# Patient Record
Sex: FEMALE | Race: WHITE | NOT HISPANIC OR LATINO | Employment: OTHER | URBAN - METROPOLITAN AREA
[De-identification: names, ages, dates, MRNs, and addresses within clinical notes are randomized per-mention and may not be internally consistent; named-entity substitution may affect disease eponyms.]

---

## 2017-01-23 ENCOUNTER — TELEPHONE (OUTPATIENT)
Dept: CARDIOLOGY | Facility: CLINIC | Age: 77
End: 2017-01-23

## 2017-01-23 NOTE — TELEPHONE ENCOUNTER
The pt called to verify her medication dosage as her home health RN has been giving her lisinopril BID.  She noticed on the bottle that she was only supposed to be taking it once daily.  I confirmed this for her (once daily) and she states she will stop taking it twice a day.

## 2017-03-02 ENCOUNTER — TRANSCRIBE ORDERS (OUTPATIENT)
Dept: ADMINISTRATIVE | Facility: HOSPITAL | Age: 77
End: 2017-03-02

## 2017-03-02 DIAGNOSIS — R10.84 GENERALIZED ABDOMINAL PAIN: Primary | ICD-10-CM

## 2017-03-03 ENCOUNTER — HOSPITAL ENCOUNTER (OUTPATIENT)
Dept: CT IMAGING | Facility: HOSPITAL | Age: 77
Discharge: HOME | End: 2017-03-03
Attending: FAMILY MEDICINE | Admitting: FAMILY MEDICINE

## 2017-03-03 DIAGNOSIS — R10.84 GENERALIZED ABDOMINAL PAIN: ICD-10-CM

## 2017-03-03 PROCEDURE — 74150 CT ABDOMEN W/O CONTRAST: CPT

## 2017-04-17 ENCOUNTER — TRANSCRIBE ORDERS (OUTPATIENT)
Dept: LAB | Facility: HOSPITAL | Age: 77
End: 2017-04-17

## 2017-04-17 ENCOUNTER — TRANSCRIBE ORDERS (OUTPATIENT)
Dept: ADMINISTRATIVE | Facility: HOSPITAL | Age: 77
End: 2017-04-17

## 2017-04-17 DIAGNOSIS — R10.811 RIGHT UPPER QUADRANT ABDOMINAL TENDERNESS, REBOUND TENDERNESS PRESENCE NOT SPECIFIED: Primary | ICD-10-CM

## 2017-04-19 ENCOUNTER — TRANSCRIBE ORDERS (OUTPATIENT)
Dept: LAB | Facility: HOSPITAL | Age: 77
End: 2017-04-19

## 2017-04-19 ENCOUNTER — LAB (OUTPATIENT)
Dept: LAB | Facility: HOSPITAL | Age: 77
End: 2017-04-19

## 2017-04-19 DIAGNOSIS — E27.8 ADRENAL MASS (HCC): ICD-10-CM

## 2017-04-19 DIAGNOSIS — E27.8 ADRENAL MASS (HCC): Primary | ICD-10-CM

## 2017-04-19 LAB
ANION GAP SERPL CALCULATED.3IONS-SCNC: 1 MMOL/L (ref 3–11)
BUN BLD-MCNC: 31 MG/DL (ref 9–23)
BUN/CREAT SERPL: 20.7 (ref 7–25)
CALCIUM SPEC-SCNC: 11 MG/DL (ref 8.7–10.4)
CHLORIDE SERPL-SCNC: 101 MMOL/L (ref 99–109)
CO2 SERPL-SCNC: 32 MMOL/L (ref 20–31)
CREAT BLD-MCNC: 1.5 MG/DL (ref 0.6–1.3)
GFR SERPL CREATININE-BSD FRML MDRD: 34 ML/MIN/1.73
GLUCOSE BLD-MCNC: 78 MG/DL (ref 70–100)
POTASSIUM BLD-SCNC: 5 MMOL/L (ref 3.5–5.5)
SODIUM BLD-SCNC: 134 MMOL/L (ref 132–146)

## 2017-04-19 PROCEDURE — 83586 ASSAY 17- KETOSTEROIDS: CPT

## 2017-04-19 PROCEDURE — 83835 ASSAY OF METANEPHRINES: CPT

## 2017-04-19 PROCEDURE — 36415 COLL VENOUS BLD VENIPUNCTURE: CPT

## 2017-04-19 PROCEDURE — 84244 ASSAY OF RENIN: CPT

## 2017-04-19 PROCEDURE — 82530 CORTISOL FREE: CPT

## 2017-04-19 PROCEDURE — 81050 URINALYSIS VOLUME MEASURE: CPT

## 2017-04-19 PROCEDURE — 83582 ASSAY OF KETOGENIC STEROIDS: CPT

## 2017-04-19 PROCEDURE — 80048 BASIC METABOLIC PNL TOTAL CA: CPT

## 2017-04-23 LAB
ALDOST SERPL-MCNC: 22.1 NG/DL (ref 0–30)
RENIN PLAS-CCNC: 0.51 NG/ML/HR (ref 0.17–5.38)

## 2017-04-24 LAB
17KS 24H UR-MRATE: 2.6 MG/D (ref 3.2–10.6)
COLLECT TME SMN: 24 HR
CREAT 24H UR-MCNC: 19 MG/DL
CREAT 24H UR-MRATE: 494 MG/D (ref 500–1400)
Lab: 1 MG/L
METANEPHRINE, PL: <10 PG/ML (ref 0–62)
NORMETANEPHRINE, PL: 24 PG/ML (ref 0–145)
TOTAL VOLUME: 2600 ML

## 2017-04-25 LAB
CORTIS F 24H UR-MRATE: 10 UG/24 HR (ref 0–50)
CORTIS F UR-MCNC: 4 UG/L

## 2017-05-08 ENCOUNTER — HOSPITAL ENCOUNTER (OUTPATIENT)
Dept: NUCLEAR MEDICINE | Facility: HOSPITAL | Age: 77
Discharge: HOME | End: 2017-05-08
Attending: SURGERY

## 2017-05-08 VITALS — BODY MASS INDEX: 30.04 KG/M2 | WEIGHT: 175 LBS

## 2017-05-08 DIAGNOSIS — R10.811 RIGHT UPPER QUADRANT ABDOMINAL TENDERNESS, REBOUND TENDERNESS PRESENCE NOT SPECIFIED: ICD-10-CM

## 2017-05-08 PROCEDURE — 0 TECHNETIUM TC 99M MEBROFENIN KIT: Performed by: SURGERY

## 2017-05-08 PROCEDURE — 78227 HEPATOBIL SYST IMAGE W/DRUG: CPT

## 2017-05-08 PROCEDURE — 25010000002 SINCALIDE PER 5 MCG: Performed by: SURGERY

## 2017-05-08 PROCEDURE — A9537 TC99M MEBROFENIN: HCPCS | Performed by: SURGERY

## 2017-05-08 RX ORDER — KIT FOR THE PREPARATION OF TECHNETIUM TC 99M MEBROFENIN 45 MG/10ML
1 INJECTION, POWDER, LYOPHILIZED, FOR SOLUTION INTRAVENOUS
Status: COMPLETED | OUTPATIENT
Start: 2017-05-08 | End: 2017-05-08

## 2017-05-08 RX ADMIN — SINCALIDE 1.6 MCG: 5 INJECTION, POWDER, LYOPHILIZED, FOR SOLUTION INTRAVENOUS at 14:06

## 2017-05-08 RX ADMIN — MEBROFENIN 1 DOSE: 45 INJECTION, POWDER, LYOPHILIZED, FOR SOLUTION INTRAVENOUS at 13:01

## 2017-06-12 ENCOUNTER — OFFICE VISIT (OUTPATIENT)
Dept: CARDIOLOGY | Facility: CLINIC | Age: 77
End: 2017-06-12

## 2017-06-12 VITALS
BODY MASS INDEX: 31.79 KG/M2 | WEIGHT: 186.2 LBS | SYSTOLIC BLOOD PRESSURE: 119 MMHG | HEART RATE: 58 BPM | DIASTOLIC BLOOD PRESSURE: 63 MMHG | HEIGHT: 64 IN

## 2017-06-12 DIAGNOSIS — I25.10 CORONARY ARTERY DISEASE INVOLVING NATIVE CORONARY ARTERY OF NATIVE HEART WITHOUT ANGINA PECTORIS: Primary | ICD-10-CM

## 2017-06-12 DIAGNOSIS — I50.21 ACUTE SYSTOLIC CONGESTIVE HEART FAILURE (HCC): ICD-10-CM

## 2017-06-12 DIAGNOSIS — I51.81 TAKOTSUBO CARDIOMYOPATHY: ICD-10-CM

## 2017-06-12 DIAGNOSIS — I10 ESSENTIAL HYPERTENSION: ICD-10-CM

## 2017-06-12 DIAGNOSIS — E78.2 MIXED HYPERLIPIDEMIA: ICD-10-CM

## 2017-06-12 PROCEDURE — 99214 OFFICE O/P EST MOD 30 MIN: CPT | Performed by: INTERNAL MEDICINE

## 2017-06-12 RX ORDER — ATORVASTATIN CALCIUM 80 MG/1
80 TABLET, FILM COATED ORAL NIGHTLY
Qty: 90 TABLET | Refills: 3
Start: 2017-06-12 | End: 2019-03-27 | Stop reason: SDUPTHER

## 2017-06-12 RX ORDER — LISINOPRIL 10 MG/1
10 TABLET ORAL DAILY
Qty: 90 TABLET | Refills: 3 | Status: SHIPPED | OUTPATIENT
Start: 2017-06-12 | End: 2019-01-28 | Stop reason: SDUPTHER

## 2017-06-12 RX ORDER — SPIRONOLACTONE 25 MG/1
25 TABLET ORAL DAILY
Qty: 90 TABLET | Refills: 3
Start: 2017-06-12 | End: 2019-03-27

## 2017-06-12 RX ORDER — CARVEDILOL 12.5 MG/1
12.5 TABLET ORAL 2 TIMES DAILY WITH MEALS
Qty: 180 TABLET | Refills: 3 | Status: SHIPPED | OUTPATIENT
Start: 2017-06-12 | End: 2019-03-27 | Stop reason: SDUPTHER

## 2017-06-12 RX ORDER — ASPIRIN 81 MG/1
81 TABLET, CHEWABLE ORAL DAILY
Qty: 90 TABLET | Refills: 3 | Status: SHIPPED | OUTPATIENT
Start: 2017-06-12 | End: 2019-03-27 | Stop reason: DRUGHIGH

## 2017-06-12 RX ORDER — PREGABALIN 50 MG/1
50 CAPSULE ORAL DAILY
COMMUNITY
End: 2019-03-27

## 2017-06-12 RX ORDER — TORSEMIDE 20 MG/1
20 TABLET ORAL DAILY
Qty: 90 TABLET | Refills: 3
Start: 2017-06-12 | End: 2019-03-27 | Stop reason: DRUGHIGH

## 2017-06-12 NOTE — PROGRESS NOTES
[facility] AT [address], [address]  [place], [place], [address] [phone]  [facility]           OUTPATIENT CLINIC FOLLOW UP NOTE    Encounter Date:    Patient Care Team:  Patient Care Team:  NIRAV Mcneill MD as PCP - General (Family Medicine)    Subjective:   Reason for consultation:   No chief complaint on file.      HPI:    Yanique Webster is a 77 y.o. female.  History of Present Illness  The patient is a 76-year-old female with a history of diabetes, hypertension, hyperlipidemia, peripheral vascular disease, history of tobacco smoking who was recently in the hospital 3 months ago due to acute respiratory distress, for which she was intubated for an monitored in the ICU.  She was found to have an elevated troponin and had an non STEMI.  She underwent a left heart catheterization which showed nonobstructive CAD.  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  She was sent home on a Life Vest and optimal medical therapy for both CAD and cardiomyopathy.    Today she presents for follow-up.  She remains functional class I.  She denies exertional chest pain/chest pressure.  She is very mild dyspnea with moderate activities that is relieved with rest and not associated with PND, orthopnea, lower extremity swelling.  She does complain of a three-day history of positional sharp chest discomfort that is worse with sitting up as well as worse with pushing on the right side of her left breast and over the sternum.  No recent viral infection.  She has not tried any medicines in addition to relieve this discomfort.    She has a history of anxiety, claustrophobia complaints    PFSH:  Patient Active Problem List   Diagnosis   • Aspiration pneumonia of both lungs   • Acute respiratory failure with hypoxia   • Fibromyalgia   • PVD (peripheral vascular disease)   • Acute renal failure - resolved.    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   • Diabetic gastroparesis   • Takotsubo cardiomyopathy   • Acute systolic congestive heart failure   • Elevated troponin   • Hypertension   • Diabetes mellitus   • Hyperlipidemia   • COPD (chronic obstructive pulmonary disease)   • Obesity   • Osteoarthritis   • Chronic pain   • GERD (gastroesophageal reflux disease)   • Pericarditis   • Gout   • Chronic joint pain   • Coronary artery disease involving native coronary artery of native heart without angina pectoris         Current Outpatient Prescriptions:   •  aspirin 81 MG chewable tablet, Chew 1 tablet Daily., Disp: 90 tablet, Rfl: 3  •  atorvastatin (LIPITOR) 80 MG tablet, Take 1 tablet by mouth Every Night., Disp: 90 tablet, Rfl: 3  •  calcitriol (ROCALTROL) 0.25 MCG capsule, Take 0.25 mcg by mouth 1 (one) time per week. Last filled 6/24/16 for 12 week course, Disp: , Rfl:   •  carvedilol (COREG) 12.5 MG tablet, Take 1 tablet by mouth 2 (Two) Times a Day With Meals., Disp: 180 tablet, Rfl: 3  •  cholecalciferol (VITAMIN D3) 1000 UNITS tablet, Take 1,000 Units by mouth daily., Disp: , Rfl:   •  DULoxetine (CYMBALTA) 60 MG capsule, Take 2 capsules by mouth daily. (Patient taking differently: Take 60 mg by mouth Daily.), Disp: , Rfl:   •  insulin detemir (LEVEMIR) 100 UNIT/ML injection, Inject 20 Units under the skin every night., Disp: , Rfl: 12  •  ipratropium-albuterol (DUO-NEB) 0.5-2.5 mg/mL nebulizer, Take 3 mL by nebulization every 6 (six) hours as needed for shortness of air., Disp: 360 mL, Rfl:   •  lisinopril (PRINIVIL,ZESTRIL) 10 MG tablet, Take 1 tablet by mouth Daily., Disp: 90 tablet, Rfl: 3  •  methadone (DOLOPHINE) 5 MG tablet, Take 1 tablet by mouth every 8 (eight) hours., Disp: , Rfl: 0  •  pantoprazole (PROTONIX) 40 MG EC tablet, Take 1 tablet by mouth daily., Disp: , Rfl:   •  polyethylene glycol (MIRALAX) packet, Take 17 g by mouth daily., Disp: , Rfl:   •  polyethylene  "glycol (MIRALAX) packet, Take 17 g by mouth every 12 (twelve) hours as needed (constipation)., Disp: , Rfl:   •  pregabalin (LYRICA) 50 MG capsule, Take 50 mg by mouth 3 (Three) Times a Day., Disp: , Rfl:   •  sennosides-docusate sodium (SENOKOT-S) 8.6-50 MG tablet, Take 2 tablets by mouth 2 (two) times a day., Disp: , Rfl:   •  spironolactone (ALDACTONE) 25 MG tablet, Take 1 tablet by mouth Daily., Disp: 90 tablet, Rfl: 3  •  torsemide (DEMADEX) 20 MG tablet, Take 1 tablet by mouth Daily., Disp: 90 tablet, Rfl: 3  •  TRADJENTA 5 MG tablet tablet, Daily., Disp: , Rfl:     Allergies   Allergen Reactions   • Penicillins         reports that she quit smoking about 14 years ago. Her smoking use included Cigarettes. She has a 25.00 pack-year smoking history. She has never used smokeless tobacco.    Family History   Problem Relation Age of Onset   • Cancer Mother    • Cancer Father    • Cancer Other        Review of Systems:  Positive for MSK chest pain atypical for angina  Negative for exertional chest pain, orthopnea, PND, lower extremity edema, palpitations, lightheadedness, syncope.   Review of Systems  All other systems reviewed and are negative.    Objective:   Blood pressure 119/63, pulse 58, height 64\" (162.6 cm), weight 186 lb 3.2 oz (84.5 kg).  Physical Exam  CONSTITUTIONAL: No acute distress, normal affect  RESPIRATORY: Normal effort. Clear to auscultation bilaterally without wheezing or rales  CHEST: Reproducible chest pain on pressure to the chest with the stethoscope  CARDIOVASCULAR: Jugular venous pressure within normal limits. Carotids with normal upstrokes without bruits.  Regular rate and rhythm with normal S1 and S2. Without murmur, gallop or rub.  PERIPHERAL VASCULAR: Normal radial pulses bilaterally, normal posterior tibial pulses bilaterally. There is no lower extremity edema bilaterally.    Labs:  Lab Results   Component Value Date    ALT 95 (H) 08/23/2016     (H) 08/23/2016     Lab Results "   Component Value Date    LDLDIRECT 2.6 (L) 04/19/2017    CREATININE 1.50 (H) 04/19/2017       Diagnostic Data:    Procedures  TTE 8/2016  · The left ventricular cavity is mildly dilated.  · The findings are consistent with stress-induced (Takotsubo) cardiomyopathy.  · Moderate aortic valve regurgitation is present.  · Left ventricular function is severely decreased. Estimated EF = 15%.    TTE 10/2016  · Left ventricular function is normal. Estimated EF = 60%.  · Left ventricular wall thickness is consistent with borderline concentric hypertrophy.  · The left ventricular cavity is borderline dilated.  · All left ventricular wall segments contract normally.  · Left ventricular diastolic dysfunction (grade I a) consistent with impaired relaxation.  · Moderate aortic valve regurgitation is present.  ·   Cath results reviewed 8/30/16  Angiographic Findings:  · Coronary circulation is right dominant  · Left main: Normal  · LAD: 40% proximal discrete stenosis, small D1 and D2 without significant disease, mid to distal LAD with mild diffuse disease  · LCX: Large circumflex, tandem 30% stenoses in the mid Cx after OM2, moderate mid OM1 disease, med sized OM2 with luminal irregularities, med to large sized OM3 without significant disease  · RCA: 50% mid RCA lesion, iFR 0.95 (not functionally significant), mild distal disease    Assessment and Plan:   Takotsubo cardiomyopathy  Comments:  -EF now normal  -NYHA class I, doing well from cardiac standpoint  -Continue cardiac medications     Coronary artery disease involving native coronary artery of native heart without angina pectoris  Comments:  -CCS class 0  -Continue current medications     Atypical chest pain  -Very reproducible on exam with slight pressure.  Suspect musculoskeletal source.  Cannot rule out pericarditis.  -We'll call the patient in one week to see how she's doing.  If chest pain persists we'll have her come back.  At that time could consider EKG and rule out  pericarditis    Essential hypertension  -BP at goal, continue current medications    - Dietary and exercise counseling was provided during this visit  - Return in about 6 months (around 12/12/2017).    Steve Geronimo MD, MSc, [facility]  Interventional Cardiology  [facility] at [facility]

## 2017-06-22 ENCOUNTER — TELEPHONE (OUTPATIENT)
Dept: CARDIOLOGY | Facility: CLINIC | Age: 77
End: 2017-06-22

## 2017-06-22 NOTE — TELEPHONE ENCOUNTER
I spoke with the patient regarding recent CP and she states she feels that MJS is correct in suggesting that the pain she was experiencing was muscular.  She did not have any more pain until exerting herself cleaning the walls of a sunroom, and has had none since.  I advised that she call us if she feels she has developed any CP, SOA, or dizziness.  Understanding and agreement verbalized at this time.

## 2017-10-31 RX ORDER — LISINOPRIL 10 MG/1
TABLET ORAL
Qty: 30 TABLET | Refills: 11 | Status: SHIPPED | OUTPATIENT
Start: 2017-10-31 | End: 2018-10-29 | Stop reason: SDUPTHER

## 2017-11-27 ENCOUNTER — TRANSCRIBE ORDERS (OUTPATIENT)
Dept: ADMINISTRATIVE | Facility: HOSPITAL | Age: 77
End: 2017-11-27

## 2017-11-27 DIAGNOSIS — Z12.31 VISIT FOR SCREENING MAMMOGRAM: Primary | ICD-10-CM

## 2017-12-18 ENCOUNTER — OFFICE VISIT (OUTPATIENT)
Dept: CARDIOLOGY | Facility: CLINIC | Age: 77
End: 2017-12-18

## 2017-12-18 VITALS
DIASTOLIC BLOOD PRESSURE: 68 MMHG | BODY MASS INDEX: 34.79 KG/M2 | SYSTOLIC BLOOD PRESSURE: 140 MMHG | OXYGEN SATURATION: 96 % | HEIGHT: 64 IN | WEIGHT: 203.8 LBS | HEART RATE: 65 BPM

## 2017-12-18 DIAGNOSIS — I35.1 NONRHEUMATIC AORTIC VALVE INSUFFICIENCY: ICD-10-CM

## 2017-12-18 DIAGNOSIS — IMO0002 UNCONTROLLED DIABETES MELLITUS TYPE 2 WITH ATHEROSCLEROSIS OF ARTERIES OF EXTREMITIES: ICD-10-CM

## 2017-12-18 DIAGNOSIS — I25.10 CORONARY ARTERY DISEASE INVOLVING NATIVE CORONARY ARTERY OF NATIVE HEART WITHOUT ANGINA PECTORIS: Primary | ICD-10-CM

## 2017-12-18 DIAGNOSIS — I10 ESSENTIAL HYPERTENSION: ICD-10-CM

## 2017-12-18 DIAGNOSIS — I50.21 ACUTE SYSTOLIC CONGESTIVE HEART FAILURE (HCC): ICD-10-CM

## 2017-12-18 DIAGNOSIS — I51.81 TAKOTSUBO CARDIOMYOPATHY: ICD-10-CM

## 2017-12-18 DIAGNOSIS — E78.2 MIXED HYPERLIPIDEMIA: ICD-10-CM

## 2017-12-18 PROCEDURE — 99214 OFFICE O/P EST MOD 30 MIN: CPT | Performed by: INTERNAL MEDICINE

## 2017-12-18 NOTE — PROGRESS NOTES
[facility] AT [address], [address]  [place], [place], [address]    [phone]  [facility]           OUTPATIENT CLINIC FOLLOW UP NOTE    Patient Care Team:  Patient Care Team:  NIRAV Mcneill MD as PCP - General (Family Medicine)    Subjective:   Reason for consultation:   Chief Complaint   Patient presents with   • Coronary Artery Disease       HPI:    Yanique Webster is a 77 y.o. female.  Coronary Artery Disease       The patient has a history of Stress-induced cardiomyopathy 8/2016, EF 15% which improved to 60% in 10/2016, found to have mild diffuse multivessel coronary artery disease at that time, moderate aortic regurgitation, diabetes, hypertension, hyperlipidemia, peripheral vascular disease, history of tobacco smoking, anxiety, claustrophobia, who presents for follow-up.    Today she remains functional class I.  She denies exertional chest pain/chest pressure.  She has very mild dyspnea with moderate activities that is relieved with rest and not associated with PND, orthopnea, lower extremity swelling.  She had a reproducible, muscular chest pain at her last visit that his resolved and not recurred since then.  It was atypical.        PFSH:  Patient Active Problem List   Diagnosis   • Aspiration pneumonia of both lungs   • Acute respiratory failure with hypoxia   • Fibromyalgia   • PVD (peripheral vascular disease)   • Acute renal failure - resolved.    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   • Diabetic gastroparesis   • Takotsubo cardiomyopathy   • Acute systolic congestive heart failure   • Elevated troponin   • Hypertension   • Diabetes mellitus   • Hyperlipidemia   • COPD (chronic obstructive pulmonary disease)   • Obesity   • Osteoarthritis   • Chronic pain   • GERD (gastroesophageal reflux disease)   • Pericarditis   • Gout   • Chronic joint pain   • Coronary artery disease involving native coronary  artery of native heart without angina pectoris         Current Outpatient Prescriptions:   •  aspirin 81 MG chewable tablet, Chew 1 tablet Daily., Disp: 90 tablet, Rfl: 3  •  atorvastatin (LIPITOR) 80 MG tablet, Take 1 tablet by mouth Every Night. (Patient taking differently: Take 40 mg by mouth Every Night.), Disp: 90 tablet, Rfl: 3  •  calcitriol (ROCALTROL) 0.25 MCG capsule, Take 0.25 mcg by mouth 1 (one) time per week. Last filled 6/24/16 for 12 week course, Disp: , Rfl:   •  carvedilol (COREG) 12.5 MG tablet, Take 1 tablet by mouth 2 (Two) Times a Day With Meals., Disp: 180 tablet, Rfl: 3  •  cholecalciferol (VITAMIN D3) 1000 UNITS tablet, Take 1,000 Units by mouth daily., Disp: , Rfl:   •  DULoxetine (CYMBALTA) 60 MG capsule, Take 2 capsules by mouth daily. (Patient taking differently: Take 60 mg by mouth Daily.), Disp: , Rfl:   •  insulin detemir (LEVEMIR) 100 UNIT/ML injection, Inject 20 Units under the skin every night., Disp: , Rfl: 12  •  lisinopril (PRINIVIL,ZESTRIL) 10 MG tablet, Take 1 tablet by mouth Daily., Disp: 90 tablet, Rfl: 3  •  lisinopril (PRINIVIL,ZESTRIL) 10 MG tablet, TAKE ONE TABLET BY MOUTH ONCE DAILY, Disp: 30 tablet, Rfl: 11  •  methadone (DOLOPHINE) 5 MG tablet, Take 1 tablet by mouth every 8 (eight) hours., Disp: , Rfl: 0  •  pantoprazole (PROTONIX) 40 MG EC tablet, Take 1 tablet by mouth daily., Disp: , Rfl:   •  polyethylene glycol (MIRALAX) packet, Take 17 g by mouth every 12 (twelve) hours as needed (constipation)., Disp: , Rfl:   •  pregabalin (LYRICA) 50 MG capsule, Take 50 mg by mouth Daily., Disp: , Rfl:   •  spironolactone (ALDACTONE) 25 MG tablet, Take 1 tablet by mouth Daily., Disp: 90 tablet, Rfl: 3  •  torsemide (DEMADEX) 20 MG tablet, Take 1 tablet by mouth Daily. (Patient taking differently: Take 10 mg by mouth Daily. Patient takes two tablets once aday), Disp: 90 tablet, Rfl: 3  •  TRADJENTA 5 MG tablet tablet, Daily., Disp: , Rfl:     Allergies   Allergen Reactions  "  • Penicillins         reports that she quit smoking about 14 years ago. Her smoking use included Cigarettes. She has a 25.00 pack-year smoking history. She has never used smokeless tobacco.    Family History   Problem Relation Age of Onset   • Cancer Mother    • Cancer Father    • Cancer Other        Review of Systems:  Negative for exertional chest pain, orthopnea, PND, lower extremity edema, palpitations, lightheadedness, syncope.   Review of Systems  All other systems reviewed and are negative.    Objective:   Blood pressure 140/68, pulse 65, height 162.6 cm (64\"), weight 92.4 kg (203 lb 12.8 oz), SpO2 96 %.  Physical Exam  CONSTITUTIONAL: No acute distress, normal affect  RESPIRATORY: Normal effort. Clear to auscultation bilaterally without wheezing or rales  CHEST: Reproducible chest pain on pressure to the chest with the stethoscope  CARDIOVASCULAR: Carotids with normal upstrokes without bruits.  Regular rate and rhythm with normal S1 and S2. Without gallop or rub. Diastolic murmur at sternal border   PERIPHERAL VASCULAR: Normal radial pulses bilaterally, normal posterior tibial pulses bilaterally. There is no lower extremity edema bilaterally.    Labs:  Lab Results   Component Value Date    ALT 95 (H) 08/23/2016     (H) 08/23/2016     Lab Results   Component Value Date    LDLDIRECT 2.6 (L) 04/19/2017    CREATININE 1.50 (H) 04/19/2017       Diagnostic Data:    Procedures  TTE 8/2016  · The left ventricular cavity is mildly dilated.  · The findings are consistent with stress-induced (Takotsubo) cardiomyopathy.  · Moderate aortic valve regurgitation is present.  · Left ventricular function is severely decreased. Estimated EF = 15%.    TTE 10/2016  · Left ventricular function is normal. Estimated EF = 60%.  · Left ventricular wall thickness is consistent with borderline concentric hypertrophy.  · The left ventricular cavity is borderline dilated.  · All left ventricular wall segments contract " normally.  · Left ventricular diastolic dysfunction (grade I a) consistent with impaired relaxation.  · Moderate aortic valve regurgitation is present.  ·   Cath results reviewed 8/30/16  Angiographic Findings:  · Coronary circulation is right dominant  · Left main: Normal  · LAD: 40% proximal discrete stenosis, small D1 and D2 without significant disease, mid to distal LAD with mild diffuse disease  · LCX: Large circumflex, tandem 30% stenoses in the mid Cx after OM2, moderate mid OM1 disease, med sized OM2 with luminal irregularities, med to large sized OM3 without significant disease  · RCA: 50% mid RCA lesion, iFR 0.95 (not functionally significant), mild distal disease    Assessment and Plan:   Takotsubo cardiomyopathy  -EF now normal  -NYHA class I, doing well from cardiac standpoint  -Continue cardiac medications     Coronary artery disease involving native coronary artery of native heart without angina pectoris  Hyperlipidemia  -CCS class 0  -Continue current medications  -Repeat lipid panel and I'll teases with PCP, requested the patient have her next set of results faxed to us     Aortic regurgitation  -Stable murmur, no new clinical symptoms, clinical monitoring    Essential hypertension  -Continue current medications    - Dietary and exercise counseling was provided during this visit  - Return in about 1 year (around 12/18/2018).    Steve Geronimo MD, MSc, [facility]  Interventional Cardiology  [facility]

## 2018-01-02 ENCOUNTER — APPOINTMENT (OUTPATIENT)
Dept: MAMMOGRAPHY | Facility: HOSPITAL | Age: 78
End: 2018-01-02
Attending: FAMILY MEDICINE

## 2018-01-03 ENCOUNTER — APPOINTMENT (OUTPATIENT)
Dept: MAMMOGRAPHY | Facility: HOSPITAL | Age: 78
End: 2018-01-03
Attending: FAMILY MEDICINE

## 2018-02-01 ENCOUNTER — APPOINTMENT (OUTPATIENT)
Dept: MAMMOGRAPHY | Facility: HOSPITAL | Age: 78
End: 2018-02-01
Attending: FAMILY MEDICINE

## 2018-02-22 ENCOUNTER — TRANSCRIBE ORDERS (OUTPATIENT)
Dept: ADMINISTRATIVE | Facility: HOSPITAL | Age: 78
End: 2018-02-22

## 2018-02-22 DIAGNOSIS — Z12.31 VISIT FOR SCREENING MAMMOGRAM: Primary | ICD-10-CM

## 2018-03-20 ENCOUNTER — HOSPITAL ENCOUNTER (OUTPATIENT)
Dept: MAMMOGRAPHY | Facility: HOSPITAL | Age: 78
Discharge: HOME | End: 2018-03-20
Attending: FAMILY MEDICINE | Admitting: FAMILY MEDICINE

## 2018-03-20 DIAGNOSIS — Z12.31 VISIT FOR SCREENING MAMMOGRAM: ICD-10-CM

## 2018-03-20 PROCEDURE — 77067 SCR MAMMO BI INCL CAD: CPT

## 2018-03-20 PROCEDURE — 77063 BREAST TOMOSYNTHESIS BI: CPT | Performed by: RADIOLOGY

## 2018-03-20 PROCEDURE — 77067 SCR MAMMO BI INCL CAD: CPT | Performed by: RADIOLOGY

## 2018-03-20 PROCEDURE — 77063 BREAST TOMOSYNTHESIS BI: CPT

## 2018-03-27 ENCOUNTER — TRANSCRIBE ORDERS (OUTPATIENT)
Dept: ADMINISTRATIVE | Facility: HOSPITAL | Age: 78
End: 2018-03-27

## 2018-03-27 DIAGNOSIS — Z87.891 PERSONAL HISTORY OF TOBACCO USE, PRESENTING HAZARDS TO HEALTH: Primary | ICD-10-CM

## 2018-04-05 ENCOUNTER — APPOINTMENT (OUTPATIENT)
Dept: CT IMAGING | Facility: HOSPITAL | Age: 78
End: 2018-04-05
Attending: FAMILY MEDICINE

## 2018-04-26 ENCOUNTER — APPOINTMENT (OUTPATIENT)
Dept: CT IMAGING | Facility: HOSPITAL | Age: 78
End: 2018-04-26
Attending: FAMILY MEDICINE

## 2018-05-10 ENCOUNTER — HOSPITAL ENCOUNTER (OUTPATIENT)
Dept: CT IMAGING | Facility: HOSPITAL | Age: 78
Discharge: HOME | End: 2018-05-10
Attending: FAMILY MEDICINE | Admitting: FAMILY MEDICINE

## 2018-05-10 DIAGNOSIS — Z87.891 PERSONAL HISTORY OF TOBACCO USE, PRESENTING HAZARDS TO HEALTH: ICD-10-CM

## 2018-05-10 PROCEDURE — G0297 LDCT FOR LUNG CA SCREEN: HCPCS

## 2018-10-30 RX ORDER — LISINOPRIL 10 MG/1
TABLET ORAL
Qty: 90 TABLET | Refills: 0 | Status: SHIPPED | OUTPATIENT
Start: 2018-10-30 | End: 2019-01-28 | Stop reason: SDUPTHER

## 2019-01-28 RX ORDER — LISINOPRIL 10 MG/1
TABLET ORAL
Qty: 90 TABLET | Refills: 0 | Status: SHIPPED | OUTPATIENT
Start: 2019-01-28 | End: 2019-03-27 | Stop reason: DRUGHIGH

## 2019-03-14 ENCOUNTER — APPOINTMENT (OUTPATIENT)
Dept: CT IMAGING | Facility: HOSPITAL | Age: 79
End: 2019-03-14

## 2019-03-14 ENCOUNTER — APPOINTMENT (OUTPATIENT)
Dept: CARDIOLOGY | Facility: HOSPITAL | Age: 79
End: 2019-03-14

## 2019-03-14 ENCOUNTER — HOSPITAL ENCOUNTER (INPATIENT)
Facility: HOSPITAL | Age: 79
LOS: 7 days | Discharge: HOME HEALTH | End: 2019-03-21
Attending: EMERGENCY MEDICINE | Admitting: INTERNAL MEDICINE

## 2019-03-14 ENCOUNTER — APPOINTMENT (OUTPATIENT)
Dept: GENERAL RADIOLOGY | Facility: HOSPITAL | Age: 79
End: 2019-03-14

## 2019-03-14 DIAGNOSIS — I49.8 OTHER SPECIFIED CARDIAC ARRHYTHMIAS: ICD-10-CM

## 2019-03-14 DIAGNOSIS — I51.81 TAKOTSUBO CARDIOMYOPATHY: ICD-10-CM

## 2019-03-14 DIAGNOSIS — N18.30 CKD (CHRONIC KIDNEY DISEASE) STAGE 3, GFR 30-59 ML/MIN (HCC): ICD-10-CM

## 2019-03-14 DIAGNOSIS — I63.9 CEREBROVASCULAR ACCIDENT (CVA), UNSPECIFIED MECHANISM (HCC): ICD-10-CM

## 2019-03-14 DIAGNOSIS — Z74.09 IMPAIRED MOBILITY AND ADLS: ICD-10-CM

## 2019-03-14 DIAGNOSIS — R41.82 ALTERED MENTAL STATUS, UNSPECIFIED ALTERED MENTAL STATUS TYPE: Primary | ICD-10-CM

## 2019-03-14 DIAGNOSIS — I63.9 ACUTE ISCHEMIC STROKE (HCC): ICD-10-CM

## 2019-03-14 DIAGNOSIS — Z78.9 IMPAIRED MOBILITY AND ADLS: ICD-10-CM

## 2019-03-14 DIAGNOSIS — R40.0 SOMNOLENCE: ICD-10-CM

## 2019-03-14 DIAGNOSIS — J44.9 CHRONIC OBSTRUCTIVE PULMONARY DISEASE, UNSPECIFIED COPD TYPE (HCC): ICD-10-CM

## 2019-03-14 DIAGNOSIS — E11.8 TYPE 2 DIABETES MELLITUS WITH COMPLICATION, UNSPECIFIED WHETHER LONG TERM INSULIN USE: ICD-10-CM

## 2019-03-14 DIAGNOSIS — I10 HYPERTENSION, UNSPECIFIED TYPE: ICD-10-CM

## 2019-03-14 DIAGNOSIS — I10 ESSENTIAL HYPERTENSION: ICD-10-CM

## 2019-03-14 DIAGNOSIS — Z74.09 IMPAIRED FUNCTIONAL MOBILITY, BALANCE, GAIT, AND ENDURANCE: ICD-10-CM

## 2019-03-14 PROBLEM — E11.9 DIABETES MELLITUS: Status: ACTIVE | Noted: 2019-03-14

## 2019-03-14 PROBLEM — E78.5 HYPERLIPIDEMIA: Status: ACTIVE | Noted: 2019-03-14

## 2019-03-14 LAB
ALT SERPL W P-5'-P-CCNC: 19 U/L (ref 7–40)
APTT PPP: 32.4 SECONDS (ref 24–37)
AST SERPL-CCNC: 20 U/L (ref 0–33)
BASOPHILS # BLD AUTO: 0.04 10*3/MM3 (ref 0–0.2)
BASOPHILS NFR BLD AUTO: 0.5 % (ref 0–1)
BH CV ECHO MEAS - AO ROOT AREA (BSA CORRECTED): 1.6
BH CV ECHO MEAS - AO ROOT AREA: 9.2 CM^2
BH CV ECHO MEAS - AO ROOT DIAM: 3.4 CM
BH CV ECHO MEAS - BSA(HAYCOCK): 2.3 M^2
BH CV ECHO MEAS - BSA: 2.1 M^2
BH CV ECHO MEAS - BZI_BMI: 38.9 KILOGRAMS/M^2
BH CV ECHO MEAS - BZI_METRIC_HEIGHT: 165.1 CM
BH CV ECHO MEAS - BZI_METRIC_WEIGHT: 106.1 KG
BH CV ECHO MEAS - EDV(CUBED): 96.7 ML
BH CV ECHO MEAS - EDV(MOD-SP2): 71 ML
BH CV ECHO MEAS - EDV(MOD-SP4): 48 ML
BH CV ECHO MEAS - EDV(TEICH): 96.8 ML
BH CV ECHO MEAS - EF(CUBED): 70.9 %
BH CV ECHO MEAS - EF(MOD-BP): 77 %
BH CV ECHO MEAS - EF(MOD-SP2): 77.5 %
BH CV ECHO MEAS - EF(MOD-SP4): 75 %
BH CV ECHO MEAS - EF(TEICH): 62.6 %
BH CV ECHO MEAS - ESV(CUBED): 28.1 ML
BH CV ECHO MEAS - ESV(MOD-SP2): 16 ML
BH CV ECHO MEAS - ESV(MOD-SP4): 12 ML
BH CV ECHO MEAS - ESV(TEICH): 36.2 ML
BH CV ECHO MEAS - FS: 33.7 %
BH CV ECHO MEAS - IVS/LVPW: 1.1
BH CV ECHO MEAS - IVSD: 1.1 CM
BH CV ECHO MEAS - LAT PEAK E' VEL: 5.4 CM/SEC
BH CV ECHO MEAS - LATERAL E/E' RATIO: 9.3
BH CV ECHO MEAS - LV DIASTOLIC VOL/BSA (35-75): 22.7 ML/M^2
BH CV ECHO MEAS - LV MASS(C)D: 165.4 GRAMS
BH CV ECHO MEAS - LV MASS(C)DI: 78.2 GRAMS/M^2
BH CV ECHO MEAS - LV SYSTOLIC VOL/BSA (12-30): 5.7 ML/M^2
BH CV ECHO MEAS - LVIDD: 4.6 CM
BH CV ECHO MEAS - LVIDS: 3 CM
BH CV ECHO MEAS - LVLD AP2: 7.4 CM
BH CV ECHO MEAS - LVLD AP4: 6.3 CM
BH CV ECHO MEAS - LVLS AP2: 5.1 CM
BH CV ECHO MEAS - LVLS AP4: 5.7 CM
BH CV ECHO MEAS - LVOT AREA (M): 2.8 CM^2
BH CV ECHO MEAS - LVOT AREA: 2.7 CM^2
BH CV ECHO MEAS - LVOT DIAM: 1.9 CM
BH CV ECHO MEAS - LVPWD: 0.98 CM
BH CV ECHO MEAS - MED PEAK E' VEL: 4.2 CM/SEC
BH CV ECHO MEAS - MEDIAL E/E' RATIO: 11.9
BH CV ECHO MEAS - MV A MAX VEL: 75 CM/SEC
BH CV ECHO MEAS - MV DEC TIME: 0.32 SEC
BH CV ECHO MEAS - MV E MAX VEL: 51.3 CM/SEC
BH CV ECHO MEAS - MV E/A: 0.68
BH CV ECHO MEAS - SI(CUBED): 32.4 ML/M^2
BH CV ECHO MEAS - SI(MOD-SP2): 26 ML/M^2
BH CV ECHO MEAS - SI(MOD-SP4): 17 ML/M^2
BH CV ECHO MEAS - SI(TEICH): 28.7 ML/M^2
BH CV ECHO MEAS - SV(CUBED): 68.6 ML
BH CV ECHO MEAS - SV(MOD-SP2): 55 ML
BH CV ECHO MEAS - SV(MOD-SP4): 36 ML
BH CV ECHO MEAS - SV(TEICH): 60.6 ML
BH CV ECHO MEASUREMENTS AVERAGE E/E' RATIO: 10.69
BH CV VAS BP RIGHT ARM: NORMAL MMHG
BUN BLDA-MCNC: 22 MG/DL (ref 8–26)
CA-I BLDA-SCNC: 1.4 MMOL/L (ref 1.2–1.32)
CHLORIDE BLDA-SCNC: 104 MMOL/L (ref 98–109)
CO2 BLDA-SCNC: 27 MMOL/L (ref 24–29)
CREAT BLDA-MCNC: 1.3 MG/DL (ref 0.6–1.3)
DEPRECATED RDW RBC AUTO: 46.6 FL (ref 37–54)
EOSINOPHIL # BLD AUTO: 0.22 10*3/MM3 (ref 0–0.3)
EOSINOPHIL NFR BLD AUTO: 2.8 % (ref 0–3)
ERYTHROCYTE [DISTWIDTH] IN BLOOD BY AUTOMATED COUNT: 14.5 % (ref 11.3–14.5)
GLUCOSE BLDC GLUCOMTR-MCNC: 141 MG/DL (ref 70–130)
GLUCOSE BLDC GLUCOMTR-MCNC: 204 MG/DL (ref 70–130)
HCT VFR BLD AUTO: 43.7 % (ref 34.5–44)
HCT VFR BLDA CALC: 43 % (ref 38–51)
HGB BLD-MCNC: 14.1 G/DL (ref 11.5–15.5)
HGB BLDA-MCNC: 14.6 G/DL (ref 12–17)
HOLD SPECIMEN: NORMAL
IMM GRANULOCYTES # BLD AUTO: 0.02 10*3/MM3 (ref 0–0.05)
IMM GRANULOCYTES NFR BLD AUTO: 0.3 % (ref 0–0.6)
INR PPP: 1.2 (ref 0.8–1.2)
LYMPHOCYTES # BLD AUTO: 1.93 10*3/MM3 (ref 0.6–4.8)
LYMPHOCYTES NFR BLD AUTO: 24.4 % (ref 24–44)
MAXIMAL PREDICTED HEART RATE: 142 BPM
MCH RBC QN AUTO: 28.7 PG (ref 27–31)
MCHC RBC AUTO-ENTMCNC: 32.3 G/DL (ref 32–36)
MCV RBC AUTO: 89 FL (ref 80–99)
MONOCYTES # BLD AUTO: 0.58 10*3/MM3 (ref 0–1)
MONOCYTES NFR BLD AUTO: 7.3 % (ref 0–12)
NEUTROPHILS # BLD AUTO: 5.14 10*3/MM3 (ref 1.5–8.3)
NEUTROPHILS NFR BLD AUTO: 65 % (ref 41–71)
PLATELET # BLD AUTO: 186 10*3/MM3 (ref 150–450)
PMV BLD AUTO: 11.3 FL (ref 6–12)
POTASSIUM BLDA-SCNC: 4.3 MMOL/L (ref 3.5–4.9)
PROTHROMBIN TIME: 14.3 SECONDS (ref 12.8–15.2)
RBC # BLD AUTO: 4.91 10*6/MM3 (ref 3.89–5.14)
SODIUM BLDA-SCNC: 140 MMOL/L (ref 138–146)
STRESS TARGET HR: 121 BPM
TROPONIN I SERPL-MCNC: 0 NG/ML (ref 0–0.07)
WBC NRBC COR # BLD: 7.91 10*3/MM3 (ref 3.5–10.8)
WHOLE BLOOD HOLD SPECIMEN: NORMAL
WHOLE BLOOD HOLD SPECIMEN: NORMAL

## 2019-03-14 PROCEDURE — 70450 CT HEAD/BRAIN W/O DYE: CPT

## 2019-03-14 PROCEDURE — 93005 ELECTROCARDIOGRAM TRACING: CPT | Performed by: EMERGENCY MEDICINE

## 2019-03-14 PROCEDURE — 93880 EXTRACRANIAL BILAT STUDY: CPT | Performed by: INTERNAL MEDICINE

## 2019-03-14 PROCEDURE — 99291 CRITICAL CARE FIRST HOUR: CPT

## 2019-03-14 PROCEDURE — 25010000002 ALTEPLASE PER 1 MG: Performed by: EMERGENCY MEDICINE

## 2019-03-14 PROCEDURE — 70498 CT ANGIOGRAPHY NECK: CPT

## 2019-03-14 PROCEDURE — 85610 PROTHROMBIN TIME: CPT

## 2019-03-14 PROCEDURE — 84484 ASSAY OF TROPONIN QUANT: CPT

## 2019-03-14 PROCEDURE — 63710000001 INSULIN REGULAR HUMAN PER 5 UNITS: Performed by: INTERNAL MEDICINE

## 2019-03-14 PROCEDURE — 99291 CRITICAL CARE FIRST HOUR: CPT | Performed by: INTERNAL MEDICINE

## 2019-03-14 PROCEDURE — 82962 GLUCOSE BLOOD TEST: CPT

## 2019-03-14 PROCEDURE — 25010000002 ONDANSETRON PER 1 MG: Performed by: NURSE PRACTITIONER

## 2019-03-14 PROCEDURE — 0042T HC CT CEREBRAL PERFUSION W/WO CONTRAST: CPT

## 2019-03-14 PROCEDURE — 99223 1ST HOSP IP/OBS HIGH 75: CPT | Performed by: PSYCHIATRY & NEUROLOGY

## 2019-03-14 PROCEDURE — 84450 TRANSFERASE (AST) (SGOT): CPT | Performed by: EMERGENCY MEDICINE

## 2019-03-14 PROCEDURE — 70496 CT ANGIOGRAPHY HEAD: CPT

## 2019-03-14 PROCEDURE — 80047 BASIC METABLC PNL IONIZED CA: CPT

## 2019-03-14 PROCEDURE — 93880 EXTRACRANIAL BILAT STUDY: CPT

## 2019-03-14 PROCEDURE — 71045 X-RAY EXAM CHEST 1 VIEW: CPT

## 2019-03-14 PROCEDURE — 25010000002 SULFUR HEXAFLUORIDE MICROSPH 60.7-25 MG RECONSTITUTED SUSPENSION: Performed by: PSYCHIATRY & NEUROLOGY

## 2019-03-14 PROCEDURE — 0 IOPAMIDOL PER 1 ML: Performed by: EMERGENCY MEDICINE

## 2019-03-14 PROCEDURE — 85025 COMPLETE CBC W/AUTO DIFF WBC: CPT | Performed by: EMERGENCY MEDICINE

## 2019-03-14 PROCEDURE — 93306 TTE W/DOPPLER COMPLETE: CPT

## 2019-03-14 PROCEDURE — 3E03317 INTRODUCTION OF OTHER THROMBOLYTIC INTO PERIPHERAL VEIN, PERCUTANEOUS APPROACH: ICD-10-PCS | Performed by: EMERGENCY MEDICINE

## 2019-03-14 PROCEDURE — 84460 ALANINE AMINO (ALT) (SGPT): CPT | Performed by: EMERGENCY MEDICINE

## 2019-03-14 PROCEDURE — 85730 THROMBOPLASTIN TIME PARTIAL: CPT | Performed by: EMERGENCY MEDICINE

## 2019-03-14 PROCEDURE — 85014 HEMATOCRIT: CPT

## 2019-03-14 PROCEDURE — 93306 TTE W/DOPPLER COMPLETE: CPT | Performed by: INTERNAL MEDICINE

## 2019-03-14 RX ORDER — DULOXETIN HYDROCHLORIDE 30 MG/1
30 CAPSULE, DELAYED RELEASE ORAL DAILY
Status: ON HOLD | COMMUNITY
End: 2019-03-15

## 2019-03-14 RX ORDER — LACTOBACILLUS RHAMNOSUS GG 10B CELL
1 CAPSULE ORAL DAILY
COMMUNITY

## 2019-03-14 RX ORDER — CARVEDILOL 12.5 MG/1
12.5 TABLET ORAL 2 TIMES DAILY WITH MEALS
COMMUNITY
End: 2019-03-21 | Stop reason: HOSPADM

## 2019-03-14 RX ORDER — SODIUM CHLORIDE 0.9 % (FLUSH) 0.9 %
3 SYRINGE (ML) INJECTION EVERY 12 HOURS SCHEDULED
Status: DISCONTINUED | OUTPATIENT
Start: 2019-03-14 | End: 2019-03-20

## 2019-03-14 RX ORDER — ASPIRIN 300 MG/1
300 SUPPOSITORY RECTAL DAILY
Status: DISCONTINUED | OUTPATIENT
Start: 2019-03-15 | End: 2019-03-21 | Stop reason: HOSPADM

## 2019-03-14 RX ORDER — ONDANSETRON 2 MG/ML
4 INJECTION INTRAMUSCULAR; INTRAVENOUS EVERY 6 HOURS PRN
Status: DISCONTINUED | OUTPATIENT
Start: 2019-03-14 | End: 2019-03-21 | Stop reason: HOSPADM

## 2019-03-14 RX ORDER — MULTIVIT-MIN/IRON/FOLIC ACID/K 18-600-40
2000 CAPSULE ORAL DAILY
COMMUNITY
End: 2022-05-18

## 2019-03-14 RX ORDER — TORSEMIDE 10 MG/1
10 TABLET ORAL DAILY
Status: ON HOLD | COMMUNITY
End: 2019-03-15

## 2019-03-14 RX ORDER — PREGABALIN 50 MG/1
50 CAPSULE ORAL DAILY
COMMUNITY
End: 2019-03-27

## 2019-03-14 RX ORDER — METHADONE HYDROCHLORIDE 10 MG/1
10 TABLET ORAL DAILY
Status: ON HOLD | COMMUNITY
End: 2023-02-14

## 2019-03-14 RX ORDER — SODIUM CHLORIDE 0.9 % (FLUSH) 0.9 %
10 SYRINGE (ML) INJECTION AS NEEDED
Status: DISCONTINUED | OUTPATIENT
Start: 2019-03-14 | End: 2019-03-21 | Stop reason: HOSPADM

## 2019-03-14 RX ORDER — SODIUM CHLORIDE 9 MG/ML
100 INJECTION, SOLUTION INTRAVENOUS ONCE
Status: COMPLETED | OUTPATIENT
Start: 2019-03-14 | End: 2019-03-14

## 2019-03-14 RX ORDER — SODIUM CHLORIDE 0.9 % (FLUSH) 0.9 %
3-10 SYRINGE (ML) INJECTION AS NEEDED
Status: DISCONTINUED | OUTPATIENT
Start: 2019-03-14 | End: 2019-03-21 | Stop reason: HOSPADM

## 2019-03-14 RX ORDER — ATORVASTATIN CALCIUM 40 MG/1
80 TABLET, FILM COATED ORAL NIGHTLY
Status: DISCONTINUED | OUTPATIENT
Start: 2019-03-14 | End: 2019-03-21 | Stop reason: HOSPADM

## 2019-03-14 RX ORDER — LABETALOL HYDROCHLORIDE 5 MG/ML
INJECTION, SOLUTION INTRAVENOUS
Status: COMPLETED | OUTPATIENT
Start: 2019-03-14 | End: 2019-03-14

## 2019-03-14 RX ORDER — ASPIRIN 325 MG
325 TABLET ORAL DAILY
Status: DISCONTINUED | OUTPATIENT
Start: 2019-03-15 | End: 2019-03-21 | Stop reason: HOSPADM

## 2019-03-14 RX ORDER — ASPIRIN 81 MG/1
81 TABLET ORAL DAILY
COMMUNITY
End: 2019-03-21 | Stop reason: HOSPADM

## 2019-03-14 RX ORDER — SPIRONOLACTONE 25 MG/1
25 TABLET ORAL DAILY
Status: ON HOLD | COMMUNITY
End: 2019-03-15

## 2019-03-14 RX ORDER — INSULIN GLARGINE 100 [IU]/ML
24 INJECTION, SOLUTION SUBCUTANEOUS NIGHTLY
COMMUNITY
End: 2021-02-16 | Stop reason: CLARIF

## 2019-03-14 RX ORDER — LISINOPRIL 10 MG/1
10 TABLET ORAL DAILY
COMMUNITY
End: 2019-03-21 | Stop reason: HOSPADM

## 2019-03-14 RX ORDER — PANTOPRAZOLE SODIUM 40 MG/1
40 TABLET, DELAYED RELEASE ORAL DAILY
Status: ON HOLD | COMMUNITY
End: 2019-07-02

## 2019-03-14 RX ORDER — ATORVASTATIN CALCIUM 40 MG/1
40 TABLET, FILM COATED ORAL NIGHTLY
COMMUNITY
End: 2019-03-21 | Stop reason: HOSPADM

## 2019-03-14 RX ADMIN — IOPAMIDOL 115 ML: 755 INJECTION, SOLUTION INTRAVENOUS at 15:15

## 2019-03-14 RX ADMIN — ALTEPLASE 81 MG: KIT at 15:37

## 2019-03-14 RX ADMIN — SODIUM CHLORIDE, PRESERVATIVE FREE 3 ML: 5 INJECTION INTRAVENOUS at 20:20

## 2019-03-14 RX ADMIN — INSULIN HUMAN 4 UNITS: 100 INJECTION, SOLUTION PARENTERAL at 18:07

## 2019-03-14 RX ADMIN — ONDANSETRON 4 MG: 2 INJECTION INTRAMUSCULAR; INTRAVENOUS at 22:20

## 2019-03-14 RX ADMIN — ALTEPLASE 9 MG: KIT at 15:36

## 2019-03-14 RX ADMIN — SULFUR HEXAFLUORIDE 3 ML: KIT at 19:15

## 2019-03-14 RX ADMIN — LABETALOL 20 MG/4 ML (5 MG/ML) INTRAVENOUS SYRINGE 10 MG: at 15:05

## 2019-03-14 RX ADMIN — NICARDIPINE HYDROCHLORIDE 5 MG/HR: 0.1 INJECTION, SOLUTION INTRAVENOUS at 18:07

## 2019-03-14 RX ADMIN — SODIUM CHLORIDE 100 ML/HR: 9 INJECTION, SOLUTION INTRAVENOUS at 16:55

## 2019-03-14 RX ADMIN — NICARDIPINE HYDROCHLORIDE 5 MG/HR: 0.1 INJECTION, SOLUTION INTRAVENOUS at 15:10

## 2019-03-14 NOTE — CONSULTS
"Neurology    Referring provider:   No referring provider defined for this encounter.    Reason for Consultation: Stroke    Chief complaint: No complaint.  Nonverbal    History of present illness: 79-year-old woman with acute onset at 1:00 this afternoon speech loss left-sided weakness forced gaze to the right.    She has a history of hypertension and diabetes which are medicated.  Her  tells me that her blood pressure is normally in the 140s.    She is never had a stroke in the past.    On admission her blood pressure was 220 systolic.        Review of Systems: Patient unable to respond    Home meds:     (Not in a hospital admission)    History  No past medical history on file., No past surgical history on file., No family history on file., Social History     Tobacco Use   • Smoking status: Not on file   Substance Use Topics   • Alcohol use: Not on file   • Drug use: Not on file    and Allergies:  Penicillins,    Vital Signs   Blood pressure 143/62, pulse 61, resp. rate 20, height 165.1 cm (65\"), weight 105 kg (231 lb 7.7 oz), SpO2 90 %.  Body mass index is 38.52 kg/m².    Physical Exam:   General: Elderly white female, unresponsive              Head: Note              Neck: No bruit              Resp: Normal breath sounds              Cor: Regular rhythm              Extr: No edema              Skin: Warm and dry              Neuro: Mentally.  She does not follow commands she does not speak.    Cranial nerves show forced deviation of the eyes to the right.    Pupils are 2 mm and equal.  They react.  Face shows left central facial weakness.  I cannot get her to open her mouth and stick out her tongue.    Reflexes are 1+ right 2+ left with a crossed adductor on the lower extremities.    Muscle tone is increased on the right slightly she is hypotonic on the left.    Results Review: CT perfusion shows no evidence of a penumbra.        CT urogram shows no hemodynamically significant stenosis aneurysm or occlusion " in the head or neck.    Labs:  Lab Results (last 72 hours)     Procedure Component Value Units Date/Time    CBC & Differential [[id number]] Collected:  03/14/19 1514    Specimen:  Blood Updated:  03/14/19 1544    Narrative:       The following orders were created for panel order CBC & Differential.  Procedure                               Abnormality         Status                     ---------                               -----------         ------                     CBC Auto Differential[[id number]]        Normal              Final result                 Please view results for these tests on the individual orders.    CBC Auto Differential [[id number]]  (Normal) Collected:  03/14/19 1514    Specimen:  Blood Updated:  03/14/19 1544     WBC 7.91 10*3/mm3      RBC 4.91 10*6/mm3      Hemoglobin 14.1 g/dL      Hematocrit 43.7 %      MCV 89.0 fL      MCH 28.7 pg      MCHC 32.3 g/dL      RDW 14.5 %      RDW-SD 46.6 fl      MPV 11.3 fL      Platelets 186 10*3/mm3      Neutrophil % 65.0 %      Lymphocyte % 24.4 %      Monocyte % 7.3 %      Eosinophil % 2.8 %      Basophil % 0.5 %      Immature Grans % 0.3 %      Neutrophils, Absolute 5.14 10*3/mm3      Lymphocytes, Absolute 1.93 10*3/mm3      Monocytes, Absolute 0.58 10*3/mm3      Eosinophils, Absolute 0.22 10*3/mm3      Basophils, Absolute 0.04 10*3/mm3      Immature Grans, Absolute 0.02 10*3/mm3     aPTT [[id number]] Collected:  03/14/19 1514    Specimen:  Blood Updated:  03/14/19 1517    Light Blue Top [[id number]] Collected:  03/14/19 1514    Specimen:  Blood Updated:  03/14/19 1517    Gold Top - SST [[id number]] Collected:  03/14/19 1514    Specimen:  Blood Updated:  03/14/19 1517    AST [[id number]] Collected:  03/14/19 1514    Specimen:  Blood Updated:  03/14/19 1517    ALT [[id number]] Collected:  03/14/19 1514    Specimen:  Blood Updated:  03/14/19 1517    Green Top (Gel) [[id number]] Collected:  03/14/19 1514    Specimen:  Blood Updated:  03/14/19 1517    Green Top (No  Gel) [[id number]] Collected:  03/14/19 1514    Specimen:  Blood Updated:  03/14/19 1517    [place] Draw [[id number]] Collected:  03/14/19 1514    Specimen:  Blood Updated:  03/14/19 1517    Narrative:       The following orders were created for panel order [place] Draw.  Procedure                               Abnormality         Status                     ---------                               -----------         ------                     Light Blue Top[[id number]]                                   In process                 Green Top (Gel)[[id number]]                                  In process                 Lavender Top[[id number]]                                     In process                 Gold Top - SST[[id number]]                                   In process                 Green Top (No Gel)[[id number]]                               In process                   Please view results for these tests on the individual orders.    Lavender Top [[id number]] Collected:  03/14/19 1514    Specimen:  Blood Updated:  03/14/19 1517    POC CHEM 8 [[id number]]  (Abnormal) Collected:  03/14/19 1512    Specimen:  Blood Updated:  03/14/19 1514     Glucose 141 mg/dL      BUN 22 mg/dL      Creatinine 1.30 mg/dL      Sodium 140 mmol/L      Potassium 4.3 mmol/L      Chloride 104 mmol/L      Total CO2 27 mmol/L      Hemoglobin 14.6 g/dL      Comment: Serial Number: [id number]:  [id number]        Hematocrit 43 %      Ionized Calcium 1.40 mmol/L     POC Protime / INR [[id number]]  (Normal) Collected:  03/14/19 1509    Specimen:  Blood Updated:  03/14/19 1513     Protime 14.3 seconds      INR 1.2     Comment: Serial Number: [id number]:  [id number]             Rads:  Imaging Results (last 72 hours)     Procedure Component Value Units Date/Time    XR Chest 1 View [[id number]] Collected:  03/14/19 1541     Updated:  03/14/19 1545    Narrative:          EXAMINATION: XR CHEST 1 VW - 3/14/2019     INDICATION: Stroke protocol.     COMPARISON:  NONE     FINDINGS: Cardiac size enlarged with increased central pulmonary  vascularity and interstitial prominence consistent with interstitial  edema pattern and probable trace to small left pleural effusion. No  pneumothorax.           Impression:       Cardiac silhouette enlarged with increased pulmonary  vascularity and interstitial prominence of interstitial edema pattern  along with probable trace left pleural effusion.     DICTATED:   3/14/2019  EDITED/ls :   3/14/2019        CT Angiogram Head With & Without Contrast [[id number]] Collected:  03/14/19 1543     Updated:  03/14/19 1544    Narrative:       EXAMINATION: CT ANGIOGRAM NECK WWO CONTRAST, CT ANGIOGRAM HEAD WWO  CONTRAST - 3/14/2019      INDICATION: Evaluate for stroke.     TECHNIQUE: CT angiogram head and neck with and without intravenous  contrast administration. 2D reconstructions performed.     The radiation dose reduction device was turned on for each scan per the  ALARA (As Low as Reasonably Achievable) protocol.     COMPARISON: CT cerebral perfusion concurrently performed     FINDINGS:      CTA NECK: Normal 3-vessel arch with patent great vessel origins  demonstrating atherosclerotic involvement of the distal transverse  portion aortic arch and mild to moderate atherosclerotic involvement of  the left subclavian without occlusion. Right dominant vertebral artery  system without focal severe stenosis,  aneurysm or occlusion. Carotids  demonstrating a severely near midline retropharyngeal course of the  distal common carotid arteries with high branching pattern demonstrating  minimal atherosclerotic involvement of the carotid bulbs with 10% right  and 15% left luminal narrowing as measured by NASCET criteria. No focal  severe stenosis, aneurysm or occlusion of the distal internal carotid  arteries. Cervical soft tissues demonstrate severely heterogeneous  thyroid gland with calcifications and nodularity including a  low-attenuation nodule measuring  up to 1.5 cm left thyroid lobe.  Visualized lung apices unremarkable. No bulky cervical adenopathy.     CTA HEAD: Distal internal carotid arteries are patent without  hemodynamically significant stenosis, aneurysm or occlusion  demonstrating minimal atherosclerotic involvement and luminal narrowing  from calcifications. Anterior cerebral arteries are patent without  hemodynamically significant stenosis, aneurysm or occlusion. Middle  cerebral arteries are patent without hemodynamically significant  stenosis, aneurysm or occlusion. Vertebrobasilar system and posterior  cerebral arteries are patent without hemodynamically significant  stenosis, aneurysm or occlusion.       Impression:       No hemodynamically significant stenosis, aneurysm or  occlusion of the CTA head and neck with severely retropharyngeal course  of the distal common carotid arteries in a  high branching pattern with  minimal atherosclerotic involvement of the carotid bulbs as detailed  above.     DICTATED:   3/14/2019  EDITED/ls :   3/14/2019        CT Angiogram Neck With & Without Contrast [[id number]] Collected:  03/14/19 1543     Updated:  03/14/19 1544    Narrative:       EXAMINATION: CT ANGIOGRAM NECK WWO CONTRAST, CT ANGIOGRAM HEAD WWO  CONTRAST - 3/14/2019      INDICATION: Evaluate for stroke.     TECHNIQUE: CT angiogram head and neck with and without intravenous  contrast administration. 2D reconstructions performed.     The radiation dose reduction device was turned on for each scan per the  ALARA (As Low as Reasonably Achievable) protocol.     COMPARISON: CT cerebral perfusion concurrently performed     FINDINGS:      CTA NECK: Normal 3-vessel arch with patent great vessel origins  demonstrating atherosclerotic involvement of the distal transverse  portion aortic arch and mild to moderate atherosclerotic involvement of  the left subclavian without occlusion. Right dominant vertebral artery  system without focal severe stenosis,  aneurysm  or occlusion. Carotids  demonstrating a severely near midline retropharyngeal course of the  distal common carotid arteries with high branching pattern demonstrating  minimal atherosclerotic involvement of the carotid bulbs with 10% right  and 15% left luminal narrowing as measured by NASCET criteria. No focal  severe stenosis, aneurysm or occlusion of the distal internal carotid  arteries. Cervical soft tissues demonstrate severely heterogeneous  thyroid gland with calcifications and nodularity including a  low-attenuation nodule measuring up to 1.5 cm left thyroid lobe.  Visualized lung apices unremarkable. No bulky cervical adenopathy.     CTA HEAD: Distal internal carotid arteries are patent without  hemodynamically significant stenosis, aneurysm or occlusion  demonstrating minimal atherosclerotic involvement and luminal narrowing  from calcifications. Anterior cerebral arteries are patent without  hemodynamically significant stenosis, aneurysm or occlusion. Middle  cerebral arteries are patent without hemodynamically significant  stenosis, aneurysm or occlusion. Vertebrobasilar system and posterior  cerebral arteries are patent without hemodynamically significant  stenosis, aneurysm or occlusion.       Impression:       No hemodynamically significant stenosis, aneurysm or  occlusion of the CTA head and neck with severely retropharyngeal course  of the distal common carotid arteries in a  high branching pattern with  minimal atherosclerotic involvement of the carotid bulbs as detailed  above.     DICTATED:   3/14/2019  EDITED/ls :   3/14/2019        CT Cerebral Perfusion With & Without Contrast [[id number]] Collected:  03/14/19 1524     Updated:  03/14/19 1530    Narrative:       EXAMINATION: CT CEREBRAL PERFUSION WWO CONTRAST - 3/14/2019     INDICATION: Evaluate for stroke.     TECHNIQUE: CT cerebral perfusion with and without intravenous contrast  administration.     The radiation dose reduction device was  turned on for each scan per the  ALARA (As Low as Reasonably Achievable) protocol.     COMPARISON: CT stroke head earlier same day.     FINDINGS: Multiparametric maps including mean transit time, time to  drain, cerebral blood flow and cerebral blood volume. No perfusional  abnormality. Specifically no reversible ischemia within a specific  vascular territory identified.       Impression:       No reversible ischemia within a specific vascular territory.     DICTATED:   3/14/2019  EDITED/ls :   3/14/2019        CT Head Without Contrast Stroke Protocol [[id number]] Collected:  03/14/19 1509     Updated:  03/14/19 1529    Narrative:       EXAMINATION: CT HEAD WO CONTRAST - 3/14/2019     INDICATION: Stroke protocol.     TECHNIQUE: Axial CT of the head without intravenous contrast  administration     The radiation dose reduction device was turned on for each scan per the  ALARA (As Low as Reasonably Achievable) protocol.     COMPARISON: NONE     FINDINGS: Poorly defined low-attenuation area left parietal-occipital  region extending to involve the cortex with edematous appearance and  effacement of the sulci concerning for evolving infarction versus  superimposed area upon chronic changes as there is no prior available  comparison. No intra-axial hemorrhage or extra-axial fluid collection.  No hydrocephalus. Globes and orbits unremarkable. Visualized paranasal  sinuses and mastoid air cells are grossly clear and well-pneumatized.  Calvarium intact.       Impression:       Abnormal area of low attenuation extending to involve the  cortex with sulcal effacement and edematous appearance of left  parietal-occipital region concerning for left PCA and/or MCA infarction  without evidence for midline shift or hydrocephalus.     Scan performed on 3/14/2019 at 1455 hours. Scan report given to ER  stroke team in person by Dr. Gregorio at scanner on 3/14/2019 at 1505 hours.     DICTATED:   3/14/2019  EDITED/ls :   3/14/2019                 Assessment: Acute right hemisphere stroke with NIH score of 22 in no apparent arterial blockages on CTA       Plan:    The risk and benefit of TPA were explained to the patient is being given.    Blood pressure is down with Cardene.        Comment:   Guarded prognosis    I discussed the patients findings and my recommendations with family, nursing staff and primary care team      Surendra Elkins MD  03/14/19  3:51 PM

## 2019-03-14 NOTE — H&P
INTENSIVIST   INITIAL HOSPITAL VISIT NOTE     Hospital:  LOS: 0 days     Ms. Yanique Webster, 79 y.o. female is seen for a Chief Complaint of:  Chief Complaint   Patient presents with   • Stroke       Altered mental status    Hypertension    Hyperlipidemia    Diabetes mellitus (CMS/HCC)    COPD (chronic obstructive pulmonary disease) (CMS/HCC)      Subjective   S     Ms. Webster is a 80yo F who developed acute speech loss and left sided weakness at approximately 1300 today. She has a history of hypertension and diabetes. Upon presentation to the ED, her systolic blood pressure was 220. She was started on Cardene. Neurology was consulted and elected to give tPA once her blood pressure was under control. CTA of the head and neck was negative for significant stenosis, aneurysm or occlusion. She will be admitted to the ICU for further care.        PMH: She  has a past medical history of COPD (chronic obstructive pulmonary disease) (CMS/HCC), Diabetes mellitus (CMS/HCC), GERD (gastroesophageal reflux disease), Hyperlipidemia, and Hypertension.   PSxH: She  has no past surgical history on file.      Medications:  No current facility-administered medications on file prior to encounter.      Current Outpatient Medications on File Prior to Encounter   Medication Sig   • aspirin 81 MG EC tablet Take 81 mg by mouth Daily.   • atorvastatin (LIPITOR) 40 MG tablet Take 40 mg by mouth Daily.   • carvedilol (COREG) 12.5 MG tablet Take 12.5 mg by mouth 2 (Two) Times a Day With Meals.   • Cholecalciferol (VITAMIN D) 2000 units capsule Take 2,000 Units by mouth.   • DULoxetine (CYMBALTA) 30 MG capsule Take 30 mg by mouth Daily.   • insulin glargine (LANTUS) 100 UNIT/ML injection Inject 21 Units under the skin into the appropriate area as directed Daily.   • lisinopril (PRINIVIL,ZESTRIL) 10 MG tablet Take 10 mg by mouth Daily.   • methadone (DOLOPHINE) 10 MG tablet Take 10 mg by mouth Every 8 (Eight) Hours As Needed for Moderate Pain ,    • pantoprazole (PROTONIX) 40 MG EC tablet Take 40 mg by mouth Daily.   • pregabalin (LYRICA) 50 MG capsule Take 50 mg by mouth Daily.   • probiotic (CULTURELLE) capsule capsule Take  by mouth Daily.   • spironolactone (ALDACTONE) 25 MG tablet Take 25 mg by mouth Daily.   • torsemide (DEMADEX) 10 MG tablet Take 10 mg by mouth Daily.       Allergies: She is allergic to penicillins.   FH: Her family history is not on file.   SH: She  does not have a smoking history on file. she has never used smokeless tobacco. She reports that she does not use drugs.     The patient's relevant past medical, surgical and social history were reviewed and updated in Epic as appropriate.     ROS (14):   Review of Systems   Unable to perform ROS: Mental status change       Objective   O     Vitals    Temp  Min: 97.1 °F (36.2 °C)  Max: 97.1 °F (36.2 °C)  BP  Min: 142/67  Max: 230/158  Pulse  Min: 60  Max: 65  Resp  Min: 20  Max: 20  SpO2  Min: 89 %  Max: 97 % No Data Recorded     I/O 24 hrs (7:00AM - 6:59 AM)  Intake/Output     None          Medications (drips):    niCARdipine Last Rate: 10 mg/hr (03/14/19 3758)       Physical Examination    Telemetry: Normal sinus rhythm.    Constitutional:  No acute distress.  Nonverbal. Staring to the left.    HEENT: Normocephalic and atraumatic.   Neck:  Normal range of motion. Neck supple.   No JVD present.   No thyromegaly.    Cardiovascular: Regular rate and rhythm.  No murmurs, rub or gallop.  No peripheral edema.   Respiratory: Normal respiratory effort.  Clear to ascultation.   Abdominal:  Soft. No masses.   Non-tender. No distension.   No hepatosplenomegaly.   MSK: Normal muscle tone. Unable to assess strength.    Extremities: No digital cyanosis or clubbing.   Skin: Skin is warm and dry.  No rashes, lesions or ulcers noted.    Neurological:   Awake with gaze preference.   Nonverbal.    Psychiatric:  Unable to assess secondary to mental status change.      Interval: baseline  1a. Level of  Consciousness: 3-->Responds only with reflex motor or autonomic effects or totally unresponsive, flaccid, and areflexic  1b. LOC Questions: 2-->Answers neither question correctly  1c. LOC Commands: 2-->Performs neither task correctly  2. Best Gaze: 2-->Forced deviation, or total gaze paresis not overcome by the oculocephalic maneuver  3. Visual: 0-->No visual loss  4. Facial Palsy: 0-->Normal symmetrical movements  5a. Motor Arm, Left: 0-->No drift, limb holds 90 (or 45) degrees for full 10 secs  5b. Motor Arm, Right: 0-->No drift, limb holds 90 (or 45) degrees for full 10 secs  6a. Motor Leg, Left: 1-->Drift, leg falls by the end of the 5-sec period but does not hit bed  6b. Motor Leg, Right: 1-->Drift, leg falls by the end of the 5-sec period but does not hit bed  7. Limb Ataxia: 0-->Absent  8. Sensory: 0-->Normal, no sensory loss  9. Best Language: 3-->Mute, global aphasia, no usable speech or auditory comprehension  10. Dysarthria: 2-->Severe dysarthria, patients speech is so slurred as to be unintelligible in the absence of or out of proportion to any dysphasia, or is mute/anarthric  11. Extinction and Inattention (formerly Neglect): 2-->Profound jessica-inattention/extinction more than 1 modality    Total (NIH Stroke Scale): 18       Results from last 7 days   Lab Units 03/14/19  1514 03/14/19  1512   WBC 10*3/mm3 7.91  --    HEMOGLOBIN g/dL 14.1  --    HEMOGLOBIN, POC g/dL  --  14.6   MCV fL 89.0  --    PLATELETS 10*3/mm3 186  --      Results from last 7 days   Lab Units 03/14/19  1512   CREATININE mg/dL 1.30     Estimated Creatinine Clearance: 42.9 mL/min (by C-G formula based on SCr of 1.3 mg/dL).  Results from last 7 days   Lab Units 03/14/19  1514   ALT (SGPT) U/L 19   AST (SGOT) U/L 20           Images:    Imaging Results (last 24 hours)     Procedure Component Value Units Date/Time    XR Chest 1 View [[id number]] Collected:  03/14/19 1541     Updated:  03/14/19 1545    Narrative:          EXAMINATION: XR  CHEST 1 VW - 3/14/2019     INDICATION: Stroke protocol.     COMPARISON: NONE     FINDINGS: Cardiac size enlarged with increased central pulmonary  vascularity and interstitial prominence consistent with interstitial  edema pattern and probable trace to small left pleural effusion. No  pneumothorax.           Impression:       Cardiac silhouette enlarged with increased pulmonary  vascularity and interstitial prominence of interstitial edema pattern  along with probable trace left pleural effusion.     DICTATED:   3/14/2019  EDITED/ls :   3/14/2019        CT Angiogram Head With & Without Contrast [[id number]] Collected:  03/14/19 1543     Updated:  03/14/19 1544    Narrative:       EXAMINATION: CT ANGIOGRAM NECK WWO CONTRAST, CT ANGIOGRAM HEAD WWO  CONTRAST - 3/14/2019      INDICATION: Evaluate for stroke.     TECHNIQUE: CT angiogram head and neck with and without intravenous  contrast administration. 2D reconstructions performed.     The radiation dose reduction device was turned on for each scan per the  ALARA (As Low as Reasonably Achievable) protocol.     COMPARISON: CT cerebral perfusion concurrently performed     FINDINGS:      CTA NECK: Normal 3-vessel arch with patent great vessel origins  demonstrating atherosclerotic involvement of the distal transverse  portion aortic arch and mild to moderate atherosclerotic involvement of  the left subclavian without occlusion. Right dominant vertebral artery  system without focal severe stenosis,  aneurysm or occlusion. Carotids  demonstrating a severely near midline retropharyngeal course of the  distal common carotid arteries with high branching pattern demonstrating  minimal atherosclerotic involvement of the carotid bulbs with 10% right  and 15% left luminal narrowing as measured by NASCET criteria. No focal  severe stenosis, aneurysm or occlusion of the distal internal carotid  arteries. Cervical soft tissues demonstrate severely heterogeneous  thyroid gland with  calcifications and nodularity including a  low-attenuation nodule measuring up to 1.5 cm left thyroid lobe.  Visualized lung apices unremarkable. No bulky cervical adenopathy.     CTA HEAD: Distal internal carotid arteries are patent without  hemodynamically significant stenosis, aneurysm or occlusion  demonstrating minimal atherosclerotic involvement and luminal narrowing  from calcifications. Anterior cerebral arteries are patent without  hemodynamically significant stenosis, aneurysm or occlusion. Middle  cerebral arteries are patent without hemodynamically significant  stenosis, aneurysm or occlusion. Vertebrobasilar system and posterior  cerebral arteries are patent without hemodynamically significant  stenosis, aneurysm or occlusion.       Impression:       No hemodynamically significant stenosis, aneurysm or  occlusion of the CTA head and neck with severely retropharyngeal course  of the distal common carotid arteries in a  high branching pattern with  minimal atherosclerotic involvement of the carotid bulbs as detailed  above.     DICTATED:   3/14/2019  EDITED/ls :   3/14/2019        CT Angiogram Neck With & Without Contrast [[id number]] Collected:  03/14/19 1543     Updated:  03/14/19 1544    Narrative:       EXAMINATION: CT ANGIOGRAM NECK WWO CONTRAST, CT ANGIOGRAM HEAD WWO  CONTRAST - 3/14/2019      INDICATION: Evaluate for stroke.     TECHNIQUE: CT angiogram head and neck with and without intravenous  contrast administration. 2D reconstructions performed.     The radiation dose reduction device was turned on for each scan per the  ALARA (As Low as Reasonably Achievable) protocol.     COMPARISON: CT cerebral perfusion concurrently performed     FINDINGS:      CTA NECK: Normal 3-vessel arch with patent great vessel origins  demonstrating atherosclerotic involvement of the distal transverse  portion aortic arch and mild to moderate atherosclerotic involvement of  the left subclavian without occlusion. Right  dominant vertebral artery  system without focal severe stenosis,  aneurysm or occlusion. Carotids  demonstrating a severely near midline retropharyngeal course of the  distal common carotid arteries with high branching pattern demonstrating  minimal atherosclerotic involvement of the carotid bulbs with 10% right  and 15% left luminal narrowing as measured by NASCET criteria. No focal  severe stenosis, aneurysm or occlusion of the distal internal carotid  arteries. Cervical soft tissues demonstrate severely heterogeneous  thyroid gland with calcifications and nodularity including a  low-attenuation nodule measuring up to 1.5 cm left thyroid lobe.  Visualized lung apices unremarkable. No bulky cervical adenopathy.     CTA HEAD: Distal internal carotid arteries are patent without  hemodynamically significant stenosis, aneurysm or occlusion  demonstrating minimal atherosclerotic involvement and luminal narrowing  from calcifications. Anterior cerebral arteries are patent without  hemodynamically significant stenosis, aneurysm or occlusion. Middle  cerebral arteries are patent without hemodynamically significant  stenosis, aneurysm or occlusion. Vertebrobasilar system and posterior  cerebral arteries are patent without hemodynamically significant  stenosis, aneurysm or occlusion.       Impression:       No hemodynamically significant stenosis, aneurysm or  occlusion of the CTA head and neck with severely retropharyngeal course  of the distal common carotid arteries in a  high branching pattern with  minimal atherosclerotic involvement of the carotid bulbs as detailed  above.     DICTATED:   3/14/2019  EDITED/ls :   3/14/2019        CT Cerebral Perfusion With & Without Contrast [[id number]] Collected:  03/14/19 1524     Updated:  03/14/19 1530    Narrative:       EXAMINATION: CT CEREBRAL PERFUSION WWO CONTRAST - 3/14/2019     INDICATION: Evaluate for stroke.     TECHNIQUE: CT cerebral perfusion with and without intravenous  contrast  administration.     The radiation dose reduction device was turned on for each scan per the  ALARA (As Low as Reasonably Achievable) protocol.     COMPARISON: CT stroke head earlier same day.     FINDINGS: Multiparametric maps including mean transit time, time to  drain, cerebral blood flow and cerebral blood volume. No perfusional  abnormality. Specifically no reversible ischemia within a specific  vascular territory identified.       Impression:       No reversible ischemia within a specific vascular territory.     DICTATED:   3/14/2019  EDITED/ls :   3/14/2019        CT Head Without Contrast Stroke Protocol [[id number]] Collected:  03/14/19 1509     Updated:  03/14/19 1529    Narrative:       EXAMINATION: CT HEAD WO CONTRAST - 3/14/2019     INDICATION: Stroke protocol.     TECHNIQUE: Axial CT of the head without intravenous contrast  administration     The radiation dose reduction device was turned on for each scan per the  ALARA (As Low as Reasonably Achievable) protocol.     COMPARISON: NONE     FINDINGS: Poorly defined low-attenuation area left parietal-occipital  region extending to involve the cortex with edematous appearance and  effacement of the sulci concerning for evolving infarction versus  superimposed area upon chronic changes as there is no prior available  comparison. No intra-axial hemorrhage or extra-axial fluid collection.  No hydrocephalus. Globes and orbits unremarkable. Visualized paranasal  sinuses and mastoid air cells are grossly clear and well-pneumatized.  Calvarium intact.       Impression:       Abnormal area of low attenuation extending to involve the  cortex with sulcal effacement and edematous appearance of left  parietal-occipital region concerning for left PCA and/or MCA infarction  without evidence for midline shift or hydrocephalus.     Scan performed on 3/14/2019 at 1455 hours. Scan report given to ER  stroke team in person by Dr. Gregorio at scanner on 3/14/2019 at 1505  hours.     DICTATED:   3/14/2019  EDITED/ls :   3/14/2019            ECG/EMG Results (last 24 hours)     Procedure Component Value Units Date/Time    ECG 12 Lead [[id number]] Collected:  03/14/19 1527     Updated:  03/14/19 1527          I reviewed the patient's new laboratory and imaging results.  I independently reviewed the patient's new images.    Assessment/Plan   A / P        Ms. Webster is a 78yo F with a clinical exam concerning for a right hemispheric stroke. CTA was negative for blockage. NIHSS per Neurology is a 22. She is currently getting tPA. Her family is at bedside.     Nutrition:   NPO Diet  Advance Directives:   Code Status and Medical Interventions:   Ordered at: 03/14/19 1617     Level Of Support Discussed With:    Patient     Code Status:    CPR     Medical Interventions (Level of Support Prior to Arrest):    Full       Active Hospital Problems    Diagnosis   • Altered mental status   • Hypertension   • Hyperlipidemia   • Diabetes mellitus (CMS/[facility])   • COPD (chronic obstructive pulmonary disease) (CMS/[facility])       Assessment / Plan:    1. Admit to ICU  2. Post-tPA order set  3. Blood pressure control with Cardene  4. MRI brain  5. Echocardiogram  6. At risk for respiratory failure if no improvement in mental status. At this time, her family wishes her to be intubated if need be.   7. NPO. Not appropriate for dysphagia evaluation at this time.   8. SSI  9. AM labs      I discussed the patient's findings and my recommendations with family and nursing staff    Time:   Critical Care Time: was greater than 35 minutes.(excluding time spent on other separately billable procedures or treating other patients).        Kati SANDERS Case, DO  Pulmonary and Critical Care Medicine

## 2019-03-14 NOTE — PLAN OF CARE
Problem: Fall Risk (Adult)  Goal: Identify Related Risk Factors and Signs and Symptoms  Outcome: Ongoing (interventions implemented as appropriate)    Goal: Absence of Fall  Outcome: Ongoing (interventions implemented as appropriate)      Problem: Skin Injury Risk (Adult)  Goal: Identify Related Risk Factors and Signs and Symptoms  Outcome: Ongoing (interventions implemented as appropriate)    Goal: Skin Health and Integrity  Outcome: Ongoing (interventions implemented as appropriate)      Problem: Patient Care Overview  Goal: Plan of Care Review  Outcome: Ongoing (interventions implemented as appropriate)   03/14/19 1814   Coping/Psychosocial   Plan of Care Reviewed With patient;spouse;family   Plan of Care Review   Progress improving   OTHER   Outcome Summary TPA given and finished at 1630. Initial NIH on arrival to unit was 25. Current on TPA checks is an 11. Scans negative for large vessel occlusion, will check MRI tonight. Pt continues to improve. ECHO and carotid duplex was completed. On low dose cardene to keep SBP<180. VSS, will continue to monitor.        Problem: Stroke (Ischemic) (Adult)  Goal: Signs and Symptoms of Listed Potential Problems Will be Absent, Minimized or Managed (Stroke)  Outcome: Ongoing (interventions implemented as appropriate)

## 2019-03-14 NOTE — ED PROVIDER NOTES
Subjective   Yanique Webster is a 79 y.o.female who presents to the emergency department via EMS with complaints of abnormal neurologic symptoms. EMS were told that around 1330 today the patient and her  were sitting on the couch. Her  got up to make lunch and when he got back he found her unresponsive. He then called EMS and when they arrived she was non-verbal and gazing off to the right. The patient would not follow commands with EMS and they were unable to further assess her neurologic status. She is not on blood thinners. There are no other known complaints at this time.          History provided by:  EMS personnel  History limited by:  Mental status change  Neurologic Problem   The patient's primary symptoms include an altered mental status. This is a new problem. The current episode started today. The last time the patient was known to be well was 3/14/2019 1:30 PM.  The problem is unchanged. There was no focality noted. Past treatments include nothing.       Review of Systems   Unable to perform ROS: Mental status change   Neurological:        The patient was found by her  unresponsive shortly after 1330 this afternoon       Past Medical History:   Diagnosis Date   • COPD (chronic obstructive pulmonary disease) (CMS/[facility])    • Diabetes mellitus (CMS/[facility])    • GERD (gastroesophageal reflux disease)    • Hyperlipidemia    • Hypertension        Allergies   Allergen Reactions   • Penicillins Unknown (See Comments)     unk       History reviewed. No pertinent surgical history.    History reviewed. No pertinent family history.    Social History     Socioeconomic History   • Marital status:      Spouse name: Not on file   • Number of children: Not on file   • Years of education: Not on file   • Highest education level: Not on file   Substance and Sexual Activity   • Drug use: No         Objective   Physical Exam   Constitutional: She appears well-developed and well-nourished. No distress.    HENT:   Head: Normocephalic and atraumatic.   Eyes: Conjunctivae are normal. No scleral icterus.   Neck: Normal range of motion. Neck supple.   Cardiovascular: Normal rate, regular rhythm and normal heart sounds.   No murmur heard.  Pulmonary/Chest: Effort normal and breath sounds normal. No respiratory distress.   Abdominal: Soft. There is no tenderness.   Musculoskeletal: She exhibits no edema.   Neurological:   Forced gaze to the right and fixed gaze to the right. She will not follow any commands.   Skin: Skin is warm and dry. No erythema.   Nursing note and vitals reviewed.      Critical Care  Performed by: Allyssa Dunbar MD  Authorized by: Allyssa Dunbar MD     Critical care provider statement:     Critical care time (minutes):  45    Critical care time was exclusive of:  Separately billable procedures and treating other patients    Critical care was necessary to treat or prevent imminent or life-threatening deterioration of the following conditions:  CNS failure or compromise    Critical care was time spent personally by me on the following activities:  Evaluation of patient's response to treatment, examination of patient, obtaining history from patient or surrogate, ordering and performing treatments and interventions, ordering and review of laboratory studies, ordering and review of radiographic studies, re-evaluation of patient's condition, development of treatment plan with patient or surrogate, pulse oximetry, discussions with consultants and review of old charts               ED Course  ED Course as of Mar 14 1637   Thu Mar 14, 2019   1529 Paged Dr. Elkins, on call for stroke neurology.  [AS]   1530 Dr. Dunbar discussed the case in detail with Dr. Elkins.  [AS]   1536 Thrombolytic Therapy for Stroke  Time: 3:37 PM    Inclusion criteria:  Clinical diagnosis of ischemic stroke causing measurable neurologic deficit. Onset of symptoms < 4.5 hours before beginning treatment. Last known well  time < 4.5 hours before beginning treatment. Pt is at least 18 years of age.    Exclusion criteria:  Historical: None.  Clinical: Refractory blood pressure elevation (SBP >= 185 or DBP >= 110 mmHg).  Hematologic: None.  Head CT scan: None.  Relative exclusion criteria: None.  Additional relative exclusion criteria for treatment from 3-4.5 hours from symptom onset: None.    No exclusion criteria present.  Current inclusion criteria have been reviewed and met.    tPA given. Intensive monitoring performed:  blood pressure, cardiac monitoring, O2 sat and neuro exam.  Complications: None.    [AS]      ED Course User Index  [AS] Lakesha Rubin     Recent Results (from the past 24 hour(s))   POC Protime / INR    Collection Time: 03/14/19  3:09 PM   Result Value Ref Range    Protime 14.3 12.8 - 15.2 seconds    INR 1.2 0.8 - 1.2   POC CHEM 8    Collection Time: 03/14/19  3:12 PM   Result Value Ref Range    Glucose 141 (H) 70 - 130 mg/dL    BUN 22 8 - 26 mg/dL    Creatinine 1.30 0.60 - 1.30 mg/dL    Sodium 140 138 - 146 mmol/L    Potassium 4.3 3.5 - 4.9 mmol/L    Chloride 104 98 - 109 mmol/L    Total CO2 27 24 - 29 mmol/L    Hemoglobin 14.6 12.0 - 17.0 g/dL    Hematocrit 43 38 - 51 %    Ionized Calcium 1.40 (H) 1.20 - 1.32 mmol/L   aPTT    Collection Time: 03/14/19  3:14 PM   Result Value Ref Range    PTT 32.4 24.0 - 37.0 seconds   AST    Collection Time: 03/14/19  3:14 PM   Result Value Ref Range    AST (SGOT) 20 0 - 33 U/L   ALT    Collection Time: 03/14/19  3:14 PM   Result Value Ref Range    ALT (SGPT) 19 7 - 40 U/L   Light Blue Top    Collection Time: 03/14/19  3:14 PM   Result Value Ref Range    Extra Tube hold for add-on    Green Top (Gel)    Collection Time: 03/14/19  3:14 PM   Result Value Ref Range    Extra Tube Hold for add-ons.    Lavender Top    Collection Time: 03/14/19  3:14 PM   Result Value Ref Range    Extra Tube hold for add-on    Gold Top - SST    Collection Time: 03/14/19  3:14 PM   Result Value Ref  Range    Extra Tube Hold for add-ons.    Green Top (No Gel)    Collection Time: 03/14/19  3:14 PM   Result Value Ref Range    Extra Tube Hold for add-ons.    CBC Auto Differential    Collection Time: 03/14/19  3:14 PM   Result Value Ref Range    WBC 7.91 3.50 - 10.80 10*3/mm3    RBC 4.91 3.89 - 5.14 10*6/mm3    Hemoglobin 14.1 11.5 - 15.5 g/dL    Hematocrit 43.7 34.5 - 44.0 %    MCV 89.0 80.0 - 99.0 fL    MCH 28.7 27.0 - 31.0 pg    MCHC 32.3 32.0 - 36.0 g/dL    RDW 14.5 11.3 - 14.5 %    RDW-SD 46.6 37.0 - 54.0 fl    MPV 11.3 6.0 - 12.0 fL    Platelets 186 150 - 450 10*3/mm3    Neutrophil % 65.0 41.0 - 71.0 %    Lymphocyte % 24.4 24.0 - 44.0 %    Monocyte % 7.3 0.0 - 12.0 %    Eosinophil % 2.8 0.0 - 3.0 %    Basophil % 0.5 0.0 - 1.0 %    Immature Grans % 0.3 0.0 - 0.6 %    Neutrophils, Absolute 5.14 1.50 - 8.30 10*3/mm3    Lymphocytes, Absolute 1.93 0.60 - 4.80 10*3/mm3    Monocytes, Absolute 0.58 0.00 - 1.00 10*3/mm3    Eosinophils, Absolute 0.22 0.00 - 0.30 10*3/mm3    Basophils, Absolute 0.04 0.00 - 0.20 10*3/mm3    Immature Grans, Absolute 0.02 0.00 - 0.05 10*3/mm3   POC Troponin, Rapid    Collection Time: 03/14/19  4:12 PM   Result Value Ref Range    Troponin I 0.00 0.00 - 0.07 ng/mL     Note: In addition to lab results from this visit, the labs listed above may include labs taken at another facility or during a different encounter within the last 24 hours. Please correlate lab times with ED admission and discharge times for further clarification of the services performed during this visit.    XR Chest 1 View   Preliminary Result   Cardiac silhouette enlarged with increased pulmonary   vascularity and interstitial prominence of interstitial edema pattern   along with probable trace left pleural effusion.       DICTATED:   3/14/2019   EDITED/ls :   3/14/2019           CT Angiogram Head With & Without Contrast   Preliminary Result   No hemodynamically significant stenosis, aneurysm or   occlusion of the CTA head  and neck with severely retropharyngeal course   of the distal common carotid arteries in a  high branching pattern with   minimal atherosclerotic involvement of the carotid bulbs as detailed   above.       DICTATED:   3/14/2019   EDITED/ls :   3/14/2019           CT Angiogram Neck With & Without Contrast   Preliminary Result   No hemodynamically significant stenosis, aneurysm or   occlusion of the CTA head and neck with severely retropharyngeal course   of the distal common carotid arteries in a  high branching pattern with   minimal atherosclerotic involvement of the carotid bulbs as detailed   above.       DICTATED:   3/14/2019   EDITED/ls :   3/14/2019           CT Cerebral Perfusion With & Without Contrast   Preliminary Result   No reversible ischemia within a specific vascular territory.       DICTATED:   3/14/2019   EDITED/ls :   3/14/2019           CT Head Without Contrast Stroke Protocol   Preliminary Result   Abnormal area of low attenuation extending to involve the   cortex with sulcal effacement and edematous appearance of left   parietal-occipital region concerning for left PCA and/or MCA infarction   without evidence for midline shift or hydrocephalus.       Scan performed on 3/14/2019 at 1455 hours. Scan report given to ER   stroke team in person by Dr. Gregorio at scanner on 3/14/2019 at 1505 hours.       DICTATED:   3/14/2019   EDITED/ls :   3/14/2019           CT Head Without Contrast    (Results Pending)   MRI Brain Without Contrast    (Results Pending)     Vitals:    03/14/19 1545 03/14/19 1553 03/14/19 1600 03/14/19 1612   BP: 143/62 153/68 144/62    Pulse: 61 65 62    Resp:       Temp:    97.1 °F (36.2 °C)   TempSrc:    Axillary   SpO2: 90% 92% 91%    Weight:       Height:         Medications   sodium chloride 0.9 % flush 10 mL (not administered)   niCARdipine (CARDENE-IV) 20 mg/200 mL (0.1 mg/mL) in 0.9% NaCl infusion (10 mg/hr Intravenous Rate/Dose Change 3/14/19 1528)   sodium chloride 0.9 %  infusion (not administered)   sodium chloride 0.9 % flush 3 mL (not administered)   sodium chloride 0.9 % flush 3-10 mL (not administered)   atorvastatin (LIPITOR) tablet 80 mg (not administered)   aspirin tablet 325 mg (not administered)     Or   aspirin suppository 300 mg (not administered)   insulin regular (humuLIN R,novoLIN R) injection 0-9 Units (not administered)   labetalol (NORMODYNE,TRANDATE) injection (10 mg Intravenous Given 3/14/19 1505)   iopamidol (ISOVUE-370) 76 % injection 150 mL (115 mL Intravenous Given 3/14/19 1515)   alteplase (ACTIVASE) bolus from vial (9 mg Intravenous Given 3/14/19 1536)   alteplase (ACTIVASE) 100 mg kit (81 mg Intravenous New Bag 3/14/19 1537)     ECG/EMG Results (last 24 hours)     ** No results found for the last 24 hours. **        ECG 12 Lead                      NIHSS (NIH Stroke Scale/Score) reviewed and/or performed as part of the patient evaluation and treatment planning process.  The result associated with this review/performance is: 22           MDM  Number of Diagnoses or Management Options  Acute ischemic stroke (CMS/[facility]): new and requires workup  Altered mental status, unspecified altered mental status type: new and requires workup  Hypertension, unspecified type: new and requires workup  Diagnosis management comments: Patient presents with initially inability to find her words witnessed by her  at approximately 1 PM.  This has subsequently evolved into significant neurological deficits accounting for an NIH stroke scale of 22 recorded by the neuro stroke staff.    CT head without was reported as concerning for possible left MCA/PCA territory infarct.    CT perfusion scan did not report a large vessel occlusion.    CTA of the head and CT of the neck do not show any acute stenosis or abnormalities.    I discussed the patient with the neurology staff, who reports Dr. Barriga is in agreement with TPA.    I discussed the patient with the stroke neurologist,  Dr. Elkins, who evaluated the patient in person, and is in agreement with TPA administration.    Cardene was administered and the blood pressure was improved to less than 180 systolic, and less than 100 diastolic, TPA was initiated approximately 41 minutes into the patient's course.    On serial rechecks the patient remained altered throughout the ER course.    I discussed the patient with the intensivist, Dr. Riddle, who will admit.       Amount and/or Complexity of Data Reviewed  Clinical lab tests: ordered and reviewed  Tests in the radiology section of CPT®: ordered and reviewed  Obtain history from someone other than the patient: yes  Review and summarize past medical records: yes  Discuss the patient with other providers: yes  Independent visualization of images, tracings, or specimens: yes    Risk of Complications, Morbidity, and/or Mortality  Presenting problems: high  Diagnostic procedures: high  Management options: high    Critical Care  Total time providing critical care: 30-74 minutes    Patient Progress  Patient progress: stable      Final diagnoses:   Altered mental status, unspecified altered mental status type   Acute ischemic stroke (CMS/HCC)   Hypertension, unspecified type       Documentation assistance provided by claudia Rubin.  Information recorded by the claudia was done at my direction and has been verified and validated by me.     Lakesha Rubin  03/14/19 7689       Allyssa Dunbar MD  03/14/19 1806

## 2019-03-15 ENCOUNTER — APPOINTMENT (OUTPATIENT)
Dept: NEUROLOGY | Facility: HOSPITAL | Age: 79
End: 2019-03-15

## 2019-03-15 ENCOUNTER — APPOINTMENT (OUTPATIENT)
Dept: CARDIOLOGY | Facility: HOSPITAL | Age: 79
End: 2019-03-15

## 2019-03-15 ENCOUNTER — APPOINTMENT (OUTPATIENT)
Dept: MRI IMAGING | Facility: HOSPITAL | Age: 79
End: 2019-03-15

## 2019-03-15 ENCOUNTER — APPOINTMENT (OUTPATIENT)
Dept: CT IMAGING | Facility: HOSPITAL | Age: 79
End: 2019-03-15

## 2019-03-15 PROBLEM — I63.9 CVA (CEREBRAL VASCULAR ACCIDENT): Status: ACTIVE | Noted: 2019-03-15

## 2019-03-15 LAB
ANION GAP SERPL CALCULATED.3IONS-SCNC: 9 MMOL/L (ref 3–11)
ARTICHOKE IGE QN: 97 MG/DL (ref 0–130)
BH CV ECHO MEAS - BSA(HAYCOCK): 2.2 M^2
BH CV ECHO MEAS - BSA: 2.1 M^2
BH CV ECHO MEAS - BZI_BMI: 38.4 KILOGRAMS/M^2
BH CV ECHO MEAS - BZI_METRIC_HEIGHT: 165.1 CM
BH CV ECHO MEAS - BZI_METRIC_WEIGHT: 104.8 KG
BH CV XLRA MEAS LEFT DIST CCA EDV: 9.9 CM/SEC
BH CV XLRA MEAS LEFT DIST CCA PSV: 86 CM/SEC
BH CV XLRA MEAS LEFT ICA/CCA RATIO: 0.9
BH CV XLRA MEAS LEFT MID CCA EDV: 12.1 CM/SEC
BH CV XLRA MEAS LEFT MID CCA PSV: 112.5 CM/SEC
BH CV XLRA MEAS LEFT MID ICA EDV: 14.3 CM/SEC
BH CV XLRA MEAS LEFT MID ICA PSV: 75.5 CM/SEC
BH CV XLRA MEAS LEFT PROX CCA EDV: 0 CM/SEC
BH CV XLRA MEAS LEFT PROX CCA PSV: 83.8 CM/SEC
BH CV XLRA MEAS LEFT PROX ICA EDV: 16.5 CM/SEC
BH CV XLRA MEAS LEFT PROX ICA PSV: 68.9 CM/SEC
BH CV XLRA MEAS LEFT PROX SCLA EDV: 0 CM/SEC
BH CV XLRA MEAS LEFT PROX SCLA PSV: 102.6 CM/SEC
BH CV XLRA MEAS LEFT VERTEBRAL A EDV: 8.1 CM/SEC
BH CV XLRA MEAS LEFT VERTEBRAL A PSV: 33 CM/SEC
BH CV XLRA MEAS RIGHT DIST CCA EDV: 13.2 CM/SEC
BH CV XLRA MEAS RIGHT DIST CCA PSV: 64.5 CM/SEC
BH CV XLRA MEAS RIGHT ICA/CCA RATIO: 1.3
BH CV XLRA MEAS RIGHT MID CCA EDV: 10.5 CM/SEC
BH CV XLRA MEAS RIGHT MID CCA PSV: 71.1 CM/SEC
BH CV XLRA MEAS RIGHT MID ICA EDV: 15.4 CM/SEC
BH CV XLRA MEAS RIGHT MID ICA PSV: 86 CM/SEC
BH CV XLRA MEAS RIGHT PROX CCA EDV: 0 CM/SEC
BH CV XLRA MEAS RIGHT PROX CCA PSV: 52.8 CM/SEC
BH CV XLRA MEAS RIGHT PROX ECA EDV: 0 CM/SEC
BH CV XLRA MEAS RIGHT PROX ECA PSV: 99.3 CM/SEC
BH CV XLRA MEAS RIGHT PROX ICA EDV: 9.4 CM/SEC
BH CV XLRA MEAS RIGHT PROX ICA PSV: 44.7 CM/SEC
BH CV XLRA MEAS RIGHT PROX SCLA EDV: 0 CM/SEC
BH CV XLRA MEAS RIGHT PROX SCLA PSV: 102.5 CM/SEC
BH CV XLRA MEAS RIGHT VERTEBRAL A EDV: 7.7 CM/SEC
BH CV XLRA MEAS RIGHT VERTEBRAL A PSV: 54 CM/SEC
BUN BLD-MCNC: 20 MG/DL (ref 9–23)
BUN/CREAT SERPL: 15.4 (ref 7–25)
CALCIUM SPEC-SCNC: 10.4 MG/DL (ref 8.7–10.4)
CHLORIDE SERPL-SCNC: 105 MMOL/L (ref 99–109)
CHOLEST SERPL-MCNC: 145 MG/DL (ref 0–200)
CO2 SERPL-SCNC: 22 MMOL/L (ref 20–31)
CREAT BLD-MCNC: 1.3 MG/DL (ref 0.6–1.3)
DEPRECATED RDW RBC AUTO: 46.4 FL (ref 37–54)
ERYTHROCYTE [DISTWIDTH] IN BLOOD BY AUTOMATED COUNT: 14.3 % (ref 11.3–14.5)
GFR SERPL CREATININE-BSD FRML MDRD: 40 ML/MIN/1.73
GLUCOSE BLD-MCNC: 160 MG/DL (ref 70–100)
GLUCOSE BLDC GLUCOMTR-MCNC: 161 MG/DL (ref 70–130)
GLUCOSE BLDC GLUCOMTR-MCNC: 161 MG/DL (ref 70–130)
GLUCOSE BLDC GLUCOMTR-MCNC: 168 MG/DL (ref 70–130)
GLUCOSE BLDC GLUCOMTR-MCNC: 176 MG/DL (ref 70–130)
GLUCOSE BLDC GLUCOMTR-MCNC: 178 MG/DL (ref 70–130)
HBA1C MFR BLD: 6.7 % (ref 4.8–5.6)
HCT VFR BLD AUTO: 42.6 % (ref 34.5–44)
HDLC SERPL-MCNC: 35 MG/DL (ref 40–60)
HGB BLD-MCNC: 13.8 G/DL (ref 11.5–15.5)
MAGNESIUM SERPL-MCNC: 2 MG/DL (ref 1.3–2.7)
MCH RBC QN AUTO: 28.9 PG (ref 27–31)
MCHC RBC AUTO-ENTMCNC: 32.4 G/DL (ref 32–36)
MCV RBC AUTO: 89.3 FL (ref 80–99)
PHOSPHATE SERPL-MCNC: 3.3 MG/DL (ref 2.4–5.1)
PLATELET # BLD AUTO: 164 10*3/MM3 (ref 150–450)
PMV BLD AUTO: 11.2 FL (ref 6–12)
POTASSIUM BLD-SCNC: 4.2 MMOL/L (ref 3.5–5.5)
RBC # BLD AUTO: 4.77 10*6/MM3 (ref 3.89–5.14)
RIGHT ARM BP: NORMAL MMHG
SODIUM BLD-SCNC: 136 MMOL/L (ref 132–146)
TRIGL SERPL-MCNC: 117 MG/DL (ref 0–150)
WBC NRBC COR # BLD: 8.58 10*3/MM3 (ref 3.5–10.8)

## 2019-03-15 PROCEDURE — 83735 ASSAY OF MAGNESIUM: CPT | Performed by: INTERNAL MEDICINE

## 2019-03-15 PROCEDURE — 84100 ASSAY OF PHOSPHORUS: CPT | Performed by: INTERNAL MEDICINE

## 2019-03-15 PROCEDURE — 95819 EEG AWAKE AND ASLEEP: CPT | Performed by: PSYCHIATRY & NEUROLOGY

## 2019-03-15 PROCEDURE — 95819 EEG AWAKE AND ASLEEP: CPT

## 2019-03-15 PROCEDURE — 80048 BASIC METABOLIC PNL TOTAL CA: CPT | Performed by: INTERNAL MEDICINE

## 2019-03-15 PROCEDURE — 97166 OT EVAL MOD COMPLEX 45 MIN: CPT

## 2019-03-15 PROCEDURE — 93888 INTRACRANIAL LIMITED STUDY: CPT

## 2019-03-15 PROCEDURE — 97162 PT EVAL MOD COMPLEX 30 MIN: CPT

## 2019-03-15 PROCEDURE — 70450 CT HEAD/BRAIN W/O DYE: CPT

## 2019-03-15 PROCEDURE — 92523 SPEECH SOUND LANG COMPREHEN: CPT

## 2019-03-15 PROCEDURE — 97530 THERAPEUTIC ACTIVITIES: CPT

## 2019-03-15 PROCEDURE — 85027 COMPLETE CBC AUTOMATED: CPT | Performed by: INTERNAL MEDICINE

## 2019-03-15 PROCEDURE — 92610 EVALUATE SWALLOWING FUNCTION: CPT

## 2019-03-15 PROCEDURE — 70551 MRI BRAIN STEM W/O DYE: CPT

## 2019-03-15 PROCEDURE — 80061 LIPID PANEL: CPT | Performed by: PSYCHIATRY & NEUROLOGY

## 2019-03-15 PROCEDURE — 83036 HEMOGLOBIN GLYCOSYLATED A1C: CPT | Performed by: PSYCHIATRY & NEUROLOGY

## 2019-03-15 PROCEDURE — 99232 SBSQ HOSP IP/OBS MODERATE 35: CPT | Performed by: PSYCHIATRY & NEUROLOGY

## 2019-03-15 PROCEDURE — 82962 GLUCOSE BLOOD TEST: CPT

## 2019-03-15 PROCEDURE — 99233 SBSQ HOSP IP/OBS HIGH 50: CPT | Performed by: INTERNAL MEDICINE

## 2019-03-15 RX ORDER — HEPARIN SODIUM 5000 [USP'U]/ML
5000 INJECTION, SOLUTION INTRAVENOUS; SUBCUTANEOUS EVERY 12 HOURS SCHEDULED
Status: DISCONTINUED | OUTPATIENT
Start: 2019-03-16 | End: 2019-03-21 | Stop reason: HOSPADM

## 2019-03-15 RX ORDER — TORSEMIDE 10 MG/1
10 TABLET ORAL 3 TIMES WEEKLY
Status: ON HOLD | COMMUNITY
End: 2019-07-02

## 2019-03-15 RX ORDER — CARVEDILOL 12.5 MG/1
12.5 TABLET ORAL EVERY 12 HOURS SCHEDULED
Status: DISCONTINUED | OUTPATIENT
Start: 2019-03-15 | End: 2019-03-18

## 2019-03-15 RX ORDER — FAMOTIDINE 20 MG/1
20 TABLET, FILM COATED ORAL 2 TIMES DAILY
Status: DISCONTINUED | OUTPATIENT
Start: 2019-03-15 | End: 2019-03-21 | Stop reason: HOSPADM

## 2019-03-15 RX ORDER — LISINOPRIL 10 MG/1
10 TABLET ORAL
Status: DISCONTINUED | OUTPATIENT
Start: 2019-03-15 | End: 2019-03-18

## 2019-03-15 RX ORDER — DULOXETIN HYDROCHLORIDE 60 MG/1
60 CAPSULE, DELAYED RELEASE ORAL DAILY
COMMUNITY
End: 2019-06-19 | Stop reason: SDUPTHER

## 2019-03-15 RX ADMIN — INSULIN HUMAN 2 UNITS: 100 INJECTION, SOLUTION PARENTERAL at 11:43

## 2019-03-15 RX ADMIN — INSULIN HUMAN 2 UNITS: 100 INJECTION, SOLUTION PARENTERAL at 06:49

## 2019-03-15 RX ADMIN — SODIUM CHLORIDE, PRESERVATIVE FREE 3 ML: 5 INJECTION INTRAVENOUS at 20:33

## 2019-03-15 RX ADMIN — INSULIN HUMAN 2 UNITS: 100 INJECTION, SOLUTION PARENTERAL at 18:00

## 2019-03-15 RX ADMIN — LISINOPRIL 10 MG: 10 TABLET ORAL at 16:26

## 2019-03-15 RX ADMIN — ASPIRIN 325 MG ORAL TABLET 325 MG: 325 PILL ORAL at 16:27

## 2019-03-15 RX ADMIN — NICARDIPINE HYDROCHLORIDE 4 MG/HR: 0.1 INJECTION, SOLUTION INTRAVENOUS at 09:54

## 2019-03-15 RX ADMIN — FAMOTIDINE 20 MG: 20 TABLET ORAL at 20:33

## 2019-03-15 RX ADMIN — SODIUM CHLORIDE, PRESERVATIVE FREE 3 ML: 5 INJECTION INTRAVENOUS at 08:53

## 2019-03-15 RX ADMIN — CARVEDILOL 12.5 MG: 12.5 TABLET, FILM COATED ORAL at 20:33

## 2019-03-15 RX ADMIN — ATORVASTATIN CALCIUM 80 MG: 40 TABLET, FILM COATED ORAL at 20:33

## 2019-03-15 RX ADMIN — CARVEDILOL 12.5 MG: 12.5 TABLET, FILM COATED ORAL at 11:43

## 2019-03-15 NOTE — CONSULTS
Patient does not meet diabetes education order criteria of educate if A1C >7.5% and she was 6.7%, therefore patient was not seen for diabetes education at this time.  Note from chart she is IDDM so 6.7% very good unless biased by lows. Please re consult as needed.

## 2019-03-15 NOTE — PLAN OF CARE
Problem: Patient Care Overview  Goal: Plan of Care Review  Outcome: Ongoing (interventions implemented as appropriate)   03/15/19 0846   Coping/Psychosocial   Plan of Care Reviewed With patient;spouse   OTHER   Outcome Summary PT initial evaluation completed. Pt demonstrates generalized BLE weakness and decreased indep/balance re: functional mobility, warranting further skilled PT services to promote PLOF. Limited today by global aphasia and fatigue. Recommend IP rehab placement at d/c based upon current level of function.        Problem: Stroke (Ischemic) (Adult)  Goal: Signs and Symptoms of Listed Potential Problems Will be Absent, Minimized or Managed (Stroke)  Outcome: Ongoing (interventions implemented as appropriate)   03/15/19 0846   Goal/Outcome Evaluation   Problems Assessed (Stroke (Ischemic)) cognitive impairment;communication impairment;motor/sensory impairment   Problems Assessed (Stroke (Ischemic)) communication impairment;cognitive impairment;motor/sensory impairment

## 2019-03-15 NOTE — PROGRESS NOTES
"Critical Care Note     LOS: 1 day   Patient Care Team:  MAGDIEL Mcneill MD as PCP - General (Family Medicine)    Chief Complaint/Reason for visit:    Chief Complaint   Patient presents with   • Stroke       Subjective     79-year-old woman presenting with the sudden onset of  aphasia, left-sided weakness, right gaze focus.  She has underlying high blood pressure and diabetes.  She received TPA.    Interval History:   Stroke symptoms resolved except expressive aphasia  Repeat CT scan showed no change  Afebrile  Cardene drip for blood pressure management      Review of Systems:    All systems were reviewed and negative except as noted in subjective.    Medical history, surgical history, social history, family history reviewed    Objective     Intake/Output:    Intake/Output Summary (Last 24 hours) at 3/15/2019 1603  Last data filed at 3/15/2019 1200  Gross per 24 hour   Intake 513 ml   Output 1575 ml   Net -1062 ml       Nutrition:  Diet Soft Texture; Whole Foods; Thin; Cardiac, Consistent Carbohydrate    Infusions:    niCARdipine 5-15 mg/hr Last Rate: 4 mg/hr (03/15/19 0954)         Telemetry: Sinus rhythm             Vital Signs  Blood pressure 167/83, pulse 71, temperature 98.4 °F (36.9 °C), temperature source Oral, resp. rate 16, height 165.1 cm (65\"), weight 98.2 kg (216 lb 7.9 oz), SpO2 91 %.    Physical Exam:  General Appearance:   Well-developed elderly woman in no distress   Head:   Atraumatic   Eyes:          Pupils equal and reactive to light, extraocular movements intact   Ears:     Throat:  Oral mucosa moist   Neck:  Trachea midline, no JVD, no palpable thyroid   Back:      Lungs:    Symmetric chest expansion.  Breath sounds bilateral, equal, clear    Heart:   Regular rhythm, S1, S2 auscultated, no murmur   Abdomen:    Bowel sounds present, nondistended, soft, nontender   Rectal:   Deferred   Extremities:  No pitting edema or cyanosis   Pulses:    Skin:  Warm and dry   Lymph nodes:    Neurologic:  Face " symmetric,  equal, awake, mild dysarthria      Results Review:     I reviewed the patient's new clinical results.   Results from last 7 days   Lab Units 03/15/19  0335 03/14/19  1514 03/14/19  1512   SODIUM mmol/L 136  --   --    POTASSIUM mmol/L 4.2  --   --    CHLORIDE mmol/L 105  --   --    CO2 mmol/L 22.0  --   --    BUN mg/dL 20  --   --    CREATININE mg/dL 1.30  --  1.30   CALCIUM mg/dL 10.4  --   --    ALT (SGPT) U/L  --  19  --    AST (SGOT) U/L  --  20  --    GLUCOSE mg/dL 160*  --   --      Results from last 7 days   Lab Units 03/15/19  0335 03/14/19  1514 03/14/19  1512   WBC 10*3/mm3 8.58 7.91  --    HEMOGLOBIN g/dL 13.8 14.1  --    HEMOGLOBIN, POC g/dL  --   --  14.6   HEMATOCRIT % 42.6 43.7  --    HEMATOCRIT POC %  --   --  43   PLATELETS 10*3/mm3 164 186  --          No results found for: BLOODCX  No results found for: URINECX    I reviewed the patient's new imaging including images and reports.       FINDINGS: Old left PCA territory occipitoparietal infarct with  encephalomalacia and result in volume loss. Old lacunar infarcts in the  cerebellar hemispheres and paramedian thalami. There is no restricted  diffusion currently to suggest acute infarction. Parenchymal volume is  within normal limits. [place] flow voids are preserved.  Sinonasal and temporal bone structures are unremarkable.     IMPRESSION:  Several old infarctions without acute intracranial  abnormality.      D:  03/15/2019  FINDINGS: Multiparametric maps including mean transit time, time to  drain, cerebral blood flow and cerebral blood volume. No perfusional  abnormality. Specifically no reversible ischemia within a specific  vascular territory identified.     IMPRESSION:  No reversible ischemia within a specific vascular territory.     DICTATED:   3/14/2019  All medications reviewed.     aspirin 325 mg Oral Daily   Or      aspirin 300 mg Rectal Daily   atorvastatin 80 mg Oral Nightly   carvedilol 12.5 mg Oral Q12H    insulin regular 0-9 Units Subcutaneous Q6H   sodium chloride 3 mL Intravenous Q12H         Assessment/Plan       CVA (cerebral vascular accident) (CMS/[facility])    Altered mental status    Hypertension    Hyperlipidemia    Diabetes mellitus (CMS/[facility])    COPD (chronic obstructive pulmonary disease) (CMS/[facility])      79-year-old woman presenting with acute stroke symptoms with NIHSS 22 that resolved for the most part with TPA.  Scans do not reveal a new stroke or reversible ischemia.  She has some persistent expressive aphasia.  She is requiring Cardene for blood pressure management.  Skull therapy noted generalized bilateral weakness and decreased balance.  She required assistance to sit on the edge of the bed and had persistent A phase and confusion.  Soft cold foods thin liquid diet ordered.    PLAN:  Increase oral blood pressure medications and wean Cardene  Continue PT, OT, speech therapy  Follow-up echocardiogram, neurology would like a ERMIAS to rule out cardioembolic event  Aspirin, statin  Sliding scale insulin  Start subcutaneous heparin tomorrow  Start Pepcid    VTE Prophylaxis:SCDS    Stress Ulcer Prophylaxis:none    Swati Alba MD  03/15/19  4:03 PM      Time: 25min  I personally provided care to this critically ill patient as documented above.  Critical care time does not include time spent on separately billed procedures.  Non of my critical care time was concurrent with other critical care providers.

## 2019-03-15 NOTE — PLAN OF CARE
Problem: Patient Care Overview  Goal: Plan of Care Review  Outcome: Ongoing (interventions implemented as appropriate)   03/15/19 1032   Coping/Psychosocial   Plan of Care Reviewed With patient;spouse   Plan of Care Review   Progress improving   SLP evaluation completed. Will address aphasia and apraxia. Please see note for further details and recommendations.

## 2019-03-15 NOTE — PROGRESS NOTES
Neurology       Patient Care Team:  MAGDIEL Mcneill MD as PCP - General (Family Medicine)    Chief complaint: Stroke    History: Further history from the patient's  indicates that she had a remote stroke with visual changes but no speech difficulty.    Overnight she became a bit nauseated which has resolved.    CT overnight showed no change.    Patient's symptoms have largely resolved with the exception of a significant expressive a aphasia.  She seems to have good word comprehension.      Past Medical History:   Diagnosis Date   • COPD (chronic obstructive pulmonary disease) (CMS/[facility])    • Diabetes mellitus (CMS/[facility])    • GERD (gastroesophageal reflux disease)    • Hyperlipidemia    • Hypertension        Vital Signs   Vitals:    03/15/19 0645 03/15/19 0700 03/15/19 0715 03/15/19 0800   BP: 140/63 137/66 164/83 150/49   BP Location:    Left arm   Patient Position:    Lying   Pulse: 78 76 87 98   Resp:    16   Temp:    98.4 °F (36.9 °C)   TempSrc:    Oral   SpO2: 92% 91% 95% 93%   Weight:       Height:           Physical Exam:   General: Awake and alert              Neuro: Following commands    Eye movements are full.    Pupils are equal.    Face is symmetrical.    Motor testing shows good strength bilaterally with perhaps a mild deep weakness in the right arm.  Reflexes are increased on the right compared to the left.    She moves lower extremities well.    Results Review:  MRI shows an old occipitoparietal infarct with encephalomalacia.  There is no evidence of a new infarct.    CT angiogram is normal both head and neck.  Perfusion scan was unremarkable.      Results from last 7 days   Lab Units 03/15/19  0335   WBC 10*3/mm3 8.58   HEMOGLOBIN g/dL 13.8   HEMATOCRIT % 42.6   PLATELETS 10*3/mm3 164     Results from last 7 days   Lab Units 03/15/19  0335 03/14/19  1514 03/14/19  1512   SODIUM mmol/L 136  --   --    POTASSIUM mmol/L 4.2  --   --    CHLORIDE mmol/L 105  --   --    CO2 mmol/L 22.0  --   --    BUN  mg/dL 20  --   --    CREATININE mg/dL 1.30  --  1.30   CALCIUM mg/dL 10.4  --   --    ALT (SGPT) U/L  --  19  --    AST (SGOT) U/L  --  20  --    GLUCOSE mg/dL 160*  --   --        Imaging Results (last 24 hours)     Procedure Component Value Units Date/Time    MRI Brain Without Contrast [[id number]] Collected:  03/15/19 0838     Updated:  03/15/19 1010    Narrative:       EXAMINATION: MRI BRAIN WO CONTRAST-      INDICATION: R41.82-Altered mental status, unspecified; I63.9-Cerebral  infarction, unspecified; I10-Essential (primary) hypertension.     TECHNIQUE:  Multiplanar, multisequence MRI of the brain was performed  without contrast.     COMPARISONS:  Noncontrast CT head 03/14/2019.     FINDINGS: Old left PCA territory occipitoparietal infarct with  encephalomalacia and result in volume loss. Old lacunar infarcts in the  cerebellar hemispheres and paramedian thalami. There is no restricted  diffusion currently to suggest acute infarction. Parenchymal volume is  within normal limits. [place] flow voids are preserved.  Sinonasal and temporal bone structures are unremarkable.       Impression:       Several old infarctions without acute intracranial  abnormality.      D:  03/15/2019  E:  03/15/2019     This report was finalized on 3/15/2019 10:08 AM by Eleno Boyer.       CT Head Without Contrast [[id number]] Collected:  03/14/19 2243     Updated:  03/14/19 2312    Narrative:       EXAM:    CT Head Without Contrast     EXAM DATE/TIME:    3/14/2019 10:43 PM     CLINICAL HISTORY:    79 years old, female; Essential (primary) hypertension; Cerebral infarction,   unspecified; Altered mental status, unspecified; Signs and symptoms; Speech   disturbance and other: Nausea; Aphasia; Additional info: Nausea, aphasic     TECHNIQUE:    Axial computed tomography images of the head/brain without contrast.    All CT scans at this facility use at least one of these dose optimization   techniques: automated exposure control; mA  and/or kV adjustment per patient   size (includes targeted exams where dose is matched to clinical indication); or   iterative reconstruction.     COMPARISON:    CT HEAD WO CONTRAST STROKE PROTOCOL 3/14/2019 2:54 PM     FINDINGS:    Brain:  Stable appearance of the hypodensity involving the left   parieto-occipital region. Age-related involutional changes and chronic   microvascular ischemic disease. No midline shift. No extra-axial collection. No   acute intracranial hemorrhage. Basal cisterns are patent.    Ventricles: Normal. No ventriculomegaly.    Bones/joints: Unremarkable. No acute fracture.    Sinuses: Visualized sinuses are unremarkable. No acute sinusitis.    Mastoid air cells: Visualized mastoid air cells are unremarkable. No mastoid   effusion.    Orbits:  Bilateral cataract surgery.    Soft tissues: Unremarkable.       Impression:       No significant interval change. Stable appearance of the hypodensity involving   the left parieto-occipital region. No acute intracranial hemorrhage.    THIS DOCUMENT HAS BEEN ELECTRONICALLY SIGNED BY EDISON SCHILLING MD    CT Head Without Contrast Stroke Protocol [[id number]] Collected:  03/14/19 1509     Updated:  03/14/19 1725    Narrative:       EXAMINATION: CT HEAD WO CONTRAST - 3/14/2019     INDICATION: Stroke protocol.     TECHNIQUE: Axial CT of the head without intravenous contrast  administration     The radiation dose reduction device was turned on for each scan per the  ALARA (As Low as Reasonably Achievable) protocol.     COMPARISON: NONE     FINDINGS: Poorly defined low-attenuation area left parietal-occipital  region extending to involve the cortex with edematous appearance and  effacement of the sulci concerning for evolving infarction versus  superimposed area upon chronic changes as there is no prior available  comparison. No intra-axial hemorrhage or extra-axial fluid collection.  No hydrocephalus. Globes and orbits unremarkable. Visualized paranasal  sinuses  and mastoid air cells are grossly clear and well-pneumatized.  Calvarium intact.       Impression:       Abnormal area of low attenuation extending to involve the  cortex with sulcal effacement and edematous appearance of left  parietal-occipital region concerning for left PCA and/or MCA infarction  without evidence for midline shift or hydrocephalus.     Scan performed on 3/14/2019 at 1455 hours. Scan report given to ER  stroke team in person by Dr. Gregorio at scanner on 3/14/2019 at 1505 hours.     DICTATED:   3/14/2019  EDITED/ls :   3/14/2019      This report was finalized on 3/14/2019 5:23 PM by Dr. Gary Gregorio.       CT Angiogram Head With & Without Contrast [[id number]] Collected:  03/14/19 1543     Updated:  03/14/19 1725    Narrative:       EXAMINATION: CT ANGIOGRAM NECK WWO CONTRAST, CT ANGIOGRAM HEAD WWO  CONTRAST - 3/14/2019      INDICATION: Evaluate for stroke.     TECHNIQUE: CT angiogram head and neck with and without intravenous  contrast administration. 2D reconstructions performed.     The radiation dose reduction device was turned on for each scan per the  ALARA (As Low as Reasonably Achievable) protocol.     COMPARISON: CT cerebral perfusion concurrently performed     FINDINGS:      CTA NECK: Normal 3-vessel arch with patent great vessel origins  demonstrating atherosclerotic involvement of the distal transverse  portion aortic arch and mild to moderate atherosclerotic involvement of  the left subclavian without occlusion. Right dominant vertebral artery  system without focal severe stenosis,  aneurysm or occlusion. Carotids  demonstrating a severely near midline retropharyngeal course of the  distal common carotid arteries with high branching pattern demonstrating  minimal atherosclerotic involvement of the carotid bulbs with 10% right  and 15% left luminal narrowing as measured by NASCET criteria. No focal  severe stenosis, aneurysm or occlusion of the distal internal carotid  arteries. Cervical  soft tissues demonstrate severely heterogeneous  thyroid gland with calcifications and nodularity including a  low-attenuation nodule measuring up to 1.5 cm left thyroid lobe.  Visualized lung apices unremarkable. No bulky cervical adenopathy.     CTA HEAD: Distal internal carotid arteries are patent without  hemodynamically significant stenosis, aneurysm or occlusion  demonstrating minimal atherosclerotic involvement and luminal narrowing  from calcifications. Anterior cerebral arteries are patent without  hemodynamically significant stenosis, aneurysm or occlusion. Middle  cerebral arteries are patent without hemodynamically significant  stenosis, aneurysm or occlusion. Vertebrobasilar system and posterior  cerebral arteries are patent without hemodynamically significant  stenosis, aneurysm or occlusion.       Impression:       No hemodynamically significant stenosis, aneurysm or  occlusion of the CTA head and neck with severely retropharyngeal course  of the distal common carotid arteries in a  high branching pattern with  minimal atherosclerotic involvement of the carotid bulbs as detailed  above.     DICTATED:   3/14/2019  EDITED/ls :   3/14/2019      This report was finalized on 3/14/2019 5:23 PM by Dr. Gary Gregorio.       CT Angiogram Neck With & Without Contrast [[id number]] Collected:  03/14/19 1543     Updated:  03/14/19 1725    Narrative:       EXAMINATION: CT ANGIOGRAM NECK WWO CONTRAST, CT ANGIOGRAM HEAD WWO  CONTRAST - 3/14/2019      INDICATION: Evaluate for stroke.     TECHNIQUE: CT angiogram head and neck with and without intravenous  contrast administration. 2D reconstructions performed.     The radiation dose reduction device was turned on for each scan per the  ALARA (As Low as Reasonably Achievable) protocol.     COMPARISON: CT cerebral perfusion concurrently performed     FINDINGS:      CTA NECK: Normal 3-vessel arch with patent great vessel origins  demonstrating atherosclerotic involvement of  the distal transverse  portion aortic arch and mild to moderate atherosclerotic involvement of  the left subclavian without occlusion. Right dominant vertebral artery  system without focal severe stenosis,  aneurysm or occlusion. Carotids  demonstrating a severely near midline retropharyngeal course of the  distal common carotid arteries with high branching pattern demonstrating  minimal atherosclerotic involvement of the carotid bulbs with 10% right  and 15% left luminal narrowing as measured by NASCET criteria. No focal  severe stenosis, aneurysm or occlusion of the distal internal carotid  arteries. Cervical soft tissues demonstrate severely heterogeneous  thyroid gland with calcifications and nodularity including a  low-attenuation nodule measuring up to 1.5 cm left thyroid lobe.  Visualized lung apices unremarkable. No bulky cervical adenopathy.     CTA HEAD: Distal internal carotid arteries are patent without  hemodynamically significant stenosis, aneurysm or occlusion  demonstrating minimal atherosclerotic involvement and luminal narrowing  from calcifications. Anterior cerebral arteries are patent without  hemodynamically significant stenosis, aneurysm or occlusion. Middle  cerebral arteries are patent without hemodynamically significant  stenosis, aneurysm or occlusion. Vertebrobasilar system and posterior  cerebral arteries are patent without hemodynamically significant  stenosis, aneurysm or occlusion.       Impression:       No hemodynamically significant stenosis, aneurysm or  occlusion of the CTA head and neck with severely retropharyngeal course  of the distal common carotid arteries in a  high branching pattern with  minimal atherosclerotic involvement of the carotid bulbs as detailed  above.     DICTATED:   3/14/2019  EDITED/ls :   3/14/2019      This report was finalized on 3/14/2019 5:23 PM by Dr. Gary Gregorio.       CT Cerebral Perfusion With & Without Contrast [[id number]] Collected:  03/14/19  1524     Updated:  03/14/19 1725    Narrative:       EXAMINATION: CT CEREBRAL PERFUSION WWO CONTRAST - 3/14/2019     INDICATION: Evaluate for stroke.     TECHNIQUE: CT cerebral perfusion with and without intravenous contrast  administration.     The radiation dose reduction device was turned on for each scan per the  ALARA (As Low as Reasonably Achievable) protocol.     COMPARISON: CT stroke head earlier same day.     FINDINGS: Multiparametric maps including mean transit time, time to  drain, cerebral blood flow and cerebral blood volume. No perfusional  abnormality. Specifically no reversible ischemia within a specific  vascular territory identified.       Impression:       No reversible ischemia within a specific vascular territory.     DICTATED:   3/14/2019  EDITED/ls :   3/14/2019      This report was finalized on 3/14/2019 5:23 PM by Dr. Gary Gregorio.       XR Chest 1 View [[id number]] Collected:  03/14/19 1541     Updated:  03/14/19 1725    Narrative:          EXAMINATION: XR CHEST 1 VW - 3/14/2019     INDICATION: Stroke protocol.     COMPARISON: NONE     FINDINGS: Cardiac size enlarged with increased central pulmonary  vascularity and interstitial prominence consistent with interstitial  edema pattern and probable trace to small left pleural effusion. No  pneumothorax.           Impression:       Cardiac silhouette enlarged with increased pulmonary  vascularity and interstitial prominence of interstitial edema pattern  along with probable trace left pleural effusion.     DICTATED:   3/14/2019  EDITED/ls :   3/14/2019      This report was finalized on 3/14/2019 5:23 PM by Dr. Gary Gregorio.             Assessment:  Acute stroke with negative MRI correlation and persisting isolated expressive aphasia.    Remote left occipital parietal stroke    Plan:  Transcranial Doppler.    Cardiology evaluation for possible cardioembolic source.        Comment:  This is a extremely unusual scenario.  The patient presents with  an NIH score of 22 gets TPA with a negative perfusion scan and has a virtual elimination of all of her symptoms other than an expressive aphasia and no MR my markers to explain that.    Would have to assume that the patient had a embolic event that was largely obliterated by TPA although I be welcome to hear any alternative explanations from cardiology.    I suspect she will need a ERMIAS later this week         I discussed the patients findings and my recommendations with patient, family and nursing staff    Surendra Elkins MD  03/15/19  10:36 AM

## 2019-03-15 NOTE — THERAPY EVALUATION
Acute Care - Physical Therapy Initial Evaluation  [facility]     Patient Name: Yanique Webster  : 1940  MRN: [id number]  Today's Date: 3/15/2019   Onset of Illness/Injury or Date of Surgery: 19  Date of Referral to PT: 19  Referring Physician: MD Severo      Admit Date: 3/14/2019    Visit Dx:     ICD-10-CM ICD-9-CM   1. Altered mental status, unspecified altered mental status type R41.82 780.97   2. Acute ischemic stroke (CMS/HCC) I63.9 434.91   3. Hypertension, unspecified type I10 401.9   4. Impaired mobility and ADLs Z74.09 799.89   5. Impaired functional mobility, balance, gait, and endurance Z74.09 V49.89     Patient Active Problem List   Diagnosis   • Altered mental status   • Hypertension   • Hyperlipidemia   • Diabetes mellitus (CMS/HCC)   • COPD (chronic obstructive pulmonary disease) (CMS/HCC)     Past Medical History:   Diagnosis Date   • COPD (chronic obstructive pulmonary disease) (CMS/HCC)    • Diabetes mellitus (CMS/HCC)    • GERD (gastroesophageal reflux disease)    • Hyperlipidemia    • Hypertension      History reviewed. No pertinent surgical history.     PT ASSESSMENT (last 12 hours)      Physical Therapy Evaluation     Row Name 03/15/19 0846          PT Evaluation Time/Intention    Subjective Information  no complaints  -LS     Document Type  evaluation  -LS     Mode of Treatment  physical therapy  -LS     Patient Effort  good  -LS     Symptoms Noted During/After Treatment  fatigue  -LS     Comment  Exp and receptive aphasia.  -LS     Row Name 03/15/19 0846          General Information    Patient Profile Reviewed?  yes  -LS     Onset of Illness/Injury or Date of Surgery  19  -LS     Referring Physician  MD Severo  -LS     Patient Observations  alert;cooperative;agree to therapy  -LS     Prior Level of Function  independent:;all household mobility;ADL's;bathing;dressing  -LS     Equipment Currently Used at Home  walker, rolling;shower chair  -LS     Pertinent History  of Current Functional Problem  To ED via EMS after being found down by spouse. Demonstrated L-sided weakness, L facial droop,  R gaze preference, global aphasia. s/p tPA.  -LS     Existing Precautions/Restrictions  fall global aphasia  -LS     Limitations/Impairments  safety/cognitive  -LS     Risks Reviewed  patient and family:;LOB;dizziness;increased discomfort;change in vital signs  -LS     Benefits Reviewed  patient and family:;improve function;increase independence;increase strength;increase balance;increase knowledge  -LS     Barriers to Rehab  cognitive status  -LS     Row Name 03/15/19 0846          Relationship/Environment    Lives With  spouse  -LS     Row Name 03/15/19 0846          Resource/Environmental Concerns    Current Living Arrangements  home/apartment/condo  -     Row Name 03/15/19 0846          Home Main Entrance    Number of Stairs, Main Entrance  two  -LS     Row Name 03/15/19 0846          Cognitive Assessment/Intervention- PT/OT    Affect/Mental Status (Cognitive)  confused  -LS     Orientation Status (Cognition)  oriented to;person  -LS     Follows Commands (Cognition)  follows one step commands;50-74% accuracy  -     Cognitive Function (Cognitive)  safety deficit  -LS     Safety Deficit (Cognitive)  moderate deficit;awareness of need for assistance;insight into deficits/self awareness;safety precautions awareness  -     Personal Safety Interventions  fall prevention program maintained;gait belt;nonskid shoes/slippers when out of bed  -LS     Row Name 03/15/19 0846          Safety Issues, Functional Mobility    Impairments Affecting Function (Mobility)  balance;cognition;coordination;endurance/activity tolerance;strength  -     Row Name 03/15/19 0846          Bed Mobility Assessment/Treatment    Supine-Sit [place] (Bed Mobility)  minimum assist (75% patient effort);2 person assist;verbal cues  -     Assistive Device (Bed Mobility)  bed rails;draw sheet;head of bed elevated   -     Row Name 03/15/19 0846          Transfer Assessment/Treatment    Transfer Assessment/Treatment  sit-stand transfer;stand-sit transfer  -     Sit-Stand [place] (Transfers)  minimum assist (75% patient effort);2 person assist;verbal cues  -LS     Stand-Sit [place] (Transfers)  minimum assist (75% patient effort);2 person assist;verbal cues  -     Row Name 03/15/19 0846          Sit-Stand Transfer    Assistive Device (Sit-Stand Transfers)  walker, front-wheeled  -LS     Row Name 03/15/19 0846          Stand-Sit Transfer    Assistive Device (Stand-Sit Transfers)  walker, front-wheeled  -LS     Row Name 03/15/19 0846          Gait/Stairs Assessment/Training    Gait/Stairs Assessment/Training  gait/ambulation assistive device  -     [place] Level (Gait)  minimum assist (75% patient effort);2 person assist;verbal cues  -     Assistive Device (Gait)  walker, front-wheeled  -LS     Distance in Feet (Gait)  26  -LS     Deviations/Abnormal Patterns (Gait)  gait speed decreased;myke decreased;stride length decreased  -LS     Bilateral Gait Deviations  heel strike decreased;forward flexed posture  -LS     Comment (Gait/Stairs)  VC's for keeping RWx close to body; encouragement to progress distance. Followed with recliner for safety; distance limited by fatigue.   -     Row Name 03/15/19 0846          General ROM    GENERAL ROM COMMENTS  BLE grossly WFL  -[facility] Name 03/15/19 0846          MMT (Manual Muscle Testing)    General MMT Comments  pt with difficulty following commands for formal MMT; BLEs grossly 3+/5 as demonstrated during mobility tasks.   -     Row Name 03/15/19 0846          Motor Assessment/Intervention    Additional Documentation  Balance (Group);Therapeutic Exercise (Group)  -[facility] Name 03/15/19 0846          Therapeutic Exercise    55629 - PT Therapeutic Activity Minutes  9  -[facility] Name 03/15/19 0846          Balance    Balance  static sitting balance;static  standing balance  -     Row Name 03/15/19 0846          Static Sitting Balance    Level of [place] (Unsupported Sitting, Static Balance)  minimal assist, 75% patient effort  -LS     Sitting Position (Unsupported Sitting, Static Balance)  sitting on edge of bed  -     Row Name 03/15/19 0846          Static Standing Balance    Level of [place] (Supported Standing, Static Balance)  minimal assist, 75% patient effort  -LS     Assistive Device Utilized (Supported Standing, Static Balance)  walker, rolling  -     Row Name 03/15/19 0846          Sensory Assessment/Intervention    Sensory General Assessment  -- TBA further  -     Row Name 03/15/19 0846          Pain Scale: FACES Pre/Post-Treatment    Pain: FACES Scale, Pretreatment  0-->no hurt  -     Pain: FACES Scale, Post-Treatment  0-->no hurt  -     Row Name 03/15/19 0846          Plan of Care Review    Plan of Care Reviewed With  patient;spouse  -     Row Name 03/15/19 0846          Physical Therapy Clinical Impression    Date of Referral to PT  03/14/19  -     PT Diagnosis (PT Clinical Impression)  impaired functional mobility, balance, gait  -     Patient/Family Goals Statement (PT Clinical Impression)  return to [facility]  -     Criteria for Skilled Interventions Met (PT Clinical Impression)  yes;treatment indicated  -     Rehab Potential (PT Clinical Summary)  good, to achieve stated therapy goals  -     Care Plan Review (PT)  evaluation/treatment results reviewed  -     Row Name 03/15/19 0846          Vital Signs    Pre Systolic BP Rehab  150  -LS     Pre Treatment Diastolic BP  49  -LS     Pretreatment Heart Rate (beats/min)  82  -LS     Posttreatment Heart Rate (beats/min)  88  -LS     Pre SpO2 (%)  97  -LS     O2 Delivery Pre Treatment  room air  -LS     Post SpO2 (%)  93  -LS     O2 Delivery Post Treatment  room air  -LS     Pre Patient Position  Supine  -LS     Intra Patient Position  Standing  -LS     Post Patient Position   Sitting  -LS     Row Name 03/15/19 0846          Physical Therapy Goals    Bed Mobility Goal Selection (PT)  bed mobility, PT goal 1  -LS     Transfer Goal Selection (PT)  transfer, PT goal 1  -LS     Gait Training Goal Selection (PT)  gait training, PT goal 1  -LS     Row Name 03/15/19 0846          Bed Mobility Goal 1 (PT)    Activity/Assistive Device (Bed Mobility Goal 1, PT)  sit to supine/supine to sit  -LS     [place] Level/Cues Needed (Bed Mobility Goal 1, PT)  supervision required  -LS     Time Frame (Bed Mobility Goal 1, PT)  2 weeks  -LS     Progress/Outcomes (Bed Mobility Goal 1, PT)  goal ongoing  -LS     Row Name 03/15/19 0846          Transfer Goal 1 (PT)    Activity/Assistive Device (Transfer Goal 1, PT)  sit-to-stand/stand-to-sit;walker, rolling  -LS     [place] Level/Cues Needed (Transfer Goal 1, PT)  supervision required  -LS     Time Frame (Transfer Goal 1, PT)  2 weeks  -LS     Progress/Outcome (Transfer Goal 1, PT)  goal ongoing  -     Row Name 03/15/19 0846          Gait Training Goal 1 (PT)    Activity/Assistive Device (Gait Training Goal 1, PT)  gait (walking locomotion);walker, rolling  -LS     [place] Level (Gait Training Goal 1, PT)  contact guard assist  -LS     Distance (Gait Goal 1, PT)  150  -LS     Time Frame (Gait Training Goal 1, PT)  2 weeks  -LS     Progress/Outcome (Gait Training Goal 1, PT)  goal ongoing  -     Row Name 03/15/19 0846          Positioning and Restraints    Pre-Treatment Position  in bed  -LS     Post Treatment Position  chair  -LS     In Chair  notified nsg;reclined;call light within reach;encouraged to call for assist;exit alarm on;waffle cushion;legs elevated;heels elevated;with family/caregiver  -     Row Name 03/15/19 0846          Living Environment    Home Accessibility  stairs to enter home  -LS       User Key  (r) = Recorded By, (t) = Taken By, (c) = Cosigned By    Initials Name Provider Type    Lizbeth Doherty, PT Physical  Therapist        Physical Therapy Education     Title: PT OT SLP Therapies (In Progress)     Topic: Physical Therapy (In Progress)     Point: Mobility training (In Progress)     Learning Progress Summary           Patient Acceptance, E,D, NR by  at 3/15/2019  8:46 AM                   Point: Body mechanics (In Progress)     Learning Progress Summary           Patient Acceptance, E,D, NR by  at 3/15/2019  8:46 AM                   Point: Precautions (In Progress)     Learning Progress Summary           Patient Acceptance, E,D, NR by  at 3/15/2019  8:46 AM                               User Key     Initials Effective Dates Name Provider Type Discipline     06/19/15 -  Lizbeth Mcmahan, PT Physical Therapist PT              PT Recommendation and Plan  Anticipated Discharge Disposition (PT): inpatient rehabilitation facility  Planned Therapy Interventions (PT Eval): balance training, bed mobility training, gait training, home exercise program, strengthening, transfer training, patient/family education  Therapy Frequency (PT Clinical Impression): daily  Outcome Summary/Treatment Plan (PT)  Anticipated Discharge Disposition (PT): inpatient rehabilitation facility  Plan of Care Reviewed With: patient, spouse  Outcome Summary: PT initial evaluation completed. Pt demonstrates generalized BLE weakness and decreased indep/balance re: functional mobility, warranting further skilled PT services to promote PLOF. Limited today by global aphasia and fatigue. Recommend IP rehab placement at d/c based upon current level of function.   Outcome Measures     Row Name 03/15/19 0846 03/15/19 0748          How much help from another person do you currently need...    Turning from your back to your side while in flat bed without using bedrails?  3  -LS  --     Moving from lying on back to sitting on the side of a flat bed without bedrails?  3  -LS  --     Moving to and from a bed to a chair (including a wheelchair)?  3  -LS  --      Standing up from a chair using your arms (e.g., wheelchair, bedside chair)?  3  -LS  --     Climbing 3-5 steps with a railing?  2  -LS  --     To walk in hospital room?  3  -LS  --     AM-PAC 6 Clicks Score  17  -LS  --        How much help from another is currently needed...    Putting on and taking off regular lower body clothing?  --  1  -TB     Bathing (including washing, rinsing, and drying)  --  2  -TB     Toileting (which includes using toilet bed pan or urinal)  --  1  -TB     Putting on and taking off regular upper body clothing  --  2  -TB     Taking care of personal grooming (such as brushing teeth)  --  2  -TB     Eating meals  --  2  -TB     Score  --  10  -TB        Modified [place] Scale    Pre-Stroke Modified [place] Scale  0 - No Symptoms at all.  -LS  0 - No Symptoms at all.  -TB     Modified [place] Scale  3 - Moderate disability.  Requiring some help, but able to walk without assistance.  -LS  4 - Moderately severe disability.  Unable to walk without assistance, and unable to attend to own bodily needs without assistance.  -TB        Functional Assessment    Outcome Measure Options  AM-PAC 6 Clicks Basic Mobility (PT)  -LS  AM-PAC 6 Clicks Daily Activity (OT);Modified [place]  -TB       User Key  (r) = Recorded By, (t) = Taken By, (c) = Cosigned By    Initials Name Provider Type     Sarah Bear, OT Occupational Therapist    Lizbeth Doherty, PT Physical Therapist         Time Calculation:   PT Charges     Row Name 03/15/19 0846             Time Calculation    Start Time  0846  -      PT Received On  03/15/19  -      PT Goal Re-Cert Due Date  03/25/19  -         Time Calculation- PT    Total Timed Code Minutes- PT  9 minute(s)  -         Timed Charges    74265 - PT Therapeutic Activity Minutes  9  -        User Key  (r) = Recorded By, (t) = Taken By, (c) = Cosigned By    Initials Name Provider Type    Lizbeth Doherty, PT Physical Therapist        Therapy Suggested Charges      Code   Minutes Charges    58340 (CPT®) Hc Pt Neuromusc Re Education Ea 15 Min      45521 (CPT®) Hc Pt Ther Proc Ea 15 Min      70635 (CPT®) Hc Gait Training Ea 15 Min      33642 (CPT®) Hc Pt Therapeutic Act Ea 15 Min 9 1    86183 (CPT®) Hc Pt Manual Therapy Ea 15 Min      71019 (CPT®) Hc Pt Iontophoresis Ea 15 Min      51471 (CPT®) Hc Pt Elec Stim Ea-Per 15 Min      66841 (CPT®) Hc Pt Ultrasound Ea 15 Min      67287 (CPT®) Hc Pt Self Care/Mgmt/Train Ea 15 Min      70701 (CPT®) Hc Pt Prosthetic (S) Train Initial Encounter, Each 15 Min      44222 (CPT®) Hc Pt Orthotic(S)/Prosthetic(S) Encounter, Each 15 Min      88753 (CPT®) Hc Orthotic(S) Mgmt/Train Initial Encounter, Each 15min      Total  9 1        Therapy Charges for Today     Code Description Service Date Service Provider Modifiers Qty    03726074224 HC PT EVAL MOD COMPLEXITY 3 3/15/2019 Lizbeth Mcmahan, PT GP 1    29687648629 HC PT THERAPEUTIC ACT EA 15 MIN 3/15/2019 Lizbeth Mcmahan, PT GP 1    98438662811 HC PT THER SUPP EA 15 MIN 3/15/2019 Lizbeth Mcmahan, PT GP 2          PT G-Codes  Outcome Measure Options: AM-PAC 6 Clicks Basic Mobility (PT)  AM-PAC 6 Clicks Score: 17  Score: 10  Modified [place] Scale: 3 - Moderate disability.  Requiring some help, but able to walk without assistance.      Lizbeth Mcmahan, PT  3/15/2019

## 2019-03-15 NOTE — THERAPY EVALUATION
Acute Care - Occupational Therapy Initial Evaluation  [facility]     Patient Name: Yanique Webster  : 1940  MRN: [id number]  Today's Date: 3/15/2019  Onset of Illness/Injury or Date of Surgery: 19  Date of Referral to OT: 19  Referring Physician: Severo    Admit Date: 3/14/2019       ICD-10-CM ICD-9-CM   1. Altered mental status, unspecified altered mental status type R41.82 780.97   2. Acute ischemic stroke (CMS/HCC) I63.9 434.91   3. Hypertension, unspecified type I10 401.9   4. Impaired mobility and ADLs Z74.09 799.89     Patient Active Problem List   Diagnosis   • Altered mental status   • Hypertension   • Hyperlipidemia   • Diabetes mellitus (CMS/HCC)   • COPD (chronic obstructive pulmonary disease) (CMS/HCC)     Past Medical History:   Diagnosis Date   • COPD (chronic obstructive pulmonary disease) (CMS/HCC)    • Diabetes mellitus (CMS/HCC)    • GERD (gastroesophageal reflux disease)    • Hyperlipidemia    • Hypertension      History reviewed. No pertinent surgical history.       OT ASSESSMENT FLOWSHEET (last 72 hours)      Occupational Therapy Evaluation     Row Name 03/15/19 0748                   OT Evaluation Time/Intention    Subjective Information  no complaints  -TB        Document Type  evaluation  -TB        Mode of Treatment  occupational therapy  -TB        Patient Effort  good  -TB        Symptoms Noted During/After Treatment  none  -TB        Comment  restless  -TB           General Information    Patient Profile Reviewed?  yes  -TB        Onset of Illness/Injury or Date of Surgery  19  -TB        Referring Physician  Severo  -TB        Patient Observations  alert;cooperative  -TB        Patient/Family Observations  Spouse at bedside  -TB        General Observations of Patient  Pt supine, RA, IV, tele, purwick; exit alarm  -TB        Prior Level of Function  independent:;all household mobility;ADL's;mod assist:;home management  -TB        Equipment Currently Used at  Home  walker, rolling;shower chair  -TB        Pertinent History of Current Functional Problem  Pt to ED via EMS after found unresponsive by spouse. Pt arousable and found to have L side weakness, L facial droop, strong R gaze preference and global aphasia. BP in the 220s systolic in ED. Imaging (+) acute ischemic stroke. Pt is s/p TPA  -TB        Existing Precautions/Restrictions  fall;other (see comments) exit alarms; TPA precautions  -TB        Limitations/Impairments  safety/cognitive;visual  -TB        Risks Reviewed  patient and family:;LOB;increased discomfort;change in vital signs;lines disloged  -TB        Benefits Reviewed  patient and family:;improve function;increase independence;increase knowledge  -TB        Barriers to Rehab  visual deficit aphasia  -TB           Relationship/Environment    Primary Source of Support/Comfort  spouse  -TB        Lives With  spouse  -TB        Family Caregiver if Needed  spouse  -TB        Concerns About Impact on Relationships  Pt lives with spouse in single story home with walk-in shower. Spouse reports independent RW level at baseline.  -TB           Resource/Environmental Concerns    Current Living Arrangements  home/apartment/condo  -TB           Home Main Entrance    Number of Stairs, Main Entrance  two  -TB           Cognitive Assessment/Interventions    Additional Documentation  Cognitive Assessment/Intervention (Group)  -TB           Cognitive Assessment/Intervention- PT/OT    Affect/Mental Status (Cognitive)  confused  -TB        Orientation Status (Cognition)  oriented to;person  -TB        Follows Commands (Cognition)  follows one step commands;verbal cues/prompting required;repetition of directions required;increased processing time needed;50-74% accuracy 50%  -TB        Cognitive Function (Cognitive)  attention deficit;executive function deficit;safety deficit  -TB        Attention Deficit (Cognitive)  restless when unoccupied;requires cues/redirection to  task  -TB        Executive Function Deficit (Cognition)  insight/awareness of deficits;judgment  -TB        Safety Deficit (Cognitive)  awareness of need for assistance;insight into deficits/self awareness;judgment  -TB        Personal Safety Interventions  fall prevention program maintained;gait belt;nonskid shoes/slippers when out of bed exit alarms  -TB        Cognitive Assessment/Intervention Comment  global aphasia limits assessment  -TB           Safety Issues, Functional Mobility    Safety Issues Affecting Function (Mobility)  ability to follow commands;awareness of need for assistance;insight into deficits/self awareness;judgment  -TB        Impairments Affecting Function (Mobility)  cognition;strength;visual/perceptual  -TB           Bed Mobility Assessment/Treatment    Bed Mobility Assessment/Treatment  rolling right;supine-sit;sit-supine;scooting/bridging  -TB        Rolling Right [place] (Bed Mobility)  minimum assist (75% patient effort);verbal cues;nonverbal cues (demo/gesture)  -TB        Scooting/Bridging [place] (Bed Mobility)  minimum assist (75% patient effort);nonverbal cues (demo/gesture);verbal cues  -TB        Supine-Sit [place] (Bed Mobility)  minimum assist (75% patient effort);contact guard;nonverbal cues (demo/gesture);verbal cues  -TB        Sit-Supine [place] (Bed Mobility)  moderate assist (50% patient effort);nonverbal cues (demo/gesture);verbal cues  -TB        Bed Mobility, Safety Issues  decreased use of arms for pushing/pulling  -TB        Assistive Device (Bed Mobility)  head of bed elevated  -TB        Comment (Bed Mobility)  increased time to allow for delayed processing  -TB           Functional Mobility    Functional Mobility- Comment  Defer to PT; TPA precautions  -TB           Transfer Assessment/Treatment    Comment (Transfers)  Deferred to PT  -TB           ADL Assessment/Intervention    BADL Assessment/Intervention  lower body  dressing;grooming;toileting  -TB           Lower Body Dressing Assessment/Training    Lower Body Dressing [place] Level  don;socks;dependent (less than 25% patient effort)  -TB        Lower Body Dressing Position  edge of bed sitting  -TB           Grooming Assessment/Training    [place] Level (Grooming)  minimum assist (75% patient effort);wash face, hands  -TB        Grooming Position  -- fowlers  -TB        Comment (Grooming)  Demonstration required along with ncreased time for delayed processing  -TB           Toileting Assessment/Training    [place] Level (Toileting)  dependent (less than 25% patient effort)  -TB        Comment (Toileting)  purwick  -TB           BADL Safety/Performance    Impairments, BADL Safety/Performance  cognition;strength;visual/perceptual  -TB        Cognitive Impairments, BADL Safety/Performance  attention;awareness, need for assistance;insight into deficits/self awareness;judgment;sequencing abilities  -TB        Skilled BADL Treatment/Intervention  hand-over-hand training/cues  -TB           General ROM    GENERAL ROM COMMENTS  AAROM ROM B UE WFL  -TB           MMT (Manual Muscle Testing)    General MMT Comments  Difficult to assess d/t inconsistent command following. Pt moves all 4 limbs spontaneously. Moving R UE over head 3+/5 or better. Moves L UE to shoulder height spontaneously 3/5 clinincal projection.  -TB           Motor Assessment/Interventions    Additional Documentation  Balance (Group);Therapeutic Exercise (Group);Neuromuscular Re-education (Group)  -TB           Therapeutic Exercise    Therapeutic Exercise  supine, upper extremities  -TB        Additional Documentation  Therapeutic Exercise (Row)  -TB           Upper Extremity Supine Therapeutic Exercise    Performed, Supine Upper Extremity (Therapeutic Exercise)  shoulder flexion/extension;shoulder abduction/adduction;shoulder external/internal rotation;shoulder horizontal abduction/adduction;elbow  flexion/extension forearm supination/pronation, wrist f/e,   -TB        Exercise Type, Supine Upper Extremity (Therapeutic Exercise)  AROM (active range of motion);AAROM (active assistive range of motion)  -TB        Expected Outcomes, Supine Upper Extremity (Therapeutic Exercise)  improve performance, BADLs;improve functional tolerance, self-care activity;improve manipulation of objects, self care activity;improve performance, transfer skills  -TB        Restrictions, Supine Upper Extremity (Therapeutic Exercise)  aphasia with decreased command following  -TB        Sets/Reps Detail, Supine Upper Extremity (Therapeutic Exercise)  10  -TB        Comment, Supine Upper Extremity (Therapeutic Exercise)  Education initiated for benefits of activity/therapy, role of OT and d/c planning  -TB           Balance    Balance  dynamic sitting balance;static sitting balance  -TB           Static Sitting Balance    Level of [place] (Unsupported Sitting, Static Balance)  supervision  -TB        Sitting Position (Unsupported Sitting, Static Balance)  sitting on edge of bed  -TB        Time Able to Maintain Position (Unsupported Sitting, Static Balance)  more than 5 minutes  -TB           Dynamic Sitting Balance    Level of [place], Reaches Outside Midline (Sitting, Dynamic Balance)  contact guard assist;standby assist  -TB        Sitting Position, Reaches Outside Midline (Sitting, Dynamic Balance)  sitting on edge of bed  -TB        Comment, Reaches Outside Midline (Sitting, Dynamic Balance)  ADL and assessment tasks  -TB           Neuromuscular Re-education    Comment, Neuromuscular Re-education  Balance tasks initiated to support ADLs  -TB           Sensory Assessment/Intervention    Sensory General Assessment  light touch sensation deficits identified Pt's spouse reports h/o B hand/feet peripheral neuropathy  -TB           Positioning and Restraints    Pre-Treatment Position  in bed  -TB        Post Treatment  "Position  bed  -TB        In Bed  side lying right;call light within reach;encouraged to call for assist;exit alarm on;pillow between legs;with family/caregiver  -TB           Pain Assessment    Additional Documentation  Pain Scale: FACES Pre/Post-Treatment (Group)  -TB           Pain Scale: FACES Pre/Post-Treatment    Pain: FACES Scale, Pretreatment  0-->no hurt  -TB        Pain: FACES Scale, Post-Treatment  0-->no hurt  -TB        Pre/Post Treatment Pain Comment  Pt shakes head \"no\" for pain; no indication of pain with activity/mobility  -TB           Coping    Observed Emotional State  accepting;calm;cooperative  -TB        Verbalized Emotional State  -- global aphasia  -TB           Plan of Care Review    Plan of Care Reviewed With  patient;spouse  -TB           Clinical Impression (OT)    Date of Referral to OT  03/14/19  -TB        OT Diagnosis  Impaired mobility and ADL  -TB        Patient/Family Goals Statement (OT Eval)  spouse wanting pt to return to PLOF and return to home  -TB        Criteria for Skilled Therapeutic Interventions Met (OT Eval)  yes;treatment indicated  -TB        Rehab Potential (OT Eval)  fair, will monitor progress closely  -TB        Therapy Frequency (OT Eval)  daily  -TB        Care Plan Review (OT)  evaluation/treatment results reviewed;care plan/treatment goals reviewed;risks/benefits reviewed;patient/other agree to care plan  -TB        Care Plan Review, Other Participant (OT Eval)  spouse  -TB        Anticipated Equipment Needs at Discharge (OT)  bedside commode  -TB        Anticipated Discharge Disposition (OT)  inpatient rehabilitation facility  -TB           Vital Signs    Pre Systolic BP Rehab  165  -TB        Pre Treatment Diastolic BP  79  -TB        Post Systolic BP Rehab  129  -TB        Post Treatment Diastolic BP  112  -TB        Pretreatment Heart Rate (beats/min)  85  -TB        Posttreatment Heart Rate (beats/min)  94  -TB        Pre SpO2 (%)  94  -TB        O2 " Delivery Pre Treatment  room air  -TB        Post SpO2 (%)  94  -TB        O2 Delivery Post Treatment  room air  -TB        Pre Patient Position  Supine  -TB        Intra Patient Position  Sitting  -TB        Post Patient Position  Supine  -TB           Planned OT Interventions    Planned Therapy Interventions (OT Eval)  transfer/mobility retraining;strengthening exercise;occupation/activity based interventions;BADL retraining  -TB           OT Goals    Transfer Goal Selection (OT)  transfer, OT goal 1  -TB        Toileting Goal Selection (OT)  toileting, OT goal 1  -TB        Grooming Goal Selection (OT)  grooming, OT goal 1  -TB        Strength Goal Selection (OT)  strength, OT goal 1  -TB        Functional Mobility Goal Selection (OT)  functional mobility, OT goal 1  -TB           Transfer Goal 1 (OT)    Activity/Assistive Device (Transfer Goal 1, OT)  toilet;walker, rolling  -TB        [place] Level/Cues Needed (Transfer Goal 1, OT)  minimum assist (75% or more patient effort);verbal cues required;tactile cues required  -TB        Time Frame (Transfer Goal 1, OT)  by discharge  -TB        Barriers (Transfers Goal 1, OT)  confusion, balance, strength  -TB        Progress/Outcome (Transfer Goal 1, OT)  goal ongoing  -TB           Toileting Goal 1 (OT)    Activity/Device (Toileting Goal 1, OT)  adjust/manage clothing;perform perineal hygiene;grab bar/safety frame;raised toilet seat  -TB        [place] Level/Cues Needed (Toileting Goal 1, OT)  moderate assist (50-74% patient effort);verbal cues required  -TB        Time Frame (Toileting Goal 1, OT)  by discharge  -TB        Barriers (Toileting Goal 1, OT)  confusion, balance  -TB        Progress/Outcome (Toileting Goal 1, OT)  goal ongoing  -TB           Grooming Goal 1 (OT)    Activity/Device (Grooming Goal 1, OT)  oral care pt able to sequence familiar task  -TB        [place] (Grooming Goal 1, OT)  minimum assist (75% or more patient effort);verbal  cues required;tactile cues required  -TB        Time Frame (Grooming Goal 1, OT)  by discharge  -TB        Barriers (Grooming Goal 1, OT)  confusion, strength  -TB        Progress/Outcome (Grooming Goal 1, OT)  goal ongoing  -TB           Strength Goal 1 (OT)    Strength Goal 1 (OT)  Pt participates in daily AROM HEP to support ADLs; 3x10-12 reps  -TB        Time Frame (Strength Goal 1, OT)  by discharge  -TB        Progress/Outcome (Strength Goal 1, OT)  goal ongoing  -TB           Functional Mobility Goal 1 (OT)    Activity/Assistive Device (Functional Mobility Goal 1, OT)  walker, rolling  -TB        [place] Level/Cues Needed (Functional Mobility Goal 1, OT)  minimum assist (75% or more patient effort);verbal cues required;tactile cues required  -TB        Distance Goal 1 (Functional Mobility, OT)  to [place] for transfer training; household distances  -TB        Time Frame (Functional Mobility Goal 1, OT)  by discharge  -TB        Barriers (Functional Mobility Goal 1, OT)  confusion, strength, balance  -TB        Progress/Outcome (Functional Mobility Goal 1, OT)  goal ongoing  -TB           Living Environment    Home Accessibility  stairs to enter home walk-in shower with seat  -TB          User Key  (r) = Recorded By, (t) = Taken By, (c) = Cosigned By    Initials Name Effective Dates    TB Sarah Bear OT 06/08/18 -          Occupational Therapy Education     Title: PT OT SLP Therapies (In Progress)     Topic: Occupational Therapy (In Progress)     Point: ADL training (In Progress)     Description: Instruct learner(s) on proper safety adaptation and remediation techniques during self care or transfers.   Instruct in proper use of assistive devices.    Learning Progress Summary           Patient Acceptance, E,D, NR by TB at 3/15/2019  8:50 AM    Comment:  Education initiated for benefits of activity/therapy, role of OT and d/c planning   Significant Other Acceptance, E,D, NR by TB at 3/15/2019  8:50  AM    Comment:  Education initiated for benefits of activity/therapy, role of OT and d/c planning                               User Key     Initials Effective Dates Name Provider Type Discipline     06/08/18 -  Sarah Bear, OT Occupational Therapist OT                  OT Recommendation and Plan  Outcome Summary/Treatment Plan (OT)  Anticipated Equipment Needs at Discharge (OT): bedside commode  Anticipated Discharge Disposition (OT): inpatient rehabilitation facility  Planned Therapy Interventions (OT Eval): transfer/mobility retraining, strengthening exercise, occupation/activity based interventions, BADL retraining  Therapy Frequency (OT Eval): daily  Plan of Care Review  Plan of Care Reviewed With: patient, spouse  Plan of Care Reviewed With: patient, spouse  Outcome Summary: OT IE completed. Pt presents s/p CVA, s/p TPA with residual L facial droop, R gaze preference, L side weakness and confusion/aphasia. Pt moving B UE spontaneously. Mod A bed mobility. SBA dynamic sitting balance at EOB. Follows commands inconsistently; approx 50%. OT to follow. Recommend IRF at d/c for best outcome.     Outcome Measures     Row Name 03/15/19 0748             How much help from another is currently needed...    Putting on and taking off regular lower body clothing?  1  -TB      Bathing (including washing, rinsing, and drying)  2  -TB      Toileting (which includes using toilet bed pan or urinal)  1  -TB      Putting on and taking off regular upper body clothing  2  -TB      Taking care of personal grooming (such as brushing teeth)  2  -TB      Eating meals  2  -TB      Score  10  -TB         Modified Robert Scale    Pre-Stroke Modified Robert Scale  0 - No Symptoms at all.  -TB      Modified [place] Scale  4 - Moderately severe disability.  Unable to walk without assistance, and unable to attend to own bodily needs without assistance.  -TB         Functional Assessment    Outcome Measure Options  AM-PAC 6 Clicks  Daily Activity (OT);Modified Robert  -TB        User Key  (r) = Recorded By, (t) = Taken By, (c) = Cosigned By    Initials Name Provider Type    Sarah Sanford OT Occupational Therapist          Time Calculation:   Time Calculation- OT     Row Name 03/15/19 0853             Time Calculation- OT    OT Start Time  0748  -        User Key  (r) = Recorded By, (t) = Taken By, (c) = Cosigned By    Initials Name Provider Type    Sarah Sanford OT Occupational Therapist        Therapy Suggested Charges     Code   Minutes Charges    None           Therapy Charges for Today     Code Description Service Date Service Provider Modifiers Qty    [id number] HC OT EVAL MOD COMPLEXITY 4 3/15/2019 Sarah Bear OT GO 1               Sarah Bear OT  3/15/2019

## 2019-03-15 NOTE — THERAPY EVALUATION
Acute Care - Speech Language Pathology Initial Evaluation  [facility]     Patient Name: Yanique Webster  : 1940  MRN: [id number]  Today's Date: 3/15/2019  Onset of Illness/Injury or Date of Surgery: 19     Referring Physician: MD Severo      Admit Date: 3/14/2019     Visit Dx:    ICD-10-CM ICD-9-CM   1. Altered mental status, unspecified altered mental status type R41.82 780.97   2. Acute ischemic stroke (CMS/HCC) I63.9 434.91   3. Hypertension, unspecified type I10 401.9   4. Impaired mobility and ADLs Z74.09 799.89   5. Impaired functional mobility, balance, gait, and endurance Z74.09 V49.89     Patient Active Problem List   Diagnosis   • Altered mental status   • Hypertension   • Hyperlipidemia   • Diabetes mellitus (CMS/HCC)   • COPD (chronic obstructive pulmonary disease) (CMS/HCC)     Past Medical History:   Diagnosis Date   • COPD (chronic obstructive pulmonary disease) (CMS/HCC)    • Diabetes mellitus (CMS/HCC)    • GERD (gastroesophageal reflux disease)    • Hyperlipidemia    • Hypertension      History reviewed. No pertinent surgical history.     SLP EVALUATION (last 72 hours)      SLP SLC Evaluation     Row Name 03/15/19 1000                   Communication Assessment/Intervention    Document Type  evaluation  -AW        Subjective Information  no complaints  -AW        Patient Observations  alert;cooperative  -AW        Patient/Family Observations   at bedside  -AW        Patient Effort  good  -AW        Symptoms Noted During/After Treatment  none  -AW           General Information    Patient Profile Reviewed  yes  -AW        Pertinent History Of Current Problem  stroke  -AW        Precautions/Limitations, Vision  WFL  -AW        Precautions/Limitations, Hearing  WFL  -AW        Prior Level of Function-Communication  WFL  -AW        Plans/Goals Discussed with  patient and family  -AW        Barriers to Rehab  medically complex  -AW        Patient's Goals for Discharge  patient  could not state  -AW        Family Goals for Discharge  family did not state  -AW           Pain Assessment    Additional Documentation  Pain Scale: FACES Pre/Post-Treatment (Group)  -AW           Pain Scale: FACES Pre/Post-Treatment    Pain: FACES Scale, Pretreatment  0-->no hurt  -AW        Pain: FACES Scale, Post-Treatment  0-->no hurt  -AW           Comprehension Assessment/Intervention    Comprehension Assessment/Intervention  Auditory Comprehension  -AW           Auditory Comprehension Assessment/Intervention    Auditory Comprehension (Communication)  moderate impairment  -AW        Able to Identify Objects/Pictures (Communication)  moderate impairment  -AW        Answers Questions (Communication)  moderate impairment;personal;simple;wh questions;yes/no;severe impairment  -AW        Able to Follow Commands (Communication)  1-step;severe impairment;moderate impairment  -AW           Expression Assessment/Intervention    Expression Assessment/Intervention  verbal expression  -AW           Verbal Expression Assessment/Intervention    Verbal Expression  moderate impairment;severe impairment  -AW        Automatic Speech (Communication)  counting 1-20;mild impairment  -AW        Repetition  moderate impairment  -AW        Sentence Formulation  moderate impairment;severe impairment;perseverations;semantic paraphasias;delayed responses;neologisms  -AW        Conversational Discourse/Fluency  moderate impairment;severe impairment;perseverations;neologisms;delayed responses;semantic paraphasias  -AW        Verbal Expression, Comment  fluent aphasia  -AW           Oral Motor Structure and Function    Oral Motor Structure and Function  severe impairment;moderate impairment  -AW        Dentition Assessment  natural, present and adequate  -AW           Oral Musculature and Cranial Nerve Assessment    Oral Motor General Assessment  oral labial or buccal impairment  -AW           Motor Speech Assessment/Intervention    Motor  Speech Function  moderate impairment  -AW        Characteristics Consistent with Apraxia  groping  -AW           Cognitive Assessment Intervention- SLP    Cognitive Function (Cognition)  moderate impairment  -AW        Orientation Status (Cognition)  moderate impairment  -AW           SLP Clinical Impressions    SLP Diagnosis  mod-severe fluent aphasia and apraxia  -AW        Rehab Potential/Prognosis  good  -AW        SLC Criteria for Skilled Therapy Interventions Met  yes  -AW        Functional Impact  functional impact in social situations;functional impact in ADLs;difficulty communicating wants, needs  -AW           Recommendations    Therapy Frequency (SLP SLC)  5 days per week  -AW        Predicted Duration Therapy Intervention (Days)  until discharge  -AW        Anticipated Dischage Disposition  inpatient rehabilitation facility  -AW           Communication Treatment Objective and Progress Goals (SLP)    Auditory Comprehension Treatment Objectives  Auditory Comprehension Treatment Objectives (Group)  -AW        Verbal Expression Treatment Objectives  Verbal Expression Treatment Objectives (Group)  -AW        Motor Speech/Dysarthria Treatment Objectives  --  -AW        Motor Speech/Apraxia Treatment Objectives  Motor Speech/Apraxia Treatment Objectives (Group)  -AW           Auditory Comprehension Treatment Objectives    Comprehend Questions Selection  comprehend questions, SLP goal 1  -AW        Follow Directions Selection  follow directions, SLP goal 1  -AW           Comprehend Questions Goal 1 (SLP)    Improve Ability to Comprehend Questions Goal 1 (SLP)  simple yes/no questions;simple wh questions;80%;with moderate cues (50-74%)  -AW        Time Frame (Comprehend Questions Goal 1, SLP)  by discharge  -AW           Follow Directions Goal 2 (SLP)    Improve Ability to Follow Directions Goal 1 (SLP)  1 step direction with objects;80%;with moderate cues (50-74%)  -AW        Time Frame (Follow Directions Goal  1, SLP)  by discharge  -AW           Verbal Expression Treatment Objectives    Phrase and Sentence Level Response Selection  phrase and sentence level response, SLP goal 1  -AW           Ability to Construct Phrase and Sentence Level Response Goal 1 (SLP)    Improve Ability to Construct Phrase and Sentence Level Responses By Goal 1 (SLP)  answering question with phrase;80%;with moderate cues (50-74%)  -AW        Time Frame (Phrase and Sentence Level Response Goal 1, SLP)  by discharge  -AW           Motor Speech/Apraxia Treatment Objectives    Motor Planning Selection  motor planning selection, SLP goal 1  -AW           Motor Planning Goal 1 (SLP)    Improve Motor Planning to Reduce Apraxia by Goal 1 (SLP)  imitating mouth postures;imitating vowels;following isolated oral commands;80%;with moderate cues (50-74%)  -AW        Time Frame (Motor Planning Goal 1, SLP)  by discharge  -AW          User Key  (r) = Recorded By, (t) = Taken By, (c) = Cosigned By    Initials Name Effective Dates    AW Terri Benitez MS CCC-SLP 06/22/15 -              EDUCATION  The patient has been educated in the following areas:     Communication Impairment.    SLP Recommendation and Plan  SLP Diagnosis: mod-severe fluent aphasia and apraxia     [facility] Criteria for Skilled Therapy Interventions Met: yes  SLP Diagnosis: mod-severe fluent aphasia and apraxia  Anticipated Dischage Disposition: inpatient rehabilitation facility  Therapy Frequency (Swallow): evaluation only  Predicted Duration Therapy Intervention (Days): until discharge    Plan of Care Reviewed With: patient, spouse  Plan of Care Review  Plan of Care Reviewed With: patient, spouse  Progress: improving      SLP GOALS     Row Name 03/15/19 1000             Comprehend Questions Goal 1 (SLP)    Improve Ability to Comprehend Questions Goal 1 (SLP)  simple yes/no questions;simple wh questions;80%;with moderate cues (50-74%)  -AW      Time Frame (Comprehend Questions Goal 1, SLP)  by  discharge  -AW         Follow Directions Goal 2 (SLP)    Improve Ability to Follow Directions Goal 1 (SLP)  1 step direction with objects;80%;with moderate cues (50-74%)  -AW      Time Frame (Follow Directions Goal 1, SLP)  by discharge  -AW         Ability to Construct Phrase and Sentence Level Response Goal 1 (SLP)    Improve Ability to Construct Phrase and Sentence Level Responses By Goal 1 (SLP)  answering question with phrase;80%;with moderate cues (50-74%)  -AW      Time Frame (Phrase and Sentence Level Response Goal 1, SLP)  by discharge  -AW         Motor Planning Goal 1 (SLP)    Improve Motor Planning to Reduce Apraxia by Goal 1 (SLP)  imitating mouth postures;imitating vowels;following isolated oral commands;80%;with moderate cues (50-74%)  -AW      Time Frame (Motor Planning Goal 1, SLP)  by discharge  -AW        User Key  (r) = Recorded By, (t) = Taken By, (c) = Cosigned By    Initials Name Provider Type    Terri Arndt MS CCC-SLP Speech and Language Pathologist                  Time Calculation:     Time Calculation- SLP     Row Name 03/15/19 1033             Time Calculation- SLP    SLP Start Time  0900  -AW      SLP Received On  03/15/19  -AW        User Key  (r) = Recorded By, (t) = Taken By, (c) = Cosigned By    Initials Name Provider Type    Terri Arndt MS CCC-SLP Speech and Language Pathologist          Therapy Charges for Today     Code Description Service Date Service Provider Modifiers Qty    90580184700 HC ST EVAL ORAL PHARYNG SWALLOW 2 3/15/2019 Terri Benitez MS CCC-SLP GN 1    33171048597 HC ST EVAL SPEECH AND PROD W LANG  2 3/15/2019 Terri Benitez MS CCC-SLP GN 1                     Terri Benitez MS CCC-SLP  3/15/2019 and Acute Care - Speech Language Pathology   Swallow Initial Evaluation  [facility]     Patient Name: Yanique Webster  : 1940  MRN: [id number]  Today's Date: 3/15/2019  Onset of Illness/Injury or Date of Surgery: 19     Referring  Physician: MD Severo      Admit Date: 3/14/2019    Visit Dx:     ICD-10-CM ICD-9-CM   1. Altered mental status, unspecified altered mental status type R41.82 780.97   2. Acute ischemic stroke (CMS/HCC) I63.9 434.91   3. Hypertension, unspecified type I10 401.9   4. Impaired mobility and ADLs Z74.09 799.89   5. Impaired functional mobility, balance, gait, and endurance Z74.09 V49.89     Patient Active Problem List   Diagnosis   • Altered mental status   • Hypertension   • Hyperlipidemia   • Diabetes mellitus (CMS/HCC)   • COPD (chronic obstructive pulmonary disease) (CMS/[facility])     Past Medical History:   Diagnosis Date   • COPD (chronic obstructive pulmonary disease) (CMS/HCC)    • Diabetes mellitus (CMS/[facility])    • GERD (gastroesophageal reflux disease)    • Hyperlipidemia    • Hypertension      History reviewed. No pertinent surgical history.     SWALLOW EVALUATION (last 72 hours)      SLP Adult Swallow Evaluation     Row Name 03/15/19 1000                   Oral Motor and Function    Secretion Management  WNL/WFL  -AW        Volitional Swallow  WFL  -AW        Volitional Cough  WFL  -AW           General Eating/Swallowing Observations    Respiratory Support Currently in Use  room air  -AW        Eating/Swallowing Skills  fed by SLP  -AW        Positioning During Eating  upright in chair  -AW        Utensils Used  spoon;straw  -AW        Consistencies Trialed  regular textures;thin liquids;pureed  -AW           Respiratory    Respiratory Status  WFL  -AW           Clinical Swallow Eval    Oral Prep Phase  WFL  -AW        Oral Transit  WFL  -AW        Oral Residue  WFL  -AW        Pharyngeal Phase  WFL  -AW        Esophageal Phase  unremarkable  -AW        Clinical Swallow Evaluation Summary  Pt able to chew and clear oral cavity - no residue noted. No overt s/s aspiration noted with thins, puree, or solid. Pt is very lethargic, so will order [facility] soft diet in hopes of not fatiguing her during meals.   -AW            Clinical Impression    SLP Swallowing Diagnosis  functional oral phase;functional pharyngeal phase  -AW        Functional Impact  no impact on function  -AW           Recommendations    Therapy Frequency (Swallow)  evaluation only  -AW        SLP Diet Recommendation  soft textures;whole;thin liquids;other (see comments) may upgrade to regular textures when not as lethargic  -AW        SLP Rec. for Method of Medication Administration  meds whole;with thin liquids  -AW        Monitor for Signs of Aspiration  notify SLP if any concerns  -AW          User Key  (r) = Recorded By, (t) = Taken By, (c) = Cosigned By    Initials Name Effective Dates    AW Terri Benitez, MS CCC-SLP 06/22/15 -           EDUCATION  The patient has been educated in the following areas:   Dysphagia (Swallowing Impairment).    SLP Recommendation and Plan  SLP Swallowing Diagnosis: functional oral phase, functional pharyngeal phase  SLP Diet Recommendation: soft textures, whole, thin liquids, other (see comments)(may upgrade to regular textures when not as lethargic)        Monitor for Signs of Aspiration: notify SLP if any concerns        Anticipated Dischage Disposition: inpatient rehabilitation facility     Therapy Frequency (Swallow): evaluation only  Predicted Duration Therapy Intervention (Days): until discharge       Plan of Care Reviewed With: patient, spouse  Plan of Care Review  Plan of Care Reviewed With: patient, spouse  Progress: improving    SLP GOALS     Row Name 03/15/19 1000             Comprehend Questions Goal 1 (SLP)    Improve Ability to Comprehend Questions Goal 1 (SLP)  simple yes/no questions;simple wh questions;80%;with moderate cues (50-74%)  -AW      Time Frame (Comprehend Questions Goal 1, SLP)  by discharge  -AW         Follow Directions Goal 2 (SLP)    Improve Ability to Follow Directions Goal 1 (SLP)  1 step direction with objects;80%;with moderate cues (50-74%)  -AW      Time Frame (Follow Directions Goal 1, SLP)   by discharge  -AW         Ability to Construct Phrase and Sentence Level Response Goal 1 (SLP)    Improve Ability to Construct Phrase and Sentence Level Responses By Goal 1 (SLP)  answering question with phrase;80%;with moderate cues (50-74%)  -AW      Time Frame (Phrase and Sentence Level Response Goal 1, SLP)  by discharge  -AW         Motor Planning Goal 1 (SLP)    Improve Motor Planning to Reduce Apraxia by Goal 1 (SLP)  imitating mouth postures;imitating vowels;following isolated oral commands;80%;with moderate cues (50-74%)  -AW      Time Frame (Motor Planning Goal 1, SLP)  by discharge  -AW        User Key  (r) = Recorded By, (t) = Taken By, (c) = Cosigned By    Initials Name Provider Type    Terri Arndt MS CCC-SLP Speech and Language Pathologist             Time Calculation:   Time Calculation- SLP     Row Name 03/15/19 1033             Time Calculation- SLP    SLP Start Time  0900  -AW      SLP Received On  03/15/19  -        User Key  (r) = Recorded By, (t) = Taken By, (c) = Cosigned By    Initials Name Provider Type    Terri Arndt MS CCC-SLP Speech and Language Pathologist          Therapy Charges for Today     Code Description Service Date Service Provider Modifiers Qty    96403174006 HC ST EVAL ORAL PHARYNG SWALLOW 2 3/15/2019 Terri Benitez MS CCC-SLP GN 1    53216304372 HC ST EVAL SPEECH AND PROD W LANG  2 3/15/2019 Terri Benitez MS CCC-SLP GN 1               MS JOVITA Humphrey  3/15/2019

## 2019-03-15 NOTE — PROGRESS NOTES
Adult Nutrition  Assessment/PES    Patient Name:  Yanique Webster  YOB: 1940  MRN: [id number]  Admit Date:  3/14/2019    Assessment Date:  3/15/2019    Reason for Assessment     Row Name 03/15/19 1405          Reason For Assessment  other (see comments) Food preferences    Diagnosis  --    Identified At Risk by Screening Criteria  --        Nutrition/Diet History     Row Name 03/15/19 1405        Nutrition/Diet History    Typical Food/Fluid Intake  Pt awake during time of visit however allowed family to answer questions.  at bedside reported pt is not a very picky eater, likes most foods, no known food allergies or intolerances. No food preferences at this time  wanted to look over the menu.             Nutrition Prescription Ordered     Row Name 03/15/19 1416        Nutrition Prescription PO    Current PO Diet  Soft Texture     Texture  Whole foods     Fluid Consistency  Thin     Common Modifiers  Cardiac;Consistent Carbohydrate         Evaluation of Received Nutrient/Fluid Intake     Row Name 03/15/19 1417          PO Evaluation    Number of Days PO Intake Evaluated  Insufficient Data Recently off NPO status           Nutrition Intervention     Row Name 03/15/19 1422        Nutrition Intervention    RD/Tech Action  Advise alternate selection;Advise available snack;Menu provided;Encourage intake;Follow Tx progress;Care plan reviewd         Time spent: 25 minutes  Electronically signed by:  Terri Motley  03/15/19 2:22 PM

## 2019-03-15 NOTE — PROGRESS NOTES
Adult Nutrition  Assessment/PES    Patient Name:  Yanique Webster  YOB: 1940  MRN: [id number]  Admit Date:  3/14/2019    Assessment Date:  3/15/2019    Comments:  MDR note    Reason for Assessment     Row Name 03/15/19 1122          Reason for Assessment    Reason For Assessment  per organizational policy MDR; adm assessment;30 mins     Diagnosis  neurologic conditions pt adm w/ AMS, R hemispheric stroke s/p tPA, L weakness, ahasia, HTN Hx: HTN, HLP, DM, CVA, GERD, COPD     Identified At Risk by Screening Criteria  no indicators present         Nutrition/Diet History     Row Name 03/15/19 1126          Nutrition/Diet History    Typical Food/Fluid Intake  RN reports pt s/p tPA, has expressive aphasia and weaknees, did pass her swallow eval by SLP         Anthropometrics     Row Name 03/15/19 0600          Anthropometrics    Weight  98.2 kg (216 lb 7.9 oz)         Labs/Tests/Procedures/Meds     Row Name 03/15/19 1127          Labs/Procedures/Meds    Lab Results Reviewed  reviewed, pertinent        Diagnostic Tests/Procedures    Diagnostic Test/Procedure Reviewed  reviewed, pertinent     Diagnostic Test/Procedures Comments  per MDR        Medications    Pertinent Medications Reviewed  reviewed, pertinent     Pertinent Medications Comments  cardene drip             Nutrition Prescription Ordered     Row Name 03/15/19 1128          Nutrition Prescription PO    Current PO Diet  Soft Texture     Texture  Whole foods     Fluid Consistency  Thin     Common Modifiers  Cardiac;Consistent Carbohydrate                 Problem/Interventions:  Problem 1     Row Name 03/15/19 1128          Nutrition Diagnoses Problem 1    Problem 1  No Nutrition Diagnosis at this Time                 Intervention Goal     Row Name 03/15/19 1128          Intervention Goal    General  Maintain nutrition;Meet nutritional needs for age/condition     PO  Establish PO;Tolerate PO         Nutrition Intervention     Row Name 03/15/19 1122           Nutrition Intervention    RD/Tech Action  Follow Tx progress;Care plan reviewd           Education/Evaluation     Row Name 03/15/19 1129          Monitor/Evaluation    Monitor  Per protocol;PO intake;Symptoms           Electronically signed by:  Giulia Moreno MS,RD,LD  03/15/19 11:29 AM

## 2019-03-15 NOTE — PROGRESS NOTES
Discharge Planning Assessment  [facility]     Patient Name: Yanique Webster  MRN: [id number]  Today's Date: 3/15/2019    Admit Date: 3/14/2019    Discharge Needs Assessment     Row Name 03/15/19 1216       Living Environment    Lives With  spouse    Current Living Arrangements  home/apartment/condo    Primary Care Provided by  self    Provides Primary Care For  no one    Family Caregiver if Needed  spouse    Quality of Family Relationships  helpful;involved;supportive    Able to Return to Prior Arrangements  other (see comments)       Resource/Environmental Concerns    Resource/Environmental Concerns  none    Transportation Concerns  car, none       Transition Planning    Patient/Family Anticipates Transition to  home with help/services    Patient/Family Anticipated Services at Transition      Transportation Anticipated  family or friend will provide       Discharge Needs Assessment    Readmission Within the Last 30 Days  no previous admission in last 30 days    Concerns to be Addressed  discharge planning    Equipment Currently Used at Home  none    Equipment Needed After Discharge  none    Discharge Facility/Level of Care Needs  rehabilitation facility        Discharge Plan     Row Name 03/15/19 1218       Plan    Plan  IDP    Patient/Family in Agreement with Plan  yes    Plan Comments  Pt [facility] w  in [facility] who can transport pt at d/c. Pt uses a walker always but has no other DME or HH or PT/OT. Pt may need SNF/rehab at d/c. CM spoke w  and son about this they will consider options in [place]. CM went over options w them via the phone. Pt PCP is Dr. Quan Mcneill and her insurance covers her medications. Pt goal is TBD - HH vs. rehab. CM will continue to follow.        Destination      No service coordination in this encounter.      Durable Medical Equipment      No service coordination in this encounter.      Dialysis/Infusion      No service coordination in this encounter.      Home  Medical Care      No service coordination in this encounter.      Community Resources      No service coordination in this encounter.        Expected Discharge Date and Time     Expected Discharge Date Expected Discharge Time    Mar 20, 2019         Demographic Summary     Row Name 03/15/19 1215       General Information    Admission Type  inpatient    Arrived From  emergency department    Referral Source  admission list    Reason for Consult  discharge planning    Preferred Language  English       Contact Information    Permission Granted to Share Info With  ;family/designee    Contact Information Comments  Edwin yanez (spouse/EC) [phone]        Functional Status     Row Name 03/15/19 1216       Functional Status    Usual Activity Tolerance  moderate    Current Activity Tolerance  good       Functional Status, IADL    Medications  independent    Meal Preparation  independent    Housekeeping  independent    Laundry  independent    Shopping  independent        Psychosocial    No documentation.       Abuse/Neglect    No documentation.       Legal    No documentation.       Substance Abuse    No documentation.       Patient Forms    No documentation.           Yessy Anna RN

## 2019-03-15 NOTE — PLAN OF CARE
Problem: Patient Care Overview  Goal: Plan of Care Review  Outcome: Ongoing (interventions implemented as appropriate)   03/15/19 9239   Coping/Psychosocial   Plan of Care Reviewed With patient;spouse   OTHER   Outcome Summary OT IE completed. Pt presents s/p CVA, s/p TPA with residual L facial droop, R gaze preference, L side weakness and confusion/aphasia. Pt moving B UE spontaneously. Mod A bed mobility. SBA dynamic sitting balance at EOB. Follows commands inconsistently; approx 50%. OT to follow. Recommend IRF at d/c for best outcome.

## 2019-03-16 ENCOUNTER — APPOINTMENT (OUTPATIENT)
Dept: CT IMAGING | Facility: HOSPITAL | Age: 79
End: 2019-03-16

## 2019-03-16 ENCOUNTER — APPOINTMENT (OUTPATIENT)
Dept: NEUROLOGY | Facility: HOSPITAL | Age: 79
End: 2019-03-16

## 2019-03-16 LAB
GLUCOSE BLDC GLUCOMTR-MCNC: 103 MG/DL (ref 70–130)
GLUCOSE BLDC GLUCOMTR-MCNC: 122 MG/DL (ref 70–130)
GLUCOSE BLDC GLUCOMTR-MCNC: 235 MG/DL (ref 70–130)
GLUCOSE BLDC GLUCOMTR-MCNC: 247 MG/DL (ref 70–130)
GLUCOSE BLDC GLUCOMTR-MCNC: 271 MG/DL (ref 70–130)

## 2019-03-16 PROCEDURE — 70450 CT HEAD/BRAIN W/O DYE: CPT

## 2019-03-16 PROCEDURE — 25010000002 HEPARIN (PORCINE) PER 1000 UNITS: Performed by: INTERNAL MEDICINE

## 2019-03-16 PROCEDURE — 95951 HC EEG WITH VIDEO RECORDING EACH 24 HRS: CPT

## 2019-03-16 PROCEDURE — 99233 SBSQ HOSP IP/OBS HIGH 50: CPT | Performed by: PSYCHIATRY & NEUROLOGY

## 2019-03-16 PROCEDURE — 99221 1ST HOSP IP/OBS SF/LOW 40: CPT | Performed by: INTERNAL MEDICINE

## 2019-03-16 PROCEDURE — 99233 SBSQ HOSP IP/OBS HIGH 50: CPT | Performed by: INTERNAL MEDICINE

## 2019-03-16 PROCEDURE — 82962 GLUCOSE BLOOD TEST: CPT

## 2019-03-16 PROCEDURE — 63710000001 INSULIN LISPRO (HUMAN) PER 5 UNITS: Performed by: INTERNAL MEDICINE

## 2019-03-16 RX ADMIN — NICARDIPINE HYDROCHLORIDE 5 MG/HR: 0.1 INJECTION, SOLUTION INTRAVENOUS at 21:11

## 2019-03-16 RX ADMIN — FAMOTIDINE 20 MG: 20 TABLET ORAL at 08:39

## 2019-03-16 RX ADMIN — SODIUM CHLORIDE, PRESERVATIVE FREE 3 ML: 5 INJECTION INTRAVENOUS at 08:00

## 2019-03-16 RX ADMIN — HEPARIN SODIUM 5000 UNITS: 5000 INJECTION INTRAVENOUS; SUBCUTANEOUS at 21:12

## 2019-03-16 RX ADMIN — CARVEDILOL 12.5 MG: 12.5 TABLET, FILM COATED ORAL at 21:12

## 2019-03-16 RX ADMIN — LISINOPRIL 10 MG: 10 TABLET ORAL at 08:39

## 2019-03-16 RX ADMIN — ATORVASTATIN CALCIUM 80 MG: 40 TABLET, FILM COATED ORAL at 21:12

## 2019-03-16 RX ADMIN — INSULIN LISPRO 4 UNITS: 100 INJECTION, SOLUTION INTRAVENOUS; SUBCUTANEOUS at 17:16

## 2019-03-16 RX ADMIN — NICARDIPINE HYDROCHLORIDE 5 MG/HR: 0.1 INJECTION, SOLUTION INTRAVENOUS at 17:06

## 2019-03-16 RX ADMIN — INSULIN LISPRO 6 UNITS: 100 INJECTION, SOLUTION INTRAVENOUS; SUBCUTANEOUS at 21:12

## 2019-03-16 RX ADMIN — SODIUM CHLORIDE, PRESERVATIVE FREE 10 ML: 5 INJECTION INTRAVENOUS at 08:44

## 2019-03-16 RX ADMIN — ASPIRIN 325 MG ORAL TABLET 325 MG: 325 PILL ORAL at 08:39

## 2019-03-16 RX ADMIN — HEPARIN SODIUM 5000 UNITS: 5000 INJECTION INTRAVENOUS; SUBCUTANEOUS at 08:39

## 2019-03-16 RX ADMIN — NICARDIPINE HYDROCHLORIDE 2.5 MG/HR: 0.1 INJECTION, SOLUTION INTRAVENOUS at 05:51

## 2019-03-16 RX ADMIN — INSULIN LISPRO 4 UNITS: 100 INJECTION, SOLUTION INTRAVENOUS; SUBCUTANEOUS at 15:56

## 2019-03-16 RX ADMIN — FAMOTIDINE 20 MG: 20 TABLET ORAL at 21:12

## 2019-03-16 RX ADMIN — CARVEDILOL 12.5 MG: 12.5 TABLET, FILM COATED ORAL at 08:39

## 2019-03-16 NOTE — PROGRESS NOTES
Daily Progress Note  Neurology     LOS: 2 days     Subjective     Chief Complaint: Left-sided weakness and aphasia.    Interval History: This morning, nursing staff noted that her speech became worse and she was unable to answer any questions being asked.  She was having significant difficulty with comprehension and could not follow commands.  Following this, stat CT head was done and did not reveal any acute intracranial abnormality.  She continues to have episodes of speech arrest and difficulty with comprehension and difficulty with following commands throughout the day.    ROS:  Negative for Fever, SOB and Chest pain.     Objective     Vital signs in last 24 hours:  Temp:  [97.4 °F (36.3 °C)-98.6 °F (37 °C)] 97.4 °F (36.3 °C)  Heart Rate:  [64-96] 79  Resp:  [16-20] 20  BP: (115-191)/() 177/75      Physical Exam:   General: Lying in bed with eyes open. In NAD.     Respiratory: Respirations unlabored   CV: RRR       Neurologic Exam:   Mental status: Awake, alert, unable to follow command.  Has both receptive and expressive aphasia.  Confused.   CN: II-XII intact to detailed exam    Motor: Moves all 4 extremities spontaneously.   Reflexes: 2+ and symmetric throughout          Results Review:    Results from last 7 days   Lab Units 03/15/19  0335   WBC 10*3/mm3 8.58   HEMOGLOBIN g/dL 13.8   HEMATOCRIT % 42.6   PLATELETS 10*3/mm3 164     Results from last 7 days   Lab Units 03/15/19  0335   SODIUM mmol/L 136   POTASSIUM mmol/L 4.2   CHLORIDE mmol/L 105   CO2 mmol/L 22.0   BUN mg/dL 20   CREATININE mg/dL 1.30   CALCIUM mg/dL 10.4       Radiological Studies: Ct Head Without Contrast    Result Date: 3/16/2019  Stable left parieto-occipital encephalomalacia. No evidence of intracranial hemorrhage or other new intracranial disease.  DICTATED:   3/16/2019 EDITED/ls :   3/16/2019      Ct Head Without  Contrast    Result Date: 3/15/2019  Persistent low attenuation of likely chronic insults left parieto-occipital region without acute intracranial abnormality specifically, no acute intracranial hemorrhage.  D:  03/15/2019 E:  03/15/2019    This report was finalized on 3/15/2019 4:16 PM by Dr. Gary Gregorio.      Ct Head Without Contrast    Result Date: 3/14/2019  No significant interval change. Stable appearance of the hypodensity involving the left parieto-occipital region. No acute intracranial hemorrhage. THIS DOCUMENT HAS BEEN ELECTRONICALLY SIGNED BY EDISON SCHILLING MD    Mri Brain Without Contrast    Result Date: 3/15/2019  Several old infarctions without acute intracranial abnormality.  D:  03/15/2019 E:  03/15/2019  This report was finalized on 3/15/2019 10:08 AM by Eleno Boyer.          EEG: EEG done yesterday did not reveal any epileptiform abnormalities.      Assessment/Plan     79-year-old woman came to ER with left-sided weakness, right gaze deviation and expressive aphasia.  NIH stroke scale of 22. She is status post TPA and weakness as well as right gaze deviation has resolved but she continues to have expressive, receptive aphasia, difficulty with comprehension, unable to follow commands and intermittent episodes of confusion.  MRI and stroke workup is unremarkable.  Routine EEG was normal but based on my examination today, there is a high likely possibility that she is having subclinical seizures which is causing speech arrest and episodes of confusion.  I would like to have 24-hour continuous EEG monitoring for further evaluation.  Appreciate cardiology input.  She is scheduled for ERMIAS on Monday for further evaluation.      José Jenkins MD  03/16/19  4:53 PM

## 2019-03-16 NOTE — PROGRESS NOTES
INTENSIVIST   PROGRESS NOTE     Hospital:  LOS: 2 days      S     Ms. Yanique Webster, 79 y.o. female is followed for:  Chief Complaint   Patient presents with   • Stroke       CVA (cerebral vascular accident) (CMS/[facility])    Altered mental status    Hypertension    Hyperlipidemia    Diabetes mellitus (CMS/[facility])    COPD (chronic obstructive pulmonary disease) (CMS/[facility])    As an Intensivist, we provide an integrated approach to the ICU patient and family, medical management of comorbid conditions, lead interdisciplinary rounds and coordinate the care with all other services, including those from other specialists.     79-year-old woman presenting with the sudden onset of  aphasia, left-sided weakness, right gaze focus.  She has underlying high blood pressure and diabetes.  She received TPA.    Interval History:  This morning she was very different neurologically, according to nurse.  Her speech change, she was just repeating her name.  She was not following commands.  Restlessnes.     The patient's relevant past medical, surgical and social history were reviewed and updated in Epic as appropriate.     ROS:   ROS cannot be reliably obtained from the patient due to her Acuity of condition and Altered Mental Status.          O     Vitals:  Temp: 97.7 °F (36.5 °C) (03/16/19 0800) Temp  Min: 97.6 °F (36.4 °C)  Max: 98.6 °F (37 °C)   BP: 166/90 (03/16/19 1000) BP  Min: 115/82  Max: 192/84   Pulse: 87 (03/16/19 1000) Pulse  Min: 64  Max: 96   Resp: 16 (03/16/19 1000) Resp  Min: 16  Max: 20   SpO2: 95 % (03/16/19 1000) SpO2  Min: 91 %  Max: 98 %   Device: room air (03/16/19 1000)    Flow Rate: 2 (03/15/19 0200) No Data Recorded     Intake/Ouptut 24 hrs (7:00AM - 6:59 AM)  Intake/Output       03/15/19 0700 - 03/16/19 0659 03/16/19 0700 - 03/17/19 0659    Intake (ml) 315.9 117.6    Output (ml) 925 350    Net (ml) -609.1 -232.4    Last Weight  98.2 kg (216 lb 7.9 oz)  --           Medications (drips):    niCARdipine Last Rate: 5  mg/hr (03/16/19 0632)       Physical Exam:  General Appearance:   Well-developed elderly woman in no distress   Head:   Atraumatic   Eyes:          Pupils equal and reactive to light, extraocular movements intact   Throat:  Oral mucosa moist   Neck:  Trachea midline, no JVD, no palpable thyroid   Lungs:    Symmetric chest expansion.  Breath sounds bilateral, equal, clear    Heart:   Regular rhythm, S1, S2 auscultated, no murmur   Abdomen:    Bowel sounds present, nondistended, soft, nontender   Rectal:   Deferred   Extremities:  No pitting edema or cyanosis   Skin:  Warm and dry   Neurologic:  Face symmetric,  equal, awake, mild dysarthria     Total (NIH Stroke Scale): 9 (03/16/19 0700)     Hematology:  Results from last 7 days   Lab Units 03/15/19  0335 03/14/19  1514 03/14/19  1512   WBC 10*3/mm3 8.58 7.91  --    HEMOGLOBIN g/dL 13.8 14.1  --    HEMOGLOBIN, POC g/dL  --   --  14.6   MCV fL 89.3 89.0  --    PLATELETS 10*3/mm3 164 186  --        Chemistry:  Estimated Creatinine Clearance: 41.4 mL/min (by C-G formula based on SCr of 1.3 mg/dL).    Results from last 7 days   Lab Units 03/15/19  0335   SODIUM mmol/L 136   POTASSIUM mmol/L 4.2   CHLORIDE mmol/L 105   CO2 mmol/L 22.0   ANION GAP mmol/L 9.0   GLUCOSE mg/dL 160*   CALCIUM mg/dL 10.4     Results from last 7 days   Lab Units 03/15/19  0335 03/14/19  1512   BUN mg/dL 20  --    CREATININE mg/dL 1.30 1.30     Results from last 7 days   Lab Units 03/15/19  0335   MAGNESIUM mg/dL 2.0   PHOSPHORUS mg/dL 3.3       Hepatic:  Results from last 7 days   Lab Units 03/14/19  1514   ALT (SGPT) U/L 19   AST (SGOT) U/L 20       Images:  Ct Angiogram Head With & Without Contrast    Result Date: 3/14/2019  No hemodynamically significant stenosis, aneurysm or occlusion of the CTA head and neck with severely retropharyngeal course of the distal common carotid arteries in a  high branching pattern with minimal atherosclerotic involvement of the carotid bulbs as detailed  above.  DICTATED:   3/14/2019 EDITED/ls :   3/14/2019  This report was finalized on 3/14/2019 5:23 PM by Dr. Gary Gregorio.      Ct Head Without Contrast    Result Date: 3/16/2019  Stable left parieto-occipital encephalomalacia. No evidence of intracranial hemorrhage or other new intracranial disease.  DICTATED:   3/16/2019 EDITED/ls :   3/16/2019      Ct Head Without Contrast    Result Date: 3/15/2019  Persistent low attenuation of likely chronic insults left parieto-occipital region without acute intracranial abnormality specifically, no acute intracranial hemorrhage.  D:  03/15/2019 E:  03/15/2019    This report was finalized on 3/15/2019 4:16 PM by Dr. Gary Gregorio.      Ct Head Without Contrast    Result Date: 3/14/2019  No significant interval change. Stable appearance of the hypodensity involving the left parieto-occipital region. No acute intracranial hemorrhage. THIS DOCUMENT HAS BEEN ELECTRONICALLY SIGNED BY EDISON SCHILLING MD    Ct Angiogram Neck With & Without Contrast    Result Date: 3/14/2019  No hemodynamically significant stenosis, aneurysm or occlusion of the CTA head and neck with severely retropharyngeal course of the distal common carotid arteries in a  high branching pattern with minimal atherosclerotic involvement of the carotid bulbs as detailed above.  DICTATED:   3/14/2019 EDITED/ls :   3/14/2019  This report was finalized on 3/14/2019 5:23 PM by Dr. Gary Gregorio.      Mri Brain Without Contrast    Result Date: 3/15/2019  Several old infarctions without acute intracranial abnormality.  D:  03/15/2019 E:  03/15/2019  This report was finalized on 3/15/2019 10:08 AM by Eleno Boyer.      Xr Chest 1 View    Result Date: 3/14/2019  Cardiac silhouette enlarged with increased pulmonary vascularity and interstitial prominence of interstitial edema pattern along with probable trace left pleural effusion.  DICTATED:   3/14/2019 EDITED/ls :   3/14/2019  This report was finalized on 3/14/2019 5:23 PM by   Gary Gregorio.      Ct Head Without Contrast Stroke Protocol    Result Date: 3/14/2019  Abnormal area of low attenuation extending to involve the cortex with sulcal effacement and edematous appearance of left parietal-occipital region concerning for left PCA and/or MCA infarction without evidence for midline shift or hydrocephalus.  Scan performed on 3/14/2019 at 1455 hours. Scan report given to ER stroke team in person by Dr. Gregorio at scanner on 3/14/2019 at 1505 hours.  DICTATED:   3/14/2019 EDITED/ls :   3/14/2019  This report was finalized on 3/14/2019 5:23 PM by Dr. Gary Gregorio.      Ct Cerebral Perfusion With & Without Contrast    Result Date: 3/14/2019  No reversible ischemia within a specific vascular territory.  DICTATED:   3/14/2019 EDITED/ls :   3/14/2019  This report was finalized on 3/14/2019 5:23 PM by Dr. Gary Gregorio.        Echo:  Results for orders placed during the hospital encounter of 03/14/19   Adult Transthoracic Echo Complete W/ Cont if Necessary Per Protocol (With Agitated Saline)    Narrative · The study is technically difficult for diagnosis.  · Left ventricular systolic function is hyperdynamic (EF > 70).  · Left ventricular diastolic dysfunction (grade I) consistent with   impaired relaxation.  · The valves were poorly visualized. There was no obvious hemodynamic   abnormalities of the aortic or mitral valve, however        Results: Reviewed.  I reviewed the patient's new laboratory and imaging results.  I independently reviewed the patient's new images.    Medications: Reviewed.      Assessment/Plan      79-year-old woman presenting with acute stroke symptoms with NIHSS 22 that resolved for the most part with TPA.  Scans do not reveal a new stroke or reversible ischemia.  She has some persistent expressive aphasia.  She is requiring Cardene for blood pressure management.  Skull therapy noted generalized bilateral weakness and decreased balance.  She required assistance to sit on the edge  of the bed and had persistent A phase and confusion.        CVA (cerebral vascular accident) (CMS/[facility])    Altered mental status    Hypertension    Hyperlipidemia    Diabetes mellitus (CMS/H[facility])    COPD (chronic obstructive pulmonary disease) (CMS/[facility])    PLAN:  1. STAT CT Head done this morning: No new findings.  2. Neurology notified.    Plan of care and goals reviewed during interdisciplinary rounds.  Level of Risk is High due to:  illness with threat to life or bodily function.   I discussed the patient's findings and my recommendations with patient, family and nursing staff    Tato Grayson MD, FACP, FCCP, CNSC  Intensive Care Medicine, Nutrition Support and Pulmonary Medicine        - - -

## 2019-03-16 NOTE — PLAN OF CARE
Problem: Fall Risk (Adult)  Goal: Identify Related Risk Factors and Signs and Symptoms  Outcome: Ongoing (interventions implemented as appropriate)    Goal: Absence of Fall  Outcome: Ongoing (interventions implemented as appropriate)      Problem: Skin Injury Risk (Adult)  Goal: Skin Health and Integrity  Outcome: Ongoing (interventions implemented as appropriate)      Problem: Patient Care Overview  Goal: Plan of Care Review   03/15/19 1032 03/15/19 1800 03/15/19 2003   Coping/Psychosocial   Plan of Care Reviewed With --  patient;spouse;family --    Plan of Care Review   Progress improving --  --    OTHER   Outcome Summary --  --  Pt alert but aphasic. Only verbalizes certain words and at times garbled and illogical. Moving all four extremities and up in chair in AM. No complaints of pain or discomfort throughout shift. Resting between care. Purewick still in place. Dysphagia screening done-diet ordered. Will continue to monitor and assess neuro status.        Problem: Stroke (Ischemic) (Adult)  Goal: Signs and Symptoms of Listed Potential Problems Will be Absent, Minimized or Managed (Stroke)  Outcome: Ongoing (interventions implemented as appropriate)

## 2019-03-16 NOTE — NURSING NOTE
Pt hooked up for TONYA.  No skin problems noted before hook-up.  Pt and family voiced understanding of procedure.  Will re-check head later.

## 2019-03-16 NOTE — NURSING NOTE
Rechecked pt and head with Sheila MARTIN.  No complaints.  No skin irritation or breakdown.  Will recheck tomorrow.

## 2019-03-16 NOTE — CONSULTS
[facility] at [facility]   Consult Note    Referring Provider: Dr. Elkins    Reason for Consultation: CVA    Patient Care Team:  MAGDIEL Mcneill MD as PCP - General (Family Medicine)     Problem List:  1. CVA  1. March 15, 2019 MRI of the brain with several old infarctions without acute intracranial abnormality.  2. March 2019 echocardiogram EF greater than 70%.  Overall poor quality study.  2. Coronary artery disease  1. 2016 moderate diffuse disease by cardiac catheterization.  3. History of Takot subo cardiomyopathy with resolution.  4. Hypertension  5. Dyslipidemia  6. Diabetes mellitus  7. COPD  8. GERD      Allergies   Allergen Reactions   • Penicillins Unknown (See Comments)     unk           Current Facility-Administered Medications:   •  aspirin tablet 325 mg, 325 mg, Oral, Daily, 325 mg at 03/16/19 0839 **OR** aspirin suppository 300 mg, 300 mg, Rectal, Daily, Surendra Elkins MD  •  atorvastatin (LIPITOR) tablet 80 mg, 80 mg, Oral, Nightly, Surendra Elkins MD, 80 mg at 03/15/19 2033  •  carvedilol (COREG) tablet 12.5 mg, 12.5 mg, Oral, Q12H, Swati Alba MD, 12.5 mg at 03/16/19 0839  •  famotidine (PEPCID) tablet 20 mg, 20 mg, Oral, BID, Swati Alba MD, 20 mg at 03/16/19 0839  •  heparin (porcine) 5000 UNIT/ML injection 5,000 Units, 5,000 Units, Subcutaneous, Q12H, Swati Alba MD, 5,000 Units at 03/16/19 0839  •  insulin lispro (humaLOG) injection 0-9 Units, 0-9 Units, Subcutaneous, 4x Daily With Meals & Nightly, Case, Kati V., DO  •  lisinopril (PRINIVIL,ZESTRIL) tablet 10 mg, 10 mg, Oral, Q24H, Swati Alba MD, 10 mg at 03/16/19 0839  •  niCARdipine (CARDENE-IV) 20 mg/200 mL (0.1 mg/mL) in 0.9% NaCl infusion, 5-15 mg/hr, Intravenous, Titrated, Allyssa Dunbar MD, Last Rate: 50 mL/hr at 03/16/19 0632, 5 mg/hr at 03/16/19 0632  •  ondansetron (ZOFRAN) injection 4 mg, 4 mg, Intravenous, Q6H PRN, Ian Arcos APRN, 4 mg at  03/14/19 2220  •  sodium chloride 0.9 % flush 10 mL, 10 mL, Intravenous, PRN, Allyssa Dunbar MD, 10 mL at 03/16/19 0844  •  sodium chloride 0.9 % flush 3 mL, 3 mL, Intravenous, Q12H, Surendra Elkins MD, 3 mL at 03/16/19 0800  •  sodium chloride 0.9 % flush 3-10 mL, 3-10 mL, Intravenous, PRN, Surendra Elkins MD      niCARdipine 5-15 mg/hr Last Rate: 5 mg/hr (03/16/19 0632)       Medications Prior to Admission   Medication Sig Dispense Refill Last Dose   • aspirin 81 MG EC tablet Take 81 mg by mouth Daily.      • atorvastatin (LIPITOR) 40 MG tablet Take 40 mg by mouth Every Night.      • carvedilol (COREG) 12.5 MG tablet Take 12.5 mg by mouth 2 (Two) Times a Day With Meals.      • Cholecalciferol (VITAMIN D) 2000 units capsule Take 2,000 Units by mouth Daily.      • DULoxetine (CYMBALTA) 60 MG capsule Take 60 mg by mouth Daily.      • insulin glargine (LANTUS) 100 UNIT/ML injection Inject 24 Units under the skin into the appropriate area as directed Every Night.      • lisinopril (PRINIVIL,ZESTRIL) 10 MG tablet Take 10 mg by mouth Daily.      • methadone (DOLOPHINE) 10 MG tablet Take 10 mg by mouth Every 8 (Eight) Hours As Needed for Moderate Pain ,      • pantoprazole (PROTONIX) 40 MG EC tablet Take 40 mg by mouth Daily.      • pregabalin (LYRICA) 50 MG capsule Take 50 mg by mouth Daily.      • probiotic (CULTURELLE) capsule capsule Take 1 capsule by mouth Daily.      • torsemide (DEMADEX) 10 MG tablet Take 10 mg by mouth 3 (Three) Times a Week. On Monday, Wednesday, and Friday            Subjective .   History of present illness:    Patient is a 79-year-old  female who we are seeing today for further evaluation of suspected embolic CVA.  Patient has a noted history of Takot subo cardiomyopathy with ejection fraction of 15% back in 2016.  She underwent cardiac catheterization at that time which only showed moderate obstructive disease.  She has not followed up in the office since December  "2017.  Patient's family notes that she was in her normal state of health until date of admission whenever she had sudden onset of left-sided weakness and expressive aphasia.  She was brought to the emergency department for further evaluation.  She was administered TPA.  Still has residual expressive aphasia.  She is unable to give any history.  Patient's family notes that prior to admission she denies any chest pain pressure.  Did not note any increased shortness of breath.  Did not note any episodes of palpitations.  Given her presentation we were asked to see her for further evaluation.      Social History     Socioeconomic History   • Marital status:      Spouse name: Not on file   • Number of children: Not on file   • Years of education: Not on file   • Highest education level: Not on file   Social Needs   • Financial resource strain: Not on file   • Food insecurity - worry: Not on file   • Food insecurity - inability: Not on file   • Transportation needs - medical: Not on file   • Transportation needs - non-medical: Not on file   Occupational History   • Not on file   Tobacco Use   • Smoking status: Never Smoker   • Smokeless tobacco: Never Used   Substance and Sexual Activity   • Alcohol use: No     Frequency: Never   • Drug use: No   • Sexual activity: Defer   Other Topics Concern   • Not on file   Social History Narrative   • Not on file     History reviewed. No pertinent family history.      Review of Systems:  Review of Systems   Unable to perform ROS: other   Expressive aphasia.  Does not answer questions appropriately.           Objective   Vitals:  Blood pressure 166/90, pulse 87, temperature 97.7 °F (36.5 °C), temperature source Oral, resp. rate 16, height 165.1 cm (65\"), weight 98.2 kg (216 lb 7.9 oz), SpO2 95 %.     Intake/Output Summary (Last 24 hours) at 3/16/2019 1043  Last data filed at 3/16/2019 1000  Gross per 24 hour   Intake 433.46 ml   Output 950 ml   Net -516.54 ml       Physical " Exam   Constitutional: She appears well-developed and well-nourished. No distress.   HENT:   Head: Normocephalic and atraumatic.   Eyes: Right eye exhibits no discharge. Left eye exhibits no discharge.   Neck: No JVD present. No tracheal deviation present.   Cardiovascular: Normal rate, regular rhythm, normal heart sounds and intact distal pulses. Exam reveals no friction rub.   No murmur heard.  Pulmonary/Chest: Effort normal and breath sounds normal. No respiratory distress.   Abdominal: Soft. Bowel sounds are normal. There is no tenderness.   Musculoskeletal: She exhibits no edema or deformity.   Neurological: She is alert.   Expressive aphasia noted.  When asked questions she simply responds with different names.   Skin: Skin is warm and dry.            Results Review:  I reviewed the patient's new clinical results.  Results from last 7 days   Lab Units 03/15/19  0335   WBC 10*3/mm3 8.58   HEMOGLOBIN g/dL 13.8   HEMATOCRIT % 42.6   PLATELETS 10*3/mm3 164     Results from last 7 days   Lab Units 03/15/19  0335 03/14/19  1514   SODIUM mmol/L 136  --    POTASSIUM mmol/L 4.2  --    CHLORIDE mmol/L 105  --    CO2 mmol/L 22.0  --    BUN mg/dL 20  --    CREATININE mg/dL 1.30  --    CALCIUM mg/dL 10.4  --    ALT (SGPT) U/L  --  19   AST (SGOT) U/L  --  20   GLUCOSE mg/dL 160*  --      Results from last 7 days   Lab Units 03/15/19  0335   SODIUM mmol/L 136   POTASSIUM mmol/L 4.2   CHLORIDE mmol/L 105   CO2 mmol/L 22.0   BUN mg/dL 20   CREATININE mg/dL 1.30   GLUCOSE mg/dL 160*   CALCIUM mg/dL 10.4     Results from last 7 days   Lab Units 03/14/19  1509   INR  1.2     No results found for: TROPONINI      Results from last 7 days   Lab Units 03/15/19  0335   CHOLESTEROL mg/dL 145   TRIGLYCERIDES mg/dL 117   HDL CHOL mg/dL 35*   LDL CHOL mg/dL 97               Tele:  SR    EKG: SR WNL      Assessment/Plan     1. Suspected CVA.  Residual a aphasia, expressive  2. Coronary artery disease, no reported recent  angina.  3. History of Takotsubo cardiomyopathy.  Normal LVEF by echocardiogram this admission.  4. Hypertension  5. Dyslipidemia  6. Diabetes      Plan:    1. Continue current regimen for now.  No active cardiac issues at this time  2. We will have Dr. Geronimo follow with the patient beginning Monday.  3. Will place n.p.o. after midnight on Sunday for ERMIAS (source of embolism).  4. We will have our service follow-up with the patient on Monday.      CHRIS Hurtado obtained past medical, family history, social history, review of systems and functioned as a scribe for the remainder of the dictation for Dr. Barajas.      CHRIS Hurtado  03/16/19  10:43 AM  I, Nagi Barajas MD, personally performed the services described in this documentation as scribed by the above individual in my presence, and it is both accurate and complete    Dictated utilizing Dragon dictation

## 2019-03-16 NOTE — PLAN OF CARE
Problem: Fall Risk (Adult)  Goal: Absence of Fall  Outcome: Ongoing (interventions implemented as appropriate)      Problem: Skin Injury Risk (Adult)  Goal: Skin Health and Integrity  Outcome: Ongoing (interventions implemented as appropriate)      Problem: Patient Care Overview  Goal: Plan of Care Review   03/16/19 0319 03/16/19 1600 03/16/19 1758   Coping/Psychosocial   Plan of Care Reviewed With --  patient;family;spouse --    Plan of Care Review   Progress improving --  --    OTHER   Outcome Summary --  --  Pt more confused. Illogical speech with agiation. Restless throughoput day. Stat CT revealed no neuro decline since last CT. Cardene restarted to keep BP <180. Will continue to monitor.     Goal: Interprofessional Rounds/Family Conf  Outcome: Ongoing (interventions implemented as appropriate)   03/16/19 1758   Interdisciplinary Rounds/Family Conf   Summary Pt more confused today. Stat CT done-revealed no change. Dr. Jenkins notified. SEE NOTES. Restless and agitated throughout day. Unable to rest per patient. Cardene restartted to keep BP,<180. Too bedside commode for bowel movement. Continuous EEG ordered -see notes. ERMIAS planned for later this week. Pt to remain NPO after midnight tonight. Will continue to assess and monitor.

## 2019-03-16 NOTE — PLAN OF CARE
Problem: Fall Risk (Adult)  Goal: Identify Related Risk Factors and Signs and Symptoms  Outcome: Outcome(s) achieved Date Met: 03/16/19    Goal: Absence of Fall  Outcome: Ongoing (interventions implemented as appropriate)      Problem: Skin Injury Risk (Adult)  Goal: Skin Health and Integrity  Outcome: Ongoing (interventions implemented as appropriate)      Problem: Patient Care Overview  Goal: Plan of Care Review  Outcome: Ongoing (interventions implemented as appropriate)   03/16/19 0313   Coping/Psychosocial   Plan of Care Reviewed With patient   Plan of Care Review   Progress improving   OTHER   Outcome Summary pt remains very expressively aphasic. no droop. MONTANEZ. no drift. sleeping well tonight. Cardene has been off since 1930. VSS. afebrile.        Problem: Stroke (Ischemic) (Adult)  Goal: Signs and Symptoms of Listed Potential Problems Will be Absent, Minimized or Managed (Stroke)  Outcome: Ongoing (interventions implemented as appropriate)

## 2019-03-17 ENCOUNTER — APPOINTMENT (OUTPATIENT)
Dept: NEUROLOGY | Facility: HOSPITAL | Age: 79
End: 2019-03-17

## 2019-03-17 LAB
ANION GAP SERPL CALCULATED.3IONS-SCNC: 11 MMOL/L (ref 3–11)
BASOPHILS # BLD AUTO: 0.02 10*3/MM3 (ref 0–0.2)
BASOPHILS NFR BLD AUTO: 0.2 % (ref 0–1)
BUN BLD-MCNC: 24 MG/DL (ref 9–23)
BUN/CREAT SERPL: 17.8 (ref 7–25)
CALCIUM SPEC-SCNC: 9.6 MG/DL (ref 8.7–10.4)
CHLORIDE SERPL-SCNC: 107 MMOL/L (ref 99–109)
CO2 SERPL-SCNC: 20 MMOL/L (ref 20–31)
CREAT BLD-MCNC: 1.35 MG/DL (ref 0.6–1.3)
DEPRECATED RDW RBC AUTO: 46.1 FL (ref 37–54)
EOSINOPHIL # BLD AUTO: 0.05 10*3/MM3 (ref 0–0.3)
EOSINOPHIL NFR BLD AUTO: 0.5 % (ref 0–3)
ERYTHROCYTE [DISTWIDTH] IN BLOOD BY AUTOMATED COUNT: 14.2 % (ref 11.3–14.5)
GFR SERPL CREATININE-BSD FRML MDRD: 38 ML/MIN/1.73
GLUCOSE BLD-MCNC: 145 MG/DL (ref 70–100)
GLUCOSE BLDC GLUCOMTR-MCNC: 125 MG/DL (ref 70–130)
GLUCOSE BLDC GLUCOMTR-MCNC: 154 MG/DL (ref 70–130)
GLUCOSE BLDC GLUCOMTR-MCNC: 167 MG/DL (ref 70–130)
GLUCOSE BLDC GLUCOMTR-MCNC: 203 MG/DL (ref 70–130)
HCT VFR BLD AUTO: 40.8 % (ref 34.5–44)
HGB BLD-MCNC: 13.8 G/DL (ref 11.5–15.5)
IMM GRANULOCYTES # BLD AUTO: 0.03 10*3/MM3 (ref 0–0.05)
IMM GRANULOCYTES NFR BLD AUTO: 0.3 % (ref 0–0.6)
LYMPHOCYTES # BLD AUTO: 0.94 10*3/MM3 (ref 0.6–4.8)
LYMPHOCYTES NFR BLD AUTO: 9 % (ref 24–44)
MCH RBC QN AUTO: 30 PG (ref 27–31)
MCHC RBC AUTO-ENTMCNC: 33.8 G/DL (ref 32–36)
MCV RBC AUTO: 88.7 FL (ref 80–99)
MONOCYTES # BLD AUTO: 0.79 10*3/MM3 (ref 0–1)
MONOCYTES NFR BLD AUTO: 7.5 % (ref 0–12)
NEUTROPHILS # BLD AUTO: 8.64 10*3/MM3 (ref 1.5–8.3)
NEUTROPHILS NFR BLD AUTO: 82.5 % (ref 41–71)
PLATELET # BLD AUTO: 161 10*3/MM3 (ref 150–450)
PMV BLD AUTO: 10.2 FL (ref 6–12)
POTASSIUM BLD-SCNC: 3.9 MMOL/L (ref 3.5–5.5)
RBC # BLD AUTO: 4.6 10*6/MM3 (ref 3.89–5.14)
SODIUM BLD-SCNC: 138 MMOL/L (ref 132–146)
WBC NRBC COR # BLD: 10.47 10*3/MM3 (ref 3.5–10.8)

## 2019-03-17 PROCEDURE — 25010000002 HEPARIN (PORCINE) PER 1000 UNITS: Performed by: INTERNAL MEDICINE

## 2019-03-17 PROCEDURE — 82962 GLUCOSE BLOOD TEST: CPT

## 2019-03-17 PROCEDURE — 95951 HC EEG WITH VIDEO RECORDING EACH 24 HRS: CPT

## 2019-03-17 PROCEDURE — 80048 BASIC METABOLIC PNL TOTAL CA: CPT | Performed by: INTERNAL MEDICINE

## 2019-03-17 PROCEDURE — 85025 COMPLETE CBC W/AUTO DIFF WBC: CPT | Performed by: INTERNAL MEDICINE

## 2019-03-17 PROCEDURE — 99233 SBSQ HOSP IP/OBS HIGH 50: CPT | Performed by: INTERNAL MEDICINE

## 2019-03-17 PROCEDURE — 99232 SBSQ HOSP IP/OBS MODERATE 35: CPT | Performed by: PSYCHIATRY & NEUROLOGY

## 2019-03-17 RX ADMIN — FAMOTIDINE 20 MG: 20 TABLET ORAL at 20:32

## 2019-03-17 RX ADMIN — HEPARIN SODIUM 5000 UNITS: 5000 INJECTION INTRAVENOUS; SUBCUTANEOUS at 20:32

## 2019-03-17 RX ADMIN — ATORVASTATIN CALCIUM 80 MG: 40 TABLET, FILM COATED ORAL at 20:31

## 2019-03-17 RX ADMIN — CARVEDILOL 12.5 MG: 12.5 TABLET, FILM COATED ORAL at 08:12

## 2019-03-17 RX ADMIN — INSULIN LISPRO 2 UNITS: 100 INJECTION, SOLUTION INTRAVENOUS; SUBCUTANEOUS at 12:56

## 2019-03-17 RX ADMIN — NICARDIPINE HYDROCHLORIDE 5 MG/HR: 0.1 INJECTION, SOLUTION INTRAVENOUS at 07:15

## 2019-03-17 RX ADMIN — FAMOTIDINE 20 MG: 20 TABLET ORAL at 08:12

## 2019-03-17 RX ADMIN — HEPARIN SODIUM 5000 UNITS: 5000 INJECTION INTRAVENOUS; SUBCUTANEOUS at 08:12

## 2019-03-17 RX ADMIN — INSULIN LISPRO 4 UNITS: 100 INJECTION, SOLUTION INTRAVENOUS; SUBCUTANEOUS at 18:25

## 2019-03-17 RX ADMIN — CARVEDILOL 12.5 MG: 12.5 TABLET, FILM COATED ORAL at 20:32

## 2019-03-17 RX ADMIN — SODIUM CHLORIDE, PRESERVATIVE FREE 3 ML: 5 INJECTION INTRAVENOUS at 20:32

## 2019-03-17 RX ADMIN — ASPIRIN 325 MG ORAL TABLET 325 MG: 325 PILL ORAL at 08:12

## 2019-03-17 RX ADMIN — LISINOPRIL 10 MG: 10 TABLET ORAL at 08:12

## 2019-03-17 RX ADMIN — INSULIN LISPRO 2 UNITS: 100 INJECTION, SOLUTION INTRAVENOUS; SUBCUTANEOUS at 20:32

## 2019-03-17 NOTE — PROGRESS NOTES
INTENSIVIST   PROGRESS NOTE     Hospital:  LOS: 3 days      S     Ms. Yanique Webster, 79 y.o. female is followed for:  Chief Complaint   Patient presents with   • Stroke       CVA (cerebral vascular accident) (CMS/[facility])    Altered mental status    Hypertension    Hyperlipidemia    Diabetes mellitus (CMS/HCC)    COPD (chronic obstructive pulmonary disease) (CMS/[facility])    As an Intensivist, we provide an integrated approach to the ICU patient and family, medical management of comorbid conditions, lead interdisciplinary rounds and coordinate the care with all other services, including those from other specialists.     79-year-old woman presenting with the sudden onset of  aphasia, left-sided weakness, right gaze focus.  She has underlying high blood pressure and diabetes.  She received TPA.    Interval History:  Better today.  However when her SBP > 180, she gets confused, and has some neuro deficits.  cEEG has not demonstrated any seizures so far.  Some confusion.  She feels tired.     The patient's relevant past medical, surgical and social history were reviewed and updated in Epic as appropriate.     ROS:   Constitutional: Negative for fever or chills.   Respiratory: Negative for dyspnea or cough.   Cardiovascular: Negative for chest pain or palpitations.   Gastrointestinal: Negative for  nausea, vomiting or diarrhea.           O     Vitals:  Temp: 98.1 °F (36.7 °C) (03/17/19 1200) Temp  Min: 98.1 °F (36.7 °C)  Max: 99.1 °F (37.3 °C)   BP: 157/85 (03/17/19 1500) BP  Min: 126/62  Max: 177/74   Pulse: 80 (03/17/19 1500) Pulse  Min: 0  Max: 86   Resp: 20 (03/17/19 1430) Resp  Min: 16  Max: 20   SpO2: 97 % (03/17/19 1500) SpO2  Min: 94 %  Max: 98 %   Device: room air (03/17/19 1430)    Flow Rate: 2 (03/15/19 0200) No Data Recorded     Intake/Ouptut 24 hrs (7:00AM - 6:59 AM)  Intake/Output       03/16/19 0700 - 03/17/19 0659 03/17/19 0700 - 03/18/19 0659    Intake (ml) 699.8 --    Output (ml) 1675 400    Net (ml) -975.2  -400           Medications (drips):    niCARdipine Last Rate: Stopped (03/17/19 0930)       Physical Exam:  General Appearance:   Well-developed elderly woman in no distress   Head:   Atraumatic   Eyes:          Pupils equal and reactive to light, extraocular movements intact   Throat:  Oral mucosa moist   Neck:  Trachea midline, no JVD, no palpable thyroid   Lungs:    Symmetric chest expansion.  Breath sounds bilateral, equal, clear    Heart:   Regular rhythm, S1, S2 auscultated, no murmur   Abdomen:    Bowel sounds present, nondistended, soft, nontender   Rectal:   Deferred   Extremities:  No pitting edema or cyanosis   Skin:  Warm and dry   Neurologic:  Face symmetric,  equal, awake, mild dysarthria  Best Eye Response: 4-->(E4) spontaneous (03/17/19 1400)  Best Verbal Response: 4-->(V4) confused (03/17/19 1400)  Best Motor Response: 6-->(M6) obeys commands (03/17/19 1400)  Thomas Coma Scale Score: 14 (03/17/19 1400)     Total (NIH Stroke Scale): 3 (03/17/19 0700)     Hematology:  Results from last 7 days   Lab Units 03/17/19  0819 03/15/19  0335 03/14/19  1514   WBC 10*3/mm3 10.47 8.58 7.91   HEMOGLOBIN g/dL 13.8 13.8 14.1   MCV fL 88.7 89.3 89.0   PLATELETS 10*3/mm3 161 164 186       Chemistry:  Estimated Creatinine Clearance: 39.9 mL/min (A) (by C-G formula based on SCr of 1.35 mg/dL (H)).    Results from last 7 days   Lab Units 03/17/19  0819 03/15/19  0335   SODIUM mmol/L 138 136   POTASSIUM mmol/L 3.9 4.2   CHLORIDE mmol/L 107 105   CO2 mmol/L 20.0 22.0   ANION GAP mmol/L 11.0 9.0   GLUCOSE mg/dL 145* 160*   CALCIUM mg/dL 9.6 10.4     Results from last 7 days   Lab Units 03/17/19  0819 03/15/19  0335 03/14/19  1512   BUN mg/dL 24* 20  --    CREATININE mg/dL 1.35* 1.30 1.30     Results from last 7 days   Lab Units 03/15/19  0335   MAGNESIUM mg/dL 2.0   PHOSPHORUS mg/dL 3.3       Hepatic:  Results from last 7 days   Lab Units 03/14/19  1514   ALT (SGPT) U/L 19   AST (SGOT) U/L 20       Images:  Ct Head  Without Contrast    Result Date: 3/16/2019  Stable left parieto-occipital encephalomalacia. No evidence of intracranial hemorrhage or other new intracranial disease.  DICTATED:   3/16/2019 EDITED/ls :   3/16/2019  This report was finalized on 3/16/2019 10:34 PM by DR. Marciano Emerson MD.        Echo:  Results for orders placed during the hospital encounter of 03/14/19   Adult Transthoracic Echo Complete W/ Cont if Necessary Per Protocol (With Agitated Saline)    Narrative · The study is technically difficult for diagnosis.  · Left ventricular systolic function is hyperdynamic (EF > 70).  · Left ventricular diastolic dysfunction (grade I) consistent with   impaired relaxation.  · The valves were poorly visualized. There was no obvious hemodynamic   abnormalities of the aortic or mitral valve, however        Results: Reviewed.  I reviewed the patient's new laboratory and imaging results.  I independently reviewed the patient's new images.    Medications: Reviewed.      Assessment/Plan      79-year-old woman presenting with acute stroke symptoms with NIHSS 22 that resolved for the most part with TPA.  Scans do not reveal a new stroke or reversible ischemia.  She has some persistent expressive aphasia.  She is requiring Cardene for blood pressure management.  Skull therapy noted generalized bilateral weakness and decreased balance.  She required assistance to sit on the edge of the bed and had persistent A phase and confusion.      1.   CVA  2.   Altered mental status  1. R/O HTN encephalopathy  3.   Hypertension  4.   Hyperlipidemia  5.   COPD  6.   T2D    Results from last 7 days   Lab Units 03/17/19  1155 03/17/19  0703 03/16/19  2032 03/16/19  1640 03/16/19  1136 03/16/19  0657   GLUCOSE mg/dL 154* 125 271* 247* 235* 122     Lab Results   Lab Value Date/Time    HGBA1C 6.70 (H) 03/15/2019 0335        PLAN:  1. BP control  2. ERMIAS probably tomorrow  3. Disposition: Keep in ICU.     Plan of care and goals reviewed during  interdisciplinary rounds.  Level of Risk is High due to:  illness with threat to life or bodily function.   I discussed the patient's findings and my recommendations with patient, family and nursing staff    Tato Grayson MD, FACP, FCCP, CNSC  Intensive Care Medicine, Nutrition Support and Pulmonary Medicine

## 2019-03-17 NOTE — PLAN OF CARE
Problem: Fall Risk (Adult)  Goal: Absence of Fall  Outcome: Ongoing (interventions implemented as appropriate)      Problem: Skin Injury Risk (Adult)  Goal: Skin Health and Integrity  Outcome: Ongoing (interventions implemented as appropriate)      Problem: Patient Care Overview  Goal: Plan of Care Review  Outcome: Ongoing (interventions implemented as appropriate)  Pt aphasia some better, does repeat words and phrases, pt has used  Call bell appropriately, Cardene weaned off,     Problem: Stroke (Ischemic) (Adult)  Goal: Signs and Symptoms of Listed Potential Problems Will be Absent, Minimized or Managed (Stroke)  Outcome: Ongoing (interventions implemented as appropriate)

## 2019-03-17 NOTE — PLAN OF CARE
Problem: Patient Care Overview  Goal: Plan of Care Review  Outcome: Ongoing (interventions implemented as appropriate)   03/17/19 9416   Coping/Psychosocial   Plan of Care Reviewed With patient;spouse   Plan of Care Review   Progress improving   OTHER   Outcome Summary Pt. this morning NIH was a 3. She did have a left facial droop with morning. She was confused in the morning and as the day went on she became oriented. She moves all extermites. Neuro wanted a another 24 hour EEG. She is suppose to have a ERMIAS tomorrow. VSS. Will continue to monitor.        Problem: Stroke (Ischemic) (Adult)  Goal: Signs and Symptoms of Listed Potential Problems Will be Absent, Minimized or Managed (Stroke)  Outcome: Ongoing (interventions implemented as appropriate)

## 2019-03-17 NOTE — NURSING NOTE
Came to check pt's head and skin and the nurse stated that one had popped off.  Checked which one and it was one of the grounds.  Did not replace it but helped nurse put pt back to bed to avoid anymore coming off.  Skin is fine without breakdown or irritation.

## 2019-03-17 NOTE — NURSING NOTE
Dr. Jenkins wants TONYA to run until am 3/18.  In to check skin.  P4 was bad so I redid the whole hook-up and wrap.  No skin breakdown or irritation before second wrap initiated.  Pt voiced understanding that the wrap is okay.  Will recheck before heading home.

## 2019-03-17 NOTE — PROGRESS NOTES
Daily Progress Note  Neurology     LOS: 3 days     Subjective     Chief Complaint: Left sided weakness and aphasia.    Interval History: No acute issues overnight.  Speech seems to have improved as per the nursing staff.  Worsening in speech seems to be correlating with increase in blood pressure.  She has been on continuous EEG monitoring since yesterday and no electrographic seizures or epileptiform abnormality has been seen.    ROS: Negative for fever, shortness of breath and chest pain.    Objective     Vital signs in last 24 hours:  Temp:  [98.2 °F (36.8 °C)-99.1 °F (37.3 °C)] 98.7 °F (37.1 °C)  Heart Rate:  [0-86] 77  Resp:  [16-20] 18  BP: (126-177)/(59-95) 145/65      Physical Exam:   General: Lying in bed with eyes open. In NAD.     Respiratory: Respirations unlabored   CV: RRR       Neurologic Exam:   Mental status: Awake, alert, Follows commands. Speech is much better today.  No evidence of expressive or receptive aphasia.   CN: II-XII intact to detailed exam    Motor: Strength full (5/5) throughout in BUE and BLE    Reflexes: 2+ and symmetric throughout          Results Review:    Results from last 7 days   Lab Units 03/17/19  0819   WBC 10*3/mm3 10.47   HEMOGLOBIN g/dL 13.8   HEMATOCRIT % 40.8   PLATELETS 10*3/mm3 161     Results from last 7 days   Lab Units 03/17/19  0819   SODIUM mmol/L 138   POTASSIUM mmol/L 3.9   CHLORIDE mmol/L 107   CO2 mmol/L 20.0   BUN mg/dL 24*   CREATININE mg/dL 1.35*   CALCIUM mg/dL 9.6       EEG: Continuous EEG monitoring from last 24-hour did not reveal any evidence of epileptiform abnormalities or electrographic seizures.      Assessment/Plan   79-year-old woman presented to ER with left-sided weakness, right gaze deviation and expressive aphasia.  She is status post TPA.  Weakness and right gaze deviation is completely resolved but continues to have mild intermittent  expressive and receptive aphasia as well as episodes of confusion and speech arrest suspicious for complex partial seizures.  Continuous EEG in last 24 hours did not reveal any epileptiform abnormalities. Will continue with EEG monitoring for another 24 hours for further evaluation.  She is scheduled to have ERMIAS tomorrow.     José Jenikns MD  03/17/19  12:30 PM

## 2019-03-18 ENCOUNTER — APPOINTMENT (OUTPATIENT)
Dept: CARDIOLOGY | Facility: HOSPITAL | Age: 79
End: 2019-03-18

## 2019-03-18 PROBLEM — I25.10 CAD (CORONARY ARTERY DISEASE): Status: ACTIVE | Noted: 2019-03-18

## 2019-03-18 PROBLEM — I51.81 TAKOTSUBO CARDIOMYOPATHY: Status: ACTIVE | Noted: 2019-03-18

## 2019-03-18 PROBLEM — N18.30 CKD (CHRONIC KIDNEY DISEASE) STAGE 3, GFR 30-59 ML/MIN: Status: ACTIVE | Noted: 2019-03-18

## 2019-03-18 LAB
ALBUMIN SERPL-MCNC: 3.71 G/DL (ref 3.2–4.8)
ALBUMIN/GLOB SERPL: 1.7 G/DL (ref 1.5–2.5)
ALP SERPL-CCNC: 122 U/L (ref 25–100)
ALT SERPL W P-5'-P-CCNC: 17 U/L (ref 7–40)
ANION GAP SERPL CALCULATED.3IONS-SCNC: 8 MMOL/L (ref 3–11)
AST SERPL-CCNC: 26 U/L (ref 0–33)
BASOPHILS # BLD AUTO: 0.03 10*3/MM3 (ref 0–0.2)
BASOPHILS NFR BLD AUTO: 0.3 % (ref 0–1)
BH CV ECHO MEAS - AI DEC SLOPE: 276 CM/SEC^2
BH CV ECHO MEAS - AI MAX PG: 94.5 MMHG
BH CV ECHO MEAS - AI MAX VEL: 486 CM/SEC
BH CV ECHO MEAS - AI P1/2T: 515.7 MSEC
BH CV ECHO MEAS - AO ROOT DIAM: 3.7 CM
BH CV ECHO MEAS - BSA(HAYCOCK): 2.2 M^2
BH CV ECHO MEAS - BSA: 2 M^2
BH CV ECHO MEAS - BZI_BMI: 35.9 KILOGRAMS/M^2
BH CV ECHO MEAS - BZI_METRIC_HEIGHT: 165.1 CM
BH CV ECHO MEAS - BZI_METRIC_WEIGHT: 98 KG
BH CV VAS BP LEFT ARM: NORMAL MMHG
BILIRUB SERPL-MCNC: 0.6 MG/DL (ref 0.3–1.2)
BUN BLD-MCNC: 26 MG/DL (ref 9–23)
BUN/CREAT SERPL: 18.8 (ref 7–25)
CALCIUM SPEC-SCNC: 9.6 MG/DL (ref 8.7–10.4)
CHLORIDE SERPL-SCNC: 107 MMOL/L (ref 99–109)
CO2 SERPL-SCNC: 23 MMOL/L (ref 20–31)
CREAT BLD-MCNC: 1.38 MG/DL (ref 0.6–1.3)
DEPRECATED RDW RBC AUTO: 45.6 FL (ref 37–54)
EOSINOPHIL # BLD AUTO: 0.06 10*3/MM3 (ref 0–0.3)
EOSINOPHIL NFR BLD AUTO: 0.7 % (ref 0–3)
ERYTHROCYTE [DISTWIDTH] IN BLOOD BY AUTOMATED COUNT: 14.3 % (ref 11.3–14.5)
GFR SERPL CREATININE-BSD FRML MDRD: 37 ML/MIN/1.73
GLOBULIN UR ELPH-MCNC: 2.2 GM/DL
GLUCOSE BLD-MCNC: 140 MG/DL (ref 70–100)
GLUCOSE BLDC GLUCOMTR-MCNC: 153 MG/DL (ref 70–130)
GLUCOSE BLDC GLUCOMTR-MCNC: 231 MG/DL (ref 70–130)
GLUCOSE BLDC GLUCOMTR-MCNC: 231 MG/DL (ref 70–130)
GLUCOSE BLDC GLUCOMTR-MCNC: 253 MG/DL (ref 70–130)
HCT VFR BLD AUTO: 41.4 % (ref 34.5–44)
HGB BLD-MCNC: 13.5 G/DL (ref 11.5–15.5)
IMM GRANULOCYTES # BLD AUTO: 0.03 10*3/MM3 (ref 0–0.05)
IMM GRANULOCYTES NFR BLD AUTO: 0.3 % (ref 0–0.6)
LYMPHOCYTES # BLD AUTO: 1.41 10*3/MM3 (ref 0.6–4.8)
LYMPHOCYTES NFR BLD AUTO: 16.4 % (ref 24–44)
MAGNESIUM SERPL-MCNC: 1.9 MG/DL (ref 1.3–2.7)
MCH RBC QN AUTO: 28.5 PG (ref 27–31)
MCHC RBC AUTO-ENTMCNC: 32.6 G/DL (ref 32–36)
MCV RBC AUTO: 87.5 FL (ref 80–99)
MONOCYTES # BLD AUTO: 0.99 10*3/MM3 (ref 0–1)
MONOCYTES NFR BLD AUTO: 11.5 % (ref 0–12)
NEUTROPHILS # BLD AUTO: 6.1 10*3/MM3 (ref 1.5–8.3)
NEUTROPHILS NFR BLD AUTO: 71.1 % (ref 41–71)
PHOSPHATE SERPL-MCNC: 2.3 MG/DL (ref 2.4–5.1)
PLATELET # BLD AUTO: 169 10*3/MM3 (ref 150–450)
PMV BLD AUTO: 10.9 FL (ref 6–12)
POTASSIUM BLD-SCNC: 3.6 MMOL/L (ref 3.5–5.5)
PROT SERPL-MCNC: 5.9 G/DL (ref 5.7–8.2)
RBC # BLD AUTO: 4.73 10*6/MM3 (ref 3.89–5.14)
SODIUM BLD-SCNC: 138 MMOL/L (ref 132–146)
WBC NRBC COR # BLD: 8.59 10*3/MM3 (ref 3.5–10.8)

## 2019-03-18 PROCEDURE — 80053 COMPREHEN METABOLIC PANEL: CPT | Performed by: INTERNAL MEDICINE

## 2019-03-18 PROCEDURE — 97530 THERAPEUTIC ACTIVITIES: CPT

## 2019-03-18 PROCEDURE — 25010000002 FENTANYL CITRATE (PF) 100 MCG/2ML SOLUTION

## 2019-03-18 PROCEDURE — 25010000002 MIDAZOLAM PER 1 MG

## 2019-03-18 PROCEDURE — 93312 ECHO TRANSESOPHAGEAL: CPT | Performed by: INTERNAL MEDICINE

## 2019-03-18 PROCEDURE — 93312 ECHO TRANSESOPHAGEAL: CPT

## 2019-03-18 PROCEDURE — 82962 GLUCOSE BLOOD TEST: CPT

## 2019-03-18 PROCEDURE — 93320 DOPPLER ECHO COMPLETE: CPT | Performed by: INTERNAL MEDICINE

## 2019-03-18 PROCEDURE — 99232 SBSQ HOSP IP/OBS MODERATE 35: CPT | Performed by: INTERNAL MEDICINE

## 2019-03-18 PROCEDURE — 25010000002 HEPARIN (PORCINE) PER 1000 UNITS: Performed by: INTERNAL MEDICINE

## 2019-03-18 PROCEDURE — 97116 GAIT TRAINING THERAPY: CPT

## 2019-03-18 PROCEDURE — B24BZZ4 ULTRASONOGRAPHY OF HEART WITH AORTA, TRANSESOPHAGEAL: ICD-10-PCS | Performed by: INTERNAL MEDICINE

## 2019-03-18 PROCEDURE — 99232 SBSQ HOSP IP/OBS MODERATE 35: CPT | Performed by: PSYCHIATRY & NEUROLOGY

## 2019-03-18 PROCEDURE — 93325 DOPPLER ECHO COLOR FLOW MAPG: CPT | Performed by: INTERNAL MEDICINE

## 2019-03-18 PROCEDURE — 97110 THERAPEUTIC EXERCISES: CPT

## 2019-03-18 PROCEDURE — 93320 DOPPLER ECHO COMPLETE: CPT

## 2019-03-18 PROCEDURE — 84100 ASSAY OF PHOSPHORUS: CPT | Performed by: INTERNAL MEDICINE

## 2019-03-18 PROCEDURE — 93325 DOPPLER ECHO COLOR FLOW MAPG: CPT

## 2019-03-18 PROCEDURE — 85025 COMPLETE CBC W/AUTO DIFF WBC: CPT | Performed by: INTERNAL MEDICINE

## 2019-03-18 PROCEDURE — 83735 ASSAY OF MAGNESIUM: CPT | Performed by: INTERNAL MEDICINE

## 2019-03-18 RX ORDER — CARVEDILOL 12.5 MG/1
25 TABLET ORAL EVERY 12 HOURS SCHEDULED
Status: DISCONTINUED | OUTPATIENT
Start: 2019-03-18 | End: 2019-03-21 | Stop reason: HOSPADM

## 2019-03-18 RX ORDER — METHADONE HYDROCHLORIDE 5 MG/1
5 TABLET ORAL DAILY
Status: DISCONTINUED | OUTPATIENT
Start: 2019-03-18 | End: 2019-03-21 | Stop reason: HOSPADM

## 2019-03-18 RX ORDER — FENTANYL CITRATE 50 UG/ML
50 INJECTION, SOLUTION INTRAMUSCULAR; INTRAVENOUS ONCE
Status: COMPLETED | OUTPATIENT
Start: 2019-03-18 | End: 2019-03-18

## 2019-03-18 RX ORDER — CARVEDILOL 12.5 MG/1
25 TABLET ORAL ONCE
Status: COMPLETED | OUTPATIENT
Start: 2019-03-18 | End: 2019-03-18

## 2019-03-18 RX ORDER — MIDAZOLAM HYDROCHLORIDE 1 MG/ML
INJECTION INTRAMUSCULAR; INTRAVENOUS
Status: DISPENSED
Start: 2019-03-18 | End: 2019-03-18

## 2019-03-18 RX ORDER — LISINOPRIL 20 MG/1
20 TABLET ORAL ONCE
Status: COMPLETED | OUTPATIENT
Start: 2019-03-18 | End: 2019-03-18

## 2019-03-18 RX ORDER — FENTANYL CITRATE 50 UG/ML
INJECTION, SOLUTION INTRAMUSCULAR; INTRAVENOUS
Status: COMPLETED
Start: 2019-03-18 | End: 2019-03-18

## 2019-03-18 RX ORDER — MIDAZOLAM HYDROCHLORIDE 1 MG/ML
4 INJECTION INTRAMUSCULAR; INTRAVENOUS ONCE
Status: COMPLETED | OUTPATIENT
Start: 2019-03-18 | End: 2019-03-18

## 2019-03-18 RX ORDER — LISINOPRIL 20 MG/1
20 TABLET ORAL
Status: DISCONTINUED | OUTPATIENT
Start: 2019-03-19 | End: 2019-03-21 | Stop reason: HOSPADM

## 2019-03-18 RX ORDER — MIDAZOLAM HYDROCHLORIDE 1 MG/ML
INJECTION INTRAMUSCULAR; INTRAVENOUS
Status: COMPLETED
Start: 2019-03-18 | End: 2019-03-18

## 2019-03-18 RX ADMIN — INSULIN LISPRO 4 UNITS: 100 INJECTION, SOLUTION INTRAVENOUS; SUBCUTANEOUS at 12:12

## 2019-03-18 RX ADMIN — MIDAZOLAM HYDROCHLORIDE 4 MG: 1 INJECTION, SOLUTION INTRAMUSCULAR; INTRAVENOUS at 10:52

## 2019-03-18 RX ADMIN — ASPIRIN 325 MG ORAL TABLET 325 MG: 325 PILL ORAL at 12:08

## 2019-03-18 RX ADMIN — LISINOPRIL 20 MG: 20 TABLET ORAL at 14:21

## 2019-03-18 RX ADMIN — NICARDIPINE HYDROCHLORIDE 5 MG/HR: 0.1 INJECTION, SOLUTION INTRAVENOUS at 07:47

## 2019-03-18 RX ADMIN — NICARDIPINE HYDROCHLORIDE 5 MG/HR: 0.1 INJECTION, SOLUTION INTRAVENOUS at 04:18

## 2019-03-18 RX ADMIN — NICARDIPINE HYDROCHLORIDE 5 MG/HR: 0.1 INJECTION, SOLUTION INTRAVENOUS at 13:06

## 2019-03-18 RX ADMIN — INSULIN LISPRO 6 UNITS: 100 INJECTION, SOLUTION INTRAVENOUS; SUBCUTANEOUS at 21:52

## 2019-03-18 RX ADMIN — INSULIN LISPRO 4 UNITS: 100 INJECTION, SOLUTION INTRAVENOUS; SUBCUTANEOUS at 17:41

## 2019-03-18 RX ADMIN — METHADONE HYDROCHLORIDE 5 MG: 5 TABLET ORAL at 17:59

## 2019-03-18 RX ADMIN — Medication 30 MG: at 10:53

## 2019-03-18 RX ADMIN — FAMOTIDINE 20 MG: 20 TABLET ORAL at 21:52

## 2019-03-18 RX ADMIN — FENTANYL CITRATE 50 MCG: 50 INJECTION INTRAMUSCULAR; INTRAVENOUS at 10:54

## 2019-03-18 RX ADMIN — CARVEDILOL 25 MG: 12.5 TABLET, FILM COATED ORAL at 21:52

## 2019-03-18 RX ADMIN — FENTANYL CITRATE 50 MCG: 50 INJECTION, SOLUTION INTRAMUSCULAR; INTRAVENOUS at 10:54

## 2019-03-18 RX ADMIN — METHOHEXITAL SODIUM 30 MG: 500 INJECTION, POWDER, LYOPHILIZED, FOR SOLUTION INTRAMUSCULAR; INTRAVENOUS; RECTAL at 10:53

## 2019-03-18 RX ADMIN — FAMOTIDINE 20 MG: 20 TABLET ORAL at 12:08

## 2019-03-18 RX ADMIN — SODIUM CHLORIDE, PRESERVATIVE FREE 3 ML: 5 INJECTION INTRAVENOUS at 10:58

## 2019-03-18 RX ADMIN — HEPARIN SODIUM 5000 UNITS: 5000 INJECTION INTRAVENOUS; SUBCUTANEOUS at 12:08

## 2019-03-18 RX ADMIN — MIDAZOLAM HYDROCHLORIDE 4 MG: 1 INJECTION INTRAMUSCULAR; INTRAVENOUS at 10:52

## 2019-03-18 RX ADMIN — ATORVASTATIN CALCIUM 80 MG: 40 TABLET, FILM COATED ORAL at 21:52

## 2019-03-18 RX ADMIN — CARVEDILOL 25 MG: 12.5 TABLET, FILM COATED ORAL at 14:20

## 2019-03-18 RX ADMIN — HEPARIN SODIUM 5000 UNITS: 5000 INJECTION INTRAVENOUS; SUBCUTANEOUS at 21:52

## 2019-03-18 NOTE — PROGRESS NOTES
Intensive Care Follow-up      LOS: 4 days     Ms. Yanique Webster, 79 y.o. female is followed for: CVA (cerebral vascular accident) (CMS/HCC)     Subjective - Interval History     Blood pressure not adequately controlled overnight on oral regimen, nicardipine restarted  N.p.o. this morning in anticipation of ERMIAS  Adequately oxygenating on room air    The patient's relevant past medical, surgical and social history were reviewed and updated in Epic as appropriate.     Objective     Infusions:    niCARdipine 5-15 mg/hr Last Rate: 5 mg/hr (03/18/19 0747)     Medications:    aspirin 325 mg Oral Daily   Or      aspirin 300 mg Rectal Daily   atorvastatin 80 mg Oral Nightly   carvedilol 25 mg Oral Q12H   famotidine 20 mg Oral BID   fentaNYL citrate (PF)      heparin (porcine) 5,000 Units Subcutaneous Q12H   insulin lispro 0-9 Units Subcutaneous 4x Daily With Meals & Nightly   [START ON 3/19/2019] lisinopril 20 mg Oral Q24H   methohexital      midazolam      midazolam      sodium chloride 3 mL Intravenous Q12H     Intake/Output       03/17/19 0700 - 03/18/19 0659 03/18/19 0700 - 03/19/19 0659    Intake (ml) 600 --    Output (ml) 400 200    Net (ml) 200 -200        Vital Sign Min/Max for last 24 hours  Temp  Min: 98.1 °F (36.7 °C)  Max: 99.2 °F (37.3 °C)   BP  Min: 127/91  Max: 191/78   Pulse  Min: 59  Max: 89   Resp  Min: 16  Max: 20   SpO2  Min: 94 %  Max: 99 %   No Data Recorded        Physical Exam:   GENERAL: Awake, no distress   HEENT: No adenopathy or thyromegaly   LUNGS: Clear without wheezes or crackles   HEART: Regular rate and rhythm without murmurs   GI: Soft, nontender   EXTREMITIES: Palpable pulses.  No clubbing or cyanosis   NEURO/PSYCH: Awake and alert.  Follows commands.    Results from last 7 days   Lab Units 03/18/19  0325 03/17/19  0819 03/15/19  0335   WBC 10*3/mm3 8.59 10.47 8.58   HEMOGLOBIN g/dL 13.5 13.8 13.8   PLATELETS 10*3/mm3 169 161 164     Results from last 7 days   Lab Units 03/18/19  0321  03/17/19  0819 03/15/19  0335   SODIUM mmol/L 138 138 136   POTASSIUM mmol/L 3.6 3.9 4.2   CO2 mmol/L 23.0 20.0 22.0   BUN mg/dL 26* 24* 20   CREATININE mg/dL 1.38* 1.35* 1.30   MAGNESIUM mg/dL 1.9  --  2.0   PHOSPHORUS mg/dL 2.3*  --  3.3   GLUCOSE mg/dL 140* 145* 160*     Estimated Creatinine Clearance: 39 mL/min (A) (by C-G formula based on SCr of 1.38 mg/dL (H)).    Results from last 7 days   Lab Units 03/15/19  0335   HEMOGLOBIN A1C % 6.70*           No results found for: LACTATE       Images: Admission chest x-ray with cardiomegaly and vascular prominence but no overt consolidation    I reviewed the patient's results and images.     Impression      Active Hospital Problems    Diagnosis   • **CVA (cerebral vascular accident) (CMS/[facility])   • CAD (coronary artery disease)   • H/O Takotsubo cardiomyopathy (EF now normal)   • CKD (chronic kidney disease) stage 3, GFR 30-59 ml/min (CMS/[facility])   • Altered mental status   • Hypertension   • Hyperlipidemia   • Diabetes mellitus (CMS/[facility])   • COPD (chronic obstructive pulmonary disease) (CMS/[facility])            Plan        Better blood pressure control  ERMIAS today   Plan of care and goals reviewed with Multidisciplinary Team and Antibiotic Stewardship rounds   I discussed the patient's findings and my recommendations with patient and nursing staff      .     BRETT Rogers MD  Pulmonary and Critical Care Medicine

## 2019-03-18 NOTE — THERAPY TREATMENT NOTE
Acute Care - Occupational Therapy Treatment Note  [facility]     Patient Name: Yanique Webster  : 1940  MRN: [id number]  Today's Date: 3/18/2019  Onset of Illness/Injury or Date of Surgery: 19  Date of Referral to OT: 19  Referring Physician: MD Severo    Admit Date: 3/14/2019       ICD-10-CM ICD-9-CM   1. Altered mental status, unspecified altered mental status type R41.82 780.97   2. Acute ischemic stroke (CMS/HCC) I63.9 434.91   3. Hypertension, unspecified type I10 401.9   4. Impaired mobility and ADLs Z74.09 799.89   5. Impaired functional mobility, balance, gait, and endurance Z74.09 V49.89     Patient Active Problem List   Diagnosis   • Altered mental status   • Hypertension   • Hyperlipidemia   • Diabetes mellitus (CMS/HCC)   • COPD (chronic obstructive pulmonary disease) (CMS/HCC)   • CVA (cerebral vascular accident) (CMS/[facility])   • CAD (coronary artery disease)   • H/O Takotsubo cardiomyopathy (EF now normal)   • CKD (chronic kidney disease) stage 3, GFR 30-59 ml/min (CMS/[facility])     Past Medical History:   Diagnosis Date   • COPD (chronic obstructive pulmonary disease) (CMS/HCC)    • Diabetes mellitus (CMS/HCC)    • GERD (gastroesophageal reflux disease)    • Hyperlipidemia    • Hypertension      History reviewed. No pertinent surgical history.    Therapy Treatment    Rehabilitation Treatment Summary     Row Name 19 1406             Treatment Time/Intention    Discipline  occupational therapist  -TA      Document Type  therapy note (daily note)  -TA      Subjective Information  no complaints  -TA      Mode of Treatment  occupational therapy  -TA      Patient/Family Observations  No visitors present in room  -TA      Patient Effort  good  -TA      Existing Precautions/Restrictions  fall;other (see comments) global aphasia  -TA      Recorded by [TA] Cristobal Alicea OT 19 4447      Row Name 19 1406             Vital Signs    Pre Systolic BP Rehab  -- VSS; RN cleared  pt for tx  -TA      Pre Patient Position  Supine  -TA      Intra Patient Position  Supine  -TA      Post Patient Position  Supine  -TA      Recorded by [TA] Cristobal Alicea, OT 03/18/19 1427      Row Name 03/18/19 1406             Cognitive Assessment/Intervention- PT/OT    Affect/Mental Status (Cognitive)  WFL  -TA      Orientation Status (Cognition)  oriented to;person;situation  -TA      Follows Commands (Cognition)  follows one step commands;over 90% accuracy  -TA      Cognitive Function (Cognitive)  safety deficit  -TA      Safety Deficit (Cognitive)  moderate deficit;awareness of need for assistance;insight into deficits/self awareness;safety precautions awareness;safety precautions follow-through/compliance  -TA      Personal Safety Interventions  fall prevention program maintained;gait belt;muscle strengthening facilitated;nonskid shoes/slippers when out of bed;supervised activity  -TA      Cognitive Assessment/Intervention Comment  global aphasia, improved and pt conversant  -TA      Recorded by [TA] Cristobal Alicea, OT 03/18/19 1427      Row Name 03/18/19 1406             Safety Issues, Functional Mobility    Safety Issues Affecting Function (Mobility)  insight into deficits/self awareness;awareness of need for assistance;safety precaution awareness;safety precautions follow-through/compliance  -TA      Impairments Affecting Function (Mobility)  balance;cognition;endurance/activity tolerance;strength  -TA      Recorded by [TA] Cristobal Alicea, OT 03/18/19 1427      Row Name 03/18/19 1406             Bed Mobility Assessment/Treatment    Bed Mobility Assessment/Treatment  rolling left;rolling right  -TA      Rolling Left [place] (Bed Mobility)  minimum assist (75% patient effort)  -TA      Rolling Right [place] (Bed Mobility)  minimum assist (75% patient effort)  -TA      Recorded by [TA] Cristobal Alicea, OT 03/18/19 1427      Row Name 03/18/19 1406             Functional Mobility     Functional Mobility- Comment  defer to PT  -TA      Recorded by [TA] Cristobal Alicea, OT 03/18/19 1427      Row Name 03/18/19 1406             Motor Skills Assessment/Interventions    Additional Documentation  Therapeutic Exercise (Group);Therapeutic Exercise Interventions (Group)  -TA      Recorded by [TA] Cristobal Alicea, OT 03/18/19 1427      Row Name 03/18/19 1406             Therapeutic Exercise    Therapeutic Exercise  supine, upper extremities  -TA      Additional Documentation  Therapeutic Exercise (Row)  -TA      78354 - OT Therapeutic Exercise Minutes  13  -TA      Recorded by [TA] Cristobal Alicea, OT 03/18/19 1427      Row Name 03/18/19 1406             Upper Extremity Supine Therapeutic Exercise    Performed, Supine Upper Extremity (Therapeutic Exercise)  shoulder flexion/extension;shoulder abduction/adduction;shoulder external/internal rotation;shoulder horizontal abduction/adduction;elbow flexion/extension hand pumps  -TA      Exercise Type, Supine Upper Extremity (Therapeutic Exercise)  AROM (active range of motion)  -TA      Expected Outcomes, Supine Upper Extremity (Therapeutic Exercise)  improve functional tolerance, self-care activity;improve performance, BADLs;improve performance, transfer skills  -TA      Restrictions, Supine Upper Extremity (Therapeutic Exercise)  improved command following today  -TA      Sets/Reps Detail, Supine Upper Extremity (Therapeutic Exercise)  1/10, 15  -TA      Recorded by [TA] Cristobal Alicea, OT 03/18/19 1427      Row Name 03/18/19 1406             Positioning and Restraints    Pre-Treatment Position  in bed  -TA      Post Treatment Position  bed  -TA      In Bed  supine;call light within reach;encouraged to call for assist;exit alarm on;side rails up x3;legs elevated  -TA      Recorded by [TA] Cristobal Alicea, OT 03/18/19 1427      Row Name 03/18/19 1406             Pain Assessment    Additional Documentation  Pain Scale: Numbers  Pre/Post-Treatment (Group)  -TA      Recorded by [TA] Cristobal Alicea, OT 03/18/19 1427      Row Name 03/18/19 1406             Pain Scale: Numbers Pre/Post-Treatment    Pain Scale: Numbers, Pretreatment  0/10 - no pain  -TA      Pain Scale: Numbers, Post-Treatment  0/10 - no pain  -TA      Pre/Post Treatment Pain Comment  tolerated  -TA      Pain Intervention(s)  Repositioned;Ambulation/increased activity  -TA      Recorded by [TA] Cristobal Alicea, OT 03/18/19 1427      Row Name 03/18/19 1406             Pain Scale: FACES Pre/Post-Treatment    Pain: FACES Scale, Pretreatment  0-->no hurt  -TA      Pain: FACES Scale, Post-Treatment  0-->no hurt  -TA      Recorded by [TA] Cristobal Alicea, OT 03/18/19 1427      Row Name 03/18/19 1406             Coping    Observed Emotional State  accepting;calm;cooperative;pleasant  -TA      Verbalized Emotional State  acceptance  -TA      Recorded by [TA] Cristobal Alicea, OT 03/18/19 1427      Row Name 03/18/19 1406             Plan of Care Review    Plan of Care Reviewed With  patient  -TA      Recorded by [TA] Cristobal Alicea, OT 03/18/19 1427      Row Name 03/18/19 1406             Outcome Summary/Treatment Plan (OT)    Daily Summary of Progress (OT)  progress toward functional goals as expected  -TA      Plan for Continued Treatment (OT)  Continue per OT POC  -TA      Anticipated Equipment Needs at Discharge (OT)  bedside commode  -TA      Anticipated Discharge Disposition (OT)  inpatient rehabilitation facility  -TA      Recorded by [TA] Cristobal Alicea, OT 03/18/19 1427        User Key  (r) = Recorded By, (t) = Taken By, (c) = Cosigned By    Initials Name Effective Dates Discipline    TA Cristobal Alicea, OT 03/14/16 -  OT             Occupational Therapy Education     Title: PT OT SLP Therapies (In Progress)     Topic: Occupational Therapy (In Progress)     Point: ADL training (In Progress)     Description: Instruct learner(s) on proper safety  adaptation and remediation techniques during self care or transfers.   Instruct in proper use of assistive devices.    Learning Progress Summary           Patient Acceptance, E,D, NR by TB at 3/15/2019  8:50 AM    Comment:  Education initiated for benefits of activity/therapy, role of OT and d/c planning   Significant Other Acceptance, E,D, NR by TB at 3/15/2019  8:50 AM    Comment:  Education initiated for benefits of activity/therapy, role of OT and d/c planning                   Point: Home exercise program (Done)     Description: Instruct learner(s) on appropriate technique for monitoring, assisting and/or progressing therapeutic exercises/activities.    Learning Progress Summary           Patient Acceptance, E,D, VU,DU,NR by TA at 3/18/2019  2:28 PM    Comment:  MONICA MATTHEWS initiated this date with pt. Reinforced need for call for assist with OOB activities.                   Point: Precautions (Done)     Description: Instruct learner(s) on prescribed precautions during self-care and functional transfers.    Learning Progress Summary           Patient Acceptance, E,D, VU,DU,NR by TA at 3/18/2019  2:28 PM    Comment:  MONICA MATTHEWS initiated this date with pt. Reinforced need for call for assist with OOB activities.                               User Key     Initials Effective Dates Name Provider Type Discipline    TB 06/08/18 -  Sarah Bear OT Occupational Therapist OT    TA 03/14/16 -  Cristobal Alicea OT Occupational Therapist OT                OT Recommendation and Plan  Outcome Summary/Treatment Plan (OT)  Daily Summary of Progress (OT): progress toward functional goals as expected  Plan for Continued Treatment (OT): Continue per OT POC  Anticipated Equipment Needs at Discharge (OT): bedside commode  Anticipated Discharge Disposition (OT): inpatient rehabilitation facility  Daily Summary of Progress (OT): progress toward functional goals as expected  Plan of Care Review  Plan of Care Reviewed With:  patient  Plan of Care Reviewed With: patient  Outcome Summary: VSS; Pt with improved conversation this date; initiated BUE strengthening HEP with reinforcemnet needed. Pt tolerated 10-15 reps all ex's. Continue per OT POC.   Outcome Measures     Row Name 03/18/19 1406             How much help from another is currently needed...    Putting on and taking off regular lower body clothing?  1  -TA      Bathing (including washing, rinsing, and drying)  2  -TA      Toileting (which includes using toilet bed pan or urinal)  1  -TA      Putting on and taking off regular upper body clothing  2  -TA      Taking care of personal grooming (such as brushing teeth)  2  -TA      Eating meals  2  -TA      Score  10  -TA         Modified Person Scale    Pre-Stroke Modified Person Scale  0 - No Symptoms at all.  -TA      Modified Robert Scale  3 - Moderate disability.  Requiring some help, but able to walk without assistance.  -TA         Functional Assessment    Outcome Measure Options  AM-PAC 6 Clicks Daily Activity (OT);Modified Robert  -TA        User Key  (r) = Recorded By, (t) = Taken By, (c) = Cosigned By    Initials Name Provider Type    Cristobal Reynoso OT Occupational Therapist           Time Calculation:   Time Calculation- OT     Row Name 03/18/19 1431 03/18/19 1406          Time Calculation- OT    OT Start Time  1406 ttc 15 minutes  -TA  --     Total Timed Code Minutes- OT  15 minute(s)  -TA  --     OT Received On  03/18/19  -TA  --     OT Goal Re-Cert Due Date  03/25/19  -TA  --        Timed Charges    85926 - OT Therapeutic Exercise Minutes  --  13  -TA       User Key  (r) = Recorded By, (t) = Taken By, (c) = Cosigned By    Initials Name Provider Type    Cristobal Reynoso OT Occupational Therapist           Therapy Suggested Charges     Code   Minutes Charges    13934 (CPT®) Hc Ot Neuromusc Re Education Ea 15 Min      65905 (CPT®) Hc Ot Ther Proc Ea 15 Min 13 1    71121 (CPT®) Hc Ot Therapeutic Act Ea 15  Min      94743 (CPT®) Hc Ot Manual Therapy Ea 15 Min      54196 (CPT®) Hc Ot Iontophoresis Ea 15 Min      28109 (CPT®) Hc Ot Elec Stim Ea-Per 15 Min      05556 (CPT®) Hc Ot Ultrasound Ea 15 Min      11967 (CPT®) Hc Ot Self Care/Mgmt/Train Ea 15 Min      Total  13 1        Therapy Charges for Today     Code Description Service Date Service Provider Modifiers Qty    [id number] HC OT THER PROC EA 15 MIN 3/18/2019 Cristobal Alicea, OT GO 1               Cristobal Alicea OT  3/18/2019

## 2019-03-18 NOTE — PLAN OF CARE
Problem: Patient Care Overview  Goal: Plan of Care Review  Outcome: Ongoing (interventions implemented as appropriate)   03/18/19 1418   Coping/Psychosocial   Plan of Care Reviewed With patient   Plan of Care Review   Progress improving   OTHER   Outcome Summary Pt demonstrated increased indep with bed mobility and transfers; progressed forward ambulation distance to 120 total ft with use of RWx and min A. Noted significant improvement in aphasia and following of commands since last PT session; will cont PT POC.        Problem: Stroke (Ischemic) (Adult)  Goal: Signs and Symptoms of Listed Potential Problems Will be Absent, Minimized or Managed (Stroke)  Outcome: Ongoing (interventions implemented as appropriate)   03/18/19 1418   Goal/Outcome Evaluation   Problems Assessed (Stroke (Ischemic)) communication impairment;cognitive impairment;motor/sensory impairment   Problems Assessed (Stroke (Ischemic)) communication impairment;cognitive impairment;motor/sensory impairment

## 2019-03-18 NOTE — THERAPY TREATMENT NOTE
Acute Care - Physical Therapy Treatment Note  [facility]     Patient Name: Yanique Webster  : 1940  MRN: [id number]  Today's Date: 3/18/2019  Onset of Illness/Injury or Date of Surgery: 19  Date of Referral to PT: 19  Referring Physician: MD Severo    Admit Date: 3/14/2019    Visit Dx:    ICD-10-CM ICD-9-CM   1. Altered mental status, unspecified altered mental status type R41.82 780.97   2. Acute ischemic stroke (CMS/HCC) I63.9 434.91   3. Hypertension, unspecified type I10 401.9   4. Impaired mobility and ADLs Z74.09 799.89   5. Impaired functional mobility, balance, gait, and endurance Z74.09 V49.89     Patient Active Problem List   Diagnosis   • Altered mental status   • Hypertension   • Hyperlipidemia   • Diabetes mellitus (CMS/[facility])   • COPD (chronic obstructive pulmonary disease) (CMS/[facility])   • CVA (cerebral vascular accident) (CMS/[facility])   • CAD (coronary artery disease)   • H/O Takotsubo cardiomyopathy (EF now normal)   • CKD (chronic kidney disease) stage 3, GFR 30-59 ml/min (CMS/[facility])       Therapy Treatment    Rehabilitation Treatment Summary     Row Name 19 1418 19 1406          Treatment Time/Intention    Discipline  physical therapist  -LS  occupational therapist  -TA     Document Type  therapy note (daily note)  -LS  therapy note (daily note)  -TA     Subjective Information  no complaints  -LS  no complaints  -TA     Mode of Treatment  physical therapy  -LS  occupational therapy  -TA     Patient/Family Observations  --  No visitors present in room  -TA     Patient Effort  excellent  -LS  good  -TA     Existing Precautions/Restrictions  fall expressive aphasia  -LS  fall;other (see comments) global aphasia  -TA     Recorded by [LS] Lizbeth Mcmahan, PT 19 1606 [TA] Cristobal Alicea, OT 19 1427     Row Name 19 1418 19 1406          Vital Signs    Pre Systolic BP Rehab  139  -LS  -- VSS; RN cleared pt for tx  -TA     Pre Treatment Diastolic BP  94   -LS  --     Pretreatment Heart Rate (beats/min)  70  -LS  --     Posttreatment Heart Rate (beats/min)  72  -LS  --     O2 Delivery Pre Treatment  room air  -LS  --     O2 Delivery Post Treatment  room air  -LS  --     Pre Patient Position  Supine  -LS  Supine  -TA     Intra Patient Position  Standing  -LS  Supine  -TA     Post Patient Position  Sitting  -LS  Supine  -TA     Recorded by [LS] Lizbeth Mcmahan, PT 03/18/19 1606 [TA] Cristobal Alicea, OT 03/18/19 1427     Row Name 03/18/19 1418 03/18/19 1406          Cognitive Assessment/Intervention- PT/OT    Affect/Mental Status (Cognitive)  WFL  -LS  WFL  -TA     Orientation Status (Cognition)  oriented x 4  -LS  oriented to;person;situation  -TA     Follows Commands (Cognition)  follows one step commands;75-90% accuracy;physical/tactile prompts required;repetition of directions required;verbal cues/prompting required  -LS  follows one step commands;over 90% accuracy  -TA     Cognitive Function (Cognitive)  safety deficit  -LS  safety deficit  -TA     Safety Deficit (Cognitive)  mild deficit;awareness of need for assistance;insight into deficits/self awareness;safety precautions awareness  -LS  moderate deficit;awareness of need for assistance;insight into deficits/self awareness;safety precautions awareness;safety precautions follow-through/compliance  -TA     Personal Safety Interventions  fall prevention program maintained;gait belt;nonskid shoes/slippers when out of bed  -LS  fall prevention program maintained;gait belt;muscle strengthening facilitated;nonskid shoes/slippers when out of bed;supervised activity  -TA     Cognitive Assessment/Intervention Comment  --  global aphasia, improved and pt conversant  -TA     Recorded by [LS] Lizbeth Mcmahan, PT 03/18/19 1606 [TA] Cristobal Alicea, OT 03/18/19 1427     Row Name 03/18/19 1406             Safety Issues, Functional Mobility    Safety Issues Affecting Function (Mobility)  insight into deficits/self  awareness;awareness of need for assistance;safety precaution awareness;safety precautions follow-through/compliance  -TA      Impairments Affecting Function (Mobility)  balance;cognition;endurance/activity tolerance;strength  -TA      Recorded by [TA] Cristobal Alicea, OT 03/18/19 1427      Row Name 03/18/19 1418 03/18/19 1406          Bed Mobility Assessment/Treatment    Bed Mobility Assessment/Treatment  --  rolling left;rolling right  -TA     Rolling Left [place] (Bed Mobility)  --  minimum assist (75% patient effort)  -TA     Rolling Right [place] (Bed Mobility)  --  minimum assist (75% patient effort)  -TA     Supine-Sit [place] (Bed Mobility)  contact guard;verbal cues  -LS  --     Assistive Device (Bed Mobility)  bed rails;head of bed elevated  -LS  --     Recorded by [LS] Lizbeth Mcmahan, PT 03/18/19 1606 [TA] Cristobal Alicea, OT 03/18/19 1427     Row Name 03/18/19 1406             Functional Mobility    Functional Mobility- Comment  defer to PT  -TA      Recorded by [TA] Cristobal Alicea, OT 03/18/19 1427      Row Name 03/18/19 1418             Transfer Assessment/Treatment    Transfer Assessment/Treatment  sit-stand transfer;stand-sit transfer;toilet transfer  -LS      Recorded by [LS] Lizbeth Mcmahan, PT 03/18/19 1606      Row Name 03/18/19 1418             Sit-Stand Transfer    Sit-Stand [place] (Transfers)  minimum assist (75% patient effort);verbal cues  -LS      Assistive Device (Sit-Stand Transfers)  walker, front-wheeled  -LS      Recorded by [LS] Lizbeth Mcmahan, PT 03/18/19 1606      Row Name 03/18/19 1418             Stand-Sit Transfer    Stand-Sit [place] (Transfers)  minimum assist (75% patient effort);verbal cues  -LS      Assistive Device (Stand-Sit Transfers)  walker, front-wheeled  -LS      Recorded by [LS] Lizbeth Mcmahan, PT 03/18/19 1606      Row Name 03/18/19 1418             Toilet Transfer    Type (Toilet Transfer)  stand pivot/stand step  -LS       [place] Level (Toilet Transfer)  minimum assist (75% patient effort);verbal cues  -LS      Assistive Device (Toilet Transfer)  walker, front-wheeled  -LS      Recorded by [LS] Lizbeth Mcmahan, PT 03/18/19 1606      Row Name 03/18/19 1418             Gait/Stairs Assessment/Training    83822 - Gait Training Minutes   12  -LS      Gait/Stairs Assessment/Training  gait/ambulation assistive device  -LS      [place] Level (Gait)  minimum assist (75% patient effort);1 person to manage equipment;verbal cues  -LS      Assistive Device (Gait)  walker, front-wheeled  -LS      Distance in Feet (Gait)  120  -LS      Deviations/Abnormal Patterns (Gait)  stride length decreased;gait speed decreased;myke decreased  -LS      Bilateral Gait Deviations  forward flexed posture;heel strike decreased  -LS      Comment (Gait/Stairs)  VC's for upright posture and keeping RW close to body with even step length; req'd min A for safety in negotiation of RW. Distance limited by fatigue.   -LS      Recorded by [LS] Lizbeth Mcmahan, PT 03/18/19 1606      Row Name 03/18/19 1406             Motor Skills Assessment/Interventions    Additional Documentation  Therapeutic Exercise (Group);Therapeutic Exercise Interventions (Group)  -TA      Recorded by [TA] Cristobal Alicea, OT 03/18/19 1427      Row Name 03/18/19 1418 03/18/19 1406          Therapeutic Exercise    Therapeutic Exercise  seated, lower extremities  -LS  supine, upper extremities  -TA     Additional Documentation  --  Therapeutic Exercise (Row)  -TA     70066 - PT Therapeutic Exercise Minutes  6  -LS  --     45558 - PT Therapeutic Activity Minutes  10  -LS  --     05801 - OT Therapeutic Exercise Minutes  --  13  -TA     Recorded by [LS] Lizbeth Mcmahan, PT 03/18/19 1606 [TA] Cristobal Alicea, OT 03/18/19 1427     Row Name 03/18/19 1406             Upper Extremity Supine Therapeutic Exercise    Performed, Supine Upper Extremity (Therapeutic Exercise)  shoulder  flexion/extension;shoulder abduction/adduction;shoulder external/internal rotation;shoulder horizontal abduction/adduction;elbow flexion/extension hand pumps  -TA      Exercise Type, Supine Upper Extremity (Therapeutic Exercise)  AROM (active range of motion)  -TA      Expected Outcomes, Supine Upper Extremity (Therapeutic Exercise)  improve functional tolerance, self-care activity;improve performance, BADLs;improve performance, transfer skills  -TA      Restrictions, Supine Upper Extremity (Therapeutic Exercise)  improved command following today  -TA      Sets/Reps Detail, Supine Upper Extremity (Therapeutic Exercise)  1/10, 15  -TA      Recorded by [TA] Cristobal Alicea, OT 03/18/19 1427      Row Name 03/18/19 1418             Lower Extremity Seated Therapeutic Exercise    Performed, Seated Lower Extremity (Therapeutic Exercise)  hip flexion/extension;LAQ (long arc quad), knee extension;ankle dorsiflexion/plantarflexion  -LS      Exercise Type, Seated Lower Extremity (Therapeutic Exercise)  AROM (active range of motion)  -LS      Sets/Reps Detail, Seated Lower Extremity (Therapeutic Exercise)  1/10 BLE  -LS      Recorded by [LS] Lizbeth Mcmahan, PT 03/18/19 1606      Row Name 03/18/19 1418             Static Sitting Balance    Level of [place] (Unsupported Sitting, Static Balance)  supervision  -LS      Sitting Position (Unsupported Sitting, Static Balance)  sitting on edge of bed  -LS      Recorded by [LS] Lizbeth Mcmahan, PT 03/18/19 1606      Row Name 03/18/19 1418             Static Standing Balance    Level of [place] (Supported Standing, Static Balance)  contact guard assist  -LS      Assistive Device Utilized (Supported Standing, Static Balance)  walker, rolling  -LS      Recorded by [LS] Lizbeth Mcmahan, PT 03/18/19 1606      Row Name 03/18/19 1418 03/18/19 1406          Positioning and Restraints    Pre-Treatment Position  in bed  -LS  in bed  -TA     Post Treatment Position  chair  -LS  bed   -TA     In Bed  --  supine;call light within reach;encouraged to call for assist;exit alarm on;side rails up x3;legs elevated  -TA     In Chair  notified nsg;reclined;call light within reach;encouraged to call for assist;exit alarm on;RUE elevated;LUE elevated;legs elevated  -LS  --     Recorded by [LS] Lizbeth Mcmahan, PT 03/18/19 1606 [TA] Cristobal Alicea, OT 03/18/19 1427     Row Name 03/18/19 1406             Pain Assessment    Additional Documentation  Pain Scale: Numbers Pre/Post-Treatment (Group)  -TA      Recorded by [TA] Cristobal Alicea, OT 03/18/19 1427      Row Name 03/18/19 1418 03/18/19 1406          Pain Scale: Numbers Pre/Post-Treatment    Pain Scale: Numbers, Pretreatment  0/10 - no pain  -LS  0/10 - no pain  -TA     Pain Scale: Numbers, Post-Treatment  0/10 - no pain  -LS  0/10 - no pain  -TA     Pre/Post Treatment Pain Comment  --  tolerated  -TA     Pain Intervention(s)  --  Repositioned;Ambulation/increased activity  -TA     Recorded by [LS] Lizbeth Mcmahan, PT 03/18/19 1606 [TA] Cristobal Alicea, OT 03/18/19 1427     Row Name 03/18/19 1406             Pain Scale: FACES Pre/Post-Treatment    Pain: FACES Scale, Pretreatment  0-->no hurt  -TA      Pain: FACES Scale, Post-Treatment  0-->no hurt  -TA      Recorded by [TA] Cristobal Alicea, OT 03/18/19 1427      Row Name 03/18/19 1406             Coping    Observed Emotional State  accepting;calm;cooperative;pleasant  -TA      Verbalized Emotional State  acceptance  -TA      Recorded by [TA] Cristobal Alicea, OT 03/18/19 1427      Row Name 03/18/19 1418 03/18/19 1406          Plan of Care Review    Plan of Care Reviewed With  patient  -LS  patient  -TA     Recorded by [LS] Lizbeth Mcmahan, PT 03/18/19 1606 [TA] Cristobal Alicea, OT 03/18/19 1427     Row Name 03/18/19 1406             Outcome Summary/Treatment Plan (OT)    Daily Summary of Progress (OT)  progress toward functional goals as expected  -TA      Plan for Continued  Treatment (OT)  Continue per OT POC  -TA      Anticipated Equipment Needs at Discharge (OT)  bedside commode  -TA      Anticipated Discharge Disposition (OT)  inpatient rehabilitation facility  -TA      Recorded by [TA] Cristobal Ailcea, OT 03/18/19 1427      Row Name 03/18/19 1418             Outcome Summary/Treatment Plan (PT)    Daily Summary of Progress (PT)  progress toward functional goals is good  -LS      Recorded by [LS] Lizbeth Mcmahan, PT 03/18/19 1606        User Key  (r) = Recorded By, (t) = Taken By, (c) = Cosigned By    Initials Name Effective Dates Discipline     Lizbeth Mcmahan, PT 06/19/15 -  PT    TA Cristobal Alicea, OT 03/14/16 -  OT                   Physical Therapy Education     Title: PT OT SLP Therapies (In Progress)     Topic: Physical Therapy (Done)     Point: Mobility training (Done)     Learning Progress Summary           Patient Acceptance, E,D, VU,NR by  at 3/18/2019  2:18 PM    Acceptance, E,D, NR by  at 3/15/2019  8:46 AM                   Point: Home exercise program (Done)     Learning Progress Summary           Patient Acceptance, E,D, VU,NR by LS at 3/18/2019  2:18 PM                   Point: Body mechanics (Done)     Learning Progress Summary           Patient Acceptance, E,D, VU,NR by LS at 3/18/2019  2:18 PM    Acceptance, E,D, NR by  at 3/15/2019  8:46 AM                   Point: Precautions (Done)     Learning Progress Summary           Patient Acceptance, E,D, VU,NR by  at 3/18/2019  2:18 PM    Acceptance, E,D, NR by  at 3/15/2019  8:46 AM                               User Key     Initials Effective Dates Name Provider Type Discipline     06/19/15 -  Lizbeth Mcmahan, PT Physical Therapist PT                PT Recommendation and Plan  Anticipated Discharge Disposition (PT): inpatient rehabilitation facility  Planned Therapy Interventions (PT Eval): balance training, bed mobility training, gait training, home exercise program, strengthening, transfer  training, patient/family education  Therapy Frequency (PT Clinical Impression): daily  Outcome Summary/Treatment Plan (PT)  Daily Summary of Progress (PT): progress toward functional goals is good  Anticipated Discharge Disposition (PT): inpatient rehabilitation facility  Plan of Care Reviewed With: patient  Progress: improving  Outcome Summary: Pt demonstrated increased indep with bed mobility and transfers; progressed forward ambulation distance to 120 total ft with use of RWx and min A. Noted significant improvement in aphasia and following of commands since last PT session; will cont PT POC.   Outcome Measures     Row Name 03/18/19 1418 03/18/19 1406          How much help from another person do you currently need...    Turning from your back to your side while in flat bed without using bedrails?  3  -LS  --     Moving from lying on back to sitting on the side of a flat bed without bedrails?  3  -LS  --     Moving to and from a bed to a chair (including a wheelchair)?  3  -LS  --     Standing up from a chair using your arms (e.g., wheelchair, bedside chair)?  3  -LS  --     Climbing 3-5 steps with a railing?  2  -LS  --     To walk in hospital room?  3  -LS  --     AM-PAC 6 Clicks Score  17  -LS  --        How much help from another is currently needed...    Putting on and taking off regular lower body clothing?  --  1  -TA     Bathing (including washing, rinsing, and drying)  --  2  -TA     Toileting (which includes using toilet bed pan or urinal)  --  1  -TA     Putting on and taking off regular upper body clothing  --  2  -TA     Taking care of personal grooming (such as brushing teeth)  --  2  -TA     Eating meals  --  2  -TA     Score  --  10  -TA        Modified Robert Scale    Pre-Stroke Modified Robert Scale  --  0 - No Symptoms at all.  -TA     Modified Robert Scale  --  3 - Moderate disability.  Requiring some help, but able to walk without assistance.  -TA        Functional Assessment    Outcome  Measure Options  AM-PAC 6 Clicks Basic Mobility (PT)  -LS  AM-PAC 6 Clicks Daily Activity (OT);Modified [place]  -TA       User Key  (r) = Recorded By, (t) = Taken By, (c) = Cosigned By    Initials Name Provider Type    Lizbeth Doherty, PT Physical Therapist    Cristobal Reynoso, OT Occupational Therapist         Time Calculation:   PT Charges     Row Name 03/18/19 1418             Time Calculation    Start Time  1418  -LS      PT Received On  03/18/19  -         Time Calculation- PT    Total Timed Code Minutes- PT  28 minute(s)  -LS         Timed Charges    95809 - PT Therapeutic Exercise Minutes  6  -LS      58431 - Gait Training Minutes   12  -LS      92277 - PT Therapeutic Activity Minutes  10  -LS        User Key  (r) = Recorded By, (t) = Taken By, (c) = Cosigned By    Initials Name Provider Type    Lizbeth Doherty, PT Physical Therapist        Therapy Suggested Charges     Code   Minutes Charges    38446 (CPT®) Hc Pt Neuromusc Re Education Ea 15 Min      03650 (CPT®) Hc Pt Ther Proc Ea 15 Min 6     21479 (CPT®) Hc Gait Training Ea 15 Min 12 1    96838 (CPT®) Hc Pt Therapeutic Act Ea 15 Min 10 1    91081 (CPT®) Hc Pt Manual Therapy Ea 15 Min      83442 (CPT®) Hc Pt Iontophoresis Ea 15 Min      18300 (CPT®) Hc Pt Elec Stim Ea-Per 15 Min      94437 (CPT®) Hc Pt Ultrasound Ea 15 Min      98461 (CPT®) Hc Pt Self Care/Mgmt/Train Ea 15 Min      79861 (CPT®) Hc Pt Prosthetic (S) Train Initial Encounter, Each 15 Min      05775 (CPT®) Hc Pt Orthotic(S)/Prosthetic(S) Encounter, Each 15 Min      47163 (CPT®) Hc Orthotic(S) Mgmt/Train Initial Encounter, Each 15min      Total  28 2        Therapy Charges for Today     Code Description Service Date Service Provider Modifiers Qty    24508230440 HC GAIT TRAINING EA 15 MIN 3/18/2019 Lizbeth Mcmahan, PT GP 1    86932330113 HC PT THERAPEUTIC ACT EA 15 MIN 3/18/2019 Lizbeth Mcmahan, PT GP 1    24050514358 HC PT THER SUPP EA 15 MIN 3/18/2019 Lizbeth Mcmahan, PT GP 2           PT G-Codes  Outcome Measure Options: AM-PAC 6 Clicks Basic Mobility (PT)  AM-PAC 6 Clicks Score: 17  Score: 10  Modified [place] Scale: 3 - Moderate disability.  Requiring some help, but able to walk without assistance.    Lizbeth Mcmahan, PT  3/18/2019

## 2019-03-18 NOTE — PROGRESS NOTES
Neurology       Patient Care Team:  MAGDIEL Mcneill MD as PCP - General (Family Medicine)    Chief complaint: Stroke    History: Patient continues to have mild difficulty with speech with since the wax and wane.      Past Medical History:   Diagnosis Date   • COPD (chronic obstructive pulmonary disease) (CMS/[facility])    • Diabetes mellitus (CMS/[facility])    • GERD (gastroesophageal reflux disease)    • Hyperlipidemia    • Hypertension        Vital Signs   Vitals:    03/18/19 1215 03/18/19 1230 03/18/19 1245 03/18/19 1300   BP: 152/81 139/75 146/80 159/71   BP Location:       Patient Position:       Pulse: 68 66 65 75   Resp: 16 16 16 16   Temp:       TempSrc:       SpO2: 96% 95% 95% 95%   Weight:       Height:           Physical Exam:   General: Blood pressure continues to fluctuate with marginal control.    There is nothing to suggest malignant hypertension.                  Neuro: Awake and alert oriented to person place and time.    She able to rattle off the names of her 6 children in the year there was born.    Speech shows minimal blocking.    Face is symmetrical.    Palate elevates normally tongue protrudes normally.    Motor testing shows symmetrical strength and tone.        Results Review:  Transesophageal echocardiogram shows some layered plaque in the ascending aorta.    Otherwise it is completely normal.      Results from last 7 days   Lab Units 03/18/19  0325   WBC 10*3/mm3 8.59   HEMOGLOBIN g/dL 13.5   HEMATOCRIT % 41.4   PLATELETS 10*3/mm3 169     Results from last 7 days   Lab Units 03/18/19  0325 03/17/19  0819 03/15/19  0335 03/14/19  1514   SODIUM mmol/L 138 138 136  --    POTASSIUM mmol/L 3.6 3.9 4.2  --    CHLORIDE mmol/L 107 107 105  --    CO2 mmol/L 23.0 20.0 22.0  --    BUN mg/dL 26* 24* 20  --    CREATININE mg/dL 1.38* 1.35* 1.30  --    CALCIUM mg/dL 9.6 9.6 10.4  --    BILIRUBIN mg/dL 0.6  --   --   --    ALK PHOS U/L 122*  --   --   --    ALT (SGPT) U/L 17  --   --  19   AST (SGOT) U/L 26  --    --  20   GLUCOSE mg/dL 140* 145* 160*  --        Imaging Results (last 24 hours)     ** No results found for the last 24 hours. **          Assessment:  Expressive aphasia post NIH score of 22 with TPA being given    Plan:  Repeat MRI.        Comment:  We will consult with Dr. mcdonald regarding dual antiplatelets versus anticoagulation.             I discussed the patients findings and my recommendations with patient and consulting provider    Surendra Elkins MD  03/18/19  1:56 PM

## 2019-03-18 NOTE — PROGRESS NOTES
"Adult Nutrition  Assessment/PES    Patient Name:  Yanique Webster  YOB: 1940  MRN: [id number]  Admit Date:  3/14/2019    Assessment Date:  3/18/2019    Comments:  Pt w/ marginal po intake per limited documentation. RD will continue to monitor per protocol.    Reason for Assessment     Row Name 03/18/19 1255          Reason for Assessment    Reason For Assessment  per organizational policy MDR; LOS, po f/u 30 mins     Diagnosis  neurologic conditions pt adm w/ AMS, R hemispheric stroke s/p tPA, L weakness, ahasia, HTN Hx: HTN, HLP, DM, CVA, GERD, COPD     Identified At Risk by Screening Criteria  no indicators present         Nutrition/Diet History     Row Name 03/18/19 1256          Nutrition/Diet History    Typical Food/Fluid Intake  RN reports pt NPO for ERMIAS, pt still aphasic,  HTN elev and back on cardene drip; PT/OT eval for dc placement.     Food Preferences  Pt nods that she likes the she is getting.  states she is eating pretty well         Anthropometrics     Row Name 03/18/19 1101          Anthropometrics    Height  165 cm (64.96\")     Weight  98 kg (216 lb)        Ideal Body Weight (IBW)    Ideal Body Weight (IBW) (kg)  57.2     % Ideal Body Weight  171.29        Body Mass Index (BMI)    BMI (kg/m2)  36.06         Labs/Tests/Procedures/Meds     Row Name 03/18/19 1308          Labs/Procedures/Meds    Lab Results Reviewed  reviewed, pertinent        Diagnostic Tests/Procedures    Diagnostic Test/Procedure Reviewed  reviewed, pertinent     Diagnostic Test/Procedures Comments  ERMIAS today        Medications    Pertinent Medications Reviewed  reviewed, pertinent     Pertinent Medications Comments  cardene drip ; anti-hypertensives adjusted per MD         Physical Findings     Row Name 03/18/19 1308          Physical Findings    Overall Physical Appearance  obese;other (see comments) pt aphasic         Estimated/Assessed Needs     Row Name 03/18/19 1101          Calculation Measurements    " "Height  165 cm (64.96\")         Nutrition Prescription Ordered     Row Name 03/18/19 1309          Nutrition Prescription PO    Current PO Diet  Soft Texture     Texture  Whole foods     Fluid Consistency  Thin     Common Modifiers  Cardiac;Consistent Carbohydrate         Evaluation of Received Nutrient/Fluid Intake     Row Name 03/18/19 1309          Height  --          Number of Days PO Intake Evaluated  Insufficient Data 5 day intake review for LOS; pt NPO for several meals, missing documentation for several meals    Number of Meals  3    % PO Intake  66        Evaluation of Prescribed Nutrient/Fluid Intake     Row Name 03/18/19 1101          Calculation Measurements    Height  165 cm (64.96\")             Problem/Interventions:  Problem 1     Row Name 03/18/19 1314          Nutrition Diagnoses Problem 1    Problem 1  Predicted Suboptimal Intake     Etiology (related to)  Medical Diagnosis     Neurological  CVA     Signs/Symptoms (evidenced by)  PO Intake     Percent (%) intake recorded  66 % insufficient documentation over 5 day LOS review     Over number of meals  3                 Intervention Goal     Row Name 03/18/19 1317          Intervention Goal    General  Nutrition support treatment;Meet nutritional needs for age/condition     PO  Increase intake         Nutrition Intervention     Row Name 03/18/19 1317          Nutrition Intervention    RD/Tech Action  Advise alternate selection;Menu provided;Encourage intake;Follow Tx progress;Care plan reviewd           Education/Evaluation     Row Name 03/18/19 1317          Monitor/Evaluation    Monitor  Per protocol;PO intake           Electronically signed by:  Giulia Moreno, MS,RD,LD  03/18/19 1:19 PM  "

## 2019-03-18 NOTE — PROGRESS NOTES
Continued Stay Note  [facility]     Patient Name: Yanique Webster  MRN: [id number]  Today's Date: 3/18/2019    Admit Date: 3/14/2019    Discharge Plan     Row Name 03/18/19 1036       Plan    Plan Comments  CM is following for discharge needs. Once PT/OT eval is completed CM will discuss HH vs rehab with pt and family.        Discharge Codes    No documentation.       Expected Discharge Date and Time     Expected Discharge Date Expected Discharge Time    Mar 20, 2019             Karla Cooney RN

## 2019-03-18 NOTE — PLAN OF CARE
Problem: Fall Risk (Adult)  Goal: Absence of Fall  Outcome: Ongoing (interventions implemented as appropriate)   03/18/19 0519   Fall Risk (Adult)   Absence of Fall making progress toward outcome       Problem: Skin Injury Risk (Adult)  Goal: Skin Health and Integrity  Outcome: Ongoing (interventions implemented as appropriate)   03/18/19 0519   Skin Injury Risk (Adult)   Skin Health and Integrity making progress toward outcome       Problem: Patient Care Overview  Goal: Plan of Care Review  Outcome: Ongoing (interventions implemented as appropriate)   03/18/19 0519   Coping/Psychosocial   Plan of Care Reviewed With patient;spouse   Plan of Care Review   Progress no change   OTHER   Outcome Summary pt NIH was 3. confused to place this shift. occasionally will repeat answers. MONTANEZ. FC. right  is slightly weaker than left. on cont. EEG. RA. NSR. HTN at times. cardene restarted due to sustaining SBP of 180s. Voids per bedpan. incontinent at times. 1 incontinent BM. NPO since midnight for pending ERMIAS. will cont. to monitor.        Problem: Stroke (Ischemic) (Adult)  Goal: Signs and Symptoms of Listed Potential Problems Will be Absent, Minimized or Managed (Stroke)  Outcome: Ongoing (interventions implemented as appropriate)   03/18/19 0519   Goal/Outcome Evaluation   Problems Assessed (Stroke (Ischemic)) all   Problems Assessed (Stroke (Ischemic)) bladder/bowel dysfunction;situational response

## 2019-03-18 NOTE — PROGRESS NOTES
[facility] at [facility]    Inpatient Progress Note      Chief Complaint/Reason for service:    · Cryptogenic stroke         Subjective:       Denies chest pain, dyspnea, palpitations today.  Feels well.  Has mild decreased right  strength.    Past medical, surgical, social and family history reviewed in the patient's electronic medical record.    Review of Systems:   Negative for exertional chest pain, dyspnea with exertion, lower extremity edema, palpitations     Problem List  Active Hospital Problems    Diagnosis  POA   • **CVA (cerebral vascular accident) (CMS/[facility]) [I63.9]  Yes   • Altered mental status [R41.82]  Yes   • Hypertension [I10]  Yes   • Hyperlipidemia [E78.5]  Yes   • Diabetes mellitus (CMS/[facility]) [E11.9]  Yes   • COPD (chronic obstructive pulmonary disease) (CMS/[facility]) [J44.9]  Yes      Resolved Hospital Problems   No resolved problems to display.            Objective:      Current Facility-Administered Medications:   •  aspirin tablet 325 mg, 325 mg, Oral, Daily, 325 mg at 03/17/19 0812 **OR** aspirin suppository 300 mg, 300 mg, Rectal, Daily, Surendra Elkins MD  •  atorvastatin (LIPITOR) tablet 80 mg, 80 mg, Oral, Nightly, Surendra Elkins MD, 80 mg at 03/17/19 2031  •  carvedilol (COREG) tablet 12.5 mg, 12.5 mg, Oral, Q12H, Swati Alba MD, 12.5 mg at 03/17/19 2032  •  famotidine (PEPCID) tablet 20 mg, 20 mg, Oral, BID, Swati Alba MD, 20 mg at 03/17/19 2032  •  heparin (porcine) 5000 UNIT/ML injection 5,000 Units, 5,000 Units, Subcutaneous, Q12H, Swati Alba MD, 5,000 Units at 03/17/19 2032  •  insulin lispro (humaLOG) injection 0-9 Units, 0-9 Units, Subcutaneous, 4x Daily With Meals & Nightly, Case, Kati V., DO, 2 Units at 03/17/19 2032  •  lisinopril (PRINIVIL,ZESTRIL) tablet 10 mg, 10 mg, Oral, Q24H, Swati Alba MD, 10 mg at 03/17/19 0812  •  niCARdipine (CARDENE-IV) 20 mg/200 mL (0.1 mg/mL) in 0.9% NaCl infusion,  5-15 mg/hr, Intravenous, Titrated, Allyssa Dunbar MD, Last Rate: 50 mL/hr at 03/18/19 0747, 5 mg/hr at 03/18/19 0747  •  ondansetron (ZOFRAN) injection 4 mg, 4 mg, Intravenous, Q6H PRN, Ian Arcos, APRN, 4 mg at 03/14/19 2220  •  sodium chloride 0.9 % flush 10 mL, 10 mL, Intravenous, PRN, Allyssa Dunbar MD, 10 mL at 03/16/19 0844  •  sodium chloride 0.9 % flush 3 mL, 3 mL, Intravenous, Q12H, Surendra Elkins MD, 3 mL at 03/17/19 2032  •  sodium chloride 0.9 % flush 3-10 mL, 3-10 mL, Intravenous, PRN, Surendra Elkins MD    Vital Sign Min/Max for last 24 hours  Temp  Min: 98.1 °F (36.7 °C)  Max: 99.2 °F (37.3 °C)   BP  Min: 127/91  Max: 191/78   Pulse  Min: 59  Max: 89   Resp  Min: 16  Max: 20   SpO2  Min: 94 %  Max: 99 %   No Data Recorded      Intake/Output Summary (Last 24 hours) at 3/18/2019 0953  Last data filed at 3/17/2019 1800  Gross per 24 hour   Intake 600 ml   Output --   Net 600 ml           CONSTITUTIONAL: No acute distress, normal affect  RESPIRATORY: Normal effort. Clear to auscultation bilaterally without wheezing or rales  CARDIOVASCULAR: Regular rate and rhythm with normal S1 and S2. Without murmur, gallop or rub.  PERIPHERAL VASCULAR: Normal radial pulses bilaterally. There is no peripheral edema bilaterally.    Results Review:   No results found for: TROPONINI, TROPONINT    BUN   Date Value Ref Range Status   03/18/2019 26 (H) 9 - 23 mg/dL Final     Creatinine   Date Value Ref Range Status   03/18/2019 1.38 (H) 0.60 - 1.30 mg/dL Final     Potassium   Date Value Ref Range Status   03/18/2019 3.6 3.5 - 5.5 mmol/L Final     ALT (SGPT)   Date Value Ref Range Status   03/18/2019 17 7 - 40 U/L Final     AST (SGOT)   Date Value Ref Range Status   03/18/2019 26 0 - 33 U/L Final       Lab Results   Component Value Date    CHOL 145 03/15/2019     Lab Results   Component Value Date    TRIG 117 03/15/2019     Lab Results   Component Value Date    HDL 35 (L) 03/15/2019     No  results found for: LDLC  Lab Results   Component Value Date    LDL 97 03/15/2019     No components found for: LDLDIRECTC        Tele:  NSR, no AFib    TTE 8/2016  · The left ventricular cavity is mildly dilated.  · The findings are consistent with stress-induced (Takotsubo) cardiomyopathy.  · Moderate aortic valve regurgitation is present.  · Left ventricular function is severely decreased. Estimated EF = 15%.     TTE 10/2016  · Left ventricular function is normal. Estimated EF = 60%.  · Left ventricular wall thickness is consistent with borderline concentric hypertrophy.  · The left ventricular cavity is borderline dilated.  · All left ventricular wall segments contract normally.  · Left ventricular diastolic dysfunction (grade I a) consistent with impaired relaxation.  · Moderate aortic valve regurgitation is present.     TTE 3/2019  · The study is technically difficult for diagnosis.  · Left ventricular systolic function is hyperdynamic (EF > 70).  · Left ventricular diastolic dysfunction (grade I) consistent with impaired relaxation.  · The valves were poorly visualized. There was no obvious hemodynamic abnormalities of the aortic or mitral valve, however    Cath results 8/30/16  · Coronary circulation is right dominant  · Left main: Normal  · LAD: 40% proximal discrete stenosis, small D1 and D2 without significant disease, mid to distal LAD with mild diffuse disease  · LCX: Large circumflex, tandem 30% stenoses in the mid Cx after OM2, moderate mid OM1 disease, med sized OM2 with luminal irregularities, med to large sized OM3 without significant disease  · RCA: 50% mid RCA lesion, iFR 0.95 (not functionally significant), mild distal disease          Assessment/Plan:     ASSESSMENT:  -Cryptogenic stroke status post TPA, no clear stroke on imaging post TPA.  -CAD  -History of Takotsubo cardiomyopathy with normalized EF  -Essential hypertension  -Diabetes  -Dyslipidemia    PLAN:  -ERMIAS today. NPO   -30 day event  monitor on discharge if ERMIAS unremarkable  -Consider anti-coagulation if a cardioembolic source of stroke is strongly suspected    -The patient has 2 EPIC charts.  Please have the charts merged.    Steve Geronimo MD, MSc, [facility]  Interventional Cardiology  [facility]  3/18/2019

## 2019-03-18 NOTE — PLAN OF CARE
Problem: Patient Care Overview  Goal: Plan of Care Review  Outcome: Ongoing (interventions implemented as appropriate)   03/18/19 1426   Coping/Psychosocial   Plan of Care Reviewed With patient   Plan of Care Review   Progress improving   OTHER   Outcome Summary VSS; Pt with improved conversation this date; initiated BUE strengthening HEP with reinforcemnet needed. Pt tolerated 10-15 reps all ex's. Continue per OT POC.

## 2019-03-18 NOTE — PLAN OF CARE
Problem: Fall Risk (Adult)  Goal: Absence of Fall  Outcome: Ongoing (interventions implemented as appropriate)   03/18/19 0519   Fall Risk (Adult)   Absence of Fall making progress toward outcome       Problem: Skin Injury Risk (Adult)  Goal: Skin Health and Integrity  Outcome: Ongoing (interventions implemented as appropriate)   03/18/19 0519   Skin Injury Risk (Adult)   Skin Health and Integrity making progress toward outcome       Problem: Patient Care Overview  Goal: Plan of Care Review  Outcome: Ongoing (interventions implemented as appropriate)   03/18/19 8021   Coping/Psychosocial   Plan of Care Reviewed With patient;spouse   OTHER   Outcome Summary VSS. Was requiring Cardene gtt for the morning. MD increased oral BP medications. Tolerated ambulation with PT. No issues following AM ERMIAS. Pt pleasant and optimistic. Continuing to monitor and provide support.        Problem: Stroke (Ischemic) (Adult)  Goal: Signs and Symptoms of Listed Potential Problems Will be Absent, Minimized or Managed (Stroke)   03/18/19 0519   Goal/Outcome Evaluation   Problems Assessed (Stroke (Ischemic)) all   Problems Assessed (Stroke (Ischemic)) bladder/bowel dysfunction;situational response

## 2019-03-19 ENCOUNTER — APPOINTMENT (OUTPATIENT)
Dept: MRI IMAGING | Facility: HOSPITAL | Age: 79
End: 2019-03-19

## 2019-03-19 LAB
ANION GAP SERPL CALCULATED.3IONS-SCNC: 8 MMOL/L (ref 3–11)
BUN BLD-MCNC: 26 MG/DL (ref 9–23)
BUN/CREAT SERPL: 21 (ref 7–25)
CALCIUM SPEC-SCNC: 9.7 MG/DL (ref 8.7–10.4)
CHLORIDE SERPL-SCNC: 109 MMOL/L (ref 99–109)
CO2 SERPL-SCNC: 22 MMOL/L (ref 20–31)
CREAT BLD-MCNC: 1.24 MG/DL (ref 0.6–1.3)
GFR SERPL CREATININE-BSD FRML MDRD: 42 ML/MIN/1.73
GLUCOSE BLD-MCNC: 110 MG/DL (ref 70–100)
GLUCOSE BLDC GLUCOMTR-MCNC: 166 MG/DL (ref 70–130)
GLUCOSE BLDC GLUCOMTR-MCNC: 197 MG/DL (ref 70–130)
GLUCOSE BLDC GLUCOMTR-MCNC: 210 MG/DL (ref 70–130)
GLUCOSE BLDC GLUCOMTR-MCNC: 218 MG/DL (ref 70–130)
POTASSIUM BLD-SCNC: 3.5 MMOL/L (ref 3.5–5.5)
SODIUM BLD-SCNC: 139 MMOL/L (ref 132–146)

## 2019-03-19 PROCEDURE — 99231 SBSQ HOSP IP/OBS SF/LOW 25: CPT | Performed by: PSYCHIATRY & NEUROLOGY

## 2019-03-19 PROCEDURE — 80048 BASIC METABOLIC PNL TOTAL CA: CPT | Performed by: INTERNAL MEDICINE

## 2019-03-19 PROCEDURE — 25010000002 HEPARIN (PORCINE) PER 1000 UNITS: Performed by: INTERNAL MEDICINE

## 2019-03-19 PROCEDURE — 25010000002 HYDRALAZINE PER 20 MG: Performed by: NURSE PRACTITIONER

## 2019-03-19 PROCEDURE — 92507 TX SP LANG VOICE COMM INDIV: CPT

## 2019-03-19 PROCEDURE — 82962 GLUCOSE BLOOD TEST: CPT

## 2019-03-19 PROCEDURE — 99232 SBSQ HOSP IP/OBS MODERATE 35: CPT | Performed by: INTERNAL MEDICINE

## 2019-03-19 PROCEDURE — 97116 GAIT TRAINING THERAPY: CPT

## 2019-03-19 PROCEDURE — 97110 THERAPEUTIC EXERCISES: CPT

## 2019-03-19 PROCEDURE — 70551 MRI BRAIN STEM W/O DYE: CPT

## 2019-03-19 RX ORDER — DULOXETIN HYDROCHLORIDE 60 MG/1
60 CAPSULE, DELAYED RELEASE ORAL DAILY
Status: DISCONTINUED | OUTPATIENT
Start: 2019-03-19 | End: 2019-03-21 | Stop reason: HOSPADM

## 2019-03-19 RX ORDER — CLOPIDOGREL BISULFATE 75 MG/1
300 TABLET ORAL ONCE
Status: COMPLETED | OUTPATIENT
Start: 2019-03-19 | End: 2019-03-19

## 2019-03-19 RX ORDER — HYDRALAZINE HYDROCHLORIDE 20 MG/ML
20 INJECTION INTRAMUSCULAR; INTRAVENOUS EVERY 6 HOURS PRN
Status: DISCONTINUED | OUTPATIENT
Start: 2019-03-19 | End: 2019-03-21 | Stop reason: HOSPADM

## 2019-03-19 RX ORDER — CARVEDILOL 12.5 MG/1
12.5 TABLET ORAL 2 TIMES DAILY WITH MEALS
Status: DISCONTINUED | OUTPATIENT
Start: 2019-03-19 | End: 2019-03-19

## 2019-03-19 RX ORDER — METHADONE HYDROCHLORIDE 10 MG/1
10 TABLET ORAL EVERY 8 HOURS PRN
Status: DISCONTINUED | OUTPATIENT
Start: 2019-03-19 | End: 2019-03-21 | Stop reason: HOSPADM

## 2019-03-19 RX ORDER — PREGABALIN 50 MG/1
50 CAPSULE ORAL DAILY
Status: DISCONTINUED | OUTPATIENT
Start: 2019-03-19 | End: 2019-03-21 | Stop reason: HOSPADM

## 2019-03-19 RX ORDER — CLOPIDOGREL BISULFATE 75 MG/1
75 TABLET ORAL DAILY
Status: DISCONTINUED | OUTPATIENT
Start: 2019-03-20 | End: 2019-03-21 | Stop reason: HOSPADM

## 2019-03-19 RX ADMIN — HEPARIN SODIUM 5000 UNITS: 5000 INJECTION INTRAVENOUS; SUBCUTANEOUS at 08:10

## 2019-03-19 RX ADMIN — FAMOTIDINE 20 MG: 20 TABLET ORAL at 08:10

## 2019-03-19 RX ADMIN — HEPARIN SODIUM 5000 UNITS: 5000 INJECTION INTRAVENOUS; SUBCUTANEOUS at 20:42

## 2019-03-19 RX ADMIN — CARVEDILOL 25 MG: 12.5 TABLET, FILM COATED ORAL at 08:10

## 2019-03-19 RX ADMIN — INSULIN LISPRO 2 UNITS: 100 INJECTION, SOLUTION INTRAVENOUS; SUBCUTANEOUS at 08:11

## 2019-03-19 RX ADMIN — SODIUM CHLORIDE, PRESERVATIVE FREE 3 ML: 5 INJECTION INTRAVENOUS at 08:11

## 2019-03-19 RX ADMIN — NICARDIPINE HYDROCHLORIDE 5 MG/HR: 0.1 INJECTION, SOLUTION INTRAVENOUS at 14:18

## 2019-03-19 RX ADMIN — INSULIN LISPRO 4 UNITS: 100 INJECTION, SOLUTION INTRAVENOUS; SUBCUTANEOUS at 20:45

## 2019-03-19 RX ADMIN — CLOPIDOGREL BISULFATE 300 MG: 75 TABLET ORAL at 11:14

## 2019-03-19 RX ADMIN — HYDRALAZINE HYDROCHLORIDE 20 MG: 20 INJECTION INTRAMUSCULAR; INTRAVENOUS at 18:12

## 2019-03-19 RX ADMIN — DULOXETINE 60 MG: 60 CAPSULE, DELAYED RELEASE ORAL at 11:47

## 2019-03-19 RX ADMIN — METHADONE HYDROCHLORIDE 10 MG: 10 TABLET ORAL at 16:08

## 2019-03-19 RX ADMIN — LISINOPRIL 20 MG: 20 TABLET ORAL at 08:10

## 2019-03-19 RX ADMIN — FAMOTIDINE 20 MG: 20 TABLET ORAL at 20:42

## 2019-03-19 RX ADMIN — INSULIN LISPRO 4 UNITS: 100 INJECTION, SOLUTION INTRAVENOUS; SUBCUTANEOUS at 17:50

## 2019-03-19 RX ADMIN — ASPIRIN 325 MG ORAL TABLET 325 MG: 325 PILL ORAL at 08:10

## 2019-03-19 RX ADMIN — PREGABALIN 50 MG: 50 CAPSULE ORAL at 11:47

## 2019-03-19 RX ADMIN — INSULIN LISPRO 2 UNITS: 100 INJECTION, SOLUTION INTRAVENOUS; SUBCUTANEOUS at 11:14

## 2019-03-19 RX ADMIN — ATORVASTATIN CALCIUM 80 MG: 40 TABLET, FILM COATED ORAL at 20:42

## 2019-03-19 RX ADMIN — CARVEDILOL 25 MG: 12.5 TABLET, FILM COATED ORAL at 20:42

## 2019-03-19 RX ADMIN — METHADONE HYDROCHLORIDE 5 MG: 5 TABLET ORAL at 08:10

## 2019-03-19 NOTE — PROGRESS NOTES
Clinical Nutrition     Patient Name: Yanique Webster  MRN: [id number]  Admission date: 3/14/2019      Multidisciplinary Rounds    Additional information obtained during MDR:  RN reports pt improving; still very weak; Still has high BP may be f pain related as pt  does not have home pain meds scheduled.Pt awaiting rehab placement , ready for TTF.    Current diet: Diet Regular; Cardiac, Consistent Carbohydrate    Pertinent medical data reviewed    Intervention:  Plan of care and goals reviewed    Monitor:  RD to follow per protocol      Giulia Moreno MS,RD,LD  03/19/19 3:12 PM  Time: 20min

## 2019-03-19 NOTE — PROGRESS NOTES
Continued Stay Note  [facility]     Patient Name: Yanique Webster  MRN: [id number]  Today's Date: 3/19/2019    Admit Date: 3/14/2019    Discharge Plan     Row Name 03/19/19 1056       Plan    Plan  Rehab    Patient/Family in Agreement with Plan  yes    Plan Comments  Referral made with [facility] to evaluate for possible transfer.  Called [facility] to assess for admission.        Discharge Codes    No documentation.       Expected Discharge Date and Time     Expected Discharge Date Expected Discharge Time    Mar 20, 2019             Iftikhar Pineda RN

## 2019-03-19 NOTE — PLAN OF CARE
Problem: Fall Risk (Adult)  Goal: Absence of Fall  Outcome: Ongoing (interventions implemented as appropriate)   03/19/19 1509   Fall Risk (Adult)   Absence of Fall achieves outcome       Problem: Skin Injury Risk (Adult)  Goal: Skin Health and Integrity  Outcome: Ongoing (interventions implemented as appropriate)   03/18/19 0519   Skin Injury Risk (Adult)   Skin Health and Integrity making progress toward outcome       Problem: Patient Care Overview  Goal: Plan of Care Review  Outcome: Ongoing (interventions implemented as appropriate)   03/19/19 0846 03/19/19 1200 03/19/19 1509   Coping/Psychosocial   Plan of Care Reviewed With --  patient;family;spouse --    Plan of Care Review   Progress improving --  --    OTHER   Outcome Summary --  --  VSS throughout the shift. Required cardene gtt for a portion of the shift. MD restarted pain medication. Pt is now a transfer patient. Awaiting a floor bed. Continuing to monitor and provide support.        Problem: Stroke (Ischemic) (Adult)  Goal: Signs and Symptoms of Listed Potential Problems Will be Absent, Minimized or Managed (Stroke)  Outcome: Ongoing (interventions implemented as appropriate)   03/19/19 0846   Goal/Outcome Evaluation   Problems Assessed (Stroke (Ischemic)) communication impairment;motor/sensory impairment;cognitive impairment   Problems Assessed (Stroke (Ischemic)) communication impairment;motor/sensory impairment

## 2019-03-19 NOTE — PROGRESS NOTES
Intensive Care Follow-up      LOS: 5 days     Ms. Yanique Webster, 79 y.o. female is followed for: CVA (cerebral vascular accident) (CMS/HCC)     Subjective - Interval History     Awake and alert  Oxygenating well on room air  Not requiring supplemental oxygen  Blood pressure little high    The patient's relevant past medical, surgical and social history were reviewed and updated in Epic as appropriate.     Objective     Infusions:    niCARdipine 5-15 mg/hr Last Rate: Stopped (03/19/19 0900)     Medications:    aspirin 325 mg Oral Daily   Or      aspirin 300 mg Rectal Daily   atorvastatin 80 mg Oral Nightly   carvedilol 25 mg Oral Q12H   [START ON 3/20/2019] clopidogrel 75 mg Oral Daily   DULoxetine 60 mg Oral Daily   famotidine 20 mg Oral BID   heparin (porcine) 5,000 Units Subcutaneous Q12H   insulin lispro 0-9 Units Subcutaneous 4x Daily With Meals & Nightly   lisinopril 20 mg Oral Q24H   methadone 5 mg Oral Daily   pregabalin 50 mg Oral Daily   sodium chloride 3 mL Intravenous Q12H     Intake/Output       03/18/19 0700 - 03/19/19 0659 03/19/19 0700 - 03/20/19 0659    Intake (ml) 1091 --    Output (ml) 400 400    Net (ml) 691 -400    Last Weight  98 kg (216 lb)  --        Vital Sign Min/Max for last 24 hours  Temp  Min: 97.6 °F (36.4 °C)  Max: 98.1 °F (36.7 °C)   BP  Min: 97/87  Max: 186/86   Pulse  Min: 58  Max: 79   Resp  Min: 16  Max: 18   SpO2  Min: 94 %  Max: 99 %   No Data Recorded        Physical Exam:   GENERAL: Awake, no distress   HEENT: No adenopathy or thyromegaly   LUNGS: Clear without wheezes or crackles   HEART: Regular rate and rhythm without murmurs   GI: Soft, nontender   EXTREMITIES: Palpable pulses.  No clubbing   NEURO/PSYCH: Awake and alert.  No change neurologically    Results from last 7 days   Lab Units 03/18/19  0325 03/17/19  0819 03/15/19  0335   WBC 10*3/mm3 8.59 10.47 8.58   HEMOGLOBIN g/dL 13.5 13.8 13.8   PLATELETS 10*3/mm3 169 161 164     Results from last 7 days   Lab Units  03/19/19  0332 03/18/19  0325 03/17/19  0819 03/15/19  0335   SODIUM mmol/L 139 138 138 136   POTASSIUM mmol/L 3.5 3.6 3.9 4.2   CO2 mmol/L 22.0 23.0 20.0 22.0   BUN mg/dL 26* 26* 24* 20   CREATININE mg/dL 1.24 1.38* 1.35* 1.30   MAGNESIUM mg/dL  --  1.9  --  2.0   PHOSPHORUS mg/dL  --  2.3*  --  3.3   GLUCOSE mg/dL 110* 140* 145* 160*     Estimated Creatinine Clearance: 43.3 mL/min (by C-G formula based on SCr of 1.24 mg/dL).    Results from last 7 days   Lab Units 03/15/19  0335   HEMOGLOBIN A1C % 6.70*           No results found for: LACTATE       Images: Initial chest x-ray reveals cardiomegaly    I reviewed the patient's results and images.     Impression      Active Hospital Problems    Diagnosis   • **CVA (cerebral vascular accident) (CMS/[facility])   • CAD (coronary artery disease)   • H/O Takotsubo cardiomyopathy (EF now normal)   • CKD (chronic kidney disease) stage 3, GFR 30-59 ml/min (CMS/[facility])   • Altered mental status   • Hypertension   • Hyperlipidemia   • Diabetes mellitus (CMS/[facility])   • COPD (chronic obstructive pulmonary disease) (CMS/[facility])            Plan        Continue blood pressure management  Continue DAPT  Physical therapy  Restart home Lyrica and methadone  Stable for transfer to the floor  Will most likely need inpatient rehabilitation  30-day event recorder as an outpatient     Plan of care and goals reviewed with Multidisciplinary Team and Antibiotic Stewardship rounds   I discussed the patient's findings and my recommendations with patient, family and nursing staff      .     BRETT Rogers MD  Pulmonary and Critical Care Medicine

## 2019-03-19 NOTE — PROGRESS NOTES
[facility] at [facility]    Inpatient Progress Note      Chief Complaint/Reason for service:    · Cryptogenic stroke         Subjective:       Denies chest pain, dyspnea, palpitations today.      Past medical, surgical, social and family history reviewed in the patient's electronic medical record.    Review of Systems:   Negative for exertional chest pain, dyspnea with exertion, lower extremity edema, palpitations     Problem List  Active Hospital Problems    Diagnosis  POA   • **CVA (cerebral vascular accident) (CMS/[facility]) [I63.9]  Yes   • CAD (coronary artery disease) [I25.10]  Yes   • H/O Takotsubo cardiomyopathy (EF now normal) [I51.81]  Yes   • CKD (chronic kidney disease) stage 3, GFR 30-59 ml/min (CMS/[facility]) [N18.3]  Yes   • Altered mental status [R41.82]  Yes   • Hypertension [I10]  Yes   • Hyperlipidemia [E78.5]  Yes   • Diabetes mellitus (CMS/[facility]) [E11.9]  Yes   • COPD (chronic obstructive pulmonary disease) (CMS/[facility]) [J44.9]  Yes      Resolved Hospital Problems   No resolved problems to display.            Objective:      Current Facility-Administered Medications:   •  aspirin tablet 325 mg, 325 mg, Oral, Daily, 325 mg at 03/18/19 1208 **OR** aspirin suppository 300 mg, 300 mg, Rectal, Daily, Surendra Elkins MD  •  atorvastatin (LIPITOR) tablet 80 mg, 80 mg, Oral, Nightly, Surendra Elkins MD, 80 mg at 03/18/19 2152  •  carvedilol (COREG) tablet 25 mg, 25 mg, Oral, Q12H, Axel Rogers MD, 25 mg at 03/18/19 2152  •  famotidine (PEPCID) tablet 20 mg, 20 mg, Oral, BID, Swati Alba MD, 20 mg at 03/18/19 2152  •  heparin (porcine) 5000 UNIT/ML injection 5,000 Units, 5,000 Units, Subcutaneous, Q12H, Swati Alba MD, 5,000 Units at 03/18/19 2152  •  insulin lispro (humaLOG) injection 0-9 Units, 0-9 Units, Subcutaneous, 4x Daily With Meals & Nightly, Venkatesh, Kati KUMAR, DO, 6 Units at 03/18/19 9670  •  lisinopril (PRINIVIL,ZESTRIL) tablet 20 mg, 20 mg, Oral,  Q24H, Axel Rogers MD  •  methadone (DOLOPHINE) tablet 5 mg, 5 mg, Oral, Daily, Gigi Knight, CHRIS, 5 mg at 03/18/19 1759  •  niCARdipine (CARDENE-IV) 20 mg/200 mL (0.1 mg/mL) in 0.9% NaCl infusion, 5-15 mg/hr, Intravenous, Titrated, Allsysa Dunbar MD, Last Rate: 25 mL/hr at 03/19/19 0600, 2.5 mg/hr at 03/19/19 0600  •  ondansetron (ZOFRAN) injection 4 mg, 4 mg, Intravenous, Q6H PRN, Ian Arcos APRN, 4 mg at 03/14/19 2220  •  sodium chloride 0.9 % flush 10 mL, 10 mL, Intravenous, PRN, Allyssa Dunbar MD, 10 mL at 03/16/19 0844  •  sodium chloride 0.9 % flush 3 mL, 3 mL, Intravenous, Q12H, Surendra Elkins MD, 3 mL at 03/18/19 1058  •  sodium chloride 0.9 % flush 3-10 mL, 3-10 mL, Intravenous, PRN, Surendra Elkins MD    Vital Sign Min/Max for last 24 hours  Temp  Min: 97.6 °F (36.4 °C)  Max: 98.1 °F (36.7 °C)   BP  Min: 97/87  Max: 184/72   Pulse  Min: 58  Max: 81   Resp  Min: 14  Max: 18   SpO2  Min: 94 %  Max: 99 %   No Data Recorded      Intake/Output Summary (Last 24 hours) at 3/19/2019 0741  Last data filed at 3/19/2019 0600  Gross per 24 hour   Intake 1091 ml   Output 400 ml   Net 691 ml           CONSTITUTIONAL: No acute distress, normal affect  RESPIRATORY: Normal effort. Clear to auscultation bilaterally without wheezing or rales  CARDIOVASCULAR: Regular rate and rhythm with normal S1 and S2. Without murmur, gallop or rub.  PERIPHERAL VASCULAR: Normal radial pulses bilaterally. There is no peripheral edema bilaterally.    Results Review:   No results found for: TROPONINI, TROPONINT    BUN   Date Value Ref Range Status   03/19/2019 26 (H) 9 - 23 mg/dL Final     Creatinine   Date Value Ref Range Status   03/19/2019 1.24 0.60 - 1.30 mg/dL Final     Potassium   Date Value Ref Range Status   03/19/2019 3.5 3.5 - 5.5 mmol/L Final     ALT (SGPT)   Date Value Ref Range Status   03/18/2019 17 7 - 40 U/L Final     AST (SGOT)   Date Value Ref Range Status   03/18/2019 26  0 - 33 U/L Final       Lab Results   Component Value Date    CHOL 145 03/15/2019     Lab Results   Component Value Date    TRIG 117 03/15/2019     Lab Results   Component Value Date    HDL 35 (L) 03/15/2019     No results found for: LDLC  Lab Results   Component Value Date    LDL 97 03/15/2019     No components found for: LDLDIRECTC        Tele:  NSR, no AFib    TTE 8/2016  · The left ventricular cavity is mildly dilated.  · The findings are consistent with stress-induced (Takotsubo) cardiomyopathy.  · Moderate aortic valve regurgitation is present.  · Left ventricular function is severely decreased. Estimated EF = 15%.     TTE 10/2016  · Left ventricular function is normal. Estimated EF = 60%.  · Left ventricular wall thickness is consistent with borderline concentric hypertrophy.  · The left ventricular cavity is borderline dilated.  · All left ventricular wall segments contract normally.  · Left ventricular diastolic dysfunction (grade I a) consistent with impaired relaxation.  · Moderate aortic valve regurgitation is present.     TTE 3/2019  · The study is technically difficult for diagnosis.  · Left ventricular systolic function is hyperdynamic (EF > 70).  · Left ventricular diastolic dysfunction (grade I) consistent with impaired relaxation.  · The valves were poorly visualized. There was no obvious hemodynamic abnormalities of the aortic or mitral valve, however    Cath results 8/30/16  · Coronary circulation is right dominant  · Left main: Normal  · LAD: 40% proximal discrete stenosis, small D1 and D2 without significant disease, mid to distal LAD with mild diffuse disease  · LCX: Large circumflex, tandem 30% stenoses in the mid Cx after OM2, moderate mid OM1 disease, med sized OM2 with luminal irregularities, med to large sized OM3 without significant disease  · RCA: 50% mid RCA lesion, iFR 0.95 (not functionally significant), mild distal disease          Assessment/Plan:     ASSESSMENT:  -Cryptogenic  stroke status post TPA, NIHSS 22 at admission, received TPA and had quick neurologic improvement. Imaging without clear stroke. ERMIAS without clear cardioembolic source. No arrhythmia noted thus far.  -CAD  -History of Takotsubo cardiomyopathy with normalized EF  -Essential hypertension  -Diabetes  -Dyslipidemia    PLAN:  -Discussed with Dr Elkins. Will treat for now with DAPT.  -30 day event heart monitor upon discharge   -Otherwise okay for discharge from a cardiac standpoint with monitor placement at discharge. Follow up with Dr Geronimo in 6 weeks    Steve Geronimo MD, MSc, [facility]  Interventional Cardiology  [facility] at [facility]  3/19/2019

## 2019-03-19 NOTE — THERAPY TREATMENT NOTE
Acute Care - Speech Language Pathology Treatment Note  [facility]     Patient Name: Yanique Webster  : 1940  MRN: [id number]  Today's Date: 3/19/2019         Admit Date: 3/14/2019    Visit Dx:      ICD-10-CM ICD-9-CM   1. Altered mental status, unspecified altered mental status type R41.82 780.97   2. Acute ischemic stroke (CMS/HCC) I63.9 434.91   3. Hypertension, unspecified type I10 401.9   4. Impaired mobility and ADLs Z74.09 799.89   5. Impaired functional mobility, balance, gait, and endurance Z74.09 V49.89   6. Cerebrovascular accident (CVA), unspecified mechanism (CMS/HCC) I63.9 434.91   7. Other specified cardiac arrhythmias  I49.8 427.89     Patient Active Problem List   Diagnosis   • Altered mental status   • Hypertension   • Hyperlipidemia   • Diabetes mellitus (CMS/HCC)   • COPD (chronic obstructive pulmonary disease) (CMS/HCC)   • CVA (cerebral vascular accident) (CMS/[facility])   • CAD (coronary artery disease)   • H/O Takotsubo cardiomyopathy (EF now normal)   • CKD (chronic kidney disease) stage 3, GFR 30-59 ml/min (CMS/[facility])        Therapy Treatment  Rehabilitation Treatment Summary     Row Name 19 1500 19 0846          Treatment Time/Intention    Discipline  speech language pathologist  -MP  physical therapist  -LS     Document Type  therapy note (daily note)  -MP  therapy note (daily note) treatment time 3943-1593  -LS2     Subjective Information  no complaints  -MP  complains of;pain  -LS     Mode of Treatment  individual therapy;speech-language pathology  -MP  physical therapy  -LS     Patient/Family Observations  Family initially present  -MP  --     Therapy Frequency ([place])  evaluation only  -MP  --     Therapy Frequency (SLP SLC)  5 days per week  -MP  --     Patient Effort  good  -MP  excellent  -LS     Existing Precautions/Restrictions  --  fall;other (see comments) expressive aphaisa  -LS     Recorded by [MP] Marty Olivera, MS, CFY-SLP [id number] [LS] Lizbeth Mcmahan,  PT 03/19/19 1007  [LS2] Lizbeth Mcmahan, PT 03/19/19 1008     Row Name 03/19/19 0846             Vital Signs    Pre Systolic BP Rehab  147  -LS      Pre Treatment Diastolic BP  70  -LS      Pretreatment Heart Rate (beats/min)  67  -LS      Pre SpO2 (%)  98  -LS      O2 Delivery Pre Treatment  room air  -LS      O2 Delivery Post Treatment  room air  -LS      Pre Patient Position  Sitting  -LS      Intra Patient Position  Standing  -LS      Post Patient Position  Supine  -LS      Recorded by [LS] Lizbeth Mcmahan, PT 03/19/19 1007      Row Name 03/19/19 0846             Cognitive Assessment/Intervention- PT/OT    Affect/Mental Status (Cognitive)  WFL  -LS      Orientation Status (Cognition)  oriented x 4  -LS      Follows Commands (Cognition)  follows one step commands;over 90% accuracy;verbal cues/prompting required  -LS      Cognitive Function (Cognitive)  safety deficit  -LS      Safety Deficit (Cognitive)  mild deficit;insight into deficits/self awareness;safety precautions awareness  -LS      Personal Safety Interventions  fall prevention program maintained;gait belt;nonskid shoes/slippers when out of bed  -LS      Cognitive Assessment/Intervention Comment  expressive aphasia persists  -LS      Recorded by [LS] Lizbeth Mcmahan, PT 03/19/19 1007      Row Name 03/19/19 0846             Bed Mobility Assessment/Treatment    Sit-Supine [place] (Bed Mobility)  contact guard;verbal cues  -LS      Recorded by [LS] Lizbeth Mcmahan, PT 03/19/19 1007      Row Name 03/19/19 0846             Transfer Assessment/Treatment    Transfer Assessment/Treatment  stand-sit transfer;sit-stand transfer;toilet transfer  -LS      Comment (Transfers)  VC's for hand placement.   -LS      Recorded by [LS] Lizbeth Mcmahan, PT 03/19/19 1007      Row Name 03/19/19 0846             Sit-Stand Transfer    Sit-Stand [place] (Transfers)  contact guard;verbal cues  -LS      Assistive Device (Sit-Stand Transfers)  walker, front-wheeled  -LS       Recorded by [LS] Lizbeth Mcmahan, PT 03/19/19 1007      Row Name 03/19/19 0846             Stand-Sit Transfer    Stand-Sit [place] (Transfers)  contact guard;verbal cues  -LS      Assistive Device (Stand-Sit Transfers)  walker, front-wheeled  -LS      Recorded by [LS] Lizbeth Mcmahan, PT 03/19/19 1007      Row Name 03/19/19 0846             Toilet Transfer    Type (Toilet Transfer)  sit-stand;stand-sit  -LS      [place] Level (Toilet Transfer)  contact guard;verbal cues  -LS      Assistive Device (Toilet Transfer)  walker, front-wheeled;commode, bedside without drop arms  -LS      Recorded by [LS] Lizbeth Mcmahan, PT 03/19/19 1007      Row Name 03/19/19 0846             Gait/Stairs Assessment/Training    17409 - Gait Training Minutes   15  -LS      Gait/Stairs Assessment/Training  gait/ambulation assistive device  -LS      [place] Level (Gait)  minimum assist (75% patient effort);verbal cues  -LS      Assistive Device (Gait)  walker, front-wheeled  -LS      Distance in Feet (Gait)  140  -LS      Deviations/Abnormal Patterns (Gait)  stride length decreased;myke decreased  -LS      Bilateral Gait Deviations  forward flexed posture;heel strike decreased  -LS      Comment (Gait/Stairs)  Noted veering to R; primarily CGA but required occasional A at RWx for maintaining straight path. VC's for posture and increasing step length. Distance limited by fatigue.   -LS      Recorded by [LS] Lizbeth Mcmahan, PT 03/19/19 1007      Row Name 03/19/19 0846             Motor Skills Assessment/Interventions    Additional Documentation  Balance (Group);Therapeutic Exercise (Group)  -LS      Recorded by [LS] Lizbeth Mcmahan, PT 03/19/19 1007      Row Name 03/19/19 0846             Therapeutic Exercise    75777 - PT Therapeutic Exercise Minutes  5  -LS      93821 - PT Therapeutic Activity Minutes  3  -LS      Recorded by [LS] Lizbeth Mcmahan, PT 03/19/19 1007      Row Name 03/19/19 0846             Upper Extremity Supine  Therapeutic Exercise    Performed, Supine Upper Extremity (Therapeutic Exercise)  shoulder flexion/extension;shoulder abduction/adduction;elbow flexion/extension digit flex/extend; thumb to finger  -LS      Exercise Type, Supine Upper Extremity (Therapeutic Exercise)  AROM (active range of motion)  -LS      Sets/Reps Detail, Supine Upper Extremity (Therapeutic Exercise)  1/10  -LS      Recorded by [LS] Lizbeth Mcmahan, PT 03/19/19 1007      Row Name 03/19/19 0846             Lower Extremity Seated Therapeutic Exercise    Performed, Seated Lower Extremity (Therapeutic Exercise)  hip flexion/extension;LAQ (long arc quad), knee extension;ankle dorsiflexion/plantarflexion  -LS      Exercise Type, Seated Lower Extremity (Therapeutic Exercise)  AROM (active range of motion)  -LS      Sets/Reps Detail, Seated Lower Extremity (Therapeutic Exercise)  1/10  -LS      Recorded by [LS] Lizbeth Mcmahan, PT 03/19/19 1007      Row Name 03/19/19 0846             Static Sitting Balance    Level of [place] (Unsupported Sitting, Static Balance)  supervision  -LS      Sitting Position (Unsupported Sitting, Static Balance)  sitting in chair  -LS      Recorded by [LS] Lizbeth Mcmahan, PT 03/19/19 1007      Row Name 03/19/19 0846             Static Standing Balance    Level of [place] (Supported Standing, Static Balance)  contact guard assist  -LS      Assistive Device Utilized (Supported Standing, Static Balance)  walker, rolling  -LS      Recorded by [LS] Lizbeth Mcmahan, PT 03/19/19 1007      Row Name 03/19/19 0846             Positioning and Restraints    Pre-Treatment Position  sitting in chair/recliner  -LS      Post Treatment Position  bed  -LS      In Bed  notified nsg;fowlers;call light within reach;encouraged to call for assist;exit alarm on;RUE elevated;LUE elevated  -LS      Recorded by [LS] Lizbeth Mcmahan, PT 03/19/19 1007      Row Name 03/19/19 1500             Pain Assessment    Additional Documentation  Pain Scale:  "FACES Pre/Post-Treatment (Group)  -MP      Recorded by [MP] Marty Olivera, MS, CFY-SLP 03/19/19 1636      Row Name 03/19/19 0846             Pain Scale: Numbers Pre/Post-Treatment    Pain Scale: Numbers, Pretreatment  10/10  -LS      Pain Scale: Numbers, Post-Treatment  10/10  -LS      Pain Location - Side  Bilateral  -LS      Pain Location  foot \"neuropathy pain\"; also in hands  -LS      Pre/Post Treatment Pain Comment  tolerated  -LS      Pain Intervention(s)  Repositioned;Ambulation/increased activity  -LS      Recorded by [LS] Lizbeth Mcmahan, PT 03/19/19 1007      Row Name 03/19/19 1500             Pain Scale: FACES Pre/Post-Treatment    Pain: FACES Scale, Pretreatment  0-->no hurt  -MP      Pain: FACES Scale, Post-Treatment  0-->no hurt  -MP      Recorded by [MP] Marty Olivera, MS, CFY-SLP 03/19/19 1636      Row Name 03/19/19 0846             Plan of Care Review    Plan of Care Reviewed With  patient;spouse  -LS      Recorded by [LS] Lizbeth Mcmahan, PT 03/19/19 1007      Row Name 03/19/19 0846             Outcome Summary/Treatment Plan (PT)    Daily Summary of Progress (PT)  progress toward functional goals is good  -LS      Recorded by [LS] Lizbeth Mcmahan, PT 03/19/19 1007      Row Name 03/19/19 1500             Outcome Summary/Treatment Plan (SLP)    Daily Summary of Progress (SLP)  progress toward functional goals is good  -MP      Plan for Continued Treatment (SLP)  Pt much improved from initial SLP evaluation. Good participation in tx. Goals updated accordingly. Will continue to follow for tx. Pt will benefit from continued SLP services at next level of care.  -MP      Anticipated Dischage Disposition  inpatient rehabilitation facility;anticipate therapy at next level of care  -MP      Recorded by [MP] Marty Olivera, MS, CFY-SLP 03/19/19 1636        User Key  (r) = Recorded By, (t) = Taken By, (c) = Cosigned By    Initials Name Effective Dates Discipline    Lizbeth Doherty, PT 06/19/15 -  PT    " Marty Lewis, MS, CFY-SLP 08/15/18 -  SLP          EDUCATION  The patient has been educated in the following areas:   Cognitive Impairment Communication Impairment.    SLP Recommendation and Plan  Daily Summary of Progress (SLP): progress toward functional goals is good     Plan for Continued Treatment (SLP): Pt much improved from initial SLP evaluation. Good participation in tx. Goals updated accordingly. Will continue to follow for tx. Pt will benefit from continued SLP services at next level of care.  Anticipated Dischage Disposition: inpatient rehabilitation facility, anticipate therapy at next level of care             SLP GOALS     Row Name 03/19/19 1500             Comprehend Questions Goal 1 (SLP)    Improve Ability to Comprehend Questions Goal 1 (SLP)  simple yes/no questions;simple wh questions;80%;with moderate cues (50-74%)  -MP      Time Frame (Comprehend Questions Goal 1, SLP)  short term goal (STG);by discharge  -MP      Progress (Ability to Comprehend Questions Goal 1, SLP)  100%;independently (over 90% accuracy)  -MP      Progress/Outcomes (Comprehend Questions Goal 1, SLP)  goal met;goal no longer appropriate  -MP         Follow Directions Goal 2 (SLP)    Improve Ability to Follow Directions Goal 1 (SLP)  1 step direction with objects;80%;with moderate cues (50-74%)  -MP      Time Frame (Follow Directions Goal 1, SLP)  short term goal (STG);by discharge  -MP      Progress (Ability to Follow Directions Goal 1, SLP)  100%;independently (over 90% accuracy)  -MP      Progress/Outcomes (Follow Directions Goal 1, SLP)  goal met;goal no longer appropriate  -MP         Word Retrieval Skills Goal 1 (SLP)    Improve Word Retrieval Skills By Goal 1 (SLP)  completing functional word finding tasks;supplying an antonym;supplying a synonym;completing a divergent task;completing a convergent task;80%;with minimal cues (75-90%)  -MP      Time Frame (Word Retrieval Goal 1, SLP)  short term goal (STG);by  discharge  -MP         Ability to Construct Phrase and Sentence Level Response Goal 1 (SLP)    Improve Ability to Construct Phrase and Sentence Level Responses By Goal 1 (SLP)  answering question with phrase;constructing a sentence with a key word;80%;with minimal cues (75-90%)  -MP      Time Frame (Phrase and Sentence Level Response Goal 1, SLP)  short term goal (STG);by discharge  -MP      Progress (Construct Phrase and Sentence Level Response Goal 1, SLP)  independently (over 90% accuracy)  -MP      Progress/Outcomes (Phrase and Sentence Level Response Goal 1, SLP)  goal revised this date  -MP         Connected Speech to Express Thoughts Goal 1 (SLP)    Improve Narrative Discourse to Express Thoughts By Goal 1 (SLP)  giving basic definition of a word;giving multiple definitions of a word;describing a picture;conversational task on a given topic;80%;with minimal cues (75-90%)  -MP      Time Frame (Connected Speech Goal 1, SLP)  short term goal (STG);by discharge  -MP         Motor Planning Goal 1 (SLP)    Improve Motor Planning to Reduce Apraxia by Goal 1 (SLP)  imitating mouth postures;imitating vowels;following isolated oral commands;80%;with moderate cues (50-74%)  -MP      Time Frame (Motor Planning Goal 1, SLP)  short term goal (STG);by discharge  -MP      Progress (Motor Planning Goal 1, SLP)  100%;independently (over 90% accuracy)  -MP      Progress/Outcomes (Motor Planning Goal 1, SLP)  goal met;goal no longer appropriate  -MP        User Key  (r) = Recorded By, (t) = Taken By, (c) = Cosigned By    Initials Name Provider Type    Marty Lewis MS, JOMAR-SLP Speech and Language Pathologist              Time Calculation:     Time Calculation- SLP     Row Name 03/19/19 1640             Time Calculation- SLP    SLP Start Time  1500  -MP      SLP Received On  03/19/19  -MP        User Key  (r) = Recorded By, (t) = Taken By, (c) = Cosigned By    Initials Name Provider Type    Marty Lewis MS, JOMAR-SLP  Speech and Language Pathologist          Therapy Charges for Today     Code Description Service Date Service Provider Modifiers Qty    [id number]  ST TREATMENT SPEECH 4 3/19/2019 Marty Olivera MS, CFY-SLP GN 1                     Marty Olivera MS, CFCHAKA-SLP  3/19/2019

## 2019-03-19 NOTE — PLAN OF CARE
Problem: Patient Care Overview  Goal: Plan of Care Review  Outcome: Ongoing (interventions implemented as appropriate)   03/19/19 0846   Coping/Psychosocial   Plan of Care Reviewed With patient;spouse   Plan of Care Review   Progress improving   OTHER   Outcome Summary Pt demonstrated increased indep with bed mobility and gait; progressed forward ambulation distance to 140 total ft with min A. Cont to be limited by expressive aphasia and mild balance deficits with mobility tasks. Issued HEP for seated ther ex. Will cont PT POC.        Problem: Stroke (Ischemic) (Adult)  Goal: Signs and Symptoms of Listed Potential Problems Will be Absent, Minimized or Managed (Stroke)  Outcome: Ongoing (interventions implemented as appropriate)   03/19/19 0846   Goal/Outcome Evaluation   Problems Assessed (Stroke (Ischemic)) communication impairment;motor/sensory impairment;cognitive impairment   Problems Assessed (Stroke (Ischemic)) communication impairment;motor/sensory impairment

## 2019-03-19 NOTE — THERAPY TREATMENT NOTE
Acute Care - Physical Therapy Treatment Note  [facility]     Patient Name: Yanique Webster  : 1940  MRN: [id number]  Today's Date: 3/19/2019  Onset of Illness/Injury or Date of Surgery: 19  Date of Referral to PT: 19  Referring Physician: MD Severo    Admit Date: 3/14/2019    Visit Dx:    ICD-10-CM ICD-9-CM   1. Altered mental status, unspecified altered mental status type R41.82 780.97   2. Acute ischemic stroke (CMS/HCC) I63.9 434.91   3. Hypertension, unspecified type I10 401.9   4. Impaired mobility and ADLs Z74.09 799.89   5. Impaired functional mobility, balance, gait, and endurance Z74.09 V49.89   6. Cerebrovascular accident (CVA), unspecified mechanism (CMS/[facility]) I63.9 434.91   7. Other specified cardiac arrhythmias  I49.8 427.89     Patient Active Problem List   Diagnosis   • Altered mental status   • Hypertension   • Hyperlipidemia   • Diabetes mellitus (CMS/[facility])   • COPD (chronic obstructive pulmonary disease) (CMS/[facility])   • CVA (cerebral vascular accident) (CMS/[facility])   • CAD (coronary artery disease)   • H/O Takotsubo cardiomyopathy (EF now normal)   • CKD (chronic kidney disease) stage 3, GFR 30-59 ml/min (CMS/[facility])       Therapy Treatment    Rehabilitation Treatment Summary     Row Name 19 0846             Treatment Time/Intention    Discipline  physical therapist  -LS      Document Type  therapy note (daily note) treatment time 1221-5313  -LS2      Subjective Information  complains of;pain  -LS      Mode of Treatment  physical therapy  -LS      Patient Effort  excellent  -LS      Existing Precautions/Restrictions  fall;other (see comments) expressive aphaisa  -LS      Recorded by [LS] Lizbeth Mcmahan, PT 19 1007  [LS2] Lizbeth Mcmahan, PT 19 1008      Row Name 19 0846             Vital Signs    Pre Systolic BP Rehab  147  -LS      Pre Treatment Diastolic BP  70  -LS      Pretreatment Heart Rate (beats/min)  67  -LS      Pre SpO2 (%)  98  -LS      O2 Delivery  Pre Treatment  room air  -LS      O2 Delivery Post Treatment  room air  -LS      Pre Patient Position  Sitting  -LS      Intra Patient Position  Standing  -LS      Post Patient Position  Supine  -LS      Recorded by [LS] Lizbeth Mcmahan, PT 03/19/19 1007      Row Name 03/19/19 0846             Cognitive Assessment/Intervention- PT/OT    Affect/Mental Status (Cognitive)  WFL  -LS      Orientation Status (Cognition)  oriented x 4  -LS      Follows Commands (Cognition)  follows one step commands;over 90% accuracy;verbal cues/prompting required  -LS      Cognitive Function (Cognitive)  safety deficit  -LS      Safety Deficit (Cognitive)  mild deficit;insight into deficits/self awareness;safety precautions awareness  -LS      Personal Safety Interventions  fall prevention program maintained;gait belt;nonskid shoes/slippers when out of bed  -LS      Cognitive Assessment/Intervention Comment  expressive aphasia persists  -LS      Recorded by [LS] Lizbeth Mcmahan, PT 03/19/19 1007      Row Name 03/19/19 0846             Bed Mobility Assessment/Treatment    Sit-Supine [place] (Bed Mobility)  contact guard;verbal cues  -LS      Recorded by [LS] Lizbeth Mcmahan, PT 03/19/19 1007      Row Name 03/19/19 0846             Transfer Assessment/Treatment    Transfer Assessment/Treatment  stand-sit transfer;sit-stand transfer;toilet transfer  -LS      Comment (Transfers)  VC's for hand placement.   -LS      Recorded by [LS] Lizbeth Mcmahan, PT 03/19/19 1007      Row Name 03/19/19 0846             Sit-Stand Transfer    Sit-Stand [place] (Transfers)  contact guard;verbal cues  -LS      Assistive Device (Sit-Stand Transfers)  walker, front-wheeled  -LS      Recorded by [LS] Lizbeth Mcmahan, PT 03/19/19 1007      Row Name 03/19/19 0846             Stand-Sit Transfer    Stand-Sit [place] (Transfers)  contact guard;verbal cues  -LS      Assistive Device (Stand-Sit Transfers)  walker, front-wheeled  -LS      Recorded by [LS] Marj  Lizbeth DAVIES, PT 03/19/19 1007      Row Name 03/19/19 0846             Toilet Transfer    Type (Toilet Transfer)  sit-stand;stand-sit  -LS      [place] Level (Toilet Transfer)  contact guard;verbal cues  -LS      Assistive Device (Toilet Transfer)  walker, front-wheeled;commode, bedside without drop arms  -LS      Recorded by [LS] Lizbeth Mcmahan, PT 03/19/19 1007      Row Name 03/19/19 0846             Gait/Stairs Assessment/Training    18838 - Gait Training Minutes   15  -LS      Gait/Stairs Assessment/Training  gait/ambulation assistive device  -LS      [place] Level (Gait)  minimum assist (75% patient effort);verbal cues  -LS      Assistive Device (Gait)  walker, front-wheeled  -LS      Distance in Feet (Gait)  140  -LS      Deviations/Abnormal Patterns (Gait)  stride length decreased;myke decreased  -LS      Bilateral Gait Deviations  forward flexed posture;heel strike decreased  -LS      Comment (Gait/Stairs)  Noted veering to R; primarily CGA but required occasional A at RWx for maintaining straight path. VC's for posture and increasing step length. Distance limited by fatigue.   -LS      Recorded by [LS] Lizbeth Mcmahan, PT 03/19/19 1007      Row Name 03/19/19 0846             Motor Skills Assessment/Interventions    Additional Documentation  Balance (Group);Therapeutic Exercise (Group)  -LS      Recorded by [LS] Lizbeth Mcmahan, PT 03/19/19 1007      Row Name 03/19/19 0846             Therapeutic Exercise    53511 - PT Therapeutic Exercise Minutes  5  -LS      17948 - PT Therapeutic Activity Minutes  3  -LS      Recorded by [LS] Lizbeth Mcmahan, PT 03/19/19 1007      Row Name 03/19/19 0846             Upper Extremity Supine Therapeutic Exercise    Performed, Supine Upper Extremity (Therapeutic Exercise)  shoulder flexion/extension;shoulder abduction/adduction;elbow flexion/extension digit flex/extend; thumb to finger  -LS      Exercise Type, Supine Upper Extremity (Therapeutic Exercise)  AROM (active  "range of motion)  -LS      Sets/Reps Detail, Supine Upper Extremity (Therapeutic Exercise)  1/10  -LS      Recorded by [LS] Lizbeth Mcmahan, PT 03/19/19 1007      Row Name 03/19/19 0846             Lower Extremity Seated Therapeutic Exercise    Performed, Seated Lower Extremity (Therapeutic Exercise)  hip flexion/extension;LAQ (long arc quad), knee extension;ankle dorsiflexion/plantarflexion  -LS      Exercise Type, Seated Lower Extremity (Therapeutic Exercise)  AROM (active range of motion)  -LS      Sets/Reps Detail, Seated Lower Extremity (Therapeutic Exercise)  1/10  -LS      Recorded by [LS] Lizbeth Mcmahan, PT 03/19/19 1007      Row Name 03/19/19 0846             Static Sitting Balance    Level of [place] (Unsupported Sitting, Static Balance)  supervision  -LS      Sitting Position (Unsupported Sitting, Static Balance)  sitting in chair  -LS      Recorded by [LS] Lizbeth Mcmahan, PT 03/19/19 1007      Row Name 03/19/19 0846             Static Standing Balance    Level of [place] (Supported Standing, Static Balance)  contact guard assist  -LS      Assistive Device Utilized (Supported Standing, Static Balance)  walker, rolling  -LS      Recorded by [LS] Lizbeth Mcmahan, PT 03/19/19 1007      Row Name 03/19/19 0846             Positioning and Restraints    Pre-Treatment Position  sitting in chair/recliner  -LS      Post Treatment Position  bed  -LS      In Bed  notified nsg;fowlers;call light within reach;encouraged to call for assist;exit alarm on;RUE elevated;LUE elevated  -LS      Recorded by [LS] Lizbeth Mcmahan, PT 03/19/19 1007      Row Name 03/19/19 0846             Pain Scale: Numbers Pre/Post-Treatment    Pain Scale: Numbers, Pretreatment  10/10  -LS      Pain Scale: Numbers, Post-Treatment  10/10  -LS      Pain Location - Side  Bilateral  -LS      Pain Location  foot \"neuropathy pain\"; also in hands  -LS      Pre/Post Treatment Pain Comment  tolerated  -LS      Pain Intervention(s)  " Repositioned;Ambulation/increased activity  -LS      Recorded by [LS] Lizbeth Mcmahan, PT 03/19/19 1007      Row Name 03/19/19 0846             Plan of Care Review    Plan of Care Reviewed With  patient;spouse  -LS      Recorded by [LS] Lizbeth Mcmahan, PT 03/19/19 1007      Row Name 03/19/19 0846             Outcome Summary/Treatment Plan (PT)    Daily Summary of Progress (PT)  progress toward functional goals is good  -LS      Recorded by [LS] Lizbeth Mcmahan, PT 03/19/19 1007        User Key  (r) = Recorded By, (t) = Taken By, (c) = Cosigned By    Initials Name Effective Dates Discipline    LS Lizbeth Mcmahan, PT 06/19/15 -  PT                   Physical Therapy Education     Title: PT OT SLP Therapies (In Progress)     Topic: Physical Therapy (Done)     Point: Mobility training (Done)     Learning Progress Summary           Patient Acceptance, E,D,H, VU,NR by LS at 3/19/2019  9:10 AM    Acceptance, E,D, VU,NR by LS at 3/18/2019  2:18 PM    Acceptance, E,D, NR by LS at 3/15/2019  8:46 AM   Significant Other Acceptance, E,D,H, VU,NR by LS at 3/19/2019  9:10 AM                   Point: Home exercise program (Done)     Learning Progress Summary           Patient Acceptance, E,D,H, VU,NR by LS at 3/19/2019  9:10 AM    Acceptance, E,D, VU,NR by LS at 3/18/2019  2:18 PM   Significant Other Acceptance, E,D,H, VU,NR by LS at 3/19/2019  9:10 AM                   Point: Body mechanics (Done)     Learning Progress Summary           Patient Acceptance, E,D,H, VU,NR by LS at 3/19/2019  9:10 AM    Acceptance, E,D, VU,NR by LS at 3/18/2019  2:18 PM    Acceptance, E,D, NR by LS at 3/15/2019  8:46 AM   Significant Other Acceptance, E,D,H, VU,NR by LS at 3/19/2019  9:10 AM                   Point: Precautions (Done)     Learning Progress Summary           Patient Acceptance, E,D,H, VU,NR by LS at 3/19/2019  9:10 AM    Acceptance, E,D, VU,NR by LS at 3/18/2019  2:18 PM    Acceptance, TOMEKA ZAPATA, NR by KAMRYN at 3/15/2019  8:46 AM   Significant  Other Acceptance, E,D,H, VU,NR by  at 3/19/2019  9:10 AM                               User Key     Initials Effective Dates Name Provider Type Discipline     06/19/15 -  Lizbeth Mcmahan, PT Physical Therapist PT                PT Recommendation and Plan  Anticipated Discharge Disposition (PT): inpatient rehabilitation facility  Planned Therapy Interventions (PT Eval): balance training, bed mobility training, gait training, home exercise program, strengthening, transfer training, patient/family education  Therapy Frequency (PT Clinical Impression): daily  Outcome Summary/Treatment Plan (PT)  Daily Summary of Progress (PT): progress toward functional goals is good  Anticipated Discharge Disposition (PT): inpatient rehabilitation facility  Plan of Care Reviewed With: patient, spouse  Progress: improving  Outcome Summary: Pt demonstrated increased indep with bed mobility and gait; progressed forward ambulation distance to 140 total ft with min A. Cont to be limited by expressive aphasia and mild balance deficits with mobility tasks. Issued HEP for seated ther ex. Will cont PT POC.   Outcome Measures     Row Name 03/19/19 0846 03/18/19 1418 03/18/19 1406       How much help from another person do you currently need...    Turning from your back to your side while in flat bed without using bedrails?  3  -LS  3  -LS  --    Moving from lying on back to sitting on the side of a flat bed without bedrails?  3  -LS  3  -LS  --    Moving to and from a bed to a chair (including a wheelchair)?  3  -LS  3  -LS  --    Standing up from a chair using your arms (e.g., wheelchair, bedside chair)?  3  -LS  3  -LS  --    Climbing 3-5 steps with a railing?  2  -LS  2  -LS  --    To walk in hospital room?  3  -LS  3  -LS  --    AM-PAC 6 Clicks Score  17  -LS  17  -LS  --       How much help from another is currently needed...    Putting on and taking off regular lower body clothing?  --  --  1  -TA    Bathing (including washing, rinsing,  and drying)  --  --  2  -TA    Toileting (which includes using toilet bed pan or urinal)  --  --  1  -TA    Putting on and taking off regular upper body clothing  --  --  2  -TA    Taking care of personal grooming (such as brushing teeth)  --  --  2  -TA    Eating meals  --  --  2  -TA    Score  --  --  10  -TA       Modified Robert Scale    Pre-Stroke Modified Robert Scale  --  --  0 - No Symptoms at all.  -TA    Modified [place] Scale  --  --  3 - Moderate disability.  Requiring some help, but able to walk without assistance.  -TA       Functional Assessment    Outcome Measure Options  AM-PAC 6 Clicks Basic Mobility (PT)  -LS  AM-PAC 6 Clicks Basic Mobility (PT)  -LS  AM-PAC 6 Clicks Daily Activity (OT);Modified Robert  -TA      User Key  (r) = Recorded By, (t) = Taken By, (c) = Cosigned By    Initials Name Provider Type     Lizbeth Mcmahan, PT Physical Therapist    TA Cristobal Alicea, OT Occupational Therapist         Time Calculation:   PT Charges     Row Name 03/19/19 0846             Time Calculation    Start Time  0846  -      PT Received On  03/19/19  -         Time Calculation- PT    Total Timed Code Minutes- PT  23 minute(s)  -         Timed Charges    64099 - PT Therapeutic Exercise Minutes  5  -      21840 - Gait Training Minutes   15  -LS      66675 - PT Therapeutic Activity Minutes  3  -LS        User Key  (r) = Recorded By, (t) = Taken By, (c) = Cosigned By    Initials Name Provider Type     Lizbeth Mcmahan, PT Physical Therapist        Therapy Charges for Today     Code Description Service Date Service Provider Modifiers Qty    36425365625 HC GAIT TRAINING EA 15 MIN 3/18/2019 Lizbeth Mcmahan, PT GP 1    55983459351 HC PT THERAPEUTIC ACT EA 15 MIN 3/18/2019 Lizbeth Mcmahan, PT GP 1    44494968159 HC PT THER SUPP EA 15 MIN 3/18/2019 Lizbeth Mcmahan, PT GP 2    33263020774 HC PT THER PROC EA 15 MIN 3/19/2019 Lizbeth Mcmahan, PT GP 1    84739637633 HC GAIT TRAINING EA 15 MIN 3/19/2019 Marj  Lizbeth DAVIES, PT GP 1          PT G-Codes  Outcome Measure Options: AM-PAC 6 Clicks Basic Mobility (PT)  AM-PAC 6 Clicks Score: 17  Score: 10  Modified [place] Scale: 3 - Moderate disability.  Requiring some help, but able to walk without assistance.    Lizbeth Mcmahan, PT  3/19/2019

## 2019-03-19 NOTE — PLAN OF CARE
Problem: Patient Care Overview  Goal: Plan of Care Review  Outcome: Ongoing (interventions implemented as appropriate)   03/19/19 5009   Coping/Psychosocial   Plan of Care Reviewed With patient;family   SLP treatment completed. Will continue to address cognitive-communication. Please see note for further details and recommendations.

## 2019-03-19 NOTE — PROGRESS NOTES
Neurology       Patient Care Team:  MAGDIEL Mcneill MD as PCP - General (Family Medicine)    Chief complaint: Aphasia    History: Recheck continues to improve but is still not back to baseline according to the family.    She is able to ambulate but is quite weak.      Past Medical History:   Diagnosis Date   • COPD (chronic obstructive pulmonary disease) (CMS/[facility])    • Diabetes mellitus (CMS/[facility])    • GERD (gastroesophageal reflux disease)    • Hyperlipidemia    • Hypertension        Vital Signs   Vitals:    03/19/19 0845 03/19/19 0900 03/19/19 1100 03/19/19 1200   BP: 153/81 147/70 165/80 (!) 183/102   BP Location:  Left arm Left arm Left arm   Patient Position:  Lying Lying Lying   Pulse: 72 70 69 75   Resp:  18 18 18   Temp:    98.1 °F (36.7 °C)   TempSrc:    Oral   SpO2: 96% 96% 97% 98%   Weight:       Height:           Physical Exam:   General: Awake and alert              Neuro: Oriented to person place and time.    Speech is fluent with the rare blocking.    She has no receptive speech problems.    Motor testing shows her to have a normal gait.  She moves all extremities well.    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    No acute changes are noted.      Results from last 7 days   Lab Units 03/18/19  0325   WBC 10*3/mm3 8.59   HEMOGLOBIN g/dL 13.5   HEMATOCRIT % 41.4   PLATELETS 10*3/mm3 169     Results from last 7 days   Lab Units 03/19/19  0332 03/18/19  0325 03/17/19  0819  03/14/19  1514   SODIUM mmol/L 139 138 138   < >  --    POTASSIUM mmol/L 3.5 3.6 3.9   < >  --    CHLORIDE mmol/L 109 107 107   < >  --    CO2 mmol/L 22.0 23.0 20.0   < >  --    BUN mg/dL 26* 26* 24*   < >  --    CREATININE mg/dL 1.24 1.38* 1.35*   < >  --    CALCIUM mg/dL 9.7 9.6 9.6   < >  --    BILIRUBIN mg/dL  --  0.6  --   --   --    ALK PHOS U/L  --  122*  --   --   --    ALT (SGPT) U/L  --  17  --   --  19   AST (SGOT) U/L  --  26  --   --  20   GLUCOSE mg/dL 110* 140* 145*    < >  --     < > = values in this interval not displayed.       Imaging Results (last 24 hours)     Procedure Component Value Units Date/Time    MRI Brain Without Contrast [[id number]] Updated:  03/19/19 6316          Assessment:  Transient neurologic episode with an NIH of 22 reverting to 2 after TPA    Plan:  SPECT with Dr. Steve Geronimo this morning.  He feels that dual antiplatelet and outpatient monitoring for A. fib is appropriate given the negativity of all the studies.    I would agree with that.        Comment:  Doing well.  Okay to telemetry.  Rehab when bed available.         I discussed the patients findings and my recommendations with patient, family, nursing staff and primary care team    Surendra Elkins MD  03/19/19  2:20 PM

## 2019-03-20 LAB
GLUCOSE BLDC GLUCOMTR-MCNC: 152 MG/DL (ref 70–130)
GLUCOSE BLDC GLUCOMTR-MCNC: 169 MG/DL (ref 70–130)
GLUCOSE BLDC GLUCOMTR-MCNC: 173 MG/DL (ref 70–130)
GLUCOSE BLDC GLUCOMTR-MCNC: 298 MG/DL (ref 70–130)

## 2019-03-20 PROCEDURE — 25010000002 HEPARIN (PORCINE) PER 1000 UNITS: Performed by: INTERNAL MEDICINE

## 2019-03-20 PROCEDURE — 63710000001 INSULIN DETEMIR PER 5 UNITS: Performed by: INTERNAL MEDICINE

## 2019-03-20 PROCEDURE — 99232 SBSQ HOSP IP/OBS MODERATE 35: CPT | Performed by: INTERNAL MEDICINE

## 2019-03-20 PROCEDURE — 82962 GLUCOSE BLOOD TEST: CPT

## 2019-03-20 RX ORDER — SENNA AND DOCUSATE SODIUM 50; 8.6 MG/1; MG/1
2 TABLET, FILM COATED ORAL 2 TIMES DAILY
Status: DISCONTINUED | OUTPATIENT
Start: 2019-03-20 | End: 2019-03-21 | Stop reason: HOSPADM

## 2019-03-20 RX ADMIN — FAMOTIDINE 20 MG: 20 TABLET ORAL at 20:18

## 2019-03-20 RX ADMIN — CARVEDILOL 25 MG: 12.5 TABLET, FILM COATED ORAL at 08:44

## 2019-03-20 RX ADMIN — INSULIN LISPRO 6 UNITS: 100 INJECTION, SOLUTION INTRAVENOUS; SUBCUTANEOUS at 08:44

## 2019-03-20 RX ADMIN — INSULIN LISPRO 2 UNITS: 100 INJECTION, SOLUTION INTRAVENOUS; SUBCUTANEOUS at 20:17

## 2019-03-20 RX ADMIN — LISINOPRIL 20 MG: 20 TABLET ORAL at 08:44

## 2019-03-20 RX ADMIN — SENNOSIDES AND DOCUSATE SODIUM 2 TABLET: 8.6; 5 TABLET ORAL at 20:17

## 2019-03-20 RX ADMIN — HEPARIN SODIUM 5000 UNITS: 5000 INJECTION INTRAVENOUS; SUBCUTANEOUS at 08:44

## 2019-03-20 RX ADMIN — METHADONE HYDROCHLORIDE 5 MG: 5 TABLET ORAL at 08:44

## 2019-03-20 RX ADMIN — DULOXETINE 60 MG: 60 CAPSULE, DELAYED RELEASE ORAL at 08:44

## 2019-03-20 RX ADMIN — INSULIN LISPRO 2 UNITS: 100 INJECTION, SOLUTION INTRAVENOUS; SUBCUTANEOUS at 12:07

## 2019-03-20 RX ADMIN — INSULIN LISPRO 2 UNITS: 100 INJECTION, SOLUTION INTRAVENOUS; SUBCUTANEOUS at 17:47

## 2019-03-20 RX ADMIN — ASPIRIN 325 MG ORAL TABLET 325 MG: 325 PILL ORAL at 08:44

## 2019-03-20 RX ADMIN — PREGABALIN 50 MG: 50 CAPSULE ORAL at 08:44

## 2019-03-20 RX ADMIN — CARVEDILOL 25 MG: 12.5 TABLET, FILM COATED ORAL at 20:18

## 2019-03-20 RX ADMIN — HEPARIN SODIUM 5000 UNITS: 5000 INJECTION INTRAVENOUS; SUBCUTANEOUS at 20:17

## 2019-03-20 RX ADMIN — SENNOSIDES AND DOCUSATE SODIUM 2 TABLET: 8.6; 5 TABLET ORAL at 12:06

## 2019-03-20 RX ADMIN — ATORVASTATIN CALCIUM 80 MG: 40 TABLET, FILM COATED ORAL at 20:18

## 2019-03-20 RX ADMIN — CLOPIDOGREL BISULFATE 75 MG: 75 TABLET ORAL at 08:44

## 2019-03-20 RX ADMIN — FAMOTIDINE 20 MG: 20 TABLET ORAL at 08:44

## 2019-03-20 RX ADMIN — INSULIN DETEMIR 15 UNITS: 100 INJECTION, SOLUTION SUBCUTANEOUS at 20:18

## 2019-03-20 NOTE — PLAN OF CARE
Problem: Patient Care Overview  Goal: Plan of Care Review  Outcome: Ongoing (interventions implemented as appropriate)   03/20/19 1511   Coping/Psychosocial   Plan of Care Reviewed With patient   Plan of Care Review   Progress improving   OTHER   Outcome Summary NIH remains 0, VSS. Pt is refusing inpatient rehab at [facility] despite discussions with MIKE, RN, and Dr. Rogers--plan to D/C home tomorrow with home health and assistance of family. Per cardiology, will need 30 day cardiac event monitor upon D/C.

## 2019-03-20 NOTE — PROGRESS NOTES
Continued Stay Note  [facility]     Patient Name: Yanique Webster  MRN: [id number]  Today's Date: 3/20/2019    Admit Date: 3/14/2019    Discharge Plan     Row Name 03/20/19 1345       Plan    Plan  Home c     Patient/Family in Agreement with Plan  yes    Plan Comments  Ms. Webster does not want to go to [facility].  She states she does not need rehab.  She has 7 children that live in [place] that can assist her at home as needed.  Will ask [employer]  to assist her with PT. OT at home.  Goal is home c HH when medically ready.     Final Discharge Disposition Code  06 - home with home health care        Discharge Codes    No documentation.       Expected Discharge Date and Time     Expected Discharge Date Expected Discharge Time    Mar 20, 2019             Iftikhar Pineda RN

## 2019-03-20 NOTE — PROGRESS NOTES
Intensive Care Follow-up      LOS: 6 days     Ms. Yanique Webster, 79 y.o. female is followed for: Glycemic, Electrolyte, Respiratory, and Medical management     Subjective - Interval History     No problems overnight  Has not had a recent bowel movement  Oxygenating well on room air  Eating okay  On no continuous infusions    The patient's relevant past medical, surgical and social history were reviewed and updated in Epic as appropriate.     Objective     Infusions:    niCARdipine 5-15 mg/hr Last Rate: Stopped (03/19/19 1611)     Medications:    aspirin 325 mg Oral Daily   Or      aspirin 300 mg Rectal Daily   atorvastatin 80 mg Oral Nightly   carvedilol 25 mg Oral Q12H   clopidogrel 75 mg Oral Daily   DULoxetine 60 mg Oral Daily   famotidine 20 mg Oral BID   heparin (porcine) 5,000 Units Subcutaneous Q12H   insulin detemir 15 Units Subcutaneous Nightly   insulin lispro 0-9 Units Subcutaneous 4x Daily With Meals & Nightly   lisinopril 20 mg Oral Q24H   methadone 5 mg Oral Daily   pregabalin 50 mg Oral Daily     Intake/Output       03/19/19 0700 - 03/20/19 0659    Intake (ml) --    Output (ml) 525    Net (ml) -525        Vital Sign Min/Max for last 24 hours  Temp  Min: 97.2 °F (36.2 °C)  Max: 98.3 °F (36.8 °C)   BP  Min: 117/69  Max: 183/99   Pulse  Min: 57  Max: 75   Resp  Min: 16  Max: 20   SpO2  Min: 94 %  Max: 99 %   No Data Recorded        Physical Exam:   GENERAL: Awake, no distress   HEENT: No adenopathy or thyromegaly   LUNGS: No wheezes or rhonchi   HEART: Regular rate and rhythm without murmurs   GI: Soft, distended, bowel sounds present   EXTREMITIES: Palpable pulses.  No clubbing or cyanosis   NEURO/PSYCH: Awake and alert.  Follows commands    Results from last 7 days   Lab Units 03/18/19  0325 03/17/19  0819 03/15/19  0335   WBC 10*3/mm3 8.59 10.47 8.58   HEMOGLOBIN g/dL 13.5 13.8 13.8   PLATELETS 10*3/mm3 169 161 164     Results from last 7 days   Lab Units 03/19/19  0332 03/18/19  0325 03/17/19  0819  03/15/19  0335   SODIUM mmol/L 139 138 138 136   POTASSIUM mmol/L 3.5 3.6 3.9 4.2   CO2 mmol/L 22.0 23.0 20.0 22.0   BUN mg/dL 26* 26* 24* 20   CREATININE mg/dL 1.24 1.38* 1.35* 1.30   MAGNESIUM mg/dL  --  1.9  --  2.0   PHOSPHORUS mg/dL  --  2.3*  --  3.3   GLUCOSE mg/dL 110* 140* 145* 160*     Estimated Creatinine Clearance: 43.3 mL/min (by C-G formula based on SCr of 1.24 mg/dL).    Results from last 7 days   Lab Units 03/15/19  0335   HEMOGLOBIN A1C % 6.70*           No results found for: LACTATE       Images: Most recent MRI reveals several old CVA    I reviewed the patient's results and images.     Impression      Active Hospital Problems    Diagnosis   • **CVA (cerebral vascular accident) (CMS/[facility])   • CAD (coronary artery disease)   • H/O Takotsubo cardiomyopathy (EF now normal)   • CKD (chronic kidney disease) stage 3, GFR 30-59 ml/min (CMS/[facility])   • Altered mental status   • Hypertension   • Hyperlipidemia   • Diabetes mellitus (CMS/[facility])   • COPD (chronic obstructive pulmonary disease) (CMS/[facility])         Plan        Continue current medical care  Add stool softener  Add long-acting insulin for better blood sugar control  Inpatient rehabilitation     Plan of care and goals reviewed with Multidisciplinary Team and Antibiotic Stewardship rounds   I discussed the patient's findings and my recommendations with patient, family and nursing staff      .     BRETT Rogers MD  Pulmonary and Critical Care Medicine

## 2019-03-20 NOTE — PROGRESS NOTES
Continued Stay Note  [facility]     Patient Name: Yanique Webster  MRN: [id number]  Today's Date: 3/20/2019    Admit Date: 3/14/2019    Discharge Plan     Row Name 03/20/19 1345       Plan    Plan  Home c     Patient/Family in Agreement with Plan  yes    Plan Comments  Ms. Webster does not want to go to [facility].  She states she does not need rehab.  She has 7 children that live in [place] that can assist her at home as needed.  Will ask [employer]  to assist her with PT. OT at home.  Goal is home c HH when medically ready.     Final Discharge Disposition Code  06 - home with home health care        Discharge Codes    No documentation.       Expected Discharge Date and Time     Expected Discharge Date Expected Discharge Time    Mar 20, 2019             Iftikhar Pineda RN

## 2019-03-20 NOTE — PROGRESS NOTES
Adult Nutrition  Assessment/PES    Patient Name:  Yanique Webster  YOB: 1940  MRN: [id number]  Admit Date:  3/14/2019    Assessment Date:  3/20/2019    Comments:  Intake w/ positive trend- 100% consumption past 4 meals documented.    Reason for Assessment     Row Name 03/20/19 1027          Reason for Assessment    Reason For Assessment  per organizational policy MDR; 15 mins     Diagnosis  neurologic conditions pt adm w/ AMS, R hemispheric stroke s/p tPA, L weakness, ahasia, HTN Hx: HTN, HLP, DM, CVA, GERD, COPD     Identified At Risk by Screening Criteria  no indicators present         Nutrition/Diet History     Row Name 03/20/19 1027          Nutrition/Diet History    Typical Food/Fluid Intake  RN reports pt ready inpt rehab ot transfer to floor. Pt not really able to home w/  he cannot help her. Case manger reports [facility] will take her.                 Nutrition Prescription Ordered     Row Name 03/20/19 1028          Nutrition Prescription PO    Current PO Diet  Soft Texture     Texture  Whole foods     Fluid Consistency  Thin     Common Modifiers  Cardiac;Consistent Carbohydrate                 Problem/Interventions:  Problem 1     Row Name 03/20/19 1030          Nutrition Diagnoses Problem 1    Problem 1  No Nutrition Diagnosis at this Time     Etiology (related to)  Medical Diagnosis     Neurological  CVA     Signs/Symptoms (evidenced by)  PO Intake     Percent (%) intake recorded  100 % insufficient documentation over 5 day LOS review     Over number of meals  4                 Intervention Goal     Row Name 03/20/19 1030          Intervention Goal    General  Meet nutritional needs for age/condition;Maintain nutrition     PO  Maintain intake         Nutrition Intervention     Row Name 03/20/19 1030          Nutrition Intervention    RD/Tech Action  Follow Tx progress;Care plan reviewd           Education/Evaluation     Row Name 03/20/19 1030          Monitor/Evaluation    Monitor  Per  protocol;PO intake           Electronically signed by:  Giulia Moreno MS,RD,LD  03/20/19 10:31 AM

## 2019-03-21 VITALS
WEIGHT: 216 LBS | RESPIRATION RATE: 18 BRPM | BODY MASS INDEX: 35.99 KG/M2 | TEMPERATURE: 98.2 F | HEART RATE: 64 BPM | HEIGHT: 65 IN | OXYGEN SATURATION: 95 % | DIASTOLIC BLOOD PRESSURE: 128 MMHG | SYSTOLIC BLOOD PRESSURE: 176 MMHG

## 2019-03-21 LAB
GLUCOSE BLDC GLUCOMTR-MCNC: 134 MG/DL (ref 70–130)
GLUCOSE BLDC GLUCOMTR-MCNC: 200 MG/DL (ref 70–130)

## 2019-03-21 PROCEDURE — 99231 SBSQ HOSP IP/OBS SF/LOW 25: CPT | Performed by: INTERNAL MEDICINE

## 2019-03-21 PROCEDURE — 82962 GLUCOSE BLOOD TEST: CPT

## 2019-03-21 PROCEDURE — 25010000002 HEPARIN (PORCINE) PER 1000 UNITS: Performed by: INTERNAL MEDICINE

## 2019-03-21 PROCEDURE — 97535 SELF CARE MNGMENT TRAINING: CPT

## 2019-03-21 PROCEDURE — 97116 GAIT TRAINING THERAPY: CPT

## 2019-03-21 PROCEDURE — 99239 HOSP IP/OBS DSCHRG MGMT >30: CPT | Performed by: INTERNAL MEDICINE

## 2019-03-21 PROCEDURE — 99231 SBSQ HOSP IP/OBS SF/LOW 25: CPT | Performed by: PSYCHIATRY & NEUROLOGY

## 2019-03-21 RX ORDER — ATORVASTATIN CALCIUM 80 MG/1
80 TABLET, FILM COATED ORAL NIGHTLY
Qty: 30 TABLET | Refills: 1 | Status: SHIPPED | OUTPATIENT
Start: 2019-03-21 | End: 2021-04-07

## 2019-03-21 RX ORDER — CLOPIDOGREL BISULFATE 75 MG/1
75 TABLET ORAL DAILY
Qty: 30 TABLET | Refills: 1 | Status: SHIPPED | OUTPATIENT
Start: 2019-03-22 | End: 2020-11-04

## 2019-03-21 RX ORDER — LISINOPRIL 20 MG/1
20 TABLET ORAL
Qty: 30 TABLET | Refills: 1 | Status: ON HOLD | OUTPATIENT
Start: 2019-03-22 | End: 2019-07-08 | Stop reason: SDUPTHER

## 2019-03-21 RX ORDER — ASPIRIN 325 MG
325 TABLET ORAL DAILY
Qty: 30 TABLET | Refills: 1 | Status: SHIPPED | OUTPATIENT
Start: 2019-03-22 | End: 2019-07-08 | Stop reason: HOSPADM

## 2019-03-21 RX ORDER — CARVEDILOL 25 MG/1
25 TABLET ORAL EVERY 12 HOURS SCHEDULED
Qty: 60 TABLET | Refills: 1 | Status: SHIPPED | OUTPATIENT
Start: 2019-03-21

## 2019-03-21 RX ADMIN — DULOXETINE 60 MG: 60 CAPSULE, DELAYED RELEASE ORAL at 08:16

## 2019-03-21 RX ADMIN — INSULIN LISPRO 4 UNITS: 100 INJECTION, SOLUTION INTRAVENOUS; SUBCUTANEOUS at 08:17

## 2019-03-21 RX ADMIN — CARVEDILOL 25 MG: 12.5 TABLET, FILM COATED ORAL at 08:13

## 2019-03-21 RX ADMIN — SENNOSIDES AND DOCUSATE SODIUM 1 TABLET: 8.6; 5 TABLET ORAL at 08:10

## 2019-03-21 RX ADMIN — METHADONE HYDROCHLORIDE 10 MG: 10 TABLET ORAL at 02:31

## 2019-03-21 RX ADMIN — ASPIRIN 325 MG ORAL TABLET 325 MG: 325 PILL ORAL at 08:11

## 2019-03-21 RX ADMIN — METHADONE HYDROCHLORIDE 5 MG: 5 TABLET ORAL at 08:16

## 2019-03-21 RX ADMIN — HEPARIN SODIUM 5000 UNITS: 5000 INJECTION INTRAVENOUS; SUBCUTANEOUS at 08:14

## 2019-03-21 RX ADMIN — FAMOTIDINE 20 MG: 20 TABLET ORAL at 08:13

## 2019-03-21 RX ADMIN — PREGABALIN 50 MG: 50 CAPSULE ORAL at 08:12

## 2019-03-21 RX ADMIN — CLOPIDOGREL BISULFATE 75 MG: 75 TABLET ORAL at 08:11

## 2019-03-21 RX ADMIN — LISINOPRIL 20 MG: 20 TABLET ORAL at 08:14

## 2019-03-21 NOTE — PROGRESS NOTES
[facility] at [facility]    Inpatient Progress Note      Chief Complaint/Reason for service:    · Cryptogenic stroke         Subjective:       Denies chest pain, dyspnea, palpitations today.      Past medical, surgical, social and family history reviewed in the patient's electronic medical record.    Review of Systems:   Negative for exertional chest pain, dyspnea with exertion, lower extremity edema, palpitations     Problem List  Active Hospital Problems    Diagnosis  POA   • **CVA (cerebral vascular accident) (CMS/[facility]) [I63.9]  Yes   • CAD (coronary artery disease) [I25.10]  Yes   • H/O Takotsubo cardiomyopathy (EF now normal) [I51.81]  Yes   • CKD (chronic kidney disease) stage 3, GFR 30-59 ml/min (CMS/[facility]) [N18.3]  Yes   • Altered mental status [R41.82]  Yes   • Hypertension [I10]  Yes   • Hyperlipidemia [E78.5]  Yes   • Diabetes mellitus (CMS/[facility]) [E11.9]  Yes   • COPD (chronic obstructive pulmonary disease) (CMS/[facility]) [J44.9]  Yes      Resolved Hospital Problems   No resolved problems to display.            Objective:      Current Facility-Administered Medications:   •  aspirin tablet 325 mg, 325 mg, Oral, Daily, 325 mg at 03/21/19 0811 **OR** aspirin suppository 300 mg, 300 mg, Rectal, Daily, Surendra Elkins MD  •  atorvastatin (LIPITOR) tablet 80 mg, 80 mg, Oral, Nightly, Surendra Elkins MD, 80 mg at 03/20/19 2018  •  carvedilol (COREG) tablet 25 mg, 25 mg, Oral, Q12H, Axel Rogers MD, 25 mg at 03/21/19 0813  •  clopidogrel (PLAVIX) tablet 75 mg, 75 mg, Oral, Daily, Surendra Elkins MD, 75 mg at 03/21/19 0811  •  DULoxetine (CYMBALTA) DR capsule 60 mg, 60 mg, Oral, Daily, Axel Rogers MD, 60 mg at 03/21/19 0816  •  famotidine (PEPCID) tablet 20 mg, 20 mg, Oral, BID, Swati Alba MD, 20 mg at 03/21/19 0813  •  heparin (porcine) 5000 UNIT/ML injection 5,000 Units, 5,000 Units, Subcutaneous, Q12H, Swati Alba MD, 5,000 Units at  03/21/19 0814  •  hydrALAZINE (APRESOLINE) injection 20 mg, 20 mg, Intravenous, Q6H PRN, Gigi Knight APRN, 20 mg at 03/19/19 1812  •  insulin detemir (LEVEMIR) injection 15 Units, 15 Units, Subcutaneous, Nightly, Axel Rogers MD, 15 Units at 03/20/19 2018  •  insulin lispro (humaLOG) injection 0-9 Units, 0-9 Units, Subcutaneous, 4x Daily With Meals & Nightly, Case, Kati V., DO, 4 Units at 03/21/19 0817  •  lisinopril (PRINIVIL,ZESTRIL) tablet 20 mg, 20 mg, Oral, Q24H, Axel Rogers MD, 20 mg at 03/21/19 0814  •  methadone (DOLOPHINE) tablet 10 mg, 10 mg, Oral, Q8H PRN, Axel Rogers MD, 10 mg at 03/21/19 0231  •  methadone (DOLOPHINE) tablet 5 mg, 5 mg, Oral, Daily, Gigi Knight APRN, 5 mg at 03/21/19 0816  •  niCARdipine (CARDENE-IV) 20 mg/200 mL (0.1 mg/mL) in 0.9% NaCl infusion, 5-15 mg/hr, Intravenous, Titrated, Allyssa Dunbar MD, Stopped at 03/19/19 1611  •  ondansetron (ZOFRAN) injection 4 mg, 4 mg, Intravenous, Q6H PRN, Ian Arcos APRN, 4 mg at 03/14/19 2220  •  pregabalin (LYRICA) capsule 50 mg, 50 mg, Oral, Daily, Axel Rogers MD, 50 mg at 03/21/19 0812  •  sennosides-docusate sodium (SENOKOT-S) 8.6-50 MG tablet 2 tablet, 2 tablet, Oral, BID, Axel Rogers MD, 1 tablet at 03/21/19 0810  •  sodium chloride 0.9 % flush 10 mL, 10 mL, Intravenous, PRN, Allyssa Dunbar MD, 10 mL at 03/16/19 0844  •  sodium chloride 0.9 % flush 3-10 mL, 3-10 mL, Intravenous, PRN, Surendra Elkins MD    Vital Sign Min/Max for last 24 hours  Temp  Min: 97.4 °F (36.3 °C)  Max: 98.8 °F (37.1 °C)   BP  Min: 121/70  Max: 178/77   Pulse  Min: 58  Max: 76   Resp  Min: 16  Max: 20   SpO2  Min: 92 %  Max: 97 %   No Data Recorded      Intake/Output Summary (Last 24 hours) at 3/21/2019 0847  Last data filed at 3/21/2019 0200  Gross per 24 hour   Intake --   Output 525 ml   Net -525 ml           CONSTITUTIONAL: No acute distress, normal  affect  RESPIRATORY: Normal effort. Clear to auscultation bilaterally without wheezing or rales  CARDIOVASCULAR: Regular rate and rhythm with normal S1 and S2. Without murmur, gallop or rub.  PERIPHERAL VASCULAR: Normal radial pulses bilaterally. There is no peripheral edema bilaterally.    Results Review:   No results found for: TROPONINI, TROPONINT    BUN   Date Value Ref Range Status   03/19/2019 26 (H) 9 - 23 mg/dL Final     Creatinine   Date Value Ref Range Status   03/19/2019 1.24 0.60 - 1.30 mg/dL Final     Potassium   Date Value Ref Range Status   03/19/2019 3.5 3.5 - 5.5 mmol/L Final       Lab Results   Component Value Date    CHOL 145 03/15/2019     Lab Results   Component Value Date    TRIG 117 03/15/2019     Lab Results   Component Value Date    HDL 35 (L) 03/15/2019     No results found for: LDLC  Lab Results   Component Value Date    LDL 97 03/15/2019     No components found for: LDLDIRECTC        Tele:  NSR, no AFib    TTE 8/2016  · The left ventricular cavity is mildly dilated.  · The findings are consistent with stress-induced (Takotsubo) cardiomyopathy.  · Moderate aortic valve regurgitation is present.  · Left ventricular function is severely decreased. Estimated EF = 15%.     TTE 10/2016  · Left ventricular function is normal. Estimated EF = 60%.  · Left ventricular wall thickness is consistent with borderline concentric hypertrophy.  · The left ventricular cavity is borderline dilated.  · All left ventricular wall segments contract normally.  · Left ventricular diastolic dysfunction (grade I a) consistent with impaired relaxation.  · Moderate aortic valve regurgitation is present.     TTE 3/2019  · The study is technically difficult for diagnosis.  · Left ventricular systolic function is hyperdynamic (EF > 70).  · Left ventricular diastolic dysfunction (grade I) consistent with impaired relaxation.  · The valves were poorly visualized. There was no obvious hemodynamic abnormalities of the aortic  or mitral valve, however    Cath results 8/30/16  · Coronary circulation is right dominant  · Left main: Normal  · LAD: 40% proximal discrete stenosis, small D1 and D2 without significant disease, mid to distal LAD with mild diffuse disease  · LCX: Large circumflex, tandem 30% stenoses in the mid Cx after OM2, moderate mid OM1 disease, med sized OM2 with luminal irregularities, med to large sized OM3 without significant disease  · RCA: 50% mid RCA lesion, iFR 0.95 (not functionally significant), mild distal disease          Assessment/Plan:     ASSESSMENT:  -Cryptogenic stroke status post TPA, NIHSS 22 at admission, received TPA and had quick neurologic improvement. Imaging without clear stroke. ERMIAS without clear cardioembolic source. No arrhythmia noted thus far.  -CAD  -History of Takotsubo cardiomyopathy with normalized EF  -Essential hypertension  -Diabetes  -Dyslipidemia    PLAN:  -Continue DAPT for now  -30 day event heart monitor upon discharge     -Otherwise okay for discharge from a cardiac standpoint with monitor placement at discharge. Follow up with Dr Geronimo in 6 weeks  -Will sign off for now. Please feel free to call with any questions or concerns    Steve Geronimo MD, MSc, Located within [facility]  Interventional Cardiology  [facility]  3/21/2019

## 2019-03-21 NOTE — THERAPY TREATMENT NOTE
Acute Care - Occupational Therapy Treatment Note  [facility]     Patient Name: Yanique Webster  : 1940  MRN: [id number]  Today's Date: 3/21/2019  Onset of Illness/Injury or Date of Surgery: 19  Date of Referral to OT: 19  Referring Physician: MD Severo    Admit Date: 3/14/2019       ICD-10-CM ICD-9-CM   1. Altered mental status, unspecified altered mental status type R41.82 780.97   2. Acute ischemic stroke (CMS/[facility]) I63.9 434.91   3. Hypertension, unspecified type I10 401.9   4. Impaired mobility and ADLs Z74.09 799.89   5. Impaired functional mobility, balance, gait, and endurance Z74.09 V49.89   6. Cerebrovascular accident (CVA), unspecified mechanism (CMS/[facility]) I63.9 434.91   7. Other specified cardiac arrhythmias  I49.8 427.89   8. Somnolence R40.0 780.09   9. Essential hypertension I10 401.9   10. Type 2 diabetes mellitus with complication, unspecified whether long term insulin use (CMS/[facility]) E11.8 250.90   11. Chronic obstructive pulmonary disease, unspecified COPD type (CMS/[facility]) J44.9 496   12. H/O Takotsubo cardiomyopathy (EF now normal) I51.81 429.83   13. CKD (chronic kidney disease) stage 3, GFR 30-59 ml/min (CMS/[facility]) N18.3 585.3     Patient Active Problem List   Diagnosis   • Altered mental status   • Hypertension   • Hyperlipidemia   • Diabetes mellitus (CMS/[facility])   • COPD (chronic obstructive pulmonary disease) (CMS/[facility])   • CVA (cerebral vascular accident) (S/P tPA)   • CAD (coronary artery disease)   • H/O Takotsubo cardiomyopathy (EF now normal)   • CKD (chronic kidney disease) stage 3, GFR 30-59 ml/min (CMS/[facility])     Past Medical History:   Diagnosis Date   • COPD (chronic obstructive pulmonary disease) (CMS/[facility])    • Diabetes mellitus (CMS/[facility])    • GERD (gastroesophageal reflux disease)    • Hyperlipidemia    • Hypertension      History reviewed. No pertinent surgical history.    Therapy Treatment    Rehabilitation Treatment Summary     Row Name 19 0820 19 0809           Treatment Time/Intention    Discipline  physical therapist  -LS  occupational therapist  -CL     Document Type  therapy note (daily note)  -LS  therapy note (daily note)  -CL     Subjective Information  no complaints  -LS  no complaints  -CL     Mode of Treatment  physical therapy  -LS  --     Patient Effort  good  -LS  good  -CL     Existing Precautions/Restrictions  fall  -LS  fall  -CL     Treatment Considerations/Comments  Light exp aphasia persists.   -LS  --     Recorded by [LS] Lizbeth Mcmahan, PT 03/21/19 0914 [CL] Lynette Ribeiro, OT 03/21/19 0955     Row Name 03/21/19 0820 03/21/19 0809          Vital Signs    Pre Systolic BP Rehab  163  -LS  163  -CL     Pre Treatment Diastolic BP  74  -LS  74  -CL     Post Systolic BP Rehab  --  -- w/ PT  -CL     Pretreatment Heart Rate (beats/min)  --  65  -CL     Posttreatment Heart Rate (beats/min)  78  -LS  67  -CL     Pre SpO2 (%)  --  98  -CL     O2 Delivery Pre Treatment  room air  -LS  room air  -CL     Post SpO2 (%)  97  -LS  --     O2 Delivery Post Treatment  room air  -LS  --     Pre Patient Position  Sitting  -LS  Supine  -CL     Intra Patient Position  Standing  -LS  Sitting  -CL     Post Patient Position  Supine  -LS  Sitting  -CL     Recorded by [LS] Lizbeth Mcmahan, PT 03/21/19 0914 [CL] Lynette Ribeiro, OT 03/21/19 0955     Row Name 03/21/19 0820 03/21/19 0809          Cognitive Assessment/Intervention- PT/OT    Affect/Mental Status (Cognitive)  WFL  -LS  WFL  -CL     Orientation Status (Cognition)  oriented x 4  -LS  oriented x 4  -CL     Follows Commands (Cognition)  follows one step commands;over 90% accuracy;verbal cues/prompting required  -LS  follows one step commands;over 90% accuracy;repetition of directions required;verbal cues/prompting required  -CL     Cognitive Function (Cognitive)  safety deficit  -LS  safety deficit  -CL     Safety Deficit (Cognitive)  mild deficit;safety precautions awareness  -LS  mild deficit;awareness of need for  assistance;safety precautions awareness  -CL     Personal Safety Interventions  fall prevention program maintained;gait belt;nonskid shoes/slippers when out of bed  -LS  fall prevention program maintained;gait belt;nonskid shoes/slippers when out of bed  -CL     Recorded by [LS] Lizbeth Mcmahan, PT 03/21/19 0914 [CL] Lynette Ribeiro, OT 03/21/19 0955     Row Name 03/21/19 0820 03/21/19 0809          Bed Mobility Assessment/Treatment    Bed Mobility Assessment/Treatment  --  supine-sit  -CL     Supine-Sit Coden (Bed Mobility)  --  verbal cues;contact guard  -CL     Sit-Supine Coden (Bed Mobility)  supervision;verbal cues  -LS  --     Assistive Device (Bed Mobility)  head of bed elevated  -LS  bed rails;head of bed elevated  -CL     Recorded by [LS] Lizbeth Mcmahan, PT 03/21/19 0914 [CL] Lynette Ribeiro, OT 03/21/19 0955     Row Name 03/21/19 0809             Functional Mobility    Functional Mobility- Comment  Defer to PT.   -CL      Recorded by [CL] Lynette Ribeiro, OT 03/21/19 0955      Row Name 03/21/19 0809             Transfer Assessment/Treatment    Comment (Transfers)  Defer to PT.   -CL      Recorded by [CL] Lynette Ribeiro, OT 03/21/19 0955      Row Name 03/21/19 0820             Sit-Stand Transfer    Sit-Stand Coden (Transfers)  supervision;verbal cues  -LS      Assistive Device (Sit-Stand Transfers)  walker, front-wheeled  -LS      Recorded by [LS] Lizbeth Mcmahan, PT 03/21/19 0914      Row Name 03/21/19 0820             Stand-Sit Transfer    Stand-Sit Coden (Transfers)  supervision;verbal cues  -LS      Assistive Device (Stand-Sit Transfers)  walker, front-wheeled  -LS      Recorded by [LS] Lizbeth Mcmahan, PT 03/21/19 0914      Row Name 03/21/19 0820             Gait/Stairs Assessment/Training    26116 - Gait Training Minutes   12  -LS      Gait/Stairs Assessment/Training  gait/ambulation independence  -LS      [place] Level (Gait)  supervision;verbal cues  -LS      Assistive Device  (Gait)  walker, front-wheeled  -LS      Distance in Feet (Gait)  250  -LS      Deviations/Abnormal Patterns (Gait)  stride length decreased;gait speed decreased  -LS      Bilateral Gait Deviations  forward flexed posture  -LS      Comment (Gait/Stairs)  VC for upright posture; no noted LOB.   -LS      Recorded by [LS] Lizbeth Mcmahan, PT 03/21/19 0914      Row Name 03/21/19 0809             ADL Assessment/Intervention    01067 - OT Self Care/Mgmt Minutes  7  -CL      BADL Assessment/Intervention  lower body dressing;bathing  -CL      Recorded by [CL] Lynette Ribeiro, OT 03/21/19 0955      Row Name 03/21/19 0809             Bathing Assessment/Intervention    Bathing [place] Level  lower body  -CL      Assistive Devices (Bathing)  long-handled sponge  -CL      Comment (Bathing)  Pt educated on AE use.   -CL      Recorded by [CL] Lynette Ribeiro, OT 03/21/19 0955      Row Name 03/21/19 0809             Lower Body Dressing Assessment/Training    Lower Body Dressing [place] Level  doff;don;socks;maximum assist (25% patient effort)  -CL      Lower Body Dressing Position  edge of bed sitting  -CL      Comment (Lower Body Dressing)  Pt educated on use of AE to improve independence, denies need for sock aid stating wears slip ons at home. Adminisetered  shoe horn and reacher.   -CL      Recorded by [CL] Lynette Ribeiro, OT 03/21/19 0955      Row Name 03/21/19 0820 03/21/19 0809          Therapeutic Exercise    Therapeutic Exercise  supine, lower extremities;supine, upper extremities  -LS  --     60825 - PT Therapeutic Exercise Minutes  3  -LS  --     54846 - OT Therapeutic Activity Minutes  --  6  -CL     Recorded by [LS] Lizbeth Mcmahan, PT 03/21/19 0914 [CL] Lynette Ribeiro, OT 03/21/19 0955     Row Name 03/21/19 0820             Upper Extremity Supine Therapeutic Exercise    Performed, Supine Upper Extremity (Therapeutic Exercise)  shoulder flexion/extension;shoulder abduction/adduction;elbow flexion/extension  -LS       Exercise Type, Supine Upper Extremity (Therapeutic Exercise)  AROM (active range of motion)  -LS      Sets/Reps Detail, Supine Upper Extremity (Therapeutic Exercise)  1/10  -LS      Recorded by [LS] Lizbeth Mcmahan, PT 03/21/19 0914      Row Name 03/21/19 0820             Lower Extremity Seated Therapeutic Exercise    Performed, Seated Lower Extremity (Therapeutic Exercise)  hip flexion/extension;LAQ (long arc quad), knee extension;ankle dorsiflexion/plantarflexion  -LS      Exercise Type, Seated Lower Extremity (Therapeutic Exercise)  AROM (active range of motion)  -LS      Sets/Reps Detail, Seated Lower Extremity (Therapeutic Exercise)  1/10  -LS      Recorded by [LS] Lizbeth Mcmahan, PT 03/21/19 0914      Row Name 03/21/19 0809             Balance    Balance  static sitting balance;dynamic sitting balance  -CL      Recorded by [CL] Lynette Ribeiro, OT 03/21/19 0955      Row Name 03/21/19 0820 03/21/19 0809          Static Sitting Balance    Level of [place] (Unsupported Sitting, Static Balance)  independent  -LS  supervision  -CL     Sitting Position (Unsupported Sitting, Static Balance)  sitting on edge of bed  -LS  sitting on edge of bed  -CL     Time Able to Maintain Position (Unsupported Sitting, Static Balance)  --  more than 5 minutes  -CL     Recorded by [LS] Lizbeth Mcmahan, PT 03/21/19 0914 [CL] Lynette Ribeiro, OT 03/21/19 0955     Row Name 03/21/19 0809             Dynamic Sitting Balance    Level of [place], Reaches Outside Midline (Sitting, Dynamic Balance)  supervision  -CL      Sitting Position, Reaches Outside Midline (Sitting, Dynamic Balance)  sitting on edge of bed  -CL      Comment, Reaches Outside Midline (Sitting, Dynamic Balance)  A/P leaning, reaching for objects, R/L lean  -CL      Recorded by [CL] Lynette Ribeiro, OT 03/21/19 0955      Row Name 03/21/19 0820             Static Standing Balance    Level of [place] (Supported Standing, Static Balance)  supervision  -LS      Assistive  Device Utilized (Supported Standing, Static Balance)  walker, rolling  -LS      Recorded by [LS] Lizbeth Mcmahan, PT 03/21/19 0914      Row Name 03/21/19 0820 03/21/19 0809          Positioning and Restraints    Pre-Treatment Position  in bed  -LS  in bed  -CL     Post Treatment Position  bed  -LS  bed  -CL     In Bed  notified nsg;fowlers;call light within reach;encouraged to call for assist;with family/caregiver;with other staff cardiologist present  -LS  notified nsg;sitting EOB;call light within reach;encouraged to call for assist;with PT;with family/caregiver;with nsg transitioned to PT treatment  -CL     Recorded by [LS] Lizbeth Mcmahan, PT 03/21/19 0914 [CL] Lynette Ribeiro, OT 03/21/19 0955     Row Name 03/21/19 0820 03/21/19 0809          Pain Scale: Numbers Pre/Post-Treatment    Pain Scale: Numbers, Pretreatment  --  0/10 - no pain  -CL     Pain Scale: Numbers, Post-Treatment  --  0/10 - no pain  -CL     Pain Location - Side  Bilateral  -LS  --     Pain Location  foot neuropathic pain  -LS  --     Pre/Post Treatment Pain Comment  tolerated  -LS  --     Pain Intervention(s)  Repositioned;Ambulation/increased activity  -LS  --     Recorded by [LS] Lizbeth Mcmahan, PT 03/21/19 0914 [CL] Lynette Ribeiro, OT 03/21/19 0955     Row Name 03/21/19 0820             Pain Scale: FACES Pre/Post-Treatment    Pain: FACES Scale, Pretreatment  2-->hurts little bit  -LS      Pain: FACES Scale, Post-Treatment  2-->hurts little bit  -LS      Recorded by [LS] Lizbeth Mcmahan, PT 03/21/19 0914      Row Name 03/21/19 0820             Plan of Care Review    Plan of Care Reviewed With  patient;spouse  -LS      Recorded by [LS] Lizbeth Mcmahan, PT 03/21/19 0914      Row Name 03/21/19 0820             Outcome Summary/Treatment Plan (PT)    Daily Summary of Progress (PT)  progress toward functional goals is good  -LS      Anticipated Discharge Disposition (PT)  home with home health;home with assist  -LS      Recorded by [LS] Lizbeth Mcmahan, PT  03/21/19 0914        User Key  (r) = Recorded By, (t) = Taken By, (c) = Cosigned By    Initials Name Effective Dates Discipline    LS Lizbeth Mcmahan, PT 06/19/15 -  PT    CL Lynette Ribeiro OT 04/03/18 -  OT             Occupational Therapy Education     Title: PT OT SLP Therapies (In Progress)     Topic: Occupational Therapy (In Progress)     Point: ADL training (In Progress)     Description: Instruct learner(s) on proper safety adaptation and remediation techniques during self care or transfers.   Instruct in proper use of assistive devices.    Learning Progress Summary           Patient Acceptance, E, NR by CL at 3/21/2019  9:56 AM    Comment:  Pt educated on appropriate safety precautions, t/f techniques, role of OT, AE use, and benefits of therapy.    Acceptance, E,D, NR by TB at 3/15/2019  8:50 AM    Comment:  Education initiated for benefits of activity/therapy, role of OT and d/c planning   Significant Other Acceptance, E,D, NR by TB at 3/15/2019  8:50 AM    Comment:  Education initiated for benefits of activity/therapy, role of OT and d/c planning                   Point: Home exercise program (In Progress)     Description: Instruct learner(s) on appropriate technique for monitoring, assisting and/or progressing therapeutic exercises/activities.    Learning Progress Summary           Patient Acceptance, E, NR by CL at 3/21/2019  9:56 AM    Comment:  Pt educated on appropriate safety precautions, t/f techniques, role of OT, AE use, and benefits of therapy.    Acceptance, E,D, VU,DU,NR by TA at 3/18/2019  2:28 PM    Comment:  MONICA MATTHEWS initiated this date with pt. Reinforced need for call for assist with OOB activities.                   Point: Precautions (In Progress)     Description: Instruct learner(s) on prescribed precautions during self-care and functional transfers.    Learning Progress Summary           Patient Acceptance, E, NR by CL at 3/21/2019  9:56 AM    Comment:  Pt educated on appropriate safety  precautions, t/f techniques, role of OT, AE use, and benefits of therapy.    Acceptance, E,D, VU,DU,NR by TA at 3/18/2019  2:28 PM    Comment:  MONICA MATTHEWS initiated this date with pt. Reinforced need for call for assist with OOB activities.                   Point: Body mechanics (In Progress)     Description: Instruct learner(s) on proper positioning and spine alignment during self-care, functional mobility activities and/or exercises.    Learning Progress Summary           Patient Acceptance, E, NR by CL at 3/21/2019  9:56 AM    Comment:  Pt educated on appropriate safety precautions, t/f techniques, role of OT, AE use, and benefits of therapy.                               User Key     Initials Effective Dates Name Provider Type Discipline    TB 06/08/18 -  Sarah Bear, OT Occupational Therapist OT    TA 03/14/16 -  Cristobal Alicea, OT Occupational Therapist OT    CL 04/03/18 -  Lynette Ribeiro OT Occupational Therapist OT                OT Recommendation and Plan     Plan of Care Review  Plan of Care Reviewed With: patient  Plan of Care Reviewed With: patient  Outcome Summary: Pt demo improved independence w/ bed mobility of CGA however is MaxA for LBD tasks. Pt educated on AE use to improve independence. Recommend cont skilled IPOT POC. Pt is declining rehab placement, recommend DC home w/ assist and HH OT/PT/SLP services.   Outcome Measures     Row Name 03/21/19 0820 03/21/19 0809 03/19/19 0846       How much help from another person do you currently need...    Turning from your back to your side while in flat bed without using bedrails?  3  -LS  --  3  -LS    Moving from lying on back to sitting on the side of a flat bed without bedrails?  3  -LS  --  3  -LS    Moving to and from a bed to a chair (including a wheelchair)?  3  -LS  --  3  -LS    Standing up from a chair using your arms (e.g., wheelchair, bedside chair)?  3  -LS  --  3  -LS    Climbing 3-5 steps with a railing?  3  -LS  --  2  -LS     To walk in hospital room?  3  -LS  --  3  -LS    AM-PAC 6 Clicks Score  18  -LS  --  17  -LS       How much help from another is currently needed...    Putting on and taking off regular lower body clothing?  --  2  -CL  --    Bathing (including washing, rinsing, and drying)  --  2  -CL  --    Toileting (which includes using toilet bed pan or urinal)  --  2  -CL  --    Putting on and taking off regular upper body clothing  --  3  -CL  --    Taking care of personal grooming (such as brushing teeth)  --  3  -CL  --    Eating meals  --  4  -CL  --    Score  --  16  -CL  --       Modified [place] Scale    Pre-Stroke Modified Robert Scale  0 - No Symptoms at all.  -LS  0 - No Symptoms at all.  -CL  --    Modified Robert Scale  3 - Moderate disability.  Requiring some help, but able to walk without assistance.  -LS  3 - Moderate disability.  Requiring some help, but able to walk without assistance.  -CL  --       Functional Assessment    Outcome Measure Options  AM-PAC 6 Clicks Basic Mobility (PT)  -LS  AM-PAC 6 Clicks Daily Activity (OT)  -CL  AM-[facility] 6 Clicks Basic Mobility (PT)  -LS    Row Name 03/18/19 1418 03/18/19 1406          How much help from another person do you currently need...    Turning from your back to your side while in flat bed without using bedrails?  3  -LS  --     Moving from lying on back to sitting on the side of a flat bed without bedrails?  3  -LS  --     Moving to and from a bed to a chair (including a wheelchair)?  3  -LS  --     Standing up from a chair using your arms (e.g., wheelchair, bedside chair)?  3  -LS  --     Climbing 3-5 steps with a railing?  2  -LS  --     To walk in hospital room?  3  -LS  --     AM-PAC 6 Clicks Score  17  -LS  --        How much help from another is currently needed...    Putting on and taking off regular lower body clothing?  --  1  -TA     Bathing (including washing, rinsing, and drying)  --  2  -TA     Toileting (which includes using toilet bed pan or urinal)   --  1  -TA     Putting on and taking off regular upper body clothing  --  2  -TA     Taking care of personal grooming (such as brushing teeth)  --  2  -TA     Eating meals  --  2  -TA     Score  --  10  -TA        Modified Robert Scale    Pre-Stroke Modified [place] Scale  --  0 - No Symptoms at all.  -TA     Modified [place] Scale  --  3 - Moderate disability.  Requiring some help, but able to walk without assistance.  -TA        Functional Assessment    Outcome Measure Options  AM-PAC 6 Clicks Basic Mobility (PT)  -LS  AM-PAC 6 Clicks Daily Activity (OT);Modified [place]  -TA       User Key  (r) = Recorded By, (t) = Taken By, (c) = Cosigned By    Initials Name Provider Type    Lizbeth Doherty, PT Physical Therapist    TA Cristobal Alicea, OT Occupational Therapist    CL Lynette Ribeiro, OT Occupational Therapist           Time Calculation:   Time Calculation- OT     Row Name 03/21/19 0820 03/21/19 0809          Time Calculation- OT    OT Start Time  --  0809  -CL     OT Received On  --  03/21/19  -CL     OT Goal Re-Cert Due Date  --  03/25/19  -CL        Timed Charges    89455 - Gait Training Minutes   12  -LS  --     39330 - OT Therapeutic Activity Minutes  --  6  -CL     14977 - OT Self Care/Mgmt Minutes  --  7  -CL       User Key  (r) = Recorded By, (t) = Taken By, (c) = Cosigned By    Initials Name Provider Type    Lizbeth Doherty, PT Physical Therapist    CL Lynette Ribeiro, OT Occupational Therapist        Therapy Charges for Today     Code Description Service Date Service Provider Modifiers Qty    37704928094 HC OT SELF CARE/MGMT/TRAIN EA 15 MIN 3/21/2019 Lynette Ribeiro, OT GO 1    35260549929 HC OT THER SUPP EA 15 MIN 3/21/2019 Lynette Ribeiro OT GO 1               Lynette Ribeiro OT  3/21/2019

## 2019-03-21 NOTE — PLAN OF CARE
Problem: Patient Care Overview  Goal: Plan of Care Review  Outcome: Ongoing (interventions implemented as appropriate)   03/21/19 0820   Coping/Psychosocial   Plan of Care Reviewed With patient;spouse   Plan of Care Review   Progress improving   OTHER   Outcome Summary Pt demonstrated increased indep with bed mobility, transfers, gait; progressed forward ambulation distance to 250 ft with use of RW and supervision. Edu pt/spouse re: home safey, HEP. Pt declining IP rehab placement, pt is safe to d/c home with assist and HHPT. Will cont PT POC.        Problem: Stroke (Ischemic) (Adult)  Goal: Signs and Symptoms of Listed Potential Problems Will be Absent, Minimized or Managed (Stroke)  Outcome: Ongoing (interventions implemented as appropriate)   03/21/19 0820   Goal/Outcome Evaluation   Problems Assessed (Stroke (Ischemic)) communication impairment;cognitive impairment;motor/sensory impairment   Problems Assessed (Stroke (Ischemic)) communication impairment;motor/sensory impairment

## 2019-03-21 NOTE — DISCHARGE SUMMARY
DISCHARGE SUMMARY     Admit date: 3/14/2019  Date of Discharge:  3/21/2019    Discharge Diagnoses  Active Hospital Problems    Diagnosis   • **CVA (cerebral vascular accident) (S/P tPA)   • CAD (coronary artery disease)   • H/O Takotsubo cardiomyopathy (EF now normal)   • CKD (chronic kidney disease) stage 3, GFR 30-59 ml/min (CMS/[facility])   • Altered mental status   • Hypertension   • Hyperlipidemia   • Diabetes mellitus (CMS/[facility])   • COPD (chronic obstructive pulmonary disease) (CMS/[facility])       History reviewed. No pertinent surgical history.    History of Present Illness    Patient is a 79 y.o. female presented with: CVA (cerebral vascular accident) (CMS/[facility])    Per admission H&P:  Ms. Webster is a 80yo F who developed acute speech loss and left sided weakness at approximately 1300 today. She has a history of hypertension and diabetes. Upon presentation to the ED, her systolic blood pressure was 220. She was started on Cardene. Neurology was consulted and elected to give tPA once her blood pressure was under control. CTA of the head and neck was negative for significant stenosis, aneurysm or occlusion. She will be admitted to the ICU for further care.     Hospital Course    79-year-old woman presented to ER with left-sided weakness, right gaze deviation and expressive aphasia.  She is status post TPA.  Weakness and right gaze deviation completely resolved but continues to have mild intermittent expressive and receptive aphasia as well as episodes of confusion and speech arrest suspicious for complex partial seizures.  Continuous EEG did not reveal any epileptiform abnormalities.    MRI revealed evidence of several old strokes.  A ERMIAS did not show an intracardiac source for thrombus.  She did have plaque of the proximal aorta.    Plan was to treat with dual antiplatelet therapy which was started and tolerated without difficulty.    The patient improved but continued to have some deficit for which we felt inpatient  rehabilitation would be most appropriate course.  After repeated discussions with the patient, however, she was adamant that she wanted to be discharged home.    She is being discharged home with skilled nursing care, physical therapy, occupational therapy and will have follow-up with neurology and cardiology as an outpatient.    A 30-day event recorder will be placed prior to her discharge    Procedures Performed:  Multiple CT scans of the head and MRIs    Consults: Neurology, Cardiology    Pertinent Test Results:   Results from last 7 days   Lab Units 03/18/19  0325   WBC 10*3/mm3 8.59   HEMOGLOBIN g/dL 13.5   HEMATOCRIT % 41.4   PLATELETS 10*3/mm3 169     Results from last 7 days   Lab Units 03/19/19  0332 03/18/19  0325 03/17/19  0819 03/15/19  0335   SODIUM mmol/L 139 138 138 136   POTASSIUM mmol/L 3.5 3.6 3.9 4.2   CHLORIDE mmol/L 109 107 107 105   CO2 mmol/L 22.0 23.0 20.0 22.0   BUN mg/dL 26* 26* 24* 20   CREATININE mg/dL 1.24 1.38* 1.35* 1.30   GLUCOSE mg/dL 110* 140* 145* 160*   CALCIUM mg/dL 9.7 9.6 9.6 10.4   PHOSPHORUS mg/dL  --  2.3*  --  3.3     Results from last 7 days   Lab Units 03/15/19  0335   HEMOGLOBIN A1C % 6.70*       Condition on Discharge:  Improved/Stable    Discharge Disposition: Home or Self Care       Discharge Medications      New Medications      Instructions Start Date   aspirin 325 MG tablet  Replaces:  aspirin 81 MG EC tablet   325 mg, Oral, Daily   Start Date:  3/22/2019     clopidogrel 75 MG tablet  Commonly known as:  PLAVIX   75 mg, Oral, Daily   Start Date:  3/22/2019        Changes to Medications      Instructions Start Date   atorvastatin 80 MG tablet  Commonly known as:  LIPITOR  What changed:    · medication strength  · how much to take   80 mg, Oral, Nightly      carvedilol 25 MG tablet  Commonly known as:  COREG  What changed:    · medication strength  · how much to take  · when to take this   25 mg, Oral, Every 12 Hours Scheduled      lisinopril 20 MG tablet  Commonly  known as:  BARBRA PETERSON  What changed:    · medication strength  · how much to take  · when to take this   20 mg, Oral, Every 24 Hours Scheduled   Start Date:  3/22/2019        Continue These Medications      Instructions Start Date   DULoxetine 60 MG capsule  Commonly known as:  CYMBALTA   60 mg, Oral, Daily      insulin glargine 100 UNIT/ML injection  Commonly known as:  LANTUS   24 Units, Subcutaneous, Nightly      methadone 10 MG tablet  Commonly known as:  DOLOPHINE   10 mg, Oral, Every 8 Hours PRN      pantoprazole 40 MG EC tablet  Commonly known as:  PROTONIX   40 mg, Oral, Daily      pregabalin 50 MG capsule  Commonly known as:  LYRICA   50 mg, Oral, Daily      probiotic capsule capsule   1 capsule, Oral, Daily      torsemide 10 MG tablet  Commonly known as:  DEMADEX   10 mg, Oral, 3 Times Weekly, On Monday, Wednesday, and Friday       Vitamin D 2000 units capsule   2,000 Units, Oral, Daily         Stop These Medications    aspirin 81 MG EC tablet  Replaced by:  aspirin 325 MG tablet            Discharge Diet: Diet Regular; Cardiac, Consistent Carbohydrate    Activity at Discharge: Ad caridad    Follow-up Appointments    Additional Instructions for the Follow-ups that You Need to Schedule     Ambulatory Referral to Home Health   As directed      Face to Face Visit Date:  3/20/2019    Follow-up Provider for Plan of Care?:  I will be treating the patient on an ongoing basis.  Please send me the Plan of Care for signature.    Follow-up Provider:  MAGDIEL FRAUSTO [[id number]]    Reason/Clinical Findings:  CVA    Describe mobility limitations that make leaving home difficult:  weak and deconditioned    Nursing/Therapeutic Services Requested:  Skilled Nursing Physical Therapy Occupational Therapy    Skilled nursing orders:  Medication education    PT orders:  Therapeutic exercise Gait Training Transfer training Strengthening Home safety assessment    Weight Bearing Status:  As Tolerated    Occupational orders:   Activities of daily living Strengthening Fine motor Cognition Home safety assessment         Discharge Follow-up with Specialty: Dr Geronimo; 6 Weeks   As directed      Specialty:  Dr Geronimo    Follow Up:  6 Weeks        Discharge Follow-up with Specialty: [facility]; 4 Weeks     Specialty:  [facility]   Follow Up:  4 Weeks                Test Results Pending at Discharge       Code Status and Medical Interventions:   Ordered at: 03/14/19 1613     Level Of Support Discussed With:    Patient     Code Status:    CPR     Medical Interventions (Level of Support Prior to Arrest):    Full       Axel Rogers MD  03/21/19  9:41 AM    Discharge Time Spent: 45 Minutes

## 2019-03-21 NOTE — THERAPY TREATMENT NOTE
Acute Care - Physical Therapy Treatment Note  [facility]     Patient Name: Yanique Webster  : 1940  MRN: [id number]  Today's Date: 3/21/2019  Onset of Illness/Injury or Date of Surgery: 19  Date of Referral to PT: 19  Referring Physician: MD Severo    Admit Date: 3/14/2019    Visit Dx:    ICD-10-CM ICD-9-CM   1. Altered mental status, unspecified altered mental status type R41.82 780.97   2. Acute ischemic stroke (CMS/[facility]) I63.9 434.91   3. Hypertension, unspecified type I10 401.9   4. Impaired mobility and ADLs Z74.09 799.89   5. Impaired functional mobility, balance, gait, and endurance Z74.09 V49.89   6. Cerebrovascular accident (CVA), unspecified mechanism (CMS/[facility]) I63.9 434.91   7. Other specified cardiac arrhythmias  I49.8 427.89   8. Somnolence R40.0 780.09   9. Essential hypertension I10 401.9   10. Type 2 diabetes mellitus with complication, unspecified whether long term insulin use (CMS/[facility]) E11.8 250.90   11. Chronic obstructive pulmonary disease, unspecified COPD type (CMS/[facility]) J44.9 496   12. H/O Takotsubo cardiomyopathy (EF now normal) I51.81 429.83   13. CKD (chronic kidney disease) stage 3, GFR 30-59 ml/min (CMS/[facility]) N18.3 585.3     Patient Active Problem List   Diagnosis   • Altered mental status   • Hypertension   • Hyperlipidemia   • Diabetes mellitus (CMS/[facility])   • COPD (chronic obstructive pulmonary disease) (CMS/[facility])   • CVA (cerebral vascular accident) (CMS/[facility])   • CAD (coronary artery disease)   • H/O Takotsubo cardiomyopathy (EF now normal)   • CKD (chronic kidney disease) stage 3, GFR 30-59 ml/min (CMS/[facility])       Therapy Treatment    Rehabilitation Treatment Summary     Row Name 19 0820             Treatment Time/Intention    Discipline  physical therapist  -LS      Document Type  therapy note (daily note)  -LS      Subjective Information  no complaints  -LS      Mode of Treatment  physical therapy  -LS      Patient Effort  good  -LS      Existing  Precautions/Restrictions  fall  -LS      Treatment Considerations/Comments  Light exp aphasia persists.   -LS      Recorded by [LS] Lizbeth Mcmahan, PT 03/21/19 0914      Row Name 03/21/19 0820             Vital Signs    Pre Systolic BP Rehab  163  -LS      Pre Treatment Diastolic BP  74  -LS      Posttreatment Heart Rate (beats/min)  78  -LS      O2 Delivery Pre Treatment  room air  -LS      Post SpO2 (%)  97  -LS      O2 Delivery Post Treatment  room air  -LS      Pre Patient Position  Sitting  -LS      Intra Patient Position  Standing  -LS      Post Patient Position  Supine  -LS      Recorded by [LS] Lizbeth Mcmahan, PT 03/21/19 0914      Row Name 03/21/19 0820             Cognitive Assessment/Intervention- PT/OT    Affect/Mental Status (Cognitive)  WFL  -LS      Orientation Status (Cognition)  oriented x 4  -LS      Follows Commands (Cognition)  follows one step commands;over 90% accuracy;verbal cues/prompting required  -LS      Cognitive Function (Cognitive)  safety deficit  -LS      Safety Deficit (Cognitive)  mild deficit;safety precautions awareness  -LS      Personal Safety Interventions  fall prevention program maintained;gait belt;nonskid shoes/slippers when out of bed  -LS      Recorded by [LS] Lizbeth Mcmahan, PT 03/21/19 0914      Row Name 03/21/19 0820             Bed Mobility Assessment/Treatment    Sit-Supine [place] (Bed Mobility)  supervision;verbal cues  -LS      Assistive Device (Bed Mobility)  head of bed elevated  -LS      Recorded by [LS] Lizbeth Mcmahan, PT 03/21/19 0914      Row Name 03/21/19 0820             Sit-Stand Transfer    Sit-Stand [place] (Transfers)  supervision;verbal cues  -LS      Assistive Device (Sit-Stand Transfers)  walker, front-wheeled  -LS      Recorded by [LS] Lizbeth Mcmahan, PT 03/21/19 0914      Row Name 03/21/19 0820             Stand-Sit Transfer    Stand-Sit [place] (Transfers)  supervision;verbal cues  -LS      Assistive Device (Stand-Sit Transfers)   walker, front-wheeled  -LS      Recorded by [LS] Lizbeth Mcmahan, PT 03/21/19 0914      Row Name 03/21/19 0820             Gait/Stairs Assessment/Training    80011 - Gait Training Minutes   12  -LS      Gait/Stairs Assessment/Training  gait/ambulation independence  -LS      [place] Level (Gait)  supervision;verbal cues  -LS      Assistive Device (Gait)  walker, front-wheeled  -LS      Distance in Feet (Gait)  250  -LS      Deviations/Abnormal Patterns (Gait)  stride length decreased;gait speed decreased  -LS      Bilateral Gait Deviations  forward flexed posture  -LS      Comment (Gait/Stairs)  VC for upright posture; no noted LOB.   -LS      Recorded by [LS] Lizbeth Mcmahan, PT 03/21/19 0914      Row Name 03/21/19 0820             Therapeutic Exercise    Therapeutic Exercise  supine, lower extremities;supine, upper extremities  -      81005 - PT Therapeutic Exercise Minutes  3  -LS      Recorded by [LS] Lizbeth Mcmahan, PT 03/21/19 0914      Row Name 03/21/19 0820             Upper Extremity Supine Therapeutic Exercise    Performed, Supine Upper Extremity (Therapeutic Exercise)  shoulder flexion/extension;shoulder abduction/adduction;elbow flexion/extension  -      Exercise Type, Supine Upper Extremity (Therapeutic Exercise)  AROM (active range of motion)  -LS      Sets/Reps Detail, Supine Upper Extremity (Therapeutic Exercise)  1/10  -LS      Recorded by [LS] Lizbeth Mcmahan, PT 03/21/19 0914      Row Name 03/21/19 0820             Lower Extremity Seated Therapeutic Exercise    Performed, Seated Lower Extremity (Therapeutic Exercise)  hip flexion/extension;LAQ (long arc quad), knee extension;ankle dorsiflexion/plantarflexion  -LS      Exercise Type, Seated Lower Extremity (Therapeutic Exercise)  AROM (active range of motion)  -LS      Sets/Reps Detail, Seated Lower Extremity (Therapeutic Exercise)  1/10  -LS      Recorded by [LS] Lizbeth Mcmahan, PT 03/21/19 0914      Row Name 03/21/19 0820             Static  Sitting Balance    Level of [place] (Unsupported Sitting, Static Balance)  independent  -LS      Sitting Position (Unsupported Sitting, Static Balance)  sitting on edge of bed  -LS      Recorded by [LS] Lizbeth Mcmahan, PT 03/21/19 0914      Row Name 03/21/19 0820             Static Standing Balance    Level of [place] (Supported Standing, Static Balance)  supervision  -LS      Assistive Device Utilized (Supported Standing, Static Balance)  walker, rolling  -LS      Recorded by [LS] Lizbeth Mcmahan, PT 03/21/19 0914      Row Name 03/21/19 0820             Positioning and Restraints    Pre-Treatment Position  in bed  -LS      Post Treatment Position  bed  -LS      In Bed  notified nsg;fowlers;call light within reach;encouraged to call for assist;with family/caregiver;with other staff cardiologist present  -LS      Recorded by [LS] Lizbeth Mcmahan, PT 03/21/19 0914      Row Name 03/21/19 0820             Pain Scale: Numbers Pre/Post-Treatment    Pain Location - Side  Bilateral  -LS      Pain Location  foot neuropathic pain  -LS      Pre/Post Treatment Pain Comment  tolerated  -LS      Pain Intervention(s)  Repositioned;Ambulation/increased activity  -LS      Recorded by [LS] Lizbeth Mcmahan, PT 03/21/19 0914      Row Name 03/21/19 0820             Pain Scale: FACES Pre/Post-Treatment    Pain: FACES Scale, Pretreatment  2-->hurts little bit  -LS      Pain: FACES Scale, Post-Treatment  2-->hurts little bit  -LS      Recorded by [LS] Lizbeth Mcmahan, PT 03/21/19 0914      Row Name 03/21/19 0820             Plan of Care Review    Plan of Care Reviewed With  patient;spouse  -LS      Recorded by [LS] Lizbeth Mcmahan, PT 03/21/19 0914      Row Name 03/21/19 0820             Outcome Summary/Treatment Plan (PT)    Daily Summary of Progress (PT)  progress toward functional goals is good  -LS      Anticipated Discharge Disposition (PT)  home with home health;home with assist  -LS      Recorded by [LS] Lizbeth Mcmahan, PT  03/21/19 0914        User Key  (r) = Recorded By, (t) = Taken By, (c) = Cosigned By    Initials Name Effective Dates Discipline    Lizbeth Doherty PT 06/19/15 -  PT                   Physical Therapy Education     Title: PT OT SLP Therapies (In Progress)     Topic: Physical Therapy (Done)     Point: Mobility training (Done)     Learning Progress Summary           Patient Acceptance, E,D, VU,DU by KAMRYN at 3/21/2019  8:20 AM    Acceptance, E,D,H, VU,NR by KAMRYN at 3/19/2019  9:10 AM    Acceptance, E,D, VU,NR by KAMRYN at 3/18/2019  2:18 PM    Acceptance, E,D, NR by KAMRYN at 3/15/2019  8:46 AM   Significant Other Acceptance, E,D, VU,DU by KAMRYN at 3/21/2019  8:20 AM    Acceptance, E,D,H, VU,NR by KAMRYN at 3/19/2019  9:10 AM                   Point: Home exercise program (Done)     Learning Progress Summary           Patient Acceptance, E,D, VU,DU by KAMRYN at 3/21/2019  8:20 AM    Acceptance, E,D,H, VU,NR by KAMRYN at 3/19/2019  9:10 AM    Acceptance, E,D, VU,NR by KAMRYN at 3/18/2019  2:18 PM   Significant Other Acceptance, E,D, VU,DU by KAMRYN at 3/21/2019  8:20 AM    Acceptance, E,D,H, VU,NR by KAMRYN at 3/19/2019  9:10 AM                   Point: Body mechanics (Done)     Learning Progress Summary           Patient Acceptance, E,D, VU,DU by KAMRYN at 3/21/2019  8:20 AM    Acceptance, E,D,H, VU,NR by KAMRYN at 3/19/2019  9:10 AM    Acceptance, E,D, VU,NR by KAMRYN at 3/18/2019  2:18 PM    Acceptance, E,D, NR by KAMRYN at 3/15/2019  8:46 AM   Significant Other Acceptance, E,D, VU,DU by KAMRYN at 3/21/2019  8:20 AM    Acceptance, E,D,H, VU,NR by KAMRYN at 3/19/2019  9:10 AM                   Point: Precautions (Done)     Learning Progress Summary           Patient Acceptance, E,D, VU,DU by KAMRYN at 3/21/2019  8:20 AM    Acceptance, E,D,H, VU,NR by LS at 3/19/2019  9:10 AM    Acceptance, E,D, VU,NR by LS at 3/18/2019  2:18 PM    Acceptance, E,D, NR by LS at 3/15/2019  8:46 AM   Significant Other Acceptance, E,D, VU,DU by LS at 3/21/2019  8:20 AM    Acceptance, E,D,H, VU,NR by LS  at 3/19/2019  9:10 AM                               User Key     Initials Effective Dates Name Provider Type Discipline     06/19/15 -  Lizbeth Mcmahan, PT Physical Therapist PT                PT Recommendation and Plan  Anticipated Discharge Disposition (PT): home with home health, home with assist  Planned Therapy Interventions (PT Eval): balance training, bed mobility training, gait training, home exercise program, strengthening, transfer training, patient/family education  Therapy Frequency (PT Clinical Impression): daily  Outcome Summary/Treatment Plan (PT)  Daily Summary of Progress (PT): progress toward functional goals is good  Anticipated Discharge Disposition (PT): home with home health, home with assist  Plan of Care Reviewed With: patient, spouse  Progress: improving  Outcome Summary: Pt demonstrated increased indep with bed mobility, transfers, gait; progressed forward ambulation distance to 250 ft with use of RW and supervision. Edu pt/spouse re: home safey, HEP. Pt declining IP rehab placement, pt is safe to d/c home with assist and HHPT. Will cont PT POC.   Outcome Measures     Row Name 03/21/19 0820 03/19/19 0846 03/18/19 1418       How much help from another person do you currently need...    Turning from your back to your side while in flat bed without using bedrails?  3  -LS  3  -LS  3  -LS    Moving from lying on back to sitting on the side of a flat bed without bedrails?  3  -LS  3  -LS  3  -LS    Moving to and from a bed to a chair (including a wheelchair)?  3  -LS  3  -LS  3  -LS    Standing up from a chair using your arms (e.g., wheelchair, bedside chair)?  3  -LS  3  -LS  3  -LS    Climbing 3-5 steps with a railing?  3  -LS  2  -LS  2  -LS    To walk in hospital room?  3  -LS  3  -LS  3  -LS    AM-PAC 6 Clicks Score  18  -LS  17  -LS  17  -LS       Modified [place] Scale    Pre-Stroke Modified Robert Scale  0 - No Symptoms at all.  -LS  --  --    Modified Robert Scale  3 - Moderate  disability.  Requiring some help, but able to walk without assistance.  -LS  --  --       Functional Assessment    Outcome Measure Options  AM-PAC 6 Clicks Basic Mobility (PT)  -LS  AM-PAC 6 Clicks Basic Mobility (PT)  -LS  AM-PAC 6 Clicks Basic Mobility (PT)  -    Row Name 03/18/19 1406             How much help from another is currently needed...    Putting on and taking off regular lower body clothing?  1  -TA      Bathing (including washing, rinsing, and drying)  2  -TA      Toileting (which includes using toilet bed pan or urinal)  1  -TA      Putting on and taking off regular upper body clothing  2  -TA      Taking care of personal grooming (such as brushing teeth)  2  -TA      Eating meals  2  -TA      Score  10  -TA         Modified [place] Scale    Pre-Stroke Modified Robert Scale  0 - No Symptoms at all.  -TA      Modified [place] Scale  3 - Moderate disability.  Requiring some help, but able to walk without assistance.  -TA         Functional Assessment    Outcome Measure Options  AM-PAC 6 Clicks Daily Activity (OT);Modified [place]  -TA        User Key  (r) = Recorded By, (t) = Taken By, (c) = Cosigned By    Initials Name Provider Type     Lizbeth Mcmahan, PT Physical Therapist    TA Cristobal Alicea, OT Occupational Therapist         Time Calculation:   PT Charges     Row Name 03/21/19 0820             Time Calculation    Start Time  0820  -      PT Received On  03/21/19  -         Time Calculation- PT    Total Timed Code Minutes- PT  15 minute(s)  -         Timed Charges    37214 - PT Therapeutic Exercise Minutes  3  -      48507 - Gait Training Minutes   12  -        User Key  (r) = Recorded By, (t) = Taken By, (c) = Cosigned By    Initials Name Provider Type    Lizbeth Doherty, PT Physical Therapist        Therapy Charges for Today     Code Description Service Date Service Provider Modifiers Qty    [id number] HC GAIT TRAINING EA 15 MIN 3/21/2019 Lizbeth Mcmahan, PT GP 1    [id number]  HC PT THER SUPP EA 15 MIN 3/21/2019 Lizbeth Mcmahan, PT GP 1          PT G-Codes  Outcome Measure Options: AM-PAC 6 Clicks Basic Mobility (PT)  AM-PAC 6 Clicks Score: 18  Score: 10  Modified Robert Scale: 3 - Moderate disability.  Requiring some help, but able to walk without assistance.    Lizbeth Mcmahan, PT  3/21/2019

## 2019-03-21 NOTE — PLAN OF CARE
Problem: Patient Care Overview  Goal: Plan of Care Review  Outcome: Ongoing (interventions implemented as appropriate)   03/21/19 0957   Coping/Psychosocial   Plan of Care Reviewed With patient   Plan of Care Review   Progress improving   OTHER   Outcome Summary Pt demo improved independence w/ bed mobility of CGA however is MaxA for LBD tasks. Pt educated on AE use to improve independence. Recommend cont skilled IPOT POC. Pt is declining rehab placement, recommend DC home w/ assist and HH OT/PT/SLP services.        Problem: Stroke (Ischemic) (Adult)  Goal: Signs and Symptoms of Listed Potential Problems Will be Absent, Minimized or Managed (Stroke)  Outcome: Ongoing (interventions implemented as appropriate)   03/21/19 0957   Goal/Outcome Evaluation   Problems Assessed (Stroke (Ischemic)) cognitive impairment;communication impairment;motor/sensory impairment   Problems Assessed (Stroke (Ischemic)) motor/sensory impairment;communication impairment

## 2019-03-21 NOTE — PROGRESS NOTES
Neurology       Patient Care Team:  MAGDIEL Mcneill MD as PCP - General (Family Medicine)    Chief complaint: Transient aphasia    History: Patient is doing well and being discharged home today.    She is unaware of any speech difficulty.      Past Medical History:   Diagnosis Date   • COPD (chronic obstructive pulmonary disease) (CMS/[facility])    • Diabetes mellitus (CMS/[facility])    • GERD (gastroesophageal reflux disease)    • Hyperlipidemia    • Hypertension        Vital Signs   Vitals:    03/21/19 0700 03/21/19 0800 03/21/19 0900 03/21/19 1000   BP: 153/63 163/74 160/77 136/57   BP Location:   Left arm    Patient Position:       Pulse: 64 71 54 64   Resp:       Temp:   98.2 °F (36.8 °C)    TempSrc:   Oral    SpO2: 96%  98% 97%   Weight:       Height:           Physical Exam:   General: Awake alert and oriented              Neuro: Speech is normal.    Moves all extremities well.        Results Review:  Review  Results from last 7 days   Lab Units 03/18/19  0325   WBC 10*3/mm3 8.59   HEMOGLOBIN g/dL 13.5   HEMATOCRIT % 41.4   PLATELETS 10*3/mm3 169     Results from last 7 days   Lab Units 03/19/19  0332 03/18/19  0325 03/17/19  0819  03/14/19  1514   SODIUM mmol/L 139 138 138   < >  --    POTASSIUM mmol/L 3.5 3.6 3.9   < >  --    CHLORIDE mmol/L 109 107 107   < >  --    CO2 mmol/L 22.0 23.0 20.0   < >  --    BUN mg/dL 26* 26* 24*   < >  --    CREATININE mg/dL 1.24 1.38* 1.35*   < >  --    CALCIUM mg/dL 9.7 9.6 9.6   < >  --    BILIRUBIN mg/dL  --  0.6  --   --   --    ALK PHOS U/L  --  122*  --   --   --    ALT (SGPT) U/L  --  17  --   --  19   AST (SGOT) U/L  --  26  --   --  20   GLUCOSE mg/dL 110* 140* 145*   < >  --     < > = values in this interval not displayed.       Imaging Results (last 24 hours)     ** No results found for the last 24 hours. **          Assessment:  Acute stroke unexplained etiology    Plan:  Dual antiplatelets.    High-dose statin.    Follow-up Dr. Geronimo with outpatient  monitoring.        Comment:  The cause remains to be determined         I discussed the patients findings and my recommendations with patient and family    Surendra Elkins MD  03/21/19  11:42 AM

## 2019-03-21 NOTE — PLAN OF CARE
Problem: Fall Risk (Adult)  Goal: Absence of Fall  Outcome: Ongoing (interventions implemented as appropriate)      Problem: Skin Injury Risk (Adult)  Goal: Skin Health and Integrity  Outcome: Ongoing (interventions implemented as appropriate)      Problem: Patient Care Overview  Goal: Plan of Care Review  Outcome: Ongoing (interventions implemented as appropriate)   03/21/19 7156   Coping/Psychosocial   Plan of Care Reviewed With patient;spouse   Plan of Care Review   Progress improving   OTHER   Outcome Summary NIH = 0. Pt is ready to go home today with home health. Will continue to monitor.

## 2019-03-21 NOTE — PROGRESS NOTES
Continued Stay Note  [facility]     Patient Name: Yanique Webster  MRN: [id number]  Today's Date: 3/21/2019    Admit Date: 3/14/2019    Discharge Plan     Row Name 03/21/19 0936       Plan    Plan Comments  Plan to discharge to home today acc. by family.  [facility]  will see her for PT.OT and SN.  She refuses to go to [facility], even though staff have encouraged her to do so.  CASIMIRO Paige c [facility] said she can call [facility] if she gets home and changes her mind.  They would still be able to accept her.          Discharge Codes    No documentation.       Expected Discharge Date and Time     Expected Discharge Date Expected Discharge Time    Mar 21, 2019             Iftikhar Pineda RN

## 2019-03-22 ENCOUNTER — READMISSION MANAGEMENT (OUTPATIENT)
Dept: CALL CENTER | Facility: HOSPITAL | Age: 79
End: 2019-03-22

## 2019-03-22 NOTE — OUTREACH NOTE
Prep Survey      Responses   Facility patient discharged from?  [place]   Is patient eligible?  Yes   Discharge diagnosis  CVA (cerebral vascular accident,                                                                  COPD   Does the patient have one of the following disease processes/diagnoses(primary or secondary)?  Stroke (TIA)   Does the patient have Home health ordered?  Yes   What is the Home health agency?   [facility]    Is there a DME ordered?  No   Medication alerts for this patient  ASA, Plavix    Prep survey completed?  Yes          Vidhi Hadley RN

## 2019-03-22 NOTE — PROGRESS NOTES
Case Management Discharge Note    Final Note: Home    Destination      No service has been selected for the patient.      Durable Medical Equipment      No service has been selected for the patient.      Dialysis/Infusion      No service has been selected for the patient.      Home Medical Care      No service has been selected for the patient.      Community Resources      No service has been selected for the patient.             Final Discharge Disposition Code: 06 - home with home health care

## 2019-03-25 ENCOUNTER — READMISSION MANAGEMENT (OUTPATIENT)
Dept: CALL CENTER | Facility: HOSPITAL | Age: 79
End: 2019-03-25

## 2019-03-25 NOTE — OUTREACH NOTE
Stroke Week 1 Survey      Responses   Facility patient discharged from?  [place]   Does the patient have one of the following disease processes/diagnoses(primary or secondary)?  Stroke (TIA)   Is there a successful TCM telephone encounter documented?  No   Week 1 attempt successful?  Yes   Call start time  1012   Rescheduled  Rescheduled-pt requested [Patient in shower]   Discharge diagnosis  CVA (cerebral vascular accident)                                                                  COPD          Rosalia Jean RN

## 2019-03-26 ENCOUNTER — READMISSION MANAGEMENT (OUTPATIENT)
Dept: CALL CENTER | Facility: HOSPITAL | Age: 79
End: 2019-03-26

## 2019-03-26 ENCOUNTER — TELEPHONE (OUTPATIENT)
Dept: CARDIOLOGY | Facility: CLINIC | Age: 79
End: 2019-03-26

## 2019-03-26 PROCEDURE — 99284 EMERGENCY DEPT VISIT MOD MDM: CPT

## 2019-03-26 PROCEDURE — 36415 COLL VENOUS BLD VENIPUNCTURE: CPT

## 2019-03-26 NOTE — TELEPHONE ENCOUNTER
Can you check back in with her in 1 week.  If her systolic blood pressures remain above 140mmHg start amlodipine 5 mg daily. Thanks

## 2019-03-26 NOTE — TELEPHONE ENCOUNTER
Pt was recently D/C on 03/21/19 with a CVA. Her BP is elevated as follows;    176/106  154/106  160/115  178/127  152/83  181/125    Pt denies Chest pn or soa. HR has been in the 60's and 70's. I advised to increase Lisinopril to 40 mg until further instruction from you.

## 2019-03-26 NOTE — OUTREACH NOTE
Stroke Week 1 Survey      Responses   Facility patient discharged from?  [place]   Does the patient have one of the following disease processes/diagnoses(primary or secondary)?  Stroke (TIA)   Is there a successful TCM telephone encounter documented?  No   Week 1 attempt successful?  Yes   Call start time  1612   Call end time  1627   Discharge diagnosis  CVA (cerebral vascular accident)                                                                  COPD   Is patient permission given to speak with other caregiver?  Yes   List who call center can speak with     Person spoke with today (if not patient) and relationship     Meds reviewed with patient/caregiver?  Yes   Is the patient having any side effects they believe may be caused by any medication additions or changes?  No   Does the patient have all medications ordered at discharge?  Yes   Is the patient taking all medications as directed (includes completed medication regime)?  Yes   Does the patient have a primary care provider?   Yes   Does the patient have an appointment with their PCP within 7 days of discharge?  Yes   Has the patient kept scheduled appointments due by today?  N/A   What is the Home health agency?   [facility]    Has home health visited the patient within 72 hours of discharge?  Yes   Psychosocial issues?  No   Does the patient require any assistance with activities of daily living such as eating, bathing, dressing, walking, etc.?  No   Does the patient have any residual symptoms from stroke/TIA?  No   Does the patient understand the diet ordered at discharge?  Yes   Comments  Last pm her BP was 178/127, she took 1/2 dose of bp med that took bp down to 168/115. So she just laid down. Today herBP is 152/89   Did the patient receive a copy of their discharge instructions?  Yes   Nursing interventions  Reviewed instructions with patient   What is the patient's perception of their health status since discharge?  Improving   Nursing  interventions  Nurse provided patient education, Advised patient to call provider [as she is having high BP readings without symptoms. ]   Is the patient able to teach back FAST for Stroke?  Yes   Is the patient/caregiver able to teach back the risk factors for a stroke?  High blood pressure-goal below 120/80, History of TIAs, HIgh red blood cell count, History of Afib, High Cholesterol   Is the patient/caregiver able to teach back signs and symptoms related to disease process for when to call PCP?  Yes   Is the patient/caregiver able to teach back signs and symptoms related to disease process for when to call 911?  Yes   Is the patient/caregiver able to teach back the hierarchy of who to call/visit for symptoms/problems? PCP, Specialist, Home health nurse, Urgent Care, ED, 911  Yes   Week 1 call completed?  Yes          Marta Howard RN

## 2019-03-27 ENCOUNTER — TELEPHONE (OUTPATIENT)
Dept: CARDIOLOGY | Facility: CLINIC | Age: 79
End: 2019-03-27

## 2019-03-27 ENCOUNTER — APPOINTMENT (OUTPATIENT)
Dept: GENERAL RADIOLOGY | Facility: HOSPITAL | Age: 79
End: 2019-03-27

## 2019-03-27 ENCOUNTER — APPOINTMENT (OUTPATIENT)
Dept: CT IMAGING | Facility: HOSPITAL | Age: 79
End: 2019-03-27

## 2019-03-27 ENCOUNTER — HOSPITAL ENCOUNTER (EMERGENCY)
Facility: HOSPITAL | Age: 79
Discharge: HOME | End: 2019-03-27
Attending: EMERGENCY MEDICINE | Admitting: EMERGENCY MEDICINE

## 2019-03-27 VITALS
TEMPERATURE: 98.7 F | HEIGHT: 64 IN | RESPIRATION RATE: 22 BRPM | BODY MASS INDEX: 32.44 KG/M2 | WEIGHT: 190 LBS | HEART RATE: 55 BPM | SYSTOLIC BLOOD PRESSURE: 169 MMHG | DIASTOLIC BLOOD PRESSURE: 76 MMHG | OXYGEN SATURATION: 98 %

## 2019-03-27 DIAGNOSIS — Z86.73 HISTORY OF CVA (CEREBROVASCULAR ACCIDENT): ICD-10-CM

## 2019-03-27 DIAGNOSIS — I10 ESSENTIAL HYPERTENSION: Primary | ICD-10-CM

## 2019-03-27 DIAGNOSIS — E11.8 TYPE 2 DIABETES MELLITUS WITH COMPLICATION, UNSPECIFIED WHETHER LONG TERM INSULIN USE: ICD-10-CM

## 2019-03-27 DIAGNOSIS — N18.30 CKD (CHRONIC KIDNEY DISEASE) STAGE 3, GFR 30-59 ML/MIN (HCC): ICD-10-CM

## 2019-03-27 LAB
ALBUMIN SERPL-MCNC: 4.13 G/DL (ref 3.2–4.8)
ALBUMIN/GLOB SERPL: 1.6 G/DL (ref 1.5–2.5)
ALP SERPL-CCNC: 139 U/L (ref 25–100)
ALT SERPL W P-5'-P-CCNC: 36 U/L (ref 7–40)
ANION GAP SERPL CALCULATED.3IONS-SCNC: 9 MMOL/L (ref 3–11)
AST SERPL-CCNC: 17 U/L (ref 0–33)
BASOPHILS # BLD AUTO: 0.05 10*3/MM3 (ref 0–0.2)
BASOPHILS NFR BLD AUTO: 0.5 % (ref 0–1)
BILIRUB SERPL-MCNC: 0.3 MG/DL (ref 0.3–1.2)
BUN BLD-MCNC: 21 MG/DL (ref 9–23)
BUN/CREAT SERPL: 15.1 (ref 7–25)
CALCIUM SPEC-SCNC: 10.2 MG/DL (ref 8.7–10.4)
CHLORIDE SERPL-SCNC: 104 MMOL/L (ref 99–109)
CO2 SERPL-SCNC: 25 MMOL/L (ref 20–31)
CREAT BLD-MCNC: 1.39 MG/DL (ref 0.6–1.3)
DEPRECATED RDW RBC AUTO: 46.2 FL (ref 37–54)
EOSINOPHIL # BLD AUTO: 0.26 10*3/MM3 (ref 0–0.3)
EOSINOPHIL NFR BLD AUTO: 2.7 % (ref 0–3)
ERYTHROCYTE [DISTWIDTH] IN BLOOD BY AUTOMATED COUNT: 14.2 % (ref 11.3–14.5)
GFR SERPL CREATININE-BSD FRML MDRD: 37 ML/MIN/1.73
GLOBULIN UR ELPH-MCNC: 2.6 GM/DL
GLUCOSE BLD-MCNC: 227 MG/DL (ref 70–100)
GLUCOSE BLDC GLUCOMTR-MCNC: 226 MG/DL (ref 70–130)
HCT VFR BLD AUTO: 38.7 % (ref 34.5–44)
HGB BLD-MCNC: 12.5 G/DL (ref 11.5–15.5)
HOLD SPECIMEN: NORMAL
HOLD SPECIMEN: NORMAL
IMM GRANULOCYTES # BLD AUTO: 0.05 10*3/MM3 (ref 0–0.05)
IMM GRANULOCYTES NFR BLD AUTO: 0.5 % (ref 0–0.6)
LYMPHOCYTES # BLD AUTO: 2.39 10*3/MM3 (ref 0.6–4.8)
LYMPHOCYTES NFR BLD AUTO: 24.4 % (ref 24–44)
MCH RBC QN AUTO: 28.7 PG (ref 27–31)
MCHC RBC AUTO-ENTMCNC: 32.3 G/DL (ref 32–36)
MCV RBC AUTO: 88.8 FL (ref 80–99)
MONOCYTES # BLD AUTO: 0.88 10*3/MM3 (ref 0–1)
MONOCYTES NFR BLD AUTO: 9 % (ref 0–12)
NEUTROPHILS # BLD AUTO: 6.2 10*3/MM3 (ref 1.5–8.3)
NEUTROPHILS NFR BLD AUTO: 63.4 % (ref 41–71)
NRBC BLD AUTO-RTO: 0 /100 WBC (ref 0–0)
PLATELET # BLD AUTO: 226 10*3/MM3 (ref 150–450)
PMV BLD AUTO: 11.2 FL (ref 6–12)
POTASSIUM BLD-SCNC: 4.6 MMOL/L (ref 3.5–5.5)
PROT SERPL-MCNC: 6.7 G/DL (ref 5.7–8.2)
RBC # BLD AUTO: 4.36 10*6/MM3 (ref 3.89–5.14)
SODIUM BLD-SCNC: 138 MMOL/L (ref 132–146)
TROPONIN I SERPL-MCNC: 0 NG/ML (ref 0–0.07)
WBC NRBC COR # BLD: 9.78 10*3/MM3 (ref 3.5–10.8)
WHOLE BLOOD HOLD SPECIMEN: NORMAL
WHOLE BLOOD HOLD SPECIMEN: NORMAL

## 2019-03-27 PROCEDURE — 84484 ASSAY OF TROPONIN QUANT: CPT

## 2019-03-27 PROCEDURE — 82962 GLUCOSE BLOOD TEST: CPT

## 2019-03-27 PROCEDURE — 85025 COMPLETE CBC W/AUTO DIFF WBC: CPT | Performed by: PHYSICIAN ASSISTANT

## 2019-03-27 PROCEDURE — 93005 ELECTROCARDIOGRAM TRACING: CPT | Performed by: PHYSICIAN ASSISTANT

## 2019-03-27 PROCEDURE — 70450 CT HEAD/BRAIN W/O DYE: CPT

## 2019-03-27 PROCEDURE — 80053 COMPREHEN METABOLIC PANEL: CPT | Performed by: PHYSICIAN ASSISTANT

## 2019-03-27 PROCEDURE — 71046 X-RAY EXAM CHEST 2 VIEWS: CPT

## 2019-03-27 RX ORDER — SODIUM CHLORIDE 0.9 % (FLUSH) 0.9 %
10 SYRINGE (ML) INJECTION AS NEEDED
Status: DISCONTINUED | OUTPATIENT
Start: 2019-03-27 | End: 2019-03-27 | Stop reason: HOSPADM

## 2019-03-27 RX ORDER — AMLODIPINE BESYLATE 5 MG/1
5 TABLET ORAL DAILY
Qty: 30 TABLET | Refills: 3 | Status: SHIPPED | OUTPATIENT
Start: 2019-03-27 | End: 2019-04-18

## 2019-03-27 NOTE — TELEPHONE ENCOUNTER
Please have her bring her blood pressure cuff with her to the office to make sure that they correlate.  If her blood pressure still elevated on Friday, increase amlodipine to 10 mg daily.

## 2019-03-27 NOTE — TELEPHONE ENCOUNTER
I called the patient this morning and she said she went to the hospital last night because her pressures were in the 200's/100's. They D/C with her last pressure being 169/76 and didn't want to drive her pressure any lower for fear of cerebral hypoperfusion. While on the phone with her she took her BP and it was 210/143 with a HR of 76. Pt feels fine otherwise. No chest pain, SOA, or dizziness. I went ahead and called in the Amlodipine 5 mg daily for her. She said she would pick it up today. Advised her to call back on Friday with monitored BP's or sooner if BP not going down. Will contact pt if I don't hear from her by Friday. Pt verbalized understanding and agreeable to plan.

## 2019-03-27 NOTE — TELEPHONE ENCOUNTER
Pt  called to ask about BP plan from this morning. Advised him that I spoke with his wife already and told him I called in a RX for Amlodipine 5 mg. I went over the plan with him as well for her to call on Friday or sooner if BP stays up on the Amlodipine. He also asked about the MCOT she had placed in the hospital on 03/21/19. He said they were having trouble switching out the packs. I advised they could come in anytime and we could help them change them out to charge. Pt verbalized understanding and agreeable to plan.

## 2019-03-27 NOTE — TELEPHONE ENCOUNTER
Called pt to let her know to bring in her BP cuff in so we can correlate to make sure hers is working okay. Pt took BP on the phone with me now and it was 151/101 but earlier her pressures were a lot lower at 130/85, 145/77, 112/66. Advised pt I will still contact her Friday to check her BP. Pt verbalized understanding and agreeable to plan.

## 2019-03-29 ENCOUNTER — TELEPHONE (OUTPATIENT)
Dept: CARDIOLOGY | Facility: CLINIC | Age: 79
End: 2019-03-29

## 2019-03-29 NOTE — TELEPHONE ENCOUNTER
Pt called with BP log  129/78  125/67  136/75  148/94- evening readings in [facility]  147/90  195/81  122/66  Taking  Carvedilol 25 mg BID  Lisinopril 40 mg daily  Amlodipine 5 mg daily    Will increase amlodipine to 5 mg BID and have her update in one week. Pt aware and agreeable. KH

## 2019-04-03 ENCOUNTER — READMISSION MANAGEMENT (OUTPATIENT)
Dept: CALL CENTER | Facility: HOSPITAL | Age: 79
End: 2019-04-03

## 2019-04-03 NOTE — OUTREACH NOTE
Stroke Week 2 Survey      Responses   Facility patient discharged from?  [place]   Does the patient have one of the following disease processes/diagnoses(primary or secondary)?  Stroke (TIA)   Week 2 attempt successful?  Yes   Call start time  1037   Call end time  1041   Discharge diagnosis  CVA (cerebral vascular accident)                                                                  COPD   Meds reviewed with patient/caregiver?  Yes   Is the patient taking all medications as directed (includes completed medication regime)?  Yes   Has the patient kept scheduled appointments due by today?  Yes   What is the Home health agency?   [facility]    Home health comments  Pt is still receiving HH.   Psychosocial issues?  No   Does the patient require any assistance with activities of daily living such as eating, bathing, dressing, walking, etc.?  No   Does the patient understand the diet ordered at discharge?  Yes   What is the patient's perception of their health status since discharge?  Improving   Is the patient able to teach back FAST for Stroke?  Yes   Is the patient/caregiver able to teach back the risk factors for a stroke?  High blood pressure-goal below 120/80, History of TIAs, HIgh red blood cell count, History of Afib, High Cholesterol   Week 2 call completed?  Yes          Addie Law RN

## 2019-04-10 ENCOUNTER — TELEPHONE (OUTPATIENT)
Dept: CARDIOLOGY | Facility: CLINIC | Age: 79
End: 2019-04-10

## 2019-04-10 ENCOUNTER — READMISSION MANAGEMENT (OUTPATIENT)
Dept: CALL CENTER | Facility: HOSPITAL | Age: 79
End: 2019-04-10

## 2019-04-10 NOTE — TELEPHONE ENCOUNTER
Pt called with concerns of leg swelling. She states they are both swollen but the left leg is slightly more swollen but she admits her left foot has been injured in the past and always gives her trouble. She states swelling gets better with elevation when she is asleep at night but gets worse during the day. She denies any red streaks, pain, and hasn't experienced trouble ambulating. Advised to elevate her legs in her recliner during the day and to try compression stockings. Advised to take breaks during the day to elevate. Educated on signs of a blood clot and advised to go to ED if it gets worse. I let her know I'd consult with MJS when he gets back next week and told her I'd call her Monday or Tuesday. Pt agreeable to plan.

## 2019-04-10 NOTE — OUTREACH NOTE
Stroke Week 3 Survey      Responses   Facility patient discharged from?  [place]   Does the patient have one of the following disease processes/diagnoses(primary or secondary)?  Stroke (TIA)   Week 3 attempt successful?  Yes   Call start time  1425   Call end time  1432   Discharge diagnosis  CVA (cerebral vascular accident)                                                                  COPD   Meds reviewed with patient/caregiver?  Yes   Is the patient taking all medications as directed (includes completed medication regime)?  Yes   Medication comments  Lisinopril increased  Amlodipine added   Does the patient have a primary care provider?   Yes   Has the patient kept scheduled appointments due by today?  Yes   Comments  Dr. Geronimo appt              Dr Mcneill tomorrow 04/11   What is the Home health agency?   [facility]    Home health comments  HH will come one more time on Friday 04/12   Psychosocial issues?  No   Does the patient require any assistance with activities of daily living such as eating, bathing, dressing, walking, etc.?  No   Does the patient have any residual symptoms from stroke/TIA?  Yes   Residual symptoms comments  Peripheral vision on right side is not as well.  This is not a new symptom.  The CVA affected her vision.   Does the patient understand the diet ordered at discharge?  Yes   What is the patient's perception of their health status since discharge?  Improving   Is the patient able to teach back FAST for Stroke?  Yes   Is the patient/caregiver able to teach back the risk factors for a stroke?  High blood pressure-goal below 120/80, Diabetes, High Cholesterol, History of TIAs, History of Afib   Week 3 call completed?  Yes          Rosalia Jean RN

## 2019-04-17 NOTE — TELEPHONE ENCOUNTER
Patient's home health nurse called to report that the patient's leg swelling has increased. Her amlodipine was increased to 5 mg BID 3/29/2019 for hypertension and she has noticed increased swelling since. Current /60, HR 62. Also reports weight gain (unsure how much as she does not weigh everyday).  Please advise.

## 2019-04-18 ENCOUNTER — READMISSION MANAGEMENT (OUTPATIENT)
Dept: CALL CENTER | Facility: HOSPITAL | Age: 79
End: 2019-04-18

## 2019-04-18 RX ORDER — CLONIDINE HYDROCHLORIDE 0.1 MG/1
0.1 TABLET ORAL 2 TIMES DAILY
Qty: 180 TABLET | Refills: 3 | Status: SHIPPED | OUTPATIENT
Start: 2019-04-18 | End: 2019-07-08 | Stop reason: HOSPADM

## 2019-04-18 NOTE — OUTREACH NOTE
Stroke Week 4 Survey      Responses   Facility patient discharged from?  [place]   Does the patient have one of the following disease processes/diagnoses(primary or secondary)?  Stroke (TIA)   Week 4 attempt successful?  Yes   Call start time  1455   Call end time  1503   Meds reviewed with patient/caregiver?  Yes   Is the patient having any side effects they believe may be caused by any medication additions or changes?  Yes   Side effects comments   legs were swelling due to amlodipine   Prescription comments  Amlodipine was discontinued-will be starting on Clonidine this evening.   Is the patient taking all medications as directed (includes completed medication regime)?  Yes   Has the patient kept scheduled appointments due by today?  Yes   Is the patient still receiving Home Health Services?  Yes   Home health comments  Home health nurse continues to visit.   Psychosocial issues?  No   Does the patient require any assistance with activities of daily living such as eating, bathing, dressing, walking, etc.?  No   Does the patient have any residual symptoms from stroke/TIA?  Yes   Residual symptoms comments  States no peripheral vision in right eye.   Does the patient understand the diet ordered at discharge?  Yes   What is the patient's perception of their health status since discharge?  Improving   Nursing interventions  Nurse provided patient education   Is the patient able to teach back FAST for Stroke?  Yes   Is the patient/caregiver able to teach back the risk factors for a stroke?  High blood pressure-goal below 120/80, Diabetes, History of TIAs, High Cholesterol, Carotid or other artery disease   Is the patient/caregiver able to teach back signs and symptoms related to disease process for when to call PCP?  Yes   Is the patient/caregiver able to teach back signs and symptoms related to disease process for when to call 911?  Yes   If the patient is a current smoker, are they able to teach back resources for  cessation?  -- [nonsmoker]   Is the patient/caregiver able to teach back the hierarchy of who to call/visit for symptoms/problems? PCP, Specialist, Home health nurse, Urgent Care, ED, 911  Yes   Week 4 Call Completed?  Yes   Would the patient like one additional call?  No   Graduated  Yes   Wrap up additional comments  All goals met-states doing well.          Zahida Ramos, RN

## 2019-04-18 NOTE — TELEPHONE ENCOUNTER
Advised to stop amlodipine and start Clonidine 0.1 mg BID. I called in the new RX for her, confirmed pharmacy. Advised to monitor BP, HR, and weight gain. Pt will call next week with those numbers. Pt agreeable to plan and verbalized understanding.

## 2019-04-18 NOTE — TELEPHONE ENCOUNTER
Please have her stop the amlodipine and start clonidine 0.1 mg twice daily.  Can you call in another week to see how the blood pressure is going?  Thanks

## 2019-04-25 ENCOUNTER — TELEPHONE (OUTPATIENT)
Dept: CARDIOLOGY | Facility: CLINIC | Age: 79
End: 2019-04-25

## 2019-04-25 NOTE — TELEPHONE ENCOUNTER
Called patient with the results of her monitor per MJS. (see note below)    Patient also had some concern about her BP being low. She states that her BP has been in the 100's SBP since starting the Clonidine 0.1 mg BID. I advised patient to cut back the Clonidine 0.1 mg to just once a day. Told patient to check and record her BP twice daily. Once in the morning about 1-2 hours after taking Clonidine, and then check BP in the evening around 8pm. Told patient to call in 1 week to report BP readings, but if she has any difficulty with BP (too high or Low) to call sooner.    Patient verbalized understanding

## 2019-04-25 NOTE — TELEPHONE ENCOUNTER
----- Message from Steve Geronimo MD sent at 4/24/2019  5:01 PM EDT -----  Please let the patient know her heart monitor did not reveal atrial fibrillation.  As result though, we will discuss loop recorder implant at her upcoming clinic visit.  Thanks

## 2019-05-13 ENCOUNTER — OUTSIDE FACILITY SERVICE (OUTPATIENT)
Dept: PULMONOLOGY | Facility: CLINIC | Age: 79
End: 2019-05-13

## 2019-05-13 PROCEDURE — G0180 MD CERTIFICATION HHA PATIENT: HCPCS | Performed by: INTERNAL MEDICINE

## 2019-05-15 RX ORDER — CLOPIDOGREL BISULFATE 75 MG/1
TABLET ORAL
Qty: 30 TABLET | Refills: 1 | OUTPATIENT
Start: 2019-05-15

## 2019-05-20 RX ORDER — CLOPIDOGREL BISULFATE 75 MG/1
TABLET ORAL
Qty: 30 TABLET | Refills: 1 | OUTPATIENT
Start: 2019-05-20

## 2019-06-07 ENCOUNTER — CALL CENTER PROGRAMS (OUTPATIENT)
Dept: CALL CENTER | Facility: HOSPITAL | Age: 79
End: 2019-06-07

## 2019-06-07 NOTE — OUTREACH NOTE
Stroke [place] Survey      Responses   Facility patient discharged from?  [place]   Attempt successful  Yes   Call start time  1058   Person spoke with today (if not patient) and relationship  Patient   Call end time  1105   Patient location 30 days post discharge if known  Home   Was the patient readmitted within 30 days of discharge?  No   Could you live alone without any help from another person?  Yes   Can you do everything that you were doing right before your stroke even if slower and not as much?  No   Are you completely back to the way you were right before your stroke?  No   Can you walk from one room to another without help from another person?  Yes   Can the patient sit up in bed without any help?  Yes   Call Center [place] Score  2   Robert score call completed  Yes   Comments  Patient reports decreased right eye peripheral vision continues.  She does not drive since her stroke.  She is able to ambulate independently and manages her own ADLs.  She monitors her BP and is aware of S/S of a stroke.          Monalisa Freedman RN

## 2019-06-19 ENCOUNTER — OFFICE VISIT (OUTPATIENT)
Dept: CARDIOLOGY | Facility: CLINIC | Age: 79
End: 2019-06-19

## 2019-06-19 VITALS
HEART RATE: 60 BPM | BODY MASS INDEX: 36.06 KG/M2 | WEIGHT: 211.2 LBS | SYSTOLIC BLOOD PRESSURE: 130 MMHG | DIASTOLIC BLOOD PRESSURE: 68 MMHG | HEIGHT: 64 IN

## 2019-06-19 DIAGNOSIS — I25.10 CORONARY ARTERY DISEASE INVOLVING NATIVE CORONARY ARTERY OF NATIVE HEART WITHOUT ANGINA PECTORIS: ICD-10-CM

## 2019-06-19 DIAGNOSIS — I51.81 TAKOTSUBO CARDIOMYOPATHY: Primary | ICD-10-CM

## 2019-06-19 DIAGNOSIS — I63.9 CEREBROVASCULAR ACCIDENT (CVA), UNSPECIFIED MECHANISM (HCC): ICD-10-CM

## 2019-06-19 DIAGNOSIS — I10 ESSENTIAL HYPERTENSION: ICD-10-CM

## 2019-06-19 PROCEDURE — 99214 OFFICE O/P EST MOD 30 MIN: CPT | Performed by: INTERNAL MEDICINE

## 2019-06-19 NOTE — PROGRESS NOTES
[facility] AT [address], [address]  [place], [place], [id number] [phone]  [email or web address]           OUTPATIENT CLINIC FOLLOW UP NOTE    Patient Care Team:  Patient Care Team:  MAGDIEL Mcneill MD as PCP - General (Family Medicine)  Natalee Woodard APRN as PCP - Claims Attributed  MAGDIEL Mcneill MD (Family Medicine)    Subjective:   Reason for consultation:   Chief Complaint   Patient presents with   • Coronary Artery Disease       HPI:    Yanique Webster is a 79 y.o. female.  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    The patient reports that over the last several weeks her blood pressure has been better controlled.  However she did have instances of hypertension overnight which was improved with a half dose of clonidine.  She denies any chest pain, palpitations, lightheadedness, syncope, orthopnea, or PND.  She also reports improvement in her lower extremity edema since stopping amlodipine.      Review of Systems:  Negative for exertional chest pain, orthopnea, PND, lower extremity edema, palpitations, lightheadedness, syncope.     PFSH:  Patient Active Problem List   Diagnosis   • Aspiration pneumonia of both lungs (CMS/HCC)   • Acute respiratory failure with hypoxia (CMS/HCC)   • Fibromyalgia   • PVD (peripheral vascular disease) (CMS/HCC)   • Acute renal failure - resolved.    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 (CMS/HCC)   • Diabetic gastroparesis (CMS/HCC)   • Takotsubo cardiomyopathy   • Acute systolic congestive heart failure (CMS/HCC)   • Elevated  troponin   • Hypertension   • Diabetes mellitus (CMS/[facility])   • Hyperlipidemia   • COPD (chronic obstructive pulmonary disease) (CMS/[facility])   • Obesity   • Osteoarthritis   • Chronic pain   • GERD (gastroesophageal reflux disease)   • Pericarditis   • Gout   • Chronic joint pain   • Coronary artery disease involving native coronary artery of native heart without angina pectoris   • Nonrheumatic aortic valve insufficiency   • Altered mental status   • Hypertension   • Hyperlipidemia   • Diabetes mellitus (CMS/HCC)   • COPD (chronic obstructive pulmonary disease) (CMS/[facility])   • CVA (cerebral vascular accident) (S/P tPA)   • CAD (coronary artery disease)   • H/O Takotsubo cardiomyopathy (EF now normal)   • CKD (chronic kidney disease) stage 3, GFR 30-59 ml/min (CMS/[facility])         Current Outpatient Medications:   •  aspirin 325 MG tablet, Take 1 tablet by mouth Daily., Disp: 30 tablet, Rfl: 1  •  atorvastatin (LIPITOR) 80 MG tablet, Take 1 tablet by mouth Every Night., Disp: 30 tablet, Rfl: 1  •  carvedilol (COREG) 25 MG tablet, Take 1 tablet by mouth Every 12 (Twelve) Hours., Disp: 60 tablet, Rfl: 1  •  Cholecalciferol (VITAMIN D) 2000 units capsule, Take 2,000 Units by mouth Daily., Disp: , Rfl:   •  CloNIDine (CATAPRES) 0.1 MG tablet, Take 1 tablet by mouth 2 (Two) Times a Day., Disp: 180 tablet, Rfl: 3  •  clopidogrel (PLAVIX) 75 MG tablet, Take 1 tablet by mouth Daily., Disp: 30 tablet, Rfl: 1  •  DULoxetine (CYMBALTA) 60 MG capsule, Take 2 capsules by mouth daily. (Patient taking differently: Take 60 mg by mouth Daily.), Disp: , Rfl:   •  insulin glargine (LANTUS) 100 UNIT/ML injection, Inject 24 Units under the skin into the appropriate area as directed Every Night., Disp: , Rfl:   •  lisinopril (PRINIVIL,ZESTRIL) 20 MG tablet, Take 1 tablet by mouth Daily. (Patient taking differently: Take 40 mg by mouth Daily.), Disp: 30 tablet, Rfl: 1  •  methadone (DOLOPHINE) 10 MG tablet, Take 10 mg by mouth Every 8 (Eight) Hours  "As Needed for Moderate Pain ,, Disp: , Rfl:   •  pantoprazole (PROTONIX) 40 MG EC tablet, Take 1 tablet by mouth daily., Disp: , Rfl:   •  pantoprazole (PROTONIX) 40 MG EC tablet, Take 40 mg by mouth Daily., Disp: , Rfl:   •  polyethylene glycol (MIRALAX) packet, Take 17 g by mouth every 12 (twelve) hours as needed (constipation)., Disp: , Rfl:   •  probiotic (CULTURELLE) capsule capsule, Take 1 capsule by mouth Daily., Disp: , Rfl:   •  torsemide (DEMADEX) 10 MG tablet, Take 10 mg by mouth 3 (Three) Times a Week. On Monday, Wednesday, and Friday, Disp: , Rfl:     Allergies   Allergen Reactions   • Penicillins    • Penicillins Unknown (See Comments)     unk        reports that she has never smoked. She has never used smokeless tobacco.    Family History   Problem Relation Age of Onset   • Cancer Mother    • Cancer Father    • Cancer Other    • Breast cancer Sister 67   • Ovarian cancer Neg Hx            Objective:   Blood pressure 130/68, pulse 60, height 162.6 cm (64\"), weight 95.8 kg (211 lb 3.2 oz).  CONSTITUTIONAL: No acute distress, normal affect  RESPIRATORY: Normal effort. Clear to auscultation bilaterally without wheezing or rales.  CARDIOVASCULAR: Regular rate and rhythm with normal S1 and S2. Without murmur, gallop or rub.  PERIPHERAL VASCULAR: Normal radial pulses bilaterally. There is no peripheral edema bilaterally.    Labs:  Lab Results   Component Value Date    ALT 36 03/27/2019    AST 17 03/27/2019     Lab Results   Component Value Date    CHOL 145 03/15/2019    TRIG 117 03/15/2019    HDL 35 (L) 03/15/2019    LDLDIRECT 2.6 (L) 04/19/2017    CREATININE 1.39 (H) 03/27/2019       Diagnostic Data:    Procedures    MCOT 4/2019  -No atrial fibrillation.  -Low ectopy burden.  -One 2.2-second pause.  -One triggered event was normal sinus rhythm.    ERMIAS 3/2019  · Left ventricular systolic function is normal. Estimated EF appears to be in the range of 61 - 65%  · Normal left atrial size and volume noted. There " is no spontaneous echo contrast present. The left atrial appendage was visualized through multiple planes. Left atrial appendage was found to be multilobar in nature. Doppler interrogation shows normal flow within the left atrial appendage. No evidence of a left atrial appendage thrombus was present  · Lipomatous hypertrophy of the interatrial septum present. No evidence of a patent foramen ovale. Saline test results are negative.  · There is mild thickening of the noncoronary cusp of the aortic valve. Mild aortic valve regurgitation is present  · There is moderate (grade 2) layered plaque in the proximal aorta present.    TTE 3/2019  · The study is technically difficult for diagnosis.  · Left ventricular systolic function is hyperdynamic (EF > 70).  · Left ventricular diastolic dysfunction (grade I) consistent with impaired relaxation.  · The valves were poorly visualized. There was no obvious hemodynamic abnormalities of the aortic or mitral valve, however    TTE 8/2016  · The left ventricular cavity is mildly dilated.  · The findings are consistent with stress-induced (Takotsubo) cardiomyopathy.  · Moderate aortic valve regurgitation is present.  · Left ventricular function is severely decreased. Estimated EF = 15%.    TTE 10/2016  · Left ventricular function is normal. Estimated EF = 60%.  · Left ventricular wall thickness is consistent with borderline concentric hypertrophy.  · The left ventricular cavity is borderline dilated.  · All left ventricular wall segments contract normally.  · Left ventricular diastolic dysfunction (grade I a) consistent with impaired relaxation.  · Moderate aortic valve regurgitation is present.  ·   Cath results reviewed 8/30/16  Angiographic Findings:  · Coronary circulation is right dominant  · Left main: Normal  · LAD: 40% proximal discrete stenosis, small D1 and D2 without significant disease, mid to distal LAD with mild diffuse disease  · LCX: Large circumflex, tandem 30%  stenoses in the mid Cx after OM2, moderate mid OM1 disease, med sized OM2 with luminal irregularities, med to large sized OM3 without significant disease  · RCA: 50% mid RCA lesion, iFR 0.95 (not functionally significant), mild distal disease    Assessment and Plan:   Takotsubo cardiomyopathy  -EF normalized  -NYHA class I, doing well from cardiac standpoint  -Continue cardiac medications     Coronary artery disease involving native coronary artery of native heart without angina pectoris  Hyperlipidemia  -CCS class 0  -Continue aspirin, statin, beta-blocker.    Essential hypertension  -At goal this visit.  -Continue current related medications.  -Patient may take an additional clonidine for significant hypertension.    CVA, possibly cardioembolic per the neurology team, symptoms resolved after TPA, old occipital infarct in addition to 2019 stroke  -30-day cardiac event monitor without evidence of atrial fibrillation.  -We will proceed with loop recorder implant at this time.  -Continue DAPT, statin for now.    - No Follow-up on file.  We will schedule follow-up after loop recorder implant.    Scribed for Steve Geronimo MD by Milena Nayak, APRN   6/19/2019  4:34 PM        I, Steve Geronimo MD, personally performed the services as scribed by the above named individual. I have made any necessary edits and it is both accurate and complete.     Steve Geronimo MD, MSc, [facility]  Interventional Cardiology  [facility]

## 2019-06-20 ENCOUNTER — PREP FOR SURGERY (OUTPATIENT)
Dept: OTHER | Facility: HOSPITAL | Age: 79
End: 2019-06-20

## 2019-06-20 DIAGNOSIS — I63.9 CVA (CEREBRAL VASCULAR ACCIDENT) (HCC): Primary | ICD-10-CM

## 2019-06-20 RX ORDER — SODIUM CHLORIDE 0.9 % (FLUSH) 0.9 %
3 SYRINGE (ML) INJECTION EVERY 12 HOURS SCHEDULED
Status: CANCELLED | OUTPATIENT
Start: 2019-06-20

## 2019-06-20 RX ORDER — SODIUM CHLORIDE 0.9 % (FLUSH) 0.9 %
3-10 SYRINGE (ML) INJECTION AS NEEDED
Status: CANCELLED | OUTPATIENT
Start: 2019-06-20

## 2019-07-02 ENCOUNTER — HOSPITAL ENCOUNTER (OUTPATIENT)
Facility: HOSPITAL | Age: 79
Discharge: HOME | End: 2019-07-02
Attending: INTERNAL MEDICINE | Admitting: INTERNAL MEDICINE

## 2019-07-02 VITALS
SYSTOLIC BLOOD PRESSURE: 152 MMHG | BODY MASS INDEX: 35.76 KG/M2 | TEMPERATURE: 97.5 F | DIASTOLIC BLOOD PRESSURE: 61 MMHG | RESPIRATION RATE: 18 BRPM | HEIGHT: 64 IN | HEART RATE: 55 BPM | WEIGHT: 209.44 LBS | OXYGEN SATURATION: 95 %

## 2019-07-02 DIAGNOSIS — I63.9 CVA (CEREBRAL VASCULAR ACCIDENT) (HCC): ICD-10-CM

## 2019-07-02 DIAGNOSIS — I63.9 CEREBROVASCULAR ACCIDENT (CVA), UNSPECIFIED MECHANISM (HCC): ICD-10-CM

## 2019-07-02 LAB
ANION GAP SERPL CALCULATED.3IONS-SCNC: 13 MMOL/L (ref 5–15)
BUN BLD-MCNC: 36 MG/DL (ref 8–23)
BUN/CREAT SERPL: 22.9 (ref 7–25)
CALCIUM SPEC-SCNC: 10 MG/DL (ref 8.6–10.5)
CHLORIDE SERPL-SCNC: 104 MMOL/L (ref 98–107)
CO2 SERPL-SCNC: 25 MMOL/L (ref 22–29)
CREAT BLD-MCNC: 1.57 MG/DL (ref 0.57–1)
DEPRECATED RDW RBC AUTO: 43.9 FL (ref 37–54)
ERYTHROCYTE [DISTWIDTH] IN BLOOD BY AUTOMATED COUNT: 13.5 % (ref 12.3–15.4)
GFR SERPL CREATININE-BSD FRML MDRD: 32 ML/MIN/1.73
GLUCOSE BLD-MCNC: 124 MG/DL (ref 65–99)
HCT VFR BLD AUTO: 38.6 % (ref 34–46.6)
HGB BLD-MCNC: 12.3 G/DL (ref 12–15.9)
MCH RBC QN AUTO: 28.3 PG (ref 26.6–33)
MCHC RBC AUTO-ENTMCNC: 31.9 G/DL (ref 31.5–35.7)
MCV RBC AUTO: 88.7 FL (ref 79–97)
PLATELET # BLD AUTO: 187 10*3/MM3 (ref 140–450)
PMV BLD AUTO: 11.1 FL (ref 6–12)
POTASSIUM BLD-SCNC: 4.5 MMOL/L (ref 3.5–5.2)
RBC # BLD AUTO: 4.35 10*6/MM3 (ref 3.77–5.28)
SODIUM BLD-SCNC: 142 MMOL/L (ref 136–145)
WBC NRBC COR # BLD: 9.3 10*3/MM3 (ref 3.4–10.8)

## 2019-07-02 PROCEDURE — 25010000002 FENTANYL CITRATE (PF) 100 MCG/2ML SOLUTION: Performed by: INTERNAL MEDICINE

## 2019-07-02 PROCEDURE — 25010000002 MIDAZOLAM PER 1 MG: Performed by: INTERNAL MEDICINE

## 2019-07-02 PROCEDURE — 33285 INSJ SUBQ CAR RHYTHM MNTR: CPT | Performed by: INTERNAL MEDICINE

## 2019-07-02 PROCEDURE — 85027 COMPLETE CBC AUTOMATED: CPT | Performed by: NURSE PRACTITIONER

## 2019-07-02 PROCEDURE — 25010000002 VANCOMYCIN: Performed by: NURSE PRACTITIONER

## 2019-07-02 PROCEDURE — 36415 COLL VENOUS BLD VENIPUNCTURE: CPT

## 2019-07-02 PROCEDURE — C1764 EVENT RECORDER, CARDIAC: HCPCS | Performed by: INTERNAL MEDICINE

## 2019-07-02 PROCEDURE — 25010000003 LIDOCAINE 1 % SOLUTION: Performed by: INTERNAL MEDICINE

## 2019-07-02 PROCEDURE — 80048 BASIC METABOLIC PNL TOTAL CA: CPT | Performed by: NURSE PRACTITIONER

## 2019-07-02 DEVICE — ICM LP/RECRD REVEAL LINQ MEDTRONIC: Type: IMPLANTABLE DEVICE | Status: FUNCTIONAL

## 2019-07-02 RX ORDER — LIDOCAINE HYDROCHLORIDE 10 MG/ML
INJECTION, SOLUTION INFILTRATION; PERINEURAL AS NEEDED
Status: DISCONTINUED | OUTPATIENT
Start: 2019-07-02 | End: 2019-07-02 | Stop reason: HOSPADM

## 2019-07-02 RX ORDER — FENTANYL CITRATE 50 UG/ML
INJECTION, SOLUTION INTRAMUSCULAR; INTRAVENOUS AS NEEDED
Status: DISCONTINUED | OUTPATIENT
Start: 2019-07-02 | End: 2019-07-02 | Stop reason: HOSPADM

## 2019-07-02 RX ORDER — SODIUM CHLORIDE 0.9 % (FLUSH) 0.9 %
3-10 SYRINGE (ML) INJECTION AS NEEDED
Status: DISCONTINUED | OUTPATIENT
Start: 2019-07-02 | End: 2019-07-02 | Stop reason: HOSPADM

## 2019-07-02 RX ORDER — SODIUM CHLORIDE 0.9 % (FLUSH) 0.9 %
3 SYRINGE (ML) INJECTION EVERY 12 HOURS SCHEDULED
Status: DISCONTINUED | OUTPATIENT
Start: 2019-07-02 | End: 2019-07-02 | Stop reason: HOSPADM

## 2019-07-02 RX ORDER — MIDAZOLAM HYDROCHLORIDE 1 MG/ML
INJECTION INTRAMUSCULAR; INTRAVENOUS AS NEEDED
Status: DISCONTINUED | OUTPATIENT
Start: 2019-07-02 | End: 2019-07-02 | Stop reason: HOSPADM

## 2019-07-02 RX ADMIN — VANCOMYCIN HYDROCHLORIDE 1500 MG: 10 INJECTION, POWDER, LYOPHILIZED, FOR SOLUTION INTRAVENOUS at 08:12

## 2019-07-02 NOTE — INTERVAL H&P NOTE
H&P reviewed. The patient was examined and there are no changes to the H&P.   She denies chest pain, shortness of breath, palpitations, or lower extremity edema. Lab work from today reviewed. Plan for loop recorder implant today with Dr. Geronimo. The risks, benefits, and alternatives of the procedure have been reviewed and the patient wishes to proceed.       Milena Nayak, CHRIS  07/02/19 7:55 AM

## 2019-07-05 ENCOUNTER — APPOINTMENT (OUTPATIENT)
Dept: CT IMAGING | Facility: HOSPITAL | Age: 79
End: 2019-07-05

## 2019-07-05 ENCOUNTER — HOSPITAL ENCOUNTER (INPATIENT)
Facility: HOSPITAL | Age: 79
LOS: 2 days | Discharge: HOME | End: 2019-07-08
Attending: EMERGENCY MEDICINE | Admitting: HOSPITALIST

## 2019-07-05 ENCOUNTER — APPOINTMENT (OUTPATIENT)
Dept: GENERAL RADIOLOGY | Facility: HOSPITAL | Age: 79
End: 2019-07-05

## 2019-07-05 DIAGNOSIS — Z86.73 HISTORY OF STROKE: ICD-10-CM

## 2019-07-05 DIAGNOSIS — R41.82 ALTERED MENTAL STATUS, UNSPECIFIED ALTERED MENTAL STATUS TYPE: ICD-10-CM

## 2019-07-05 DIAGNOSIS — E11.69 DIABETES MELLITUS TYPE 2 IN OBESE (HCC): ICD-10-CM

## 2019-07-05 DIAGNOSIS — I16.1 HYPERTENSIVE EMERGENCY: Primary | ICD-10-CM

## 2019-07-05 DIAGNOSIS — E66.9 DIABETES MELLITUS TYPE 2 IN OBESE (HCC): ICD-10-CM

## 2019-07-05 LAB
ALBUMIN SERPL-MCNC: 4.3 G/DL (ref 3.5–5.2)
ALBUMIN/GLOB SERPL: 1.3 G/DL
ALP SERPL-CCNC: 137 U/L (ref 39–117)
ALT SERPL W P-5'-P-CCNC: 13 U/L (ref 1–33)
ANION GAP SERPL CALCULATED.3IONS-SCNC: 17 MMOL/L (ref 5–15)
AST SERPL-CCNC: 15 U/L (ref 1–32)
BACTERIA UR QL AUTO: NORMAL /HPF
BASOPHILS # BLD AUTO: 0.07 10*3/MM3 (ref 0–0.2)
BASOPHILS NFR BLD AUTO: 0.9 % (ref 0–1.5)
BILIRUB SERPL-MCNC: 0.4 MG/DL (ref 0.2–1.2)
BILIRUB UR QL STRIP: NEGATIVE
BUN BLD-MCNC: 30 MG/DL (ref 8–23)
BUN/CREAT SERPL: 22.6 (ref 7–25)
CALCIUM SPEC-SCNC: 10.5 MG/DL (ref 8.6–10.5)
CHLORIDE SERPL-SCNC: 100 MMOL/L (ref 98–107)
CLARITY UR: CLEAR
CO2 SERPL-SCNC: 23 MMOL/L (ref 22–29)
COLOR UR: YELLOW
CREAT BLD-MCNC: 1.33 MG/DL (ref 0.57–1)
DEPRECATED RDW RBC AUTO: 42 FL (ref 37–54)
EOSINOPHIL # BLD AUTO: 0.23 10*3/MM3 (ref 0–0.4)
EOSINOPHIL NFR BLD AUTO: 2.9 % (ref 0.3–6.2)
ERYTHROCYTE [DISTWIDTH] IN BLOOD BY AUTOMATED COUNT: 13.4 % (ref 12.3–15.4)
GFR SERPL CREATININE-BSD FRML MDRD: 39 ML/MIN/1.73
GLOBULIN UR ELPH-MCNC: 3.2 GM/DL
GLUCOSE BLD-MCNC: 141 MG/DL (ref 65–99)
GLUCOSE UR STRIP-MCNC: NEGATIVE MG/DL
HCT VFR BLD AUTO: 44 % (ref 34–46.6)
HGB BLD-MCNC: 14.5 G/DL (ref 12–15.9)
HGB UR QL STRIP.AUTO: ABNORMAL
HOLD SPECIMEN: NORMAL
HOLD SPECIMEN: NORMAL
HYALINE CASTS UR QL AUTO: NORMAL /LPF
IMM GRANULOCYTES # BLD AUTO: 0.02 10*3/MM3 (ref 0–0.05)
IMM GRANULOCYTES NFR BLD AUTO: 0.2 % (ref 0–0.5)
KETONES UR QL STRIP: NEGATIVE
LEUKOCYTE ESTERASE UR QL STRIP.AUTO: NEGATIVE
LYMPHOCYTES # BLD AUTO: 1.44 10*3/MM3 (ref 0.7–3.1)
LYMPHOCYTES NFR BLD AUTO: 17.9 % (ref 19.6–45.3)
MAGNESIUM SERPL-MCNC: 2 MG/DL (ref 1.6–2.4)
MCH RBC QN AUTO: 28.4 PG (ref 26.6–33)
MCHC RBC AUTO-ENTMCNC: 33 G/DL (ref 31.5–35.7)
MCV RBC AUTO: 86.3 FL (ref 79–97)
MONOCYTES # BLD AUTO: 0.55 10*3/MM3 (ref 0.1–0.9)
MONOCYTES NFR BLD AUTO: 6.8 % (ref 5–12)
NEUTROPHILS # BLD AUTO: 5.74 10*3/MM3 (ref 1.7–7)
NEUTROPHILS NFR BLD AUTO: 71.3 % (ref 42.7–76)
NITRITE UR QL STRIP: NEGATIVE
NRBC BLD AUTO-RTO: 0 /100 WBC (ref 0–0.2)
PH UR STRIP.AUTO: 8 [PH] (ref 5–8)
PLATELET # BLD AUTO: 184 10*3/MM3 (ref 140–450)
PMV BLD AUTO: 10.8 FL (ref 6–12)
POTASSIUM BLD-SCNC: 4.1 MMOL/L (ref 3.5–5.2)
PROT SERPL-MCNC: 7.5 G/DL (ref 6–8.5)
PROT UR QL STRIP: ABNORMAL
RBC # BLD AUTO: 5.1 10*6/MM3 (ref 3.77–5.28)
RBC # UR: NORMAL /HPF
REF LAB TEST METHOD: NORMAL
SODIUM BLD-SCNC: 140 MMOL/L (ref 136–145)
SP GR UR STRIP: 1.01 (ref 1–1.03)
SQUAMOUS #/AREA URNS HPF: NORMAL /HPF
TROPONIN T SERPL-MCNC: <0.01 NG/ML (ref 0–0.03)
UROBILINOGEN UR QL STRIP: ABNORMAL
WBC NRBC COR # BLD: 8.05 10*3/MM3 (ref 3.4–10.8)
WBC UR QL AUTO: NORMAL /HPF
WHOLE BLOOD HOLD SPECIMEN: NORMAL
WHOLE BLOOD HOLD SPECIMEN: NORMAL

## 2019-07-05 PROCEDURE — 85025 COMPLETE CBC W/AUTO DIFF WBC: CPT

## 2019-07-05 PROCEDURE — G0378 HOSPITAL OBSERVATION PER HR: HCPCS

## 2019-07-05 PROCEDURE — 80053 COMPREHEN METABOLIC PANEL: CPT

## 2019-07-05 PROCEDURE — P9612 CATHETERIZE FOR URINE SPEC: HCPCS

## 2019-07-05 PROCEDURE — 81001 URINALYSIS AUTO W/SCOPE: CPT

## 2019-07-05 PROCEDURE — 93005 ELECTROCARDIOGRAM TRACING: CPT

## 2019-07-05 PROCEDURE — 70450 CT HEAD/BRAIN W/O DYE: CPT

## 2019-07-05 PROCEDURE — 83735 ASSAY OF MAGNESIUM: CPT

## 2019-07-05 PROCEDURE — 99285 EMERGENCY DEPT VISIT HI MDM: CPT

## 2019-07-05 PROCEDURE — 84484 ASSAY OF TROPONIN QUANT: CPT

## 2019-07-05 PROCEDURE — 71045 X-RAY EXAM CHEST 1 VIEW: CPT

## 2019-07-05 RX ORDER — SODIUM CHLORIDE 0.9 % (FLUSH) 0.9 %
10 SYRINGE (ML) INJECTION AS NEEDED
Status: DISCONTINUED | OUTPATIENT
Start: 2019-07-05 | End: 2019-07-08 | Stop reason: HOSPADM

## 2019-07-05 RX ORDER — LABETALOL HYDROCHLORIDE 5 MG/ML
10 INJECTION, SOLUTION INTRAVENOUS
Status: DISCONTINUED | OUTPATIENT
Start: 2019-07-05 | End: 2019-07-06

## 2019-07-05 RX ADMIN — LABETALOL 20 MG/4 ML (5 MG/ML) INTRAVENOUS SYRINGE 10 MG: at 22:12

## 2019-07-05 RX ADMIN — LABETALOL 20 MG/4 ML (5 MG/ML) INTRAVENOUS SYRINGE 10 MG: at 21:54

## 2019-07-05 RX ADMIN — NICARDIPINE HYDROCHLORIDE 5 MG/HR: 0.1 INJECTION, SOLUTION INTRAVENOUS at 22:50

## 2019-07-06 PROBLEM — E11.9 DIABETES MELLITUS (HCC): Status: RESOLVED | Noted: 2019-03-14 | Resolved: 2019-07-06

## 2019-07-06 PROBLEM — Z95.818 STATUS POST PLACEMENT OF IMPLANTABLE LOOP RECORDER: Status: ACTIVE | Noted: 2019-07-06

## 2019-07-06 PROBLEM — Z86.73 HISTORY OF CVA (CEREBROVASCULAR ACCIDENT): Status: ACTIVE | Noted: 2019-03-15

## 2019-07-06 PROBLEM — Z86.73 HISTORY OF CVA (CEREBROVASCULAR ACCIDENT): Status: RESOLVED | Noted: 2019-03-15 | Resolved: 2019-07-06

## 2019-07-06 PROBLEM — I51.81 TAKOTSUBO CARDIOMYOPATHY: Status: RESOLVED | Noted: 2019-03-18 | Resolved: 2019-07-06

## 2019-07-06 LAB
ANION GAP SERPL CALCULATED.3IONS-SCNC: 16 MMOL/L (ref 5–15)
BUN BLD-MCNC: 28 MG/DL (ref 8–23)
BUN/CREAT SERPL: 24.3 (ref 7–25)
CALCIUM SPEC-SCNC: 10.3 MG/DL (ref 8.6–10.5)
CHLORIDE SERPL-SCNC: 100 MMOL/L (ref 98–107)
CO2 SERPL-SCNC: 21 MMOL/L (ref 22–29)
CREAT BLD-MCNC: 1.15 MG/DL (ref 0.57–1)
DEPRECATED RDW RBC AUTO: 40.5 FL (ref 37–54)
ERYTHROCYTE [DISTWIDTH] IN BLOOD BY AUTOMATED COUNT: 13.1 % (ref 12.3–15.4)
GFR SERPL CREATININE-BSD FRML MDRD: 46 ML/MIN/1.73
GLUCOSE BLD-MCNC: 192 MG/DL (ref 65–99)
GLUCOSE BLDC GLUCOMTR-MCNC: 143 MG/DL (ref 70–130)
GLUCOSE BLDC GLUCOMTR-MCNC: 180 MG/DL (ref 70–130)
GLUCOSE BLDC GLUCOMTR-MCNC: 187 MG/DL (ref 70–130)
GLUCOSE BLDC GLUCOMTR-MCNC: 189 MG/DL (ref 70–130)
HBA1C MFR BLD: 6.3 % (ref 4.8–5.6)
HCT VFR BLD AUTO: 43.9 % (ref 34–46.6)
HGB BLD-MCNC: 14.3 G/DL (ref 12–15.9)
MCH RBC QN AUTO: 27.9 PG (ref 26.6–33)
MCHC RBC AUTO-ENTMCNC: 32.6 G/DL (ref 31.5–35.7)
MCV RBC AUTO: 85.7 FL (ref 79–97)
PLATELET # BLD AUTO: 201 10*3/MM3 (ref 140–450)
PMV BLD AUTO: 10.7 FL (ref 6–12)
POTASSIUM BLD-SCNC: 3.9 MMOL/L (ref 3.5–5.2)
RBC # BLD AUTO: 5.12 10*6/MM3 (ref 3.77–5.28)
SODIUM BLD-SCNC: 137 MMOL/L (ref 136–145)
WBC NRBC COR # BLD: 10.74 10*3/MM3 (ref 3.4–10.8)

## 2019-07-06 PROCEDURE — 82962 GLUCOSE BLOOD TEST: CPT

## 2019-07-06 PROCEDURE — 63710000001 INSULIN LISPRO (HUMAN) PER 5 UNITS: Performed by: PHYSICIAN ASSISTANT

## 2019-07-06 PROCEDURE — 80048 BASIC METABOLIC PNL TOTAL CA: CPT | Performed by: PHYSICIAN ASSISTANT

## 2019-07-06 PROCEDURE — 25010000002 PROMETHAZINE PER 50 MG: Performed by: INTERNAL MEDICINE

## 2019-07-06 PROCEDURE — 99223 1ST HOSP IP/OBS HIGH 75: CPT | Performed by: INTERNAL MEDICINE

## 2019-07-06 PROCEDURE — 83036 HEMOGLOBIN GLYCOSYLATED A1C: CPT | Performed by: PHYSICIAN ASSISTANT

## 2019-07-06 PROCEDURE — 85027 COMPLETE CBC AUTOMATED: CPT | Performed by: PHYSICIAN ASSISTANT

## 2019-07-06 PROCEDURE — 99222 1ST HOSP IP/OBS MODERATE 55: CPT | Performed by: INTERNAL MEDICINE

## 2019-07-06 RX ORDER — AMLODIPINE BESYLATE 5 MG/1
5 TABLET ORAL
Status: DISCONTINUED | OUTPATIENT
Start: 2019-07-06 | End: 2019-07-06

## 2019-07-06 RX ORDER — ASPIRIN 81 MG/1
81 TABLET ORAL DAILY
Status: DISCONTINUED | OUTPATIENT
Start: 2019-07-06 | End: 2019-07-08 | Stop reason: HOSPADM

## 2019-07-06 RX ORDER — DULOXETIN HYDROCHLORIDE 60 MG/1
60 CAPSULE, DELAYED RELEASE ORAL DAILY
Status: DISCONTINUED | OUTPATIENT
Start: 2019-07-06 | End: 2019-07-08 | Stop reason: HOSPADM

## 2019-07-06 RX ORDER — METHADONE HYDROCHLORIDE 10 MG/1
10 TABLET ORAL EVERY 8 HOURS PRN
Status: DISCONTINUED | OUTPATIENT
Start: 2019-07-06 | End: 2019-07-08 | Stop reason: HOSPADM

## 2019-07-06 RX ORDER — LISINOPRIL 10 MG/1
40 TABLET ORAL DAILY
Status: DISCONTINUED | OUTPATIENT
Start: 2019-07-06 | End: 2019-07-08 | Stop reason: HOSPADM

## 2019-07-06 RX ORDER — PROMETHAZINE HYDROCHLORIDE 25 MG/1
12.5 TABLET ORAL EVERY 6 HOURS PRN
Status: DISCONTINUED | OUTPATIENT
Start: 2019-07-06 | End: 2019-07-08 | Stop reason: HOSPADM

## 2019-07-06 RX ORDER — SODIUM CHLORIDE 0.9 % (FLUSH) 0.9 %
3-10 SYRINGE (ML) INJECTION AS NEEDED
Status: DISCONTINUED | OUTPATIENT
Start: 2019-07-06 | End: 2019-07-08 | Stop reason: HOSPADM

## 2019-07-06 RX ORDER — CHLORTHALIDONE 25 MG/1
25 TABLET ORAL DAILY
Status: DISCONTINUED | OUTPATIENT
Start: 2019-07-06 | End: 2019-07-08 | Stop reason: HOSPADM

## 2019-07-06 RX ORDER — SODIUM CHLORIDE 9 MG/ML
75 INJECTION, SOLUTION INTRAVENOUS ONCE
Status: COMPLETED | OUTPATIENT
Start: 2019-07-06 | End: 2019-07-06

## 2019-07-06 RX ORDER — ATORVASTATIN CALCIUM 40 MG/1
80 TABLET, FILM COATED ORAL NIGHTLY
Status: DISCONTINUED | OUTPATIENT
Start: 2019-07-06 | End: 2019-07-08 | Stop reason: HOSPADM

## 2019-07-06 RX ORDER — PANTOPRAZOLE SODIUM 40 MG/1
40 TABLET, DELAYED RELEASE ORAL
Status: DISCONTINUED | OUTPATIENT
Start: 2019-07-06 | End: 2019-07-08 | Stop reason: HOSPADM

## 2019-07-06 RX ORDER — PROMETHAZINE HYDROCHLORIDE 25 MG/ML
12.5 INJECTION, SOLUTION INTRAMUSCULAR; INTRAVENOUS EVERY 6 HOURS PRN
Status: DISCONTINUED | OUTPATIENT
Start: 2019-07-06 | End: 2019-07-08 | Stop reason: HOSPADM

## 2019-07-06 RX ORDER — CARVEDILOL 12.5 MG/1
25 TABLET ORAL EVERY 12 HOURS SCHEDULED
Status: DISCONTINUED | OUTPATIENT
Start: 2019-07-06 | End: 2019-07-08 | Stop reason: HOSPADM

## 2019-07-06 RX ORDER — AMLODIPINE BESYLATE 5 MG/1
5 TABLET ORAL DAILY PRN
Status: DISCONTINUED | OUTPATIENT
Start: 2019-07-06 | End: 2019-07-08 | Stop reason: HOSPADM

## 2019-07-06 RX ORDER — ACETAMINOPHEN 325 MG/1
650 TABLET ORAL EVERY 4 HOURS PRN
Status: DISCONTINUED | OUTPATIENT
Start: 2019-07-06 | End: 2019-07-08 | Stop reason: HOSPADM

## 2019-07-06 RX ORDER — SODIUM CHLORIDE 0.9 % (FLUSH) 0.9 %
3 SYRINGE (ML) INJECTION EVERY 12 HOURS SCHEDULED
Status: DISCONTINUED | OUTPATIENT
Start: 2019-07-06 | End: 2019-07-08 | Stop reason: HOSPADM

## 2019-07-06 RX ORDER — ASPIRIN 325 MG
325 TABLET ORAL DAILY
Status: DISCONTINUED | OUTPATIENT
Start: 2019-07-06 | End: 2019-07-06

## 2019-07-06 RX ORDER — NICOTINE POLACRILEX 4 MG
15 LOZENGE BUCCAL
Status: DISCONTINUED | OUTPATIENT
Start: 2019-07-06 | End: 2019-07-08 | Stop reason: HOSPADM

## 2019-07-06 RX ORDER — DEXTROSE MONOHYDRATE 25 G/50ML
25 INJECTION, SOLUTION INTRAVENOUS
Status: DISCONTINUED | OUTPATIENT
Start: 2019-07-06 | End: 2019-07-08 | Stop reason: HOSPADM

## 2019-07-06 RX ORDER — CLONIDINE HYDROCHLORIDE 0.1 MG/1
0.1 TABLET ORAL 2 TIMES DAILY
Status: DISCONTINUED | OUTPATIENT
Start: 2019-07-06 | End: 2019-07-06

## 2019-07-06 RX ORDER — ONDANSETRON 2 MG/ML
4 INJECTION INTRAMUSCULAR; INTRAVENOUS EVERY 6 HOURS PRN
Status: DISCONTINUED | OUTPATIENT
Start: 2019-07-06 | End: 2019-07-08 | Stop reason: HOSPADM

## 2019-07-06 RX ORDER — CLOPIDOGREL BISULFATE 75 MG/1
75 TABLET ORAL DAILY
Status: DISCONTINUED | OUTPATIENT
Start: 2019-07-06 | End: 2019-07-08 | Stop reason: HOSPADM

## 2019-07-06 RX ADMIN — CLOPIDOGREL BISULFATE 75 MG: 75 TABLET ORAL at 08:48

## 2019-07-06 RX ADMIN — INSULIN LISPRO 2 UNITS: 100 INJECTION, SOLUTION INTRAVENOUS; SUBCUTANEOUS at 08:48

## 2019-07-06 RX ADMIN — ATORVASTATIN CALCIUM 80 MG: 40 TABLET, FILM COATED ORAL at 20:49

## 2019-07-06 RX ADMIN — CARVEDILOL 25 MG: 12.5 TABLET, FILM COATED ORAL at 20:49

## 2019-07-06 RX ADMIN — ACETAMINOPHEN 650 MG: 325 TABLET, FILM COATED ORAL at 00:37

## 2019-07-06 RX ADMIN — SODIUM CHLORIDE, PRESERVATIVE FREE 3 ML: 5 INJECTION INTRAVENOUS at 20:51

## 2019-07-06 RX ADMIN — ASPIRIN 81 MG: 81 TABLET, COATED ORAL at 17:01

## 2019-07-06 RX ADMIN — SODIUM CHLORIDE 75 ML/HR: 9 INJECTION, SOLUTION INTRAVENOUS at 06:11

## 2019-07-06 RX ADMIN — PANTOPRAZOLE SODIUM 40 MG: 40 TABLET, DELAYED RELEASE ORAL at 06:11

## 2019-07-06 RX ADMIN — METHADONE HYDROCHLORIDE 10 MG: 10 TABLET ORAL at 20:49

## 2019-07-06 RX ADMIN — PROMETHAZINE HYDROCHLORIDE 12.5 MG: 25 INJECTION INTRAMUSCULAR; INTRAVENOUS at 06:52

## 2019-07-06 RX ADMIN — CHLORTHALIDONE 25 MG: 25 TABLET ORAL at 17:01

## 2019-07-06 RX ADMIN — METHADONE HYDROCHLORIDE 10 MG: 10 TABLET ORAL at 06:11

## 2019-07-06 RX ADMIN — CLONIDINE HYDROCHLORIDE 0.1 MG: 0.1 TABLET ORAL at 08:47

## 2019-07-06 RX ADMIN — NICARDIPINE HYDROCHLORIDE 7.5 MG/HR: 0.1 INJECTION, SOLUTION INTRAVENOUS at 01:37

## 2019-07-06 RX ADMIN — INSULIN LISPRO 2 UNITS: 100 INJECTION, SOLUTION INTRAVENOUS; SUBCUTANEOUS at 17:01

## 2019-07-06 RX ADMIN — INSULIN LISPRO 2 UNITS: 100 INJECTION, SOLUTION INTRAVENOUS; SUBCUTANEOUS at 20:51

## 2019-07-06 RX ADMIN — SODIUM CHLORIDE, PRESERVATIVE FREE 3 ML: 5 INJECTION INTRAVENOUS at 08:51

## 2019-07-06 RX ADMIN — ASPIRIN 325 MG ORAL TABLET 325 MG: 325 PILL ORAL at 08:47

## 2019-07-06 RX ADMIN — DULOXETINE HYDROCHLORIDE 60 MG: 60 CAPSULE, DELAYED RELEASE ORAL at 08:47

## 2019-07-06 RX ADMIN — CARVEDILOL 25 MG: 12.5 TABLET, FILM COATED ORAL at 08:47

## 2019-07-07 LAB
ANION GAP SERPL CALCULATED.3IONS-SCNC: 12 MMOL/L (ref 5–15)
BUN BLD-MCNC: 32 MG/DL (ref 8–23)
BUN/CREAT SERPL: 21.2 (ref 7–25)
CALCIUM SPEC-SCNC: 9.6 MG/DL (ref 8.6–10.5)
CHLORIDE SERPL-SCNC: 103 MMOL/L (ref 98–107)
CO2 SERPL-SCNC: 23 MMOL/L (ref 22–29)
CREAT BLD-MCNC: 1.51 MG/DL (ref 0.57–1)
DEPRECATED RDW RBC AUTO: 44.9 FL (ref 37–54)
ERYTHROCYTE [DISTWIDTH] IN BLOOD BY AUTOMATED COUNT: 13.7 % (ref 12.3–15.4)
GFR SERPL CREATININE-BSD FRML MDRD: 33 ML/MIN/1.73
GLUCOSE BLD-MCNC: 240 MG/DL (ref 65–99)
GLUCOSE BLDC GLUCOMTR-MCNC: 108 MG/DL (ref 70–130)
GLUCOSE BLDC GLUCOMTR-MCNC: 117 MG/DL (ref 70–130)
GLUCOSE BLDC GLUCOMTR-MCNC: 177 MG/DL (ref 70–130)
GLUCOSE BLDC GLUCOMTR-MCNC: 187 MG/DL (ref 70–130)
HCT VFR BLD AUTO: 40.2 % (ref 34–46.6)
HGB BLD-MCNC: 12.7 G/DL (ref 12–15.9)
MCH RBC QN AUTO: 28.5 PG (ref 26.6–33)
MCHC RBC AUTO-ENTMCNC: 31.6 G/DL (ref 31.5–35.7)
MCV RBC AUTO: 90.1 FL (ref 79–97)
PLATELET # BLD AUTO: 182 10*3/MM3 (ref 140–450)
PMV BLD AUTO: 11.3 FL (ref 6–12)
POTASSIUM BLD-SCNC: 3.8 MMOL/L (ref 3.5–5.2)
RBC # BLD AUTO: 4.46 10*6/MM3 (ref 3.77–5.28)
SODIUM BLD-SCNC: 138 MMOL/L (ref 136–145)
WBC NRBC COR # BLD: 9.02 10*3/MM3 (ref 3.4–10.8)

## 2019-07-07 PROCEDURE — 82962 GLUCOSE BLOOD TEST: CPT

## 2019-07-07 PROCEDURE — 99232 SBSQ HOSP IP/OBS MODERATE 35: CPT | Performed by: NURSE PRACTITIONER

## 2019-07-07 PROCEDURE — 99233 SBSQ HOSP IP/OBS HIGH 50: CPT | Performed by: HOSPITALIST

## 2019-07-07 PROCEDURE — 63710000001 INSULIN LISPRO (HUMAN) PER 5 UNITS: Performed by: PHYSICIAN ASSISTANT

## 2019-07-07 PROCEDURE — 80048 BASIC METABOLIC PNL TOTAL CA: CPT | Performed by: HOSPITALIST

## 2019-07-07 PROCEDURE — 85027 COMPLETE CBC AUTOMATED: CPT | Performed by: HOSPITALIST

## 2019-07-07 RX ORDER — AMLODIPINE BESYLATE 5 MG/1
5 TABLET ORAL
Status: DISCONTINUED | OUTPATIENT
Start: 2019-07-07 | End: 2019-07-07

## 2019-07-07 RX ORDER — NIFEDIPINE 30 MG/1
30 TABLET, EXTENDED RELEASE ORAL
Status: DISCONTINUED | OUTPATIENT
Start: 2019-07-07 | End: 2019-07-08 | Stop reason: HOSPADM

## 2019-07-07 RX ADMIN — NIFEDIPINE 30 MG: 30 TABLET, FILM COATED, EXTENDED RELEASE ORAL at 13:36

## 2019-07-07 RX ADMIN — METHADONE HYDROCHLORIDE 10 MG: 10 TABLET ORAL at 06:25

## 2019-07-07 RX ADMIN — PANTOPRAZOLE SODIUM 40 MG: 40 TABLET, DELAYED RELEASE ORAL at 06:25

## 2019-07-07 RX ADMIN — CHLORTHALIDONE 25 MG: 25 TABLET ORAL at 08:30

## 2019-07-07 RX ADMIN — CLOPIDOGREL BISULFATE 75 MG: 75 TABLET ORAL at 08:30

## 2019-07-07 RX ADMIN — ATORVASTATIN CALCIUM 80 MG: 40 TABLET, FILM COATED ORAL at 21:41

## 2019-07-07 RX ADMIN — INSULIN LISPRO 2 UNITS: 100 INJECTION, SOLUTION INTRAVENOUS; SUBCUTANEOUS at 13:38

## 2019-07-07 RX ADMIN — INSULIN LISPRO 2 UNITS: 100 INJECTION, SOLUTION INTRAVENOUS; SUBCUTANEOUS at 21:44

## 2019-07-07 RX ADMIN — SODIUM CHLORIDE, PRESERVATIVE FREE 3 ML: 5 INJECTION INTRAVENOUS at 08:32

## 2019-07-07 RX ADMIN — CARVEDILOL 25 MG: 12.5 TABLET, FILM COATED ORAL at 08:30

## 2019-07-07 RX ADMIN — DULOXETINE HYDROCHLORIDE 60 MG: 60 CAPSULE, DELAYED RELEASE ORAL at 08:30

## 2019-07-07 RX ADMIN — ACETAMINOPHEN 650 MG: 325 TABLET, FILM COATED ORAL at 21:41

## 2019-07-07 RX ADMIN — CARVEDILOL 25 MG: 12.5 TABLET, FILM COATED ORAL at 21:41

## 2019-07-07 RX ADMIN — SODIUM CHLORIDE, PRESERVATIVE FREE 3 ML: 5 INJECTION INTRAVENOUS at 22:05

## 2019-07-07 RX ADMIN — ASPIRIN 81 MG: 81 TABLET, COATED ORAL at 08:30

## 2019-07-07 RX ADMIN — LISINOPRIL 40 MG: 10 TABLET ORAL at 08:29

## 2019-07-08 VITALS
HEART RATE: 56 BPM | SYSTOLIC BLOOD PRESSURE: 150 MMHG | TEMPERATURE: 98.8 F | HEIGHT: 64 IN | WEIGHT: 209.7 LBS | OXYGEN SATURATION: 97 % | RESPIRATION RATE: 18 BRPM | BODY MASS INDEX: 35.8 KG/M2 | DIASTOLIC BLOOD PRESSURE: 89 MMHG

## 2019-07-08 LAB
GLUCOSE BLDC GLUCOMTR-MCNC: 138 MG/DL (ref 70–130)
GLUCOSE BLDC GLUCOMTR-MCNC: 217 MG/DL (ref 70–130)

## 2019-07-08 PROCEDURE — 99239 HOSP IP/OBS DSCHRG MGMT >30: CPT | Performed by: NURSE PRACTITIONER

## 2019-07-08 PROCEDURE — 82962 GLUCOSE BLOOD TEST: CPT

## 2019-07-08 PROCEDURE — 63710000001 INSULIN LISPRO (HUMAN) PER 5 UNITS: Performed by: PHYSICIAN ASSISTANT

## 2019-07-08 PROCEDURE — 99231 SBSQ HOSP IP/OBS SF/LOW 25: CPT | Performed by: INTERNAL MEDICINE

## 2019-07-08 RX ORDER — LISINOPRIL 40 MG/1
40 TABLET ORAL DAILY
Qty: 30 TABLET | Refills: 0 | Status: SHIPPED | OUTPATIENT
Start: 2019-07-08

## 2019-07-08 RX ORDER — NIFEDIPINE 30 MG/1
30 TABLET, FILM COATED, EXTENDED RELEASE ORAL
Qty: 30 TABLET | Refills: 0 | Status: SHIPPED | OUTPATIENT
Start: 2019-07-09 | End: 2021-04-07

## 2019-07-08 RX ORDER — ASPIRIN 81 MG/1
81 TABLET ORAL DAILY
Start: 2019-07-09

## 2019-07-08 RX ORDER — CHLORTHALIDONE 25 MG/1
25 TABLET ORAL DAILY
Qty: 30 TABLET | Refills: 0 | Status: SHIPPED | OUTPATIENT
Start: 2019-07-09 | End: 2019-08-02

## 2019-07-08 RX ADMIN — DULOXETINE HYDROCHLORIDE 60 MG: 60 CAPSULE, DELAYED RELEASE ORAL at 09:26

## 2019-07-08 RX ADMIN — CARVEDILOL 25 MG: 12.5 TABLET, FILM COATED ORAL at 09:27

## 2019-07-08 RX ADMIN — PANTOPRAZOLE SODIUM 40 MG: 40 TABLET, DELAYED RELEASE ORAL at 06:11

## 2019-07-08 RX ADMIN — NIFEDIPINE 30 MG: 30 TABLET, FILM COATED, EXTENDED RELEASE ORAL at 09:26

## 2019-07-08 RX ADMIN — SODIUM CHLORIDE, PRESERVATIVE FREE 3 ML: 5 INJECTION INTRAVENOUS at 09:28

## 2019-07-08 RX ADMIN — CLOPIDOGREL BISULFATE 75 MG: 75 TABLET ORAL at 09:27

## 2019-07-08 RX ADMIN — ASPIRIN 81 MG: 81 TABLET, COATED ORAL at 09:27

## 2019-07-08 RX ADMIN — INSULIN LISPRO 3 UNITS: 100 INJECTION, SOLUTION INTRAVENOUS; SUBCUTANEOUS at 12:04

## 2019-07-08 RX ADMIN — METHADONE HYDROCHLORIDE 10 MG: 10 TABLET ORAL at 00:02

## 2019-07-08 RX ADMIN — LISINOPRIL 40 MG: 10 TABLET ORAL at 09:27

## 2019-07-08 RX ADMIN — CHLORTHALIDONE 25 MG: 25 TABLET ORAL at 09:26

## 2019-07-09 ENCOUNTER — READMISSION MANAGEMENT (OUTPATIENT)
Dept: CALL CENTER | Facility: HOSPITAL | Age: 79
End: 2019-07-09

## 2019-07-09 NOTE — OUTREACH NOTE
Prep Survey      Responses   Facility patient discharged from?  [facility]   Is patient eligible?  Yes   Discharge diagnosis  Hypertensive emergency   Does the patient have one of the following disease processes/diagnoses(primary or secondary)?  Other   Does the patient have Home health ordered?  No   Is there a DME ordered?  No   Prep survey completed?  Yes          Nicole Larsen RN

## 2019-07-10 ENCOUNTER — READMISSION MANAGEMENT (OUTPATIENT)
Dept: CALL CENTER | Facility: HOSPITAL | Age: 79
End: 2019-07-10

## 2019-07-10 NOTE — OUTREACH NOTE
Medical Week 1 Survey      Responses   Facility patient discharged from?  [facility]   Does the patient have one of the following disease processes/diagnoses(primary or secondary)?  Other   Is there a successful TCM telephone encounter documented?  No   Week 1 attempt successful?  Yes   Call start time  1120   Call end time  1124   Discharge diagnosis  Hypertensive emergency   Is patient permission given to speak with other caregiver?  Yes   List who call center can speak with     Meds reviewed with patient/caregiver?  Yes   Is the patient having any side effects they believe may be caused by any medication additions or changes?  No   Does the patient have all medications ordered at discharge?  Yes   Prescription comments  .   Is the patient taking all medications as directed (includes completed medication regime)?  Yes   Medication comments  .   Does the patient have a primary care provider?   Yes   Does the patient have an appointment with their PCP within 7 days of discharge?  Yes   Has the patient kept scheduled appointments due by today?  N/A   What is the Home health agency?   .   Psychosocial issues?  No   Comments  Monitoring BP at home, she reports 132/76   Did the patient receive a copy of their discharge instructions?  Yes   Nursing interventions  Reviewed instructions with patient   What is the patient's perception of their health status since discharge?  Returned to baseline/stable   Is the patient/caregiver able to teach back signs and symptoms related to disease process for when to call PCP?  Yes   Is the patient/caregiver able to teach back signs and symptoms related to disease process for when to call 911?  Yes   Is the patient/caregiver able to teach back the hierarchy of who to call/visit for symptoms/problems? PCP, Specialist, Home health nurse, Urgent Care, ED, 911  Yes   Week 1 call completed?  Yes   Revoked  No further contact(revokes)-requires comment   Graduated/Revoked comments   baseline          Marta Howard RN

## 2019-08-01 ENCOUNTER — TELEPHONE (OUTPATIENT)
Dept: CARDIOLOGY | Facility: CLINIC | Age: 79
End: 2019-08-01

## 2019-08-01 DIAGNOSIS — I10 ESSENTIAL HYPERTENSION: Primary | ICD-10-CM

## 2019-08-01 NOTE — TELEPHONE ENCOUNTER
Patient called to report that she has been experiencing some fatigue, weakness, and urinary retention and oliguria. She believes these symptoms are contributed to her new medications that were started at discharge, Nifedipine XL 30 mg and Chlorthalidone 25mg. Patients BP averaging 127/75 HR 71. She denies chest pain, edema, palpitations.       Of note- she had a loop implanted during admission, I checked with the device clinic and they had received a transmission this morning reading NSR. She states that the wound is healed, no redness, no drainage, afebrile. She did not come to her scheduled wound check on 7/10 or follow up with her PCP as scheduled on 7/15.    How would you like to proceed?

## 2019-08-01 NOTE — TELEPHONE ENCOUNTER
Have her stop the chlorthalidone, get a BMP early next week. Check in on her symptoms and BP after BMP comes back Tues or Wed.

## 2019-08-02 NOTE — TELEPHONE ENCOUNTER
Patient contacted with recommendations per MJS. Pt to d/c Chlorthalidone and have repeat BMP next Tuesday. Will fax lab orders to Dr. Mcneill's office. Pt agreeable to plan, all questions answered.

## 2019-09-11 ENCOUNTER — TELEPHONE (OUTPATIENT)
Dept: CARDIOLOGY | Facility: CLINIC | Age: 79
End: 2019-09-11

## 2019-09-11 NOTE — TELEPHONE ENCOUNTER
Patient called to report that she is having some lower extremity swelling that started on Friday. She states that swelling gets better at night when her legs are elevated. She denies and SOA, chest pain. She states that she does not wear compression stockings because they are too difficult for her to put on.     She was taking Chlorthalidone after hospital discharge but d/c this on 08/02/19 due to side effects. She was to have repeat BMP with Dr. Mcneill in one week and states that she thinks she did have this done. I have requested these labs from [facility].    How would you like to proceed?

## 2019-09-12 NOTE — TELEPHONE ENCOUNTER
"Consider buying the \"zip-up\" compression stockings. Keep legs elevated. Exercise. Have her follow up with Dr Mcneill's office about the BMP to discuss risk/benefit of restarting a diuretic like chlorthalidone.   "

## 2019-09-12 NOTE — TELEPHONE ENCOUNTER
Patient contacted with recommendations per MJS. Pt to have follow up scheduled with Dr. Mcneill to discuss lab results/diuretics. Pt verbalizes understanding, pt agreeable to plan.

## 2019-10-16 ENCOUNTER — CLINICAL SUPPORT NO REQUIREMENTS (OUTPATIENT)
Dept: CARDIOLOGY | Facility: CLINIC | Age: 79
End: 2019-10-16

## 2019-10-16 DIAGNOSIS — Z95.818 STATUS POST PLACEMENT OF IMPLANTABLE LOOP RECORDER: ICD-10-CM

## 2019-10-16 PROCEDURE — 93298 REM INTERROG DEV EVAL SCRMS: CPT | Performed by: INTERNAL MEDICINE

## 2019-10-16 PROCEDURE — 93299 PR REM INTERROG ICPMS/SCRMS <30 D TECH REVIEW: CPT | Performed by: INTERNAL MEDICINE

## 2020-01-21 ENCOUNTER — TELEPHONE (OUTPATIENT)
Dept: CARDIOLOGY | Facility: CLINIC | Age: 80
End: 2020-01-21

## 2020-01-21 NOTE — TELEPHONE ENCOUNTER
ASHLEY, Dr. Mcneill's nurse, called to inform that patients blood pressure has been averaging 170/100.       Pt contacted and reviewed blood pressure and medications. Pt is not taking clonidine 0.1 mg BID. Pt advised to restart this and call office in one week with BP recordings. Pt agreeable to plan.

## 2020-01-29 ENCOUNTER — CLINICAL SUPPORT NO REQUIREMENTS (OUTPATIENT)
Dept: CARDIOLOGY | Facility: CLINIC | Age: 80
End: 2020-01-29

## 2020-01-29 DIAGNOSIS — I63.89 CEREBRAL INFARCTION DUE TO OTHER MECHANISM (HCC): ICD-10-CM

## 2020-01-29 PROCEDURE — G2066 INTER DEVC REMOTE 30D: HCPCS | Performed by: INTERNAL MEDICINE

## 2020-01-29 PROCEDURE — 93297 REM INTERROG DEV EVAL ICPMS: CPT | Performed by: INTERNAL MEDICINE

## 2020-05-12 RX ORDER — CLONIDINE HYDROCHLORIDE 0.1 MG/1
TABLET ORAL
Qty: 180 TABLET | Refills: 0 | OUTPATIENT
Start: 2020-05-12

## 2020-11-04 ENCOUNTER — OFFICE VISIT (OUTPATIENT)
Dept: CARDIOLOGY | Facility: CLINIC | Age: 80
End: 2020-11-04

## 2020-11-04 ENCOUNTER — LAB (OUTPATIENT)
Dept: LAB | Facility: HOSPITAL | Age: 80
End: 2020-11-04

## 2020-11-04 VITALS
DIASTOLIC BLOOD PRESSURE: 72 MMHG | OXYGEN SATURATION: 96 % | WEIGHT: 228 LBS | BODY MASS INDEX: 38.93 KG/M2 | SYSTOLIC BLOOD PRESSURE: 144 MMHG | HEIGHT: 64 IN | HEART RATE: 72 BPM

## 2020-11-04 DIAGNOSIS — I42.8 OTHER CARDIOMYOPATHIES (HCC): ICD-10-CM

## 2020-11-04 DIAGNOSIS — I51.81 TAKOTSUBO CARDIOMYOPATHY: ICD-10-CM

## 2020-11-04 DIAGNOSIS — I25.10 CORONARY ARTERY DISEASE INVOLVING NATIVE CORONARY ARTERY OF NATIVE HEART WITHOUT ANGINA PECTORIS: ICD-10-CM

## 2020-11-04 DIAGNOSIS — I10 ESSENTIAL HYPERTENSION: Primary | ICD-10-CM

## 2020-11-04 DIAGNOSIS — I10 ESSENTIAL HYPERTENSION: ICD-10-CM

## 2020-11-04 DIAGNOSIS — I48.0 PAROXYSMAL ATRIAL FIBRILLATION (HCC): ICD-10-CM

## 2020-11-04 PROCEDURE — 85025 COMPLETE CBC W/AUTO DIFF WBC: CPT

## 2020-11-04 PROCEDURE — 36415 COLL VENOUS BLD VENIPUNCTURE: CPT

## 2020-11-04 PROCEDURE — 84443 ASSAY THYROID STIM HORMONE: CPT

## 2020-11-04 PROCEDURE — 93291 INTERROG DEV EVAL SCRMS IP: CPT | Performed by: INTERNAL MEDICINE

## 2020-11-04 PROCEDURE — 99214 OFFICE O/P EST MOD 30 MIN: CPT | Performed by: INTERNAL MEDICINE

## 2020-11-04 PROCEDURE — 80053 COMPREHEN METABOLIC PANEL: CPT

## 2020-11-04 PROCEDURE — 83880 ASSAY OF NATRIURETIC PEPTIDE: CPT

## 2020-11-04 RX ORDER — TORSEMIDE 20 MG/1
40 TABLET ORAL DAILY
COMMUNITY
Start: 2020-09-04 | End: 2021-04-07 | Stop reason: SDUPTHER

## 2020-11-04 NOTE — PROGRESS NOTES
[facility] AT [address], [address]  [place], [place], [address] [phone]  WWW.[facility]           OUTPATIENT CLINIC FOLLOW UP NOTE    Patient Care Team:  Patient Care Team:  MAGDIEL Mcneill MD as PCP - General (Family Medicine)  MAGDIEL Mcneill MD (Family Medicine)  Steve Geronimo MD as Consulting Physician (Cardiology)    Subjective:   Reason for consultation:   Chief Complaint   Patient presents with   • Takotsubo cardiomyopathy   • Coronary Artery Disease   • Hypertension       HPI:    Yanique Webster is a 80 y.o. female.  Problem list:  1. 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 EF 15% improved to 60% by 10/2016  2. CAD  1. [facility] 2016: mild diffuse multivessel coronary artery disease at that time  3. Paroxysmal atrial fibrillation diagnosed by loop recorder 10/2020  4. Moderate aortic regurgitation  5. 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 Loop recorder implant 7/2019, later diagnosed with A. fib as noted above  6. Diabetes  7. Hypertension  8. Mixed hyperlipidemia  9. Peripheral vascular disease  10. History of tobacco smoking  11. Anxiety  12. Claustrophobia    Today the patient presents for follow-up.    Since her last visit the patient has had persistent lower extremity swelling.  Possibly due to switching her torsemide from 20 mg in the morning to 10 mg twice daily.  Left greater than right.  Denies associated significant shortness of air or chest pain.  Denies palpitations.  Cannot feel her atrial fibrillation that has been noted on her loop recorder    Review of Systems:  Positive for musculoskeletal difficulties, left greater than right lower extremity swelling.  Negative for exertional chest pain, orthopnea, PND, lower extremity edema, palpitations, lightheadedness, syncope.     PFSH:  Patient Active Problem List   Diagnosis   • Fibromyalgia   • PVD (peripheral vascular disease) (CMS/HCC)   • Type 2 diabetes mellitus (CMS/HCC)   • Diabetic gastroparesis  (CMS/[facility])   • Takotsubo cardiomyopathy   • Hyperlipidemia LDL goal <70   • COPD (chronic obstructive pulmonary disease) (CMS/[facility])   • Obesity   • Osteoarthritis   • Chronic pain   • GERD (gastroesophageal reflux disease)   • Gout   • Chronic joint pain   • Coronary artery disease involving native coronary artery of native heart without angina pectoris   • Nonrheumatic aortic valve insufficiency   • Altered mental status   • Essential hypertension   • CKD (chronic kidney disease) stage 3, GFR 30-59 ml/min   • Hypertensive emergency   • Status post placement of implantable loop recorder         Current Outpatient Medications:   •  aspirin 81 MG EC tablet, Take 1 tablet by mouth Daily., Disp: , Rfl:   •  atorvastatin (LIPITOR) 80 MG tablet, Take 1 tablet by mouth Every Night., Disp: 30 tablet, Rfl: 1  •  carvedilol (COREG) 25 MG tablet, Take 1 tablet by mouth Every 12 (Twelve) Hours., Disp: 60 tablet, Rfl: 1  •  Cholecalciferol (VITAMIN D) 2000 units capsule, Take 2,000 Units by mouth Daily., Disp: , Rfl:   •  DULoxetine (CYMBALTA) 60 MG capsule, Take 2 capsules by mouth daily. (Patient taking differently: Take 60 mg by mouth Daily.), Disp: , Rfl:   •  insulin glargine (LANTUS) 100 UNIT/ML injection, Inject 24 Units under the skin into the appropriate area as directed Every Night., Disp: , Rfl:   •  lisinopril (PRINIVIL,ZESTRIL) 40 MG tablet, Take 1 tablet by mouth Daily., Disp: 30 tablet, Rfl: 0  •  methadone (DOLOPHINE) 10 MG tablet, Take 10 mg by mouth Every 8 (Eight) Hours As Needed for Moderate Pain ,, Disp: , Rfl:   •  NIFEdipine XL (ADALAT CC) 30 MG 24 hr tablet, Take 1 tablet by mouth Daily., Disp: 30 tablet, Rfl: 0  •  pantoprazole (PROTONIX) 40 MG EC tablet, Take 1 tablet by mouth daily., Disp: , Rfl:   •  polyethylene glycol (MIRALAX) packet, Take 17 g by mouth every 12 (twelve) hours as needed (constipation)., Disp: , Rfl:   •  probiotic (CULTURELLE) capsule capsule, Take 1 capsule by mouth Daily., Disp: ,  "Rfl:   •  torsemide (DEMADEX) 20 MG tablet, Take 20 mg by mouth Daily., Disp: , Rfl:     Allergies   Allergen Reactions   • Penicillins    • Nickel Rash   • Penicillins Rash     unk        reports that she quit smoking about 17 years ago. Her smoking use included cigarettes. She smoked 1.00 pack per day. She has never used smokeless tobacco.    Family History   Problem Relation Age of Onset   • Cancer Mother    • Cancer Father    • Cancer Other    • Breast cancer Sister 67   • Ovarian cancer Neg Hx            Objective:   Blood pressure 144/72, pulse 72, height 162.6 cm (64\"), weight 103 kg (228 lb), SpO2 96 %.  CONSTITUTIONAL: No acute distress, normal affect  RESPIRATORY: Normal effort. Clear to auscultation bilaterally without wheezing or rales.  CARDIOVASCULAR: Regular rate and rhythm with normal S1 and S2. Without murmur, gallop or rub.  PERIPHERAL VASCULAR: Normal radial pulses bilaterally. There is mild left lower extremity swelling.    11/2020 exam: 2+ DP pulses bilaterally    Labs:  Lab Results   Component Value Date    ALT 13 07/05/2019    AST 15 07/05/2019     Lab Results   Component Value Date    CHOL 145 03/15/2019    TRIG 117 03/15/2019    HDL 35 (L) 03/15/2019    LDLDIRECT 2.6 (L) 04/19/2017    CREATININE 1.51 (H) 07/07/2019       Diagnostic Data:      ECG 12 Lead    Date/Time: 11/4/2020 5:09 PM  Performed by: Steve Geronimo MD  Authorized by: Steve Geronimo MD   Comparison: compared with previous ECG from 7/5/2019  Comparison to previous ECG: LVH now present  Rhythm: sinus rhythm  Other findings: left ventricular hypertrophy  Comments: Heart rate 68, , QTc 434          DEVICE INTERROGATION: [employer] loop recorder, Interrogation date 11/4/2020- Multiple episodes of A. fib.  Battery intact    MCOT 4/2019  -No atrial fibrillation.  -Low ectopy burden.  -One 2.2-second pause.  -One triggered event was normal sinus rhythm.    ERMIAS 3/2019  · Left ventricular systolic function is normal. Estimated EF " appears to be in the range of 61 - 65%  · Normal left atrial size and volume noted. There is no spontaneous echo contrast present. The left atrial appendage was visualized through multiple planes. Left atrial appendage was found to be multilobar in nature. Doppler interrogation shows normal flow within the left atrial appendage. No evidence of a left atrial appendage thrombus was present  · Lipomatous hypertrophy of the interatrial septum present. No evidence of a patent foramen ovale. Saline test results are negative.  · There is mild thickening of the noncoronary cusp of the aortic valve. Mild aortic valve regurgitation is present  · There is moderate (grade 2) layered plaque in the proximal aorta present.    TTE 3/2019  · The study is technically difficult for diagnosis.  · Left ventricular systolic function is hyperdynamic (EF > 70).  · Left ventricular diastolic dysfunction (grade I) consistent with impaired relaxation.  · The valves were poorly visualized. There was no obvious hemodynamic abnormalities of the aortic or mitral valve, however    TTE 8/2016  · The left ventricular cavity is mildly dilated.  · The findings are consistent with stress-induced (Takotsubo) cardiomyopathy.  · Moderate aortic valve regurgitation is present.  · Left ventricular function is severely decreased. Estimated EF = 15%.    TTE 10/2016  · Left ventricular function is normal. Estimated EF = 60%.  · Left ventricular wall thickness is consistent with borderline concentric hypertrophy.  · The left ventricular cavity is borderline dilated.  · All left ventricular wall segments contract normally.  · Left ventricular diastolic dysfunction (grade I a) consistent with impaired relaxation.  · Moderate aortic valve regurgitation is present.    Cath results reviewed 8/30/16  · Coronary circulation is right dominant  · Left main: Normal  · LAD: 40% proximal discrete stenosis, small D1 and D2 without significant disease, mid to distal LAD with  mild diffuse disease  · LCX: Large circumflex, tandem 30% stenoses in the mid Cx after OM2, moderate mid OM1 disease, med sized OM2 with luminal irregularities, med to large sized OM3 without significant disease  · RCA: 50% mid RCA lesion, iFR 0.95 (not functionally significant), mild distal disease    Assessment and Plan:     Takotsubo cardiomyopathy  -Has had some swelling recently.  May be due to switching the way she takes torsemide  -Recommended going back to a full dose (I believe she is taking 20 mg daily) once a day in the mornings instead of taking half a dose morning and night  -Continue other related cardiac medications    -Checking a proBNP along with her other labs.  If elevated, would repeat an echocardiogram.  Would also consider repeating an echocardiogram if her shortness of air and swelling worsen     Paroxysmal atrial fibrillation  CVA, symptoms resolved after TPA, old occipital infarct in addition to 2019 stroke  -New diagnosis of atrial fibrillation.    -Checking a CMP, CBC, TSH, T4, proBNP   -Discontinue clopidogrel, start renally dose Xarelto 15 mg daily    Coronary artery disease involving native coronary artery of native heart without angina pectoris  Hyperlipidemia  -CCS class 0  -Continue aspirin, statin, beta-blocker.    Essential hypertension  -Continue current related medications.  -Patient may take an additional clonidine for significant hypertension.      - Return in about 3 months (around 2/4/2021) for Next scheduled follow up.      Steve Geronimo MD, MSc, [facility]  Interventional Cardiology  [facility] at [facility]

## 2020-11-05 ENCOUNTER — TELEPHONE (OUTPATIENT)
Dept: CARDIOLOGY | Facility: CLINIC | Age: 80
End: 2020-11-05

## 2020-11-05 LAB
ALBUMIN SERPL-MCNC: 4.1 G/DL (ref 3.5–5.2)
ALBUMIN/GLOB SERPL: 1.5 G/DL
ALP SERPL-CCNC: 165 U/L (ref 39–117)
ALT SERPL W P-5'-P-CCNC: 16 U/L (ref 1–33)
ANION GAP SERPL CALCULATED.3IONS-SCNC: 9.6 MMOL/L (ref 5–15)
AST SERPL-CCNC: 19 U/L (ref 1–32)
BASOPHILS # BLD AUTO: 0.08 10*3/MM3 (ref 0–0.2)
BASOPHILS NFR BLD AUTO: 0.9 % (ref 0–1.5)
BILIRUB SERPL-MCNC: 0.3 MG/DL (ref 0–1.2)
BUN SERPL-MCNC: 35 MG/DL (ref 8–23)
BUN/CREAT SERPL: 20.5 (ref 7–25)
CALCIUM SPEC-SCNC: 10.6 MG/DL (ref 8.6–10.5)
CHLORIDE SERPL-SCNC: 100 MMOL/L (ref 98–107)
CO2 SERPL-SCNC: 29.4 MMOL/L (ref 22–29)
CREAT SERPL-MCNC: 1.71 MG/DL (ref 0.57–1)
DEPRECATED RDW RBC AUTO: 42 FL (ref 37–54)
EOSINOPHIL # BLD AUTO: 0.26 10*3/MM3 (ref 0–0.4)
EOSINOPHIL NFR BLD AUTO: 2.9 % (ref 0.3–6.2)
ERYTHROCYTE [DISTWIDTH] IN BLOOD BY AUTOMATED COUNT: 12.9 % (ref 12.3–15.4)
GFR SERPL CREATININE-BSD FRML MDRD: 29 ML/MIN/1.73
GLOBULIN UR ELPH-MCNC: 2.8 GM/DL
GLUCOSE SERPL-MCNC: 131 MG/DL (ref 65–99)
HCT VFR BLD AUTO: 37.5 % (ref 34–46.6)
HGB BLD-MCNC: 12.2 G/DL (ref 12–15.9)
IMM GRANULOCYTES # BLD AUTO: 0.02 10*3/MM3 (ref 0–0.05)
IMM GRANULOCYTES NFR BLD AUTO: 0.2 % (ref 0–0.5)
LYMPHOCYTES # BLD AUTO: 1.8 10*3/MM3 (ref 0.7–3.1)
LYMPHOCYTES NFR BLD AUTO: 19.8 % (ref 19.6–45.3)
MCH RBC QN AUTO: 28.8 PG (ref 26.6–33)
MCHC RBC AUTO-ENTMCNC: 32.5 G/DL (ref 31.5–35.7)
MCV RBC AUTO: 88.4 FL (ref 79–97)
MONOCYTES # BLD AUTO: 0.71 10*3/MM3 (ref 0.1–0.9)
MONOCYTES NFR BLD AUTO: 7.8 % (ref 5–12)
NEUTROPHILS NFR BLD AUTO: 6.22 10*3/MM3 (ref 1.7–7)
NEUTROPHILS NFR BLD AUTO: 68.4 % (ref 42.7–76)
NRBC BLD AUTO-RTO: 0 /100 WBC (ref 0–0.2)
NT-PROBNP SERPL-MCNC: 482.5 PG/ML (ref 0–1800)
PLATELET # BLD AUTO: 200 10*3/MM3 (ref 140–450)
PMV BLD AUTO: 11.1 FL (ref 6–12)
POTASSIUM SERPL-SCNC: 4.8 MMOL/L (ref 3.5–5.2)
PROT SERPL-MCNC: 6.9 G/DL (ref 6–8.5)
RBC # BLD AUTO: 4.24 10*6/MM3 (ref 3.77–5.28)
SODIUM SERPL-SCNC: 139 MMOL/L (ref 136–145)
TSH SERPL DL<=0.05 MIU/L-ACNC: 0.95 UIU/ML (ref 0.27–4.2)
WBC # BLD AUTO: 9.09 10*3/MM3 (ref 3.4–10.8)

## 2020-11-05 NOTE — TELEPHONE ENCOUNTER
----- Message from Steve Geronimo MD sent at 11/5/2020 12:21 PM EST -----  Please let the patient know her kidney function is a little bit up when compared to the last measurement we have from last year.  May be similar to where she has been more recently, if she has had labs drawn elsewhere/at [facility].  If her swelling is stable, would decrease her torsemide.  I believe she is taking 20 mg once daily in the mornings.  If so, would decrease to 10 mg once daily if her swelling is stable to give her kidneys a little bit of a rest.

## 2020-11-05 NOTE — TELEPHONE ENCOUNTER
Patient contacted to review results and recommendations. LVM requesting return call. Will await.

## 2020-11-30 ENCOUNTER — TELEPHONE (OUTPATIENT)
Dept: ENDOCRINOLOGY | Facility: CLINIC | Age: 80
End: 2020-11-30

## 2020-12-01 NOTE — TELEPHONE ENCOUNTER
Spoke with patient.  States that she had needed a refill on pen needles but she bought them over the counter.

## 2020-12-18 ENCOUNTER — TELEPHONE (OUTPATIENT)
Dept: ENDOCRINOLOGY | Facility: CLINIC | Age: 80
End: 2020-12-18

## 2020-12-18 NOTE — TELEPHONE ENCOUNTER
Called pt - she hasnt checked her blood sugar anymore today.  She takes lantus 24units at bedtime for her diabetes -no other diabetes meds.  Please advise

## 2020-12-18 NOTE — TELEPHONE ENCOUNTER
PATIENT WAS SEEN AT PAIN CLINIC YESTERDAY AND WAS GIVEN A CORTISONE SHOT. SHE STATES THIS MORNING HER BLOOD SUGAR . SHE NEEDS TO SEE IF THIS WOULD OF AFFECTED HER BLOOD SUGAR AND TO SEE WHAT SHE NEEDS TO DO ABOUT BLOOD SUGAR BEING ELEVATED. PHONE NUMBER [phone]

## 2021-02-16 ENCOUNTER — OFFICE VISIT (OUTPATIENT)
Dept: ENDOCRINOLOGY | Facility: CLINIC | Age: 81
End: 2021-02-16

## 2021-02-16 DIAGNOSIS — Z79.4 TYPE 2 DIABETES MELLITUS WITH DIABETIC POLYNEUROPATHY, WITH LONG-TERM CURRENT USE OF INSULIN (HCC): Primary | ICD-10-CM

## 2021-02-16 DIAGNOSIS — E11.42 TYPE 2 DIABETES MELLITUS WITH DIABETIC POLYNEUROPATHY, WITH LONG-TERM CURRENT USE OF INSULIN (HCC): Primary | ICD-10-CM

## 2021-02-16 PROCEDURE — 99442 PR PHYS/QHP TELEPHONE EVALUATION 11-20 MIN: CPT | Performed by: INTERNAL MEDICINE

## 2021-02-16 RX ORDER — DULOXETIN HYDROCHLORIDE 60 MG/1
60 CAPSULE, DELAYED RELEASE ORAL DAILY
Qty: 90 CAPSULE | Refills: 3 | Status: SHIPPED | OUTPATIENT
Start: 2021-02-16 | End: 2021-12-08 | Stop reason: SDUPTHER

## 2021-02-16 NOTE — PROGRESS NOTES
Office Note      Date: 2021  Patient Name: Yanique Webster  MRN: [id number]  : 1940   Cc: diabetes follow up    Pt consented for a telephone visit  Both parties were in [place]  Start was 1515  End was 1529      History of Present Illness:   Yanique Webster is a 81 y.o. female who presents for Diabetes - type 2.  She takes a single daily shot of 24 units of lantus at bedtime.  She did have to increase to 30 units for a couple days after a steroid shot. She checks her bg a couple times per week. She has not had hypoglycemia. Typically her numbers are normal      Last A1c:6.6  Hemoglobin A1C   Date Value Ref Range Status   2019 6.30 (H) 4.80 - 5.60 % Final       Changes in health since last visit: none. Last eye exam due in may.    Subjective      Diabetic Complications:  Eyes: No  Kidneys: No  Feet: Yes, describe: severe neuropathy on duloxetine and methadone  Heart: No.    Review of Systems:   Review of Systems   Constitutional: Negative.    HENT: Negative.    Eyes: Negative.    Respiratory: Negative.    All other systems reviewed and are negative.      The following portions of the patient's history were reviewed and updated as appropriate: allergies, current medications, past medical history, past social history, past surgical history and problem list.    Objective     Visit Vitals  LMP  (LMP Unknown)       Labs:    CMP  Lab Results   Component Value Date    GLUCOSE 131 (H) 2020    BUN 35 (H) 2020    CREATININE 1.71 (H) 2020    EGFRIFNONA 29 (L) 2020    BCR 20.5 2020    K 4.8 2020    CO2 29.4 (H) 2020    CALCIUM 10.6 (H) 2020    AST 19 2020    ALT 16 2020        CBC w/DIFF  Lab Results   Component Value Date    WBC 9.09 2020    RBC 4.24 2020    HGB 12.2 2020    HCT 37.5 2020    MCV 88.4 2020    MCH 28.8 2020    MCHC 32.5 2020    RDW 12.9 2020    RDWSD 42.0 2020    MPV 11.1  11/04/2020     11/04/2020    NEUTRORELPCT 68.4 11/04/2020    LYMPHORELPCT 19.8 11/04/2020    MONORELPCT 7.8 11/04/2020    EOSRELPCT 2.9 11/04/2020    BASORELPCT 0.9 11/04/2020    AUTOIGPER 0.2 11/04/2020    NEUTROABS 6.22 11/04/2020    LYMPHSABS 1.80 11/04/2020    MONOSABS 0.71 11/04/2020    EOSABS 0.26 11/04/2020    BASOSABS 0.08 11/04/2020    AUTOIGNUM 0.02 11/04/2020    NRBC 0.0 11/04/2020       Physical Exam:  Physical Exam  Constitutional:       Comments: Phone visit   Neurological:      Mental Status: She is oriented to person, place, and time. Mental status is at baseline.   Psychiatric:         Mood and Affect: Mood normal.         Behavior: Behavior normal.         Thought Content: Thought content normal.         Judgment: Judgment normal.          Assessment / Plan      Assessment & Plan:  Problem List Items Addressed This Visit        Other    Type 2 diabetes mellitus (CMS/[facility]) - Primary    Current Assessment & Plan     Diabetes is unchanged.   Continue current treatment regimen.  Diabetes will be reassessed in 6 months.         Relevant Medications    insulin glargine (LANTUS) 100 UNIT/ML injection           Emilio Regalado MD   02/16/2021

## 2021-04-07 ENCOUNTER — OFFICE VISIT (OUTPATIENT)
Dept: CARDIOLOGY | Facility: CLINIC | Age: 81
End: 2021-04-07

## 2021-04-07 VITALS
OXYGEN SATURATION: 97 % | BODY MASS INDEX: 37.39 KG/M2 | HEIGHT: 64 IN | WEIGHT: 219 LBS | HEART RATE: 82 BPM | DIASTOLIC BLOOD PRESSURE: 60 MMHG | SYSTOLIC BLOOD PRESSURE: 110 MMHG

## 2021-04-07 DIAGNOSIS — I63.89 CEREBRAL INFARCTION DUE TO OTHER MECHANISM (HCC): ICD-10-CM

## 2021-04-07 DIAGNOSIS — I10 ESSENTIAL HYPERTENSION: ICD-10-CM

## 2021-04-07 DIAGNOSIS — I51.81 TAKOTSUBO CARDIOMYOPATHY: Primary | ICD-10-CM

## 2021-04-07 DIAGNOSIS — I48.0 PAROXYSMAL ATRIAL FIBRILLATION (HCC): ICD-10-CM

## 2021-04-07 PROCEDURE — 99214 OFFICE O/P EST MOD 30 MIN: CPT | Performed by: INTERNAL MEDICINE

## 2021-04-07 PROCEDURE — 93291 INTERROG DEV EVAL SCRMS IP: CPT | Performed by: INTERNAL MEDICINE

## 2021-04-07 PROCEDURE — 93000 ELECTROCARDIOGRAM COMPLETE: CPT | Performed by: INTERNAL MEDICINE

## 2021-04-07 RX ORDER — PEN NEEDLE, DIABETIC 30 GX5/16"
NEEDLE, DISPOSABLE MISCELLANEOUS
COMMUNITY
End: 2021-08-12 | Stop reason: SDUPTHER

## 2021-04-07 RX ORDER — TORSEMIDE 20 MG/1
40 TABLET ORAL DAILY
COMMUNITY

## 2021-04-07 RX ORDER — NALOXONE HYDROCHLORIDE 4 MG/.1ML
SPRAY NASAL
COMMUNITY

## 2021-04-07 RX ORDER — DOXAZOSIN MESYLATE 4 MG/1
4 TABLET ORAL DAILY
COMMUNITY
Start: 2021-02-22

## 2021-04-07 RX ORDER — HYDRALAZINE HYDROCHLORIDE 25 MG/1
25 TABLET, FILM COATED ORAL 3 TIMES DAILY
COMMUNITY
Start: 2021-03-02 | End: 2021-05-26 | Stop reason: SDUPTHER

## 2021-04-07 RX ORDER — CLONIDINE HYDROCHLORIDE 0.1 MG/1
0.1 TABLET ORAL 2 TIMES DAILY
COMMUNITY
Start: 2021-03-17

## 2021-04-07 NOTE — PROGRESS NOTES
[facility] AT [address], [address]  [place], [place], [address] [phone]  WWW[facility]           OUTPATIENT CLINIC FOLLOW UP NOTE    Patient Care Team:  Patient Care Team:  MAGDIEL Mcneill MD as PCP - General (Family Medicine)  MAGDIEL Mcneill MD (Family Medicine)  Steve Geronimo MD as Consulting Physician (Cardiology)    Subjective:   Reason for consultation:   Chief Complaint   Patient presents with   • Coronary Artery Disease   • PAF   • PVD   • Hypertension       HPI:    Yanique Webster is a 81 y.o. female.  Problem list:  1. 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 EF 15% improved to 60% by 10/2016  2. CAD  1. [facility] 2016: mild diffuse multivessel coronary artery disease at that time  3. Paroxysmal atrial fibrillation diagnosed by loop recorder 10/2020  4. Moderate aortic regurgitation  5. 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 Loop recorder implant 7/2019, later diagnosed with A. fib as noted above  6. Diabetes  7. Hypertension  8. Mixed hyperlipidemia  9. Peripheral vascular disease  10. History of tobacco smoking  11. Anxiety  12. Claustrophobia    Today the patient presents for follow-up.    Since her last visit her swelling has been stable with torsemide 40 mg once daily (takes to 20 mg tablets).  Still has mild left greater than right swelling.  Denies significant shortness of air, chest pain.  Denies palpitations.    Review of Systems:  Positive for left greater than right lower extremity swelling.  Negative for exertional chest pain, orthopnea, PND, palpitations, lightheadedness, syncope.     PFSH:  Patient Active Problem List   Diagnosis   • Fibromyalgia   • PVD (peripheral vascular disease) (CMS/HCC)   • Type 2 diabetes mellitus (CMS/HCC)   • Diabetic gastroparesis (CMS/HCC)   • Takotsubo cardiomyopathy   • Hyperlipidemia LDL goal <70   • COPD (chronic obstructive pulmonary disease) (CMS/HCC)   • Obesity   • Osteoarthritis   • Chronic pain   • GERD  "(gastroesophageal reflux disease)   • Gout   • Chronic joint pain   • Coronary artery disease involving native coronary artery of native heart without angina pectoris   • Nonrheumatic aortic valve insufficiency   • Altered mental status   • Essential hypertension   • CKD (chronic kidney disease) stage 3, GFR 30-59 ml/min (CMS/[facility])   • Hypertensive emergency   • Status post placement of implantable loop recorder   • Paroxysmal atrial fibrillation (CMS/[facility])         Current Outpatient Medications:   •  aspirin 81 MG EC tablet, Take 1 tablet by mouth Daily., Disp: , Rfl:   •  carvedilol (COREG) 25 MG tablet, Take 1 tablet by mouth Every 12 (Twelve) Hours., Disp: 60 tablet, Rfl: 1  •  Cholecalciferol (VITAMIN D) 2000 units capsule, Take 2,000 Units by mouth Daily., Disp: , Rfl:   •  cloNIDine (CATAPRES) 0.1 MG tablet, Take 0.1 mg by mouth 2 (Two) Times a Day., Disp: , Rfl:   •  doxazosin (CARDURA) 4 MG tablet, Take 4 mg by mouth Daily., Disp: , Rfl:   •  DULoxetine (CYMBALTA) 60 MG capsule, Take 1 capsule by mouth Daily., Disp: 90 capsule, Rfl: 3  •  hydrALAZINE (APRESOLINE) 25 MG tablet, Take 25 mg by mouth 3 (Three) Times a Day., Disp: , Rfl:   •  Insulin Glargine (LANTUS SOLOSTAR) 100 UNIT/ML injection pen, Inject 24 Units under the skin into the appropriate area as directed Every Night., Disp: 27 mL, Rfl: 3  •  Insulin Pen Needle (Pen Needles 3/16\") 31G X 5 MM misc, pen needle, diabetic 31 gauge x 5/16\", Disp: , Rfl:   •  lisinopril (PRINIVIL,ZESTRIL) 40 MG tablet, Take 1 tablet by mouth Daily., Disp: 30 tablet, Rfl: 0  •  methadone (DOLOPHINE) 10 MG tablet, Take 10 mg by mouth Every 8 (Eight) Hours As Needed for Moderate Pain ,, Disp: , Rfl:   •  naloxone (Narcan) 4 MG/0.1ML nasal spray, Narcan 4 mg/actuation nasal spray, Disp: , Rfl:   •  pantoprazole (PROTONIX) 40 MG EC tablet, Take 1 tablet by mouth daily., Disp: , Rfl:   •  polyethylene glycol (MIRALAX) packet, Take 17 g by mouth every 12 (twelve) hours as " "needed (constipation)., Disp: , Rfl:   •  probiotic (CULTURELLE) capsule capsule, Take 1 capsule by mouth Daily., Disp: , Rfl:   •  rivaroxaban (Xarelto) 15 MG tablet, Take 1 tablet by mouth Daily With Dinner., Disp: 30 tablet, Rfl: 11  •  torsemide (DEMADEX) 20 MG tablet, Take 40 mg by mouth Daily., Disp: , Rfl:     Allergies   Allergen Reactions   • Penicillins    • Nickel Rash   • Penicillins Rash     unk        reports that she quit smoking about 18 years ago. Her smoking use included cigarettes. She smoked 1.00 pack per day. She has never used smokeless tobacco.    Family History   Problem Relation Age of Onset   • Cancer Mother    • Cancer Father    • Cancer Other    • Breast cancer Sister 67   • Ovarian cancer Neg Hx            Objective:   Blood pressure 110/60, pulse 82, height 162.6 cm (64.02\"), weight 99.3 kg (219 lb), SpO2 97 %.  CONSTITUTIONAL: No acute distress, normal affect  RESPIRATORY: Normal effort. Clear to auscultation bilaterally without wheezing or rales.  CARDIOVASCULAR: Regular rate and rhythm with normal S1 and S2. Without murmur, gallop or rub.  PERIPHERAL VASCULAR: Normal radial pulses bilaterally. There is mild left lower extremity swelling.    11/2020 exam: 2+ DP pulses bilaterally    Labs:  Lab Results   Component Value Date    ALT 16 11/04/2020    AST 19 11/04/2020     Lab Results   Component Value Date    CHOL 145 03/15/2019    TRIG 117 03/15/2019    HDL 35 (L) 03/15/2019    LDLDIRECT 2.6 (L) 04/19/2017    CREATININE 1.71 (H) 11/04/2020       Diagnostic Data:      ECG 12 Lead    Date/Time: 4/7/2021 3:39 PM  Performed by: Steve Geronimo MD  Authorized by: Steve Geronimo MD   Comparison: compared with previous ECG from 11/4/2020  Similar to previous ECG  Rhythm: sinus rhythm  Comments: Heart rate 65, QRS 81, QTc 397            Device interrogation: [employer] loop recorder, 4/7/2021-no events    DEVICE INTERROGATION: [employer] loop recorder, Interrogation date 11/4/2020- Multiple " episodes of A. fib.  Battery intact    MCOT 4/2019  -No atrial fibrillation.  -Low ectopy burden.  -One 2.2-second pause.  -One triggered event was normal sinus rhythm.    ERMIAS 3/2019  · Left ventricular systolic function is normal. Estimated EF appears to be in the range of 61 - 65%  · Normal left atrial size and volume noted. There is no spontaneous echo contrast present. The left atrial appendage was visualized through multiple planes. Left atrial appendage was found to be multilobar in nature. Doppler interrogation shows normal flow within the left atrial appendage. No evidence of a left atrial appendage thrombus was present  · Lipomatous hypertrophy of the interatrial septum present. No evidence of a patent foramen ovale. Saline test results are negative.  · There is mild thickening of the noncoronary cusp of the aortic valve. Mild aortic valve regurgitation is present  · There is moderate (grade 2) layered plaque in the proximal aorta present.    TTE 3/2019  · The study is technically difficult for diagnosis.  · Left ventricular systolic function is hyperdynamic (EF > 70).  · Left ventricular diastolic dysfunction (grade I) consistent with impaired relaxation.  · The valves were poorly visualized. There was no obvious hemodynamic abnormalities of the aortic or mitral valve, however    TTE 8/2016  · The left ventricular cavity is mildly dilated.  · The findings are consistent with stress-induced (Takotsubo) cardiomyopathy.  · Moderate aortic valve regurgitation is present.  · Left ventricular function is severely decreased. Estimated EF = 15%.    TTE 10/2016  · Left ventricular function is normal. Estimated EF = 60%.  · Left ventricular wall thickness is consistent with borderline concentric hypertrophy.  · The left ventricular cavity is borderline dilated.  · All left ventricular wall segments contract normally.  · Left ventricular diastolic dysfunction (grade I a) consistent with impaired  relaxation.  · Moderate aortic valve regurgitation is present.    Cath results reviewed 8/30/16  · Coronary circulation is right dominant  · Left main: Normal  · LAD: 40% proximal discrete stenosis, small D1 and D2 without significant disease, mid to distal LAD with mild diffuse disease  · LCX: Large circumflex, tandem 30% stenoses in the mid Cx after OM2, moderate mid OM1 disease, med sized OM2 with luminal irregularities, med to large sized OM3 without significant disease  · RCA: 50% mid RCA lesion, iFR 0.95 (not functionally significant), mild distal disease    Assessment and Plan:     Takotsubo cardiomyopathy  -Continue related cardiac medications  -Checked with her pharmacy and the patient today regarding her torsemide dose.  She takes two 20 mg torsemide doses a day.  Usually both tablets in the morning  -We will obtain the patient's most recent labs from her PCP office.  Monitoring renal function     Paroxysmal atrial fibrillation  CVA, symptoms resolved after TPA, old occipital infarct in addition to 2019 stroke  -Renally dose Xarelto 15 mg daily    Coronary artery disease involving native coronary artery of native heart without angina pectoris  Hyperlipidemia  -CCS class 0  -Continue aspirin, statin, beta-blocker.    Essential hypertension  -Continue current related medications.    - Return in about 6 months (around 10/7/2021) for Next scheduled follow up with Milena Nayak.      Steve Geronimo MD, MSc, [facility]  Interventional Cardiology  [facility] at [facility]

## 2021-05-22 PROCEDURE — 93298 REM INTERROG DEV EVAL SCRMS: CPT | Performed by: INTERNAL MEDICINE

## 2021-05-22 PROCEDURE — G2066 INTER DEVC REMOTE 30D: HCPCS | Performed by: INTERNAL MEDICINE

## 2021-05-26 RX ORDER — HYDRALAZINE HYDROCHLORIDE 25 MG/1
25 TABLET, FILM COATED ORAL 3 TIMES DAILY
Qty: 90 TABLET | Refills: 5 | Status: SHIPPED | OUTPATIENT
Start: 2021-05-26

## 2021-06-30 ENCOUNTER — OFFICE VISIT (OUTPATIENT)
Dept: ENDOCRINOLOGY | Facility: CLINIC | Age: 81
End: 2021-06-30

## 2021-06-30 VITALS
HEART RATE: 74 BPM | BODY MASS INDEX: 38.07 KG/M2 | SYSTOLIC BLOOD PRESSURE: 126 MMHG | OXYGEN SATURATION: 95 % | HEIGHT: 64 IN | DIASTOLIC BLOOD PRESSURE: 74 MMHG | WEIGHT: 223 LBS

## 2021-06-30 DIAGNOSIS — E11.42 TYPE 2 DIABETES MELLITUS WITH DIABETIC POLYNEUROPATHY, WITH LONG-TERM CURRENT USE OF INSULIN (HCC): Primary | ICD-10-CM

## 2021-06-30 DIAGNOSIS — Z79.4 TYPE 2 DIABETES MELLITUS WITH DIABETIC POLYNEUROPATHY, WITH LONG-TERM CURRENT USE OF INSULIN (HCC): Primary | ICD-10-CM

## 2021-06-30 LAB
EXPIRATION DATE: ABNORMAL
EXPIRATION DATE: NORMAL
GLUCOSE BLDC GLUCOMTR-MCNC: 224 MG/DL (ref 70–130)
HBA1C MFR BLD: 7.1 %
Lab: ABNORMAL
Lab: NORMAL

## 2021-06-30 PROCEDURE — 82947 ASSAY GLUCOSE BLOOD QUANT: CPT | Performed by: INTERNAL MEDICINE

## 2021-06-30 PROCEDURE — 99213 OFFICE O/P EST LOW 20 MIN: CPT | Performed by: INTERNAL MEDICINE

## 2021-06-30 PROCEDURE — 3051F HG A1C>EQUAL 7.0%<8.0%: CPT | Performed by: INTERNAL MEDICINE

## 2021-06-30 PROCEDURE — 83036 HEMOGLOBIN GLYCOSYLATED A1C: CPT | Performed by: INTERNAL MEDICINE

## 2021-06-30 NOTE — PROGRESS NOTES
"     Office Note      Date: 2021  Patient Name: Yanique Webster  MRN: [id number]  : 1940    Chief Complaint   Patient presents with   • Diabetes       History of Present Illness:   Yanique Webster is a 81 y.o. female who presents for Diabetes- type 2  She takes one shot of insulin per day.  bg checks are done 3 times per week.  She is not having hypoglycemia.    Last A1c:  Hemoglobin A1C   Date Value Ref Range Status   2021 7.1 % Final   2019 6.30 (H) 4.80 - 5.60 % Final       Changes in health since last visit: none . Last eye exam due  Full labs are ok .    Subjective          Review of Systems:   Review of Systems   Constitutional: Negative.    HENT: Negative.    Eyes: Negative.    Respiratory: Negative.        The following portions of the patient's history were reviewed and updated as appropriate: allergies, current medications, past family history, past medical history, past social history, past surgical history and problem list.    Objective     Visit Vitals  /74 (BP Location: Left arm, Patient Position: Sitting, Cuff Size: Adult)   Pulse 74   Ht 162.6 cm (64\")   Wt 101 kg (223 lb)   LMP  (LMP Unknown)   SpO2 95%   BMI 38.28 kg/m²       Labs:    CMP  Lab Results   Component Value Date    GLUCOSE 131 (H) 2020    BUN 35 (H) 2020    CREATININE 1.71 (H) 2020    EGFRIFNONA 29 (L) 2020    BCR 20.5 2020    K 4.8 2020    CO2 29.4 (H) 2020    CALCIUM 10.6 (H) 2020    AST 19 2020    ALT 16 2020        CBC w/DIFF  Lab Results   Component Value Date    WBC 9.09 2020    RBC 4.24 2020    HGB 12.2 2020    HCT 37.5 2020    MCV 88.4 2020    MCH 28.8 2020    MCHC 32.5 2020    RDW 12.9 2020    RDWSD 42.0 2020    MPV 11.1 2020     2020    NEUTRORELPCT 68.4 2020    LYMPHORELPCT 19.8 2020    MONORELPCT 7.8 2020    EOSRELPCT 2.9 2020    " BASORELPCT 0.9 11/04/2020    AUTOIGPER 0.2 11/04/2020    NEUTROABS 6.22 11/04/2020    LYMPHSABS 1.80 11/04/2020    MONOSABS 0.71 11/04/2020    EOSABS 0.26 11/04/2020    BASOSABS 0.08 11/04/2020    AUTOIGNUM 0.02 11/04/2020    NRBC 0.0 11/04/2020       Physical Exam:  Physical Exam  Vitals reviewed.   Constitutional:       Appearance: Normal appearance.   Cardiovascular:      Pulses:           Dorsalis pedis pulses are 0 on the right side and 0 on the left side.        Posterior tibial pulses are 0 on the right side and 0 on the left side.   Musculoskeletal:      Right foot: Decreased range of motion. Foot drop present.      Left foot: Decreased range of motion. Deformity and foot drop present.   Feet:      Right foot:      Protective Sensation: 10 sites tested. 0 sites sensed.      Skin integrity: Erythema present.      Toenail Condition: Right toenails are abnormally thick.      Left foot:      Protective Sensation: 10 sites tested. 0 sites sensed.      Skin integrity: Erythema present.      Toenail Condition: Left toenails are abnormally thick.      Comments: Diabetic Foot Exam Performed and Monofilament Test Performed  Neurological:      Mental Status: She is alert.   Psychiatric:         Mood and Affect: Mood normal.         Thought Content: Thought content normal.         Judgment: Judgment normal.          Assessment / Plan      Assessment & Plan:  Problem List Items Addressed This Visit        Other    Type 2 diabetes mellitus (CMS/HCC) - Primary    Current Assessment & Plan     Diabetes is unchanged.   Continue current treatment regimen.  Diabetes will be reassessed in 6 months.         Relevant Medications    DULoxetine (CYMBALTA) 60 MG capsule    Insulin Glargine (LANTUS SOLOSTAR) 100 UNIT/ML injection pen    Other Relevant Orders    POC Glycosylated Hemoglobin (Hb A1C) (Completed)    POC Glucose, Blood (Completed)           Emilio Regalado MD   06/30/2021

## 2021-07-06 ENCOUNTER — TRANSCRIBE ORDERS (OUTPATIENT)
Dept: ADMINISTRATIVE | Facility: HOSPITAL | Age: 81
End: 2021-07-06

## 2021-07-06 ENCOUNTER — HOSPITAL ENCOUNTER (OUTPATIENT)
Dept: CT IMAGING | Facility: HOSPITAL | Age: 81
Discharge: HOME | End: 2021-07-06

## 2021-07-06 ENCOUNTER — HOSPITAL ENCOUNTER (OUTPATIENT)
Dept: GENERAL RADIOLOGY | Facility: HOSPITAL | Age: 81
Discharge: HOME | End: 2021-07-06

## 2021-07-06 DIAGNOSIS — M79.672 ACUTE FOOT PAIN, LEFT: ICD-10-CM

## 2021-07-06 DIAGNOSIS — S09.90XA INJURY OF HEAD REGION, INITIAL ENCOUNTER: ICD-10-CM

## 2021-07-06 DIAGNOSIS — M79.672 ACUTE FOOT PAIN, LEFT: Primary | ICD-10-CM

## 2021-07-06 DIAGNOSIS — S09.90XA INJURY OF HEAD REGION, INITIAL ENCOUNTER: Primary | ICD-10-CM

## 2021-07-06 PROCEDURE — 70450 CT HEAD/BRAIN W/O DYE: CPT

## 2021-07-06 PROCEDURE — 73630 X-RAY EXAM OF FOOT: CPT

## 2021-07-09 ENCOUNTER — TRANSCRIBE ORDERS (OUTPATIENT)
Dept: PHYSICAL THERAPY | Facility: HOSPITAL | Age: 81
End: 2021-07-09

## 2021-07-09 DIAGNOSIS — S91.112D: Primary | ICD-10-CM

## 2021-07-21 ENCOUNTER — HOSPITAL ENCOUNTER (OUTPATIENT)
Dept: PHYSICAL THERAPY | Facility: HOSPITAL | Age: 81
Setting detail: THERAPIES SERIES
Discharge: HOME | End: 2021-07-21

## 2021-07-21 DIAGNOSIS — S91.102D OPEN WOUND OF LEFT GREAT TOE, SUBSEQUENT ENCOUNTER: Primary | ICD-10-CM

## 2021-07-21 PROCEDURE — 97161 PT EVAL LOW COMPLEX 20 MIN: CPT

## 2021-07-21 PROCEDURE — 97597 DBRDMT OPN WND 1ST 20 CM/<: CPT

## 2021-07-21 NOTE — THERAPY EVALUATION
Outpatient Rehabilitation - Wound/Debridement Initial Eval   [facility]     Patient Name: Yanique Webster  : 1940  MRN: [id number]  Today's Date: 2021        L great toe:                    Admit Date: 2021    Visit Dx:    ICD-10-CM ICD-9-CM   1. Open wound of left great toe, subsequent encounter  S91.102D V58.89     893.0       Patient Active Problem List   Diagnosis   • Fibromyalgia   • PVD (peripheral vascular disease) (CMS/[facility])   • Type 2 diabetes mellitus (CMS/[facility])   • Diabetic gastroparesis (CMS/[facility])   • Takotsubo cardiomyopathy   • Hyperlipidemia LDL goal <70   • COPD (chronic obstructive pulmonary disease) (CMS/[facility])   • Obesity   • Osteoarthritis   • Chronic pain   • GERD (gastroesophageal reflux disease)   • Gout   • Chronic joint pain   • Coronary artery disease involving native coronary artery of native heart without angina pectoris   • Nonrheumatic aortic valve insufficiency   • Altered mental status   • Essential hypertension   • CKD (chronic kidney disease) stage 3, GFR 30-59 ml/min (CMS/[facility])   • Hypertensive emergency   • Status post placement of implantable loop recorder   • Paroxysmal atrial fibrillation (CMS/[facility])        Past Medical History:   Diagnosis Date   • Blurry vision    • Chronic joint pain    • Chronic pain    • COPD (chronic obstructive pulmonary disease) (CMS/[facility])    • Coronary artery disease    • Diabetes mellitus (CMS/[facility])    • Diabetic gastroparesis (CMS/[facility]) 2016   • Esophageal stricture     s/p dilation in    • GERD (gastroesophageal reflux disease)    • Gout    • Headache     secondary to hypertension   • Hypercholesterolemia    • Hyperlipidemia    • Hypertension    • Hypertensive disorder    • Long term current use of insulin (CMS/[facility])    • Neuropathy    • Neuropathy due to type 2 diabetes mellitus (CMS/[facility])    • Obesity     body mass index 30+-obesity   • Osteoarthritis    • Pericarditis    • Stroke (CMS/[facility]) 2019   • Type 2 diabetes  "mellitus (CMS/HCC)         Past Surgical History:   Procedure Laterality Date   • BRONCHOSCOPY N/A 8/23/2016    Procedure: BRONCHOSCOPY BIOPSY AT BEDSIDE;  Surgeon: Mookie Willard MD;  Location:  [facility];  Service:    • CARDIAC CATHETERIZATION N/A 8/30/2016    Procedure: Left Heart Cath;  Surgeon: Steve Geronimo MD;  Location:  [facility];  Service:    • CARDIAC CATHETERIZATION N/A 8/30/2016    Procedure: Right Heart Cath;  Surgeon: Steve Geronimo MD;  Location:  [facility];  Service:    • CARDIAC ELECTROPHYSIOLOGY PROCEDURE N/A 7/2/2019    Procedure: Loop insertion;  Surgeon: Steve Geronimo MD;  Location:  [facility];  Service: Cardiovascular   • CATARACT EXTRACTION     • ESOPHAGEAL DILATATION  2007   • HERNIA REPAIR     • HYSTERECTOMY  1998   • WRIST FRACTURE SURGERY Left        Patient History     Row Name 07/21/21 1100             History    Chief Complaint  Ulcer, wound or other skin conditions  -MP      Date Current Problem(s) Began  07/04/21  -MP      Brief Description of Current Complaint  Patient presents to OP wound care with open wound to L great toe that occured on 7/4/21 as a result of a fall outside of the shower. She completed a 10 round of oral abx recently. Of note, pt has PMHx significant for DM2 and trauma to left to resulting in hammer-toe appearance.   -MP      Previous treatment for THIS PROBLEM  Medication;Other (comment) oral abx and abx ointment to wound  -MP      Patient/Caregiver Goals  Know what to do to help the symptoms;Heal wound;Decrease swelling  -MP      Patient's Rating of General Health  Fair  -MP      Patient seeing anyone else for problem(s)?  PCP  -MP      How has patient tried to help current problem?  Dressing wound BID with abx ointment and \"non-stick pads\".  -MP         Pain     Pain at Present  0  -MP         Fall Risk Assessment    Any falls in the past year:  Yes  -MP         Services    Are you currently " receiving Home Health services  No  -MP         Daily Activities    Primary Language  English  -MP      Pt Participated in POC and Goals  Yes  -MP        User Key  (r) = Recorded By, (t) = Taken By, (c) = Cosigned By    Initials Name Provider Type    Marty Gaming PT Physical Therapist          EVALUATION  PT Ortho     Row Name 07/21/21 1100       Subjective Comments    Subjective Comments  Pt states that since making appt, she has changed insurance. PT explained that new insurance has not approved treatment to be performed as we were unaware that she had new insurance so office staff was unable to verify. Pt verbalzied understanding but would like to proceed with tx. Patient states she will call with new insurance information on Friday.  -MP       Subjective Pain    Able to rate subjective pain?  yes  -MP    Pre-Treatment Pain Level  0  -MP    Post-Treatment Pain Level  0  -MP       Transfers    Comment (Transfers)  remained seated in transport chair  -MP      User Key  (r) = Recorded By, (t) = Taken By, (c) = Cosigned By    Initials Name Provider Type    Marty Gaming PT Physical Therapist        LDA Wound     Row Name 07/21/21 1100             Wound 07/21/21 1100 Left great toe Traumatic    Wound - Properties Group Placement Date: 07/21/21  -MP Placement Time: 1100  -MP Present on Hospital Admission: Y  -MP Side: Left  -MP Location: great toe  -MP Primary Wound Type: Traumatic  -MP Additional Comments: L plantar toe wound  -MP    Wound Image  Images linked: 2  -MP      Dressing Appearance  intact;moist drainage covered with tape and non-stick pad  -MP      Base  moist;pink;red;slough  -MP      Periwound  macerated;pale white;moist  -MP      Periwound Temperature  warm  -MP      Periwound Skin Turgor  soft  -MP      Edges  irregular;open  -MP      Wound Length (cm)  0.2 cm  -MP      Wound Width (cm)  3.4 cm  -MP      Wound Depth (cm)  0.1 cm  -MP      Drainage Characteristics/Odor  serous  -MP       Drainage Amount  scant  -MP      Care, Wound  cleansed with;wound cleanser;debrided  -MP      Dressing Care  dressing applied;antimicrobial agent applied;foam;gauze HFBt ribbon, 1'' conform, primafix tape  -MP      Periwound Care  cleansed with pH balanced cleanser;dry periwound area maintained  -MP      Retired Wound - Properties Group Date first assessed: 07/21/21  -MP Time first assessed: 1100  -MP Present on Hospital Admission: Y  -MP Side: Left  -MP Location: great toe  -MP Primary Wound Type: Traumatic  -MP Additional Comments: L plantar toe wound  -MP      User Key  (r) = Recorded By, (t) = Taken By, (c) = Cosigned By    Initials Name Provider Type    Marty Gaming, PT Physical Therapist            WOUND DEBRIDEMENT  Total area of Debridement: 2 cm2  Debridement Site 1  Location- Site 1: L plantar great toe wound  Selective Debridement- Site 1: Wound Surface <20cmsq  Instruments- Site 1: tweezers  Excised Tissue Description- Site 1: scant, slough, minimum, other (comment) (macerated skin)             Therapy Education     Row Name 07/21/21 1100             Therapy Education    Education Details  Patient provided information on PT role in wound healing, POC and importance of calling office back to provide updated insurance information. PT provided information regarding importance of keeping wound dry due to increased periwound maceration; instructed to avoid abx ointment at this time. PT instructed pt to clean wound with skintegrity, pat very dry with gauze, cut strip of HFBt to lightly place under and around L great toe secured with conform. PT instructed pt to avoid placing conform too tight and to monitor L second toe skin for signs of increased irritation; pt verbalized understanding.  -MP      Given  Symptoms/condition management;Bandaging/dressing change  -MP      Program  New  -MP      How Provided  Verbal;Demonstration  -MP      Provided to  Patient;Other (comment) friend  -MP        User Key  (r)  = Recorded By, (t) = Taken By, (c) = Cosigned By    Initials Name Provider Type    Marty Gaming PT Physical Therapist          Recommendation and Plan  PT Assessment/Plan     Row Name 07/21/21 1100          PT Assessment    Functional Limitations  Other (comment) wound management  -MP     Impairments  Integumentary integrity  -MP     Assessment Comments  Patient presents to OP wound care with wound to plantar surface of L great toe s/p fall. Wound presents with moderate maceration to periwound skin; PT instructed pt to hold on applying abx ointment to area at this time as ointment adds increased moisture to area. PT was able to lightly debride slough and macerated nonviable periwound tissue from area. Wound was dressed with ribbon of HFBt lightly wrapped around great toe secured with conform; PT reinforced importance of not applying dressing too tight. Patient would continue to benefit from skilled PT wound care to promote increased wound healing potential.  -MP     Rehab Potential  Good  -MP     Patient/caregiver participated in establishment of treatment plan and goals  Yes  -MP     Patient would benefit from skilled therapy intervention  Yes  -MP        PT Plan    PT Frequency  1x/week;2x/week  -MP     Predicted Duration of Therapy Intervention (PT)  15 visits  -MP     Planned CPT's?  PT EVAL LOW COMPLEXITY: 09093;PT SELF CARE/MGMT/TRAIN 15 MIN: 76274;PT NONSELECT DEBRIDE 15 MIN: 57763;PT NLFU MIST: 50628;PT SRIRAM DEBRIDE OPEN WOUND UP TO 20 CM: 38795  -MP     Physical Therapy Interventions (Optional Details)  patient/family education;wound care  -MP     PT Plan Comments  dressing management, education, ?MIST if healing delayed  -MP       User Key  (r) = Recorded By, (t) = Taken By, (c) = Cosigned By    Initials Name Provider Type    Marty Gaming, PT Physical Therapist            Goals  PT OP Goals     Row Name 07/21/21 1100          PT Short Term Goals    STG 1  Patient will present with 25% decrease in  L great toe wound measurements as evidence of wound healing.  -MP     STG 1 Progress  New  -MP     STG 2  Patient will verbalize signs and symptoms of infection.  -MP     STG 2 Progress  New  -MP        Long Term Goals    LTG 1  Patient will present with 75% decrease in L great toe wound measurements as evidence of wound healing.  -MP     LTG 1 Progress  New  -MP     LTG 2  Patient will demonstrate independence with clean home dressing management.  -MP     LTG 2 Progress  New  -MP        Time Calculation    PT Goal Re-Cert Due Date  10/19/21  -MP       User Key  (r) = Recorded By, (t) = Taken By, (c) = Cosigned By    Initials Name Provider Type    Marty Gaming, PT Physical Therapist          Time Calculation: Start Time: 1100  Untimed Charges  PT Eval/Re-eval Minutes: 50  Wound Care: 52689 Selective debridement  30598-Vrdozuehv debridement: 15  Total Minutes  Untimed Charges Total Minutes: 65   Total Minutes: 65  Therapy Charges for Today     Code Description Service Date Service Provider Modifiers Qty    19392239517 HC PT EVAL LOW COMPLEXITY 4 7/21/2021 Marty Gonzales, PT GP 1    98008239992 HC SRIRAM DEBRIDE OPEN WOUND UP TO 20CM 7/21/2021 Marty Gonzales, PT GP 1                Marty Gonzales PT  7/21/2021

## 2021-07-23 PROCEDURE — G2066 INTER DEVC REMOTE 30D: HCPCS | Performed by: INTERNAL MEDICINE

## 2021-07-23 PROCEDURE — 93298 REM INTERROG DEV EVAL SCRMS: CPT | Performed by: INTERNAL MEDICINE

## 2021-08-03 ENCOUNTER — HOSPITAL ENCOUNTER (OUTPATIENT)
Dept: PHYSICAL THERAPY | Facility: HOSPITAL | Age: 81
Setting detail: THERAPIES SERIES
Discharge: HOME | End: 2021-08-03

## 2021-08-03 DIAGNOSIS — S91.102D OPEN WOUND OF LEFT GREAT TOE, SUBSEQUENT ENCOUNTER: Primary | ICD-10-CM

## 2021-08-03 PROCEDURE — 97597 DBRDMT OPN WND 1ST 20 CM/<: CPT

## 2021-08-03 NOTE — THERAPY PROGRESS REPORT/RE-CERT
Outpatient Rehabilitation - Wound/Debridement Progress Note   [place]     Patient Name: Yainque Webster  : 1940  MRN: [id number]  Today's Date: 8/3/2021                   Admit Date: (Not on file)    Visit Dx:    ICD-10-CM ICD-9-CM   1. Open wound of left great toe, subsequent encounter  S91.102D V58.89     893.0       Patient Active Problem List   Diagnosis   • Fibromyalgia   • PVD (peripheral vascular disease) (CMS/[facility])   • Type 2 diabetes mellitus (CMS/[facility])   • Diabetic gastroparesis (CMS/[facility])   • Takotsubo cardiomyopathy   • Hyperlipidemia LDL goal <70   • COPD (chronic obstructive pulmonary disease) (CMS/[facility])   • Obesity   • Osteoarthritis   • Chronic pain   • GERD (gastroesophageal reflux disease)   • Gout   • Chronic joint pain   • Coronary artery disease involving native coronary artery of native heart without angina pectoris   • Nonrheumatic aortic valve insufficiency   • Altered mental status   • Essential hypertension   • CKD (chronic kidney disease) stage 3, GFR 30-59 ml/min (CMS/[facility])   • Hypertensive emergency   • Status post placement of implantable loop recorder   • Paroxysmal atrial fibrillation (CMS/[facility])        Past Medical History:   Diagnosis Date   • Blurry vision    • Chronic joint pain    • Chronic pain    • COPD (chronic obstructive pulmonary disease) (CMS/[facility])    • Coronary artery disease    • Diabetes mellitus (CMS/[facility])    • Diabetic gastroparesis (CMS/[facility]) 2016   • Esophageal stricture     s/p dilation in    • GERD (gastroesophageal reflux disease)    • Gout    • Headache     secondary to hypertension   • Hypercholesterolemia    • Hyperlipidemia    • Hypertension    • Hypertensive disorder    • Long term current use of insulin (CMS/[facility])    • Neuropathy    • Neuropathy due to type 2 diabetes mellitus (CMS/[facility])    • Obesity     body mass index 30+-obesity   • Osteoarthritis    • Pericarditis    • Stroke (CMS/[facility]) 2019   • Type 2 diabetes mellitus (CMS/[facility])          Past Surgical History:   Procedure Laterality Date   • BRONCHOSCOPY N/A 8/23/2016    Procedure: BRONCHOSCOPY BIOPSY AT BEDSIDE;  Surgeon: Mookie Willard MD;  Location:  [facility];  Service:    • CARDIAC CATHETERIZATION N/A 8/30/2016    Procedure: Left Heart Cath;  Surgeon: Steve Geronimo MD;  Location:  [facility];  Service:    • CARDIAC CATHETERIZATION N/A 8/30/2016    Procedure: Right Heart Cath;  Surgeon: Steve Geronimo MD;  Location:  [facility];  Service:    • CARDIAC ELECTROPHYSIOLOGY PROCEDURE N/A 7/2/2019    Procedure: Loop insertion;  Surgeon: Steve Geronimo MD;  Location:  [facility];  Service: Cardiovascular   • CATARACT EXTRACTION     • ESOPHAGEAL DILATATION  2007   • HERNIA REPAIR     • HYSTERECTOMY  1998   • WRIST FRACTURE SURGERY Left          EVALUATION  PT Ortho     Row Name 08/03/21 1345       Subjective Comments    Subjective Comments  No complaints. She says her son thinks the wound is healed  -       Subjective Pain    Able to rate subjective pain?  yes  -MC    Pre-Treatment Pain Level  0  -MC    Post-Treatment Pain Level  0  -MC       Transfers    Comment (Transfers)  remained seated in w/c for tx  -MC      User Key  (r) = Recorded By, (t) = Taken By, (c) = Cosigned By    Initials Name Provider Type    Cyndy Tapia PT Physical Therapist          LDA Wound     Row Name 08/03/21 1345             Wound 07/21/21 1100 Left great toe Traumatic    Wound - Properties Group Placement Date: 07/21/21  -MP Placement Time: 1100  -MP Present on Hospital Admission: Y  -MP Side: Left  -MP Location: great toe  -MP Primary Wound Type: Traumatic  -MP Additional Comments: L plantar toe wound  -MP    Wound Image  Images linked: 2  -MC      Dressing Appearance  intact;no drainage  -MC      Base  clean;closed/resurfaced;dry;pink  -MC      Periwound  intact;dry;pink  -MC      Periwound Temperature  warm  -MC      Periwound Skin Turgor   soft  -MC      Drainage Amount  none  -MC      Care, Wound  cleansed with;wound cleanser;debrided  -      Dressing Care  open to air  -MC      Retired Wound - Properties Group Date first assessed: 07/21/21  -MP Time first assessed: 1100  -MP Present on Hospital Admission: Y  -MP Side: Left  -MP Location: great toe  -MP Primary Wound Type: Traumatic  -MP Additional Comments: L plantar toe wound  -MP      User Key  (r) = Recorded By, (t) = Taken By, (c) = Cosigned By    Initials Name Provider Type    Cyndy Tapia, PT Physical Therapist    MP Marty Gonzales, ANGY Physical Therapist            WOUND DEBRIDEMENT  Total area of Debridement: 1 cm2  Debridement Site 1  Location- Site 1: L plantar great toe wound  Selective Debridement- Site 1: Wound Surface <20cmsq  Instruments- Site 1: tweezers  Excised Tissue Description- Site 1: scant, other (comment) (adherent crusts over new skin)  Bleeding- Site 1: none             Therapy Education     Row Name 08/03/21 3772             Therapy Education    Education Details  May leave area SANA. Discussed that, given her anatomy, she may be at risk for fissures to this area if the skin is allowed to get too dry. Recommended use of light lotions/ointments like Aquaphor to ensure appropriate skin moisture management. Tentative d/c.  -MC      Given  Symptoms/condition management;Bandaging/dressing change  -      Program  Progressed  -MC      How Provided  Verbal;Demonstration  -MC      Provided to  Patient  -      Level of Understanding  Teach back education performed;Verbalized  -MC        User Key  (r) = Recorded By, (t) = Taken By, (c) = Cosigned By    Initials Name Provider Type    Cyndy Tapia, PT Physical Therapist          Recommendation and Plan  PT Assessment/Plan     Row Name 08/03/21 5919          PT Assessment    Functional Limitations  Other (comment) wound mgmt  -MC     Impairments  Integumentary integrity  -MC     Assessment Comments  Pt's plantar  ulceration is now closed with appropriate skin integrity noted after debridement of adherent crusts. Pt has met her goals for PT wound care and extra education provided re: ongoing skin care. Pt is appropriate for tentative d/c today.  -     Rehab Potential  Good  -     Patient/caregiver participated in establishment of treatment plan and goals  Yes  -     Patient would benefit from skilled therapy intervention  Yes  -        PT Plan    PT Frequency  Other (comment) prn within 30 days  -     Planned CPT's?  PT SELF CARE/MGMT/TRAIN 15 MIN: 85257;PT NONSELECT DEBRIDE 15 MIN: 09800;PT SRIRAM DEBRIDE OPEN WOUND UP TO 20 CM: 39897  -     Physical Therapy Interventions (Optional Details)  wound care;patient/family education  -     PT Plan Comments  tentative d/c  -       User Key  (r) = Recorded By, (t) = Taken By, (c) = Cosigned By    Initials Name Provider Type    Cyndy Tapia, PT Physical Therapist          Goals  PT OP Goals     Row Name 08/03/21 1345          PT Short Term Goals    STG 1  Patient will present with 25% decrease in L great toe wound measurements as evidence of wound healing.  -     STG 1 Progress  Met  -     STG 2  Patient will verbalize signs and symptoms of infection.  -     STG 2 Progress  Met  -        Long Term Goals    LTG 1  Patient will present with 75% decrease in L great toe wound measurements as evidence of wound healing.  -     LTG 1 Progress  Met  -     LTG 2  Patient will demonstrate independence with clean home dressing management.  -     LTG 2 Progress  Met  -        Time Calculation    PT Goal Re-Cert Due Date  10/19/21  -       User Key  (r) = Recorded By, (t) = Taken By, (c) = Cosigned By    Initials Name Provider Type    Cyndy Tapia PT Physical Therapist          PT Goal Re-Cert Due Date: 10/19/21  PT Short Term Goals  STG 1: Patient will present with 25% decrease in L great toe wound measurements as evidence of wound healing.  STG  1 Progress: Met  STG 2: Patient will verbalize signs and symptoms of infection.  STG 2 Progress: Met  Long Term Goals  LTG 1: Patient will present with 75% decrease in L great toe wound measurements as evidence of wound healing.  LTG 1 Progress: Met  LTG 2: Patient will demonstrate independence with clean home dressing management.  LTG 2 Progress: Met      Time Calculation: Start Time: 1345  Untimed Charges  95982-Ifoicrbko debridement: 10  Total Minutes  Untimed Charges Total Minutes: 10   Total Minutes: 10  Therapy Charges for Today     Code Description Service Date Service Provider Modifiers Qty    [id number]  SRIRAM DEBRIDE OPEN WOUND UP TO 20CM 8/3/2021 Cyndy Ocampo, PT GP 1                  Cyndy Ocampo, PT  8/3/2021

## 2021-08-12 RX ORDER — PEN NEEDLE, DIABETIC 30 GX5/16"
NEEDLE, DISPOSABLE MISCELLANEOUS
Qty: 100 EACH | Refills: 2 | Status: SHIPPED | OUTPATIENT
Start: 2021-08-12 | End: 2022-05-18 | Stop reason: SDUPTHER

## 2021-08-23 PROCEDURE — G2066 INTER DEVC REMOTE 30D: HCPCS | Performed by: INTERNAL MEDICINE

## 2021-08-23 PROCEDURE — 93298 REM INTERROG DEV EVAL SCRMS: CPT | Performed by: INTERNAL MEDICINE

## 2021-09-17 ENCOUNTER — DOCUMENTATION (OUTPATIENT)
Dept: PHYSICAL THERAPY | Facility: HOSPITAL | Age: 81
End: 2021-09-17

## 2021-09-17 NOTE — THERAPY DISCHARGE NOTE
Outpatient Rehabilitation - Wound/Debridement   Discharge Summary       Patient Name: Yanique Webster  : 1940  MRN: [id number]  Today's Date: 2021                  Admit Date: (Not on file)    Visit Dx:  No diagnosis found.    Patient Active Problem List   Diagnosis   • Fibromyalgia   • PVD (peripheral vascular disease) (CMS/[facility])   • Type 2 diabetes mellitus (CMS/[facility])   • Diabetic gastroparesis (CMS/[facility])   • Takotsubo cardiomyopathy   • Hyperlipidemia LDL goal <70   • COPD (chronic obstructive pulmonary disease) (CMS/[facility])   • Obesity   • Osteoarthritis   • Chronic pain   • GERD (gastroesophageal reflux disease)   • Gout   • Chronic joint pain   • Coronary artery disease involving native coronary artery of native heart without angina pectoris   • Nonrheumatic aortic valve insufficiency   • Altered mental status   • Essential hypertension   • CKD (chronic kidney disease) stage 3, GFR 30-59 ml/min (CMS/[facility])   • Hypertensive emergency   • Status post placement of implantable loop recorder   • Paroxysmal atrial fibrillation (CMS/[facility])        Past Medical History:   Diagnosis Date   • Blurry vision    • Chronic joint pain    • Chronic pain    • COPD (chronic obstructive pulmonary disease) (CMS/[facility])    • Coronary artery disease    • Diabetes mellitus (CMS/[facility])    • Diabetic gastroparesis (CMS/[facility]) 2016   • Esophageal stricture     s/p dilation in    • GERD (gastroesophageal reflux disease)    • Gout    • Headache     secondary to hypertension   • Hypercholesterolemia    • Hyperlipidemia    • Hypertension    • Hypertensive disorder    • Long term current use of insulin (CMS/[facility])    • Neuropathy    • Neuropathy due to type 2 diabetes mellitus (CMS/[facility])    • Obesity     body mass index 30+-obesity   • Osteoarthritis    • Pericarditis    • Stroke (CMS/[facility]) 2019   • Type 2 diabetes mellitus (CMS/[facility])         Past Surgical History:   Procedure Laterality Date   • BRONCHOSCOPY N/A 2016     Procedure: BRONCHOSCOPY BIOPSY AT BEDSIDE;  Surgeon: Mookie Willard MD;  Location:  [facility];  Service:    • CARDIAC CATHETERIZATION N/A 8/30/2016    Procedure: Left Heart Cath;  Surgeon: Steve Geronimo MD;  Location:  [facility];  Service:    • CARDIAC CATHETERIZATION N/A 8/30/2016    Procedure: Right Heart Cath;  Surgeon: Steve Geronimo MD;  Location:  [facility];  Service:    • CARDIAC ELECTROPHYSIOLOGY PROCEDURE N/A 7/2/2019    Procedure: Loop insertion;  Surgeon: Steve Geronimo MD;  Location:  [facility];  Service: Cardiovascular   • CATARACT EXTRACTION     • ESOPHAGEAL DILATATION  2007   • HERNIA REPAIR     • HYSTERECTOMY  1998   • WRIST FRACTURE SURGERY Left          EVALUATION                WOUND DEBRIDEMENT                            Recommendation and Plan      Goals  PT OP Goals     Row Name 09/17/21 1523          PT Short Term Goals    STG 1  Patient will present with 25% decrease in L great toe wound measurements as evidence of wound healing.  -MP     STG 1 Progress  Met  -MP     STG 2  Patient will verbalize signs and symptoms of infection.  -MP     STG 2 Progress  Met  -MP        Long Term Goals    LTG 1  Patient will present with 75% decrease in L great toe wound measurements as evidence of wound healing.  -MP     LTG 1 Progress  Met  -MP     LTG 2  Patient will demonstrate independence with clean home dressing management.  -MP     LTG 2 Progress  Met  -MP       User Key  (r) = Recorded By, (t) = Taken By, (c) = Cosigned By    Initials Name Provider Type    Marty Gaming, PT Physical Therapist          Time Calculation:              OP Discharge Summary     Row Name 09/17/21 1523             OP PT Discharge Summary    Date of Discharge  09/17/21  -MP      Reason for Discharge  All goals achieved  -MP      Outcomes Achieved  Able to achieve all goals within established timeline  -MP      Discharge Destination  Home without follow-up   -MP      Discharge Instructions/Additional Comments  No follow up for 30 days  -MP        User Key  (r) = Recorded By, (t) = Taken By, (c) = Cosigned By    Initials Name Provider Type    Marty Gaming, PT Physical Therapist          Marty Gonzales, PT  9/17/2021

## 2021-09-23 PROCEDURE — 93298 REM INTERROG DEV EVAL SCRMS: CPT | Performed by: INTERNAL MEDICINE

## 2021-09-23 PROCEDURE — G2066 INTER DEVC REMOTE 30D: HCPCS | Performed by: INTERNAL MEDICINE

## 2021-10-24 PROCEDURE — 93298 REM INTERROG DEV EVAL SCRMS: CPT | Performed by: INTERNAL MEDICINE

## 2021-10-24 PROCEDURE — G2066 INTER DEVC REMOTE 30D: HCPCS | Performed by: INTERNAL MEDICINE

## 2021-11-24 PROCEDURE — 93298 REM INTERROG DEV EVAL SCRMS: CPT | Performed by: INTERNAL MEDICINE

## 2021-11-24 PROCEDURE — G2066 INTER DEVC REMOTE 30D: HCPCS | Performed by: INTERNAL MEDICINE

## 2021-12-08 DIAGNOSIS — E11.42 TYPE 2 DIABETES MELLITUS WITH DIABETIC POLYNEUROPATHY, WITH LONG-TERM CURRENT USE OF INSULIN (HCC): ICD-10-CM

## 2021-12-08 DIAGNOSIS — Z79.4 TYPE 2 DIABETES MELLITUS WITH DIABETIC POLYNEUROPATHY, WITH LONG-TERM CURRENT USE OF INSULIN (HCC): ICD-10-CM

## 2021-12-08 RX ORDER — DULOXETIN HYDROCHLORIDE 60 MG/1
60 CAPSULE, DELAYED RELEASE ORAL DAILY
Qty: 90 CAPSULE | Refills: 3 | Status: SHIPPED | OUTPATIENT
Start: 2021-12-08

## 2021-12-08 NOTE — TELEPHONE ENCOUNTER
PATIENT NEEDS REFILLS ON ONE TOUCH ULTRA BLUE TEST STRIPS. THEY ALSO NEED ALL MEDICATIONS DR. BLACKMON PRESCRIBE TO BE SENT TO [facility].

## 2021-12-21 NOTE — TELEPHONE ENCOUNTER
PT CALLED REQUESTING ONE TOUCH ULTRA BLUE TEST STRIPS TO BE SENT IN TO [facility]. PLEASE AND THANK YOU

## 2021-12-28 ENCOUNTER — TELEPHONE (OUTPATIENT)
Dept: ENDOCRINOLOGY | Facility: CLINIC | Age: 81
End: 2021-12-28

## 2021-12-28 NOTE — TELEPHONE ENCOUNTER
[facility] CALLED TO SAY THAT NAN SENT US A CMN FOR PTS TEST STRIPS -SHE IS OUT OF THESE  DID WE GET FORM   PLEASE CALL MAURA AT [facility]  [phone]

## 2022-01-25 PROCEDURE — 93298 REM INTERROG DEV EVAL SCRMS: CPT | Performed by: INTERNAL MEDICINE

## 2022-01-25 PROCEDURE — G2066 INTER DEVC REMOTE 30D: HCPCS | Performed by: INTERNAL MEDICINE

## 2022-02-25 PROCEDURE — 93298 REM INTERROG DEV EVAL SCRMS: CPT | Performed by: INTERNAL MEDICINE

## 2022-02-25 PROCEDURE — G2066 INTER DEVC REMOTE 30D: HCPCS | Performed by: INTERNAL MEDICINE

## 2022-03-28 PROCEDURE — 93298 REM INTERROG DEV EVAL SCRMS: CPT | Performed by: INTERNAL MEDICINE

## 2022-03-28 PROCEDURE — G2066 INTER DEVC REMOTE 30D: HCPCS | Performed by: INTERNAL MEDICINE

## 2022-05-18 ENCOUNTER — OFFICE VISIT (OUTPATIENT)
Dept: ENDOCRINOLOGY | Facility: CLINIC | Age: 82
End: 2022-05-18

## 2022-05-18 VITALS
DIASTOLIC BLOOD PRESSURE: 68 MMHG | OXYGEN SATURATION: 96 % | HEART RATE: 66 BPM | HEIGHT: 64 IN | WEIGHT: 203 LBS | BODY MASS INDEX: 34.66 KG/M2 | SYSTOLIC BLOOD PRESSURE: 156 MMHG

## 2022-05-18 DIAGNOSIS — E11.621 DIABETIC ULCER OF RIGHT MIDFOOT ASSOCIATED WITH TYPE 2 DIABETES MELLITUS, LIMITED TO BREAKDOWN OF SKIN: ICD-10-CM

## 2022-05-18 DIAGNOSIS — Z79.4 TYPE 2 DIABETES MELLITUS WITH HYPERGLYCEMIA, WITH LONG-TERM CURRENT USE OF INSULIN: Primary | Chronic | ICD-10-CM

## 2022-05-18 DIAGNOSIS — L97.411 DIABETIC ULCER OF RIGHT MIDFOOT ASSOCIATED WITH TYPE 2 DIABETES MELLITUS, LIMITED TO BREAKDOWN OF SKIN: ICD-10-CM

## 2022-05-18 DIAGNOSIS — E11.65 TYPE 2 DIABETES MELLITUS WITH HYPERGLYCEMIA, WITH LONG-TERM CURRENT USE OF INSULIN: Primary | Chronic | ICD-10-CM

## 2022-05-18 LAB
EXPIRATION DATE: NORMAL
EXPIRATION DATE: NORMAL
GLUCOSE BLDC GLUCOMTR-MCNC: 123 MG/DL (ref 70–130)
HBA1C MFR BLD: 5.7 %
Lab: NORMAL
Lab: NORMAL

## 2022-05-18 PROCEDURE — 99214 OFFICE O/P EST MOD 30 MIN: CPT | Performed by: PHYSICIAN ASSISTANT

## 2022-05-18 PROCEDURE — 82947 ASSAY GLUCOSE BLOOD QUANT: CPT | Performed by: PHYSICIAN ASSISTANT

## 2022-05-18 PROCEDURE — 3044F HG A1C LEVEL LT 7.0%: CPT | Performed by: PHYSICIAN ASSISTANT

## 2022-05-18 PROCEDURE — 83036 HEMOGLOBIN GLYCOSYLATED A1C: CPT | Performed by: PHYSICIAN ASSISTANT

## 2022-05-18 RX ORDER — BLOOD SUGAR DIAGNOSTIC
STRIP MISCELLANEOUS
Qty: 100 EACH | Refills: 3 | Status: SHIPPED | OUTPATIENT
Start: 2022-05-18 | End: 2022-05-27 | Stop reason: SDUPTHER

## 2022-05-18 RX ORDER — PEN NEEDLE, DIABETIC 30 GX5/16"
NEEDLE, DISPOSABLE MISCELLANEOUS
Qty: 100 EACH | Refills: 3 | Status: ON HOLD | OUTPATIENT
Start: 2022-05-18 | End: 2023-02-14

## 2022-05-18 RX ORDER — LANCETS 33 GAUGE
EACH MISCELLANEOUS
Qty: 100 EACH | Refills: 3 | Status: ON HOLD | OUTPATIENT
Start: 2022-05-18 | End: 2023-02-14

## 2022-05-27 DIAGNOSIS — Z79.4 TYPE 2 DIABETES MELLITUS WITH HYPERGLYCEMIA, WITH LONG-TERM CURRENT USE OF INSULIN: Chronic | ICD-10-CM

## 2022-05-27 DIAGNOSIS — E11.65 TYPE 2 DIABETES MELLITUS WITH HYPERGLYCEMIA, WITH LONG-TERM CURRENT USE OF INSULIN: Chronic | ICD-10-CM

## 2022-05-27 RX ORDER — BLOOD SUGAR DIAGNOSTIC
STRIP MISCELLANEOUS
Qty: 100 EACH | Refills: 3 | Status: ON HOLD | OUTPATIENT
Start: 2022-05-27 | End: 2023-02-14

## 2022-05-29 PROCEDURE — G2066 INTER DEVC REMOTE 30D: HCPCS | Performed by: INTERNAL MEDICINE

## 2022-05-29 PROCEDURE — 93298 REM INTERROG DEV EVAL SCRMS: CPT | Performed by: INTERNAL MEDICINE

## 2022-08-02 DIAGNOSIS — Z79.4 TYPE 2 DIABETES MELLITUS WITH DIABETIC POLYNEUROPATHY, WITH LONG-TERM CURRENT USE OF INSULIN: ICD-10-CM

## 2022-08-02 DIAGNOSIS — E11.42 TYPE 2 DIABETES MELLITUS WITH DIABETIC POLYNEUROPATHY, WITH LONG-TERM CURRENT USE OF INSULIN: ICD-10-CM

## 2022-08-02 RX ORDER — INSULIN GLARGINE 100 [IU]/ML
INJECTION, SOLUTION SUBCUTANEOUS
Qty: 21 ML | Refills: 1 | Status: SHIPPED | OUTPATIENT
Start: 2022-08-02 | End: 2022-12-08

## 2022-08-12 ENCOUNTER — HOSPITAL ENCOUNTER (OUTPATIENT)
Dept: CARDIOLOGY | Facility: HOSPITAL | Age: 82
Discharge: HOME | End: 2022-08-12
Admitting: PHYSICIAN ASSISTANT

## 2022-08-12 ENCOUNTER — TRANSCRIBE ORDERS (OUTPATIENT)
Dept: ADMINISTRATIVE | Facility: HOSPITAL | Age: 82
End: 2022-08-12

## 2022-08-12 VITALS — HEIGHT: 64 IN | BODY MASS INDEX: 34.66 KG/M2 | WEIGHT: 203 LBS

## 2022-08-12 DIAGNOSIS — M79.89 SWELLING OF LOWER LEG: ICD-10-CM

## 2022-08-12 DIAGNOSIS — M79.89 SWELLING OF LOWER LEG: Primary | ICD-10-CM

## 2022-08-12 LAB
BH CV LOWER VASCULAR LEFT COMMON FEMORAL COMPRESS: NORMAL
BH CV LOWER VASCULAR LEFT COMMON FEMORAL PHASIC: NORMAL
BH CV LOWER VASCULAR LEFT COMMON FEMORAL SPONT: NORMAL
BH CV LOWER VASCULAR LEFT DISTAL FEMORAL COMPRESS: NORMAL
BH CV LOWER VASCULAR LEFT GASTRONEMIUS COMPRESS: NORMAL
BH CV LOWER VASCULAR LEFT GREATER SAPH AK COMPRESS: NORMAL
BH CV LOWER VASCULAR LEFT GREATER SAPH BK COMPRESS: NORMAL
BH CV LOWER VASCULAR LEFT LESSER SAPH COMPRESS: NORMAL
BH CV LOWER VASCULAR LEFT MID FEMORAL AUGMENT: NORMAL
BH CV LOWER VASCULAR LEFT MID FEMORAL COMPRESS: NORMAL
BH CV LOWER VASCULAR LEFT MID FEMORAL PHASIC: NORMAL
BH CV LOWER VASCULAR LEFT MID FEMORAL SPONT: NORMAL
BH CV LOWER VASCULAR LEFT PERONEAL COMPRESS: NORMAL
BH CV LOWER VASCULAR LEFT POPLITEAL AUGMENT: NORMAL
BH CV LOWER VASCULAR LEFT POPLITEAL COMPRESS: NORMAL
BH CV LOWER VASCULAR LEFT POPLITEAL PHASIC: NORMAL
BH CV LOWER VASCULAR LEFT POPLITEAL SPONT: NORMAL
BH CV LOWER VASCULAR LEFT POSTERIOR TIBIAL COMPRESS: NORMAL
BH CV LOWER VASCULAR LEFT PROFUNDA FEMORAL COMPRESS: NORMAL
BH CV LOWER VASCULAR LEFT PROXIMAL FEMORAL COMPRESS: NORMAL
BH CV LOWER VASCULAR LEFT SAPHENOFEMORAL JUNCTION COMPRESS: NORMAL
BH CV LOWER VASCULAR RIGHT COMMON FEMORAL COMPRESS: NORMAL
BH CV LOWER VASCULAR RIGHT COMMON FEMORAL PHASIC: NORMAL
BH CV LOWER VASCULAR RIGHT COMMON FEMORAL SPONT: NORMAL
MAXIMAL PREDICTED HEART RATE: 139 BPM
STRESS TARGET HR: 118 BPM

## 2022-08-12 PROCEDURE — 93971 EXTREMITY STUDY: CPT

## 2022-08-12 PROCEDURE — 93971 EXTREMITY STUDY: CPT | Performed by: INTERNAL MEDICINE

## 2022-08-17 ENCOUNTER — OFFICE VISIT (OUTPATIENT)
Dept: CARDIOLOGY | Facility: CLINIC | Age: 82
End: 2022-08-17

## 2022-08-17 VITALS
HEIGHT: 64 IN | BODY MASS INDEX: 33.8 KG/M2 | WEIGHT: 198 LBS | DIASTOLIC BLOOD PRESSURE: 68 MMHG | SYSTOLIC BLOOD PRESSURE: 150 MMHG | HEART RATE: 62 BPM | OXYGEN SATURATION: 96 %

## 2022-08-17 DIAGNOSIS — I51.81 TAKOTSUBO CARDIOMYOPATHY: ICD-10-CM

## 2022-08-17 DIAGNOSIS — I25.10 CORONARY ARTERY DISEASE INVOLVING NATIVE CORONARY ARTERY OF NATIVE HEART WITHOUT ANGINA PECTORIS: Primary | ICD-10-CM

## 2022-08-17 DIAGNOSIS — Z95.818 STATUS POST PLACEMENT OF IMPLANTABLE LOOP RECORDER: ICD-10-CM

## 2022-08-17 DIAGNOSIS — I10 ESSENTIAL HYPERTENSION: ICD-10-CM

## 2022-08-17 DIAGNOSIS — I48.0 PAROXYSMAL ATRIAL FIBRILLATION: ICD-10-CM

## 2022-08-17 DIAGNOSIS — I35.1 NONRHEUMATIC AORTIC VALVE INSUFFICIENCY: ICD-10-CM

## 2022-08-17 DIAGNOSIS — E78.5 HYPERLIPIDEMIA LDL GOAL <70: ICD-10-CM

## 2022-08-17 DIAGNOSIS — I73.9 PVD (PERIPHERAL VASCULAR DISEASE): ICD-10-CM

## 2022-08-17 PROCEDURE — 99214 OFFICE O/P EST MOD 30 MIN: CPT | Performed by: INTERNAL MEDICINE

## 2022-08-17 RX ORDER — ATORVASTATIN CALCIUM 80 MG/1
80 TABLET, FILM COATED ORAL DAILY
COMMUNITY
Start: 2022-07-01

## 2022-08-17 NOTE — PROGRESS NOTES
[facility] AT [address], [address]  [place], [place], [address] [phone]  [email or web address]           OUTPATIENT CLINIC FOLLOW UP NOTE    Patient Care Team:  Patient Care Team:  Sebas Alicea MD as PCP - General (Family Medicine)  Steve Geronimo MD as Consulting Physician (Cardiology)  Emilio Regalado MD as Consulting Physician (Endocrinology)    Subjective:   Reason for consultation:   Chief Complaint   Patient presents with   • Takotsubo cardiomyopathy       HPI:    Yanique Webster is a 82 y.o. female.  Problem list:  1. 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 EF 15% improved to 60% by 10/2016  2. CAD  1. [facility] 2016: mild diffuse multivessel coronary artery disease at that time  3. Paroxysmal atrial fibrillation diagnosed by loop recorder 10/2020  4. Moderate aortic regurgitation  5. 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 Loop recorder implant 7/2019, later diagnosed with A. fib as noted above  6. Diabetes  7. Hypertension  8. Mixed hyperlipidemia  9. Peripheral vascular disease  10. History of tobacco smoking  11. Anxiety  12. Claustrophobia    Today the patient presents for follow-up.    Patient's been doing well from a cardiac standpoint since her last visit.  Denies chest pain, shortness of breath, palpitations, lightheadedness or syncope.  Notes improvement in her lower extremity edema.  Is well controlled with torsemide.    Review of Systems:  As noted in HPI.     PFSH:  Patient Active Problem List   Diagnosis   • Fibromyalgia   • PVD (peripheral vascular disease) ([facility])   • Type 2 diabetes mellitus (HCC)   • Diabetic gastroparesis ([facility])   • Takotsubo cardiomyopathy   • Hyperlipidemia LDL goal <70   • COPD (chronic obstructive pulmonary disease) ([facility])   • Obesity   • Osteoarthritis   • Chronic pain   • GERD (gastroesophageal reflux disease)   • Gout   • Chronic joint pain   • Coronary artery disease involving native coronary artery of native heart without  "angina pectoris   • Nonrheumatic aortic valve insufficiency   • Altered mental status   • Essential hypertension   • CKD (chronic kidney disease) stage 3, GFR 30-59 ml/min ([facility])   • Hypertensive emergency   • Status post placement of implantable loop recorder   • Paroxysmal atrial fibrillation ([facility])         Current Outpatient Medications:   •  aspirin 81 MG EC tablet, Take 1 tablet by mouth Daily., Disp: , Rfl:   •  atorvastatin (LIPITOR) 80 MG tablet, Daily., Disp: , Rfl:   •  carvedilol (COREG) 25 MG tablet, Take 1 tablet by mouth Every 12 (Twelve) Hours., Disp: 60 tablet, Rfl: 1  •  cloNIDine (CATAPRES) 0.1 MG tablet, Take 0.1 mg by mouth 2 (Two) Times a Day., Disp: , Rfl:   •  doxazosin (CARDURA) 4 MG tablet, Take 4 mg by mouth Daily., Disp: , Rfl:   •  DULoxetine (CYMBALTA) 60 MG capsule, Take 1 capsule by mouth Daily., Disp: 90 capsule, Rfl: 3  •  hydrALAZINE (APRESOLINE) 25 MG tablet, Take 1 tablet by mouth 3 (Three) Times a Day., Disp: 90 tablet, Rfl: 5  •  Insulin Pen Needle (Pen Needles 3/16\") 31G X 5 MM misc, Use pen needle once daily, Disp: 100 each, Rfl: 3  •  Lantus SoloStar 100 UNIT/ML injection pen, INJECT 24 UNITS SUBCUTANEOUSLY EVERY NIGHT AS DIRECTED, Disp: 21 mL, Rfl: 1  •  lisinopril (PRINIVIL,ZESTRIL) 40 MG tablet, Take 1 tablet by mouth Daily., Disp: 30 tablet, Rfl: 0  •  methadone (DOLOPHINE) 10 MG tablet, Take 10 mg by mouth Every 8 (Eight) Hours As Needed for Moderate Pain ,, Disp: , Rfl:   •  naloxone (NARCAN) 4 MG/0.1ML nasal spray, Narcan 4 mg/actuation nasal spray, Disp: , Rfl:   •  OneTouch Delica Lancets 33G misc, Testing 1 time per day; E11.65, Disp: 100 each, Rfl: 3  •  OneTouch Verio test strip, Testing 1 time per day; E11.65, Disp: 100 each, Rfl: 3  •  pantoprazole (PROTONIX) 40 MG EC tablet, Take 1 tablet by mouth daily., Disp: , Rfl:   •  polyethylene glycol (MIRALAX) packet, Take 17 g by mouth every 12 (twelve) hours as needed (constipation)., Disp: , Rfl:   •  probiotic " "(CULTURELLE) capsule capsule, Take 1 capsule by mouth Daily., Disp: , Rfl:   •  rivaroxaban (Xarelto) 15 MG tablet, Take 1 tablet by mouth Daily With Dinner., Disp: 30 tablet, Rfl: 11  •  torsemide (DEMADEX) 20 MG tablet, Take 40 mg by mouth Daily., Disp: , Rfl:     Allergies   Allergen Reactions   • Penicillins    • Nickel Rash   • Penicillins Rash     unk        reports that she quit smoking about 19 years ago. Her smoking use included cigarettes. She smoked 1.00 pack per day. She has never used smokeless tobacco.    Family History   Problem Relation Age of Onset   • Cancer Mother    • Cancer Father    • Cancer Other    • Breast cancer Sister 67   • Ovarian cancer Neg Hx            Objective:   Blood pressure 150/68, pulse 62, height 162.6 cm (64\"), weight 89.8 kg (198 lb), SpO2 96 %.  CONSTITUTIONAL: No acute distress, normal affect  RESPIRATORY: Normal effort. Clear to auscultation bilaterally without wheezing or rales.  CARDIOVASCULAR: Regular rate and rhythm with normal S1 and S2. Without murmur, gallop or rub.  PERIPHERAL VASCULAR: Normal radial pulses bilaterally.  There is no lower extremity edema bilaterally.    11/2020 exam: 2+ DP pulses bilaterally    Labs:  Lab Results   Component Value Date    ALT 16 11/04/2020    AST 19 11/04/2020     Lab Results   Component Value Date    CHOL 145 03/15/2019    TRIG 117 03/15/2019    HDL 35 (L) 03/15/2019    LDLDIRECT 2.6 (L) 04/19/2017    CREATININE 1.71 (H) 11/04/2020       Diagnostic Data:    Procedures    DEVICE INTERROGATION:  Medtronic loop recorder, 8/17/2022 - one tachy episode determined to be artifact, one pause episode determined to be from undersensing, no real events noted.     Device interrogation: Medtronic loop recorder, 4/7/2021-no events    DEVICE INTERROGATION: Medtronic loop recorder, Interrogation date 11/4/2020- Multiple episodes of A. fib.  Battery intact    MCOT 4/2019  -No atrial fibrillation.  -Low ectopy burden.  -One 2.2-second pause.  -One " triggered event was normal sinus rhythm.    ERMIAS 3/2019  · Left ventricular systolic function is normal. Estimated EF appears to be in the range of 61 - 65%  · Normal left atrial size and volume noted. There is no spontaneous echo contrast present. The left atrial appendage was visualized through multiple planes. Left atrial appendage was found to be multilobar in nature. Doppler interrogation shows normal flow within the left atrial appendage. No evidence of a left atrial appendage thrombus was present  · Lipomatous hypertrophy of the interatrial septum present. No evidence of a patent foramen ovale. Saline test results are negative.  · There is mild thickening of the noncoronary cusp of the aortic valve. Mild aortic valve regurgitation is present  · There is moderate (grade 2) layered plaque in the proximal aorta present.    TTE 3/2019  · The study is technically difficult for diagnosis.  · Left ventricular systolic function is hyperdynamic (EF > 70).  · Left ventricular diastolic dysfunction (grade I) consistent with impaired relaxation.  · The valves were poorly visualized. There was no obvious hemodynamic abnormalities of the aortic or mitral valve, however    TTE 8/2016  · The left ventricular cavity is mildly dilated.  · The findings are consistent with stress-induced (Takotsubo) cardiomyopathy.  · Moderate aortic valve regurgitation is present.  · Left ventricular function is severely decreased. Estimated EF = 15%.    TTE 10/2016  · Left ventricular function is normal. Estimated EF = 60%.  · Left ventricular wall thickness is consistent with borderline concentric hypertrophy.  · The left ventricular cavity is borderline dilated.  · All left ventricular wall segments contract normally.  · Left ventricular diastolic dysfunction (grade I a) consistent with impaired relaxation.  · Moderate aortic valve regurgitation is present.    Cath results reviewed 8/30/16  · Coronary circulation is right dominant  · Left  main: Normal  · LAD: 40% proximal discrete stenosis, small D1 and D2 without significant disease, mid to distal LAD with mild diffuse disease  · LCX: Large circumflex, tandem 30% stenoses in the mid Cx after OM2, moderate mid OM1 disease, med sized OM2 with luminal irregularities, med to large sized OM3 without significant disease  · RCA: 50% mid RCA lesion, iFR 0.95 (not functionally significant), mild distal disease    Assessment and Plan:     Takotsubo cardiomyopathy  -Continue related cardiac medications  -Continue torsemide.   -We will obtain the patient's most recent labs from her PCP office.  Monitoring renal function     Paroxysmal atrial fibrillation  CVA, symptoms resolved after TPA, old occipital infarct in addition to 2019 stroke  -No events noted on loop recorder interrogation today.   -Discussed loop recorder removal once battery no longer functioning.   -Renally dose Xarelto 15 mg daily    Coronary artery disease involving native coronary artery of native heart without angina pectoris  Hyperlipidemia  -CCS class 0  -Continue aspirin, statin, beta-blocker.    Essential hypertension  -Well controlled at home averaging 120/60  -Continue current related medications.    - Return in about 1 year (around 8/17/2023) for Next scheduled follow up with EKG .      Scribed for Steve Geronimo MD by Judith Morin, CHRIS. 8/17/2022  16:30 EDT    I, Steve Geronimo MD, personally performed the services as scribed by the above named individual. I have made any necessary edits and it is both accurate and complete.     Steve Geronimo MD, MSc, FACC, [facility]  Interventional Cardiology  [facility]

## 2022-08-30 PROCEDURE — 93298 REM INTERROG DEV EVAL SCRMS: CPT | Performed by: INTERNAL MEDICINE

## 2022-08-30 PROCEDURE — G2066 INTER DEVC REMOTE 30D: HCPCS | Performed by: INTERNAL MEDICINE

## 2022-10-31 PROCEDURE — G2066 INTER DEVC REMOTE 30D: HCPCS | Performed by: INTERNAL MEDICINE

## 2022-10-31 PROCEDURE — 93298 REM INTERROG DEV EVAL SCRMS: CPT | Performed by: INTERNAL MEDICINE

## 2022-12-01 PROCEDURE — G2066 INTER DEVC REMOTE 30D: HCPCS | Performed by: INTERNAL MEDICINE

## 2022-12-01 PROCEDURE — 93298 REM INTERROG DEV EVAL SCRMS: CPT | Performed by: INTERNAL MEDICINE

## 2022-12-08 DIAGNOSIS — Z79.4 TYPE 2 DIABETES MELLITUS WITH DIABETIC POLYNEUROPATHY, WITH LONG-TERM CURRENT USE OF INSULIN: ICD-10-CM

## 2022-12-08 DIAGNOSIS — E11.42 TYPE 2 DIABETES MELLITUS WITH DIABETIC POLYNEUROPATHY, WITH LONG-TERM CURRENT USE OF INSULIN: ICD-10-CM

## 2022-12-08 RX ORDER — INSULIN GLARGINE 100 [IU]/ML
INJECTION, SOLUTION SUBCUTANEOUS
Qty: 9 ML | Refills: 2 | Status: SHIPPED | OUTPATIENT
Start: 2022-12-08

## 2023-01-01 PROCEDURE — G2066 INTER DEVC REMOTE 30D: HCPCS | Performed by: INTERNAL MEDICINE

## 2023-01-01 PROCEDURE — 93298 REM INTERROG DEV EVAL SCRMS: CPT | Performed by: INTERNAL MEDICINE

## 2023-01-24 ENCOUNTER — TELEPHONE (OUTPATIENT)
Dept: CARDIOLOGY | Facility: CLINIC | Age: 83
End: 2023-01-24
Payer: MEDICARE

## 2023-01-24 NOTE — TELEPHONE ENCOUNTER
Per remote transmission: patient had 3 second pause at 1:45 pm on 01/23/23. Patient reports she was taking a shower at that time. She denies dizziness, cp, syncope, or palpiations.

## 2023-02-13 ENCOUNTER — HOSPITAL ENCOUNTER (OUTPATIENT)
Facility: HOSPITAL | Age: 83
Setting detail: OBSERVATION
LOS: 1 days | Discharge: HOME | End: 2023-02-16
Attending: EMERGENCY MEDICINE | Admitting: INTERNAL MEDICINE
Payer: MEDICARE

## 2023-02-13 ENCOUNTER — APPOINTMENT (OUTPATIENT)
Dept: CT IMAGING | Facility: HOSPITAL | Age: 83
End: 2023-02-13
Payer: MEDICARE

## 2023-02-13 ENCOUNTER — APPOINTMENT (OUTPATIENT)
Dept: GENERAL RADIOLOGY | Facility: HOSPITAL | Age: 83
End: 2023-02-13
Payer: MEDICARE

## 2023-02-13 DIAGNOSIS — I25.10 CORONARY ARTERY DISEASE INVOLVING NATIVE CORONARY ARTERY OF NATIVE HEART WITHOUT ANGINA PECTORIS: ICD-10-CM

## 2023-02-13 DIAGNOSIS — K21.9 GASTROESOPHAGEAL REFLUX DISEASE WITHOUT ESOPHAGITIS: ICD-10-CM

## 2023-02-13 DIAGNOSIS — R06.00 DYSPNEA AND RESPIRATORY ABNORMALITIES: ICD-10-CM

## 2023-02-13 DIAGNOSIS — J45.901 ACUTE EXACERBATION OF COPD WITH ASTHMA: Primary | ICD-10-CM

## 2023-02-13 DIAGNOSIS — Z95.818 STATUS POST PLACEMENT OF IMPLANTABLE LOOP RECORDER: ICD-10-CM

## 2023-02-13 DIAGNOSIS — I51.81 TAKOTSUBO CARDIOMYOPATHY: ICD-10-CM

## 2023-02-13 DIAGNOSIS — E78.5 HYPERLIPIDEMIA LDL GOAL <70: ICD-10-CM

## 2023-02-13 DIAGNOSIS — I48.0 PAROXYSMAL ATRIAL FIBRILLATION: ICD-10-CM

## 2023-02-13 DIAGNOSIS — I10 ESSENTIAL HYPERTENSION: ICD-10-CM

## 2023-02-13 DIAGNOSIS — G89.29 CHRONIC JOINT PAIN: ICD-10-CM

## 2023-02-13 DIAGNOSIS — J20.9 ACUTE BRONCHITIS, UNSPECIFIED ORGANISM: ICD-10-CM

## 2023-02-13 DIAGNOSIS — E11.65 TYPE 2 DIABETES MELLITUS WITH HYPERGLYCEMIA, WITH LONG-TERM CURRENT USE OF INSULIN: ICD-10-CM

## 2023-02-13 DIAGNOSIS — M19.90 OSTEOARTHRITIS, UNSPECIFIED OSTEOARTHRITIS TYPE, UNSPECIFIED SITE: ICD-10-CM

## 2023-02-13 DIAGNOSIS — I16.1 HYPERTENSIVE EMERGENCY: ICD-10-CM

## 2023-02-13 DIAGNOSIS — M79.7 FIBROMYALGIA: ICD-10-CM

## 2023-02-13 DIAGNOSIS — I73.9 PVD (PERIPHERAL VASCULAR DISEASE): ICD-10-CM

## 2023-02-13 DIAGNOSIS — Z79.4 TYPE 2 DIABETES MELLITUS WITH HYPERGLYCEMIA, WITH LONG-TERM CURRENT USE OF INSULIN: ICD-10-CM

## 2023-02-13 DIAGNOSIS — N18.9 CHRONIC RENAL IMPAIRMENT, UNSPECIFIED CKD STAGE: ICD-10-CM

## 2023-02-13 DIAGNOSIS — I35.1 NONRHEUMATIC AORTIC VALVE INSUFFICIENCY: ICD-10-CM

## 2023-02-13 DIAGNOSIS — N18.31 STAGE 3A CHRONIC KIDNEY DISEASE: ICD-10-CM

## 2023-02-13 DIAGNOSIS — R40.0 SOMNOLENCE: ICD-10-CM

## 2023-02-13 DIAGNOSIS — R06.89 DYSPNEA AND RESPIRATORY ABNORMALITIES: ICD-10-CM

## 2023-02-13 DIAGNOSIS — M25.50 CHRONIC JOINT PAIN: ICD-10-CM

## 2023-02-13 DIAGNOSIS — J44.1 ACUTE EXACERBATION OF COPD WITH ASTHMA: Primary | ICD-10-CM

## 2023-02-13 LAB
ALBUMIN SERPL-MCNC: 4 G/DL (ref 3.5–5.2)
ALBUMIN/GLOB SERPL: 1.5 G/DL
ALP SERPL-CCNC: 130 U/L (ref 39–117)
ALT SERPL W P-5'-P-CCNC: 16 U/L (ref 1–33)
ANION GAP SERPL CALCULATED.3IONS-SCNC: 11 MMOL/L (ref 5–15)
AST SERPL-CCNC: 27 U/L (ref 1–32)
BASOPHILS # BLD AUTO: 0.01 10*3/MM3 (ref 0–0.2)
BASOPHILS NFR BLD AUTO: 0.2 % (ref 0–1.5)
BILIRUB SERPL-MCNC: 0.4 MG/DL (ref 0–1.2)
BUN SERPL-MCNC: 38 MG/DL (ref 8–23)
BUN/CREAT SERPL: 22.1 (ref 7–25)
CALCIUM SPEC-SCNC: 10 MG/DL (ref 8.6–10.5)
CHLORIDE SERPL-SCNC: 102 MMOL/L (ref 98–107)
CO2 SERPL-SCNC: 21 MMOL/L (ref 22–29)
CREAT SERPL-MCNC: 1.72 MG/DL (ref 0.57–1)
D-LACTATE SERPL-SCNC: 1.3 MMOL/L (ref 0.5–2)
DEPRECATED RDW RBC AUTO: 46.3 FL (ref 37–54)
EGFRCR SERPLBLD CKD-EPI 2021: 29.4 ML/MIN/1.73
EOSINOPHIL # BLD AUTO: 0.02 10*3/MM3 (ref 0–0.4)
EOSINOPHIL NFR BLD AUTO: 0.4 % (ref 0.3–6.2)
ERYTHROCYTE [DISTWIDTH] IN BLOOD BY AUTOMATED COUNT: 14.3 % (ref 12.3–15.4)
FLUAV SUBTYP SPEC NAA+PROBE: NOT DETECTED
FLUBV RNA ISLT QL NAA+PROBE: NOT DETECTED
GEN 5 2HR TROPONIN T REFLEX: 51 NG/L
GLOBULIN UR ELPH-MCNC: 2.6 GM/DL
GLUCOSE BLDC GLUCOMTR-MCNC: 276 MG/DL (ref 70–130)
GLUCOSE SERPL-MCNC: 191 MG/DL (ref 65–99)
HBA1C MFR BLD: 5.5 % (ref 4.8–5.6)
HCT VFR BLD AUTO: 35.7 % (ref 34–46.6)
HGB BLD-MCNC: 11.7 G/DL (ref 12–15.9)
HOLD SPECIMEN: NORMAL
IMM GRANULOCYTES # BLD AUTO: 0.01 10*3/MM3 (ref 0–0.05)
IMM GRANULOCYTES NFR BLD AUTO: 0.2 % (ref 0–0.5)
LYMPHOCYTES # BLD AUTO: 1.18 10*3/MM3 (ref 0.7–3.1)
LYMPHOCYTES NFR BLD AUTO: 26.2 % (ref 19.6–45.3)
MCH RBC QN AUTO: 29.2 PG (ref 26.6–33)
MCHC RBC AUTO-ENTMCNC: 32.8 G/DL (ref 31.5–35.7)
MCV RBC AUTO: 89 FL (ref 79–97)
MONOCYTES # BLD AUTO: 0.47 10*3/MM3 (ref 0.1–0.9)
MONOCYTES NFR BLD AUTO: 10.4 % (ref 5–12)
NEUTROPHILS NFR BLD AUTO: 2.81 10*3/MM3 (ref 1.7–7)
NEUTROPHILS NFR BLD AUTO: 62.6 % (ref 42.7–76)
NRBC BLD AUTO-RTO: 0 /100 WBC (ref 0–0.2)
NT-PROBNP SERPL-MCNC: 1773 PG/ML (ref 0–1800)
PLAT MORPH BLD: NORMAL
PLATELET # BLD AUTO: 130 10*3/MM3 (ref 140–450)
PMV BLD AUTO: 11.2 FL (ref 6–12)
POTASSIUM SERPL-SCNC: 4.3 MMOL/L (ref 3.5–5.2)
PROCALCITONIN SERPL-MCNC: 0.11 NG/ML (ref 0–0.25)
PROT SERPL-MCNC: 6.6 G/DL (ref 6–8.5)
QT INTERVAL: 378 MS
QT INTERVAL: 410 MS
QTC INTERVAL: 430 MS
QTC INTERVAL: 463 MS
RBC # BLD AUTO: 4.01 10*6/MM3 (ref 3.77–5.28)
RBC MORPH BLD: NORMAL
SARS-COV-2 RNA RESP QL NAA+PROBE: NOT DETECTED
SODIUM SERPL-SCNC: 134 MMOL/L (ref 136–145)
TROPONIN T DELTA: 2 NG/L
TROPONIN T SERPL HS-MCNC: 49 NG/L
WBC MORPH BLD: NORMAL
WBC NRBC COR # BLD: 4.5 10*3/MM3 (ref 3.4–10.8)
WHOLE BLOOD HOLD COAG: NORMAL
WHOLE BLOOD HOLD SPECIMEN: NORMAL

## 2023-02-13 PROCEDURE — 87150 DNA/RNA AMPLIFIED PROBE: CPT | Performed by: EMERGENCY MEDICINE

## 2023-02-13 PROCEDURE — 83880 ASSAY OF NATRIURETIC PEPTIDE: CPT | Performed by: EMERGENCY MEDICINE

## 2023-02-13 PROCEDURE — 36415 COLL VENOUS BLD VENIPUNCTURE: CPT

## 2023-02-13 PROCEDURE — 99222 1ST HOSP IP/OBS MODERATE 55: CPT

## 2023-02-13 PROCEDURE — 94799 UNLISTED PULMONARY SVC/PX: CPT

## 2023-02-13 PROCEDURE — G0378 HOSPITAL OBSERVATION PER HR: HCPCS

## 2023-02-13 PROCEDURE — 83036 HEMOGLOBIN GLYCOSYLATED A1C: CPT

## 2023-02-13 PROCEDURE — 93005 ELECTROCARDIOGRAM TRACING: CPT

## 2023-02-13 PROCEDURE — 93005 ELECTROCARDIOGRAM TRACING: CPT | Performed by: EMERGENCY MEDICINE

## 2023-02-13 PROCEDURE — 84145 PROCALCITONIN (PCT): CPT | Performed by: EMERGENCY MEDICINE

## 2023-02-13 PROCEDURE — 96374 THER/PROPH/DIAG INJ IV PUSH: CPT

## 2023-02-13 PROCEDURE — 80053 COMPREHEN METABOLIC PANEL: CPT | Performed by: EMERGENCY MEDICINE

## 2023-02-13 PROCEDURE — 87040 BLOOD CULTURE FOR BACTERIA: CPT | Performed by: EMERGENCY MEDICINE

## 2023-02-13 PROCEDURE — 63710000001 INSULIN LISPRO (HUMAN) PER 5 UNITS

## 2023-02-13 PROCEDURE — 71250 CT THORAX DX C-: CPT

## 2023-02-13 PROCEDURE — 84484 ASSAY OF TROPONIN QUANT: CPT | Performed by: EMERGENCY MEDICINE

## 2023-02-13 PROCEDURE — 94640 AIRWAY INHALATION TREATMENT: CPT

## 2023-02-13 PROCEDURE — 25010000002 METHYLPREDNISOLONE PER 40 MG: Performed by: EMERGENCY MEDICINE

## 2023-02-13 PROCEDURE — 82962 GLUCOSE BLOOD TEST: CPT

## 2023-02-13 PROCEDURE — 83605 ASSAY OF LACTIC ACID: CPT | Performed by: EMERGENCY MEDICINE

## 2023-02-13 PROCEDURE — 99285 EMERGENCY DEPT VISIT HI MDM: CPT

## 2023-02-13 PROCEDURE — 87636 SARSCOV2 & INF A&B AMP PRB: CPT | Performed by: EMERGENCY MEDICINE

## 2023-02-13 PROCEDURE — 85007 BL SMEAR W/DIFF WBC COUNT: CPT | Performed by: EMERGENCY MEDICINE

## 2023-02-13 PROCEDURE — 85025 COMPLETE CBC W/AUTO DIFF WBC: CPT | Performed by: EMERGENCY MEDICINE

## 2023-02-13 PROCEDURE — 71045 X-RAY EXAM CHEST 1 VIEW: CPT

## 2023-02-13 RX ORDER — CARVEDILOL 12.5 MG/1
25 TABLET ORAL EVERY 12 HOURS SCHEDULED
Status: DISCONTINUED | OUTPATIENT
Start: 2023-02-13 | End: 2023-02-16 | Stop reason: HOSPADM

## 2023-02-13 RX ORDER — METHYLPREDNISOLONE SODIUM SUCCINATE 40 MG/ML
80 INJECTION, POWDER, LYOPHILIZED, FOR SOLUTION INTRAMUSCULAR; INTRAVENOUS ONCE
Status: COMPLETED | OUTPATIENT
Start: 2023-02-13 | End: 2023-02-13

## 2023-02-13 RX ORDER — POLYETHYLENE GLYCOL 3350 17 G/17G
17 POWDER, FOR SOLUTION ORAL DAILY PRN
Status: DISCONTINUED | OUTPATIENT
Start: 2023-02-13 | End: 2023-02-16 | Stop reason: HOSPADM

## 2023-02-13 RX ORDER — MORPHINE SULFATE 15 MG/1
15 TABLET, FILM COATED, EXTENDED RELEASE ORAL 2 TIMES DAILY
COMMUNITY

## 2023-02-13 RX ORDER — IPRATROPIUM BROMIDE AND ALBUTEROL SULFATE 2.5; .5 MG/3ML; MG/3ML
3 SOLUTION RESPIRATORY (INHALATION)
Status: DISCONTINUED | OUTPATIENT
Start: 2023-02-13 | End: 2023-02-16 | Stop reason: HOSPADM

## 2023-02-13 RX ORDER — HYDROCODONE BITARTRATE AND ACETAMINOPHEN 5; 325 MG/1; MG/1
1 TABLET ORAL EVERY 6 HOURS PRN
Status: DISCONTINUED | OUTPATIENT
Start: 2023-02-13 | End: 2023-02-16 | Stop reason: HOSPADM

## 2023-02-13 RX ORDER — SODIUM CHLORIDE 0.9 % (FLUSH) 0.9 %
10 SYRINGE (ML) INJECTION EVERY 12 HOURS SCHEDULED
Status: DISCONTINUED | OUTPATIENT
Start: 2023-02-13 | End: 2023-02-16 | Stop reason: HOSPADM

## 2023-02-13 RX ORDER — IBUPROFEN 600 MG/1
1 TABLET ORAL
Status: DISCONTINUED | OUTPATIENT
Start: 2023-02-13 | End: 2023-02-16 | Stop reason: HOSPADM

## 2023-02-13 RX ORDER — SODIUM CHLORIDE 0.9 % (FLUSH) 0.9 %
10 SYRINGE (ML) INJECTION AS NEEDED
Status: DISCONTINUED | OUTPATIENT
Start: 2023-02-13 | End: 2023-02-16 | Stop reason: HOSPADM

## 2023-02-13 RX ORDER — METHYLPREDNISOLONE SODIUM SUCCINATE 40 MG/ML
40 INJECTION, POWDER, LYOPHILIZED, FOR SOLUTION INTRAMUSCULAR; INTRAVENOUS EVERY 12 HOURS
Status: DISCONTINUED | OUTPATIENT
Start: 2023-02-14 | End: 2023-02-15

## 2023-02-13 RX ORDER — CLONIDINE HYDROCHLORIDE 0.1 MG/1
0.1 TABLET ORAL EVERY 12 HOURS SCHEDULED
Status: DISCONTINUED | OUTPATIENT
Start: 2023-02-13 | End: 2023-02-16 | Stop reason: HOSPADM

## 2023-02-13 RX ORDER — ACETAMINOPHEN 160 MG/5ML
650 SOLUTION ORAL EVERY 4 HOURS PRN
Status: DISCONTINUED | OUTPATIENT
Start: 2023-02-13 | End: 2023-02-16 | Stop reason: HOSPADM

## 2023-02-13 RX ORDER — DEXTROSE MONOHYDRATE 25 G/50ML
25 INJECTION, SOLUTION INTRAVENOUS
Status: DISCONTINUED | OUTPATIENT
Start: 2023-02-13 | End: 2023-02-16 | Stop reason: HOSPADM

## 2023-02-13 RX ORDER — ATORVASTATIN CALCIUM 40 MG/1
80 TABLET, FILM COATED ORAL DAILY
Status: DISCONTINUED | OUTPATIENT
Start: 2023-02-14 | End: 2023-02-16 | Stop reason: HOSPADM

## 2023-02-13 RX ORDER — PANTOPRAZOLE SODIUM 40 MG/1
40 TABLET, DELAYED RELEASE ORAL DAILY
Status: DISCONTINUED | OUTPATIENT
Start: 2023-02-14 | End: 2023-02-16 | Stop reason: HOSPADM

## 2023-02-13 RX ORDER — AMOXICILLIN 250 MG
2 CAPSULE ORAL 2 TIMES DAILY
Status: DISCONTINUED | OUTPATIENT
Start: 2023-02-13 | End: 2023-02-16 | Stop reason: HOSPADM

## 2023-02-13 RX ORDER — HYDRALAZINE HYDROCHLORIDE 50 MG/1
25 TABLET, FILM COATED ORAL 3 TIMES DAILY
Status: DISCONTINUED | OUTPATIENT
Start: 2023-02-13 | End: 2023-02-16 | Stop reason: HOSPADM

## 2023-02-13 RX ORDER — DULOXETIN HYDROCHLORIDE 60 MG/1
60 CAPSULE, DELAYED RELEASE ORAL DAILY
Status: DISCONTINUED | OUTPATIENT
Start: 2023-02-14 | End: 2023-02-16 | Stop reason: HOSPADM

## 2023-02-13 RX ORDER — BISACODYL 10 MG
10 SUPPOSITORY, RECTAL RECTAL DAILY PRN
Status: DISCONTINUED | OUTPATIENT
Start: 2023-02-13 | End: 2023-02-16 | Stop reason: HOSPADM

## 2023-02-13 RX ORDER — BISACODYL 5 MG/1
5 TABLET, DELAYED RELEASE ORAL DAILY PRN
Status: DISCONTINUED | OUTPATIENT
Start: 2023-02-13 | End: 2023-02-16 | Stop reason: HOSPADM

## 2023-02-13 RX ORDER — TORSEMIDE 20 MG/1
20 TABLET ORAL DAILY
Status: DISCONTINUED | OUTPATIENT
Start: 2023-02-14 | End: 2023-02-15

## 2023-02-13 RX ORDER — INSULIN LISPRO 100 [IU]/ML
0-7 INJECTION, SOLUTION INTRAVENOUS; SUBCUTANEOUS
Status: DISCONTINUED | OUTPATIENT
Start: 2023-02-13 | End: 2023-02-16 | Stop reason: HOSPADM

## 2023-02-13 RX ORDER — IPRATROPIUM BROMIDE AND ALBUTEROL SULFATE 2.5; .5 MG/3ML; MG/3ML
3 SOLUTION RESPIRATORY (INHALATION) ONCE
Status: COMPLETED | OUTPATIENT
Start: 2023-02-13 | End: 2023-02-13

## 2023-02-13 RX ORDER — ACETAMINOPHEN 325 MG/1
650 TABLET ORAL EVERY 4 HOURS PRN
Status: DISCONTINUED | OUTPATIENT
Start: 2023-02-13 | End: 2023-02-16 | Stop reason: HOSPADM

## 2023-02-13 RX ORDER — ASPIRIN 81 MG/1
81 TABLET ORAL DAILY
Status: DISCONTINUED | OUTPATIENT
Start: 2023-02-14 | End: 2023-02-16 | Stop reason: HOSPADM

## 2023-02-13 RX ORDER — SODIUM CHLORIDE 9 MG/ML
40 INJECTION, SOLUTION INTRAVENOUS AS NEEDED
Status: DISCONTINUED | OUTPATIENT
Start: 2023-02-13 | End: 2023-02-16 | Stop reason: HOSPADM

## 2023-02-13 RX ORDER — NICOTINE POLACRILEX 4 MG
15 LOZENGE BUCCAL
Status: DISCONTINUED | OUTPATIENT
Start: 2023-02-13 | End: 2023-02-16 | Stop reason: HOSPADM

## 2023-02-13 RX ORDER — ACETAMINOPHEN 650 MG/1
650 SUPPOSITORY RECTAL EVERY 4 HOURS PRN
Status: DISCONTINUED | OUTPATIENT
Start: 2023-02-13 | End: 2023-02-16 | Stop reason: HOSPADM

## 2023-02-13 RX ADMIN — METHYLPREDNISOLONE SODIUM SUCCINATE 80 MG: 40 INJECTION, POWDER, FOR SOLUTION INTRAMUSCULAR; INTRAVENOUS at 17:47

## 2023-02-13 RX ADMIN — RIVAROXABAN 15 MG: 15 TABLET, FILM COATED ORAL at 22:40

## 2023-02-13 RX ADMIN — IPRATROPIUM BROMIDE AND ALBUTEROL SULFATE 3 ML: 2.5; .5 SOLUTION RESPIRATORY (INHALATION) at 23:18

## 2023-02-13 RX ADMIN — IPRATROPIUM BROMIDE AND ALBUTEROL SULFATE 3 ML: .5; 2.5 SOLUTION RESPIRATORY (INHALATION) at 17:28

## 2023-02-13 RX ADMIN — HYDROCODONE BITARTRATE AND ACETAMINOPHEN 1 TABLET: 5; 325 TABLET ORAL at 22:51

## 2023-02-13 RX ADMIN — Medication 10 ML: at 22:40

## 2023-02-13 RX ADMIN — DOXYCYCLINE 100 MG: 100 INJECTION, POWDER, LYOPHILIZED, FOR SOLUTION INTRAVENOUS at 22:39

## 2023-02-13 RX ADMIN — INSULIN LISPRO 4 UNITS: 100 INJECTION, SOLUTION INTRAVENOUS; SUBCUTANEOUS at 22:51

## 2023-02-13 RX ADMIN — CLONIDINE HYDROCHLORIDE 0.1 MG: 0.1 TABLET ORAL at 20:40

## 2023-02-13 RX ADMIN — HYDRALAZINE HYDROCHLORIDE 25 MG: 50 TABLET, FILM COATED ORAL at 22:40

## 2023-02-13 RX ADMIN — CARVEDILOL 25 MG: 12.5 TABLET, FILM COATED ORAL at 22:40

## 2023-02-13 NOTE — ED PROVIDER NOTES
"[facility]    EMERGENCY DEPARTMENT ENCOUNTER      Pt Name: Yanique Webster  MRN: [id number]  YOB: 1940  Date of evaluation: 2/13/2023  Provider: Cristhian Rivera DO    CHIEF COMPLAINT       Chief Complaint   Patient presents with   • Shortness of Breath         HISTORY OF PRESENT ILLNESS  (Location/Symptom, Timing/Onset, Context/Setting, Quality, Duration, Modifying Factors, Severity.)   Yanique Webster is a 83 y.o. female who presents to the emergency department for evaluation of shortness of breath, cough and congestion which has been worsening over the last few days.  Positive cough without sputum production.  Positive sick contact with a home health nurse with \"bronchitis\".  She denies any fever, has had some chills, generalized weakness, just overall not feeling well.  She does not wear supplemental oxygen at home, remote history of COPD, recurrent pneumonia.  She notes some congestion, soreness in her lower anterior chest wall.  Denies any nausea vomiting diarrhea, no urinary bowel complaints.  No known history of significant heart failure.  Denies any other acute systemic complaints at this time.      Nursing notes were reviewed.      PAST MEDICAL HISTORY     Past Medical History:   Diagnosis Date   • Blurry vision    • Chronic joint pain    • Chronic pain    • COPD (chronic obstructive pulmonary disease) ([facility])    • Coronary artery disease    • Diabetic gastroparesis ([facility]) 08/24/2016   • Esophageal stricture     s/p dilation in 2007   • GERD (gastroesophageal reflux disease)    • Gout    • Headache     secondary to hypertension   • Hyperlipidemia    • Hypertension    • Long term current use of insulin ([facility])    • Neuropathy    • Neuropathy due to type 2 diabetes mellitus ([facility])    • Obesity    • Osteoarthritis    • Pericarditis 2008   • Stroke ([facility]) 03/2019   • Type 2 diabetes mellitus ([facility])          SURGICAL HISTORY       Past Surgical History:   Procedure Laterality Date   • " "BRONCHOSCOPY N/A 8/23/2016    Procedure: BRONCHOSCOPY BIOPSY AT BEDSIDE;  Surgeon: Mookie Willard MD;  Location:  [facility];  Service:    • CARDIAC CATHETERIZATION N/A 8/30/2016    Procedure: Left Heart Cath;  Surgeon: Steve Geronimo MD;  Location:  [facility] CATH INVASIVE [facility]ATION;  Service:    • CARDIAC CATHETERIZATION N/A 8/30/2016    Procedure: Right Heart Cath;  Surgeon: Steve Geronimo MD;  Location:  [facility]IVE [facility];  Service:    • CARDIAC ELECTROPHYSIOLOGY PROCEDURE N/A 7/2/2019    Procedure: Loop insertion;  Surgeon: Steve Geronimo MD;  Location:  [facility]VASIVE [facility]ATION;  Service: Cardiovascular   • CATARACT EXTRACTION     • ESOPHAGEAL DILATATION  2007   • HERNIA REPAIR     • HYSTERECTOMY  1998   • WRIST FRACTURE SURGERY Left          CURRENT MEDICATIONS       Current Facility-Administered Medications:   •  sodium chloride 0.9 % flush 10 mL, 10 mL, Intravenous, PRN, Pacittmojgan, Cristhian Alejandro, DO    Current Outpatient Medications:   •  Lantus SoloStar 100 UNIT/ML injection pen, INJECT 24 UNITS SUBCUTANEOUSLY EVERY NIGHT AS DIRECTED, Disp: 9 mL, Rfl: 2  •  aspirin 81 MG EC tablet, Take 1 tablet by mouth Daily., Disp: , Rfl:   •  atorvastatin (LIPITOR) 80 MG tablet, Daily., Disp: , Rfl:   •  carvedilol (COREG) 25 MG tablet, Take 1 tablet by mouth Every 12 (Twelve) Hours., Disp: 60 tablet, Rfl: 1  •  cloNIDine (CATAPRES) 0.1 MG tablet, Take 0.1 mg by mouth 2 (Two) Times a Day., Disp: , Rfl:   •  doxazosin (CARDURA) 4 MG tablet, Take 4 mg by mouth Daily., Disp: , Rfl:   •  DULoxetine (CYMBALTA) 60 MG capsule, Take 1 capsule by mouth Daily., Disp: 90 capsule, Rfl: 3  •  hydrALAZINE (APRESOLINE) 25 MG tablet, Take 1 tablet by mouth 3 (Three) Times a Day., Disp: 90 tablet, Rfl: 5  •  Insulin Pen Needle (Pen Needles 3/16\") 31G X 5 MM misc, Use pen needle once daily, Disp: 100 each, Rfl: 3  •  lisinopril (PRINIVIL,ZESTRIL) 40 MG tablet, Take 1 tablet by mouth Daily., Disp: 30 tablet, Rfl: 0  •  " methadone (DOLOPHINE) 10 MG tablet, Take 10 mg by mouth Every 8 (Eight) Hours As Needed for Moderate Pain ,, Disp: , Rfl:   •  naloxone (NARCAN) 4 MG/0.1ML nasal spray, Narcan 4 mg/actuation nasal spray, Disp: , Rfl:   •  OneTouch Delica Lancets 33G misc, Testing 1 time per day; E11.65, Disp: 100 each, Rfl: 3  •  OneTouch Verio test strip, Testing 1 time per day; E11.65, Disp: 100 each, Rfl: 3  •  pantoprazole (PROTONIX) 40 MG EC tablet, Take 1 tablet by mouth daily., Disp: , Rfl:   •  polyethylene glycol (MIRALAX) packet, Take 17 g by mouth every 12 (twelve) hours as needed (constipation)., Disp: , Rfl:   •  probiotic (CULTURELLE) capsule capsule, Take 1 capsule by mouth Daily., Disp: , Rfl:   •  rivaroxaban (Xarelto) 15 MG tablet, Take 1 tablet by mouth Daily With Dinner., Disp: 30 tablet, Rfl: 11  •  torsemide (DEMADEX) 20 MG tablet, Take 40 mg by mouth Daily., Disp: , Rfl:     ALLERGIES     Penicillins, Nickel, and Penicillins    FAMILY HISTORY       Family History   Problem Relation Age of Onset   • Cancer Mother    • Cancer Father    • Cancer Other    • Breast cancer Sister 67   • Ovarian cancer Neg Hx           SOCIAL HISTORY       Social History     Socioeconomic History   • Marital status:    Tobacco Use   • Smoking status: Former     Packs/day: 1.00     Types: Cigarettes     Quit date:      Years since quittin.1   • Smokeless tobacco: Never   Vaping Use   • Vaping Use: Never used   Substance and Sexual Activity   • Alcohol use: No   • Drug use: No   • Sexual activity: Defer         PHYSICAL EXAM    (up to 7 for level 4, 8 or more for level 5)     Vitals:    23 1601 23 1728 23 1730 23 1830   BP: 165/87  170/96 175/82   Pulse:  82 82 71   Resp:       Temp:       TempSrc:       SpO2:  93% 97% 95%   Weight:       Height:           Physical Exam  General : Patient is awake, alert, oriented, nontoxic-appearing, in mild respiratory distress  HEENT: Pupils are equally round,  EOMI, conjunctivae clear  Neck: Neck is supple, full range of motion, trachea midline  Cardiac: Heart regular rate, rhythm  Lungs: Lungs with decreased breath sounds bilaterally, scattered expiratory wheezes are noted, mild increased work of breathing.  Chest wall: There is no tenderness to palpation over the chest wall or over ribs  Abdomen: Abdomen is soft, nondistended, no firm or pulsatile masses.  Musculoskeletal: Trace peripheral edema, 5 out of 5 strength in all 4 extremities.  No focal muscle deficits are appreciated  Neuro: Motor intact, sensory intact, level of consciousness is normal, GCS 15  Dermatology: Skin is warm and dry  Psych: Mentation is grossly normal, cognition is grossly normal. Affect is anxious      DIAGNOSTIC RESULTS     EKG:  All EKGs are interpreted by the Emergency Department Physician who either signs or Co-signs this chart in the absence of a cardiologist.    ECG 12 Lead Chest Pain   Final Result   Test Reason : Chest Pain   Blood Pressure :   */*   mmHG   Vent. Rate :  78 BPM     Atrial Rate :  78 BPM      P-R Int : 154 ms          QRS Dur :  72 ms       QT Int : 378 ms       P-R-T Axes :   3  31  55 degrees      QTc Int : 430 ms      Normal sinus rhythm   Anterior infarct (cited on or before 13-FEB-2023)   Abnormal ECG   When compared with ECG of 13-FEB-2023 16:08, (Unconfirmed)   Sinus rhythm has replaced Junctional rhythm   ST more elevated in Inferior leads   Confirmed by ZACK CABRERA MD ([id number]) on 2/13/2023 4:57:28 PM      Referred By: CHIQUI           Confirmed By: ZACK CABRERA MD      ECG 12 Lead ED Triage Standing Order; SOA   Final Result   Test Reason : ED Triage Standing Order~   Blood Pressure :   */*   mmHG   Vent. Rate :  77 BPM     Atrial Rate :  72 BPM      P-R Int :   * ms          QRS Dur :  94 ms       QT Int : 410 ms       P-R-T Axes :   *  39  65 degrees      QTc Int : 463 ms      Accelerated Junctional rhythm   Anterior infarct , age undetermined   Abnormal ECG    When compared with ECG of 05-JUL-2019 21:19,   Junctional rhythm has replaced Sinus rhythm   ST now depressed in Anterior leads   Confirmed by ZACK CABRERA MD ([id number]) on 2/13/2023 4:57:24 PM      Referred By: CHIQUI           Confirmed By: ZACK CABRERA MD      ECG 12 Lead Dyspnea    (Results Pending)       RADIOLOGY:     [] Radiologist's Report Reviewed:  CT Chest Without Contrast Diagnostic   Final Result   Impression:   1.Central bronchial wall thickening with left lower lobe bronchial obstruction which may represent acute or chronic bronchitis. No significant focal pneumonia seen at this time.   2.Calcific atherosclerosis of the coronary arteries, aorta, and branch vessels.   3.Cholelithiasis.   4.4 cm chronic right adrenal mass with attenuation suggestive of an adrenal adenoma.   5.Suspected multinodular goiter with extension into the upper mediastinum.      Electronically Signed: Camron Hendrix     2/13/2023 6:18 PM EST     Workstation ID: [id number]      XR Chest 1 View   Final Result   Impression:      1. Chronic changes, but no acute cardiopulmonary disease.      Electronically Signed: Tod Granger     2/13/2023 4:22 PM EST     Workstation ID: [id number]          I ordered and independently reviewed the above noted radiographic studies.      I viewed images of chest x-ray which showed no acute cardiopulmonary process per my independent interpretation.    See radiologist's dictation for official interpretation.      ED BEDSIDE ULTRASOUND:   Performed by ED Physician - none    LABS:    I have reviewed and interpreted all of the currently available lab results from this visit (if applicable):  Results for orders placed or performed during the hospital encounter of 02/13/23   Comprehensive Metabolic Panel    Specimen: Blood   Result Value Ref Range    Glucose 191 (H) 65 - 99 mg/dL    BUN 38 (H) 8 - 23 mg/dL    Creatinine 1.72 (H) 0.57 - 1.00 mg/dL    Sodium 134 (L) 136 - 145 mmol/L    Potassium 4.3 3.5 - 5.2  mmol/L    Chloride 102 98 - 107 mmol/L    CO2 21.0 (L) 22.0 - 29.0 mmol/L    Calcium 10.0 8.6 - 10.5 mg/dL    Total Protein 6.6 6.0 - 8.5 g/dL    Albumin 4.0 3.5 - 5.2 g/dL    ALT (SGPT) 16 1 - 33 U/L    AST (SGOT) 27 1 - 32 U/L    Alkaline Phosphatase 130 (H) 39 - 117 U/L    Total Bilirubin 0.4 0.0 - 1.2 mg/dL    Globulin 2.6 gm/dL    A/G Ratio 1.5 g/dL    BUN/Creatinine Ratio 22.1 7.0 - 25.0    Anion Gap 11.0 5.0 - 15.0 mmol/L    eGFR 29.4 (L) >60.0 mL/min/1.73   BNP    Specimen: Blood   Result Value Ref Range    proBNP 1,773.0 0.0 - 1,800.0 pg/mL   High Sensitivity Troponin T    Specimen: Blood   Result Value Ref Range    HS Troponin T 49 (H) <10 ng/L   CBC Auto Differential    Specimen: Blood   Result Value Ref Range    WBC 4.50 3.40 - 10.80 10*3/mm3    RBC 4.01 3.77 - 5.28 10*6/mm3    Hemoglobin 11.7 (L) 12.0 - 15.9 g/dL    Hematocrit 35.7 34.0 - 46.6 %    MCV 89.0 79.0 - 97.0 fL    MCH 29.2 26.6 - 33.0 pg    MCHC 32.8 31.5 - 35.7 g/dL    RDW 14.3 12.3 - 15.4 %    RDW-SD 46.3 37.0 - 54.0 fl    MPV 11.2 6.0 - 12.0 fL    Platelets 130 (L) 140 - 450 10*3/mm3    Neutrophil % 62.6 42.7 - 76.0 %    Lymphocyte % 26.2 19.6 - 45.3 %    Monocyte % 10.4 5.0 - 12.0 %    Eosinophil % 0.4 0.3 - 6.2 %    Basophil % 0.2 0.0 - 1.5 %    Immature Grans % 0.2 0.0 - 0.5 %    Neutrophils, Absolute 2.81 1.70 - 7.00 10*3/mm3    Lymphocytes, Absolute 1.18 0.70 - 3.10 10*3/mm3    Monocytes, Absolute 0.47 0.10 - 0.90 10*3/mm3    Eosinophils, Absolute 0.02 0.00 - 0.40 10*3/mm3    Basophils, Absolute 0.01 0.00 - 0.20 10*3/mm3    Immature Grans, Absolute 0.01 0.00 - 0.05 10*3/mm3    nRBC 0.0 0.0 - 0.2 /100 WBC   Lactic Acid, Plasma    Specimen: Blood   Result Value Ref Range    Lactate 1.3 0.5 - 2.0 mmol/L   Procalcitonin    Specimen: Blood   Result Value Ref Range    Procalcitonin 0.11 0.00 - 0.25 ng/mL   Scan Slide    Specimen: Blood   Result Value Ref Range    RBC Morphology Normal Normal    WBC Morphology Normal Normal    Platelet  Morphology Normal Normal   ECG 12 Lead ED Triage Standing Order; SOA   Result Value Ref Range    QT Interval 410 ms    QTC Interval 463 ms   ECG 12 Lead Chest Pain   Result Value Ref Range    QT Interval 378 ms    QTC Interval 430 ms   Green Top (Gel)   Result Value Ref Range    Extra Tube Hold for add-ons.    Lavender Top   Result Value Ref Range    Extra Tube hold for add-on    Gold Top - SST   Result Value Ref Range    Extra Tube Hold for add-ons.    Light Blue Top   Result Value Ref Range    Extra Tube Hold for add-ons.         If labs were ordered, I independently reviewed the results and considered them in treating the patient.      EMERGENCY DEPARTMENT COURSE and DIFFERENTIAL DIAGNOSIS/MDM:   Vitals:  AS OF 19:01 EST    BP - 175/82  HR - 71  TEMP - 98.5 °F (36.9 °C) (Oral)  O2 SATS - 95%      Orders placed during this visit:  Orders Placed This Encounter   Procedures   • Blood Culture - Blood,   • Blood Culture - Blood,   • COVID PRE-OP / PRE-PROCEDURE SCREENING ORDER (NO ISOLATION) - Swab, Nasopharynx   • COVID-19 and FLU A/B PCR - Swab, Nasopharynx   • XR Chest 1 View   • CT Chest Without Contrast Diagnostic   • Desmet Draw   • Comprehensive Metabolic Panel   • BNP   • High Sensitivity Troponin T   • CBC Auto Differential   • Lactic Acid, Plasma   • Procalcitonin   • Scan Slide   • High Sensitivity Troponin T 2Hr   • NPO Diet NPO Type: Strict NPO   • Undress & Gown   • Cardiac Monitoring   • Continuous Pulse Oximetry   • Vital Signs   • Oxygen Therapy- Nasal Cannula; 2 LPM; Titrate for SPO2: equal to or greater than, 92%   • ECG 12 Lead ED Triage Standing Order; SOA   • ECG 12 Lead Chest Pain   • ECG 12 Lead Dyspnea   • Insert Peripheral IV   • Initiate Observation Status   • ED Bed Request   • CBC & Differential   • Green Top (Gel)   • Lavender Top   • Gold Top - SST   • Gray Top   • Light Blue Top       All labs have been independently reviewed by me.  All radiology studies have been reviewed by me and  the radiologist dictating the report.  All EKG's have been independently viewed and interpreted by me.      Discussion below represents my analysis of pertinent findings related to patient's condition, differential diagnosis, treatment plan and final disposition.    Differential diagnosis:  The differential diagnosis associated with the patient's presentation includes: Bronchitis, COPD exacerbation, pneumonia, pulmonary embolism    Additional sources  Discussed/ obtained information from independent historians:   [] Spouse  [] Parent  [x] Family member  [] Friend  [] EMS   [] Other:    External (non-ED) record review:    [x] Inpatient record:   [x] Office record:   [x] Outpatient record:   [x] Prior Outpatient labs:   [] Prior Outpatient radiology:   [] Primary Care record:   [] Outside ED record:   [] Other:     Patient's care impacted by:   [] Diabetes  [x] Hypertension  [x] Hyperlipidemia  [x] Coronary Artery Disease, Takotsubo cardiomyopathy   [x] COPD   [] Cancer   [] Tobacco Abuse   [] Substance Abuse    [] Other:     Care significantly affected by Social Determinants of Health (housing and economic circumstances, unemployment)    [] Yes     [x] No   If yes, Patient's care significantly limited by Social Determinants of Health including:   [] Inadequate housing   [] Low income   [] Alcoholism and drug addiction in family   [] Problems related to primary support group   [] Unemployment   [] Problems related to employment   [] Other Social Determinants of Health:           MEDICATIONS ADMINISTERED IN ED:  Medications   sodium chloride 0.9 % flush 10 mL (has no administration in time range)   ipratropium-albuterol (DUO-NEB) nebulizer solution 3 mL (3 mL Nebulization Given 2/13/23 1728)   methylPREDNISolone sodium succinate (SOLU-Medrol) injection 80 mg (80 mg Intravenous Given 2/13/23 1747)              Patient with shortness of breath cough and congestion which been worsening over the last few days.  She is  nontoxic-appearing, has very mild work increased breathing noted during my examination, initial vital signs pulse ox 95%, respirations 24.  She does have a history of diabetes, COPD, paroxysmal atrial fibrillation, hyperlipidemia, previous Takotsubo cardiomyopathy.  Multiple risk factors for cardiac and pulmonary disease.  Initial EKG without acute ischemic changes.  With her multiple risk factors we discussed obtaining IV, labs, imaging including a CT PE study for further assessment.  She was given breathing treatments Solu-Medrol with her dry history of COPD.  Results reviewed as above.  Patient with chronic renal insufficiency, creatinine 1.72 which is at her baseline.  White count is normal at 4.5, no signs of sepsis on work-up CT scan reveals acute bronchitis with some distal airway obstruction, no signs of obvious pneumonia.  She has a right adrenal mass, multinodular goiter with extension in the mediastinum.  Patient has underlying COPD exacerbation, placed on 2 L nasal cannula, treated with steroids, breathing treatments, will likely benefit from admission to the hospital for respiratory therapy, pulmonary toilet given her tachypnea, acute COPD exacerbation.  Ox saturations 92 to 94% at rest, gets severely dyspneic with exertion.  She is agreeable to admission, case discussed with hospitalist, Dr. Marshall, for admission.            PROCEDURES:  Procedures    CRITICAL CARE TIME    Total Critical Care time was 0 minutes, excluding separately reportable procedures.   There was a high probability of clinically significant/life threatening deterioration in the patient's condition which required my urgent intervention.      FINAL IMPRESSION      1. Acute exacerbation of COPD with asthma (HCC)    2. Dyspnea and respiratory abnormalities    3. Acute bronchitis, unspecified organism    4. Chronic renal impairment, unspecified CKD stage          DISPOSITION/PLAN     ED Disposition     ED Disposition   Decision to Admit     Condition   --    Comment   Level of Care: Telemetry [5]   Diagnosis: Acute exacerbation of COPD with asthma ([facility]) [[id number]]   Admitting Physician: CARTER HOFFMAN [[id number]]               Comment: Please note this report has been produced using speech recognition software.      Cristhian Rivera DO  Attending Emergency Physician       Cristhian Rivera DO  02/13/23 [id number]

## 2023-02-14 LAB
ANION GAP SERPL CALCULATED.3IONS-SCNC: 10 MMOL/L (ref 5–15)
BASOPHILS # BLD MANUAL: 0 10*3/MM3 (ref 0–0.2)
BASOPHILS NFR BLD MANUAL: 0 % (ref 0–1.5)
BUN SERPL-MCNC: 40 MG/DL (ref 8–23)
BUN/CREAT SERPL: 22.7 (ref 7–25)
BURR CELLS BLD QL SMEAR: ABNORMAL
CALCIUM SPEC-SCNC: 10.4 MG/DL (ref 8.6–10.5)
CHLORIDE SERPL-SCNC: 102 MMOL/L (ref 98–107)
CO2 SERPL-SCNC: 23 MMOL/L (ref 22–29)
CREAT SERPL-MCNC: 1.76 MG/DL (ref 0.57–1)
DEPRECATED RDW RBC AUTO: 46.7 FL (ref 37–54)
EGFRCR SERPLBLD CKD-EPI 2021: 28.6 ML/MIN/1.73
EOSINOPHIL # BLD MANUAL: 0 10*3/MM3 (ref 0–0.4)
EOSINOPHIL NFR BLD MANUAL: 0 % (ref 0.3–6.2)
ERYTHROCYTE [DISTWIDTH] IN BLOOD BY AUTOMATED COUNT: 14.2 % (ref 12.3–15.4)
GLUCOSE BLDC GLUCOMTR-MCNC: 208 MG/DL (ref 70–130)
GLUCOSE BLDC GLUCOMTR-MCNC: 242 MG/DL (ref 70–130)
GLUCOSE BLDC GLUCOMTR-MCNC: 287 MG/DL (ref 70–130)
GLUCOSE BLDC GLUCOMTR-MCNC: 333 MG/DL (ref 70–130)
GLUCOSE SERPL-MCNC: 253 MG/DL (ref 65–99)
HCT VFR BLD AUTO: 37.3 % (ref 34–46.6)
HGB BLD-MCNC: 12 G/DL (ref 12–15.9)
LYMPHOCYTES # BLD MANUAL: 0.62 10*3/MM3 (ref 0.7–3.1)
LYMPHOCYTES NFR BLD MANUAL: 3 % (ref 5–12)
MAGNESIUM SERPL-MCNC: 2.4 MG/DL (ref 1.6–2.4)
MCH RBC QN AUTO: 28.9 PG (ref 26.6–33)
MCHC RBC AUTO-ENTMCNC: 32.2 G/DL (ref 31.5–35.7)
MCV RBC AUTO: 89.9 FL (ref 79–97)
MONOCYTES # BLD: 0.07 10*3/MM3 (ref 0.1–0.9)
MYELOCYTES NFR BLD MANUAL: 1 % (ref 0–0)
NEUTROPHILS # BLD AUTO: 1.76 10*3/MM3 (ref 1.7–7)
NEUTROPHILS NFR BLD MANUAL: 55 % (ref 42.7–76)
NEUTS BAND NFR BLD MANUAL: 16 % (ref 0–5)
OVALOCYTES BLD QL SMEAR: ABNORMAL
PLAT MORPH BLD: NORMAL
PLATELET # BLD AUTO: 133 10*3/MM3 (ref 140–450)
PMV BLD AUTO: 11.3 FL (ref 6–12)
POTASSIUM SERPL-SCNC: 4.7 MMOL/L (ref 3.5–5.2)
QT INTERVAL: 392 MS
QTC INTERVAL: 437 MS
RBC # BLD AUTO: 4.15 10*6/MM3 (ref 3.77–5.28)
SODIUM SERPL-SCNC: 135 MMOL/L (ref 136–145)
TSH SERPL DL<=0.05 MIU/L-ACNC: 0.16 UIU/ML (ref 0.27–4.2)
VARIANT LYMPHS NFR BLD MANUAL: 19 % (ref 19.6–45.3)
VARIANT LYMPHS NFR BLD MANUAL: 6 % (ref 0–5)
WBC MORPH BLD: NORMAL
WBC NRBC COR # BLD: 2.48 10*3/MM3 (ref 3.4–10.8)

## 2023-02-14 PROCEDURE — 25010000002 METHYLPREDNISOLONE PER 40 MG

## 2023-02-14 PROCEDURE — 84443 ASSAY THYROID STIM HORMONE: CPT

## 2023-02-14 PROCEDURE — 94799 UNLISTED PULMONARY SVC/PX: CPT

## 2023-02-14 PROCEDURE — 94761 N-INVAS EAR/PLS OXIMETRY MLT: CPT

## 2023-02-14 PROCEDURE — 63710000001 INSULIN DETEMIR PER 5 UNITS: Performed by: INTERNAL MEDICINE

## 2023-02-14 PROCEDURE — 80048 BASIC METABOLIC PNL TOTAL CA: CPT

## 2023-02-14 PROCEDURE — 97166 OT EVAL MOD COMPLEX 45 MIN: CPT

## 2023-02-14 PROCEDURE — 85025 COMPLETE CBC W/AUTO DIFF WBC: CPT

## 2023-02-14 PROCEDURE — 96376 TX/PRO/DX INJ SAME DRUG ADON: CPT

## 2023-02-14 PROCEDURE — 97530 THERAPEUTIC ACTIVITIES: CPT

## 2023-02-14 PROCEDURE — 99232 SBSQ HOSP IP/OBS MODERATE 35: CPT | Performed by: INTERNAL MEDICINE

## 2023-02-14 PROCEDURE — G0378 HOSPITAL OBSERVATION PER HR: HCPCS

## 2023-02-14 PROCEDURE — 63710000001 INSULIN LISPRO (HUMAN) PER 5 UNITS

## 2023-02-14 PROCEDURE — 83735 ASSAY OF MAGNESIUM: CPT

## 2023-02-14 PROCEDURE — 97162 PT EVAL MOD COMPLEX 30 MIN: CPT

## 2023-02-14 PROCEDURE — 82962 GLUCOSE BLOOD TEST: CPT

## 2023-02-14 RX ADMIN — CARVEDILOL 25 MG: 12.5 TABLET, FILM COATED ORAL at 20:36

## 2023-02-14 RX ADMIN — METHYLPREDNISOLONE SODIUM SUCCINATE 40 MG: 40 INJECTION, POWDER, FOR SOLUTION INTRAMUSCULAR; INTRAVENOUS at 05:17

## 2023-02-14 RX ADMIN — CARVEDILOL 25 MG: 12.5 TABLET, FILM COATED ORAL at 08:58

## 2023-02-14 RX ADMIN — IPRATROPIUM BROMIDE AND ALBUTEROL SULFATE 3 ML: 2.5; .5 SOLUTION RESPIRATORY (INHALATION) at 20:03

## 2023-02-14 RX ADMIN — IPRATROPIUM BROMIDE AND ALBUTEROL SULFATE 3 ML: 2.5; .5 SOLUTION RESPIRATORY (INHALATION) at 10:20

## 2023-02-14 RX ADMIN — METHYLPREDNISOLONE SODIUM SUCCINATE 40 MG: 40 INJECTION, POWDER, FOR SOLUTION INTRAMUSCULAR; INTRAVENOUS at 17:31

## 2023-02-14 RX ADMIN — INSULIN LISPRO 4 UNITS: 100 INJECTION, SOLUTION INTRAVENOUS; SUBCUTANEOUS at 20:43

## 2023-02-14 RX ADMIN — CLONIDINE HYDROCHLORIDE 0.1 MG: 0.1 TABLET ORAL at 20:36

## 2023-02-14 RX ADMIN — POLYETHYLENE GLYCOL 3350 17 G: 17 POWDER, FOR SOLUTION ORAL at 13:09

## 2023-02-14 RX ADMIN — HYDRALAZINE HYDROCHLORIDE 25 MG: 50 TABLET, FILM COATED ORAL at 20:36

## 2023-02-14 RX ADMIN — IPRATROPIUM BROMIDE AND ALBUTEROL SULFATE 3 ML: 2.5; .5 SOLUTION RESPIRATORY (INHALATION) at 06:24

## 2023-02-14 RX ADMIN — HYDROCODONE BITARTRATE AND ACETAMINOPHEN 1 TABLET: 5; 325 TABLET ORAL at 14:52

## 2023-02-14 RX ADMIN — IPRATROPIUM BROMIDE AND ALBUTEROL SULFATE 3 ML: 2.5; .5 SOLUTION RESPIRATORY (INHALATION) at 03:33

## 2023-02-14 RX ADMIN — ASPIRIN 81 MG: 81 TABLET, COATED ORAL at 08:57

## 2023-02-14 RX ADMIN — TORSEMIDE 20 MG: 20 TABLET ORAL at 08:58

## 2023-02-14 RX ADMIN — CLONIDINE HYDROCHLORIDE 0.1 MG: 0.1 TABLET ORAL at 08:58

## 2023-02-14 RX ADMIN — INSULIN LISPRO 3 UNITS: 100 INJECTION, SOLUTION INTRAVENOUS; SUBCUTANEOUS at 17:29

## 2023-02-14 RX ADMIN — DOXYCYCLINE 100 MG: 100 INJECTION, POWDER, LYOPHILIZED, FOR SOLUTION INTRAVENOUS at 20:36

## 2023-02-14 RX ADMIN — Medication 10 ML: at 08:58

## 2023-02-14 RX ADMIN — INSULIN LISPRO 3 UNITS: 100 INJECTION, SOLUTION INTRAVENOUS; SUBCUTANEOUS at 08:57

## 2023-02-14 RX ADMIN — HYDRALAZINE HYDROCHLORIDE 25 MG: 50 TABLET, FILM COATED ORAL at 15:32

## 2023-02-14 RX ADMIN — DULOXETINE HYDROCHLORIDE 60 MG: 60 CAPSULE, DELAYED RELEASE ORAL at 08:58

## 2023-02-14 RX ADMIN — RIVAROXABAN 15 MG: 15 TABLET, FILM COATED ORAL at 17:31

## 2023-02-14 RX ADMIN — DOXYCYCLINE 100 MG: 100 INJECTION, POWDER, LYOPHILIZED, FOR SOLUTION INTRAVENOUS at 08:57

## 2023-02-14 RX ADMIN — SENNOSIDES AND DOCUSATE SODIUM 2 TABLET: 50; 8.6 TABLET ORAL at 20:36

## 2023-02-14 RX ADMIN — INSULIN LISPRO 5 UNITS: 100 INJECTION, SOLUTION INTRAVENOUS; SUBCUTANEOUS at 12:18

## 2023-02-14 RX ADMIN — IPRATROPIUM BROMIDE AND ALBUTEROL SULFATE 3 ML: 2.5; .5 SOLUTION RESPIRATORY (INHALATION) at 15:22

## 2023-02-14 RX ADMIN — ATORVASTATIN CALCIUM 80 MG: 40 TABLET, FILM COATED ORAL at 08:58

## 2023-02-14 RX ADMIN — HYDRALAZINE HYDROCHLORIDE 25 MG: 50 TABLET, FILM COATED ORAL at 08:58

## 2023-02-14 RX ADMIN — PANTOPRAZOLE SODIUM 40 MG: 40 TABLET, DELAYED RELEASE ORAL at 05:17

## 2023-02-14 RX ADMIN — INSULIN DETEMIR 5 UNITS: 100 INJECTION, SOLUTION SUBCUTANEOUS at 20:36

## 2023-02-14 NOTE — PLAN OF CARE
Goal Outcome Evaluation:  Plan of Care Reviewed With: patient        Progress: no change  Outcome Evaluation: Pt presents with generalized weakness/deconditioning, decreased functional mobility, and decreased endurance with activity. Pt ambulated 125ft with CGA and RW for support. Pt requires cues to improve sequencing and positioning of AD. Recommend continued skilled IP PT interventions. Recommend D/C home with assist and HHPT.

## 2023-02-14 NOTE — H&P
[facility] Medicine Services  HISTORY AND PHYSICAL    Patient Name: Yanique Webster  : 1940  MRN: [id number]  Primary Care Physician: Sebas Alicea MD  Date of admission: 2023    Subjective   Subjective     Chief Complaint:  Shortness of breath    HPI:  Yanique Webster is a 83 y.o. female with PMH of CAD, HLD, HTN, Afib, CKD3, DM2, Takotsubo,COPD and PVD who presents to the ED for shortness of breath and cough. Patient developed shortness of breath about 3 days ago. She developed a cough with yellow sputum production. She is also complaining of congestion and weakness. She states her shortness of breath has been getting worse so she came to the ED. Denies fever. She does not wear oxygen or use inhalers at home. She walks with a walker at home due to numbness/pain in her legs secondary to neuropathy. Of note, patient's  receives home health. Home health recently diagnosed with bronchitis. [facility] Patient denies chest pain, abdominal pain, appetite change, N/V/D, dysuria.         Review of Systems   Constitutional: Positive for activity change and fatigue. Negative for appetite change, chills and fever.   HENT: Positive for congestion. Negative for sore throat and trouble swallowing.    Eyes: Negative.    Respiratory: Positive for cough, shortness of breath and wheezing. Negative for chest tightness.    Cardiovascular: Negative for chest pain and leg swelling.   Gastrointestinal: Negative for abdominal distention, abdominal pain, constipation, diarrhea, nausea and vomiting.   Endocrine: Negative.    Genitourinary: Negative for difficulty urinating and dysuria.   Musculoskeletal: Negative.    Skin: Negative.    Neurological: Positive for weakness and numbness (bilateral lower extremities). Negative for dizziness and headaches.   Hematological: Negative.    Psychiatric/Behavioral: Negative for agitation and  confusion.      All other systems reviewed and are negative.     Personal History     Past Medical History:   Diagnosis Date   • Blurry vision    • Chronic joint pain    • Chronic pain    • COPD (chronic obstructive pulmonary disease) ([facility])    • Coronary artery disease    • Diabetic gastroparesis ([facility]) 08/24/2016   • Esophageal stricture     s/p dilation in 2007   • GERD (gastroesophageal reflux disease)    • Gout    • Headache     secondary to hypertension   • Hyperlipidemia    • Hypertension    • Long term current use of insulin ([facility])    • Neuropathy    • Neuropathy due to type 2 diabetes mellitus ([facility])    • Obesity    • Osteoarthritis    • Pericarditis 2008   • Stroke ([facility]) 03/2019   • Type 2 diabetes mellitus ([facility])              Past Surgical History:   Procedure Laterality Date   • BRONCHOSCOPY N/A 8/23/2016    Procedure: BRONCHOSCOPY BIOPSY AT BEDSIDE;  Surgeon: Mookie Willard MD;  Location:  [facility] ENDOSCOPY;  Service:    • CARDIAC CATHETERIZATION N/A 8/30/2016    Procedure: Left Heart Cath;  Surgeon: Steve Geronimo MD;  Location:  [facility] CATH INVASIVE [facility]ATION;  Service:    • CARDIAC CATHETERIZATION N/A 8/30/2016    Procedure: Right Heart Cath;  Surgeon: Steve Geronimo MD;  Location:  [facility] CATH [facility]VASIVE [facility]ATION;  Service:    • CARDIAC ELECTROPHYSIOLOGY PROCEDURE N/A 7/2/2019    Procedure: Loop insertion;  Surgeon: Steve Geronimo MD;  Location:  [facility] CATH INVASIVE [facility]ATION;  Service: Cardiovascular   • CATARACT EXTRACTION     • ESOPHAGEAL DILATATION  2007   • HERNIA REPAIR     • HYSTERECTOMY  1998   • WRIST FRACTURE SURGERY Left        Family History:  family history includes Breast cancer (age of onset: 67) in her sister; Cancer in her father, mother, and another family member. Otherwise pertinent FHx was reviewed and unremarkable.     Social History:  reports that she quit smoking about 20 years ago. Her smoking use included cigarettes. She smoked an average of 1 pack per day. She has never used smokeless  "tobacco. She reports that she does not drink alcohol and does not use drugs.  Social History     Social History Narrative    ** Merged History Encounter **         Mrs. Webster is  and lives with her  in [place]. 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 She smoked 1ppd for 25 years but quit in 2000. Denies ETOH/illicit drug use.        Medications:  DULoxetine, Insulin Glargine, Morphine, OneTouch Delica Lancets 33G, Pen Needles 3/16\", aspirin, atorvastatin, carvedilol, cloNIDine, doxazosin, glucose blood, hydrALAZINE, lisinopril, methadone, naloxone, pantoprazole, polyethylene glycol, probiotic, rivaroxaban, and torsemide    Allergies   Allergen Reactions   • Penicillins    • Nickel Rash   • Penicillins Rash     unk       Objective   Objective     Vital Signs:   Temp:  [98.5 °F (36.9 °C)] 98.5 °F (36.9 °C)  Heart Rate:  [71-89] 73  Resp:  [24] 24  BP: (165-175)/(82-96) 175/82    Physical Exam  Constitutional:       General: She is not in acute distress.     Appearance: She is obese.   HENT:      Head: Normocephalic.      Nose: Congestion present.      Mouth/Throat:      Mouth: Mucous membranes are dry.   Eyes:      Extraocular Movements: Extraocular movements intact.      Pupils: Pupils are equal, round, and reactive to light.   Cardiovascular:      Rate and Rhythm: Normal rate and regular rhythm.      Pulses: Normal pulses.      Heart sounds: Normal heart sounds. No murmur heard.  Pulmonary:      Effort: Tachypnea present. No respiratory distress.      Breath sounds: Wheezing present. No rhonchi.   Abdominal:      General: Bowel sounds are normal. There is no distension.      Palpations: Abdomen is soft.      Tenderness: There is no abdominal tenderness.   Musculoskeletal:         General: No swelling. Normal range of motion.      Cervical back: Normal range of motion. No rigidity.   Skin:     General: Skin is warm.      Capillary Refill: Capillary refill takes " less than 2 seconds.   Neurological:      General: No focal deficit present.      Mental Status: She is alert and oriented to person, place, and time. Mental status is at baseline.      Motor: Weakness present.   Psychiatric:         Mood and Affect: Mood normal.         Behavior: Behavior normal.         Thought Content: Thought content normal.         Judgment: Judgment normal.          Result Review:  I have personally reviewed the results from the time of this admission to 2/13/2023 20:24 EST and agree with these findings:  [x]  Laboratory list / accordion  [x]  Microbiology  [x]  Radiology  [x]  EKG/Telemetry   []  Cardiology/Vascular   []  Pathology  [x]  Old records  []  Other:  Most notable findings include:     LAB RESULTS:      Lab 02/13/23  1643   WBC 4.50   HEMOGLOBIN 11.7*   HEMATOCRIT 35.7   PLATELETS 130*   NEUTROS ABS 2.81   IMMATURE GRANS (ABS) 0.01   LYMPHS ABS 1.18   MONOS ABS 0.47   EOS ABS 0.02   MCV 89.0   PROCALCITONIN 0.11   LACTATE 1.3         Lab 02/13/23  1643   SODIUM 134*   POTASSIUM 4.3   CHLORIDE 102   CO2 21.0*   ANION GAP 11.0   BUN 38*   CREATININE 1.72*   EGFR 29.4*   GLUCOSE 191*   CALCIUM 10.0         Lab 02/13/23  1643   TOTAL PROTEIN 6.6   ALBUMIN 4.0   GLOBULIN 2.6   ALT (SGPT) 16   AST (SGOT) 27   BILIRUBIN 0.4   ALK PHOS 130*         Lab 02/13/23  1905 02/13/23  1643   PROBNP  --  1,773.0   HSTROP T 51* 49*                 Brief Urine Lab Results     None        Microbiology Results (last 10 days)     Procedure Component Value - Date/Time    COVID PRE-OP / PRE-PROCEDURE SCREENING ORDER (NO ISOLATION) - Swab, Nasopharynx [[id number]]  (Normal) Collected: 02/13/23 1904    Lab Status: Final result Specimen: Swab from Nasopharynx Updated: 02/13/23 1948    Narrative:      The following orders were created for panel order COVID PRE-OP / PRE-PROCEDURE SCREENING ORDER (NO ISOLATION) - Swab, Nasopharynx.  Procedure                               Abnormality         Status                      ---------                               -----------         ------                     COVID-19 and FLU A/B PCR...[[id number]]  Normal              Final result                 Please view results for these tests on the individual orders.    COVID-19 and FLU A/B PCR - Swab, Nasopharynx [[id number]]  (Normal) Collected: 02/13/23 1904    Lab Status: Final result Specimen: Swab from Nasopharynx Updated: 02/13/23 1948     COVID19 Not Detected     Influenza A PCR Not Detected     Influenza B PCR Not Detected    Narrative:      Fact sheet for providers: https://www.fda.gov/media/911632/download    Fact sheet for patients: https://www.fda.gov/media/613869/download    Test performed by PCR.          CT Chest Without Contrast Diagnostic    Result Date: 2/13/2023  CT CHEST WO CONTRAST DIAGNOSTIC Date of Exam: 2/13/2023 5:56 PM EST Indication: chest pain, shortness of breath, cough, dyspnea. Comparison: Chest radiograph performed the same date. Chest CT for lung cancer screening may 10/20/2018, CT chest, abdomen, and pelvis August 23, 2016. Technique: Axial CT images were obtained of the chest without contrast administration.  Reconstructed coronal and sagittal images were also obtained. Automated exposure control and iterative construction methods were used. Findings: There is some bronchial wall thickening throughout the lungs, but most prominent in the lower lobes and lingula. There is some bronchial obstruction in the left lower lobe. There does not appear to be significant focal consolidation. There is some suspected mild atelectasis or scarring in the lingula and right middle lobe. There is suspected mild scarring at the lung apices. No pneumothorax. The heart does not appear enlarged. There is calcific atherosclerosis of the coronary arteries. No pericardial effusion. The ascending aorta does not appear significantly enlarged. There is atherosclerosis of the aorta and branch vessels. There appears to be nodular  enlargement of the thyroid with extension into the upper mediastinum where there appears to be a nodule with coarse calcifications. There are calcified mediastinal and right hilar lymph nodes consistent with remote granulomatous disease. There appears to be a small hiatal hernia. No definite mediastinal, axillary, or hilar lymphadenopathy is seen at this time. Implanted cardiac device is seen in the upper left breast. There is a 4 cm mass in the right adrenal gland which has been present since at least 2016 and demonstrates attenuation of less than 10 Hounsfield units, most consistent with an adenoma. The gallbladder appears distended containing layering hyperdense material, likely gallstones. Probable cyst at the posterior left renal upper pole. Degenerative changes are present in the thoracic spine. No definite aggressive osseous lesion is identified.     Impression: Impression: 1.Central bronchial wall thickening with left lower lobe bronchial obstruction which may represent acute or chronic bronchitis. No significant focal pneumonia seen at this time. 2.Calcific atherosclerosis of the coronary arteries, aorta, and branch vessels. 3.Cholelithiasis. 4.4 cm chronic right adrenal mass with attenuation suggestive of an adrenal adenoma. 5.Suspected multinodular goiter with extension into the upper mediastinum. Electronically Signed: Camron Hendrix  2/13/2023 6:18 PM EST  Workstation ID: [id number]    XR Chest 1 View    Result Date: 2/13/2023  XR CHEST 1 VW Date of Exam: 2/13/2023 4:07 PM EST Indication: SOA triage protocol. Comparison: None available. Findings: Chronic changes throughout the lungs are similar compared to prior exam. Cardiac and mediastinal contours within normal limits. Regional skeleton is unremarkable.     Impression: Impression: 1. Chronic changes, but no acute cardiopulmonary disease. Electronically Signed: Tod Granger  2/13/2023 4:22 PM EST  Workstation ID: [id number]      Results for orders placed  during the hospital encounter of 03/14/19    Adult Transesophageal Echo (ERMIAS) W/ Cont if Necessary Per Protocol    Interpretation Summary  · Left ventricular systolic function is normal. Estimated EF appears to be in the range of 61 - 65%  · Normal left atrial size and volume noted. There is no spontaneous echo contrast present. The left atrial appendage was visualized through multiple planes. Left atrial appendage was found to be multilobar in nature. Doppler interrogation shows normal flow within the left atrial appendage. No evidence of a left atrial appendage thrombus was present  · Lipomatous hypertrophy of the interatrial septum present. No evidence of a patent foramen ovale. Saline test results are negative.  · There is mild thickening of the noncoronary cusp of the aortic valve. Mild aortic valve regurgitation is present  · There is moderate (grade 2) layered plaque in the proximal aorta present.      Assessment & Plan   Assessment & Plan       Acute exacerbation of COPD with asthma (HCC)    PVD (peripheral vascular disease) (HCC)    Type 2 diabetes mellitus (HCC)    Takotsubo cardiomyopathy    Hyperlipidemia LDL goal <70    Osteoarthritis    GERD (gastroesophageal reflux disease)    Coronary artery disease involving native coronary artery of native heart without angina pectoris    Essential hypertension    CKD (chronic kidney disease) stage 3, GFR 30-59 ml/min (HCC)    Paroxysmal atrial fibrillation (HCC)    Yanique Webster is a 83 y.o. female with PMH of CAD, HLD, HTN, Afib, CKD3, DM2, Takotsubo,COPD and PVD who presents to the ED for shortness of breath and cough.     Acute on Chronic COPD Exacerbation  Acute Bronchitis   Hx of Tobacco Abuse  -CT chest shows bronchial wall thickening with LLL bronchial obstruction representing acute bronchitis  -CXR shows no acute pulmonary process  -Doxycycline  -Duonebs  -Methylpred  -Pending respiratory culture  -Consult PT/OT    DM2  Neuropathy   -Pending  HgbA1c  -SSI  -ACHS finger sticks    CKD3  -Cr 1.7, which is around baseline  -Strict I/Os  -Avoid nephrotoxic agents    Takotsubo Cardiomyopathy   CAD  HLD  -Echo in 2019 shows EF 61-65%  -Continue aspirin and statin  -Follows with Dr. Geronimo    HTN  Afib  -Continue home coreg, clonidine and xarelto     OA  -On home morphine  -Pain control  -Check merced     Right Adrenal Mass  Multinodular Goiter  -Incidental finding on CT chest  -Adrenal mass 4cm which may represent adenoma, has been present since 2016  -Check TSH  -Follow up outpatient       DVT prophylaxis:  SCDs    CODE STATUS:  FULL       Expected Discharge  2/14/2023      This note has been completed as part of a split-shared workflow.     Signature: Electronically signed by HCRIS Scott, 02/13/23, 8:38 PM EST.    Total APC Time: 60 minutes

## 2023-02-14 NOTE — THERAPY EVALUATION
Patient Name: Yanique Webster  : 1940    MRN: [id number]                              Today's Date: 2023       Admit Date: 2023    Visit Dx:     ICD-10-CM ICD-9-CM   1. Acute exacerbation of COPD with asthma ([facility])  J44.1 493.22    J45.901    2. Dyspnea and respiratory abnormalities  R06.00 786.09    R06.89    3. Acute bronchitis, unspecified organism  J20.9 466.0   4. Chronic renal impairment, unspecified CKD stage  N18.9 585.9     Patient Active Problem List   Diagnosis   • Fibromyalgia   • PVD (peripheral vascular disease) ([facility])   • Type 2 diabetes mellitus ([facility])   • Diabetic gastroparesis ([facility])   • Takotsubo cardiomyopathy   • Hyperlipidemia LDL goal <70   • COPD (chronic obstructive pulmonary disease) ([facility])   • Obesity   • Osteoarthritis   • Chronic pain   • GERD (gastroesophageal reflux disease)   • Gout   • Chronic joint pain   • Coronary artery disease involving native coronary artery of native heart without angina pectoris   • Nonrheumatic aortic valve insufficiency   • Altered mental status   • Essential hypertension   • CKD (chronic kidney disease) stage 3, GFR 30-59 ml/min ([facility])   • Hypertensive emergency   • Status post placement of implantable loop recorder   • Paroxysmal atrial fibrillation ([facility])   • Acute exacerbation of COPD with asthma ([facility])     Past Medical History:   Diagnosis Date   • Blurry vision    • Chronic joint pain    • Chronic pain    • COPD (chronic obstructive pulmonary disease) ([facility])    • Coronary artery disease    • Diabetic gastroparesis ([facility]) 2016   • Esophageal stricture     s/p dilation in    • GERD (gastroesophageal reflux disease)    • Gout    • Headache     secondary to hypertension   • Hyperlipidemia    • Hypertension    • Long term current use of insulin ([facility])    • Neuropathy    • Neuropathy due to type 2 diabetes mellitus ([facility])    • Obesity    • Osteoarthritis    • Pericarditis    • Stroke ([facility]) 2019   • Type 2 diabetes mellitus ([facility])       Past Surgical History:   Procedure Laterality Date   • BRONCHOSCOPY N/A 8/23/2016    Procedure: BRONCHOSCOPY BIOPSY AT BEDSIDE;  Surgeon: Mookie Willard MD;  Location:  [facility];  Service:    • CARDIAC CATHETERIZATION N/A 8/30/2016    Procedure: Left Heart Cath;  Surgeon: Steve Geronimo MD;  Location:  [facility];  Service:    • CARDIAC CATHETERIZATION N/A 8/30/2016    Procedure: Right Heart Cath;  Surgeon: Steve Geronimo MD;  Location:  [facility];  Service:    • CARDIAC ELECTROPHYSIOLOGY PROCEDURE N/A 7/2/2019    Procedure: Loop insertion;  Surgeon: Steve Geronimo MD;  Location:  [facility];  Service: Cardiovascular   • CATARACT EXTRACTION     • ESOPHAGEAL DILATATION  2007   • HERNIA REPAIR     • HYSTERECTOMY  1998   • WRIST FRACTURE SURGERY Left       General Information     Row Name 02/14/23 1150          Physical Therapy Time and Intention    Document Type evaluation  -AE     Mode of Treatment physical therapy  -AE     Row Name 02/14/23 1150          General Information    Patient Profile Reviewed yes  -AE     Prior Level of Function independent:;all household mobility;gait;transfer;bed mobility;ADL's;dressing;bathing  Pt uses rollator at baseline, assists spouse with ADLs and meal prep when needed.  -AE     Existing Precautions/Restrictions fall;other (see comments)  monitor BP  -AE     Barriers to Rehab medically complex;previous functional deficit  -AE     Row Name 02/14/23 1150          Living Environment    People in Home spouse  -AE     Row Name 02/14/23 1150          Home Main Entrance    Number of Stairs, Main Entrance none  -AE     Row Name 02/14/23 1150          Stairs Within Home, Primary    Stairs, Within Home, Primary ramp into kitchen  -AE     Number of Stairs, Within Home, Primary none  -AE     Stair Railings, Within Home, Primary none  -AE     Row Name 02/14/23 1150          Cognition    Orientation Status (Cognition) oriented x 3   -AE     Row Name 02/14/23 1150          Safety Issues, Functional Mobility    Safety Issues Affecting Function (Mobility) awareness of need for assistance;insight into deficits/self-awareness;safety precaution awareness;sequencing abilities  -AE     Impairments Affecting Function (Mobility) balance;endurance/activity tolerance;shortness of breath;strength;postural/trunk control  -AE           User Key  (r) = Recorded By, (t) = Taken By, (c) = Cosigned By    Initials Name Provider Type    AE Harrison Meyer, ANGY Physical Therapist               Mobility     Row Name 02/14/23 1152          Bed Mobility    Comment, (Bed Mobility) Pt received and left UIC.  -AE     Row Name 02/14/23 1152          Transfers    Comment, (Transfers) VCs for hand placement and sequencing. Pt required increased cues for sequencing of AD to improve safety awareness while using AD.  -AE     Row Name 02/14/23 1152          Sit-Stand Transfer    Sit-Stand [place] (Transfers) contact guard;1 person assist;verbal cues;nonverbal cues (demo/gesture)  -AE     Assistive Device (Sit-Stand Transfers) walker, front-wheeled  -AE     Row Name 02/14/23 1152          Gait/Stairs (Locomotion)    [place] Level (Gait) contact guard;1 person assist;verbal cues;nonverbal cues (demo/gesture)  -AE     Assistive Device (Gait) walker, front-wheeled  -AE     Distance in Feet (Gait) 125  -AE     Deviations/Abnormal Patterns (Gait) bilateral deviations;base of support, narrow;myke decreased;gait speed decreased;stride length decreased  -AE     Bilateral Gait Deviations forward flexed posture;heel strike decreased  -AE     Comment, (Gait/Stairs) Pt demo step through gait pattern with slowed myke, decreased step length, and forward flexed posture. Pt required increased cues to improve positioning within RW and sequencing of AD to improve balance and safety awareness. Pt limited by SOA this session, has generalized weakness and deconditioning as well.  -AE            User Key  (r) = Recorded By, (t) = Taken By, (c) = Cosigned By    Initials Name Provider Type    AE Harrison Meyer PT Physical Therapist               Obj/Interventions     Row Name 02/14/23 1155          Range of Motion Comprehensive    General Range of Motion bilateral lower extremity ROM WFL  -AE     Row Name 02/14/23 1155          Strength Comprehensive (MMT)    General Manual Muscle Testing (MMT) Assessment lower extremity strength deficits identified  -AE     Comment, General Manual Muscle Testing (MMT) Assessment generalized weakness; BLE grossly 4-/5  -AE     Row Name 02/14/23 1155          Balance    Balance Assessment sitting static balance;sitting dynamic balance;sit to stand dynamic balance;standing static balance;standing dynamic balance  -AE     Static Sitting Balance standby assist  -AE     Dynamic Sitting Balance contact guard  -AE     Position, Sitting Balance unsupported;sitting in chair  -AE     Sit to Stand Dynamic Balance contact guard;1-person assist;verbal cues;non-verbal cues (demo/gesture)  -AE     Static Standing Balance contact guard  -AE     Dynamic Standing Balance contact guard  -AE     Position/Device Used, Standing Balance supported;walker, front-wheeled  -AE     Row Name 02/14/23 1155          Sensory Assessment (Somatosensory)    Sensory Assessment (Somatosensory) bilateral LE  -AE     Bilateral LE Sensory Assessment general sensation;impaired  neuropathy distal to bilat knees  -AE           User Key  (r) = Recorded By, (t) = Taken By, (c) = Cosigned By    Initials Name Provider Type    AE Harrison Meyer PT Physical Therapist               Goals/Plan     Row Name 02/14/23 1201          Bed Mobility Goal 1 (PT)    Activity/Assistive Device (Bed Mobility Goal 1, PT) sit to supine/supine to sit  -AE     [place] Level/Cues Needed (Bed Mobility Goal 1, PT) standby assist  -AE     Time Frame (Bed Mobility Goal 1, PT) long term goal (LTG);10 days  -AE      Progress/Outcomes (Bed Mobility Goal 1, PT) goal ongoing  -AE     Row Name 02/14/23 1201          Transfer Goal 1 (PT)    Activity/Assistive Device (Transfer Goal 1, PT) sit-to-stand/stand-to-sit;bed-to-chair/chair-to-bed  -AE     [place] Level/Cues Needed (Transfer Goal 1, PT) modified independence  -AE     Time Frame (Transfer Goal 1, PT) long term goal (LTG);10 days  -AE     Progress/Outcome (Transfer Goal 1, PT) goal ongoing  -AE     Row Name 02/14/23 1201          Gait Training Goal 1 (PT)    Activity/Assistive Device (Gait Training Goal 1, PT) gait (walking locomotion);assistive device use  -AE     [place] Level (Gait Training Goal 1, PT) standby assist  -AE     Distance (Gait Training Goal 1, PT) 250ft  -AE     Time Frame (Gait Training Goal 1, PT) long term goal (LTG);10 days  -AE     Progress/Outcome (Gait Training Goal 1, PT) goal ongoing  -AE     Row Name 02/14/23 1201          Therapy Assessment/Plan (PT)    Planned Therapy Interventions (PT) balance training;bed mobility training;gait training;home exercise program;patient/family education;postural re-education;ROM (range of motion);strengthening;transfer training  -AE           User Key  (r) = Recorded By, (t) = Taken By, (c) = Cosigned By    Initials Name Provider Type    AE Harrison Meyer, PT Physical Therapist               Clinical Impression     Row Name 02/14/23 1157          Pain    Pretreatment Pain Rating 0/10 - no pain  -AE     Posttreatment Pain Rating 0/10 - no pain  -AE     Row Name 02/14/23 1157          Plan of Care Review    Plan of Care Reviewed With patient  -AE     Progress no change  -AE     Outcome Evaluation Pt presents with generalized weakness/deconditioning, decreased functional mobility, and decreased endurance with activity. Pt ambulated 125ft with CGA and RW for support. Pt requires cues to improve sequencing and positioning of AD. Recommend continued skilled IP PT interventions. Recommend D/C home with assist  and HHPT.  -AE     Row Name 02/14/23 1157          Therapy Assessment/Plan (PT)    Patient/Family Therapy Goals Statement (PT) Return home  -AE     Rehab Potential (PT) good, to achieve stated therapy goals  -AE     Criteria for Skilled Interventions Met (PT) yes  -AE     Therapy Frequency (PT) daily  -AE     Row Name 02/14/23 1157          Vital Signs    Pre Systolic BP Rehab 183  -AE     Pre Treatment Diastolic BP 80  -AE     Post Systolic BP Rehab 197  -AE     Post Treatment Diastolic BP 86  -AE     Pretreatment Heart Rate (beats/min) 91  -AE     Posttreatment Heart Rate (beats/min) 81  -AE     Pre SpO2 (%) 94  -AE     O2 Delivery Pre Treatment room air  -AE     O2 Delivery Intra Treatment room air  -AE     Post SpO2 (%) 97  -AE     O2 Delivery Post Treatment room air  -AE     Pre Patient Position Sitting  -AE     Intra Patient Position Standing  -AE     Post Patient Position Sitting  -AE     Row Name 02/14/23 1157          Positioning and Restraints    Pre-Treatment Position sitting in chair/recliner  -AE     Post Treatment Position chair  -AE     In Chair notified nsg;reclined;call light within reach;encouraged to call for assist;legs elevated  -AE           User Key  (r) = Recorded By, (t) = Taken By, (c) = Cosigned By    Initials Name Provider Type    AE Harrison Meyer, PT Physical Therapist               Outcome Measures     Row Name 02/14/23 1202          How much help from another person do you currently need...    Turning from your back to your side while in flat bed without using bedrails? 4  -AE     Moving from lying on back to sitting on the side of a flat bed without bedrails? 3  -AE     Moving to and from a bed to a chair (including a wheelchair)? 3  -AE     Standing up from a chair using your arms (e.g., wheelchair, bedside chair)? 3  -AE     Climbing 3-5 steps with a railing? 2  -AE     To walk in hospital room? 3  -AE     AM-PAC 6 Clicks Score (PT) 18  -AE     Highest level of mobility 6 -->  Walked 10 steps or more  -AE     Row Name 02/14/23 1202          Functional Assessment    Outcome Measure Options AM-PAC 6 Clicks Basic Mobility (PT)  -AE           User Key  (r) = Recorded By, (t) = Taken By, (c) = Cosigned By    Initials Name Provider Type    AE Harrison Meyer PT Physical Therapist                               PT Recommendation and Plan  Planned Therapy Interventions (PT): balance training, bed mobility training, gait training, home exercise program, patient/family education, postural re-education, ROM (range of motion), strengthening, transfer training  Plan of Care Reviewed With: patient  Progress: no change  Outcome Evaluation: Pt presents with generalized weakness/deconditioning, decreased functional mobility, and decreased endurance with activity. Pt ambulated 125ft with CGA and RW for support. Pt requires cues to improve sequencing and positioning of AD. Recommend continued skilled IP PT interventions. Recommend D/C home with assist and HHPT.     Time Calculation:    PT Charges     Row Name 02/14/23 1203             Time Calculation    Start Time 1035  -AE      PT Received On 02/14/23  -AE      PT Goal Re-Cert Due Date 02/24/23  -AE         Time Calculation- PT    Total Timed Code Minutes- PT 8 minute(s)  -AE         Timed Charges    53900 - PT Therapeutic Activity Minutes 8  -AE         Untimed Charges    PT Eval/Re-eval Minutes 32  -AE         Total Minutes    Timed Charges Total Minutes 8  -AE      Untimed Charges Total Minutes 32  -AE       Total Minutes 40  -AE            User Key  (r) = Recorded By, (t) = Taken By, (c) = Cosigned By    Initials Name Provider Type    AE Harrison Meyer PT Physical Therapist              Therapy Charges for Today     Code Description Service Date Service Provider Modifiers Qty    96319500492 HC PT THERAPEUTIC ACT EA 15 MIN 2/14/2023 Harrison Meyer, PT GP 1    95928520584 HC PT EVAL MOD COMPLEXITY 3 2/14/2023 Harrison Meyer, PT GP 1          PT  G-Codes  Outcome Measure Options: AM-PAC 6 Clicks Basic Mobility (PT)  AM-PAC 6 Clicks Score (PT): 18  PT Discharge Summary  Anticipated Discharge Disposition (PT): home with assist, home with home health    Harrison Meyer, PT  2/14/2023

## 2023-02-14 NOTE — THERAPY EVALUATION
Patient Name: Yanique Webster  : 1940    MRN: [id number]                              Today's Date: 2023       Admit Date: 2023    Visit Dx:     ICD-10-CM ICD-9-CM   1. Acute exacerbation of COPD with asthma ([facility])  J44.1 493.22    J45.901    2. Dyspnea and respiratory abnormalities  R06.00 786.09    R06.89    3. Acute bronchitis, unspecified organism  J20.9 466.0   4. Chronic renal impairment, unspecified CKD stage  N18.9 585.9     Patient Active Problem List   Diagnosis   • Fibromyalgia   • PVD (peripheral vascular disease) ([facility])   • Type 2 diabetes mellitus ([facility])   • Diabetic gastroparesis ([facility])   • Takotsubo cardiomyopathy   • Hyperlipidemia LDL goal <70   • COPD (chronic obstructive pulmonary disease) ([facility])   • Obesity   • Osteoarthritis   • Chronic pain   • GERD (gastroesophageal reflux disease)   • Gout   • Chronic joint pain   • Coronary artery disease involving native coronary artery of native heart without angina pectoris   • Nonrheumatic aortic valve insufficiency   • Altered mental status   • Essential hypertension   • CKD (chronic kidney disease) stage 3, GFR 30-59 ml/min ([facility])   • Hypertensive emergency   • Status post placement of implantable loop recorder   • Paroxysmal atrial fibrillation ([facility])   • Acute exacerbation of COPD with asthma ([facility])     Past Medical History:   Diagnosis Date   • Blurry vision    • Chronic joint pain    • Chronic pain    • COPD (chronic obstructive pulmonary disease) ([facility])    • Coronary artery disease    • Diabetic gastroparesis ([facility]) 2016   • Esophageal stricture     s/p dilation in    • GERD (gastroesophageal reflux disease)    • Gout    • Headache     secondary to hypertension   • Hyperlipidemia    • Hypertension    • Long term current use of insulin ([facility])    • Neuropathy    • Neuropathy due to type 2 diabetes mellitus ([facility])    • Obesity    • Osteoarthritis    • Pericarditis    • Stroke ([facility]) 2019   • Type 2 diabetes mellitus ([facility])       Past Surgical History:   Procedure Laterality Date   • BRONCHOSCOPY N/A 8/23/2016    Procedure: BRONCHOSCOPY BIOPSY AT BEDSIDE;  Surgeon: Mookie Willard MD;  Location:  [facility];  Service:    • CARDIAC CATHETERIZATION N/A 8/30/2016    Procedure: Left Heart Cath;  Surgeon: Steve Geronimo MD;  Location:  [facility];  Service:    • CARDIAC CATHETERIZATION N/A 8/30/2016    Procedure: Right Heart Cath;  Surgeon: Steve Geronimo MD;  Location:  [facility];  Service:    • CARDIAC ELECTROPHYSIOLOGY PROCEDURE N/A 7/2/2019    Procedure: Loop insertion;  Surgeon: Steve Geronimo MD;  Location:  [facility];  Service: Cardiovascular   • CATARACT EXTRACTION     • ESOPHAGEAL DILATATION  2007   • HERNIA REPAIR     • HYSTERECTOMY  1998   • WRIST FRACTURE SURGERY Left       General Information     Row Name 02/14/23 1520          OT Time and Intention    Document Type evaluation  -EMILY     Mode of Treatment occupational therapy  -EMILY     Row Name 02/14/23 1520          General Information    Patient Profile Reviewed yes  -EMILY     Prior Level of Function independent:;ADL's;bed mobility;transfer;all household mobility;home management  Ambulates using rollator at baseline; assists spouse with ADL's; has groceries delivered to home  -EMILY     Existing Precautions/Restrictions fall;oxygen therapy device and L/min;other (see comments)  monitor BP; baseline room air  -EMILY     Barriers to Rehab medically complex;previous functional deficit  -EMILY     Row Name 02/14/23 1520          Living Environment    People in Home spouse  -EMILY     Row Name 02/14/23 1520          Home Main Entrance    Number of Stairs, Main Entrance none  -EMILY     Row Name 02/14/23 1520          Stairs Within Home, Primary    Stairs, Within Home, Primary Ramp into kitchen  -EMILY     Number of Stairs, Within Home, Primary none  -EMILY     Row Name 02/14/23 1520          Cognition    Orientation Status (Cognition) oriented x 3   -[facility] Name 02/14/23 1520          Safety Issues, Functional Mobility    Safety Issues Affecting Function (Mobility) awareness of need for assistance;insight into deficits/self-awareness;judgment;problem-solving;safety precaution awareness;safety precautions follow-through/compliance  -     Impairments Affecting Function (Mobility) balance;endurance/activity tolerance;pain;postural/trunk control;shortness of breath;strength  -           User Key  (r) = Recorded By, (t) = Taken By, (c) = Cosigned By    Initials Name Provider Type    Alexa Ly OT Occupational Therapist                 Mobility/ADL's     [facility] Name 02/14/23 1522          Bed Mobility    Bed Mobility supine-sit;sit-supine;scooting/bridging  -EMILY     Scooting/Bridging [place] (Bed Mobility) supervision  -EMILY     Supine-Sit [place] (Bed Mobility) supervision  -EMILY     Sit-Supine [place] (Bed Mobility) supervision  -     Assistive Device (Bed Mobility) bed rails;head of bed elevated  -EMILY     Comment, (Bed Mobility) Increased time and effort needed  -[facility] Name 02/14/23 1522          Transfers    Transfers sit-stand transfer  -EMILY     Row Name 02/14/23 1522          Sit-Stand Transfer    Sit-Stand [place] (Transfers) contact guard  -     Comment, (Sit-Stand Transfer) Pt stood from EOB, took 3 sidesteps to L toward HOB with CGA/no AD  -EMILY     Row Name 02/14/23 1522          Functional Mobility    Functional Mobility- Ind. Level not tested  -[facility] Name 02/14/23 1522          Activities of Daily Living    BADL Assessment/Intervention lower body dressing;grooming  -EMILY     Row Name 02/14/23 1522          Lower Body Dressing Assessment/Training    [place] Level (Lower Body Dressing) don;doff;socks;supervision  -     Position (Lower Body Dressing) edge of bed sitting  -[facility] Name 02/14/23 1522          Grooming Assessment/Training    [place] Level (Grooming) hair care, combing/brushing;wash face,  hands;set up  -EMILY     Position (Grooming) edge of bed sitting  -EMILY           User Key  (r) = Recorded By, (t) = Taken By, (c) = Cosigned By    Initials Name Provider Type    Alexa Ly OT Occupational Therapist               Obj/Interventions     Row Name 02/14/23 1524          Sensory Assessment (Somatosensory)    Sensory Assessment (Somatosensory) UE sensation intact  -EMILY     [facility] Name 02/14/23 1524          Vision Assessment/Intervention    Visual Impairment/Limitations WFL;corrective lenses for distance  -EMILY     Row Name 02/14/23 1524          Range of Motion Comprehensive    General Range of Motion bilateral upper extremity ROM WFL  -EMILY     Row Name 02/14/23 1524          Strength Comprehensive (MMT)    Comment, General Manual Muscle Testing (MMT) Assessment BUE's grossly 4-/5  -EMILY     Row Name 02/14/23 1524          Balance    Balance Assessment sitting static balance;sitting dynamic balance;standing static balance;standing dynamic balance  -EMILY     Static Sitting Balance standby assist  -EMILY     Dynamic Sitting Balance supervision  -EMILY     Position, Sitting Balance unsupported;sitting edge of bed  -EMILY     Static Standing Balance contact guard  -EMILY     Dynamic Standing Balance contact guard  -EMILY     Position/Device Used, Standing Balance unsupported  -EMILY           User Key  (r) = Recorded By, (t) = Taken By, (c) = Cosigned By    Initials Name Provider Type    Alexa Ly OT Occupational Therapist               Goals/Plan     Row Name 02/14/23 1535          Transfer Goal 1 (OT)    Activity/Assistive Device (Transfer Goal 1, OT) sit-to-stand/stand-to-sit;toilet;walker, rolling  -EMILY     [place] Level/Cues Needed (Transfer Goal 1, OT) supervision required  -EMILY     Time Frame (Transfer Goal 1, OT) long term goal (LTG);by discharge  -EMILY     Progress/Outcome (Transfer Goal 1, OT) new goal  -EMILY     Row Name 02/14/23 1535          Toileting Goal 1 (OT)    Activity/Device (Toileting Goal 1, OT)  adjust/manage clothing;perform perineal hygiene;commode;grab bar/safety frame  -EMILY     [place] Level/Cues Needed (Toileting Goal 1, OT) supervision required  -EMILY     Time Frame (Toileting Goal 1, OT) long term goal (LTG);by discharge  -EMILY     Progress/Outcome (Toileting Goal 1, OT) new goal  -EMILY     Row Name 02/14/23 1537          Grooming Goal 1 (OT)    Activity/Device (Grooming Goal 1, OT) hair care;oral care;wash face, hands  -EMILY     [place] (Grooming Goal 1, OT) supervision required  -EMILY     Time Frame (Grooming Goal 1, OT) long term goal (LTG);by discharge  -EMILY     Strategies/Barriers (Grooming Goal 1, OT) standing sink side  -EMILY     Progress/Outcome (Grooming Goal 1, OT) new goal  -     Row Name 02/14/23 0779          Therapy Assessment/Plan (OT)    Planned Therapy Interventions (OT) activity tolerance training;BADL retraining;functional balance retraining;occupation/activity based interventions;patient/caregiver education/training;ROM/therapeutic exercise;strengthening exercise;transfer/mobility retraining  -           User Key  (r) = Recorded By, (t) = Taken By, (c) = Cosigned By    Initials Name Provider Type    Alexa Ly OT Occupational Therapist               Clinical Impression     Row Name 02/14/23 3246          Pain Assessment    Pretreatment Pain Rating 8/10  -EMILY     Posttreatment Pain Rating 6/10  -EMILY     Pre/Posttreatment Pain Comment Pt reported pain in head, BLE's and feet; nursing notified  -EMILY     Pain Intervention(s) Repositioned;Ambulation/increased activity;Nursing Notified;Medication (See MAR)  -     Row Name 02/14/23 5798          Plan of Care Review    Plan of Care Reviewed With patient  -EMILY     Outcome Evaluation OT eval completed. Pt presents with weakness, decreased activity tolerance, and pain impacting ADL's. Pt completed bed mobility with SUP, donned socks and performed grooming seated EOB with REYES/SUP, CGA for STS and sidestepping toward HOB. IP OT services  warranted to support goal of return to PLOF. Recommend home with assist and HHOT at discharge.  -     Row Name 02/14/23 1528          Therapy Assessment/Plan (OT)    Rehab Potential (OT) good, to achieve stated therapy goals  -     Criteria for Skilled Therapeutic Interventions Met (OT) yes;meets criteria;skilled treatment is necessary  -     Therapy Frequency (OT) daily  -EMILY     Row Name 02/14/23 1528          Therapy Plan Review/Discharge Plan (OT)    Anticipated Discharge Disposition (OT) home with assist;home with home health  -[facility] Name 02/14/23 1528          Vital Signs    Pre Systolic BP Rehab 185  -EMILY     Pre Treatment Diastolic BP 92  -EMILY     Intra Systolic BP Rehab 174  EOB  -EMILY     Intra Treatment Diastolic BP 72  -EMILY     Post Systolic BP Rehab 153  EOB following activity  -EMILY     Post Treatment Diastolic   -EMILY     Intratreatment Heart Rate (beats/min) 80  -EMILY     Posttreatment Heart Rate (beats/min) 72  -EMILY     Pre SpO2 (%) 97  -EMILY     O2 Delivery Pre Treatment nasal cannula  -EMILY     O2 Delivery Intra Treatment nasal cannula  -EMILY     Post SpO2 (%) 97  -EMILY     O2 Delivery Post Treatment nasal cannula  -EMILY     Pre Patient Position Supine  -EMILY     Intra Patient Position Standing  -EMILY     Post Patient Position Supine  -EMILY     Row Name 02/14/23 1528          Positioning and Restraints    Pre-Treatment Position in bed  -EMILY     Post Treatment Position bed  -EMILY     In Bed notified nsg;estefaniawelin;call light within reach;encouraged to call for assist;exit alarm on  -EMILY           User Key  (r) = Recorded By, (t) = Taken By, (c) = Cosigned By    Initials Name Provider Type    Alexa Ly OT Occupational Therapist               Outcome Measures     Row Name 02/14/23 1536          How much help from another is currently needed...    Putting on and taking off regular lower body clothing? 3  -EMILY     Bathing (including washing, rinsing, and drying) 2  -EMILY     Toileting (which includes using toilet  bed pan or urinal) 2  -EMILY     Putting on and taking off regular upper body clothing 3  -EMILY     Taking care of personal grooming (such as brushing teeth) 3  -EMILY     Eating meals 3  -EMILY     AM-PAC 6 Clicks Score (OT) 16  -EMILY     Row Name 02/14/23 1202          How much help from another person do you currently need...    Turning from your back to your side while in flat bed without using bedrails? 4  -AE     Moving from lying on back to sitting on the side of a flat bed without bedrails? 3  -AE     Moving to and from a bed to a chair (including a wheelchair)? 3  -AE     Standing up from a chair using your arms (e.g., wheelchair, bedside chair)? 3  -AE     Climbing 3-5 steps with a railing? 2  -AE     To walk in hospital room? 3  -AE     AM-PAC 6 Clicks Score (PT) 18  -AE     Highest level of mobility 6 --> Walked 10 steps or more  -AE     Row Name 02/14/23 1536 02/14/23 1202       Functional Assessment    Outcome Measure Options AM-PAC 6 Clicks Daily Activity (OT)  -EMILY AM-PAC 6 Clicks Basic Mobility (PT)  -AE          User Key  (r) = Recorded By, (t) = Taken By, (c) = Cosigned By    Initials Name Provider Type    Alexa Ly OT Occupational Therapist    Harrison Coleman, PT Physical Therapist                Occupational Therapy Education     Title: PT OT SLP Therapies (In Progress)     Topic: Occupational Therapy (In Progress)     Point: ADL training (Done)     Description:   Instruct learner(s) on proper safety adaptation and remediation techniques during self care or transfers.   Instruct in proper use of assistive devices.              Learning Progress Summary           Patient Acceptance, E, VU by EMILY at 2/14/2023 1517    Comment: OT POC; discharge planning                   Point: Home exercise program (Not Started)     Description:   Instruct learner(s) on appropriate technique for monitoring, assisting and/or progressing therapeutic exercises/activities.              Learner Progress:  Not documented  in this visit.          Point: Precautions (Done)     Description:   Instruct learner(s) on prescribed precautions during self-care and functional transfers.              Learning Progress Summary           Patient Acceptance, E, VU by EMILY at 2/14/2023 1537    Comment: OT POC; discharge planning                   Point: Body mechanics (Done)     Description:   Instruct learner(s) on proper positioning and spine alignment during self-care, functional mobility activities and/or exercises.              Learning Progress Summary           Patient Acceptance, E, VU by EMILY at 2/14/2023 1537    Comment: OT POC; discharge planning                               User Key     Initials Effective Dates Name Provider Type Discipline     06/16/21 -  Alexa Lu OT Occupational Therapist OT              OT Recommendation and Plan  Planned Therapy Interventions (OT): activity tolerance training, BADL retraining, functional balance retraining, occupation/activity based interventions, patient/caregiver education/training, ROM/therapeutic exercise, strengthening exercise, transfer/mobility retraining  Therapy Frequency (OT): daily  Plan of Care Review  Plan of Care Reviewed With: patient  Outcome Evaluation: OT eval completed. Pt presents with weakness, decreased activity tolerance, and pain impacting ADL's. Pt completed bed mobility with SUP, donned socks and performed grooming seated EOB with REYES/SUP, CGA for STS and sidestepping toward HOB. IP OT services warranted to support goal of return to PLOF. Recommend home with assist and HHOT at discharge.     Time Calculation:    Time Calculation- OT     Row Name 02/14/23 1440             Time Calculation- OT    OT Start Time 1440  -EMILY      OT Received On 02/14/23  -EMILY      OT Goal Re-Cert Due Date 02/24/23  -EMILY         Untimed Charges    OT Eval/Re-eval Minutes 48  -EMILY         Total Minutes    Untimed Charges Total Minutes 48  -EMILY       Total Minutes 48  -EMILY            User Key  (r) =  Recorded By, (t) = Taken By, (c) = Cosigned By    Initials Name Provider Type    Alexa Ly, BETO Occupational Therapist              Therapy Charges for Today     Code Description Service Date Service Provider Modifiers Qty    80686938856 HC OT EVAL MOD COMPLEXITY 4 2/14/2023 Alexa Lu OT GO 1               Alexa Lu OT  2/14/2023

## 2023-02-14 NOTE — PLAN OF CARE
Goal Outcome Evaluation:  Plan of Care Reviewed With: patient           Outcome Evaluation: OT eval completed. Pt presents with weakness, decreased activity tolerance, and pain impacting ADL's. Pt completed bed mobility with SUP, donned socks and performed grooming seated EOB with REYES/SUP, CGA for STS and sidestepping toward HOB. IP OT services warranted to support goal of return to PLOF. Recommend home with assist and HHOT at discharge.

## 2023-02-14 NOTE — PLAN OF CARE
Problem: Adult Inpatient Plan of Care  Goal: Plan of Care Review  2/14/2023 0636 by Kelsie Knott, RN  Outcome: Ongoing, Progressing  Flowsheets (Taken 2/14/2023 0636)  Progress: no change  2/14/2023 0636 by Kelsie Knott, RN  Outcome: Ongoing, Progressing  Flowsheets (Taken 2/14/2023 0636)  Progress: no change  Plan of Care Reviewed With: patient   Goal Outcome Evaluation:  Plan of Care Reviewed With: patient      Patient remains on 2L NC throughout the shift.  Denies any needs or concerns at this time. Sleep/rest encouraged  Progress: no change

## 2023-02-14 NOTE — PROGRESS NOTES
[facility] Medicine Services  PROGRESS NOTE    Patient Name: Yanique Webster  : 1940  MRN: [id number]    Date of Admission: 2023  Primary Care Physician: Sebas Alicea MD    Subjective   Subjective     CC:  F/u COPD/bronchitis    HPI:  Patient resting in bed. Feeling much improved since admission, still on home oxygen. No other complaints.    ROS:  Gen- No fevers, chills  CV- No chest pain, palpitations  Resp- No cough, dyspnea  GI- No N/V/D, abd pain    Objective   Objective     Vital Signs:   Temp:  [98 °F (36.7 °C)-98.5 °F (36.9 °C)] 98 °F (36.7 °C)  Heart Rate:  [61-89] 68  Resp:  [18-24] 18  BP: (165-219)/(82-98) 185/92  Flow (L/min):  [2] 2     Physical Exam:  Constitutional: No acute distress, awake, alert  HENT: NCAT, mucous membranes moist   Respiratory: scatter expiratory wheezing, scattered rhonchi  Cardiovascular: RRR, no murmurs, rubs, or gallops  Gastrointestinal: Positive bowel sounds, soft, nontender, nondistended  Musculoskeletal: No bilateral ankle edema  Psychiatric: Appropriate affect, cooperative  Neurologic: Oriented x 3, speech clear  Skin: No rashes      Results Reviewed:  LAB RESULTS:      Lab 23  0314 23  1643   WBC 2.48* 4.50   HEMOGLOBIN 12.0 11.7*   HEMATOCRIT 37.3 35.7   PLATELETS 133* 130*   NEUTROS ABS 1.76 2.81   IMMATURE GRANS (ABS)  --  0.01   LYMPHS ABS  --  1.18   MONOS ABS  --  0.47   EOS ABS 0.00 0.02   MCV 89.9 89.0   PROCALCITONIN  --  0.11   LACTATE  --  1.3         Lab 23  0314 23  1643   SODIUM 135* 134*   POTASSIUM 4.7 4.3   CHLORIDE 102 102   CO2 23.0 21.0*   ANION GAP 10.0 11.0   BUN 40* 38*   CREATININE 1.76* 1.72*   EGFR 28.6* 29.4*   GLUCOSE 253* 191*   CALCIUM 10.4 10.0   MAGNESIUM 2.4  --    HEMOGLOBIN A1C  --  5.50   TSH 0.159*  --          Lab 23  1643   TOTAL PROTEIN 6.6   ALBUMIN 4.0   GLOBULIN 2.6   ALT (SGPT) 16   AST (SGOT) 27   BILIRUBIN 0.4   ALK PHOS 130*         Lab  02/13/23 1905 02/13/23  1643   PROBNP  --  1,773.0   HSTROP T 51* 49*                 Brief Urine Lab Results     None          Microbiology Results Abnormal     Procedure Component Value - Date/Time    COVID PRE-OP / PRE-PROCEDURE SCREENING ORDER (NO ISOLATION) - Swab, Nasopharynx [[id number]]  (Normal) Collected: 02/13/23 1904    Lab Status: Final result Specimen: Swab from Nasopharynx Updated: 02/13/23 1948    Narrative:      The following orders were created for panel order COVID PRE-OP / PRE-PROCEDURE SCREENING ORDER (NO ISOLATION) - Swab, Nasopharynx.  Procedure                               Abnormality         Status                     ---------                               -----------         ------                     COVID-19 and FLU A/B PCR...[[id number]]  Normal              Final result                 Please view results for these tests on the individual orders.    COVID-19 and FLU A/B PCR - Swab, Nasopharynx [[id number]]  (Normal) Collected: 02/13/23 1904    Lab Status: Final result Specimen: Swab from Nasopharynx Updated: 02/13/23 1948     COVID19 Not Detected     Influenza A PCR Not Detected     Influenza B PCR Not Detected    Narrative:      Fact sheet for providers: https://www.fda.gov/media/092302/download    Fact sheet for patients: https://www.fda.gov/media/213475/download    Test performed by PCR.          CT Chest Without Contrast Diagnostic    Result Date: 2/13/2023  CT CHEST WO CONTRAST DIAGNOSTIC Date of Exam: 2/13/2023 5:56 PM EST Indication: chest pain, shortness of breath, cough, dyspnea. Comparison: Chest radiograph performed the same date. Chest CT for lung cancer screening may 10/20/2018, CT chest, abdomen, and pelvis August 23, 2016. Technique: Axial CT images were obtained of the chest without contrast administration.  Reconstructed coronal and sagittal images were also obtained. Automated exposure control and iterative construction methods were used. Findings: There is some  bronchial wall thickening throughout the lungs, but most prominent in the lower lobes and lingula. There is some bronchial obstruction in the left lower lobe. There does not appear to be significant focal consolidation. There is some suspected mild atelectasis or scarring in the lingula and right middle lobe. There is suspected mild scarring at the lung apices. No pneumothorax. The heart does not appear enlarged. There is calcific atherosclerosis of the coronary arteries. No pericardial effusion. The ascending aorta does not appear significantly enlarged. There is atherosclerosis of the aorta and branch vessels. There appears to be nodular enlargement of the thyroid with extension into the upper mediastinum where there appears to be a nodule with coarse calcifications. There are calcified mediastinal and right hilar lymph nodes consistent with remote granulomatous disease. There appears to be a small hiatal hernia. No definite mediastinal, axillary, or hilar lymphadenopathy is seen at this time. Implanted cardiac device is seen in the upper left breast. There is a 4 cm mass in the right adrenal gland which has been present since at least 2016 and demonstrates attenuation of less than 10 Hounsfield units, most consistent with an adenoma. The gallbladder appears distended containing layering hyperdense material, likely gallstones. Probable cyst at the posterior left renal upper pole. Degenerative changes are present in the thoracic spine. No definite aggressive osseous lesion is identified.     Impression: Impression: 1.Central bronchial wall thickening with left lower lobe bronchial obstruction which may represent acute or chronic bronchitis. No significant focal pneumonia seen at this time. 2.Calcific atherosclerosis of the coronary arteries, aorta, and branch vessels. 3.Cholelithiasis. 4.4 cm chronic right adrenal mass with attenuation suggestive of an adrenal adenoma. 5.Suspected multinodular goiter with extension  into the upper mediastinum. Electronically Signed: Camron Hendrix  2/13/2023 6:18 PM EST  Workstation ID: [id number]    XR Chest 1 View    Result Date: 2/13/2023  XR CHEST 1 VW Date of Exam: 2/13/2023 4:07 PM EST Indication: SOA triage protocol. Comparison: None available. Findings: Chronic changes throughout the lungs are similar compared to prior exam. Cardiac and mediastinal contours within normal limits. Regional skeleton is unremarkable.     Impression: Impression: 1. Chronic changes, but no acute cardiopulmonary disease. Electronically Signed: Tod Granger  2/13/2023 4:22 PM EST  Workstation ID: [id number]      Results for orders placed during the hospital encounter of 03/14/19    Adult Transesophageal Echo (ERMIAS) W/ Cont if Necessary Per Protocol    Interpretation Summary  · Left ventricular systolic function is normal. Estimated EF appears to be in the range of 61 - 65%  · Normal left atrial size and volume noted. There is no spontaneous echo contrast present. The left atrial appendage was visualized through multiple planes. Left atrial appendage was found to be multilobar in nature. Doppler interrogation shows normal flow within the left atrial appendage. No evidence of a left atrial appendage thrombus was present  · Lipomatous hypertrophy of the interatrial septum present. No evidence of a patent foramen ovale. Saline test results are negative.  · There is mild thickening of the noncoronary cusp of the aortic valve. Mild aortic valve regurgitation is present  · There is moderate (grade 2) layered plaque in the proximal aorta present.      I have reviewed the medications:  Scheduled Meds:aspirin, 81 mg, Oral, Daily  atorvastatin, 80 mg, Oral, Daily  carvedilol, 25 mg, Oral, Q12H  cloNIDine, 0.1 mg, Oral, Q12H  doxycycline, 100 mg, Intravenous, Q12H  DULoxetine, 60 mg, Oral, Daily  hydrALAZINE, 25 mg, Oral, TID  insulin lispro, 0-7 Units, Subcutaneous, 4x Daily With Meals & Nightly  ipratropium-albuterol, 3 mL,  Nebulization, Q4H - RT  methylPREDNISolone sodium succinate, 40 mg, Intravenous, Q12H  pantoprazole, 40 mg, Oral, Daily  pharmacy consult - MTM, , Does not apply, Daily  rivaroxaban, 15 mg, Oral, Daily With Dinner  senna-docusate sodium, 2 tablet, Oral, BID  sodium chloride, 10 mL, Intravenous, Q12H  torsemide, 20 mg, Oral, Daily      Continuous Infusions:   PRN Meds:.•  acetaminophen **OR** acetaminophen **OR** acetaminophen  •  senna-docusate sodium **AND** polyethylene glycol **AND** bisacodyl **AND** bisacodyl  •  dextrose  •  dextrose  •  glucagon (human recombinant)  •  HYDROcodone-acetaminophen  •  sodium chloride  •  sodium chloride  •  sodium chloride    Assessment & Plan   Assessment & Plan     Active Hospital Problems    Diagnosis  POA   • **Acute exacerbation of COPD with asthma ([facility]) [J44.1, J45.901]  Yes   • Paroxysmal atrial fibrillation ([facility]) [I48.0]  Yes   • CKD (chronic kidney disease) stage 3, GFR 30-59 ml/min ([facility]) [N18.30]  Yes   • Essential hypertension [I10]  Yes   • Coronary artery disease involving native coronary artery of native heart without angina pectoris [I25.10]  Yes   • Hyperlipidemia LDL goal <70 [E78.5]  Yes   • GERD (gastroesophageal reflux disease) [K21.9]  Yes   • Osteoarthritis [M19.90]  Yes   • Takotsubo cardiomyopathy [I51.81]  Yes   • PVD (peripheral vascular disease) ([facility]) [I73.9]  Yes   • Type 2 diabetes mellitus ([facility]) [E11.9]  Yes      Resolved Hospital Problems   No resolved problems to display.        Brief Hospital Course to date:  Yanique Webster is a 83 y.o. female with PMH of CAD, HLD, HTN, Afib, CKD3, DM2, Takotsubo,COPD and PVD who presents to the ED for shortness of breath and cough.      Acute COPD Exacerbation  Acute Bronchitis   Hx of Tobacco Abuse  -CT chest shows bronchial wall thickening with LLL bronchial obstruction representing acute bronchitis  -CXR shows no acute pulmonary process  -Doxycycline  -Duonebs  -solumedrol   -Pending respiratory  culture     DM2  Neuropathy   -A1c 5.5  -hyperglycemia likely secondary to steroids  -SSI  -ACHS finger sticks     CKD3  -Cr 1.7, which is around baseline  -Strict I/Os  -Avoid nephrotoxic agents     Takotsubo Cardiomyopathy   CAD  HLD  -Echo in 2019 shows EF 61-65%  -Continue aspirin and statin  -Follows with Dr. Geronimo     HTN  Afib  -Continue home coreg, clonidine and xarelto      OA  -On home morphine  -Pain control     Right Adrenal Mass  Multinodular Goiter  -Incidental finding on CT chest  -Adrenal mass 4cm which may represent adenoma, has been present since 2016  -Check TSH   -Follow up outpatient     Expected Discharge Location and Transportation: Home  Expected Discharge   Expected Discharge Date and Time     Expected Discharge Date Expected Discharge Time    Feb 16, 2023            DVT prophylaxis:  Medical and mechanical DVT prophylaxis orders are present.     AM-PAC 6 Clicks Score (PT): 18 (02/14/23 1202)    CODE STATUS:   Code Status and Medical Interventions:   Ordered at: 02/13/23 2044     Level Of Support Discussed With:    Patient     Code Status (Patient has no pulse and is not breathing):    CPR (Attempt to Resuscitate)     Medical Interventions (Patient has pulse or is breathing):    Full Support       Cookie Patten, DO  02/14/23

## 2023-02-14 NOTE — CASE MANAGEMENT/SOCIAL WORK
Discharge Planning Assessment  [facility]     Patient Name: Yanique Webster  MRN: [id number]  Today's Date: 2/14/2023    Admit Date: 2/13/2023    Plan: Home   Discharge Needs Assessment     Row Name 02/14/23 1344       Living Environment    People in Home spouse    Current Living Arrangements home    Primary Care Provided by self    Provides Primary Care For no one    Family Caregiver if Needed spouse    Family Caregiver Names Edwin Webster,     Quality of Family Relationships helpful;involved;supportive    Able to Return to Prior Arrangements yes       Resource/Environmental Concerns    Resource/Environmental Concerns none       Transition Planning    Patient/Family Anticipates Transition to home with family    Patient/Family Anticipated Services at Transition     Transportation Anticipated family or friend will provide       Discharge Needs Assessment    Equipment Currently Used at Home glucometer;walker, standard    Concerns to be Addressed denies needs/concerns at this time    Equipment Needed After Discharge none    Discharge Coordination/Progress Lives in a house in [place] [facility] with her , independent of ADLs. Uses a walker at home. No history of home health, has advanced directives.               Discharge Plan     Row Name 02/14/23 1346       Plan    Plan Home    Patient/Family in Agreement with Plan yes    Plan Comments I spoke with Mrs Webster at bedside.  Mrs Webster resides in a house in [facility] [place] [facility] with her , and is independent of ADLs. She uses a walker to get around.  She denies any history of home health.  She does have an advanced directive.  Her PCP is Sebas Alicea, and she gets her prescriptions filled at [facility] in [place]. At this time, she denies any discharge needs.  Family will be able to transport her home at discharge.    Final Discharge Disposition Code 01 - home or self-care              Continued Care and Services - Admitted Since  2/13/2023    Coordination has not been started for this encounter.       Expected Discharge Date and Time     Expected Discharge Date Expected Discharge Time    Feb 16, 2023          Demographic Summary    No documentation.                Functional Status     Row Name 02/14/23 1344       Functional Status    Usual Activity Tolerance good    Current Activity Tolerance good       Functional Status, IADL    Medications independent    Meal Preparation independent    Housekeeping independent    Laundry independent    Shopping independent       Mental Status    General Appearance WDL WDL               Psychosocial    No documentation.                Abuse/Neglect    No documentation.                Legal    No documentation.                Substance Abuse    No documentation.                Patient Forms    No documentation.                   Pam Mccormack RN

## 2023-02-15 LAB
ANION GAP SERPL CALCULATED.3IONS-SCNC: 10 MMOL/L (ref 5–15)
B PARAPERT DNA SPEC QL NAA+PROBE: NOT DETECTED
B PERT DNA SPEC QL NAA+PROBE: NOT DETECTED
BACTERIA BLD CULT: NORMAL
BUN SERPL-MCNC: 44 MG/DL (ref 8–23)
BUN/CREAT SERPL: 29.1 (ref 7–25)
C PNEUM DNA NPH QL NAA+NON-PROBE: NOT DETECTED
CALCIUM SPEC-SCNC: 10 MG/DL (ref 8.6–10.5)
CHLORIDE SERPL-SCNC: 104 MMOL/L (ref 98–107)
CO2 SERPL-SCNC: 23 MMOL/L (ref 22–29)
CREAT SERPL-MCNC: 1.51 MG/DL (ref 0.57–1)
EGFRCR SERPLBLD CKD-EPI 2021: 34.4 ML/MIN/1.73
FLUAV SUBTYP SPEC NAA+PROBE: NOT DETECTED
FLUBV RNA ISLT QL NAA+PROBE: NOT DETECTED
GLUCOSE BLDC GLUCOMTR-MCNC: 207 MG/DL (ref 70–130)
GLUCOSE BLDC GLUCOMTR-MCNC: 287 MG/DL (ref 70–130)
GLUCOSE BLDC GLUCOMTR-MCNC: 298 MG/DL (ref 70–130)
GLUCOSE BLDC GLUCOMTR-MCNC: 327 MG/DL (ref 70–130)
GLUCOSE SERPL-MCNC: 238 MG/DL (ref 65–99)
HADV DNA SPEC NAA+PROBE: NOT DETECTED
HCOV 229E RNA SPEC QL NAA+PROBE: NOT DETECTED
HCOV HKU1 RNA SPEC QL NAA+PROBE: NOT DETECTED
HCOV NL63 RNA SPEC QL NAA+PROBE: NOT DETECTED
HCOV OC43 RNA SPEC QL NAA+PROBE: NOT DETECTED
HMPV RNA NPH QL NAA+NON-PROBE: DETECTED
HPIV1 RNA ISLT QL NAA+PROBE: NOT DETECTED
HPIV2 RNA SPEC QL NAA+PROBE: NOT DETECTED
HPIV3 RNA NPH QL NAA+PROBE: NOT DETECTED
HPIV4 P GENE NPH QL NAA+PROBE: NOT DETECTED
M PNEUMO IGG SER IA-ACNC: NOT DETECTED
POTASSIUM SERPL-SCNC: 4.6 MMOL/L (ref 3.5–5.2)
RHINOVIRUS RNA SPEC NAA+PROBE: NOT DETECTED
RSV RNA NPH QL NAA+NON-PROBE: NOT DETECTED
SARS-COV-2 RNA NPH QL NAA+NON-PROBE: NOT DETECTED
SODIUM SERPL-SCNC: 137 MMOL/L (ref 136–145)

## 2023-02-15 PROCEDURE — 94799 UNLISTED PULMONARY SVC/PX: CPT

## 2023-02-15 PROCEDURE — 80048 BASIC METABOLIC PNL TOTAL CA: CPT | Performed by: INTERNAL MEDICINE

## 2023-02-15 PROCEDURE — 0202U NFCT DS 22 TRGT SARS-COV-2: CPT | Performed by: INTERNAL MEDICINE

## 2023-02-15 PROCEDURE — 96376 TX/PRO/DX INJ SAME DRUG ADON: CPT

## 2023-02-15 PROCEDURE — 82962 GLUCOSE BLOOD TEST: CPT

## 2023-02-15 PROCEDURE — 97535 SELF CARE MNGMENT TRAINING: CPT

## 2023-02-15 PROCEDURE — G0378 HOSPITAL OBSERVATION PER HR: HCPCS

## 2023-02-15 PROCEDURE — 25010000002 METHYLPREDNISOLONE PER 40 MG

## 2023-02-15 PROCEDURE — 94664 DEMO&/EVAL PT USE INHALER: CPT

## 2023-02-15 PROCEDURE — 97530 THERAPEUTIC ACTIVITIES: CPT

## 2023-02-15 PROCEDURE — 63710000001 INSULIN DETEMIR PER 5 UNITS: Performed by: INTERNAL MEDICINE

## 2023-02-15 PROCEDURE — 99232 SBSQ HOSP IP/OBS MODERATE 35: CPT | Performed by: INTERNAL MEDICINE

## 2023-02-15 PROCEDURE — 63710000001 INSULIN LISPRO (HUMAN) PER 5 UNITS

## 2023-02-15 PROCEDURE — 94761 N-INVAS EAR/PLS OXIMETRY MLT: CPT

## 2023-02-15 RX ORDER — METHYLPREDNISOLONE SODIUM SUCCINATE 40 MG/ML
40 INJECTION, POWDER, LYOPHILIZED, FOR SOLUTION INTRAMUSCULAR; INTRAVENOUS DAILY
Status: DISCONTINUED | OUTPATIENT
Start: 2023-02-16 | End: 2023-02-16

## 2023-02-15 RX ORDER — TORSEMIDE 20 MG/1
20 TABLET ORAL ONCE
Status: DISCONTINUED | OUTPATIENT
Start: 2023-02-15 | End: 2023-02-16 | Stop reason: HOSPADM

## 2023-02-15 RX ORDER — DOXYCYCLINE 100 MG/1
100 CAPSULE ORAL EVERY 12 HOURS SCHEDULED
Status: DISCONTINUED | OUTPATIENT
Start: 2023-02-15 | End: 2023-02-16 | Stop reason: HOSPADM

## 2023-02-15 RX ORDER — TORSEMIDE 20 MG/1
40 TABLET ORAL DAILY
Status: DISCONTINUED | OUTPATIENT
Start: 2023-02-16 | End: 2023-02-16 | Stop reason: HOSPADM

## 2023-02-15 RX ADMIN — TORSEMIDE 20 MG: 20 TABLET ORAL at 09:01

## 2023-02-15 RX ADMIN — DOXYCYCLINE 100 MG: 100 CAPSULE ORAL at 09:00

## 2023-02-15 RX ADMIN — INSULIN LISPRO 4 UNITS: 100 INJECTION, SOLUTION INTRAVENOUS; SUBCUTANEOUS at 22:17

## 2023-02-15 RX ADMIN — HYDROCODONE BITARTRATE AND ACETAMINOPHEN 1 TABLET: 5; 325 TABLET ORAL at 00:23

## 2023-02-15 RX ADMIN — IPRATROPIUM BROMIDE AND ALBUTEROL SULFATE 3 ML: 2.5; .5 SOLUTION RESPIRATORY (INHALATION) at 10:14

## 2023-02-15 RX ADMIN — SENNOSIDES AND DOCUSATE SODIUM 2 TABLET: 50; 8.6 TABLET ORAL at 19:42

## 2023-02-15 RX ADMIN — ASPIRIN 81 MG: 81 TABLET, COATED ORAL at 09:01

## 2023-02-15 RX ADMIN — CLONIDINE HYDROCHLORIDE 0.1 MG: 0.1 TABLET ORAL at 09:01

## 2023-02-15 RX ADMIN — PANTOPRAZOLE SODIUM 40 MG: 40 TABLET, DELAYED RELEASE ORAL at 05:31

## 2023-02-15 RX ADMIN — HYDRALAZINE HYDROCHLORIDE 25 MG: 50 TABLET, FILM COATED ORAL at 19:42

## 2023-02-15 RX ADMIN — INSULIN LISPRO 3 UNITS: 100 INJECTION, SOLUTION INTRAVENOUS; SUBCUTANEOUS at 09:01

## 2023-02-15 RX ADMIN — DOXYCYCLINE 100 MG: 100 CAPSULE ORAL at 19:43

## 2023-02-15 RX ADMIN — INSULIN LISPRO 4 UNITS: 100 INJECTION, SOLUTION INTRAVENOUS; SUBCUTANEOUS at 17:27

## 2023-02-15 RX ADMIN — Medication 10 ML: at 09:01

## 2023-02-15 RX ADMIN — METHYLPREDNISOLONE SODIUM SUCCINATE 40 MG: 40 INJECTION, POWDER, FOR SOLUTION INTRAMUSCULAR; INTRAVENOUS at 05:31

## 2023-02-15 RX ADMIN — CLONIDINE HYDROCHLORIDE 0.1 MG: 0.1 TABLET ORAL at 19:43

## 2023-02-15 RX ADMIN — CARVEDILOL 25 MG: 12.5 TABLET, FILM COATED ORAL at 19:43

## 2023-02-15 RX ADMIN — INSULIN LISPRO 5 UNITS: 100 INJECTION, SOLUTION INTRAVENOUS; SUBCUTANEOUS at 12:18

## 2023-02-15 RX ADMIN — RIVAROXABAN 15 MG: 15 TABLET, FILM COATED ORAL at 17:27

## 2023-02-15 RX ADMIN — CARVEDILOL 25 MG: 12.5 TABLET, FILM COATED ORAL at 09:00

## 2023-02-15 RX ADMIN — INSULIN DETEMIR 5 UNITS: 100 INJECTION, SOLUTION SUBCUTANEOUS at 19:43

## 2023-02-15 RX ADMIN — IPRATROPIUM BROMIDE AND ALBUTEROL SULFATE 3 ML: 2.5; .5 SOLUTION RESPIRATORY (INHALATION) at 07:04

## 2023-02-15 RX ADMIN — IPRATROPIUM BROMIDE AND ALBUTEROL SULFATE 3 ML: 2.5; .5 SOLUTION RESPIRATORY (INHALATION) at 15:29

## 2023-02-15 RX ADMIN — HYDROCODONE BITARTRATE AND ACETAMINOPHEN 1 TABLET: 5; 325 TABLET ORAL at 19:41

## 2023-02-15 RX ADMIN — Medication 10 ML: at 19:44

## 2023-02-15 RX ADMIN — IPRATROPIUM BROMIDE AND ALBUTEROL SULFATE 3 ML: 2.5; .5 SOLUTION RESPIRATORY (INHALATION) at 20:17

## 2023-02-15 RX ADMIN — DULOXETINE HYDROCHLORIDE 60 MG: 60 CAPSULE, DELAYED RELEASE ORAL at 09:01

## 2023-02-15 RX ADMIN — IPRATROPIUM BROMIDE AND ALBUTEROL SULFATE 3 ML: 2.5; .5 SOLUTION RESPIRATORY (INHALATION) at 03:02

## 2023-02-15 RX ADMIN — INSULIN DETEMIR 5 UNITS: 100 INJECTION, SOLUTION SUBCUTANEOUS at 09:01

## 2023-02-15 RX ADMIN — IPRATROPIUM BROMIDE AND ALBUTEROL SULFATE 3 ML: 2.5; .5 SOLUTION RESPIRATORY (INHALATION) at 00:35

## 2023-02-15 RX ADMIN — IPRATROPIUM BROMIDE AND ALBUTEROL SULFATE 3 ML: 2.5; .5 SOLUTION RESPIRATORY (INHALATION) at 23:53

## 2023-02-15 RX ADMIN — ATORVASTATIN CALCIUM 80 MG: 40 TABLET, FILM COATED ORAL at 09:00

## 2023-02-15 RX ADMIN — HYDRALAZINE HYDROCHLORIDE 25 MG: 50 TABLET, FILM COATED ORAL at 09:00

## 2023-02-15 RX ADMIN — HYDRALAZINE HYDROCHLORIDE 25 MG: 50 TABLET, FILM COATED ORAL at 16:51

## 2023-02-15 NOTE — PROGRESS NOTES
[facility] Medicine Services  PROGRESS NOTE    Patient Name: Yanique Webster  : 1940  MRN: [id number]    Date of Admission: 2023  Primary Care Physician: Sebas Alicea MD    Subjective   Subjective     CC:  F/u COPD/bronchitis    HPI:  Patient sitting up in bed. States that she is still coughing and wheezing, her  is sick with the same thing and [facility].    ROS:  Gen- No fevers, chills  CV- No chest pain, palpitations  Resp-+cough, dyspnea  GI- No N/V/D, abd pain    Objective   Objective     Vital Signs:   Temp:  [98 °F (36.7 °C)-98.6 °F (37 °C)] 98.5 °F (36.9 °C)  Heart Rate:  [53-83] 67  Resp:  [16-20] 16  BP: (163-197)/(72-92) 190/87  Flow (L/min):  [2] 2     Physical Exam:  Constitutional: No acute distress, awake, alert  HENT: NCAT, mucous membranes moist   Respiratory: scatter expiratory wheezing, scattered rhonchi still present today on 2L NC  Cardiovascular: RRR, no murmurs, rubs, or gallops  Gastrointestinal: Positive bowel sounds, soft, nontender, nondistended  Musculoskeletal: No bilateral ankle edema  Psychiatric: Appropriate affect, cooperative  Neurologic: Oriented x 3, speech clear  Skin: No rashes      Results Reviewed:  LAB RESULTS:      Lab 23  0314 23  1643   WBC 2.48* 4.50   HEMOGLOBIN 12.0 11.7*   HEMATOCRIT 37.3 35.7   PLATELETS 133* 130*   NEUTROS ABS 1.76 2.81   IMMATURE GRANS (ABS)  --  0.01   LYMPHS ABS  --  1.18   MONOS ABS  --  0.47   EOS ABS 0.00 0.02   MCV 89.9 89.0   PROCALCITONIN  --  0.11   LACTATE  --  1.3         Lab 02/15/23  0425 23  0314 23  1643   SODIUM 137 135* 134*   POTASSIUM 4.6 4.7 4.3   CHLORIDE 104 102 102   CO2 23.0 23.0 21.0*   ANION GAP 10.0 10.0 11.0   BUN 44* 40* 38*   CREATININE 1.51* 1.76* 1.72*   EGFR 34.4* 28.6* 29.4*   GLUCOSE 238* 253* 191*   CALCIUM 10.0 10.4 10.0   MAGNESIUM  --  2.4  --    HEMOGLOBIN A1C  --   --  5.50   TSH  --  0.159*  --          Lab  02/13/23  1643   TOTAL PROTEIN 6.6   ALBUMIN 4.0   GLOBULIN 2.6   ALT (SGPT) 16   AST (SGOT) 27   BILIRUBIN 0.4   ALK PHOS 130*         Lab 02/13/23 1905 02/13/23  1643   PROBNP  --  1,773.0   HSTROP T 51* 49*                 Brief Urine Lab Results     None          Microbiology Results Abnormal     Procedure Component Value - Date/Time    Blood Culture - Blood, Hand, Right [[id number]]  (Normal) Collected: 02/13/23 1709    Lab Status: Preliminary result Specimen: Blood from Hand, Right Updated: 02/14/23 1731     Blood Culture No growth at 24 hours    Blood Culture - Blood, Arm, Left [[id number]]  (Normal) Collected: 02/13/23 1704    Lab Status: Preliminary result Specimen: Blood from Arm, Left Updated: 02/14/23 1731     Blood Culture No growth at 24 hours    COVID PRE-OP / PRE-PROCEDURE SCREENING ORDER (NO ISOLATION) - Swab, Nasopharynx [[id number]]  (Normal) Collected: 02/13/23 1904    Lab Status: Final result Specimen: Swab from Nasopharynx Updated: 02/13/23 1948    Narrative:      The following orders were created for panel order COVID PRE-OP / PRE-PROCEDURE SCREENING ORDER (NO ISOLATION) - Swab, Nasopharynx.  Procedure                               Abnormality         Status                     ---------                               -----------         ------                     COVID-19 and FLU A/B PCR...[[id number]]  Normal              Final result                 Please view results for these tests on the individual orders.    COVID-19 and FLU A/B PCR - Swab, Nasopharynx [[id number]]  (Normal) Collected: 02/13/23 1904    Lab Status: Final result Specimen: Swab from Nasopharynx Updated: 02/13/23 1948     COVID19 Not Detected     Influenza A PCR Not Detected     Influenza B PCR Not Detected    Narrative:      Fact sheet for providers: https://www.fda.gov/media/417866/download    Fact sheet for patients: https://www.fda.gov/media/538008/download    Test performed by PCR.          CT Chest Without Contrast  Diagnostic    Result Date: 2/13/2023  CT CHEST WO CONTRAST DIAGNOSTIC Date of Exam: 2/13/2023 5:56 PM EST Indication: chest pain, shortness of breath, cough, dyspnea. Comparison: Chest radiograph performed the same date. Chest CT for lung cancer screening may 10/20/2018, CT chest, abdomen, and pelvis August 23, 2016. Technique: Axial CT images were obtained of the chest without contrast administration.  Reconstructed coronal and sagittal images were also obtained. Automated exposure control and iterative construction methods were used. Findings: There is some bronchial wall thickening throughout the lungs, but most prominent in the lower lobes and lingula. There is some bronchial obstruction in the left lower lobe. There does not appear to be significant focal consolidation. There is some suspected mild atelectasis or scarring in the lingula and right middle lobe. There is suspected mild scarring at the lung apices. No pneumothorax. The heart does not appear enlarged. There is calcific atherosclerosis of the coronary arteries. No pericardial effusion. The ascending aorta does not appear significantly enlarged. There is atherosclerosis of the aorta and branch vessels. There appears to be nodular enlargement of the thyroid with extension into the upper mediastinum where there appears to be a nodule with coarse calcifications. There are calcified mediastinal and right hilar lymph nodes consistent with remote granulomatous disease. There appears to be a small hiatal hernia. No definite mediastinal, axillary, or hilar lymphadenopathy is seen at this time. Implanted cardiac device is seen in the upper left breast. There is a 4 cm mass in the right adrenal gland which has been present since at least 2016 and demonstrates attenuation of less than 10 Hounsfield units, most consistent with an adenoma. The gallbladder appears distended containing layering hyperdense material, likely gallstones. Probable cyst at the posterior  left renal upper pole. Degenerative changes are present in the thoracic spine. No definite aggressive osseous lesion is identified.     Impression: Impression: 1.Central bronchial wall thickening with left lower lobe bronchial obstruction which may represent acute or chronic bronchitis. No significant focal pneumonia seen at this time. 2.Calcific atherosclerosis of the coronary arteries, aorta, and branch vessels. 3.Cholelithiasis. 4.4 cm chronic right adrenal mass with attenuation suggestive of an adrenal adenoma. 5.Suspected multinodular goiter with extension into the upper mediastinum. Electronically Signed: Camron Hendrix  2/13/2023 6:18 PM EST  Workstation ID: [id number]    XR Chest 1 View    Result Date: 2/13/2023  XR CHEST 1 VW Date of Exam: 2/13/2023 4:07 PM EST Indication: SOA triage protocol. Comparison: None available. Findings: Chronic changes throughout the lungs are similar compared to prior exam. Cardiac and mediastinal contours within normal limits. Regional skeleton is unremarkable.     Impression: Impression: 1. Chronic changes, but no acute cardiopulmonary disease. Electronically Signed: Tod Granger  2/13/2023 4:22 PM EST  Workstation ID: [id number]      Results for orders placed during the hospital encounter of 03/14/19    Adult Transesophageal Echo (ERMIAS) W/ Cont if Necessary Per Protocol    Interpretation Summary  · Left ventricular systolic function is normal. Estimated EF appears to be in the range of 61 - 65%  · Normal left atrial size and volume noted. There is no spontaneous echo contrast present. The left atrial appendage was visualized through multiple planes. Left atrial appendage was found to be multilobar in nature. Doppler interrogation shows normal flow within the left atrial appendage. No evidence of a left atrial appendage thrombus was present  · Lipomatous hypertrophy of the interatrial septum present. No evidence of a patent foramen ovale. Saline test results are negative.  · There is  mild thickening of the noncoronary cusp of the aortic valve. Mild aortic valve regurgitation is present  · There is moderate (grade 2) layered plaque in the proximal aorta present.      I have reviewed the medications:  Scheduled Meds:aspirin, 81 mg, Oral, Daily  atorvastatin, 80 mg, Oral, Daily  carvedilol, 25 mg, Oral, Q12H  cloNIDine, 0.1 mg, Oral, Q12H  doxycycline, 100 mg, Oral, Q12H  DULoxetine, 60 mg, Oral, Daily  hydrALAZINE, 25 mg, Oral, TID  insulin detemir, 5 Units, Subcutaneous, Q12H  insulin lispro, 0-7 Units, Subcutaneous, 4x Daily With Meals & Nightly  ipratropium-albuterol, 3 mL, Nebulization, Q4H - RT  [START ON 2/16/2023] methylPREDNISolone sodium succinate, 40 mg, Intravenous, Daily  pantoprazole, 40 mg, Oral, Daily  pharmacy consult - MTM, , Does not apply, Daily  rivaroxaban, 15 mg, Oral, Daily With Dinner  senna-docusate sodium, 2 tablet, Oral, BID  sodium chloride, 10 mL, Intravenous, Q12H  torsemide, 20 mg, Oral, Daily      Continuous Infusions:   PRN Meds:.•  acetaminophen **OR** acetaminophen **OR** acetaminophen  •  senna-docusate sodium **AND** polyethylene glycol **AND** bisacodyl **AND** bisacodyl  •  dextrose  •  dextrose  •  glucagon (human recombinant)  •  HYDROcodone-acetaminophen  •  sodium chloride  •  sodium chloride  •  sodium chloride    Assessment & Plan   Assessment & Plan     Active Hospital Problems    Diagnosis  POA   • **Acute exacerbation of COPD with asthma ([facility]) [J44.1, J45.901]  Yes   • Paroxysmal atrial fibrillation ([facility]) [I48.0]  Yes   • CKD (chronic kidney disease) stage 3, GFR 30-59 ml/min ([facility]) [N18.30]  Yes   • Essential hypertension [I10]  Yes   • Coronary artery disease involving native coronary artery of native heart without angina pectoris [I25.10]  Yes   • Hyperlipidemia LDL goal <70 [E78.5]  Yes   • GERD (gastroesophageal reflux disease) [K21.9]  Yes   • Osteoarthritis [M19.90]  Yes   • Takotsubo cardiomyopathy [I51.81]  Yes   • PVD (peripheral vascular  disease) ([facility]) [I73.9]  Yes   • Type 2 diabetes mellitus ([facility]) [E11.9]  Yes      Resolved Hospital Problems   No resolved problems to display.        Brief Hospital Course to date:  Yanique Webster is a 83 y.o. female with PMH of CAD, HLD, HTN, Afib, CKD3, DM2, Takotsubo,COPD and PVD who presents to the ED for shortness of breath and cough.      Acute COPD Exacerbation  Acute Bronchitis   Hx of Tobacco Abuse  -CT chest shows bronchial wall thickening with LLL bronchial obstruction representing acute bronchitis  -CXR shows no acute pulmonary process  -Doxycycline x 5 days, scheduled Duonebs  -solumedrol, wean to daily dosing, will likely need prednisone taper at discharge  -check full respiratory viral panel, COVID/flu negative     DM2  Neuropathy   -A1c 5.5  -hyperglycemia likely secondary to steroids  -SSI, add levemir 5units BID  -ACHS finger sticks     CKD3  -Cr 1.7, which is around baseline  -Strict I/Os  -Avoid nephrotoxic agents     Takotsubo Cardiomyopathy   CAD  HLD  -Echo in 2019 shows EF 61-65%  -Continue aspirin and statin  -Follows with Dr. Geronimo     HTN  Afib  -Continue home coreg, clonidine and xarelto      OA  -On home morphine  -Pain control     Right Adrenal Mass  Multinodular Goiter  -Incidental finding on CT chest  -Adrenal mass 4cm which may represent adenoma, has been present since 2016  -Follow up outpatient with endocrine, TSH low    Expected Discharge Location and Transportation: Home  Expected Discharge   Expected Discharge Date and Time     Expected Discharge Date Expected Discharge Time    Feb 16, 2023            DVT prophylaxis:  Medical and mechanical DVT prophylaxis orders are present.     AM-PAC 6 Clicks Score (PT): 18 (02/14/23 1202)    CODE STATUS:   Code Status and Medical Interventions:   Ordered at: 02/13/23 2044     Level Of Support Discussed With:    Patient     Code Status (Patient has no pulse and is not breathing):    CPR (Attempt to Resuscitate)     Medical Interventions  (Patient has pulse or is breathing):    Full Support       Cookie Patten, DO  02/15/23

## 2023-02-15 NOTE — PLAN OF CARE
Goal Outcome Evaluation:  Plan of Care Reviewed With: patient        Progress: improving  Outcome Evaluation: VSS, on 2L O2 NC, sats > 94%, pt slept well most of the night, adequate UOP.      Problem: Adult Inpatient Plan of Care  Goal: Absence of Hospital-Acquired Illness or Injury  Intervention: Prevent Skin Injury  Recent Flowsheet Documentation  Taken 2/14/2023 2000 by Anabel Aguirre RN  Body Position:   supine   position changed independently  Intervention: Prevent and Manage VTE (Venous Thromboembolism) Risk  Recent Flowsheet Documentation  Taken 2/14/2023 2000 by Anabel Aguirre RN  Activity Management: activity adjusted per tolerance     Problem: Adult Inpatient Plan of Care  Goal: Absence of Hospital-Acquired Illness or Injury  Intervention: Prevent and Manage VTE (Venous Thromboembolism) Risk  Recent Flowsheet Documentation  Taken 2/14/2023 2000 by Anabel Aguirre RN  Activity Management: activity adjusted per tolerance

## 2023-02-15 NOTE — THERAPY TREATMENT NOTE
Patient Name: Yanique Webster  : 1940    MRN: [id number]                              Today's Date: 2/15/2023       Admit Date: 2023    Visit Dx:     ICD-10-CM ICD-9-CM   1. Acute exacerbation of COPD with asthma ([facility])  J44.1 493.22    J45.901    2. Dyspnea and respiratory abnormalities  R06.00 786.09    R06.89    3. Acute bronchitis, unspecified organism  J20.9 466.0   4. Chronic renal impairment, unspecified CKD stage  N18.9 585.9     Patient Active Problem List   Diagnosis   • Fibromyalgia   • PVD (peripheral vascular disease) ([facility])   • Type 2 diabetes mellitus ([facility])   • Diabetic gastroparesis ([facility])   • Takotsubo cardiomyopathy   • Hyperlipidemia LDL goal <70   • COPD (chronic obstructive pulmonary disease) ([facility])   • Obesity   • Osteoarthritis   • Chronic pain   • GERD (gastroesophageal reflux disease)   • Gout   • Chronic joint pain   • Coronary artery disease involving native coronary artery of native heart without angina pectoris   • Nonrheumatic aortic valve insufficiency   • Altered mental status   • Essential hypertension   • CKD (chronic kidney disease) stage 3, GFR 30-59 ml/min ([facility])   • Hypertensive emergency   • Status post placement of implantable loop recorder   • Paroxysmal atrial fibrillation ([facility])   • Acute exacerbation of COPD with asthma ([facility])     Past Medical History:   Diagnosis Date   • Blurry vision    • Chronic joint pain    • Chronic pain    • COPD (chronic obstructive pulmonary disease) ([facility])    • Coronary artery disease    • Diabetic gastroparesis ([facility]) 2016   • Esophageal stricture     s/p dilation in    • GERD (gastroesophageal reflux disease)    • Gout    • Headache     secondary to hypertension   • Hyperlipidemia    • Hypertension    • Long term current use of insulin ([facility])    • Neuropathy    • Neuropathy due to type 2 diabetes mellitus ([facility])    • Obesity    • Osteoarthritis    • Pericarditis    • Stroke ([facility]) 2019   • Type 2 diabetes mellitus ([facility])       Past Surgical History:   Procedure Laterality Date   • BRONCHOSCOPY N/A 8/23/2016    Procedure: BRONCHOSCOPY BIOPSY AT BEDSIDE;  Surgeon: Mookie Willard MD;  Location:  [facility];  Service:    • CARDIAC CATHETERIZATION N/A 8/30/2016    Procedure: Left Heart Cath;  Surgeon: Steve Geronimo MD;  Location:  [facility];  Service:    • CARDIAC CATHETERIZATION N/A 8/30/2016    Procedure: Right Heart Cath;  Surgeon: Steve Geronimo MD;  Location:  [facility];  Service:    • CARDIAC ELECTROPHYSIOLOGY PROCEDURE N/A 7/2/2019    Procedure: Loop insertion;  Surgeon: Steve Geronimo MD;  Location:  [facility]ATH INVASIVE LOCATION;  Service: Cardiovascular   • CATARACT EXTRACTION     • ESOPHAGEAL DILATATION  2007   • HERNIA REPAIR     • HYSTERECTOMY  1998   • WRIST FRACTURE SURGERY Left       General Information     Row Name 02/15/23 1617          Physical Therapy Time and Intention    Document Type therapy note (daily note)  -AE     Mode of Treatment physical therapy  -AE     Row Name 02/15/23 1617          General Information    Patient Profile Reviewed yes  -AE     Existing Precautions/Restrictions fall;other (see comments)  monitor BP  -AE     Barriers to Rehab medically complex;previous functional deficit  -AE     Row Name 02/15/23 1617          Cognition    Orientation Status (Cognition) oriented x 3  -AE     Row Name 02/15/23 161          Safety Issues, Functional Mobility    Safety Issues Affecting Function (Mobility) awareness of need for assistance;insight into deficits/self-awareness;safety precaution awareness;safety precautions follow-through/compliance  -AE     Impairments Affecting Function (Mobility) balance;endurance/activity tolerance;strength;shortness of breath  -AE           User Key  (r) = Recorded By, (t) = Taken By, (c) = Cosigned By    Initials Name Provider Type    AE Harrison Meyer, PT Physical Therapist               Mobility     Row Name 02/15/23 1612           Bed Mobility    Comment, (Bed Mobility) Pt received and left UIC.  -AE     Row Name 02/15/23 1618          Transfers    Comment, (Transfers) VCs for hand placement and sequencing. Pt demo improved sequencing while completing STS.  -AE     Row Name 02/15/23 1618          Sit-Stand Transfer    Sit-Stand [place] (Transfers) contact guard;1 person assist;verbal cues;nonverbal cues (demo/gesture)  -AE     Assistive Device (Sit-Stand Transfers) walker, front-wheeled  -AE     Row Name 02/15/23 1618          Gait/Stairs (Locomotion)    Arnold Level (Gait) contact guard;1 person assist;verbal cues;nonverbal cues (demo/gesture)  -AE     Assistive Device (Gait) walker, front-wheeled  -AE     Distance in Feet (Gait) 175  -AE     Deviations/Abnormal Patterns (Gait) bilateral deviations;base of support, narrow;myke decreased;gait speed decreased;stride length decreased  -AE     Bilateral Gait Deviations forward flexed posture;heel strike decreased  -AE     Comment, (Gait/Stairs) Pt demo step through gait pattern with slowed myke, decreased gait speed, and forward flexed posture. Pt demo improve endurance with activity this session but required intermittent cues to improve sequencing of AD to improve safety awareness. Pt limited by SOA and fatigue this session.  -AE           User Key  (r) = Recorded By, (t) = Taken By, (c) = Cosigned By    Initials Name Provider Type    AE Harrison Meyer PT Physical Therapist               Obj/Interventions     Row Name 02/15/23 1620          Balance    Balance Assessment sitting static balance;sitting dynamic balance;sit to stand dynamic balance;standing static balance;standing dynamic balance  -AE     Static Sitting Balance contact guard  -AE     Dynamic Sitting Balance contact guard  -AE     Position, Sitting Balance unsupported;sitting edge of bed  -AE     Sit to Stand Dynamic Balance contact guard;1-person assist;verbal cues;non-verbal cues (demo/gesture)  -AE      Static Standing Balance contact guard  -AE     Dynamic Standing Balance contact guard  -AE     Position/Device Used, Standing Balance supported  -AE           User Key  (r) = Recorded By, (t) = Taken By, (c) = Cosigned By    Initials Name Provider Type    AE Harrison Meyer PT Physical Therapist               Goals/Plan    No documentation.                Clinical Impression     Row Name 02/15/23 1621          Pain    Pretreatment Pain Rating 0/10 - no pain  -AE     Posttreatment Pain Rating 0/10 - no pain  -AE     Row Name 02/15/23 1621          Plan of Care Review    Plan of Care Reviewed With patient  -AE     Progress improving  -AE     Outcome Evaluation Pt continues to be limited by decreased endurance with activity, limiting overall independence with mobility. Pt ambulated 175ft this session with CGA and RW for support. Pt requires cues to improve sequencing of AD at times to improve safety awareness. Continue to progress per pt tolerance.  -AE     Row Name 02/15/23 1621          Vital Signs    Pre Systolic BP Rehab 163  -AE     Pre Treatment Diastolic BP 83  -AE     Pretreatment Heart Rate (beats/min) 86  -AE     Posttreatment Heart Rate (beats/min) 84  -AE     Pre SpO2 (%) 96  -AE     O2 Delivery Pre Treatment room air  -AE     O2 Delivery Intra Treatment room air  -AE     Post SpO2 (%) 97  -AE     O2 Delivery Post Treatment room air  -AE     Pre Patient Position Sitting  -AE     Intra Patient Position Standing  -AE     Post Patient Position Sitting  -AE     Row Name 02/15/23 1621          Positioning and Restraints    Pre-Treatment Position sitting in chair/recliner  -AE     Post Treatment Position chair  -AE     In Chair notified nsg;reclined;call light within reach;encouraged to call for assist;exit alarm on;waffle cushion;legs elevated  -AE           User Key  (r) = Recorded By, (t) = Taken By, (c) = Cosigned By    Initials Name Provider Type    AE Harrison Meyer, PT Physical Therapist                Outcome Measures     Row Name 02/15/23 1624 02/15/23 0800       How much help from another person do you currently need...    Turning from your back to your side while in flat bed without using bedrails? 4  -AE 4  -MC    Moving from lying on back to sitting on the side of a flat bed without bedrails? 3  -AE 3  -MC    Moving to and from a bed to a chair (including a wheelchair)? 3  -AE 3  -MC    Standing up from a chair using your arms (e.g., wheelchair, bedside chair)? 3  -AE 3  -MC    Climbing 3-5 steps with a railing? 2  -AE 2  -MC    To walk in hospital room? 3  -AE 3  -MC    AM-PAC 6 Clicks Score (PT) 18  -AE 18  -MC    Highest level of mobility 6 --> Walked 10 steps or more  -AE 6 --> Walked 10 steps or more  -MC    Row Name 02/15/23 1624 02/15/23 1512       Functional Assessment    Outcome Measure Options AM-PAC 6 Clicks Basic Mobility (PT)  -AE AM-PAC 6 Clicks Daily Activity (OT)  -AC          User Key  (r) = Recorded By, (t) = Taken By, (c) = Cosigned By    Initials Name Provider Type    AC Marcella Drake, OT Occupational Therapist    Shobha Purcell RN Registered Nurse    Harrison Coleman, PT Physical Therapist                               PT Recommendation and Plan  Planned Therapy Interventions (PT): balance training, bed mobility training, gait training, home exercise program, patient/family education, postural re-education, ROM (range of motion), strengthening, transfer training  Plan of Care Reviewed With: patient  Progress: improving  Outcome Evaluation: Pt continues to be limited by decreased endurance with activity, limiting overall independence with mobility. Pt ambulated 175ft this session with CGA and RW for support. Pt requires cues to improve sequencing of AD at times to improve safety awareness. Continue to progress per pt tolerance.     Time Calculation:    PT Charges     Row Name 02/15/23 1624             Time Calculation    Start Time 1544  -AE      PT Received On 02/15/23  -AE      PT  Goal Re-Cert Due Date 02/24/23  -AE         Time Calculation- PT    Total Timed Code Minutes- PT 24 minute(s)  -AE         Timed Charges    83576 - PT Therapeutic Activity Minutes 24  -AE         Total Minutes    Timed Charges Total Minutes 24  -AE       Total Minutes 24  -AE            User Key  (r) = Recorded By, (t) = Taken By, (c) = Cosigned By    Initials Name Provider Type    AE Harrison Meyer, PT Physical Therapist              Therapy Charges for Today     Code Description Service Date Service Provider Modifiers Qty    69769590303 HC PT THERAPEUTIC ACT EA 15 MIN 2/14/2023 Harrison Meyer, PT GP 1    11137385483 HC PT EVAL MOD COMPLEXITY 3 2/14/2023 Harrison Meyer, PT GP 1    34978422089 HC PT THERAPEUTIC ACT EA 15 MIN 2/15/2023 Harrison Meyer, PT GP 2          PT G-Codes  Outcome Measure Options: AM-PAC 6 Clicks Basic Mobility (PT)  AM-PAC 6 Clicks Score (PT): 18  AM-PAC 6 Clicks Score (OT): 19  PT Discharge Summary  Anticipated Discharge Disposition (PT): home with assist, home with home health    Harrison Meyer PT  2/15/2023

## 2023-02-15 NOTE — THERAPY TREATMENT NOTE
Patient Name: Yanique Webster  : 1940    MRN: [id number]                              Today's Date: 2/15/2023       Admit Date: 2023    Visit Dx:     ICD-10-CM ICD-9-CM   1. Acute exacerbation of COPD with asthma ([facility])  J44.1 493.22    J45.901    2. Dyspnea and respiratory abnormalities  R06.00 786.09    R06.89    3. Acute bronchitis, unspecified organism  J20.9 466.0   4. Chronic renal impairment, unspecified CKD stage  N18.9 585.9     Patient Active Problem List   Diagnosis   • Fibromyalgia   • PVD (peripheral vascular disease) ([facility])   • Type 2 diabetes mellitus ([facility])   • Diabetic gastroparesis ([facility])   • Takotsubo cardiomyopathy   • Hyperlipidemia LDL goal <70   • COPD (chronic obstructive pulmonary disease) ([facility])   • Obesity   • Osteoarthritis   • Chronic pain   • GERD (gastroesophageal reflux disease)   • Gout   • Chronic joint pain   • Coronary artery disease involving native coronary artery of native heart without angina pectoris   • Nonrheumatic aortic valve insufficiency   • Altered mental status   • Essential hypertension   • CKD (chronic kidney disease) stage 3, GFR 30-59 ml/min ([facility])   • Hypertensive emergency   • Status post placement of implantable loop recorder   • Paroxysmal atrial fibrillation ([facility])   • Acute exacerbation of COPD with asthma ([facility])     Past Medical History:   Diagnosis Date   • Blurry vision    • Chronic joint pain    • Chronic pain    • COPD (chronic obstructive pulmonary disease) ([facility])    • Coronary artery disease    • Diabetic gastroparesis ([facility]) 2016   • Esophageal stricture     s/p dilation in    • GERD (gastroesophageal reflux disease)    • Gout    • Headache     secondary to hypertension   • Hyperlipidemia    • Hypertension    • Long term current use of insulin ([facility])    • Neuropathy    • Neuropathy due to type 2 diabetes mellitus ([facility])    • Obesity    • Osteoarthritis    • Pericarditis    • Stroke ([facility]) 2019   • Type 2 diabetes mellitus ([facility])       Past Surgical History:   Procedure Laterality Date   • BRONCHOSCOPY N/A 8/23/2016    Procedure: BRONCHOSCOPY BIOPSY AT BEDSIDE;  Surgeon: Mookie Willard MD;  Location:  [facility];  Service:    • CARDIAC CATHETERIZATION N/A 8/30/2016    Procedure: Left Heart Cath;  Surgeon: Steve Geronimo MD;  Location:  [facility];  Service:    • CARDIAC CATHETERIZATION N/A 8/30/2016    Procedure: Right Heart Cath;  Surgeon: Steve Geronimo MD;  Location:  [facility];  Service:    • CARDIAC ELECTROPHYSIOLOGY PROCEDURE N/A 7/2/2019    Procedure: Loop insertion;  Surgeon: Steve Geronimo MD;  Location:  [facility];  Service: Cardiovascular   • CATARACT EXTRACTION     • ESOPHAGEAL DILATATION  2007   • HERNIA REPAIR     • HYSTERECTOMY  1998   • WRIST FRACTURE SURGERY Left       General Information     Row Name 02/15/23 1331          OT Time and Intention    Document Type therapy note (daily note)  -     Mode of Treatment occupational therapy  -AC     Row Name 02/15/23 1331          General Information    Patient Profile Reviewed yes  -AC     Existing Precautions/Restrictions fall;other (see comments)  monitor BP  -AC     Row Name 02/15/23 1331          Cognition    Orientation Status (Cognition) oriented x 3  -AC     Row Name 02/15/23 1331          Safety Issues, Functional Mobility    Safety Issues Affecting Function (Mobility) awareness of need for assistance;insight into deficits/self-awareness;safety precaution awareness  -AC     Impairments Affecting Function (Mobility) balance;endurance/activity tolerance;strength  -AC           User Key  (r) = Recorded By, (t) = Taken By, (c) = Cosigned By    Initials Name Provider Type    AC Marcella Drake OT Occupational Therapist                 Mobility/ADL's     Row Name 02/15/23 6337          Bed Mobility    Bed Mobility supine-sit  -AC     Supine-Sit [place] (Bed Mobility) modified independence  -AC     Assistive Device  (Bed Mobility) bed rails;head of bed elevated  -     Row Name 02/15/23 1459          Transfers    Transfers sit-stand transfer;toilet transfer  -     Row Name 02/15/23 1459          Sit-Stand Transfer    Sit-Stand [place] (Transfers) contact guard  -     Assistive Device (Sit-Stand Transfers) walker, front-wheeled  -     Comment, (Sit-Stand Transfer) Vcs for hand placement  -[facility] Name 02/15/23 1459          Toilet Transfer    [place] Level (Toilet Transfer) contact guard  -     Assistive Device (Toilet Transfer) walker, front-wheeled;commode;grab bars/safety frame  -     Row Name 02/15/23 1459          Functional Mobility    Functional Mobility- Ind. Level contact guard assist  -     Functional Mobility- Device walker, front-wheeled  -AC     Functional Mobility-Distance (Feet) 30  -[facility] Name 02/15/23 1459          Activities of Daily Living    BADL Assessment/Intervention upper body dressing;lower body dressing;grooming;toileting;bathing  -[facility] Name 02/15/23 1459          Lower Body Dressing Assessment/Training    [place] Level (Lower Body Dressing) don;doff;socks;supervision  -     Position (Lower Body Dressing) edge of bed sitting  -     Row Name 02/15/23 1459          Grooming Assessment/Training    [place] Level (Grooming) hair care, combing/brushing;wash face, hands;set up;supervision  -     Position (Grooming) supported standing  -     Comment, (Grooming) washed hair over sink and dried with towel  -[facility] Name 02/15/23 1459          Upper Body Dressing Assessment/Training    [place] Level (Upper Body Dressing) upper body dressing skills;doff;don;pajama/robe;minimum assist (75% patient effort)  -     Position (Upper Body Dressing) unsupported sitting  -     Row Name 02/15/23 1459          Toileting Assessment/Training    [place] Level (Toileting) adjust/manage clothing;perform perineal hygiene;supervision  -     Assistive Devices  (Toileting) commode;grab bar/safety frame  -     Position (Toileting) unsupported sitting;supported standing  -     Row Name 02/15/23 7387          Bathing Assessment/Intervention    [place] Level (Bathing) upper body;perineal area;set up;supervision  -AC     Position (Bathing) sink side;supported standing  -AC           User Key  (r) = Recorded By, (t) = Taken By, (c) = Cosigned By    Initials Name Provider Type    Marcella Mcintyre, OT Occupational Therapist               Obj/Interventions    No documentation.                Goals/Plan    No documentation.                Clinical Impression     Row Name 02/15/23 1504          Pain Assessment    Pretreatment Pain Rating 0/10 - no pain  -AC     Posttreatment Pain Rating 0/10 - no pain  -AC     Row Name 02/15/23 1504          Plan of Care Review    Plan of Care Reviewed With patient  -AC     Outcome Evaluation Pt with increased tolerance for activity today completing grooming, washing hair, and UBB sinkside given setup/sup with O2 sat 92 % on RA after completion.  Pt with excellent progress, but continues to below baseline function d/t weakness. Recommend home with assist and HHOT upon d/c.  -     Row Name 02/15/23 1504          Vital Signs    Pre Systolic BP Rehab 179  -AC     Pre Treatment Diastolic BP 72  -AC     Post Systolic BP Rehab 163  -AC     Post Treatment Diastolic BP 83  -AC     Pretreatment Heart Rate (beats/min) 73  -AC     Posttreatment Heart Rate (beats/min) 77  -AC     Pre SpO2 (%) 93  -AC     O2 Delivery Pre Treatment room air  -AC     O2 Delivery Intra Treatment room air  -AC     Post SpO2 (%) 95  -AC     O2 Delivery Post Treatment room air  -AC     Pre Patient Position Supine  -AC     Post Patient Position Sitting  -     Row Name 02/15/23 1506          Positioning and Restraints    Pre-Treatment Position in bed  -AC     Post Treatment Position chair  -AC     In Chair notified nsg;reclined;call light within reach;encouraged to call for  assist;exit alarm on;waffle cushion  -           User Key  (r) = Recorded By, (t) = Taken By, (c) = Cosigned By    Initials Name Provider Type    Marcella Mcintyre, BETO Occupational Therapist               Outcome Measures     Row Name 02/15/23 1512          How much help from another is currently needed...    Putting on and taking off regular lower body clothing? 3  -AC     Bathing (including washing, rinsing, and drying) 3  -AC     Toileting (which includes using toilet bed pan or urinal) 3  -AC     Putting on and taking off regular upper body clothing 3  -AC     Taking care of personal grooming (such as brushing teeth) 3  -AC     Eating meals 4  -AC     AM-PAC 6 Clicks Score (OT) 19  -AC     Row Name 02/15/23 1512          Functional Assessment    Outcome Measure Options AM-PAC 6 Clicks Daily Activity (OT)  -           User Key  (r) = Recorded By, (t) = Taken By, (c) = Cosigned By    Initials Name Provider Type    Marcella Mcintyre, OT Occupational Therapist                Occupational Therapy Education     Title: PT OT SLP Therapies (In Progress)     Topic: Occupational Therapy (In Progress)     Point: ADL training (Done)     Description:   Instruct learner(s) on proper safety adaptation and remediation techniques during self care or transfers.   Instruct in proper use of assistive devices.              Learning Progress Summary           Patient Acceptance, E, VU by JUDE at 2/15/2023 1512    Acceptance, E, VU by EMILY at 2/14/2023 1537    Comment: OT POC; discharge planning                   Point: Home exercise program (Not Started)     Description:   Instruct learner(s) on appropriate technique for monitoring, assisting and/or progressing therapeutic exercises/activities.              Learner Progress:  Not documented in this visit.          Point: Precautions (Done)     Description:   Instruct learner(s) on prescribed precautions during self-care and functional transfers.              Learning Progress Summary            Patient Acceptance, E, VU by EMILY at 2/14/2023 1537    Comment: OT POC; discharge planning                   Point: Body mechanics (Done)     Description:   Instruct learner(s) on proper positioning and spine alignment during self-care, functional mobility activities and/or exercises.              Learning Progress Summary           Patient Acceptance, E, VU by EMILY at 2/14/2023 1537    Comment: OT POC; discharge planning                               User Key     Initials Effective Dates Name Provider Type Discipline     02/03/23 -  Marcella Drake, OT Occupational Therapist OT    EMILY 06/16/21 -  Alexa Lu OT Occupational Therapist OT              OT Recommendation and Plan     Plan of Care Review  Plan of Care Reviewed With: patient  Outcome Evaluation: Pt with increased tolerance for activity today completing grooming, washing hair, and UBB sinkside given setup/sup with O2 sat 92 % on RA after completion.  Pt with excellent progress, but continues to below baseline function d/t weakness. Recommend home with assist and HHOT upon d/c.     Time Calculation:    Time Calculation- OT     Row Name 02/15/23 1331             Time Calculation- OT    OT Start Time 1331  -AC      OT Received On 02/15/23  -AC      OT Goal Re-Cert Due Date 02/24/23  -AC         Timed Charges    40152 - OT Therapeutic Activity Minutes 10  -AC      48610 - OT Self Care/Mgmt Minutes 30  -AC         Total Minutes    Timed Charges Total Minutes 40  -AC       Total Minutes 40  -AC            User Key  (r) = Recorded By, (t) = Taken By, (c) = Cosigned By    Initials Name Provider Type    AC Marcella Drake, OT Occupational Therapist              Therapy Charges for Today     Code Description Service Date Service Provider Modifiers Qty    77355296225  OT THERAPEUTIC ACT EA 15 MIN 2/15/2023 Marcella Drake OT GO 1    08121189147 HC OT SELF CARE/MGMT/TRAIN EA 15 MIN 2/15/2023 Marcella Drake, OT GO 2               Marcella Drake  OT  2/15/2023

## 2023-02-15 NOTE — PLAN OF CARE
Goal Outcome Evaluation:  Plan of Care Reviewed With: patient           Outcome Evaluation: Pt with increased tolerance for activity today completing grooming, washing hair, and UBB sinkside given setup/sup with O2 sat 92 % on RA after completion.  Pt with excellent progress, but continues to below baseline function d/t weakness. Recommend home with assist and HHOT upon d/c.

## 2023-02-15 NOTE — CASE MANAGEMENT/SOCIAL WORK
Continued Stay Note  [facility]     Patient Name: Yanique Webster  MRN: [id number]  Today's Date: 2/15/2023    Admit Date: 2/13/2023    Plan: home/hh   Discharge Plan     Row Name 02/15/23 1441       Plan    Plan home/hh    Patient/Family in Agreement with Plan yes    Plan Comments Observation status: I spoke with patient at . She lives in [place] with her  and she takes care of him and [facility]. Patient has a ramp in the garage, uses a rollator at baseline, glucometer and SB. No home 02 but is on 2L now. Rec'd a call from [facility] and  rec'd a referral from PCP for nursing for COPD and  HH needs a call at discharge and placed new orders for nursing for COPD and if she needs anything else, such as PT. I told the patient about HH and she agrees with HH. Family can trnasport patient home. Patient has PCP,  and has coverage for meds and can afford these meds.     Final Discharge Disposition Code 06 - home with home health care               Discharge Codes    No documentation.               Expected Discharge Date and Time     Expected Discharge Date Expected Discharge Time    Feb 18, 2023             Milena Frey RN

## 2023-02-15 NOTE — PLAN OF CARE
Goal Outcome Evaluation:  Plan of Care Reviewed With: patient        Progress: improving  Outcome Evaluation: Pt continues to be limited by decreased endurance with activity, limiting overall independence with mobility. Pt ambulated 175ft this session with CGA and RW for support. Pt requires cues to improve sequencing of AD at times to improve safety awareness. Continue to progress per pt tolerance.

## 2023-02-16 ENCOUNTER — READMISSION MANAGEMENT (OUTPATIENT)
Dept: CALL CENTER | Facility: HOSPITAL | Age: 83
End: 2023-02-16
Payer: MEDICARE

## 2023-02-16 VITALS
DIASTOLIC BLOOD PRESSURE: 85 MMHG | BODY MASS INDEX: 31.47 KG/M2 | HEIGHT: 64 IN | TEMPERATURE: 98.3 F | WEIGHT: 184.3 LBS | OXYGEN SATURATION: 93 % | HEART RATE: 90 BPM | SYSTOLIC BLOOD PRESSURE: 171 MMHG | RESPIRATION RATE: 20 BRPM

## 2023-02-16 LAB
BACTERIA SPEC AEROBE CULT: ABNORMAL
GLUCOSE BLDC GLUCOMTR-MCNC: 144 MG/DL (ref 70–130)
GLUCOSE BLDC GLUCOMTR-MCNC: 263 MG/DL (ref 70–130)
GRAM STN SPEC: ABNORMAL
ISOLATED FROM: ABNORMAL

## 2023-02-16 PROCEDURE — 94761 N-INVAS EAR/PLS OXIMETRY MLT: CPT

## 2023-02-16 PROCEDURE — 82962 GLUCOSE BLOOD TEST: CPT

## 2023-02-16 PROCEDURE — 63710000001 INSULIN DETEMIR PER 5 UNITS: Performed by: INTERNAL MEDICINE

## 2023-02-16 PROCEDURE — 63710000001 INSULIN LISPRO (HUMAN) PER 5 UNITS

## 2023-02-16 PROCEDURE — G0378 HOSPITAL OBSERVATION PER HR: HCPCS

## 2023-02-16 PROCEDURE — 63710000001 PREDNISONE PER 1 MG: Performed by: INTERNAL MEDICINE

## 2023-02-16 PROCEDURE — 94799 UNLISTED PULMONARY SVC/PX: CPT

## 2023-02-16 PROCEDURE — 99239 HOSP IP/OBS DSCHRG MGMT >30: CPT | Performed by: INTERNAL MEDICINE

## 2023-02-16 RX ORDER — DOXYCYCLINE 100 MG/1
100 CAPSULE ORAL EVERY 12 HOURS SCHEDULED
Qty: 7 CAPSULE | Refills: 0 | Status: SHIPPED | OUTPATIENT
Start: 2023-02-16 | End: 2023-02-20

## 2023-02-16 RX ORDER — PREDNISONE 20 MG/1
TABLET ORAL
Qty: 7 TABLET | Refills: 0 | Status: SHIPPED | OUTPATIENT
Start: 2023-02-17 | End: 2023-02-22

## 2023-02-16 RX ORDER — NYSTATIN 100000 [USP'U]/G
POWDER TOPICAL 4 TIMES DAILY
Qty: 15 G | Refills: 0 | Status: SHIPPED | OUTPATIENT
Start: 2023-02-16 | End: 2023-02-21

## 2023-02-16 RX ORDER — BENZONATATE 100 MG/1
100 CAPSULE ORAL 3 TIMES DAILY PRN
Qty: 15 CAPSULE | Refills: 0 | Status: SHIPPED | OUTPATIENT
Start: 2023-02-16

## 2023-02-16 RX ORDER — TERAZOSIN 5 MG/1
5 CAPSULE ORAL NIGHTLY
Status: DISCONTINUED | OUTPATIENT
Start: 2023-02-16 | End: 2023-02-16

## 2023-02-16 RX ORDER — TERAZOSIN 5 MG/1
5 CAPSULE ORAL ONCE
Status: COMPLETED | OUTPATIENT
Start: 2023-02-16 | End: 2023-02-16

## 2023-02-16 RX ORDER — PREDNISONE 20 MG/1
40 TABLET ORAL
Status: DISCONTINUED | OUTPATIENT
Start: 2023-02-16 | End: 2023-02-16 | Stop reason: HOSPADM

## 2023-02-16 RX ADMIN — TORSEMIDE 40 MG: 20 TABLET ORAL at 08:44

## 2023-02-16 RX ADMIN — INSULIN LISPRO 4 UNITS: 100 INJECTION, SOLUTION INTRAVENOUS; SUBCUTANEOUS at 11:39

## 2023-02-16 RX ADMIN — Medication 10 ML: at 08:44

## 2023-02-16 RX ADMIN — ASPIRIN 81 MG: 81 TABLET, COATED ORAL at 08:43

## 2023-02-16 RX ADMIN — PANTOPRAZOLE SODIUM 40 MG: 40 TABLET, DELAYED RELEASE ORAL at 06:45

## 2023-02-16 RX ADMIN — CARVEDILOL 25 MG: 12.5 TABLET, FILM COATED ORAL at 08:43

## 2023-02-16 RX ADMIN — ATORVASTATIN CALCIUM 80 MG: 40 TABLET, FILM COATED ORAL at 08:43

## 2023-02-16 RX ADMIN — PREDNISONE 40 MG: 20 TABLET ORAL at 08:43

## 2023-02-16 RX ADMIN — HYDRALAZINE HYDROCHLORIDE 25 MG: 50 TABLET, FILM COATED ORAL at 08:45

## 2023-02-16 RX ADMIN — IPRATROPIUM BROMIDE AND ALBUTEROL SULFATE 3 ML: 2.5; .5 SOLUTION RESPIRATORY (INHALATION) at 07:37

## 2023-02-16 RX ADMIN — SENNOSIDES AND DOCUSATE SODIUM 2 TABLET: 50; 8.6 TABLET ORAL at 08:44

## 2023-02-16 RX ADMIN — DOXYCYCLINE 100 MG: 100 CAPSULE ORAL at 08:43

## 2023-02-16 RX ADMIN — DULOXETINE HYDROCHLORIDE 60 MG: 60 CAPSULE, DELAYED RELEASE ORAL at 08:43

## 2023-02-16 RX ADMIN — HYDROCODONE BITARTRATE AND ACETAMINOPHEN 1 TABLET: 5; 325 TABLET ORAL at 13:14

## 2023-02-16 RX ADMIN — CLONIDINE HYDROCHLORIDE 0.1 MG: 0.1 TABLET ORAL at 08:43

## 2023-02-16 RX ADMIN — TERAZOSIN HYDROCHLORIDE 5 MG: 5 CAPSULE ORAL at 10:03

## 2023-02-16 RX ADMIN — INSULIN DETEMIR 5 UNITS: 100 INJECTION, SOLUTION SUBCUTANEOUS at 08:44

## 2023-02-16 NOTE — CASE MANAGEMENT/SOCIAL WORK
Case Management Discharge Note      Final Note: I met with the patient at the bedside regarding discharge today. She stated she is current with [facility] for SN,PT,OT and CM called them to notifiy them of her discharge. The order was placed in [facility] for SN,PT,OT for HH. The patient has family to transport her home. She denies having any other discharge planning needs at this time.         Selected Continued Care - Admitted Since 2/13/2023     Destination    No services have been selected for the patient.              Durable Medical Equipment    No services have been selected for the patient.              Dialysis/Infusion    No services have been selected for the patient.              Home Medical Care Coordination complete.    Service Provider Selected Services Address Phone Fax Patient Preferred    [facility] [address], [address], [place] [address] [phone] -- --          Therapy    No services have been selected for the patient.              Community Resources    No services have been selected for the patient.              Community & DME    No services have been selected for the patient.                       Final Discharge Disposition Code: 06 - home with home health care

## 2023-02-16 NOTE — DISCHARGE SUMMARY
[facility]  DISCHARGE SUMMARY    Patient Name: Yanique Webster  : 1940  MRN: [id number]    Date of Admission: 2023  4:07 PM  Date of Discharge:  2023  Primary Care Physician: Sebas Alicea MD    Consults     No orders found from 1/15/2023 to 2023.          Hospital Course     Presenting Problem:   Acute exacerbation of COPD with asthma ([facility]) [J44.1, J45.901]    Active Hospital Problems    Diagnosis  POA   • **Acute exacerbation of COPD with asthma ([facility]) [J44.1, J45.901]  Yes   • Paroxysmal atrial fibrillation ([facility]) [I48.0]  Yes   • CKD (chronic kidney disease) stage 3, GFR 30-59 ml/min ([facility]) [N18.30]  Yes   • Essential hypertension [I10]  Yes   • Coronary artery disease involving native coronary artery of native heart without angina pectoris [I25.10]  Yes   • Hyperlipidemia LDL goal <70 [E78.5]  Yes   • GERD (gastroesophageal reflux disease) [K21.9]  Yes   • Osteoarthritis [M19.90]  Yes   • Takotsubo cardiomyopathy [I51.81]  Yes   • PVD (peripheral vascular disease) ([facility]) [I73.9]  Yes   • Type 2 diabetes mellitus ([facility]) [E11.9]  Yes      Resolved Hospital Problems   No resolved problems to display.          Hospital Course:  Yanique Webster is a 83 y.o. female with PMH of CAD, HLD, HTN, Afib, CKD3, DM2, Takotsubo,COPD and PVD who presents to the ED for shortness of breath and cough.      Acute COPD Exacerbation  Acute Viral Bronchitis   Hx of Tobacco Abuse  + metapneumovirus  -CT chest shows bronchial wall thickening with LLL bronchial obstruction representing acute bronchitis  -CXR shows no acute pulmonary process  -Doxycycline x 5 days, Prednisone taper at discharge  - PRN tessalon for cough  - now stable on room air     DM2  Neuropathy   -A1c 5.5  -hyperglycemia likely secondary to steroids  - resume home insulin regimen     CKD3  -Creatinine at baseline     Takotsubo Cardiomyopathy   CAD  HLD  -Echo in 2019 shows EF 61-65%  -Continue  aspirin and statin  -Follows with Dr. Geronimo     HTN  Afib  -Continue home coreg, clonidine, Cardura, Lisinopril and xarelto      OA  -resume home pain regimen     Right Adrenal Mass  Multinodular Goiter  -Incidental finding on CT chest  -Adrenal mass 4cm which may represent adenoma, has been present since 2016  -Follow up outpatient with endocrine for further work-up, patient already follows with Endocrine for diabetes management, TSH low. Updated DIL Dr. Whitt    Discharge Follow Up Recommendations for outpatient labs/diagnostics:   - f/u with PCP in one week  - outpatient work-up of Goiter per Endocrinology    Day of Discharge     HPI:   Patient notes some congestion this morning, otherwise doing okay and feels ready for discharge home.    Review of Systems  Gen- No fevers, chills  CV- No chest pain, palpitations  Resp- No cough, dyspnea  GI- No N/V/D, abd pain    Vital Signs:   Temp:  [98.1 °F (36.7 °C)-98.7 °F (37.1 °C)] 98.2 °F (36.8 °C)  Heart Rate:  [] 78  Resp:  [16-18] 18  BP: (155-196)/() 155/70      Physical Exam:  Constitutional: No acute distress, awake, alert  HENT: NCAT, mucous membranes moist  Respiratory: no further wheezing present, stable on room air  Cardiovascular: RRR, no murmurs, rubs, or gallops  Gastrointestinal: Positive bowel sounds, soft, nontender, nondistended  Musculoskeletal: No bilateral ankle edema  Psychiatric: Appropriate affect, cooperative  Neurologic: Oriented x 3, speech clear  Skin: No rashes      Pertinent  and/or Most Recent Results     LAB RESULTS:      Lab 02/14/23 0314 02/13/23  1643   WBC 2.48* 4.50   HEMOGLOBIN 12.0 11.7*   HEMATOCRIT 37.3 35.7   PLATELETS 133* 130*   NEUTROS ABS 1.76 2.81   IMMATURE GRANS (ABS)  --  0.01   LYMPHS ABS  --  1.18   MONOS ABS  --  0.47   EOS ABS 0.00 0.02   MCV 89.9 89.0   PROCALCITONIN  --  0.11   LACTATE  --  1.3         Lab 02/15/23  0425 02/14/23  0314 02/13/23  1643   SODIUM 137 135* 134*   POTASSIUM 4.6 4.7 4.3    CHLORIDE 104 102 102   CO2 23.0 23.0 21.0*   ANION GAP 10.0 10.0 11.0   BUN 44* 40* 38*   CREATININE 1.51* 1.76* 1.72*   EGFR 34.4* 28.6* 29.4*   GLUCOSE 238* 253* 191*   CALCIUM 10.0 10.4 10.0   MAGNESIUM  --  2.4  --    HEMOGLOBIN A1C  --   --  5.50   TSH  --  0.159*  --          Lab 02/13/23  1643   TOTAL PROTEIN 6.6   ALBUMIN 4.0   GLOBULIN 2.6   ALT (SGPT) 16   AST (SGOT) 27   BILIRUBIN 0.4   ALK PHOS 130*         Lab 02/13/23  1905 02/13/23  1643   PROBNP  --  1,773.0   HSTROP T 51* 49*                 Brief Urine Lab Results     None        Microbiology Results (last 10 days)     Procedure Component Value - Date/Time    Respiratory Panel PCR w/COVID-19(SARS-CoV-2) DELICIA/TATY/DAT/PAD/COR/MAD/OLIVE In-House, NP Swab in UTM/VTM, 3-4 HR TAT - Swab, Nasopharynx [[id number]]  (Abnormal) Collected: 02/15/23 1305    Lab Status: Final result Specimen: Swab from Nasopharynx Updated: 02/15/23 1407     ADENOVIRUS, PCR Not Detected     Coronavirus 229E Not Detected     Coronavirus HKU1 Not Detected     Coronavirus NL63 Not Detected     Coronavirus OC43 Not Detected     COVID19 Not Detected     Human Metapneumovirus Detected     Human Rhinovirus/Enterovirus Not Detected     Influenza A PCR Not Detected     Influenza B PCR Not Detected     Parainfluenza Virus 1 Not Detected     Parainfluenza Virus 2 Not Detected     Parainfluenza Virus 3 Not Detected     Parainfluenza Virus 4 Not Detected     RSV, PCR Not Detected     Bordetella pertussis pcr Not Detected     Bordetella parapertussis PCR Not Detected     Chlamydophila pneumoniae PCR Not Detected     Mycoplasma pneumo by PCR Not Detected    Narrative:      In the setting of a positive respiratory panel with a viral infection PLUS a negative procalcitonin without other underlying concern for bacterial infection, consider observing off antibiotics or discontinuation of antibiotics and continue supportive care. If the respiratory panel is positive for atypical bacterial infection  (Bordetella pertussis, Chlamydophila pneumoniae, or Mycoplasma pneumoniae), consider antibiotic de-escalation to target atypical bacterial infection.    COVID PRE-OP / PRE-PROCEDURE SCREENING ORDER (NO ISOLATION) - Swab, Nasopharynx [[id number]]  (Normal) Collected: 02/13/23 1904    Lab Status: Final result Specimen: Swab from Nasopharynx Updated: 02/13/23 1948    Narrative:      The following orders were created for panel order COVID PRE-OP / PRE-PROCEDURE SCREENING ORDER (NO ISOLATION) - Swab, Nasopharynx.  Procedure                               Abnormality         Status                     ---------                               -----------         ------                     COVID-19 and FLU A/B PCR...[[id number]]  Normal              Final result                 Please view results for these tests on the individual orders.    COVID-19 and FLU A/B PCR - Swab, Nasopharynx [[id number]]  (Normal) Collected: 02/13/23 1904    Lab Status: Final result Specimen: Swab from Nasopharynx Updated: 02/13/23 1948     COVID19 Not Detected     Influenza A PCR Not Detected     Influenza B PCR Not Detected    Narrative:      Fact sheet for providers: https://www.fda.gov/media/291765/download    Fact sheet for patients: https://www.fda.gov/media/366755/download    Test performed by PCR.    Blood Culture - Blood, Hand, Right [[id number]]  (Normal) Collected: 02/13/23 1709    Lab Status: Preliminary result Specimen: Blood from Hand, Right Updated: 02/15/23 1731     Blood Culture No growth at 2 days    Blood Culture - Blood, Arm, Left [[id number]]  (Abnormal) Collected: 02/13/23 1704    Lab Status: Final result Specimen: Blood from Arm, Left Updated: 02/16/23 0825     Blood Culture Micrococcus species     Isolated from Aerobic Bottle     Gram Stain Aerobic Bottle Gram positive cocci in groups    Narrative:      Probable contaminant requires clinical correlation, susceptibility not performed unless requested by physician.      Blood  Culture ID, PCR - Blood, Arm, Left [[id number]]  (Normal) Collected: 02/13/23 1704    Lab Status: Final result Specimen: Blood from Arm, Left Updated: 02/15/23 1756     BCID, PCR Negative by BCID PCR. Culture to Follow.          CT Chest Without Contrast Diagnostic    Result Date: 2/13/2023  CT CHEST WO CONTRAST DIAGNOSTIC Date of Exam: 2/13/2023 5:56 PM EST Indication: chest pain, shortness of breath, cough, dyspnea. Comparison: Chest radiograph performed the same date. Chest CT for lung cancer screening may 10/20/2018, CT chest, abdomen, and pelvis August 23, 2016. Technique: Axial CT images were obtained of the chest without contrast administration.  Reconstructed coronal and sagittal images were also obtained. Automated exposure control and iterative construction methods were used. Findings: There is some bronchial wall thickening throughout the lungs, but most prominent in the lower lobes and lingula. There is some bronchial obstruction in the left lower lobe. There does not appear to be significant focal consolidation. There is some suspected mild atelectasis or scarring in the lingula and right middle lobe. There is suspected mild scarring at the lung apices. No pneumothorax. The heart does not appear enlarged. There is calcific atherosclerosis of the coronary arteries. No pericardial effusion. The ascending aorta does not appear significantly enlarged. There is atherosclerosis of the aorta and branch vessels. There appears to be nodular enlargement of the thyroid with extension into the upper mediastinum where there appears to be a nodule with coarse calcifications. There are calcified mediastinal and right hilar lymph nodes consistent with remote granulomatous disease. There appears to be a small hiatal hernia. No definite mediastinal, axillary, or hilar lymphadenopathy is seen at this time. Implanted cardiac device is seen in the upper left breast. There is a 4 cm mass in the right adrenal gland which has  been present since at least 2016 and demonstrates attenuation of less than 10 Hounsfield units, most consistent with an adenoma. The gallbladder appears distended containing layering hyperdense material, likely gallstones. Probable cyst at the posterior left renal upper pole. Degenerative changes are present in the thoracic spine. No definite aggressive osseous lesion is identified.     Impression: 1.Central bronchial wall thickening with left lower lobe bronchial obstruction which may represent acute or chronic bronchitis. No significant focal pneumonia seen at this time. 2.Calcific atherosclerosis of the coronary arteries, aorta, and branch vessels. 3.Cholelithiasis. 4.4 cm chronic right adrenal mass with attenuation suggestive of an adrenal adenoma. 5.Suspected multinodular goiter with extension into the upper mediastinum. Electronically Signed: Camron Hendrix  2/13/2023 6:18 PM EST  Workstation ID: [id number]    XR Chest 1 View    Result Date: 2/13/2023  XR CHEST 1 VW Date of Exam: 2/13/2023 4:07 PM EST Indication: SOA triage protocol. Comparison: None available. Findings: Chronic changes throughout the lungs are similar compared to prior exam. Cardiac and mediastinal contours within normal limits. Regional skeleton is unremarkable.     Impression: 1. Chronic changes, but no acute cardiopulmonary disease. Electronically Signed: Tod Granger  2/13/2023 4:22 PM EST  Workstation ID: [id number]      Results for orders placed during the hospital encounter of 08/12/22    Duplex Venous Lower Extremity - Left CAR    Interpretation Summary  · Normal left lower extremity venous duplex scan.      Results for orders placed during the hospital encounter of 08/12/22    Duplex Venous Lower Extremity - Left CAR    Interpretation Summary  · Normal left lower extremity venous duplex scan.      Results for orders placed during the hospital encounter of 03/14/19    Adult Transesophageal Echo (ERMIAS) W/ Cont if Necessary Per  Protocol    Interpretation Summary  · Left ventricular systolic function is normal. Estimated EF appears to be in the range of 61 - 65%  · Normal left atrial size and volume noted. There is no spontaneous echo contrast present. The left atrial appendage was visualized through multiple planes. Left atrial appendage was found to be multilobar in nature. Doppler interrogation shows normal flow within the left atrial appendage. No evidence of a left atrial appendage thrombus was present  · Lipomatous hypertrophy of the interatrial septum present. No evidence of a patent foramen ovale. Saline test results are negative.  · There is mild thickening of the noncoronary cusp of the aortic valve. Mild aortic valve regurgitation is present  · There is moderate (grade 2) layered plaque in the proximal aorta present.      Plan for Follow-up of Pending Labs/Results:   Pending Labs     Order Current Status    Blood Culture - Blood, Hand, Right Preliminary result        Discharge Details        Discharge Medications      New Medications      Instructions Start Date   benzonatate 100 MG capsule  Commonly known as: Tessalon Perles   100 mg, Oral, 3 Times Daily PRN      doxycycline 100 MG capsule  Commonly known as: MONODOX   100 mg, Oral, Every 12 Hours Scheduled      predniSONE 20 MG tablet  Commonly known as: DELTASONE   Take 2 tablets by mouth Daily With Breakfast for 2 days, THEN 1 tablet Daily With Breakfast for 3 days.   Start Date: February 17, 2023        Continue These Medications      Instructions Start Date   aspirin 81 MG EC tablet   81 mg, Oral, Daily      atorvastatin 80 MG tablet  Commonly known as: LIPITOR   80 mg, Oral, Daily      carvedilol 25 MG tablet  Commonly known as: COREG   25 mg, Oral, Every 12 Hours Scheduled      cloNIDine 0.1 MG tablet  Commonly known as: CATAPRES   0.1 mg, Oral, 2 Times Daily      doxazosin 4 MG tablet  Commonly known as: CARDURA   4 mg, Oral, Daily      DULoxetine 60 MG capsule  Commonly  known as: CYMBALTA   60 mg, Oral, Daily      hydrALAZINE 25 MG tablet  Commonly known as: APRESOLINE   25 mg, Oral, 3 Times Daily      Lantus SoloStar 100 UNIT/ML injection pen  Generic drug: Insulin Glargine   INJECT 24 UNITS SUBCUTANEOUSLY EVERY NIGHT AS DIRECTED      lisinopril 40 MG tablet  Commonly known as: PRINIVIL,ZESTRIL   40 mg, Oral, Daily      Morphine 15 MG 12 hr tablet  Commonly known as: MS CONTIN   15 mg, Oral, 2 Times Daily      naloxone 4 MG/0.1ML nasal spray  Commonly known as: NARCAN   Narcan 4 mg/actuation nasal spray      pantoprazole 40 MG EC tablet  Commonly known as: PROTONIX   40 mg, Oral, Daily      probiotic capsule capsule   1 capsule, Oral, Daily      rivaroxaban 15 MG tablet  Commonly known as: Xarelto   15 mg, Oral, Daily With Dinner      torsemide 20 MG tablet  Commonly known as: DEMADEX   40 mg, Oral, Daily             Allergies   Allergen Reactions   • Penicillins    • Nickel Rash   • Penicillins Rash     unk         Discharge Disposition:  Home or Self Care    Diet:  Hospital:  Diet Order   Procedures   • Diet: Cardiac Diets, Diabetic Diets, Renal Diets; Healthy Heart (2-3 Na+); Consistent Carbohydrate; Low Sodium (2-3g); Texture: Regular Texture (IDDSI 7); Fluid Consistency: Thin (IDDSI 0)       Activity:  - as tolerated    Restrictions or Other Recommendations:  - none       CODE STATUS:    Code Status and Medical Interventions:   Ordered at: 02/13/23 2044     Level Of Support Discussed With:    Patient     Code Status (Patient has no pulse and is not breathing):    CPR (Attempt to Resuscitate)     Medical Interventions (Patient has pulse or is breathing):    Full Support       Future Appointments   Date Time Provider Department Center   4/12/2023 11:00 AM Coco Juárez PA [facility]   9/6/2023  2:30 PM Steve Geronimo MD [facility] JAMSHID Patten DO  02/16/23      Time Spent on Discharge:  I spent  35 minutes on this discharge activity  which included: face-to-face encounter with the patient, reviewing the data in the system, coordination of the care with the nursing staff as well as consultants, documentation, and entering orders.

## 2023-02-18 LAB — BACTERIA SPEC AEROBE CULT: NORMAL

## 2023-02-21 ENCOUNTER — READMISSION MANAGEMENT (OUTPATIENT)
Dept: CALL CENTER | Facility: HOSPITAL | Age: 83
End: 2023-02-21
Payer: MEDICARE

## 2023-02-21 NOTE — OUTREACH NOTE
COVID-19 Week 1 Survey    Flowsheet Row Responses   [facility] patient discharged from? [place]   Does the patient have one of the following disease processes/diagnoses(primary or secondary)? COPD   Call start time 1323   Call end time 1328   Discharge diagnosis Acute exacerbation of COPD with asthma   Person spoke with today (if not patient) and relationship pt   Meds reviewed with patient/caregiver? Yes   Is the patient having any side effects they believe may be caused by any medication additions or changes? No   Does the patient have all medications ordered at discharge? Yes   Is the patient taking all medications as directed (includes completed medication regime)? Yes   Does the patient have a primary care provider?  Yes   Comments regarding PCP 2/21/23   Does the patient have an appointment with their PCP or specialist within 7 days of discharge? Yes   Has the patient kept scheduled appointments due by today? N/A   Psychosocial issues? No   Did the patient receive a copy of their discharge instructions? Yes   Nursing interventions Reviewed instructions with patient   What is the patient's perception of their health status since discharge? Improving   Nursing Interventions Nurse provided patient education   Pulse Ox monitoring None   If the patient is a current smoker, are they able to teach back resources for cessation? Not a smoker   Is the patient/caregiver able to teach back the hierarchy of who to call/visit for symptoms/problems? PCP, Specialist, Home health nurse, Urgent Care, ED, 911 Yes   Is the patient able to teach back COPD zones? Yes   Nursing interventions Education provided on various zones   Patient reports what zone on this call? Green Zone   Green Zone Reports doing well, Breathing without shortness of breath, Appetite is good, Slept well last night, Usual activity and exercise level, Usual amounts of cough and phlegm/mucous   Green Zone interventions: Take daily medications, Continue  regular exercise/diet plan   Is the patient interested in additional calls from an ambulatory ?  NOTE:  applies to high risk patients requiring additional follow-up. No   Wrap up additional comments Pt states she is doing better, and has less SOB. Reviewed AVS/medications with pt. Pt verified PCP hospital fu appt on 2/22/23. No questions/concerns.          Pilar SHORT - Registered Nurse

## 2023-03-01 ENCOUNTER — READMISSION MANAGEMENT (OUTPATIENT)
Dept: CALL CENTER | Facility: HOSPITAL | Age: 83
End: 2023-03-01
Payer: MEDICARE

## 2023-03-01 NOTE — OUTREACH NOTE
COPD/PN Week 2 Survey    Flowsheet Row Responses   [facility] patient discharged from? [place]   Does the patient have one of the following disease processes/diagnoses(primary or secondary)? COPD   Week 2 attempt successful? Yes   Call start time 1314   Call end time 1316   Discharge diagnosis Acute exacerbation of COPD with asthma   Meds reviewed with patient/caregiver? Yes   Is the patient taking all medications as directed (includes completed medication regime)? Yes   Does the patient have a primary care provider?  Yes   Does the patient have an appointment with their PCP or specialist within 7 days of discharge? Yes   Has the patient kept scheduled appointments due by today? Yes   Has home health visited the patient within 72 hours of discharge? Yes   Home health comments Pt   Pulse Ox monitoring None   Psychosocial issues? No   Did the patient receive a copy of their discharge instructions? Yes   Nursing interventions Reviewed instructions with patient   What is the patient's perception of their health status since discharge? Improving   Nursing Interventions Nurse provided patient education   Is the patient/caregiver able to teach back the hierarchy of who to call/visit for symptoms/problems? PCP, Specialist, Home health nurse, Urgent Care, ED, 911 Yes   Is the patient able to teach back COPD zones? Yes   Patient reports what zone on this call? Green Zone   Green Zone Reports doing well, Breathing without shortness of breath   Green Zone interventions: Take daily medications, Use oxygen as prescribed   Week 2 call completed? Yes   Wrap up additional comments Pt reports she is doing well and feels fine. No needs.           PIETRO ECHOLS - Registered Nurse

## 2023-03-04 PROCEDURE — 93298 REM INTERROG DEV EVAL SCRMS: CPT | Performed by: INTERNAL MEDICINE

## 2023-03-04 PROCEDURE — G2066 INTER DEVC REMOTE 30D: HCPCS | Performed by: INTERNAL MEDICINE

## 2023-03-29 ENCOUNTER — TELEPHONE (OUTPATIENT)
Dept: CARDIOLOGY | Facility: CLINIC | Age: 83
End: 2023-03-29
Payer: MEDICARE

## 2023-03-29 NOTE — TELEPHONE ENCOUNTER
Remote reading received showing loop recorder is at RRT.  Called and made Mrs Webster aware that loop recorder is at end of battery life and we will no longer monitor loop.  She verbalized understanding.

## 2023-03-31 ENCOUNTER — TELEPHONE (OUTPATIENT)
Dept: CARDIOLOGY | Facility: CLINIC | Age: 83
End: 2023-03-31
Payer: MEDICARE

## 2023-03-31 DIAGNOSIS — Z45.09 ENCOUNTER FOR LOOP RECORDER AT END OF BATTERY LIFE: Primary | ICD-10-CM

## 2023-03-31 NOTE — TELEPHONE ENCOUNTER
----- Message from Steve Geronimo MD sent at 3/29/2023  4:48 PM EDT -----  Loop recorder battery has .  Please advise patient.  Recommend explant.  Please order procedure.

## 2023-04-12 ENCOUNTER — OFFICE VISIT (OUTPATIENT)
Dept: ENDOCRINOLOGY | Facility: CLINIC | Age: 83
End: 2023-04-12
Payer: MEDICARE

## 2023-04-12 VITALS
DIASTOLIC BLOOD PRESSURE: 64 MMHG | SYSTOLIC BLOOD PRESSURE: 140 MMHG | HEART RATE: 64 BPM | WEIGHT: 190 LBS | TEMPERATURE: 98.6 F | RESPIRATION RATE: 18 BRPM | OXYGEN SATURATION: 98 % | BODY MASS INDEX: 32.44 KG/M2 | HEIGHT: 64 IN

## 2023-04-12 DIAGNOSIS — E04.2 MULTINODULAR GOITER: ICD-10-CM

## 2023-04-12 DIAGNOSIS — E11.649 TYPE 2 DIABETES MELLITUS WITH HYPOGLYCEMIA WITHOUT COMA, WITH LONG-TERM CURRENT USE OF INSULIN: Primary | Chronic | ICD-10-CM

## 2023-04-12 DIAGNOSIS — Z79.4 TYPE 2 DIABETES MELLITUS WITH DIABETIC POLYNEUROPATHY, WITH LONG-TERM CURRENT USE OF INSULIN: ICD-10-CM

## 2023-04-12 DIAGNOSIS — E11.42 TYPE 2 DIABETES MELLITUS WITH DIABETIC POLYNEUROPATHY, WITH LONG-TERM CURRENT USE OF INSULIN: ICD-10-CM

## 2023-04-12 DIAGNOSIS — R79.89 LOW TSH LEVEL: ICD-10-CM

## 2023-04-12 DIAGNOSIS — E27.8 ADRENAL MASS, RIGHT: ICD-10-CM

## 2023-04-12 DIAGNOSIS — Z79.4 TYPE 2 DIABETES MELLITUS WITH HYPOGLYCEMIA WITHOUT COMA, WITH LONG-TERM CURRENT USE OF INSULIN: Primary | Chronic | ICD-10-CM

## 2023-04-12 LAB
EXPIRATION DATE: NORMAL
GLUCOSE BLDC GLUCOMTR-MCNC: 102 MG/DL (ref 70–130)
HBA1C MFR BLD: 5.9 %
Lab: NORMAL
T4 FREE SERPL-MCNC: 1.09 NG/DL (ref 0.93–1.7)
TSH SERPL DL<=0.05 MIU/L-ACNC: 0.57 UIU/ML (ref 0.27–4.2)

## 2023-04-12 PROCEDURE — 3078F DIAST BP <80 MM HG: CPT | Performed by: PHYSICIAN ASSISTANT

## 2023-04-12 PROCEDURE — 84439 ASSAY OF FREE THYROXINE: CPT | Performed by: PHYSICIAN ASSISTANT

## 2023-04-12 PROCEDURE — 83036 HEMOGLOBIN GLYCOSYLATED A1C: CPT | Performed by: PHYSICIAN ASSISTANT

## 2023-04-12 PROCEDURE — 1160F RVW MEDS BY RX/DR IN RCRD: CPT | Performed by: PHYSICIAN ASSISTANT

## 2023-04-12 PROCEDURE — 1159F MED LIST DOCD IN RCRD: CPT | Performed by: PHYSICIAN ASSISTANT

## 2023-04-12 PROCEDURE — 3044F HG A1C LEVEL LT 7.0%: CPT | Performed by: PHYSICIAN ASSISTANT

## 2023-04-12 PROCEDURE — 36415 COLL VENOUS BLD VENIPUNCTURE: CPT | Performed by: PHYSICIAN ASSISTANT

## 2023-04-12 PROCEDURE — 82947 ASSAY GLUCOSE BLOOD QUANT: CPT | Performed by: PHYSICIAN ASSISTANT

## 2023-04-12 PROCEDURE — 84443 ASSAY THYROID STIM HORMONE: CPT | Performed by: PHYSICIAN ASSISTANT

## 2023-04-12 PROCEDURE — 99214 OFFICE O/P EST MOD 30 MIN: CPT | Performed by: PHYSICIAN ASSISTANT

## 2023-04-12 PROCEDURE — 3077F SYST BP >= 140 MM HG: CPT | Performed by: PHYSICIAN ASSISTANT

## 2023-04-12 NOTE — PROGRESS NOTES
Office Note      Date: 2023  Patient Name: Yanique Webster  MRN: [id number]  : 1940    Chief Complaint   Patient presents with   • Diabetes       History of Present Illness  Yanique Webster is a 83 y.o. female who presents today for follow up on type 2 diabetes.   Diabetes was diagnosed in .  Known diabetic complications: nephropathy, autonomic neuropathy, peripheral neuropathy, cardiovascular disease, peripheral vascular disease.  Current diabetic medications: Lantus.  She reports having trouble with glucometer.  She is testing fasting FSBG 2-3 days per week.  Readings typically around 100, but has noted down to 66.  Occasional symptoms of hypoglycemia.  Feet: She reports no new issues.  She notes numbness, pain.  She sees pain management.  Last eye exam: .  ACE inhibitor/ARB: Lisinopril.  Statin: Atorvastatin.  She continues to be followed by cardiology.  She was admitted to [facility] from 2023-2023.  Diagnosed with acute exacerbation of COPD.  Positive for metapneumovirus.  There was a 4 cm mass in right adrenal gland on chest CT 2023.  This was noted to be present since at least  and demonstrates attenuation of less than 10 Hounsfield units, most consistent with an adenoma.  Nonfunctioning adenoma on evaluation in 2017.  Nodular enlargement of the thyroid was noted with extension into the upper mediastinum.  Appeared to be a nodule with coarse calcifications.  Reviewed prior scan reports.  Note was made of heterogenous thyroid with coarse calcifications on CT in 2018.  This was noted to be overall similar in appearance to CT in 2016 given differences in technique.  She has not noticed any change in the size of her neck.  She denies any compressive symptoms.  TSH was mildly low at 0.159 in hospital on 2023.    Review of systems  As noted in HPI.    Past Medical History:   Diagnosis Date   • Blurry vision    • Chronic joint pain    • Chronic pain    •  "COPD (chronic obstructive pulmonary disease)    • Coronary artery disease    • Diabetic gastroparesis 08/24/2016   • Esophageal stricture     s/p dilation in 2007   • GERD (gastroesophageal reflux disease)    • Gout    • Headache     secondary to hypertension   • Hyperlipidemia    • Hypertension    • Long term current use of insulin    • Neuropathy    • Neuropathy due to type 2 diabetes mellitus    • Obesity    • Osteoarthritis    • Pericarditis 2008   • Stroke 03/2019   • Type 2 diabetes mellitus       Past Surgical History:   Procedure Laterality Date   • HYSTERECTOMY  1998   • ESOPHAGEAL DILATATION  2007   • BRONCHOSCOPY N/A 8/23/2016    Procedure: BRONCHOSCOPY BIOPSY AT BEDSIDE;  Surgeon: Mookie Willard MD;  Location:  [facility];  Service:    • CARDIAC CATHETERIZATION N/A 8/30/2016    Procedure: Left Heart Cath;  Surgeon: Steve Geronimo MD;  Location:  [facility];  Service:    • CARDIAC CATHETERIZATION N/A 8/30/2016    Procedure: Right Heart Cath;  Surgeon: Steve Geronimo MD;  Location:  [facility];  Service:    • CARDIAC ELECTROPHYSIOLOGY PROCEDURE N/A 7/2/2019    Procedure: Loop insertion;  Surgeon: Steve Geronimo MD;  Location:  [facility];  Service: Cardiovascular   • CATARACT EXTRACTION     • HERNIA REPAIR     • WRIST FRACTURE SURGERY Left      The following portions of the patient's history were reviewed and updated as appropriate: allergies, current medications, past family history, past medical history, past social history, past surgical history and problem list.    Vitals:  /64   Pulse 64   Temp 98.6 °F (37 °C) (Infrared)   Resp 18   Ht 162.6 cm (64\")   Wt 86.2 kg (190 lb)   LMP  (LMP Unknown)   SpO2 98%   BMI 32.61 kg/m²     Physical Exam  Vitals reviewed.   Constitutional:       General: She is not in acute distress.  Neck:      Comments: Thyroid difficult to palpate, low in neck.  Cardiovascular:      Pulses:           " Dorsalis pedis pulses are 2+ on the right side and 2+ on the left side.   Musculoskeletal:      Right foot: Deformity present.      Left foot: Deformity present.   Feet:      Right foot:      Protective Sensation: 8 sites tested. 0 sites sensed.      Skin integrity: Callus and dry skin present.      Toenail Condition: Right toenails are abnormally thick.      Left foot:      Protective Sensation: 8 sites tested. 0 sites sensed.      Skin integrity: Callus and dry skin present.      Toenail Condition: Left toenails are abnormally thick.      Comments: Diabetic Foot Exam Performed and Monofilament Test Performed  Lymphadenopathy:      Cervical: No cervical adenopathy.   Neurological:      Mental Status: She is alert and oriented to person, place, and time.   Psychiatric:         Mood and Affect: Affect normal.       Labs/Imaging    Hemoglobin A1c  Lab Results   Component Value Date    HGBA1C 5.9 04/12/2023    HGBA1C 5.50 02/13/2023    HGBA1C 5.7 05/18/2022     Glucose  Lab Results   Component Value Date    POCGLU 102 04/12/2023       Current Outpatient Medications   Medication Instructions   • aspirin 81 mg, Oral, Daily   • atorvastatin (LIPITOR) 80 mg, Oral, Daily   • benzonatate (TESSALON PERLES) 100 mg, Oral, 3 Times Daily PRN   • carvedilol (COREG) 25 mg, Oral, Every 12 Hours Scheduled   • cloNIDine (CATAPRES) 0.1 mg, Oral, 2 Times Daily   • doxazosin (CARDURA) 4 mg, Oral, Daily   • DULoxetine (CYMBALTA) 60 mg, Oral, Daily   • hydrALAZINE (APRESOLINE) 25 mg, Oral, 3 Times Daily   • Lantus SoloStar 100 UNIT/ML injection pen INJECT 24 UNITS SUBCUTANEOUSLY EVERY NIGHT AS DIRECTED   • lisinopril (PRINIVIL,ZESTRIL) 40 mg, Oral, Daily   • Morphine (MS CONTIN) 15 mg, Oral, 2 Times Daily   • naloxone (NARCAN) 4 MG/0.1ML nasal spray Narcan 4 mg/actuation nasal spray   • pantoprazole (PROTONIX) 40 mg, Oral, Daily   • probiotic (CULTURELLE) capsule capsule 1 capsule, Oral, Daily   • rivaroxaban (XARELTO) 15 mg, Oral, Daily  With Dinner   • torsemide (DEMADEX) 40 mg, Oral, Daily       Assessment & Plan  1. Type 2 diabetes mellitus with hypoglycemia without coma, with long-term current use of insulin  A1c okay at 5.9%, but lower than it needs to be.  Want to avoid hypoglycemia.  Will decrease Lantus.  - POC Glucose, Blood  - POC Glycosylated Hemoglobin (Hb A1C)    2. Type 2 diabetes mellitus with diabetic polyneuropathy, with long-term current use of insulin  She will call to schedule appointment with podiatry.    3. Adrenal mass, right  4cm right adrenal mass present since at least 2016, most consistent with adenoma.  Labs in 2017 consistent with nonfunctioning adenoma.    4. Multinodular goiter  Thyroid difficult to palpate, low in neck.  Will discuss scheduling thyroid ultrasound with Dr. Emilio Regalado.  - TSH; Future  - T4, Free; Future    5. Low TSH level  Mildly low TSH during hospitalization 2 months ago.  She received steroid injection prior to the TSH being drawn.  Repeat TFTs today.  - TSH; Future  - T4, Free; Future      Return in about 6 months (around 10/12/2023) for next scheduled follow up for DM follow up, sooner for thyroid u/s with Dr. Emilio Regalado. She was advised to contact the office with any interval questions or concerns.    INDIRA Garibay  Endocrinology  04/12/2023

## 2023-04-27 DIAGNOSIS — Z45.09 ENCOUNTER FOR LOOP RECORDER AT END OF BATTERY LIFE: Primary | ICD-10-CM

## 2023-04-28 ENCOUNTER — PREP FOR SURGERY (OUTPATIENT)
Dept: OTHER | Facility: HOSPITAL | Age: 83
End: 2023-04-28
Payer: MEDICARE

## 2023-04-28 DIAGNOSIS — Z45.09 ENCOUNTER FOR LOOP RECORDER AT END OF BATTERY LIFE: Primary | ICD-10-CM

## 2023-04-28 RX ORDER — SODIUM CHLORIDE 9 MG/ML
40 INJECTION, SOLUTION INTRAVENOUS AS NEEDED
OUTPATIENT
Start: 2023-04-28

## 2023-04-28 RX ORDER — SODIUM CHLORIDE 0.9 % (FLUSH) 0.9 %
10 SYRINGE (ML) INJECTION AS NEEDED
OUTPATIENT
Start: 2023-04-28

## 2023-04-28 RX ORDER — SODIUM CHLORIDE 0.9 % (FLUSH) 0.9 %
10 SYRINGE (ML) INJECTION EVERY 12 HOURS SCHEDULED
OUTPATIENT
Start: 2023-04-28

## 2023-06-09 DIAGNOSIS — Z79.4 TYPE 2 DIABETES MELLITUS WITH DIABETIC POLYNEUROPATHY, WITH LONG-TERM CURRENT USE OF INSULIN: ICD-10-CM

## 2023-06-09 DIAGNOSIS — E11.42 TYPE 2 DIABETES MELLITUS WITH DIABETIC POLYNEUROPATHY, WITH LONG-TERM CURRENT USE OF INSULIN: ICD-10-CM

## 2023-06-09 RX ORDER — INSULIN GLARGINE 100 [IU]/ML
INJECTION, SOLUTION SUBCUTANEOUS
Qty: 15 ML | Refills: 2 | Status: SHIPPED | OUTPATIENT
Start: 2023-06-09

## 2023-06-09 NOTE — TELEPHONE ENCOUNTER
Rx Refill Note  Requested Prescriptions     Pending Prescriptions Disp Refills    Lantus SoloStar 100 UNIT/ML injection pen [Pharmacy Med Name: LANTUS SOLOSTAR 100 UNITS/M 100 Solution Pen-injector] 15 mL 2     Sig: INJECT 24 UNITS SUBCUTANESOUSLY EVERY NIGHT AS DIRECTED          Last office visit with prescribing clinician: 6/30/2021     Next office visit with prescribing clinician: Visit date not found         Linda Payton MA  06/09/23, 13:53 EDT

## 2023-09-05 ENCOUNTER — TELEPHONE (OUTPATIENT)
Dept: CARDIOLOGY | Facility: CLINIC | Age: 83
End: 2023-09-05

## 2023-09-05 NOTE — TELEPHONE ENCOUNTER
Caller: Yanique Webster    Relationship to patient: Self    Best call back number: [phone]    Type of visit: FOLLOW UP     Requested date: ASAP     If rescheduling, when is the original appointment: 9-6-23     Additional notes:PT STATS SHE IS HAVING TO R/S HER APPT DUE TO LACK OF TRANSPORTATION. WHEN ATTEMPTING TO R/S PT THERE WAS NO AVAILABILITY UNTIL WELL INTO 2024. PT REQUESTS THAT SHE BE R/S FOR EITHER A TUESDAY OR THURSDAY IN THE AM. PT IS REQUESTING TO GET HER LOOP DEVICE REMOVED. PLEASE REACH OUT TO PT TO FURTHER ADVISE. THANK YOU!

## 2023-09-26 ENCOUNTER — HOSPITAL ENCOUNTER (OUTPATIENT)
Facility: HOSPITAL | Age: 83
Setting detail: HOSPITAL OUTPATIENT SURGERY
Discharge: HOME | End: 2023-09-26
Attending: INTERNAL MEDICINE | Admitting: INTERNAL MEDICINE
Payer: MEDICARE

## 2023-09-26 VITALS
BODY MASS INDEX: 31.79 KG/M2 | OXYGEN SATURATION: 98 % | DIASTOLIC BLOOD PRESSURE: 67 MMHG | TEMPERATURE: 97.3 F | HEIGHT: 64 IN | HEART RATE: 68 BPM | RESPIRATION RATE: 18 BRPM | SYSTOLIC BLOOD PRESSURE: 157 MMHG | WEIGHT: 186.2 LBS

## 2023-09-26 DIAGNOSIS — Z45.09 ENCOUNTER FOR LOOP RECORDER AT END OF BATTERY LIFE: ICD-10-CM

## 2023-09-26 LAB
ANION GAP SERPL CALCULATED.3IONS-SCNC: 10 MMOL/L (ref 5–15)
BUN SERPL-MCNC: 50 MG/DL (ref 8–23)
BUN/CREAT SERPL: 25.1 (ref 7–25)
CALCIUM SPEC-SCNC: 9.6 MG/DL (ref 8.6–10.5)
CHLORIDE SERPL-SCNC: 109 MMOL/L (ref 98–107)
CO2 SERPL-SCNC: 24 MMOL/L (ref 22–29)
CREAT SERPL-MCNC: 1.99 MG/DL (ref 0.57–1)
DEPRECATED RDW RBC AUTO: 50.2 FL (ref 37–54)
EGFRCR SERPLBLD CKD-EPI 2021: 24.7 ML/MIN/1.73
ERYTHROCYTE [DISTWIDTH] IN BLOOD BY AUTOMATED COUNT: 14.7 % (ref 12.3–15.4)
GLUCOSE SERPL-MCNC: 97 MG/DL (ref 65–99)
HCT VFR BLD AUTO: 30.2 % (ref 34–46.6)
HGB BLD-MCNC: 9.4 G/DL (ref 12–15.9)
MCH RBC QN AUTO: 28.8 PG (ref 26.6–33)
MCHC RBC AUTO-ENTMCNC: 31.1 G/DL (ref 31.5–35.7)
MCV RBC AUTO: 92.6 FL (ref 79–97)
PLATELET # BLD AUTO: 158 10*3/MM3 (ref 140–450)
PMV BLD AUTO: 10.8 FL (ref 6–12)
POTASSIUM SERPL-SCNC: 5.1 MMOL/L (ref 3.5–5.2)
RBC # BLD AUTO: 3.26 10*6/MM3 (ref 3.77–5.28)
SODIUM SERPL-SCNC: 143 MMOL/L (ref 136–145)
WBC NRBC COR # BLD: 6.08 10*3/MM3 (ref 3.4–10.8)

## 2023-09-26 PROCEDURE — 80048 BASIC METABOLIC PNL TOTAL CA: CPT | Performed by: HOSPITALIST

## 2023-09-26 PROCEDURE — 25010000002 VANCOMYCIN PER 500 MG: Performed by: INTERNAL MEDICINE

## 2023-09-26 PROCEDURE — 85027 COMPLETE CBC AUTOMATED: CPT | Performed by: HOSPITALIST

## 2023-09-26 PROCEDURE — 33286 RMVL SUBQ CAR RHYTHM MNTR: CPT | Performed by: INTERNAL MEDICINE

## 2023-09-26 RX ORDER — SODIUM CHLORIDE 0.9 % (FLUSH) 0.9 %
10 SYRINGE (ML) INJECTION AS NEEDED
Status: DISCONTINUED | OUTPATIENT
Start: 2023-09-26 | End: 2023-09-26 | Stop reason: HOSPADM

## 2023-09-26 RX ORDER — SODIUM CHLORIDE 0.9 % (FLUSH) 0.9 %
10 SYRINGE (ML) INJECTION EVERY 12 HOURS SCHEDULED
Status: DISCONTINUED | OUTPATIENT
Start: 2023-09-26 | End: 2023-09-26 | Stop reason: HOSPADM

## 2023-09-26 RX ORDER — SODIUM CHLORIDE 9 MG/ML
40 INJECTION, SOLUTION INTRAVENOUS AS NEEDED
Status: DISCONTINUED | OUTPATIENT
Start: 2023-09-26 | End: 2023-09-26 | Stop reason: HOSPADM

## 2023-09-26 RX ORDER — LIDOCAINE HYDROCHLORIDE 10 MG/ML
INJECTION, SOLUTION EPIDURAL; INFILTRATION; INTRACAUDAL; PERINEURAL
Status: DISCONTINUED | OUTPATIENT
Start: 2023-09-26 | End: 2023-09-26 | Stop reason: HOSPADM

## 2023-09-26 NOTE — H&P
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 fib as noted above  Diabetes  Hypertension  Mixed hyperlipidemia  Peripheral vascular disease  History of tobacco smoking  Anxiety  Claustrophobia    HPI:      Yanique Webster is a 83 y.o. female.  Patient with history of cryptogenic CVA in 2019 with loop recorder implant.  Later diagnosed with A-fib on loop recorder 10/2020.  Remote reading received 3/2023 showing loop recorder is at end of battery life.  Recommended to have loop recorder explanted.  Patient presents today for loop recorder explant. Has been doing well from a cardiac standpoint.  Denies chest pain, palpitations or shortness of breath.     Review of Systems:  As noted in the HPI    PFSH:  Patient Active Problem List   Diagnosis    Fibromyalgia    PVD (peripheral vascular disease)    Type 2 diabetes mellitus    Diabetic gastroparesis    Takotsubo cardiomyopathy    Hyperlipidemia LDL goal <70    COPD (chronic obstructive pulmonary disease)    Obesity    Osteoarthritis    Chronic pain    GERD (gastroesophageal reflux disease)    Gout    Chronic joint pain    Coronary artery disease involving native coronary artery of native heart without angina pectoris     Nonrheumatic aortic valve insufficiency    Altered mental status    Essential hypertension    CKD (chronic kidney disease) stage 3, GFR 30-59 ml/min    Hypertensive emergency    Status post placement of implantable loop recorder    Paroxysmal atrial fibrillation    Acute exacerbation of COPD with asthma    Encounter for loop recorder at end of battery life       No current facility-administered medications on file prior to encounter.     Current Outpatient Medications on File Prior to Encounter   Medication Sig Dispense Refill    aspirin 81 MG EC tablet Take 1 tablet by mouth Daily.      atorvastatin (LIPITOR) 80 MG tablet Take 1 tablet by mouth Daily.      benzonatate (Tessalon Perles) 100 MG capsule Take 1 capsule by mouth 3 (Three) Times a Day As Needed for Cough. 15 capsule 0    carvedilol (COREG) 25 MG tablet Take 1 tablet by mouth Every 12 (Twelve) Hours. 60 tablet 1    cloNIDine (CATAPRES) 0.1 MG tablet Take 1 tablet by mouth 2 (Two) Times a Day.      doxazosin (CARDURA) 4 MG tablet Take 1 tablet by mouth Daily.      DULoxetine (CYMBALTA) 60 MG capsule Take 1 capsule by mouth Daily. 90 capsule 3    hydrALAZINE (APRESOLINE) 25 MG tablet Take 1 tablet by mouth 3 (Three) Times a Day. 90 tablet 5    lisinopril (PRINIVIL,ZESTRIL) 40 MG tablet Take 1 tablet by mouth Daily. 30 tablet 0    Morphine (MS CONTIN) 15 MG 12 hr tablet Take 1 tablet by mouth 2 (Two) Times a Day.      naloxone (NARCAN) 4 MG/0.1ML nasal spray Narcan 4 mg/actuation nasal spray      pantoprazole (PROTONIX) 40 MG EC tablet Take 1 tablet by mouth daily.      probiotic (CULTURELLE) capsule capsule Take 1 capsule by mouth Daily.      rivaroxaban (Xarelto) 15 MG tablet Take 1 tablet by mouth Daily With Dinner. 30 tablet 11    torsemide (DEMADEX) 20 MG tablet Take 2 tablets by mouth Daily.         Social History     Socioeconomic History    Marital status:    Tobacco Use    Smoking status: Former     Packs/day: 1.00     Types: Cigarettes      Quit date: 2003     Years since quittin.7    Smokeless tobacco: Never   Vaping Use    Vaping Use: Never used   Substance and Sexual Activity    Alcohol use: No    Drug use: No    Sexual activity: Not Currently     Partners: Male     Birth control/protection: Pill, Hysterectomy            Objective:     Vital Sign Min/Max for last 24 hours  No data recorded   No data recorded   No data recorded   No data recorded   No data recorded   No data recorded    No intake or output data in the 24 hours ending 23 1127        There were no vitals filed for this visit.  CONSTITUTIONAL: No acute distress  RESPIRATORY: Normal effort. Clear to auscultation bilaterally without wheezing or rales  CARDIOVASCULAR: Regular rate and rhythm with normal S1 and S2. Without murmur.  PERIPHERAL VASCULAR: No carotid bruit bilaterally.  Normal radial pulse. There is no lower extremity edema bilaterally.    Labs and radiologic results:  Today's results were reviewed by myself.    Cardiac Data:    EKG 2023:  NSR    Results for orders placed during the hospital encounter of 19    Adult Transesophageal Echo (ERMIAS) W/ Cont if Necessary Per Protocol    Interpretation Summary  · Left ventricular systolic function is normal. Estimated EF appears to be in the range of 61 - 65%  · Normal left atrial size and volume noted. There is no spontaneous echo contrast present. The left atrial appendage was visualized through multiple planes. Left atrial appendage was found to be multilobar in nature. Doppler interrogation shows normal flow within the left atrial appendage. No evidence of a left atrial appendage thrombus was present  · Lipomatous hypertrophy of the interatrial septum present. No evidence of a patent foramen ovale. Saline test results are negative.  · There is mild thickening of the noncoronary cusp of the aortic valve. Mild aortic valve regurgitation is present  · There is moderate (grade 2) layered plaque in the proximal  aorta present.      Tele:  NSR         Assessment and Plan:     Problem list:    Encounter for loop recorder at end of battery life      ASSESSMENT:  PAF  Diagnosed on loop recorder, 10/2020  CVA, 2019  Loop recorder implant 7/2019  Takotsubo cardiomyopathy   EF 15% 8/2016. Improved to 60% by 10/2016  Hypertension     PLAN:  Loop recorder explant today with Dr. Geronimo.   Vanc 15 mg/kg x 1 for surgical prophylaxis today.   The risks, benefits, and alternatives of the procedure have been reviewed and the patient wishes to proceed.     Electronically signed by CHRIS Warner, 09/26/23, 11:27 AM EDT.

## 2023-10-10 ENCOUNTER — OFFICE VISIT (OUTPATIENT)
Dept: CARDIOLOGY | Facility: CLINIC | Age: 83
End: 2023-10-10
Payer: MEDICARE

## 2023-10-10 DIAGNOSIS — I48.0 PAROXYSMAL ATRIAL FIBRILLATION: Primary | ICD-10-CM

## 2023-10-10 NOTE — PROGRESS NOTES
10/10/2023    Yanique Webster, : 1940      Fever: No    Temperature if indicated:     Wound Location: Left Infraclavicular    Dressing Removed: Removed by MA/RN      Old Dressing Appearance:  Clean, dry    Wound Appearance: Redness []                  Drainage []                  Culture obtained []        Color: N/A     Consistency:        Amount: none         Gloves used, wound cleansed with sterile 4x4 and peroxide [x]       MD notified []     MD orders:     Antibiotic started []      If checked, type     Other:       Appointment for follow-up scheduled for 3 months post procedure []    Future Appointments   Date Time Provider Department Center   3/25/2024  8:45 AM Steve Geronimo MD [facility]           Yesenia Amezquita MA, 10/10/23      MD Signature:______________________________ Completed By/Date:

## 2023-10-10 NOTE — PROGRESS NOTES
Patient in for loop recorder explant suture removal. Wound C/D/I, no redness, pain. 2 sutures removed, open to air. Patient tolerate. All questions answered at this time.

## 2023-12-18 DIAGNOSIS — Z79.4 TYPE 2 DIABETES MELLITUS WITH DIABETIC POLYNEUROPATHY, WITH LONG-TERM CURRENT USE OF INSULIN: ICD-10-CM

## 2023-12-18 DIAGNOSIS — E11.42 TYPE 2 DIABETES MELLITUS WITH DIABETIC POLYNEUROPATHY, WITH LONG-TERM CURRENT USE OF INSULIN: ICD-10-CM

## 2023-12-18 RX ORDER — INSULIN GLARGINE 100 [IU]/ML
INJECTION, SOLUTION SUBCUTANEOUS
Qty: 15 ML | Refills: 2 | Status: SHIPPED | OUTPATIENT
Start: 2023-12-18 | End: 2023-12-18

## 2023-12-18 RX ORDER — INSULIN GLARGINE 100 [IU]/ML
INJECTION, SOLUTION SUBCUTANEOUS
Qty: 15 ML | Refills: 2 | Status: SHIPPED | OUTPATIENT
Start: 2023-12-18

## 2023-12-18 NOTE — TELEPHONE ENCOUNTER
Rx Refill Note  Requested Prescriptions     Pending Prescriptions Disp Refills    Lantus SoloStar 100 UNIT/ML injection pen [Pharmacy Med Name: LANTUS SOLOSTAR 100 UNITS/M 100 Solution Pen-injector] 15 mL 2     Sig: INJECT 24 UNITS SUBCUTANEOUSLY ONCE NIGHTLY AS DIRECTED          Last office visit with prescribing clinician: Visit date not found     Next office visit with prescribing clinician: 12/18/2023         Linda Payton MA  12/18/23, 11:28 EST

## 2023-12-18 NOTE — TELEPHONE ENCOUNTER
Caller: COLLEEN GAUTAM    Relationship: SELF    Best call back number: [phone]    Requested Prescriptions:   Requested Prescriptions     Pending Prescriptions Disp Refills    Insulin Glargine (Lantus SoloStar) 100 UNIT/ML injection pen 15 mL 2        Pharmacy where request should be sent: [facility] [address] - [phone] [facility] [phone] FX     Last office visit with prescribing clinician: Visit date not found   Last telemedicine visit with prescribing clinician: Visit date not found   Next office visit with prescribing clinician: 4/4/2024     Additional details provided by patient:     Does the patient have less than a 3 day supply:  [x] Yes  [] No    Would you like a call back once the refill request has been completed: [x] Yes [] No    If the office needs to give you a call back, can they leave a voicemail: [x] Yes [] No    Vazquez Garcia Rep   12/18/23 09:44 EST

## 2023-12-18 NOTE — TELEPHONE ENCOUNTER
Rx Refill Note  Requested Prescriptions     Pending Prescriptions Disp Refills    Insulin Glargine (Lantus SoloStar) 100 UNIT/ML injection pen 15 mL 2          Last office visit with prescribing clinician: 4/12/23    Next office visit with prescribing clinician: 4/4/2024         Linda Payton MA  12/18/23, 11:08 EST

## 2023-12-19 ENCOUNTER — TRANSCRIBE ORDERS (OUTPATIENT)
Dept: DIABETES SERVICES | Facility: HOSPITAL | Age: 83
End: 2023-12-19
Payer: MEDICARE

## 2023-12-19 DIAGNOSIS — E11.69 TYPE 2 DIABETES MELLITUS WITH OTHER SPECIFIED COMPLICATION, WITHOUT LONG-TERM CURRENT USE OF INSULIN: Primary | ICD-10-CM

## 2023-12-30 ENCOUNTER — HOSPITAL ENCOUNTER (INPATIENT)
Facility: HOSPITAL | Age: 83
LOS: 6 days | Discharge: NURSING, REHAB OR LONG-TERM CARE | DRG: 480 | End: 2024-01-05
Attending: EMERGENCY MEDICINE | Admitting: INTERNAL MEDICINE
Payer: MEDICARE

## 2023-12-30 ENCOUNTER — APPOINTMENT (OUTPATIENT)
Dept: GENERAL RADIOLOGY | Facility: HOSPITAL | Age: 83
DRG: 480 | End: 2023-12-30
Payer: MEDICARE

## 2023-12-30 DIAGNOSIS — S72.002A CLOSED LEFT HIP FRACTURE, INITIAL ENCOUNTER: Primary | ICD-10-CM

## 2023-12-30 DIAGNOSIS — S72.002D CLOSED FRACTURE OF LEFT HIP WITH ROUTINE HEALING, SUBSEQUENT ENCOUNTER: ICD-10-CM

## 2023-12-30 DIAGNOSIS — N18.9 CHRONIC KIDNEY DISEASE, UNSPECIFIED CKD STAGE: ICD-10-CM

## 2023-12-30 DIAGNOSIS — D64.9 ANEMIA, UNSPECIFIED TYPE: ICD-10-CM

## 2023-12-30 PROBLEM — F41.8 ANXIETY ASSOCIATED WITH DEPRESSION: Status: ACTIVE | Noted: 2023-12-30

## 2023-12-30 PROBLEM — D63.8 ANEMIA, CHRONIC DISEASE: Status: ACTIVE | Noted: 2023-12-30

## 2023-12-30 LAB
ALBUMIN SERPL-MCNC: 3.8 G/DL (ref 3.5–5.2)
ALBUMIN/GLOB SERPL: 1.6 G/DL
ALP SERPL-CCNC: 154 U/L (ref 39–117)
ALT SERPL W P-5'-P-CCNC: 12 U/L (ref 1–33)
ANION GAP SERPL CALCULATED.3IONS-SCNC: 11 MMOL/L (ref 5–15)
AST SERPL-CCNC: 16 U/L (ref 1–32)
BASOPHILS # BLD AUTO: 0.02 10*3/MM3 (ref 0–0.2)
BASOPHILS NFR BLD AUTO: 0.2 % (ref 0–1.5)
BILIRUB SERPL-MCNC: 0.3 MG/DL (ref 0–1.2)
BUN SERPL-MCNC: 41 MG/DL (ref 8–23)
BUN/CREAT SERPL: 20 (ref 7–25)
CALCIUM SPEC-SCNC: 9.7 MG/DL (ref 8.6–10.5)
CHLORIDE SERPL-SCNC: 108 MMOL/L (ref 98–107)
CO2 SERPL-SCNC: 21 MMOL/L (ref 22–29)
CREAT SERPL-MCNC: 2.05 MG/DL (ref 0.57–1)
DEPRECATED RDW RBC AUTO: 50.4 FL (ref 37–54)
EGFRCR SERPLBLD CKD-EPI 2021: 23.8 ML/MIN/1.73
EOSINOPHIL # BLD AUTO: 0.14 10*3/MM3 (ref 0–0.4)
EOSINOPHIL NFR BLD AUTO: 1.6 % (ref 0.3–6.2)
ERYTHROCYTE [DISTWIDTH] IN BLOOD BY AUTOMATED COUNT: 15.3 % (ref 12.3–15.4)
GLOBULIN UR ELPH-MCNC: 2.4 GM/DL
GLUCOSE SERPL-MCNC: 161 MG/DL (ref 65–99)
HCT VFR BLD AUTO: 29.1 % (ref 34–46.6)
HGB BLD-MCNC: 9.2 G/DL (ref 12–15.9)
HOLD SPECIMEN: NORMAL
IMM GRANULOCYTES # BLD AUTO: 0.03 10*3/MM3 (ref 0–0.05)
IMM GRANULOCYTES NFR BLD AUTO: 0.3 % (ref 0–0.5)
INR PPP: 2.38 (ref 0.89–1.12)
LYMPHOCYTES # BLD AUTO: 0.97 10*3/MM3 (ref 0.7–3.1)
LYMPHOCYTES NFR BLD AUTO: 11 % (ref 19.6–45.3)
MCH RBC QN AUTO: 28.2 PG (ref 26.6–33)
MCHC RBC AUTO-ENTMCNC: 31.6 G/DL (ref 31.5–35.7)
MCV RBC AUTO: 89.3 FL (ref 79–97)
MONOCYTES # BLD AUTO: 0.66 10*3/MM3 (ref 0.1–0.9)
MONOCYTES NFR BLD AUTO: 7.5 % (ref 5–12)
NEUTROPHILS NFR BLD AUTO: 7 10*3/MM3 (ref 1.7–7)
NEUTROPHILS NFR BLD AUTO: 79.4 % (ref 42.7–76)
NRBC BLD AUTO-RTO: 0 /100 WBC (ref 0–0.2)
PLATELET # BLD AUTO: 160 10*3/MM3 (ref 140–450)
PMV BLD AUTO: 11.1 FL (ref 6–12)
POTASSIUM SERPL-SCNC: 3.9 MMOL/L (ref 3.5–5.2)
PROT SERPL-MCNC: 6.2 G/DL (ref 6–8.5)
PROTHROMBIN TIME: 26.1 SECONDS (ref 12.2–14.5)
RBC # BLD AUTO: 3.26 10*6/MM3 (ref 3.77–5.28)
SODIUM SERPL-SCNC: 140 MMOL/L (ref 136–145)
WBC NRBC COR # BLD AUTO: 8.82 10*3/MM3 (ref 3.4–10.8)
WHOLE BLOOD HOLD COAG: NORMAL
WHOLE BLOOD HOLD SPECIMEN: NORMAL

## 2023-12-30 PROCEDURE — 85025 COMPLETE CBC W/AUTO DIFF WBC: CPT | Performed by: EMERGENCY MEDICINE

## 2023-12-30 PROCEDURE — 99285 EMERGENCY DEPT VISIT HI MDM: CPT

## 2023-12-30 PROCEDURE — 71045 X-RAY EXAM CHEST 1 VIEW: CPT

## 2023-12-30 PROCEDURE — 73552 X-RAY EXAM OF FEMUR 2/>: CPT

## 2023-12-30 PROCEDURE — 25010000002 BUPIVACAINE (PF) 0.25 % SOLUTION: Performed by: EMERGENCY MEDICINE

## 2023-12-30 PROCEDURE — 25010000002 HYDROMORPHONE PER 4 MG: Performed by: INTERNAL MEDICINE

## 2023-12-30 PROCEDURE — 86900 BLOOD TYPING SEROLOGIC ABO: CPT

## 2023-12-30 PROCEDURE — 73560 X-RAY EXAM OF KNEE 1 OR 2: CPT

## 2023-12-30 PROCEDURE — 72170 X-RAY EXAM OF PELVIS: CPT

## 2023-12-30 PROCEDURE — 80053 COMPREHEN METABOLIC PANEL: CPT | Performed by: EMERGENCY MEDICINE

## 2023-12-30 PROCEDURE — 93005 ELECTROCARDIOGRAM TRACING: CPT

## 2023-12-30 PROCEDURE — 86901 BLOOD TYPING SEROLOGIC RH(D): CPT

## 2023-12-30 PROCEDURE — 85610 PROTHROMBIN TIME: CPT | Performed by: EMERGENCY MEDICINE

## 2023-12-30 PROCEDURE — 25010000002 HYDROMORPHONE PER 4 MG: Performed by: EMERGENCY MEDICINE

## 2023-12-30 PROCEDURE — 99223 1ST HOSP IP/OBS HIGH 75: CPT | Performed by: INTERNAL MEDICINE

## 2023-12-30 RX ORDER — HYDROMORPHONE HYDROCHLORIDE 1 MG/ML
0.5 INJECTION, SOLUTION INTRAMUSCULAR; INTRAVENOUS; SUBCUTANEOUS
Status: DISPENSED | OUTPATIENT
Start: 2023-12-30 | End: 2023-12-31

## 2023-12-30 RX ORDER — PANTOPRAZOLE SODIUM 40 MG/1
40 TABLET, DELAYED RELEASE ORAL DAILY
Status: DISCONTINUED | OUTPATIENT
Start: 2023-12-31 | End: 2024-01-05 | Stop reason: HOSPADM

## 2023-12-30 RX ORDER — BISACODYL 10 MG
10 SUPPOSITORY, RECTAL RECTAL DAILY PRN
Status: DISCONTINUED | OUTPATIENT
Start: 2023-12-30 | End: 2024-01-05 | Stop reason: HOSPADM

## 2023-12-30 RX ORDER — LIDOCAINE HYDROCHLORIDE AND EPINEPHRINE 10; 10 MG/ML; UG/ML
10 INJECTION, SOLUTION INFILTRATION; PERINEURAL ONCE
Status: COMPLETED | OUTPATIENT
Start: 2023-12-30 | End: 2023-12-30

## 2023-12-30 RX ORDER — CLONIDINE HYDROCHLORIDE 0.1 MG/1
0.1 TABLET ORAL 2 TIMES DAILY
Status: DISCONTINUED | OUTPATIENT
Start: 2023-12-31 | End: 2024-01-05 | Stop reason: HOSPADM

## 2023-12-30 RX ORDER — DEXTROSE MONOHYDRATE 25 G/50ML
25 INJECTION, SOLUTION INTRAVENOUS
Status: DISCONTINUED | OUTPATIENT
Start: 2023-12-30 | End: 2024-01-05 | Stop reason: HOSPADM

## 2023-12-30 RX ORDER — HYDROMORPHONE HYDROCHLORIDE 1 MG/ML
0.5 INJECTION, SOLUTION INTRAMUSCULAR; INTRAVENOUS; SUBCUTANEOUS ONCE
Status: COMPLETED | OUTPATIENT
Start: 2023-12-30 | End: 2023-12-30

## 2023-12-30 RX ORDER — SODIUM CHLORIDE 0.9 % (FLUSH) 0.9 %
10 SYRINGE (ML) INJECTION EVERY 12 HOURS SCHEDULED
Status: DISCONTINUED | OUTPATIENT
Start: 2023-12-31 | End: 2024-01-05 | Stop reason: HOSPADM

## 2023-12-30 RX ORDER — IBUPROFEN 600 MG/1
1 TABLET ORAL
Status: DISCONTINUED | OUTPATIENT
Start: 2023-12-30 | End: 2024-01-05 | Stop reason: HOSPADM

## 2023-12-30 RX ORDER — ACETAMINOPHEN 325 MG/1
650 TABLET ORAL EVERY 4 HOURS PRN
Status: DISCONTINUED | OUTPATIENT
Start: 2023-12-30 | End: 2024-01-05 | Stop reason: HOSPADM

## 2023-12-30 RX ORDER — SODIUM CHLORIDE 0.9 % (FLUSH) 0.9 %
10 SYRINGE (ML) INJECTION AS NEEDED
Status: DISCONTINUED | OUTPATIENT
Start: 2023-12-30 | End: 2024-01-05 | Stop reason: HOSPADM

## 2023-12-30 RX ORDER — ACETAMINOPHEN 650 MG/1
650 SUPPOSITORY RECTAL EVERY 4 HOURS PRN
Status: DISCONTINUED | OUTPATIENT
Start: 2023-12-30 | End: 2024-01-05 | Stop reason: HOSPADM

## 2023-12-30 RX ORDER — CARVEDILOL 12.5 MG/1
25 TABLET ORAL EVERY 12 HOURS SCHEDULED
Status: DISCONTINUED | OUTPATIENT
Start: 2023-12-31 | End: 2024-01-05 | Stop reason: HOSPADM

## 2023-12-30 RX ORDER — L.ACID,PARA/B.BIFIDUM/S.THERM 8B CELL
1 CAPSULE ORAL DAILY
Status: DISCONTINUED | OUTPATIENT
Start: 2023-12-31 | End: 2024-01-05 | Stop reason: HOSPADM

## 2023-12-30 RX ORDER — SODIUM CHLORIDE 9 MG/ML
40 INJECTION, SOLUTION INTRAVENOUS AS NEEDED
Status: DISCONTINUED | OUTPATIENT
Start: 2023-12-30 | End: 2024-01-02 | Stop reason: SDUPTHER

## 2023-12-30 RX ORDER — ATORVASTATIN CALCIUM 40 MG/1
80 TABLET, FILM COATED ORAL DAILY
Status: DISCONTINUED | OUTPATIENT
Start: 2023-12-31 | End: 2024-01-05 | Stop reason: HOSPADM

## 2023-12-30 RX ORDER — NICOTINE POLACRILEX 4 MG
15 LOZENGE BUCCAL
Status: DISCONTINUED | OUTPATIENT
Start: 2023-12-30 | End: 2024-01-05 | Stop reason: HOSPADM

## 2023-12-30 RX ORDER — BISACODYL 5 MG/1
5 TABLET, DELAYED RELEASE ORAL DAILY PRN
Status: DISCONTINUED | OUTPATIENT
Start: 2023-12-30 | End: 2024-01-05 | Stop reason: HOSPADM

## 2023-12-30 RX ORDER — INSULIN LISPRO 100 [IU]/ML
2-7 INJECTION, SOLUTION INTRAVENOUS; SUBCUTANEOUS
Status: DISCONTINUED | OUTPATIENT
Start: 2023-12-31 | End: 2024-01-05 | Stop reason: HOSPADM

## 2023-12-30 RX ORDER — BUPIVACAINE HYDROCHLORIDE 2.5 MG/ML
30 INJECTION, SOLUTION EPIDURAL; INFILTRATION; INTRACAUDAL ONCE
Status: COMPLETED | OUTPATIENT
Start: 2023-12-30 | End: 2023-12-30

## 2023-12-30 RX ORDER — AMOXICILLIN 250 MG
2 CAPSULE ORAL 2 TIMES DAILY
Status: DISCONTINUED | OUTPATIENT
Start: 2023-12-31 | End: 2024-01-05 | Stop reason: HOSPADM

## 2023-12-30 RX ORDER — POLYETHYLENE GLYCOL 3350 17 G/17G
17 POWDER, FOR SOLUTION ORAL DAILY PRN
Status: DISCONTINUED | OUTPATIENT
Start: 2023-12-30 | End: 2024-01-05 | Stop reason: HOSPADM

## 2023-12-30 RX ORDER — ACETAMINOPHEN 160 MG/5ML
650 SOLUTION ORAL EVERY 4 HOURS PRN
Status: DISCONTINUED | OUTPATIENT
Start: 2023-12-30 | End: 2024-01-05 | Stop reason: HOSPADM

## 2023-12-30 RX ORDER — SODIUM CHLORIDE 9 MG/ML
75 INJECTION, SOLUTION INTRAVENOUS CONTINUOUS
Status: ACTIVE | OUTPATIENT
Start: 2023-12-31 | End: 2023-12-31

## 2023-12-30 RX ORDER — IPRATROPIUM BROMIDE AND ALBUTEROL SULFATE 2.5; .5 MG/3ML; MG/3ML
3 SOLUTION RESPIRATORY (INHALATION) EVERY 4 HOURS PRN
Status: DISCONTINUED | OUTPATIENT
Start: 2023-12-30 | End: 2024-01-05 | Stop reason: HOSPADM

## 2023-12-30 RX ORDER — CHOLECALCIFEROL (VITAMIN D3) 125 MCG
5 CAPSULE ORAL NIGHTLY PRN
Status: DISCONTINUED | OUTPATIENT
Start: 2023-12-30 | End: 2024-01-05 | Stop reason: HOSPADM

## 2023-12-30 RX ORDER — ONDANSETRON 2 MG/ML
4 INJECTION INTRAMUSCULAR; INTRAVENOUS ONCE
Status: DISCONTINUED | OUTPATIENT
Start: 2023-12-30 | End: 2024-01-03

## 2023-12-30 RX ORDER — TERAZOSIN 5 MG/1
5 CAPSULE ORAL NIGHTLY
Status: DISCONTINUED | OUTPATIENT
Start: 2023-12-31 | End: 2024-01-05 | Stop reason: HOSPADM

## 2023-12-30 RX ORDER — HYDRALAZINE HYDROCHLORIDE 25 MG/1
25 TABLET, FILM COATED ORAL EVERY 8 HOURS SCHEDULED
Status: DISCONTINUED | OUTPATIENT
Start: 2023-12-31 | End: 2024-01-01

## 2023-12-30 RX ADMIN — LIDOCAINE HYDROCHLORIDE AND EPINEPHRINE 10 ML: 10; 10 INJECTION, SOLUTION INFILTRATION; PERINEURAL at 21:15

## 2023-12-30 RX ADMIN — BUPIVACAINE HYDROCHLORIDE 30 ML: 2.5 INJECTION, SOLUTION EPIDURAL; INFILTRATION; INTRACAUDAL; PERINEURAL at 21:15

## 2023-12-30 RX ADMIN — HYDROMORPHONE HYDROCHLORIDE 0.5 MG: 1 INJECTION, SOLUTION INTRAMUSCULAR; INTRAVENOUS; SUBCUTANEOUS at 23:58

## 2023-12-30 RX ADMIN — HYDROMORPHONE HYDROCHLORIDE 0.5 MG: 1 INJECTION, SOLUTION INTRAMUSCULAR; INTRAVENOUS; SUBCUTANEOUS at 19:36

## 2023-12-30 NOTE — Clinical Note
Level of Care: Telemetry [5]   Diagnosis: Closed left hip fracture [742190]   Admitting Physician: SARAH OCONNELL III [[id number]]   Attending Physician: SARAH OCONNELL III [[id number]]   Bed Request Comments: tele inpatient

## 2023-12-30 NOTE — LETTER
EMS Transport Request  For use at [facility], [place], [place], [place], and [place] only   Patient Name: Yanique Webtser : 1940   Weight:84.6 kg (186 lb 8 oz) Pick-up Location: [facility] BLS/ALS: bls   Insurance: MEDICARE Auth End Date   Pre-Cert #: D/C Summary complete:    Destination: [facility]   Contact Precautions:    Equipment (O2, Fluids, etc.):    Arrive By Date/Time:  Stretcher/WC: stretcher   CM Requesting: Karla Cooney RN Ext: [phone]   Notes/Medical Necessity: hip fx; requires arjo and max assist X2 to stand or pivot      ______________________________________________________________________    *Only 2 patient bags OR 1 carry-on size bag are permitted.  Wheelchairs and walkers CANNOT transported with the patient. Acknowledge: yes

## 2023-12-31 ENCOUNTER — ANESTHESIA EVENT (OUTPATIENT)
Dept: TELEMETRY | Facility: HOSPITAL | Age: 83
End: 2023-12-31

## 2023-12-31 ENCOUNTER — APPOINTMENT (OUTPATIENT)
Dept: GENERAL RADIOLOGY | Facility: HOSPITAL | Age: 83
DRG: 480 | End: 2023-12-31
Payer: MEDICARE

## 2023-12-31 LAB
ABO GROUP BLD: NORMAL
ABO GROUP BLD: NORMAL
ANION GAP SERPL CALCULATED.3IONS-SCNC: 9 MMOL/L (ref 5–15)
APTT PPP: 48.6 SECONDS (ref 22–39)
BACTERIA UR QL AUTO: NORMAL /HPF
BASOPHILS # BLD AUTO: 0.04 10*3/MM3 (ref 0–0.2)
BASOPHILS NFR BLD AUTO: 0.6 % (ref 0–1.5)
BILIRUB UR QL STRIP: NEGATIVE
BLD GP AB SCN SERPL QL: NEGATIVE
BUN SERPL-MCNC: 41 MG/DL (ref 8–23)
BUN/CREAT SERPL: 20.8 (ref 7–25)
CALCIUM SPEC-SCNC: 9.8 MG/DL (ref 8.6–10.5)
CHLORIDE SERPL-SCNC: 112 MMOL/L (ref 98–107)
CLARITY UR: CLEAR
CO2 SERPL-SCNC: 22 MMOL/L (ref 22–29)
COLOR UR: YELLOW
CREAT SERPL-MCNC: 1.97 MG/DL (ref 0.57–1)
CREAT UR-MCNC: 44.5 MG/DL
DEPRECATED RDW RBC AUTO: 49.7 FL (ref 37–54)
EGFRCR SERPLBLD CKD-EPI 2021: 25 ML/MIN/1.73
EOSINOPHIL # BLD AUTO: 0.44 10*3/MM3 (ref 0–0.4)
EOSINOPHIL NFR BLD AUTO: 6.4 % (ref 0.3–6.2)
ERYTHROCYTE [DISTWIDTH] IN BLOOD BY AUTOMATED COUNT: 15.3 % (ref 12.3–15.4)
FERRITIN SERPL-MCNC: 83.11 NG/ML (ref 13–150)
FOLATE SERPL-MCNC: 8.34 NG/ML (ref 4.78–24.2)
GLUCOSE BLDC GLUCOMTR-MCNC: 132 MG/DL (ref 70–130)
GLUCOSE BLDC GLUCOMTR-MCNC: 135 MG/DL (ref 70–130)
GLUCOSE BLDC GLUCOMTR-MCNC: 193 MG/DL (ref 70–130)
GLUCOSE BLDC GLUCOMTR-MCNC: 219 MG/DL (ref 70–130)
GLUCOSE SERPL-MCNC: 127 MG/DL (ref 65–99)
GLUCOSE UR STRIP-MCNC: ABNORMAL MG/DL
HBA1C MFR BLD: 5.1 % (ref 4.8–5.6)
HCT VFR BLD AUTO: 29.5 % (ref 34–46.6)
HGB BLD-MCNC: 9.2 G/DL (ref 12–15.9)
HGB UR QL STRIP.AUTO: NEGATIVE
HYALINE CASTS UR QL AUTO: NORMAL /LPF
IMM GRANULOCYTES # BLD AUTO: 0.02 10*3/MM3 (ref 0–0.05)
IMM GRANULOCYTES NFR BLD AUTO: 0.3 % (ref 0–0.5)
INR PPP: 2.12 (ref 0.89–1.12)
IRON 24H UR-MRATE: 31 MCG/DL (ref 37–145)
IRON SATN MFR SERPL: 13 % (ref 20–50)
KETONES UR QL STRIP: NEGATIVE
LEUKOCYTE ESTERASE UR QL STRIP.AUTO: NEGATIVE
LYMPHOCYTES # BLD AUTO: 1.29 10*3/MM3 (ref 0.7–3.1)
LYMPHOCYTES NFR BLD AUTO: 18.8 % (ref 19.6–45.3)
MAGNESIUM SERPL-MCNC: 2.2 MG/DL (ref 1.6–2.4)
MCH RBC QN AUTO: 27.9 PG (ref 26.6–33)
MCHC RBC AUTO-ENTMCNC: 31.2 G/DL (ref 31.5–35.7)
MCV RBC AUTO: 89.4 FL (ref 79–97)
MONOCYTES # BLD AUTO: 0.68 10*3/MM3 (ref 0.1–0.9)
MONOCYTES NFR BLD AUTO: 9.9 % (ref 5–12)
NEUTROPHILS NFR BLD AUTO: 4.4 10*3/MM3 (ref 1.7–7)
NEUTROPHILS NFR BLD AUTO: 64 % (ref 42.7–76)
NITRITE UR QL STRIP: NEGATIVE
NRBC BLD AUTO-RTO: 0 /100 WBC (ref 0–0.2)
OSMOLALITY UR: 432 MOSM/KG (ref 300–1100)
PH UR STRIP.AUTO: 6.5 [PH] (ref 5–8)
PLATELET # BLD AUTO: 160 10*3/MM3 (ref 140–450)
PMV BLD AUTO: 11 FL (ref 6–12)
POTASSIUM SERPL-SCNC: 5.1 MMOL/L (ref 3.5–5.2)
PROT UR QL STRIP: ABNORMAL
PROTHROMBIN TIME: 23.9 SECONDS (ref 12.2–14.5)
RBC # BLD AUTO: 3.3 10*6/MM3 (ref 3.77–5.28)
RBC # UR STRIP: NORMAL /HPF
REF LAB TEST METHOD: NORMAL
RETICS # AUTO: 0.05 10*6/MM3 (ref 0.02–0.13)
RETICS/RBC NFR AUTO: 1.52 % (ref 0.7–1.9)
RH BLD: NEGATIVE
RH BLD: NEGATIVE
SODIUM SERPL-SCNC: 143 MMOL/L (ref 136–145)
SODIUM UR-SCNC: 96 MMOL/L
SP GR UR STRIP: 1.01 (ref 1–1.03)
SQUAMOUS #/AREA URNS HPF: NORMAL /HPF
T&S EXPIRATION DATE: NORMAL
TIBC SERPL-MCNC: 247 MCG/DL (ref 298–536)
TRANSFERRIN SERPL-MCNC: 166 MG/DL (ref 200–360)
TSH SERPL DL<=0.05 MIU/L-ACNC: 0.26 UIU/ML (ref 0.27–4.2)
UROBILINOGEN UR QL STRIP: ABNORMAL
VIT B12 BLD-MCNC: 550 PG/ML (ref 211–946)
WBC # UR STRIP: NORMAL /HPF
WBC NRBC COR # BLD AUTO: 6.87 10*3/MM3 (ref 3.4–10.8)

## 2023-12-31 PROCEDURE — 86900 BLOOD TYPING SEROLOGIC ABO: CPT | Performed by: NURSE PRACTITIONER

## 2023-12-31 PROCEDURE — 71045 X-RAY EXAM CHEST 1 VIEW: CPT

## 2023-12-31 PROCEDURE — 93005 ELECTROCARDIOGRAM TRACING: CPT | Performed by: INTERNAL MEDICINE

## 2023-12-31 PROCEDURE — 83735 ASSAY OF MAGNESIUM: CPT | Performed by: NURSE PRACTITIONER

## 2023-12-31 PROCEDURE — 82948 REAGENT STRIP/BLOOD GLUCOSE: CPT

## 2023-12-31 PROCEDURE — 84443 ASSAY THYROID STIM HORMONE: CPT | Performed by: NURSE PRACTITIONER

## 2023-12-31 PROCEDURE — 85730 THROMBOPLASTIN TIME PARTIAL: CPT | Performed by: NURSE PRACTITIONER

## 2023-12-31 PROCEDURE — 36430 TRANSFUSION BLD/BLD COMPNT: CPT

## 2023-12-31 PROCEDURE — 25010000002 HYDROMORPHONE PER 4 MG: Performed by: INTERNAL MEDICINE

## 2023-12-31 PROCEDURE — 25010000002 NITROGLYCERIN 200 MCG/ML SOLUTION: Performed by: INTERNAL MEDICINE

## 2023-12-31 PROCEDURE — 85045 AUTOMATED RETICULOCYTE COUNT: CPT | Performed by: NURSE PRACTITIONER

## 2023-12-31 PROCEDURE — 85025 COMPLETE CBC W/AUTO DIFF WBC: CPT | Performed by: NURSE PRACTITIONER

## 2023-12-31 PROCEDURE — 83036 HEMOGLOBIN GLYCOSYLATED A1C: CPT | Performed by: NURSE PRACTITIONER

## 2023-12-31 PROCEDURE — 82746 ASSAY OF FOLIC ACID SERUM: CPT | Performed by: NURSE PRACTITIONER

## 2023-12-31 PROCEDURE — 99222 1ST HOSP IP/OBS MODERATE 55: CPT | Performed by: ORTHOPAEDIC SURGERY

## 2023-12-31 PROCEDURE — 82607 VITAMIN B-12: CPT | Performed by: NURSE PRACTITIONER

## 2023-12-31 PROCEDURE — 82570 ASSAY OF URINE CREATININE: CPT | Performed by: NURSE PRACTITIONER

## 2023-12-31 PROCEDURE — 83935 ASSAY OF URINE OSMOLALITY: CPT | Performed by: NURSE PRACTITIONER

## 2023-12-31 PROCEDURE — 86923 COMPATIBILITY TEST ELECTRIC: CPT

## 2023-12-31 PROCEDURE — 99232 SBSQ HOSP IP/OBS MODERATE 35: CPT | Performed by: INTERNAL MEDICINE

## 2023-12-31 PROCEDURE — 3E0T3BZ INTRODUCTION OF ANESTHETIC AGENT INTO PERIPHERAL NERVES AND PLEXI, PERCUTANEOUS APPROACH: ICD-10-PCS | Performed by: EMERGENCY MEDICINE

## 2023-12-31 PROCEDURE — 85014 HEMATOCRIT: CPT | Performed by: NURSE PRACTITIONER

## 2023-12-31 PROCEDURE — 85610 PROTHROMBIN TIME: CPT | Performed by: NURSE PRACTITIONER

## 2023-12-31 PROCEDURE — 81001 URINALYSIS AUTO W/SCOPE: CPT | Performed by: NURSE PRACTITIONER

## 2023-12-31 PROCEDURE — 82728 ASSAY OF FERRITIN: CPT | Performed by: NURSE PRACTITIONER

## 2023-12-31 PROCEDURE — 83540 ASSAY OF IRON: CPT | Performed by: NURSE PRACTITIONER

## 2023-12-31 PROCEDURE — 86927 PLASMA FRESH FROZEN: CPT

## 2023-12-31 PROCEDURE — 84300 ASSAY OF URINE SODIUM: CPT | Performed by: NURSE PRACTITIONER

## 2023-12-31 PROCEDURE — 80048 BASIC METABOLIC PNL TOTAL CA: CPT | Performed by: NURSE PRACTITIONER

## 2023-12-31 PROCEDURE — 82747 ASSAY OF FOLIC ACID RBC: CPT | Performed by: NURSE PRACTITIONER

## 2023-12-31 PROCEDURE — P9059 PLASMA, FRZ BETWEEN 8-24HOUR: HCPCS

## 2023-12-31 PROCEDURE — 86901 BLOOD TYPING SEROLOGIC RH(D): CPT | Performed by: NURSE PRACTITIONER

## 2023-12-31 PROCEDURE — 25810000003 SODIUM CHLORIDE 0.9 % SOLUTION: Performed by: NURSE PRACTITIONER

## 2023-12-31 PROCEDURE — 93010 ELECTROCARDIOGRAM REPORT: CPT | Performed by: INTERNAL MEDICINE

## 2023-12-31 PROCEDURE — 84466 ASSAY OF TRANSFERRIN: CPT | Performed by: NURSE PRACTITIONER

## 2023-12-31 PROCEDURE — 86850 RBC ANTIBODY SCREEN: CPT | Performed by: NURSE PRACTITIONER

## 2023-12-31 PROCEDURE — 63710000001 INSULIN LISPRO (HUMAN) PER 5 UNITS: Performed by: NURSE PRACTITIONER

## 2023-12-31 RX ORDER — TORSEMIDE 20 MG/1
20 TABLET ORAL DAILY
Status: DISCONTINUED | OUTPATIENT
Start: 2023-12-31 | End: 2024-01-01

## 2023-12-31 RX ORDER — NITROGLYCERIN 20 MG/100ML
5-200 INJECTION INTRAVENOUS
Status: DISCONTINUED | OUTPATIENT
Start: 2023-12-31 | End: 2024-01-03

## 2023-12-31 RX ORDER — LISINOPRIL 40 MG/1
40 TABLET ORAL
Status: DISCONTINUED | OUTPATIENT
Start: 2023-12-31 | End: 2024-01-04

## 2023-12-31 RX ORDER — MORPHINE SULFATE 15 MG/1
15 TABLET, FILM COATED, EXTENDED RELEASE ORAL EVERY 12 HOURS SCHEDULED
Qty: 14 TABLET | Refills: 0 | Status: DISCONTINUED | OUTPATIENT
Start: 2023-12-31 | End: 2024-01-05 | Stop reason: HOSPADM

## 2023-12-31 RX ORDER — NITROGLYCERIN 0.4 MG/1
0.4 TABLET SUBLINGUAL ONCE
Status: COMPLETED | OUTPATIENT
Start: 2023-12-31 | End: 2023-12-31

## 2023-12-31 RX ADMIN — CARVEDILOL 25 MG: 12.5 TABLET, FILM COATED ORAL at 08:34

## 2023-12-31 RX ADMIN — NITROGLYCERIN 30 MCG/MIN: 20 INJECTION INTRAVENOUS at 23:19

## 2023-12-31 RX ADMIN — Medication 1 CAPSULE: at 08:35

## 2023-12-31 RX ADMIN — CARVEDILOL 25 MG: 12.5 TABLET, FILM COATED ORAL at 00:09

## 2023-12-31 RX ADMIN — ATORVASTATIN CALCIUM 80 MG: 40 TABLET, FILM COATED ORAL at 08:35

## 2023-12-31 RX ADMIN — NITROGLYCERIN 25 MCG/MIN: 20 INJECTION INTRAVENOUS at 22:56

## 2023-12-31 RX ADMIN — Medication 10 ML: at 00:09

## 2023-12-31 RX ADMIN — TERAZOSIN HYDROCHLORIDE 5 MG: 5 CAPSULE ORAL at 20:44

## 2023-12-31 RX ADMIN — HYDRALAZINE HYDROCHLORIDE 25 MG: 25 TABLET, FILM COATED ORAL at 00:09

## 2023-12-31 RX ADMIN — Medication 10 ML: at 20:44

## 2023-12-31 RX ADMIN — CLONIDINE HYDROCHLORIDE 0.1 MG: 0.1 TABLET ORAL at 15:38

## 2023-12-31 RX ADMIN — SODIUM CHLORIDE 75 ML/HR: 9 INJECTION, SOLUTION INTRAVENOUS at 00:09

## 2023-12-31 RX ADMIN — NITROGLYCERIN 0.4 MG: 0.4 TABLET SUBLINGUAL at 21:00

## 2023-12-31 RX ADMIN — TORSEMIDE 20 MG: 20 TABLET ORAL at 13:15

## 2023-12-31 RX ADMIN — SENNOSIDES AND DOCUSATE SODIUM 2 TABLET: 8.6; 5 TABLET ORAL at 20:33

## 2023-12-31 RX ADMIN — INSULIN LISPRO 3 UNITS: 100 INJECTION, SOLUTION INTRAVENOUS; SUBCUTANEOUS at 20:44

## 2023-12-31 RX ADMIN — HYDROMORPHONE HYDROCHLORIDE 0.5 MG: 1 INJECTION, SOLUTION INTRAMUSCULAR; INTRAVENOUS; SUBCUTANEOUS at 05:31

## 2023-12-31 RX ADMIN — CLONIDINE HYDROCHLORIDE 0.1 MG: 0.1 TABLET ORAL at 20:33

## 2023-12-31 RX ADMIN — TERAZOSIN HYDROCHLORIDE 5 MG: 5 CAPSULE ORAL at 00:09

## 2023-12-31 RX ADMIN — MORPHINE SULFATE 15 MG: 15 TABLET, FILM COATED, EXTENDED RELEASE ORAL at 20:44

## 2023-12-31 RX ADMIN — PANTOPRAZOLE SODIUM 40 MG: 40 TABLET, DELAYED RELEASE ORAL at 08:35

## 2023-12-31 RX ADMIN — HYDRALAZINE HYDROCHLORIDE 25 MG: 25 TABLET, FILM COATED ORAL at 13:15

## 2023-12-31 RX ADMIN — Medication 10 ML: at 08:36

## 2023-12-31 RX ADMIN — NITROGLYCERIN 20 MCG/MIN: 20 INJECTION INTRAVENOUS at 21:03

## 2023-12-31 RX ADMIN — HYDRALAZINE HYDROCHLORIDE 25 MG: 25 TABLET, FILM COATED ORAL at 06:50

## 2023-12-31 RX ADMIN — MORPHINE SULFATE 15 MG: 15 TABLET, FILM COATED, EXTENDED RELEASE ORAL at 16:25

## 2023-12-31 RX ADMIN — CARVEDILOL 25 MG: 12.5 TABLET, FILM COATED ORAL at 20:32

## 2023-12-31 RX ADMIN — INSULIN LISPRO 2 UNITS: 100 INJECTION, SOLUTION INTRAVENOUS; SUBCUTANEOUS at 13:15

## 2023-12-31 RX ADMIN — LISINOPRIL 40 MG: 40 TABLET ORAL at 13:15

## 2023-12-31 RX ADMIN — CLONIDINE HYDROCHLORIDE 0.1 MG: 0.1 TABLET ORAL at 08:35

## 2023-12-31 RX ADMIN — CLONIDINE HYDROCHLORIDE 0.1 MG: 0.1 TABLET ORAL at 00:09

## 2023-12-31 NOTE — H&P
"    [facility] Medicine Services  HISTORY AND PHYSICAL    Patient Name: Yanique Webster  : 1940  MRN: [id number]  Primary Care Physician: Sebas Alicea MD  Date of admission: 2023    Subjective   Subjective     Chief Complaint:  Fall     HPI:  Yanique Webster is a 83 y.o. female w/a hx of CAD, HTN, HLD, PAF (on Xarelto), Takotsubo cardiomyopathy, PVD, T2DM, neuropathy, CKD Stage III, COPD, chronic pain who presented to the ED w/ c/o a fall.   Pt presented w/ c/o left hip and left knee pain after falling. Pt describes that she was at her home tonight and \"lost her balance\" causing her to fall. She fell on her left side hitting her left knee and left hip. She denies LOC or head trauma.   Pt evaluated in the ED. XRAY c/w left hip fracture. Pt admitted to the hospital medicine service for further evaluation.     Review of Systems   Constitutional: Negative.  Negative for chills, diaphoresis, fatigue, fever and unexpected weight change.   HENT: Negative.  Negative for congestion, postnasal drip, rhinorrhea, sinus pressure, sinus pain, sneezing, sore throat and trouble swallowing.    Eyes: Negative.  Negative for visual disturbance.   Respiratory: Negative.  Negative for cough, chest tightness, shortness of breath and wheezing.    Cardiovascular: Negative.  Negative for chest pain, palpitations and leg swelling.   Gastrointestinal: Negative.  Negative for abdominal distention, abdominal pain, blood in stool, constipation, diarrhea, nausea and vomiting.   Endocrine: Negative.    Genitourinary: Negative.  Negative for decreased urine volume, difficulty urinating, dysuria, flank pain, frequency, hematuria, pelvic pain and urgency.   Musculoskeletal:  Negative for back pain, neck pain and neck stiffness.        Left hip and left knee pain 2/2 mechanical fall.    Skin: Negative.  Negative for wound.   Allergic/Immunologic: Negative.  Negative for immunocompromised state. "   Neurological: Negative.  Negative for dizziness, tremors, seizures, syncope, facial asymmetry, speech difficulty, weakness, light-headedness, numbness and headaches.   Hematological: Negative.  Does not bruise/bleed easily.   Psychiatric/Behavioral: Negative.  Negative for confusion.    All other systems reviewed and are negative.     Personal History     Past Medical History:   Diagnosis Date    Blurry vision     Chronic joint pain     Chronic pain     COPD (chronic obstructive pulmonary disease)     Coronary artery disease     Diabetic gastroparesis 08/24/2016    Esophageal stricture     s/p dilation in 2007    GERD (gastroesophageal reflux disease)     Gout     Headache     secondary to hypertension    Hyperlipidemia     Hypertension     Long term current use of insulin     Neuropathy     Neuropathy due to type 2 diabetes mellitus     Obesity     Osteoarthritis     Pericarditis 2008    Stroke 03/2019    Type 2 diabetes mellitus      Past Surgical History:   Procedure Laterality Date    BRONCHOSCOPY N/A 8/23/2016    Procedure: BRONCHOSCOPY BIOPSY AT BEDSIDE;  Surgeon: Mookie Willard MD;  Location:  [facility];  Service:     CARDIAC CATHETERIZATION N/A 8/30/2016    Procedure: Left Heart Cath;  Surgeon: Steve Geronimo MD;  Location:  [facility];  Service:     CARDIAC CATHETERIZATION N/A 8/30/2016    Procedure: Right Heart Cath;  Surgeon: Steve Geronimo MD;  Location:  [facility];  Service:     CARDIAC ELECTROPHYSIOLOGY PROCEDURE N/A 7/2/2019    Procedure: Loop insertion;  Surgeon: Steve Geronimo MD;  Location:  [facility] INV[facility]IVE LOCATION;  Service: Cardiovascular    CARDIAC ELECTROPHYSIOLOGY PROCEDURE N/A 9/26/2023    Procedure: Loop recorder removal;  Surgeon: Steve Geronimo MD;  Location:  [facility] INVASIVE LOCATION;  Service: Cardiovascular;  Laterality: N/A;    CATARACT EXTRACTION      ESOPHAGEAL DILATATION  2007    HERNIA REPAIR      HYSTERECTOMY  1998    WRIST  FRACTURE SURGERY Left      Family History:  family history includes Breast cancer (age of onset: 67) in her sister; Cancer in her father, mother, and another family member.     Social History:  reports that she quit smoking about 21 years ago. Her smoking use included cigarettes. She smoked an average of 1 pack per day. She has never used smokeless tobacco. She reports that she does not drink alcohol and does not use drugs.  Social History     Social History Narrative    ** Merged History Encounter **         Mrs. Webster is  and lives with her  in [place]. 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 She smoked 1ppd for 25 years but quit in 2000. Denies ETOH/illicit drug use.      Medications:  DULoxetine, Insulin Glargine, Morphine, aspirin, atorvastatin, carvedilol, cloNIDine, doxazosin, hydrALAZINE, lisinopril, naloxone, pantoprazole, probiotic, rivaroxaban, and torsemide    Allergies   Allergen Reactions    Penicillins     Nickel Rash    Penicillins Rash     unk     Objective   Objective     Vital Signs:   Temp:  [98 °F (36.7 °C)] 98 °F (36.7 °C)  Heart Rate:  [65-80] 66  Resp:  [16-20] 16  BP: (150-183)/(61-74) 156/74    Physical Exam     Constitutional: Awake, alert; non-toxic appearing   Eyes: PERRLA, sclerae anicteric, no conjunctival injection  HENT: NCAT, mucous membranes moist  Neck: Supple, no thyromegaly, no lymphadenopathy, trachea midline  Respiratory: Clear to auscultation bilaterally, nonlabored respirations   Cardiovascular: RRR, no murmurs, rubs, or gallops, no peripheral edema   Gastrointestinal: Positive bowel sounds, full/soft, non-tender  Musculoskeletal: Normal ROM BUE and RLE; LLE shortened- pulses palpable   Psychiatric: Appropriate affect, cooperative  Neurologic: Oriented x 3, strength symmetric in all extremities, Cranial Nerves grossly intact to confrontation, speech clear  Skin: No rashes, lesions or wounds     Result Review:  I have  personally reviewed the results from the time of this admission to 12/30/2023 22:35 EST and agree with these findings:  [x]  Laboratory list / accordion  []  Microbiology  [x]  Radiology  [x]  EKG/Telemetry   []  Cardiology/Vascular   []  Pathology  [x]  Old records    LAB RESULTS:      Lab 12/30/23 1945 12/30/23 1944   WBC  --  8.82   HEMOGLOBIN  --  9.2*   HEMATOCRIT  --  29.1*   PLATELETS  --  160   NEUTROS ABS  --  7.00   IMMATURE GRANS (ABS)  --  0.03   LYMPHS ABS  --  0.97   MONOS ABS  --  0.66   EOS ABS  --  0.14   MCV  --  89.3   PROTIME 26.1*  --          Lab 12/30/23 1944   SODIUM 140   POTASSIUM 3.9   CHLORIDE 108*   CO2 21.0*   ANION GAP 11.0   BUN 41*   CREATININE 2.05*   EGFR 23.8*   GLUCOSE 161*   CALCIUM 9.7         Lab 12/30/23 1944   TOTAL PROTEIN 6.2   ALBUMIN 3.8   GLOBULIN 2.4   ALT (SGPT) 12   AST (SGOT) 16   BILIRUBIN 0.3   ALK PHOS 154*         Lab 12/30/23 1945   PROTIME 26.1*   INR 2.38*                 Brief Urine Lab Results       None          Microbiology Results (last 10 days)       ** No results found for the last 240 hours. **          XR Femur 2 View Left    Result Date: 12/30/2023  XR FEMUR 2 VW LEFT XR KNEE 1 OR 2 VW LEFT Date of Exam: 12/30/2023 7:49 PM EST Indication: fall Comparison: None available. Findings: There is an acute minimally displaced fracture of the left femoral neck. There is mild osteoarthritis at the left hip. The bones are generally demineralized. There is minimal osteophyte formation at the knee with preservation of joint spaces. There is no  acute fracture or dislocation at the knee. No definite knee joint effusion. No erosions.     Impression: Impression: 1.Acute minimally displaced fracture of the left femoral neck. 2.Osteopenia and mild degenerative changes. Electronically Signed: Adrian Wolfe MD  12/30/2023 8:20 PM EST  Workstation ID: [id number]    XR Knee 1 or 2 View Left    Result Date: 12/30/2023  XR FEMUR 2 VW LEFT XR KNEE 1 OR 2 VW LEFT Date of  Exam: 12/30/2023 7:49 PM EST Indication: fall Comparison: None available. Findings: There is an acute minimally displaced fracture of the left femoral neck. There is mild osteoarthritis at the left hip. The bones are generally demineralized. There is minimal osteophyte formation at the knee with preservation of joint spaces. There is no  acute fracture or dislocation at the knee. No definite knee joint effusion. No erosions.     Impression: Impression: 1.Acute minimally displaced fracture of the left femoral neck. 2.Osteopenia and mild degenerative changes. Electronically Signed: Adrian Wolfe MD  12/30/2023 8:20 PM EST  Workstation ID: [id number]    XR Pelvis 1 or 2 View    Result Date: 12/30/2023  XR PELVIS 1 OR 2 VW Date of Exam: 12/30/2023 7:47 PM EST Indication: fall/pain Comparison: None available. Findings: Suboptimal demonstration of bony detail due to overlying soft tissue attenuation. There is a nondisplaced fracture of the left femoral neck. No fracture of the pelvis is demonstrated.     Impression: Impression: Nondisplaced left femoral neck fracture Electronically Signed: Derek Morrow  12/30/2023 8:19 PM EST  Workstation ID: [id number]     Results for orders placed during the hospital encounter of 03/14/19    Adult Transesophageal Echo (ERMIAS) W/ Cont if Necessary Per Protocol    Interpretation Summary  · Left ventricular systolic function is normal. Estimated EF appears to be in the range of 61 - 65%  · Normal left atrial size and volume noted. There is no spontaneous echo contrast present. The left atrial appendage was visualized through multiple planes. Left atrial appendage was found to be multilobar in nature. Doppler interrogation shows normal flow within the left atrial appendage. No evidence of a left atrial appendage thrombus was present  · Lipomatous hypertrophy of the interatrial septum present. No evidence of a patent foramen ovale. Saline test results are negative.  · There is mild thickening of  the noncoronary cusp of the aortic valve. Mild aortic valve regurgitation is present  · There is moderate (grade 2) layered plaque in the proximal aorta present.    Assessment & Plan   Assessment & Plan       Closed left hip fracture    Fibromyalgia    Type 2 diabetes mellitus    Takotsubo cardiomyopathy    Elevated serum creatinine    Hyperlipidemia LDL goal <70    COPD (chronic obstructive pulmonary disease)    GERD (gastroesophageal reflux disease)    Coronary artery disease involving native coronary artery of native heart without angina pectoris    Essential hypertension    CKD (chronic kidney disease) stage 3, GFR 30-59 ml/min    Paroxysmal atrial fibrillation    Anxiety associated with depression    Anemia, chronic disease    Yanique Webster is a 83 y.o. female w/a hx of CAD, HTN, HLD, PAF (on Xarelto), Takotsubo cardiomyopathy, PVD, T2DM, neuropathy, CKD Stage III, COPD, chronic pain who presented to the ED w/ c/o a fall.     **Left hip fracture   **Mechanical fall  -xray obtained: minimally displaced fracture of the left femoral neck  -left knee xray:  no acute fracture or dislocation at the knee  -s/p block placed by ED MD  -ED provider spoke w/ Dr. Harrington w/ Ortho who plans to see pt in am; consult ordered   -neurovascular checks to LLE q 4 hours overnight  -NS @ 75ml/hour x 10 hours  -holding Xarelto and ASA (last dose of Xarelto taken at ~6pm tonight)   -NPO after MN  -coags, hem A1c, TSH, mag, type/screen- pending   -symptom mgt  -pt,ot and case mgt consult     **Elevated creatinine   **CKD Stage III  -slightly elevated CR @ 2.05 w/ eGFR 23.8  -previous 1.99 in 9/23, ~1.7 in 2/23  -UA pending, urine studies pending   -NS @ 75ml/hour x 10 hours  -holding Cymbalta 2/2 CrCl and holding lisinopril and Torsemide   -repeat BMP in am     **PAF   **Cardiomyopathy  **CAD  **HTN, HLD   -ECHO 2019 w/ EF 61-65%  -EKG c/w NSR  -holding Xarelto and ASA pending surgery   -continue routine statin, coreg, clonidine,  Cardura (sub Hytrin)  -holding Torsemide and lisinopril pending CR trend   -daily weights; strict I/Os     **COPD   -currently on room air   -CXR pending   -prn nebs     **T2DM  -hem A1c pending  -holding routine Lantus while NPO  -FSBG q ac/hs w/ LDSS     **Anemia   -previous H/H 9.4/30.2 in 9/23 and 12.0/37.3 in February 2023   -current H/H 9.2/29.1  -anemia panel w/ am labs   -CBC in am     **Anxiety   **Depression   -Cymbalta held 2/2 contraindication/CrCl    **Fibromyalgia   **Chronic pain   -holding MS contin for now     **GERD  -continue PPI     DVT prophylaxis:  Xarelto (on hold pending surgery)     CODE STATUS:    Level Of Support Discussed With: Patient  Code Status (Patient has no pulse and is not breathing): CPR (Attempt to Resuscitate)  Medical Interventions (Patient has pulse or is breathing): Full Support    Expected Discharge tbd    This note has been completed as part of a split-shared workflow.     Signature: Electronically signed by CHRIS Velásquez, 12/30/23, 10:35 PM EST.    Time spent: 55 minutes       Attending   Admission Attestation       I have performed an independent face-to-face diagnostic evaluation including performing an independent physical examination.  The documented plan of care above was reviewed and developed with the advanced practice clinician (APC).  I have updated the HPI as appropriate.    Brief HPI    83 F states that earlier this afternoon (Saturday 12/30) she was turning to reach for an object and lost her balance, causing her to fall and impact her left side.  She states she had immediate left hip pain which was worse with any attempted ambulation.  She has not ambulated since the incident.  She denies impacting her head, and also denies any symptoms causative of the fall, i.e. dizziness/lightheadedness, focal weakness, or syncope.  Workup in the ED included plain films of the left hip which revealed a left femoral neck fracture.    Attending Physical Exam:  Temp:   [98 °F (36.7 °C)] 98 °F (36.7 °C)  Heart Rate:  [65-80] 72  Resp:  [16-20] 16  BP: (150-183)/(61-74) 165/71    Constitutional: Awake, alert, NAD, pleasant.  Eyes: PERRLA, sclerae anicteric, no conjunctival injection  HENT: NCAT, mucous membranes moist  Neck: Supple, no thyromegaly, no lymphadenopathy, trachea midline  Respiratory: Clear to auscultation bilaterally, nonlabored respirations   Cardiovascular: RRR, no murmurs, rubs, or gallops, palpable pedal pulses bilaterally.  NVI at toes on the left.  Normal cap refill of left foot.  Gastrointestinal: Positive bowel sounds, soft, nontender, nondistended  Musculoskeletal: No bilateral ankle edema, no clubbing or cyanosis to extremities; LLE is kept in slight external rotation at the hip and slight flexion at the knee.  Psychiatric: Appropriate affect, cooperative  Neurologic: Oriented x 3, strength symmetric in all extremities though LLE not tested, Cranial Nerves grossly intact to confrontation, speech clear  Skin: No rashes, normal turgor.    Assessment and Plan:    See assessment and plan documented by APC above and updated/edited by me as appropriate.    Total time spent: 34 minutes  Time spent includes time reviewing chart, face-to-face time, counseling patient/family/caregiver, ordering medications/tests/procedures, communicating with other health care professionals, documenting clinical information in the electronic health record, and coordination of care.       Cedric Vieyra III, DO  12/31/23

## 2023-12-31 NOTE — CONSULTS
Orthopaedic Consult Note  Patient Care Team:  Sebas Alicea MD as PCP - General (Family Medicine)  Steve Geronimo MD as Consulting Physician (Cardiology)  Emilio Regalado MD as Consulting Physician (Endocrinology)    Chief complaint   Left hip pain    Subjective .     History of present illness:    81-year-old female presents to the hospital for left thigh and hip pain after mechanical fall yesterday afternoon.  Was subsequently able to ambulate however pain worsened and later on the evening was unable to ambulate secondary to pain.  Described pain that radiates from her left hip into her groin and knee.  Of note patient has chronic kidney disease.  Patient also does have diabetes.  Patient takes Xarelto for A-fib and last took it yesterday evening.  Pain well-controlled today.  Patient currently NPO.    Review of Systems:  All systems reviewed are negative except for what is stated in the HPI       History  Family History   Problem Relation Age of Onset    Cancer Mother     Cancer Father     Cancer Other     Breast cancer Sister 67    Ovarian cancer Neg Hx      Social History     Socioeconomic History    Marital status:    Tobacco Use    Smoking status: Former     Packs/day: 1     Types: Cigarettes     Quit date: 2003     Years since quittin.0    Smokeless tobacco: Never   Vaping Use    Vaping Use: Never used   Substance and Sexual Activity    Alcohol use: No    Drug use: No    Sexual activity: Not Currently     Partners: Male     Birth control/protection: Pill, Hysterectomy     Past Surgical History:   Procedure Laterality Date    BRONCHOSCOPY N/A 2016    Procedure: BRONCHOSCOPY BIOPSY AT BEDSIDE;  Surgeon: Mookie Willard MD;  Location:  [facility] ENDOSCOPY;  Service:     CARDIAC CATHETERIZATION N/A 2016    Procedure: Left Heart Cath;  Surgeon: Steve Geronimo MD;  Location:  [facility] CATH INVASIVE LOCATION;  Service:     CARDIAC CATHETERIZATION N/A 2016    Procedure: Right  Heart Cath;  Surgeon: Steve Geronimo MD;  Location:  [facility] CATH INVASIVE LOCATION;  Service:     CARDIAC ELECTROPHYSIOLOGY PROCEDURE N/A 7/2/2019    Procedure: Loop insertion;  Surgeon: Steve Geronimo MD;  Location:  [facility] CATH INVASIVE LOCATION;  Service: Cardiovascular    CARDIAC ELECTROPHYSIOLOGY PROCEDURE N/A 9/26/2023    Procedure: Loop recorder removal;  Surgeon: Steve Geronimo MD;  Location:  [facility] CATH INVASIVE LOCATION;  Service: Cardiovascular;  Laterality: N/A;    CATARACT EXTRACTION      ESOPHAGEAL DILATATION  2007    HERNIA REPAIR      HYSTERECTOMY  1998    WRIST FRACTURE SURGERY Left      Past Medical History:   Diagnosis Date    Blurry vision     Chronic joint pain     Chronic pain     COPD (chronic obstructive pulmonary disease)     Coronary artery disease     Diabetic gastroparesis 08/24/2016    Esophageal stricture     s/p dilation in 2007    GERD (gastroesophageal reflux disease)     Gout     Headache     secondary to hypertension    Hyperlipidemia     Hypertension     Long term current use of insulin     Neuropathy     Neuropathy due to type 2 diabetes mellitus     Obesity     Osteoarthritis     Pericarditis 2008    Stroke 03/2019    Type 2 diabetes mellitus      Allergies   Allergen Reactions    Penicillins     Nickel Rash    Penicillins Rash     unk       Current Facility-Administered Medications:     acetaminophen (TYLENOL) tablet 650 mg, 650 mg, Oral, Q4H PRN **OR** acetaminophen (TYLENOL) 160 MG/5ML oral solution 650 mg, 650 mg, Oral, Q4H PRN **OR** acetaminophen (TYLENOL) suppository 650 mg, 650 mg, Rectal, Q4H PRN, Lizbeth Buck APRN    atorvastatin (LIPITOR) tablet 80 mg, 80 mg, Oral, Daily, Lizbeth Buck APRN, 80 mg at 12/31/23 0835    sennosides-docusate (PERICOLACE) 8.6-50 MG per tablet 2 tablet, 2 tablet, Oral, BID **AND** polyethylene glycol (MIRALAX) packet 17 g, 17 g, Oral, Daily PRN **AND** bisacodyl (DULCOLAX) EC tablet 5 mg, 5 mg, Oral, Daily PRN **AND** bisacodyl (DULCOLAX)  suppository 10 mg, 10 mg, Rectal, Daily PRN, Lizbeth Buck APRN    carvedilol (COREG) tablet 25 mg, 25 mg, Oral, Q12H, Lizbeth Buck APRN, 25 mg at 12/31/23 0834    cloNIDine (CATAPRES) tablet 0.1 mg, 0.1 mg, Oral, BID, Lizbeth Buck APRN, 0.1 mg at 12/31/23 0835    dextrose (D50W) (25 g/50 mL) IV injection 25 g, 25 g, Intravenous, Q15 Min PRN, Lizbeth Buck APRN    dextrose (GLUTOSE) oral gel 15 g, 15 g, Oral, Q15 Min PRN, Lizbeth Buck APRN    glucagon (GLUCAGEN) injection 1 mg, 1 mg, Intramuscular, Q15 Min PRN, Lizbeth Buck APRN    hydrALAZINE (APRESOLINE) tablet 25 mg, 25 mg, Oral, Q8H, Lizbeth Buck APRN, 25 mg at 12/31/23 0650    HYDROmorphone (DILAUDID) injection 0.5 mg, 0.5 mg, Intravenous, Q3H PRN, Cedric Vieyra III, DO, 0.5 mg at 12/31/23 0531    Insulin Lispro (humaLOG) injection 2-7 Units, 2-7 Units, Subcutaneous, 4x Daily AC & at Bedtime, Lizbeth Buck APRN    ipratropium-albuterol (DUO-NEB) nebulizer solution 3 mL, 3 mL, Nebulization, Q4H PRN, Lizbeth Buck APRN    lactobacillus acidophilus (RISAQUAD) capsule 1 capsule, 1 capsule, Oral, Daily, Lizbeth Buck APRN, 1 capsule at 12/31/23 0835    melatonin tablet 5 mg, 5 mg, Oral, Nightly PRN, Lizbeth Buck APRN    ondansetron (ZOFRAN) injection 4 mg, 4 mg, Intravenous, Once, Baudilio Hsu MD    pantoprazole (PROTONIX) EC tablet 40 mg, 40 mg, Oral, Daily, Lizbeth Buck APRN, 40 mg at 12/31/23 0835    sodium chloride 0.9 % flush 10 mL, 10 mL, Intravenous, PRN, Baudilio Hsu MD    sodium chloride 0.9 % flush 10 mL, 10 mL, Intravenous, Q12H, Lizbeth Buck APRN, 10 mL at 12/31/23 0836    sodium chloride 0.9 % flush 10 mL, 10 mL, Intravenous, PRN, Lizbeth Buck APRN    sodium chloride 0.9 % infusion 40 mL, 40 mL, Intravenous, PRN, Lizbeth Buck APRN    sodium chloride 0.9 % infusion, 75 mL/hr, Intravenous, Continuous, Lizbeth Buck APRN, Last Rate: 75 mL/hr at 12/31/23 0009, 75 mL/hr at 12/31/23 0009     terazosin (HYTRIN) capsule 5 mg, 5 mg, Oral, Nightly, Lizbeth Buck, CHRIS, 5 mg at 12/31/23 0009    Objective     Vital Signs   Temp:  [98 °F (36.7 °C)-98.2 °F (36.8 °C)] 98.2 °F (36.8 °C)  Heart Rate:  [65-80] 70  Resp:  [16-20] 16  BP: (141-183)/(40-77) 183/77  Body mass index is 30.9 kg/m².      Physical Exam:  left LE: skin intact, compartments soft/compressible   Tenderness palpation with deep palpation about the hip and over the greater trochanter, minimal pain with knee range of motion no pain with ankle range of motion   +EHL/FHL/GSC/TA   SILT DP/SP/SN/Saph/Tib, slightly decreased at baseline due to neuropathy   Palpable DP, CR brisk in all toes    Labs:  Lab Results (last 24 hours)       Procedure Component Value Units Date/Time    Urinalysis With Culture If Indicated - Urine, Clean Catch [[id number]]  (Abnormal) Collected: 12/31/23 0753    Specimen: Urine, Clean Catch Updated: 12/31/23 0834     Color, UA Yellow     Appearance, UA Clear     pH, UA 6.5     Specific Gravity, UA 1.014     Glucose,  mg/dL (1+)     Ketones, UA Negative     Bilirubin, UA Negative     Blood, UA Negative     Protein,  mg/dL (2+)     Leuk Esterase, UA Negative     Nitrite, UA Negative     Urobilinogen, UA 0.2 E.U./dL    Narrative:      In absence of clinical symptoms, the presence of pyuria, bacteria, and/or nitrites on the urinalysis result does not correlate with infection.    Urinalysis, Microscopic Only - Urine, Clean Catch [[id number]] Collected: 12/31/23 0753    Specimen: Urine, Clean Catch Updated: 12/31/23 0834     RBC, UA 0-2 /HPF      WBC, UA 0-2 /HPF      Comment: Urine culture not indicated.        Bacteria, UA None Seen /HPF      Squamous Epithelial Cells, UA None Seen /HPF      Hyaline Casts, UA None Seen /LPF      Methodology Automated Microscopy    Creatinine Urine Random (kidney function) GFR component - Urine, Clean Catch [[id number]] Collected: 12/31/23 0753    Specimen: Urine, Clean Catch Updated:  12/31/23 0753    Osmolality, Urine - Urine, Clean Catch [[id number]] Collected: 12/31/23 0753    Specimen: Urine, Clean Catch Updated: 12/31/23 0753    Sodium, Urine, Random - Urine, Clean Catch [[id number]] Collected: 12/31/23 0753    Specimen: Urine, Clean Catch Updated: 12/31/23 0753    POC Glucose Once [[id number]]  (Abnormal) Collected: 12/31/23 0738    Specimen: Blood Updated: 12/31/23 0741     Glucose 132 mg/dL     Magnesium [[id number]]  (Normal) Collected: 12/31/23 0538    Specimen: Blood Updated: 12/31/23 0716     Magnesium 2.2 mg/dL     Basic Metabolic Panel [[id number]]  (Abnormal) Collected: 12/31/23 0538    Specimen: Blood Updated: 12/31/23 0716     Glucose 127 mg/dL      BUN 41 mg/dL      Creatinine 1.97 mg/dL      Sodium 143 mmol/L      Potassium 5.1 mmol/L      Chloride 112 mmol/L      CO2 22.0 mmol/L      Calcium 9.8 mg/dL      BUN/Creatinine Ratio 20.8     Anion Gap 9.0 mmol/L      eGFR 25.0 mL/min/1.73     Narrative:      GFR Normal >60  Chronic Kidney Disease <60  Kidney Failure <15    The GFR formula is only valid for adults with stable renal function between ages 18 and 70.    Ferritin [[id number]]  (Normal) Collected: 12/31/23 0538    Specimen: Blood Updated: 12/31/23 0709     Ferritin 83.11 ng/mL     Narrative:      Results may be falsely decreased if patient taking Biotin.      Iron Profile [[id number]]  (Abnormal) Collected: 12/31/23 0538    Specimen: Blood Updated: 12/31/23 0709     Iron 31 mcg/dL      Iron Saturation (TSAT) 13 %      Transferrin 166 mg/dL      TIBC 247 mcg/dL     TSH [[id number]]  (Abnormal) Collected: 12/31/23 0538    Specimen: Blood Updated: 12/31/23 0709     TSH 0.258 uIU/mL     Narrative:      Due to abnormal TSH results, suggest ordering Free T4.    Protime-INR [[id number]]  (Abnormal) Collected: 12/31/23 0538    Specimen: Blood Updated: 12/31/23 0654     Protime 23.9 Seconds      INR 2.12    aPTT [[id number]]  (Abnormal) Collected: 12/31/23 0538    Specimen: Blood Updated:  12/31/23 0654     PTT 48.6 seconds     Narrative:      PTT = The equivalent PTT values for the therapeutic range of heparin levels at 0.3 to 0.5 U/ml are 60 to 70 seconds.    Reticulocytes [[id number]]  (Normal) Collected: 12/31/23 0538    Specimen: Blood Updated: 12/31/23 0639     Reticulocyte % 1.52 %      Reticulocyte Absolute 0.0502 10*6/mm3     CBC & Differential [[id number]]  (Abnormal) Collected: 12/31/23 0538    Specimen: Blood Updated: 12/31/23 0639    Narrative:      The following orders were created for panel order CBC & Differential.  Procedure                               Abnormality         Status                     ---------                               -----------         ------                     CBC Auto Differential[[id number]]        Abnormal            Final result                 Please view results for these tests on the individual orders.    CBC Auto Differential [[id number]]  (Abnormal) Collected: 12/31/23 0538    Specimen: Blood Updated: 12/31/23 0639     WBC 6.87 10*3/mm3      RBC 3.30 10*6/mm3      Hemoglobin 9.2 g/dL      Hematocrit 29.5 %      MCV 89.4 fL      MCH 27.9 pg      MCHC 31.2 g/dL      RDW 15.3 %      RDW-SD 49.7 fl      MPV 11.0 fL      Platelets 160 10*3/mm3      Neutrophil % 64.0 %      Lymphocyte % 18.8 %      Monocyte % 9.9 %      Eosinophil % 6.4 %      Basophil % 0.6 %      Immature Grans % 0.3 %      Neutrophils, Absolute 4.40 10*3/mm3      Lymphocytes, Absolute 1.29 10*3/mm3      Monocytes, Absolute 0.68 10*3/mm3      Eosinophils, Absolute 0.44 10*3/mm3      Basophils, Absolute 0.04 10*3/mm3      Immature Grans, Absolute 0.02 10*3/mm3      nRBC 0.0 /100 WBC     Vitamin B12 [[id number]] Collected: 12/31/23 0538    Specimen: Blood Updated: 12/31/23 0627    Folate [[id number]] Collected: 12/31/23 0538    Specimen: Blood Updated: 12/31/23 0627    Folate RBC [[id number]] Collected: 12/31/23 0538    Specimen: Blood Updated: 12/31/23 0627    Hemoglobin A1c [[id number]]  Collected: 12/31/23 0538    Specimen: Blood Updated: 12/31/23 0627    [place] Draw [[id number]] Collected: 12/30/23 1944    Specimen: Blood Updated: 12/30/23 2345    Narrative:      The following orders were created for panel order [place] Draw.  Procedure                               Abnormality         Status                     ---------                               -----------         ------                     Green Top (Gel)[[id number]]                                  Final result               Lavender Top[[id number]]                                     Final result               Gold Top - SST[[id number]]                                   Final result               Gray Top[[id number]]                                         Final result               Light Blue Top[[id number]]                                   Final result                 Please view results for these tests on the individual orders.    Gray Top [[id number]] Collected: 12/30/23 1944    Specimen: Blood Updated: 12/30/23 2345     Extra Tube Hold for add-ons.     Comment: Auto resulted.       Green Top (Gel) [[id number]] Collected: 12/30/23 1944    Specimen: Blood Updated: 12/30/23 2046     Extra Tube Hold for add-ons.     Comment: Auto resulted.       Lavender Top [[id number]] Collected: 12/30/23 1944    Specimen: Blood Updated: 12/30/23 2046     Extra Tube hold for add-on     Comment: Auto resulted       Gold Top - SST [[id number]] Collected: 12/30/23 1944    Specimen: Blood Updated: 12/30/23 2046     Extra Tube Hold for add-ons.     Comment: Auto resulted.       Light Blue Top [[id number]] Collected: 12/30/23 1945    Specimen: Blood Updated: 12/30/23 2046     Extra Tube Hold for add-ons.     Comment: Auto resulted       Comprehensive Metabolic Panel [[id number]]  (Abnormal) Collected: 12/30/23 1944    Specimen: Blood Updated: 12/30/23 2045     Glucose 161 mg/dL      BUN 41 mg/dL      Creatinine 2.05 mg/dL      Sodium 140 mmol/L      Potassium 3.9  mmol/L      Chloride 108 mmol/L      CO2 21.0 mmol/L      Calcium 9.7 mg/dL      Total Protein 6.2 g/dL      Albumin 3.8 g/dL      ALT (SGPT) 12 U/L      AST (SGOT) 16 U/L      Alkaline Phosphatase 154 U/L      Total Bilirubin 0.3 mg/dL      Globulin 2.4 gm/dL      Comment: Calculated Result        A/G Ratio 1.6 g/dL      BUN/Creatinine Ratio 20.0     Anion Gap 11.0 mmol/L      eGFR 23.8 mL/min/1.73     Narrative:      GFR Normal >60  Chronic Kidney Disease <60  Kidney Failure <15    The GFR formula is only valid for adults with stable renal function between ages 18 and 70.    Protime-INR [[id number]]  (Abnormal) Collected: 12/30/23 1945    Specimen: Blood Updated: 12/30/23 2039     Protime 26.1 Seconds      INR 2.38    CBC & Differential [[id number]]  (Abnormal) Collected: 12/30/23 1944    Specimen: Blood Updated: 12/30/23 2035    Narrative:      The following orders were created for panel order CBC & Differential.  Procedure                               Abnormality         Status                     ---------                               -----------         ------                     CBC Auto Differential[[id number]]        Abnormal            Final result                 Please view results for these tests on the individual orders.    CBC Auto Differential [[id number]]  (Abnormal) Collected: 12/30/23 1944    Specimen: Blood Updated: 12/30/23 2035     WBC 8.82 10*3/mm3      RBC 3.26 10*6/mm3      Hemoglobin 9.2 g/dL      Hematocrit 29.1 %      MCV 89.3 fL      MCH 28.2 pg      MCHC 31.6 g/dL      RDW 15.3 %      RDW-SD 50.4 fl      MPV 11.1 fL      Platelets 160 10*3/mm3      Neutrophil % 79.4 %      Lymphocyte % 11.0 %      Monocyte % 7.5 %      Eosinophil % 1.6 %      Basophil % 0.2 %      Immature Grans % 0.3 %      Neutrophils, Absolute 7.00 10*3/mm3      Lymphocytes, Absolute 0.97 10*3/mm3      Monocytes, Absolute 0.66 10*3/mm3      Eosinophils, Absolute 0.14 10*3/mm3      Basophils, Absolute 0.02 10*3/mm3       Immature Grans, Absolute 0.03 10*3/mm3      nRBC 0.0 /100 WBC             Imaging:  XR FEMUR 2 VW LEFT  XR KNEE 1 OR 2 VW LEFT     Date of Exam: 12/30/2023 7:49 PM EST     Indication: fall     Comparison: None available.     Findings:  There is an acute minimally displaced fracture of the left femoral neck. There is mild osteoarthritis at the left hip. The bones are generally demineralized. There is minimal osteophyte formation at the knee with preservation of joint spaces. There is no   acute fracture or dislocation at the knee. No definite knee joint effusion. No erosions.     IMPRESSION:  Impression:  1.Acute minimally displaced fracture of the left femoral neck.  2.Osteopenia and mild degenerative changes.           Electronically Signed: Adrian Wolfe MD    12/30/2023 8:20 PM EST    Workstation ID: [id number]    12/31/23 I have personally reviewed and interpreted the images from outside facility with the documented findings, minimally displaced valgus impacted femoral neck fracture        Assessment & Plan   83-year-old female with left femoral neck fracture      Closed left hip fracture    Fibromyalgia    Type 2 diabetes mellitus    Takotsubo cardiomyopathy    Elevated serum creatinine    Hyperlipidemia LDL goal <70    COPD (chronic obstructive pulmonary disease)    GERD (gastroesophageal reflux disease)    Coronary artery disease involving native coronary artery of native heart without angina pectoris    Essential hypertension    CKD (chronic kidney disease) stage 3, GFR 30-59 ml/min    Paroxysmal atrial fibrillation    Anxiety associated with depression    Anemia, chronic disease        I discussed the patient's findings and my recommendations as well as treatment options and alternatives with the patient as well as the patient's son.  Surgical versus non surgical treatment options were discussed as well.  We reviewed the nature of the planned procedure including the risks, benefits and potential  complications.  Understanding was expressed and the decision was made to proceed with surgery.     Patient has minimally displaced valgus impacted left femoral neck fracture.  I do recommend surgical management.  Of note patient is on Xarelto for atrial fibrillation and her last dose was last night.  INR today was 2.12.  Patient currently hemodynamically stable.  Had discussion with hospitalist this morning regarding patient's current INR and her Xarelto status.  We concluded that using the next 24 hours to optimize the patient's INR as well as other medical issues would be best for the patient with plans to take her to the operating room first thing tomorrow morning.  I believe this fracture is amenable to cannulated screw fixation however if it seems as though this is not the best fixation method the next best surgical solution would be a hemiarthroplasty.  Discussed with hospitalist that my preference would be an INR under 1.5.  Medicine team reported they will work to improve that number over the next 24 hours.  This information was relayed to the patient as well as the patient's son and everyone agrees that surgical fixation in the operating room tomorrow morning is the best plan of action.  Patient was given a diet this morning and will be made n.p.o. at midnight.      Seymour Harrington MD  12/31/23  09:15 EST

## 2023-12-31 NOTE — ED PROVIDER NOTES
Subjective   History of Present Illness  83-year-old female presents for evaluation of left thigh pain.  This afternoon at approximately 2:30 PM, the patient had a mechanical trip and fall at home.  EMS was called for a lift assist and the patient was able to bear weight at that time.  She declined medical transport and then called them back this evening for worsening pain and difficulty walking.  She denies any repeat fall or injury.  She states that the pain radiates from her left hip into her groin and knee.  She currently rates her pain at 8 out of 10 in severity and notes that the pain is worse with attempted movement.  She denies any paresthesias.  The pain is currently 8 out of 10 in severity.  She did not hit her head or lose consciousness.  No neck pain.      Review of Systems   Musculoskeletal:         Left hip pain       Past Medical History:   Diagnosis Date    Blurry vision     Chronic joint pain     Chronic pain     COPD (chronic obstructive pulmonary disease)     Coronary artery disease     Diabetic gastroparesis 08/24/2016    Esophageal stricture     s/p dilation in 2007    GERD (gastroesophageal reflux disease)     Gout     Headache     secondary to hypertension    Hyperlipidemia     Hypertension     Long term current use of insulin     Neuropathy     Neuropathy due to type 2 diabetes mellitus     Obesity     Osteoarthritis     Pericarditis 2008    Stroke 03/2019    Type 2 diabetes mellitus        Allergies   Allergen Reactions    Penicillins     Nickel Rash    Penicillins Rash     unk       Past Surgical History:   Procedure Laterality Date    BRONCHOSCOPY N/A 8/23/2016    Procedure: BRONCHOSCOPY BIOPSY AT BEDSIDE;  Surgeon: Mookie Willard MD;  Location:  [facility];  Service:     CARDIAC CATHETERIZATION N/A 8/30/2016    Procedure: Left Heart Cath;  Surgeon: Steve Geronimo MD;  Location:  [facility];  Service:     CARDIAC CATHETERIZATION N/A 8/30/2016    Procedure: Right  Heart Cath;  Surgeon: Steve Geronimo MD;  Location:  [facility];  Service:     CARDIAC ELECTROPHYSIOLOGY PROCEDURE N/A 2019    Procedure: Loop insertion;  Surgeon: Steve Geronimo MD;  Location:  [facility];  Service: Cardiovascular    CARDIAC ELECTROPHYSIOLOGY PROCEDURE N/A 2023    Procedure: Loop recorder removal;  Surgeon: Steve Geronimo MD;  Location:  [facility];  Service: Cardiovascular;  Laterality: N/A;    CATARACT EXTRACTION      ESOPHAGEAL DILATATION      HERNIA REPAIR      HYSTERECTOMY  1998    WRIST FRACTURE SURGERY Left        Family History   Problem Relation Age of Onset    Cancer Mother     Cancer Father     Cancer Other     Breast cancer Sister 67    Ovarian cancer Neg Hx        Social History     Socioeconomic History    Marital status:    Tobacco Use    Smoking status: Former     Packs/day: 1     Types: Cigarettes     Quit date: 2003     Years since quittin.0    Smokeless tobacco: Never   Vaping Use    Vaping Use: Never used   Substance and Sexual Activity    Alcohol use: No    Drug use: No    Sexual activity: Not Currently     Partners: Male     Birth control/protection: Pill, Hysterectomy           Objective   Physical Exam  Vitals and nursing note reviewed.   Constitutional:       General: She is not in acute distress.     Appearance: She is well-developed. She is not diaphoretic.      Comments: Nontoxic-appearing female   HENT:      Head: Normocephalic and atraumatic.   Eyes:      Pupils: Pupils are equal, round, and reactive to light.   Neck:      Comments: No midline cervical spine tenderness noted, no step-off or deformity present  Cardiovascular:      Rate and Rhythm: Normal rate and regular rhythm.      Heart sounds: Normal heart sounds. No murmur heard.     No friction rub. No gallop.   Pulmonary:      Effort: Pulmonary effort is normal. No respiratory distress.      Breath sounds: Normal breath sounds. No  wheezing or rales.   Abdominal:      General: Bowel sounds are normal. There is no distension.      Palpations: Abdomen is soft. There is no mass.      Tenderness: There is no abdominal tenderness. There is no guarding or rebound.   Musculoskeletal:      Comments: Range of motion of left hip is limited secondary to pain, no pelvic instability, no shortening or rotation of left lower extremity when compared to the right   Skin:     General: Skin is warm and dry.      Findings: No erythema or rash.   Neurological:      Mental Status: She is alert and oriented to person, place, and time.      Comments: Left lower extremity is neurovascularly intact distally with bounding distal pulses and normal sensation noted   Psychiatric:         Mood and Affect: Mood normal.         Thought Content: Thought content normal.         Judgment: Judgment normal.         Nerve Block    Date/Time: 12/31/2023 12:22 AM    Performed by: Baudilio Hsu MD  Authorized by: Baudilio Hsu MD    Consent:     Consent obtained:  Verbal and written    Consent given by:  Patient    Risks, benefits, and alternatives were discussed: yes      Risks discussed:  Allergic reaction, bleeding, intravenous injection, infection, nerve damage, pain, unsuccessful block and swelling    Alternatives discussed:  Alternative treatment  Universal protocol:     Procedure explained and questions answered to patient or proxy's satisfaction: yes      Relevant documents present and verified: yes      Test results available: yes      Imaging studies available: yes      Required blood products, implants, devices, and special equipment available: yes      Site/side marked: yes      Immediately prior to procedure, a time out was called: yes      Patient identity confirmed:  Arm band and verbally with patient  Indications:     Indications:  Pain relief  Location:     Body area:  Lower extremity    Lower extremity nerve:  Femoral    Laterality:  Left  Pre-procedure  details:     Skin preparation:  Chlorhexidine    Preparation: Patient was prepped and draped in usual sterile fashion    Skin anesthesia:     Skin anesthesia method:  Local infiltration    Local anesthetic:  Lidocaine 1% WITH epi  Procedure details:     Guidance: ultrasound      Anesthetic injected:  Bupivacaine 0.25% w/o epi    Steroid injected:  None    Additive injected:  None    Injection procedure:  Anatomic landmarks identified, anatomic landmarks palpated, introduced needle, incremental injection and negative aspiration for blood    Paresthesia:  None  Post-procedure details:     Dressing:  Sterile dressing    Outcome:  Pain improved    Procedure completion:  Tolerated well, no immediate complications             ED Course  ED Course as of 12/31/23 0020   Sat Dec 30, 2023   1911 83-year-old female presents for evaluation of left thigh pain.  This afternoon at approximately 2:30 PM, the patient had a mechanical trip and fall at home.  EMS was called for a lift assist and the patient was able to bear weight.  She declined medical transport at that time but called them back this evening for worsening pain and difficulty walking.  On arrival to the ED, the patient is nontoxic-appearing.  She is anticoagulated.  She did not hit her head or lose consciousness.  No neck pain.  On exam, the patient has limited range of motion of her left hip secondary to pain.  No shortening or rotation noted.  No pelvic instability present.  Left lower extremity is neurovascularly intact distally with bounding distal pulses and normal sensation noted.  We will obtain plain films, provide pain control, and we will reassess following initial interventions. [DD]   2043 I personally and independently viewed the patient's x-ray images myself, and I am in agreement with the radiologist's reading for final interpretation--particularly regarding hip fracture. [DD]   2043 I discussed the patient's case with our orthopedic surgeon on-call,  Dr. Harrington, and he recommended admission to the hospitalist.  The patient will be kept n.p.o. at midnight in preparation for surgery tomorrow. [DD]   2043 I discussed the patient's case with our hospitalist, Dr. Vieyra, and the patient will be admitted under his care for further evaluation and treatment.  The patient is hemodynamically stable at this time and is aware/agreeable with plan. [DD]   2128 After obtaining informed consent, under sterile conditions and under ultrasound guidance, a femoral nerve block was performed without complication.  The patient tolerated the procedure well.  Pain is significantly improved. [DD]      ED Course User Index  [DD] Baudilio Hsu MD                                 Recent Results (from the past 24 hour(s))   ECG 12 Lead QT Measurement    Collection Time: 12/30/23  7:31 PM   Result Value Ref Range    QT Interval 446 ms    QTC Interval 481 ms   Green Top (Gel)    Collection Time: 12/30/23  7:44 PM   Result Value Ref Range    Extra Tube Hold for add-ons.    Lavender Top    Collection Time: 12/30/23  7:44 PM   Result Value Ref Range    Extra Tube hold for add-on    Gold Top - SST    Collection Time: 12/30/23  7:44 PM   Result Value Ref Range    Extra Tube Hold for add-ons.    Gray Top    Collection Time: 12/30/23  7:44 PM   Result Value Ref Range    Extra Tube Hold for add-ons.    Comprehensive Metabolic Panel    Collection Time: 12/30/23  7:44 PM    Specimen: Blood   Result Value Ref Range    Glucose 161 (H) 65 - 99 mg/dL    BUN 41 (H) 8 - 23 mg/dL    Creatinine 2.05 (H) 0.57 - 1.00 mg/dL    Sodium 140 136 - 145 mmol/L    Potassium 3.9 3.5 - 5.2 mmol/L    Chloride 108 (H) 98 - 107 mmol/L    CO2 21.0 (L) 22.0 - 29.0 mmol/L    Calcium 9.7 8.6 - 10.5 mg/dL    Total Protein 6.2 6.0 - 8.5 g/dL    Albumin 3.8 3.5 - 5.2 g/dL    ALT (SGPT) 12 1 - 33 U/L    AST (SGOT) 16 1 - 32 U/L    Alkaline Phosphatase 154 (H) 39 - 117 U/L    Total Bilirubin 0.3 0.0 - 1.2 mg/dL    Globulin 2.4  gm/dL    A/G Ratio 1.6 g/dL    BUN/Creatinine Ratio 20.0 7.0 - 25.0    Anion Gap 11.0 5.0 - 15.0 mmol/L    eGFR 23.8 (L) >60.0 mL/min/1.73   CBC Auto Differential    Collection Time: 12/30/23  7:44 PM    Specimen: Blood   Result Value Ref Range    WBC 8.82 3.40 - 10.80 10*3/mm3    RBC 3.26 (L) 3.77 - 5.28 10*6/mm3    Hemoglobin 9.2 (L) 12.0 - 15.9 g/dL    Hematocrit 29.1 (L) 34.0 - 46.6 %    MCV 89.3 79.0 - 97.0 fL    MCH 28.2 26.6 - 33.0 pg    MCHC 31.6 31.5 - 35.7 g/dL    RDW 15.3 12.3 - 15.4 %    RDW-SD 50.4 37.0 - 54.0 fl    MPV 11.1 6.0 - 12.0 fL    Platelets 160 140 - 450 10*3/mm3    Neutrophil % 79.4 (H) 42.7 - 76.0 %    Lymphocyte % 11.0 (L) 19.6 - 45.3 %    Monocyte % 7.5 5.0 - 12.0 %    Eosinophil % 1.6 0.3 - 6.2 %    Basophil % 0.2 0.0 - 1.5 %    Immature Grans % 0.3 0.0 - 0.5 %    Neutrophils, Absolute 7.00 1.70 - 7.00 10*3/mm3    Lymphocytes, Absolute 0.97 0.70 - 3.10 10*3/mm3    Monocytes, Absolute 0.66 0.10 - 0.90 10*3/mm3    Eosinophils, Absolute 0.14 0.00 - 0.40 10*3/mm3    Basophils, Absolute 0.02 0.00 - 0.20 10*3/mm3    Immature Grans, Absolute 0.03 0.00 - 0.05 10*3/mm3    nRBC 0.0 0.0 - 0.2 /100 WBC   Light Blue Top    Collection Time: 12/30/23  7:45 PM   Result Value Ref Range    Extra Tube Hold for add-ons.    Protime-INR    Collection Time: 12/30/23  7:45 PM    Specimen: Blood   Result Value Ref Range    Protime 26.1 (H) 12.2 - 14.5 Seconds    INR 2.38 (H) 0.89 - 1.12     Note: In addition to lab results from this visit, the labs listed above may include labs taken at another facility or during a different encounter within the last 24 hours. Please correlate lab times with ED admission and discharge times for further clarification of the services performed during this visit.    XR Chest 1 View   Final Result   Impression:   Cardiomegaly and pulmonary vascular congestion without overt pulmonary edema.            Electronically Signed: Adrian Wolfe MD     12/30/2023 11:01 PM EST      Workstation ID: [id number]      XR Knee 1 or 2 View Left   Final Result   Impression:   1.Acute minimally displaced fracture of the left femoral neck.   2.Osteopenia and mild degenerative changes.            Electronically Signed: Adrian Wolfe MD     12/30/2023 8:20 PM EST     Workstation ID: [id number]      XR Femur 2 View Left   Final Result   Impression:   1.Acute minimally displaced fracture of the left femoral neck.   2.Osteopenia and mild degenerative changes.            Electronically Signed: Adrian Wolfe MD     12/30/2023 8:20 PM EST     Workstation ID: [id number]      XR Pelvis 1 or 2 View   Final Result   Impression:   Nondisplaced left femoral neck fracture         Electronically Signed: Derek Morrow     12/30/2023 8:19 PM EST     Workstation ID: [id number]        Vitals:    12/30/23 2000 12/30/23 2030 12/30/23 2100 12/30/23 2132   BP: 159/61 159/68 150/63 156/74   Pulse: 72 68 65 66   Resp:       Temp:       TempSrc:       SpO2: 94% 96% 95% 97%   Weight:       Height:         Medications   ondansetron (ZOFRAN) injection 4 mg (0 mg Intravenous Hold 12/30/23 2123)   sodium chloride 0.9 % flush 10 mL (has no administration in time range)   fat emulsion (INTRALIPID,LIPOSYN) 20 % infusion  - ADS Override Pull (has no administration in time range)   atorvastatin (LIPITOR) tablet 80 mg (has no administration in time range)   carvedilol (COREG) tablet 25 mg (25 mg Oral Given 12/31/23 0009)   cloNIDine (CATAPRES) tablet 0.1 mg (0.1 mg Oral Given 12/31/23 0009)   terazosin (HYTRIN) capsule 5 mg (5 mg Oral Given 12/31/23 0009)   hydrALAZINE (APRESOLINE) tablet 25 mg (25 mg Oral Given 12/31/23 0009)   pantoprazole (PROTONIX) EC tablet 40 mg (has no administration in time range)   lactobacillus acidophilus (RISAQUAD) capsule 1 capsule (has no administration in time range)   sodium chloride 0.9 % flush 10 mL (10 mL Intravenous Given 12/31/23 0009)   sodium chloride 0.9 % flush 10 mL (has no administration in time range)    sodium chloride 0.9 % infusion 40 mL (has no administration in time range)   sennosides-docusate (PERICOLACE) 8.6-50 MG per tablet 2 tablet (has no administration in time range)     And   polyethylene glycol (MIRALAX) packet 17 g (has no administration in time range)     And   bisacodyl (DULCOLAX) EC tablet 5 mg (has no administration in time range)     And   bisacodyl (DULCOLAX) suppository 10 mg (has no administration in time range)   dextrose (GLUTOSE) oral gel 15 g (has no administration in time range)   dextrose (D50W) (25 g/50 mL) IV injection 25 g (has no administration in time range)   glucagon (GLUCAGEN) injection 1 mg (has no administration in time range)   Insulin Lispro (humaLOG) injection 2-7 Units (has no administration in time range)   sodium chloride 0.9 % infusion (75 mL/hr Intravenous New Bag 12/31/23 0009)   acetaminophen (TYLENOL) tablet 650 mg (has no administration in time range)     Or   acetaminophen (TYLENOL) 160 MG/5ML oral solution 650 mg (has no administration in time range)     Or   acetaminophen (TYLENOL) suppository 650 mg (has no administration in time range)   melatonin tablet 5 mg (has no administration in time range)   HYDROmorphone (DILAUDID) injection 0.5 mg (0.5 mg Intravenous Given 12/30/23 2358)   ipratropium-albuterol (DUO-NEB) nebulizer solution 3 mL (has no administration in time range)   HYDROmorphone (DILAUDID) injection 0.5 mg (0.5 mg Intravenous Given 12/30/23 1936)   lidocaine 1% - EPINEPHrine 1:074540 (XYLOCAINE W/EPI) 1 %-1:493203 injection 10 mL (10 mL Injection Given by Other 12/30/23 2115)   bupivacaine (PF) (MARCAINE) 0.25 % injection 30 mL (30 mL Injection Given by Other 12/30/23 2115)     ECG/EMG Results (last 24 hours)       Procedure Component Value Units Date/Time    ECG 12 Lead QT Measurement [[id number]] Collected: 12/30/23 1931     Updated: 12/30/23 1932     QT Interval 446 ms      QTC Interval 481 ms     Narrative:      Test Reason : QT  Measurement  Blood Pressure :   */*   mmHG  Vent. Rate :  70 BPM     Atrial Rate :  70 BPM     P-R Int : 136 ms          QRS Dur : 114 ms      QT Int : 446 ms       P-R-T Axes :  79  23  53 degrees     QTc Int : 481 ms    Normal sinus rhythm  Normal ECG  When compared with ECG of 13-FEB-2023 19:16,  QRS duration has increased  ST elevation now present in Inferior leads  ST now depressed in Lateral leads    Referred By: EDMD           Confirmed By:           ECG 12 Lead QT Measurement   Preliminary Result   Test Reason : QT Measurement   Blood Pressure :   */*   mmHG   Vent. Rate :  70 BPM     Atrial Rate :  70 BPM      P-R Int : 136 ms          QRS Dur : 114 ms       QT Int : 446 ms       P-R-T Axes :  79  23  53 degrees      QTc Int : 481 ms      Normal sinus rhythm   Normal ECG   When compared with ECG of 13-FEB-2023 19:16,   QRS duration has increased   ST elevation now present in Inferior leads   ST now depressed in Lateral leads      Referred By: EDMD           Confirmed By:                       Medical Decision Making  Amount and/or Complexity of Data Reviewed  Labs: ordered.  Radiology: ordered.    Risk  Prescription drug management.  Decision regarding hospitalization.        Final diagnoses:   Closed left hip fracture, initial encounter   Anemia, unspecified type   Chronic kidney disease, unspecified CKD stage       ED Disposition  ED Disposition       ED Disposition   Decision to Admit    Condition   --    Comment   Level of Care: Telemetry [5]   Diagnosis: Closed left hip fracture [056530]   Admitting Physician: SARAH OCONNELL III [[id number]]   Attending Physician: SARAH OCONNELL III [[id number]]   Isolate for COVID?: No [0]   Bed Request Comments: tele inpatient   Certification: I Certify That Inpatient Hospital Services Are Medically Necessary For Greater Than 2 Midnights                 No follow-up provider specified.       Medication List      No changes were made to your prescriptions  during this visit.            Baudilio Hsu MD  12/31/23 0023

## 2023-12-31 NOTE — CASE MANAGEMENT/SOCIAL WORK
Discharge Planning Assessment  [facility]     Patient Name: Yanique Webster  MRN: [id number]  Today's Date: 12/31/2023    Admit Date: 12/30/2023    Plan: Rehab at [place]   Discharge Needs Assessment       Row Name 12/31/23 1453       Living Environment    People in Home alone    Current Living Arrangements apartment    Living Arrangement Comments Lives in a first floor apartment in independent living at [facility]. Goes to dinning room for meals. Has grab bars on toilet.       Discharge Needs Assessment    Equipment Currently Used at Home rollator;grab bar    Equipment Needed After Discharge none    Discharge Coordination/Progress Denies current use of HH or outpt services. Has been to [facility] in the past for inpt rehab.                   Discharge Plan       Row Name 12/31/23 1456       Plan    Plan Rehab at [place]    Patient/Family in Agreement with Plan yes    Plan Comments I spoke with the pt. She is going to OR tomorrow. She is interested in [facility] rehab at [place]. The [facility] rep has left for the day. CM will f/u post op and refer to [facility].    Final Discharge Disposition Code 62 - inpatient rehab facility                  Continued Care and Services - Admitted Since 12/30/2023    Coordination has not been started for this encounter.          Demographic Summary    No documentation.                  Functional Status       Row Name 12/31/23 1452       Functional Status    Usual Activity Tolerance good       Functional Status, IADL    Medications independent    Meal Preparation independent    Housekeeping independent    Laundry independent    Shopping independent                   Psychosocial    No documentation.                  Abuse/Neglect    No documentation.                  Legal    No documentation.                  Substance Abuse    No documentation.                  Patient Forms    No documentation.                     Izzy Rosas RN

## 2023-12-31 NOTE — PROGRESS NOTES
[facility] Medicine Services  PROGRESS NOTE    Patient Name: Yanique Webster  : 1940  MRN: [id number]    Date of Admission: 2023  Primary Care Physician: Seabs Alicea MD    Subjective   Subjective     CC:  F/u hip fracture    HPI:  Patient resting in bed asleep, did not arouse for exam.      Objective   Objective     Vital Signs:   Temp:  [98 °F (36.7 °C)-98.2 °F (36.8 °C)] 98.1 °F (36.7 °C)  Heart Rate:  [65-80] 73  Resp:  [16-20] 16  BP: (141-183)/(40-77) 161/64     Physical Exam:  Constitutional: No acute distress, sleeping in bed.  HENT: NCAT, mucous membranes moist  Respiratory: Clear to auscultation bilaterally, respiratory effort normal   Cardiovascular: RRR, no murmurs, rubs, or gallops  Gastrointestinal: soft, nontender, nondistended  Musculoskeletal: No bilateral ankle edema  Neurologic: sleeping in bed.  Skin: No rashes      Results Reviewed:  LAB RESULTS:      Lab 23   WBC 6.87  --  8.82   HEMOGLOBIN 9.2*  --  9.2*   HEMATOCRIT 29.5*  --  29.1*   PLATELETS 160  --  160   NEUTROS ABS 4.40  --  7.00   IMMATURE GRANS (ABS) 0.02  --  0.03   LYMPHS ABS 1.29  --  0.97   MONOS ABS 0.68  --  0.66   EOS ABS 0.44*  --  0.14   MCV 89.4  --  89.3   PROTIME 23.9* 26.1*  --    APTT 48.6*  --   --          Lab 23  0538 23   SODIUM 143 140   POTASSIUM 5.1 3.9   CHLORIDE 112* 108*   CO2 22.0 21.0*   ANION GAP 9.0 11.0   BUN 41* 41*   CREATININE 1.97* 2.05*   EGFR 25.0* 23.8*   GLUCOSE 127* 161*   CALCIUM 9.8 9.7   MAGNESIUM 2.2  --    HEMOGLOBIN A1C 5.10  --    TSH 0.258*  --          Lab 23   TOTAL PROTEIN 6.2   ALBUMIN 3.8   GLOBULIN 2.4   ALT (SGPT) 12   AST (SGOT) 16   BILIRUBIN 0.3   ALK PHOS 154*         Lab 23  0538 23   PROTIME 23.9* 26.1*   INR 2.12* 2.38*             Lab 23   IRON 31*   IRON SATURATION (TSAT) 13*   TIBC 247*   TRANSFERRIN 166*   FERRITIN 83.11    FOLATE 8.34   VITAMIN B 12 550   ABO TYPING O   RH TYPING Negative   ANTIBODY SCREEN Negative         Brief Urine Lab Results  (Last result in the past 365 days)        Color   Clarity   Blood   Leuk Est   Nitrite   Protein   CREAT   Urine HCG        12/31/23 0753             44.5         12/31/23 0753 Yellow   Clear   Negative   Negative   Negative   100 mg/dL (2+)                   Microbiology Results Abnormal       None            XR Chest 1 View    Result Date: 12/30/2023  XR CHEST 1 VW Date of Exam: 12/30/2023 10:31 PM EST Indication: CHF Comparison: 2/13/2023. Findings: The cardiac silhouette appears enlarged. There is pulmonary vascular congestion without overt pulmonary edema. No focal lung consolidation. No pneumothorax or pleural effusion. No acute osseous abnormality. There are stable aortic arch calcifications.     Impression: Impression: Cardiomegaly and pulmonary vascular congestion without overt pulmonary edema. Electronically Signed: Adrian Wolfe MD  12/30/2023 11:01 PM EST  Workstation ID: [id number]    XR Femur 2 View Left    Result Date: 12/30/2023  XR FEMUR 2 VW LEFT XR KNEE 1 OR 2 VW LEFT Date of Exam: 12/30/2023 7:49 PM EST Indication: fall Comparison: None available. Findings: There is an acute minimally displaced fracture of the left femoral neck. There is mild osteoarthritis at the left hip. The bones are generally demineralized. There is minimal osteophyte formation at the knee with preservation of joint spaces. There is no  acute fracture or dislocation at the knee. No definite knee joint effusion. No erosions.     Impression: Impression: 1.Acute minimally displaced fracture of the left femoral neck. 2.Osteopenia and mild degenerative changes. Electronically Signed: Adrian Wolfe MD  12/30/2023 8:20 PM EST  Workstation ID: [id number]    XR Knee 1 or 2 View Left    Result Date: 12/30/2023  XR FEMUR 2 VW LEFT XR KNEE 1 OR 2 VW LEFT Date of Exam: 12/30/2023 7:49 PM EST Indication: fall  Comparison: None available. Findings: There is an acute minimally displaced fracture of the left femoral neck. There is mild osteoarthritis at the left hip. The bones are generally demineralized. There is minimal osteophyte formation at the knee with preservation of joint spaces. There is no  acute fracture or dislocation at the knee. No definite knee joint effusion. No erosions.     Impression: Impression: 1.Acute minimally displaced fracture of the left femoral neck. 2.Osteopenia and mild degenerative changes. Electronically Signed: Adrian Wolfe MD  12/30/2023 8:20 PM EST  Workstation ID: [id number]    XR Pelvis 1 or 2 View    Result Date: 12/30/2023  XR PELVIS 1 OR 2 VW Date of Exam: 12/30/2023 7:47 PM EST Indication: fall/pain Comparison: None available. Findings: Suboptimal demonstration of bony detail due to overlying soft tissue attenuation. There is a nondisplaced fracture of the left femoral neck. No fracture of the pelvis is demonstrated.     Impression: Impression: Nondisplaced left femoral neck fracture Electronically Signed: Derek Morrow  12/30/2023 8:19 PM EST  Workstation ID: [id number]     Results for orders placed during the hospital encounter of 03/14/19    Adult Transesophageal Echo (ERMIAS) W/ Cont if Necessary Per Protocol    Interpretation Summary  · Left ventricular systolic function is normal. Estimated EF appears to be in the range of 61 - 65%  · Normal left atrial size and volume noted. There is no spontaneous echo contrast present. The left atrial appendage was visualized through multiple planes. Left atrial appendage was found to be multilobar in nature. Doppler interrogation shows normal flow within the left atrial appendage. No evidence of a left atrial appendage thrombus was present  · Lipomatous hypertrophy of the interatrial septum present. No evidence of a patent foramen ovale. Saline test results are negative.  · There is mild thickening of the noncoronary cusp of the aortic valve.  Mild aortic valve regurgitation is present  · There is moderate (grade 2) layered plaque in the proximal aorta present.      Current medications:  Scheduled Meds:atorvastatin, 80 mg, Oral, Daily  carvedilol, 25 mg, Oral, Q12H  cloNIDine, 0.1 mg, Oral, BID  hydrALAZINE, 25 mg, Oral, Q8H  insulin lispro, 2-7 Units, Subcutaneous, 4x Daily AC & at Bedtime  lactobacillus acidophilus, 1 capsule, Oral, Daily  ondansetron, 4 mg, Intravenous, Once  pantoprazole, 40 mg, Oral, Daily  senna-docusate sodium, 2 tablet, Oral, BID  sodium chloride, 10 mL, Intravenous, Q12H  terazosin, 5 mg, Oral, Nightly      Continuous Infusions:   PRN Meds:.  acetaminophen **OR** acetaminophen **OR** acetaminophen    senna-docusate sodium **AND** polyethylene glycol **AND** bisacodyl **AND** bisacodyl    dextrose    dextrose    glucagon (human recombinant)    HYDROmorphone    ipratropium-albuterol    melatonin    sodium chloride    sodium chloride    sodium chloride    Assessment & Plan   Assessment & Plan     Active Hospital Problems    Diagnosis  POA    **Closed left hip fracture [S72.002A]  Yes    Anxiety associated with depression [F41.8]  Yes    Anemia, chronic disease [D63.8]  Yes    Paroxysmal atrial fibrillation [I48.0]  Yes    CKD (chronic kidney disease) stage 3, GFR 30-59 ml/min [N18.30]  Yes    Essential hypertension [I10]  Yes    Coronary artery disease involving native coronary artery of native heart without angina pectoris [I25.10]  Yes    Hyperlipidemia LDL goal <70 [E78.5]  Yes    COPD (chronic obstructive pulmonary disease) [J44.9]  Yes    GERD (gastroesophageal reflux disease) [K21.9]  Yes    Elevated serum creatinine [R79.89]  Yes    Takotsubo cardiomyopathy [I51.81]  Yes    Fibromyalgia [M79.7]  Yes    Type 2 diabetes mellitus [E11.9]  Yes      Resolved Hospital Problems   No resolved problems to display.        Brief Hospital Course to date:  Yanique Webster is a 83 y.o. female w/a hx of CAD, HTN, HLD, PAF (on Xarelto),  Takotsubo cardiomyopathy, PVD, T2DM, neuropathy, CKD Stage III, COPD, chronic pain who presented to the ED w/ c/o a fall found to have a hip fracture.    Left hip fracture after mechanical fall  - d/w Dr. Harrington, plans OR in the AM for repair, recommends repeat INR in AM, ideally <1.5, will give 2 units FFP today to try to reverse coagulopathy  -neurovascular checks to LLE q  -holding Xarelto and ASA (last dose of Xarelto taken at ~6pm 12/30)  -NPO after MN tonight  -pt,ot and case mgt consult      CKD Stage III  - at baseline around 1.9  - monitor      PAF   Cardiomyopathy  CAD  HTN, HLD   -ECHO 2019 w/ EF 61-65%  -holding Xarelto and ASA pending surgery   -continue routine statin, coreg, clonidine, Cardura (sub Hytrin)  -resume torsemide and lisinopril   -daily weights; strict I/Os      COPD   -currently on room air   -prn nebs      T2DM  -hem A1c only 5.1  -FSBG q ac/hs w/ LDSS      Anemia   - at baseline, monitor     Anxiety   Depression   -Cymbalta on hold as it is contraindicated w/her creatinine clearance     Fibromyalgia   Chronic pain   -resume home pain regimen MS contin 15mg BID     GERD  -continue PPI      Expected Discharge Location and Transportation: Rehab  Expected Discharge   Expected discharge date/ time has not been documented.     DVT prophylaxis:  No DVT prophylaxis order currently exists.     AM-PAC 6 Clicks Score (PT): 14 (12/31/23 0800)    CODE STATUS:   Code Status and Medical Interventions:   Ordered at: 12/30/23 6105     Level Of Support Discussed With:    Patient     Code Status (Patient has no pulse and is not breathing):    CPR (Attempt to Resuscitate)     Medical Interventions (Patient has pulse or is breathing):    Full Support       Cookie Patten, DO  12/31/23

## 2023-12-31 NOTE — ED NOTES
Yanique Quintin Webster    Nursing Report ED to Floor:  Mental status: a&ox4  Ambulatory status: bedrest  Oxygen Therapy:  ra  Cardiac Rhythm: ns  Admitted from: ed  Safety Concerns:  none  Social Issues: none  ED Room #:  12    ED Nurse Phone Extension - [phone] or may call [phone].      HPI:   Chief Complaint   Patient presents with    Fall       Past Medical History:  Past Medical History:   Diagnosis Date    Blurry vision     Chronic joint pain     Chronic pain     COPD (chronic obstructive pulmonary disease)     Coronary artery disease     Diabetic gastroparesis 08/24/2016    Esophageal stricture     s/p dilation in 2007    GERD (gastroesophageal reflux disease)     Gout     Headache     secondary to hypertension    Hyperlipidemia     Hypertension     Long term current use of insulin     Neuropathy     Neuropathy due to type 2 diabetes mellitus     Obesity     Osteoarthritis     Pericarditis 2008    Stroke 03/2019    Type 2 diabetes mellitus         Past Surgical History:  Past Surgical History:   Procedure Laterality Date    BRONCHOSCOPY N/A 8/23/2016    Procedure: BRONCHOSCOPY BIOPSY AT BEDSIDE;  Surgeon: Mookie Willard MD;  Location:  [facility];  Service:     CARDIAC CATHETERIZATION N/A 8/30/2016    Procedure: Left Heart Cath;  Surgeon: Steve Geronimo MD;  Location:  [facility];  Service:     CARDIAC CATHETERIZATION N/A 8/30/2016    Procedure: Right Heart Cath;  Surgeon: Steve Geronimo MD;  Location:  [facility];  Service:     CARDIAC ELECTROPHYSIOLOGY PROCEDURE N/A 7/2/2019    Procedure: Loop insertion;  Surgeon: Steve Geronimo MD;  Location:  [facility];  Service: Cardiovascular    CARDIAC ELECTROPHYSIOLOGY PROCEDURE N/A 9/26/2023    Procedure: Loop recorder removal;  Surgeon: Steve Geronimo MD;  Location:  [facility];  Service: Cardiovascular;  Laterality: N/A;    CATARACT EXTRACTION      ESOPHAGEAL DILATATION  2007    HERNIA REPAIR       HYSTERECTOMY  1998    WRIST FRACTURE SURGERY Left         Admitting Doctor:   Cedric Vieyra III, DO    Consulting Provider(s):  Consults       No orders found from 12/1/2023 to 12/31/2023.             Admitting Diagnosis:   There were no encounter diagnoses.    Most Recent Vitals:   Vitals:    12/30/23 2000 12/30/23 2030 12/30/23 2100 12/30/23 2132   BP: 159/61 159/68 150/63 156/74   Pulse: 72 68 65 66   Resp:       Temp:       TempSrc:       SpO2: 94% 96% 95% 97%   Weight:       Height:           Active LDAs/IV Access:   Lines, Drains & Airways       Active LDAs       Name Placement date Placement time Site Days    Peripheral IV 12/30/23 1936 Left Antecubital 12/30/23 1936  Antecubital  less than 1                    Labs (abnormal labs have a star):   Labs Reviewed   COMPREHENSIVE METABOLIC PANEL - Abnormal; Notable for the following components:       Result Value    Glucose 161 (*)     BUN 41 (*)     Creatinine 2.05 (*)     Chloride 108 (*)     CO2 21.0 (*)     Alkaline Phosphatase 154 (*)     eGFR 23.8 (*)     All other components within normal limits    Narrative:     GFR Normal >60  Chronic Kidney Disease <60  Kidney Failure <15    The GFR formula is only valid for adults with stable renal function between ages 18 and 70.   PROTIME-INR - Abnormal; Notable for the following components:    Protime 26.1 (*)     INR 2.38 (*)     All other components within normal limits   CBC WITH AUTO DIFFERENTIAL - Abnormal; Notable for the following components:    RBC 3.26 (*)     Hemoglobin 9.2 (*)     Hematocrit 29.1 (*)     Neutrophil % 79.4 (*)     Lymphocyte % 11.0 (*)     All other components within normal limits   RAINBOW DRAW    Narrative:     The following orders were created for panel order [place] Draw.  Procedure                               Abnormality         Status                     ---------                               -----------         ------                     Green Top (Gel)[[id number]]                                   Final result               Lavender Top[[id number]]                                     Final result               Gold Top - SST[[id number]]                                   Final result               Gray Top[[id number]]                                         In process                 Light Blue Top[[id number]]                                   Final result                 Please view results for these tests on the individual orders.   GREEN TOP   LAVENDER TOP   GOLD TOP - SST   LIGHT BLUE TOP   CBC AND DIFFERENTIAL    Narrative:     The following orders were created for panel order CBC & Differential.  Procedure                               Abnormality         Status                     ---------                               -----------         ------                     CBC Auto Differential[[id number]]        Abnormal            Final result                 Please view results for these tests on the individual orders.   GRAY TOP       Meds Given in ED:   Medications   ondansetron (ZOFRAN) injection 4 mg (0 mg Intravenous Hold 12/30/23 2123)   sodium chloride 0.9 % flush 10 mL (has no administration in time range)   fat emulsion (INTRALIPID,LIPOSYN) 20 % infusion  - ADS Override Pull (has no administration in time range)   HYDROmorphone (DILAUDID) injection 0.5 mg (0.5 mg Intravenous Given 12/30/23 1936)   lidocaine 1% - EPINEPHrine 1:[id number] (XYLOCAINE W/EPI) 1 %-1:[id number] injection 10 mL (10 mL Injection Given by Other 12/30/23 2115)   bupivacaine (PF) (MARCAINE) 0.25 % injection 30 mL (30 mL Injection Given by Other 12/30/23 2115)

## 2024-01-01 ENCOUNTER — ANESTHESIA (OUTPATIENT)
Dept: TELEMETRY | Facility: HOSPITAL | Age: 84
End: 2024-01-01

## 2024-01-01 ENCOUNTER — ANESTHESIA EVENT (OUTPATIENT)
Dept: PERIOP | Facility: HOSPITAL | Age: 84
End: 2024-01-01
Payer: MEDICARE

## 2024-01-01 ENCOUNTER — APPOINTMENT (OUTPATIENT)
Dept: CARDIOLOGY | Facility: HOSPITAL | Age: 84
DRG: 480 | End: 2024-01-01
Payer: MEDICARE

## 2024-01-01 LAB
ANION GAP SERPL CALCULATED.3IONS-SCNC: 11 MMOL/L (ref 5–15)
BASOPHILS # BLD AUTO: 0.04 10*3/MM3 (ref 0–0.2)
BASOPHILS NFR BLD AUTO: 0.6 % (ref 0–1.5)
BH BB BLOOD EXPIRATION DATE: NORMAL
BH BB BLOOD EXPIRATION DATE: NORMAL
BH BB BLOOD TYPE BARCODE: 5100
BH BB BLOOD TYPE BARCODE: 5100
BH BB DISPENSE STATUS: NORMAL
BH BB DISPENSE STATUS: NORMAL
BH BB PRODUCT CODE: NORMAL
BH BB PRODUCT CODE: NORMAL
BH BB UNIT NUMBER: NORMAL
BH BB UNIT NUMBER: NORMAL
BH CV ECHO MEAS - AI P1/2T: 612.2 MSEC
BH CV ECHO MEAS - AO MAX PG: 10.2 MMHG
BH CV ECHO MEAS - AO MEAN PG: 5 MMHG
BH CV ECHO MEAS - AO ROOT DIAM: 3.1 CM
BH CV ECHO MEAS - AO V2 MAX: 160 CM/SEC
BH CV ECHO MEAS - AO V2 VTI: 51.4 CM
BH CV ECHO MEAS - AVA(I,D): 1.79 CM2
BH CV ECHO MEAS - EDV(CUBED): 125 ML
BH CV ECHO MEAS - EDV(MOD-SP2): 81 ML
BH CV ECHO MEAS - EDV(MOD-SP4): 126 ML
BH CV ECHO MEAS - EF(MOD-BP): 63.8 %
BH CV ECHO MEAS - EF(MOD-SP2): 73.5 %
BH CV ECHO MEAS - EF(MOD-SP4): 53.6 %
BH CV ECHO MEAS - ESV(CUBED): 42.9 ML
BH CV ECHO MEAS - ESV(MOD-SP2): 21.5 ML
BH CV ECHO MEAS - ESV(MOD-SP4): 58.5 ML
BH CV ECHO MEAS - FS: 30 %
BH CV ECHO MEAS - IVS/LVPW: 1.08 CM
BH CV ECHO MEAS - IVSD: 1.4 CM
BH CV ECHO MEAS - LA DIMENSION: 4.3 CM
BH CV ECHO MEAS - LAT PEAK E' VEL: 12.6 CM/SEC
BH CV ECHO MEAS - LV MASS(C)D: 276.4 GRAMS
BH CV ECHO MEAS - LV MAX PG: 4.9 MMHG
BH CV ECHO MEAS - LV MEAN PG: 2 MMHG
BH CV ECHO MEAS - LV V1 MAX: 111 CM/SEC
BH CV ECHO MEAS - LV V1 VTI: 29.3 CM
BH CV ECHO MEAS - LVIDD: 5 CM
BH CV ECHO MEAS - LVIDS: 3.5 CM
BH CV ECHO MEAS - LVOT AREA: 3.1 CM2
BH CV ECHO MEAS - LVOT DIAM: 2 CM
BH CV ECHO MEAS - LVPWD: 1.3 CM
BH CV ECHO MEAS - MED PEAK E' VEL: 7.6 CM/SEC
BH CV ECHO MEAS - MV A MAX VEL: 95.9 CM/SEC
BH CV ECHO MEAS - MV DEC SLOPE: 497 CM/SEC2
BH CV ECHO MEAS - MV DEC TIME: 0.2 SEC
BH CV ECHO MEAS - MV E MAX VEL: 101 CM/SEC
BH CV ECHO MEAS - MV E/A: 1.05
BH CV ECHO MEAS - MV MAX PG: 7.4 MMHG
BH CV ECHO MEAS - MV MEAN PG: 2 MMHG
BH CV ECHO MEAS - MV V2 VTI: 38.4 CM
BH CV ECHO MEAS - MVA(VTI): 2.4 CM2
BH CV ECHO MEAS - PA ACC TIME: 0.15 SEC
BH CV ECHO MEAS - PA V2 MAX: 124 CM/SEC
BH CV ECHO MEAS - RAP SYSTOLE: 8 MMHG
BH CV ECHO MEAS - RVSP: 46 MMHG
BH CV ECHO MEAS - SV(LVOT): 92 ML
BH CV ECHO MEAS - SV(MOD-SP2): 59.5 ML
BH CV ECHO MEAS - SV(MOD-SP4): 67.5 ML
BH CV ECHO MEAS - TAPSE (>1.6): 2.6 CM
BH CV ECHO MEAS - TR MAX PG: 38 MMHG
BH CV ECHO MEAS - TR MAX VEL: 310 CM/SEC
BH CV ECHO MEASUREMENTS AVERAGE E/E' RATIO: 10
BH CV VAS BP RIGHT ARM: NORMAL MMHG
BH CV XLRA - RV BASE: 3.2 CM
BH CV XLRA - RV LENGTH: 6.8 CM
BH CV XLRA - RV MID: 2.5 CM
BH CV XLRA - TDI S': 15.4 CM/SEC
BUN SERPL-MCNC: 37 MG/DL (ref 8–23)
BUN/CREAT SERPL: 20.3 (ref 7–25)
CALCIUM SPEC-SCNC: 9.5 MG/DL (ref 8.6–10.5)
CHLORIDE SERPL-SCNC: 108 MMOL/L (ref 98–107)
CO2 SERPL-SCNC: 20 MMOL/L (ref 22–29)
CREAT SERPL-MCNC: 1.82 MG/DL (ref 0.57–1)
DEPRECATED RDW RBC AUTO: 47.6 FL (ref 37–54)
EGFRCR SERPLBLD CKD-EPI 2021: 27.5 ML/MIN/1.73
EOSINOPHIL # BLD AUTO: 0.43 10*3/MM3 (ref 0–0.4)
EOSINOPHIL NFR BLD AUTO: 6.2 % (ref 0.3–6.2)
ERYTHROCYTE [DISTWIDTH] IN BLOOD BY AUTOMATED COUNT: 15.3 % (ref 12.3–15.4)
GEN 5 2HR TROPONIN T REFLEX: 58 NG/L
GLUCOSE BLDC GLUCOMTR-MCNC: 133 MG/DL (ref 70–130)
GLUCOSE BLDC GLUCOMTR-MCNC: 165 MG/DL (ref 70–130)
GLUCOSE BLDC GLUCOMTR-MCNC: 208 MG/DL (ref 70–130)
GLUCOSE BLDC GLUCOMTR-MCNC: 236 MG/DL (ref 70–130)
GLUCOSE SERPL-MCNC: 153 MG/DL (ref 65–99)
HCT VFR BLD AUTO: 26.3 % (ref 34–46.6)
HGB BLD-MCNC: 8.4 G/DL (ref 12–15.9)
IMM GRANULOCYTES # BLD AUTO: 0.04 10*3/MM3 (ref 0–0.05)
IMM GRANULOCYTES NFR BLD AUTO: 0.6 % (ref 0–0.5)
INR PPP: 1.27 (ref 0.89–1.12)
LYMPHOCYTES # BLD AUTO: 1.05 10*3/MM3 (ref 0.7–3.1)
LYMPHOCYTES NFR BLD AUTO: 15.1 % (ref 19.6–45.3)
MCH RBC QN AUTO: 27.5 PG (ref 26.6–33)
MCHC RBC AUTO-ENTMCNC: 31.9 G/DL (ref 31.5–35.7)
MCV RBC AUTO: 85.9 FL (ref 79–97)
MONOCYTES # BLD AUTO: 0.53 10*3/MM3 (ref 0.1–0.9)
MONOCYTES NFR BLD AUTO: 7.6 % (ref 5–12)
NEUTROPHILS NFR BLD AUTO: 4.86 10*3/MM3 (ref 1.7–7)
NEUTROPHILS NFR BLD AUTO: 69.9 % (ref 42.7–76)
NRBC BLD AUTO-RTO: 0 /100 WBC (ref 0–0.2)
PLATELET # BLD AUTO: 145 10*3/MM3 (ref 140–450)
PMV BLD AUTO: 10.6 FL (ref 6–12)
POTASSIUM SERPL-SCNC: 4.2 MMOL/L (ref 3.5–5.2)
PROTHROMBIN TIME: 16.1 SECONDS (ref 12.2–14.5)
QT INTERVAL: 324 MS
QT INTERVAL: 446 MS
QT INTERVAL: 472 MS
QTC INTERVAL: 422 MS
QTC INTERVAL: 481 MS
QTC INTERVAL: 574 MS
RBC # BLD AUTO: 3.06 10*6/MM3 (ref 3.77–5.28)
SODIUM SERPL-SCNC: 139 MMOL/L (ref 136–145)
TROPONIN T DELTA: 0 NG/L
TROPONIN T SERPL HS-MCNC: 57 NG/L
TROPONIN T SERPL HS-MCNC: 58 NG/L
UNIT  ABO: NORMAL
UNIT  ABO: NORMAL
UNIT  RH: NORMAL
UNIT  RH: NORMAL
WBC NRBC COR # BLD AUTO: 6.95 10*3/MM3 (ref 3.4–10.8)

## 2024-01-01 PROCEDURE — 85610 PROTHROMBIN TIME: CPT | Performed by: INTERNAL MEDICINE

## 2024-01-01 PROCEDURE — 82948 REAGENT STRIP/BLOOD GLUCOSE: CPT

## 2024-01-01 PROCEDURE — 25010000002 FUROSEMIDE PER 20 MG: Performed by: INTERNAL MEDICINE

## 2024-01-01 PROCEDURE — 84484 ASSAY OF TROPONIN QUANT: CPT | Performed by: INTERNAL MEDICINE

## 2024-01-01 PROCEDURE — 93010 ELECTROCARDIOGRAM REPORT: CPT | Performed by: INTERNAL MEDICINE

## 2024-01-01 PROCEDURE — 93005 ELECTROCARDIOGRAM TRACING: CPT | Performed by: NURSE PRACTITIONER

## 2024-01-01 PROCEDURE — 84484 ASSAY OF TROPONIN QUANT: CPT | Performed by: NURSE PRACTITIONER

## 2024-01-01 PROCEDURE — 99024 POSTOP FOLLOW-UP VISIT: CPT | Performed by: ORTHOPAEDIC SURGERY

## 2024-01-01 PROCEDURE — 93306 TTE W/DOPPLER COMPLETE: CPT

## 2024-01-01 PROCEDURE — 93306 TTE W/DOPPLER COMPLETE: CPT | Performed by: INTERNAL MEDICINE

## 2024-01-01 PROCEDURE — 99222 1ST HOSP IP/OBS MODERATE 55: CPT | Performed by: INTERNAL MEDICINE

## 2024-01-01 PROCEDURE — 63710000001 INSULIN LISPRO (HUMAN) PER 5 UNITS: Performed by: NURSE PRACTITIONER

## 2024-01-01 PROCEDURE — 85025 COMPLETE CBC W/AUTO DIFF WBC: CPT | Performed by: INTERNAL MEDICINE

## 2024-01-01 PROCEDURE — 99232 SBSQ HOSP IP/OBS MODERATE 35: CPT | Performed by: NURSE PRACTITIONER

## 2024-01-01 PROCEDURE — 25010000002 NITROGLYCERIN 200 MCG/ML SOLUTION: Performed by: INTERNAL MEDICINE

## 2024-01-01 PROCEDURE — 80048 BASIC METABOLIC PNL TOTAL CA: CPT | Performed by: INTERNAL MEDICINE

## 2024-01-01 RX ORDER — TORSEMIDE 20 MG/1
40 TABLET ORAL DAILY
Status: DISCONTINUED | OUTPATIENT
Start: 2024-01-01 | End: 2024-01-02

## 2024-01-01 RX ORDER — FAMOTIDINE 10 MG/ML
20 INJECTION, SOLUTION INTRAVENOUS ONCE
Status: CANCELLED | OUTPATIENT
Start: 2024-01-01 | End: 2024-01-01

## 2024-01-01 RX ORDER — FUROSEMIDE 10 MG/ML
40 INJECTION INTRAMUSCULAR; INTRAVENOUS ONCE
Status: COMPLETED | OUTPATIENT
Start: 2024-01-01 | End: 2024-01-01

## 2024-01-01 RX ORDER — SODIUM CHLORIDE 0.9 % (FLUSH) 0.9 %
10 SYRINGE (ML) INJECTION EVERY 12 HOURS SCHEDULED
Status: CANCELLED | OUTPATIENT
Start: 2024-01-01

## 2024-01-01 RX ORDER — SODIUM CHLORIDE 0.9 % (FLUSH) 0.9 %
10 SYRINGE (ML) INJECTION AS NEEDED
Status: CANCELLED | OUTPATIENT
Start: 2024-01-01

## 2024-01-01 RX ORDER — SODIUM CHLORIDE 9 MG/ML
40 INJECTION, SOLUTION INTRAVENOUS AS NEEDED
Status: CANCELLED | OUTPATIENT
Start: 2024-01-01

## 2024-01-01 RX ORDER — HYDRALAZINE HYDROCHLORIDE 50 MG/1
50 TABLET, FILM COATED ORAL EVERY 8 HOURS SCHEDULED
Status: DISCONTINUED | OUTPATIENT
Start: 2024-01-01 | End: 2024-01-01

## 2024-01-01 RX ORDER — SODIUM CHLORIDE, SODIUM LACTATE, POTASSIUM CHLORIDE, CALCIUM CHLORIDE 600; 310; 30; 20 MG/100ML; MG/100ML; MG/100ML; MG/100ML
9 INJECTION, SOLUTION INTRAVENOUS CONTINUOUS
Status: CANCELLED | OUTPATIENT
Start: 2024-01-01

## 2024-01-01 RX ORDER — LIDOCAINE HYDROCHLORIDE 10 MG/ML
0.5 INJECTION, SOLUTION EPIDURAL; INFILTRATION; INTRACAUDAL; PERINEURAL ONCE AS NEEDED
Status: CANCELLED | OUTPATIENT
Start: 2024-01-01

## 2024-01-01 RX ADMIN — INSULIN LISPRO 3 UNITS: 100 INJECTION, SOLUTION INTRAVENOUS; SUBCUTANEOUS at 21:46

## 2024-01-01 RX ADMIN — MORPHINE SULFATE 15 MG: 15 TABLET, FILM COATED, EXTENDED RELEASE ORAL at 08:48

## 2024-01-01 RX ADMIN — FUROSEMIDE 40 MG: 10 INJECTION, SOLUTION INTRAMUSCULAR; INTRAVENOUS at 00:48

## 2024-01-01 RX ADMIN — TORSEMIDE 40 MG: 20 TABLET ORAL at 10:51

## 2024-01-01 RX ADMIN — LISINOPRIL 40 MG: 40 TABLET ORAL at 08:48

## 2024-01-01 RX ADMIN — PANTOPRAZOLE SODIUM 40 MG: 40 TABLET, DELAYED RELEASE ORAL at 08:49

## 2024-01-01 RX ADMIN — CARVEDILOL 25 MG: 12.5 TABLET, FILM COATED ORAL at 08:47

## 2024-01-01 RX ADMIN — TERAZOSIN HYDROCHLORIDE 5 MG: 5 CAPSULE ORAL at 21:48

## 2024-01-01 RX ADMIN — CLONIDINE HYDROCHLORIDE 0.1 MG: 0.1 TABLET ORAL at 08:48

## 2024-01-01 RX ADMIN — Medication 1 CAPSULE: at 08:48

## 2024-01-01 RX ADMIN — MORPHINE SULFATE 15 MG: 15 TABLET, FILM COATED, EXTENDED RELEASE ORAL at 21:48

## 2024-01-01 RX ADMIN — ATORVASTATIN CALCIUM 80 MG: 40 TABLET, FILM COATED ORAL at 08:48

## 2024-01-01 RX ADMIN — HYDRALAZINE HYDROCHLORIDE 25 MG: 25 TABLET, FILM COATED ORAL at 14:19

## 2024-01-01 RX ADMIN — INSULIN LISPRO 2 UNITS: 100 INJECTION, SOLUTION INTRAVENOUS; SUBCUTANEOUS at 17:17

## 2024-01-01 RX ADMIN — INSULIN LISPRO 2 UNITS: 100 INJECTION, SOLUTION INTRAVENOUS; SUBCUTANEOUS at 12:14

## 2024-01-01 RX ADMIN — Medication 10 ML: at 08:47

## 2024-01-01 RX ADMIN — HYDRALAZINE HYDROCHLORIDE 75 MG: 50 TABLET ORAL at 21:47

## 2024-01-01 RX ADMIN — NITROGLYCERIN 25 MCG/MIN: 20 INJECTION INTRAVENOUS at 01:05

## 2024-01-01 RX ADMIN — NITROGLYCERIN 20 MCG/MIN: 20 INJECTION INTRAVENOUS at 04:24

## 2024-01-01 RX ADMIN — CLONIDINE HYDROCHLORIDE 0.1 MG: 0.1 TABLET ORAL at 21:48

## 2024-01-01 RX ADMIN — CARVEDILOL 25 MG: 12.5 TABLET, FILM COATED ORAL at 21:48

## 2024-01-01 NOTE — PROGRESS NOTES
"      Orthopaedic Surgery Progress Note  CC:   Chief Complaint   Patient presents with    Fall      Subjective   Interval History:   Patient with chest pain and difficulty breathing overnight.  Rapid response team called.  Troponin elevated.  Chest x-ray shows pulmonary congestion.  Patient reports symptoms resolved this morning.  Resting in bed denies any new symptoms with regard to her left hip..     ROS: Denies fever, chills, nausea or vomiting    Objective     Vital Signs:  Temp (24hrs), Av.2 °F (36.8 °C), Min:98 °F (36.7 °C), Max:98.6 °F (37 °C)    /74   Pulse 68   Temp 98 °F (36.7 °C) (Oral)   Resp 16   Ht 162.6 cm (64\")   Wt 81.6 kg (180 lb)   LMP  (LMP Unknown)   SpO2 96%   BMI 30.90 kg/m²   Body mass index is 30.9 kg/m².    Physical Exam:  left LE: skin intact, compartments soft/compressible              Tenderness palpation with deep palpation about the hip and over the greater trochanter, minimal pain with knee range of motion no pain with ankle range of motion              +EHL/FHL/GSC/TA              SILT DP/SP/SN/Saph/Tib, slightly decreased at baseline due to neuropathy              Palpable DP, CR brisk in all toes    Assessment and Plan:  83-year-old female with left femoral neck fracture     -Long discussion had with primary team this morning with regard to the cause of patient's symptoms overnight.  Workup continues for etiology.  In light of her recent symptoms surgery will be canceled for today.  Tentatively rescheduled for tomorrow.  Medicine to continue further workup with cardiology consult.  Will continue to closely monitor her clinically over the next 24 hours to ensure she can safely undergo surgery and or at least be medically optimized.  -Bedrest  -Pain control  -N.p.o. at midnight for planned left hip fracture fixation tomorrow, of note patient reports nickel allergy  -DVT ppx  -Rest of mgmt per primary team    Seymour Harrington MD  24  09:13 EST       "

## 2024-01-01 NOTE — PROGRESS NOTES
[facility] Medicine Services  PROGRESS NOTE    Patient Name: Yanique Webster  : 1940  MRN: [id number]    Date of Admission: 2023  Primary Care Physician: Sebas Alicea MD    Subjective   Subjective     CC:  F/u hip fracture    HPI:  Pt is seen resting up in bed in NAD.  Awake and alert.  States she feels better.  Had RRT last pm with CP and Hypertensive urgency.  On nitro gtt currently.  No chest pain at this time.  BP stable.  Only complaint now is Lt hip pain rated 4-9/10 scale. No n/v.  Awaiting cards consult for clearance for ortho sx.        Objective   Objective     Vital Signs:   Temp:  [98 °F (36.7 °C)-98.6 °F (37 °C)] 98.4 °F (36.9 °C)  Heart Rate:  [59-91] 64  Resp:  [16] 16  BP: (129-222)/() 146/68     Physical Exam:  Constitutional: No acute distress, resting up in bed.  Awake and alert.    HENT: NCAT, mucous membranes moist  Respiratory: Clear to auscultation bilaterally, respiratory effort normal on RA. Sats wnl.    Cardiovascular: RRR, no murmurs, rubs, or gallops  Gastrointestinal: soft, nontender, nondistended. + BS.  Obese abd.   Musculoskeletal: No bilateral ankle edema.  MONTANEZ spont but LLE decreased ROM due to pain.  LLE ext rotated and shortened.    Neurologic: awake and alert. Oriented x 3.  Nonfocal.  Follows commands. speech clear and appropriate.    Skin: No rashes      Results Reviewed:  LAB RESULTS:      Lab 24  0801 24  0201 23  0538 23   WBC  --  6.95 6.87  --  8.82   HEMOGLOBIN  --  8.4* 9.2*  --  9.2*   HEMATOCRIT  --  26.3* 29.5*  --  29.1*   PLATELETS  --  145 160  --  160   NEUTROS ABS  --  4.86 4.40  --  7.00   IMMATURE GRANS (ABS)  --  0.04 0.02  --  0.03   LYMPHS ABS  --  1.05 1.29  --  0.97   MONOS ABS  --  0.53 0.68  --  0.66   EOS ABS  --  0.43* 0.44*  --  0.14   MCV  --  85.9 89.4  --  89.3   PROTIME 16.1*  --  23.9* 26.1*  --    APTT  --   --  48.6*  --   --          Lab  01/01/24  0201 12/31/23  0538 12/30/23 1944   SODIUM 139 143 140   POTASSIUM 4.2 5.1 3.9   CHLORIDE 108* 112* 108*   CO2 20.0* 22.0 21.0*   ANION GAP 11.0 9.0 11.0   BUN 37* 41* 41*   CREATININE 1.82* 1.97* 2.05*   EGFR 27.5* 25.0* 23.8*   GLUCOSE 153* 127* 161*   CALCIUM 9.5 9.8 9.7   MAGNESIUM  --  2.2  --    HEMOGLOBIN A1C  --  5.10  --    TSH  --  0.258*  --          Lab 12/30/23 1944   TOTAL PROTEIN 6.2   ALBUMIN 3.8   GLOBULIN 2.4   ALT (SGPT) 12   AST (SGOT) 16   BILIRUBIN 0.3   ALK PHOS 154*         Lab 01/01/24  0801 01/01/24  0420 01/01/24  0201 01/01/24  0001 12/31/23  0538 12/30/23 1945   HSTROP T  --  57* 58* 58*  --   --    PROTIME 16.1*  --   --   --  23.9* 26.1*   INR 1.27*  --   --   --  2.12* 2.38*             Lab 12/31/23  0538   IRON 31*   IRON SATURATION (TSAT) 13*   TIBC 247*   TRANSFERRIN 166*   FERRITIN 83.11   FOLATE 8.34   VITAMIN B 12 550   ABO TYPING O   RH TYPING Negative   ANTIBODY SCREEN Negative         Brief Urine Lab Results  (Last result in the past 365 days)        Color   Clarity   Blood   Leuk Est   Nitrite   Protein   CREAT   Urine HCG        12/31/23 0753             44.5         12/31/23 0753 Yellow   Clear   Negative   Negative   Negative   100 mg/dL (2+)                   Microbiology Results Abnormal       None            XR Chest 1 View    Result Date: 12/31/2023  XR CHEST 1 VW Date of Exam: 12/31/2023 10:30 PM EST Indication: Difficulty breathing/SOA Comparison: 12/30/2023. Findings: Cardiac silhouette is unchanged. Pulmonary vasculature is congested without overt pulmonary edema. Lungs are clear. No pneumothorax or pleural effusion. No acute osseous abnormality. Stable mild aortic arch calcification.     Impression: Impression: Pulmonary vascular congestion without overt pulmonary edema. Electronically Signed: Adrian Wolfe MD  12/31/2023 10:50 PM EST  Workstation ID: [id number]    XR Chest 1 View    Result Date: 12/30/2023  XR CHEST 1 VW Date of Exam: 12/30/2023  10:31 PM EST Indication: CHF Comparison: 2/13/2023. Findings: The cardiac silhouette appears enlarged. There is pulmonary vascular congestion without overt pulmonary edema. No focal lung consolidation. No pneumothorax or pleural effusion. No acute osseous abnormality. There are stable aortic arch calcifications.     Impression: Impression: Cardiomegaly and pulmonary vascular congestion without overt pulmonary edema. Electronically Signed: Adrian Wolfe MD  12/30/2023 11:01 PM EST  Workstation ID: [id number]    XR Femur 2 View Left    Result Date: 12/30/2023  XR FEMUR 2 VW LEFT XR KNEE 1 OR 2 VW LEFT Date of Exam: 12/30/2023 7:49 PM EST Indication: fall Comparison: None available. Findings: There is an acute minimally displaced fracture of the left femoral neck. There is mild osteoarthritis at the left hip. The bones are generally demineralized. There is minimal osteophyte formation at the knee with preservation of joint spaces. There is no  acute fracture or dislocation at the knee. No definite knee joint effusion. No erosions.     Impression: Impression: 1.Acute minimally displaced fracture of the left femoral neck. 2.Osteopenia and mild degenerative changes. Electronically Signed: Adrian Wolfe MD  12/30/2023 8:20 PM EST  Workstation ID: [id number]    XR Knee 1 or 2 View Left    Result Date: 12/30/2023  XR FEMUR 2 VW LEFT XR KNEE 1 OR 2 VW LEFT Date of Exam: 12/30/2023 7:49 PM EST Indication: fall Comparison: None available. Findings: There is an acute minimally displaced fracture of the left femoral neck. There is mild osteoarthritis at the left hip. The bones are generally demineralized. There is minimal osteophyte formation at the knee with preservation of joint spaces. There is no  acute fracture or dislocation at the knee. No definite knee joint effusion. No erosions.     Impression: Impression: 1.Acute minimally displaced fracture of the left femoral neck. 2.Osteopenia and mild degenerative changes.  Electronically Signed: Adrian Wolfe MD  12/30/2023 8:20 PM EST  Workstation ID: [id number]    XR Pelvis 1 or 2 View    Result Date: 12/30/2023  XR PELVIS 1 OR 2 VW Date of Exam: 12/30/2023 7:47 PM EST Indication: fall/pain Comparison: None available. Findings: Suboptimal demonstration of bony detail due to overlying soft tissue attenuation. There is a nondisplaced fracture of the left femoral neck. No fracture of the pelvis is demonstrated.     Impression: Impression: Nondisplaced left femoral neck fracture Electronically Signed: Derek Morrow  12/30/2023 8:19 PM EST  Workstation ID: [id number]     Results for orders placed during the hospital encounter of 03/14/19    Adult Transesophageal Echo (ERMIAS) W/ Cont if Necessary Per Protocol    Interpretation Summary  · Left ventricular systolic function is normal. Estimated EF appears to be in the range of 61 - 65%  · Normal left atrial size and volume noted. There is no spontaneous echo contrast present. The left atrial appendage was visualized through multiple planes. Left atrial appendage was found to be multilobar in nature. Doppler interrogation shows normal flow within the left atrial appendage. No evidence of a left atrial appendage thrombus was present  · Lipomatous hypertrophy of the interatrial septum present. No evidence of a patent foramen ovale. Saline test results are negative.  · There is mild thickening of the noncoronary cusp of the aortic valve. Mild aortic valve regurgitation is present  · There is moderate (grade 2) layered plaque in the proximal aorta present.      Current medications:  Scheduled Meds:atorvastatin, 80 mg, Oral, Daily  carvedilol, 25 mg, Oral, Q12H  cloNIDine, 0.1 mg, Oral, BID  hydrALAZINE, 25 mg, Oral, Q8H  insulin lispro, 2-7 Units, Subcutaneous, 4x Daily AC & at Bedtime  lactobacillus acidophilus, 1 capsule, Oral, Daily  lisinopril, 40 mg, Oral, Q24H  Morphine, 15 mg, Oral, Q12H  ondansetron, 4 mg, Intravenous, Once  pantoprazole, 40  mg, Oral, Daily  senna-docusate sodium, 2 tablet, Oral, BID  sodium chloride, 10 mL, Intravenous, Q12H  terazosin, 5 mg, Oral, Nightly  torsemide, 40 mg, Oral, Daily      Continuous Infusions:nitroglycerin, 5-200 mcg/min, Last Rate: 5 mcg/min (01/01/24 1010)      PRN Meds:.  acetaminophen **OR** acetaminophen **OR** acetaminophen    senna-docusate sodium **AND** polyethylene glycol **AND** bisacodyl **AND** bisacodyl    dextrose    dextrose    glucagon (human recombinant)    ipratropium-albuterol    melatonin    sodium chloride    sodium chloride    sodium chloride    Assessment & Plan   Assessment & Plan     Active Hospital Problems    Diagnosis  POA    **Closed left hip fracture [S72.002A]  Yes    Anxiety associated with depression [F41.8]  Yes    Anemia, chronic disease [D63.8]  Yes    Paroxysmal atrial fibrillation [I48.0]  Yes    CKD (chronic kidney disease) stage 3, GFR 30-59 ml/min [N18.30]  Yes    Essential hypertension [I10]  Yes    Coronary artery disease involving native coronary artery of native heart without angina pectoris [I25.10]  Yes    Hyperlipidemia LDL goal <70 [E78.5]  Yes    COPD (chronic obstructive pulmonary disease) [J44.9]  Yes    GERD (gastroesophageal reflux disease) [K21.9]  Yes    Elevated serum creatinine [R79.89]  Yes    Takotsubo cardiomyopathy [I51.81]  Yes    Fibromyalgia [M79.7]  Yes    Type 2 diabetes mellitus [E11.9]  Yes      Resolved Hospital Problems   No resolved problems to display.        Brief Hospital Course to date:  Yanique Webster is a 83 y.o. female w/a hx of CAD, HTN, HLD, PAF (on Xarelto), Takotsubo cardiomyopathy, PVD, T2DM, neuropathy, CKD Stage III, COPD, chronic pain who presented to the ED w/ c/o a fall found to have a hip fracture.    This patient's problems and plans were partially entered by my partner and updated as appropriate by me 01/01/24.    Assessment/plan:  Pt is new to me today     Left hip fracture after mechanical fall  - plan was for OR today  with Dr. Harrington for repair, recommends repeat INR in AM, ideally <1.5, gave 2 units FFP yest 12/31 to try to reverse coagulopathy  -neurovascular checks to LLE q  -holding Xarelto and ASA (last dose of Xarelto taken at ~6pm 12/30)  -NPO after MN last pm for poss sx today.  Now awaiting cards consult for clearance.  May need to feed today and NPO again after MN.  -pt,ot and case mgt consult      PAF   Cardiomyopathy  CAD  HTN, HLD   CP/Hypertensive urgency and poss flash pulm edema overnight 12/31 with RRT called.   -ECHO 2019 w/ EF 61-65%  -holding Xarelto and ASA pending surgery   -continue routine statin, coreg, clonidine, Cardura (sub Hytrin)  -resume torsemide and lisinopril   -daily weights; strict I/Os   --on nitro gtt.  BP better.  No chest pain  --Dr Harrington now putting OR today on hold for cards consult for clearance.  Likely surgery tomorrow vs today.      CKD Stage III  - at baseline around 1.9  - monitor      COPD   -currently on room air   -prn nebs      T2DM  -hem A1c only 5.1  -FSBG q ac/hs w/ LDSS      Anemia   - generally at baseline.  Hgb slightly down today with IVFs. No obvious bleeding.   --turner am labs      Anxiety   Depression   -Cymbalta on hold as it is contraindicated w/her creatinine clearance   --stable   Fibromyalgia     Chronic pain   -resume home pain regimen MS contin 15mg BID     GERD  -continue PPI      Expected Discharge Location and Transportation: Rehab likely after surgery   Expected Discharge   Expected Discharge Date: 1/6/2024; Expected Discharge Time:      DVT prophylaxis:  No DVT prophylaxis order currently exists.     AM-PAC 6 Clicks Score (PT): 14 (01/01/24 5931)    CODE STATUS:   Code Status and Medical Interventions:   Ordered at: 12/30/23 3112     Level Of Support Discussed With:    Patient     Code Status (Patient has no pulse and is not breathing):    CPR (Attempt to Resuscitate)     Medical Interventions (Patient has pulse or is breathing):    Full Support       Karolina  Francesca Peña, APRN  01/01/24

## 2024-01-01 NOTE — SIGNIFICANT NOTE
RRT called for chest pain and shortness of breath.  Temp:  [98 °F (36.7 °C)-98.6 °F (37 °C)] 98.6 °F (37 °C)  Heart Rate:  [70-84] 84  Resp:  [16] 16  BP: (141-190)/(40-79) 182/75  Patient had SBP over 220 and was short of breath at rest with chest heaviness.  Oxygen administered, but sats were 95% on RA.  Heart reg  Lung shallow labored  Abd full but not tender    EKG without significant ST changes.  Troponin and CXR ordered  Nitro administered.  Patient felt better.  CXR with pulm edema  -add lasix, stop ivf  Still waiting on troponin  Check echo in the am    Lilo Owens MD 12/31/23 23:03 EST

## 2024-01-01 NOTE — CONSULTS
Cardiology Consult    Yanique Webster  1940  [phone]  There is no work phone number on file.    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 fib as noted above  Diabetes  Hypertension  Mixed hyperlipidemia  Peripheral vascular disease  History of tobacco smoking  Anxiety  Claustrophobia       History of Present Illness:   Yanique Webster is an 83 year old female with above PMHx patient of Dr. Geronimo who presented to [facility] 12/30/23 due to fall with left hip fracture. Fall was related to her losing her balance while putting her shirt on. She denies any dizziness, LH, syncope. Dr. Harrington with ortho sx has been consulted and planned on hip surgery today however she had an episode last night of acute onset of SOB and chest pressure that came on while she was being rolled over in the bed by nursing staff. SOB persisted and she was not able to lie flat. Chest xray showed mild pulmonary vascular congestion without pulmonary edema. She was placed on O2 per NC and given IV lasix 40 mg x1 which improved her SOB symptoms. Her BP was noted to be elevated 220/125 and nitro gtt was initiated. Shortly after SOB and chest pressure subsided and she has had no further symptoms. She is now on roomair with sats >95%. Her blood pressure has remained elevated and she is still on Nitro 5 mg IV gtt currently. Besides  this one event, she denies any CP or SOB recently. Echo today shows normal EF, mild AR, mild-moderate TR with moderately elevated RVSP.       Allergies   Allergen Reactions    Penicillins     Nickel Rash    Penicillins Rash     unk       Prior to Admission Medications       Prescriptions Last Dose Informant Patient Reported? Taking?    aspirin 81 MG EC tablet 12/29/2023  No Yes    Take 1 tablet by mouth Daily.    atorvastatin (LIPITOR) 80 MG tablet 12/29/2023  Yes Yes    Take 1 tablet by mouth Daily.    carvedilol (COREG) 25 MG tablet 12/29/2023  No Yes    Take 1 tablet by mouth Every 12 (Twelve) Hours.    cloNIDine (CATAPRES) 0.1 MG tablet 12/29/2023  Yes Yes    Take 1 tablet by mouth 2 (Two) Times a Day.    doxazosin (CARDURA) 4 MG tablet 12/29/2023  Yes Yes    Take 1 tablet by mouth Daily.    DULoxetine (CYMBALTA) 60 MG capsule 12/29/2023  No Yes    Take 1 capsule by mouth Daily.    hydrALAZINE (APRESOLINE) 25 MG tablet 12/29/2023  No Yes    Take 1 tablet by mouth 3 (Three) Times a Day.    lisinopril (PRINIVIL,ZESTRIL) 40 MG tablet   No No    Take 1 tablet by mouth Daily.    naloxone (NARCAN) 4 MG/0.1ML nasal spray   Yes No    Narcan 4 mg/actuation nasal spray    pantoprazole (PROTONIX) 40 MG EC tablet 12/29/2023  No Yes    Take 1 tablet by mouth daily.    probiotic (CULTURELLE) capsule capsule 12/29/2023 Spouse/Significant Other Yes Yes    Take 1 capsule by mouth Daily.    rivaroxaban (Xarelto) 15 MG tablet   No No    Take 1 tablet by mouth Daily With Dinner.    torsemide (DEMADEX) 20 MG tablet 12/29/2023  Yes Yes    Take 2 tablets by mouth Daily.    Lantus SoloStar 100 UNIT/ML injection pen   No No    INJECT 24 UNITS SUBCUTANEOUSLY ONCE NIGHTLY AS DIRECTED    Morphine (MS CONTIN) 15 MG 12 hr tablet   Yes No    Take 1 tablet by mouth 2 (Two) Times a Day.              Current Facility-Administered Medications:     acetaminophen (TYLENOL) tablet 650 mg, 650 mg, Oral, Q4H PRN **OR** acetaminophen (TYLENOL) 160  MG/5ML oral solution 650 mg, 650 mg, Oral, Q4H PRN **OR** acetaminophen (TYLENOL) suppository 650 mg, 650 mg, Rectal, Q4H PRN, Lizbeth Buck APRN    atorvastatin (LIPITOR) tablet 80 mg, 80 mg, Oral, Daily, Lizbeth Buck APRN, 80 mg at 01/01/24 0848    sennosides-docusate (PERICOLACE) 8.6-50 MG per tablet 2 tablet, 2 tablet, Oral, BID, 2 tablet at 12/31/23 2033 **AND** polyethylene glycol (MIRALAX) packet 17 g, 17 g, Oral, Daily PRN **AND** bisacodyl (DULCOLAX) EC tablet 5 mg, 5 mg, Oral, Daily PRN **AND** bisacodyl (DULCOLAX) suppository 10 mg, 10 mg, Rectal, Daily PRN, Lizbeth Buck APRN    carvedilol (COREG) tablet 25 mg, 25 mg, Oral, Q12H, Lizbeth Buck APRN, 25 mg at 01/01/24 0847    cloNIDine (CATAPRES) tablet 0.1 mg, 0.1 mg, Oral, BID, Lizbeth Buck APRN, 0.1 mg at 01/01/24 0848    dextrose (D50W) (25 g/50 mL) IV injection 25 g, 25 g, Intravenous, Q15 Min PRN, Lizbeth Buck APRN    dextrose (GLUTOSE) oral gel 15 g, 15 g, Oral, Q15 Min PRN, Lizbeth Buck APRN    glucagon (GLUCAGEN) injection 1 mg, 1 mg, Intramuscular, Q15 Min PRN, Lizbeth Buck APRN    hydrALAZINE (APRESOLINE) tablet 25 mg, 25 mg, Oral, Q8H, Lizbeth Buck APRN, 25 mg at 01/01/24 1419    Insulin Lispro (humaLOG) injection 2-7 Units, 2-7 Units, Subcutaneous, 4x Daily AC & at Bedtime, Lizbeth Buck APRN, 2 Units at 01/01/24 1214    ipratropium-albuterol (DUO-NEB) nebulizer solution 3 mL, 3 mL, Nebulization, Q4H PRN, Lizbeth Buck APRN    lactobacillus acidophilus (RISAQUAD) capsule 1 capsule, 1 capsule, Oral, Daily, Lizbeth uBck APRN, 1 capsule at 01/01/24 0848    lisinopril (PRINIVIL,ZESTRIL) tablet 40 mg, 40 mg, Oral, Q24H, Cookie Patten, , 40 mg at 01/01/24 0848    melatonin tablet 5 mg, 5 mg, Oral, Nightly PRN, Lizbeth Buck APRN    Morphine (MS CONTIN) 12 hr tablet 15 mg, 15 mg, Oral, Q12H, Cookie Patten, DO, 15 mg at 01/01/24 0848    nitroglycerin (TRIDIL) 200 mcg/ml infusion, 5-200 mcg/min, Intravenous,  Titrated, Lilo Owens MD, Last Rate: 1.5 mL/hr at 24 1010, 5 mcg/min at 24 1010    ondansetron (ZOFRAN) injection 4 mg, 4 mg, Intravenous, Once, Baudilio Hsu MD    pantoprazole (PROTONIX) EC tablet 40 mg, 40 mg, Oral, Daily, HeberDillona, APRN, 40 mg at 24 0849    sodium chloride 0.9 % flush 10 mL, 10 mL, Intravenous, PRN, Baudilio Hsu MD    sodium chloride 0.9 % flush 10 mL, 10 mL, Intravenous, Q12H, Dillon Bucka, APRN, 10 mL at 24 0847    sodium chloride 0.9 % flush 10 mL, 10 mL, Intravenous, PRN, Dillon Bucka, APRN    sodium chloride 0.9 % infusion 40 mL, 40 mL, Intravenous, PRN, Heber Lizbeth, APRN    terazosin (HYTRIN) capsule 5 mg, 5 mg, Oral, Nightly, Heber, Lizbeth, APRN, 5 mg at 23 2044    torsemide (DEMADEX) tablet 40 mg, 40 mg, Oral, Daily, Cookie Patten DO, 40 mg at 24 1051    Social History     Socioeconomic History    Marital status:    Tobacco Use    Smoking status: Former     Packs/day: 1     Types: Cigarettes     Quit date: 2003     Years since quittin.0    Smokeless tobacco: Never   Vaping Use    Vaping Use: Never used   Substance and Sexual Activity    Alcohol use: No    Drug use: No    Sexual activity: Not Currently     Partners: Male     Birth control/protection: Pill, Hysterectomy       Family History   Problem Relation Age of Onset    Cancer Mother     Cancer Father     Cancer Other     Breast cancer Sister 67    Ovarian cancer Neg Hx        REVIEW OF SYSTEMS:   CONSTITUTIONAL:         No weight loss, fever, chills, weakness or fatigue.   HEENT:                            No visual loss, blurred vision, double vision, yellow sclerae.                                             No hearing loss, congestion, sore throat.   SKIN:                                No rashes, urticaria, ulcers, sores.     RESPIRATORY:               + shortness of breath, -hemoptysis, cough, sputum.   GI:                                      No anorexia, nausea, vomiting, diarrhea. No abdominal pain, melena.   :                                   No burning on urination, hematuria or increased frequency.  ENDOCRINE:                   No diaphoresis, cold or heat intolerance. No polyuria or polydipsia.   NEURO:                            No headache, dizziness, syncope, paralysis, ataxia, or parasthesias.                                            No change in bowel or bladder control. + history of CVA/TIA  MUSCULOSKELETAL:    No muscle, back pain, joint pain or stiffness.   HEMATOLOGY:               No anemia, bleeding, bruising. No history of DVT/PE.  PSYCH:                            No history of depression, anxiety    Vitals:    01/01/24 1500 01/01/24 1515 01/01/24 1530 01/01/24 1545   BP: 136/87 159/68 154/65 164/70   BP Location: Right arm      Patient Position:       Pulse: 77 65 66 65   Resp: 16      Temp: 98 °F (36.7 °C)      TempSrc: Oral      SpO2: 97% 96% 96% 97%   Weight:       Height:             Vital Sign Min/Max for last 24 hours  Temp  Min: 98 °F (36.7 °C)  Max: 98.6 °F (37 °C)   BP  Min: 129/50  Max: 222/125   Pulse  Min: 59  Max: 91   Resp  Min: 16  Max: 16   SpO2  Min: 93 %  Max: 99 %   No data recorded      Intake/Output Summary (Last 24 hours) at 1/1/2024 1601  Last data filed at 1/1/2024 1500  Gross per 24 hour   Intake 938.75 ml   Output 1800 ml   Net -861.25 ml             Physical Exam:  GEN: Well nourished, Well- developed  No acute distress  HEENT: Normocephalic, Atraumatic, PERRLA, moist mucous membranes  NECK: supple, NO JVD, no thyromegaly, no lymphadenopathy  CARDIAC: S1S2  RRR no murmur, gallop, rub  LUNGS: Clear to ausculation, normal respiratory effort  ABDOMEN: Soft, nontender, normal bowel sounds  EXTREMITIES:No gross deformities,  No clubbing, cyanosis, or edema  SKIN: Warm, dry  NEURO: No focal deficits  PSYCHIATRIC: Normal affect and mood      I personally viewed and interpreted the patient's  EKG/Telemetry/lab data    Data:   Results from last 7 days   Lab Units 01/01/24  0201 12/31/23  0538 12/30/23  1944   WBC 10*3/mm3 6.95 6.87 8.82   HEMOGLOBIN g/dL 8.4* 9.2* 9.2*   HEMATOCRIT % 26.3* 29.5* 29.1*   PLATELETS 10*3/mm3 145 160 160     Results from last 7 days   Lab Units 01/01/24  0201 12/31/23  0538 12/30/23  1944   SODIUM mmol/L 139 143 140   POTASSIUM mmol/L 4.2 5.1 3.9   CHLORIDE mmol/L 108* 112* 108*   CO2 mmol/L 20.0* 22.0 21.0*   BUN mg/dL 37* 41* 41*   CREATININE mg/dL 1.82* 1.97* 2.05*   GLUCOSE mg/dL 153* 127* 161*      Results from last 7 days   Lab Units 12/31/23  0538   HEMOGLOBIN A1C % 5.10     Results from last 7 days   Lab Units 12/31/23  0538   TSH uIU/mL 0.258*         Results from last 7 days   Lab Units 01/01/24  0801 12/31/23  0538 12/30/23  1945   PROTIME Seconds 16.1* 23.9* 26.1*   INR  1.27* 2.12* 2.38*   APTT seconds  --  48.6*  --      Results from last 7 days   Lab Units 01/01/24  0420 01/01/24  0201 01/01/24  0001   HSTROP T ng/L 57* 58* 58*               Intake/Output Summary (Last 24 hours) at 1/1/2024 1601  Last data filed at 1/1/2024 1500  Gross per 24 hour   Intake 938.75 ml   Output 1800 ml   Net -861.25 ml       Chest X-Ray:  Imaging Results (Last 24 Hours)       Procedure Component Value Units Date/Time    XR Chest 1 View [[id number]] Collected: 12/31/23 2249     Updated: 12/31/23 2253    Narrative:      XR CHEST 1 VW    Date of Exam: 12/31/2023 10:30 PM EST    Indication: Difficulty breathing/SOA    Comparison: 12/30/2023.    Findings:  Cardiac silhouette is unchanged. Pulmonary vasculature is congested without overt pulmonary edema. Lungs are clear. No pneumothorax or pleural effusion. No acute osseous abnormality. Stable mild aortic arch calcification.      Impression:      Impression:  Pulmonary vascular congestion without overt pulmonary edema.        Electronically Signed: Adrian Wolfe MD    12/31/2023 10:50 PM EST    Workstation ID: [id number]             Telemetry: NSR, 59-91 bpm  EKG: NSR,  bpm        Assessment and Plan:   Hypertension, chest pressure, history of mild CAD  -pt reports well controlled BP and no recent CP prior to last night. Follow with Dr. Geronimo. -History of mild CAD on [facility] 2016  -RRT called 12/31/23 at 21:00 for onset of hypertension and chest pressure yesterday during episode of acute hypoxia and possible flash pulmonary edema.    -BP improved on nitro gtts. No further chest pressure  -Troponins have remained flat 58, 58, 57  -Echo today shows normal EF, mild AR, mild-moderate TR with moderately elevated RVSP.   -Current home BP medications include: Coreg, clonidine, cardura, hydralazine, lisinopril    2. Acute hypoxia, poss. Flash pulmonary edema  -acute onset while rolling over in bed.   -Chest xray showed mild pulmonary vascular congestion without pulmonary edema.   -SOB improved with IV lasix 40 mg x1. No further SOB. Now on RA.     3. PAF  -NSR this admission  -anticoagulation: Xarelto, held for possible surgery tomorrow    4. History of Takotsubo CM    5. Hip fracture  -Needs cardiac clearance for hip surgery possibly tomorrow.       Electronically signed by CHRIS Beltran, 01/01/24, 4:33 PM EST.      Patient was seen and examined in a face-to-face encounter.  Last night she had significant hypertension with change in chest pressure as well as orthopnea and flash pulmonary edema.  Today these are all improved.  She is laying relatively flat on room air with no dyspnea.  The chest pressure lasted about half hour.  She is still hypertensive systolics 160s on a nitroglycerin drip.  Her surgery for today was canceled.    Echocardiogram results:    Left ventricular ejection fraction appears to be 56 - 60%.    Left ventricular wall thickness is consistent with mild concentric hypertrophy    The aortic valve exhibits sclerosis.    Mild aortic valve regurgitation is present. No hemodynamically significant aortic valve stenosis is  present.    Mild tricuspid valve regurgitation is present. Estimated right ventricular systolic pressure from tricuspid regurgitation is moderately elevated (45-55 mmHg    Moderate mitral annular calcification is present. Trace to mild mitral valve regurgitation is present. No significant mitral valve stenosis is present.    Patient does have a history of moderate CAD on heart catheterization in 2016.  Current troponin levels are all flat.  EKGs are poor quality but do not see new ST changes.    Will increase her hydralazine to 75 mg 3 times daily and try to wean off nitro drip.  Continue other antihypertensives.    She appears to have improved with IV diuresis.      Will keep n.p.o. after midnight and if patient is able to be weaned off of IV nitro I think she can proceed with her hip surgery at acceptable cardiac risk.  Repeat EKG a.m.      Dr. Geronimo to resume care tomorrow      I, Eleno Sterling M.D.,  have reviewed the notes, assessments, and/or procedures performed by CHRIS/PA, I CONCUR with the documentation of Yanique Webster.

## 2024-01-01 NOTE — PLAN OF CARE
Goal Outcome Evaluation:  Plan of Care Reviewed With: patient        Progress: improving          Pt. Surgery has been postponed til tomorrow due to chest pain, high BP. Pt, has been on nitro gtt throughout the day, weaned to 5 mcg/min. Cardio consult. Plan is for surgery tomorrow if she has cardiac clearance. VSS, on room air air and NSR on tele. Waffle mattress.

## 2024-01-02 ENCOUNTER — ANESTHESIA EVENT CONVERTED (OUTPATIENT)
Dept: ANESTHESIOLOGY | Facility: HOSPITAL | Age: 84
DRG: 480 | End: 2024-01-02
Payer: MEDICARE

## 2024-01-02 ENCOUNTER — APPOINTMENT (OUTPATIENT)
Dept: GENERAL RADIOLOGY | Facility: HOSPITAL | Age: 84
DRG: 480 | End: 2024-01-02
Payer: MEDICARE

## 2024-01-02 ENCOUNTER — ANESTHESIA (OUTPATIENT)
Dept: PERIOP | Facility: HOSPITAL | Age: 84
End: 2024-01-02
Payer: MEDICARE

## 2024-01-02 LAB
ANION GAP SERPL CALCULATED.3IONS-SCNC: 12 MMOL/L (ref 5–15)
BUN SERPL-MCNC: 46 MG/DL (ref 8–23)
BUN/CREAT SERPL: 21 (ref 7–25)
CALCIUM SPEC-SCNC: 9.8 MG/DL (ref 8.6–10.5)
CHLORIDE SERPL-SCNC: 104 MMOL/L (ref 98–107)
CO2 SERPL-SCNC: 20 MMOL/L (ref 22–29)
CREAT SERPL-MCNC: 2.19 MG/DL (ref 0.57–1)
DEPRECATED RDW RBC AUTO: 46.5 FL (ref 37–54)
EGFRCR SERPLBLD CKD-EPI 2021: 22 ML/MIN/1.73
ERYTHROCYTE [DISTWIDTH] IN BLOOD BY AUTOMATED COUNT: 15.1 % (ref 12.3–15.4)
GLUCOSE BLDC GLUCOMTR-MCNC: 142 MG/DL (ref 70–130)
GLUCOSE BLDC GLUCOMTR-MCNC: 150 MG/DL (ref 70–130)
GLUCOSE BLDC GLUCOMTR-MCNC: 175 MG/DL (ref 70–130)
GLUCOSE SERPL-MCNC: 144 MG/DL (ref 65–99)
HCT VFR BLD AUTO: 26 % (ref 34–46.6)
HGB BLD-MCNC: 8.4 G/DL (ref 12–15.9)
INR PPP: 1.19 (ref 0.89–1.12)
MCH RBC QN AUTO: 27.5 PG (ref 26.6–33)
MCHC RBC AUTO-ENTMCNC: 32.3 G/DL (ref 31.5–35.7)
MCV RBC AUTO: 85 FL (ref 79–97)
PLATELET # BLD AUTO: 148 10*3/MM3 (ref 140–450)
PMV BLD AUTO: 10.8 FL (ref 6–12)
POTASSIUM SERPL-SCNC: 3.9 MMOL/L (ref 3.5–5.2)
PROTHROMBIN TIME: 15.2 SECONDS (ref 12.2–14.5)
RBC # BLD AUTO: 3.06 10*6/MM3 (ref 3.77–5.28)
SODIUM SERPL-SCNC: 136 MMOL/L (ref 136–145)
WBC NRBC COR # BLD AUTO: 7.48 10*3/MM3 (ref 3.4–10.8)

## 2024-01-02 PROCEDURE — 25010000002 CEFAZOLIN PER 500 MG: Performed by: ORTHOPAEDIC SURGERY

## 2024-01-02 PROCEDURE — 25810000003 SODIUM CHLORIDE 0.9 % SOLUTION 1,000 ML FLEX CONT: Performed by: NURSE PRACTITIONER

## 2024-01-02 PROCEDURE — 93010 ELECTROCARDIOGRAM REPORT: CPT | Performed by: INTERNAL MEDICINE

## 2024-01-02 PROCEDURE — 99232 SBSQ HOSP IP/OBS MODERATE 35: CPT | Performed by: NURSE PRACTITIONER

## 2024-01-02 PROCEDURE — 25010000002 ROPIVACAINE PER 1 MG: Performed by: NURSE ANESTHETIST, CERTIFIED REGISTERED

## 2024-01-02 PROCEDURE — C1769 GUIDE WIRE: HCPCS | Performed by: ORTHOPAEDIC SURGERY

## 2024-01-02 PROCEDURE — C1713 ANCHOR/SCREW BN/BN,TIS/BN: HCPCS | Performed by: ORTHOPAEDIC SURGERY

## 2024-01-02 PROCEDURE — 27235 TREAT THIGH FRACTURE: CPT | Performed by: ORTHOPAEDIC SURGERY

## 2024-01-02 PROCEDURE — 25810000003 SODIUM CHLORIDE 0.9 % SOLUTION: Performed by: ORTHOPAEDIC SURGERY

## 2024-01-02 PROCEDURE — 0QH734Z INSERTION OF INTERNAL FIXATION DEVICE INTO LEFT UPPER FEMUR, PERCUTANEOUS APPROACH: ICD-10-PCS | Performed by: ORTHOPAEDIC SURGERY

## 2024-01-02 PROCEDURE — 25010000002 DEXAMETHASONE PER 1 MG: Performed by: ANESTHESIOLOGY

## 2024-01-02 PROCEDURE — 25010000002 PROPOFOL 10 MG/ML EMULSION: Performed by: ANESTHESIOLOGY

## 2024-01-02 PROCEDURE — 93005 ELECTROCARDIOGRAM TRACING: CPT | Performed by: INTERNAL MEDICINE

## 2024-01-02 PROCEDURE — 85610 PROTHROMBIN TIME: CPT | Performed by: NURSE PRACTITIONER

## 2024-01-02 PROCEDURE — 25010000002 ROPIVACAINE HCL-NACL 0.2-0.9 % SOLUTION: Performed by: NURSE ANESTHETIST, CERTIFIED REGISTERED

## 2024-01-02 PROCEDURE — 85027 COMPLETE CBC AUTOMATED: CPT | Performed by: NURSE PRACTITIONER

## 2024-01-02 PROCEDURE — 63710000001 INSULIN LISPRO (HUMAN) PER 5 UNITS: Performed by: ORTHOPAEDIC SURGERY

## 2024-01-02 PROCEDURE — 71045 X-RAY EXAM CHEST 1 VIEW: CPT

## 2024-01-02 PROCEDURE — 25010000002 LABETALOL 5 MG/ML SOLUTION: Performed by: ANESTHESIOLOGY

## 2024-01-02 PROCEDURE — 73502 X-RAY EXAM HIP UNI 2-3 VIEWS: CPT

## 2024-01-02 PROCEDURE — 82948 REAGENT STRIP/BLOOD GLUCOSE: CPT

## 2024-01-02 PROCEDURE — 25010000002 SUGAMMADEX 200 MG/2ML SOLUTION: Performed by: ANESTHESIOLOGY

## 2024-01-02 PROCEDURE — 76000 FLUOROSCOPY <1 HR PHYS/QHP: CPT

## 2024-01-02 PROCEDURE — 25010000002 ONDANSETRON PER 1 MG: Performed by: ANESTHESIOLOGY

## 2024-01-02 PROCEDURE — 99232 SBSQ HOSP IP/OBS MODERATE 35: CPT | Performed by: INTERNAL MEDICINE

## 2024-01-02 PROCEDURE — 25010000002 VANCOMYCIN 10 G RECONSTITUTED SOLUTION: Performed by: ORTHOPAEDIC SURGERY

## 2024-01-02 PROCEDURE — 25010000002 FENTANYL CITRATE (PF) 50 MCG/ML SOLUTION

## 2024-01-02 PROCEDURE — 80048 BASIC METABOLIC PNL TOTAL CA: CPT | Performed by: NURSE PRACTITIONER

## 2024-01-02 DEVICE — IMPLANTABLE DEVICE: Type: IMPLANTABLE DEVICE | Site: HIP | Status: FUNCTIONAL

## 2024-01-02 RX ORDER — ROPIVACAINE HYDROCHLORIDE 2 MG/ML
INJECTION, SOLUTION EPIDURAL; INFILTRATION; PERINEURAL CONTINUOUS
Status: DISCONTINUED | OUTPATIENT
Start: 2024-01-02 | End: 2024-01-02

## 2024-01-02 RX ORDER — ROPIVACAINE HYDROCHLORIDE 2 MG/ML
INJECTION, SOLUTION EPIDURAL; INFILTRATION; PERINEURAL CONTINUOUS
Status: DISCONTINUED | OUTPATIENT
Start: 2024-01-02 | End: 2024-01-05 | Stop reason: HOSPADM

## 2024-01-02 RX ORDER — ONDANSETRON 4 MG/1
4 TABLET, FILM COATED ORAL EVERY 6 HOURS PRN
Status: DISCONTINUED | OUTPATIENT
Start: 2024-01-02 | End: 2024-01-05 | Stop reason: HOSPADM

## 2024-01-02 RX ORDER — FENTANYL CITRATE 50 UG/ML
50 INJECTION, SOLUTION INTRAMUSCULAR; INTRAVENOUS
Status: DISCONTINUED | OUTPATIENT
Start: 2024-01-02 | End: 2024-01-02 | Stop reason: HOSPADM

## 2024-01-02 RX ORDER — SODIUM CHLORIDE 9 MG/ML
100 INJECTION, SOLUTION INTRAVENOUS CONTINUOUS
Status: DISCONTINUED | OUTPATIENT
Start: 2024-01-02 | End: 2024-01-05 | Stop reason: HOSPADM

## 2024-01-02 RX ORDER — OXYCODONE HYDROCHLORIDE 5 MG/1
5 TABLET ORAL EVERY 4 HOURS PRN
Status: DISCONTINUED | OUTPATIENT
Start: 2024-01-02 | End: 2024-01-05 | Stop reason: HOSPADM

## 2024-01-02 RX ORDER — DEXAMETHASONE SODIUM PHOSPHATE 4 MG/ML
INJECTION, SOLUTION INTRA-ARTICULAR; INTRALESIONAL; INTRAMUSCULAR; INTRAVENOUS; SOFT TISSUE AS NEEDED
Status: DISCONTINUED | OUTPATIENT
Start: 2024-01-02 | End: 2024-01-02 | Stop reason: SURG

## 2024-01-02 RX ORDER — SODIUM CHLORIDE 0.9 % (FLUSH) 0.9 %
3 SYRINGE (ML) INJECTION EVERY 12 HOURS SCHEDULED
Status: DISCONTINUED | OUTPATIENT
Start: 2024-01-02 | End: 2024-01-05 | Stop reason: HOSPADM

## 2024-01-02 RX ORDER — HYDROMORPHONE HYDROCHLORIDE 1 MG/ML
0.5 INJECTION, SOLUTION INTRAMUSCULAR; INTRAVENOUS; SUBCUTANEOUS
Status: DISCONTINUED | OUTPATIENT
Start: 2024-01-02 | End: 2024-01-05 | Stop reason: HOSPADM

## 2024-01-02 RX ORDER — PHENYLEPHRINE HCL IN 0.9% NACL 1 MG/10 ML
SYRINGE (ML) INTRAVENOUS AS NEEDED
Status: DISCONTINUED | OUTPATIENT
Start: 2024-01-02 | End: 2024-01-02 | Stop reason: SURG

## 2024-01-02 RX ORDER — DOCUSATE SODIUM 100 MG/1
100 CAPSULE, LIQUID FILLED ORAL 2 TIMES DAILY PRN
Status: DISCONTINUED | OUTPATIENT
Start: 2024-01-02 | End: 2024-01-05 | Stop reason: HOSPADM

## 2024-01-02 RX ORDER — LIDOCAINE HYDROCHLORIDE 10 MG/ML
INJECTION, SOLUTION EPIDURAL; INFILTRATION; INTRACAUDAL; PERINEURAL AS NEEDED
Status: DISCONTINUED | OUTPATIENT
Start: 2024-01-02 | End: 2024-01-02 | Stop reason: SURG

## 2024-01-02 RX ORDER — SODIUM CHLORIDE 0.9 % (FLUSH) 0.9 %
3-10 SYRINGE (ML) INJECTION AS NEEDED
Status: DISCONTINUED | OUTPATIENT
Start: 2024-01-02 | End: 2024-01-05 | Stop reason: HOSPADM

## 2024-01-02 RX ORDER — SODIUM CHLORIDE 9 MG/ML
40 INJECTION, SOLUTION INTRAVENOUS AS NEEDED
Status: DISCONTINUED | OUTPATIENT
Start: 2024-01-02 | End: 2024-01-05 | Stop reason: HOSPADM

## 2024-01-02 RX ORDER — ACETAMINOPHEN 500 MG
1000 TABLET ORAL EVERY 6 HOURS
Status: DISCONTINUED | OUTPATIENT
Start: 2024-01-02 | End: 2024-01-05 | Stop reason: HOSPADM

## 2024-01-02 RX ORDER — ROCURONIUM BROMIDE 10 MG/ML
INJECTION, SOLUTION INTRAVENOUS AS NEEDED
Status: DISCONTINUED | OUTPATIENT
Start: 2024-01-02 | End: 2024-01-02 | Stop reason: SURG

## 2024-01-02 RX ORDER — IPRATROPIUM BROMIDE AND ALBUTEROL SULFATE 2.5; .5 MG/3ML; MG/3ML
3 SOLUTION RESPIRATORY (INHALATION) ONCE AS NEEDED
Status: DISCONTINUED | OUTPATIENT
Start: 2024-01-02 | End: 2024-01-02 | Stop reason: HOSPADM

## 2024-01-02 RX ORDER — ONDANSETRON 2 MG/ML
INJECTION INTRAMUSCULAR; INTRAVENOUS AS NEEDED
Status: DISCONTINUED | OUTPATIENT
Start: 2024-01-02 | End: 2024-01-02 | Stop reason: SURG

## 2024-01-02 RX ORDER — ROPIVACAINE HYDROCHLORIDE 5 MG/ML
INJECTION, SOLUTION EPIDURAL; INFILTRATION; PERINEURAL
Status: COMPLETED | OUTPATIENT
Start: 2024-01-02 | End: 2024-01-02

## 2024-01-02 RX ORDER — FENTANYL CITRATE 50 UG/ML
INJECTION, SOLUTION INTRAMUSCULAR; INTRAVENOUS
Status: COMPLETED
Start: 2024-01-02 | End: 2024-01-02

## 2024-01-02 RX ORDER — EPHEDRINE SULFATE 50 MG/ML
INJECTION INTRAVENOUS AS NEEDED
Status: DISCONTINUED | OUTPATIENT
Start: 2024-01-02 | End: 2024-01-02 | Stop reason: SURG

## 2024-01-02 RX ORDER — MAGNESIUM HYDROXIDE 1200 MG/15ML
LIQUID ORAL AS NEEDED
Status: DISCONTINUED | OUTPATIENT
Start: 2024-01-02 | End: 2024-01-02 | Stop reason: HOSPADM

## 2024-01-02 RX ORDER — LABETALOL HYDROCHLORIDE 5 MG/ML
5 INJECTION, SOLUTION INTRAVENOUS
Status: DISCONTINUED | OUTPATIENT
Start: 2024-01-02 | End: 2024-01-02 | Stop reason: HOSPADM

## 2024-01-02 RX ORDER — FAMOTIDINE 20 MG/1
20 TABLET, FILM COATED ORAL ONCE
Status: COMPLETED | OUTPATIENT
Start: 2024-01-02 | End: 2024-01-02

## 2024-01-02 RX ORDER — ONDANSETRON 2 MG/ML
4 INJECTION INTRAMUSCULAR; INTRAVENOUS ONCE AS NEEDED
Status: DISCONTINUED | OUTPATIENT
Start: 2024-01-02 | End: 2024-01-02 | Stop reason: HOSPADM

## 2024-01-02 RX ORDER — PROPOFOL 10 MG/ML
VIAL (ML) INTRAVENOUS AS NEEDED
Status: DISCONTINUED | OUTPATIENT
Start: 2024-01-02 | End: 2024-01-02 | Stop reason: SURG

## 2024-01-02 RX ORDER — ONDANSETRON 2 MG/ML
4 INJECTION INTRAMUSCULAR; INTRAVENOUS EVERY 6 HOURS PRN
Status: DISCONTINUED | OUTPATIENT
Start: 2024-01-02 | End: 2024-01-05 | Stop reason: HOSPADM

## 2024-01-02 RX ORDER — SODIUM CHLORIDE 9 MG/ML
INJECTION, SOLUTION INTRAVENOUS CONTINUOUS PRN
Status: DISCONTINUED | OUTPATIENT
Start: 2024-01-02 | End: 2024-01-02 | Stop reason: SURG

## 2024-01-02 RX ORDER — BISACODYL 10 MG
10 SUPPOSITORY, RECTAL RECTAL DAILY PRN
Status: DISCONTINUED | OUTPATIENT
Start: 2024-01-02 | End: 2024-01-05 | Stop reason: HOSPADM

## 2024-01-02 RX ORDER — NALOXONE HCL 0.4 MG/ML
0.1 VIAL (ML) INJECTION
Status: DISCONTINUED | OUTPATIENT
Start: 2024-01-02 | End: 2024-01-05 | Stop reason: HOSPADM

## 2024-01-02 RX ORDER — MIDAZOLAM HYDROCHLORIDE 1 MG/ML
0.5 INJECTION INTRAMUSCULAR; INTRAVENOUS
Status: DISCONTINUED | OUTPATIENT
Start: 2024-01-02 | End: 2024-01-02 | Stop reason: HOSPADM

## 2024-01-02 RX ADMIN — EPHEDRINE SULFATE 5 MG: 50 INJECTION INTRAVENOUS at 16:26

## 2024-01-02 RX ADMIN — SODIUM CHLORIDE 40 ML: 9 INJECTION, SOLUTION INTRAVENOUS at 12:57

## 2024-01-02 RX ADMIN — ROCURONIUM BROMIDE 10 MG: 10 SOLUTION INTRAVENOUS at 16:37

## 2024-01-02 RX ADMIN — EPHEDRINE SULFATE 5 MG: 50 INJECTION INTRAVENOUS at 17:33

## 2024-01-02 RX ADMIN — MORPHINE SULFATE 15 MG: 15 TABLET, FILM COATED, EXTENDED RELEASE ORAL at 21:08

## 2024-01-02 RX ADMIN — ROCURONIUM BROMIDE 50 MG: 10 SOLUTION INTRAVENOUS at 16:00

## 2024-01-02 RX ADMIN — DEXAMETHASONE SODIUM PHOSPHATE 4 MG: 4 INJECTION, SOLUTION INTRAMUSCULAR; INTRAVENOUS at 16:51

## 2024-01-02 RX ADMIN — ACETAMINOPHEN 1000 MG: 500 TABLET ORAL at 21:08

## 2024-01-02 RX ADMIN — FAMOTIDINE 20 MG: 20 TABLET, FILM COATED ORAL at 12:57

## 2024-01-02 RX ADMIN — Medication 1 CAPSULE: at 09:06

## 2024-01-02 RX ADMIN — EPHEDRINE SULFATE 5 MG: 50 INJECTION INTRAVENOUS at 16:44

## 2024-01-02 RX ADMIN — PANTOPRAZOLE SODIUM 40 MG: 40 TABLET, DELAYED RELEASE ORAL at 09:07

## 2024-01-02 RX ADMIN — MORPHINE SULFATE 15 MG: 15 TABLET, FILM COATED, EXTENDED RELEASE ORAL at 09:06

## 2024-01-02 RX ADMIN — VANCOMYCIN HYDROCHLORIDE 1250 MG: 10 INJECTION, POWDER, LYOPHILIZED, FOR SOLUTION INTRAVENOUS at 13:50

## 2024-01-02 RX ADMIN — CARVEDILOL 25 MG: 12.5 TABLET, FILM COATED ORAL at 21:07

## 2024-01-02 RX ADMIN — EPHEDRINE SULFATE 10 MG: 50 INJECTION INTRAVENOUS at 16:19

## 2024-01-02 RX ADMIN — SODIUM CHLORIDE: 900 INJECTION INTRAVENOUS at 15:54

## 2024-01-02 RX ADMIN — Medication 3 ML: at 21:08

## 2024-01-02 RX ADMIN — SODIUM CHLORIDE 2000 MG: 900 INJECTION INTRAVENOUS at 16:04

## 2024-01-02 RX ADMIN — PROPOFOL 150 MG: 10 INJECTION, EMULSION INTRAVENOUS at 16:00

## 2024-01-02 RX ADMIN — CLONIDINE HYDROCHLORIDE 0.1 MG: 0.1 TABLET ORAL at 21:08

## 2024-01-02 RX ADMIN — ONDANSETRON 4 MG: 2 INJECTION INTRAMUSCULAR; INTRAVENOUS at 18:00

## 2024-01-02 RX ADMIN — CLONIDINE HYDROCHLORIDE 0.1 MG: 0.1 TABLET ORAL at 09:07

## 2024-01-02 RX ADMIN — INSULIN LISPRO 2 UNITS: 100 INJECTION, SOLUTION INTRAVENOUS; SUBCUTANEOUS at 21:45

## 2024-01-02 RX ADMIN — Medication 100 MCG: at 16:07

## 2024-01-02 RX ADMIN — EPHEDRINE SULFATE 5 MG: 50 INJECTION INTRAVENOUS at 17:26

## 2024-01-02 RX ADMIN — EPHEDRINE SULFATE 5 MG: 50 INJECTION INTRAVENOUS at 16:53

## 2024-01-02 RX ADMIN — ROCURONIUM BROMIDE 10 MG: 10 SOLUTION INTRAVENOUS at 17:08

## 2024-01-02 RX ADMIN — FENTANYL CITRATE 50 MCG: 50 INJECTION, SOLUTION INTRAMUSCULAR; INTRAVENOUS at 18:54

## 2024-01-02 RX ADMIN — LIDOCAINE HYDROCHLORIDE 50 MG: 10 INJECTION, SOLUTION EPIDURAL; INFILTRATION; INTRACAUDAL; PERINEURAL at 16:00

## 2024-01-02 RX ADMIN — Medication 5 MG: at 18:59

## 2024-01-02 RX ADMIN — EPHEDRINE SULFATE 10 MG: 50 INJECTION INTRAVENOUS at 16:24

## 2024-01-02 RX ADMIN — EPHEDRINE SULFATE 5 MG: 50 INJECTION INTRAVENOUS at 16:32

## 2024-01-02 RX ADMIN — Medication 10 ML: at 21:09

## 2024-01-02 RX ADMIN — Medication 1000 MG: at 18:23

## 2024-01-02 RX ADMIN — CARVEDILOL 25 MG: 12.5 TABLET, FILM COATED ORAL at 09:06

## 2024-01-02 RX ADMIN — Medication 100 MCG: at 16:26

## 2024-01-02 RX ADMIN — Medication 100 MCG: at 16:53

## 2024-01-02 RX ADMIN — ATORVASTATIN CALCIUM 80 MG: 40 TABLET, FILM COATED ORAL at 09:05

## 2024-01-02 RX ADMIN — Medication 5 MG: at 19:13

## 2024-01-02 RX ADMIN — SUGAMMADEX 200 MG: 100 INJECTION, SOLUTION INTRAVENOUS at 18:00

## 2024-01-02 RX ADMIN — ROPIVACAINE HYDROCHLORIDE 25 ML: 5 INJECTION, SOLUTION EPIDURAL; INFILTRATION; PERINEURAL at 13:22

## 2024-01-02 RX ADMIN — Medication 100 MCG: at 17:32

## 2024-01-02 RX ADMIN — Medication 100 MCG: at 16:32

## 2024-01-02 RX ADMIN — HYDRALAZINE HYDROCHLORIDE 75 MG: 50 TABLET ORAL at 21:07

## 2024-01-02 RX ADMIN — TERAZOSIN HYDROCHLORIDE 5 MG: 5 CAPSULE ORAL at 21:08

## 2024-01-02 NOTE — PROGRESS NOTES
"      Orthopaedic Surgery Progress Note  CC:   Chief Complaint   Patient presents with    Fall      Subjective   Interval History:   No acute events overnight.  Patient resting comfortably in bed  during interview.  Denies chest pain or shortness of breath.  States no pain left hip.  No new complaints.    ROS: Denies fever, chills, nausea or vomiting    Objective     Vital Signs:  Temp (24hrs), Av.4 °F (36.9 °C), Min:98 °F (36.7 °C), Max:98.6 °F (37 °C)    /70   Pulse 62   Temp 98.4 °F (36.9 °C) (Oral)   Resp 16   Ht 162.6 cm (64\")   Wt 81.6 kg (180 lb)   LMP  (LMP Unknown)   SpO2 95%   BMI 30.90 kg/m²   Body mass index is 30.9 kg/m².    Physical Exam:  left LE: skin intact, compartments soft/compressible              +EHL/FHL/GSC/TA              SILT DP/SP/SN/Saph/Tib, slightly decreased at baseline due to neuropathy              Palpable DP, CR brisk in all toes    Assessment and Plan:  83-year-old female with left femoral neck fracture     -Bedrest  -Pain control  -N.p.o. for possible OR today, patient still on nitro drip, will plan to have a conversation with cardiology this morning to further discuss patient's condition and level of medical risk for surgical fixation in the OR  -DVT ppx  -Rest of mgmt per primary team    Seymour Harrington MD  24  07:44 EST       "

## 2024-01-02 NOTE — ANESTHESIA PREPROCEDURE EVALUATION
Anesthesia Evaluation     Patient summary reviewed and Nursing notes reviewed   NPO Solid Status: > 8 hours  NPO Liquid Status: > 8 hours           Airway   Mallampati: II  TM distance: >3 FB  Neck ROM: full  Dental      Pulmonary     breath sounds clear to auscultation  Cardiovascular   Exercise tolerance: poor (<4 METS)    Rhythm: regular  Rate: normal        Neuro/Psych  GI/Hepatic/Renal/Endo      Musculoskeletal     Abdominal    Substance History      OB/GYN          Other            Phys Exam Other: Upper front caps            Anesthesia Plan    ASA 3     general with block       Anesthetic plan, risks, benefits, and alternatives have been provided, discussed and informed consent has been obtained with: patient.    CODE STATUS:    Level Of Support Discussed With: Patient  Code Status (Patient has no pulse and is not breathing): CPR (Attempt to Resuscitate)  Medical Interventions (Patient has pulse or is breathing): Full Support

## 2024-01-02 NOTE — OP NOTE
ORTHOPAEDIC SURGERY OPERATIVE REPORT    Location of Surgery: [facility]    Patient Name:  Yanique Webster  YOB: 1940    Date of Surgery:  1/2/2024     Pre-op Diagnosis:   Nondisplaced left femoral neck fracture    Post-op Diagnosis:      Same    Procedure/CPT® Codes:  71008 percutaneous screw fixation hip fracture    Staff:  Surgeon(s):  Seymour Harrington MD       Anesthesia: General with Block    Estimated Blood Loss: minimal    Specimen:          None    Complications:   None    CLINICAL HISTORY:  Yanique Webster is a 83 y.o. female with the above condition. Discussed operative and non-operative treatment options and risks including but not limited to pain, infection, bleeding, damage to surrounding structures, and need for additional procedures.  After all questions were fully answered, Yanique Webster understood the risks and elected to proceed, and informed consent was obtained. The patient was marked with indelible marking pen after verifying correct side, site, and procedure.      DESCRIPTION OF PROCEDURE:   The patient was identified in the pre-operative area where correct procedure, site, and patient were verified. The patient was brought to the operating theater in stable condition and was transferred to the operative hana table where they remained in the supine position for the entirety of the case. Preoperative timeout was taken to ensure the correct identity, operative procedure, and location. General anesthesia was then administered. The patient received appropriate weight based IV antibiotics within 30 minutes of skin incision.  All bony prominences well-padded. The left leg was then prepped and draped in the standard sterile fashion.      Imaging confirmed a nondisplaced/valgus impacted femoral neck fracture without signs of distraction and with a continuous inferior cortical margin.     The bony landmarks of the left proximal femur were palpated and  marked out. A free guidewire was aligned with fluoro guidance along the intended path along the femoral neck, and a marker used to draw out that angle.  A 4 cm incision was then made in the skin along the intended pathway.  Blunt dissection was taken down to the fascia wound was then opened with a knife.  A guidewire was then placed along the lateral aspect of the femur and the starting point checked on fluoro.    The guide wire was inserted along the inferior border of the femoral neck with a midline starting point (not distal to the lesser trochanter) and the tip placed just shy of the subchondral surface of the femoral head. The pin was checked on multiple views to ensure that it was not beyond the femoral head surface.     A second pin was then placed under fluoroscopic guidance along the posterior aspect of the femoral neck and more proximal. The trajectory was set to be approximately parallel to the first pin. Once this position was satisfactory, another pin was introduced along the anterior superior aspect of the femoral neck, again staying close to the border of the femoral neck to ensure the optimal spread of the pins. Pin position and configuration was checked on fluoroscopy.      The pins were measured for 7.5 mm cannulated titanium screws, partially threaded.  The inferior pin was drilled first with a cannulated bit. The screw was then placed such that the threads were not protruding from the femoral head and the screw head was firmly against the lateral femoral cortex. Similarly, the posterior superior pin was drilled and another cannulated screw placed there with position checked on fluoroscopy. Finally, the anterior superior screw was drilled and the final screw was placed there.  The screws altogether were placed in an inverted triangle configuration    After configuration was confirmed on fluoroscopy and satisfactory, the surgical site was thoroughly irrigated. Subcutaneous skin was closed with 2-0  vicryl sutures. Finally, the skin was closed with a series of staples.    A sterile dressing was then placed over the hip wound.     The patient tolerated the procedure well no with acute complications identified.  All sponge & needle counts were correct x2 prior to procedure conclusion.  Patient was awoken from anesthesia and transferred back to the PACU without complication.     Counts:    Sponge: Correct    Needle: Correct    Sharp: Correct    Instrument: Correct     POSTOPERATIVE PLAN:   -Anemia, acute blood loss anemia following hip fracture  -Weight bearing as tolerated with assistance and support devices (walker, wheelchair) as needed  -Antibiotics - complete 24 hour postoperative course of IV antibioitics  -Advance diet as tolerated, diet per primary  -Continue dressing, dressing CDI, can be removed and changed as needed if dressing becomes saturated  -PT/OT Consult - Weight bearing as tolerated  -Ice hip  -DVT Prophylaxis -   -Osteoporotic hip fracture - I recommend informing the patient's PCP regarding osteoporotic fracture/consideration of enrolling patient in osteoporotic care program postoperatively  -Follow-up, upon discharge, patient will need follow-up with me in the clinic in 2-3 weeks' time.  Continue Xarelto for DVT prophylaxis.  Continue covaderm or dry dressing every other day.  Keep incision covered at all times.  DC staples on or around 2-3 weeks. The  will need to call my office/[facility] to arrange for a discharge follow-up appointment in 2-3 weeks. I discussed the patient's findings and my recommendations with patient.    Future Appointments   Date Time Provider Department Center   3/25/2024  8:45 AM Steve Geronimo MD [facility]   4/4/2024 11:45 AM Emilio Regalado MD [facility]       Please have the schedulers call our office at [phone] for the clinic appointment as needed. Please call with questions or concerns.       Implants:    Implant  Name Type Inv. Item Serial No.  Lot No. LRB No. Used Action   JIMENA LAGUNA CSSPLS SHT/THRD TI 7.5X75MM - [id number] Implant JIMENA [employer] CSSPLS SHT/THRD TI 7.5X75MM  DEPUY SYNTHES  Left 1 Explanted   [employer]IME[employer] CSS[employer]S SHT/THRD TI 7.5X90MM - [id number] Implant JIMENA [employer] CSS[employer]S SHT/THRD TI 7.5X90MM  DEPUY SYNTHES  Left 1 Implanted   95mm long thread 7.5 CSS+ inferior    DEPUY SYNTHES  Left 1 Explanted   85 short thread .5 CSS +    DEPUY SYNTHES  Left 1 Explanted   95mm short thread 7.5 CSS+    DEPUY SYNTHES  Left 1 Implanted   80 mm short thread 7.5 CSS +    DEPUY SYNTHES  Left 2 Implanted       Seymour Harrington MD     Date: 1/2/2024  Time: 18:23 EST  Electronically signed by Seymour Harrington MD, 01/02/24, 6:23 PM EST.

## 2024-01-02 NOTE — PROGRESS NOTES
Brief ortho Progress Note:  I was scrubbed in for surgery yet had conversation with cardiology over the phone.  Cardiology reported acceptable risk for surgery.  Currently blood pressure being controlled on oral medication.  Nitro drip has been discontinued.  We also discussed spinal anesthesia versus general.  Cardiology reported that general might actually be better for keeping her blood pressure better controlled.      Following finishing with surgery I went and had another conversation with the patient.  We once again discussed cardiology's recommendation and the risks of surgery.  We discussed cannulated screw fixation due to her fracture pattern however we also did discuss that if for any reason her fracture had displaced from previous a hemiarthroplasty would be indicated.  Patient expressed understanding.  The risks we discussed included but are not limited to the risk of bleeding, infection, neurovascular damage, post-operative stiffness, continued/chronic pain, need for further revision surgeries in the future, deep venous thrombosis, deformity, malunion, nonunion, hardware failure, need for further surgery, etc. We also discussed the general risks from anesthesia including death, strokes, heart attacks, blood clots, etc. We also discussed the post-operative rehabilitation, the need for physical therapy, and the overall expected outcomes from the procedure. We also discussed what would happen if we do nothing. I allowed proper time and answered the patient's questions regarding the procedure. The patient expressed understanding. Knowing what the risks are and what the conservative treatment is, the patient elected to forgo any further conservative treatment options and proceed with the surgical intervention.     Seymour Harrington MD  [facility]

## 2024-01-02 NOTE — ANESTHESIA PROCEDURE NOTES
Airway  Urgency: elective    Date/Time: 1/2/2024 4:01 PM  Airway not difficult    General Information and Staff    Patient location during procedure: OR  SRNA: Mariaelena Mccoy SRNA  Indications and Patient Condition  Indications for airway management: airway protection    Preoxygenated: yes  MILS not maintained throughout  Mask difficulty assessment: 1 - vent by mask    Final Airway Details  Final airway type: endotracheal airway      Successful airway: ETT  Cuffed: yes   Successful intubation technique: direct laryngoscopy  Facilitating devices/methods: cricoid pressure and intubating stylet  Endotracheal tube insertion site: oral  Blade: Sandra  Blade size: 3  ETT size (mm): 7.0  Cormack-Lehane Classification: grade I - full view of glottis  Placement verified by: chest auscultation and capnometry   Cuff volume (mL): 8  Measured from: lips  ETT/EBT  to lips (cm): 23  Number of attempts at approach: 1  Assessment: lips, teeth, and gum same as pre-op and atraumatic intubation    Additional Comments  Negative epigastric sounds, Breath sound equal bilaterally with symmetric chest rise and fall

## 2024-01-02 NOTE — DISCHARGE INSTRUCTIONS
Seymour Harrington MD  Orthopedic Foot & Ankle Surgeon  [facility]    Diagnosis: Closed left hip fracture  Procedure Performed: Procedure(s) (LRB):  HIP CANNULATED SCREW PLACEMENT (Left)    What to Expect After Surgery  Diet after surgery:  Resume your diet gradually.  Begin with clear liquids and gradually progress as you feel ready.  Surgical Site Care:  Continue Aquacel dressing for 1 week after surgery and then remove and replace with a Covaderm or dry dressing every other day.  Keep incision covered at all times.  Follow Up Appointment: Please call the [facility] at [phone] or Dr. Harrington's schedulers within two (2) days of your discharge to /confirm/verify your follow-up appointment date/time with Dr. Harrington.  Typically, Dr. Harrington will want to see you 14-21 days after surgery for a post-operative follow-up appointment - before 14 days, the sutures may have to stay in and you will need to return in the 14-21 day window again.  Please arrive ~15 minutes prior to your appointment time to take care of any paperwork.    Activity Precautions:  You may be weightbearing on your operative lower extremity   Use ice packs intermittently (20 minutes on, 20 minutes off) to reduce pain and swelling, however, use extreme caution with icing if your nerve block is still in effect.  Make sure to have a barrier between your skin and the ice pack to avoid frost bite.    Pain Management  Nerve Blocks:  to help control pain after surgery, you may have received a nerve block where the anesthesiologist injected numbing medication around the nerves that send pain signals from your leg to your brain.  This helps you feel little to no pain.   The time a nerve block lasts varies from person to person.  Often, they will last between 12 and 24 hours but could last days.  You may not be able to move your foot and/or ankle during this time.  As the block medication wears off, you may  feel a tingling sensation as the nerves start to wake up.  Keep your leg safe while it is numb.  Avoid excessive heating,  scratching, etc. until the block has completely worn off.  If icing the hip, watch the toes of that leg closely to ensure that they do not become discolored (i.e., white, red or blue)  Even if you still feel no pain in your leg before you go to bed, take a dose of your narcotic medication before you go to sleep.  You do not want to wake up with the block having worn off and being behind on pain control - it is quite difficult to 'catch up' again.  Pain Medication:    Acetaminophen (Tylenol) 500 mg tablets are typically your baseline pain medication.  Take this tablet up to once every 4 hours. Do not exceed 3,000 mg of acetaminophen daily.  You have been provided a narcotic pain reliever. Take doses of this medication if you are having severe breakthrough pain uncontrolled by the Tylenol alone. Typically, patients are taking it every 4 hours for the first day or two after surgery.  Actively wean down your use of this medication after the third day after surgery.  Note that it takes about 30-45 minutes for oral pain medication to take effect.  DO NOT drink alcoholic beverages, use recreational drugs, or use prescription sedative medications when taking pain medication.  DO NOT operate heavy machinery/drive while taking opioids.  To avoid the risk of nausea, please make sure that you are taking your medication with food.  You may experience light headedness while taking your pain medication.  Make sure you drink plenty of water while taking narcotic pain medication to stay well hydrated and help avoid constipation.    You have been provided a stool softener to help with constipation that can be a side effect of taking narcotics.  Take that medication as needed.  An antinausea prescription medications can also be given to help with nausea that you can take as needed.  Itching is a common side effect  to pain medication usage.  If you have an associated rash with the itching, please contact your provider.       When to Call Your Physician  Common or Normal Post-Operative Reactions:  Low grade fever (approximately 100.5 degrees) for up to one week.  Small amount of blood or fluid leaking from the surgical site or dressing  Bruising along the surgical extremity  Swelling around the surgical site and surrounding area  The reactions listed above are NORMAL, but you want to call your surgeon if any of these reactions persist.  Symptoms to Report:  Please report any of the following signs to your surgeon:  Signs of infection:  Redness or pain around your incision (if you cannot see your incision, red streaking up your extremity should be reported).  Thick, dark yellow or foul-smelling drainage at the incision site or from your dressing.  Temperature measured over 101.5 degrees for more than 24 hours despite Tylenol.  Signs of Decreased Circulation to the Ankle and Foot:  Coldness of ankle and foot  Foot or leg turning pale  Tingling/numbness (after the block has fully resolved)  Sustained increases in pain uncontrolled by medication  Toenail bed turns blue in color  Blood Clots:  Although rare, please be aware and utilize the recommendations below to avoid getting a blood clot.  Prevention of deep vein thrombus DVT (blood clots):  Get up 4-5 times during the daytime and walk/crutch/walker across the room to keep the blood circulating in your legs. (Maintain any weightbearing restrictions, of course)  Wiggle your toes frequently if you are in a splint or case  Notify your physician if you are a nicotine user, on hormone replacement therapy medications, are taking birth control, or have a history of blood clots as these can increase your risk of developing a blood clot.  Notify your provider if you develop pain or tenderness in your calf muscle    If you have any additional questions, please contact Dr. Harrington's staff the  [facility] at [phone]    IF YOU HAVE AN EMERGENCY, i.e., SHORTNESS OF BREATH, CHEST PAIN, OR SYMPTOMS SEVERE IN NATURE, PLEASE CALL [phone] OR VISIT YOUR LOCAL EMERGENCY ROOM

## 2024-01-02 NOTE — PROGRESS NOTES
[facility] Medicine Services  PROGRESS NOTE    Patient Name: Yanique Webster  : 1940  MRN: [id number]    Date of Admission: 2023  Primary Care Physician: Sebas Alicea MD    Subjective   Subjective     CC:  F/u hip fracture    HPI:  Patient seen resting up in bed awake and alert.  No acute distress.  Nitroglycerin and heparin drips now off.  No further chest pain or shortness of air.  No dizziness.  Left hip pain rated 6/10 scale.  Has been seen by cardiology today and cleared for surgery.  She reports plan is to OR around 2 PM today.  No new complaints.      Objective   Objective     Vital Signs:   Temp:  [98 °F (36.7 °C)-98.7 °F (37.1 °C)] 98.7 °F (37.1 °C)  Heart Rate:  [57-80] 57  Resp:  [16-18] 18  BP: (135-183)/() 161/68     Physical Exam:  Constitutional: No acute distress, resting up in bed.  Awake and alert.    HENT: NCAT, mucous membranes moist  Respiratory: Clear to auscultation bilaterally, respiratory effort normal on RA. Sats wnl.    Cardiovascular: RRR, no murmurs, rubs, or gallops  Gastrointestinal: soft, nontender, nondistended. + BS.  Obese abd.   Musculoskeletal: No bilateral ankle edema.  MONTANEZ spont but LLE decreased ROM due to pain.  LLE ext rotated and shortened.    Neurologic: awake and alert. Oriented x 3.  Nonfocal.  Follows commands. speech clear and appropriate.    Skin: No rashes      Results Reviewed:  LAB RESULTS:      Lab 24  0423 24  0801 24  0201 23  0538 23   WBC 7.48  --  6.95 6.87  --  8.82   HEMOGLOBIN 8.4*  --  8.4* 9.2*  --  9.2*   HEMATOCRIT 26.0*  --  26.3* 29.5*  --  29.1*   PLATELETS 148  --  145 160  --  160   NEUTROS ABS  --   --  4.86 4.40  --  7.00   IMMATURE GRANS (ABS)  --   --  0.04 0.02  --  0.03   LYMPHS ABS  --   --  1.05 1.29  --  0.97   MONOS ABS  --   --  0.53 0.68  --  0.66   EOS ABS  --   --  0.43* 0.44*  --  0.14   MCV 85.0  --  85.9 89.4  --  89.3    PROTIME 15.2* 16.1*  --  23.9* 26.1*  --    APTT  --   --   --  48.6*  --   --          Lab 01/02/24  0423 01/01/24  0201 12/31/23  0538 12/30/23 1944   SODIUM 136 139 143 140   POTASSIUM 3.9 4.2 5.1 3.9   CHLORIDE 104 108* 112* 108*   CO2 20.0* 20.0* 22.0 21.0*   ANION GAP 12.0 11.0 9.0 11.0   BUN 46* 37* 41* 41*   CREATININE 2.19* 1.82* 1.97* 2.05*   EGFR 22.0* 27.5* 25.0* 23.8*   GLUCOSE 144* 153* 127* 161*   CALCIUM 9.8 9.5 9.8 9.7   MAGNESIUM  --   --  2.2  --    HEMOGLOBIN A1C  --   --  5.10  --    TSH  --   --  0.258*  --          Lab 12/30/23 1944   TOTAL PROTEIN 6.2   ALBUMIN 3.8   GLOBULIN 2.4   ALT (SGPT) 12   AST (SGOT) 16   BILIRUBIN 0.3   ALK PHOS 154*         Lab 01/02/24  0423 01/01/24  0801 01/01/24  0420 01/01/24  0201 01/01/24  0001 12/31/23  0538 12/30/23 1945   HSTROP T  --   --  57* 58* 58*  --   --    PROTIME 15.2* 16.1*  --   --   --  23.9* 26.1*   INR 1.19* 1.27*  --   --   --  2.12* 2.38*             Lab 12/31/23 0538   IRON 31*   IRON SATURATION (TSAT) 13*   TIBC 247*   TRANSFERRIN 166*   FERRITIN 83.11   FOLATE 8.34   VITAMIN B 12 550   ABO TYPING O   RH TYPING Negative   ANTIBODY SCREEN Negative         Brief Urine Lab Results  (Last result in the past 365 days)        Color   Clarity   Blood   Leuk Est   Nitrite   Protein   CREAT   Urine HCG        12/31/23 0753             44.5         12/31/23 0753 Yellow   Clear   Negative   Negative   Negative   100 mg/dL (2+)                   Microbiology Results Abnormal       None            XR Chest 1 View    Result Date: 1/2/2024  XR CHEST 1 VW Date of Exam: 1/2/2024 4:20 AM EST Indication: f/u edema Comparison: 12/31/2023 Findings: Stable cardiomegaly. Lungs without consolidation. No overt pulmonary edema. Dense aortic arch atherosclerotic calcifications. Negative for pleural effusion or pneumothorax.     Impression: Impression: 1. Stable cardiomegaly. 2. No acute process. Electronically Signed: Gerard Rm MD  1/2/2024 7:27 AM EST   Workstation ID: [id number]    Adult Transthoracic Echo Complete W/ Cont if Necessary Per Protocol    Result Date: 1/1/2024    Left ventricular ejection fraction appears to be 56 - 60%.   Left ventricular wall thickness is consistent with mild concentric hypertrophy   The aortic valve exhibits sclerosis.   Mild aortic valve regurgitation is present. No hemodynamically significant aortic valve stenosis is present.   Mild tricuspid valve regurgitation is present. Estimated right ventricular systolic pressure from tricuspid regurgitation is moderately elevated (45-55 mmHg   Moderate mitral annular calcification is present. Trace to mild mitral valve regurgitation is present. No significant mitral valve stenosis is present.     XR Chest 1 View    Result Date: 12/31/2023  XR CHEST 1 VW Date of Exam: 12/31/2023 10:30 PM EST Indication: Difficulty breathing/SOA Comparison: 12/30/2023. Findings: Cardiac silhouette is unchanged. Pulmonary vasculature is congested without overt pulmonary edema. Lungs are clear. No pneumothorax or pleural effusion. No acute osseous abnormality. Stable mild aortic arch calcification.     Impression: Impression: Pulmonary vascular congestion without overt pulmonary edema. Electronically Signed: Adrian Wolfe MD  12/31/2023 10:50 PM EST  Workstation ID: [id number]     Results for orders placed during the hospital encounter of 12/30/23    Adult Transthoracic Echo Complete W/ Cont if Necessary Per Protocol    Interpretation Summary    Left ventricular ejection fraction appears to be 56 - 60%.    Left ventricular wall thickness is consistent with mild concentric hypertrophy    The aortic valve exhibits sclerosis.    Mild aortic valve regurgitation is present. No hemodynamically significant aortic valve stenosis is present.    Mild tricuspid valve regurgitation is present. Estimated right ventricular systolic pressure from tricuspid regurgitation is moderately elevated (45-55 mmHg    Moderate mitral  annular calcification is present. Trace to mild mitral valve regurgitation is present. No significant mitral valve stenosis is present.      Current medications:  Scheduled Meds:atorvastatin, 80 mg, Oral, Daily  carvedilol, 25 mg, Oral, Q12H  cloNIDine, 0.1 mg, Oral, BID  hydrALAZINE, 75 mg, Oral, Q8H  insulin lispro, 2-7 Units, Subcutaneous, 4x Daily AC & at Bedtime  lactobacillus acidophilus, 1 capsule, Oral, Daily  [Held by provider] lisinopril, 40 mg, Oral, Q24H  Morphine, 15 mg, Oral, Q12H  ondansetron, 4 mg, Intravenous, Once  pantoprazole, 40 mg, Oral, Daily  senna-docusate sodium, 2 tablet, Oral, BID  sodium chloride, 10 mL, Intravenous, Q12H  terazosin, 5 mg, Oral, Nightly      Continuous Infusions:nitroglycerin, 5-200 mcg/min, Last Rate: Stopped (01/02/24 0921)      PRN Meds:.  acetaminophen **OR** acetaminophen **OR** acetaminophen    senna-docusate sodium **AND** polyethylene glycol **AND** bisacodyl **AND** bisacodyl    dextrose    dextrose    glucagon (human recombinant)    ipratropium-albuterol    melatonin    sodium chloride    sodium chloride    sodium chloride    Assessment & Plan   Assessment & Plan     Active Hospital Problems    Diagnosis  POA    **Closed left hip fracture [S72.002A]  Yes    Anxiety associated with depression [F41.8]  Yes    Anemia, chronic disease [D63.8]  Yes    Paroxysmal atrial fibrillation [I48.0]  Yes    CKD (chronic kidney disease) stage 3, GFR 30-59 ml/min [N18.30]  Yes    Essential hypertension [I10]  Yes    Coronary artery disease involving native coronary artery of native heart without angina pectoris [I25.10]  Yes    Hyperlipidemia LDL goal <70 [E78.5]  Yes    COPD (chronic obstructive pulmonary disease) [J44.9]  Yes    GERD (gastroesophageal reflux disease) [K21.9]  Yes    Elevated serum creatinine [R79.89]  Yes    Takotsubo cardiomyopathy [I51.81]  Yes    Fibromyalgia [M79.7]  Yes    Type 2 diabetes mellitus [E11.9]  Yes      Resolved Hospital Problems   No  resolved problems to display.        Brief Hospital Course to date:  Yanique Webster is a 83 y.o. female w/a hx of CAD, HTN, HLD, PAF (on Xarelto), Takotsubo cardiomyopathy, PVD, T2DM, neuropathy, CKD Stage III, COPD, chronic pain who presented to the ED w/ c/o a fall found to have a hip fracture.    This patient's problems and plans were partially entered by my partner and updated as appropriate by me 01/02/24.    Assessment/plan:    Left hip fracture after mechanical fall  - plan was for OR today with Dr. Harrington for repair, recommends repeat INR in AM, ideally <1.5, gave 2 units FFP yest 12/31 to try to reverse coagulopathy  -neurovascular checks to LLE q  -holding Xarelto and ASA (last dose of Xarelto taken at ~6pm 12/30)  -NPO after MN last pm for poss sx today.    -- Seen by Dr. Geronimo cardiology today for clearance for surgery.  -pt,ot and case mgt consult   --Plans for probable surgery this afternoon    PAF   Cardiomyopathy  CAD  HTN, HLD   CP/Hypertensive urgency and poss flash pulm edema overnight 12/31 with RRT called.   -ECHO 2019 w/ EF 61-65%  -holding Xarelto and ASA pending surgery   -continue routine statin, coreg, clonidine, Cardura (sub Hytrin)  -resume torsemide and lisinopril   -daily weights; strict I/Os   --/P chest pain and hypertensive emergency.  Required nitroglycerin and heparin drips.  Now off.  No further complaints.  Seen by cardiology today.  Plans for surgery later this afternoon with Dr. Harrington    CKD Stage III  - at baseline around 1.9  - monitor      COPD   -currently on room air   -prn nebs      T2DM  -hem A1c only 5.1  -FSBG q ac/hs w/ LDSS.  N.p.o. for surgery.  Continue current regimen for now and adjust further postop when eating.     Anemia   - generally at baseline.  Hgb slightly down with IVFs. No obvious bleeding.   -- AM labs today with stable hemoglobin at 8.4.     Anxiety   Depression   -Cymbalta on hold as it is contraindicated w/her creatinine clearance   --stable      Fibromyalgia     Chronic pain   -prior resumed home pain regimen MS contin 15mg BID     GERD  -continue PPI      Expected Discharge Location and Transportation: Rehab likely after surgery   Expected Discharge   Expected Discharge Date: 1/6/2024; Expected Discharge Time:      DVT prophylaxis:  No DVT prophylaxis order currently exists.     AM-PAC 6 Clicks Score (PT): 14 (01/01/24 2000)    CODE STATUS:   Code Status and Medical Interventions:   Ordered at: 12/30/23 4580     Level Of Support Discussed With:    Patient     Code Status (Patient has no pulse and is not breathing):    CPR (Attempt to Resuscitate)     Medical Interventions (Patient has pulse or is breathing):    Full Support       Karolina Peña, APRN  01/02/24

## 2024-01-02 NOTE — ANESTHESIA POSTPROCEDURE EVALUATION
Patient: Yanique Webster    Procedure Summary       Date: 01/02/24 Room / Location:  [facility]  /  [facility]    Anesthesia Start: 1554 Anesthesia Stop: 1824    Procedure: HIP CANNULATED SCREW PLACEMENT (Left: Hip) Diagnosis:     Surgeons: Seymour Harrington MD Provider: Cristobal Lainez MD    Anesthesia Type: general with block ASA Status: 3            Anesthesia Type: general with block    Vitals  Vitals Value Taken Time   /140 01/02/24 1822   Temp 97.3 °F (36.3 °C) 01/02/24 1818   Pulse 73 01/02/24 1824   Resp 16 01/02/24 1818   SpO2 100 % 01/02/24 1823   Vitals shown include unfiled device data.        Post Anesthesia Care and Evaluation    Patient location during evaluation: PACU  Patient participation: waiting for patient participation  Level of consciousness: lethargic  Pain score: 2  Pain management: adequate    Airway patency: patent  Anesthetic complications: No anesthetic complications  PONV Status: none  Cardiovascular status: acceptable  Respiratory status: acceptable  Hydration status: acceptable

## 2024-01-02 NOTE — PLAN OF CARE
Problem: Adult Inpatient Plan of Care  Goal: Plan of Care Review  Outcome: Ongoing, Progressing  Goal: Patient-Specific Goal (Individualized)  Outcome: Ongoing, Progressing  Goal: Absence of Hospital-Acquired Illness or Injury  Outcome: Ongoing, Progressing  Intervention: Identify and Manage Fall Risk  Recent Flowsheet Documentation  Taken 1/2/2024 0600 by Nilam Lopez RN  Safety Promotion/Fall Prevention:   assistive device/personal items within reach   clutter free environment maintained   fall prevention program maintained   nonskid shoes/slippers when out of bed   room organization consistent   safety round/check completed  Taken 1/2/2024 0400 by Nilam Lopez RN  Safety Promotion/Fall Prevention:   assistive device/personal items within reach   clutter free environment maintained   fall prevention program maintained   nonskid shoes/slippers when out of bed   room organization consistent   safety round/check completed  Taken 1/2/2024 0200 by Nilam Lopez RN  Safety Promotion/Fall Prevention:   assistive device/personal items within reach   clutter free environment maintained   fall prevention program maintained   nonskid shoes/slippers when out of bed   room organization consistent   safety round/check completed  Taken 1/2/2024 0000 by Nilam Lopez RN  Safety Promotion/Fall Prevention:   assistive device/personal items within reach   clutter free environment maintained   fall prevention program maintained   nonskid shoes/slippers when out of bed   room organization consistent   safety round/check completed  Taken 1/1/2024 2200 by Nilam Lopez RN  Safety Promotion/Fall Prevention:   assistive device/personal items within reach   clutter free environment maintained   fall prevention program maintained   nonskid shoes/slippers when out of bed   safety round/check completed   room organization consistent  Taken 1/1/2024 2000 by Nilam Lopez RN  Safety Promotion/Fall Prevention:   assistive device/personal  items within reach   clutter free environment maintained   fall prevention program maintained   nonskid shoes/slippers when out of bed   room organization consistent   safety round/check completed  Intervention: Prevent Skin Injury  Recent Flowsheet Documentation  Taken 1/2/2024 0600 by Nilam Lopez RN  Body Position:   neutral body alignment   neutral head position   weight shifting  Skin Protection:   adhesive use limited   incontinence pads utilized   tubing/devices free from skin contact  Taken 1/2/2024 0400 by Nilam Lopez RN  Body Position:   neutral body alignment   neutral head position   weight shifting  Skin Protection:   adhesive use limited   incontinence pads utilized   tubing/devices free from skin contact  Taken 1/2/2024 0200 by Nilam Lopez RN  Body Position:   neutral body alignment   neutral head position   weight shifting  Skin Protection:   adhesive use limited   incontinence pads utilized  Taken 1/2/2024 0000 by Nilam Lopez RN  Body Position:   neutral body alignment   neutral head position   weight shifting  Skin Protection:   adhesive use limited   incontinence pads utilized   tubing/devices free from skin contact  Taken 1/1/2024 2200 by Nilam Lopez RN  Body Position:   neutral body alignment   neutral head position   weight shifting  Skin Protection:   adhesive use limited   incontinence pads utilized   tubing/devices free from skin contact  Taken 1/1/2024 2000 by Nilam Lopez RN  Body Position:   neutral body alignment   neutral head position   weight shifting  Skin Protection:   adhesive use limited   incontinence pads utilized   tubing/devices free from skin contact  Intervention: Prevent and Manage VTE (Venous Thromboembolism) Risk  Recent Flowsheet Documentation  Taken 1/1/2024 2000 by Nilam Lopez RN  Activity Management: bedrest  Goal: Optimal Comfort and Wellbeing  Outcome: Ongoing, Progressing  Goal: Readiness for Transition of Care  Outcome: Ongoing, Progressing      Problem: Skin Injury Risk Increased  Goal: Skin Health and Integrity  Outcome: Ongoing, Progressing  Intervention: Optimize Skin Protection  Recent Flowsheet Documentation  Taken 1/2/2024 0600 by Nilam Lopez RN  Pressure Reduction Techniques:   frequent weight shift encouraged   pressure points protected   weight shift assistance provided  Head of Bed (HOB) Positioning: [facility] elevated  Pressure Reduction Devices:   positioning supports utilized   pressure-redistributing mattress utilized  Skin Protection:   adhesive use limited   incontinence pads utilized   tubing/devices free from skin contact  Taken 1/2/2024 0400 by Nilam Lopez RN  Pressure Reduction Techniques:   frequent weight shift encouraged   pressure points protected   weight shift assistance provided  Head of Bed ([facility]) Positioning: [facility] elevated  Pressure Reduction Devices:   positioning supports utilized   pressure-redistributing mattress utilized  Skin Protection:   adhesive use limited   incontinence pads utilized   tubing/devices free from skin contact  Taken 1/2/2024 0200 by Nilam Lopez RN  Pressure Reduction Techniques:   frequent weight shift encouraged   weight shift assistance provided   pressure points protected  Head of Bed ([facility]) Positioning: [facility] elevated  Pressure Reduction Devices:   positioning supports utilized   pressure-redistributing mattress utilized  Skin Protection:   adhesive use limited   incontinence pads utilized  Taken 1/2/2024 0000 by Nilam Lopez RN  Pressure Reduction Techniques:   frequent weight shift encouraged   weight shift assistance provided   pressure points protected  Head of Bed ([facility]) Positioning: [facility] elevated  Pressure Reduction Devices:   positioning supports utilized   pressure-redistributing mattress utilized  Skin Protection:   adhesive use limited   incontinence pads utilized   tubing/devices free from skin contact  Taken 1/1/2024 2200 by Nilam Lopez RN  Pressure Reduction Techniques:   frequent weight  shift encouraged   weight shift assistance provided   pressure points protected  Head of Bed (HOB) Positioning: HOB elevated  Pressure Reduction Devices:   positioning supports utilized   pressure-redistributing mattress utilized  Skin Protection:   adhesive use limited   incontinence pads utilized   tubing/devices free from skin contact  Taken 1/1/2024 2000 by Nilam Lopez RN  Pressure Reduction Techniques:   frequent weight shift encouraged   weight shift assistance provided   pressure points protected  Head of Bed (HOB) Positioning: HOB elevated  Pressure Reduction Devices:   positioning supports utilized   pressure-redistributing mattress utilized  Skin Protection:   adhesive use limited   incontinence pads utilized   tubing/devices free from skin contact     Problem: Fall Injury Risk  Goal: Absence of Fall and Fall-Related Injury  Outcome: Ongoing, Progressing  Intervention: Promote Injury-Free Environment  Recent Flowsheet Documentation  Taken 1/2/2024 0600 by Nilam Lopez RN  Safety Promotion/Fall Prevention:   assistive device/personal items within reach   clutter free environment maintained   fall prevention program maintained   nonskid shoes/slippers when out of bed   room organization consistent   safety round/check completed  Taken 1/2/2024 0400 by Nilam Lopez RN  Safety Promotion/Fall Prevention:   assistive device/personal items within reach   clutter free environment maintained   fall prevention program maintained   nonskid shoes/slippers when out of bed   room organization consistent   safety round/check completed  Taken 1/2/2024 0200 by Nilam Lopez RN  Safety Promotion/Fall Prevention:   assistive device/personal items within reach   clutter free environment maintained   fall prevention program maintained   nonskid shoes/slippers when out of bed   room organization consistent   safety round/check completed  Taken 1/2/2024 0000 by Nilam Lopez RN  Safety Promotion/Fall Prevention:   assistive  device/personal items within reach   clutter free environment maintained   fall prevention program maintained   nonskid shoes/slippers when out of bed   room organization consistent   safety round/check completed  Taken 1/1/2024 2200 by Nilam Lopez RN  Safety Promotion/Fall Prevention:   assistive device/personal items within reach   clutter free environment maintained   fall prevention program maintained   nonskid shoes/slippers when out of bed   safety round/check completed   room organization consistent  Taken 1/1/2024 2000 by Nilam Lopez RN  Safety Promotion/Fall Prevention:   assistive device/personal items within reach   clutter free environment maintained   fall prevention program maintained   nonskid shoes/slippers when out of bed   room organization consistent   safety round/check completed   Goal Outcome Evaluation:

## 2024-01-02 NOTE — PROGRESS NOTES
[facility] Cardiology    Inpatient Progress Note      Chief Complaint/Reason for service:    Hypertensive urgency         Subjective:       Without cardiopulmonary symptoms today.  Denies chest pain, dyspnea, palpitations.  Nitroglycerin was added to 10 mcg/min.  We discontinued it and she was given her oral medicines.  Blood pressure after oral medicines is in the 150s systolic    Past medical, surgical, social and family history reviewed in the patient's electronic medical record.         Objective:      Infusions:  nitroglycerin, 5-200 mcg/min, Last Rate: 40 mcg/min (01/1940)         Medications:    Current Facility-Administered Medications:     acetaminophen (TYLENOL) tablet 650 mg, 650 mg, Oral, Q4H PRN **OR** acetaminophen (TYLENOL) 160 MG/5ML oral solution 650 mg, 650 mg, Oral, Q4H PRN **OR** acetaminophen (TYLENOL) suppository 650 mg, 650 mg, Rectal, Q4H PRN, Lizbeth Buck APRN    atorvastatin (LIPITOR) tablet 80 mg, 80 mg, Oral, Daily, Lizbeth Buck APRN, 80 mg at 01/02/24 0905    sennosides-docusate (PERICOLACE) 8.6-50 MG per tablet 2 tablet, 2 tablet, Oral, BID, 2 tablet at 01/02/24 0906 **AND** polyethylene glycol (MIRALAX) packet 17 g, 17 g, Oral, Daily PRN **AND** bisacodyl (DULCOLAX) EC tablet 5 mg, 5 mg, Oral, Daily PRN **AND** bisacodyl (DULCOLAX) suppository 10 mg, 10 mg, Rectal, Daily PRN, Lizbeth Buck APRN    carvedilol (COREG) tablet 25 mg, 25 mg, Oral, Q12H, Lizbeth Buck APRN, 25 mg at 01/02/24 0906    cloNIDine (CATAPRES) tablet 0.1 mg, 0.1 mg, Oral, BID, Lizbeth Buck APRN, 0.1 mg at 01/02/24 0907    dextrose (D50W) (25 g/50 mL) IV injection 25 g, 25 g, Intravenous, Q15 Min PRN, Lizbeth Buck APRN    dextrose (GLUTOSE) oral gel 15 g, 15 g, Oral, Q15 Min PRN, Lizbeth Buck APRN    glucagon (GLUCAGEN) injection 1 mg, 1 mg, Intramuscular, Q15 Min PRN, Lizbeth Buck APRN    hydrALAZINE (APRESOLINE) tablet 75 mg, 75 mg, Oral, Q8H, Eleno Sterling,  MD, 75 mg at 01/01/24 2147    Insulin Lispro (humaLOG) injection 2-7 Units, 2-7 Units, Subcutaneous, 4x Daily AC & at Bedtime, Lizbeth Buck APRN, 3 Units at 01/01/24 2146    ipratropium-albuterol (DUO-NEB) nebulizer solution 3 mL, 3 mL, Nebulization, Q4H PRN, Lizbeth Buck APRN    lactobacillus acidophilus (RISAQUAD) capsule 1 capsule, 1 capsule, Oral, Daily, Lizbeth Buck APRN, 1 capsule at 01/02/24 0906    [Held by provider] lisinopril (PRINIVIL,ZESTRIL) tablet 40 mg, 40 mg, Oral, Q24H, Cookie Patten DO, 40 mg at 01/01/24 0848    melatonin tablet 5 mg, 5 mg, Oral, Nightly PRN, Lizbeth Buck APRN    Morphine (MS CONTIN) 12 hr tablet 15 mg, 15 mg, Oral, Q12H, Cookie Patten DO, 15 mg at 01/02/24 0906    nitroglycerin (TRIDIL) 200 mcg/ml infusion, 5-200 mcg/min, Intravenous, Titrated, Lilo Owens MD, Last Rate: 12 mL/hr at 01/1940, 40 mcg/min at 01/1940    ondansetron (ZOFRAN) injection 4 mg, 4 mg, Intravenous, Once, Baudilio Hsu MD    pantoprazole (PROTONIX) EC tablet 40 mg, 40 mg, Oral, Daily, Lizbeth Buck APRN, 40 mg at 01/02/24 0907    sodium chloride 0.9 % flush 10 mL, 10 mL, Intravenous, PRN, Baudilio Hsu MD    sodium chloride 0.9 % flush 10 mL, 10 mL, Intravenous, Q12H, Lizbeth Buck APRN, 10 mL at 01/01/24 0847    sodium chloride 0.9 % flush 10 mL, 10 mL, Intravenous, PRN, Lizbeth Buck APRN    sodium chloride 0.9 % infusion 40 mL, 40 mL, Intravenous, PRN, Lizbeth Buck APRN    terazosin (HYTRIN) capsule 5 mg, 5 mg, Oral, Nightly, Lizbeth Buck, APRN, 5 mg at 01/01/24 4258    Vital Sign Min/Max for last 24 hours  Temp  Min: 98 °F (36.7 °C)  Max: 98.6 °F (37 °C)   BP  Min: 129/50  Max: 183/77   Pulse  Min: 61  Max: 80   Resp  Min: 16  Max: 18   SpO2  Min: 93 %  Max: 97 %   No data recorded      Intake/Output Summary (Last 24 hours) at 1/2/2024 0909  Last data filed at 1/2/2024 0734  Gross per 24 hour   Intake 250 ml   Output 2650 ml   Net -2400 ml            CONSTITUTIONAL: No acute distress      Labs/studies:  Available lab and imaging results were reviewed by myself today    Results for orders placed during the hospital encounter of 12/30/23    Adult Transthoracic Echo Complete W/ Cont if Necessary Per Protocol    Interpretation Summary    Left ventricular ejection fraction appears to be 56 - 60%.    Left ventricular wall thickness is consistent with mild concentric hypertrophy    The aortic valve exhibits sclerosis.    Mild aortic valve regurgitation is present. No hemodynamically significant aortic valve stenosis is present.    Mild tricuspid valve regurgitation is present. Estimated right ventricular systolic pressure from tricuspid regurgitation is moderately elevated (45-55 mmHg    Moderate mitral annular calcification is present. Trace to mild mitral valve regurgitation is present. No significant mitral valve stenosis is present.      Tele: Sinus rhythm         Assessment/Plan:         Closed left hip fracture    Fibromyalgia    Type 2 diabetes mellitus    Takotsubo cardiomyopathy    Elevated serum creatinine    Hyperlipidemia LDL goal <70    COPD (chronic obstructive pulmonary disease)    GERD (gastroesophageal reflux disease)    Coronary artery disease involving native coronary artery of native heart without angina pectoris    Essential hypertension    CKD (chronic kidney disease) stage 3, GFR 30-59 ml/min    Paroxysmal atrial fibrillation    Anxiety associated with depression    Anemia, chronic disease       ASSESSMENT:  Hypertension, history of mild CAD  Blood pressure has been controlled at home prior to this incident and traumatic left leg injury  Now weaned off nitroglycerin with adjusted oral medication and systolic blood pressures in the 150s  Mild pulmonary edema   Improved after IV Lasix x 1  Maintaining acceptable oxygen sats on room air  History of Takotsubo cardiomyopathy  Normal EF on repeat echo during this admission  Currently  euvolemic  History of paroxysmal atrial fibrillation  Currently normal sinus, on Xarelto at home which is currently being held for surgery  Mild valvular heart disease  Hip fracture    PLAN:  Asymptomatic from a cardiopulmonary standpoint this morning with acceptable vitals off of antihypertensive infusions.  Reasonable (low to intermediate) cardiac risk without meaningful modifiable risk factors prior to left hip surgery at the current time.  Discussed plan with Saroj Cloud and Juany  Continue current antihypertensives  Monitor blood pressure perioperatively; nitroglycerin drip or Cardene drip as needed  Monitor volume status perioperatively; Lasix as needed  Follow-up in the heart valve clinic 1 week after discharge to reassess volume status and blood pressure  Keep follow-up with Dr. Geronimo in the cardiology clinic in March 2024  Cardiology will follow peripherally moving forward, but will be available with any questions or concerns.  Happy to revisit as needed      Steve Geronimo MD, MSc, FACC, [facility]  Interventional Cardiology  [facility]

## 2024-01-02 NOTE — ANESTHESIA PROCEDURE NOTES
Peripheral Block    Pre-sedation assessment completed: 1/2/2024 1:22 PM    Patient reassessed immediately prior to procedure    Start time: 1/2/2024 1:22 PM  Reason for block: at surgeon's request and post-op pain management  Performed by  CRNA/CAA: Enrique La, CRNA  Assisted by: Raiza Nascimento RN  Preanesthetic Checklist  Completed: patient identified, IV checked, site marked, risks and benefits discussed, surgical consent, monitors and equipment checked, pre-op evaluation and timeout performed  Prep:  Pt Position: supine  Sterile barriers:cap, gloves, mask and washed/disinfected hands  Prep: ChloraPrep  Patient monitoring: blood pressure monitoring, continuous pulse oximetry and EKG  Procedure  Performed under: local infiltration  Guidance:ultrasound guided    ULTRASOUND INTERPRETATION.  Using ultrasound guidance a 20 G gauge needle was placed in close proximity to the nerve, at which point, under ultrasound guidance anesthetic was injected in the area of the nerve and spread of the anesthesia was seen on ultrasound in close proximity thereto.  There were no abnormalities seen on ultrasound; a digital image was taken; and the patient tolerated the procedure with no complications. Images:still images obtained, printed/placed on chart    Laterality:left  Block Type:fascia iliaca compartment  Injection Technique:catheter  Needle Type:echogenic and Tuohy  Needle Gauge:18 G  Resistance on Injection: none  Catheter Size:20 G (20g)    Medications Used: ropivacaine (NAROPIN) 0.5 % injection - Injection   25 mL - 1/2/2024 1:22:00 PM      Medications  Preservative Free Saline:20ml    Post Assessment  Injection Assessment: negative aspiration for heme, no paresthesia on injection and incremental injection  Patient Tolerance:comfortable throughout block  Complications:no  Additional Notes  CKAFASCIAILIACA: CATHETER  A high-frequency linear transducer, with sterile cover, was placed in parasagittal plane on top of the  "Anterior Superior Iliac Spine (ASIS) and moved medially to identify the Internal Oblique muscle, Sartorius muscle, Iliacus Muscle, Fascia Iliaca (FI) and Fascia Latae. The insertion site was prepped and draped in sterile fashion. Skin and cutaneous tissue was infiltrated with 2-5 ml of 1% Lidocaine. Using ultrasound-guidance, an 18-gauge Contiplex Ultra 360 Touhy needle was advanced in plane from caudad to cephalad. Preservative-free normal saline was utilized for hydro-dissection of tissue, advancement of Touhy, and to confirm final needle placement below FI. Local anesthetic in incremental 3-5 ml injections. Aspiration every 5 ml to prevent intravascular injection. Injection was completed with negative aspiration of blood and negative intravascular injection. Injection pressures were normal with minimal resistance. A 20-gauge Contiplex Echo catheter was placed through the needle and advance out the tip of the Touhy 3-5 cm. The Touhy needle was then removed, and final catheter position verified below the FI. The catheter was secured in the usual fashion with skin glue, benzoin, steri-strips, CHG tegaderm and Label noting \"Nerve Block Catheter\". Jerk tape applied at yellow connector and catheter connection.             "

## 2024-01-03 LAB
ANION GAP SERPL CALCULATED.3IONS-SCNC: 12 MMOL/L (ref 5–15)
BASOPHILS # BLD AUTO: 0.03 10*3/MM3 (ref 0–0.2)
BASOPHILS NFR BLD AUTO: 0.4 % (ref 0–1.5)
BUN SERPL-MCNC: 46 MG/DL (ref 8–23)
BUN/CREAT SERPL: 23.6 (ref 7–25)
CALCIUM SPEC-SCNC: 9.6 MG/DL (ref 8.6–10.5)
CHLORIDE SERPL-SCNC: 108 MMOL/L (ref 98–107)
CO2 SERPL-SCNC: 18 MMOL/L (ref 22–29)
CREAT SERPL-MCNC: 1.95 MG/DL (ref 0.57–1)
DEPRECATED RDW RBC AUTO: 47.8 FL (ref 37–54)
EGFRCR SERPLBLD CKD-EPI 2021: 25.3 ML/MIN/1.73
EOSINOPHIL # BLD AUTO: 0.01 10*3/MM3 (ref 0–0.4)
EOSINOPHIL NFR BLD AUTO: 0.1 % (ref 0.3–6.2)
ERYTHROCYTE [DISTWIDTH] IN BLOOD BY AUTOMATED COUNT: 15.3 % (ref 12.3–15.4)
FOLATE BLD-MCNC: 324 NG/ML
FOLATE RBC-MCNC: 1141 NG/ML
GLUCOSE BLDC GLUCOMTR-MCNC: 143 MG/DL (ref 70–130)
GLUCOSE BLDC GLUCOMTR-MCNC: 195 MG/DL (ref 70–130)
GLUCOSE BLDC GLUCOMTR-MCNC: 207 MG/DL (ref 70–130)
GLUCOSE BLDC GLUCOMTR-MCNC: 234 MG/DL (ref 70–130)
GLUCOSE SERPL-MCNC: 230 MG/DL (ref 65–99)
HCT VFR BLD AUTO: 28.3 % (ref 34–46.6)
HCT VFR BLD AUTO: 28.4 % (ref 34–46.6)
HGB BLD-MCNC: 9 G/DL (ref 12–15.9)
IMM GRANULOCYTES # BLD AUTO: 0.02 10*3/MM3 (ref 0–0.05)
IMM GRANULOCYTES NFR BLD AUTO: 0.3 % (ref 0–0.5)
LYMPHOCYTES # BLD AUTO: 0.52 10*3/MM3 (ref 0.7–3.1)
LYMPHOCYTES NFR BLD AUTO: 7.7 % (ref 19.6–45.3)
MCH RBC QN AUTO: 27.3 PG (ref 26.6–33)
MCHC RBC AUTO-ENTMCNC: 31.8 G/DL (ref 31.5–35.7)
MCV RBC AUTO: 85.8 FL (ref 79–97)
MONOCYTES # BLD AUTO: 0.4 10*3/MM3 (ref 0.1–0.9)
MONOCYTES NFR BLD AUTO: 5.9 % (ref 5–12)
NEUTROPHILS NFR BLD AUTO: 5.78 10*3/MM3 (ref 1.7–7)
NEUTROPHILS NFR BLD AUTO: 85.6 % (ref 42.7–76)
NRBC BLD AUTO-RTO: 0 /100 WBC (ref 0–0.2)
PLATELET # BLD AUTO: 144 10*3/MM3 (ref 140–450)
PMV BLD AUTO: 10.4 FL (ref 6–12)
POTASSIUM SERPL-SCNC: 4.9 MMOL/L (ref 3.5–5.2)
RBC # BLD AUTO: 3.3 10*6/MM3 (ref 3.77–5.28)
SODIUM SERPL-SCNC: 138 MMOL/L (ref 136–145)
WBC NRBC COR # BLD AUTO: 6.76 10*3/MM3 (ref 3.4–10.8)

## 2024-01-03 PROCEDURE — 25010000002 VANCOMYCIN 10 G RECONSTITUTED SOLUTION: Performed by: ORTHOPAEDIC SURGERY

## 2024-01-03 PROCEDURE — 99232 SBSQ HOSP IP/OBS MODERATE 35: CPT | Performed by: INTERNAL MEDICINE

## 2024-01-03 PROCEDURE — 63710000001 INSULIN DETEMIR PER 5 UNITS: Performed by: INTERNAL MEDICINE

## 2024-01-03 PROCEDURE — 25810000003 SODIUM CHLORIDE 0.9 % SOLUTION: Performed by: ORTHOPAEDIC SURGERY

## 2024-01-03 PROCEDURE — 63710000001 INSULIN LISPRO (HUMAN) PER 5 UNITS: Performed by: ORTHOPAEDIC SURGERY

## 2024-01-03 PROCEDURE — 97530 THERAPEUTIC ACTIVITIES: CPT

## 2024-01-03 PROCEDURE — 80048 BASIC METABOLIC PNL TOTAL CA: CPT | Performed by: ORTHOPAEDIC SURGERY

## 2024-01-03 PROCEDURE — 85025 COMPLETE CBC W/AUTO DIFF WBC: CPT | Performed by: ORTHOPAEDIC SURGERY

## 2024-01-03 PROCEDURE — 99024 POSTOP FOLLOW-UP VISIT: CPT | Performed by: ORTHOPAEDIC SURGERY

## 2024-01-03 PROCEDURE — 97162 PT EVAL MOD COMPLEX 30 MIN: CPT

## 2024-01-03 PROCEDURE — 97166 OT EVAL MOD COMPLEX 45 MIN: CPT

## 2024-01-03 PROCEDURE — 97535 SELF CARE MNGMENT TRAINING: CPT

## 2024-01-03 PROCEDURE — 82948 REAGENT STRIP/BLOOD GLUCOSE: CPT

## 2024-01-03 RX ADMIN — INSULIN LISPRO 3 UNITS: 100 INJECTION, SOLUTION INTRAVENOUS; SUBCUTANEOUS at 11:52

## 2024-01-03 RX ADMIN — Medication 3 ML: at 19:36

## 2024-01-03 RX ADMIN — POLYETHYLENE GLYCOL 3350 17 G: 17 POWDER, FOR SOLUTION ORAL at 11:28

## 2024-01-03 RX ADMIN — MORPHINE SULFATE 15 MG: 15 TABLET, FILM COATED, EXTENDED RELEASE ORAL at 11:10

## 2024-01-03 RX ADMIN — CARVEDILOL 25 MG: 12.5 TABLET, FILM COATED ORAL at 11:10

## 2024-01-03 RX ADMIN — PANTOPRAZOLE SODIUM 40 MG: 40 TABLET, DELAYED RELEASE ORAL at 11:10

## 2024-01-03 RX ADMIN — ACETAMINOPHEN 1000 MG: 500 TABLET ORAL at 11:10

## 2024-01-03 RX ADMIN — OXYCODONE HYDROCHLORIDE 5 MG: 5 TABLET ORAL at 13:57

## 2024-01-03 RX ADMIN — ACETAMINOPHEN 1000 MG: 500 TABLET ORAL at 19:32

## 2024-01-03 RX ADMIN — HYDRALAZINE HYDROCHLORIDE 75 MG: 50 TABLET ORAL at 06:17

## 2024-01-03 RX ADMIN — HYDRALAZINE HYDROCHLORIDE 75 MG: 50 TABLET ORAL at 19:34

## 2024-01-03 RX ADMIN — MORPHINE SULFATE 15 MG: 15 TABLET, FILM COATED, EXTENDED RELEASE ORAL at 19:33

## 2024-01-03 RX ADMIN — INSULIN DETEMIR 5 UNITS: 100 INJECTION, SOLUTION SUBCUTANEOUS at 17:35

## 2024-01-03 RX ADMIN — CLONIDINE HYDROCHLORIDE 0.1 MG: 0.1 TABLET ORAL at 11:10

## 2024-01-03 RX ADMIN — OXYCODONE HYDROCHLORIDE 5 MG: 5 TABLET ORAL at 17:35

## 2024-01-03 RX ADMIN — CARVEDILOL 25 MG: 12.5 TABLET, FILM COATED ORAL at 19:33

## 2024-01-03 RX ADMIN — INSULIN LISPRO 2 UNITS: 100 INJECTION, SOLUTION INTRAVENOUS; SUBCUTANEOUS at 20:14

## 2024-01-03 RX ADMIN — TERAZOSIN HYDROCHLORIDE 5 MG: 5 CAPSULE ORAL at 19:33

## 2024-01-03 RX ADMIN — HYDRALAZINE HYDROCHLORIDE 75 MG: 50 TABLET ORAL at 13:54

## 2024-01-03 RX ADMIN — ACETAMINOPHEN 1000 MG: 500 TABLET ORAL at 02:20

## 2024-01-03 RX ADMIN — VANCOMYCIN HYDROCHLORIDE 1250 MG: 10 INJECTION, POWDER, LYOPHILIZED, FOR SOLUTION INTRAVENOUS at 02:20

## 2024-01-03 RX ADMIN — Medication 1 CAPSULE: at 11:10

## 2024-01-03 RX ADMIN — Medication 3 ML: at 11:14

## 2024-01-03 RX ADMIN — Medication 10 ML: at 11:14

## 2024-01-03 RX ADMIN — CLONIDINE HYDROCHLORIDE 0.1 MG: 0.1 TABLET ORAL at 19:33

## 2024-01-03 RX ADMIN — RIVAROXABAN 15 MG: 15 TABLET, FILM COATED ORAL at 11:10

## 2024-01-03 RX ADMIN — Medication 10 ML: at 19:32

## 2024-01-03 RX ADMIN — OXYCODONE HYDROCHLORIDE 5 MG: 5 TABLET ORAL at 02:20

## 2024-01-03 RX ADMIN — ATORVASTATIN CALCIUM 80 MG: 40 TABLET, FILM COATED ORAL at 11:10

## 2024-01-03 NOTE — PROGRESS NOTES
[facility]    Acute pain service Inpatient Progress Note    Patient Name: Yanique Webster  :  1940  MRN:  [id number]        Acute Pain  Service Inpatient Progress Note:    Analgesia:Excellent  Pain Score:0/10  LOC: alert and awake  Resp Status: room air  Cardiac: VS stable  Side Effects:None  Catheter Site:clean, dressing intact and dry  Cath type: peripheral nerve cath with ON Q  Volume: 1mL,10ml, 10ml InfuSystem Pump.  Catheter Plan:Catheter to remain Insitu and Continue catheter infusion rate unchanged  Comments:

## 2024-01-03 NOTE — THERAPY EVALUATION
Patient Name: Yanique Webster  : 1940    MRN: [id number]                              Today's Date: 1/3/2024       Admit Date: 2023    Visit Dx:     ICD-10-CM ICD-9-CM   1. Closed left hip fracture, initial encounter  S72.002A 820.8   2. Anemia, unspecified type  D64.9 285.9   3. Chronic kidney disease, unspecified CKD stage  N18.9 585.9     Patient Active Problem List   Diagnosis    Fibromyalgia    PVD (peripheral vascular disease)    Type 2 diabetes mellitus    Diabetic gastroparesis    Takotsubo cardiomyopathy    Elevated serum creatinine    Hyperlipidemia LDL goal <70    COPD (chronic obstructive pulmonary disease)    Obesity    Osteoarthritis    Chronic pain    GERD (gastroesophageal reflux disease)    Gout    Chronic joint pain    Coronary artery disease involving native coronary artery of native heart without angina pectoris    Nonrheumatic aortic valve insufficiency    Altered mental status    Essential hypertension    CKD (chronic kidney disease) stage 3, GFR 30-59 ml/min    Hypertensive emergency    Status post placement of implantable loop recorder    Paroxysmal atrial fibrillation    Acute exacerbation of COPD with asthma    Encounter for loop recorder at end of battery life    Closed left hip fracture    Anxiety associated with depression    Anemia, chronic disease     Past Medical History:   Diagnosis Date    Blurry vision     Chronic joint pain     Chronic pain     COPD (chronic obstructive pulmonary disease)     Coronary artery disease     Diabetic gastroparesis 2016    Esophageal stricture     s/p dilation in     GERD (gastroesophageal reflux disease)     Gout     Headache     secondary to hypertension    Hyperlipidemia     Hypertension     Long term current use of insulin     Neuropathy     Neuropathy due to type 2 diabetes mellitus     Obesity     Osteoarthritis     Pericarditis     Stroke 2019    Type 2 diabetes mellitus      Past Surgical History:   Procedure  Laterality Date    BRONCHOSCOPY N/A 8/23/2016    Procedure: BRONCHOSCOPY BIOPSY AT BEDSIDE;  Surgeon: Mookie Willard MD;  Location:  [facility];  Service:     CARDIAC CATHETERIZATION N/A 8/30/2016    Procedure: Left Heart Cath;  Surgeon: Steve Geronimo MD;  Location:  [facility];  Service:     CARDIAC CATHETERIZATION N/A 8/30/2016    Procedure: Right Heart Cath;  Surgeon: Steve Geronimo MD;  Location:  [facility];  Service:     CARDIAC ELECTROPHYSIOLOGY PROCEDURE N/A 7/2/2019    Procedure: Loop insertion;  Surgeon: Steve Geronimo MD;  Location:  [facility];  Service: Cardiovascular    CARDIAC ELECTROPHYSIOLOGY PROCEDURE N/A 9/26/2023    Procedure: Loop recorder removal;  Surgeon: Steve Geronimo MD;  Location:  [facility];  Service: Cardiovascular;  Laterality: N/A;    CATARACT EXTRACTION      ESOPHAGEAL DILATATION  2007    HERNIA REPAIR      HYSTERECTOMY  1998    WRIST FRACTURE SURGERY Left       General Information       Row Name 01/03/24 0958          Physical Therapy Time and Intention    Document Type evaluation  -     Mode of Treatment physical therapy  -       Row Name 01/03/24 0958          General Information    Patient Profile Reviewed yes  -     Prior Level of Function independent:;all household mobility;gait;bed mobility;ADL's;dressing;bathing;dependent:;driving  Pt uses rollator at baseline. Lives in ILF and walks to dining espino for meals.  -     Existing Precautions/Restrictions fall;other (see comments)  L LE WBAT, fascia iliaca catheter, KI for quad weakness, brief w/ OOB mobility  -     Barriers to Rehab medically complex;previous functional deficit  -       Row Name 01/03/24 0958          Living Environment    People in Home alone;facility resident  [facility]  -       Row Name 01/03/24 0958          Home Main Entrance    Number of Stairs, Main Entrance none  -       Row Name 01/03/24 0942           Stairs Within Home, Primary    Number of Stairs, Within Home, Primary none  -[facility] Name 01/03/24 0958          Cognition    Orientation Status (Cognition) oriented x 4  -[facility] Name 01/03/24 0958          Safety Issues, Functional Mobility    Safety Issues Affecting Function (Mobility) insight into deficits/self-awareness;safety precaution awareness;safety precautions follow-through/compliance;sequencing abilities;problem-solving;judgment  -     Impairments Affecting Function (Mobility) balance;endurance/activity tolerance;pain;strength  -               User Key  (r) = Recorded By, (t) = Taken By, (c) = Cosigned By      Initials Name Provider Type     Sonia Greenwood, ANGY Physical Therapist                   Mobility       [place] [facility] Name 01/03/24 1003          Bed Mobility    Bed Mobility supine-sit  -     Supine-Sit Basile (Bed Mobility) maximum assist (25% patient effort);2 person assist;verbal cues  -     Assistive Device (Bed Mobility) bed rails;head of bed elevated;draw sheet  -     Comment, (Bed Mobility) VCs for hand placement and sequencing. Required assist at trunk and B LEs. Requires increased time and effort d/t pain. KI donned in supine d/t quad weakness  -[facility] Name 01/03/24 1003          Transfers    Comment, (Transfers) VCs for hand placement and sequencing. Cues to step out L LE  -[facility] Name 01/03/24 1003          Bed-Chair Transfer    Bed-Chair Basile (Transfers) minimum assist (75% patient effort);2 person assist;verbal cues  [facility]     Assistive Device (Bed-Chair Transfers) walker, front-wheeled  -     Comment, (Bed-Chair Transfer) Steps from EOB>chair  -[facility] Name 01/03/24 1003          Sit-Stand Transfer    Sit-Stand Basile (Transfers) moderate assist (50% patient effort);2 person assist;verbal cues  [facility]     Assistive Device (Sit-Stand Transfers) walker, front-wheeled  -     Comment, (Sit-Stand Transfer) STS x1 from EOB. Cues to push up  from bed. Requires increased time to achieve full upright posture. Incontinent upon standing  -[facility] Name 01/03/24 1003          Gait/Stairs (Locomotion)    [place] Level (Gait) minimum assist (75% patient effort);2 person assist;verbal cues  -     Assistive Device (Gait) walker, front-wheeled  -     Distance in Feet (Gait) 5  -     Deviations/Abnormal Patterns (Gait) bilateral deviations;antalgic;myke decreased;gait speed decreased;stride length decreased;weight shifting decreased  -     Bilateral Gait Deviations forward flexed posture  -     Left Sided Gait Deviations weight shift ability decreased;heel strike decreased  -     [place] Level (Stairs) not tested  -     Comment, (Gait/Stairs) Pt took steps from EOB>chair. Pt demo step to pattern w/ decreased speed and decreased L LE WB. VCs for sequencing, upright posture, normal heel strike, and increased L LE WB. No LOB or buckling noted. Distance limited by fatigue  -[facility] Name 01/03/24 1003          Mobility    Extremity Weight-bearing Status left lower extremity  -     Left Lower Extremity (Weight-bearing Status) weight-bearing as tolerated (WBAT)  -               User Key  (r) = Recorded By, (t) = Taken By, (c) = Cosigned By      Initials Name Provider Type     Sonia Greenwood, ANGY Physical Therapist                   Obj/Interventions       [facility] Name 01/03/24 1010          Range of Motion Comprehensive    General Range of Motion bilateral lower extremity ROM WFL  -[facility] Name 01/03/24 1010          Strength Comprehensive (MMT)    General Manual Muscle Testing (MMT) Assessment lower extremity strength deficits identified  -     Comment, General Manual Muscle Testing (MMT) Assessment Able to actively DF/PF L ankle, unable to perform L SLR. R LE functionally 4/5.  -[facility] Name 01/03/24 1010          Motor Skills    Therapeutic Exercise hip;knee;ankle  -[facility] Name 01/03/24 1010          Hip  (Therapeutic Exercise)    Hip (Therapeutic Exercise) strengthening exercise  -     Hip Strengthening (Therapeutic Exercise) left;heel slides;3 repetitions  -       Row Name 01/03/24 1010          Knee (Therapeutic Exercise)    Knee (Therapeutic Exercise) isometric exercises  -     Knee Isometrics (Therapeutic Exercise) left;quad sets;3 repetitions  -       Row Name 01/03/24 1010          Ankle (Therapeutic Exercise)    Ankle (Therapeutic Exercise) AROM (active range of motion)  -     Ankle AROM (Therapeutic Exercise) bilateral;dorsiflexion;plantarflexion;5 repetitions  -       Row Name 01/03/24 1010          Balance    Balance Assessment sitting static balance;sitting dynamic balance;sit to stand dynamic balance;standing static balance;standing dynamic balance  -     Static Sitting Balance contact guard  -     Dynamic Sitting Balance minimal assist  -     Position, Sitting Balance unsupported;sitting edge of bed  -     Sit to Stand Dynamic Balance moderate assist;2-person assist;verbal cues  -     Static Standing Balance minimal assist;2-person assist;verbal cues  -     Dynamic Standing Balance minimal assist;2-person assist;verbal cues  -     Position/Device Used, Standing Balance supported;walker, front-wheeled  -     Balance Interventions sitting;standing;sit to stand;supported;static;dynamic  -     Comment, Balance No LOB or buckling noted. Pt performed lateral weight shifting x10 in standing prior to ambulating  -       Row Name 01/03/24 1010          Sensory Assessment (Somatosensory)    Sensory Assessment (Somatosensory) bilateral LE  -     Bilateral LE Sensory Assessment general sensation;light touch awareness;intact;other (see comments)  B neuropathy in feet  -               User Key  (r) = Recorded By, (t) = Taken By, (c) = Cosigned By      Initials Name Provider Type     Sonia Greenwood PT Physical Therapist                   Goals/Plan       Row Name 01/03/24 1101           Bed Mobility Goal 1 (PT)    Activity/Assistive Device (Bed Mobility Goal 1, PT) sit to supine/supine to sit  -     [place] Level/Cues Needed (Bed Mobility Goal 1, PT) minimum assist (75% or more patient effort)  -     Time Frame (Bed Mobility Goal 1, PT) long term goal (LTG);5 days  -       Row Name 01/03/24 1102          Transfer Goal 1 (PT)    Activity/Assistive Device (Transfer Goal 1, PT) sit-to-stand/stand-to-sit;bed-to-chair/chair-to-bed  -     [place] Level/Cues Needed (Transfer Goal 1, PT) contact guard required  -     Time Frame (Transfer Goal 1, PT) long term goal (LTG);5 days  -       Row Name 01/03/24 1102          Gait Training Goal 1 (PT)    Activity/Assistive Device (Gait Training Goal 1, PT) gait (walking locomotion);assistive device use  -     [place] Level (Gait Training Goal 1, PT) contact guard required  -     Distance (Gait Training Goal 1, PT) 30 ft  -     Time Frame (Gait Training Goal 1, PT) long term goal (LTG);5 days  -       Row Name 01/03/24 1102          Therapy Assessment/Plan (PT)    Planned Therapy Interventions (PT) balance training;bed mobility training;gait training;home exercise program;neuromuscular re-education;patient/family education;postural re-education;ROM (range of motion);strengthening;stretching;transfer training  -               User Key  (r) = Recorded By, (t) = Taken By, (c) = Cosigned By      Initials Name Provider Type     Sonia Greenwood, ANGY Physical Therapist                   Clinical Impression       Row Name 01/03/24 1059          Pain    Pretreatment Pain Rating 2/10  -     Posttreatment Pain Rating 4/10  -     Pain Location - Side/Orientation Bilateral  -     Pain Location lower  -     Pain Location - extremity;foot;hip  neuropathy pain  -     Pre/Posttreatment Pain Comment L hip and LE pain w/ WB  -     Pain Intervention(s) Repositioned;Ambulation/increased activity;Elevated  -       Row Name  01/03/24 1059          Plan of Care Review    Plan of Care Reviewed With patient  -     Outcome Evaluation PT eval completed. Pt presents below baseline function d/t acute pain and deficits in L LE strength, balance, and activity tolerance. Pt required maxA x2 for bed mobility, modA x2 for STS, and Odilon x2 to take steps from bed>chair w/ FWW. Pt would benefit from skilled IP PT. Recommend IRF at d/c for best functional outcome.  -       Row Name 01/03/24 1059          Therapy Assessment/Plan (PT)    Patient/Family Therapy Goals Statement (PT) to go to rehab  -     Rehab Potential (PT) good, to achieve stated therapy goals  -     Criteria for Skilled Interventions Met (PT) yes;meets criteria;skilled treatment is necessary  -     Therapy Frequency (PT) daily  -       Row Name 01/03/24 1059          Vital Signs    Pre Systolic BP Rehab 131  -LH     Pre Treatment Diastolic BP 69  -LH     Post Systolic BP Rehab 140  -LH     Post Treatment Diastolic BP 67  -LH     O2 Delivery Pre Treatment room air  -     O2 Delivery Intra Treatment room air  -     O2 Delivery Post Treatment room air  -     Pre Patient Position Supine  -     Intra Patient Position Standing  -     Post Patient Position Sitting  -       Row Name 01/03/24 1059          Positioning and Restraints    Pre-Treatment Position in bed  -     Post Treatment Position chair  -     In Chair reclined;sitting;call light within reach;encouraged to call for assist;exit alarm on;waffle cushion;legs elevated;notified [facility]               User Key  (r) = Recorded By, (t) = Taken By, (c) = Cosigned By      Initials Name Provider Type     Sonia Greenwood, PT Physical Therapist                   Outcome Measures       Row Name 01/03/24 1103          How much help from another person do you currently need...    Turning from your back to your side while in flat bed without using bedrails? 3  -     Moving from lying on back to sitting on the side  of a flat bed without bedrails? 2  -LH     Moving to and from a bed to a chair (including a wheelchair)? 3  -LH     Standing up from a chair using your arms (e.g., wheelchair, bedside chair)? 2  -LH     Climbing 3-5 steps with a railing? 2  -LH     To walk in hospital room? 3  -     AM-PAC 6 Clicks Score (PT) 15  -     Highest Level of Mobility Goal 4 --> Transfer to chair/commode  -       Row Name 01/03/24 1103          PADD    Diagnosis 2  -     Gender 1  -     Age Group 0  -     Gait Distance 0  -     Assist Level 0  -     Home Support 0  -     PADD Score 3  -     Patient Preference extended rehabilitation  -     Prediction by PADD Score extended rehabilitation  -       Row Name 01/03/24 1103          Functional Assessment    Outcome Measure Options AM-PAC 6 Clicks Basic Mobility (PT);PADD  -               User Key  (r) = Recorded By, (t) = Taken By, (c) = Cosigned By      Initials Name Provider Type     Sonia Greenwood PT Physical Therapist                                 Physical Therapy Education       Title: PT OT SLP Therapies (In Progress)       Topic: Physical Therapy (In Progress)       Point: Mobility training (Done)       Learning Progress Summary             Patient Acceptance, E, VU,NR by  at 1/3/2024 1103    Comment: Educated on safety w/ mobility, use of AD, benefit of OOB mobility, PT POC, and d/c planning                         Point: Home exercise program (Not Started)       Learner Progress:  Not documented in this visit.              Point: Body mechanics (Done)       Learning Progress Summary             Patient Acceptance, E, VU,NR by  at 1/3/2024 1103    Comment: Educated on safety w/ mobility, use of AD, benefit of OOB mobility, PT POC, and d/c planning                         Point: Precautions (Done)       Learning Progress Summary             Patient Acceptance, E, VU,NR by  at 1/3/2024 1103    Comment: Educated on safety w/ mobility, use of AD,  benefit of OOB mobility, PT POC, and d/c planning                                         User Key       Initials Effective Dates Name Provider Type [facility] 09/21/23 -  Sonia Greenwood, PT Physical Therapist PT                  PT Recommendation and Plan  Planned Therapy Interventions (PT): balance training, bed mobility training, gait training, home exercise program, neuromuscular re-education, patient/family education, postural re-education, ROM (range of motion), strengthening, stretching, transfer training  Plan of Care Reviewed With: patient  Outcome Evaluation: PT eval completed. Pt presents below baseline function d/t acute pain and deficits in L LE strength, balance, and activity tolerance. Pt required maxA x2 for bed mobility, modA x2 for STS, and Odilon x2 to take steps from bed>chair w/ FWW. Pt would benefit from skilled IP PT. Recommend IRF at d/c for best functional outcome.     Time Calculation:   PT Evaluation Complexity  History, PT Evaluation Complexity: 3 or more personal factors and/or comorbidities  Examination of Body Systems (PT Eval Complexity): total of 4 or more elements  Clinical Presentation (PT Evaluation Complexity): evolving  Clinical Decision Making (PT Evaluation Complexity): moderate complexity  Overall Complexity (PT Evaluation Complexity): moderate complexity     PT Charges       Row Name 01/03/24 1104             Time Calculation    Start Time 0816  -LH      PT Received On 01/03/24  -      PT Goal Re-Cert Due Date 01/13/24  -         Timed Charges    45465 - PT Therapeutic Activity Minutes 10  -LH         Untimed Charges    PT Eval/Re-eval Minutes 55  -LH         Total Minutes    Timed Charges Total Minutes 10  -LH      Untimed Charges Total Minutes 55  -LH       Total Minutes 65  -LH                User Key  (r) = Recorded By, (t) = Taken By, (c) = Cosigned By      Initials Name Provider Type     Sonia Greenwood, PT Physical Therapist                  Therapy Charges  for Today       Code Description Service Date Service Provider Modifiers Qty    32963571349 HC PT THERAPEUTIC ACT EA 15 MIN 1/3/2024 Sonia Greenwood, PT GP 1    18019403761 HC PT EVAL MOD COMPLEXITY 4 1/3/2024 Sonia Greenwood, PT GP 1            PT G-Codes  Outcome Measure Options: AM-PAC 6 Clicks Basic Mobility (PT), PADD  AM-PAC 6 Clicks Score (PT): 15  PT Discharge Summary  Anticipated Discharge Disposition (PT): inpatient rehabilitation facility    Sonia Greenwood, PT  1/3/2024

## 2024-01-03 NOTE — PROGRESS NOTES
[facility] Medicine Services  PROGRESS NOTE    Patient Name: Yanique Webster  : 1940  MRN: [id number]    Date of Admission: 2023  Primary Care Physician: Sebas Alicea MD    Subjective   Subjective     CC:  Hip fracture f/u    HPI:  Patient doing well toda - pain controlled which is a relief to her  Well appearing      Objective   Objective     Vital Signs:   Temp:  [96.8 °F (36 °C)-97.8 °F (36.6 °C)] 97.7 °F (36.5 °C)  Heart Rate:  [65-90] 79  Resp:  [14-17] 16  BP: (118-202)/(54-97) 122/54  Flow (L/min):  [2] 2     Physical Exam:  Constitutional: No acute distress, awake, alert older female sitting up in recliner  HENT: NCAT, mucous membranes moist  Respiratory: Clear to auscultation bilaterally, respiratory effort normal   Cardiovascular: RRR, no murmurs, rubs, or gallops  Gastrointestinal: Soft, nontender, nondistended  Musculoskeletal: Left hip site w bandage in place, no excessive swelling/brusiing. Muscle tone within normal limits, no joint effusions appreciated  Psychiatric: Appropriate affect, cooperative  Neurologic: Alert and oriented, facial movements symmetric and spontaneous movement of all 4 extremities grossly equal bilaterally, speech clear  Skin: No rashes    Results Reviewed:  LAB RESULTS:      Lab 24  0516 24  0423 24  0801 24  0201 23  0538 23   WBC 6.76 7.48  --  6.95 6.87  --  8.82   HEMOGLOBIN 9.0* 8.4*  --  8.4* 9.2*  --  9.2*   HEMATOCRIT 28.3* 26.0*  --  26.3* 29.5*  --  29.1*   PLATELETS 144 148  --  145 160  --  160   NEUTROS ABS 5.78  --   --  4.86 4.40  --  7.00   IMMATURE GRANS (ABS) 0.02  --   --  0.04 0.02  --  0.03   LYMPHS ABS 0.52*  --   --  1.05 1.29  --  0.97   MONOS ABS 0.40  --   --  0.53 0.68  --  0.66   EOS ABS 0.01  --   --  0.43* 0.44*  --  0.14   MCV 85.8 85.0  --  85.9 89.4  --  89.3   PROTIME  --  15.2* 16.1*  --  23.9* 26.1*  --    APTT  --   --   --   --  48.6*   --   --          Lab 01/03/24  0516 01/02/24  0423 01/01/24  0201 12/31/23  0538 12/30/23 1944   SODIUM 138 136 139 143 140   POTASSIUM 4.9 3.9 4.2 5.1 3.9   CHLORIDE 108* 104 108* 112* 108*   CO2 18.0* 20.0* 20.0* 22.0 21.0*   ANION GAP 12.0 12.0 11.0 9.0 11.0   BUN 46* 46* 37* 41* 41*   CREATININE 1.95* 2.19* 1.82* 1.97* 2.05*   EGFR 25.3* 22.0* 27.5* 25.0* 23.8*   GLUCOSE 230* 144* 153* 127* 161*   CALCIUM 9.6 9.8 9.5 9.8 9.7   MAGNESIUM  --   --   --  2.2  --    HEMOGLOBIN A1C  --   --   --  5.10  --    TSH  --   --   --  0.258*  --          Lab 12/30/23 1944   TOTAL PROTEIN 6.2   ALBUMIN 3.8   GLOBULIN 2.4   ALT (SGPT) 12   AST (SGOT) 16   BILIRUBIN 0.3   ALK PHOS 154*         Lab 01/02/24  0423 01/01/24  0801 01/01/24  0420 01/01/24  0201 01/01/24  0001 12/31/23  0538 12/30/23 1945   HSTROP T  --   --  57* 58* 58*  --   --    PROTIME 15.2* 16.1*  --   --   --  23.9* 26.1*   INR 1.19* 1.27*  --   --   --  2.12* 2.38*             Lab 12/31/23 0538   IRON 31*   IRON SATURATION (TSAT) 13*   TIBC 247*   TRANSFERRIN 166*   FERRITIN 83.11   FOLATE 8.34   VITAMIN B 12 550   ABO TYPING O   RH TYPING Negative   ANTIBODY SCREEN Negative         Brief Urine Lab Results  (Last result in the past 365 days)        Color   Clarity   Blood   Leuk Est   Nitrite   Protein   CREAT   Urine HCG        12/31/23 0753             44.5         12/31/23 0753 Yellow   Clear   Negative   Negative   Negative   100 mg/dL (2+)                   Microbiology Results Abnormal       None            XR Hip With or Without Pelvis 2 - 3 View Left    Result Date: 1/2/2024  XR HIP W OR WO PELVIS 2-3 VIEW LEFT Date of Exam: 1/2/2024 6:28 PM EST Indication: postop Comparison: 12/30/2023 Findings: Status post left femoral neck ORIF. 3 Surgical pins are noted. The surgical hardware appears to be in adequate position without evidence of loosening or dislocation. Overall alignment is maintained. Again a subtle fracture is noted through the left  femoral neck. Mild degenerative changes of the right hip. No other acute osseous abnormality. Soft tissue swelling overlying the left hip consistent with recent surgery.     Impression: Impression: Status post left femoral neck ORIF. The hardware appears to be in adequate position Electronically Signed: Oleksandr Gonzalez DO  1/2/2024 6:52 PM EST  Workstation ID: [id number]    FL C Arm During Surgery    Result Date: 1/2/2024  This procedure was auto-finalized with no dictation required.    Peripheral Block    Result Date: 1/2/2024  Enrique La CRNA     1/2/2024  5:27 PM Peripheral Block Pre-sedation assessment completed: 1/2/2024 1:22 PM Patient reassessed immediately prior to procedure Start time: 1/2/2024 1:22 PM Reason for block: at surgeon's request and post-op pain management Performed by CRNA/CAA: Enrique La CRNA Assisted by: Raiza Nascimento RN Preanesthetic Checklist Completed: patient identified, IV checked, site marked, risks and benefits discussed, surgical consent, monitors and equipment checked, pre-op evaluation and timeout performed Prep: Pt Position: supine Sterile barriers:cap, gloves, mask and washed/disinfected hands Prep: ChloraPrep Patient monitoring: blood pressure monitoring, continuous pulse oximetry and EKG Procedure Performed under: local infiltration Guidance:ultrasound guided ULTRASOUND INTERPRETATION.  Using ultrasound guidance a 20 G gauge needle was placed in close proximity to the nerve, at which point, under ultrasound guidance anesthetic was injected in the area of the nerve and spread of the anesthesia was seen on ultrasound in close proximity thereto.  There were no abnormalities seen on ultrasound; a digital image was taken; and the patient tolerated the procedure with no complications. Images:still images obtained, printed/placed on chart Laterality:left Block Type:fascia iliaca compartment Injection Technique:catheter Needle Type:echogenic and Tuohy Needle Gauge:18 G  "Resistance on Injection: none Catheter Size:20 G (20g) Medications Used: ropivacaine (NAROPIN) 0.5 % injection - Injection  25 mL - 1/2/2024 1:22:00 PM Medications Preservative Free Saline:20ml Post Assessment Injection Assessment: negative aspiration for heme, no paresthesia on injection and incremental injection Patient Tolerance:comfortable throughout block Complications:no Additional Notes CKAFASCIAILIACA: CATHETER A high-frequency linear transducer, with sterile cover, was placed in parasagittal plane on top of the Anterior Superior Iliac Spine (ASIS) and moved medially to identify the Internal Oblique muscle, Sartorius muscle, Iliacus Muscle, Fascia Iliaca (FI) and Fascia Latae. The insertion site was prepped and draped in sterile fashion. Skin and cutaneous tissue was infiltrated with 2-5 ml of 1% Lidocaine. Using ultrasound-guidance, an 18-gauge Contiplex Ultra 360 Touhy needle was advanced in plane from caudad to cephalad. Preservative-free normal saline was utilized for hydro-dissection of tissue, advancement of Touhy, and to confirm final needle placement below FI. Local anesthetic in incremental 3-5 ml injections. Aspiration every 5 ml to prevent intravascular injection. Injection was completed with negative aspiration of blood and negative intravascular injection. Injection pressures were normal with minimal resistance. A 20-gauge Contiplex Echo catheter was placed through the needle and advance out the tip of the Touhy 3-5 cm. The Touhy needle was then removed, and final catheter position verified below the FI. The catheter was secured in the usual fashion with skin glue, benzoin, steri-strips, CHG tegaderm and Label noting \"Nerve Block Catheter\". Jerk tape applied at yellow connector and catheter connection.     XR Chest 1 View    Result Date: 1/2/2024  XR CHEST 1 VW Date of Exam: 1/2/2024 4:20 AM EST Indication: f/u edema Comparison: 12/31/2023 Findings: Stable cardiomegaly. Lungs without " consolidation. No overt pulmonary edema. Dense aortic arch atherosclerotic calcifications. Negative for pleural effusion or pneumothorax.     Impression: Impression: 1. Stable cardiomegaly. 2. No acute process. Electronically Signed: Gerard Rm MD  1/2/2024 7:27 AM EST  Workstation ID: [id number]    Adult Transthoracic Echo Complete W/ Cont if Necessary Per Protocol    Result Date: 1/1/2024    Left ventricular ejection fraction appears to be 56 - 60%.   Left ventricular wall thickness is consistent with mild concentric hypertrophy   The aortic valve exhibits sclerosis.   Mild aortic valve regurgitation is present. No hemodynamically significant aortic valve stenosis is present.   Mild tricuspid valve regurgitation is present. Estimated right ventricular systolic pressure from tricuspid regurgitation is moderately elevated (45-55 mmHg   Moderate mitral annular calcification is present. Trace to mild mitral valve regurgitation is present. No significant mitral valve stenosis is present.      Results for orders placed during the hospital encounter of 12/30/23    Adult Transthoracic Echo Complete W/ Cont if Necessary Per Protocol    Interpretation Summary    Left ventricular ejection fraction appears to be 56 - 60%.    Left ventricular wall thickness is consistent with mild concentric hypertrophy    The aortic valve exhibits sclerosis.    Mild aortic valve regurgitation is present. No hemodynamically significant aortic valve stenosis is present.    Mild tricuspid valve regurgitation is present. Estimated right ventricular systolic pressure from tricuspid regurgitation is moderately elevated (45-55 mmHg    Moderate mitral annular calcification is present. Trace to mild mitral valve regurgitation is present. No significant mitral valve stenosis is present.      Current medications:  Scheduled Meds:acetaminophen, 1,000 mg, Oral, Q6H  atorvastatin, 80 mg, Oral, Daily  carvedilol, 25 mg, Oral, Q12H  cloNIDine, 0.1 mg, Oral,  BID  hydrALAZINE, 75 mg, Oral, Q8H  insulin lispro, 2-7 Units, Subcutaneous, 4x Daily AC & at Bedtime  lactobacillus acidophilus, 1 capsule, Oral, Daily  [Held by provider] lisinopril, 40 mg, Oral, Q24H  Morphine, 15 mg, Oral, Q12H  pantoprazole, 40 mg, Oral, Daily  rivaroxaban, 15 mg, Oral, Daily  senna-docusate sodium, 2 tablet, Oral, BID  sodium chloride, 10 mL, Intravenous, Q12H  sodium chloride, 3 mL, Intravenous, Q12H  terazosin, 5 mg, Oral, Nightly      Continuous Infusions:ropivacaine,   sodium chloride, 100 mL/hr      PRN Meds:.  acetaminophen **OR** acetaminophen **OR** acetaminophen    senna-docusate sodium **AND** polyethylene glycol **AND** bisacodyl **AND** bisacodyl    bisacodyl    dextrose    dextrose    docusate sodium    glucagon (human recombinant)    HYDROmorphone **AND** naloxone    ipratropium-albuterol    melatonin    ondansetron **OR** ondansetron    oxyCODONE    sodium chloride    sodium chloride    sodium chloride    sodium chloride    Assessment & Plan   Assessment & Plan     Active Hospital Problems    Diagnosis  POA    **Closed left hip fracture [S72.002A]  Yes    Anxiety associated with depression [F41.8]  Yes    Anemia, chronic disease [D63.8]  Yes    Paroxysmal atrial fibrillation [I48.0]  Yes    CKD (chronic kidney disease) stage 3, GFR 30-59 ml/min [N18.30]  Yes    Essential hypertension [I10]  Yes    Coronary artery disease involving native coronary artery of native heart without angina pectoris [I25.10]  Yes    Hyperlipidemia LDL goal <70 [E78.5]  Yes    COPD (chronic obstructive pulmonary disease) [J44.9]  Yes    GERD (gastroesophageal reflux disease) [K21.9]  Yes    Elevated serum creatinine [R79.89]  Yes    Takotsubo cardiomyopathy [I51.81]  Yes    Fibromyalgia [M79.7]  Yes    Type 2 diabetes mellitus [E11.9]  Yes      Resolved Hospital Problems   No resolved problems to display.        Brief Hospital Course to date:  Yanique Webster is a 83 y.o. female w PAF on xarelto,  Takotsubo cardiomyopathy, CKDIV, DMII, COPD on r/a who presented with left hip fracture after a fall. S/p OR w screw fixation w Dr Harrington 1/2    Left femoral neck fracture 2/2 mechanical fall  -s/p screw fixation w Dr Harrington 1/2  -PT/OT  -pain and bowel regimen  -DVT ppx w xarelto    HTN emergency c/b pulmonary edema - 12/31 PM  -improved w nitro gtt and resumption of home medications  -BP currently well controlled    Paroxysmal Afib - continue xarelto as above, h/o mild CAD only unclear reason for additional aspirin so will hold for now given bleeding risk    DMII - on home glargine 24 units qhs. Will start w detemir 5 units BID and titrate from there    CKDIV - at baseline GFR 20's  COPD - room air, no s/s of exacerbation  Chronic anemia  Chronic pain - home MS contin 15 mg BID  GERD - ppi  BMI 32  Anxiety/depression    Expected Discharge Location and Transportation: [facility]  Expected Discharge medically ready 1/4  Expected Discharge Date: 1/6/2024; Expected Discharge Time:      DVT prophylaxis:  Medical DVT prophylaxis orders are present.     AM-PAC 6 Clicks Score (PT): 15 (01/03/24 1103)    CODE STATUS:   Code Status and Medical Interventions:   Ordered at: 12/30/23 4229     Level Of Support Discussed With:    Patient     Code Status (Patient has no pulse and is not breathing):    CPR (Attempt to Resuscitate)     Medical Interventions (Patient has pulse or is breathing):    Full Support       Tiffanie Baron MD  01/03/24

## 2024-01-03 NOTE — PLAN OF CARE
Goal Outcome Evaluation:  Plan of Care Reviewed With: patient        Progress: no change (OT IE)  Outcome Evaluation: OT evaluation completed. PT presents w/ decreased I in ADLs, related t/fs, mobility compared to PLOF limited by decreased activity tolerance, impaired balance, muscle weakness at  BUEs, decreased distal reach impacting LB ADLs, OT issued LH AE to assist with LB needs and anticipate greater I with use in future sessions. PT req'd upward of dep A for d/d socks, min A for d/d gown, dep care for posterior hygiene after incontinent episode upon standing and min to mod A x 2 for STS and SPT t/fs, max A x 2 for bed mobility. Pt is below occupational performance baseline and would benefit from IPOT POC and IRF at d/c when medically ready.      Anticipated Discharge Disposition (OT): inpatient rehabilitation facility

## 2024-01-03 NOTE — PLAN OF CARE
Problem: Adult Inpatient Plan of Care  Goal: Plan of Care Review  Outcome: Ongoing, Progressing  Goal: Patient-Specific Goal (Individualized)  Outcome: Ongoing, Progressing  Goal: Absence of Hospital-Acquired Illness or Injury  Outcome: Ongoing, Progressing  Intervention: Identify and Manage Fall Risk  Recent Flowsheet Documentation  Taken 1/3/2024 0000 by Nilam Lopez RN  Safety Promotion/Fall Prevention:   assistive device/personal items within reach   clutter free environment maintained   fall prevention program maintained   nonskid shoes/slippers when out of bed   room organization consistent   safety round/check completed  Taken 1/2/2024 2200 by Nilam Lopez RN  Safety Promotion/Fall Prevention:   assistive device/personal items within reach   clutter free environment maintained   fall prevention program maintained   nonskid shoes/slippers when out of bed   room organization consistent   safety round/check completed  Taken 1/2/2024 2000 by Nilam Lopez RN  Safety Promotion/Fall Prevention:   assistive device/personal items within reach   clutter free environment maintained   fall prevention program maintained   nonskid shoes/slippers when out of bed   room organization consistent   safety round/check completed  Intervention: Prevent Skin Injury  Recent Flowsheet Documentation  Taken 1/3/2024 0000 by Nilam Lopez RN  Body Position:   neutral body alignment   neutral head position  Skin Protection:   adhesive use limited   incontinence pads utilized   tubing/devices free from skin contact  Taken 1/2/2024 2200 by Nilam Lopez RN  Body Position:   neutral body alignment   neutral head position  Skin Protection:   adhesive use limited   incontinence pads utilized   tubing/devices free from skin contact  Taken 1/2/2024 2000 by Nilam Lopez RN  Body Position:   neutral body alignment   neutral head position  Skin Protection:   adhesive use limited   incontinence pads utilized   tubing/devices free from skin  contact  Goal: Optimal Comfort and Wellbeing  Outcome: Ongoing, Progressing  Goal: Readiness for Transition of Care  Outcome: Ongoing, Progressing     Problem: Skin Injury Risk Increased  Goal: Skin Health and Integrity  Outcome: Ongoing, Progressing  Intervention: Optimize Skin Protection  Recent Flowsheet Documentation  Taken 1/3/2024 0000 by Nilam Lopez RN  Pressure Reduction Techniques:   frequent weight shift encouraged   heels elevated off bed   positioned off wounds   pressure points protected  Head of Bed (HOB) Positioning: [facility] elevated  Pressure Reduction Devices:   positioning supports utilized   pressure-redistributing mattress utilized  Skin Protection:   adhesive use limited   incontinence pads utilized   tubing/devices free from skin contact  Taken 1/2/2024 2200 by Nilam Lopez RN  Pressure Reduction Techniques:   frequent weight shift encouraged   heels elevated off bed   positioned off wounds   pressure points protected  Head of Bed (HOB) Positioning: [facility] elevated  Pressure Reduction Devices:   positioning supports utilized   pressure-redistributing mattress utilized  Skin Protection:   adhesive use limited   incontinence pads utilized   tubing/devices free from skin contact  Taken 1/2/2024 2000 by Nilam Lopez RN  Pressure Reduction Techniques:   frequent weight shift encouraged   heels elevated off bed   positioned off wounds   pressure points protected  Head of Bed (HOB) Positioning: HOB elevated  Pressure Reduction Devices:   positioning supports utilized   pressure-redistributing mattress utilized  Skin Protection:   adhesive use limited   incontinence pads utilized   tubing/devices free from skin contact     Problem: Fall Injury Risk  Goal: Absence of Fall and Fall-Related Injury  Outcome: Ongoing, Progressing  Intervention: Promote Injury-Free Environment  Recent Flowsheet Documentation  Taken 1/3/2024 0000 by Nilam Lopez RN  Safety Promotion/Fall Prevention:   assistive device/personal  items within reach   clutter free environment maintained   fall prevention program maintained   nonskid shoes/slippers when out of bed   room organization consistent   safety round/check completed  Taken 1/2/2024 2200 by Nilam Lopez RN  Safety Promotion/Fall Prevention:   assistive device/personal items within reach   clutter free environment maintained   fall prevention program maintained   nonskid shoes/slippers when out of bed   room organization consistent   safety round/check completed  Taken 1/2/2024 2000 by Nilam Lopez RN  Safety Promotion/Fall Prevention:   assistive device/personal items within reach   clutter free environment maintained   fall prevention program maintained   nonskid shoes/slippers when out of bed   room organization consistent   safety round/check completed   Goal Outcome Evaluation:

## 2024-01-03 NOTE — PROGRESS NOTES
1 Day Post-Op status post:  Procedure(s):  HIP CANNULATED SCREW PLACEMENT    Subjective:  The patient rested comfortably overnight with minimal postoperative pain.  No events overnight. The patient denies any new chest pain/shortness of breath/fever/chills or any other systemic symptoms.    PHYSICAL THERAPY PROGRESS          Medications/Allergies:  Scheduled Meds:acetaminophen, 1,000 mg, Oral, Q6H  atorvastatin, 80 mg, Oral, Daily  carvedilol, 25 mg, Oral, Q12H  cloNIDine, 0.1 mg, Oral, BID  hydrALAZINE, 75 mg, Oral, Q8H  insulin lispro, 2-7 Units, Subcutaneous, 4x Daily AC & at Bedtime  lactobacillus acidophilus, 1 capsule, Oral, Daily  [Held by provider] lisinopril, 40 mg, Oral, Q24H  Morphine, 15 mg, Oral, Q12H  ondansetron, 4 mg, Intravenous, Once  pantoprazole, 40 mg, Oral, Daily  rivaroxaban, 15 mg, Oral, Daily  senna-docusate sodium, 2 tablet, Oral, BID  sodium chloride, 10 mL, Intravenous, Q12H  sodium chloride, 3 mL, Intravenous, Q12H  terazosin, 5 mg, Oral, Nightly      Continuous Infusions:ropivacaine,   sodium chloride, 100 mL/hr      PRN Meds:.  acetaminophen **OR** acetaminophen **OR** acetaminophen    senna-docusate sodium **AND** polyethylene glycol **AND** bisacodyl **AND** bisacodyl    bisacodyl    dextrose    dextrose    docusate sodium    glucagon (human recombinant)    HYDROmorphone **AND** naloxone    ipratropium-albuterol    melatonin    ondansetron **OR** ondansetron    oxyCODONE    sodium chloride    sodium chloride    sodium chloride    sodium chloride  Penicillins, Nickel, and Penicillins      Objective:  Vital Signs   Temp:  [96.8 °F (36 °C)-98.7 °F (37.1 °C)] 97.7 °F (36.5 °C)  Heart Rate:  [57-78] 72  Resp:  [14-18] 16  BP: (118-202)/(54-97) 147/61  Body mass index is 32.01 kg/m².    Results Review:  I reviewed the patient's new clinical results.    Results from last 7 days   Lab Units 01/03/24  0516   WBC 10*3/mm3 6.76   HEMOGLOBIN g/dL 9.0*   HEMATOCRIT % 28.3*   PLATELETS  10*3/mm3 144     Results from last 7 days   Lab Units 01/03/24  0516   SODIUM mmol/L 138   POTASSIUM mmol/L 4.9   CHLORIDE mmol/L 108*   CO2 mmol/L 18.0*   BUN mg/dL 46*   CREATININE mg/dL 1.95*   GLUCOSE mg/dL 230*   CALCIUM mg/dL 9.6     Results from last 7 days   Lab Units 01/02/24  0423   INR  1.19*     Results from last 7 days   Lab Units 01/03/24  0516 12/31/23  0538 12/30/23  1944   SODIUM mmol/L 138   < > 140   POTASSIUM mmol/L 4.9   < > 3.9   CHLORIDE mmol/L 108*   < > 108*   CO2 mmol/L 18.0*   < > 21.0*   BUN mg/dL 46*   < > 41*   CREATININE mg/dL 1.95*   < > 2.05*   CALCIUM mg/dL 9.6   < > 9.7   ALK PHOS U/L  --   --  154*   ALT (SGPT) U/L  --   --  12   AST (SGOT) U/L  --   --  16   GLUCOSE mg/dL 230*   < > 161*    < > = values in this interval not displayed.       XR Hip With or Without Pelvis 2 - 3 View Left    Result Date: 1/2/2024  XR HIP W OR WO PELVIS 2-3 VIEW LEFT Date of Exam: 1/2/2024 6:28 PM EST Indication: postop Comparison: 12/30/2023 Findings: Status post left femoral neck ORIF. 3 Surgical pins are noted. The surgical hardware appears to be in adequate position without evidence of loosening or dislocation. Overall alignment is maintained. Again a subtle fracture is noted through the left femoral neck. Mild degenerative changes of the right hip. No other acute osseous abnormality. Soft tissue swelling overlying the left hip consistent with recent surgery.     Impression: Status post left femoral neck ORIF. The hardware appears to be in adequate position Electronically Signed: Oleksandr Gonzalez DO  1/2/2024 6:52 PM EST  Workstation ID: [id number]         Physical Exam:  left LE: small area of spotting on left hip dressing about 3 mm in diameter  compartments soft/compressible thigh and leg              +EHL/FHL/GSC/TA              SILT DP/SP/SN/Saph/Tib, slightly decreased at baseline due to neuropathy              CR brisk in all toes        Closed left hip fracture    Fibromyalgia    Type 2  diabetes mellitus    Takotsubo cardiomyopathy    Elevated serum creatinine    Hyperlipidemia LDL goal <70    COPD (chronic obstructive pulmonary disease)    GERD (gastroesophageal reflux disease)    Coronary artery disease involving native coronary artery of native heart without angina pectoris    Essential hypertension    CKD (chronic kidney disease) stage 3, GFR 30-59 ml/min    Paroxysmal atrial fibrillation    Anxiety associated with depression    Anemia, chronic disease      Assessment/Plan:  1 Day Post-Op status post:  Procedure(s):  HIP CANNULATED SCREW PLACEMENT    -Weight bearing as tolerated with assistance and support devices (walker, wheelchair) as needed  -Antibiotics - complete 24 hour postoperative course of IV antibioitics  -Advance diet as tolerated, diet per primary  -Continue dressing, dressing CDI, can be removed and changed as needed if dressing becomes saturated  -PT/OT Consult - Weight bearing as tolerated  -Ice hip  -DVT Prophylaxis - okay to restart xarelto today  -Osteoporotic hip fracture - I recommend informing the patient's PCP regarding osteoporotic fracture/consideration of enrolling patient in osteoporotic care program postoperatively  -Follow-up, upon discharge, patient will need follow-up with me in the clinic in 2-3 weeks' time.  Continue Xarelto for DVT prophylaxis.  Continue covaderm or dry dressing every other day.  Keep incision covered at all times.  DC staples on or around 2-3 weeks. The  will need to call my office/[facility] to arrange for a discharge follow-up appointment in 2-3 weeks. I discussed the patient's findings and my recommendations with patient.    Future Appointments   Date Time Provider Department Center   3/25/2024  8:45 AM Steve Geronimo MD [facility]   4/4/2024 11:45 AM Emilio Regalado MD [facility]       Please have the schedulers call our office at [phone] for the clinic appointment as needed. Please call  with questions or concerns.      Seymour Harrington MD  01/03/24  08:35 EST

## 2024-01-03 NOTE — CASE MANAGEMENT/SOCIAL WORK
Continued Stay Note  [facility]     Patient Name: Yanique Webster  MRN: [id number]  Today's Date: 1/3/2024    Admit Date: 12/30/2023    Plan: Rehab at [place]   Discharge Plan       Row Name 01/03/24 0906       Plan    Plan Comments PT/OT recommending inpt rehab for pt prior to returning home. She is agreeable to this and has requested for a referral to [facility]. April has been given the referral                   Discharge Codes    No documentation.                 Expected Discharge Date and Time       Expected Discharge Date Expected Discharge Time    Jan 6, 2024               Karla Cooney RN

## 2024-01-03 NOTE — THERAPY EVALUATION
Patient Name: Yanique Webster  : 1940    MRN: [id number]                              Today's Date: 1/3/2024       Admit Date: 2023    Visit Dx:     ICD-10-CM ICD-9-CM   1. Closed left hip fracture, initial encounter  S72.002A 820.8   2. Anemia, unspecified type  D64.9 285.9   3. Chronic kidney disease, unspecified CKD stage  N18.9 585.9     Patient Active Problem List   Diagnosis    Fibromyalgia    PVD (peripheral vascular disease)    Type 2 diabetes mellitus    Diabetic gastroparesis    Takotsubo cardiomyopathy    Elevated serum creatinine    Hyperlipidemia LDL goal <70    COPD (chronic obstructive pulmonary disease)    Obesity    Osteoarthritis    Chronic pain    GERD (gastroesophageal reflux disease)    Gout    Chronic joint pain    Coronary artery disease involving native coronary artery of native heart without angina pectoris    Nonrheumatic aortic valve insufficiency    Altered mental status    Essential hypertension    CKD (chronic kidney disease) stage 3, GFR 30-59 ml/min    Hypertensive emergency    Status post placement of implantable loop recorder    Paroxysmal atrial fibrillation    Acute exacerbation of COPD with asthma    Encounter for loop recorder at end of battery life    Closed left hip fracture    Anxiety associated with depression    Anemia, chronic disease     Past Medical History:   Diagnosis Date    Blurry vision     Chronic joint pain     Chronic pain     COPD (chronic obstructive pulmonary disease)     Coronary artery disease     Diabetic gastroparesis 2016    Esophageal stricture     s/p dilation in     GERD (gastroesophageal reflux disease)     Gout     Headache     secondary to hypertension    Hyperlipidemia     Hypertension     Long term current use of insulin     Neuropathy     Neuropathy due to type 2 diabetes mellitus     Obesity     Osteoarthritis     Pericarditis     Stroke 2019    Type 2 diabetes mellitus      Past Surgical History:   Procedure  Laterality Date    BRONCHOSCOPY N/A 8/23/2016    Procedure: BRONCHOSCOPY BIOPSY AT BEDSIDE;  Surgeon: Mookie Willard MD;  Location:  [facility];  Service:     CARDIAC CATHETERIZATION N/A 8/30/2016    Procedure: Left Heart Cath;  Surgeon: Steve Geronimo MD;  Location:  [facility];  Service:     CARDIAC CATHETERIZATION N/A 8/30/2016    Procedure: Right Heart Cath;  Surgeon: Steve Geronimo MD;  Location:  [facility];  Service:     CARDIAC ELECTROPHYSIOLOGY PROCEDURE N/A 7/2/2019    Procedure: Loop insertion;  Surgeon: Steve Geronimo MD;  Location:  [facility];  Service: Cardiovascular    CARDIAC ELECTROPHYSIOLOGY PROCEDURE N/A 9/26/2023    Procedure: Loop recorder removal;  Surgeon: Steve Geroniom MD;  Location:  [facility];  Service: Cardiovascular;  Laterality: N/A;    CATARACT EXTRACTION      ESOPHAGEAL DILATATION  2007    HERNIA REPAIR      HYSTERECTOMY  1998    WRIST FRACTURE SURGERY Left       General Information       Row Name 01/03/24 1009          OT Time and Intention    Document Type evaluation  -JY     Mode of Treatment occupational therapy  -JY       Row Name 01/03/24 1009          General Information    Patient Profile Reviewed yes  -JY     Prior Level of Function independent:;all household mobility;gait;transfer;bed mobility;ADL's;feeding;grooming;dressing;bathing;dependent:;home management;cooking;cleaning;driving  pt grossly I in all ADLs, laundry related t/fs, mobility (sleeps on couch- I bed mobility) w/ use of 4WW, walks to dining room for 2/3 meals; facility assists w/ home mgmt, meal prep; pt dep for driving; endorses 1 fall PTA/last 6 months  -JY     Existing Precautions/Restrictions fall;other (see comments)  -JY     Barriers to Rehab medically complex;previous functional deficit  -JY       Row Name 01/03/24 1009          Occupational Profile    Environmental Supports and Barriers (Occupational Profile) walk in shower  w/ seat, standard toilet w/ grab bars, DME: 4WW used at baseline  -JY       Row Name 01/03/24 1009          Living Environment    People in Home alone;facility resident;other (see comments)  [facility] 1st floor apt  -RENATE       Row Name 01/03/24 1009          Home Main Entrance    Number of Stairs, Main Entrance none  -JY       Row Name 01/03/24 1009          Stairs Within Home, Primary    Number of Stairs, Within Home, Primary none  -JY       Row Name 01/03/24 1009          Cognition    Orientation Status (Cognition) oriented x 4  -JY       Row Name 01/03/24 1009          Safety Issues, Functional Mobility    Safety Issues Affecting Function (Mobility) insight into deficits/self-awareness;safety precaution awareness;safety precautions follow-through/compliance;sequencing abilities;problem-solving;judgment  -JCHAKA     Impairments Affecting Function (Mobility) balance;endurance/activity tolerance;pain;strength  -JY     Comment, Safety Issues/Impairments (Mobility) pt alert and able to follow commands; initially reported neuropathy pain vs pain at L hip, more pain at L hip w/ movement  -JY               User Key  (r) = Recorded By, (t) = Taken By, (c) = Cosigned By      Initials Name Provider Type    Alexa Bardales OT Occupational Therapist                     Mobility/ADL's       Row Name 01/03/24 1341          Bed Mobility    Bed Mobility supine-sit;scooting/bridging  -JY     Scooting/Bridging [place] (Bed Mobility) moderate assist (50% patient effort);maximum assist (25% patient effort);2 person assist;verbal cues  -JCHAKA     Supine-Sit [place] (Bed Mobility) maximum assist (25% patient effort);2 person assist;verbal cues  -JY     Assistive Device (Bed Mobility) bed rails;head of bed elevated;draw sheet  -JY     Comment, (Bed Mobility) skilled cues for optimal tech including advancing LEs to EOB, reaching across body to grasp bedrails and uprighting trunk into sitting with push through UE; pt req'd  A at UB and LB and to be allowed increased time d/t increased effort and reported increase in pain at L LE: KI donned in supine d/t quad weakness  -RENATE Huang Name 01/03/24 1341          Transfers    Transfers sit-stand transfer;stand-sit transfer;bed-chair transfer  -KEKECHAKA     Comment, (Transfers) skilled cues for optimal hand placement for controlled ascend, descend specifically to push up from seated surface and reach back prior to sitting once aligned and in close proximity to seated surface; further cues for stepping LLE out forward d/t KI placement and for comfort  -RENATE Huang Name 01/03/24 1341          Bed-Chair Transfer    Bed-Chair [place] (Transfers) minimum assist (75% patient effort);2 person assist;verbal cues  -RENATE     Assistive Device (Bed-Chair Transfers) walker, front-wheeled  -RENATE Huang Name 01/03/24 1341          Sit-Stand Transfer    Sit-Stand [place] (Transfers) moderate assist (50% patient effort);2 person assist;verbal cues  -RENATE     Assistive Device (Sit-Stand Transfers) walker, front-wheeled  -RENATE     Comment, (Sit-Stand Transfer) increased time to achieve full upright posture, incontinent upon standing  -RENATE Huang Name 01/03/24 1341          Stand-Sit Transfer    Stand-Sit [place] (Transfers) minimum assist (75% patient effort);moderate assist (50% patient effort);2 person assist;verbal cues  -JCHAKA     Assistive Device (Stand-Sit Transfers) walker, front-wheeled  -RENATE Huang Name 01/03/24 1341          Functional Mobility    Functional Mobility- Ind. Level not tested  -JY     Functional Mobility- Comment defer to PT for specifics  -RENATE Huang Name 01/03/24 1341          Activities of Daily Living    BADL Assessment/Intervention upper body dressing;lower body dressing;grooming;toileting;bathing  -RENATE Huang Name 01/03/24 1341          Mobility    Extremity Weight-bearing Status left lower extremity  -JY     Left Lower Extremity (Weight-bearing Status) weight-bearing  as tolerated (WBAT)  -[employer]       Row Name 01/03/24 1341          Upper Body Dressing Assessment/Training    [place] Level (Upper Body Dressing) doff;don;pajama/robe;minimum assist (75% patient effort);verbal cues  -JY     Position (Upper Body Dressing) edge of bed sitting  -JY     Comment, (Upper Body Dressing) min A for proximal and posterior mgmt of gown  -JY       Row Name 01/03/24 1341          Lower Body Dressing Assessment/Training    [place] Level (Lower Body Dressing) doff;don;socks;dependent (less than 25% patient effort)  -JY     Assistive Devices (Lower Body Dressing) long-handled shoe horn;reacher;sock-aid;other (see comments)  issued  AE to assist with LB ADLs with education on each  -JY     Position (Lower Body Dressing) supine  -JY     Comment, (Lower Body Dressing) difficulty with distal reach given increased pain at L hip with movement and limited reach d/t KI donned at LLE; educated pt on optimal position, tech, seq for LB garments with initial AE training with plan for further training in future sessions  -[employer]       Row Name 01/03/24 1341          Grooming Assessment/Training    [place] Level (Grooming) wash face, hands;set up  -JY     Position (Grooming) supported sitting  -[employer]       Row Name 01/03/24 1341          Toileting Assessment/Training    [place] Level (Toileting) perform perineal hygiene;dependent (less than 25% patient effort)  -JY     Assistive Devices (Toileting) other (see comments)  bedside care afater incontinent episode once standing at EOB  -JY     Position (Toileting) supported standing  -JY       Row Name 01/03/24 1341          Bathing Assessment/Intervention    [place] Level (Bathing) bathing skills;not tested  -JY     Assistive Devices (Bathing) long-handled sponge  -JY     Comment, (Bathing) authentic bathing not assessed this date; introduced pt to  AE to assist with distal reach given current increased pain and decreased mobility; emphasized no  showering while nerve cath still in place  -               User Key  (r) = Recorded By, (t) = Taken By, (c) = Cosigned By      Initials Name Provider Type    Alexa Bardales OT Occupational Therapist                   Obj/Interventions       Row Name 01/03/24 1356          Sensory Assessment (Somatosensory)    Sensory Assessment (Somatosensory) bilateral UE;sensation intact  -JY     Bilateral UE Sensory Assessment general sensation;light touch awareness;intact  -JY     Sensory Assessment able to recognize LT Stimuli as intact and symmetrical at BUEs however reports some numbness at B hands/digits and feet and lower legs d/t baseline neuropathy  -       Row Name 01/03/24 1356          Vision Assessment/Intervention    Visual Impairment/Limitations corrective lenses for distance  -[facility] Name 01/03/24 1356          Range of Motion Comprehensive    Comment, General Range of Motion denies any acute changes to vision  -[facility] Name 01/03/24 1356          Strength Comprehensive (MMT)    General Manual Muscle Testing (MMT) Assessment upper extremity strength deficits identified  -     Comment, General Manual Muscle Testing (MMT) Assessment MMS grossly 4+/5 with greater (mild) deficits noted at L shoulder 4/5  -[facility] Name 01/03/24 1356          Motor Skills    Motor Skills functional endurance;coordination  -J     Coordination bilateral;upper extremity;finger to nose;other (see comments);WFL  finger thumb opposition  -Y     Functional Endurance decreased activity tolerance toward more dynamic demands  -[facility] Name 01/03/24 1356          Balance    Balance Assessment sitting static balance;sitting dynamic balance;standing static balance;standing dynamic balance  -JY     Static Sitting Balance contact guard  -JY     Dynamic Sitting Balance minimal assist  -J     Position, Sitting Balance unsupported;sitting edge of bed  -JY     Static Standing Balance minimal assist;2-person assist;verbal  cues  -JY     Dynamic Standing Balance minimal assist;2-person assist;verbal cues  -JY     Position/Device Used, Standing Balance supported;walker, front-wheeled  -JY     Balance Interventions sitting;standing;static;sit to stand;supported;occupation based/functional task  -JY     Comment, Balance no overt LOB during seated or standing, initial decreased weight accepatance at L LE of which improved with weight shifting and progression of seq  -JY               User Key  (r) = Recorded By, (t) = Taken By, (c) = Cosigned By      Initials Name Provider Type    Alexa Bardales, BETO Occupational Therapist                   Goals/Plan       Row Name 01/03/24 1411          Transfer Goal 1 (OT)    Activity/Assistive Device (Transfer Goal 1, OT) sit-to-stand/stand-to-sit;bed-to-chair/chair-to-bed;toilet;commode, bedside without drop arms;commode;walker, rolling;other (see comments)  progress to toilet as able  -JY     [place] Level/Cues Needed (Transfer Goal 1, OT) minimum assist (75% or more patient effort);verbal cues required  -JY     Time Frame (Transfer Goal 1, OT) long term goal (LTG);by discharge  -JY     Progress/Outcome (Transfer Goal 1, OT) new goal  -JY       Row Name 01/03/24 1411          Dressing Goal 1 (OT)    Activity/Device (Dressing Goal 1, OT) upper body dressing;lower body dressing;other (see comments)  d/d TB garments with AE PRN  -JY     [place]/Cues Needed (Dressing Goal 1, OT) minimum assist (75% or more patient effort);verbal cues required  -JY     Time Frame (Dressing Goal 1, OT) long term goal (LTG);by discharge  -JY     Progress/Outcome (Dressing Goal 1, OT) new goal  -JY       Row Name 01/03/24 1411          Toileting Goal 1 (OT)    Activity/Device (Toileting Goal 1, OT) adjust/manage clothing;perform perineal hygiene;commode, bedside without drop arms;commode;grab bar/safety frame;raised toilet seat  -JY     [place] Level/Cues Needed (Toileting Goal 1, OT) moderate assist (50-74%  patient effort);verbal cues required  -JY     Time Frame (Toileting Goal 1, OT) long term goal (LTG);by discharge  -JY     Progress/Outcome (Toileting Goal 1, OT) new goal  -       Row Name 01/03/24 1411          Strength Goal 1 (OT)    Strength Goal 1 (OT) Pt to complete seated HEP encompassing BUEs targeting strength, endurance w/ progressive sets/reps/resistance in order to promote improved integration in ADLs, related t/fs and mobility  -JY     Time Frame (Strength Goal 1, OT) long term goal (LTG);by discharge  -JY     Progress/Outcome (Strength Goal 1, OT) new goal  -       Row Name 01/03/24 1411          Therapy Assessment/Plan (OT)    Planned Therapy Interventions (OT) activity tolerance training;adaptive equipment training;BADL retraining;functional balance retraining;occupation/activity based interventions;patient/caregiver education/training;ROM/therapeutic exercise;strengthening exercise;transfer/mobility retraining  -JY               User Key  (r) = Recorded By, (t) = Taken By, (c) = Cosigned By      Initials Name Provider Type    Alexa Bardales, BETO Occupational Therapist                   Clinical Impression       Row Name 01/03/24 1400          Pain Assessment    Pretreatment Pain Rating 2/10  -JY     Posttreatment Pain Rating 4/10  -JY     Pain Location - Side/Orientation Bilateral  -JY     Pain Location lower  -JY     Pain Location - extremity;foot;hip  -JY     Pre/Posttreatment Pain Comment 2/10 pain initially related to baseline neuropathy and later 4/10 pain reported at L hip  -JY     Pain Intervention(s) Repositioned;Ambulation/increased activity;Elevated;Rest;Nursing Notified  -       Row Name 01/03/24 1400          Plan of Care Review    Plan of Care Reviewed With patient  -JY     Progress no change  OT IE  -JY     Outcome Evaluation OT evaluation completed. PT presents w/ decreased I in ADLs, related t/fs, mobility compared to PLOF limited by decreased activity tolerance, impaired  balance, muscle weakness at  BUEs, decreased distal reach impacting LB ADLs, OT issued LH AE to assist with LB needs and anticipate greater I with use in future sessions. PT req'd upward of dep A for d/d socks, min A for d/d gown, dep care for posterior hygiene after incontinent episode upon standing and min to mod A x 2 for STS and SPT t/fs, max A x 2 for bed mobility. Pt is below occupational performance baseline and would benefit from IPOT POC and IRF at d/c when medically ready.  -       Row Name 01/03/24 1400          Therapy Assessment/Plan (OT)    Patient/Family Therapy Goal Statement (OT) to maximize I in ADLs, related t/fs, mobility, return to PLOF  -JY     Rehab Potential (OT) good, to achieve stated therapy goals  -J     Criteria for Skilled Therapeutic Interventions Met (OT) yes;skilled treatment is necessary  -JY     Therapy Frequency (OT) daily  -       Row Name 01/03/24 1400          Therapy Plan Review/Discharge Plan (OT)    Equipment Needs Upon Discharge (OT) dressing equipment;bathing equipment  -JY     Anticipated Discharge Disposition (OT) inpatient rehabilitation facility  -       Row Name 01/03/24 1400          Vital Signs    Pre Systolic BP Rehab 131  -JY     Pre Treatment Diastolic BP 69  -JY     Post Systolic BP Rehab 140  -JY     Post Treatment Diastolic BP 67  -JY     Pretreatment Heart Rate (beats/min) 77  -JY     Posttreatment Heart Rate (beats/min) 94  -JY     Pre SpO2 (%) 96  -JY     O2 Delivery Pre Treatment room air  -JY     O2 Delivery Intra Treatment room air  -JY     Post SpO2 (%) 95  -JY     O2 Delivery Post Treatment room air  -JY     Pre Patient Position Supine  -JY     Intra Patient Position Standing  -JY     Post Patient Position Sitting  -JY       Row Name 01/03/24 1400          Positioning and Restraints    Pre-Treatment Position in bed  -JY     Post Treatment Position chair  -JY     In Chair notified nsg;reclined;call light within reach;encouraged to call for  assist;exit alarm on;compression device;waffle cushion;legs elevated  fascia iliaca nerve cath  -JY               User Key  (r) = Recorded By, (t) = Taken By, (c) = Cosigned By      Initials Name Provider Type    Alexa Bardales OT Occupational Therapist                   Outcome Measures       Row Name 01/03/24 1413          How much help from another is currently needed...    Putting on and taking off regular lower body clothing? 2  -JY     Bathing (including washing, rinsing, and drying) 2  -JY     Toileting (which includes using toilet bed pan or urinal) 1  -JY     Putting on and taking off regular upper body clothing 3  -JY     Taking care of personal grooming (such as brushing teeth) 3  -JY     Eating meals 4  -JY     AM-PAC 6 Clicks Score (OT) 15  -JY       Row Name 01/03/24 1103          How much help from another person do you currently need...    Turning from your back to your side while in flat bed without using bedrails? 3  -LH     Moving from lying on back to sitting on the side of a flat bed without bedrails? 2  -LH     Moving to and from a bed to a chair (including a wheelchair)? 3  -LH     Standing up from a chair using your arms (e.g., wheelchair, bedside chair)? 2  -LH     Climbing 3-5 steps with a railing? 2  -LH     To walk in hospital room? 3  -LH     AM-PAC 6 Clicks Score (PT) 15  -LH     Highest Level of Mobility Goal 4 --> Transfer to chair/commode  -       Row Name 01/03/24 1413 01/03/24 1103       Functional Assessment    Outcome Measure Options AM-PAC 6 Clicks Daily Activity (OT)  -JY AM-PAC 6 Clicks Basic Mobility (PT);PADD  -LH              User Key  (r) = Recorded By, (t) = Taken By, (c) = Cosigned By      Initials Name Provider Type    Alexa Bardales OT Occupational Therapist     Sonia Greenwood PT Physical Therapist                    Occupational Therapy Education       Title: PT OT SLP Therapies (In Progress)       Topic: Occupational Therapy (In Progress)       Point:  ADL training (In Progress)       Description:   Instruct learner(s) on proper safety adaptation and remediation techniques during self care or transfers.   Instruct in proper use of assistive devices.                  Learning Progress Summary             Patient Acceptance, E,D, NR by RENATE at 1/3/2024 0801                         Point: Home exercise program (Not Started)       Description:   Instruct learner(s) on appropriate technique for monitoring, assisting and/or progressing therapeutic exercises/activities.                  Learner Progress:  Not documented in this visit.              Point: Precautions (In Progress)       Description:   Instruct learner(s) on prescribed precautions during self-care and functional transfers.                  Learning Progress Summary             Patient Acceptance, E,D, NR by RENATE at 1/3/2024 0801                         Point: Body mechanics (In Progress)       Description:   Instruct learner(s) on proper positioning and spine alignment during self-care, functional mobility activities and/or exercises.                  Learning Progress Summary             Patient Acceptance, E,D, NR by RENATE at 1/3/2024 0801                                         User Key       Initials Effective Dates Name Provider Type Discipline    RENATE 06/16/21 -  Alexa Lainez OT Occupational Therapist OT                  OT Recommendation and Plan  Planned Therapy Interventions (OT): activity tolerance training, adaptive equipment training, BADL retraining, functional balance retraining, occupation/activity based interventions, patient/caregiver education/training, ROM/therapeutic exercise, strengthening exercise, transfer/mobility retraining  Therapy Frequency (OT): daily  Plan of Care Review  Plan of Care Reviewed With: patient  Progress: no change (OT IE)  Outcome Evaluation: OT evaluation completed. PT presents w/ decreased I in ADLs, related t/fs, mobility compared to PLOF limited by decreased  activity tolerance, impaired balance, muscle weakness at  BUEs, decreased distal reach impacting LB ADLs, OT issued LH AE to assist with LB needs and anticipate greater I with use in future sessions. PT req'd upward of dep A for d/d socks, min A for d/d gown, dep care for posterior hygiene after incontinent episode upon standing and min to mod A x 2 for STS and SPT t/fs, max A x 2 for bed mobility. Pt is below occupational performance baseline and would benefit from IPOT POC and IRF at d/c when medically ready.     Time Calculation:   Evaluation Complexity (OT)  Review Occupational Profile/Medical/Therapy History Complexity: expanded/moderate complexity  Assessment, Occupational Performance/Identification of Deficit Complexity: 3-5 performance deficits  Clinical Decision Making Complexity (OT): detailed assessment/moderate complexity  Overall Complexity of Evaluation (OT): moderate complexity     Time Calculation- OT       Row Name 01/03/24 1414             Time Calculation- OT    OT Start Time 0814  -JY      OT Received On 01/03/24  -JY      OT Goal Re-Cert Due Date 01/13/24  -JY         Timed Charges    05474 - OT Self Care/Mgmt Minutes 16  -JY         Untimed Charges    OT Eval/Re-eval Minutes 50  -JY         Total Minutes    Timed Charges Total Minutes 16  -JY      Untimed Charges Total Minutes 50  -JY       Total Minutes 66  -JY                User Key  (r) = Recorded By, (t) = Taken By, (c) = Cosigned By      Initials Name Provider Type    Alexa Bardales OT Occupational Therapist                  Therapy Charges for Today       Code Description Service Date Service Provider Modifiers Qty    31919113349 HC OT SELF CARE/MGMT/TRAIN EA 15 MIN 1/3/2024 Alexa Lainez OT GO 1    [id number] HC OT EVAL MOD COMPLEXITY 4 1/3/2024 Alexa Lainez OT GO 1                 Alexa Lainez OT  1/3/2024

## 2024-01-03 NOTE — PLAN OF CARE
Goal Outcome Evaluation:  Plan of Care Reviewed With: patient           Outcome Evaluation: PT eval completed. Pt presents below baseline function d/t acute pain and deficits in L LE strength, balance, and activity tolerance. Pt required maxA x2 for bed mobility, modA x2 for STS, and [place] x2 to take steps from bed>chair w/ FWW. Pt would benefit from skilled IP PT. Recommend IRF at d/c for best functional outcome.      Anticipated Discharge Disposition (PT): inpatient rehabilitation facility

## 2024-01-04 LAB
BH BB BLOOD EXPIRATION DATE: NORMAL
BH BB BLOOD EXPIRATION DATE: NORMAL
BH BB BLOOD TYPE BARCODE: 9500
BH BB BLOOD TYPE BARCODE: 9500
BH BB DISPENSE STATUS: NORMAL
BH BB DISPENSE STATUS: NORMAL
BH BB PRODUCT CODE: NORMAL
BH BB PRODUCT CODE: NORMAL
BH BB UNIT NUMBER: NORMAL
BH BB UNIT NUMBER: NORMAL
CROSSMATCH INTERPRETATION: NORMAL
CROSSMATCH INTERPRETATION: NORMAL
DEPRECATED RDW RBC AUTO: 48.6 FL (ref 37–54)
ERYTHROCYTE [DISTWIDTH] IN BLOOD BY AUTOMATED COUNT: 15.3 % (ref 12.3–15.4)
GLUCOSE BLDC GLUCOMTR-MCNC: 138 MG/DL (ref 70–130)
GLUCOSE BLDC GLUCOMTR-MCNC: 146 MG/DL (ref 70–130)
GLUCOSE BLDC GLUCOMTR-MCNC: 209 MG/DL (ref 70–130)
GLUCOSE BLDC GLUCOMTR-MCNC: 211 MG/DL (ref 70–130)
HCT VFR BLD AUTO: 26.5 % (ref 34–46.6)
HGB BLD-MCNC: 8.3 G/DL (ref 12–15.9)
MCH RBC QN AUTO: 27.3 PG (ref 26.6–33)
MCHC RBC AUTO-ENTMCNC: 31.3 G/DL (ref 31.5–35.7)
MCV RBC AUTO: 87.2 FL (ref 79–97)
PLATELET # BLD AUTO: 158 10*3/MM3 (ref 140–450)
PMV BLD AUTO: 10.5 FL (ref 6–12)
QT INTERVAL: 424 MS
QTC INTERVAL: 437 MS
RBC # BLD AUTO: 3.04 10*6/MM3 (ref 3.77–5.28)
UNIT  ABO: NORMAL
UNIT  ABO: NORMAL
UNIT  RH: NORMAL
UNIT  RH: NORMAL
WBC NRBC COR # BLD AUTO: 7.99 10*3/MM3 (ref 3.4–10.8)

## 2024-01-04 PROCEDURE — 97530 THERAPEUTIC ACTIVITIES: CPT

## 2024-01-04 PROCEDURE — 63710000001 INSULIN DETEMIR PER 5 UNITS: Performed by: INTERNAL MEDICINE

## 2024-01-04 PROCEDURE — 85027 COMPLETE CBC AUTOMATED: CPT | Performed by: INTERNAL MEDICINE

## 2024-01-04 PROCEDURE — 82948 REAGENT STRIP/BLOOD GLUCOSE: CPT

## 2024-01-04 PROCEDURE — 99232 SBSQ HOSP IP/OBS MODERATE 35: CPT | Performed by: INTERNAL MEDICINE

## 2024-01-04 PROCEDURE — 99024 POSTOP FOLLOW-UP VISIT: CPT | Performed by: ORTHOPAEDIC SURGERY

## 2024-01-04 PROCEDURE — 63710000001 INSULIN LISPRO (HUMAN) PER 5 UNITS: Performed by: ORTHOPAEDIC SURGERY

## 2024-01-04 PROCEDURE — 97116 GAIT TRAINING THERAPY: CPT

## 2024-01-04 RX ORDER — TORSEMIDE 20 MG/1
20 TABLET ORAL ONCE
Status: DISCONTINUED | OUTPATIENT
Start: 2024-01-04 | End: 2024-01-04

## 2024-01-04 RX ORDER — TORSEMIDE 20 MG/1
20 TABLET ORAL DAILY
Status: DISCONTINUED | OUTPATIENT
Start: 2024-01-04 | End: 2024-01-05 | Stop reason: HOSPADM

## 2024-01-04 RX ORDER — POLYETHYLENE GLYCOL 3350 17 G/17G
17 POWDER, FOR SOLUTION ORAL DAILY
Status: DISCONTINUED | OUTPATIENT
Start: 2024-01-04 | End: 2024-01-05 | Stop reason: HOSPADM

## 2024-01-04 RX ADMIN — MORPHINE SULFATE 15 MG: 15 TABLET, FILM COATED, EXTENDED RELEASE ORAL at 08:15

## 2024-01-04 RX ADMIN — INSULIN DETEMIR 5 UNITS: 100 INJECTION, SOLUTION SUBCUTANEOUS at 08:14

## 2024-01-04 RX ADMIN — Medication 10 ML: at 19:14

## 2024-01-04 RX ADMIN — Medication 3 ML: at 19:17

## 2024-01-04 RX ADMIN — CARVEDILOL 25 MG: 12.5 TABLET, FILM COATED ORAL at 19:15

## 2024-01-04 RX ADMIN — TORSEMIDE 20 MG: 20 TABLET ORAL at 16:13

## 2024-01-04 RX ADMIN — HYDRALAZINE HYDROCHLORIDE 75 MG: 50 TABLET ORAL at 19:17

## 2024-01-04 RX ADMIN — ACETAMINOPHEN 1000 MG: 500 TABLET ORAL at 14:36

## 2024-01-04 RX ADMIN — POLYETHYLENE GLYCOL 3350 17 G: 17 POWDER, FOR SOLUTION ORAL at 08:14

## 2024-01-04 RX ADMIN — TERAZOSIN HYDROCHLORIDE 5 MG: 5 CAPSULE ORAL at 19:15

## 2024-01-04 RX ADMIN — ACETAMINOPHEN 1000 MG: 500 TABLET ORAL at 08:15

## 2024-01-04 RX ADMIN — INSULIN DETEMIR 5 UNITS: 100 INJECTION, SOLUTION SUBCUTANEOUS at 17:33

## 2024-01-04 RX ADMIN — MORPHINE SULFATE 15 MG: 15 TABLET, FILM COATED, EXTENDED RELEASE ORAL at 19:16

## 2024-01-04 RX ADMIN — RIVAROXABAN 15 MG: 15 TABLET, FILM COATED ORAL at 08:15

## 2024-01-04 RX ADMIN — Medication 1 CAPSULE: at 08:14

## 2024-01-04 RX ADMIN — BISACODYL 10 MG: 10 SUPPOSITORY RECTAL at 13:16

## 2024-01-04 RX ADMIN — INSULIN LISPRO 3 UNITS: 100 INJECTION, SOLUTION INTRAVENOUS; SUBCUTANEOUS at 20:16

## 2024-01-04 RX ADMIN — INSULIN LISPRO 3 UNITS: 100 INJECTION, SOLUTION INTRAVENOUS; SUBCUTANEOUS at 12:27

## 2024-01-04 RX ADMIN — CLONIDINE HYDROCHLORIDE 0.1 MG: 0.1 TABLET ORAL at 19:16

## 2024-01-04 RX ADMIN — ATORVASTATIN CALCIUM 80 MG: 40 TABLET, FILM COATED ORAL at 08:15

## 2024-01-04 RX ADMIN — HYDRALAZINE HYDROCHLORIDE 75 MG: 50 TABLET ORAL at 04:40

## 2024-01-04 RX ADMIN — HYDRALAZINE HYDROCHLORIDE 75 MG: 50 TABLET ORAL at 14:36

## 2024-01-04 RX ADMIN — CLONIDINE HYDROCHLORIDE 0.1 MG: 0.1 TABLET ORAL at 08:15

## 2024-01-04 RX ADMIN — CARVEDILOL 25 MG: 12.5 TABLET, FILM COATED ORAL at 08:15

## 2024-01-04 RX ADMIN — Medication 3 ML: at 08:17

## 2024-01-04 RX ADMIN — PANTOPRAZOLE SODIUM 40 MG: 40 TABLET, DELAYED RELEASE ORAL at 08:15

## 2024-01-04 RX ADMIN — ACETAMINOPHEN 1000 MG: 500 TABLET ORAL at 03:23

## 2024-01-04 NOTE — PROGRESS NOTES
2 Days Post-Op status post:  Procedure(s):  HIP CANNULATED SCREW PLACEMENT    Subjective:  No acute events.  Having some more discomfort in hip today after being up doing therapy yesterday.  Patient resting in bed and pain currently well-controlled.  No other complaints.    PHYSICAL THERAPY PROGRESS  Outcome Evaluation: Patient able to increase her ambulaiton with mechanical gait aid. She continues to need signifiicant assistance to get out of bed. I contined to recommend IRF at discharge (01/04/24 0909)       Medications/Allergies:  Scheduled Meds:acetaminophen, 1,000 mg, Oral, Q6H  atorvastatin, 80 mg, Oral, Daily  carvedilol, 25 mg, Oral, Q12H  cloNIDine, 0.1 mg, Oral, BID  hydrALAZINE, 75 mg, Oral, Q8H  insulin detemir, 5 Units, Subcutaneous, BID  insulin lispro, 2-7 Units, Subcutaneous, 4x Daily AC & at Bedtime  lactobacillus acidophilus, 1 capsule, Oral, Daily  [Held by provider] lisinopril, 40 mg, Oral, Q24H  Morphine, 15 mg, Oral, Q12H  pantoprazole, 40 mg, Oral, Daily  polyethylene glycol, 17 g, Oral, Daily  rivaroxaban, 15 mg, Oral, Daily  senna-docusate sodium, 2 tablet, Oral, BID  sodium chloride, 10 mL, Intravenous, Q12H  sodium chloride, 3 mL, Intravenous, Q12H  terazosin, 5 mg, Oral, Nightly      Continuous Infusions:ropivacaine,   sodium chloride, 100 mL/hr      PRN Meds:.  acetaminophen **OR** acetaminophen **OR** acetaminophen    senna-docusate sodium **AND** polyethylene glycol **AND** bisacodyl **AND** bisacodyl    bisacodyl    dextrose    dextrose    Diclofenac Sodium    docusate sodium    glucagon (human recombinant)    HYDROmorphone **AND** naloxone    ipratropium-albuterol    melatonin    ondansetron **OR** ondansetron    oxyCODONE    sodium chloride    sodium chloride    sodium chloride    sodium chloride  Penicillins, Nickel, and Penicillins      Objective:  Vital Signs   Temp:  [97.5 °F (36.4 °C)-98.4 °F (36.9 °C)] 98 °F (36.7 °C)  Heart Rate:  [71-90] 77  Resp:  [16-18] 17  BP:  (122-174)/(54-80) 157/76  Body mass index is 32.01 kg/m².    Results Review:  I reviewed the patient's new clinical results.    Results from last 7 days   Lab Units 01/04/24  0621   WBC 10*3/mm3 7.99   HEMOGLOBIN g/dL 8.3*   HEMATOCRIT % 26.5*   PLATELETS 10*3/mm3 158     Results from last 7 days   Lab Units 01/03/24  0516   SODIUM mmol/L 138   POTASSIUM mmol/L 4.9   CHLORIDE mmol/L 108*   CO2 mmol/L 18.0*   BUN mg/dL 46*   CREATININE mg/dL 1.95*   GLUCOSE mg/dL 230*   CALCIUM mg/dL 9.6     Results from last 7 days   Lab Units 01/02/24  0423   INR  1.19*     Results from last 7 days   Lab Units 01/03/24  0516 12/31/23  0538 12/30/23  1944   SODIUM mmol/L 138   < > 140   POTASSIUM mmol/L 4.9   < > 3.9   CHLORIDE mmol/L 108*   < > 108*   CO2 mmol/L 18.0*   < > 21.0*   BUN mg/dL 46*   < > 41*   CREATININE mg/dL 1.95*   < > 2.05*   CALCIUM mg/dL 9.6   < > 9.7   ALK PHOS U/L  --   --  154*   ALT (SGPT) U/L  --   --  12   AST (SGOT) U/L  --   --  16   GLUCOSE mg/dL 230*   < > 161*    < > = values in this interval not displayed.       XR Hip With or Without Pelvis 2 - 3 View Left    Result Date: 1/2/2024  XR HIP W OR WO PELVIS 2-3 VIEW LEFT Date of Exam: 1/2/2024 6:28 PM EST Indication: postop Comparison: 12/30/2023 Findings: Status post left femoral neck ORIF. 3 Surgical pins are noted. The surgical hardware appears to be in adequate position without evidence of loosening or dislocation. Overall alignment is maintained. Again a subtle fracture is noted through the left femoral neck. Mild degenerative changes of the right hip. No other acute osseous abnormality. Soft tissue swelling overlying the left hip consistent with recent surgery.     Impression: Status post left femoral neck ORIF. The hardware appears to be in adequate position Electronically Signed: Oleksandr Gonzalez DO  1/2/2024 6:52 PM EST  Workstation ID: [id number]         Physical Exam:  left LE: small area of spotting on left hip dressing about 3 mm in  diameter, similar to yesterday  compartments soft/compressible thigh and leg              +EHL/FHL/GSC/TA              SILT DP/SP/SN/Saph/Tib, slightly decreased at baseline due to neuropathy              CR brisk in all toes        Closed left hip fracture    Fibromyalgia    Type 2 diabetes mellitus    Takotsubo cardiomyopathy    Elevated serum creatinine    Hyperlipidemia LDL goal <70    COPD (chronic obstructive pulmonary disease)    GERD (gastroesophageal reflux disease)    Coronary artery disease involving native coronary artery of native heart without angina pectoris    Essential hypertension    CKD (chronic kidney disease) stage 3, GFR 30-59 ml/min    Paroxysmal atrial fibrillation    Anxiety associated with depression    Anemia, chronic disease      Assessment/Plan:  2 Days Post-Op status post:  Procedure(s):  HIP CANNULATED SCREW PLACEMENT    -Weight bearing as tolerated with assistance and support devices (walker, wheelchair) as needed  -Ddiet per primary  -Continue dressing, dressing CDI, can be removed and changed as needed if dressing becomes saturated  -PT/OT Consult - Weight bearing as tolerated  -Ice hip  -DVT Prophylaxis, xarelto  -Osteoporotic hip fracture - I recommend informing the patient's PCP regarding osteoporotic fracture/consideration of enrolling patient in osteoporotic care program postoperatively  -Follow-up, upon discharge, patient will need follow-up with me in the clinic in 2-3 weeks' time.  Continue Xarelto for DVT prophylaxis.  Continue covaderm or dry dressing every other day.  Keep incision covered at all times.  DC staples on or around 2-3 weeks. The  will need to call my office/[facility] to arrange for a discharge follow-up appointment in 2-3 weeks. I discussed the patient's findings and my recommendations with patient.    Future Appointments   Date Time Provider Department Center   3/25/2024  8:45 AM Steve Geronimo MD [facility]   4/4/2024  11:45 AM Emilio Regalado MD [facility]       Please have the schedulers call our office at [phone] for the clinic appointment as needed. Please call with questions or concerns.      Seymour Harrington MD  01/04/24  11:03 EST

## 2024-01-04 NOTE — PROGRESS NOTES
[facility]  PROGRESS NOTE    Patient Name: Yanique Webster  : 1940  MRN: [id number]    Date of Admission: 2023  Primary Care Physician: Sebas Alicea MD    Subjective   Subjective     CC:  Hip fracture f/u    HPI:  More sore than yesterday which is a bit discouraging to her but optimistic overall about her continued progress    Objective   Objective     Vital Signs:   Temp:  [97.5 °F (36.4 °C)-98.4 °F (36.9 °C)] 98 °F (36.7 °C)  Heart Rate:  [71-81] 78  Resp:  [17-18] 17  BP: (152-174)/(64-80) 169/68     Physical Exam:  Constitutional: No acute distress, awake, alert older female sitting up in bed  HENT: NCAT, mucous membranes moist  Respiratory: Clear to auscultation bilaterally, respiratory effort normal   Cardiovascular: RRR, no murmurs, rubs, or gallops  Gastrointestinal: Soft, nontender, nondistended  Musculoskeletal: Left hip site w bandage in place examined alongside orthopedic surgery , no excessive swelling/brusiing. Muscle tone within normal limits, no joint effusions appreciated  Psychiatric: Appropriate affect, cooperative  Neurologic: Alert and oriented, facial movements symmetric and spontaneous movement of all 4 extremities grossly equal bilaterally, speech clear  Skin: No rashes    Results Reviewed:  LAB RESULTS:      Lab 24  0621 24  0516 24  0423 24  0801 24  0201 23  0538 235 23   WBC 7.99 6.76 7.48  --  6.95 6.87  --  8.82   HEMOGLOBIN 8.3* 9.0* 8.4*  --  8.4* 9.2*  --  9.2*   HEMATOCRIT 26.5* 28.3* 26.0*  --  26.3* 29.5*  28.4*  --  29.1*   PLATELETS 158 144 148  --  145 160  --  160   NEUTROS ABS  --  5.78  --   --  4.86 4.40  --  7.00   IMMATURE GRANS (ABS)  --  0.02  --   --  0.04 0.02  --  0.03   LYMPHS ABS  --  0.52*  --   --  1.05 1.29  --  0.97   MONOS ABS  --  0.40  --   --  0.53 0.68  --  0.66   EOS ABS  --  0.01  --   --  0.43* 0.44*  --  0.14   MCV 87.2  85.8 85.0  --  85.9 89.4  --  89.3   PROTIME  --   --  15.2* 16.1*  --  23.9* 26.1*  --    APTT  --   --   --   --   --  48.6*  --   --          Lab 01/03/24  0516 01/02/24  0423 01/01/24  0201 12/31/23  0538 12/30/23 1944   SODIUM 138 136 139 143 140   POTASSIUM 4.9 3.9 4.2 5.1 3.9   CHLORIDE 108* 104 108* 112* 108*   CO2 18.0* 20.0* 20.0* 22.0 21.0*   ANION GAP 12.0 12.0 11.0 9.0 11.0   BUN 46* 46* 37* 41* 41*   CREATININE 1.95* 2.19* 1.82* 1.97* 2.05*   EGFR 25.3* 22.0* 27.5* 25.0* 23.8*   GLUCOSE 230* 144* 153* 127* 161*   CALCIUM 9.6 9.8 9.5 9.8 9.7   MAGNESIUM  --   --   --  2.2  --    HEMOGLOBIN A1C  --   --   --  5.10  --    TSH  --   --   --  0.258*  --          Lab 12/30/23 1944   TOTAL PROTEIN 6.2   ALBUMIN 3.8   GLOBULIN 2.4   ALT (SGPT) 12   AST (SGOT) 16   BILIRUBIN 0.3   ALK PHOS 154*         Lab 01/02/24  0423 01/01/24  0801 01/01/24  0420 01/01/24  0201 01/01/24  0001 12/31/23  0538 12/30/23 1945   HSTROP T  --   --  57* 58* 58*  --   --    PROTIME 15.2* 16.1*  --   --   --  23.9* 26.1*   INR 1.19* 1.27*  --   --   --  2.12* 2.38*             Lab 12/31/23 0538   IRON 31*   IRON SATURATION (TSAT) 13*   TIBC 247*   TRANSFERRIN 166*   FERRITIN 83.11   FOLATE 8.34   VITAMIN B 12 550   ABO TYPING O   RH TYPING Negative   ANTIBODY SCREEN Negative         Brief Urine Lab Results  (Last result in the past 365 days)        Color   Clarity   Blood   Leuk Est   Nitrite   Protein   CREAT   Urine HCG        12/31/23 0753             44.5         12/31/23 0753 Yellow   Clear   Negative   Negative   Negative   100 mg/dL (2+)                   Microbiology Results Abnormal       None            XR Hip With or Without Pelvis 2 - 3 View Left    Result Date: 1/2/2024  XR HIP W OR WO PELVIS 2-3 VIEW LEFT Date of Exam: 1/2/2024 6:28 PM EST Indication: postop Comparison: 12/30/2023 Findings: Status post left femoral neck ORIF. 3 Surgical pins are noted. The surgical hardware appears to be in adequate position without  evidence of loosening or dislocation. Overall alignment is maintained. Again a subtle fracture is noted through the left femoral neck. Mild degenerative changes of the right hip. No other acute osseous abnormality. Soft tissue swelling overlying the left hip consistent with recent surgery.     Impression: Impression: Status post left femoral neck ORIF. The hardware appears to be in adequate position Electronically Signed: Oleksandr Gonzalez DO  1/2/2024 6:52 PM EST  Workstation ID: [id number]    FL C Arm During Surgery    Result Date: 1/2/2024  This procedure was auto-finalized with no dictation required.     Results for orders placed during the hospital encounter of 12/30/23    Adult Transthoracic Echo Complete W/ Cont if Necessary Per Protocol    Interpretation Summary    Left ventricular ejection fraction appears to be 56 - 60%.    Left ventricular wall thickness is consistent with mild concentric hypertrophy    The aortic valve exhibits sclerosis.    Mild aortic valve regurgitation is present. No hemodynamically significant aortic valve stenosis is present.    Mild tricuspid valve regurgitation is present. Estimated right ventricular systolic pressure from tricuspid regurgitation is moderately elevated (45-55 mmHg    Moderate mitral annular calcification is present. Trace to mild mitral valve regurgitation is present. No significant mitral valve stenosis is present.      Current medications:  Scheduled Meds:acetaminophen, 1,000 mg, Oral, Q6H  atorvastatin, 80 mg, Oral, Daily  carvedilol, 25 mg, Oral, Q12H  cloNIDine, 0.1 mg, Oral, BID  hydrALAZINE, 75 mg, Oral, Q8H  insulin detemir, 5 Units, Subcutaneous, BID  insulin lispro, 2-7 Units, Subcutaneous, 4x Daily AC & at Bedtime  lactobacillus acidophilus, 1 capsule, Oral, Daily  Morphine, 15 mg, Oral, Q12H  pantoprazole, 40 mg, Oral, Daily  polyethylene glycol, 17 g, Oral, Daily  rivaroxaban, 15 mg, Oral, Daily  senna-docusate sodium, 2 tablet, Oral, BID  sodium  chloride, 10 mL, Intravenous, Q12H  sodium chloride, 3 mL, Intravenous, Q12H  terazosin, 5 mg, Oral, Nightly  torsemide, 20 mg, Oral, Daily      Continuous Infusions:ropivacaine,   sodium chloride, 100 mL/hr      PRN Meds:.  acetaminophen **OR** acetaminophen **OR** acetaminophen    senna-docusate sodium **AND** polyethylene glycol **AND** bisacodyl **AND** bisacodyl    bisacodyl    dextrose    dextrose    Diclofenac Sodium    docusate sodium    glucagon (human recombinant)    HYDROmorphone **AND** naloxone    ipratropium-albuterol    melatonin    ondansetron **OR** ondansetron    oxyCODONE    sodium chloride    sodium chloride    sodium chloride    sodium chloride    Assessment & Plan   Assessment & Plan     Active Hospital Problems    Diagnosis  POA    **Closed left hip fracture [S72.002A]  Yes    Anxiety associated with depression [F41.8]  Yes    Anemia, chronic disease [D63.8]  Yes    Paroxysmal atrial fibrillation [I48.0]  Yes    CKD (chronic kidney disease) stage 3, GFR 30-59 ml/min [N18.30]  Yes    Essential hypertension [I10]  Yes    Coronary artery disease involving native coronary artery of native heart without angina pectoris [I25.10]  Yes    Hyperlipidemia LDL goal <70 [E78.5]  Yes    COPD (chronic obstructive pulmonary disease) [J44.9]  Yes    GERD (gastroesophageal reflux disease) [K21.9]  Yes    Elevated serum creatinine [R79.89]  Yes    Takotsubo cardiomyopathy [I51.81]  Yes    Fibromyalgia [M79.7]  Yes    Type 2 diabetes mellitus [E11.9]  Yes      Resolved Hospital Problems   No resolved problems to display.        Brief Hospital Course to date:  Yanique Webster is a 83 y.o. female w PAF on xarelto, Takotsubo cardiomyopathy, CKDIV, DMII, COPD on r/a who presented with left hip fracture after a fall. S/p OR w screw fixation w Dr Harrington 1/2    Left femoral neck fracture 2/2 mechanical fall  -s/p screw fixation w Dr Harrington 1/2  -PT/OT  -pain and bowel regimen  -DVT ppx w xarelto    HTN emergency c/b  pulmonary edema - 12/31 PM  -improved w nitro gtt and resumption of home medications (minus lisinopril)  -BP currently well controlled    Paroxysmal Afib - continue xarelto as above, h/o mild CAD only unclear reason for additional aspirin so will hold for now given bleeding risk    DMII - on home glargine 24 units qhs. Started detemir 5 units BID w fair control    CKDIV - at baseline GFR 20's. Hold home lisinopril for now - f/u with nephrology OP  COPD - room air, no s/s of exacerbation  Chronic anemia  Chronic pain - home MS contin 15 mg BID  GERD - ppi  BMI 32  Anxiety/depression    Expected Discharge Location and Transportation: [facility]  Expected Discharge [facility] 1/5  Expected Discharge Date: 1/5/2024; Expected Discharge Time:      DVT prophylaxis:  Medical DVT prophylaxis orders are present.     AM-PAC 6 Clicks Score (PT): 12 (01/04/24 0232)    CODE STATUS:   Code Status and Medical Interventions:   Ordered at: 12/30/23 8124     Level Of Support Discussed With:    Patient     Code Status (Patient has no pulse and is not breathing):    CPR (Attempt to Resuscitate)     Medical Interventions (Patient has pulse or is breathing):    Full Support       Tiffanie Baron MD  01/04/24

## 2024-01-04 NOTE — PLAN OF CARE
Alert and oriented x 4. Pain controlled this shift with scheduled tylenol, does have complaints of pain with turning/repositioning. Bm today, patient has incontinence so uses purewick to void. Anticipate d/c to [facility] tomorrw.

## 2024-01-04 NOTE — PLAN OF CARE
Goal Outcome Evaluation:  Plan of Care Reviewed With: patient        Progress: improving  Outcome Evaluation: Patient able to increase her ambulaiton with mechanical gait aid. She continues to need signifiicant assistance to get out of bed. I contined to recommend IRF at discharge          Received clearance request from Dr. Barr. Information given to Natalia Sevilla MA.

## 2024-01-04 NOTE — THERAPY TREATMENT NOTE
Patient Name: Yanique Webster  : 1940    MRN: [id number]                              Today's Date: 2024       Admit Date: 2023    Visit Dx:     ICD-10-CM ICD-9-CM   1. Closed left hip fracture, initial encounter  S72.002A 820.8   2. Anemia, unspecified type  D64.9 285.9   3. Chronic kidney disease, unspecified CKD stage  N18.9 585.9     Patient Active Problem List   Diagnosis    Fibromyalgia    PVD (peripheral vascular disease)    Type 2 diabetes mellitus    Diabetic gastroparesis    Takotsubo cardiomyopathy    Elevated serum creatinine    Hyperlipidemia LDL goal <70    COPD (chronic obstructive pulmonary disease)    Obesity    Osteoarthritis    Chronic pain    GERD (gastroesophageal reflux disease)    Gout    Chronic joint pain    Coronary artery disease involving native coronary artery of native heart without angina pectoris    Nonrheumatic aortic valve insufficiency    Altered mental status    Essential hypertension    CKD (chronic kidney disease) stage 3, GFR 30-59 ml/min    Hypertensive emergency    Status post placement of implantable loop recorder    Paroxysmal atrial fibrillation    Acute exacerbation of COPD with asthma    Encounter for loop recorder at end of battery life    Closed left hip fracture    Anxiety associated with depression    Anemia, chronic disease     Past Medical History:   Diagnosis Date    Blurry vision     Chronic joint pain     Chronic pain     COPD (chronic obstructive pulmonary disease)     Coronary artery disease     Diabetic gastroparesis 2016    Esophageal stricture     s/p dilation in     GERD (gastroesophageal reflux disease)     Gout     Headache     secondary to hypertension    Hyperlipidemia     Hypertension     Long term current use of insulin     Neuropathy     Neuropathy due to type 2 diabetes mellitus     Obesity     Osteoarthritis     Pericarditis     Stroke 2019    Type 2 diabetes mellitus      Past Surgical History:   Procedure  Laterality Date    BRONCHOSCOPY N/A 8/23/2016    Procedure: BRONCHOSCOPY BIOPSY AT BEDSIDE;  Surgeon: Mookie Willard MD;  Location:  [facility];  Service:     CARDIAC CATHETERIZATION N/A 8/30/2016    Procedure: Left Heart Cath;  Surgeon: Steve Geronimo MD;  Location:  [facility];  Service:     CARDIAC CATHETERIZATION N/A 8/30/2016    Procedure: Right Heart Cath;  Surgeon: Steve Geronimo MD;  Location:  [facility];  Service:     CARDIAC ELECTROPHYSIOLOGY PROCEDURE N/A 7/2/2019    Procedure: Loop insertion;  Surgeon: Steve Geronimo MD;  Location:  [facility];  Service: Cardiovascular    CARDIAC ELECTROPHYSIOLOGY PROCEDURE N/A 9/26/2023    Procedure: Loop recorder removal;  Surgeon: Steve Geronimo MD;  Location:  [facility];  Service: Cardiovascular;  Laterality: N/A;    CATARACT EXTRACTION      ESOPHAGEAL DILATATION  2007    HERNIA REPAIR      HYSTERECTOMY  1998    WRIST FRACTURE SURGERY Left       General Information       Row Name 01/04/24 0904          Physical Therapy Time and Intention    Document Type therapy note (daily note)  -SC     Mode of Treatment physical therapy  -SC       Row Name 01/04/24 0904          General Information    Patient Profile Reviewed yes  -SC     Existing Precautions/Restrictions fall;other (see comments);brace worn when out of bed  nerve block  -SC       Row Name 01/04/24 0904          Cognition    Orientation Status (Cognition) oriented x 4  -SC       Row Name 01/04/24 0904          Safety Issues, Functional Mobility    Impairments Affecting Function (Mobility) balance;endurance/activity tolerance;pain;strength  -SC     Comment, Safety Issues/Impairments (Mobility) alert, following commands  -SC               User Key  (r) = Recorded By, (t) = Taken By, (c) = Cosigned By      Initials Name Provider Type    SC Shawna Leyva, PT Physical Therapist                   Mobility       Row Name 01/04/24 0904           Bed Mobility    Bed Mobility supine-sit;scooting/bridging  -SC     Scooting/Bridging [place] (Bed Mobility) moderate assist (50% patient effort);1 person assist;1 person to manage equipment  -SC     Supine-Sit [place] (Bed Mobility) maximum assist (25% patient effort);2 person assist;verbal cues  -SC     Assistive Device (Bed Mobility) bed rails;head of bed elevated;draw sheet  -SC     Comment, (Bed Mobility) cuesf or sequencing getting up to edge of bed. Patient with much difficulty moving legs and getting trunk upright  -SC       Row Name 01/04/24 0904          Transfers    Comment, (Transfers) Cues for hand placement on ARJO walker. On standing, time taken to work on activating upright posture. unable to sustaing full upright posture  -SC       Row Name 01/04/24 0904          Sit-Stand Transfer    Sit-Stand [place] (Transfers) 2 person assist;verbal cues;moderate assist (50% patient effort)  -SC     Assistive Device (Sit-Stand Transfers) other (see comments)  arjo  -SC     Comment, (Sit-Stand Transfer) cues for sequencing sitting and sliding out L leg  -SC       Row Name 01/04/24 0904          Gait/Stairs (Locomotion)    Assistive Device (Gait) other (see comments)  arjo  -SC     Distance in Feet (Gait) 18  -SC     Deviations/Abnormal Patterns (Gait) left sided deviations;antalgic;base of support, narrow;stride length decreased;weight shifting decreased  -SC     Bilateral Gait Deviations forward flexed posture  -SC     Left Sided Gait Deviations weight shift ability decreased;heel strike decreased  -SC     Comment, (Gait/Stairs) Gt training focused on achieving stepthrough gait pattern. Cues for upright posture and wider stance.  -SC               User Key  (r) = Recorded By, (t) = Taken By, (c) = Cosigned By      Initials Name Provider Type    SC Shawna Leyva PT Physical Therapist                   Obj/Interventions       Row Name 01/04/24 0908          Motor Skills    Therapeutic Exercise  ankle;knee;hip  -[facility] Name 01/04/24 0908          Hip (Therapeutic Exercise)    Hip (Therapeutic Exercise) AAROM (active assistive range of motion)  -SC     Hip Strengthening (Therapeutic Exercise) left;flexion;extension;aBduction;aDduction;supine;10 repetitions  -[facility] Name 01/04/24 0908          Knee (Therapeutic Exercise)    Knee (Therapeutic Exercise) AAROM (active assistive range of motion)  -SC     Knee AAROM (Therapeutic Exercise) left;flexion;extension;sitting;10 repetitions  -SC     Knee Isometrics (Therapeutic Exercise) bilateral;quad sets;10 repetitions  -[facility] Name 01/04/24 0908          Ankle (Therapeutic Exercise)    Ankle (Therapeutic Exercise) AROM (active range of motion)  -SC     Ankle AROM (Therapeutic Exercise) bilateral;dorsiflexion;plantarflexion;20 repititions  -[facility] Name 01/04/24 0908          Balance    Dynamic Standing Balance 2-person assist  -SC     Position/Device Used, Standing Balance supported;other (see comments)  ina alonzo  -SC     Comment, Balance small base of support, unsteady  -SC               User Key  (r) = Recorded By, (t) = Taken By, (c) = Cosigned By      Initials Name Provider Type    SC Shawna Leyva, PT Physical Therapist                   Goals/Plan    No documentation.                  Clinical Impression       [facility] Name 01/04/24 0909          Pain    Pain Intervention(s) Repositioned;Cold applied  -SC     Additional Documentation Pain Scale: FACES Pre/Post-Treatment (Group)  -SC       Row Name 01/04/24 0909          Pain Scale: FACES Pre/Post-Treatment    Pain: FACES Scale, Pretreatment 4-->hurts little more  -SC     Posttreatment Pain Rating 8-->hurts whole lot  -SC     Pain Location - Side/Orientation Left  -SC     Pain Location - hip  -[facility] Name 01/04/24 0909          Plan of Care Review    Plan of Care Reviewed With patient  -SC     Progress improving  -SC     Outcome Evaluation Patient able to increase her ambulaiton with  mechanical gait aid. She continues to need signifiicant assistance to get out of bed. I contined to recommend IRF at discharge  -SC       Row Name 01/04/24 0909          Therapy Assessment/Plan (PT)    Patient/Family Therapy Goals Statement (PT) rehab  -SC     Rehab Potential (PT) good, to achieve stated therapy goals  -SC     Criteria for Skilled Interventions Met (PT) yes;meets criteria;skilled treatment is necessary  -SC     Therapy Frequency (PT) daily  -SC       Row Name 01/04/24 0909          Positioning and Restraints    Pre-Treatment Position in bed  -SC     Post Treatment Position chair  -SC     In Chair notified nsg;reclined;sitting;call light within reach;encouraged to call for assist;exit alarm on  -SC               User Key  (r) = Recorded By, (t) = Taken By, (c) = Cosigned By      Initials Name Provider Type    Shawna Alfred PT Physical Therapist                   Outcome Measures       Row Name 01/04/24 0911          How much help from another person do you currently need...    Turning from your back to your side while in flat bed without using bedrails? 3  -SC     Moving from lying on back to sitting on the side of a flat bed without bedrails? 2  -SC     Moving to and from a bed to a chair (including a wheelchair)? 2  -SC     Standing up from a chair using your arms (e.g., wheelchair, bedside chair)? 2  -SC     Climbing 3-5 steps with a railing? 1  -SC     To walk in hospital room? 2  -SC     AM-PAC 6 Clicks Score (PT) 12  -SC     Highest Level of Mobility Goal 4 --> Transfer to chair/commode  -SC       Row Name 01/04/24 0911          Functional Assessment    Outcome Measure Options AM-PAC 6 Clicks Basic Mobility (PT)  -SC               User Key  (r) = Recorded By, (t) = Taken By, (c) = Cosigned By      Initials Name Provider Type    Shawna Alfred PT Physical Therapist                                 Physical Therapy Education       Title: PT OT SLP Therapies (In Progress)       Topic:  Physical Therapy (Done)       Point: Mobility training (Done)       Learning Progress Summary             Patient Eager, E, VU by SC at 1/4/2024 0911    Comment: reviewed HEP    Acceptance, E, VU,NR by  at 1/3/2024 1103    Comment: Educated on safety w/ mobility, use of AD, benefit of OOB mobility, PT POC, and d/c planning                         Point: Home exercise program (Done)       Learning Progress Summary             Patient Eager, E, VU by SC at 1/4/2024 0911    Comment: reviewed HEP                         Point: Body mechanics (Done)       Learning Progress Summary             Patient Eager, E, VU by SC at 1/4/2024 0911    Comment: reviewed HEP    Acceptance, E, VU,NR by  at 1/3/2024 1103    Comment: Educated on safety w/ mobility, use of AD, benefit of OOB mobility, PT POC, and d/c planning                         Point: Precautions (Done)       Learning Progress Summary             Patient Eager, E, VU by SC at 1/4/2024 0911    Comment: reviewed HEP    Acceptance, E, VU,NR by  at 1/3/2024 1103    Comment: Educated on safety w/ mobility, use of AD, benefit of OOB mobility, PT POC, and d/c planning                                         User Key       Initials Effective Dates Name Provider Type Discipline    SC 02/03/23 -  Shawna Leyva, PT Physical Therapist PT     09/21/23 -  Sonia Greenwood PT Physical Therapist PT                  PT Recommendation and Plan     Plan of Care Reviewed With: patient  Progress: improving  Outcome Evaluation: Patient able to increase her ambulaiton with mechanical gait aid. She continues to need signifiicant assistance to get out of bed. I contined to recommend IRF at discharge     Time Calculation:         PT Charges       Row Name 01/04/24 0837             Time Calculation    Start Time 0837  -SC      PT Received On 01/04/24  -SC      PT Goal Re-Cert Due Date 01/13/24  -SC         Timed Charges    44645 - PT Therapeutic Exercise Minutes 7  -SC      67904 -  Gait Training Minutes  10  -SC      12237 - PT Therapeutic Activity Minutes 10  -SC         Total Minutes    Timed Charges Total Minutes 27  -SC       Total Minutes 27  -SC                User Key  (r) = Recorded By, (t) = Taken By, (c) = Cosigned By      Initials Name Provider Type    SC Shawna Leyva PT Physical Therapist                  Therapy Charges for Today       Code Description Service Date Service Provider Modifiers Qty    78306637469  GAIT TRAINING EA 15 MIN 1/4/2024 Shawna Leyva, PT GP 1    98425009437 HC PT THERAPEUTIC ACT EA 15 MIN 1/4/2024 Shawna Leyva, PT GP 1    [id number]  PT THER SUPP EA 15 MIN 1/4/2024 Shawna Leyva, PT GP 2            PT G-Codes  Outcome Measure Options: AM-PAC 6 Clicks Basic Mobility (PT)  AM-PAC 6 Clicks Score (PT): 12  AM-PAC 6 Clicks Score (OT): 15       Shawna Leyva, PT  1/4/2024

## 2024-01-04 NOTE — PLAN OF CARE
Skin care provided at each incontinent episode. A&Ox4 with forgetfulness. Patient repeatedly asks the same questions. SBP/BG level has been  elevated. On RA. SR on cardiac mx. Frequent T&R required to be in comfortable position. Denies pain/discomfort at this time. Border dressing CDI. Call light in reach.

## 2024-01-04 NOTE — CASE MANAGEMENT/SOCIAL WORK
Continued Stay Note  [facility]     Patient Name: Yanique Webster  MRN: [id number]  Today's Date: 1/4/2024    Admit Date: 12/30/2023    Plan: Rehab at DC   Discharge Plan       Row Name 01/04/24 1132       Plan    Plan Comments Pt has been accepted to [facility] pending medical readiness. [facility] has been arranged for 1/5 at 1200; PCS has been electronically sent                   Discharge Codes    No documentation.                 Expected Discharge Date and Time       Expected Discharge Date Expected Discharge Time    Jan 6, 2024               Karla Cooney RN

## 2024-01-04 NOTE — PROGRESS NOTES
[place]    Acute pain service Inpatient Progress Note    Patient Name: Yanique Webster  :  1940  MRN:  [id number]        Acute Pain  Service Inpatient Progress Note:    Analgesia:Poor  Pain Score:8/10  LOC: alert and awake  Resp Status: room air  Cardiac: VS stable  Side Effects:None  Catheter Site:clean, dressing intact and dry  Cath type: peripheral nerve cath with ON Q  Infusion rate: FICB: Basal: 1ml/hr, PIB: 10ml q 2h, PCA: 10ml q 30 minutes (1mL,10ml, 10ml InfuSystem Pump)  Dosing/Volume: ropivacaine 0.2%  Catheter Plan:Catheter to remain Insitu and Continue catheter infusion rate unchanged  Comments:    10 cc bolus through nerve catheter given

## 2024-01-05 VITALS
RESPIRATION RATE: 16 BRPM | HEART RATE: 65 BPM | OXYGEN SATURATION: 96 % | BODY MASS INDEX: 31.84 KG/M2 | DIASTOLIC BLOOD PRESSURE: 60 MMHG | WEIGHT: 186.5 LBS | SYSTOLIC BLOOD PRESSURE: 142 MMHG | HEIGHT: 64 IN | TEMPERATURE: 97.9 F

## 2024-01-05 LAB
ANION GAP SERPL CALCULATED.3IONS-SCNC: 12 MMOL/L (ref 5–15)
BUN SERPL-MCNC: 49 MG/DL (ref 8–23)
BUN/CREAT SERPL: 22.9 (ref 7–25)
CALCIUM SPEC-SCNC: 9.9 MG/DL (ref 8.6–10.5)
CHLORIDE SERPL-SCNC: 106 MMOL/L (ref 98–107)
CO2 SERPL-SCNC: 16 MMOL/L (ref 22–29)
CREAT SERPL-MCNC: 2.14 MG/DL (ref 0.57–1)
DEPRECATED RDW RBC AUTO: 51.3 FL (ref 37–54)
EGFRCR SERPLBLD CKD-EPI 2021: 22.6 ML/MIN/1.73
ERYTHROCYTE [DISTWIDTH] IN BLOOD BY AUTOMATED COUNT: 15.5 % (ref 12.3–15.4)
GLUCOSE BLDC GLUCOMTR-MCNC: 150 MG/DL (ref 70–130)
GLUCOSE BLDC GLUCOMTR-MCNC: 159 MG/DL (ref 70–130)
GLUCOSE SERPL-MCNC: 147 MG/DL (ref 65–99)
HCT VFR BLD AUTO: 28.4 % (ref 34–46.6)
HGB BLD-MCNC: 8.5 G/DL (ref 12–15.9)
MCH RBC QN AUTO: 27.3 PG (ref 26.6–33)
MCHC RBC AUTO-ENTMCNC: 29.9 G/DL (ref 31.5–35.7)
MCV RBC AUTO: 91.3 FL (ref 79–97)
PLATELET # BLD AUTO: 183 10*3/MM3 (ref 140–450)
PMV BLD AUTO: 11.2 FL (ref 6–12)
POTASSIUM SERPL-SCNC: 5.1 MMOL/L (ref 3.5–5.2)
RBC # BLD AUTO: 3.11 10*6/MM3 (ref 3.77–5.28)
SODIUM SERPL-SCNC: 134 MMOL/L (ref 136–145)
WBC NRBC COR # BLD AUTO: 7.06 10*3/MM3 (ref 3.4–10.8)

## 2024-01-05 PROCEDURE — 63710000001 INSULIN LISPRO (HUMAN) PER 5 UNITS: Performed by: ORTHOPAEDIC SURGERY

## 2024-01-05 PROCEDURE — 99024 POSTOP FOLLOW-UP VISIT: CPT | Performed by: ORTHOPAEDIC SURGERY

## 2024-01-05 PROCEDURE — 80048 BASIC METABOLIC PNL TOTAL CA: CPT | Performed by: INTERNAL MEDICINE

## 2024-01-05 PROCEDURE — 63710000001 INSULIN DETEMIR PER 5 UNITS: Performed by: INTERNAL MEDICINE

## 2024-01-05 PROCEDURE — 85027 COMPLETE CBC AUTOMATED: CPT | Performed by: INTERNAL MEDICINE

## 2024-01-05 PROCEDURE — 82948 REAGENT STRIP/BLOOD GLUCOSE: CPT

## 2024-01-05 PROCEDURE — 99238 HOSP IP/OBS DSCHRG MGMT 30/<: CPT | Performed by: INTERNAL MEDICINE

## 2024-01-05 RX ORDER — HYDRALAZINE HYDROCHLORIDE 25 MG/1
75 TABLET, FILM COATED ORAL EVERY 8 HOURS SCHEDULED
Start: 2024-01-05

## 2024-01-05 RX ORDER — ACETAMINOPHEN 500 MG
1000 TABLET ORAL EVERY 6 HOURS
Start: 2024-01-05

## 2024-01-05 RX ORDER — OXYCODONE HYDROCHLORIDE 5 MG/1
5 TABLET ORAL EVERY 8 HOURS PRN
Qty: 12 TABLET | Refills: 0 | Status: SHIPPED | OUTPATIENT
Start: 2024-01-05 | End: 2024-01-09

## 2024-01-05 RX ADMIN — Medication 1 CAPSULE: at 08:13

## 2024-01-05 RX ADMIN — TORSEMIDE 20 MG: 20 TABLET ORAL at 08:13

## 2024-01-05 RX ADMIN — MORPHINE SULFATE 15 MG: 15 TABLET, FILM COATED, EXTENDED RELEASE ORAL at 08:27

## 2024-01-05 RX ADMIN — Medication 10 ML: at 08:14

## 2024-01-05 RX ADMIN — HYDRALAZINE HYDROCHLORIDE 75 MG: 50 TABLET ORAL at 04:58

## 2024-01-05 RX ADMIN — INSULIN DETEMIR 5 UNITS: 100 INJECTION, SOLUTION SUBCUTANEOUS at 08:12

## 2024-01-05 RX ADMIN — ATORVASTATIN CALCIUM 80 MG: 40 TABLET, FILM COATED ORAL at 08:13

## 2024-01-05 RX ADMIN — ACETAMINOPHEN 1000 MG: 500 TABLET ORAL at 08:13

## 2024-01-05 RX ADMIN — PANTOPRAZOLE SODIUM 40 MG: 40 TABLET, DELAYED RELEASE ORAL at 08:13

## 2024-01-05 RX ADMIN — CARVEDILOL 25 MG: 12.5 TABLET, FILM COATED ORAL at 08:13

## 2024-01-05 RX ADMIN — INSULIN LISPRO 2 UNITS: 100 INJECTION, SOLUTION INTRAVENOUS; SUBCUTANEOUS at 08:11

## 2024-01-05 RX ADMIN — RIVAROXABAN 15 MG: 15 TABLET, FILM COATED ORAL at 08:13

## 2024-01-05 NOTE — CASE MANAGEMENT/SOCIAL WORK
Case Management Discharge Note      Final Note: Pt is scheduled to discharge today to [facility] for Acute rehab.  ambulance is transporting, by stretcher, at 1200. PCS is in EPIC. RN call report to [phone]. RN fax D/C summary to [phone].         Selected Continued Care - Admitted Since 12/30/2023       Destination Coordination complete.      Service Provider Selected Services Address Phone Fax Patient Preferred    [facility] [address] [facility] [address] [facility] [address] [phone] --       Internal Comment last updated by Romy Crockett, RN 1/5/2024 [address] [place] [address]                         Durable Medical Equipment    No services have been selected for the patient.                Dialysis/Infusion    No services have been selected for the patient.                Home Medical Care    No services have been selected for the patient.                Therapy    No services have been selected for the patient.                Community Resources    No services have been selected for the patient.                Community & DME    No services have been selected for the patient.                    Transportation Services  Ambulance: [facility] (1200 on 1/5/24)    Final Discharge Disposition Code: 62 - inpatient rehab facility

## 2024-01-05 NOTE — PLAN OF CARE
SBP/BG level remain elevated. Patient cont to deny pain/discomfort. Border dressing CDI. Scheduled Tylenol omitted due to cumulative over dose at HS. UOP ADQ. SR on cardiac mx.. Call light in reach.

## 2024-01-05 NOTE — PROGRESS NOTES
[facility]    Acute pain service Inpatient Progress Note    Patient Name: Yanique Webstre  :  1940  MRN:  [id number]        Acute Pain  Service Inpatient Progress Note:    Analgesia:Good  Pain Score:4/10  LOC: alert and awake  Resp Status: room air  Cardiac: VS stable  Side Effects:None  Catheter Site:clean, dressing intact and dry  Cath type: peripheral nerve cath with ON Q  Volume: 1mL,10ml, 10ml InfuSystem Pump.  Catheter Plan:RN to remove catheter  Comments:    Pump infusion complete. May remove nerve block catheter.

## 2024-01-05 NOTE — PROGRESS NOTES
3 Days Post-Op status post:  Procedure(s):  HIP CANNULATED SCREW PLACEMENT    Subjective:  No acute events.  States pain well-controlled.  States has some discomfort with physical therapy but is improving.  States took 18 steps yesterday and I encouraged her to try to do more today.  Patient is happy with progress.  No new complaints.  Denies chest pain or shortness of breath.  States previous knee pain and ankle pain continue to improve and did not bother her with ambulation.    PHYSICAL THERAPY PROGRESS  Outcome Evaluation: Patient able to increase her ambulaiton with mechanical gait aid. She continues to need signifiicant assistance to get out of bed. I contined to recommend IRF at discharge (01/04/24 0909)       Medications/Allergies:  Scheduled Meds:acetaminophen, 1,000 mg, Oral, Q6H  atorvastatin, 80 mg, Oral, Daily  carvedilol, 25 mg, Oral, Q12H  cloNIDine, 0.1 mg, Oral, BID  hydrALAZINE, 75 mg, Oral, Q8H  insulin detemir, 5 Units, Subcutaneous, BID  insulin lispro, 2-7 Units, Subcutaneous, 4x Daily AC & at Bedtime  lactobacillus acidophilus, 1 capsule, Oral, Daily  Morphine, 15 mg, Oral, Q12H  pantoprazole, 40 mg, Oral, Daily  polyethylene glycol, 17 g, Oral, Daily  rivaroxaban, 15 mg, Oral, Daily  senna-docusate sodium, 2 tablet, Oral, BID  sodium chloride, 10 mL, Intravenous, Q12H  sodium chloride, 3 mL, Intravenous, Q12H  terazosin, 5 mg, Oral, Nightly  torsemide, 20 mg, Oral, Daily      Continuous Infusions:ropivacaine,   sodium chloride, 100 mL/hr      PRN Meds:.  acetaminophen **OR** acetaminophen **OR** acetaminophen    senna-docusate sodium **AND** polyethylene glycol **AND** bisacodyl **AND** bisacodyl    bisacodyl    dextrose    dextrose    Diclofenac Sodium    docusate sodium    glucagon (human recombinant)    HYDROmorphone **AND** naloxone    ipratropium-albuterol    melatonin    ondansetron **OR** ondansetron    oxyCODONE    sodium chloride    sodium chloride    sodium chloride     sodium chloride  Penicillins, Nickel, and Penicillins      Objective:  Vital Signs   Temp:  [98.4 °F (36.9 °C)-98.7 °F (37.1 °C)] 98.4 °F (36.9 °C)  Heart Rate:  [70-83] 70  Resp:  [16-18] 16  BP: (139-180)/(58-75) 139/58  Body mass index is 32.01 kg/m².    Results Review:  I reviewed the patient's new clinical results.    Results from last 7 days   Lab Units 01/05/24  0434   WBC 10*3/mm3 7.06   HEMOGLOBIN g/dL 8.5*   HEMATOCRIT % 28.4*   PLATELETS 10*3/mm3 183     Results from last 7 days   Lab Units 01/05/24  0434   SODIUM mmol/L 134*   POTASSIUM mmol/L 5.1   CHLORIDE mmol/L 106   CO2 mmol/L 16.0*   BUN mg/dL 49*   CREATININE mg/dL 2.14*   GLUCOSE mg/dL 147*   CALCIUM mg/dL 9.9     Results from last 7 days   Lab Units 01/02/24  0423   INR  1.19*     Results from last 7 days   Lab Units 01/05/24  0434 12/31/23  0538 12/30/23  1944   SODIUM mmol/L 134*   < > 140   POTASSIUM mmol/L 5.1   < > 3.9   CHLORIDE mmol/L 106   < > 108*   CO2 mmol/L 16.0*   < > 21.0*   BUN mg/dL 49*   < > 41*   CREATININE mg/dL 2.14*   < > 2.05*   CALCIUM mg/dL 9.9   < > 9.7   ALK PHOS U/L  --   --  154*   ALT (SGPT) U/L  --   --  12   AST (SGOT) U/L  --   --  16   GLUCOSE mg/dL 147*   < > 161*    < > = values in this interval not displayed.         Physical Exam:  left LE: small area of spotting on left hip dressing about 3 mm in diameter, stable from previous  compartments soft/compressible thigh and leg              +EHL/FHL/GSC/TA              SILT DP/SP/SN/Saph/Tib, slightly decreased at baseline due to neuropathy              CR brisk in all toes        Closed left hip fracture    Fibromyalgia    Type 2 diabetes mellitus    Takotsubo cardiomyopathy    Elevated serum creatinine    Hyperlipidemia LDL goal <70    COPD (chronic obstructive pulmonary disease)    GERD (gastroesophageal reflux disease)    Coronary artery disease involving native coronary artery of native heart without angina pectoris    Essential hypertension    CKD (chronic  kidney disease) stage 3, GFR 30-59 ml/min    Paroxysmal atrial fibrillation    Anxiety associated with depression    Anemia, chronic disease      Assessment/Plan:  3 Days Post-Op status post:  Procedure(s):  HIP CANNULATED SCREW PLACEMENT    -Weight bearing as tolerated with assistance and support devices (walker, wheelchair) as needed  -Diet per primary  -Continue dressing, dressing CDI, can be removed and changed as needed if dressing becomes saturated  -PT/OT Consult - Weight bearing as tolerated  -Ice hip  -DVT Prophylaxis, xarelto  -Osteoporotic hip fracture - I recommend informing the patient's PCP regarding osteoporotic fracture/consideration of enrolling patient in osteoporotic care program postoperatively  -Okay for discharge from orthopedic standpoint  -Follow-up, upon discharge, patient will need follow-up with me in the clinic in 2-3 weeks' time.  Continue Xarelto for DVT prophylaxis.  Continue covaderm or dry dressing every other day.  Keep incision covered at all times.  DC staples on or around 2-3 weeks. The  will need to call my office/[facility] to arrange for a discharge follow-up appointment in 2-3 weeks. I discussed the patient's findings and my recommendations with patient.    Future Appointments   Date Time Provider Department Center   1/5/2024 12:00 PM MED 7  [facility]   3/25/2024  8:45 AM Steve Geronimo MD [facility]   4/4/2024 11:45 AM Emilio Regalado MD [facility]       Please have the schedulers call our office at [phone] for the clinic appointment as needed. Please call with questions or concerns.      Seymour Harrington MD  01/05/24  08:02 EST

## 2024-01-05 NOTE — DISCHARGE SUMMARY
[facility] Medicine Services  DISCHARGE SUMMARY    Patient Name: Yanique Webster  : 1940  MRN: [id number]    Date of Admission: 2023  7:25 PM  Date of Discharge:  2023  Primary Care Physician: Sebas Alicea MD    Consults       Date and Time Order Name Status Description    2024  8:07 AM Inpatient Cardiology Consult Completed     2023 11:51 PM Inpatient Orthopedic Surgery Consult Completed             Hospital Course     Presenting Problem: closed left hip fracture    Active Hospital Problems    Diagnosis  POA    **Closed left hip fracture [S72.002A]  Yes    Anxiety associated with depression [F41.8]  Yes    Anemia, chronic disease [D63.8]  Yes    Paroxysmal atrial fibrillation [I48.0]  Yes    CKD (chronic kidney disease) stage 3, GFR 30-59 ml/min [N18.30]  Yes    Essential hypertension [I10]  Yes    Coronary artery disease involving native coronary artery of native heart without angina pectoris [I25.10]  Yes    Hyperlipidemia LDL goal <70 [E78.5]  Yes    COPD (chronic obstructive pulmonary disease) [J44.9]  Yes    GERD (gastroesophageal reflux disease) [K21.9]  Yes    Elevated serum creatinine [R79.89]  Yes    Takotsubo cardiomyopathy [I51.81]  Yes    Fibromyalgia [M79.7]  Yes    Type 2 diabetes mellitus [E11.9]  Yes      Resolved Hospital Problems   No resolved problems to display.          Hospital Course:  Yanique Webster is a 83 y.o. female w PAF on xarelto, Takotsubo cardiomyopathy, CKDIV, DMII, COPD on r/a who presented with left hip fracture after a fall. S/p OR w screw fixation w Dr Harrington . Patient to continue home xarelto for DVT ppx. Reviewed chart - indication for aspirin appears only minimal CAD. Will d/c per AFIRE trial and given bleeding risk post-op + CKDIV. Xarelto only for now    Stay c/b HTN emergency c/b pulmonary edema requiring nitro gtt pre-operatively.   This resolved with resumption of home medications. Given patient's  borderline GFR in mid 20's, lisinopril was held and home hydralazine was increased with good blood pressure control from there on.    Patient discharged to inpatient rehab 1/5    Discharge Follow Up Recommendations for outpatient labs/diagnostics:   F/u PCP 1 week from discharge: reassess CKDIV and lisinopril plan   F/u 2-3 weeks w Dr Harrington    Day of Discharge     HPI:   Patient feeling well today - used no oxycodone yesterday with good pain control and progress with therapy services      Vital Signs:   Temp:  [98.4 °F (36.9 °C)-98.7 °F (37.1 °C)] 98.4 °F (36.9 °C)  Heart Rate:  [70-83] 70  Resp:  [16-18] 16  BP: (139-180)/(58-75) 139/58      Physical Exam:  Constitutional: No acute distress, awake, alert  HENT: NCAT, mucous membranes moist  Respiratory: Clear to auscultation bilaterally, respiratory effort normal   Cardiovascular: RRR, no murmurs, rubs, or gallops  Gastrointestinal: Soft, nontender, nondistended  Musculoskeletal: Left hip sight examined w expected moderate swelling. Muscle tone within normal limits, no joint effusions appreciated  Psychiatric: Appropriate affect, cooperative  Neurologic: Alert and oriented, facial movements symmetric and spontaneous movement of all 4 extremities grossly equal bilaterally, speech clear  Skin: No rashes    Pertinent  and/or Most Recent Results     LAB RESULTS:      Lab 01/05/24  0434 01/04/24  0621 01/03/24  0516 01/02/24  0423 01/01/24  0801 01/01/24  0201 12/31/23  0538 12/30/23  1945 12/30/23  1944   WBC 7.06 7.99 6.76 7.48  --  6.95 6.87  --  8.82   HEMOGLOBIN 8.5* 8.3* 9.0* 8.4*  --  8.4* 9.2*  --  9.2*   HEMATOCRIT 28.4* 26.5* 28.3* 26.0*  --  26.3* 29.5*  28.4*  --  29.1*   PLATELETS 183 158 144 148  --  145 160  --  160   NEUTROS ABS  --   --  5.78  --   --  4.86 4.40  --  7.00   IMMATURE GRANS (ABS)  --   --  0.02  --   --  0.04 0.02  --  0.03   LYMPHS ABS  --   --  0.52*  --   --  1.05 1.29  --  0.97   MONOS ABS  --   --  0.40  --   --  0.53 0.68  --  0.66    EOS ABS  --   --  0.01  --   --  0.43* 0.44*  --  0.14   MCV 91.3 87.2 85.8 85.0  --  85.9 89.4  --  89.3   PROTIME  --   --   --  15.2* 16.1*  --  23.9* 26.1*  --    APTT  --   --   --   --   --   --  48.6*  --   --          Lab 01/05/24  0434 01/03/24  0516 01/02/24  0423 01/01/24  0201 12/31/23  0538   SODIUM 134* 138 136 139 143   POTASSIUM 5.1 4.9 3.9 4.2 5.1   CHLORIDE 106 108* 104 108* 112*   CO2 16.0* 18.0* 20.0* 20.0* 22.0   ANION GAP 12.0 12.0 12.0 11.0 9.0   BUN 49* 46* 46* 37* 41*   CREATININE 2.14* 1.95* 2.19* 1.82* 1.97*   EGFR 22.6* 25.3* 22.0* 27.5* 25.0*   GLUCOSE 147* 230* 144* 153* 127*   CALCIUM 9.9 9.6 9.8 9.5 9.8   MAGNESIUM  --   --   --   --  2.2   HEMOGLOBIN A1C  --   --   --   --  5.10   TSH  --   --   --   --  0.258*         Lab 12/30/23 1944   TOTAL PROTEIN 6.2   ALBUMIN 3.8   GLOBULIN 2.4   ALT (SGPT) 12   AST (SGOT) 16   BILIRUBIN 0.3   ALK PHOS 154*         Lab 01/02/24  0423 01/01/24  0801 01/01/24  0420 01/01/24  0201 01/01/24  0001 12/31/23  0538 12/30/23 1945   HSTROP T  --   --  57* 58* 58*  --   --    PROTIME 15.2* 16.1*  --   --   --  23.9* 26.1*   INR 1.19* 1.27*  --   --   --  2.12* 2.38*             Lab 12/31/23  0538   IRON 31*   IRON SATURATION (TSAT) 13*   TIBC 247*   TRANSFERRIN 166*   FERRITIN 83.11   FOLATE 8.34   VITAMIN B 12 550   ABO TYPING O   RH TYPING Negative   ANTIBODY SCREEN Negative         Brief Urine Lab Results  (Last result in the past 365 days)        Color   Clarity   Blood   Leuk Est   Nitrite   Protein   CREAT   Urine HCG        12/31/23 0753             44.5         12/31/23 0753 Yellow   Clear   Negative   Negative   Negative   100 mg/dL (2+)                 Microbiology Results (last 10 days)       ** No results found for the last 240 hours. **            XR Hip With or Without Pelvis 2 - 3 View Left    Result Date: 1/2/2024  XR HIP W OR WO PELVIS 2-3 VIEW LEFT Date of Exam: 1/2/2024 6:28 PM EST Indication: postop Comparison: 12/30/2023  Findings: Status post left femoral neck ORIF. 3 Surgical pins are noted. The surgical hardware appears to be in adequate position without evidence of loosening or dislocation. Overall alignment is maintained. Again a subtle fracture is noted through the left femoral neck. Mild degenerative changes of the right hip. No other acute osseous abnormality. Soft tissue swelling overlying the left hip consistent with recent surgery.     Impression: Status post left femoral neck ORIF. The hardware appears to be in adequate position Electronically Signed: Oleksandr Gonzalez DO  1/2/2024 6:52 PM EST  Workstation ID: [id number]    FL C Arm During Surgery    Result Date: 1/2/2024  This procedure was auto-finalized with no dictation required.    Peripheral Block    Result Date: 1/2/2024  Enrique La CRNA     1/2/2024  5:27 PM Peripheral Block Pre-sedation assessment completed: 1/2/2024 1:22 PM Patient reassessed immediately prior to procedure Start time: 1/2/2024 1:22 PM Reason for block: at surgeon's request and post-op pain management Performed by CRNA/CAA: Enrique La CRNA Assisted by: Raiza Nascimento RN Preanesthetic Checklist Completed: patient identified, IV checked, site marked, risks and benefits discussed, surgical consent, monitors and equipment checked, pre-op evaluation and timeout performed Prep: Pt Position: supine Sterile barriers:cap, gloves, mask and washed/disinfected hands Prep: ChloraPrep Patient monitoring: blood pressure monitoring, continuous pulse oximetry and EKG Procedure Performed under: local infiltration Guidance:ultrasound guided ULTRASOUND INTERPRETATION.  Using ultrasound guidance a 20 G gauge needle was placed in close proximity to the nerve, at which point, under ultrasound guidance anesthetic was injected in the area of the nerve and spread of the anesthesia was seen on ultrasound in close proximity thereto.  There were no abnormalities seen on ultrasound; a digital image was taken; and  "the patient tolerated the procedure with no complications. Images:still images obtained, printed/placed on chart Laterality:left Block Type:fascia iliaca compartment Injection Technique:catheter Needle Type:echogenic and Tuohy Needle Gauge:18 G Resistance on Injection: none Catheter Size:20 G (20g) Medications Used: ropivacaine (NAROPIN) 0.5 % injection - Injection  25 mL - 1/2/2024 1:22:00 PM Medications Preservative Free Saline:20ml Post Assessment Injection Assessment: negative aspiration for heme, no paresthesia on injection and incremental injection Patient Tolerance:comfortable throughout block Complications:no Additional Notes CKAFASCIAILIACA: CATHETER A high-frequency linear transducer, with sterile cover, was placed in parasagittal plane on top of the Anterior Superior Iliac Spine (ASIS) and moved medially to identify the Internal Oblique muscle, Sartorius muscle, Iliacus Muscle, Fascia Iliaca (FI) and Fascia Latae. The insertion site was prepped and draped in sterile fashion. Skin and cutaneous tissue was infiltrated with 2-5 ml of 1% Lidocaine. Using ultrasound-guidance, an 18-gauge Contiplex Ultra 360 Touhy needle was advanced in plane from caudad to cephalad. Preservative-free normal saline was utilized for hydro-dissection of tissue, advancement of Touhy, and to confirm final needle placement below FI. Local anesthetic in incremental 3-5 ml injections. Aspiration every 5 ml to prevent intravascular injection. Injection was completed with negative aspiration of blood and negative intravascular injection. Injection pressures were normal with minimal resistance. A 20-gauge Contiplex Echo catheter was placed through the needle and advance out the tip of the Touhy 3-5 cm. The Touhy needle was then removed, and final catheter position verified below the FI. The catheter was secured in the usual fashion with skin glue, benzoin, steri-strips, CHG tegaderm and Label noting \"Nerve Block Catheter\". Jerk tape " applied at yellow connector and catheter connection.     XR Chest 1 View    Result Date: 1/2/2024  XR CHEST 1 VW Date of Exam: 1/2/2024 4:20 AM EST Indication: f/u edema Comparison: 12/31/2023 Findings: Stable cardiomegaly. Lungs without consolidation. No overt pulmonary edema. Dense aortic arch atherosclerotic calcifications. Negative for pleural effusion or pneumothorax.     Impression: 1. Stable cardiomegaly. 2. No acute process. Electronically Signed: Gerard Rm MD  1/2/2024 7:27 AM EST  Workstation ID: [id number]    Adult Transthoracic Echo Complete W/ Cont if Necessary Per Protocol    Result Date: 1/1/2024    Left ventricular ejection fraction appears to be 56 - 60%.   Left ventricular wall thickness is consistent with mild concentric hypertrophy   The aortic valve exhibits sclerosis.   Mild aortic valve regurgitation is present. No hemodynamically significant aortic valve stenosis is present.   Mild tricuspid valve regurgitation is present. Estimated right ventricular systolic pressure from tricuspid regurgitation is moderately elevated (45-55 mmHg   Moderate mitral annular calcification is present. Trace to mild mitral valve regurgitation is present. No significant mitral valve stenosis is present.     XR Chest 1 View    Result Date: 12/31/2023  XR CHEST 1 VW Date of Exam: 12/31/2023 10:30 PM EST Indication: Difficulty breathing/SOA Comparison: 12/30/2023. Findings: Cardiac silhouette is unchanged. Pulmonary vasculature is congested without overt pulmonary edema. Lungs are clear. No pneumothorax or pleural effusion. No acute osseous abnormality. Stable mild aortic arch calcification.     Impression: Pulmonary vascular congestion without overt pulmonary edema. Electronically Signed: Adrian Wolfe MD  12/31/2023 10:50 PM EST  Workstation ID: [id number]    XR Chest 1 View    Result Date: 12/30/2023  XR CHEST 1 VW Date of Exam: 12/30/2023 10:31 PM EST Indication: CHF Comparison: 2/13/2023. Findings: The cardiac  silhouette appears enlarged. There is pulmonary vascular congestion without overt pulmonary edema. No focal lung consolidation. No pneumothorax or pleural effusion. No acute osseous abnormality. There are stable aortic arch calcifications.     Impression: Cardiomegaly and pulmonary vascular congestion without overt pulmonary edema. Electronically Signed: Adrian Wolfe MD  12/30/2023 11:01 PM EST  Workstation ID: [id number]    XR Femur 2 View Left    Result Date: 12/30/2023  XR FEMUR 2 VW LEFT XR KNEE 1 OR 2 VW LEFT Date of Exam: 12/30/2023 7:49 PM EST Indication: fall Comparison: None available. Findings: There is an acute minimally displaced fracture of the left femoral neck. There is mild osteoarthritis at the left hip. The bones are generally demineralized. There is minimal osteophyte formation at the knee with preservation of joint spaces. There is no  acute fracture or dislocation at the knee. No definite knee joint effusion. No erosions.     Impression: 1.Acute minimally displaced fracture of the left femoral neck. 2.Osteopenia and mild degenerative changes. Electronically Signed: Adrian Wolfe MD  12/30/2023 8:20 PM EST  Workstation ID: [id number]    XR Knee 1 or 2 View Left    Result Date: 12/30/2023  XR FEMUR 2 VW LEFT XR KNEE 1 OR 2 VW LEFT Date of Exam: 12/30/2023 7:49 PM EST Indication: fall Comparison: None available. Findings: There is an acute minimally displaced fracture of the left femoral neck. There is mild osteoarthritis at the left hip. The bones are generally demineralized. There is minimal osteophyte formation at the knee with preservation of joint spaces. There is no  acute fracture or dislocation at the knee. No definite knee joint effusion. No erosions.     Impression: 1.Acute minimally displaced fracture of the left femoral neck. 2.Osteopenia and mild degenerative changes. Electronically Signed: Adrian Wolfe MD  12/30/2023 8:20 PM EST  Workstation ID: [id number]    XR Pelvis 1 or 2  View    Result Date: 12/30/2023  XR PELVIS 1 OR 2 VW Date of Exam: 12/30/2023 7:47 PM EST Indication: fall/pain Comparison: None available. Findings: Suboptimal demonstration of bony detail due to overlying soft tissue attenuation. There is a nondisplaced fracture of the left femoral neck. No fracture of the pelvis is demonstrated.     Impression: Nondisplaced left femoral neck fracture Electronically Signed: Derek Morrow  12/30/2023 8:19 PM EST  Workstation ID: [id number]     Results for orders placed during the hospital encounter of 08/12/22    Duplex Venous Lower Extremity - Left CAR    Interpretation Summary  · Normal left lower extremity venous duplex scan.      Results for orders placed during the hospital encounter of 08/12/22    Duplex Venous Lower Extremity - Left CAR    Interpretation Summary  · Normal left lower extremity venous duplex scan.      Results for orders placed during the hospital encounter of 12/30/23    Adult Transthoracic Echo Complete W/ Cont if Necessary Per Protocol    Interpretation Summary    Left ventricular ejection fraction appears to be 56 - 60%.    Left ventricular wall thickness is consistent with mild concentric hypertrophy    The aortic valve exhibits sclerosis.    Mild aortic valve regurgitation is present. No hemodynamically significant aortic valve stenosis is present.    Mild tricuspid valve regurgitation is present. Estimated right ventricular systolic pressure from tricuspid regurgitation is moderately elevated (45-55 mmHg    Moderate mitral annular calcification is present. Trace to mild mitral valve regurgitation is present. No significant mitral valve stenosis is present.      Plan for Follow-up of Pending Labs/Results: none    Discharge Details        Discharge Medications        New Medications        Instructions Start Date   acetaminophen 500 MG tablet  Commonly known as: TYLENOL   1,000 mg, Oral, Every 6 Hours      insulin detemir 100 UNIT/ML injection  Commonly  known as: LEVEMIR   5 Units, Subcutaneous, 2 Times Daily      oxyCODONE 5 MG immediate release tablet  Commonly known as: ROXICODONE   5 mg, Oral, Every 8 Hours PRN             Changes to Medications        Instructions Start Date   hydrALAZINE 25 MG tablet  Commonly known as: APRESOLINE  What changed:   how much to take  when to take this   75 mg, Oral, Every 8 Hours Scheduled             Continue These Medications        Instructions Start Date   atorvastatin 80 MG tablet  Commonly known as: LIPITOR   80 mg, Oral, Daily      carvedilol 25 MG tablet  Commonly known as: COREG   25 mg, Oral, Every 12 Hours Scheduled      cloNIDine 0.1 MG tablet  Commonly known as: CATAPRES   0.1 mg, Oral, 2 Times Daily      doxazosin 4 MG tablet  Commonly known as: CARDURA   4 mg, Oral, Daily      DULoxetine 60 MG capsule  Commonly known as: CYMBALTA   60 mg, Oral, Daily      Morphine 15 MG 12 hr tablet  Commonly known as: MS CONTIN   15 mg, Oral, 2 Times Daily      naloxone 4 MG/0.1ML nasal spray  Commonly known as: NARCAN   Narcan 4 mg/actuation nasal spray      pantoprazole 40 MG EC tablet  Commonly known as: PROTONIX   40 mg, Oral, Daily      probiotic capsule capsule   1 capsule, Oral, Daily      rivaroxaban 15 MG tablet  Commonly known as: Xarelto   15 mg, Oral, Daily With Dinner      torsemide 20 MG tablet  Commonly known as: DEMADEX   40 mg, Oral, Daily             Stop These Medications      aspirin 81 MG EC tablet     Lantus SoloStar 100 UNIT/ML injection pen  Generic drug: Insulin Glargine     lisinopril 40 MG tablet  Commonly known as: PRINIVIL,ZESTRIL              Allergies   Allergen Reactions    Penicillins     Nickel Rash    Penicillins Rash     unk         Discharge Disposition:  Rehab Facility or Unit (DC - External)    Diet:  Hospital:  Diet Order   Procedures    Diet: Diabetic Diets; Consistent Carbohydrate; Texture: Regular Texture (IDDSI 7); Fluid Consistency: Thin (IDDSI 0)             Activity:      Restrictions or Other Recommendations:  Continue covaderm or dry dressing every other day.  Keep incision covered at all times.  DC staples on or around 2-3 weeks w  Orthopedics       CODE STATUS:    Code Status and Medical Interventions:   Ordered at: 12/30/23 2157     Level Of Support Discussed With:    Patient     Code Status (Patient has no pulse and is not breathing):    CPR (Attempt to Resuscitate)     Medical Interventions (Patient has pulse or is breathing):    Full Support       Future Appointments   Date Time Provider Department Center   1/5/2024 12:00 PM MED [facility]   3/25/2024  8:45 AM Steve Geronimo MD [facility]   4/4/2024 11:45 AM Emilio Regalado MD [facility]       Additional Instructions for the Follow-ups that You Need to Schedule       Discharge Follow-up with PCP   As directed       Currently Documented PCP:    Sebas Alicea MD    PCP Phone Number:    [phone]     Follow Up Details: 1 week from rehab discharge - discuss lisinopril restart/renal dz f/u        Discharge Follow-up with Specified Provider: Dr Harrington 2 weeks   As directed      To: Dr Harrington 2 weeks                      Tiffanie Baron MD  01/05/24      Time Spent on Discharge:  I spent  20  minutes on this discharge activity which included: face-to-face encounter with the patient, reviewing the data in the system, coordination of the care with the nursing staff as well as consultants, documentation, and entering orders.

## 2024-01-14 NOTE — PROGRESS NOTES
"Enter Query Response Below      Query Response: Flash pulmonary edema            If applicable, please update the problem list.     Patient: Yanique Webster        : 1940  Account: [id number]           Admit Date: 2023        How to Respond to this query:       a. Click New Note     b. Answer query within the yellow box.                c. Update the Problem List, if applicable.      If you have any questions about this query contact me at: [email or web address]     Dr. Baron    83-year-old female admitted 23 with left fracture.  Cardiology consult notes \"Acute hypoxia, poss. Flash pulmonary edema\".  The dc summary notes \"Stay c/b HTN emergency c/b pulmonary edema requiring nitro gtt pre-operatively\".  CXR \"Pulmonary vascular congestion without overt pulmonary edema.\". Treatment: nitroglycerin gtt (-), IV Lasix (48)    Please clarify if the patient was treated/monitored for:  Flash pulmonary edema  Pulmonary edema  Pulmonary edema ruled out  Other- specify______  Unable to determine    By submitting this query, we are merely seeking further clarification of documentation to accurately reflect all conditions that you are monitoring, evaluating, treating or that extend the hospitalization or utilize additional resources of care. Please utilize your independent clinical judgment when addressing the question(s) above.     This query and your response, once completed, will be entered into the legal medical record.    Sincerely,  Alicia Alvarado MSN, RN, CCDS  Clinical Documentation Integrity Program     "

## 2024-01-19 ENCOUNTER — TELEPHONE (OUTPATIENT)
Dept: ORTHOPEDIC SURGERY | Facility: CLINIC | Age: 84
End: 2024-01-19

## 2024-01-19 NOTE — TELEPHONE ENCOUNTER
Patient is at [facility], they were unaware of her appointment today. I rescheduled her for next Wednesday 1/24.  Zoraida Miller RT (R), ROT

## 2024-01-26 ENCOUNTER — OFFICE VISIT (OUTPATIENT)
Dept: ORTHOPEDIC SURGERY | Facility: CLINIC | Age: 84
End: 2024-01-26
Payer: MEDICARE

## 2024-01-26 VITALS — TEMPERATURE: 97.3 F

## 2024-01-26 DIAGNOSIS — Z09 SURGERY FOLLOW-UP: Primary | ICD-10-CM

## 2024-01-26 RX ORDER — SODIUM BICARBONATE 650 MG/1
650 TABLET ORAL 2 TIMES DAILY
COMMUNITY

## 2024-01-26 RX ORDER — NIFEDIPINE 30 MG
TABLET, EXTENDED RELEASE ORAL
COMMUNITY

## 2024-01-26 RX ORDER — POLYETHYLENE GLYCOL 3350 17 G/17G
17 POWDER, FOR SOLUTION ORAL DAILY
COMMUNITY

## 2024-01-26 RX ORDER — TERAZOSIN 5 MG/1
CAPSULE ORAL
COMMUNITY
Start: 2024-01-22

## 2024-01-26 NOTE — PROGRESS NOTES
[facility] Office Follow Up     Office Follow Up Visit     Date: 01/26/2024   Patient Name: Yanique Webster  MRN: [id number]  YOB: 1940  Chief Complaint:   Chief Complaint   Patient presents with    Post-op     3 weeks status post left hip cannulated screw placement (DOS 1/2/24)      History of Present Illness:   Yanique Webster is a 84 y.o. female who is here today for follow up status post left percutaneous screw fixation for valgus impacted femoral neck fracture.  Patient continues to work with physical therapy.  States she uses a walker to ambulate around her house.   does still have some continued knee pain and groin pain but does continue to improve on a daily basis.  No other complaints.    Subjective   I reviewed the patient's chief complaint, history of present illness, review of systems, past medical history, surgical history, family history, social history, medications and allergy list   Objective    Vital Signs:   Vitals:    01/26/24 1214   Temp: 97.3 °F (36.3 °C)     There is no height or weight on file to calculate BMI.    Ortho Exam:  Left hip incision clean dry and intact with staples in place.  Pain but patient able to actively flex and extend hip as well as knee and ankle.  Does have some decreased sensation in all distributions at baseline due to chronic neuropathy.  Brisk cap refill in all toes.   does have some slight groin pain with passive range of motion of her left hip.    Results Review:  XR Knee 4+ View Left  Knee X-Ray    Indication: Pain    Study:   AP and Lateral  views of Left knee(s)    Comparison: Previous    Findings:    Patient appears to have degenerative changes consistent with age.  Normal soft tissues  Minimal knee effusion noted  No bony lesions  Normal alignment   No acute osseous abnormality  XR Hip With or Without Pelvis 2 - 3 View Left  Left Hip X-Ray    Indication: Pain    Views:  AP pelvis and Frog  Leg lateral    Comparison: Previous    Findings:  Healing left femoral neck fracture with hardware in place in proper   alignment with no signs of hardware failure       Assessment / Plan    Assessment/Plan:   Diagnoses and all orders for this visit:    1. Surgery follow-up (Primary)  -     XR Hip With or Without Pelvis 2 - 3 View Left  -     XR Knee 4+ View Left      Patient is now 3 weeks postop., doing well. Happy with result, no complaints. Staples removed in clinic today.  Steri-Strips placed.  Continue to ice and elevate during recovery.  Patient is to be weightbearing as tolerated with use of assistive devices. Continue ASA for DVT ppx.  Patient to continue physical therapy focusing on gait retraining.  Plan to see patient back in clinic in 6 weeks for repeat x-rays and reevaluation.  Patient counseled to contact the clinic if anything should arise in the meantime.  Patient does continue to have some knee pain with ambulation.  States that continues to improve.  X-rays repeated in clinic today and there is no signs of fracture.  If pain persist discussed with patient possible referral to one of my orthopedic colleagues.  Patient expect agreement with plan and understanding.    Follow Up:   Return in about 6 weeks (around 3/8/2024) for Recheck, F/U with Images.      Seymour Harrington MD  [facility]

## 2024-02-01 ENCOUNTER — HOSPITAL ENCOUNTER (EMERGENCY)
Facility: HOSPITAL | Age: 84
Discharge: HOME | End: 2024-02-02
Attending: EMERGENCY MEDICINE
Payer: MEDICARE

## 2024-02-01 DIAGNOSIS — R09.89 LABILE BLOOD PRESSURE: Primary | ICD-10-CM

## 2024-02-01 DIAGNOSIS — I10 ELEVATED BLOOD PRESSURE READING WITH DIAGNOSIS OF HYPERTENSION: ICD-10-CM

## 2024-02-01 LAB
ALBUMIN SERPL-MCNC: 4.1 G/DL (ref 3.5–5.2)
ALBUMIN/GLOB SERPL: 1.7 G/DL
ALP SERPL-CCNC: 163 U/L (ref 39–117)
ALT SERPL W P-5'-P-CCNC: 31 U/L (ref 1–33)
ANION GAP SERPL CALCULATED.3IONS-SCNC: 10 MMOL/L (ref 5–15)
AST SERPL-CCNC: 34 U/L (ref 1–32)
BACTERIA UR QL AUTO: ABNORMAL /HPF
BASOPHILS # BLD AUTO: 0.05 10*3/MM3 (ref 0–0.2)
BASOPHILS NFR BLD AUTO: 0.8 % (ref 0–1.5)
BILIRUB SERPL-MCNC: 0.3 MG/DL (ref 0–1.2)
BILIRUB UR QL STRIP: NEGATIVE
BUN SERPL-MCNC: 37 MG/DL (ref 8–23)
BUN/CREAT SERPL: 20.7 (ref 7–25)
CALCIUM SPEC-SCNC: 9.7 MG/DL (ref 8.6–10.5)
CHLORIDE SERPL-SCNC: 107 MMOL/L (ref 98–107)
CLARITY UR: CLEAR
CO2 SERPL-SCNC: 21 MMOL/L (ref 22–29)
COLOR UR: ABNORMAL
CREAT SERPL-MCNC: 1.79 MG/DL (ref 0.57–1)
DEPRECATED RDW RBC AUTO: 58.8 FL (ref 37–54)
EGFRCR SERPLBLD CKD-EPI 2021: 27.9 ML/MIN/1.73
EOSINOPHIL # BLD AUTO: 0.31 10*3/MM3 (ref 0–0.4)
EOSINOPHIL NFR BLD AUTO: 5.2 % (ref 0.3–6.2)
ERYTHROCYTE [DISTWIDTH] IN BLOOD BY AUTOMATED COUNT: 18 % (ref 12.3–15.4)
GLOBULIN UR ELPH-MCNC: 2.4 GM/DL
GLUCOSE SERPL-MCNC: 163 MG/DL (ref 65–99)
GLUCOSE UR STRIP-MCNC: ABNORMAL MG/DL
HCT VFR BLD AUTO: 29.6 % (ref 34–46.6)
HGB BLD-MCNC: 9.2 G/DL (ref 12–15.9)
HGB UR QL STRIP.AUTO: ABNORMAL
HOLD SPECIMEN: NORMAL
HOLD SPECIMEN: NORMAL
HYALINE CASTS UR QL AUTO: ABNORMAL /LPF
IMM GRANULOCYTES # BLD AUTO: 0.02 10*3/MM3 (ref 0–0.05)
IMM GRANULOCYTES NFR BLD AUTO: 0.3 % (ref 0–0.5)
KETONES UR QL STRIP: NEGATIVE
LEUKOCYTE ESTERASE UR QL STRIP.AUTO: NEGATIVE
LYMPHOCYTES # BLD AUTO: 1.5 10*3/MM3 (ref 0.7–3.1)
LYMPHOCYTES NFR BLD AUTO: 25.2 % (ref 19.6–45.3)
MCH RBC QN AUTO: 27.9 PG (ref 26.6–33)
MCHC RBC AUTO-ENTMCNC: 31.1 G/DL (ref 31.5–35.7)
MCV RBC AUTO: 89.7 FL (ref 79–97)
MONOCYTES # BLD AUTO: 0.48 10*3/MM3 (ref 0.1–0.9)
MONOCYTES NFR BLD AUTO: 8.1 % (ref 5–12)
NEUTROPHILS NFR BLD AUTO: 3.6 10*3/MM3 (ref 1.7–7)
NEUTROPHILS NFR BLD AUTO: 60.4 % (ref 42.7–76)
NITRITE UR QL STRIP: NEGATIVE
NRBC BLD AUTO-RTO: 0 /100 WBC (ref 0–0.2)
NT-PROBNP SERPL-MCNC: 1510 PG/ML (ref 0–1800)
PH UR STRIP.AUTO: 6.5 [PH] (ref 5–8)
PLATELET # BLD AUTO: 165 10*3/MM3 (ref 140–450)
PMV BLD AUTO: 10.5 FL (ref 6–12)
POTASSIUM SERPL-SCNC: 4.3 MMOL/L (ref 3.5–5.2)
PROT SERPL-MCNC: 6.5 G/DL (ref 6–8.5)
PROT UR QL STRIP: ABNORMAL
RBC # BLD AUTO: 3.3 10*6/MM3 (ref 3.77–5.28)
RBC # UR STRIP: ABNORMAL /HPF
REF LAB TEST METHOD: ABNORMAL
SODIUM SERPL-SCNC: 138 MMOL/L (ref 136–145)
SP GR UR STRIP: 1.01 (ref 1–1.03)
SQUAMOUS #/AREA URNS HPF: ABNORMAL /HPF
TROPONIN T SERPL HS-MCNC: 55 NG/L
UROBILINOGEN UR QL STRIP: ABNORMAL
WBC # UR STRIP: ABNORMAL /HPF
WBC NRBC COR # BLD AUTO: 5.96 10*3/MM3 (ref 3.4–10.8)
WHOLE BLOOD HOLD COAG: NORMAL
WHOLE BLOOD HOLD SPECIMEN: NORMAL

## 2024-02-01 PROCEDURE — 36415 COLL VENOUS BLD VENIPUNCTURE: CPT

## 2024-02-01 PROCEDURE — 81001 URINALYSIS AUTO W/SCOPE: CPT | Performed by: PHYSICIAN ASSISTANT

## 2024-02-01 PROCEDURE — 85025 COMPLETE CBC W/AUTO DIFF WBC: CPT | Performed by: PHYSICIAN ASSISTANT

## 2024-02-01 PROCEDURE — 84484 ASSAY OF TROPONIN QUANT: CPT | Performed by: PHYSICIAN ASSISTANT

## 2024-02-01 PROCEDURE — 93005 ELECTROCARDIOGRAM TRACING: CPT | Performed by: EMERGENCY MEDICINE

## 2024-02-01 PROCEDURE — 83880 ASSAY OF NATRIURETIC PEPTIDE: CPT | Performed by: PHYSICIAN ASSISTANT

## 2024-02-01 PROCEDURE — 80053 COMPREHEN METABOLIC PANEL: CPT | Performed by: PHYSICIAN ASSISTANT

## 2024-02-01 PROCEDURE — 99283 EMERGENCY DEPT VISIT LOW MDM: CPT

## 2024-02-01 RX ORDER — SODIUM CHLORIDE 0.9 % (FLUSH) 0.9 %
10 SYRINGE (ML) INJECTION AS NEEDED
Status: DISCONTINUED | OUTPATIENT
Start: 2024-02-01 | End: 2024-02-02 | Stop reason: HOSPADM

## 2024-02-02 VITALS
HEIGHT: 64 IN | TEMPERATURE: 98.3 F | RESPIRATION RATE: 18 BRPM | BODY MASS INDEX: 30.73 KG/M2 | OXYGEN SATURATION: 96 % | WEIGHT: 180 LBS | SYSTOLIC BLOOD PRESSURE: 169 MMHG | DIASTOLIC BLOOD PRESSURE: 72 MMHG | HEART RATE: 69 BPM

## 2024-02-02 LAB
GEN 5 2HR TROPONIN T REFLEX: 52 NG/L
HOLD SPECIMEN: NORMAL
QT INTERVAL: 404 MS
QTC INTERVAL: 413 MS
TROPONIN T DELTA: -3 NG/L

## 2024-02-02 PROCEDURE — 84484 ASSAY OF TROPONIN QUANT: CPT | Performed by: PHYSICIAN ASSISTANT

## 2024-02-02 NOTE — ED PROVIDER NOTES
EMERGENCY DEPARTMENT ENCOUNTER    Pt Name: Yanique Webster  MRN: [id number]  Pt :   1940  Room Number:    Date of encounter:  2024  PCP: Sebas Alicea MD  ED Provider: INDIRA Portillo    Historian: Patient    HPI:  Chief Complaint: Elevated blood pressure    Context: Yanique Webster is a 84 y.o. female who presents to the ED c/o elevated blood pressure readings.  Patient reports that she was recently admitted to this facility at the end of December with a broken hip.  She reports that when she was discharged from the hospital her medication regimen was changed.  She also states when she was discharged from [facility] rehab her medication regimen was changed.  She had seen her primary care physician who had questions about the addition of nifedipine at [facility] because patient had swelling in her lower extremities.  Today patient's blood pressure readings were in the systolic 190s at home.  Her primary care physician was consulted who recommended she take her nifedipine as it was prescribed.  Patient took 1 dose of nifedipine at 8 PM and at 9:00 PM did not see a significant change in her blood pressure.  She again contacted her primary care who recommended she present to the ER for further evaluation.  On interview and exam patient blood pressure reading 159/69.  Patient is asymptomatic.  HPI     REVIEW OF SYSTEMS  A chief complaint appropriate review of systems was completed and is negative except as noted in the HPI.     PAST MEDICAL HISTORY  Past Medical History:   Diagnosis Date    Blurry vision     Chronic joint pain     Chronic pain     COPD (chronic obstructive pulmonary disease)     Coronary artery disease     Diabetic gastroparesis 2016    Esophageal stricture     s/p dilation in     GERD (gastroesophageal reflux disease)     Gout     Headache     secondary to hypertension    Hyperlipidemia     Hypertension     Long term current use of insulin     Neuropathy      Neuropathy due to type 2 diabetes mellitus     Obesity     Osteoarthritis     Pericarditis 2008    Stroke 2019    Type 2 diabetes mellitus        PAST SURGICAL HISTORY  Past Surgical History:   Procedure Laterality Date    BRONCHOSCOPY N/A 2016    Procedure: BRONCHOSCOPY BIOPSY AT BEDSIDE;  Surgeon: Mookie Willard MD;  Location: [facility];  Service:     CARDIAC CATHETERIZATION N/A 2016    Procedure: Left Heart Cath;  Surgeon: Steve Geronimo MD;  Location: [facility] CATH INVASIVE LOCATION;  Service:     CARDIAC CATHETERIZATION N/A 2016    Procedure: Right Heart Cath;  Surgeon: Steve Geronimo MD;  Location: [facility] CATH INVASIVE LOCATION;  Service:     CARDIAC ELECTROPHYSIOLOGY PROCEDURE N/A 2019    Procedure: Loop insertion;  Surgeon: Steve Geronimo MD;  Location: [facility] CATH INVASIVE LOCATION;  Service: Cardiovascular    CARDIAC ELECTROPHYSIOLOGY PROCEDURE N/A 2023    Procedure: Loop recorder removal;  Surgeon: Steve Geronimo MD;  Location: [facility] CATH INVASIVE LOCATION;  Service: Cardiovascular;  Laterality: N/A;    CATARACT EXTRACTION      ESOPHAGEAL DILATATION      HERNIA REPAIR      HIP CANNULATED SCREW PLACEMENT Left 2024    Procedure: HIP CANNULATED SCREW PLACEMENT LEFT;  Surgeon: Seymour Harrington MD;  Location: [facility] OR;  Service: Orthopedics;  Laterality: Left;    HYSTERECTOMY      WRIST FRACTURE SURGERY Left        FAMILY HISTORY  Family History   Problem Relation Age of Onset    Cancer Mother     Cancer Father     Cancer Other     Breast cancer Sister 67    Ovarian cancer Neg Hx        SOCIAL HISTORY  Social History     Socioeconomic History    Marital status:    Tobacco Use    Smoking status: Former     Packs/day: 1     Types: Cigarettes     Quit date: 2003     Years since quittin.0    Smokeless tobacco: Never   Vaping Use    Vaping Use: Never used   Substance and Sexual Activity    Alcohol use: No    Drug use: No    Sexual activity: Not  Currently     Partners: Male     Birth control/protection: Pill, Hysterectomy       ALLERGIES  Penicillins, Latex, Nickel, and Penicillins    PHYSICAL EXAM  Physical Exam  ***    LAB RESULTS  Results for orders placed or performed during the hospital encounter of 02/01/24   ECG 12 Lead Other; hypertension   Result Value Ref Range    QT Interval 404 ms    QTC Interval 413 ms   Green Top (Gel)   Result Value Ref Range    Extra Tube Hold for add-ons.    Lavender Top   Result Value Ref Range    Extra Tube hold for add-on    Gold Top - SST   Result Value Ref Range    Extra Tube Hold for add-ons.    Light Blue Top   Result Value Ref Range    Extra Tube Hold for add-ons.        If labs were ordered, I independently reviewed the results and considered them in treating the patient.    RADIOLOGY  No orders to display     [] Radiologist's Report Reviewed:  I ordered and independently interpreted the above noted radiographic studies.  See radiologist's dictation for official interpretation.      PROCEDURES    Procedures    ECG 12 Lead Other; hypertension   Preliminary Result   Test Reason : Other~   Blood Pressure :   */*   mmHG   Vent. Rate :  63 BPM     Atrial Rate :  63 BPM      P-R Int : 170 ms          QRS Dur :  88 ms       QT Int : 404 ms       P-R-T Axes :  84   5  29 degrees      QTc Int : 413 ms      Normal sinus rhythm   Possible Anterior infarct , age undetermined   Abnormal ECG   When compared with ECG of 02-JAN-2024 09:04,   Borderline criteria for Anterior infarct are now present   T wave amplitude has decreased in Anterior leads      Referred By: EDMD           Confirmed By:           MEDICATIONS GIVEN IN ER    Medications   sodium chloride 0.9 % flush 10 mL (has no administration in time range)       MEDICAL DECISION MAKING, PROGRESS, and CONSULTS   Medical Decision Making  Amount and/or Complexity of Data Reviewed  Labs: ordered.  ECG/medicine tests: ordered.    Risk  Prescription drug management.        All  labs have been independently reviewed by me.  All radiology studies have been interpreted by me and the radiologist dictating the report.  All EKG's have been independently interpreted by me as well as and overseeing attending physician.    [] Discussed with radiology regarding test interpretation:    Discussion below represents my analysis of pertinent findings related to patient's condition, differential diagnosis, treatment plan and final disposition.    Differential diagnosis:  The differential diagnosis associated with the patient's presentation includes: ***    Additional sources  Discussed/ obtained information from independent historians:   [] Spouse  [] Parent  [] Family member  [] Friend  [] EMS   [] Other:  External (non-ED) record review:   [] Inpatient record:   [] Office record:   [] Outpatient record:   [] Prior Outpatient labs:   [] Prior Outpatient radiology:   [] Primary Care record:   [] Outside ED record:   [] Other:   Patient's care impacted by:   [] Diabetes  [] Hypertension  [] Hyperlipidemia  [] Hypothyroidism   [] Coronary Artery Disease   [] COPD   [] Cancer   [] Obesity  [] GERD   [] Tobacco Abuse   [] Substance Abuse    [] Anxiety   [] Depression   [] Other:   Care significantly affected by Social Determinants of Health (housing and economic circumstances, unemployment)    [] Yes     [] No   If yes, Patient's care significantly limited by  Social Determinants of Health including:   [] Inadequate housing   [] Low income   [] Alcoholism and drug addiction in family   [] Problems related to primary support group   [] Unemployment   [] Problems related to employment   [] Other Social Determinants of Health:     Shared decision making:  ***    Orders placed during this visit:  Orders Placed This Encounter   Procedures    [place] Draw    Comprehensive Metabolic Panel    Urinalysis With Microscopic If Indicated (No Culture) - Urine, Clean Catch    BNP    Single High Sensitivity Troponin T    ECG  12 Lead Other; hypertension    Insert peripheral IV    Green Top (Gel)    Lavender Top    Gold Top - SST    Gray Top    Light Blue Top    CBC & Differential       I considered prescription management  with:   [] Pain medication  [] Antiviral  [] Antibiotic   [] Other:   Rationale:  Additional orders considered but not ordered:  The following testing was considered but ultimately not selected after discussion with patient/family:***    ED Course:              DIAGNOSIS  Final diagnoses:   None       DISPOSITION    ED Disposition       None            Please note that portions of this document were completed with voice recognition software.      findings related to patient's condition, differential diagnosis, treatment plan and final disposition.    Differential diagnosis:  The differential diagnosis associated with the patient's presentation includes: hypertensive urgency/emergency, heart failure, ACS or other end organ damage.     Additional sources  Discussed/ obtained information from independent historians:   [] Spouse  [] Parent  [] Family member  [] Friend  [x] EMS   [] Other:  External (non-ED) record review:   [] Inpatient record:   [x] Office record: Personally reviewed most recent office visit with orthopedics for surgical follow-up from her left hip replacement   [] Outpatient record:   [] Prior Outpatient labs:   [] Prior Outpatient radiology:   [] Primary Care record:   [] Outside ED record:   [] Other:   Patient's care impacted by:   [x] Diabetes  [] Hypertension  [x] Hyperlipidemia  [] Hypothyroidism   [x] Coronary Artery Disease   [x] COPD   [] Cancer   [x] Obesity  [x] GERD   [] Tobacco Abuse   [] Substance Abuse    [] Anxiety   [] Depression   [x] Other: Fibromyalgia, chronic kidney disease, paroxysmal atrial fibrillation, anemia of chronic disease  Care significantly affected by Social Determinants of Health (housing and economic circumstances, unemployment)    [] Yes     [x] No   If yes, Patient's care significantly limited by  Social Determinants of Health including:   [] Inadequate housing   [] Low income   [] Alcoholism and drug addiction in family   [] Problems related to primary support group   [] Unemployment   [] Problems related to employment   [] Other Social Determinants of Health:     Shared decision making:  I had a discussion with the patient/family regarding diagnosis, diagnostic results, treatment plan, and medications.  The patient/family indicated understanding of these instructions.  I spent adequate time at the bedside preceding discharge necessary to personally discuss the aftercare instructions, giving patient  education, providing explanations of the results of our evaluations/findings, and my decision making to assure that the patient/family understand the plan of care.  Time was allotted to answer questions at that time and throughout the ED course.  Emphasis was placed on timely follow-up after discharge.  I also discussed the potential for the development of an acute emergent condition requiring further evaluation, admission, or even surgical intervention. I discussed that we found nothing during the visit today indicating the need for further workup, admission, or the presence of an unstable medical condition.  I encouraged the patient to return to the emergency department immediately for ANY concerns, worsening, new complaints, or if symptoms persist and unable to seek follow-up in a timely fashion.  The patient/family expressed understanding and agreement with this plan.  The patient will follow-up with primary care and cardiology for reevaluation.      Orders placed during this visit:  Orders Placed This Encounter   Procedures    [place] Draw    Comprehensive Metabolic Panel    Urinalysis With Microscopic If Indicated (No Culture) - Urine, Clean Catch    BNP    Single High Sensitivity Troponin T    CBC Auto Differential    High Sensitivity Troponin T 2Hr    Urinalysis, Microscopic Only - Urine, Clean Catch    ECG 12 Lead Other; hypertension    Green Top (Gel)    Lavender Top    Gold Top - SST    Gray Top    Light Blue Top    CBC & Differential       ED Course:    ED Course as of 02/06/24 2049 Fri Feb 02, 2024   0044 Blood pressure continues to remain stable throughout patient's stay in the emergency department. [JG]   0045 No evidence of hypertensive urgency or emergency.  Patient discharged with continued outpatient follow-up to her primary care for management of her blood pressure medications. [JG]   0046 Patient presents to the emergency department complaining of high blood pressure. Patient is otherwise  asymptomatic and on interview and exam without confusion, chest pain, dysuria, vision changes, focal neurological deficit or SOB. Elevated blood pressure readings in ED. Differential diagnosis includes hypertenstive emergency, AMS, pulmonary edema, heart failure, ACS, intracranial hemorrhage, renal infarction or failure or other end organ damage. No acute or emergent findings demonstrated on physical exam and patient neurologically intact.  Labs studies were reviewed and directly interpreted by myself demonstrating no acute findings and findings consistent with prior results. EKG personally interpreted by myself and attending without evidence of acute ischemic changes.  Patient blood pressure normalized following the ED without intervention.  At time of discharge disposition patient is afebrile, nontoxic appearing, vital signs stable and able to maintain O2 sats of 96% on room air. Patient will be discharged home with symptomatic care and outpatient follow up.   [JG]      ED Course User Index  [JG] Clarke Talbot PA            DIAGNOSIS  Final diagnoses:   Labile blood pressure   Elevated blood pressure reading with diagnosis of hypertension       DISPOSITION    ED Disposition       ED Disposition   Discharge    Condition   Stable    Comment   --               Please note that portions of this document were completed with voice recognition software.        Clarke Talbot PA  02/06/24 5413

## 2024-03-20 ENCOUNTER — TELEPHONE (OUTPATIENT)
Dept: ORTHOPEDIC SURGERY | Facility: CLINIC | Age: 84
End: 2024-03-20
Payer: MEDICARE

## 2024-03-20 NOTE — TELEPHONE ENCOUNTER
Patient had HIP CANNULATED SCREW PLACEMENT LEFT on 01/02 with Dr. Harrington. Patient missed her last two postop appointments.     Attempted to call patient with no answer. I called and talked with patients sonJ Luis. Patient [facility] in a [facility], and was not aware of appointments.     - Please reach out to the patient and schedule a post op with Dr. Harrington. Zachery Dietz needs to be notified of the appointment as well.

## 2024-03-20 NOTE — TELEPHONE ENCOUNTER
Spoke with patient and scheduled post op visit on 3/27 at 1:20pm with Dr. Harrington. Called patients yris Dietz to notify. He understood.

## 2024-03-22 ENCOUNTER — TELEPHONE (OUTPATIENT)
Dept: CARDIOLOGY | Facility: CLINIC | Age: 84
End: 2024-03-22
Payer: MEDICARE

## 2024-03-22 NOTE — TELEPHONE ENCOUNTER
Name: Yanique Webster Quintin    Relationship: Self    Best Callback Number: [phone]    Incoming call to the Hub, requesting to  Reschedule their HFU appointment on 3.25.24.     Per Hub workflow, further review is needed before the task can be completed.    Result of Call: Transferred to KASSIDY at the practice

## 2024-03-27 ENCOUNTER — OFFICE VISIT (OUTPATIENT)
Dept: ORTHOPEDIC SURGERY | Facility: CLINIC | Age: 84
End: 2024-03-27
Payer: MEDICARE

## 2024-03-27 DIAGNOSIS — Z09 SURGERY FOLLOW-UP: Primary | ICD-10-CM

## 2024-03-27 PROCEDURE — 1159F MED LIST DOCD IN RCRD: CPT | Performed by: ORTHOPAEDIC SURGERY

## 2024-03-27 PROCEDURE — 1160F RVW MEDS BY RX/DR IN RCRD: CPT | Performed by: ORTHOPAEDIC SURGERY

## 2024-03-27 PROCEDURE — 99024 POSTOP FOLLOW-UP VISIT: CPT | Performed by: ORTHOPAEDIC SURGERY

## 2024-03-27 RX ORDER — INSULIN GLARGINE 100 [IU]/ML
INJECTION, SOLUTION SUBCUTANEOUS DAILY
COMMUNITY

## 2024-03-27 RX ORDER — LISINOPRIL 20 MG/1
TABLET ORAL
COMMUNITY
Start: 2024-02-06

## 2024-03-27 NOTE — PROGRESS NOTES
[facility] Follow Up     Office Follow Up Visit     Date: 03/27/2024   Patient Name: Yanique Webster  MRN: [id number]  YOB: 1940  Chief Complaint:   Chief Complaint   Patient presents with    Post-op     2 month follow up -- 3 months status post left hip cannulated screw placement (DOS 1/2/24)     History of Present Illness:   Yanique Webster is a 84 y.o. female who is here today for follow up for follow-up status post left percutaneous screw fixation for valgus impacted femoral neck fracture.  States is graduated from physical therapy.  Continues to ambulate in a walker.  Denies any hip pain.  Is happy with her progress.  Still states she is having some left knee pain and also some right knee pain.  States her knee pain is worse with activity.    Subjective   I reviewed the patient's chief complaint, history of present illness, review of systems, past medical history, surgical history, family history, social history, medications and allergy list   Objective    Vital Signs: There were no vitals filed for this visit.  There is no height or weight on file to calculate BMI.    Ortho Exam:  Left hip incision is well-healed.  Able to actively flex and extend hip as well as knee and ankle.  No pain with any hip movement.  Ambulating with a walker in clinic with nonantalgic gait.    Results Review:  XR Hip With or Without Pelvis 2 - 3 View Left  Left Hip X-Ray    Indication: Pain    Views:  AP pelvis and Frog Leg lateral    Comparison: Previous    Impression: Postsurgical changes about the left hip, hardware alignment   similar to previous with no signs of failure, unable to see the fracture   line on x-ray today consistent with bony healing       Assessment / Plan    Assessment/Plan:   Diagnoses and all orders for this visit:    1. Surgery follow-up (Primary)  -     XR Hip With or Without Pelvis 2 - 3 View Left      Patient is now 3 months postop and  doing well.  Has graduated from physical therapy and is happy with her recovery from her surgery.  Discussed with patient that her bilateral knee pain is likely due to some osteoarthritis.  Discussed with patient that if those symptoms persist I would likely refer her to one of my colleagues for treatment.  Patient expressed she will contact the clinic in the future if she decides she wants to have treatment.  Will plan to see patient back again in 3 months for repeat x-rays and reevaluation.    Follow Up:   No follow-ups on file.      Seymour Harrington MD  [facility] [place] [facility]

## 2024-04-01 ENCOUNTER — OFFICE VISIT (OUTPATIENT)
Dept: CARDIOLOGY | Facility: CLINIC | Age: 84
End: 2024-04-01
Payer: MEDICARE

## 2024-04-01 VITALS
BODY MASS INDEX: 31.76 KG/M2 | SYSTOLIC BLOOD PRESSURE: 138 MMHG | DIASTOLIC BLOOD PRESSURE: 70 MMHG | HEART RATE: 76 BPM | WEIGHT: 186 LBS | HEIGHT: 64 IN | OXYGEN SATURATION: 95 %

## 2024-04-01 DIAGNOSIS — I10 ESSENTIAL HYPERTENSION: Primary | ICD-10-CM

## 2024-04-01 DIAGNOSIS — I48.0 PAROXYSMAL ATRIAL FIBRILLATION: ICD-10-CM

## 2024-04-01 DIAGNOSIS — I35.1 NONRHEUMATIC AORTIC VALVE INSUFFICIENCY: ICD-10-CM

## 2024-04-01 DIAGNOSIS — R09.89 BILATERAL CAROTID BRUITS: ICD-10-CM

## 2024-04-01 PROCEDURE — 3075F SYST BP GE 130 - 139MM HG: CPT | Performed by: INTERNAL MEDICINE

## 2024-04-01 PROCEDURE — 3078F DIAST BP <80 MM HG: CPT | Performed by: INTERNAL MEDICINE

## 2024-04-01 PROCEDURE — G2211 COMPLEX E/M VISIT ADD ON: HCPCS | Performed by: INTERNAL MEDICINE

## 2024-04-01 PROCEDURE — 99214 OFFICE O/P EST MOD 30 MIN: CPT | Performed by: INTERNAL MEDICINE

## 2024-04-01 NOTE — PROGRESS NOTES
[facility] AT [address], [address]  [place], [place], [address] [phone]  WWW.[facility]           OUTPATIENT CLINIC FOLLOW UP NOTE    Patient Care Team:  Patient Care Team:  Sebas Alicea MD as PCP - General (Family Medicine)  Steve Geronimo MD as Consulting Physician (Cardiology)  Emilio Regalado MD as Consulting Physician (Endocrinology)    Subjective:   Reason for consultation:   Chief Complaint   Patient presents with    Coronary Artery Disease       HPI:    Yanique Webster is a 84 y.o. female.  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 fib as noted above  ILR explant 2023  Diabetes  Hypertension  Mixed hyperlipidemia  Peripheral vascular disease  History of tobacco smoking  Anxiety  Claustrophobia  Hip fracture 12/2023, status post fixation 1/2024    Today the patient presents for follow-up.    Denies chest pain, shortness of breath    Review of Systems:  As noted in HPI.     PFSH:  Patient Active Problem List   Diagnosis    Fibromyalgia    PVD (peripheral vascular disease)    Type 2 diabetes mellitus    Diabetic gastroparesis    Takotsubo cardiomyopathy    Elevated serum creatinine    Hyperlipidemia LDL goal <70    COPD (chronic obstructive pulmonary disease)    Obesity    Osteoarthritis    Chronic pain    GERD (gastroesophageal reflux disease)    Gout    Chronic joint pain    Coronary artery disease involving native coronary artery of native heart without angina pectoris    Nonrheumatic aortic valve insufficiency    Altered mental status    Essential hypertension    CKD (chronic kidney disease) stage 3, GFR 30-59 ml/min    Hypertensive  emergency    Status post placement of implantable loop recorder    Paroxysmal atrial fibrillation    Acute exacerbation of COPD with asthma    Encounter for loop recorder at end of battery life    Closed left hip fracture    Anxiety associated with depression    Anemia, chronic disease    Surgery follow-up         Current Outpatient Medications:     acetaminophen (TYLENOL) 500 MG tablet, Take 2 tablets by mouth Every 6 (Six) Hours., Disp: , Rfl:     atorvastatin (LIPITOR) 80 MG tablet, Take 1 tablet by mouth Daily., Disp: , Rfl:     carvedilol (COREG) 25 MG tablet, Take 1 tablet by mouth Every 12 (Twelve) Hours., Disp: 60 tablet, Rfl: 1    cloNIDine (CATAPRES) 0.1 MG tablet, Take 1 tablet by mouth 2 (Two) Times a Day., Disp: , Rfl:     DULoxetine (CYMBALTA) 60 MG capsule, Take 1 capsule by mouth Daily., Disp: 90 capsule, Rfl: 3    hydrALAZINE (APRESOLINE) 25 MG tablet, Take 3 tablets by mouth Every 8 (Eight) Hours., Disp: , Rfl:     insulin glargine (LANTUS, SEMGLEE) 100 UNIT/ML injection, Inject  under the skin into the appropriate area as directed Daily., Disp: , Rfl:     lisinopril (PRINIVIL,ZESTRIL) 20 MG tablet, , Disp: , Rfl:     Morphine (MS CONTIN) 15 MG 12 hr tablet, Take 1 tablet by mouth 2 (Two) Times a Day., Disp: , Rfl:     pantoprazole (PROTONIX) 40 MG EC tablet, Take 1 tablet by mouth daily., Disp: , Rfl:     polyethylene glycol (MiraLax) 17 g packet, Take 17 g by mouth Daily., Disp: , Rfl:     probiotic (CULTURELLE) capsule capsule, Take 1 capsule by mouth Daily. Bifidobacterium - lactobacillus, Disp: , Rfl:     rivaroxaban (Xarelto) 15 MG tablet, Take 1 tablet by mouth Daily With Dinner., Disp: 30 tablet, Rfl: 11    torsemide (DEMADEX) 20 MG tablet, Take 2 tablets by mouth Daily., Disp: , Rfl:     Allergies   Allergen Reactions    Penicillins     Latex Rash     Not an immediate reaction    Nickel Rash    Penicillins Rash     unk        reports that she quit smoking about 21 years ago. Her smoking  "use included cigarettes. She has never used smokeless tobacco.    Objective:   Blood pressure 138/70, pulse 76, height 162.6 cm (64\"), weight 84.4 kg (186 lb), SpO2 95%.  CONSTITUTIONAL: No acute distress, normal affect  CARDIOVASCULAR: Regular rate and rhythm with normal S1 and S2.  Soft NIKOS  PERIPHERAL VASCULAR: Normal radial pulse.  Bilateral carotid bruit    11/2020 exam: 2+ DP pulses bilaterally    Labs:  Lab Results   Component Value Date    ALT 31 02/01/2024    AST 34 (H) 02/01/2024     Lab Results   Component Value Date    CHOL 145 03/15/2019    TRIG 117 03/15/2019    HDL 35 (L) 03/15/2019    LDLDIRECT 2.6 (L) 04/19/2017    CREATININE 1.79 (H) 02/01/2024       Diagnostic Data:    Procedures    ERMIAS 3/2019  Left ventricular systolic function is normal. Estimated EF appears to be in the range of 61 - 65%  Normal left atrial size and volume noted. There is no spontaneous echo contrast present. The left atrial appendage was visualized through multiple planes. Left atrial appendage was found to be multilobar in nature. Doppler interrogation shows normal flow within the left atrial appendage. No evidence of a left atrial appendage thrombus was present  Lipomatous hypertrophy of the interatrial septum present. No evidence of a patent foramen ovale. Saline test results are negative.  There is mild thickening of the noncoronary cusp of the aortic valve. Mild aortic valve regurgitation is present  There is moderate (grade 2) layered plaque in the proximal aorta present.    Results for orders placed during the hospital encounter of 12/30/23    Adult Transthoracic Echo Complete W/ Cont if Necessary Per Protocol    Interpretation Summary    Left ventricular ejection fraction appears to be 56 - 60%.    Left ventricular wall thickness is consistent with mild concentric hypertrophy    The aortic valve exhibits sclerosis.    Mild aortic valve regurgitation is present. No hemodynamically significant aortic valve stenosis is " present.    Mild tricuspid valve regurgitation is present. Estimated right ventricular systolic pressure from tricuspid regurgitation is moderately elevated (45-55 mmHg    Moderate mitral annular calcification is present. Trace to mild mitral valve regurgitation is present. No significant mitral valve stenosis is present.      Cath results reviewed 8/30/16  Coronary circulation is right dominant  Left main: Normal  LAD: 40% proximal discrete stenosis, small D1 and D2 without significant disease, mid to distal LAD with mild diffuse disease  LCX: Large circumflex, tandem 30% stenoses in the mid Cx after OM2, moderate mid OM1 disease, med sized OM2 with luminal irregularities, med to large sized OM3 without significant disease  RCA: 50% mid RCA lesion, iFR 0.95 (not functionally significant), mild distal disease    Assessment and Plan:     Takotsubo cardiomyopathy  -Continue related cardiac medications     Paroxysmal atrial fibrillation  CVA, symptoms resolved after TPA, old occipital infarct in addition to 2019 stroke  -Beta-blocker and renally dose Xarelto 15 mg daily    Coronary artery disease   Hyperlipidemia  -Continue aspirin, statin, beta-blocker.    Bilateral carotid bruit  -Carotid duplex ultrasound    Essential hypertension  -Continue current related medications.    - Return in about 6 months (around 10/1/2024) for Next scheduled follow-up with an ECG.        Steve Geronimo MD, MSc, FACC, [facility]  Interventional Cardiology  [facility]    The patient was engaged in a continuous and active collaborative plan of care related to an identified health condition (in this case, the cardiology team has served as the focal point for this patient's needs related to longitudinal care of the patient's HTN, HL, as discussed above). The management of this/these conditions involved our direction with specialized clinical knowledge, skill and experience. Such collaborative care includes patient education,  expectations and responsibilities, shared decision-making around therapeutic goals, and shared commitments to achieve those goals.

## 2024-04-04 ENCOUNTER — OFFICE VISIT (OUTPATIENT)
Dept: ENDOCRINOLOGY | Facility: CLINIC | Age: 84
End: 2024-04-04
Payer: MEDICARE

## 2024-04-04 VITALS
SYSTOLIC BLOOD PRESSURE: 118 MMHG | DIASTOLIC BLOOD PRESSURE: 70 MMHG | OXYGEN SATURATION: 99 % | HEART RATE: 53 BPM | BODY MASS INDEX: 31.99 KG/M2 | WEIGHT: 187.4 LBS | HEIGHT: 64 IN

## 2024-04-04 DIAGNOSIS — R94.6 BORDERLINE ABNORMAL TFTS: ICD-10-CM

## 2024-04-04 DIAGNOSIS — Z79.4 TYPE 2 DIABETES MELLITUS WITH HYPOGLYCEMIA WITHOUT COMA, WITH LONG-TERM CURRENT USE OF INSULIN: Primary | ICD-10-CM

## 2024-04-04 DIAGNOSIS — E11.649 TYPE 2 DIABETES MELLITUS WITH HYPOGLYCEMIA WITHOUT COMA, WITH LONG-TERM CURRENT USE OF INSULIN: Primary | ICD-10-CM

## 2024-04-04 LAB
EXPIRATION DATE: ABNORMAL
EXPIRATION DATE: NORMAL
GLUCOSE BLDC GLUCOMTR-MCNC: 135 MG/DL (ref 70–130)
HBA1C MFR BLD: 5.6 % (ref 4.5–5.7)
Lab: ABNORMAL
Lab: NORMAL

## 2024-04-04 RX ORDER — GLUCOSAMINE HCL/CHONDROITIN SU 500-400 MG
CAPSULE ORAL
Qty: 100 EACH | Refills: 3 | Status: SHIPPED | OUTPATIENT
Start: 2024-04-04

## 2024-04-04 RX ORDER — PEN NEEDLE, DIABETIC 32GX 5/32"
1 NEEDLE, DISPOSABLE MISCELLANEOUS DAILY
Qty: 100 EACH | Refills: 3 | Status: SHIPPED | OUTPATIENT
Start: 2024-04-04

## 2024-04-04 RX ORDER — LANCETS 33 GAUGE
1 EACH MISCELLANEOUS DAILY
Qty: 100 EACH | Refills: 3 | Status: SHIPPED | OUTPATIENT
Start: 2024-04-04

## 2024-04-04 NOTE — ASSESSMENT & PLAN NOTE
She intermittently has a slightly low tsh and then it will normalize. Likely this is related to steroidsl she has a couple of small thyroid nodules that can be followed clinically

## 2024-04-04 NOTE — PROGRESS NOTES
"     Office Note      Date: 2024  Patient Name: Yanique Webster  MRN: [id number]  : 1940    Chief Complaint   Patient presents with    Diabetes     Lancets and test strips refill       History of Present Illness:   Yanique Webster is a 84 y.o. female who presents for Diabetes type 2.   Current RX currently she is on 5 units of levemir twice daily     Bg checks are done:couple times per week   Hypoglycemia :none       Last A1c:  Hemoglobin A1C   Date Value Ref Range Status   2024 5.6 4.5 - 5.7 % Final   2023 5.10 4.80 - 5.60 % Final       Changes in health since last visit: was in the hospital around the first of the year with a broken hip. Had surgery. Then stayed at [facility].. Last eye exam  up to date.    Subjective              Review of Systems:   Review of Systems   Musculoskeletal:  Positive for gait problem.       The following portions of the patient's history were reviewed and updated as appropriate: allergies, current medications, past family history, past medical history, past social history, past surgical history, and problem list.    Objective     Visit Vitals  /70 (BP Location: Left arm, Patient Position: Sitting, Cuff Size: Adult)   Pulse 53   Ht 162.6 cm (64\")   Wt 85 kg (187 lb 6.4 oz)   LMP  (LMP Unknown)   SpO2 99%   BMI 32.17 kg/m²           Physical Exam:  Physical Exam  Vitals reviewed.   Constitutional:       Appearance: Normal appearance.   Neck:      Comments: Thyroid is slightly larger than a normal thyroid but no palpable nodules   Lymphadenopathy:      Cervical: No cervical adenopathy.   Neurological:      Mental Status: She is alert.   Psychiatric:         Mood and Affect: Mood normal.         Behavior: Behavior normal.         Thought Content: Thought content normal.         Judgment: Judgment normal.          Assessment / Plan      Assessment & Plan:  Problem List Items Addressed This Visit       Type 2 diabetes mellitus - Primary    Current " Assessment & Plan     Remains in good control  Will put her back on daily insulin          Relevant Medications    DULoxetine (CYMBALTA) 60 MG capsule    Insulin Glargine (LANTUS SOLOSTAR) 100 UNIT/ML injection pen    Other Relevant Orders    POC Glucose, Blood (Completed)    POC Glycosylated Hemoglobin (Hb A1C) (Completed)    Borderline abnormal TFTs    Current Assessment & Plan     She intermittently has a slightly low tsh and then it will normalize. Likely this is related to steroidsl she has a couple of small thyroid nodules that can be followed clinically               Electronically signed by : Emilio Regalado MD  04/04/2024

## 2024-04-08 ENCOUNTER — TELEPHONE (OUTPATIENT)
Dept: ENDOCRINOLOGY | Facility: CLINIC | Age: 84
End: 2024-04-08

## 2024-04-10 ENCOUNTER — HOSPITAL ENCOUNTER (OUTPATIENT)
Dept: CARDIOLOGY | Facility: HOSPITAL | Age: 84
Discharge: HOME | End: 2024-04-10
Admitting: INTERNAL MEDICINE
Payer: MEDICARE

## 2024-04-10 VITALS — HEIGHT: 64 IN | WEIGHT: 187.39 LBS | BODY MASS INDEX: 31.99 KG/M2

## 2024-04-10 DIAGNOSIS — R09.89 BILATERAL CAROTID BRUITS: ICD-10-CM

## 2024-04-10 LAB
BH CV XLRA MEAS LEFT DIST CCA EDV: 9.5 CM/SEC
BH CV XLRA MEAS LEFT DIST CCA PSV: 95.4 CM/SEC
BH CV XLRA MEAS LEFT DIST ICA EDV: 35.6 CM/SEC
BH CV XLRA MEAS LEFT DIST ICA PSV: 157.6 CM/SEC
BH CV XLRA MEAS LEFT ICA/CCA RATIO: 1.54
BH CV XLRA MEAS LEFT MID CCA EDV: 16.7 CM/SEC
BH CV XLRA MEAS LEFT MID CCA PSV: 102.6 CM/SEC
BH CV XLRA MEAS LEFT MID ICA EDV: 21.8 CM/SEC
BH CV XLRA MEAS LEFT MID ICA PSV: 106 CM/SEC
BH CV XLRA MEAS LEFT PROX CCA EDV: 9.5 CM/SEC
BH CV XLRA MEAS LEFT PROX CCA PSV: 104.8 CM/SEC
BH CV XLRA MEAS LEFT PROX ECA EDV: 5.9 CM/SEC
BH CV XLRA MEAS LEFT PROX ECA PSV: 215.2 CM/SEC
BH CV XLRA MEAS LEFT PROX ICA EDV: 15.6 CM/SEC
BH CV XLRA MEAS LEFT PROX ICA PSV: 113.6 CM/SEC
BH CV XLRA MEAS LEFT PROX SCLA PSV: 380.6 CM/SEC
BH CV XLRA MEAS LEFT VERTEBRAL A EDV: 11.6 CM/SEC
BH CV XLRA MEAS LEFT VERTEBRAL A PSV: 60 CM/SEC
BH CV XLRA MEAS RIGHT DIST CCA EDV: 12.7 CM/SEC
BH CV XLRA MEAS RIGHT DIST CCA PSV: 93.9 CM/SEC
BH CV XLRA MEAS RIGHT DIST ICA EDV: 28 CM/SEC
BH CV XLRA MEAS RIGHT DIST ICA PSV: 143.9 CM/SEC
BH CV XLRA MEAS RIGHT ICA/CCA RATIO: 1.58
BH CV XLRA MEAS RIGHT MID CCA EDV: 11.1 CM/SEC
BH CV XLRA MEAS RIGHT MID CCA PSV: 90.8 CM/SEC
BH CV XLRA MEAS RIGHT MID ICA EDV: 24.2 CM/SEC
BH CV XLRA MEAS RIGHT MID ICA PSV: 135.5 CM/SEC
BH CV XLRA MEAS RIGHT PROX CCA EDV: 16.7 CM/SEC
BH CV XLRA MEAS RIGHT PROX CCA PSV: 90.1 CM/SEC
BH CV XLRA MEAS RIGHT PROX ECA EDV: 13.7 CM/SEC
BH CV XLRA MEAS RIGHT PROX ECA PSV: 244.9 CM/SEC
BH CV XLRA MEAS RIGHT PROX ICA EDV: 31.5 CM/SEC
BH CV XLRA MEAS RIGHT PROX ICA PSV: 121.5 CM/SEC
BH CV XLRA MEAS RIGHT PROX SCLA PSV: 174.8 CM/SEC
BH CV XLRA MEAS RIGHT VERTEBRAL A EDV: 17.1 CM/SEC
BH CV XLRA MEAS RIGHT VERTEBRAL A PSV: 90.1 CM/SEC

## 2024-04-10 PROCEDURE — 93880 EXTRACRANIAL BILAT STUDY: CPT

## 2024-04-11 ENCOUNTER — TELEPHONE (OUTPATIENT)
Dept: CARDIOLOGY | Facility: CLINIC | Age: 84
End: 2024-04-11
Payer: MEDICARE

## 2024-04-11 NOTE — TELEPHONE ENCOUNTER
----- Message from Fifi Wright RN sent at 4/11/2024  8:41 AM EDT -----    ----- Message -----  From: Steve Geronimo MD  Sent: 4/10/2024   6:22 PM EDT  To: Fifi Wright RN    Carotid without evidence of significant stenosis.  Mild plaque.  Continue current plan  ----- Message -----  From: Stvee Geronimo MD  Sent: 4/10/2024   5:48 PM EDT  To: Steve Geronimo MD

## 2024-04-11 NOTE — TELEPHONE ENCOUNTER
----- Message from Fifi Wright RN sent at 4/11/2024  8:41 AM EDT -----    ----- Message -----  From: Steve Geronimo MD  Sent: 4/10/2024   6:22 PM EDT  To: Fifi Wright RN    Carotid without evidence of significant stenosis.  Mild plaque.  Continue current plan  ----- Message -----  From: Steve Geronimo MD  Sent: 4/10/2024   5:48 PM EDT  To: Steve Geronimo MD

## 2024-04-25 ENCOUNTER — TELEPHONE (OUTPATIENT)
Dept: CARDIOLOGY | Facility: CLINIC | Age: 84
End: 2024-04-25
Payer: MEDICARE

## 2024-04-25 NOTE — TELEPHONE ENCOUNTER
Caller: Yanique Webster    Relationship: Self    Best call back number: [phone]    What is the best time to reach you: ANY    Who are you requesting to speak with (clinical staff, provider,  specific staff member): CLINICAL      What was the call regarding: PATIENT CALLED AND STATES SHE IS HAVING A TOOTH EXTRACTION AND SOME BRIDGE WORK DONE ON MONDAY 4-29-24. SHE IS INQUIRING IF SHE CAN TEMPORARILY DISCONTINUE HER XARELTO UNTIL AFTER MONDAY. PLEASE CONTACT PATIENT IN REGARDS TO THIS ISSUE.    Is it okay if the provider responds through ezeephart: PLEASE CALL

## 2024-05-28 ENCOUNTER — TELEPHONE (OUTPATIENT)
Dept: ENDOCRINOLOGY | Facility: CLINIC | Age: 84
End: 2024-05-28
Payer: MEDICARE

## 2024-05-28 NOTE — TELEPHONE ENCOUNTER
Pharmacy Name: [facility] - [place], [place] - [address] [phone] [facility] [phone] FX     Reference Number (if applicable):     Pharmacy representative name: XENA    Pharmacy representative phone number: [phone]    What medication are you calling in regards to: ACCU-CHECK METER    What question does the pharmacy have: NEEDS REFILL     Who is the provider that prescribed the medication: DR. BLACKMON    Additional notes:

## 2024-05-29 RX ORDER — BLOOD-GLUCOSE METER
1 EACH MISCELLANEOUS SEE ADMIN INSTRUCTIONS
Qty: 1 KIT | Refills: 0 | Status: SHIPPED | OUTPATIENT
Start: 2024-05-29

## 2024-06-26 ENCOUNTER — OFFICE VISIT (OUTPATIENT)
Dept: ORTHOPEDIC SURGERY | Facility: CLINIC | Age: 84
End: 2024-06-26
Payer: MEDICARE

## 2024-06-26 VITALS
BODY MASS INDEX: 31.41 KG/M2 | WEIGHT: 184 LBS | HEIGHT: 64 IN | SYSTOLIC BLOOD PRESSURE: 108 MMHG | DIASTOLIC BLOOD PRESSURE: 62 MMHG

## 2024-06-26 DIAGNOSIS — Z09 SURGERY FOLLOW-UP: Primary | ICD-10-CM

## 2024-06-26 PROCEDURE — 99213 OFFICE O/P EST LOW 20 MIN: CPT | Performed by: ORTHOPAEDIC SURGERY

## 2024-06-26 PROCEDURE — 1159F MED LIST DOCD IN RCRD: CPT | Performed by: ORTHOPAEDIC SURGERY

## 2024-06-26 PROCEDURE — 3074F SYST BP LT 130 MM HG: CPT | Performed by: ORTHOPAEDIC SURGERY

## 2024-06-26 PROCEDURE — 3078F DIAST BP <80 MM HG: CPT | Performed by: ORTHOPAEDIC SURGERY

## 2024-06-26 PROCEDURE — 1160F RVW MEDS BY RX/DR IN RCRD: CPT | Performed by: ORTHOPAEDIC SURGERY

## 2024-06-26 RX ORDER — DOXAZOSIN MESYLATE 4 MG/1
TABLET ORAL
COMMUNITY
Start: 2024-04-08

## 2024-06-26 NOTE — PROGRESS NOTES
"                          [facility] Office Follow Up     Office Follow Up Visit     Date: 06/26/2024   Patient Name: Yanique Webster  MRN: [id number]  YOB: 1940  Chief Complaint:   Chief Complaint   Patient presents with    Follow-up     3 month follow up - 5.5 months S/P left hip cannulated screw placement (DOS: 1/2/24)     History of Present Illness:   Yanique Webster is a 84 y.o. female who is here today for follow up for follow-up status post percutaneous screw fixation for left valgus impacted femoral neck fracture.  Continues to ambulate with walker which is her baseline.  Denies any hip pain.  Is very happy with her progress.  States knee pain has resolved as well.  No new complaints.    Subjective   I reviewed the patient's chief complaint, history of present illness, review of systems, past medical history, surgical history, family history, social history, medications and allergy list   Objective    Vital Signs:   Vitals:    06/26/24 1141   BP: 108/62   Weight: 83.5 kg (184 lb)   Height: 162.6 cm (64.02\")     Body mass index is 31.57 kg/m².    Ortho Exam:  Left hip incision is well-healed. Able to actively flex and extend hip as well as knee and ankle. No pain with any hip movement. Ambulating with a walker in clinic with nonantalgic gait.     Results Review:  XR Hip With or Without Pelvis 2 - 3 View Left  Left Hip X-Ray  Indication: Pain  Views:  AP pelvis and Frog Leg lateral  Comparison: Previous  Impression: Postsurgical changes about the left hip, hardware alignment   similar to previous with no signs of failure, unable to see the fracture   line on x-ray again today consistent with bony healing       Assessment / Plan    Assessment/Plan:   Diagnoses and all orders for this visit:    1. Surgery follow-up (Primary)  -     XR Hip With or Without Pelvis 2 - 3 View Left      Patient is now about 6 months out from her surgery and continues to do well.  At this point in " time appears to have made a full recovery.  Will plan to see patient back for surveillance x-rays in 6 months.  Counseled patient to contact clinic sooner should any concerns arise.  Was a pleasure seeing her today.    Follow Up:   Return in about 6 months (around 12/26/2024).      Seymour Harrington MD  [facility] Orthopedic Surgeon

## 2024-08-02 ENCOUNTER — TRANSCRIBE ORDERS (OUTPATIENT)
Dept: GENERAL RADIOLOGY | Facility: HOSPITAL | Age: 84
End: 2024-08-02
Payer: MEDICARE

## 2024-08-02 ENCOUNTER — HOSPITAL ENCOUNTER (OUTPATIENT)
Dept: GENERAL RADIOLOGY | Facility: HOSPITAL | Age: 84
Discharge: HOME | End: 2024-08-02
Payer: MEDICARE

## 2024-08-02 DIAGNOSIS — J44.9 CHRONIC OBSTRUCTIVE PULMONARY DISEASE, UNSPECIFIED COPD TYPE: Primary | ICD-10-CM

## 2024-08-02 DIAGNOSIS — J44.9 CHRONIC OBSTRUCTIVE PULMONARY DISEASE, UNSPECIFIED COPD TYPE: ICD-10-CM

## 2024-08-02 PROCEDURE — 71046 X-RAY EXAM CHEST 2 VIEWS: CPT

## 2024-08-12 ENCOUNTER — HOSPITAL ENCOUNTER (INPATIENT)
Facility: HOSPITAL | Age: 84
LOS: 5 days | Discharge: NURSING, REHAB OR LONG-TERM CARE | DRG: 811 | End: 2024-08-19
Attending: FAMILY MEDICINE | Admitting: INTERNAL MEDICINE
Payer: MEDICARE

## 2024-08-12 ENCOUNTER — APPOINTMENT (OUTPATIENT)
Dept: GENERAL RADIOLOGY | Facility: HOSPITAL | Age: 84
DRG: 811 | End: 2024-08-12
Payer: MEDICARE

## 2024-08-12 DIAGNOSIS — I48.0 PAROXYSMAL ATRIAL FIBRILLATION: ICD-10-CM

## 2024-08-12 DIAGNOSIS — D62 ACUTE BLOOD LOSS ANEMIA: ICD-10-CM

## 2024-08-12 DIAGNOSIS — K92.1 MELENA: ICD-10-CM

## 2024-08-12 DIAGNOSIS — D64.9 SYMPTOMATIC ANEMIA: Primary | ICD-10-CM

## 2024-08-12 DIAGNOSIS — D63.8 ANEMIA, CHRONIC DISEASE: ICD-10-CM

## 2024-08-12 LAB
ABO GROUP BLD: NORMAL
ALBUMIN SERPL-MCNC: 3.5 G/DL (ref 3.5–5.2)
ALBUMIN/GLOB SERPL: 1.6 G/DL
ALP SERPL-CCNC: 161 U/L (ref 39–117)
ALT SERPL W P-5'-P-CCNC: 9 U/L (ref 1–33)
ANION GAP SERPL CALCULATED.3IONS-SCNC: 10 MMOL/L (ref 5–15)
AST SERPL-CCNC: 15 U/L (ref 1–32)
BASOPHILS # BLD AUTO: 0.04 10*3/MM3 (ref 0–0.2)
BASOPHILS NFR BLD AUTO: 0.8 % (ref 0–1.5)
BILIRUB SERPL-MCNC: 0.3 MG/DL (ref 0–1.2)
BLD GP AB SCN SERPL QL: NEGATIVE
BUN SERPL-MCNC: 46 MG/DL (ref 8–23)
BUN/CREAT SERPL: 16.6 (ref 7–25)
CALCIUM SPEC-SCNC: 9.5 MG/DL (ref 8.6–10.5)
CHLORIDE SERPL-SCNC: 108 MMOL/L (ref 98–107)
CO2 SERPL-SCNC: 20 MMOL/L (ref 22–29)
CREAT SERPL-MCNC: 2.77 MG/DL (ref 0.57–1)
DEPRECATED RDW RBC AUTO: 50.3 FL (ref 37–54)
EGFRCR SERPLBLD CKD-EPI 2021: 16.5 ML/MIN/1.73
EOSINOPHIL # BLD AUTO: 0.16 10*3/MM3 (ref 0–0.4)
EOSINOPHIL NFR BLD AUTO: 3.4 % (ref 0.3–6.2)
ERYTHROCYTE [DISTWIDTH] IN BLOOD BY AUTOMATED COUNT: 15.7 % (ref 12.3–15.4)
FERRITIN SERPL-MCNC: 40.23 NG/ML (ref 13–150)
GLOBULIN UR ELPH-MCNC: 2.2 GM/DL
GLUCOSE BLDC GLUCOMTR-MCNC: 172 MG/DL (ref 70–130)
GLUCOSE SERPL-MCNC: 163 MG/DL (ref 65–99)
HCT VFR BLD AUTO: 21.1 % (ref 34–46.6)
HGB BLD-MCNC: 6.3 G/DL (ref 12–15.9)
IMM GRANULOCYTES # BLD AUTO: 0.01 10*3/MM3 (ref 0–0.05)
IMM GRANULOCYTES NFR BLD AUTO: 0.2 % (ref 0–0.5)
IRON 24H UR-MRATE: 39 MCG/DL (ref 37–145)
IRON SATN MFR SERPL: 14 % (ref 20–50)
LYMPHOCYTES # BLD AUTO: 1.23 10*3/MM3 (ref 0.7–3.1)
LYMPHOCYTES NFR BLD AUTO: 25.8 % (ref 19.6–45.3)
MAGNESIUM SERPL-MCNC: 1.8 MG/DL (ref 1.6–2.4)
MCH RBC QN AUTO: 26.4 PG (ref 26.6–33)
MCHC RBC AUTO-ENTMCNC: 29.9 G/DL (ref 31.5–35.7)
MCV RBC AUTO: 88.3 FL (ref 79–97)
MONOCYTES # BLD AUTO: 0.45 10*3/MM3 (ref 0.1–0.9)
MONOCYTES NFR BLD AUTO: 9.4 % (ref 5–12)
NEUTROPHILS NFR BLD AUTO: 2.88 10*3/MM3 (ref 1.7–7)
NEUTROPHILS NFR BLD AUTO: 60.4 % (ref 42.7–76)
NRBC BLD AUTO-RTO: 0 /100 WBC (ref 0–0.2)
PHOSPHATE SERPL-MCNC: 4.3 MG/DL (ref 2.5–4.5)
PLATELET # BLD AUTO: 125 10*3/MM3 (ref 140–450)
PMV BLD AUTO: 11.2 FL (ref 6–12)
POTASSIUM SERPL-SCNC: 4.4 MMOL/L (ref 3.5–5.2)
PROT SERPL-MCNC: 5.7 G/DL (ref 6–8.5)
RBC # BLD AUTO: 2.39 10*6/MM3 (ref 3.77–5.28)
RH BLD: NEGATIVE
SODIUM SERPL-SCNC: 138 MMOL/L (ref 136–145)
T&S EXPIRATION DATE: NORMAL
TIBC SERPL-MCNC: 280 MCG/DL (ref 298–536)
TRANSFERRIN SERPL-MCNC: 188 MG/DL (ref 200–360)
WBC NRBC COR # BLD AUTO: 4.77 10*3/MM3 (ref 3.4–10.8)

## 2024-08-12 PROCEDURE — 80053 COMPREHEN METABOLIC PANEL: CPT | Performed by: INTERNAL MEDICINE

## 2024-08-12 PROCEDURE — 86900 BLOOD TYPING SEROLOGIC ABO: CPT

## 2024-08-12 PROCEDURE — 86923 COMPATIBILITY TEST ELECTRIC: CPT

## 2024-08-12 PROCEDURE — 84466 ASSAY OF TRANSFERRIN: CPT | Performed by: INTERNAL MEDICINE

## 2024-08-12 PROCEDURE — G0378 HOSPITAL OBSERVATION PER HR: HCPCS

## 2024-08-12 PROCEDURE — 71045 X-RAY EXAM CHEST 1 VIEW: CPT

## 2024-08-12 PROCEDURE — 63710000001 INSULIN LISPRO (HUMAN) PER 5 UNITS: Performed by: INTERNAL MEDICINE

## 2024-08-12 PROCEDURE — 84100 ASSAY OF PHOSPHORUS: CPT | Performed by: INTERNAL MEDICINE

## 2024-08-12 PROCEDURE — 83735 ASSAY OF MAGNESIUM: CPT | Performed by: INTERNAL MEDICINE

## 2024-08-12 PROCEDURE — 83036 HEMOGLOBIN GLYCOSYLATED A1C: CPT | Performed by: INTERNAL MEDICINE

## 2024-08-12 PROCEDURE — 83540 ASSAY OF IRON: CPT | Performed by: INTERNAL MEDICINE

## 2024-08-12 PROCEDURE — 94640 AIRWAY INHALATION TREATMENT: CPT

## 2024-08-12 PROCEDURE — 86900 BLOOD TYPING SEROLOGIC ABO: CPT | Performed by: INTERNAL MEDICINE

## 2024-08-12 PROCEDURE — 85025 COMPLETE CBC W/AUTO DIFF WBC: CPT | Performed by: INTERNAL MEDICINE

## 2024-08-12 PROCEDURE — 82948 REAGENT STRIP/BLOOD GLUCOSE: CPT

## 2024-08-12 PROCEDURE — 86901 BLOOD TYPING SEROLOGIC RH(D): CPT | Performed by: INTERNAL MEDICINE

## 2024-08-12 PROCEDURE — 36430 TRANSFUSION BLD/BLD COMPNT: CPT

## 2024-08-12 PROCEDURE — 86850 RBC ANTIBODY SCREEN: CPT | Performed by: INTERNAL MEDICINE

## 2024-08-12 PROCEDURE — P9016 RBC LEUKOCYTES REDUCED: HCPCS

## 2024-08-12 PROCEDURE — 82728 ASSAY OF FERRITIN: CPT | Performed by: INTERNAL MEDICINE

## 2024-08-12 PROCEDURE — 99223 1ST HOSP IP/OBS HIGH 75: CPT | Performed by: INTERNAL MEDICINE

## 2024-08-12 RX ORDER — CARVEDILOL 12.5 MG/1
25 TABLET ORAL EVERY 12 HOURS SCHEDULED
Status: DISCONTINUED | OUTPATIENT
Start: 2024-08-12 | End: 2024-08-19 | Stop reason: HOSPADM

## 2024-08-12 RX ORDER — NICOTINE POLACRILEX 4 MG
15 LOZENGE BUCCAL
Status: DISCONTINUED | OUTPATIENT
Start: 2024-08-12 | End: 2024-08-12

## 2024-08-12 RX ORDER — DEXTROSE MONOHYDRATE 25 G/50ML
25 INJECTION, SOLUTION INTRAVENOUS
Status: DISCONTINUED | OUTPATIENT
Start: 2024-08-12 | End: 2024-08-16 | Stop reason: SDUPTHER

## 2024-08-12 RX ORDER — ACETAMINOPHEN 325 MG/1
650 TABLET ORAL EVERY 4 HOURS PRN
Status: DISCONTINUED | OUTPATIENT
Start: 2024-08-12 | End: 2024-08-19 | Stop reason: HOSPADM

## 2024-08-12 RX ORDER — DEXTROSE MONOHYDRATE 25 G/50ML
25 INJECTION, SOLUTION INTRAVENOUS
Status: DISCONTINUED | OUTPATIENT
Start: 2024-08-12 | End: 2024-08-19 | Stop reason: HOSPADM

## 2024-08-12 RX ORDER — ONDANSETRON 4 MG/1
4 TABLET, ORALLY DISINTEGRATING ORAL EVERY 6 HOURS PRN
Status: DISCONTINUED | OUTPATIENT
Start: 2024-08-12 | End: 2024-08-19 | Stop reason: HOSPADM

## 2024-08-12 RX ORDER — SODIUM CHLORIDE 9 MG/ML
40 INJECTION, SOLUTION INTRAVENOUS AS NEEDED
Status: DISCONTINUED | OUTPATIENT
Start: 2024-08-12 | End: 2024-08-19 | Stop reason: HOSPADM

## 2024-08-12 RX ORDER — INSULIN LISPRO 100 [IU]/ML
2-7 INJECTION, SOLUTION INTRAVENOUS; SUBCUTANEOUS
Status: DISCONTINUED | OUTPATIENT
Start: 2024-08-12 | End: 2024-08-19 | Stop reason: HOSPADM

## 2024-08-12 RX ORDER — ACETAMINOPHEN 160 MG/5ML
650 SOLUTION ORAL EVERY 4 HOURS PRN
Status: DISCONTINUED | OUTPATIENT
Start: 2024-08-12 | End: 2024-08-19 | Stop reason: HOSPADM

## 2024-08-12 RX ORDER — DULOXETIN HYDROCHLORIDE 60 MG/1
60 CAPSULE, DELAYED RELEASE ORAL DAILY
Status: DISCONTINUED | OUTPATIENT
Start: 2024-08-13 | End: 2024-08-19 | Stop reason: HOSPADM

## 2024-08-12 RX ORDER — SODIUM CHLORIDE 0.9 % (FLUSH) 0.9 %
10 SYRINGE (ML) INJECTION EVERY 12 HOURS SCHEDULED
Status: DISCONTINUED | OUTPATIENT
Start: 2024-08-12 | End: 2024-08-19 | Stop reason: HOSPADM

## 2024-08-12 RX ORDER — NICOTINE POLACRILEX 4 MG
15 LOZENGE BUCCAL
Status: DISCONTINUED | OUTPATIENT
Start: 2024-08-12 | End: 2024-08-19 | Stop reason: HOSPADM

## 2024-08-12 RX ORDER — IBUPROFEN 600 MG/1
1 TABLET ORAL
Status: DISCONTINUED | OUTPATIENT
Start: 2024-08-12 | End: 2024-08-19 | Stop reason: HOSPADM

## 2024-08-12 RX ORDER — ATORVASTATIN CALCIUM 40 MG/1
80 TABLET, FILM COATED ORAL DAILY
Status: DISCONTINUED | OUTPATIENT
Start: 2024-08-13 | End: 2024-08-19 | Stop reason: HOSPADM

## 2024-08-12 RX ORDER — SODIUM CHLORIDE 0.9 % (FLUSH) 0.9 %
10 SYRINGE (ML) INJECTION AS NEEDED
Status: DISCONTINUED | OUTPATIENT
Start: 2024-08-12 | End: 2024-08-19 | Stop reason: HOSPADM

## 2024-08-12 RX ORDER — INSULIN LISPRO 100 [IU]/ML
2-9 INJECTION, SOLUTION INTRAVENOUS; SUBCUTANEOUS
Status: DISCONTINUED | OUTPATIENT
Start: 2024-08-12 | End: 2024-08-12

## 2024-08-12 RX ORDER — PANTOPRAZOLE SODIUM 40 MG/1
40 TABLET, DELAYED RELEASE ORAL DAILY
Status: DISCONTINUED | OUTPATIENT
Start: 2024-08-12 | End: 2024-08-13

## 2024-08-12 RX ORDER — IPRATROPIUM BROMIDE AND ALBUTEROL SULFATE 2.5; .5 MG/3ML; MG/3ML
3 SOLUTION RESPIRATORY (INHALATION)
Status: DISCONTINUED | OUTPATIENT
Start: 2024-08-12 | End: 2024-08-19 | Stop reason: HOSPADM

## 2024-08-12 RX ORDER — OXYCODONE HYDROCHLORIDE 5 MG/1
5 TABLET ORAL EVERY 6 HOURS PRN
Status: DISPENSED | OUTPATIENT
Start: 2024-08-12 | End: 2024-08-17

## 2024-08-12 RX ORDER — ACETAMINOPHEN 650 MG/1
650 SUPPOSITORY RECTAL EVERY 4 HOURS PRN
Status: DISCONTINUED | OUTPATIENT
Start: 2024-08-12 | End: 2024-08-19 | Stop reason: HOSPADM

## 2024-08-12 RX ORDER — ONDANSETRON 2 MG/ML
4 INJECTION INTRAMUSCULAR; INTRAVENOUS EVERY 6 HOURS PRN
Status: DISCONTINUED | OUTPATIENT
Start: 2024-08-12 | End: 2024-08-19 | Stop reason: HOSPADM

## 2024-08-12 RX ORDER — CLONIDINE HYDROCHLORIDE 0.1 MG/1
0.1 TABLET ORAL 2 TIMES DAILY
Status: DISCONTINUED | OUTPATIENT
Start: 2024-08-12 | End: 2024-08-16

## 2024-08-12 RX ORDER — MORPHINE SULFATE 15 MG/1
15 TABLET, FILM COATED, EXTENDED RELEASE ORAL 2 TIMES DAILY
Status: DISCONTINUED | OUTPATIENT
Start: 2024-08-12 | End: 2024-08-12

## 2024-08-12 RX ORDER — CALCIUM CARBONATE 500 MG/1
1 TABLET, CHEWABLE ORAL 3 TIMES DAILY PRN
Status: DISCONTINUED | OUTPATIENT
Start: 2024-08-12 | End: 2024-08-19 | Stop reason: HOSPADM

## 2024-08-12 RX ORDER — POLYETHYLENE GLYCOL 3350 17 G/17G
17 POWDER, FOR SOLUTION ORAL DAILY PRN
Status: DISCONTINUED | OUTPATIENT
Start: 2024-08-12 | End: 2024-08-19 | Stop reason: HOSPADM

## 2024-08-12 RX ADMIN — INSULIN LISPRO 2 UNITS: 100 INJECTION, SOLUTION INTRAVENOUS; SUBCUTANEOUS at 21:50

## 2024-08-12 RX ADMIN — CLONIDINE HYDROCHLORIDE 0.1 MG: 0.1 TABLET ORAL at 20:11

## 2024-08-12 RX ADMIN — CARVEDILOL 25 MG: 12.5 TABLET, FILM COATED ORAL at 20:11

## 2024-08-12 RX ADMIN — HYDRALAZINE HYDROCHLORIDE 75 MG: 50 TABLET ORAL at 21:50

## 2024-08-12 RX ADMIN — OXYCODONE HYDROCHLORIDE 5 MG: 5 TABLET ORAL at 20:50

## 2024-08-12 RX ADMIN — PANTOPRAZOLE SODIUM 40 MG: 40 TABLET, DELAYED RELEASE ORAL at 20:12

## 2024-08-12 RX ADMIN — IPRATROPIUM BROMIDE AND ALBUTEROL SULFATE 3 ML: 2.5; .5 SOLUTION RESPIRATORY (INHALATION) at 21:07

## 2024-08-12 NOTE — H&P
[facility] Medicine Services  HISTORY AND PHYSICAL    Patient Name: Yanique Webster  : 1940  MRN: [id number]  Primary Care Physician: Sebas Alicea MD  Date of admission: 2024      Subjective   Subjective     Chief Complaint:  Progressive weakness and shortness of breath.    HPI:  Yanique Webster is a 84 y.o. female with multiple medical problems including diabetes mellitus, hypertension, hyperlipidemia, paroxysmal atrial fibrillation on Xarelto, chronic kidney disease, COPD, history of Takotsubo heart failure, history of ulcers, chronic pain and she is on MS Contin and goes to pain clinic.  Patient was sent from the office of Dr. Alicea, the primary care physician, to be admitted for symptomatic anemia.  Evidently patient had lab work on 2024 which showed anemia.  Patient went to Dr. Alicea's office today and complained about worsening of the symptoms and she was sent here to be admitted for possible transfusion.  Patient tells me that she was in her usual state of health until the weakness and shortness of breath that started about 7 days ago or so.  Denies fever or chills.  Denies chest pain or palpitation.  No nausea vomiting or diarrhea or abdominal pain.    Personal History     Past Medical History:   Diagnosis Date    Blurry vision     Chronic joint pain     Chronic pain     COPD (chronic obstructive pulmonary disease)     Coronary artery disease     Diabetic gastroparesis 2016    Esophageal stricture     s/p dilation in     GERD (gastroesophageal reflux disease)     Gout     Headache     secondary to hypertension    Hyperlipidemia     Hypertension     Long term current use of insulin     Neuropathy     Neuropathy due to type 2 diabetes mellitus     Obesity     Osteoarthritis     Pericarditis     Stroke 2019    Type 2 diabetes mellitus            Past Surgical History:   Procedure Laterality Date    BRONCHOSCOPY N/A 2016     Procedure: BRONCHOSCOPY BIOPSY AT BEDSIDE;  Surgeon: Mookie Willard MD;  Location:  [facility];  Service:     CARDIAC CATHETERIZATION N/A 8/30/2016    Procedure: Left Heart Cath;  Surgeon: Steve Geronimo MD;  Location:  [facility];  Service:     CARDIAC CATHETERIZATION N/A 8/30/2016    Procedure: Right Heart Cath;  Surgeon: Steve Geronimo MD;  Location:  [facility];  Service:     CARDIAC ELECTROPHYSIOLOGY PROCEDURE N/A 7/2/2019    Procedure: Loop insertion;  Surgeon: Steve Geronimo MD;  Location:  [facility];  Service: Cardiovascular    CARDIAC ELECTROPHYSIOLOGY PROCEDURE N/A 9/26/2023    Procedure: Loop recorder removal;  Surgeon: Steve Geronimo MD;  Location:  [facility] INVAS[facility];  Service: Cardiovascular;  Laterality: N/A;    CATARACT EXTRACTION      ESOPHAGEAL DILATATION  2007    HERNIA REPAIR      HIP CANNULATED SCREW PLACEMENT Left 1/2/2024    Procedure: HIP CANNULATED SCREW PLACEMENT LEFT;  Surgeon: Seymour Harrington MD;  Location:  [facility] OR;  Service: Orthopedics;  Laterality: Left;    HYSTERECTOMY  1998    WRIST FRACTURE SURGERY Left        Family History: family history includes Breast cancer (age of onset: 67) in her sister; Cancer in her father, mother, and another family member.     Social History:  reports that she quit smoking about 21 years ago. Her smoking use included cigarettes. She has never used smokeless tobacco. She reports that she does not drink alcohol and does not use drugs.  Social History     Social History Narrative    ** Merged History Encounter **         Mrs. Webster is  and lives with her  in [place]. 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 She smoked 1ppd for 25 years but quit in 2000. Denies ETOH/illicit drug use.        Medications:  Available home medication information reviewed.  Accu-Chek Guide, Blood Glucose Test, DULoxetine, Insulin Glargine, Insulin Pen  Needle, Lancets 33G, Morphine, acetaminophen, atorvastatin, carvedilol, cloNIDine, doxazosin, hydrALAZINE, lisinopril, pantoprazole, polyethylene glycol, probiotic, rivaroxaban, and torsemide    Allergies   Allergen Reactions    Penicillins     Latex Rash     Not an immediate reaction    Nickel Rash    Penicillins Rash     unk       Objective   Objective     Vital Signs:   Temp:  [98.7 °F (37.1 °C)] 98.7 °F (37.1 °C)  Heart Rate:  [69] 69  Resp:  [18] 18  BP: (156)/(58) 156/58       Physical Exam   Constitutional: No acute distress, awake, alert  HENT: NCAT, mucous membranes moist  Respiratory: Clear to auscultation bilaterally, respiratory effort normal   Cardiovascular: RRR, no murmurs, rubs, or gallops  Gastrointestinal: Abdomen is obese.  Positive bowel sounds, soft, nontender, nondistended  Musculoskeletal: bilateral ankle edema  Psychiatric: Appropriate affect, cooperative  Neurologic: Oriented x 3, no focality appreciated, speech clear  Skin: No rashes     Result Review:  I have personally reviewed the results from the time of this admission to 8/12/2024 18:51 EDT and agree with these findings:  [x]  Laboratory list / accordion  []  Microbiology  []  Radiology  []  EKG/Telemetry   []  Cardiology/Vascular   []  Pathology  [x]  Old records  []  Other:  Most notable findings include: Hemoglobin is 6.3.  Hematocrit 21.1, platelet is 125.  WBC is normal at 4.77.      LAB RESULTS:      Lab 08/12/24  1806   WBC 4.77   HEMOGLOBIN 6.3*   HEMATOCRIT 21.1*   PLATELETS 125*   NEUTROS ABS 2.88   IMMATURE GRANS (ABS) 0.01   LYMPHS ABS 1.23   MONOS ABS 0.45   EOS ABS 0.16   MCV 88.3                             UA          12/31/2023    07:53 2/1/2024    23:37   Urinalysis   Squamous Epithelial Cells, UA None Seen  0-2    Specific Gravity, UA 1.014  1.015    Ketones, UA Negative  Negative    Blood, UA Negative  Trace    Leukocytes, UA Negative  Negative    Nitrite, UA Negative  Negative    RBC, UA 0-2  3-5    WBC, UA 0-2   11-20    Bacteria, UA None Seen  None Seen        Microbiology Results (last 10 days)       ** No results found for the last 240 hours. **            No radiology results from the last 24 hrs    Results for orders placed during the hospital encounter of 12/30/23    Adult Transthoracic Echo Complete W/ Cont if Necessary Per Protocol    Interpretation Summary    Left ventricular ejection fraction appears to be 56 - 60%.    Left ventricular wall thickness is consistent with mild concentric hypertrophy    The aortic valve exhibits sclerosis.    Mild aortic valve regurgitation is present. No hemodynamically significant aortic valve stenosis is present.    Mild tricuspid valve regurgitation is present. Estimated right ventricular systolic pressure from tricuspid regurgitation is moderately elevated (45-55 mmHg    Moderate mitral annular calcification is present. Trace to mild mitral valve regurgitation is present. No significant mitral valve stenosis is present.      Assessment & Plan   Assessment & Plan       Symptomatic anemia    Patient is a very pleasant 84-year-old  female with past medical history of diabetes mellitus, hypertension, hyperlipidemia, paroxysmal atrial fibrillation on Xarelto, chronic kidney disease, history of ulcers.  Patient was sent from the office of primary care physician for symptomatic anemia.    Progressive shortness of breath and weakness for past several days  -Most likely secondary to anemia.  Likely his anemia is the major contributing factor.  -Lab which was done here at [facility] showed hemoglobin of 6.3 and hematocrit of 21.1.  Will give 1 unit of packed red blood cell for now and check again and we may have to give 1 more unit tomorrow.  -Patient has history of ulcers.  She is also on Xarelto for paroxysmal atrial fibrillation.  Will hold Xarelto for now.  Will also ask GI to see the patient in the morning.    Diabetes mellitus  -Will start the patient on  sliding scale and monitor.  If necessary will put the patient on Lantus and also Premeal insulin.  Will check hemoglobin A1c also.    KINDRA on CKD  - consider hydration after transfusion  - consult nephrology in am    COPD  -Evidently patient was started on inhalers recently  -Will start DuoNeb treatment    Chronic pain  -Tells me that she goes to pain pre-clinic and currently is on MS Contin 15 mg every 12 hours.  -Will continue the pain medication and monitor    Hypertension  Hyperlipidemia  -Continue home medication and monitor        VTE Prophylaxis:  Mechanical VTE prophylaxis orders are present.          CODE STATUS:    Code Status and Medical Interventions: CPR (Attempt to Resuscitate); Full Support   Ordered at: 08/12/24 1827     Code Status (Patient has no pulse and is not breathing):    CPR (Attempt to Resuscitate)     Medical Interventions (Patient has pulse or is breathing):    Full Support     Total time spent was 80 minutes.  Expected Discharge   Expected discharge date/ time has not been documented.  Home in 2 to 3 days    Axel Belle MD  08/12/24

## 2024-08-13 LAB
25(OH)D3 SERPL-MCNC: 25.1 NG/ML (ref 30–100)
BACTERIA UR QL AUTO: NORMAL /HPF
BASOPHILS # BLD AUTO: 0.03 10*3/MM3 (ref 0–0.2)
BASOPHILS NFR BLD AUTO: 0.4 % (ref 0–1.5)
BH BB BLOOD EXPIRATION DATE: NORMAL
BH BB BLOOD TYPE BARCODE: 9500
BH BB DISPENSE STATUS: NORMAL
BH BB PRODUCT CODE: NORMAL
BH BB UNIT NUMBER: NORMAL
BILIRUB UR QL STRIP: NEGATIVE
CLARITY UR: CLEAR
COLOR UR: YELLOW
CROSSMATCH INTERPRETATION: NORMAL
DEPRECATED RDW RBC AUTO: 49.5 FL (ref 37–54)
EOSINOPHIL # BLD AUTO: 0.11 10*3/MM3 (ref 0–0.4)
EOSINOPHIL NFR BLD AUTO: 1.6 % (ref 0.3–6.2)
ERYTHROCYTE [DISTWIDTH] IN BLOOD BY AUTOMATED COUNT: 15.4 % (ref 12.3–15.4)
FOLATE SERPL-MCNC: 10.5 NG/ML (ref 4.78–24.2)
GLUCOSE BLDC GLUCOMTR-MCNC: 132 MG/DL (ref 70–130)
GLUCOSE BLDC GLUCOMTR-MCNC: 145 MG/DL (ref 70–130)
GLUCOSE BLDC GLUCOMTR-MCNC: 198 MG/DL (ref 70–130)
GLUCOSE BLDC GLUCOMTR-MCNC: 210 MG/DL (ref 70–130)
GLUCOSE UR STRIP-MCNC: ABNORMAL MG/DL
HCT VFR BLD AUTO: 23.9 % (ref 34–46.6)
HCT VFR BLD AUTO: 24.4 % (ref 34–46.6)
HCT VFR BLD AUTO: 26 % (ref 34–46.6)
HGB BLD-MCNC: 7.5 G/DL (ref 12–15.9)
HGB BLD-MCNC: 7.6 G/DL (ref 12–15.9)
HGB BLD-MCNC: 8.1 G/DL (ref 12–15.9)
HGB UR QL STRIP.AUTO: NEGATIVE
HYALINE CASTS UR QL AUTO: NORMAL /LPF
IMM GRANULOCYTES # BLD AUTO: 0.03 10*3/MM3 (ref 0–0.05)
IMM GRANULOCYTES NFR BLD AUTO: 0.4 % (ref 0–0.5)
KETONES UR QL STRIP: NEGATIVE
LEUKOCYTE ESTERASE UR QL STRIP.AUTO: NEGATIVE
LYMPHOCYTES # BLD AUTO: 0.94 10*3/MM3 (ref 0.7–3.1)
LYMPHOCYTES NFR BLD AUTO: 13.4 % (ref 19.6–45.3)
MCH RBC QN AUTO: 27.2 PG (ref 26.6–33)
MCHC RBC AUTO-ENTMCNC: 31.2 G/DL (ref 31.5–35.7)
MCV RBC AUTO: 87.2 FL (ref 79–97)
MONOCYTES # BLD AUTO: 0.39 10*3/MM3 (ref 0.1–0.9)
MONOCYTES NFR BLD AUTO: 5.6 % (ref 5–12)
NEUTROPHILS NFR BLD AUTO: 5.51 10*3/MM3 (ref 1.7–7)
NEUTROPHILS NFR BLD AUTO: 78.6 % (ref 42.7–76)
NITRITE UR QL STRIP: NEGATIVE
NRBC BLD AUTO-RTO: 0 /100 WBC (ref 0–0.2)
PH UR STRIP.AUTO: 6 [PH] (ref 5–8)
PLATELET # BLD AUTO: 133 10*3/MM3 (ref 140–450)
PMV BLD AUTO: 11.5 FL (ref 6–12)
PROT ?TM UR-MCNC: 148.7 MG/DL
PROT UR QL STRIP: ABNORMAL
RBC # BLD AUTO: 2.98 10*6/MM3 (ref 3.77–5.28)
RBC # UR STRIP: NORMAL /HPF
REF LAB TEST METHOD: NORMAL
SODIUM UR-SCNC: 58 MMOL/L
SP GR UR STRIP: 1.01 (ref 1–1.03)
SQUAMOUS #/AREA URNS HPF: NORMAL /HPF
UNIT  ABO: NORMAL
UNIT  RH: NORMAL
UROBILINOGEN UR QL STRIP: ABNORMAL
VIT B12 BLD-MCNC: 465 PG/ML (ref 211–946)
WBC # UR STRIP: NORMAL /HPF
WBC NRBC COR # BLD AUTO: 7.01 10*3/MM3 (ref 3.4–10.8)

## 2024-08-13 PROCEDURE — 82607 VITAMIN B-12: CPT | Performed by: INTERNAL MEDICINE

## 2024-08-13 PROCEDURE — 63710000001 INSULIN LISPRO (HUMAN) PER 5 UNITS: Performed by: INTERNAL MEDICINE

## 2024-08-13 PROCEDURE — 84300 ASSAY OF URINE SODIUM: CPT | Performed by: STUDENT IN AN ORGANIZED HEALTH CARE EDUCATION/TRAINING PROGRAM

## 2024-08-13 PROCEDURE — 94799 UNLISTED PULMONARY SVC/PX: CPT

## 2024-08-13 PROCEDURE — G0378 HOSPITAL OBSERVATION PER HR: HCPCS

## 2024-08-13 PROCEDURE — 85014 HEMATOCRIT: CPT | Performed by: STUDENT IN AN ORGANIZED HEALTH CARE EDUCATION/TRAINING PROGRAM

## 2024-08-13 PROCEDURE — 85025 COMPLETE CBC W/AUTO DIFF WBC: CPT | Performed by: INTERNAL MEDICINE

## 2024-08-13 PROCEDURE — 85018 HEMOGLOBIN: CPT | Performed by: STUDENT IN AN ORGANIZED HEALTH CARE EDUCATION/TRAINING PROGRAM

## 2024-08-13 PROCEDURE — 85018 HEMOGLOBIN: CPT | Performed by: INTERNAL MEDICINE

## 2024-08-13 PROCEDURE — 82306 VITAMIN D 25 HYDROXY: CPT | Performed by: STUDENT IN AN ORGANIZED HEALTH CARE EDUCATION/TRAINING PROGRAM

## 2024-08-13 PROCEDURE — 94664 DEMO&/EVAL PT USE INHALER: CPT

## 2024-08-13 PROCEDURE — 82746 ASSAY OF FOLIC ACID SERUM: CPT | Performed by: INTERNAL MEDICINE

## 2024-08-13 PROCEDURE — 99223 1ST HOSP IP/OBS HIGH 75: CPT | Performed by: NURSE PRACTITIONER

## 2024-08-13 PROCEDURE — 99232 SBSQ HOSP IP/OBS MODERATE 35: CPT | Performed by: STUDENT IN AN ORGANIZED HEALTH CARE EDUCATION/TRAINING PROGRAM

## 2024-08-13 PROCEDURE — 82948 REAGENT STRIP/BLOOD GLUCOSE: CPT

## 2024-08-13 PROCEDURE — 85014 HEMATOCRIT: CPT | Performed by: INTERNAL MEDICINE

## 2024-08-13 PROCEDURE — 84156 ASSAY OF PROTEIN URINE: CPT | Performed by: STUDENT IN AN ORGANIZED HEALTH CARE EDUCATION/TRAINING PROGRAM

## 2024-08-13 PROCEDURE — 81001 URINALYSIS AUTO W/SCOPE: CPT | Performed by: STUDENT IN AN ORGANIZED HEALTH CARE EDUCATION/TRAINING PROGRAM

## 2024-08-13 PROCEDURE — 82570 ASSAY OF URINE CREATININE: CPT | Performed by: STUDENT IN AN ORGANIZED HEALTH CARE EDUCATION/TRAINING PROGRAM

## 2024-08-13 RX ORDER — SODIUM CHLORIDE, SODIUM LACTATE, POTASSIUM CHLORIDE, CALCIUM CHLORIDE 600; 310; 30; 20 MG/100ML; MG/100ML; MG/100ML; MG/100ML
30 INJECTION, SOLUTION INTRAVENOUS CONTINUOUS PRN
OUTPATIENT
Start: 2024-08-13

## 2024-08-13 RX ORDER — PANTOPRAZOLE SODIUM 40 MG/10ML
40 INJECTION, POWDER, LYOPHILIZED, FOR SOLUTION INTRAVENOUS
Status: DISCONTINUED | OUTPATIENT
Start: 2024-08-13 | End: 2024-08-19

## 2024-08-13 RX ORDER — MORPHINE SULFATE 15 MG/1
15 TABLET, FILM COATED, EXTENDED RELEASE ORAL EVERY 12 HOURS SCHEDULED
Status: DISCONTINUED | OUTPATIENT
Start: 2024-08-13 | End: 2024-08-19 | Stop reason: HOSPADM

## 2024-08-13 RX ORDER — IPRATROPIUM BROMIDE AND ALBUTEROL SULFATE 2.5; .5 MG/3ML; MG/3ML
3 SOLUTION RESPIRATORY (INHALATION) EVERY 6 HOURS PRN
Status: DISCONTINUED | OUTPATIENT
Start: 2024-08-13 | End: 2024-08-16 | Stop reason: SDUPTHER

## 2024-08-13 RX ADMIN — DULOXETINE HYDROCHLORIDE 60 MG: 60 CAPSULE, DELAYED RELEASE ORAL at 09:46

## 2024-08-13 RX ADMIN — Medication 10 ML: at 09:46

## 2024-08-13 RX ADMIN — IPRATROPIUM BROMIDE AND ALBUTEROL SULFATE 3 ML: 2.5; .5 SOLUTION RESPIRATORY (INHALATION) at 19:03

## 2024-08-13 RX ADMIN — HYDRALAZINE HYDROCHLORIDE 75 MG: 50 TABLET ORAL at 14:38

## 2024-08-13 RX ADMIN — INSULIN LISPRO 3 UNITS: 100 INJECTION, SOLUTION INTRAVENOUS; SUBCUTANEOUS at 18:34

## 2024-08-13 RX ADMIN — CLONIDINE HYDROCHLORIDE 0.1 MG: 0.1 TABLET ORAL at 21:26

## 2024-08-13 RX ADMIN — IPRATROPIUM BROMIDE AND ALBUTEROL SULFATE 3 ML: 2.5; .5 SOLUTION RESPIRATORY (INHALATION) at 07:44

## 2024-08-13 RX ADMIN — IPRATROPIUM BROMIDE AND ALBUTEROL SULFATE 3 ML: 2.5; .5 SOLUTION RESPIRATORY (INHALATION) at 12:49

## 2024-08-13 RX ADMIN — CLONIDINE HYDROCHLORIDE 0.1 MG: 0.1 TABLET ORAL at 09:46

## 2024-08-13 RX ADMIN — CARVEDILOL 25 MG: 12.5 TABLET, FILM COATED ORAL at 21:25

## 2024-08-13 RX ADMIN — ATORVASTATIN CALCIUM 80 MG: 40 TABLET, FILM COATED ORAL at 09:46

## 2024-08-13 RX ADMIN — PANTOPRAZOLE SODIUM 40 MG: 40 INJECTION, POWDER, FOR SOLUTION INTRAVENOUS at 18:34

## 2024-08-13 RX ADMIN — INSULIN LISPRO 2 UNITS: 100 INJECTION, SOLUTION INTRAVENOUS; SUBCUTANEOUS at 21:26

## 2024-08-13 RX ADMIN — IPRATROPIUM BROMIDE AND ALBUTEROL SULFATE 3 ML: 2.5; .5 SOLUTION RESPIRATORY (INHALATION) at 03:39

## 2024-08-13 RX ADMIN — HYDRALAZINE HYDROCHLORIDE 75 MG: 50 TABLET ORAL at 21:25

## 2024-08-13 RX ADMIN — CARVEDILOL 25 MG: 12.5 TABLET, FILM COATED ORAL at 09:46

## 2024-08-13 RX ADMIN — MORPHINE SULFATE 15 MG: 15 TABLET, FILM COATED, EXTENDED RELEASE ORAL at 18:34

## 2024-08-13 RX ADMIN — MORPHINE SULFATE 15 MG: 15 TABLET, FILM COATED, EXTENDED RELEASE ORAL at 05:13

## 2024-08-13 RX ADMIN — HYDRALAZINE HYDROCHLORIDE 75 MG: 50 TABLET ORAL at 05:14

## 2024-08-13 NOTE — CONSULTS
[facility]  Inpatient Gastroenterology Consult    Inpatient Gastroenterology Consult  Consult performed by: Sung Mi APRN  Consult ordered by: Axel Belle MD  Reason for consult: GIB      Referring Provider: Provider, No Known    PCP: Sebas Alicea MD    Chief Complaint: Weakness and shortness of breath    History of present illness:    Yanique Webster is a 84 y.o. female who is admitted with worsening weakness and fatigue was found to be acutely anemic.  GI consult received thereof.    Patient notes that for the past few days she has had worsening weakness and fatigue.  Believes she started having dark tarry stools around a week ago though she is not entirely sure.  Denies any nausea, vomiting, diarrhea, abdominal pain, chest pain, or fever/chills.  Does endorse exertional dyspnea.  Reports having a history of bleeding ulcers per her report but is unsure of when that was.  No recent colonoscopy on record.  Denies any use of NSAIDs.  Is anticoagulated on Xarelto. Past medical, surgical, social, and family histories are reviewed for accuracy.  No documented alleviating or exacerbating factors.  Does not endorse pain at time of exam.    Allergies:  Penicillins, Latex, Nickel, and Penicillins    Scheduled Meds:  atorvastatin, 80 mg, Oral, Daily  carvedilol, 25 mg, Oral, Q12H  cloNIDine, 0.1 mg, Oral, BID  DULoxetine, 60 mg, Oral, Daily  hydrALAZINE, 75 mg, Oral, Q8H  insulin lispro, 2-7 Units, Subcutaneous, 4x Daily AC & at Bedtime  ipratropium-albuterol, 3 mL, Nebulization, Q6H While Awake - RT  Morphine, 15 mg, Oral, Q12H  pantoprazole, 40 mg, Oral, Daily  sodium chloride, 10 mL, Intravenous, Q12H         Infusions:       PRN Meds:    acetaminophen **OR** acetaminophen **OR** acetaminophen    calcium carbonate    dextrose    dextrose    dextrose    glucagon (human recombinant)    ipratropium-albuterol    ondansetron ODT **OR** ondansetron    oxyCODONE    polyethylene  glycol    sodium chloride    sodium chloride    Home Meds:  Medications Prior to Admission   Medication Sig Dispense Refill Last Dose    acetaminophen (TYLENOL) 500 MG tablet Take 2 tablets by mouth Every 6 (Six) Hours.       atorvastatin (LIPITOR) 80 MG tablet Take 1 tablet by mouth Daily.       Blood Glucose Monitoring Suppl (Accu-Chek Guide) w/Device kit Use 1 kit See Admin Instructions. Use to check blood sugar once daily.  Dx E11.65 1 kit 0     carvedilol (COREG) 25 MG tablet Take 1 tablet by mouth Every 12 (Twelve) Hours. 60 tablet 1     cloNIDine (CATAPRES) 0.1 MG tablet Take 1 tablet by mouth 2 (Two) Times a Day.       doxazosin (CARDURA) 4 MG tablet        DULoxetine (CYMBALTA) 60 MG capsule Take 1 capsule by mouth Daily. 90 capsule 3     Glucose Blood (Blood Glucose Test) strip Check blood sugar 1 time a day dx e11.65 100 each 3     hydrALAZINE (APRESOLINE) 25 MG tablet Take 3 tablets by mouth Every 8 (Eight) Hours.       Insulin Glargine (LANTUS SOLOSTAR) 100 UNIT/ML injection pen Inject 10 Units under the skin into the appropriate area as directed Every Night. 15 mL 3     Insulin Pen Needle (BD Pen Needle Veda 2nd Gen) 32G X 4 MM misc Use 1 each Daily. 100 each 3     Lancets 33G misc Use 1 each Daily. Dx e11.65 100 each 3     lisinopril (PRINIVIL,ZESTRIL) 20 MG tablet        Morphine (MS CONTIN) 15 MG 12 hr tablet Take 1 tablet by mouth 2 (Two) Times a Day.       pantoprazole (PROTONIX) 40 MG EC tablet Take 1 tablet by mouth daily.       polyethylene glycol (MiraLax) 17 g packet Take 17 g by mouth Daily As Needed.       probiotic (CULTURELLE) capsule capsule Take 1 capsule by mouth Daily. Bifidobacterium - lactobacillus       rivaroxaban (Xarelto) 15 MG tablet Take 1 tablet by mouth Daily With Dinner. 30 tablet 11     torsemide (DEMADEX) 20 MG tablet Take 2 tablets by mouth Daily.          ROS: Review of Systems   Constitutional:  Positive for activity change and fatigue. Negative for appetite change,  chills, diaphoresis, fever and unexpected weight change.   HENT:  Negative for sore throat, trouble swallowing and voice change.    Eyes: Negative.    Respiratory:  Positive for shortness of breath. Negative for apnea, cough, choking, chest tightness, wheezing and stridor.    Cardiovascular:  Negative for chest pain, palpitations and leg swelling.   Gastrointestinal:  Positive for blood in stool. Negative for abdominal distention, abdominal pain, anal bleeding, constipation, diarrhea, nausea, rectal pain and vomiting.   Endocrine: Negative.    Genitourinary: Negative.    Musculoskeletal: Negative.    Skin: Negative.    Allergic/Immunologic: Negative.    Neurological: Negative.    Hematological: Negative.    Psychiatric/Behavioral: Negative.     All other systems reviewed and are negative.      PAST MED HX:  Past Medical History:   Diagnosis Date    Blurry vision     Chronic joint pain     Chronic pain     COPD (chronic obstructive pulmonary disease)     Coronary artery disease     Diabetic gastroparesis 08/24/2016    Esophageal stricture     s/p dilation in 2007    GERD (gastroesophageal reflux disease)     Gout     Headache     secondary to hypertension    Hyperlipidemia     Hypertension     Long term current use of insulin     Neuropathy     Neuropathy due to type 2 diabetes mellitus     Obesity     Osteoarthritis     Pericarditis 2008    Stroke 03/2019    Type 2 diabetes mellitus        PAST SURG HX:  Past Surgical History:   Procedure Laterality Date    BRONCHOSCOPY N/A 8/23/2016    Procedure: BRONCHOSCOPY BIOPSY AT BEDSIDE;  Surgeon: Mookie Willard MD;  Location:  [facility];  Service:     CARDIAC CATHETERIZATION N/A 8/30/2016    Procedure: Left Heart Cath;  Surgeon: Steve Geronimo MD;  Location:  [facility];  Service:     CARDIAC CATHETERIZATION N/A 8/30/2016    Procedure: Right Heart Cath;  Surgeon: Steve Geronimo MD;  Location:  [facility];  Service:     CARDIAC  ELECTROPHYSIOLOGY PROCEDURE N/A 2019    Procedure: Loop insertion;  Surgeon: Steve Geronimo MD;  Location:  [facility];  Service: Cardiovascular    CARDIAC ELECTROPHYSIOLOGY PROCEDURE N/A 2023    Procedure: Loop recorder removal;  Surgeon: Steve Geronimo MD;  Location:  [facility];  Service: Cardiovascular;  Laterality: N/A;    CATARACT EXTRACTION      ESOPHAGEAL DILATATION      HERNIA REPAIR      HIP CANNULATED SCREW PLACEMENT Left 2024    Procedure: HIP CANNULATED SCREW PLACEMENT LEFT;  Surgeon: Seymour Harrington MD;  Location:  [facility] OR;  Service: Orthopedics;  Laterality: Left;    HYSTERECTOMY      WRIST FRACTURE SURGERY Left        FAM HX:  Family History   Problem Relation Age of Onset    Cancer Mother     Cancer Father     Cancer Other     Breast cancer Sister 67    Ovarian cancer Neg Hx        SOC HX:  Social History     Socioeconomic History    Marital status:    Tobacco Use    Smoking status: Former     Current packs/day: 0.00     Types: Cigarettes     Quit date: 2003     Years since quittin.6    Smokeless tobacco: Never   Vaping Use    Vaping status: Never Used   Substance and Sexual Activity    Alcohol use: No    Drug use: No    Sexual activity: Not Currently     Partners: Male     Birth control/protection: Pill, Hysterectomy       PHYSICAL EXAM  /53 (BP Location: Right arm, Patient Position: Lying)   Pulse 62   Temp 97.8 °F (36.6 °C) (Oral)   Resp 21   LMP  (LMP Unknown)   SpO2 97%   Wt Readings from Last 3 Encounters:   24 83.5 kg (184 lb)   04/10/24 85 kg (187 lb 6.3 oz)   24 85 kg (187 lb 6.4 oz)   ,body mass index is unknown because there is no height or weight on file.  Physical Exam  Vitals and nursing note reviewed.   Constitutional:       General: She is not in acute distress.     Appearance: Normal appearance. She is normal weight. She is not ill-appearing or toxic-appearing.   HENT:      Head:  Normocephalic and atraumatic.   Eyes:      General: No scleral icterus.     Extraocular Movements: Extraocular movements intact.      Conjunctiva/sclera: Conjunctivae normal.      Pupils: Pupils are equal, round, and reactive to light.   Cardiovascular:      Rate and Rhythm: Normal rate and regular rhythm.      Pulses: Normal pulses.      Heart sounds: Normal heart sounds.   Pulmonary:      Effort: Pulmonary effort is normal. No respiratory distress.      Breath sounds: Normal breath sounds.   Abdominal:      General: Abdomen is flat. Bowel sounds are normal. There is no distension.      Palpations: Abdomen is soft. There is no mass.      Tenderness: There is no abdominal tenderness. There is no guarding or rebound.      Hernia: No hernia is present.   Genitourinary:     Rectum: Guaiac result positive.   Skin:     General: Skin is warm and dry.      Capillary Refill: Capillary refill takes less than 2 seconds.      Coloration: Skin is pale. Skin is not jaundiced.   Neurological:      General: No focal deficit present.      Mental Status: She is alert and oriented to person, place, and time. Mental status is at baseline.   Psychiatric:         Mood and Affect: Mood normal.         Behavior: Behavior normal.         Thought Content: Thought content normal.         Judgment: Judgment normal.         Results Review:   I reviewed the patient's new clinical results.  I reviewed the patient's new imaging results and agree with the interpretation.  I reviewed the patient's other test results and agree with the interpretation  I personally viewed and interpreted the patient's EKG/Telemetry data    Lab Results   Component Value Date    WBC 7.01 08/13/2024    HGB 7.5 (L) 08/13/2024    HGB 8.1 (L) 08/13/2024    HGB 7.6 (L) 08/13/2024    HCT 23.9 (L) 08/13/2024    MCV 87.2 08/13/2024     (L) 08/13/2024       Lab Results   Component Value Date    INR 1.19 (H) 01/02/2024    INR 1.27 (H) 01/01/2024    INR 2.12 (H) 12/31/2023        Lab Results   Component Value Date    GLUCOSE 163 (H) 08/12/2024    BUN 46 (H) 08/12/2024    CREATININE 2.77 (H) 08/12/2024    EGFRIFNONA 29 (L) 11/04/2020    BCR 16.6 08/12/2024     08/12/2024    K 4.4 08/12/2024    CO2 20.0 (L) 08/12/2024    CALCIUM 9.5 08/12/2024    ALBUMIN 3.5 08/12/2024    ALKPHOS 161 (H) 08/12/2024    BILITOT 0.3 08/12/2024    ALT 9 08/12/2024    AST 15 08/12/2024       XR Chest 1 View    Result Date: 8/12/2024  XR CHEST 1 VW Date of Exam: 8/12/2024 6:56 PM EDT Indication: SOB Comparison: Chest radiograph performed on August 2, 2024 Findings: Overlying monitoring wires limit diagnostic sensitivity. No focal consolidation or effusion.  There is no evidence of pneumothorax. There is mild pulmonary vascular congestion. Cardiac silhouette is enlarged. No acute osseous abnormality identified.     Impression: Findings compatible with mild CHF. Electronically Signed: Uri Cotto MD  8/12/2024 7:34 PM EDT  Workstation ID: [id number]    XR Chest PA & Lateral    Result Date: 8/2/2024  XR CHEST PA AND LATERAL Date of Exam: 8/2/2024 10:53 AM EDT Indication: CHRONIC OBSTRUCTION PULMONARY DISEASE, UNSPECIFIED COPD TYPE Comparison: Chest x-ray 1/2/2024 Findings: There are new small right greater than left bilateral pleural effusions. There is background emphysema. Stable cardiomegaly. No pneumothorax. The bones are demineralized without acute osseous findings.     Impression: New small bilateral pleural effusions. Background emphysema. Electronically Signed: Iglesia Bernard MD  8/2/2024 4:51 PM EDT  Workstation ID: [id number]     ASSESSMENTS/PLANS  1.  Upper gastrointestinal bleeding with melena  2.  Acute blood loss anemia, symptomatic anemia  3.  Atrial fibrillation, anticoagulated on Xarelto  4.  History of PUD per patient report  5.  Type 2 diabetes mellitus.  6.  KINDRA on CKD, likely secondary to volume depletion  7.  COPD    Yanique Webster is a 84 y.o. female who presents to hospital with  melena and associated weakness and symptomatic anemia.  Given patient's history of ulcers, high risk for recurrent ulcer bleed.  Recommend EGD for further evaluation.    >>> N.p.o. midnight  >>> EGD in a.m.  >>> IV PPI twice daily  >>> Monitor H&H and transfuse per protocol  >>> Abstain from NSAID use  >>> If EGD is unremarkable, will need colonoscopy to follow.    I discussed the patient's findings and my recommendations with patient, nursing staff, and consulting provider    CHRIS Quezada  08/13/24  16:04 EDT

## 2024-08-13 NOTE — PROGRESS NOTES
[facility] Medicine Services  PROGRESS NOTE    Patient Name: Yanique Webster  : 1940  MRN: [id number]    Date of Admission: 2024  Primary Care Physician: Sebas Alicea MD    Subjective   Subjective     CC:  Weakness, shortness of breath    HPI:  Patient states she feels much better this morning after blood product transfusion and compared to that on admission.  She denies weakness or shortness of breath.  She denies any abdominal pain or noticing any black or red stool at home.  She denies any difficulty with urination.      Objective   Objective     Vital Signs:   Temp:  [97.4 °F (36.3 °C)-98.7 °F (37.1 °C)] 98.1 °F (36.7 °C)  Heart Rate:  [59-83] 59  Resp:  [17-24] 20  BP: (134-212)/(58-83) 161/62  Flow (L/min):  [2-23] 2     Physical Exam:  General appearance: alert, awake, oriented, no acute distress   Cardiovascular: RRR, no murmurs or rubs, radial pulse full 2/4 BL   Respiratory: CTAB, no crackles or wheezes   Abdomen: soft, non-tender, no organomegaly, bowel sounds normoactive    Neuro/CNS: alert, normal speech, moves all extremities, strength and sensation grossly intact    Results Reviewed:  LAB RESULTS:      Lab 24  0645 24  0100 24  1806   WBC 7.01  --  4.77   HEMOGLOBIN 8.1* 7.6* 6.3*   HEMATOCRIT 26.0* 24.4* 21.1*   PLATELETS 133*  --  125*   NEUTROS ABS 5.51  --  2.88   IMMATURE GRANS (ABS) 0.03  --  0.01   LYMPHS ABS 0.94  --  1.23   MONOS ABS 0.39  --  0.45   EOS ABS 0.11  --  0.16   MCV 87.2  --  88.3         Lab 24  1805   SODIUM 138   POTASSIUM 4.4   CHLORIDE 108*   CO2 20.0*   ANION GAP 10.0   BUN 46*   CREATININE 2.77*   EGFR 16.5*   GLUCOSE 163*   CALCIUM 9.5   MAGNESIUM 1.8   PHOSPHORUS 4.3         Lab 24  1805   TOTAL PROTEIN 5.7*   ALBUMIN 3.5   GLOBULIN 2.2   ALT (SGPT) 9   AST (SGOT) 15   BILIRUBIN 0.3   ALK PHOS 161*                 Lab 24  0645 24  1853 24  1802   IRON  --   --  39   IRON  SATURATION (TSAT)  --   --  14*   TIBC  --   --  280*   TRANSFERRIN  --   --  188*   FERRITIN  --   --  40.23   FOLATE 10.50  --   --    VITAMIN B 12 465  --   --    ABO TYPING  --  O  --    RH TYPING  --  Negative  --    ANTIBODY SCREEN  --  Negative  --          Brief Urine Lab Results  (Last result in the past 365 days)        Color   Clarity   Blood   Leuk Est   Nitrite   Protein   CREAT   Urine HCG        02/01/24 2337 Straw   Clear   Trace   Negative   Negative   100 mg/dL (2+)                   Microbiology Results Abnormal       None            XR Chest 1 View    Result Date: 8/12/2024  XR CHEST 1 VW Date of Exam: 8/12/2024 6:56 PM EDT Indication: SOB Comparison: Chest radiograph performed on August 2, 2024 Findings: Overlying monitoring wires limit diagnostic sensitivity. No focal consolidation or effusion.  There is no evidence of pneumothorax. There is mild pulmonary vascular congestion. Cardiac silhouette is enlarged. No acute osseous abnormality identified.     Impression: Impression: Findings compatible with mild CHF. Electronically Signed: Uri Cotto MD  8/12/2024 7:34 PM EDT  Workstation ID: [id number]     Results for orders placed during the hospital encounter of 12/30/23    Adult Transthoracic Echo Complete W/ Cont if Necessary Per Protocol    Interpretation Summary    Left ventricular ejection fraction appears to be 56 - 60%.    Left ventricular wall thickness is consistent with mild concentric hypertrophy    The aortic valve exhibits sclerosis.    Mild aortic valve regurgitation is present. No hemodynamically significant aortic valve stenosis is present.    Mild tricuspid valve regurgitation is present. Estimated right ventricular systolic pressure from tricuspid regurgitation is moderately elevated (45-55 mmHg    Moderate mitral annular calcification is present. Trace to mild mitral valve regurgitation is present. No significant mitral valve stenosis is present.      Current  medications:  Scheduled Meds:atorvastatin, 80 mg, Oral, Daily  carvedilol, 25 mg, Oral, Q12H  cloNIDine, 0.1 mg, Oral, BID  DULoxetine, 60 mg, Oral, Daily  hydrALAZINE, 75 mg, Oral, Q8H  insulin lispro, 2-7 Units, Subcutaneous, 4x Daily AC & at Bedtime  ipratropium-albuterol, 3 mL, Nebulization, Q6H While Awake - RT  Morphine, 15 mg, Oral, Q12H  pantoprazole, 40 mg, Oral, Daily  sodium chloride, 10 mL, Intravenous, Q12H      Continuous Infusions:   PRN Meds:.  acetaminophen **OR** acetaminophen **OR** acetaminophen    calcium carbonate    dextrose    dextrose    dextrose    glucagon (human recombinant)    ipratropium-albuterol    ondansetron ODT **OR** ondansetron    oxyCODONE    polyethylene glycol    sodium chloride    sodium chloride    Assessment & Plan   Assessment & Plan     Active Hospital Problems    Diagnosis  POA    **Symptomatic anemia [D64.9]  Yes      Resolved Hospital Problems   No resolved problems to display.        Brief Hospital Course to date:  Yanique Webster is a 84 y.o. female with past medical history significant for CAD, COPD, hypertension, chronic pain and diabetes, was admitted for acute symptomatic anemia.  She received 1 unit PRBC with improvement.  Nephrology was consulted for KINDRA.    Acute symptomatic anemia  -s/p 1 unit pRBC  -No active signs of bleeding noted  -Iron panel reviewed, B12 and folate WNL  -Check stool occult   -Has risk factors for UGI bleed, GI consulted by colleague   -Continue PPI  -Check H&H this evening and if stable, recheck in AM     KINDRA on CKD  -Avoid nephrotoxic agents  -Strict I's and O's  -Check urine sodium and creatinine  -Nephrology consulted  -Possibly secondary to anemia  -BMP in AM      Diabetes mellitus  -Insulin dependent   -Accu-Cheks ACHS with sign scale insulin     COPD  -Evidently patient was started on inhalers recently  -Continue DuoNebs     Chronic pain  -Continue home regimen of MS Contin 15 mg every 12 hours      Hypertension  Hyperlipidemia  -Continue clonidine, Coreg and hydralazine       Expected Discharge Location and Transportation: Home   Expected Discharge   Expected Discharge Date: 8/14/2024; Expected Discharge Time:      VTE Prophylaxis:  Mechanical VTE prophylaxis orders are present.         AM-PAC 6 Clicks Score (PT): 17 (08/12/24 2009)    CODE STATUS:   Code Status and Medical Interventions: CPR (Attempt to Resuscitate); Full Support   Ordered at: 08/12/24 1827     Code Status (Patient has no pulse and is not breathing):    CPR (Attempt to Resuscitate)     Medical Interventions (Patient has pulse or is breathing):    Full Support       Heber Ocasio,   08/13/24

## 2024-08-13 NOTE — CASE MANAGEMENT/SOCIAL WORK
Discharge Planning Assessment  [facility]     Patient Name: Yanique Webster  MRN: [id number]  Today's Date: 8/13/2024    Admit Date: 8/12/2024    Plan: IDP   Discharge Needs Assessment       Row Name 08/13/24 1155       Living Environment    People in Home alone;facility resident    Unique Family Situation [facility]    Current Living Arrangements home    Potentially Unsafe Housing Conditions none    In the past 12 months has the electric, gas, oil, or water company threatened to shut off services in your home? No    Primary Care Provided by self    Provides Primary Care For no one    Family Caregiver if Needed child(dian), adult    Family Caregiver Names J Luis and Jacob    Quality of Family Relationships unable to assess    Able to Return to Prior Arrangements yes       Resource/Environmental Concerns    Resource/Environmental Concerns none    Transportation Concerns none       Transportation Needs    In the past 12 months, has lack of transportation kept you from medical appointments or from getting medications? no    In the past 12 months, has lack of transportation kept you from meetings, work, or from getting things needed for daily living? No       Food Insecurity    Within the past 12 months, you worried that your food would run out before you got the money to buy more. Never true    Within the past 12 months, the food you bought just didn't last and you didn't have money to get more. Never true       Transition Planning    Patient/Family Anticipates Transition to home    Patient/Family Anticipated Services at Transition none    Transportation Anticipated family or friend will provide       Discharge Needs Assessment    Readmission Within the Last 30 Days no previous admission in last 30 days    Equipment Currently Used at Home walker, rolling;wheelchair    Concerns to be Addressed no discharge needs identified;denies needs/concerns at this time    Anticipated Changes Related to Illness none     Equipment Needed After Discharge none                   Discharge Plan       Row Name 08/13/24 1201       Plan    Plan IDP    Patient/Family in Agreement with Plan yes    Plan Comments Spoke with patient at bedside for IDP. Lives in [facility]. Uses rollator and has wc. No HH/OPPT. PCP Sebas Alicea. Medicare A[employer] and [place] [employer] with scripts filled at [facility]. Denies any difficulty with RX coverage or cost. Plan is to return home at discharge and states her son can transport. CM will cont to follow.    Final Discharge Disposition Code 01 - home or self-care                  Continued Care and Services - Admitted Since 8/12/2024    No active coordination exists for this encounter.          Demographic Summary       Row Name 08/13/24 1155       General Information    Admission Type observation    Arrived From emergency department    Referral Source admission list    Reason for Consult discharge planning    Preferred Language English                   Functional Status       Row Name 08/13/24 1155       Functional Status    Usual Activity Tolerance moderate    Current Activity Tolerance moderate       Physical Activity    On average, how many days per week do you engage in moderate to strenuous exercise (like a brisk walk)? 0 days    On average, how many minutes do you engage in exercise at this level? 0 min    Number of minutes of exercise per week 0       Functional Status, IADL    Medications independent    Meal Preparation independent    Housekeeping independent    Laundry independent    Shopping independent                   Psychosocial    No documentation.                  Abuse/Neglect    No documentation.                  Legal    No documentation.                  Substance Abuse    No documentation.                  Patient Forms    No documentation.                     Cassie Ayala RN

## 2024-08-13 NOTE — NURSING NOTE
Woc consulted for: Left gluteal pressure injury    Wound/ Skin Assessment: Patient presents with an area of scar tissue on the transverse coccyx region slightly below the coccyx and under the gluteal cleft.  There are 2 open areas 1 by the medial coccyx and 1 on the midline left [place].  Area roughly 0.4 cm in diameter and roughly 0.01 cm in depth.  No drainage.  The area of scar tissue has surrounding brown hypopigmentation.  There was 1 area of concern does not clinically present like a deep tissue injury and it could just be really dark hypopigmentation but a linear aspect going from the distal gluteal cleft along the proximal edge of the scar tissue could be purple nonblanching we will have to keep an eye on it.  Discussed with nursing.      As of now this presents as irritant contact dermatitis due to frequent exposure to sweat urine or feces and is POA.    Recommendations/ Intervention performed: Sacral Mepilex is in place however the Allevyn dressing might not go low enough to cover the open wounds so I suggest Z guard on the areas where the Allevyn does not get.  Also patient was found with a urine soaked shock so if the external catheter is not able to get secured then I suggest removing the sacral Allevyn dressing and just using pH no rinse foam and blue wipes and Z guard to cleanse the patient and diligent offloading.      Skin interventions in place.    Pressure Injury Protocol (initiate for Stanislav Score of 18 or less):   *Maintain good skin care, keep dry, turn q 2 hr, keep heels elevated and offloaded with heel boots.    *Apply z-guard to sacrococcygeal area/ perineal area BID or daily and PRN per incontinent episodes.  *Follow C.A.R.E protocol if medical devices (Bipap, more, Ng tube, etc) are being used.     Specialty bed: Patient is on the new Isotoner mattress this should be sufficient if patient starts increasing length of stay we will consider adding the SHAHBAZ pump.    Woc will follow.    Please  contact with questions or concerns.

## 2024-08-13 NOTE — PLAN OF CARE
Goal Outcome Evaluation:  Plan of Care Reviewed With: patient        Progress: improving  Outcome Evaluation: VSS. RA-2LNC. Pt complaining of SOA. PRN duonebs added. NSR. A&O x4. PRN oxycodone and scheduled morphine administered for pain control. 1 unit PRBCs administered. Hemoglobin redraw 7.6 after 1 unit PRBCs. Fall and skin precautions in place. Q2hr turns. Plan of care discussed with patient who expresses understanding. Pt resting comfortably at this time with no further complaints.

## 2024-08-13 NOTE — CONSULTS
Referring Provider: Dr. Heber Ocasio  Reason for Consultation: Acute on chronic kidney disease stage III    Subjective     Chief complaint shortness of breath and progressive weakness    History of present illness:   84-year-old female with history of CKD ongoing for the last 7 years with proteinuria in the setting of diabetes, PMH also include hypertension, hyperlipidemia, proximal atrial fibrillation on Xarelto, COPD, heart failure, chronic pain on MS Contin, patient primary care physician Dr. Alicea.  Patient admitted with symptomatic anemia and was admitted for possible blood transfusion.     On evaluation at bedside, patient reported that she was having worsening weakness and shortness of breath for the last few days, she denied nausea vomiting or abdominal pain, denies dysuria urgency or frequency.  Denies fever or chills.  Patient reports no history of kidney stone, denies NSAID use.  Patient is aware of her abnormal kidney function but denies seeing nephrologist. [Per chart review creatinine from 1.5-1.7 range recently]     History  Past Medical History:   Diagnosis Date    Blurry vision     Chronic joint pain     Chronic pain     COPD (chronic obstructive pulmonary disease)     Coronary artery disease     Diabetic gastroparesis 08/24/2016    Esophageal stricture     s/p dilation in 2007    GERD (gastroesophageal reflux disease)     Gout     Headache     secondary to hypertension    Hyperlipidemia     Hypertension     Long term current use of insulin     Neuropathy     Neuropathy due to type 2 diabetes mellitus     Obesity     Osteoarthritis     Pericarditis 2008    Stroke 03/2019    Type 2 diabetes mellitus    ,   Past Surgical History:   Procedure Laterality Date    BRONCHOSCOPY N/A 8/23/2016    Procedure: BRONCHOSCOPY BIOPSY AT BEDSIDE;  Surgeon: Mookie Willard MD;  Location: [facility] ENDOSCOPY;  Service:     CARDIAC CATHETERIZATION N/A 8/30/2016    Procedure: Left Heart Cath;  Surgeon: Steve  MD Juanpablo;  Location:  [facility] CATH INVASIVE LOCATION;  Service:     CARDIAC CATHETERIZATION N/A 2016    Procedure: Right Heart Cath;  Surgeon: Steve Geronimo MD;  Location:  [facility] CATH INVASIVE LOCATION;  Service:     CARDIAC ELECTROPHYSIOLOGY PROCEDURE N/A 2019    Procedure: Loop insertion;  Surgeon: Steve Geronimo MD;  Location:  [facility] CATH INVASIVE LOCATION;  Service: Cardiovascular    CARDIAC ELECTROPHYSIOLOGY PROCEDURE N/A 2023    Procedure: Loop recorder removal;  Surgeon: Steve Geronimo MD;  Location:  [facility] CATH INVASIVE LOCATION;  Service: Cardiovascular;  Laterality: N/A;    CATARACT EXTRACTION      ESOPHAGEAL DILATATION      HERNIA REPAIR      HIP CANNULATED SCREW PLACEMENT Left 2024    Procedure: HIP CANNULATED SCREW PLACEMENT LEFT;  Surgeon: Seymour Harrington MD;  Location:  [facility] OR;  Service: Orthopedics;  Laterality: Left;    HYSTERECTOMY      WRIST FRACTURE SURGERY Left    ,   Family History   Problem Relation Age of Onset    Cancer Mother     Cancer Father     Cancer Other     Breast cancer Sister 67    Ovarian cancer Neg Hx    ,   Social History     Socioeconomic History    Marital status:    Tobacco Use    Smoking status: Former     Current packs/day: 0.00     Types: Cigarettes     Quit date: 2003     Years since quittin.6    Smokeless tobacco: Never   Vaping Use    Vaping status: Never Used   Substance and Sexual Activity    Alcohol use: No    Drug use: No    Sexual activity: Not Currently     Partners: Male     Birth control/protection: Pill, Hysterectomy     E-cigarette/Vaping    E-cigarette/Vaping Use Never User     Passive Exposure No     Counseling Given No      E-cigarette/Vaping Substances    Nicotine No     THC No     CBD No     Flavoring No      E-cigarette/Vaping Devices    Disposable No     Pre-filled or Refillable Cartridge No     Refillable Tank No     Pre-filled Pod No          ,   Medications Prior to Admission   Medication Sig Dispense Refill  Last Dose    acetaminophen (TYLENOL) 500 MG tablet Take 2 tablets by mouth Every 6 (Six) Hours.       atorvastatin (LIPITOR) 80 MG tablet Take 1 tablet by mouth Daily.       Blood Glucose Monitoring Suppl (Accu-Chek Guide) w/Device kit Use 1 kit See Admin Instructions. Use to check blood sugar once daily.  Dx E11.65 1 kit 0     carvedilol (COREG) 25 MG tablet Take 1 tablet by mouth Every 12 (Twelve) Hours. 60 tablet 1     cloNIDine (CATAPRES) 0.1 MG tablet Take 1 tablet by mouth 2 (Two) Times a Day.       doxazosin (CARDURA) 4 MG tablet        DULoxetine (CYMBALTA) 60 MG capsule Take 1 capsule by mouth Daily. 90 capsule 3     Glucose Blood (Blood Glucose Test) strip Check blood sugar 1 time a day dx e11.65 100 each 3     hydrALAZINE (APRESOLINE) 25 MG tablet Take 3 tablets by mouth Every 8 (Eight) Hours.       Insulin Glargine (LANTUS SOLOSTAR) 100 UNIT/ML injection pen Inject 10 Units under the skin into the appropriate area as directed Every Night. 15 mL 3     Insulin Pen Needle (BD Pen Needle Veda 2nd Gen) 32G X 4 MM misc Use 1 each Daily. 100 each 3     Lancets 33G misc Use 1 each Daily. Dx e11.65 100 each 3     lisinopril (PRINIVIL,ZESTRIL) 20 MG tablet        Morphine (MS CONTIN) 15 MG 12 hr tablet Take 1 tablet by mouth 2 (Two) Times a Day.       pantoprazole (PROTONIX) 40 MG EC tablet Take 1 tablet by mouth daily.       polyethylene glycol (MiraLax) 17 g packet Take 17 g by mouth Daily As Needed.       probiotic (CULTURELLE) capsule capsule Take 1 capsule by mouth Daily. Bifidobacterium - lactobacillus       rivaroxaban (Xarelto) 15 MG tablet Take 1 tablet by mouth Daily With Dinner. 30 tablet 11     torsemide (DEMADEX) 20 MG tablet Take 2 tablets by mouth Daily.      , Scheduled Meds:  atorvastatin, 80 mg, Oral, Daily  carvedilol, 25 mg, Oral, Q12H  cloNIDine, 0.1 mg, Oral, BID  DULoxetine, 60 mg, Oral, Daily  hydrALAZINE, 75 mg, Oral, Q8H  insulin lispro, 2-7 Units, Subcutaneous, 4x Daily AC & at  Bedtime  ipratropium-albuterol, 3 mL, Nebulization, Q6H While Awake - RT  Morphine, 15 mg, Oral, Q12H  pantoprazole, 40 mg, Oral, Daily  sodium chloride, 10 mL, Intravenous, Q12H   , Continuous Infusions:   , PRN Meds:    acetaminophen **OR** acetaminophen **OR** acetaminophen    calcium carbonate    dextrose    dextrose    dextrose    glucagon (human recombinant)    ipratropium-albuterol    ondansetron ODT **OR** ondansetron    oxyCODONE    polyethylene glycol    sodium chloride    sodium chloride, and Allergies:  Penicillins, Latex, Nickel, and Penicillins    Review of Systems  Pertinent items are noted in HPI, all other systems reviewed and negative    Objective     Vital Signs  Temp:  [97.4 °F (36.3 °C)-98.7 °F (37.1 °C)] 97.4 °F (36.3 °C)  Heart Rate:  [63-83] 80  Resp:  [17-24] 20  BP: (134-212)/(58-83) 144/62    No intake/output data recorded.  I/O last 3 completed shifts:  In: 322.9 [Blood:322.9]  Out: 1700 [Urine:1700]    Physical Exam:     General Appearance:  Alert, cooperative, in no acute distress   Head:  Normocephalic, without obvious abnormality, atraumatic   Eyes:  Lids and lashes normal, conjunctivae and sclerae normal, no icterus, no pallor, corneas clear, PERRLA   Ears:  Ears appear intact with no abnormalities noted   Throat:  No oral lesions, no thrush, oral mucosa moist   Neck:  No adenopathy, supple, trachea midline, no thyromegaly, no carotid bruit, no JVD   Back:  No kyphosis present, no scoliosis present, no skin lesions, erythema or scars, no tenderness to percussion, or palpation, range of motion normal   Lungs:  Clear to auscultation,respirations regular, even and unlabored    Heart:  Regular rhythm and normal rate, normal S1 and S2, no murmur, no gallop, no rub, no click   Breast Exam:  Deferred   Abdomen:  Normal bowel sounds, no masses, no organomegaly, soft non-tender, non-distended, no guarding, no rebound tenderness   Genitalia:  Deferred   Extremities:  Moves all extremities  "well, no edema, no cyanosis, no redness   Pulses:  Pulses palpable and equal bilaterally   Skin:  No bleeding, bruising or rash   Lymph nodes:  No palpable adenopathy   Neurologic:  Cranial nerves 2 - 12 grossly intact, sensation intact, DTR present and equal bilaterally       Results Review:   I reviewed the patient's new clinical results.  I reviewed the patient's new imaging results and agree with the interpretation.    WBC WBC   Date Value Ref Range Status   08/13/2024 7.01 3.40 - 10.80 10*3/mm3 Final   08/12/2024 4.77 3.40 - 10.80 10*3/mm3 Final      HGB Hemoglobin   Date Value Ref Range Status   08/13/2024 8.1 (L) 12.0 - 15.9 g/dL Final   08/13/2024 7.6 (L) 12.0 - 15.9 g/dL Final   08/12/2024 6.3 (C) 12.0 - 15.9 g/dL Final      HCT Hematocrit   Date Value Ref Range Status   08/13/2024 26.0 (L) 34.0 - 46.6 % Final   08/13/2024 24.4 (L) 34.0 - 46.6 % Final   08/12/2024 21.1 (L) 34.0 - 46.6 % Final      Platlets No results found for: \"LABPLAT\"   MCV MCV   Date Value Ref Range Status   08/13/2024 87.2 79.0 - 97.0 fL Final   08/12/2024 88.3 79.0 - 97.0 fL Final          Sodium Sodium   Date Value Ref Range Status   08/12/2024 138 136 - 145 mmol/L Final      Potassium Potassium   Date Value Ref Range Status   08/12/2024 4.4 3.5 - 5.2 mmol/L Final      Chloride Chloride   Date Value Ref Range Status   08/12/2024 108 (H) 98 - 107 mmol/L Final      CO2 CO2   Date Value Ref Range Status   08/12/2024 20.0 (L) 22.0 - 29.0 mmol/L Final      BUN BUN   Date Value Ref Range Status   08/12/2024 46 (H) 8 - 23 mg/dL Final      Creatinine Creatinine   Date Value Ref Range Status   08/12/2024 2.77 (H) 0.57 - 1.00 mg/dL Final      Calcium Calcium   Date Value Ref Range Status   08/12/2024 9.5 8.6 - 10.5 mg/dL Final      PO4 No results found for: \"CAPO4\"   Albumin Albumin   Date Value Ref Range Status   08/12/2024 3.5 3.5 - 5.2 g/dL Final      Magnesium Magnesium   Date Value Ref Range Status   08/12/2024 1.8 1.6 - 2.4 mg/dL Final " "     Uric Acid No results found for: \"URICACID\"         atorvastatin, 80 mg, Oral, Daily  carvedilol, 25 mg, Oral, Q12H  cloNIDine, 0.1 mg, Oral, BID  DULoxetine, 60 mg, Oral, Daily  hydrALAZINE, 75 mg, Oral, Q8H  insulin lispro, 2-7 Units, Subcutaneous, 4x Daily AC & at Bedtime  ipratropium-albuterol, 3 mL, Nebulization, Q6H While Awake - RT  Morphine, 15 mg, Oral, Q12H  pantoprazole, 40 mg, Oral, Daily  sodium chloride, 10 mL, Intravenous, Q12H           Assessment & Plan       Symptomatic anemia    1.  Acute on chronic renal failure: Looking at the chart ongoing CKD and proteinuria since 2017 or earlier.  Production has progressively worsening renal function over the last 7 years.  Underlying likelihood of diabetic nephropathy.  Admit creatinine 2.77  2.  Symptomatic anemia: Hemoglobin 6.3.  Patient received  unit of blood.  3.  Diabetes  4.  COPD   5.  Chronic pain: Takes MS Contin every 12 hours.  6.  Hypertension  7.  Hyperlipidemia  8.  Iron deficiency: Saturation 14%  9.  Mild metabolic acidosis.    Recommendation/plan:    KINDRA on chronic kidney disease:   -Baseline creatinine 1.5-1.7 range.   -Likely patient has CKD secondary to diabetes/hypertension.   -Will order urine analysis and UPCR.   -Will check SPEP and serum free light chain.   -Check PTH and vitamin D  -patient at home on torsemide 20 mg twice daily,   -Good urine output in last 24-hour. [Will hold diuretic and ACE inhibitors for now]  -May need to start diuretics tomorrow.   -Avoid nephrotoxins.  -Dose meds for GFR  -Strict GONZALES's  -No indication for dialysis at this time    Anemia:   -Status post blood transfusion  -GI planning for EGD tomorrow.     Mild metabolic acidosis:  -Will monitor closely.     Hypertension:   Blood pressure elevated on admission.  Currently better.  Continue same medications for now.     Thank you for the consult, will follow with you closely.     Addi Reyes MD  Nephrology   "

## 2024-08-13 NOTE — PLAN OF CARE
Goal Outcome Evaluation:           Progress: no change  Outcome Evaluation: Patient has had a decent shift, awake for most of today. VSS, and patient has been alert and oriented times four. No complaints of pain today. Patient will be NPO at midnight for a scheduled EGD tomorrow. Nursing staff will continue to monitor and assess the patient.

## 2024-08-14 ENCOUNTER — ANESTHESIA (OUTPATIENT)
Dept: GASTROENTEROLOGY | Facility: HOSPITAL | Age: 84
End: 2024-08-14
Payer: MEDICARE

## 2024-08-14 ENCOUNTER — ANESTHESIA EVENT (OUTPATIENT)
Dept: GASTROENTEROLOGY | Facility: HOSPITAL | Age: 84
End: 2024-08-14
Payer: MEDICARE

## 2024-08-14 PROBLEM — D62 ACUTE BLOOD LOSS ANEMIA: Status: ACTIVE | Noted: 2024-08-12

## 2024-08-14 PROBLEM — K92.1 MELENA: Status: ACTIVE | Noted: 2024-08-12

## 2024-08-14 LAB
ANION GAP SERPL CALCULATED.3IONS-SCNC: 9 MMOL/L (ref 5–15)
BUN SERPL-MCNC: 40 MG/DL (ref 8–23)
BUN/CREAT SERPL: 18.4 (ref 7–25)
CALCIUM SPEC-SCNC: 9.1 MG/DL (ref 8.6–10.5)
CHLORIDE SERPL-SCNC: 110 MMOL/L (ref 98–107)
CO2 SERPL-SCNC: 20 MMOL/L (ref 22–29)
CREAT SERPL-MCNC: 2.17 MG/DL (ref 0.57–1)
CREAT UR-MCNC: 52.4 MG/DL
DEPRECATED RDW RBC AUTO: 50.3 FL (ref 37–54)
EGFRCR SERPLBLD CKD-EPI 2021: 22.1 ML/MIN/1.73
ERYTHROCYTE [DISTWIDTH] IN BLOOD BY AUTOMATED COUNT: 15.5 % (ref 12.3–15.4)
EST. AVERAGE GLUCOSE BLD GHB EST-MCNC: 103 MG/DL
GLUCOSE BLDC GLUCOMTR-MCNC: 120 MG/DL (ref 70–130)
GLUCOSE BLDC GLUCOMTR-MCNC: 120 MG/DL (ref 70–130)
GLUCOSE BLDC GLUCOMTR-MCNC: 129 MG/DL (ref 70–130)
GLUCOSE BLDC GLUCOMTR-MCNC: 137 MG/DL (ref 70–130)
GLUCOSE BLDC GLUCOMTR-MCNC: 154 MG/DL (ref 70–130)
GLUCOSE SERPL-MCNC: 126 MG/DL (ref 65–99)
HBA1C MFR BLD: 5.2 % (ref 4.8–5.6)
HCT VFR BLD AUTO: 25.9 % (ref 34–46.6)
HGB BLD-MCNC: 7.8 G/DL (ref 12–15.9)
MCH RBC QN AUTO: 26.4 PG (ref 26.6–33)
MCHC RBC AUTO-ENTMCNC: 30.1 G/DL (ref 31.5–35.7)
MCV RBC AUTO: 87.8 FL (ref 79–97)
PLATELET # BLD AUTO: 121 10*3/MM3 (ref 140–450)
PMV BLD AUTO: 11.3 FL (ref 6–12)
POTASSIUM SERPL-SCNC: 4.3 MMOL/L (ref 3.5–5.2)
PTH-INTACT SERPL-MCNC: 736.4 PG/ML (ref 15–65)
RBC # BLD AUTO: 2.95 10*6/MM3 (ref 3.77–5.28)
SODIUM SERPL-SCNC: 139 MMOL/L (ref 136–145)
WBC NRBC COR # BLD AUTO: 5.88 10*3/MM3 (ref 3.4–10.8)

## 2024-08-14 PROCEDURE — 83970 ASSAY OF PARATHORMONE: CPT | Performed by: STUDENT IN AN ORGANIZED HEALTH CARE EDUCATION/TRAINING PROGRAM

## 2024-08-14 PROCEDURE — 93010 ELECTROCARDIOGRAM REPORT: CPT | Performed by: INTERNAL MEDICINE

## 2024-08-14 PROCEDURE — 84165 PROTEIN E-PHORESIS SERUM: CPT | Performed by: STUDENT IN AN ORGANIZED HEALTH CARE EDUCATION/TRAINING PROGRAM

## 2024-08-14 PROCEDURE — 80048 BASIC METABOLIC PNL TOTAL CA: CPT | Performed by: STUDENT IN AN ORGANIZED HEALTH CARE EDUCATION/TRAINING PROGRAM

## 2024-08-14 PROCEDURE — 93005 ELECTROCARDIOGRAM TRACING: CPT | Performed by: ANESTHESIOLOGY

## 2024-08-14 PROCEDURE — 84155 ASSAY OF PROTEIN SERUM: CPT | Performed by: STUDENT IN AN ORGANIZED HEALTH CARE EDUCATION/TRAINING PROGRAM

## 2024-08-14 PROCEDURE — 94799 UNLISTED PULMONARY SVC/PX: CPT

## 2024-08-14 PROCEDURE — 43235 EGD DIAGNOSTIC BRUSH WASH: CPT | Performed by: INTERNAL MEDICINE

## 2024-08-14 PROCEDURE — 85027 COMPLETE CBC AUTOMATED: CPT | Performed by: STUDENT IN AN ORGANIZED HEALTH CARE EDUCATION/TRAINING PROGRAM

## 2024-08-14 PROCEDURE — 25810000003 SODIUM CHLORIDE 0.9 % SOLUTION

## 2024-08-14 PROCEDURE — 94761 N-INVAS EAR/PLS OXIMETRY MLT: CPT

## 2024-08-14 PROCEDURE — 25010000002 METOCLOPRAMIDE PER 10 MG: Performed by: INTERNAL MEDICINE

## 2024-08-14 PROCEDURE — 97161 PT EVAL LOW COMPLEX 20 MIN: CPT

## 2024-08-14 PROCEDURE — 83521 IG LIGHT CHAINS FREE EACH: CPT | Performed by: STUDENT IN AN ORGANIZED HEALTH CARE EDUCATION/TRAINING PROGRAM

## 2024-08-14 PROCEDURE — 63710000001 INSULIN LISPRO (HUMAN) PER 5 UNITS: Performed by: INTERNAL MEDICINE

## 2024-08-14 PROCEDURE — 99232 SBSQ HOSP IP/OBS MODERATE 35: CPT | Performed by: STUDENT IN AN ORGANIZED HEALTH CARE EDUCATION/TRAINING PROGRAM

## 2024-08-14 PROCEDURE — 0DJ08ZZ INSPECTION OF UPPER INTESTINAL TRACT, VIA NATURAL OR ARTIFICIAL OPENING ENDOSCOPIC: ICD-10-PCS | Performed by: INTERNAL MEDICINE

## 2024-08-14 PROCEDURE — 97166 OT EVAL MOD COMPLEX 45 MIN: CPT

## 2024-08-14 PROCEDURE — 82948 REAGENT STRIP/BLOOD GLUCOSE: CPT

## 2024-08-14 PROCEDURE — 25010000002 PROPOFOL 10 MG/ML EMULSION

## 2024-08-14 PROCEDURE — 94664 DEMO&/EVAL PT USE INHALER: CPT

## 2024-08-14 RX ORDER — PEG-3350, SODIUM SULFATE, SODIUM CHLORIDE, POTASSIUM CHLORIDE, SODIUM ASCORBATE AND ASCORBIC ACID 7.5-2.691G
1000 KIT ORAL ONCE
Status: COMPLETED | OUTPATIENT
Start: 2024-08-14 | End: 2024-08-14

## 2024-08-14 RX ORDER — PROPOFOL 10 MG/ML
VIAL (ML) INTRAVENOUS AS NEEDED
Status: DISCONTINUED | OUTPATIENT
Start: 2024-08-14 | End: 2024-08-14 | Stop reason: SURG

## 2024-08-14 RX ORDER — PEG-3350, SODIUM SULFATE, SODIUM CHLORIDE, POTASSIUM CHLORIDE, SODIUM ASCORBATE AND ASCORBIC ACID 7.5-2.691G
1000 KIT ORAL ONCE
Status: COMPLETED | OUTPATIENT
Start: 2024-08-15 | End: 2024-08-15

## 2024-08-14 RX ORDER — METOCLOPRAMIDE HYDROCHLORIDE 5 MG/ML
5 INJECTION INTRAMUSCULAR; INTRAVENOUS EVERY 6 HOURS
Status: COMPLETED | OUTPATIENT
Start: 2024-08-14 | End: 2024-08-15

## 2024-08-14 RX ORDER — SODIUM CHLORIDE 9 MG/ML
INJECTION, SOLUTION INTRAVENOUS CONTINUOUS PRN
Status: DISCONTINUED | OUTPATIENT
Start: 2024-08-14 | End: 2024-08-14 | Stop reason: SURG

## 2024-08-14 RX ORDER — LIDOCAINE HYDROCHLORIDE 10 MG/ML
INJECTION, SOLUTION EPIDURAL; INFILTRATION; INTRACAUDAL; PERINEURAL AS NEEDED
Status: DISCONTINUED | OUTPATIENT
Start: 2024-08-14 | End: 2024-08-14 | Stop reason: SURG

## 2024-08-14 RX ORDER — ONDANSETRON 2 MG/ML
4 INJECTION INTRAMUSCULAR; INTRAVENOUS ONCE AS NEEDED
Status: DISCONTINUED | OUTPATIENT
Start: 2024-08-14 | End: 2024-08-14 | Stop reason: HOSPADM

## 2024-08-14 RX ORDER — IPRATROPIUM BROMIDE AND ALBUTEROL SULFATE 2.5; .5 MG/3ML; MG/3ML
3 SOLUTION RESPIRATORY (INHALATION) ONCE AS NEEDED
Status: DISCONTINUED | OUTPATIENT
Start: 2024-08-14 | End: 2024-08-14 | Stop reason: HOSPADM

## 2024-08-14 RX ADMIN — SODIUM CHLORIDE: 9 INJECTION, SOLUTION INTRAVENOUS at 09:00

## 2024-08-14 RX ADMIN — CARVEDILOL 25 MG: 12.5 TABLET, FILM COATED ORAL at 21:37

## 2024-08-14 RX ADMIN — MORPHINE SULFATE 15 MG: 15 TABLET, FILM COATED, EXTENDED RELEASE ORAL at 05:14

## 2024-08-14 RX ADMIN — MORPHINE SULFATE 15 MG: 15 TABLET, FILM COATED, EXTENDED RELEASE ORAL at 17:00

## 2024-08-14 RX ADMIN — METOCLOPRAMIDE 5 MG: 5 INJECTION, SOLUTION INTRAMUSCULAR; INTRAVENOUS at 21:37

## 2024-08-14 RX ADMIN — DULOXETINE HYDROCHLORIDE 60 MG: 60 CAPSULE, DELAYED RELEASE ORAL at 08:01

## 2024-08-14 RX ADMIN — HYDRALAZINE HYDROCHLORIDE 75 MG: 50 TABLET ORAL at 21:37

## 2024-08-14 RX ADMIN — HYDRALAZINE HYDROCHLORIDE 75 MG: 50 TABLET ORAL at 05:14

## 2024-08-14 RX ADMIN — PEG-3350, SODIUM SULFATE, SODIUM CHLORIDE, POTASSIUM CHLORIDE, SODIUM ASCORBATE AND ASCORBIC ACID 1000 ML: KIT at 18:29

## 2024-08-14 RX ADMIN — LIDOCAINE HYDROCHLORIDE 100 MG: 10 INJECTION, SOLUTION EPIDURAL; INFILTRATION; INTRACAUDAL; PERINEURAL at 09:09

## 2024-08-14 RX ADMIN — Medication 10 ML: at 08:01

## 2024-08-14 RX ADMIN — INSULIN LISPRO 2 UNITS: 100 INJECTION, SOLUTION INTRAVENOUS; SUBCUTANEOUS at 17:00

## 2024-08-14 RX ADMIN — ATORVASTATIN CALCIUM 80 MG: 40 TABLET, FILM COATED ORAL at 08:01

## 2024-08-14 RX ADMIN — PROPOFOL 30 MG: 10 INJECTION, EMULSION INTRAVENOUS at 09:09

## 2024-08-14 RX ADMIN — PANTOPRAZOLE SODIUM 40 MG: 40 INJECTION, POWDER, FOR SOLUTION INTRAVENOUS at 08:00

## 2024-08-14 RX ADMIN — PANTOPRAZOLE SODIUM 40 MG: 40 INJECTION, POWDER, FOR SOLUTION INTRAVENOUS at 17:00

## 2024-08-14 RX ADMIN — METOCLOPRAMIDE 5 MG: 5 INJECTION, SOLUTION INTRAMUSCULAR; INTRAVENOUS at 15:31

## 2024-08-14 RX ADMIN — CLONIDINE HYDROCHLORIDE 0.1 MG: 0.1 TABLET ORAL at 21:37

## 2024-08-14 RX ADMIN — IPRATROPIUM BROMIDE AND ALBUTEROL SULFATE 3 ML: 2.5; .5 SOLUTION RESPIRATORY (INHALATION) at 13:24

## 2024-08-14 RX ADMIN — METOCLOPRAMIDE 5 MG: 5 INJECTION, SOLUTION INTRAMUSCULAR; INTRAVENOUS at 10:24

## 2024-08-14 RX ADMIN — CARVEDILOL 25 MG: 12.5 TABLET, FILM COATED ORAL at 08:01

## 2024-08-14 RX ADMIN — HYDRALAZINE HYDROCHLORIDE 75 MG: 50 TABLET ORAL at 15:31

## 2024-08-14 RX ADMIN — CLONIDINE HYDROCHLORIDE 0.1 MG: 0.1 TABLET ORAL at 08:01

## 2024-08-14 RX ADMIN — IPRATROPIUM BROMIDE AND ALBUTEROL SULFATE 3 ML: 2.5; .5 SOLUTION RESPIRATORY (INHALATION) at 19:27

## 2024-08-14 NOTE — ANESTHESIA PREPROCEDURE EVALUATION
Anesthesia Evaluation     Patient summary reviewed and Nursing notes reviewed   NPO Solid Status: > 8 hours  NPO Liquid Status: > 2 hours           Airway   Mallampati: II  TM distance: >3 FB  Neck ROM: full  No difficulty expected  Dental      Pulmonary    (+) a smoker Former, cigarettes, COPD, asthma,  Cardiovascular     ECG reviewed    (+) hypertension, CAD (mild on Cath 2016), dysrhythmias, PVD, hyperlipidemia    ROS comment: ECG NSR NS ST abn     ECHO 2024  EF >56 %. concentric LVH  AV ·  sclerosis. Mild AI NO STENOSIS   Mild TI   RVSP moderately elevated (>45)  MV annular calcification NO MR MS     CATH 2016 Mild to moderate 3 vessel CAD without severe or occlusive disease      Neuro/Psych  (+) CVA (facial droop related to a fib 7 yrs ago) residual symptoms, headaches, psychiatric history  GI/Hepatic/Renal/Endo    (+) obesity, GERD, renal disease (creat >2 for over 1 year)- CRI, diabetes mellitus type 2  (-) morbid obesity    Musculoskeletal     Abdominal    Substance History      OB/GYN          Other   arthritis, blood dyscrasia anemia,     (-) history of cancer  ROS/Med Hx Other: Worsening weakness and fatigue.   Dark tarry stools- week ago.    No N and V diarrhea, abdominal pain, chest pain, or fever/chills  Exertional dyspnea.    On Xarelto ABLA                      Anesthesia Plan    ASA 3     general and MAC     (TOP POM PFL no current nausea )  intravenous induction     Anesthetic plan, risks, benefits, and alternatives have been provided, discussed and informed consent has been obtained with: patient.    Plan discussed with CRNA.        CODE STATUS:    Code Status (Patient has no pulse and is not breathing): CPR (Attempt to Resuscitate)  Medical Interventions (Patient has pulse or is breathing): Full Support

## 2024-08-14 NOTE — PROGRESS NOTES
" LOS: 0 days   Patient Care Team:  Sebas Alicea MD as PCP - General (Family Medicine)  Steve Geronimo MD as Consulting Physician (Cardiology)  Emilio Regalado MD as Consulting Physician (Endocrinology)    Chief Complaint: CKD    Subjective       Subjective:  Symptoms:  Stable.  No shortness of breath, chest pain or chest pressure.    Diet:  Adequate intake.    Pain:  She reports no pain.        History taken from: patient    Objective     Vital Sign Min/Max for last 24 hours  Temp  Min: 97.5 °F (36.4 °C)  Max: 98.5 °F (36.9 °C)   BP  Min: 96/53  Max: 186/80   Pulse  Min: 57  Max: 76   Resp  Min: 16  Max: 22   SpO2  Min: 89 %  Max: 100 %   Flow (L/min)  Min: 2  Max: 2   No data recorded     Flowsheet Rows      Flowsheet Row First Filed Value   Admission Height 162.6 cm (64.02\") Documented at 08/13/2024 0700   Admission Weight 85.7 kg (189 lb) Documented at 08/13/2024 0700            I/O this shift:  In: 75 [I.V.:75]  Out: -   I/O last 3 completed shifts:  In: 322.9 [Blood:322.9]  Out: 2600 [Urine:2600]    Objective:  General Appearance:  Comfortable.    Vital signs: (most recent): Blood pressure 122/61, pulse 62, temperature 97.5 °F (36.4 °C), temperature source Oral, resp. rate 18, height 162.6 cm (64.02\"), weight 85.7 kg (189 lb), SpO2 99%.  Vital signs are normal.    Output: Producing urine.    HEENT: Normal HEENT exam.    Lungs:  Normal effort and normal respiratory rate.  Breath sounds clear to auscultation.  She is not in respiratory distress.  No stridor.  No decreased breath sounds or wheezes.    Heart: Normal rate.  Regular rhythm.  S1 normal and S2 normal.  No murmur or gallop.   Abdomen: Abdomen is soft.  Bowel sounds are normal.     Extremities: Normal range of motion.  There is no dependent edema or local swelling.    Pulses: Distal pulses are intact.    Neurological: Patient is alert and oriented to person, place and time.  Normal strength.    Pupils:  Pupils are equal, round, and " "reactive to light.                Results Review:     I reviewed the patient's new clinical results.    WBC WBC   Date Value Ref Range Status   08/14/2024 5.88 3.40 - 10.80 10*3/mm3 Final   08/13/2024 7.01 3.40 - 10.80 10*3/mm3 Final   08/12/2024 4.77 3.40 - 10.80 10*3/mm3 Final      HGB Hemoglobin   Date Value Ref Range Status   08/14/2024 7.8 (L) 12.0 - 15.9 g/dL Final   08/13/2024 7.5 (L) 12.0 - 15.9 g/dL Final   08/13/2024 8.1 (L) 12.0 - 15.9 g/dL Final   08/13/2024 7.6 (L) 12.0 - 15.9 g/dL Final   08/12/2024 6.3 (C) 12.0 - 15.9 g/dL Final      HCT Hematocrit   Date Value Ref Range Status   08/14/2024 25.9 (L) 34.0 - 46.6 % Final   08/13/2024 23.9 (L) 34.0 - 46.6 % Final   08/13/2024 26.0 (L) 34.0 - 46.6 % Final   08/13/2024 24.4 (L) 34.0 - 46.6 % Final   08/12/2024 21.1 (L) 34.0 - 46.6 % Final      Platlets No results found for: \"LABPLAT\"   MCV MCV   Date Value Ref Range Status   08/14/2024 87.8 79.0 - 97.0 fL Final   08/13/2024 87.2 79.0 - 97.0 fL Final   08/12/2024 88.3 79.0 - 97.0 fL Final          Sodium Sodium   Date Value Ref Range Status   08/14/2024 139 136 - 145 mmol/L Final   08/12/2024 138 136 - 145 mmol/L Final      Potassium Potassium   Date Value Ref Range Status   08/14/2024 4.3 3.5 - 5.2 mmol/L Final   08/12/2024 4.4 3.5 - 5.2 mmol/L Final      Chloride Chloride   Date Value Ref Range Status   08/14/2024 110 (H) 98 - 107 mmol/L Final   08/12/2024 108 (H) 98 - 107 mmol/L Final      CO2 CO2   Date Value Ref Range Status   08/14/2024 20.0 (L) 22.0 - 29.0 mmol/L Final   08/12/2024 20.0 (L) 22.0 - 29.0 mmol/L Final      BUN BUN   Date Value Ref Range Status   08/14/2024 40 (H) 8 - 23 mg/dL Final   08/12/2024 46 (H) 8 - 23 mg/dL Final      Creatinine Creatinine   Date Value Ref Range Status   08/14/2024 2.17 (H) 0.57 - 1.00 mg/dL Final   08/12/2024 2.77 (H) 0.57 - 1.00 mg/dL Final      Calcium Calcium   Date Value Ref Range Status   08/14/2024 9.1 8.6 - 10.5 mg/dL Final   08/12/2024 9.5 8.6 - 10.5 " "mg/dL Final      PO4 No results found for: \"CAPO4\"   Albumin Albumin   Date Value Ref Range Status   08/12/2024 3.5 3.5 - 5.2 g/dL Final      Magnesium Magnesium   Date Value Ref Range Status   08/12/2024 1.8 1.6 - 2.4 mg/dL Final      Uric Acid No results found for: \"URICACID\"     Medication Review: Yes    Assessment & Plan       Symptomatic anemia    Acute blood loss anemia    Melena      Assessment & Plan    1.  Acute on chronic renal failure: Progressive CKD in the setting of diabetes and HTN. Proteinuric renal disease. Cr ~ 2.77 on this admission. Most recent cr 2.1   2.  Symptomatic anemia: Hemoglobin 6.3.  Patient received  unit of blood. GI following. Pending SPEP  3.  Diabetes  4.  COPD   5.  Chronic pain: Takes MS Contin every 12 hours.  6.  Hypertension: Extensive secondary workup in the past. PRA 0.5 Aldosterone 21 in 2017  7.  Hyperlipidemia  8.  Iron deficiency: Saturation 14%  9.  Mild metabolic acidosis.       Thank you for the consult, will follow with you closely.     Zurdo Levine MD  08/14/24  12:59 EDT          "

## 2024-08-14 NOTE — THERAPY EVALUATION
Patient Name: Yainque Webster  : 1940    MRN: [id number]                              Today's Date: 2024       Admit Date: 2024    Visit Dx:     ICD-10-CM ICD-9-CM   1. Symptomatic anemia  D64.9 285.9   2. Acute blood loss anemia  D62 285.1   3. Melena  K92.1 578.1     Patient Active Problem List   Diagnosis    Fibromyalgia    PVD (peripheral vascular disease)    Type 2 diabetes mellitus    Diabetic gastroparesis    Takotsubo cardiomyopathy    Elevated serum creatinine    Hyperlipidemia LDL goal <70    COPD (chronic obstructive pulmonary disease)    Obesity    Osteoarthritis    Chronic pain    GERD (gastroesophageal reflux disease)    Gout    Chronic joint pain    Coronary artery disease involving native coronary artery of native heart without angina pectoris    Nonrheumatic aortic valve insufficiency    Altered mental status    Essential hypertension    CKD (chronic kidney disease) stage 3, GFR 30-59 ml/min    Hypertensive emergency    Status post placement of implantable loop recorder    Paroxysmal atrial fibrillation    Acute exacerbation of COPD with asthma    Encounter for loop recorder at end of battery life    Closed left hip fracture    Anxiety associated with depression    Anemia, chronic disease    Surgery follow-up    Borderline abnormal TFTs    Symptomatic anemia    Acute blood loss anemia    Melena     Past Medical History:   Diagnosis Date    Blurry vision     Chronic joint pain     Chronic pain     COPD (chronic obstructive pulmonary disease)     Coronary artery disease     Diabetic gastroparesis 2016    Esophageal stricture     s/p dilation in     GERD (gastroesophageal reflux disease)     Gout     Headache     secondary to hypertension    Hyperlipidemia     Hypertension     Long term current use of insulin     Neuropathy     Neuropathy due to type 2 diabetes mellitus     Obesity     Osteoarthritis     Pericarditis     Stroke 2019    Type 2 diabetes mellitus       Past Surgical History:   Procedure Laterality Date    BRONCHOSCOPY N/A 8/23/2016    Procedure: BRONCHOSCOPY BIOPSY AT BEDSIDE;  Surgeon: Mookie Willard MD;  Location:  [facility];  Service:     CARDIAC CATHETERIZATION N/A 8/30/2016    Procedure: Left Heart Cath;  Surgeon: Steve Geronimo MD;  Location: [facility];  Service:     CARDIAC CATHETERIZATION N/A 8/30/2016    Procedure: Right Heart Cath;  Surgeon: Steve Geronimo MD;  Location: [facility];  Service:     CARDIAC ELECTROPHYSIOLOGY PROCEDURE N/A 7/2/2019    Procedure: Loop insertion;  Surgeon: Steve Geronimo MD;  Location: [facility] LOCATION;  Service: Cardiovascular    CARDIAC ELECTROPHYSIOLOGY PROCEDURE N/A 9/26/2023    Procedure: Loop recorder removal;  Surgeon: Steve Geronimo MD;  Location: [facility] IN[facility]IVE LOCATION;  Service: Cardiovascular;  Laterality: N/A;    CATARACT EXTRACTION      ESOPHAGEAL DILATATION  2007    HERNIA REPAIR      HIP CANNULATED SCREW PLACEMENT Left 1/2/2024    Procedure: HIP CANNULATED SCREW PLACEMENT LEFT;  Surgeon: Seymour Harrington MD;  Location:  [facility];  Service: Orthopedics;  Laterality: Left;    HYSTERECTOMY  1998    WRIST FRACTURE SURGERY Left       General Information       Row Name 08/14/24 1456          OT Time and Intention    Document Type evaluation  -AN     Mode of Treatment occupational therapy  -AN       Row Name 08/14/24 1456          General Information    Patient Profile Reviewed yes  -AN     Prior Level of Function independent:;all household mobility;community mobility;gait;ADL's  Prior to admission pt ambulating using a rollator at baseline. 1 recent fall in Jan resulting in hip fx.  -AN     Existing Precautions/Restrictions fall;oxygen therapy device and L/min  -AN     Barriers to Rehab medically complex  -AN       Row Name 08/14/24 1456          Living Environment    People in Home facility resident;other (see comments)  [facility]  Living  -AN       Row Name 08/14/24 1456          Home Main Entrance    Number of Stairs, Main Entrance none  -AN       Row Name 08/14/24 1456          Stairs Within Home, Primary    Number of Stairs, Within Home, Primary none  -AN       Row Name 08/14/24 1456          Cognition    Orientation Status (Cognition) oriented x 4  -AN       Row Name 08/14/24 1456          Safety Issues, Functional Mobility    Safety Issues Affecting Function (Mobility) safety precaution awareness;safety precautions follow-through/compliance;insight into deficits/self-awareness;sequencing abilities;judgment;positioning of assistive device;problem-solving;awareness of need for assistance  -AN     Impairments Affecting Function (Mobility) balance;endurance/activity tolerance;strength  -AN               User Key  (r) = Recorded By, (t) = Taken By, (c) = Cosigned By      Initials Name Provider Type    Niru Ash OT Occupational Therapist                     Mobility/ADL's       Row Name 08/14/24 1457          Bed Mobility    Bed Mobility supine-sit  -AN     Supine-Sit [place] (Bed Mobility) verbal cues;supervision  -AN     Assistive Device (Bed Mobility) head of bed elevated  -AN       [facility] Name 08/14/24 1457          Transfers    Transfers sit-stand transfer;stand-sit transfer  -AN       [facility] Name 08/14/24 1457          Sit-Stand Transfer    Sit-Stand [place] (Transfers) verbal cues;contact guard  -AN     Assistive Device (Sit-Stand Transfers) walker, front-wheeled  -AN       [facility] Name 08/14/24 1457          Stand-Sit Transfer    Stand-Sit [place] (Transfers) verbal cues;contact guard  -AN     Assistive Device (Stand-Sit Transfers) walker, front-wheeled  -AN       [facility] Name 08/14/24 1457          Functional Mobility    Functional Mobility- Ind. Level contact guard assist  -AN     Functional Mobility- Device walker, front-wheeled  -AN     Functional Mobility-Distance (Feet) --  <household distance  -AN     Functional  Mobility- Safety Issues step length decreased;sequencing ability decreased;balance decreased during turns  -AN     Functional Mobility- Comment Pt ambulated short distance within the room using the RW with CGA for balance and safety.  -AN       Row Name 08/14/24 1457          Activities of Daily Living    BADL Assessment/Intervention upper body dressing;lower body dressing;grooming  -AN       Row Name 08/14/24 1457          Upper Body Dressing Assessment/Training    [place] Level (Upper Body Dressing) don;pajama/robe;minimum assist (75% patient effort)  -AN     Position (Upper Body Dressing) edge of bed sitting  -AN       Row Name 08/14/24 1457          Lower Body Dressing Assessment/Training    [place] Level (Lower Body Dressing) don;socks;minimum assist (75% patient effort)  -AN     Position (Lower Body Dressing) unsupported sitting  -AN       Row Name 08/14/24 1457          Grooming Assessment/Training    [place] Level (Grooming) hair care, combing/brushing;oral care regimen;standby assist  -AN     Position (Grooming) sink side  -AN               User Key  (r) = Recorded By, (t) = Taken By, (c) = Cosigned By      Initials Name Provider Type    AN Niru Eagle OT Occupational Therapist                   Obj/Interventions       Row Name 08/14/24 1503          Sensory Assessment (Somatosensory)    Sensory Assessment (Somatosensory) bilateral UE  -AN     Bilateral UE Sensory Assessment general sensation  -AN     Sensory Subjective Reports numbness  -AN     Sensory Assessment neuropathy  -AN       Row Name 08/14/24 1503          Vision Assessment/Intervention    Visual Impairment/Limitations WFL;corrective lenses for reading  -AN       Row Name 08/14/24 1503          Range of Motion Comprehensive    General Range of Motion bilateral upper extremity ROM WFL  -AN       Row Name 08/14/24 1503          Strength Comprehensive (MMT)    General Manual Muscle Testing (MMT) Assessment upper extremity  strength deficits identified  -AN     Comment, General Manual Muscle Testing (MMT) Assessment BUE grossly 4/5  -AN       Row Name 08/14/24 1503          Balance    Balance Assessment sitting static balance;sitting dynamic balance;sit to stand dynamic balance;standing dynamic balance;standing static balance  -AN     Static Sitting Balance supervision  -AN     Dynamic Sitting Balance supervision  -AN     Position, Sitting Balance sitting edge of bed  -AN     Sit to Stand Dynamic Balance verbal cues;contact guard  -AN     Static Standing Balance standby assist  -AN     Dynamic Standing Balance contact guard  -AN     Position/Device Used, Standing Balance supported;walker, rolling  -AN     Balance Interventions standing;sit to stand;supported;static;dynamic;minimal challenge;occupation based/functional task  -AN               User Key  (r) = Recorded By, (t) = Taken By, (c) = Cosigned By      Initials Name Provider Type    AN Niru Eagle OT Occupational Therapist                   Goals/Plan       Row Name 08/14/24 3863          Transfer Goal 1 (OT)    Activity/Assistive Device (Transfer Goal 1, OT) sit-to-stand/stand-to-sit;bed-to-chair/chair-to-bed;toilet;walker, rolling  -AN     [place] Level/Cues Needed (Transfer Goal 1, OT) standby assist  -AN     Time Frame (Transfer Goal 1, OT) short term goal (STG);4 days  -AN       Row Name 08/14/24 8156          Dressing Goal 1 (OT)    Activity/Device (Dressing Goal 1, OT) upper body dressing;lower body dressing  -AN     [place]/Cues Needed (Dressing Goal 1, OT) contact guard required  -AN     Time Frame (Dressing Goal 1, OT) long term goal (LTG);1 week  -AN       Row Name 08/14/24 0680          Toileting Goal 1 (OT)    Activity/Device (Toileting Goal 1, OT) adjust/manage clothing;perform perineal hygiene;commode  -AN     [place] Level/Cues Needed (Toileting Goal 1, OT) contact guard required  -AN     Time Frame (Toileting Goal 1, OT) long term goal  (LTG);1 week  -AN       Row Name 08/14/24 1507          Grooming Goal 1 (OT)    Activity/Device (Grooming Goal 1, OT) oral care  -AN     [place] (Grooming Goal 1, OT) standby assist  -AN     Time Frame (Grooming Goal 1, OT) long term goal (LTG);1 week  -AN       Row Name 08/14/24 1501          Therapy Assessment/Plan (OT)    Planned Therapy Interventions (OT) activity tolerance training;adaptive equipment training;BADL retraining;functional balance retraining;occupation/activity based interventions;patient/caregiver education/training;transfer/mobility retraining;strengthening exercise  -AN               User Key  (r) = Recorded By, (t) = Taken By, (c) = Cosigned By      Initials Name Provider Type    Niru Ash OT Occupational Therapist                   Clinical Impression       Row Name 08/14/24 1509          Pain Assessment    Pretreatment Pain Rating 0/10 - no pain  -AN     Posttreatment Pain Rating 0/10 - no pain  -AN       Row Name 08/14/24 150          Plan of Care Review    Plan of Care Reviewed With patient  -AN     Progress no change  -AN     Outcome Evaluation Pt presents below her functional baseline with deficits including generalized weakness, impaired balance, decreased activity tolerance and impaired ADLs warranting skilled OT services. Pt performed STS, transfers and ambulated short distance using the RW with CGA. Rec IPR at dc for best functional outcomes.  -AN       Row Name 08/14/24 1502          Therapy Assessment/Plan (OT)    Patient/Family Therapy Goal Statement (OT) Return to PLOF  -AN     Rehab Potential (OT) good, to achieve stated therapy goals  -AN     Criteria for Skilled Therapeutic Interventions Met (OT) yes;skilled treatment is necessary  -AN     Therapy Frequency (OT) daily  -AN     Predicted Duration of Therapy Intervention (OT) 7 days  -AN       Row Name 08/14/24 1509          Therapy Plan Review/Discharge Plan (OT)    Anticipated Discharge Disposition (OT)  inpatient rehabilitation facility  -AN       Row Name 08/14/24 1504          Positioning and Restraints    Pre-Treatment Position in bed  -AN     Post Treatment Position other  -AN     Other Position with PT  -AN               User Key  (r) = Recorded By, (t) = Taken By, (c) = Cosigned By      Initials Name Provider Type    Niru Ash OT Occupational Therapist                   Outcome Measures       Row Name 08/14/24 1508          How much help from another is currently needed...    Putting on and taking off regular lower body clothing? 2  -AN     Bathing (including washing, rinsing, and drying) 2  -AN     Toileting (which includes using toilet bed pan or urinal) 2  -AN     Putting on and taking off regular upper body clothing 3  -AN     Taking care of personal grooming (such as brushing teeth) 3  -AN     Eating meals 3  -AN     AM-PAC 6 Clicks Score (OT) 15  -AN       Row Name 08/14/24 0800          How much help from another person do you currently need...    Turning from your back to your side while in flat bed without using bedrails? 3  -CB     Moving from lying on back to sitting on the side of a flat bed without bedrails? 3  -CB     Moving to and from a bed to a chair (including a wheelchair)? 3  -CB     Standing up from a chair using your arms (e.g., wheelchair, bedside chair)? 3  -CB     Climbing 3-5 steps with a railing? 2  -CB     To walk in hospital room? 3  -CB     AM-PAC 6 Clicks Score (PT) 17  -CB     Highest Level of Mobility Goal 5 --> Static standing  -CB       Row Name 08/14/24 1508          Functional Assessment    Outcome Measure Options AM-PAC 6 Clicks Daily Activity (OT)  -AN               User Key  (r) = Recorded By, (t) = Taken By, (c) = Cosigned By      Initials Name Provider Type    Adan Emery, RN Registered Nurse    Niru Ash OT Occupational Therapist                    Occupational Therapy Education       Title: PT OT SLP Therapies (In Progress)        Topic: Occupational Therapy (Done)       Point: ADL training (Done)       Description:   Instruct learner(s) on proper safety adaptation and remediation techniques during self care or transfers.   Instruct in proper use of assistive devices.                  Learning Progress Summary             Patient Acceptance, E, VU by AN at 8/14/2024 1508                         Point: Home exercise program (Done)       Description:   Instruct learner(s) on appropriate technique for monitoring, assisting and/or progressing therapeutic exercises/activities.                  Learning Progress Summary             Patient Acceptance, E, VU by AN at 8/14/2024 1508                         Point: Precautions (Done)       Description:   Instruct learner(s) on prescribed precautions during self-care and functional transfers.                  Learning Progress Summary             Patient Acceptance, E, VU by AN at 8/14/2024 1508                         Point: Body mechanics (Done)       Description:   Instruct learner(s) on proper positioning and spine alignment during self-care, functional mobility activities and/or exercises.                  Learning Progress Summary             Patient Acceptance, E, VU by AN at 8/14/2024 1508                                         User Key       Initials Effective Dates Name Provider Type Discipline     09/21/21 -  Niru Eagle OT Occupational Therapist OT                  OT Recommendation and Plan  Planned Therapy Interventions (OT): activity tolerance training, adaptive equipment training, BADL retraining, functional balance retraining, occupation/activity based interventions, patient/caregiver education/training, transfer/mobility retraining, strengthening exercise  Therapy Frequency (OT): daily  Plan of Care Review  Plan of Care Reviewed With: patient  Progress: no change  Outcome Evaluation: Pt presents below her functional baseline with deficits including generalized weakness,  impaired balance, decreased activity tolerance and impaired ADLs warranting skilled OT services. Pt performed STS, transfers and ambulated short distance using the RW with CGA. Rec IPR at dc for best functional outcomes.     Time Calculation:   Evaluation Complexity (OT)  Review Occupational Profile/Medical/Therapy History Complexity: expanded/moderate complexity  Assessment, Occupational Performance/Identification of Deficit Complexity: 3-5 performance deficits  Clinical Decision Making Complexity (OT): detailed assessment/moderate complexity  Overall Complexity of Evaluation (OT): moderate complexity     Time Calculation- OT       Row Name 08/14/24 1508             Time Calculation- OT    OT Start Time 1318  -AN      OT Received On 08/14/24  -AN      OT Goal Re-Cert Due Date 08/24/24  -AN         Untimed Charges    OT Eval/Re-eval Minutes 48  -AN         Total Minutes    Untimed Charges Total Minutes 48  -AN       Total Minutes 48  -AN                User Key  (r) = Recorded By, (t) = Taken By, (c) = Cosigned By      Initials Name Provider Type    AN Niru Eagle, OT Occupational Therapist                  Therapy Charges for Today       Code Description Service Date Service Provider Modifiers Qty    [id number]  OT EVAL MOD COMPLEXITY 4 8/14/2024 Niru Eagle OT GO 1                 Niru Eagle OT  8/14/2024

## 2024-08-14 NOTE — PLAN OF CARE
Goal Outcome Evaluation:           Progress: no change  Outcome Evaluation: Patient has had a decent shift, awake for most of today. VSS, and patient has been alert and oriented times four. No complaints of pain today. Patient had an EGD performed this morning with Dr. Brunner. Patient will be NPO again tonight for an upcoming colonoscopy tomorrow. Nursing staff will continue to monitor and assess the patient.

## 2024-08-14 NOTE — BRIEF OP NOTE
ESOPHAGOGASTRODUODENOSCOPY  Progress Note    Yanique Webster  8/14/2024    EGD shows undigested food in the stomach, suggesting gastroparesis.  Upper GI mucosa is normal.  No blood or source of blood loss seen.    >> Colonoscopy tomorrow.  >> Gastroparesis diet.    Mark I. Brunner, MD     Date: 8/14/2024  Time: 09:15 EDT

## 2024-08-14 NOTE — PLAN OF CARE
Goal Outcome Evaluation:  Plan of Care Reviewed With: patient        Progress: no change  Outcome Evaluation: Patient slept majority of the shift. VSS, AOx4. 2L NC. No complaints of any nausea or pain. She is NPO for possible EGD this morning. Nursing staff will continue to monitor.

## 2024-08-14 NOTE — PLAN OF CARE
Goal Outcome Evaluation:  Plan of Care Reviewed With: patient        Progress: no change  Outcome Evaluation: Pt presents below her functional baseline with deficits including generalized weakness, impaired balance, decreased activity tolerance and impaired ADLs warranting skilled OT services. Pt performed STS, transfers and ambulated short distance using the RW with CGA. Rec IPR at dc for best functional outcomes.      Anticipated Discharge Disposition (OT): inpatient rehabilitation facility

## 2024-08-14 NOTE — PLAN OF CARE
Goal Outcome Evaluation:  Plan of Care Reviewed With: patient           Outcome Evaluation: PT initial evaluation completed for pt presenting with generalized weakness, SOA, impaired balance and coordination, and decreased functional mobility. Pt ambulated 70ft with RW and [place]. Pt's decreased independence warrants PT skilled care. Recommend D/C to IP rehab facility.      Anticipated Discharge Disposition (PT): inpatient rehabilitation facility

## 2024-08-14 NOTE — THERAPY EVALUATION
Patient Name: Yanique Webster  : 1940    MRN: [id number]                              Today's Date: 2024       Admit Date: 2024    Visit Dx:     ICD-10-CM ICD-9-CM   1. Symptomatic anemia  D64.9 285.9   2. Acute blood loss anemia  D62 285.1   3. Melena  K92.1 578.1     Patient Active Problem List   Diagnosis    Fibromyalgia    PVD (peripheral vascular disease)    Type 2 diabetes mellitus    Diabetic gastroparesis    Takotsubo cardiomyopathy    Elevated serum creatinine    Hyperlipidemia LDL goal <70    COPD (chronic obstructive pulmonary disease)    Obesity    Osteoarthritis    Chronic pain    GERD (gastroesophageal reflux disease)    Gout    Chronic joint pain    Coronary artery disease involving native coronary artery of native heart without angina pectoris    Nonrheumatic aortic valve insufficiency    Altered mental status    Essential hypertension    CKD (chronic kidney disease) stage 3, GFR 30-59 ml/min    Hypertensive emergency    Status post placement of implantable loop recorder    Paroxysmal atrial fibrillation    Acute exacerbation of COPD with asthma    Encounter for loop recorder at end of battery life    Closed left hip fracture    Anxiety associated with depression    Anemia, chronic disease    Surgery follow-up    Borderline abnormal TFTs    Symptomatic anemia    Acute blood loss anemia    Melena     Past Medical History:   Diagnosis Date    Blurry vision     Chronic joint pain     Chronic pain     COPD (chronic obstructive pulmonary disease)     Coronary artery disease     Diabetic gastroparesis 2016    Esophageal stricture     s/p dilation in     GERD (gastroesophageal reflux disease)     Gout     Headache     secondary to hypertension    Hyperlipidemia     Hypertension     Long term current use of insulin     Neuropathy     Neuropathy due to type 2 diabetes mellitus     Obesity     Osteoarthritis     Pericarditis     Stroke 2019    Type 2 diabetes mellitus       Past Surgical History:   Procedure Laterality Date    BRONCHOSCOPY N/A 8/23/2016    Procedure: BRONCHOSCOPY BIOPSY AT BEDSIDE;  Surgeon: Mookie Willard MD;  Location:  [facility];  Service:     CARDIAC CATHETERIZATION N/A 8/30/2016    Procedure: Left Heart Cath;  Surgeon: Steve Geronimo MD;  Location: [facility];  Service:     CARDIAC CATHETERIZATION N/A 8/30/2016    Procedure: Right Heart Cath;  Surgeon: Steve Geronimo MD;  Location: [facility];  Service:     CARDIAC ELECTROPHYSIOLOGY PROCEDURE N/A 7/2/2019    Procedure: Loop insertion;  Surgeon: Steve Geronimo MD;  Location: [facility];  Service: Cardiovascular    CARDIAC ELECTROPHYSIOLOGY PROCEDURE N/A 9/26/2023    Procedure: Loop recorder removal;  Surgeon: Steve Geronimo MD;  Location: [facility];  Service: Cardiovascular;  Laterality: N/A;    CATARACT EXTRACTION      ESOPHAGEAL DILATATION  2007    HERNIA REPAIR      HIP CANNULATED SCREW PLACEMENT Left 1/2/2024    Procedure: HIP CANNULATED SCREW PLACEMENT LEFT;  Surgeon: Seymour Harrington MD;  Location:  [facility];  Service: Orthopedics;  Laterality: Left;    HYSTERECTOMY  1998    WRIST FRACTURE SURGERY Left       General Information       Row Name 08/14/24 1520          Physical Therapy Time and Intention    Document Type evaluation  -KG     Mode of Treatment physical therapy  -KG       Row Name 08/14/24 1520          General Information    Patient Profile Reviewed yes  -KG     Prior Level of Function independent:;all household mobility;gait;transfer;ADL's;dressing;bathing  uses rollator at baseline  -KG     Existing Precautions/Restrictions fall;oxygen therapy device and L/min  -KG     Barriers to Rehab medically complex  -KG       Row Name 08/14/24 1520          Living Environment    People in Home facility resident  ILF  -KG       Row Name 08/14/24 1520          Home Main Entrance    Number of Stairs, Main Entrance none  -KG        Row Name 08/14/24 1520          Stairs Within Home, Primary    Number of Stairs, Within Home, Primary none  -KG       Row Name 08/14/24 1520          Cognition    Orientation Status (Cognition) oriented x 4  -KG       Row Name 08/14/24 1520          Safety Issues, Functional Mobility    Safety Issues Affecting Function (Mobility) awareness of need for assistance;insight into deficits/self-awareness;safety precaution awareness;safety precautions follow-through/compliance  -KG     Impairments Affecting Function (Mobility) balance;coordination;endurance/activity tolerance;postural/trunk control;strength;shortness of breath  -KG               User Key  (r) = Recorded By, (t) = Taken By, (c) = Cosigned By      Initials Name Provider Type    KG Kenisha Hutson, PT Physical Therapist                   Mobility       Row Name 08/14/24 1521          Bed Mobility    Bed Mobility scooting/bridging;sit-supine  -KG     Scooting/Bridging [place] (Bed Mobility) moderate assist (50% patient effort);2 person assist;verbal cues  -KG     Sit-Supine [place] (Bed Mobility) minimum assist (75% patient effort);verbal cues  -KG     Assistive Device (Bed Mobility) draw sheet;head of bed elevated  -KG     Comment, (Bed Mobility) VC's for sequencing. Pt required Odilon with BLEs when returning to supine.  -KG       Row Name 08/14/24 1521          Transfers    Comment, (Transfers) VC's for sequencing and safe hand placement.  -KG       Row Name 08/14/24 1521          Sit-Stand Transfer    Sit-Stand [place] (Transfers) contact guard;verbal cues  -KG     Assistive Device (Sit-Stand Transfers) walker, front-wheeled  -KG       Row Name 08/14/24 1521          Gait/Stairs (Locomotion)    [place] Level (Gait) minimum assist (75% patient effort);verbal cues  -KG     Assistive Device (Gait) walker, front-wheeled  -KG     Distance in Feet (Gait) 70  -KG     Deviations/Abnormal Patterns (Gait) bilateral deviations;base of  support, narrow;myke decreased;festinating/shuffling;stride length decreased  -KG     Bilateral Gait Deviations forward flexed posture;heel strike decreased  -KG     Comment, (Gait/Stairs) Pt demonstrated step through gait pattern with slow myke and short, shuffled steps. Frequent cues for upright posture. Pt unsteady with poor management of RW. Required intermittent physical assistance to steer RW. Declined additional ambulation. Limited by c/o fatigue and weakness.  -KG               User Key  (r) = Recorded By, (t) = Taken By, (c) = Cosigned By      Initials Name Provider Type    KG Kenisha Hutson, PT Physical Therapist                   Obj/Interventions       Row Name 08/14/24 1523          Range of Motion Comprehensive    General Range of Motion no range of motion deficits identified  -KG     Comment, General Range of Motion B LE WFL  -KG       Row Name 08/14/24 1523          Strength Comprehensive (MMT)    Comment, General Manual Muscle Testing (MMT) Assessment B LE grossly 3+/5  -KG       Row Name 08/14/24 1523          Balance    Balance Assessment sitting static balance;standing static balance;standing dynamic balance  -KG     Static Sitting Balance supervision  -KG     Position, Sitting Balance unsupported;sitting edge of bed  -KG     Static Standing Balance contact guard  -KG     Dynamic Standing Balance minimal assist  -KG     Position/Device Used, Standing Balance supported;walker, rolling  -KG       Row Name 08/14/24 1523          Sensory Assessment (Somatosensory)    Sensory Assessment (Somatosensory) LE sensation intact  -KG               User Key  (r) = Recorded By, (t) = Taken By, (c) = Cosigned By      Initials Name Provider Type    KG Kenisha Hutson, PT Physical Therapist                   Goals/Plan       Row Name 08/14/24 1525          Bed Mobility Goal 1 (PT)    Activity/Assistive Device (Bed Mobility Goal 1, PT) sit to supine;supine to sit  -KG     [place] Level/Cues  Needed (Bed Mobility Goal 1, PT) supervision required  -KG     Time Frame (Bed Mobility Goal 1, PT) short term goal (STG);5 days  -KG     Progress/Outcomes (Bed Mobility Goal 1, PT) goal ongoing  -KG       Row Name 08/14/24 1525          Transfer Goal 1 (PT)    Activity/Assistive Device (Transfer Goal 1, PT) sit-to-stand/stand-to-sit;bed-to-chair/chair-to-bed;walker, rolling  -KG     [place] Level/Cues Needed (Transfer Goal 1, PT) standby assist  -KG     Time Frame (Transfer Goal 1, PT) long term goal (LTG);1 week  -KG     Progress/Outcome (Transfer Goal 1, PT) goal ongoing  -KG       Row Name 08/14/24 1525          Gait Training Goal 1 (PT)    Activity/Assistive Device (Gait Training Goal 1, PT) gait (walking locomotion);assistive device use;walker, rolling  -KG     [place] Level (Gait Training Goal 1, PT) contact guard required  -KG     Distance (Gait Training Goal 1, PT) 150 feet  -KG     Time Frame (Gait Training Goal 1, PT) long term goal (LTG);1 week  -KG     Progress/Outcome (Gait Training Goal 1, PT) goal ongoing  -KG       Row Name 08/14/24 1525          Therapy Assessment/Plan (PT)    Planned Therapy Interventions (PT) balance training;bed mobility training;gait training;strengthening;transfer training  -KG               User Key  (r) = Recorded By, (t) = Taken By, (c) = Cosigned By      Initials Name Provider Type    KG Kenisha Hutson, PT Physical Therapist                   Clinical Impression       Row Name 08/14/24 1523          Pain    Pretreatment Pain Rating 0/10 - no pain  -KG     Posttreatment Pain Rating 0/10 - no pain  -KG       Row Name 08/14/24 1523          Plan of Care Review    Plan of Care Reviewed With patient  -KG     Outcome Evaluation PT initial evaluation completed for pt presenting with generalized weakness, SOA, impaired balance and coordination, and decreased functional mobility. Pt ambulated 70ft with RW and Odilon. Pt's decreased independence warrants PT skilled  care. Recommend D/C to IP rehab facility.  -KG       Row Name 08/14/24 1523          Therapy Assessment/Plan (PT)    Patient/Family Therapy Goals Statement (PT) return to PLOF  -KG     Rehab Potential (PT) good, to achieve stated therapy goals  -KG     Criteria for Skilled Interventions Met (PT) yes;skilled treatment is necessary  -KG     Therapy Frequency (PT) daily  -KG     Predicted Duration of Therapy Intervention (PT) 7 days  -KG       Row Name 08/14/24 1523          Vital Signs    Pre Systolic BP Rehab 122  -KG     Pre Treatment Diastolic BP 61  -KG     Pretreatment Heart Rate (beats/min) 64  -KG     Posttreatment Heart Rate (beats/min) 77  -KG     Pre SpO2 (%) 100  -KG     O2 Delivery Pre Treatment supplemental O2  -KG     Post SpO2 (%) 99  -KG     O2 Delivery Post Treatment supplemental O2  -KG     Pre Patient Position Standing  -KG     Intra Patient Position Standing  -KG     Post Patient Position Supine  -KG       Row Name 08/14/24 1523          Positioning and Restraints    Pre-Treatment Position standing in room  -KG     Post Treatment Position bed  -KG     In Bed notified nsg;supine;call light within reach;encouraged to call for assist;exit alarm on;with other staff;side rails up x2  -KG               User Key  (r) = Recorded By, (t) = Taken By, (c) = Cosigned By      Initials Name Provider Type    KG Kenisha Hutson, PT Physical Therapist                   Outcome Measures       Row Name 08/14/24 1525 08/14/24 0800       How much help from another person do you currently need...    Turning from your back to your side while in flat bed without using bedrails? 3  -KG 3  -CB    Moving from lying on back to sitting on the side of a flat bed without bedrails? 3  -KG 3  -CB    Moving to and from a bed to a chair (including a wheelchair)? 3  -KG 3  -CB    Standing up from a chair using your arms (e.g., wheelchair, bedside chair)? 3  -KG 3  -CB    Climbing 3-5 steps with a railing? 2  -KG 2  -CB    To  walk in hospital room? 3  -KG 3  -CB    AM-PAC 6 Clicks Score (PT) 17  -KG 17  -CB    Highest Level of Mobility Goal 5 --> Static standing  -KG 5 --> Static standing  -CB      Row Name 08/14/24 1525 08/14/24 1508       Functional Assessment    Outcome Measure Options AM-PAC 6 Clicks Basic Mobility (PT)  -KG AM-PAC 6 Clicks Daily Activity (OT)  -AN              User Key  (r) = Recorded By, (t) = Taken By, (c) = Cosigned By      Initials Name Provider Type    KG Kenisha Hutson, PT Physical Therapist    Adan Emery, RN Registered Nurse    Niru Ash, OT Occupational Therapist                                 Physical Therapy Education       Title: PT OT SLP Therapies (In Progress)       Topic: Physical Therapy (In Progress)       Point: Mobility training (In Progress)       Learning Progress Summary             Patient Acceptance, E, NR by KG at 8/14/2024 1330                         Point: Home exercise program (Not Started)       Learner Progress:  Not documented in this visit.              Point: Body mechanics (In Progress)       Learning Progress Summary             Patient Acceptance, E, NR by KG at 8/14/2024 1330                         Point: Precautions (In Progress)       Learning Progress Summary             Patient Acceptance, E, NR by KG at 8/14/2024 1330                                         User Key       Initials Effective Dates Name Provider Type Discipline    KG 05/22/20 -  Kenisha Hutson, PT Physical Therapist PT                  PT Recommendation and Plan  Planned Therapy Interventions (PT): balance training, bed mobility training, gait training, strengthening, transfer training  Plan of Care Reviewed With: patient  Outcome Evaluation: PT initial evaluation completed for pt presenting with generalized weakness, SOA, impaired balance and coordination, and decreased functional mobility. Pt ambulated 70ft with RW and Odilon. Pt's decreased independence warrants  PT skilled care. Recommend D/C to  rehab facility.     Time Calculation:   PT Evaluation Complexity  History, PT Evaluation Complexity: 1-2 personal factors and/or comorbidities  Examination of Body Systems (PT Eval Complexity): total of 3 or more elements  Clinical Presentation (PT Evaluation Complexity): stable  Clinical Decision Making (PT Evaluation Complexity): low complexity  Overall Complexity (PT Evaluation Complexity): low complexity     PT Charges       Row Name 08/14/24 1330             Time Calculation    Start Time 1330  -KG      PT Received On 08/14/24  -KG      PT Goal Re-Cert Due Date 08/24/24  -KG         Untimed Charges    PT Eval/Re-eval Minutes 46  -KG         Total Minutes    Untimed Charges Total Minutes 46  -KG       Total Minutes 46  -KG                User Key  (r) = Recorded By, (t) = Taken By, (c) = Cosigned By      Initials Name Provider Type    Kenisha Porras, PT Physical Therapist                  Therapy Charges for Today       Code Description Service Date Service Provider Modifiers Qty    01522728128 HC PT EVAL LOW COMPLEXITY 4 8/14/2024 Kenisha Hutson, PT GP 1            PT G-Codes  Outcome Measure Options: AM-PAC 6 Clicks Basic Mobility (PT)  AM-PAC 6 Clicks Score (PT): 17  AM-PAC 6 Clicks Score (OT): 15  PT Discharge Summary  Anticipated Discharge Disposition (PT): inpatient rehabilitation facility    Ruth Hutson PT  8/14/2024

## 2024-08-14 NOTE — ANESTHESIA POSTPROCEDURE EVALUATION
Patient: Yanique Webster    Procedure Summary       Date: 08/14/24 Room / Location:  [facility]    Anesthesia Start: 0903 Anesthesia Stop: 0920    Procedure: ESOPHAGOGASTRODUODENOSCOPY Diagnosis:     Surgeons: Brunner, Mark I, MD Provider: Eros Reese MD    Anesthesia Type: general, MAC ASA Status: 3            Anesthesia Type: general, MAC    Vitals  Vitals Value Taken Time   /36 08/14/24 0920   Temp     Pulse 57 08/14/24 0924   Resp     SpO2 97 % 08/14/24 0924   Vitals shown include unfiled device data.        Post Anesthesia Care and Evaluation    Patient location during evaluation: PACU  Patient participation: complete - patient participated  Level of consciousness: sleepy but conscious  Pain management: adequate    Airway patency: patent  Anesthetic complications: No anesthetic complications  PONV Status: none  Cardiovascular status: hemodynamically stable and acceptable  Respiratory status: nonlabored ventilation, acceptable and nasal cannula  Hydration status: acceptable

## 2024-08-14 NOTE — PROGRESS NOTES
[facility]  PROGRESS NOTE    Patient Name: Yanique Webster  : 1940  MRN: [id number]    Date of Admission: 2024  Primary Care Physician: Sebas Alicea MD    Subjective   Subjective     CC:  Weakness, shortness of breath      HPI:  Patient seen post EGD.  She states she feels well and denies any abdominal pain, chest pain or lightheadedness.  Denies noting any blood loss.      Objective   Objective     Vital Signs:   Temp:  [97.5 °F (36.4 °C)-98.5 °F (36.9 °C)] 97.5 °F (36.4 °C)  Heart Rate:  [57-76] 62  Resp:  [16-22] 18  BP: ()/(36-80) 122/61  Flow (L/min):  [2] 2     Physical Exam:  General appearance: alert, awake, oriented, no acute distress   Cardiovascular: RRR, no murmurs or rubs, radial pulse full 2/4 BL   Respiratory: CTAB, no crackles or wheezes   Abdomen: soft, non-tender, bowel sounds normoactive    Neuro/CNS: alert, normal speech, moves all extremities, strength and sensation grossly intact       Results Reviewed:  LAB RESULTS:      Lab 24  0732 24  1342 24  0645 24  0100 24  1806   WBC 5.88  --  7.01  --  4.77   HEMOGLOBIN 7.8* 7.5* 8.1* 7.6* 6.3*   HEMATOCRIT 25.9* 23.9* 26.0* 24.4* 21.1*   PLATELETS 121*  --  133*  --  125*   NEUTROS ABS  --   --  5.51  --  2.88   IMMATURE GRANS (ABS)  --   --  0.03  --  0.01   LYMPHS ABS  --   --  0.94  --  1.23   MONOS ABS  --   --  0.39  --  0.45   EOS ABS  --   --  0.11  --  0.16   MCV 87.8  --  87.2  --  88.3         Lab 24  0732 24  1805   SODIUM 139 138   POTASSIUM 4.3 4.4   CHLORIDE 110* 108*   CO2 20.0* 20.0*   ANION GAP 9.0 10.0   BUN 40* 46*   CREATININE 2.17* 2.77*   EGFR 22.1* 16.5*   GLUCOSE 126* 163*   CALCIUM 9.1 9.5   MAGNESIUM  --  1.8   PHOSPHORUS  --  4.3         Lab 24  1805   TOTAL PROTEIN 5.7*   ALBUMIN 3.5   GLOBULIN 2.2   ALT (SGPT) 9   AST (SGOT) 15   BILIRUBIN 0.3   ALK PHOS 161*                 Lab 24  0645  08/12/24  1853 08/12/24  1805   IRON  --   --  39   IRON SATURATION (TSAT)  --   --  14*   TIBC  --   --  280*   TRANSFERRIN  --   --  188*   FERRITIN  --   --  40.23   FOLATE 10.50  --   --    VITAMIN B 12 465  --   --    ABO TYPING  --  O  --    RH TYPING  --  Negative  --    ANTIBODY SCREEN  --  Negative  --          Brief Urine Lab Results  (Last result in the past 365 days)        Color   Clarity   Blood   Leuk Est   Nitrite   Protein   CREAT   Urine HCG        08/13/24 2000             52.4         08/13/24 2000 Yellow   Clear   Negative   Negative   Negative   >=300 mg/dL (3+)                   Microbiology Results Abnormal       None            XR Chest 1 View    Result Date: 8/12/2024  XR CHEST 1 VW Date of Exam: 8/12/2024 6:56 PM EDT Indication: SOB Comparison: Chest radiograph performed on August 2, 2024 Findings: Overlying monitoring wires limit diagnostic sensitivity. No focal consolidation or effusion.  There is no evidence of pneumothorax. There is mild pulmonary vascular congestion. Cardiac silhouette is enlarged. No acute osseous abnormality identified.     Impression: Impression: Findings compatible with mild CHF. Electronically Signed: Uri Cotto MD  8/12/2024 7:34 PM EDT  Workstation ID: [id number]     Results for orders placed during the hospital encounter of 12/30/23    Adult Transthoracic Echo Complete W/ Cont if Necessary Per Protocol    Interpretation Summary    Left ventricular ejection fraction appears to be 56 - 60%.    Left ventricular wall thickness is consistent with mild concentric hypertrophy    The aortic valve exhibits sclerosis.    Mild aortic valve regurgitation is present. No hemodynamically significant aortic valve stenosis is present.    Mild tricuspid valve regurgitation is present. Estimated right ventricular systolic pressure from tricuspid regurgitation is moderately elevated (45-55 mmHg    Moderate mitral annular calcification is present. Trace to mild mitral valve  regurgitation is present. No significant mitral valve stenosis is present.      Current medications:  Scheduled Meds:atorvastatin, 80 mg, Oral, Daily  carvedilol, 25 mg, Oral, Q12H  cloNIDine, 0.1 mg, Oral, BID  DULoxetine, 60 mg, Oral, Daily  hydrALAZINE, 75 mg, Oral, Q8H  insulin lispro, 2-7 Units, Subcutaneous, 4x Daily AC & at Bedtime  ipratropium-albuterol, 3 mL, Nebulization, Q6H While Awake - RT  metoclopramide, 5 mg, Intravenous, Q6H  Morphine, 15 mg, Oral, Q12H  pantoprazole, 40 mg, Intravenous, BID AC  [START ON 8/15/2024] PEG-KCl-NaCl-NaSulf-Na Asc-C, 1,000 mL, Oral, Once  PEG-KCl-NaCl-NaSulf-Na Asc-C, 1,000 mL, Oral, Once  sodium chloride, 10 mL, Intravenous, Q12H      Continuous Infusions:   PRN Meds:.  acetaminophen **OR** acetaminophen **OR** acetaminophen    calcium carbonate    dextrose    dextrose    dextrose    glucagon (human recombinant)    ipratropium-albuterol    ondansetron ODT **OR** ondansetron    oxyCODONE    polyethylene glycol    sodium chloride    sodium chloride    Assessment & Plan   Assessment & Plan     Active Hospital Problems    Diagnosis  POA    **Symptomatic anemia [D64.9]  Yes    Acute blood loss anemia [D62]  Unknown    Melena [K92.1]  Unknown      Resolved Hospital Problems   No resolved problems to display.        Brief Hospital Course to date:  Yanique Webster is a 84 y.o. female with past medical history significant for CAD, COPD, hypertension, chronic pain and diabetes, was admitted for acute symptomatic anemia.  She received 1 unit PRBC with improvement.  Nephrology was consulted for KINDRA. GI performed EGD on 8/14 with no source of bleeding identified.      Acute symptomatic anemia  -s/p 1 unit pRBC  -No active signs of bleeding noted  -Iron panel reviewed, B12 and folate WNL  -Check stool occult   -Has risk factors for UGI bleed, GI consulted   -s/p EGD 8/14, unremarkable for source of bleeding   -Bowel prep today w colonoscopy tomorrow    -Continue PPI  -H&H stable       KINDRA on CKD  -Avoid nephrotoxic agents  -Strict I's and O's  -FeNa 1.7%  -Nephrology consulted  -Ur Pr/Cr -> 2.8g per day   -Possibly secondary to anemia  -Renal function improving      Diabetes mellitus  -Insulin dependent   -Accu-Cheks ACHS with sliding scale insulin     COPD  -Patient was started on inhalers recently  -Continue DuoNebs     Chronic pain  -Continue home regimen of MS Contin 15 mg every 12 hours     Hypertension  Hyperlipidemia  -Continue clonidine, Coreg and hydralazine    Expected Discharge Location and Transportation: TBD   Expected Discharge   Expected Discharge Date: 8/15/2024; Expected Discharge Time:      VTE Prophylaxis:  Mechanical VTE prophylaxis orders are present.         AM-PAC 6 Clicks Score (PT): 17 (08/14/24 0800)    CODE STATUS:   Code Status and Medical Interventions: CPR (Attempt to Resuscitate); Full Support   Ordered at: 08/12/24 7699     Code Status (Patient has no pulse and is not breathing):    CPR (Attempt to Resuscitate)     Medical Interventions (Patient has pulse or is breathing):    Full Support       Heber Ocasio,   08/14/24

## 2024-08-15 ENCOUNTER — ANESTHESIA (OUTPATIENT)
Dept: GASTROENTEROLOGY | Facility: HOSPITAL | Age: 84
End: 2024-08-15
Payer: MEDICARE

## 2024-08-15 LAB
ALBUMIN SERPL ELPH-MCNC: 3 G/DL (ref 2.9–4.4)
ALBUMIN/GLOB SERPL: 1.5 {RATIO} (ref 0.7–1.7)
ALPHA1 GLOB SERPL ELPH-MCNC: 0.2 G/DL (ref 0–0.4)
ALPHA2 GLOB SERPL ELPH-MCNC: 0.6 G/DL (ref 0.4–1)
ANION GAP SERPL CALCULATED.3IONS-SCNC: 7 MMOL/L (ref 5–15)
B-GLOBULIN SERPL ELPH-MCNC: 0.6 G/DL (ref 0.7–1.3)
BUN SERPL-MCNC: 39 MG/DL (ref 8–23)
BUN/CREAT SERPL: 17.3 (ref 7–25)
CALCIUM SPEC-SCNC: 9.6 MG/DL (ref 8.6–10.5)
CHLORIDE SERPL-SCNC: 112 MMOL/L (ref 98–107)
CO2 SERPL-SCNC: 23 MMOL/L (ref 22–29)
CREAT SERPL-MCNC: 2.25 MG/DL (ref 0.57–1)
DEPRECATED RDW RBC AUTO: 51.2 FL (ref 37–54)
EGFRCR SERPLBLD CKD-EPI 2021: 21.2 ML/MIN/1.73
ERYTHROCYTE [DISTWIDTH] IN BLOOD BY AUTOMATED COUNT: 15.6 % (ref 12.3–15.4)
GAMMA GLOB SERPL ELPH-MCNC: 0.5 G/DL (ref 0.4–1.8)
GLOBULIN SER CALC-MCNC: 2 G/DL (ref 2.2–3.9)
GLUCOSE BLDC GLUCOMTR-MCNC: 105 MG/DL (ref 70–130)
GLUCOSE BLDC GLUCOMTR-MCNC: 123 MG/DL (ref 70–130)
GLUCOSE BLDC GLUCOMTR-MCNC: 178 MG/DL (ref 70–130)
GLUCOSE BLDC GLUCOMTR-MCNC: 191 MG/DL (ref 70–130)
GLUCOSE SERPL-MCNC: 134 MG/DL (ref 65–99)
HCT VFR BLD AUTO: 26.2 % (ref 34–46.6)
HEMOCCULT STL QL: NEGATIVE
HGB BLD-MCNC: 8 G/DL (ref 12–15.9)
KAPPA LC FREE SER-MCNC: 42.6 MG/L (ref 3.3–19.4)
KAPPA LC FREE/LAMBDA FREE SER: 1.29 {RATIO} (ref 0.26–1.65)
LABORATORY COMMENT REPORT: ABNORMAL
LAMBDA LC FREE SERPL-MCNC: 32.9 MG/L (ref 5.7–26.3)
M PROTEIN SERPL ELPH-MCNC: ABNORMAL G/DL
MCH RBC QN AUTO: 27.4 PG (ref 26.6–33)
MCHC RBC AUTO-ENTMCNC: 30.5 G/DL (ref 31.5–35.7)
MCV RBC AUTO: 89.7 FL (ref 79–97)
PLATELET # BLD AUTO: 121 10*3/MM3 (ref 140–450)
PMV BLD AUTO: 10.7 FL (ref 6–12)
POTASSIUM SERPL-SCNC: 4.6 MMOL/L (ref 3.5–5.2)
PROT PATTERN SERPL ELPH-IMP: ABNORMAL
PROT SERPL-MCNC: 5 G/DL (ref 6–8.5)
QT INTERVAL: 394 MS
QTC INTERVAL: 434 MS
RBC # BLD AUTO: 2.92 10*6/MM3 (ref 3.77–5.28)
SODIUM SERPL-SCNC: 142 MMOL/L (ref 136–145)
WBC NRBC COR # BLD AUTO: 5.39 10*3/MM3 (ref 3.4–10.8)

## 2024-08-15 PROCEDURE — 80048 BASIC METABOLIC PNL TOTAL CA: CPT | Performed by: STUDENT IN AN ORGANIZED HEALTH CARE EDUCATION/TRAINING PROGRAM

## 2024-08-15 PROCEDURE — 63710000001 INSULIN LISPRO (HUMAN) PER 5 UNITS: Performed by: INTERNAL MEDICINE

## 2024-08-15 PROCEDURE — 99232 SBSQ HOSP IP/OBS MODERATE 35: CPT | Performed by: STUDENT IN AN ORGANIZED HEALTH CARE EDUCATION/TRAINING PROGRAM

## 2024-08-15 PROCEDURE — 85027 COMPLETE CBC AUTOMATED: CPT | Performed by: STUDENT IN AN ORGANIZED HEALTH CARE EDUCATION/TRAINING PROGRAM

## 2024-08-15 PROCEDURE — 94799 UNLISTED PULMONARY SVC/PX: CPT

## 2024-08-15 PROCEDURE — 82948 REAGENT STRIP/BLOOD GLUCOSE: CPT

## 2024-08-15 PROCEDURE — 94664 DEMO&/EVAL PT USE INHALER: CPT

## 2024-08-15 PROCEDURE — 82272 OCCULT BLD FECES 1-3 TESTS: CPT | Performed by: INTERNAL MEDICINE

## 2024-08-15 PROCEDURE — 25010000002 METOCLOPRAMIDE PER 10 MG: Performed by: INTERNAL MEDICINE

## 2024-08-15 RX ORDER — PEG-3350, SODIUM SULFATE, SODIUM CHLORIDE, POTASSIUM CHLORIDE, SODIUM ASCORBATE AND ASCORBIC ACID 7.5-2.691G
1000 KIT ORAL ONCE
Status: COMPLETED | OUTPATIENT
Start: 2024-08-15 | End: 2024-08-15

## 2024-08-15 RX ORDER — PEG-3350, SODIUM SULFATE, SODIUM CHLORIDE, POTASSIUM CHLORIDE, SODIUM ASCORBATE AND ASCORBIC ACID 7.5-2.691G
1000 KIT ORAL ONCE
Status: DISCONTINUED | OUTPATIENT
Start: 2024-08-16 | End: 2024-08-19 | Stop reason: HOSPADM

## 2024-08-15 RX ADMIN — HYDRALAZINE HYDROCHLORIDE 75 MG: 50 TABLET ORAL at 14:21

## 2024-08-15 RX ADMIN — PEG-3350, SODIUM SULFATE, SODIUM CHLORIDE, POTASSIUM CHLORIDE, SODIUM ASCORBATE AND ASCORBIC ACID 1000 ML: KIT at 04:01

## 2024-08-15 RX ADMIN — Medication 10 ML: at 20:17

## 2024-08-15 RX ADMIN — HYDRALAZINE HYDROCHLORIDE 75 MG: 50 TABLET ORAL at 06:02

## 2024-08-15 RX ADMIN — IPRATROPIUM BROMIDE AND ALBUTEROL SULFATE 3 ML: 2.5; .5 SOLUTION RESPIRATORY (INHALATION) at 08:10

## 2024-08-15 RX ADMIN — CARVEDILOL 25 MG: 12.5 TABLET, FILM COATED ORAL at 08:50

## 2024-08-15 RX ADMIN — PANTOPRAZOLE SODIUM 40 MG: 40 INJECTION, POWDER, FOR SOLUTION INTRAVENOUS at 17:47

## 2024-08-15 RX ADMIN — IPRATROPIUM BROMIDE AND ALBUTEROL SULFATE 3 ML: 2.5; .5 SOLUTION RESPIRATORY (INHALATION) at 19:47

## 2024-08-15 RX ADMIN — CLONIDINE HYDROCHLORIDE 0.1 MG: 0.1 TABLET ORAL at 08:51

## 2024-08-15 RX ADMIN — INSULIN LISPRO 2 UNITS: 100 INJECTION, SOLUTION INTRAVENOUS; SUBCUTANEOUS at 21:16

## 2024-08-15 RX ADMIN — METOCLOPRAMIDE 5 MG: 5 INJECTION, SOLUTION INTRAMUSCULAR; INTRAVENOUS at 04:01

## 2024-08-15 RX ADMIN — PEG-3350, SODIUM SULFATE, SODIUM CHLORIDE, POTASSIUM CHLORIDE, SODIUM ASCORBATE AND ASCORBIC ACID 1000 ML: KIT at 12:34

## 2024-08-15 RX ADMIN — PEG-3350, SODIUM SULFATE, SODIUM CHLORIDE, POTASSIUM CHLORIDE, SODIUM ASCORBATE AND ASCORBIC ACID 1000 ML: KIT at 18:47

## 2024-08-15 RX ADMIN — DULOXETINE HYDROCHLORIDE 60 MG: 60 CAPSULE, DELAYED RELEASE ORAL at 08:41

## 2024-08-15 RX ADMIN — CARVEDILOL 25 MG: 12.5 TABLET, FILM COATED ORAL at 20:17

## 2024-08-15 RX ADMIN — HYDRALAZINE HYDROCHLORIDE 75 MG: 50 TABLET ORAL at 20:16

## 2024-08-15 RX ADMIN — IPRATROPIUM BROMIDE AND ALBUTEROL SULFATE 3 ML: 2.5; .5 SOLUTION RESPIRATORY (INHALATION) at 13:29

## 2024-08-15 RX ADMIN — ATORVASTATIN CALCIUM 80 MG: 40 TABLET, FILM COATED ORAL at 08:41

## 2024-08-15 RX ADMIN — MORPHINE SULFATE 15 MG: 15 TABLET, FILM COATED, EXTENDED RELEASE ORAL at 06:02

## 2024-08-15 RX ADMIN — CLONIDINE HYDROCHLORIDE 0.1 MG: 0.1 TABLET ORAL at 20:16

## 2024-08-15 RX ADMIN — MORPHINE SULFATE 15 MG: 15 TABLET, FILM COATED, EXTENDED RELEASE ORAL at 17:47

## 2024-08-15 RX ADMIN — INSULIN LISPRO 2 UNITS: 100 INJECTION, SOLUTION INTRAVENOUS; SUBCUTANEOUS at 08:41

## 2024-08-15 RX ADMIN — PANTOPRAZOLE SODIUM 40 MG: 40 INJECTION, POWDER, FOR SOLUTION INTRAVENOUS at 08:41

## 2024-08-15 NOTE — PLAN OF CARE
Goal Outcome Evaluation:  Plan of Care Reviewed With: patient        Progress: no change  Outcome Evaluation: Patient has slept most of the night. RA, VSS, patient has been alert and oriented times 4. No complaints of pain and nausea. NPO after midnight for a colonoscopy this morning. She drank the first bottle of prep before midnight and still awaiting a BM. She has started the 2nd bottle of prep around 4a. Fall precaution and skin precaution maintained. Nursing staff will continue to monitor.

## 2024-08-15 NOTE — PROGRESS NOTES
" LOS: 1 day   Patient Care Team:  Sebas Alicea MD as PCP - General (Family Medicine)  Steve Geronimo MD as Consulting Physician (Cardiology)  Emilio Regalado MD as Consulting Physician (Endocrinology)    Chief Complaint: CKD    Subjective     Stable renal function.   Subjective:  Symptoms:  Stable.  No shortness of breath, chest pain or chest pressure.    Diet:  Adequate intake.    Pain:  She reports no pain.        History taken from: patient    Objective     Vital Sign Min/Max for last 24 hours  Temp  Min: 97.9 °F (36.6 °C)  Max: 98.3 °F (36.8 °C)   BP  Min: 135/52  Max: 180/66   Pulse  Min: 61  Max: 69   Resp  Min: 17  Max: 20   SpO2  Min: 91 %  Max: 100 %   Flow (L/min)  Min: 2  Max: 2   No data recorded     Flowsheet Rows      Flowsheet Row First Filed Value   Admission Height 162.6 cm (64.02\") Documented at 08/13/2024 0700   Admission Weight 85.7 kg (189 lb) Documented at 08/13/2024 0700            I/O this shift:  In: -   Out: 450 [Urine:450]  I/O last 3 completed shifts:  In: 75 [I.V.:75]  Out: 1600 [Urine:1600]    Objective:  General Appearance:  Comfortable.    Vital signs: (most recent): Blood pressure 180/66, pulse 63, temperature 98.1 °F (36.7 °C), temperature source Oral, resp. rate 20, height 162.6 cm (64.02\"), weight 85.7 kg (189 lb), SpO2 97%.  Vital signs are normal.    Output: Producing urine.    HEENT: Normal HEENT exam.    Lungs:  Normal effort and normal respiratory rate.  Breath sounds clear to auscultation.  She is not in respiratory distress.  No stridor.  No decreased breath sounds or wheezes.    Heart: Normal rate.  Regular rhythm.  S1 normal and S2 normal.  No murmur or gallop.   Abdomen: Abdomen is soft.  Bowel sounds are normal.     Extremities: Normal range of motion.  There is no dependent edema or local swelling.    Pulses: Distal pulses are intact.    Neurological: Patient is alert and oriented to person, place and time.  Normal strength.    Pupils:  Pupils are " "equal, round, and reactive to light.                Results Review:     I reviewed the patient's new clinical results.    WBC WBC   Date Value Ref Range Status   08/15/2024 5.39 3.40 - 10.80 10*3/mm3 Final   08/14/2024 5.88 3.40 - 10.80 10*3/mm3 Final   08/13/2024 7.01 3.40 - 10.80 10*3/mm3 Final   08/12/2024 4.77 3.40 - 10.80 10*3/mm3 Final      HGB Hemoglobin   Date Value Ref Range Status   08/15/2024 8.0 (L) 12.0 - 15.9 g/dL Final   08/14/2024 7.8 (L) 12.0 - 15.9 g/dL Final   08/13/2024 7.5 (L) 12.0 - 15.9 g/dL Final   08/13/2024 8.1 (L) 12.0 - 15.9 g/dL Final   08/13/2024 7.6 (L) 12.0 - 15.9 g/dL Final   08/12/2024 6.3 (C) 12.0 - 15.9 g/dL Final      HCT Hematocrit   Date Value Ref Range Status   08/15/2024 26.2 (L) 34.0 - 46.6 % Final   08/14/2024 25.9 (L) 34.0 - 46.6 % Final   08/13/2024 23.9 (L) 34.0 - 46.6 % Final   08/13/2024 26.0 (L) 34.0 - 46.6 % Final   08/13/2024 24.4 (L) 34.0 - 46.6 % Final   08/12/2024 21.1 (L) 34.0 - 46.6 % Final      Platlets No results found for: \"LABPLAT\"   MCV MCV   Date Value Ref Range Status   08/15/2024 89.7 79.0 - 97.0 fL Final   08/14/2024 87.8 79.0 - 97.0 fL Final   08/13/2024 87.2 79.0 - 97.0 fL Final   08/12/2024 88.3 79.0 - 97.0 fL Final          Sodium Sodium   Date Value Ref Range Status   08/15/2024 142 136 - 145 mmol/L Final   08/14/2024 139 136 - 145 mmol/L Final   08/12/2024 138 136 - 145 mmol/L Final      Potassium Potassium   Date Value Ref Range Status   08/15/2024 4.6 3.5 - 5.2 mmol/L Final   08/14/2024 4.3 3.5 - 5.2 mmol/L Final   08/12/2024 4.4 3.5 - 5.2 mmol/L Final      Chloride Chloride   Date Value Ref Range Status   08/15/2024 112 (H) 98 - 107 mmol/L Final   08/14/2024 110 (H) 98 - 107 mmol/L Final   08/12/2024 108 (H) 98 - 107 mmol/L Final      CO2 CO2   Date Value Ref Range Status   08/15/2024 23.0 22.0 - 29.0 mmol/L Final   08/14/2024 20.0 (L) 22.0 - 29.0 mmol/L Final   08/12/2024 20.0 (L) 22.0 - 29.0 mmol/L Final      BUN BUN   Date Value Ref Range " "Status   08/15/2024 39 (H) 8 - 23 mg/dL Final   08/14/2024 40 (H) 8 - 23 mg/dL Final   08/12/2024 46 (H) 8 - 23 mg/dL Final      Creatinine Creatinine   Date Value Ref Range Status   08/15/2024 2.25 (H) 0.57 - 1.00 mg/dL Final   08/14/2024 2.17 (H) 0.57 - 1.00 mg/dL Final   08/12/2024 2.77 (H) 0.57 - 1.00 mg/dL Final      Calcium Calcium   Date Value Ref Range Status   08/15/2024 9.6 8.6 - 10.5 mg/dL Final   08/14/2024 9.1 8.6 - 10.5 mg/dL Final   08/12/2024 9.5 8.6 - 10.5 mg/dL Final      PO4 No results found for: \"CAPO4\"   Albumin Albumin   Date Value Ref Range Status   08/12/2024 3.5 3.5 - 5.2 g/dL Final      Magnesium Magnesium   Date Value Ref Range Status   08/12/2024 1.8 1.6 - 2.4 mg/dL Final      Uric Acid No results found for: \"URICACID\"     Medication Review: Yes    Assessment & Plan       Symptomatic anemia    Acute blood loss anemia    Melena      Assessment & Plan    1.  Acute on chronic renal failure: Progressive CKD in the setting of diabetes and HTN. Proteinuric renal disease. Cr ~ 2.77 on this admission. Most recent cr 2.1-2.2   2.  Symptomatic anemia: Hemoglobin 6.3 on admission.  Patient received  unit of blood. GI following. Pending SPEP  3.  Diabetes  4.  COPD   5.  Chronic pain: Takes MS Contin every 12 hours.  6.  Hypertension: Extensive secondary workup in the past. PRA 0.5 Aldosterone 21 in 2017  7.  Hyperlipidemia  8.  Iron deficiency: Saturation 14%  9.  Mild metabolic acidosis.       Thank you for the consult, will follow with you closely.     Zurdo Levine MD  08/15/24  15:11 EDT          "

## 2024-08-15 NOTE — PLAN OF CARE
Prep is not clear.    Will give clear liquid diet, repeat prep, and reschedule colonoscopy for tomorrow.

## 2024-08-15 NOTE — PLAN OF CARE
Goal Outcome Evaluation:  Plan of Care Reviewed With: patient      Patient has been comfortably resting all day. Vital signs stable, on Room air. Patient had multiple bowel movements after the 3rd round of prep but still not clear. Patient's colonoscopy was rescheduled for tomorrow. Patient was given a clear liquid diet for the rest of the day but will resume NPO at midnight. 4th round of prep to be given around 6:30pm after the patient has had their dinner tray. Will continue to monitor.  Problem: Adult Inpatient Plan of Care  Goal: Plan of Care Review  Outcome: Ongoing, Progressing  Flowsheets (Taken 8/15/2024 1802)  Progress: improving  Plan of Care Reviewed With: patient  Goal: Patient-Specific Goal (Individualized)  Outcome: Ongoing, Progressing  Goal: Absence of Hospital-Acquired Illness or Injury  Outcome: Ongoing, Progressing  Intervention: Identify and Manage Fall Risk  Recent Flowsheet Documentation  Taken 8/15/2024 1600 by Robert Cullen RN  Safety Promotion/Fall Prevention:   activity supervised   assistive device/personal items within reach   clutter free environment maintained   fall prevention program maintained   nonskid shoes/slippers when out of bed   room organization consistent   safety round/check completed   toileting scheduled  Taken 8/15/2024 1400 by Robert Cullen RN  Safety Promotion/Fall Prevention:   activity supervised   assistive device/personal items within reach   clutter free environment maintained   fall prevention program maintained   nonskid shoes/slippers when out of bed   room organization consistent   safety round/check completed   toileting scheduled  Taken 8/15/2024 1200 by Robert Cullen RN  Safety Promotion/Fall Prevention:   activity supervised   clutter free environment maintained   assistive device/personal items within reach   fall prevention program maintained   nonskid shoes/slippers when out of bed   room organization consistent   safety round/check  completed   toileting scheduled  Taken 8/15/2024 1000 by Robert Cullen RN  Safety Promotion/Fall Prevention:   activity supervised   assistive device/personal items within reach   clutter free environment maintained   fall prevention program maintained   nonskid shoes/slippers when out of bed   room organization consistent   safety round/check completed   toileting scheduled  Taken 8/15/2024 0800 by Robert Cullen RN  Safety Promotion/Fall Prevention:   activity supervised   assistive device/personal items within reach   clutter free environment maintained   fall prevention program maintained   nonskid shoes/slippers when out of bed   room organization consistent   safety round/check completed   toileting scheduled  Intervention: Prevent Skin Injury  Recent Flowsheet Documentation  Taken 8/15/2024 1600 by Robert Cullen RN  Body Position:   turned   left  Skin Protection:   adhesive use limited   silicone foam dressing in place   skin sealant/moisture barrier applied   transparent dressing maintained   tubing/devices free from skin contact   incontinence pads utilized  Taken 8/15/2024 1400 by Robert Cullen RN  Body Position: supine, legs elevated  Skin Protection:   adhesive use limited   silicone foam dressing in place   skin sealant/moisture barrier applied   transparent dressing maintained   tubing/devices free from skin contact  Taken 8/15/2024 1200 by Robert Cullen RN  Body Position:   turned   left  Skin Protection:   adhesive use limited   incontinence pads utilized   silicone foam dressing in place   skin sealant/moisture barrier applied   tubing/devices free from skin contact   transparent dressing maintained  Taken 8/15/2024 1000 by Robert Cullen RN  Body Position:   turned   right  Skin Protection:   adhesive use limited   incontinence pads utilized   silicone foam dressing in place   skin sealant/moisture barrier applied   tubing/devices free from skin contact   transparent dressing  maintained  Taken 8/15/2024 0800 by Robert Cullen RN  Body Position: supine, legs elevated  Skin Protection: (Dressing peeled back and skin assessed)   adhesive use limited   incontinence pads utilized   silicone foam dressing in place   skin sealant/moisture barrier applied   skin-to-device areas padded   transparent dressing maintained   tubing/devices free from skin contact  Intervention: Prevent and Manage VTE (Venous Thromboembolism) Risk  Recent Flowsheet Documentation  Taken 8/15/2024 1600 by Robert Cullen RN  Activity Management: activity encouraged  Taken 8/15/2024 1400 by Robert Cullen RN  Activity Management: up to bedside commode  Taken 8/15/2024 1200 by Robert Cullen RN  Activity Management: activity encouraged  Taken 8/15/2024 1000 by Robert Cullen RN  Activity Management: activity encouraged  Taken 8/15/2024 0800 by Robert Cullen RN  Activity Management: up to bedside commode  VTE Prevention/Management:   bilateral   sequential compression devices off  Range of Motion: active ROM (range of motion) encouraged  Intervention: Prevent Infection  Recent Flowsheet Documentation  Taken 8/15/2024 1600 by Robert Cullen RN  Infection Prevention:   environmental surveillance performed   rest/sleep promoted   single patient room provided  Taken 8/15/2024 1400 by Robert Cullen RN  Infection Prevention:   environmental surveillance performed   rest/sleep promoted   single patient room provided  Taken 8/15/2024 1200 by Robert Cullen RN  Infection Prevention:   environmental surveillance performed   rest/sleep promoted   single patient room provided  Taken 8/15/2024 1000 by Robert Cullen RN  Infection Prevention:   environmental surveillance performed   rest/sleep promoted   single patient room provided  Taken 8/15/2024 0800 by Robert Cullen RN  Infection Prevention:   environmental surveillance performed   rest/sleep promoted   single patient room provided  Goal: Optimal  Comfort and Wellbeing  Outcome: Ongoing, Progressing  Intervention: Monitor Pain and Promote Comfort  Recent Flowsheet Documentation  Taken 8/15/2024 1600 by Robert Cullen RN  Pain Management Interventions:   no interventions per patient request   quiet environment facilitated   relaxation techniques promoted  Taken 8/15/2024 1400 by Robert Cullen RN  Pain Management Interventions:   no interventions per patient request   quiet environment facilitated   relaxation techniques promoted  Taken 8/15/2024 1200 by Robert Cullen RN  Pain Management Interventions:   no interventions per patient request   quiet environment facilitated   relaxation techniques promoted  Taken 8/15/2024 1000 by Robert Cullen RN  Pain Management Interventions:   quiet environment facilitated   relaxation techniques promoted  Taken 8/15/2024 0800 by Robert Cullen RN  Pain Management Interventions:   quiet environment facilitated   relaxation techniques promoted  Intervention: Provide Person-Centered Care  Recent Flowsheet Documentation  Taken 8/15/2024 0800 by Robert Cullen RN  Trust Relationship/Rapport:   care explained   choices provided   questions answered   thoughts/feelings acknowledged  Goal: Readiness for Transition of Care  Outcome: Ongoing, Progressing     Problem: Skin Injury Risk Increased  Goal: Skin Health and Integrity  Outcome: Ongoing, Progressing  Intervention: Optimize Skin Protection  Recent Flowsheet Documentation  Taken 8/15/2024 1600 by Robert Cullen RN  Pressure Reduction Techniques:   frequent weight shift encouraged   heels elevated off bed   positioned off wounds   pressure points protected   weight shift assistance provided  Head of Bed (HOB) Positioning: HOB elevated  Pressure Reduction Devices:   foam padding utilized   heel offloading device utilized   positioning supports utilized   pressure-redistributing mattress utilized  Skin Protection:   adhesive use limited   silicone foam  dressing in place   skin sealant/moisture barrier applied   transparent dressing maintained   tubing/devices free from skin contact   incontinence pads utilized  Taken 8/15/2024 1400 by Robert Cullen RN  Pressure Reduction Techniques:   frequent weight shift encouraged   heels elevated off bed   positioned off wounds   pressure points protected   weight shift assistance provided  Head of Bed (HOB) Positioning: [facility] elevated  Pressure Reduction Devices:   foam padding utilized   heel offloading device utilized   positioning supports utilized   pressure-redistributing mattress utilized  Skin Protection:   adhesive use limited   silicone foam dressing in place   skin sealant/moisture barrier applied   transparent dressing maintained   tubing/devices free from skin contact  Taken 8/15/2024 1200 by Robert Cullen RN  Pressure Reduction Techniques:   frequent weight shift encouraged   heels elevated off bed   positioned off wounds   pressure points protected   weight shift assistance provided  Head of Bed ([facility]) Positioning: [facility] elevated  Pressure Reduction Devices:   foam padding utilized   heel offloading device utilized   positioning supports utilized   pressure-redistributing mattress utilized  Skin Protection:   adhesive use limited   incontinence pads utilized   silicone foam dressing in place   skin sealant/moisture barrier applied   tubing/devices free from skin contact   transparent dressing maintained  Taken 8/15/2024 1000 by Robert Cullen RN  Pressure Reduction Techniques:   frequent weight shift encouraged   heels elevated off bed   pressure points protected   positioned off wounds   weight shift assistance provided  Head of Bed ([facility]) Positioning: [facility] elevated  Pressure Reduction Devices:   foam padding utilized   heel offloading device utilized   positioning supports utilized   pressure-redistributing mattress utilized  Skin Protection:   adhesive use limited   incontinence pads utilized   silicone  foam dressing in place   skin sealant/moisture barrier applied   tubing/devices free from skin contact   transparent dressing maintained  Taken 8/15/2024 0800 by Robert Cullen RN  Pressure Reduction Techniques:   frequent weight shift encouraged   heels elevated off bed   positioned off wounds   pressure points protected   weight shift assistance provided  Head of Bed (HOB) Positioning: HOB elevated  Pressure Reduction Devices:   positioning supports utilized   foam padding utilized   heel offloading device utilized   pressure-redistributing mattress utilized  Skin Protection: (Dressing peeled back and skin assessed)   adhesive use limited   incontinence pads utilized   silicone foam dressing in place   skin sealant/moisture barrier applied   skin-to-device areas padded   transparent dressing maintained   tubing/devices free from skin contact     Problem: Fall Injury Risk  Goal: Absence of Fall and Fall-Related Injury  Outcome: Ongoing, Progressing  Intervention: Identify and Manage Contributors  Recent Flowsheet Documentation  Taken 8/15/2024 1600 by Robert Cullen RN  Medication Review/Management: medications reviewed  Intervention: Promote Injury-Free Environment  Recent Flowsheet Documentation  Taken 8/15/2024 1600 by Robert Cullen RN  Safety Promotion/Fall Prevention:   activity supervised   assistive device/personal items within reach   clutter free environment maintained   fall prevention program maintained   nonskid shoes/slippers when out of bed   room organization consistent   safety round/check completed   toileting scheduled  Taken 8/15/2024 1400 by Robert Cullen RN  Safety Promotion/Fall Prevention:   activity supervised   assistive device/personal items within reach   clutter free environment maintained   fall prevention program maintained   nonskid shoes/slippers when out of bed   room organization consistent   safety round/check completed   toileting scheduled  Taken 8/15/2024 1200 by  Yantz, Coltin H., RN  Safety Promotion/Fall Prevention:   activity supervised   clutter free environment maintained   assistive device/personal items within reach   fall prevention program maintained   nonskid shoes/slippers when out of bed   room organization consistent   safety round/check completed   toileting scheduled  Taken 8/15/2024 1000 by Robert Cullen RN  Safety Promotion/Fall Prevention:   activity supervised   assistive device/personal items within reach   clutter free environment maintained   fall prevention program maintained   nonskid shoes/slippers when out of bed   room organization consistent   safety round/check completed   toileting scheduled  Taken 8/15/2024 0800 by Robert Cullen RN  Safety Promotion/Fall Prevention:   activity supervised   assistive device/personal items within reach   clutter free environment maintained   fall prevention program maintained   nonskid shoes/slippers when out of bed   room organization consistent   safety round/check completed   toileting scheduled       Progress: improving

## 2024-08-15 NOTE — PROGRESS NOTES
[facility] Medicine Services  PROGRESS NOTE    Patient Name: Yanique Webster  : 1940  MRN: [id number]    Date of Admission: 2024  Primary Care Physician: Sebas Alicea MD    Subjective   Subjective     CC:  Weakness, shortness of breath        HPI:  Patient states she feels well, denies any abdominal pain.  She is working on completing her bowel prep.      Objective   Objective     Vital Signs:   Temp:  [97.9 °F (36.6 °C)-98.3 °F (36.8 °C)] 98.1 °F (36.7 °C)  Heart Rate:  [61-69] 63  Resp:  [17-20] 20  BP: (135-180)/(52-66) 180/66  Flow (L/min):  [2] 2     Physical Exam:  General appearance: alert, awake, oriented, no acute distress   Cardiovascular: RRR, no murmurs or rubs, radial pulse full 2/4 BL   Respiratory: CTAB, no crackles or wheezes   Abdomen: soft, non-tender, bowel sounds normoactive    Neuro/CNS: alert, normal speech, moves all extremities, strength and sensation grossly intact    Results Reviewed:  LAB RESULTS:      Lab 08/15/24  0833 24  0732 24  1342 24  0645 24  0100 24  1806   WBC 5.39 5.88  --  7.01  --  4.77   HEMOGLOBIN 8.0* 7.8* 7.5* 8.1* 7.6* 6.3*   HEMATOCRIT 26.2* 25.9* 23.9* 26.0* 24.4* 21.1*   PLATELETS 121* 121*  --  133*  --  125*   NEUTROS ABS  --   --   --  5.51  --  2.88   IMMATURE GRANS (ABS)  --   --   --  0.03  --  0.01   LYMPHS ABS  --   --   --  0.94  --  1.23   MONOS ABS  --   --   --  0.39  --  0.45   EOS ABS  --   --   --  0.11  --  0.16   MCV 89.7 87.8  --  87.2  --  88.3         Lab 08/15/24  0833 24  0732 24  1806 24  180   SODIUM 142 139  --  138   POTASSIUM 4.6 4.3  --  4.4   CHLORIDE 112* 110*  --  108*   CO2 23.0 20.0*  --  20.0*   ANION GAP 7.0 9.0  --  10.0   BUN 39* 40*  --  46*   CREATININE 2.25* 2.17*  --  2.77*   EGFR 21.2* 22.1*  --  16.5*   GLUCOSE 134* 126*  --  163*   CALCIUM 9.6 9.1  --  9.5   MAGNESIUM  --   --   --  1.8   PHOSPHORUS  --   --   --  4.3  CC- abdominal pain    HPI:      57 y/o male with PMHX of IVDA with heroin(  last used 2 months ago ) , opioid dependence on methadone, IDDM, chronic pancreatitis, and recent significant weight loss over the last 1 year who was a transfer from [facility] on 3/14/22  for possible ERCP.  Patient initially presented to outside hospital for worsening abdominal pain.  Patient had CT/ MRI/ US of the abdomen which showed chronic pancreatitis, gastric distention (for which he had an NGT),  intra/ extra hepatic biliary ductal dilatation, and fluid collection/ ? mass in the head of the pancreas compressing the SMV and proximal portal vein.  Patient was transferred to  for possible need for ERCP and further GI evaluation.  Upon admission, patient with significantly elevated ALKPHOS 1200.       Hospital course :   3/15/22- events of last night noted. Had coffee-ground emesis and started on Protonix drip. NGT to suction. NPO  3/16 - pt seen and examined, + NGT in place, reports severe abdominal pain, +constipation, denies cp, dyspnea, afebrile  3/17 - no epigastric/low chest pain, restrosternal earlier and trops negative; no further cp palps sob; abdo pain fluctuating       PHYSICAL EXAM:  Vital Signs Last 24 Hrs  T(F): 97.1 (17 Mar 2022 11:37), Max: 97.7 (16 Mar 2022 21:57)  HR: 67 (17 Mar 2022 11:37) (67 - 81)  BP: 165/81 (17 Mar 2022 11:37) (143/73 - 165/81)  RR: 18 (17 Mar 2022 11:37) (17 - 18)  SpO2: 100% (17 Mar 2022 11:37) (99% - 100%)  GENERAL: cachectic,  NAD  HEAD:  Atraumatic, Normocephalic  EYES: EOMI, PERRLA, conjunctiva and sclera clear  HEENT: NGT+with black discharge   NECK: Supple, No JVD  NERVOUS SYSTEM:  Alert & Oriented X2, moves all extremities  CHEST/LUNG: Clear to auscultation bilaterally; No rales, rhonchi, wheezing, or rubs  HEART: Regular rate and rhythm; No murmurs, rubs, or gallops  ABDOMEN: Soft, diffusely sensitive to palpations, +BS  GENITOURINARY- Voiding, no palpable bladder  EXTREMITIES:  2+ Peripheral Pulses, No clubbing, cyanosis, or edema  MUSCULOSKELETAL No muscle tenderness, Muscle tone normal, No joint tenderness, no Joint swelling, Joint range of motion-normal  SKIN-no rash, no lesion        RADIOLOGY & ADDITIONAL TESTS:  MR MRCP w/wo IV Cont (03.15.22 @ 15:44) >  Pancreatic head mass suspicious for neoplasm with associated biliary and   pancreatic ductal dilatation. Recommend biopsy.  Upper abdominal lymphadenopathy  Distended stomach. Recommend clinical correlation for partial gastric   outlet obstruction    Xray Abdomen 1 View PORTABLE -Urgent (Xray Abdomen 1 View PORTABLE -Urgent .) (03.15.22 @ 10:53) >  Feeding tube in satisfactory position.  Mild to moderate pulmonary venous congestion/perihilar interstitial   infiltrates, new    CT ABDOMEN AND PELVIS IC                        PROCEDURE DATE:  03/14/2022    LOWER CHEST: Mild patchy groundglass opacity in the left lower lobe.  LIVER: Within normal limits.  BILE DUCTS/PANCREAS:  Moderate intrahepatic and hepatic biliary ductal dilatation. The common   duct measures 15 mm. There is abrupt cut off of the duct in its   intrapancreatic portion.*Pancreatic duct is markedly dilated, measuring   11 mm, with abrupt cutoff in the neck due to multiple intraductal calculi.  Additional coarse pancreatic calcifications are identified compatible   with chronic pancreatitis.  *No definite findings to suggest acute pancreatitis, however evaluation   is limited due to paucity of peripancreatic fat.  *Cystic lesions in the neck and head measuring up to 2.7 cm.  GALLBLADDER: Markedly distended and containing multiple gallstones.  IMPRESSION:  Chronic pancreatitis.  Pancreatic ductal dilatation due to multiple intraductal stones.  Biliary ductal dilatation the exact etiology which could be due to post   pancreatic stricture. Consider further evaluation with MRI or endoscopic   ultrasound as an occult pancreatic mass is not excluded.  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  Cholelithiasis and marked gallbladder distention, without evidence of   acute cholecystitis.  Mild left lower lobe opacity suspicious for pneumonia.  Enteric tube with tip in the distal esophagus.    LABS: All Labs Reviewed:                     10.3   11.18 )-----------( 461      ( 17 Mar 2022 05:04 )            31.2  130<L>  |  97  |  17  ----------------------------<  328<H>  4.6   |  27  |  0.45<L>  TPro  6.9  /  Alb  1.6<L>  /  TBili  1.1  /  DBili  x   /  AST  28  /  ALT  42  /  AlkPhos  980<H>  03-17    MEDS  acetaminophen     Tablet .. 650 milliGRAM(s) Oral every 6 hours PRN  ALBUTerol    90 MICROgram(s) HFA Inhaler 2 Puff(s) Inhalation every 6 hours PRN  aluminum hydroxide/magnesium hydroxide/simethicone Suspension 30 milliLiter(s) Oral every 4 hours PRN  bisacodyl Suppository 10 milliGRAM(s) Rectal <User Schedule>  folic acid 1 milliGRAM(s) Oral daily  glucagon  Injectable 1 milliGRAM(s) IntraMuscular once  insulin lispro (ADMELOG) corrective regimen sliding scale   SubCutaneous every 6 hours  melatonin 3 milliGRAM(s) Oral at bedtime PRN  methadone    Tablet 60 milliGRAM(s) Oral daily  morphine  - Injectable 8 milliGRAM(s) IV Push every 3 hours PRN  morphine  - Injectable 6 milliGRAM(s) IV Push every 3 hours PRN  multivitamin 1 Tablet(s) Oral daily  ondansetron Injectable 4 milliGRAM(s) IV Push every 8 hours PRN  pancrelipase (ZENPEP 20,000 Lipase Units) 1 Capsule(s) Oral three times a day with meals  pantoprazole  Injectable 40 milliGRAM(s) IV Push every 12 hours  piperacillin/tazobactam IVPB.. 3.375 Gram(s) IV Intermittent every 8 hours  thiamine 100 milliGRAM(s) Oral daily  zolpidem 5 milliGRAM(s) Oral at bedtime PRN               HEMOGLOBIN A1C  --   --  5.2  --          Lab 08/12/24  1805   TOTAL PROTEIN 5.7*   ALBUMIN 3.5   GLOBULIN 2.2   ALT (SGPT) 9   AST (SGOT) 15   BILIRUBIN 0.3   ALK PHOS 161*                 Lab 08/13/24  0645 08/12/24  1853 08/12/24  1805   IRON  --   --  39   IRON SATURATION (TSAT)  --   --  14*   TIBC  --   --  280*   TRANSFERRIN  --   --  188*   FERRITIN  --   --  40.23   FOLATE 10.50  --   --    VITAMIN B 12 465  --   --    ABO TYPING  --  O  --    RH TYPING  --  Negative  --    ANTIBODY SCREEN  --  Negative  --          Brief Urine Lab Results  (Last result in the past 365 days)        Color   Clarity   Blood   Leuk Est   Nitrite   Protein   CREAT   Urine HCG        08/13/24 2000             52.4         08/13/24 2000 Yellow   Clear   Negative   Negative   Negative   >=300 mg/dL (3+)                   Microbiology Results Abnormal       None            No radiology results from the last 24 hrs    Results for orders placed during the hospital encounter of 12/30/23    Adult Transthoracic Echo Complete W/ Cont if Necessary Per Protocol    Interpretation Summary    Left ventricular ejection fraction appears to be 56 - 60%.    Left ventricular wall thickness is consistent with mild concentric hypertrophy    The aortic valve exhibits sclerosis.    Mild aortic valve regurgitation is present. No hemodynamically significant aortic valve stenosis is present.    Mild tricuspid valve regurgitation is present. Estimated right ventricular systolic pressure from tricuspid regurgitation is moderately elevated (45-55 mmHg    Moderate mitral annular calcification is present. Trace to mild mitral valve regurgitation is present. No significant mitral valve stenosis is present.      Current medications:  Scheduled Meds:atorvastatin, 80 mg, Oral, Daily  carvedilol, 25 mg, Oral, Q12H  cloNIDine, 0.1 mg, Oral, BID  DULoxetine, 60 mg, Oral, Daily  hydrALAZINE, 75 mg, Oral, Q8H  insulin lispro, 2-7 Units, Subcutaneous, 4x Daily AC &  at Bedtime  ipratropium-albuterol, 3 mL, Nebulization, Q6H While Awake - RT  Morphine, 15 mg, Oral, Q12H  pantoprazole, 40 mg, Intravenous, BID AC  sodium chloride, 10 mL, Intravenous, Q12H      Continuous Infusions:   PRN Meds:.  acetaminophen **OR** acetaminophen **OR** acetaminophen    calcium carbonate    dextrose    dextrose    dextrose    glucagon (human recombinant)    ipratropium-albuterol    ondansetron ODT **OR** ondansetron    oxyCODONE    polyethylene glycol    sodium chloride    sodium chloride    Assessment & Plan   Assessment & Plan     Active Hospital Problems    Diagnosis  POA    **Symptomatic anemia [D64.9]  Yes    Acute blood loss anemia [D62]  Unknown    Melena [K92.1]  Unknown      Resolved Hospital Problems   No resolved problems to display.        Brief Hospital Course to date:  Yanique Webster is a 84 y.o. female with past medical history significant for CAD, COPD, hypertension, chronic pain and diabetes, was admitted for acute symptomatic anemia.  She received 1 unit PRBC with improvement.  Nephrology was consulted for KINDRA. GI performed EGD on 8/14 with no source of bleeding identified.      Acute symptomatic anemia  Weakness and deconditioning  -s/p 1 unit pRBC  -No active signs of bleeding noted  -Iron panel reviewed, B12 and folate WNL  -GI consulted   -s/p EGD 8/14, unremarkable for source of bleeding   -Patient continuing bowel prep today in anticipation of colonoscopy today, if not, will likely be tomorrow   -Continue PPI  -H&H stable   -PT/OT recommends IPR      KINDRA on CKD  -Avoid nephrotoxic agents  -Strict I's and O's  -FeNa 1.7%  -Nephrology consulted  -Ur Pr/Cr -> 2.8g per day   -Possibly secondary to anemia  -Renal function improving, appears close to baseline   -SPEP pending      Diabetes mellitus  -Insulin dependent   -Accu-Cheks ACHS with sliding scale insulin     COPD  -Patient was started on inhalers recently  -Continue DuoNebs     Chronic pain  -Continue home regimen of  MS Contin 15 mg every 12 hours     Hypertension  Hyperlipidemia  -Continue clonidine, Coreg and hydralazine    Expected Discharge Location and Transportation: IPR  Expected Discharge   Expected Discharge Date: 8/16/2024; Expected Discharge Time:      VTE Prophylaxis:  Mechanical VTE prophylaxis orders are present.         AM-PAC 6 Clicks Score (PT): 16 (08/15/24 0800)    CODE STATUS:   Code Status and Medical Interventions: CPR (Attempt to Resuscitate); Full Support   Ordered at: 08/12/24 1091     Code Status (Patient has no pulse and is not breathing):    CPR (Attempt to Resuscitate)     Medical Interventions (Patient has pulse or is breathing):    Full Support       Heber Ocasio, DO  08/15/24

## 2024-08-15 NOTE — CASE MANAGEMENT/SOCIAL WORK
Continued Stay Note   [facility]     Patient Name: Yanique Webster  MRN: [id number]  Today's Date: 8/15/2024    Admit Date: 8/12/2024    Plan: IRF   Discharge Plan       Row Name 08/15/24 1110       Plan    Plan IRF    Patient/Family in Agreement with Plan yes    Plan Comments Spoke to patient at bedside for therapy recs for IRF. Patient agreeable to referral being sent to [facility] only at this time. Pending colonoscopy if pt clears. CM will cont to follow.    Final Discharge Disposition Code 62 - inpatient rehab facility                   Discharge Codes    No documentation.                 Expected Discharge Date and Time       Expected Discharge Date Expected Discharge Time    Aug 15, 2024               Cassie Ayala RN

## 2024-08-16 ENCOUNTER — APPOINTMENT (OUTPATIENT)
Dept: GENERAL RADIOLOGY | Facility: HOSPITAL | Age: 84
DRG: 811 | End: 2024-08-16
Payer: MEDICARE

## 2024-08-16 LAB
ALBUMIN SERPL-MCNC: 3.6 G/DL (ref 3.5–5.2)
ALBUMIN/GLOB SERPL: 2.1 G/DL
ALP SERPL-CCNC: 172 U/L (ref 39–117)
ALT SERPL W P-5'-P-CCNC: 9 U/L (ref 1–33)
ANION GAP SERPL CALCULATED.3IONS-SCNC: 14 MMOL/L (ref 5–15)
AST SERPL-CCNC: 16 U/L (ref 1–32)
BILIRUB SERPL-MCNC: 0.5 MG/DL (ref 0–1.2)
BUN SERPL-MCNC: 27 MG/DL (ref 8–23)
BUN/CREAT SERPL: 13.8 (ref 7–25)
CALCIUM SPEC-SCNC: 9.6 MG/DL (ref 8.6–10.5)
CHLORIDE SERPL-SCNC: 109 MMOL/L (ref 98–107)
CO2 SERPL-SCNC: 21 MMOL/L (ref 22–29)
CREAT SERPL-MCNC: 1.96 MG/DL (ref 0.57–1)
DEPRECATED RDW RBC AUTO: 50.2 FL (ref 37–54)
EGFRCR SERPLBLD CKD-EPI 2021: 25 ML/MIN/1.73
ERYTHROCYTE [DISTWIDTH] IN BLOOD BY AUTOMATED COUNT: 15.9 % (ref 12.3–15.4)
GLOBULIN UR ELPH-MCNC: 1.7 GM/DL
GLUCOSE BLDC GLUCOMTR-MCNC: 137 MG/DL (ref 70–130)
GLUCOSE BLDC GLUCOMTR-MCNC: 146 MG/DL (ref 70–130)
GLUCOSE BLDC GLUCOMTR-MCNC: 158 MG/DL (ref 70–130)
GLUCOSE BLDC GLUCOMTR-MCNC: 198 MG/DL (ref 70–130)
GLUCOSE SERPL-MCNC: 134 MG/DL (ref 65–99)
HCT VFR BLD AUTO: 26.4 % (ref 34–46.6)
HGB BLD-MCNC: 8.1 G/DL (ref 12–15.9)
MCH RBC QN AUTO: 26.9 PG (ref 26.6–33)
MCHC RBC AUTO-ENTMCNC: 30.7 G/DL (ref 31.5–35.7)
MCV RBC AUTO: 87.7 FL (ref 79–97)
PLATELET # BLD AUTO: 130 10*3/MM3 (ref 140–450)
PMV BLD AUTO: 11.1 FL (ref 6–12)
POTASSIUM SERPL-SCNC: 4.1 MMOL/L (ref 3.5–5.2)
PROT SERPL-MCNC: 5.3 G/DL (ref 6–8.5)
RBC # BLD AUTO: 3.01 10*6/MM3 (ref 3.77–5.28)
SODIUM SERPL-SCNC: 144 MMOL/L (ref 136–145)
WBC NRBC COR # BLD AUTO: 5.53 10*3/MM3 (ref 3.4–10.8)

## 2024-08-16 PROCEDURE — 63710000001 INSULIN LISPRO (HUMAN) PER 5 UNITS: Performed by: INTERNAL MEDICINE

## 2024-08-16 PROCEDURE — 94664 DEMO&/EVAL PT USE INHALER: CPT

## 2024-08-16 PROCEDURE — 0DJD8ZZ INSPECTION OF LOWER INTESTINAL TRACT, VIA NATURAL OR ARTIFICIAL OPENING ENDOSCOPIC: ICD-10-PCS | Performed by: INTERNAL MEDICINE

## 2024-08-16 PROCEDURE — 80053 COMPREHEN METABOLIC PANEL: CPT | Performed by: STUDENT IN AN ORGANIZED HEALTH CARE EDUCATION/TRAINING PROGRAM

## 2024-08-16 PROCEDURE — 94799 UNLISTED PULMONARY SVC/PX: CPT

## 2024-08-16 PROCEDURE — 85027 COMPLETE CBC AUTOMATED: CPT | Performed by: STUDENT IN AN ORGANIZED HEALTH CARE EDUCATION/TRAINING PROGRAM

## 2024-08-16 PROCEDURE — 71045 X-RAY EXAM CHEST 1 VIEW: CPT

## 2024-08-16 PROCEDURE — 97530 THERAPEUTIC ACTIVITIES: CPT

## 2024-08-16 PROCEDURE — 99232 SBSQ HOSP IP/OBS MODERATE 35: CPT | Performed by: STUDENT IN AN ORGANIZED HEALTH CARE EDUCATION/TRAINING PROGRAM

## 2024-08-16 PROCEDURE — 25010000002 FUROSEMIDE PER 20 MG: Performed by: STUDENT IN AN ORGANIZED HEALTH CARE EDUCATION/TRAINING PROGRAM

## 2024-08-16 PROCEDURE — 97535 SELF CARE MNGMENT TRAINING: CPT

## 2024-08-16 PROCEDURE — 82948 REAGENT STRIP/BLOOD GLUCOSE: CPT

## 2024-08-16 PROCEDURE — 25010000002 PROPOFOL 10 MG/ML EMULSION: Performed by: NURSE ANESTHETIST, CERTIFIED REGISTERED

## 2024-08-16 PROCEDURE — 45378 DIAGNOSTIC COLONOSCOPY: CPT | Performed by: INTERNAL MEDICINE

## 2024-08-16 PROCEDURE — 94761 N-INVAS EAR/PLS OXIMETRY MLT: CPT

## 2024-08-16 PROCEDURE — 25010000002 HYDRALAZINE PER 20 MG: Performed by: STUDENT IN AN ORGANIZED HEALTH CARE EDUCATION/TRAINING PROGRAM

## 2024-08-16 PROCEDURE — 25010000002 FUROSEMIDE PER 20 MG: Performed by: NURSE PRACTITIONER

## 2024-08-16 PROCEDURE — 25810000003 LACTATED RINGERS PER 1000 ML: Performed by: ANESTHESIOLOGY

## 2024-08-16 RX ORDER — LIDOCAINE HYDROCHLORIDE 10 MG/ML
0.5 INJECTION, SOLUTION EPIDURAL; INFILTRATION; INTRACAUDAL; PERINEURAL ONCE AS NEEDED
Status: DISCONTINUED | OUTPATIENT
Start: 2024-08-16 | End: 2024-08-16 | Stop reason: HOSPADM

## 2024-08-16 RX ORDER — IPRATROPIUM BROMIDE AND ALBUTEROL SULFATE 2.5; .5 MG/3ML; MG/3ML
3 SOLUTION RESPIRATORY (INHALATION) ONCE AS NEEDED
Status: DISCONTINUED | OUTPATIENT
Start: 2024-08-16 | End: 2024-08-16 | Stop reason: HOSPADM

## 2024-08-16 RX ORDER — FUROSEMIDE 10 MG/ML
40 INJECTION INTRAMUSCULAR; INTRAVENOUS ONCE
Status: COMPLETED | OUTPATIENT
Start: 2024-08-16 | End: 2024-08-16

## 2024-08-16 RX ORDER — CLONIDINE HYDROCHLORIDE 0.1 MG/1
0.1 TABLET ORAL ONCE
Status: COMPLETED | OUTPATIENT
Start: 2024-08-16 | End: 2024-08-16

## 2024-08-16 RX ORDER — HYDRALAZINE HYDROCHLORIDE 20 MG/ML
10 INJECTION INTRAMUSCULAR; INTRAVENOUS ONCE
Status: COMPLETED | OUTPATIENT
Start: 2024-08-16 | End: 2024-08-16

## 2024-08-16 RX ORDER — FAMOTIDINE 20 MG/1
20 TABLET, FILM COATED ORAL ONCE
Status: DISCONTINUED | OUTPATIENT
Start: 2024-08-16 | End: 2024-08-16 | Stop reason: HOSPADM

## 2024-08-16 RX ORDER — FAMOTIDINE 10 MG/ML
20 INJECTION, SOLUTION INTRAVENOUS ONCE
Status: DISCONTINUED | OUTPATIENT
Start: 2024-08-16 | End: 2024-08-16 | Stop reason: HOSPADM

## 2024-08-16 RX ORDER — SODIUM CHLORIDE 9 MG/ML
40 INJECTION, SOLUTION INTRAVENOUS AS NEEDED
Status: DISCONTINUED | OUTPATIENT
Start: 2024-08-16 | End: 2024-08-16 | Stop reason: HOSPADM

## 2024-08-16 RX ORDER — PROPOFOL 10 MG/ML
VIAL (ML) INTRAVENOUS CONTINUOUS PRN
Status: DISCONTINUED | OUTPATIENT
Start: 2024-08-16 | End: 2024-08-16 | Stop reason: SURG

## 2024-08-16 RX ORDER — IPRATROPIUM BROMIDE AND ALBUTEROL SULFATE 2.5; .5 MG/3ML; MG/3ML
3 SOLUTION RESPIRATORY (INHALATION) EVERY 4 HOURS PRN
Status: DISCONTINUED | OUTPATIENT
Start: 2024-08-16 | End: 2024-08-19 | Stop reason: HOSPADM

## 2024-08-16 RX ORDER — MIDAZOLAM HYDROCHLORIDE 1 MG/ML
0.5 INJECTION INTRAMUSCULAR; INTRAVENOUS
Status: DISCONTINUED | OUTPATIENT
Start: 2024-08-16 | End: 2024-08-16 | Stop reason: HOSPADM

## 2024-08-16 RX ORDER — SODIUM CHLORIDE 0.9 % (FLUSH) 0.9 %
10 SYRINGE (ML) INJECTION EVERY 12 HOURS SCHEDULED
Status: DISCONTINUED | OUTPATIENT
Start: 2024-08-16 | End: 2024-08-16 | Stop reason: HOSPADM

## 2024-08-16 RX ORDER — SODIUM CHLORIDE, SODIUM LACTATE, POTASSIUM CHLORIDE, CALCIUM CHLORIDE 600; 310; 30; 20 MG/100ML; MG/100ML; MG/100ML; MG/100ML
9 INJECTION, SOLUTION INTRAVENOUS CONTINUOUS
Status: DISCONTINUED | OUTPATIENT
Start: 2024-08-16 | End: 2024-08-19 | Stop reason: HOSPADM

## 2024-08-16 RX ORDER — LIDOCAINE HYDROCHLORIDE 10 MG/ML
INJECTION, SOLUTION EPIDURAL; INFILTRATION; INTRACAUDAL; PERINEURAL AS NEEDED
Status: DISCONTINUED | OUTPATIENT
Start: 2024-08-16 | End: 2024-08-16 | Stop reason: SURG

## 2024-08-16 RX ORDER — ONDANSETRON 2 MG/ML
4 INJECTION INTRAMUSCULAR; INTRAVENOUS ONCE AS NEEDED
Status: DISCONTINUED | OUTPATIENT
Start: 2024-08-16 | End: 2024-08-16 | Stop reason: HOSPADM

## 2024-08-16 RX ORDER — LABETALOL HYDROCHLORIDE 5 MG/ML
10 INJECTION, SOLUTION INTRAVENOUS ONCE
Status: COMPLETED | OUTPATIENT
Start: 2024-08-16 | End: 2024-08-16

## 2024-08-16 RX ORDER — CLONIDINE HYDROCHLORIDE 0.1 MG/1
0.1 TABLET ORAL 3 TIMES DAILY
Status: DISCONTINUED | OUTPATIENT
Start: 2024-08-16 | End: 2024-08-19 | Stop reason: HOSPADM

## 2024-08-16 RX ORDER — HYDRALAZINE HYDROCHLORIDE 50 MG/1
100 TABLET, FILM COATED ORAL EVERY 8 HOURS SCHEDULED
Status: DISCONTINUED | OUTPATIENT
Start: 2024-08-16 | End: 2024-08-19 | Stop reason: HOSPADM

## 2024-08-16 RX ORDER — SODIUM CHLORIDE 0.9 % (FLUSH) 0.9 %
10 SYRINGE (ML) INJECTION AS NEEDED
Status: DISCONTINUED | OUTPATIENT
Start: 2024-08-16 | End: 2024-08-16 | Stop reason: HOSPADM

## 2024-08-16 RX ADMIN — LIDOCAINE HYDROCHLORIDE 50 MG: 10 INJECTION, SOLUTION EPIDURAL; INFILTRATION; INTRACAUDAL; PERINEURAL at 14:33

## 2024-08-16 RX ADMIN — HYDRALAZINE HYDROCHLORIDE 75 MG: 50 TABLET ORAL at 15:37

## 2024-08-16 RX ADMIN — IPRATROPIUM BROMIDE AND ALBUTEROL SULFATE 3 ML: 2.5; .5 SOLUTION RESPIRATORY (INHALATION) at 20:47

## 2024-08-16 RX ADMIN — HYDRALAZINE HYDROCHLORIDE 100 MG: 50 TABLET ORAL at 20:41

## 2024-08-16 RX ADMIN — HYDRALAZINE HYDROCHLORIDE 10 MG: 20 INJECTION INTRAMUSCULAR; INTRAVENOUS at 16:56

## 2024-08-16 RX ADMIN — MORPHINE SULFATE 15 MG: 15 TABLET, FILM COATED, EXTENDED RELEASE ORAL at 05:55

## 2024-08-16 RX ADMIN — IPRATROPIUM BROMIDE AND ALBUTEROL SULFATE 3 ML: 2.5; .5 SOLUTION RESPIRATORY (INHALATION) at 06:55

## 2024-08-16 RX ADMIN — CLONIDINE HYDROCHLORIDE 0.1 MG: 0.1 TABLET ORAL at 20:43

## 2024-08-16 RX ADMIN — CARVEDILOL 25 MG: 12.5 TABLET, FILM COATED ORAL at 08:37

## 2024-08-16 RX ADMIN — CARVEDILOL 25 MG: 12.5 TABLET, FILM COATED ORAL at 20:41

## 2024-08-16 RX ADMIN — Medication 10 ML: at 21:56

## 2024-08-16 RX ADMIN — ATORVASTATIN CALCIUM 80 MG: 40 TABLET, FILM COATED ORAL at 08:37

## 2024-08-16 RX ADMIN — SODIUM CHLORIDE, POTASSIUM CHLORIDE, SODIUM LACTATE AND CALCIUM CHLORIDE 9 ML/HR: 600; 310; 30; 20 INJECTION, SOLUTION INTRAVENOUS at 13:57

## 2024-08-16 RX ADMIN — FUROSEMIDE 40 MG: 10 INJECTION, SOLUTION INTRAMUSCULAR; INTRAVENOUS at 04:30

## 2024-08-16 RX ADMIN — DULOXETINE HYDROCHLORIDE 60 MG: 60 CAPSULE, DELAYED RELEASE ORAL at 08:37

## 2024-08-16 RX ADMIN — PROPOFOL 50 MCG/KG/MIN: 10 INJECTION, EMULSION INTRAVENOUS at 14:33

## 2024-08-16 RX ADMIN — MORPHINE SULFATE 15 MG: 15 TABLET, FILM COATED, EXTENDED RELEASE ORAL at 17:24

## 2024-08-16 RX ADMIN — PANTOPRAZOLE SODIUM 40 MG: 40 INJECTION, POWDER, FOR SOLUTION INTRAVENOUS at 08:37

## 2024-08-16 RX ADMIN — Medication 10 MG: at 03:39

## 2024-08-16 RX ADMIN — HYDRALAZINE HYDROCHLORIDE 75 MG: 50 TABLET ORAL at 05:55

## 2024-08-16 RX ADMIN — IPRATROPIUM BROMIDE AND ALBUTEROL SULFATE 3 ML: 2.5; .5 SOLUTION RESPIRATORY (INHALATION) at 03:34

## 2024-08-16 RX ADMIN — INSULIN LISPRO 2 UNITS: 100 INJECTION, SOLUTION INTRAVENOUS; SUBCUTANEOUS at 12:12

## 2024-08-16 RX ADMIN — PANTOPRAZOLE SODIUM 40 MG: 40 INJECTION, POWDER, FOR SOLUTION INTRAVENOUS at 16:41

## 2024-08-16 RX ADMIN — CLONIDINE HYDROCHLORIDE 0.1 MG: 0.1 TABLET ORAL at 16:56

## 2024-08-16 RX ADMIN — FUROSEMIDE 40 MG: 10 INJECTION, SOLUTION INTRAMUSCULAR; INTRAVENOUS at 10:07

## 2024-08-16 RX ADMIN — INSULIN LISPRO 2 UNITS: 100 INJECTION, SOLUTION INTRAVENOUS; SUBCUTANEOUS at 20:43

## 2024-08-16 RX ADMIN — CLONIDINE HYDROCHLORIDE 0.1 MG: 0.1 TABLET ORAL at 08:37

## 2024-08-16 NOTE — PLAN OF CARE
Goal Outcome Evaluation:  Plan of Care Reviewed With: patient        Progress: no change  Outcome Evaluation: Pt with good participation during OT session with encouragement provided. The pt required slightly more assistance for mobility today, needing Odilon to perform SPT x2 using a RWx due to increased fatigue and generally feeling unwell. Toileting with hygiene completed dependently, while grooming ADLs completed with set up while pt sitting up in bedside chair. The pt remains below her functional baseline with generalized weaknes, decreased activity tolerance, and balance deficits warranting continued IP OT services. Recommend a d/c to IRF for best outcome.      Anticipated Discharge Disposition (OT): inpatient rehabilitation facility

## 2024-08-16 NOTE — PROGRESS NOTES
Nutrition Services    Patient Name:  Yanique Webster  YOB: 1940  MRN: [id number]  Admit Date:  8/12/2024    Pt identified NPO/Clear Liquid Diet >72 hrs. Noted patient with GIB and was unable to undergo colonoscopy yesterday and has been rescheduled for today. Would anticipate patient be able to advance PO diet within reasonable time, however, if unable to advance or if nutrition support becomes indicated, please consult RDN. Will monitor for diet advance.     Electronically signed by:  Sandy Oscar MS,RD,LD  08/16/24 09:19 EDT

## 2024-08-16 NOTE — PROGRESS NOTES
" LOS: 2 days   Patient Care Team:  Sebas Alicea MD as PCP - General (Family Medicine)  Steve Geronimo MD as Consulting Physician (Cardiology)  Emilio Regalado MD as Consulting Physician (Endocrinology)    Chief Complaint: CKD    Subjective   Chart seen.  Creatinine improved 1.96.  Hypertension is noted.  Denies any nausea, vomiting, headache, blurring of vision.  Objective     Vital Sign Min/Max for last 24 hours  Temp  Min: 97 °F (36.1 °C)  Max: 98.5 °F (36.9 °C)   BP  Min: 120/39  Max: 226/84   Pulse  Min: 65  Max: 89   Resp  Min: 16  Max: 20   SpO2  Min: 92 %  Max: 99 %   Flow (L/min)  Min: 2  Max: 2   No data recorded     Flowsheet Rows      Flowsheet Row First Filed Value   Admission Height 162.6 cm (64.02\") Documented at 08/13/2024 0700   Admission Weight 85.7 kg (189 lb) Documented at 08/13/2024 0700            I/O this shift:  In: 490 [P.O.:240; I.V.:250]  Out: 1900 [Urine:1900]  I/O last 3 completed shifts:  In: -   Out: 2290 [Urine:2290]  Objective:  General Appearance: Alert, oriented, no obvious distress.  Morbid obesity  Eyes: PER, EOMI.  Neck: Supple no JVD.  Lungs: Clear auscultation, no rales rhonchi's, equal chest movement, nonlabored.  Heart: No gallop, murmur, rub, RRR.  Abdomen: Soft, nontender, positive bowel sounds, no organomegaly.  Extremities: No edema, no cyanosis.  Neuro: No focal deficit, moving all extremities, alert oriented X 3      Results Review:     I reviewed the patient's new clinical results.    WBC WBC   Date Value Ref Range Status   08/16/2024 5.53 3.40 - 10.80 10*3/mm3 Final   08/15/2024 5.39 3.40 - 10.80 10*3/mm3 Final   08/14/2024 5.88 3.40 - 10.80 10*3/mm3 Final      HGB Hemoglobin   Date Value Ref Range Status   08/16/2024 8.1 (L) 12.0 - 15.9 g/dL Final   08/15/2024 8.0 (L) 12.0 - 15.9 g/dL Final   08/14/2024 7.8 (L) 12.0 - 15.9 g/dL Final      HCT Hematocrit   Date Value Ref Range Status   08/16/2024 26.4 (L) 34.0 - 46.6 % Final   08/15/2024 26.2 (L) 34.0 " "- 46.6 % Final   08/14/2024 25.9 (L) 34.0 - 46.6 % Final      Platlets No results found for: \"LABPLAT\"   MCV MCV   Date Value Ref Range Status   08/16/2024 87.7 79.0 - 97.0 fL Final   08/15/2024 89.7 79.0 - 97.0 fL Final   08/14/2024 87.8 79.0 - 97.0 fL Final          Sodium Sodium   Date Value Ref Range Status   08/16/2024 144 136 - 145 mmol/L Final   08/15/2024 142 136 - 145 mmol/L Final   08/14/2024 139 136 - 145 mmol/L Final      Potassium Potassium   Date Value Ref Range Status   08/16/2024 4.1 3.5 - 5.2 mmol/L Final   08/15/2024 4.6 3.5 - 5.2 mmol/L Final   08/14/2024 4.3 3.5 - 5.2 mmol/L Final      Chloride Chloride   Date Value Ref Range Status   08/16/2024 109 (H) 98 - 107 mmol/L Final   08/15/2024 112 (H) 98 - 107 mmol/L Final   08/14/2024 110 (H) 98 - 107 mmol/L Final      CO2 CO2   Date Value Ref Range Status   08/16/2024 21.0 (L) 22.0 - 29.0 mmol/L Final   08/15/2024 23.0 22.0 - 29.0 mmol/L Final   08/14/2024 20.0 (L) 22.0 - 29.0 mmol/L Final      BUN BUN   Date Value Ref Range Status   08/16/2024 27 (H) 8 - 23 mg/dL Final   08/15/2024 39 (H) 8 - 23 mg/dL Final   08/14/2024 40 (H) 8 - 23 mg/dL Final      Creatinine Creatinine   Date Value Ref Range Status   08/16/2024 1.96 (H) 0.57 - 1.00 mg/dL Final   08/15/2024 2.25 (H) 0.57 - 1.00 mg/dL Final   08/14/2024 2.17 (H) 0.57 - 1.00 mg/dL Final      Calcium Calcium   Date Value Ref Range Status   08/16/2024 9.6 8.6 - 10.5 mg/dL Final   08/15/2024 9.6 8.6 - 10.5 mg/dL Final   08/14/2024 9.1 8.6 - 10.5 mg/dL Final      PO4 No results found for: \"CAPO4\"   Albumin Albumin   Date Value Ref Range Status   08/16/2024 3.6 3.5 - 5.2 g/dL Final   08/14/2024 3.0 2.9 - 4.4 g/dL Final      Magnesium No results found for: \"MG\"     Uric Acid No results found for: \"URICACID\"     Medication Review: Yes    Assessment & Plan       Symptomatic anemia    Acute blood loss anemia    Melena      1.  Acute on chronic CKD: Progressive CKD in the setting of diabetes and HTN. " Proteinuric renal disease. Cr ~ 2.77 on this admission. Most recent cr 2.1-2.2 creatinine improved 1.96  2.  Symptomatic anemia: Hemoglobin 6.3 on admission.  Patient received  unit of blood. GI following. Pending SPEP  3.  Diabetes:  4.  COPD:  5.  Chronic pain: Takes MS Contin every 12 hours.  6.  Hypertension: Extensive secondary workup in the past. PRA 0.5 Aldosterone 21 in 2017.  Will increase clonidine 0.1 mg 3 times daily, increase hydralazine 100 mg daily 3 times daily.  7.  Hyperlipidemia:  8.  Iron deficiency: Saturation 14%  9.  Metabolic acidosis       Ben Day MD  08/16/24  18:10 EDT

## 2024-08-16 NOTE — ANESTHESIA PREPROCEDURE EVALUATION
Anesthesia Evaluation     Patient summary reviewed and Nursing notes reviewed   NPO Solid Status: > 8 hours  NPO Liquid Status: > 2 hours           Airway   Mallampati: II  TM distance: >3 FB  Neck ROM: full  No difficulty expected  Dental      Pulmonary    (+) a smoker Former, cigarettes, COPD, asthma,shortness of breath (worse breathing and pleural effusions)  (-) recent URI, sleep apnea    ROS comment: Sats 97% RA   CXR Pleural effusions   Cardiovascular     ECG reviewed    (+) hypertension, CAD, dysrhythmias Atrial Fib, PVD, hyperlipidemia  (-) cardiac stents    ROS comment: ECG NSR NS ST abn      ECHO 2024  EF >56 %. concentric LVH  AV ·  sclerosis. Mild AI NO STENOSIS   Mild TI   RVSP moderately elevated (>45)  MV annular calcification NO MR MS      CATH 2016 Mild to moderate 3 vessel CAD without severe or occlusive disease      Neuro/Psych  (+) CVA (facial droop 7 yrs ago), headaches, psychiatric history  (-) seizures  GI/Hepatic/Renal/Endo    (+) obesity, morbid obesity, GERD, renal disease (creat >2 for over a year)- CRI, diabetes mellitus type 2  (-) liver disease    Musculoskeletal     Abdominal    Substance History      OB/GYN          Other   arthritis,         Phys Exam Other: Horse since last EGD               Anesthesia Plan    ASA 3     general and MAC     (PFL high FiO2 )  intravenous induction     Anesthetic plan, risks, benefits, and alternatives have been provided, discussed and informed consent has been obtained with: spouse/significant other.    Plan discussed with CRNA.      CODE STATUS:    Code Status (Patient has no pulse and is not breathing): CPR (Attempt to Resuscitate)  Medical Interventions (Patient has pulse or is breathing): Full Support

## 2024-08-16 NOTE — PLAN OF CARE
Patient apparently became dyspneic overnight and required 2 L nasal cannula.  She received Lasix, and is comfortable sitting in the chair today.    Will give tapwater enema, proceed with colonoscopy.

## 2024-08-16 NOTE — PLAN OF CARE
Goal Outcome Evaluation:  Plan of Care Reviewed With: patient        Progress: improving  Outcome Evaluation: Pt back to floor from Endo. VSS. RA. Alert and oriented x4. Pt hypertensive. Scheduled hydralazine given. No complaints of pain or nausea. Skin and fall precautions in place. Update given to yris Dietz. Pt resting comfortably. No further complaints at this time.

## 2024-08-16 NOTE — PLAN OF CARE
Goal Outcome Evaluation:  Plan of Care Reviewed With: patient        Progress: no change  Patient hypertensive and provider notified. Patient denies pain and nausea. Patient complained of shortness of breath and provider notified. Patient is on 2L of oxygen via nasal cannula. Fall and safety precautions maintained.

## 2024-08-16 NOTE — ANESTHESIA POSTPROCEDURE EVALUATION
Patient: Yanique Webster    Procedure Summary       Date: 08/16/24 Room / Location:  [facility] /  [facility]    Anesthesia Start: 1430 Anesthesia Stop: 1501    Procedure: COLONOSCOPY Diagnosis:       Acute blood loss anemia      Melena      (Acute blood loss anemia [D62])      (Melena [K92.1])    Surgeons: Brunner, Mark I, MD Provider: Leonides Ignacio MD    Anesthesia Type: general, MAC ASA Status: 3            Anesthesia Type: general, MAC    Vitals  No vitals data found for the desired time range.  /39  T 98.3F  SPO2 93% RA  RR 15  HR 74 SR        Post Anesthesia Care and Evaluation    Patient location during evaluation: PACU  Patient participation: complete - patient participated  Level of consciousness: awake and alert  Pain management: adequate    Airway patency: patent  Anesthetic complications: No anesthetic complications  PONV Status: none  Cardiovascular status: hemodynamically stable and acceptable  Respiratory status: nonlabored ventilation, acceptable and nasal cannula  Hydration status: acceptable

## 2024-08-16 NOTE — CASE MANAGEMENT/SOCIAL WORK
Continued Stay Note   [facility]     Patient Name: Yanique Webster  MRN: [id number]  Today's Date: 8/16/2024    Admit Date: 8/12/2024    Plan: IRF   Discharge Plan       Row Name 08/16/24 1356       Plan    Plan IRF    Patient/Family in Agreement with Plan yes    Plan Comments Spoke with patient at bedside and informed she had been approved for [facility]. Pt not medically ready for discharge as she is pending colonoscopy, per MD possibly sunday with [facility] notified. Will follow up over weekend. CM will cont to follow.                   Discharge Codes    No documentation.                 Expected Discharge Date and Time       Expected Discharge Date Expected Discharge Time    Aug 16, 2024               Cassie Ayala RN

## 2024-08-16 NOTE — BRIEF OP NOTE
COLONOSCOPY  Progress Note    Yanique Webster  8/16/2024    Colonoscopy is normal.    Patient would benefit from alternatives to anticoagulation for stroke prevention.    Patient is stable for discharge from GI standpoint.  Please call with questions or concerns.    Mark I. Brunner, MD     Date: 8/16/2024  Time: 15:24 EDT

## 2024-08-16 NOTE — THERAPY TREATMENT NOTE
Patient Name: Yanique Webster  : 1940    MRN: [id number]                              Today's Date: 2024       Admit Date: 2024    Visit Dx:     ICD-10-CM ICD-9-CM   1. Symptomatic anemia  D64.9 285.9   2. Acute blood loss anemia  D62 285.1   3. Melena  K92.1 578.1     Patient Active Problem List   Diagnosis    Fibromyalgia    PVD (peripheral vascular disease)    Type 2 diabetes mellitus    Diabetic gastroparesis    Takotsubo cardiomyopathy    Elevated serum creatinine    Hyperlipidemia LDL goal <70    COPD (chronic obstructive pulmonary disease)    Obesity    Osteoarthritis    Chronic pain    GERD (gastroesophageal reflux disease)    Gout    Chronic joint pain    Coronary artery disease involving native coronary artery of native heart without angina pectoris    Nonrheumatic aortic valve insufficiency    Altered mental status    Essential hypertension    CKD (chronic kidney disease) stage 3, GFR 30-59 ml/min    Hypertensive emergency    Status post placement of implantable loop recorder    Paroxysmal atrial fibrillation    Acute exacerbation of COPD with asthma    Encounter for loop recorder at end of battery life    Closed left hip fracture    Anxiety associated with depression    Anemia, chronic disease    Surgery follow-up    Borderline abnormal TFTs    Symptomatic anemia    Acute blood loss anemia    Melena     Past Medical History:   Diagnosis Date    Blurry vision     Chronic joint pain     Chronic pain     COPD (chronic obstructive pulmonary disease)     Coronary artery disease     Diabetic gastroparesis 2016    Esophageal stricture     s/p dilation in     GERD (gastroesophageal reflux disease)     Gout     Headache     secondary to hypertension    Hyperlipidemia     Hypertension     Long term current use of insulin     Neuropathy     Neuropathy due to type 2 diabetes mellitus     Obesity     Osteoarthritis     Pericarditis     Stroke 2019    Type 2 diabetes mellitus       Past Surgical History:   Procedure Laterality Date    BRONCHOSCOPY N/A 8/23/2016    Procedure: BRONCHOSCOPY BIOPSY AT BEDSIDE;  Surgeon: Mookie Willard MD;  Location:  [facility];  Service:     CARDIAC CATHETERIZATION N/A 8/30/2016    Procedure: Left Heart Cath;  Surgeon: Steve Geronimo MD;  Location:  [facility];  Service:     CARDIAC CATHETERIZATION N/A 8/30/2016    Procedure: Right Heart Cath;  Surgeon: Steve Geronimo MD;  Location:  [facility];  Service:     CARDIAC ELECTROPHYSIOLOGY PROCEDURE N/A 7/2/2019    Procedure: Loop insertion;  Surgeon: Steve Geronimo MD;  Location:  [facility];  Service: Cardiovascular    CARDIAC ELECTROPHYSIOLOGY PROCEDURE N/A 9/26/2023    Procedure: Loop recorder removal;  Surgeon: Steve Geronimo MD;  Location:  [facility];  Service: Cardiovascular;  Laterality: N/A;    CATARACT EXTRACTION      ENDOSCOPY N/A 8/14/2024    Procedure: ESOPHAGOGASTRODUODENOSCOPY;  Surgeon: Brunner, Mark I, MD;  Location:  [facility];  Service: Gastroenterology;  Laterality: N/A;    ESOPHAGEAL DILATATION  2007    HERNIA REPAIR      HIP CANNULATED SCREW PLACEMENT Left 1/2/2024    Procedure: HIP CANNULATED SCREW PLACEMENT LEFT;  Surgeon: Seymour Harrington MD;  Location:  [facility];  Service: Orthopedics;  Laterality: Left;    HYSTERECTOMY  1998    WRIST FRACTURE SURGERY Left       General Information       Row Name 08/16/24 0942          OT Time and Intention    Document Type therapy note (daily note)  -KF     Mode of Treatment occupational therapy;individual therapy  -KF       Row Name 08/16/24 0942          General Information    Patient Profile Reviewed yes  -KF     Existing Precautions/Restrictions fall  -KF     Barriers to Rehab medically complex;previous functional deficit  -KF       Row Name 08/16/24 0942          Cognition    Orientation Status (Cognition) oriented x 3  -KF       Row Name 08/16/24 0942          Safety  Issues, Functional Mobility    Safety Issues Affecting Function (Mobility) awareness of need for assistance;insight into deficits/self-awareness;positioning of assistive device;safety precaution awareness;safety precautions follow-through/compliance;sequencing abilities  -KF     Impairments Affecting Function (Mobility) balance;coordination;endurance/activity tolerance;postural/trunk control;strength;shortness of breath;pain  -KF               User Key  (r) = Recorded By, (t) = Taken By, (c) = Cosigned By      Initials Name Provider Type    KF Jaida Caputo OT Occupational Therapist                     Mobility/ADL's       Row Name 08/16/24 0942          Bed Mobility    Bed Mobility supine-sit  -KF     Scooting/Bridging Wallace (Bed Mobility) minimum assist (75% patient effort);1 person assist;verbal cues;nonverbal cues (demo/gesture)  -     Assistive Device (Bed Mobility) draw sheet;head of bed elevated  -     Comment, (Bed Mobility) Increased time and effort needed  -       Row Name 08/16/24 0942          Transfers    Transfers bed-chair transfer;sit-stand transfer;stand-sit transfer;toilet transfer  -     Comment, (Transfers) Verbal cues for hand placement. Pt performed SPT from the EOB > BSC > bedside chair.  -       Row Name 08/16/24 0942          Bed-Chair Transfer    Bed-Chair Wallace (Transfers) minimum assist (75% patient effort);1 person assist;verbal cues  -     Assistive Device (Bed-Chair Transfers) walker, front-wheeled  -       Row Name 08/16/24 0942          Sit-Stand Transfer    Sit-Stand Wallace (Transfers) minimum assist (75% patient effort);1 person assist;verbal cues  -     Assistive Device (Sit-Stand Transfers) walker, front-wheeled  -     Comment, (Sit-Stand Transfer) STS x1 from the EOB, x1 from the BSC  -       Row Name 08/16/24 0942          Stand-Sit Transfer    Stand-Sit Wallace (Transfers) minimum assist (75% patient effort);1 person  assist;verbal cues  -     Assistive Device (Stand-Sit Transfers) walker, front-wheeled  -       Row Name 08/16/24 0942          Toilet Transfer    Type (Toilet Transfer) stand pivot/stand step  -     [place] Level (Toilet Transfer) minimum assist (75% patient effort);1 person assist;verbal cues  -     Assistive Device (Toilet Transfer) walker, front-wheeled;commode, bedside without drop arms  -       Row Name 08/16/24 0942          Functional Mobility    Functional Mobility- Comment Additional mobility deferred today due to fatigue and pt generally feeling unwell.  -       Row Name 08/16/24 0942          Activities of Daily Living    BADL Assessment/Intervention lower body dressing;grooming;toileting  -       Row Name 08/16/24 0942          Lower Body Dressing Assessment/Training    [place] Level (Lower Body Dressing) don;socks;dependent (less than 25% patient effort)  -KF     Position (Lower Body Dressing) sitting up in bed  -       Row Name 08/16/24 0942          Grooming Assessment/Training    [place] Level (Grooming) hair care, combing/brushing;oral care regimen;wash face, hands;set up  -     Assistive Devices (Grooming) electric/power toothbrush  -KF     Oral Care teeth brushed - regular toothbrush  -KF     Position (Grooming) supported sitting  -KF     Comment, (Grooming) Pt deferred participation in ADLs while standing sink side due to fatigue.  -       Row Name 08/16/24 0942          Toileting Assessment/Training    [place] Level (Toileting) adjust/manage clothing;perform perineal hygiene;dependent (less than 25% patient effort)  -KF     Position (Toileting) unsupported sitting;supported standing  -KF               User Key  (r) = Recorded By, (t) = Taken By, (c) = Cosigned By      Initials Name Provider Type    KF Jaida Caputo OT Occupational Therapist                   Obj/Interventions       Row Name 08/16/24 0991          Balance    Balance Assessment sitting  static balance;sitting dynamic balance;sit to stand dynamic balance;standing static balance;standing dynamic balance  -KF     Static Sitting Balance standby assist  -KF     Dynamic Sitting Balance contact guard  -KF     Position, Sitting Balance unsupported;sitting edge of bed  -KF     Sit to Stand Dynamic Balance minimal assist;1-person assist  -KF     Static Standing Balance contact guard  -KF     Dynamic Standing Balance minimal assist;1-person assist  -KF     Position/Device Used, Standing Balance supported;walker, front-wheeled  -KF     Balance Interventions sitting;standing;sit to stand;supported;static;dynamic;occupation based/functional task  -KF               User Key  (r) = Recorded By, (t) = Taken By, (c) = Cosigned By      Initials Name Provider Type    KF Jaida Caputo, BETO Occupational Therapist                   Goals/Plan    No documentation.                  Clinical Impression       Row Name 08/16/24 0945          Pain Assessment    Pretreatment Pain Rating 9/10  -KF     Posttreatment Pain Rating 9/10  -KF     Pain Location generalized  -KF     Pain Intervention(s) Repositioned;Ambulation/increased activity;Nursing Notified  -       Row Name 08/16/24 0945          Plan of Care Review    Plan of Care Reviewed With patient  -KF     Progress no change  -KF     Outcome Evaluation Pt with good participation during OT session with encouragement provided. The pt required slightly more assistance for mobility today, needing Odilon to perform SPT x2 using a RWx due to increased fatigue and generally feeling unwell. Toileting with hygiene completed dependently, while grooming ADLs completed with set up while pt sitting up in bedside chair. The pt remains below her functional baseline with generalized weaknes, decreased activity tolerance, and balance deficits warranting continued IP OT services. Recommend a d/c to IRF for best outcome.  -       Row Name 08/16/24 0945          Therapy Assessment/Plan (OT)     Rehab Potential (OT) good, to achieve stated therapy goals  -KF     Criteria for Skilled Therapeutic Interventions Met (OT) yes;skilled treatment is necessary  -KF     Therapy Frequency (OT) daily  -KF       Row Name 08/16/24 0945          Therapy Plan Review/Discharge Plan (OT)    Anticipated Discharge Disposition (OT) inpatient rehabilitation facility  -KF       Row Name 08/16/24 0945          Vital Signs    Pre Systolic BP Rehab 171  -KF     Pre Treatment Diastolic BP 67  -KF     Pretreatment Heart Rate (beats/min) 85  -KF     Pre SpO2 (%) 95  -KF     O2 Delivery Pre Treatment room air  -KF     Pre Patient Position Supine  -KF     Intra Patient Position Standing  -KF     Post Patient Position Sitting  -KF       Row Name 08/16/24 0945          Positioning and Restraints    Pre-Treatment Position in bed  -KF     Post Treatment Position chair  -KF     In Chair notified nsg;reclined;call light within reach;encouraged to call for assist;exit alarm on;legs elevated  -KF               User Key  (r) = Recorded By, (t) = Taken By, (c) = Cosigned By      Initials Name Provider Type    KF Jaida Caputo, OT Occupational Therapist                   Outcome Measures       Row Name 08/16/24 0948          How much help from another is currently needed...    Putting on and taking off regular lower body clothing? 1  -KF     Bathing (including washing, rinsing, and drying) 2  -KF     Toileting (which includes using toilet bed pan or urinal) 2  -KF     Putting on and taking off regular upper body clothing 3  -KF     Taking care of personal grooming (such as brushing teeth) 3  -KF     Eating meals 4  -KF     AM-PAC 6 Clicks Score (OT) 15  -KF       Row Name 08/16/24 0800          How much help from another person do you currently need...    Turning from your back to your side while in flat bed without using bedrails? 3  -AR     Moving from lying on back to sitting on the side of a flat bed without bedrails? 3  -AR     Moving to  and from a bed to a chair (including a wheelchair)? 3  -AR     Standing up from a chair using your arms (e.g., wheelchair, bedside chair)? 3  -AR     Climbing 3-5 steps with a railing? 3  -AR     To walk in hospital room? 3  -AR     AM-PAC 6 Clicks Score (PT) 18  -AR     Highest Level of Mobility Goal 6 --> Walk 10 steps or more  -AR       Row Name 08/16/24 0948          Functional Assessment    Outcome Measure Options AM-PAC 6 Clicks Daily Activity (OT)  -KF               User Key  (r) = Recorded By, (t) = Taken By, (c) = Cosigned By      Initials Name Provider Type    AR Pritesh Bonilla, RN Registered Nurse    Jaida Reed OT Occupational Therapist                    Occupational Therapy Education       Title: PT OT SLP Therapies (Done)       Topic: Occupational Therapy (Done)       Point: ADL training (Done)       Description:   Instruct learner(s) on proper safety adaptation and remediation techniques during self care or transfers.   Instruct in proper use of assistive devices.                  Learning Progress Summary             Patient Acceptance, E,TB, VU,DU by KF at 8/16/2024 0840    Acceptance, E,TB, VU by CYRIL at 8/15/2024 1802    Acceptance, E, VU by AN at 8/14/2024 1508                         Point: Home exercise program (Done)       Description:   Instruct learner(s) on appropriate technique for monitoring, assisting and/or progressing therapeutic exercises/activities.                  Learning Progress Summary             Patient Acceptance, E,TB, VU by CYRIL at 8/15/2024 1802    Acceptance, E, VU by AN at 8/14/2024 1508                         Point: Precautions (Done)       Description:   Instruct learner(s) on prescribed precautions during self-care and functional transfers.                  Learning Progress Summary             Patient Acceptance, E,TB, VU,DU by KF at 8/16/2024 0840    Acceptance, E,TB, VU by CYRIL at 8/15/2024 1802    Acceptance, E, VU by AN at 8/14/2024 1508                          Point: Body mechanics (Done)       Description:   Instruct learner(s) on proper positioning and spine alignment during self-care, functional mobility activities and/or exercises.                  Learning Progress Summary             Patient Acceptance, E,TB, VU,DU by KF at 8/16/2024 0840    Acceptance, E,TB, VU by CYRIL at 8/15/2024 1802    Acceptance, E, VU by AN at 8/14/2024 1508                                         User Key       Initials Effective Dates Name Provider Type Discipline    CYRIL 07/30/24 -  Robert Cullen, RN Registered Nurse Nurse    JOSEFINA 09/21/21 -  Niru Eagle OT Occupational Therapist OT    ARNEL 08/09/23 -  Jaida Caputo OT Occupational Therapist OT                  OT Recommendation and Plan  Therapy Frequency (OT): daily  Plan of Care Review  Plan of Care Reviewed With: patient  Progress: no change  Outcome Evaluation: Pt with good participation during OT session with encouragement provided. The pt required slightly more assistance for mobility today, needing Odilon to perform SPT x2 using a RWx due to increased fatigue and generally feeling unwell. Toileting with hygiene completed dependently, while grooming ADLs completed with set up while pt sitting up in bedside chair. The pt remains below her functional baseline with generalized weaknes, decreased activity tolerance, and balance deficits warranting continued IP OT services. Recommend a d/c to IRF for best outcome.     Time Calculation:         Time Calculation- OT       Row Name 08/16/24 0948             Time Calculation- OT    OT Start Time 0840  -KF      OT Received On 08/16/24  -KF      OT Goal Re-Cert Due Date 08/24/24  -KF         Timed Charges    80096 - OT Therapeutic Activity Minutes 13  -KF      56841 - OT Self Care/Mgmt Minutes 25  -KF         Total Minutes    Timed Charges Total Minutes 38  -KF       Total Minutes 38  -KF                User Key  (r) = Recorded By, (t) = Taken By, (c) = Cosigned By      Initials Name  Provider Type    KF Jaida Caputo OT Occupational Therapist                  Therapy Charges for Today       Code Description Service Date Service Provider Modifiers Qty    01502246939  OT THERAPEUTIC ACT EA 15 MIN 8/16/2024 Jaida Caputo OT GO 1    [id number]  OT SELF CARE/MGMT/TRAIN EA 15 MIN 8/16/2024 Jaida Caputo OT GO 2                 Jaida Caputo OT  8/16/2024

## 2024-08-16 NOTE — PROGRESS NOTES
[facility] Medicine Services  PROGRESS NOTE    Patient Name: Yanique Webster  : 1940  MRN: [id number]    Date of Admission: 2024  Primary Care Physician: Sebas Alicea MD    Subjective   Subjective     CC:  Weakness, shortness of breath      HPI:  Patient reports developing shortness of breath overnight that improved after diuresis.  She denies any chest pain.  She states she has continued to take her bowel prep but feels like she has not completely emptied.  She denies any lightheadedness or dizziness or noticing any blood loss.      Objective   Objective     Vital Signs:   Temp:  [97 °F (36.1 °C)-98.5 °F (36.9 °C)] 98.3 °F (36.8 °C)  Heart Rate:  [65-89] 73  Resp:  [16-20] 18  BP: (120-226)/(39-89) 195/76  Flow (L/min):  [2] 2     Physical Exam:  General appearance: alert, awake, oriented, no acute distress   Cardiovascular: RRR, no murmurs or rubs, radial pulse full 2/4 BL   Respiratory: Bibasilar crackles, oxygenating well on 2 L nasal cannula, no respiratory distress  Abdomen: soft, non-tender, bowel sounds normoactive    Neuro/CNS: alert, normal speech, moves all extremities, strength and sensation grossly intact    Results Reviewed:  LAB RESULTS:      Lab 24  0713 08/15/24  0833 24  0732 24  1342 24  0645 24  0100 24  1806   WBC 5.53 5.39 5.88  --  7.01  --  4.77   HEMOGLOBIN 8.1* 8.0* 7.8* 7.5* 8.1*   < > 6.3*   HEMATOCRIT 26.4* 26.2* 25.9* 23.9* 26.0*   < > 21.1*   PLATELETS 130* 121* 121*  --  133*  --  125*   NEUTROS ABS  --   --   --   --  5.51  --  2.88   IMMATURE GRANS (ABS)  --   --   --   --  0.03  --  0.01   LYMPHS ABS  --   --   --   --  0.94  --  1.23   MONOS ABS  --   --   --   --  0.39  --  0.45   EOS ABS  --   --   --   --  0.11  --  0.16   MCV 87.7 89.7 87.8  --  87.2  --  88.3    < > = values in this interval not displayed.         Lab 24  0713 08/15/24  0833 24  0732 24  1806 24  1805    SODIUM 144 142 139  --  138   POTASSIUM 4.1 4.6 4.3  --  4.4   CHLORIDE 109* 112* 110*  --  108*   CO2 21.0* 23.0 20.0*  --  20.0*   ANION GAP 14.0 7.0 9.0  --  10.0   BUN 27* 39* 40*  --  46*   CREATININE 1.96* 2.25* 2.17*  --  2.77*   EGFR 25.0* 21.2* 22.1*  --  16.5*   GLUCOSE 134* 134* 126*  --  163*   CALCIUM 9.6 9.6 9.1  --  9.5   MAGNESIUM  --   --   --   --  1.8   PHOSPHORUS  --   --   --   --  4.3   HEMOGLOBIN A1C  --   --   --  5.2  --          Lab 08/16/24  0713 08/14/24  0732 08/12/24  1805   TOTAL PROTEIN 5.3*  --  5.7*   ALBUMIN 3.6 3.0 3.5   GLOBULIN 1.7  --  2.2   ALT (SGPT) 9  --  9   AST (SGOT) 16  --  15   BILIRUBIN 0.5  --  0.3   ALK PHOS 172*  --  161*                 Lab 08/13/24  0645 08/12/24  1853 08/12/24  1805   IRON  --   --  39   IRON SATURATION (TSAT)  --   --  14*   TIBC  --   --  280*   TRANSFERRIN  --   --  188*   FERRITIN  --   --  40.23   FOLATE 10.50  --   --    VITAMIN B 12 465  --   --    ABO TYPING  --  O  --    RH TYPING  --  Negative  --    ANTIBODY SCREEN  --  Negative  --          Brief Urine Lab Results  (Last result in the past 365 days)        Color   Clarity   Blood   Leuk Est   Nitrite   Protein   CREAT   Urine HCG        08/13/24 2000             52.4         08/13/24 2000 Yellow   Clear   Negative   Negative   Negative   >=300 mg/dL (3+)                   Microbiology Results Abnormal       None            XR Chest 1 View    Result Date: 8/16/2024  XR CHEST 1 VW Date of Exam: 8/16/2024 3:42 AM EDT Indication: dyspnea Comparison: Chest radiograph August 12, 2024 Findings: The heart is enlarged. There is pulmonary vascular congestion. There is a small left pleural effusion. There is a small right effusion. There is bilateral basilar atelectasis. Lucency along the left aspect of the lung is most likely skinfold and does not  have the typical appearance of pneumothorax. A follow-up could be performed to ensure.     Impression: Impression: Cardiomegaly with pulmonary  vascular congestion and bilateral pleural effusions. Lucency along the left hemithorax is most likely skinfold. Follow-up radiograph could be performed to ensure. Electronically Signed: Jerry Hooper MD  8/16/2024 4:03 AM EDT  Workstation ID: [id number]     Results for orders placed during the hospital encounter of 12/30/23    Adult Transthoracic Echo Complete W/ Cont if Necessary Per Protocol    Interpretation Summary    Left ventricular ejection fraction appears to be 56 - 60%.    Left ventricular wall thickness is consistent with mild concentric hypertrophy    The aortic valve exhibits sclerosis.    Mild aortic valve regurgitation is present. No hemodynamically significant aortic valve stenosis is present.    Mild tricuspid valve regurgitation is present. Estimated right ventricular systolic pressure from tricuspid regurgitation is moderately elevated (45-55 mmHg    Moderate mitral annular calcification is present. Trace to mild mitral valve regurgitation is present. No significant mitral valve stenosis is present.      Current medications:  Scheduled Meds:atorvastatin, 80 mg, Oral, Daily  carvedilol, 25 mg, Oral, Q12H  cloNIDine, 0.1 mg, Oral, BID  DULoxetine, 60 mg, Oral, Daily  hydrALAZINE, 75 mg, Oral, Q8H  insulin lispro, 2-7 Units, Subcutaneous, 4x Daily AC & at Bedtime  ipratropium-albuterol, 3 mL, Nebulization, Q6H While Awake - RT  Morphine, 15 mg, Oral, Q12H  pantoprazole, 40 mg, Intravenous, BID AC  PEG-KCl-NaCl-NaSulf-Na Asc-C, 1,000 mL, Oral, Once  sodium chloride, 10 mL, Intravenous, Q12H      Continuous Infusions:lactated ringers, 9 mL/hr, Last Rate: Stopped (08/16/24 1456)      PRN Meds:.  acetaminophen **OR** acetaminophen **OR** acetaminophen    calcium carbonate    dextrose    dextrose    glucagon (human recombinant)    ipratropium-albuterol    ondansetron ODT **OR** ondansetron    oxyCODONE    polyethylene glycol    sodium chloride    sodium chloride    Assessment & Plan   Assessment & Plan      Active Hospital Problems    Diagnosis  POA    **Symptomatic anemia [D64.9]  Yes    Acute blood loss anemia [D62]  Unknown    Melena [K92.1]  Unknown      Resolved Hospital Problems   No resolved problems to display.        Brief Hospital Course to date:  Yanique Webster is a 84 y.o. female with past medical history significant for CAD, COPD, hypertension, chronic pain and diabetes, was admitted for acute symptomatic anemia.  She received 1 unit PRBC with improvement.  Nephrology was consulted for KINDRA. GI performed EGD on 8/14 with no source of bleeding identified. Colonoscopy 8/16 with no acute findings.      Acute symptomatic anemia  Weakness and deconditioning  -s/p 1 unit pRBC  -No active signs of bleeding noted  -Iron panel reviewed, B12 and folate WNL  -GI consulted   -s/p EGD 8/14 and colonoscopy 8/16, no active source of bleeding identified    -Likely nutritional component of anemia   -Gi recommends no pharmacologic anticoagulation; will defer to cardiology at follow up   -Continue PPI  -H&H stable   -PT/OT recommends IPR      KINDRA on CKD  -Avoid nephrotoxic agents  -Strict I's and O's  -FeNa 1.7%  -Nephrology consulted  -Ur Pr/Cr -> 2.8g per day   -Possibly secondary to anemia  -Renal function improving, close to baseline   -SPEP pending     Paroxysmal atrial fibrillation  Takotsubo Cardiomyopathy   Acute on chronic HFimEF   -Has been on renally dosed Xarelto  -GI recommends alternative to pharmacologic stroke prophylaxis  -Acute CHF exacerbation overnight that improved w diuresis, renal function improving, continue diuresis      Diabetes mellitus  -Insulin dependent   -Accu-Cheks ACHS with sliding scale insulin     COPD  -Patient was started on inhalers recently  -Continue DuoNebs     Chronic pain  -Continue home regimen of MS Contin 15 mg every 12 hours     Hypertension  Hyperlipidemia  -Continue clonidine, Coreg and hydralazine    Expected Discharge Location and Transportation: Rehab   Expected  Discharge   Expected Discharge Date: 8/16/2024; Expected Discharge Time:      VTE Prophylaxis:  Mechanical VTE prophylaxis orders are present.         AM-PAC 6 Clicks Score (PT): 18 (08/16/24 0800)    CODE STATUS:   Code Status and Medical Interventions: CPR (Attempt to Resuscitate); Full Support   Ordered at: 08/12/24 1827     Code Status (Patient has no pulse and is not breathing):    CPR (Attempt to Resuscitate)     Medical Interventions (Patient has pulse or is breathing):    Full Support       Heber Ocasio,   08/16/24

## 2024-08-17 LAB
ANION GAP SERPL CALCULATED.3IONS-SCNC: 13 MMOL/L (ref 5–15)
BUN SERPL-MCNC: 28 MG/DL (ref 8–23)
BUN/CREAT SERPL: 14.4 (ref 7–25)
CALCIUM SPEC-SCNC: 9.9 MG/DL (ref 8.6–10.5)
CHLORIDE SERPL-SCNC: 106 MMOL/L (ref 98–107)
CO2 SERPL-SCNC: 22 MMOL/L (ref 22–29)
CREAT SERPL-MCNC: 1.94 MG/DL (ref 0.57–1)
DEPRECATED RDW RBC AUTO: 48.5 FL (ref 37–54)
EGFRCR SERPLBLD CKD-EPI 2021: 25.3 ML/MIN/1.73
ERYTHROCYTE [DISTWIDTH] IN BLOOD BY AUTOMATED COUNT: 15.9 % (ref 12.3–15.4)
GLUCOSE BLDC GLUCOMTR-MCNC: 135 MG/DL (ref 70–130)
GLUCOSE BLDC GLUCOMTR-MCNC: 161 MG/DL (ref 70–130)
GLUCOSE BLDC GLUCOMTR-MCNC: 161 MG/DL (ref 70–130)
GLUCOSE BLDC GLUCOMTR-MCNC: 201 MG/DL (ref 70–130)
GLUCOSE SERPL-MCNC: 135 MG/DL (ref 65–99)
HCT VFR BLD AUTO: 25.7 % (ref 34–46.6)
HGB BLD-MCNC: 8.1 G/DL (ref 12–15.9)
MCH RBC QN AUTO: 26.9 PG (ref 26.6–33)
MCHC RBC AUTO-ENTMCNC: 31.5 G/DL (ref 31.5–35.7)
MCV RBC AUTO: 85.4 FL (ref 79–97)
PLATELET # BLD AUTO: 120 10*3/MM3 (ref 140–450)
PMV BLD AUTO: 11.5 FL (ref 6–12)
POTASSIUM SERPL-SCNC: 3.7 MMOL/L (ref 3.5–5.2)
RBC # BLD AUTO: 3.01 10*6/MM3 (ref 3.77–5.28)
SODIUM SERPL-SCNC: 141 MMOL/L (ref 136–145)
WBC NRBC COR # BLD AUTO: 6.08 10*3/MM3 (ref 3.4–10.8)

## 2024-08-17 PROCEDURE — 85027 COMPLETE CBC AUTOMATED: CPT | Performed by: STUDENT IN AN ORGANIZED HEALTH CARE EDUCATION/TRAINING PROGRAM

## 2024-08-17 PROCEDURE — 94799 UNLISTED PULMONARY SVC/PX: CPT

## 2024-08-17 PROCEDURE — 82948 REAGENT STRIP/BLOOD GLUCOSE: CPT

## 2024-08-17 PROCEDURE — 80048 BASIC METABOLIC PNL TOTAL CA: CPT | Performed by: STUDENT IN AN ORGANIZED HEALTH CARE EDUCATION/TRAINING PROGRAM

## 2024-08-17 PROCEDURE — 63710000001 INSULIN LISPRO (HUMAN) PER 5 UNITS: Performed by: INTERNAL MEDICINE

## 2024-08-17 PROCEDURE — 99232 SBSQ HOSP IP/OBS MODERATE 35: CPT | Performed by: INTERNAL MEDICINE

## 2024-08-17 RX ORDER — TERAZOSIN 5 MG/1
5 CAPSULE ORAL NIGHTLY
Status: DISCONTINUED | OUTPATIENT
Start: 2024-08-17 | End: 2024-08-19 | Stop reason: HOSPADM

## 2024-08-17 RX ORDER — LABETALOL HYDROCHLORIDE 5 MG/ML
10 INJECTION, SOLUTION INTRAVENOUS ONCE
Status: COMPLETED | OUTPATIENT
Start: 2024-08-17 | End: 2024-08-17

## 2024-08-17 RX ORDER — AMLODIPINE BESYLATE 5 MG/1
5 TABLET ORAL
Status: DISCONTINUED | OUTPATIENT
Start: 2024-08-17 | End: 2024-08-19 | Stop reason: HOSPADM

## 2024-08-17 RX ADMIN — AMLODIPINE BESYLATE 5 MG: 5 TABLET ORAL at 17:42

## 2024-08-17 RX ADMIN — CARVEDILOL 25 MG: 12.5 TABLET, FILM COATED ORAL at 08:22

## 2024-08-17 RX ADMIN — HYDRALAZINE HYDROCHLORIDE 100 MG: 50 TABLET ORAL at 21:01

## 2024-08-17 RX ADMIN — CLONIDINE HYDROCHLORIDE 0.1 MG: 0.1 TABLET ORAL at 06:20

## 2024-08-17 RX ADMIN — ATORVASTATIN CALCIUM 80 MG: 40 TABLET, FILM COATED ORAL at 08:22

## 2024-08-17 RX ADMIN — INSULIN LISPRO 2 UNITS: 100 INJECTION, SOLUTION INTRAVENOUS; SUBCUTANEOUS at 21:45

## 2024-08-17 RX ADMIN — PANTOPRAZOLE SODIUM 40 MG: 40 INJECTION, POWDER, FOR SOLUTION INTRAVENOUS at 17:42

## 2024-08-17 RX ADMIN — HYDRALAZINE HYDROCHLORIDE 100 MG: 50 TABLET ORAL at 14:15

## 2024-08-17 RX ADMIN — CLONIDINE HYDROCHLORIDE 0.1 MG: 0.1 TABLET ORAL at 20:58

## 2024-08-17 RX ADMIN — TERAZOSIN HYDROCHLORIDE 5 MG: 5 CAPSULE ORAL at 20:58

## 2024-08-17 RX ADMIN — INSULIN LISPRO 3 UNITS: 100 INJECTION, SOLUTION INTRAVENOUS; SUBCUTANEOUS at 17:43

## 2024-08-17 RX ADMIN — Medication 10 MG: at 02:45

## 2024-08-17 RX ADMIN — MORPHINE SULFATE 15 MG: 15 TABLET, FILM COATED, EXTENDED RELEASE ORAL at 05:04

## 2024-08-17 RX ADMIN — MORPHINE SULFATE 15 MG: 15 TABLET, FILM COATED, EXTENDED RELEASE ORAL at 17:42

## 2024-08-17 RX ADMIN — Medication 10 ML: at 08:23

## 2024-08-17 RX ADMIN — DULOXETINE HYDROCHLORIDE 60 MG: 60 CAPSULE, DELAYED RELEASE ORAL at 08:22

## 2024-08-17 RX ADMIN — INSULIN LISPRO 2 UNITS: 100 INJECTION, SOLUTION INTRAVENOUS; SUBCUTANEOUS at 08:22

## 2024-08-17 RX ADMIN — IPRATROPIUM BROMIDE AND ALBUTEROL SULFATE 3 ML: 2.5; .5 SOLUTION RESPIRATORY (INHALATION) at 14:11

## 2024-08-17 RX ADMIN — CARVEDILOL 25 MG: 12.5 TABLET, FILM COATED ORAL at 20:58

## 2024-08-17 RX ADMIN — CLONIDINE HYDROCHLORIDE 0.1 MG: 0.1 TABLET ORAL at 14:15

## 2024-08-17 RX ADMIN — HYDRALAZINE HYDROCHLORIDE 100 MG: 50 TABLET ORAL at 05:04

## 2024-08-17 RX ADMIN — IPRATROPIUM BROMIDE AND ALBUTEROL SULFATE 3 ML: 2.5; .5 SOLUTION RESPIRATORY (INHALATION) at 19:25

## 2024-08-17 RX ADMIN — PANTOPRAZOLE SODIUM 40 MG: 40 INJECTION, POWDER, FOR SOLUTION INTRAVENOUS at 08:23

## 2024-08-17 RX ADMIN — Medication 10 ML: at 21:00

## 2024-08-17 NOTE — PROGRESS NOTES
[facility] Medicine Services  PROGRESS NOTE    Patient Name: Yanique Webster  : 1940  MRN: [id number]    Date of Admission: 2024  Primary Care Physician: Sebas Alicea MD    Subjective   Subjective     CC:  Anemia     HPI:  No acute events. States she feels better. Denies SOA.       Objective   Objective     Vital Signs:   Temp:  [97 °F (36.1 °C)-98.4 °F (36.9 °C)] 97.7 °F (36.5 °C)  Heart Rate:  [65-85] 69  Resp:  [15-18] 15  BP: (120-204)/(39-93) 168/71     Physical Exam:  Constitutional: No acute distress frail  Respiratory: Clear to auscultation bilaterally, respiratory effort normal   Cardiovascular: RRR, no murmur  Gastrointestinal: Positive bowel sounds, soft, nontender, nondistended  Musculoskeletal: trace bilateral ankle edema  Psychiatric: Appropriate affect, cooperative  Neurologic:  strength symmetric in all extremities, Cranial Nerves grossly intact to confrontation, speech clear      Results Reviewed:  LAB RESULTS:      Lab 24  0437 24  0713 08/15/24  0833 24  0732 24  1342 24  0645 24  0100 24  1806   WBC 6.08 5.53 5.39 5.88  --  7.01  --  4.77   HEMOGLOBIN 8.1* 8.1* 8.0* 7.8* 7.5* 8.1*   < > 6.3*   HEMATOCRIT 25.7* 26.4* 26.2* 25.9* 23.9* 26.0*   < > 21.1*   PLATELETS 120* 130* 121* 121*  --  133*  --  125*   NEUTROS ABS  --   --   --   --   --  5.51  --  2.88   IMMATURE GRANS (ABS)  --   --   --   --   --  0.03  --  0.01   LYMPHS ABS  --   --   --   --   --  0.94  --  1.23   MONOS ABS  --   --   --   --   --  0.39  --  0.45   EOS ABS  --   --   --   --   --  0.11  --  0.16   MCV 85.4 87.7 89.7 87.8  --  87.2  --  88.3    < > = values in this interval not displayed.         Lab 24  0437 24  0713 08/15/24  0833 24  0732 24  1806 24  1805   SODIUM 141 144 142 139  --  138   POTASSIUM 3.7 4.1 4.6 4.3  --  4.4   CHLORIDE 106 109* 112* 110*  --  108*   CO2 22.0 21.0* 23.0 20.0*  --   20.0*   ANION GAP 13.0 14.0 7.0 9.0  --  10.0   BUN 28* 27* 39* 40*  --  46*   CREATININE 1.94* 1.96* 2.25* 2.17*  --  2.77*   EGFR 25.3* 25.0* 21.2* 22.1*  --  16.5*   GLUCOSE 135* 134* 134* 126*  --  163*   CALCIUM 9.9 9.6 9.6 9.1  --  9.5   MAGNESIUM  --   --   --   --   --  1.8   PHOSPHORUS  --   --   --   --   --  4.3   HEMOGLOBIN A1C  --   --   --   --  5.2  --          Lab 08/16/24  0713 08/14/24  0732 08/12/24  1805   TOTAL PROTEIN 5.3*  --  5.7*   ALBUMIN 3.6 3.0 3.5   GLOBULIN 1.7  --  2.2   ALT (SGPT) 9  --  9   AST (SGOT) 16  --  15   BILIRUBIN 0.5  --  0.3   ALK PHOS 172*  --  161*                 Lab 08/13/24  0645 08/12/24  1853 08/12/24  1805   IRON  --   --  39   IRON SATURATION (TSAT)  --   --  14*   TIBC  --   --  280*   TRANSFERRIN  --   --  188*   FERRITIN  --   --  40.23   FOLATE 10.50  --   --    VITAMIN B 12 465  --   --    ABO TYPING  --  O  --    RH TYPING  --  Negative  --    ANTIBODY SCREEN  --  Negative  --          Brief Urine Lab Results  (Last result in the past 365 days)        Color   Clarity   Blood   Leuk Est   Nitrite   Protein   CREAT   Urine HCG        08/13/24 2000             52.4         08/13/24 2000 Yellow   Clear   Negative   Negative   Negative   >=300 mg/dL (3+)                   Microbiology Results Abnormal       None            XR Chest 1 View    Result Date: 8/16/2024  XR CHEST 1 VW Date of Exam: 8/16/2024 3:42 AM EDT Indication: dyspnea Comparison: Chest radiograph August 12, 2024 Findings: The heart is enlarged. There is pulmonary vascular congestion. There is a small left pleural effusion. There is a small right effusion. There is bilateral basilar atelectasis. Lucency along the left aspect of the lung is most likely skinfold and does not  have the typical appearance of pneumothorax. A follow-up could be performed to ensure.     Impression: Impression: Cardiomegaly with pulmonary vascular congestion and bilateral pleural effusions. Lucency along the left  hemithorax is most likely skinfold. Follow-up radiograph could be performed to ensure. Electronically Signed: Jerry Hooper MD  8/16/2024 4:03 AM EDT  Workstation ID: [id number]     Results for orders placed during the hospital encounter of 12/30/23    Adult Transthoracic Echo Complete W/ Cont if Necessary Per Protocol    Interpretation Summary    Left ventricular ejection fraction appears to be 56 - 60%.    Left ventricular wall thickness is consistent with mild concentric hypertrophy    The aortic valve exhibits sclerosis.    Mild aortic valve regurgitation is present. No hemodynamically significant aortic valve stenosis is present.    Mild tricuspid valve regurgitation is present. Estimated right ventricular systolic pressure from tricuspid regurgitation is moderately elevated (45-55 mmHg    Moderate mitral annular calcification is present. Trace to mild mitral valve regurgitation is present. No significant mitral valve stenosis is present.      Current medications:  Scheduled Meds:atorvastatin, 80 mg, Oral, Daily  carvedilol, 25 mg, Oral, Q12H  cloNIDine, 0.1 mg, Oral, TID  DULoxetine, 60 mg, Oral, Daily  hydrALAZINE, 100 mg, Oral, Q8H  insulin lispro, 2-7 Units, Subcutaneous, 4x Daily AC & at Bedtime  ipratropium-albuterol, 3 mL, Nebulization, Q6H While Awake - RT  Morphine, 15 mg, Oral, Q12H  pantoprazole, 40 mg, Intravenous, BID AC  PEG-KCl-NaCl-NaSulf-Na Asc-C, 1,000 mL, Oral, Once  sodium chloride, 10 mL, Intravenous, Q12H      Continuous Infusions:lactated ringers, 9 mL/hr, Last Rate: Stopped (08/16/24 1456)      PRN Meds:.  acetaminophen **OR** acetaminophen **OR** acetaminophen    calcium carbonate    dextrose    dextrose    glucagon (human recombinant)    ipratropium-albuterol    ondansetron ODT **OR** ondansetron    oxyCODONE    polyethylene glycol    sodium chloride    sodium chloride    Assessment & Plan   Assessment & Plan     Active Hospital Problems    Diagnosis  POA    **Symptomatic anemia [D64.9]   Yes    Acute blood loss anemia [D62]  Unknown    Melena [K92.1]  Unknown      Resolved Hospital Problems   No resolved problems to display.        Brief Hospital Course to date:  Yanique Webster is a 84 y.o. female with past medical history significant for CAD, COPD, hypertension, chronic pain and diabetes, was admitted for acute symptomatic anemia.  She received 1 unit PRBC with improvement.  Nephrology was consulted for KINDRA. GI performed EGD on 8/14 with no source of bleeding identified. Colonoscopy 8/16 with no acute findings.      Acute symptomatic anemia  Weakness and deconditioning  -s/p 1 unit pRBC  -No active signs of bleeding noted  -Iron panel reviewed, B12 and folate WNL  -GI consulted -- s/p EGD 8/14 and colonoscopy 8/16, no active source of bleeding identified    -Likely nutritional component of anemia   -Gi recommends no pharmacologic anticoagulation; will defer to cardiology at follow up   -Continue PPI  - Holding xarelto -- consider restarting with close monitoring and follow up with cards if remains stable      KINDRA on CKD  -Avoid nephrotoxic agents  -Strict I's and O's  -FeNa 1.7%  -Nephrology consulted  -Ur Pr/Cr -> 2.8g per day   -Possibly secondary to anemia  -Renal function improving, close to baseline   -SPEP pending      Paroxysmal atrial fibrillation  Takotsubo Cardiomyopathy   Acute on chronic HFimEF   -Has been on renally dosed Xarelto  -GI recommends alternative to pharmacologic stroke prophylaxis. Close follow up with cardiology to discuss   -Acute CHF exacerbation improved with IV diuretics      Diabetes mellitus  -Insulin dependent   -Accu-Cheks ACHS with sliding scale insulin     COPD  -Patient was started on inhalers recently  -Continue DuoNebs     Chronic pain  -Continue home regimen of MS Contin 15 mg every 12 hours     Hypertension  Hyperlipidemia  -Continue clonidine, Coreg and hydralazine; resume hytrin (sub for cardura)    Expected Discharge Location and Transportation:  rehab  Expected Discharge   Expected Discharge Date: 8/16/2024; Expected Discharge Time:      VTE Prophylaxis:  Mechanical VTE prophylaxis orders are present.         AM-PAC 6 Clicks Score (PT): 18 (08/17/24 1000)    CODE STATUS:   Code Status and Medical Interventions: CPR (Attempt to Resuscitate); Full Support   Ordered at: 08/12/24 1827     Code Status (Patient has no pulse and is not breathing):    CPR (Attempt to Resuscitate)     Medical Interventions (Patient has pulse or is breathing):    Full Support       Fany Adan DO  08/17/24

## 2024-08-17 NOTE — PROGRESS NOTES
" LOS: 3 days   Patient Care Team:  Sebas Alicea MD as PCP - General (Family Medicine)  Steve Geronimo MD as Consulting Physician (Cardiology)  Emilio Regalado MD as Consulting Physician (Endocrinology)    Chief Complaint: CKD    Subjective   Chart seen.  Creatinine improved 1.94  Blood pressure started to get better last blood pressure 148/61  Denies any nausea, vomiting, headache, blurring of vision.  Objective     Vital Sign Min/Max for last 24 hours  Temp  Min: 97 °F (36.1 °C)  Max: 98.4 °F (36.9 °C)   BP  Min: 120/39  Max: 204/82   Pulse  Min: 65  Max: 85   Resp  Min: 17  Max: 18   SpO2  Min: 92 %  Max: 98 %   No data recorded   No data recorded     Flowsheet Rows      Flowsheet Row First Filed Value   Admission Height 162.6 cm (64.02\") Documented at 08/13/2024 0700   Admission Weight 85.7 kg (189 lb) Documented at 08/13/2024 0700            I/O this shift:  In: 480 [P.O.:480]  Out: -   I/O last 3 completed shifts:  In: 490 [P.O.:240; I.V.:250]  Out: 4140 [Urine:4140]  Objective:  General Appearance: Alert, oriented, no obvious distress.  Morbid obesity  Eyes: PER, EOMI.  Neck: Supple no JVD.  Lungs: Clear auscultation, no rales rhonchi's, equal chest movement, nonlabored.  Heart: No gallop, murmur, rub, RRR.  Abdomen: Soft, nontender, positive bowel sounds, no organomegaly.  Extremities: No edema, no cyanosis.  Neuro: No focal deficit, moving all extremities, alert oriented X 3      Results Review:     I reviewed the patient's new clinical results.    WBC WBC   Date Value Ref Range Status   08/17/2024 6.08 3.40 - 10.80 10*3/mm3 Final   08/16/2024 5.53 3.40 - 10.80 10*3/mm3 Final   08/15/2024 5.39 3.40 - 10.80 10*3/mm3 Final      HGB Hemoglobin   Date Value Ref Range Status   08/17/2024 8.1 (L) 12.0 - 15.9 g/dL Final   08/16/2024 8.1 (L) 12.0 - 15.9 g/dL Final   08/15/2024 8.0 (L) 12.0 - 15.9 g/dL Final      HCT Hematocrit   Date Value Ref Range Status   08/17/2024 25.7 (L) 34.0 - 46.6 % Final " "  08/16/2024 26.4 (L) 34.0 - 46.6 % Final   08/15/2024 26.2 (L) 34.0 - 46.6 % Final      Platlets No results found for: \"LABPLAT\"   MCV MCV   Date Value Ref Range Status   08/17/2024 85.4 79.0 - 97.0 fL Final   08/16/2024 87.7 79.0 - 97.0 fL Final   08/15/2024 89.7 79.0 - 97.0 fL Final          Sodium Sodium   Date Value Ref Range Status   08/17/2024 141 136 - 145 mmol/L Final   08/16/2024 144 136 - 145 mmol/L Final   08/15/2024 142 136 - 145 mmol/L Final      Potassium Potassium   Date Value Ref Range Status   08/17/2024 3.7 3.5 - 5.2 mmol/L Final   08/16/2024 4.1 3.5 - 5.2 mmol/L Final   08/15/2024 4.6 3.5 - 5.2 mmol/L Final      Chloride Chloride   Date Value Ref Range Status   08/17/2024 106 98 - 107 mmol/L Final   08/16/2024 109 (H) 98 - 107 mmol/L Final   08/15/2024 112 (H) 98 - 107 mmol/L Final      CO2 CO2   Date Value Ref Range Status   08/17/2024 22.0 22.0 - 29.0 mmol/L Final   08/16/2024 21.0 (L) 22.0 - 29.0 mmol/L Final   08/15/2024 23.0 22.0 - 29.0 mmol/L Final      BUN BUN   Date Value Ref Range Status   08/17/2024 28 (H) 8 - 23 mg/dL Final   08/16/2024 27 (H) 8 - 23 mg/dL Final   08/15/2024 39 (H) 8 - 23 mg/dL Final      Creatinine Creatinine   Date Value Ref Range Status   08/17/2024 1.94 (H) 0.57 - 1.00 mg/dL Final   08/16/2024 1.96 (H) 0.57 - 1.00 mg/dL Final   08/15/2024 2.25 (H) 0.57 - 1.00 mg/dL Final      Calcium Calcium   Date Value Ref Range Status   08/17/2024 9.9 8.6 - 10.5 mg/dL Final   08/16/2024 9.6 8.6 - 10.5 mg/dL Final   08/15/2024 9.6 8.6 - 10.5 mg/dL Final      PO4 No results found for: \"CAPO4\"   Albumin Albumin   Date Value Ref Range Status   08/16/2024 3.6 3.5 - 5.2 g/dL Final      Magnesium No results found for: \"MG\"     Uric Acid No results found for: \"URICACID\"     Medication Review: Yes    Assessment & Plan       Symptomatic anemia    Acute blood loss anemia    Melena      1.  Acute on chronic CKD: Creatinine improved 1.94, progressive CKD in the setting of diabetes and HTN. " Proteinuric renal disease. Cr ~ 2.77 on this admission. Most recent cr 2.1-2.2   2.  Symptomatic anemia: Hemoglobin 6.3 on admission.  Patient received  unit of blood. GI following. Pending SPEP  3.  Diabetes:  4.  COPD:  5.  Chronic pain: Takes MS Contin every 12 hours.  6.  Hypertension: Extensive secondary workup in the past. PRA 0.5 Aldosterone 21 in 2017.  Will increase clonidine 0.1 mg 3 times daily, increase hydralazine 100 mg daily 3 times daily.  7.  Hyperlipidemia:  8.  Iron deficiency: Saturation 14%  9.  Metabolic acidosis       Ben Day MD  08/17/24  11:18 EDT

## 2024-08-18 LAB
ANION GAP SERPL CALCULATED.3IONS-SCNC: 9 MMOL/L (ref 5–15)
BUN SERPL-MCNC: 36 MG/DL (ref 8–23)
BUN/CREAT SERPL: 17.6 (ref 7–25)
CALCIUM SPEC-SCNC: 9.3 MG/DL (ref 8.6–10.5)
CHLORIDE SERPL-SCNC: 106 MMOL/L (ref 98–107)
CO2 SERPL-SCNC: 22 MMOL/L (ref 22–29)
CREAT SERPL-MCNC: 2.05 MG/DL (ref 0.57–1)
DEPRECATED RDW RBC AUTO: 48.9 FL (ref 37–54)
EGFRCR SERPLBLD CKD-EPI 2021: 23.7 ML/MIN/1.73
ERYTHROCYTE [DISTWIDTH] IN BLOOD BY AUTOMATED COUNT: 15.6 % (ref 12.3–15.4)
GLUCOSE BLDC GLUCOMTR-MCNC: 141 MG/DL (ref 70–130)
GLUCOSE BLDC GLUCOMTR-MCNC: 147 MG/DL (ref 70–130)
GLUCOSE BLDC GLUCOMTR-MCNC: 186 MG/DL (ref 70–130)
GLUCOSE BLDC GLUCOMTR-MCNC: 194 MG/DL (ref 70–130)
GLUCOSE SERPL-MCNC: 151 MG/DL (ref 65–99)
HCT VFR BLD AUTO: 24.6 % (ref 34–46.6)
HGB BLD-MCNC: 7.6 G/DL (ref 12–15.9)
MCH RBC QN AUTO: 26.5 PG (ref 26.6–33)
MCHC RBC AUTO-ENTMCNC: 30.9 G/DL (ref 31.5–35.7)
MCV RBC AUTO: 85.7 FL (ref 79–97)
PLATELET # BLD AUTO: 106 10*3/MM3 (ref 140–450)
PMV BLD AUTO: 11.5 FL (ref 6–12)
POTASSIUM SERPL-SCNC: 4 MMOL/L (ref 3.5–5.2)
RBC # BLD AUTO: 2.87 10*6/MM3 (ref 3.77–5.28)
SODIUM SERPL-SCNC: 137 MMOL/L (ref 136–145)
WBC NRBC COR # BLD AUTO: 5.87 10*3/MM3 (ref 3.4–10.8)

## 2024-08-18 PROCEDURE — 82948 REAGENT STRIP/BLOOD GLUCOSE: CPT

## 2024-08-18 PROCEDURE — 94761 N-INVAS EAR/PLS OXIMETRY MLT: CPT

## 2024-08-18 PROCEDURE — 94799 UNLISTED PULMONARY SVC/PX: CPT

## 2024-08-18 PROCEDURE — 99232 SBSQ HOSP IP/OBS MODERATE 35: CPT | Performed by: INTERNAL MEDICINE

## 2024-08-18 PROCEDURE — 85027 COMPLETE CBC AUTOMATED: CPT | Performed by: INTERNAL MEDICINE

## 2024-08-18 PROCEDURE — 80048 BASIC METABOLIC PNL TOTAL CA: CPT | Performed by: INTERNAL MEDICINE

## 2024-08-18 PROCEDURE — 63710000001 INSULIN LISPRO (HUMAN) PER 5 UNITS: Performed by: INTERNAL MEDICINE

## 2024-08-18 PROCEDURE — 94664 DEMO&/EVAL PT USE INHALER: CPT

## 2024-08-18 RX ORDER — CALCITRIOL 0.25 UG/1
0.5 CAPSULE, LIQUID FILLED ORAL DAILY
Status: DISCONTINUED | OUTPATIENT
Start: 2024-08-18 | End: 2024-08-19 | Stop reason: HOSPADM

## 2024-08-18 RX ADMIN — CLONIDINE HYDROCHLORIDE 0.1 MG: 0.1 TABLET ORAL at 15:49

## 2024-08-18 RX ADMIN — HYDRALAZINE HYDROCHLORIDE 100 MG: 50 TABLET ORAL at 15:49

## 2024-08-18 RX ADMIN — ATORVASTATIN CALCIUM 80 MG: 40 TABLET, FILM COATED ORAL at 08:30

## 2024-08-18 RX ADMIN — IPRATROPIUM BROMIDE AND ALBUTEROL SULFATE 3 ML: 2.5; .5 SOLUTION RESPIRATORY (INHALATION) at 12:59

## 2024-08-18 RX ADMIN — CLONIDINE HYDROCHLORIDE 0.1 MG: 0.1 TABLET ORAL at 08:31

## 2024-08-18 RX ADMIN — IPRATROPIUM BROMIDE AND ALBUTEROL SULFATE 3 ML: 2.5; .5 SOLUTION RESPIRATORY (INHALATION) at 08:42

## 2024-08-18 RX ADMIN — HYDRALAZINE HYDROCHLORIDE 100 MG: 50 TABLET ORAL at 21:59

## 2024-08-18 RX ADMIN — AMLODIPINE BESYLATE 5 MG: 5 TABLET ORAL at 08:30

## 2024-08-18 RX ADMIN — PANTOPRAZOLE SODIUM 40 MG: 40 INJECTION, POWDER, FOR SOLUTION INTRAVENOUS at 08:30

## 2024-08-18 RX ADMIN — CLONIDINE HYDROCHLORIDE 0.1 MG: 0.1 TABLET ORAL at 22:00

## 2024-08-18 RX ADMIN — Medication 10 ML: at 08:31

## 2024-08-18 RX ADMIN — PANTOPRAZOLE SODIUM 40 MG: 40 INJECTION, POWDER, FOR SOLUTION INTRAVENOUS at 19:42

## 2024-08-18 RX ADMIN — MORPHINE SULFATE 15 MG: 15 TABLET, FILM COATED, EXTENDED RELEASE ORAL at 06:18

## 2024-08-18 RX ADMIN — CARVEDILOL 25 MG: 12.5 TABLET, FILM COATED ORAL at 21:59

## 2024-08-18 RX ADMIN — MORPHINE SULFATE 15 MG: 15 TABLET, FILM COATED, EXTENDED RELEASE ORAL at 19:42

## 2024-08-18 RX ADMIN — RIVAROXABAN 15 MG: 15 TABLET, FILM COATED ORAL at 19:42

## 2024-08-18 RX ADMIN — DULOXETINE HYDROCHLORIDE 60 MG: 60 CAPSULE, DELAYED RELEASE ORAL at 08:30

## 2024-08-18 RX ADMIN — INSULIN LISPRO 2 UNITS: 100 INJECTION, SOLUTION INTRAVENOUS; SUBCUTANEOUS at 21:59

## 2024-08-18 RX ADMIN — TERAZOSIN HYDROCHLORIDE 5 MG: 5 CAPSULE ORAL at 22:00

## 2024-08-18 RX ADMIN — IPRATROPIUM BROMIDE AND ALBUTEROL SULFATE 3 ML: 2.5; .5 SOLUTION RESPIRATORY (INHALATION) at 21:41

## 2024-08-18 RX ADMIN — HYDRALAZINE HYDROCHLORIDE 100 MG: 50 TABLET ORAL at 06:18

## 2024-08-18 RX ADMIN — CARVEDILOL 25 MG: 12.5 TABLET, FILM COATED ORAL at 08:30

## 2024-08-18 NOTE — PROGRESS NOTES
" LOS: 4 days   Patient Care Team:  Sebas Alicea MD as PCP - General (Family Medicine)  Steve Geronimo MD as Consulting Physician (Cardiology)  Emilio Regalado MD as Consulting Physician (Endocrinology)    Chief Complaint: CKD    Subjective     In the room  Creatinine 2.05  Blood pressure started to get better last blood pressure 146/61  Denies any nausea, vomiting, headache, blurring of vision.  Objective     Vital Sign Min/Max for last 24 hours  Temp  Min: 97.7 °F (36.5 °C)  Max: 98.1 °F (36.7 °C)   BP  Min: 136/62  Max: 195/81   Pulse  Min: 62  Max: 77   Resp  Min: 15  Max: 18   SpO2  Min: 93 %  Max: 100 %   No data recorded   No data recorded     Flowsheet Rows      Flowsheet Row First Filed Value   Admission Height 162.6 cm (64.02\") Documented at 08/13/2024 0700   Admission Weight 85.7 kg (189 lb) Documented at 08/13/2024 0700            No intake/output data recorded.  I/O last 3 completed shifts:  In: 1205 [P.O.:1205]  Out: 2100 [Urine:2100]  Objective:  General Appearance: Awake, alert, oriented x 4 no obvious distress blood pressure better controlled with the current medication.  Eyes: PER, EOMI.  Neck: Supple no JVD.  Lungs: Clear auscultation, no rales rhonchi's, equal chest movement, nonlabored.  Heart: No gallop, murmur, rub, RRR.  Abdomen: Soft, nontender, positive bowel sounds, no organomegaly.  Extremities: No edema, no cyanosis.  Neuro: No focal deficit, moving all extremities, alert oriented X 3      Results Review:     I reviewed the patient's new clinical results.    WBC WBC   Date Value Ref Range Status   08/18/2024 5.87 3.40 - 10.80 10*3/mm3 Final   08/17/2024 6.08 3.40 - 10.80 10*3/mm3 Final   08/16/2024 5.53 3.40 - 10.80 10*3/mm3 Final      HGB Hemoglobin   Date Value Ref Range Status   08/18/2024 7.6 (L) 12.0 - 15.9 g/dL Final   08/17/2024 8.1 (L) 12.0 - 15.9 g/dL Final   08/16/2024 8.1 (L) 12.0 - 15.9 g/dL Final      HCT Hematocrit   Date Value Ref Range Status   08/18/2024 " "24.6 (L) 34.0 - 46.6 % Final   08/17/2024 25.7 (L) 34.0 - 46.6 % Final   08/16/2024 26.4 (L) 34.0 - 46.6 % Final      Platlets No results found for: \"LABPLAT\"   MCV MCV   Date Value Ref Range Status   08/18/2024 85.7 79.0 - 97.0 fL Final   08/17/2024 85.4 79.0 - 97.0 fL Final   08/16/2024 87.7 79.0 - 97.0 fL Final          Sodium Sodium   Date Value Ref Range Status   08/18/2024 137 136 - 145 mmol/L Final   08/17/2024 141 136 - 145 mmol/L Final   08/16/2024 144 136 - 145 mmol/L Final      Potassium Potassium   Date Value Ref Range Status   08/18/2024 4.0 3.5 - 5.2 mmol/L Final   08/17/2024 3.7 3.5 - 5.2 mmol/L Final   08/16/2024 4.1 3.5 - 5.2 mmol/L Final      Chloride Chloride   Date Value Ref Range Status   08/18/2024 106 98 - 107 mmol/L Final   08/17/2024 106 98 - 107 mmol/L Final   08/16/2024 109 (H) 98 - 107 mmol/L Final      CO2 CO2   Date Value Ref Range Status   08/18/2024 22.0 22.0 - 29.0 mmol/L Final   08/17/2024 22.0 22.0 - 29.0 mmol/L Final   08/16/2024 21.0 (L) 22.0 - 29.0 mmol/L Final      BUN BUN   Date Value Ref Range Status   08/18/2024 36 (H) 8 - 23 mg/dL Final   08/17/2024 28 (H) 8 - 23 mg/dL Final   08/16/2024 27 (H) 8 - 23 mg/dL Final      Creatinine Creatinine   Date Value Ref Range Status   08/18/2024 2.05 (H) 0.57 - 1.00 mg/dL Final   08/17/2024 1.94 (H) 0.57 - 1.00 mg/dL Final   08/16/2024 1.96 (H) 0.57 - 1.00 mg/dL Final      Calcium Calcium   Date Value Ref Range Status   08/18/2024 9.3 8.6 - 10.5 mg/dL Final   08/17/2024 9.9 8.6 - 10.5 mg/dL Final   08/16/2024 9.6 8.6 - 10.5 mg/dL Final      PO4 No results found for: \"CAPO4\"   Albumin Albumin   Date Value Ref Range Status   08/16/2024 3.6 3.5 - 5.2 g/dL Final      Magnesium No results found for: \"MG\"     Uric Acid No results found for: \"URICACID\"     Medication Review: Yes    Assessment & Plan       Symptomatic anemia    Acute blood loss anemia    Melena      1.  Acute on chronic CKD: Creatinine 2.05 progressive CKD in the setting of " diabetes and HTN. Proteinuric renal disease. Cr ~ 2.77 on this admission. Most recent cr 2.1-2.2   2.  Symptomatic anemia: Hemoglobin 6.3 on admission.  Patient received  unit of blood. GI following.  Kappa lambda chain ratio 1.29.  No M spike noted.  3.  Diabetes:  4.  COPD:  5.  Chronic pain: Takes MS Contin every 12 hours.  6.  Hypertension: Extensive secondary workup in the past. PRA 0.5 Aldosterone 21 in 2017.  Blood pressure better controlled with the current medication.  7.  Hyperlipidemia:  8.  Iron deficiency: Saturation 14%  9.  Metabolic acidosis  10.  Secondary hyperparathyroidism intact .  Serum calcium 9.3.  Start calcitriol 0.5 mcg daily.  Check intact PTH in 8 weeks time    Ben Day MD  08/18/24  09:03 EDT

## 2024-08-18 NOTE — CASE MANAGEMENT/SOCIAL WORK
Continued Stay Note  [facility]     Patient Name: Yanique Webster  MRN: [id number]  Today's Date: 8/18/2024    Admit Date: 8/12/2024    Plan: IRF   Discharge Plan       Row Name 08/18/24 1129       Plan    Plan IRF    Patient/Family in Agreement with Plan yes    Plan Comments Notified by provider that pt not medically ready for discharge to [facility] hospital today, possibly tomorrow. [facility] notified and will follow-up with CM tomorrow. IMM complete.    Final Discharge Disposition Code 62 - inpatient rehab facility                   Discharge Codes    No documentation.                 Expected Discharge Date and Time       Expected Discharge Date Expected Discharge Time    Aug 19, 2024               Cassie Ayala RN

## 2024-08-19 VITALS
HEIGHT: 64 IN | OXYGEN SATURATION: 96 % | SYSTOLIC BLOOD PRESSURE: 154 MMHG | DIASTOLIC BLOOD PRESSURE: 57 MMHG | TEMPERATURE: 97.6 F | HEART RATE: 69 BPM | WEIGHT: 189 LBS | BODY MASS INDEX: 32.27 KG/M2 | RESPIRATION RATE: 18 BRPM

## 2024-08-19 PROBLEM — N25.81 SECONDARY HYPERPARATHYROIDISM: Status: ACTIVE | Noted: 2024-08-19

## 2024-08-19 PROBLEM — K92.1 MELENA: Status: RESOLVED | Noted: 2024-08-12 | Resolved: 2024-08-19

## 2024-08-19 PROBLEM — D64.9 SYMPTOMATIC ANEMIA: Status: RESOLVED | Noted: 2024-08-12 | Resolved: 2024-08-19

## 2024-08-19 LAB
ANION GAP SERPL CALCULATED.3IONS-SCNC: 9 MMOL/L (ref 5–15)
BUN SERPL-MCNC: 35 MG/DL (ref 8–23)
BUN/CREAT SERPL: 16.7 (ref 7–25)
CALCIUM SPEC-SCNC: 9.3 MG/DL (ref 8.6–10.5)
CHLORIDE SERPL-SCNC: 108 MMOL/L (ref 98–107)
CO2 SERPL-SCNC: 21 MMOL/L (ref 22–29)
CREAT SERPL-MCNC: 2.09 MG/DL (ref 0.57–1)
DEPRECATED RDW RBC AUTO: 49 FL (ref 37–54)
EGFRCR SERPLBLD CKD-EPI 2021: 23.1 ML/MIN/1.73
ERYTHROCYTE [DISTWIDTH] IN BLOOD BY AUTOMATED COUNT: 15.7 % (ref 12.3–15.4)
GLUCOSE BLDC GLUCOMTR-MCNC: 159 MG/DL (ref 70–130)
GLUCOSE BLDC GLUCOMTR-MCNC: 180 MG/DL (ref 70–130)
GLUCOSE SERPL-MCNC: 145 MG/DL (ref 65–99)
HCT VFR BLD AUTO: 24.3 % (ref 34–46.6)
HGB BLD-MCNC: 7.7 G/DL (ref 12–15.9)
MCH RBC QN AUTO: 27.2 PG (ref 26.6–33)
MCHC RBC AUTO-ENTMCNC: 31.7 G/DL (ref 31.5–35.7)
MCV RBC AUTO: 85.9 FL (ref 79–97)
PLATELET # BLD AUTO: 105 10*3/MM3 (ref 140–450)
PMV BLD AUTO: 11.3 FL (ref 6–12)
POTASSIUM SERPL-SCNC: 4.1 MMOL/L (ref 3.5–5.2)
RBC # BLD AUTO: 2.83 10*6/MM3 (ref 3.77–5.28)
SODIUM SERPL-SCNC: 138 MMOL/L (ref 136–145)
WBC NRBC COR # BLD AUTO: 5.05 10*3/MM3 (ref 3.4–10.8)

## 2024-08-19 PROCEDURE — 80048 BASIC METABOLIC PNL TOTAL CA: CPT | Performed by: INTERNAL MEDICINE

## 2024-08-19 PROCEDURE — 94799 UNLISTED PULMONARY SVC/PX: CPT

## 2024-08-19 PROCEDURE — 94664 DEMO&/EVAL PT USE INHALER: CPT

## 2024-08-19 PROCEDURE — 82948 REAGENT STRIP/BLOOD GLUCOSE: CPT

## 2024-08-19 PROCEDURE — 85027 COMPLETE CBC AUTOMATED: CPT | Performed by: INTERNAL MEDICINE

## 2024-08-19 PROCEDURE — 99239 HOSP IP/OBS DSCHRG MGMT >30: CPT | Performed by: INTERNAL MEDICINE

## 2024-08-19 PROCEDURE — 94761 N-INVAS EAR/PLS OXIMETRY MLT: CPT

## 2024-08-19 RX ORDER — CALCITRIOL 0.5 UG/1
0.5 CAPSULE, LIQUID FILLED ORAL DAILY
Start: 2024-08-20

## 2024-08-19 RX ORDER — TORSEMIDE 20 MG/1
20 TABLET ORAL DAILY PRN
Start: 2024-08-19

## 2024-08-19 RX ORDER — PANTOPRAZOLE SODIUM 40 MG/1
40 TABLET, DELAYED RELEASE ORAL 2 TIMES DAILY
Start: 2024-08-19

## 2024-08-19 RX ORDER — LANOLIN ALCOHOL/MO/W.PET/CERES
1000 CREAM (GRAM) TOPICAL DAILY
Start: 2024-08-19

## 2024-08-19 RX ORDER — CLONIDINE HYDROCHLORIDE 0.1 MG/1
0.1 TABLET ORAL 3 TIMES DAILY
Start: 2024-08-19

## 2024-08-19 RX ORDER — HYDRALAZINE HYDROCHLORIDE 100 MG/1
100 TABLET, FILM COATED ORAL EVERY 8 HOURS SCHEDULED
Start: 2024-08-19

## 2024-08-19 RX ORDER — PANTOPRAZOLE SODIUM 40 MG/1
40 TABLET, DELAYED RELEASE ORAL
Status: DISCONTINUED | OUTPATIENT
Start: 2024-08-19 | End: 2024-08-19 | Stop reason: HOSPADM

## 2024-08-19 RX ORDER — IPRATROPIUM BROMIDE AND ALBUTEROL SULFATE 2.5; .5 MG/3ML; MG/3ML
3 SOLUTION RESPIRATORY (INHALATION) EVERY 4 HOURS PRN
Start: 2024-08-19

## 2024-08-19 RX ORDER — AMLODIPINE BESYLATE 5 MG/1
5 TABLET ORAL
Start: 2024-08-20

## 2024-08-19 RX ORDER — FERROUS SULFATE 325(65) MG
325 TABLET ORAL
Start: 2024-08-19

## 2024-08-19 RX ADMIN — CLONIDINE HYDROCHLORIDE 0.1 MG: 0.1 TABLET ORAL at 08:41

## 2024-08-19 RX ADMIN — DULOXETINE HYDROCHLORIDE 60 MG: 60 CAPSULE, DELAYED RELEASE ORAL at 08:41

## 2024-08-19 RX ADMIN — IPRATROPIUM BROMIDE AND ALBUTEROL SULFATE 3 ML: 2.5; .5 SOLUTION RESPIRATORY (INHALATION) at 06:54

## 2024-08-19 RX ADMIN — PANTOPRAZOLE SODIUM 40 MG: 40 TABLET, DELAYED RELEASE ORAL at 08:41

## 2024-08-19 RX ADMIN — HYDRALAZINE HYDROCHLORIDE 100 MG: 50 TABLET ORAL at 05:50

## 2024-08-19 RX ADMIN — AMLODIPINE BESYLATE 5 MG: 5 TABLET ORAL at 08:41

## 2024-08-19 RX ADMIN — ATORVASTATIN CALCIUM 80 MG: 40 TABLET, FILM COATED ORAL at 08:41

## 2024-08-19 RX ADMIN — CALCITRIOL CAPSULES 0.25 MCG 0.5 MCG: 0.25 CAPSULE ORAL at 08:42

## 2024-08-19 RX ADMIN — MORPHINE SULFATE 15 MG: 15 TABLET, FILM COATED, EXTENDED RELEASE ORAL at 05:50

## 2024-08-19 RX ADMIN — CARVEDILOL 25 MG: 12.5 TABLET, FILM COATED ORAL at 08:41

## 2024-08-19 NOTE — PROGRESS NOTES
" LOS: 5 days   Patient Care Team:  Sebas Alicea MD as PCP - General (Family Medicine)  Steve Geronimo MD as Consulting Physician (Cardiology)  Emilio Regalado MD as Consulting Physician (Endocrinology)    Chief Complaint: CKD    Subjective     Seen and examined at bedside . no overnight issues.   Patient complains of some abdominal discomfort.  Denies chest pain or shortness of breath.   Possible discharge to [facility] rehab today.     Objective     Vital Sign Min/Max for last 24 hours  Temp  Min: 97.5 °F (36.4 °C)  Max: 98.4 °F (36.9 °C)   BP  Min: 124/48  Max: 173/67   Pulse  Min: 63  Max: 79   Resp  Min: 16  Max: 18   SpO2  Min: 93 %  Max: 100 %   No data recorded   No data recorded     Flowsheet Rows      Flowsheet Row First Filed Value   Admission Height 162.6 cm (64.02\") Documented at 08/13/2024 0700   Admission Weight 85.7 kg (189 lb) Documented at 08/13/2024 0700            I/O this shift:  In: 720 [P.O.:720]  Out: -   I/O last 3 completed shifts:  In: 720 [P.O.:720]  Out: 2050 [Urine:2050]  Objective:  General Appearance: Awake, alert, oriented x 4 no obvious distress blood pressure better controlled with the current medication.  Eyes: PER, EOMI.  Neck: Supple no JVD.  Lungs: Clear auscultation, no rales rhonchi's, equal chest movement, nonlabored.  Heart: No gallop, murmur, rub, RRR.  Abdomen: Soft, nontender, positive bowel sounds, no organomegaly.  Extremities: No edema, no cyanosis.  Neuro: No focal deficit, moving all extremities, alert oriented X 3      Results Review:     I reviewed the patient's new clinical results.    WBC WBC   Date Value Ref Range Status   08/19/2024 5.05 3.40 - 10.80 10*3/mm3 Final   08/18/2024 5.87 3.40 - 10.80 10*3/mm3 Final   08/17/2024 6.08 3.40 - 10.80 10*3/mm3 Final      HGB Hemoglobin   Date Value Ref Range Status   08/19/2024 7.7 (L) 12.0 - 15.9 g/dL Final   08/18/2024 7.6 (L) 12.0 - 15.9 g/dL Final   08/17/2024 8.1 (L) 12.0 - 15.9 g/dL Final      HCT " "Hematocrit   Date Value Ref Range Status   08/19/2024 24.3 (L) 34.0 - 46.6 % Final   08/18/2024 24.6 (L) 34.0 - 46.6 % Final   08/17/2024 25.7 (L) 34.0 - 46.6 % Final      Platlets No results found for: \"LABPLAT\"   MCV MCV   Date Value Ref Range Status   08/19/2024 85.9 79.0 - 97.0 fL Final   08/18/2024 85.7 79.0 - 97.0 fL Final   08/17/2024 85.4 79.0 - 97.0 fL Final          Sodium Sodium   Date Value Ref Range Status   08/19/2024 138 136 - 145 mmol/L Final   08/18/2024 137 136 - 145 mmol/L Final   08/17/2024 141 136 - 145 mmol/L Final      Potassium Potassium   Date Value Ref Range Status   08/19/2024 4.1 3.5 - 5.2 mmol/L Final   08/18/2024 4.0 3.5 - 5.2 mmol/L Final   08/17/2024 3.7 3.5 - 5.2 mmol/L Final      Chloride Chloride   Date Value Ref Range Status   08/19/2024 108 (H) 98 - 107 mmol/L Final   08/18/2024 106 98 - 107 mmol/L Final   08/17/2024 106 98 - 107 mmol/L Final      CO2 CO2   Date Value Ref Range Status   08/19/2024 21.0 (L) 22.0 - 29.0 mmol/L Final   08/18/2024 22.0 22.0 - 29.0 mmol/L Final   08/17/2024 22.0 22.0 - 29.0 mmol/L Final      BUN BUN   Date Value Ref Range Status   08/19/2024 35 (H) 8 - 23 mg/dL Final   08/18/2024 36 (H) 8 - 23 mg/dL Final   08/17/2024 28 (H) 8 - 23 mg/dL Final      Creatinine Creatinine   Date Value Ref Range Status   08/19/2024 2.09 (H) 0.57 - 1.00 mg/dL Final   08/18/2024 2.05 (H) 0.57 - 1.00 mg/dL Final   08/17/2024 1.94 (H) 0.57 - 1.00 mg/dL Final      Calcium Calcium   Date Value Ref Range Status   08/19/2024 9.3 8.6 - 10.5 mg/dL Final   08/18/2024 9.3 8.6 - 10.5 mg/dL Final   08/17/2024 9.9 8.6 - 10.5 mg/dL Final      PO4 No results found for: \"CAPO4\"   Albumin No results found for: \"ALBUMIN\"     Magnesium No results found for: \"MG\"     Uric Acid No results found for: \"URICACID\"     Medication Review: Yes    Assessment & Plan       PVD (peripheral vascular disease)    Type 2 diabetes mellitus    Hyperlipidemia LDL goal <70    COPD (chronic obstructive pulmonary " disease)    GERD (gastroesophageal reflux disease)    Coronary artery disease involving native coronary artery of native heart without angina pectoris    Essential hypertension    CKD (chronic kidney disease) stage 3, GFR 30-59 ml/min    Paroxysmal atrial fibrillation    Secondary hyperparathyroidism      1.  Acute on chronic CKD: CKD in the setting of diabetes and HTN. Proteinuric renal disease. Cr ~ 2.77 on this admission. Most recent cr 2.1-2.2.  Renal function stable.    2.  Symptomatic anemia: Hemoglobin 6.3 on admission.  Patient received  unit of blood. GI following.  Kappa lambda chain ratio 1.29.  No M spike noted.    3.  Diabetes:  4.  COPD:  5.  Chronic pain: Takes MS Contin every 12 hours.  6.  Hypertension: Extensive secondary workup in the past. PRA 0.5 Aldosterone 21 in 2017.  Blood pressure better controlled with the current medication.  7.  Hyperlipidemia:  8.  Iron deficiency: Saturation 14%  9.  Metabolic acidosis    10.  Secondary hyperparathyroidism intact .  Serum calcium 9.3.  Start calcitriol 0.5 mcg daily.  Check intact PTH in 8 weeks time.    Adid Reyes MD  08/19/24  12:22 EDT

## 2024-08-19 NOTE — PLAN OF CARE
Goal Outcome Evaluation:  Plan of Care Reviewed With: patient        Progress: improving  Outcome Evaluation: patient alert and oriented complains of some abdominal discomfort bladder scan 301 MD informed, report to [facility] patient eager for DC son here for transport                               Problem: Adult Inpatient Plan of Care  Goal: Plan of Care Review  Outcome: Ongoing, Not Progressing  Flowsheets (Taken 8/19/2024 1245)  Progress: improving  Plan of Care Reviewed With: patient  Outcome Evaluation: patient alert and oriented complains of some abdominal discomfort bladder scan 301 MD informed, report to [facility] patient eager for DC son here for transport  Goal: Patient-Specific Goal (Individualized)  Outcome: Ongoing, Not Progressing  Goal: Absence of Hospital-Acquired Illness or Injury  Outcome: Ongoing, Not Progressing  Intervention: Identify and Manage Fall Risk  Recent Flowsheet Documentation  Taken 8/19/2024 1000 by Freya Cruz RN  Safety Promotion/Fall Prevention: safety round/check completed  Taken 8/19/2024 0800 by Freya Cruz RN  Safety Promotion/Fall Prevention:   activity supervised   fall prevention program maintained   toileting scheduled  Intervention: Prevent Skin Injury  Recent Flowsheet Documentation  Taken 8/19/2024 1000 by Freya Cruz RN  Body Position: sitting up in bed  Taken 8/19/2024 0800 by Freya Cruz RN  Skin Protection: (dressing peeled back buttock blanchable red with healing wound)   adhesive use limited   silicone foam dressing in place  Intervention: Prevent and Manage VTE (Venous Thromboembolism) Risk  Recent Flowsheet Documentation  Taken 8/19/2024 1000 by Freya Cruz RN  Activity Management: activity encouraged  Taken 8/19/2024 0800 by Freya Cruz RN  Activity Management: activity encouraged  Intervention: Prevent Infection  Recent Flowsheet Documentation  Taken 8/19/2024 1000 by Freya Cruz RN  Infection Prevention: rest/sleep  promoted  Goal: Optimal Comfort and Wellbeing  Outcome: Ongoing, Not Progressing  Intervention: Monitor Pain and Promote Comfort  Recent Flowsheet Documentation  Taken 8/19/2024 1000 by Freya Cruz RN  Pain Management Interventions: quiet environment facilitated  Taken 8/19/2024 0800 by Freya Cruz RN  Pain Management Interventions:   quiet environment facilitated   position adjusted  Intervention: Provide Person-Centered Care  Recent Flowsheet Documentation  Taken 8/19/2024 0800 by Freya Cruz RN  Trust Relationship/Rapport:   care explained   choices provided   empathic listening provided   emotional support provided   questions answered   questions encouraged  Goal: Readiness for Transition of Care  Outcome: Ongoing, Not Progressing     Problem: Skin Injury Risk Increased  Goal: Skin Health and Integrity  Outcome: Ongoing, Not Progressing  Intervention: Optimize Skin Protection  Recent Flowsheet Documentation  Taken 8/19/2024 1000 by Freya Cruz RN  Head of Bed (HOB) Positioning: HOB elevated  Taken 8/19/2024 0800 by Freya Cruz RN  Pressure Reduction Techniques: weight shift assistance provided  Pressure Reduction Devices: pressure-redistributing mattress utilized  Skin Protection: (dressing peeled back buttock blanchable red with healing wound)   adhesive use limited   silicone foam dressing in place     Problem: Fall Injury Risk  Goal: Absence of Fall and Fall-Related Injury  Outcome: Ongoing, Not Progressing  Intervention: Identify and Manage Contributors  Recent Flowsheet Documentation  Taken 8/19/2024 0800 by Freya Cruz RN  Self-Care Promotion: independence encouraged  Intervention: Promote Injury-Free Environment  Recent Flowsheet Documentation  Taken 8/19/2024 1000 by Freya Cruz RN  Safety Promotion/Fall Prevention: safety round/check completed  Taken 8/19/2024 0800 by Freya Cruz RN  Safety Promotion/Fall Prevention:   activity supervised   fall  prevention program maintained   toileting scheduled

## 2024-08-19 NOTE — CASE MANAGEMENT/SOCIAL WORK
Case Management Discharge Note      Final Note: I spoke with this patient bedside and her son, J Luis, via phone regarding Jill, from [facility], offering her a rehab bed. They are in agreement. Her son, J Luis, will transport. He needs to be at [facility] prior to 1500 today per Alen from [facility]. CM to fax the D/C Summary when it becomes available. RN to please call report to Med Unit at . If unable to reach the RN, please contact the house supervisor at . They deny having any further discharge planning needs.         Selected Continued Care - Admitted Since 8/12/2024       Destination Coordination complete.      Service Provider Selected Services Address Phone Fax Patient Preferred    [facility] [address] [id number] [phone] --              Durable Medical Equipment    No services have been selected for the patient.                Dialysis/Infusion    No services have been selected for the patient.                Home Medical Care    No services have been selected for the patient.                Therapy    No services have been selected for the patient.                Community Resources    No services have been selected for the patient.                Community & DME    No services have been selected for the patient.                         Final Discharge Disposition Code: 03 - skilled nursing facility (SNF)

## 2024-08-19 NOTE — DISCHARGE SUMMARY
[facility] Medicine Services  DISCHARGE SUMMARY    Patient Name: Yanique Webster  : 1940  MRN: [id number]    Date of Admission: 2024  4:40 PM  Date of Discharge:  24  Primary Care Physician: Sebas Alicea MD    Consults       Date and Time Order Name Status Description    2024  9:35 PM Inpatient Nephrology Consult Completed     2024  8:30 PM Inpatient Gastroenterology Consult Completed             Hospital Course     Presenting Problem: anemia     Active Hospital Problems    Diagnosis  POA    Secondary hyperparathyroidism [N25.81]  Unknown    Paroxysmal atrial fibrillation [I48.0]  Yes    CKD (chronic kidney disease) stage 3, GFR 30-59 ml/min [N18.30]  Yes    Essential hypertension [I10]  Yes    Coronary artery disease involving native coronary artery of native heart without angina pectoris [I25.10]  Yes    Hyperlipidemia LDL goal <70 [E78.5]  Yes    COPD (chronic obstructive pulmonary disease) [J44.9]  Yes    GERD (gastroesophageal reflux disease) [K21.9]  Yes    PVD (peripheral vascular disease) [I73.9]  Yes    Type 2 diabetes mellitus [E11.9]  Yes      Resolved Hospital Problems    Diagnosis Date Resolved POA    **Symptomatic anemia [D64.9] 2024 Yes    Melena [K92.1] 2024 Unknown          Hospital Course:  Yanique Webster is a 84 y.o. female with past medical history significant for CAD, COPD, hypertension, chronic pain and diabetes, was admitted for acute symptomatic anemia.  She received 1 unit PRBC with improvement.  Nephrology was consulted for KINDRA. GI performed EGD on  with no source of bleeding identified. Colonoscopy  with no acute findings.      Acute symptomatic anemia  Weakness and deconditioning  -s/p 1 unit pRBC with appropriate response   -No active signs of bleeding noted while inpatient   -Iron panel reviewed, B12 and folate WNL  -GI consulted -- s/p EGD  and colonoscopy , no active source of bleeding  identified    -Likely nutritional component of anemia   -GI recommended alternative to stroke prophylaxis; I reviewed this at length with patient/son and daughter in law. Will resume xarelto as patient is a high risk of stroke with previous history of CVA. Reviewed peripherally with Dr. Geronimo as well. Agreeable to trial anticoagulation with high CHADSVASC. Referral placed to EP for consideration of Watchman. Patient may not need to be anticoagulation for a Watchman if she does have persistent anemia and can do ASA or DAPT per Dr. Geronimo.   - Close monitoring of hgb in 3 days.   -Continue PPI BID      KINDRA on CKD  Secondary hyperparathyroid   -FeNa 1.7%  - SPEP with no M spike observed   -Ur Pr/Cr -> 2.8g per day   -Nephrology consulted --likely progressive CKD in the setting of diabetes and hypertension.  Patient's blood pressure medications were adjusted and she tolerated diuresis.  Renal function stable at time of discharge.  -She was started on calcitriol for secondary hyperparathyroidism and recommended to check intact PTH in 8 weeks  -Close follow-up with [facility] in 2 weeks.  - Patient with mild urinary retention. Hopeful improvement with ambulation at rehab. Recommend monitoring PVR    Paroxysmal atrial fibrillation  Takotsubo Cardiomyopathy   Acute on chronic HFimEF   -Has been on renally dosed Xarelto  -GI recommends alternative to pharmacologic stroke prophylaxis. Reviewed this with patient, son and daughter in law. Reviewed peripherally with Dr. Geronimo per above. Trial anticoagulation with close monitoring. Referral made to EP for consideration of Watchman. May not need to be anticoagulated if watchman is a consideration and she has persistent anemia/bleeding.   -Acute CHF exacerbation improved with IV diuretics   - Resume torsemide based on daily weights. Recommend 20 mg daily PRN for weight gain of 3 lbs in 1 day or 5 lbs in 1 week     Diabetes mellitus  -Insulin dependent. Resume on  DC      COPD  -Patient was started on inhalers recently  -Continue DuoNebs     Chronic pain  -Continue home regimen of MS Contin 15 mg every 12 hours     Hypertension  Hyperlipidemia  -Continue clonidine, Coreg and hydralazine; resume hytrin (sub for cardura); amlodipine 5 mg daily started with improvement.   - Lisinopril held due to renal function    Discharge Follow Up Recommendations for outpatient labs/diagnostics:  PCP Dr. Alicea 1 week  [facility] 2 weeks   Electrophysiology next available   Repeat CBC in 3 days. Recommend monitoring PVR.   Day of Discharge     HPI:   No acute events.  States that she feels about the same.  Some shortness of breath improved with nebulizer.  Reviewed plans for discharge today patient is agreeable.  Reviewed discussion with Dr. Geronimo.  Answered all questions to the best my ability.        Vital Signs:   Temp:  [97.5 °F (36.4 °C)-98.4 °F (36.9 °C)] 97.5 °F (36.4 °C)  Heart Rate:  [63-79] 74  Resp:  [16-18] 18  BP: (124-173)/(48-67) 124/48      Physical Exam:  Constitutional: No acute distress, awake, alert; frail  Respiratory: Clear to auscultation bilaterally, respiratory effort normal; diminished   Cardiovascular: RRR, no murmurs  Gastrointestinal: Positive bowel sounds, soft, nontender, nondistended  Musculoskeletal: No bilateral ankle edema  Psychiatric: Appropriate affect, cooperative  Neurologic: strength symmetric in all extremities, Cranial Nerves grossly intact to confrontation, speech clear        Pertinent  and/or Most Recent Results     LAB RESULTS:      Lab 08/19/24  0704 08/18/24  0608 08/17/24  0437 08/16/24  0713 08/15/24  0833 08/13/24  1342 08/13/24  0645 08/13/24  0100 08/12/24  1806   WBC 5.05 5.87 6.08 5.53 5.39   < > 7.01  --  4.77   HEMOGLOBIN 7.7* 7.6* 8.1* 8.1* 8.0*   < > 8.1*   < > 6.3*   HEMATOCRIT 24.3* 24.6* 25.7* 26.4* 26.2*   < > 26.0*   < > 21.1*   PLATELETS 105* 106* 120* 130* 121*   < > 133*  --  125*   NEUTROS ABS  --    --   --   --   --   --  5.51  --  2.88   IMMATURE GRANS (ABS)  --   --   --   --   --   --  0.03  --  0.01   LYMPHS ABS  --   --   --   --   --   --  0.94  --  1.23   MONOS ABS  --   --   --   --   --   --  0.39  --  0.45   EOS ABS  --   --   --   --   --   --  0.11  --  0.16   MCV 85.9 85.7 85.4 87.7 89.7   < > 87.2  --  88.3    < > = values in this interval not displayed.         Lab 08/19/24  0704 08/18/24  0608 08/17/24  0437 08/16/24  0713 08/15/24  0833 08/14/24  0732 08/12/24  1806 08/12/24  1805   SODIUM 138 137 141 144 142   < >  --  138   POTASSIUM 4.1 4.0 3.7 4.1 4.6   < >  --  4.4   CHLORIDE 108* 106 106 109* 112*   < >  --  108*   CO2 21.0* 22.0 22.0 21.0* 23.0   < >  --  20.0*   ANION GAP 9.0 9.0 13.0 14.0 7.0   < >  --  10.0   BUN 35* 36* 28* 27* 39*   < >  --  46*   CREATININE 2.09* 2.05* 1.94* 1.96* 2.25*   < >  --  2.77*   EGFR 23.1* 23.7* 25.3* 25.0* 21.2*   < >  --  16.5*   GLUCOSE 145* 151* 135* 134* 134*   < >  --  163*   CALCIUM 9.3 9.3 9.9 9.6 9.6   < >  --  9.5   MAGNESIUM  --   --   --   --   --   --   --  1.8   PHOSPHORUS  --   --   --   --   --   --   --  4.3   HEMOGLOBIN A1C  --   --   --   --   --   --  5.2  --     < > = values in this interval not displayed.         Lab 08/16/24  0713 08/14/24  0732 08/12/24  1805   TOTAL PROTEIN 5.3*  --  5.7*   ALBUMIN 3.6 3.0 3.5   GLOBULIN 1.7  --  2.2   ALT (SGPT) 9  --  9   AST (SGOT) 16  --  15   BILIRUBIN 0.5  --  0.3   ALK PHOS 172*  --  161*                 Lab 08/13/24  0645 08/12/24  1853 08/12/24  1805   IRON  --   --  39   IRON SATURATION (TSAT)  --   --  14*   TIBC  --   --  280*   TRANSFERRIN  --   --  188*   FERRITIN  --   --  40.23   FOLATE 10.50  --   --    VITAMIN B 12 465  --   --    ABO TYPING  --  O  --    RH TYPING  --  Negative  --    ANTIBODY SCREEN  --  Negative  --          Brief Urine Lab Results  (Last result in the past 365 days)        Color   Clarity   Blood   Leuk Est   Nitrite   Protein   CREAT   Urine HCG         08/13/24 2000             52.4         08/13/24 2000 Yellow   Clear   Negative   Negative   Negative   >=300 mg/dL (3+)                 Microbiology Results (last 10 days)       ** No results found for the last 240 hours. **            XR Chest 1 View    Result Date: 8/16/2024  XR CHEST 1 VW Date of Exam: 8/16/2024 3:42 AM EDT Indication: dyspnea Comparison: Chest radiograph August 12, 2024 Findings: The heart is enlarged. There is pulmonary vascular congestion. There is a small left pleural effusion. There is a small right effusion. There is bilateral basilar atelectasis. Lucency along the left aspect of the lung is most likely skinfold and does not  have the typical appearance of pneumothorax. A follow-up could be performed to ensure.     Impression: Cardiomegaly with pulmonary vascular congestion and bilateral pleural effusions. Lucency along the left hemithorax is most likely skinfold. Follow-up radiograph could be performed to ensure. Electronically Signed: Jerry Hooper MD  8/16/2024 4:03 AM EDT  Workstation ID: [id number]    XR Chest 1 View    Result Date: 8/12/2024  XR CHEST 1 VW Date of Exam: 8/12/2024 6:56 PM EDT Indication: SOB Comparison: Chest radiograph performed on August 2, 2024 Findings: Overlying monitoring wires limit diagnostic sensitivity. No focal consolidation or effusion.  There is no evidence of pneumothorax. There is mild pulmonary vascular congestion. Cardiac silhouette is enlarged. No acute osseous abnormality identified.     Impression: Findings compatible with mild CHF. Electronically Signed: Uri Cotto MD  8/12/2024 7:34 PM EDT  Workstation ID: [id number]     Results for orders placed during the hospital encounter of 04/10/24    Duplex Carotid Ultrasound CAR    Interpretation Summary    Right internal carotid artery demonstrates a less than 50% stenosis.    Left internal carotid artery demonstrates a less than 50% stenosis.    Antegrade flow in the vertebral arteries  bilaterally.      Results for orders placed during the hospital encounter of 04/10/24    Duplex Carotid Ultrasound CAR    Interpretation Summary    Right internal carotid artery demonstrates a less than 50% stenosis.    Left internal carotid artery demonstrates a less than 50% stenosis.    Antegrade flow in the vertebral arteries bilaterally.      Results for orders placed during the hospital encounter of 12/30/23    Adult Transthoracic Echo Complete W/ Cont if Necessary Per Protocol    Interpretation Summary    Left ventricular ejection fraction appears to be 56 - 60%.    Left ventricular wall thickness is consistent with mild concentric hypertrophy    The aortic valve exhibits sclerosis.    Mild aortic valve regurgitation is present. No hemodynamically significant aortic valve stenosis is present.    Mild tricuspid valve regurgitation is present. Estimated right ventricular systolic pressure from tricuspid regurgitation is moderately elevated (45-55 mmHg    Moderate mitral annular calcification is present. Trace to mild mitral valve regurgitation is present. No significant mitral valve stenosis is present.      Plan for Follow-up of Pending Labs/Results:     Discharge Details        Discharge Medications        New Medications        Instructions Start Date   amLODIPine 5 MG tablet  Commonly known as: NORVASC   5 mg, Oral, Every 24 Hours Scheduled   Start Date: August 20, 2024     calcitriol 0.5 MCG capsule  Commonly known as: ROCALTROL   0.5 mcg, Oral, Daily   Start Date: August 20, 2024     ferrous sulfate 325 (65 FE) MG tablet   325 mg, Oral, Daily With Breakfast      ipratropium-albuterol 0.5-2.5 mg/3 ml nebulizer  Commonly known as: DUO-NEB   3 mL, Nebulization, Every 4 Hours PRN      vitamin B-12 1000 MCG tablet  Commonly known as: CYANOCOBALAMIN   1,000 mcg, Oral, Daily             Changes to Medications        Instructions Start Date   cloNIDine 0.1 MG tablet  Commonly known as: CATAPRES  What changed:  when to take this   0.1 mg, Oral, 3 times daily      hydrALAZINE 100 MG tablet  Commonly known as: APRESOLINE  What changed:   medication strength  how much to take   100 mg, Oral, Every 8 Hours Scheduled      pantoprazole 40 MG EC tablet  Commonly known as: PROTONIX  What changed: when to take this   40 mg, Oral, 2 Times Daily      torsemide 20 MG tablet  Commonly known as: DEMADEX  What changed:   how much to take  when to take this  reasons to take this   20 mg, Oral, Daily PRN             Continue These Medications        Instructions Start Date   Accu-Chek Guide w/Device kit   1 kit, Does not apply, See Admin Instructions, Use to check blood sugar once daily.  Dx E11.65      acetaminophen 500 MG tablet  Commonly known as: TYLENOL   1,000 mg, Oral, Every 6 Hours      atorvastatin 80 MG tablet  Commonly known as: LIPITOR   80 mg, Oral, Daily      BD Pen Needle Veda 2nd Gen 32G X 4 MM misc  Generic drug: Insulin Pen Needle   1 each, Does not apply, Daily      Blood Glucose Test strip   Check blood sugar 1 time a day dx e11.65      carvedilol 25 MG tablet  Commonly known as: COREG   25 mg, Oral, Every 12 Hours Scheduled      doxazosin 4 MG tablet  Commonly known as: CARDURA       DULoxetine 60 MG capsule  Commonly known as: CYMBALTA   60 mg, Oral, Daily      Insulin Glargine 100 UNIT/ML injection pen  Commonly known as: LANTUS SOLOSTAR   10 Units, Subcutaneous, Nightly      Lancets 33G misc   1 each, Does not apply, Daily, Dx e11.65      MiraLax 17 g packet  Generic drug: polyethylene glycol   17 g, Oral, Daily PRN      Morphine 15 MG 12 hr tablet  Commonly known as: MS CONTIN   15 mg, Oral, 2 Times Daily      probiotic capsule capsule   1 capsule, Oral, Daily, Bifidobacterium - lactobacillus      rivaroxaban 15 MG tablet  Commonly known as: Xarelto   15 mg, Oral, Daily With Dinner             Stop These Medications      lisinopril 20 MG tablet  Commonly known as: PRINIVIL,ZESTRIL              Allergies   Allergen  Reactions    Penicillins     Latex Rash     Not an immediate reaction    Nickel Rash    Penicillins Rash     unk         Discharge Disposition:  Rehab Facility or Unit (DC - External)    Diet:  Hospital:  Diet Order   Procedures    Diet: Diabetic; Consistent Carbohydrate; Fluid Consistency: Thin (IDDSI 0)       Diet Instructions       Diet: Cardiac Diets, Diabetic Diets; Healthy Heart (2-3 Na+); Thin (IDDSI 0); Consistent Carbohydrate      Discharge Diet:  Cardiac Diets  Diabetic Diets       Cardiac Diet: Healthy Heart (2-3 Na+)    Fluid Consistency: Thin (IDDSI 0)    Diabetic Diet: Consistent Carbohydrate             Activity:  Activity Instructions       Activity as Tolerated              Restrictions or Other Recommendations:         CODE STATUS:    Code Status and Medical Interventions: CPR (Attempt to Resuscitate); Full Support   Ordered at: 08/12/24 1827     Code Status (Patient has no pulse and is not breathing):    CPR (Attempt to Resuscitate)     Medical Interventions (Patient has pulse or is breathing):    Full Support       Future Appointments   Date Time Provider Department Center   10/14/2024 10:30 AM Steve Geronimo MD [facility]   10/29/2024 11:45 AM Emilio Regalado MD [facility]   12/27/2024 11:30 AM Seymour Harrington MD [facility]       Additional Instructions for the Follow-ups that You Need to Schedule       Discharge Follow-up with PCP   As directed       Currently Documented PCP:    Sebas Alicea MD    PCP Phone Number:    [phone]     Follow Up Details: PCP Dr. Alicea 1 week        Discharge Follow-up with Specified Provider: Electrophysiology next available   As directed      To: Electrophysiology next available        Discharge Follow-up with Specified Provider: [facility] 2 weeks   As directed      To: [facility] 2 weeks                      Fany Adan DO  08/19/24      Time Spent on Discharge:  I spent  40   minutes on this discharge activity which included: face-to-face encounter with the patient, reviewing the data in the system, coordination of the care with the nursing staff as well as consultants, documentation, and entering orders.

## 2024-08-19 NOTE — PLAN OF CARE
Goal Outcome Evaluation:  Plan of Care Reviewed With: patient        Progress: improving  Outcome Evaluation: patient alert and oriented complains of lower abdominal pain and frequent gas, pain med adm patient turned for increase in comfort, patient did not eat dinner and slept much of the day, turned Q2H per skin team bed alarm in place,                               Problem: Adult Inpatient Plan of Care  Goal: Plan of Care Review  Outcome: Ongoing, Progressing  Flowsheets (Taken 8/18/2024 2016)  Progress: improving  Plan of Care Reviewed With: patient  Outcome Evaluation: patient alert and oriented complains of lower abdominal pain and frequent gas, pain med adm patient turned for increase in comfort, patient did not eat dinner and slept much of the day, turned Q2H per skin team bed alarm in place,  Goal: Patient-Specific Goal (Individualized)  Outcome: Ongoing, Progressing  Goal: Absence of Hospital-Acquired Illness or Injury  Outcome: Ongoing, Progressing  Intervention: Identify and Manage Fall Risk  Recent Flowsheet Documentation  Taken 8/18/2024 1800 by Freya Cruz RN  Safety Promotion/Fall Prevention:   safety round/check completed   fall prevention program maintained  Taken 8/18/2024 1600 by Freya Cruz RN  Safety Promotion/Fall Prevention:   safety round/check completed   fall prevention program maintained  Taken 8/18/2024 1400 by Freya Cruz RN  Safety Promotion/Fall Prevention: safety round/check completed  Taken 8/18/2024 1200 by Freya Cruz RN  Safety Promotion/Fall Prevention: safety round/check completed  Taken 8/18/2024 1000 by Freya Cruz RN  Safety Promotion/Fall Prevention:   safety round/check completed   fall prevention program maintained  Taken 8/18/2024 0756 by Freya Cruz RN  Safety Promotion/Fall Prevention:   safety round/check completed   fall prevention program maintained  Intervention: Prevent Skin Injury  Recent Flowsheet Documentation  Taken  8/18/2024 1800 by Freya Cruz RN  Body Position: supine, legs elevated  Skin Protection:   adhesive use limited   silicone foam dressing in place  Taken 8/18/2024 1600 by Freya Cruz RN  Skin Protection: silicone foam dressing in place  Taken 8/18/2024 1200 by Freya Cruz RN  Skin Protection:   adhesive use limited   silicone foam dressing in place  Taken 8/18/2024 1000 by Freya Cruz RN  Skin Protection:   adhesive use limited   silicone foam dressing in place  Taken 8/18/2024 0800 by Freya Cruz RN  Skin Protection: (foam dressing peeled back buttock blanchable red with healing wound, dressing for prevention)   adhesive use limited   silicone foam dressing in place   other (see comments)  Taken 8/18/2024 0756 by Freya Cruz RN  Body Position: supine, legs elevated  Intervention: Prevent and Manage VTE (Venous Thromboembolism) Risk  Recent Flowsheet Documentation  Taken 8/18/2024 1800 by Freya Cruz RN  Activity Management: activity encouraged  Taken 8/18/2024 1600 by Freya Cruz RN  Activity Management: activity encouraged  Taken 8/18/2024 1400 by Freya Cruz RN  Activity Management: activity encouraged  Taken 8/18/2024 1200 by Freya Cruz RN  Activity Management: activity encouraged  Taken 8/18/2024 1000 by Freya Cruz RN  Activity Management: activity encouraged  Taken 8/18/2024 0756 by Freya Cruz RN  Activity Management: activity encouraged  Intervention: Prevent Infection  Recent Flowsheet Documentation  Taken 8/18/2024 0756 by Freya Cruz RN  Infection Prevention: hand hygiene promoted  Goal: Optimal Comfort and Wellbeing  Outcome: Ongoing, Progressing  Intervention: Monitor Pain and Promote Comfort  Recent Flowsheet Documentation  Taken 8/18/2024 1800 by Freya Cruz RN  Pain Management Interventions:   quiet environment facilitated   position adjusted  Taken 8/18/2024 1600 by Freya Cruz RN  Pain  Management Interventions: quiet environment facilitated  Taken 8/18/2024 1400 by Freya Cruz RN  Pain Management Interventions: quiet environment facilitated  Taken 8/18/2024 1200 by Freya Cruz RN  Pain Management Interventions:   quiet environment facilitated   position adjusted  Taken 8/18/2024 1000 by Freya Cruz RN  Pain Management Interventions: quiet environment facilitated  Taken 8/18/2024 0756 by Freya Cruz RN  Pain Management Interventions:   quiet environment facilitated   position adjusted  Intervention: Provide Person-Centered Care  Recent Flowsheet Documentation  Taken 8/18/2024 0756 by Freya Cruz RN  Trust Relationship/Rapport:   care explained   choices provided   emotional support provided   empathic listening provided   questions answered   questions encouraged   reassurance provided   thoughts/feelings acknowledged  Goal: Readiness for Transition of Care  Outcome: Ongoing, Progressing     Problem: Skin Injury Risk Increased  Goal: Skin Health and Integrity  Outcome: Ongoing, Progressing  Intervention: Promote and Optimize Oral Intake  Recent Flowsheet Documentation  Taken 8/18/2024 0756 by Freya Cruz RN  Oral Nutrition Promotion: rest periods promoted  Intervention: Optimize Skin Protection  Recent Flowsheet Documentation  Taken 8/18/2024 1800 by Freya Cruz RN  Pressure Reduction Techniques:   weight shift assistance provided   heels elevated off bed  Head of Bed (HOB) Positioning: HOB elevated  Pressure Reduction Devices:   pressure-redistributing mattress utilized   foam padding utilized  Skin Protection:   adhesive use limited   silicone foam dressing in place  Taken 8/18/2024 1600 by Freya Cruz RN  Pressure Reduction Techniques: weight shift assistance provided  Pressure Reduction Devices: pressure-redistributing mattress utilized  Skin Protection: silicone foam dressing in place  Taken 8/18/2024 1400 by Freya Cruz RN  Head  of Bed ([facility]) Positioning: HOB elevated  Taken 8/18/2024 1200 by Freya Cruz RN  Pressure Reduction Techniques: weight shift assistance provided  Head of Bed ([facility]) Positioning: HOB elevated  Pressure Reduction Devices:   pressure-redistributing mattress utilized   heel offloading device utilized  Skin Protection:   adhesive use limited   silicone foam dressing in place  Taken 8/18/2024 1000 by Freya Cruz RN  Pressure Reduction Techniques: weight shift assistance provided  Pressure Reduction Devices: pressure-redistributing mattress utilized  Skin Protection:   adhesive use limited   silicone foam dressing in place  Taken 8/18/2024 0800 by Freya Cruz RN  Pressure Reduction Techniques: weight shift assistance provided  Pressure Reduction Devices: pressure-redistributing mattress utilized  Skin Protection: (foam dressing peeled back buttock blanchable red with healing wound, dressing for prevention)   adhesive use limited   silicone foam dressing in place   other (see comments)  Taken 8/18/2024 0756 by Freya Cruz RN  Head of Bed ([facility]) Positioning: HOB elevated     Problem: Fall Injury Risk  Goal: Absence of Fall and Fall-Related Injury  Outcome: Ongoing, Progressing  Intervention: Promote Injury-Free Environment  Recent Flowsheet Documentation  Taken 8/18/2024 1800 by Freya Cruz RN  Safety Promotion/Fall Prevention:   safety round/check completed   fall prevention program maintained  Taken 8/18/2024 1600 by Freya Cruz RN  Safety Promotion/Fall Prevention:   safety round/check completed   fall prevention program maintained  Taken 8/18/2024 1400 by Freya Cruz RN  Safety Promotion/Fall Prevention: safety round/check completed  Taken 8/18/2024 1200 by Freya Cruz RN  Safety Promotion/Fall Prevention: safety round/check completed  Taken 8/18/2024 1000 by Freya Cruz RN  Safety Promotion/Fall Prevention:   safety round/check completed   fall prevention  program maintained  Taken 8/18/2024 0756 by Freya Cruz, RN  Safety Promotion/Fall Prevention:   safety round/check completed   fall prevention program maintained

## 2024-08-19 NOTE — DISCHARGE PLACEMENT REQUEST
"  Colleen Webster (84 y.o. Female)   [facility] unit  From Jennifer MCKEON       Date of Birth   1940    Social Security Number       Address   [address]  [address] [place] [id number]    Home Phone   [phone]    MRN   [id number]       Christian   Yazidism    Marital Status                               Admission Date   8/12/24    Admission Type   Urgent    Admitting Provider   Fany Adan DO    Attending Provider   Fany Adan DO    Department, Room/Bed   [address], [address]/1       Discharge Date       Discharge Disposition   Rehab Facility or Unit (DC - External)    Discharge Destination                                 Attending Provider: Fany Adan DO    Allergies: Penicillins, Latex, Nickel, Penicillins    Isolation: None   Infection: None   Code Status: CPR    Ht: 162.6 cm (64.02\")   Wt: 85.7 kg (189 lb)    Admission Cmt: None   Principal Problem: Symptomatic anemia [D64.9]                   Active Insurance as of 8/12/2024       Primary Coverage       Payor Plan Insurance Group Employer/Plan Group    MEDICARE MEDICARE A & B        Payor Plan Address Payor Plan Phone Number Payor Plan Fax Number Effective Dates    [address] [phone]  1/1/2006 - None Entered    [facility] [id number]         Subscriber Name Subscriber Birth Date Member ID       COLLEEN WEBSTER 1940 [id number]               Secondary Coverage       Payor Plan Insurance Group Employer/Plan Group    ANTHEM BLUE CROSS [facility] SUPP [id number]       Payor Plan Address Payor Plan Phone Number Payor Plan Fax Number Effective Dates    [address]   1/1/2006 - None Entered    [facility] [id number]         Subscriber Name Subscriber Birth Date Member ID       COLLEEN WEBSTER 1940 [id number]                     Emergency Contacts        (Rel.) Home Phone Work Phone Mobile Phone    MANJINDER NORRIS (Son) [phone] -- [phone]    KATELIN WEBSTER (Son) " [phone] -- [phone]                 Discharge Summary        Fany Adan DO at 24 0932              [facility] Medicine Services  DISCHARGE SUMMARY    Patient Name: Yanique Webster  : 1940  MRN: [id number]    Date of Admission: 2024  4:40 PM  Date of Discharge:  24  Primary Care Physician: Sebas Alicea MD    Consults       Date and Time Order Name Status Description    2024  9:35 PM Inpatient Nephrology Consult Completed     2024  8:30 PM Inpatient Gastroenterology Consult Completed             Hospital Course     Presenting Problem: anemia     Active Hospital Problems    Diagnosis  POA    Secondary hyperparathyroidism [N25.81]  Unknown    Paroxysmal atrial fibrillation [I48.0]  Yes    CKD (chronic kidney disease) stage 3, GFR 30-59 ml/min [N18.30]  Yes    Essential hypertension [I10]  Yes    Coronary artery disease involving native coronary artery of native heart without angina pectoris [I25.10]  Yes    Hyperlipidemia LDL goal <70 [E78.5]  Yes    COPD (chronic obstructive pulmonary disease) [J44.9]  Yes    GERD (gastroesophageal reflux disease) [K21.9]  Yes    PVD (peripheral vascular disease) [I73.9]  Yes    Type 2 diabetes mellitus [E11.9]  Yes      Resolved Hospital Problems    Diagnosis Date Resolved POA    **Symptomatic anemia [D64.9] 2024 Yes    Melena [K92.1] 2024 Unknown          Hospital Course:  Yanique Webster is a 84 y.o. female with past medical history significant for CAD, COPD, hypertension, chronic pain and diabetes, was admitted for acute symptomatic anemia.  She received 1 unit PRBC with improvement.  Nephrology was consulted for KINDRA. GI performed EGD on  with no source of bleeding identified. Colonoscopy  with no acute findings.      Acute symptomatic anemia  Weakness and deconditioning  -s/p 1 unit pRBC with appropriate response   -No active signs of bleeding noted while inpatient   -Iron  panel reviewed, B12 and folate WNL  -GI consulted -- s/p EGD 8/14 and colonoscopy 8/16, no active source of bleeding identified    -Likely nutritional component of anemia   -GI recommended alternative to stroke prophylaxis; I reviewed this at length with patient/son and daughter in law. Will resume xarelto as patient is a high risk of stroke with previous history of CVA. Reviewed peripherally with Dr. Geronimo as well. Agreeable to trial anticoagulation with high CHADSVASC. Referral placed to EP for consideration of Watchman. Patient may not need to be anticoagulation for a Watchman if she does have persistent anemia and can do ASA or DAPT per Dr. Geronimo.   - Close monitoring of hgb in 3 days.   -Continue PPI BID      KINDRA on CKD  Secondary hyperparathyroid   -FeNa 1.7%  - SPEP with no M spike observed   -Ur Pr/Cr -> 2.8g per day   -Nephrology consulted --likely progressive CKD in the setting of diabetes and hypertension.  Patient's blood pressure medications were adjusted and she tolerated diuresis.  Renal function stable at time of discharge.  -She was started on calcitriol for secondary hyperparathyroidism and recommended to check intact PTH in 8 weeks  -Close follow-up with [facility] in 2 weeks.  - Patient with mild urinary retention. Hopeful improvement with ambulation at rehab. Recommend monitoring PVR    Paroxysmal atrial fibrillation  Takotsubo Cardiomyopathy   Acute on chronic HFimEF   -Has been on renally dosed Xarelto  -GI recommends alternative to pharmacologic stroke prophylaxis. Reviewed this with patient, son and daughter in law. Reviewed peripherally with Dr. Geronimo per above. Trial anticoagulation with close monitoring. Referral made to EP for consideration of Watchman. May not need to be anticoagulated if watchman is a consideration and she has persistent anemia/bleeding.   -Acute CHF exacerbation improved with IV diuretics   - Resume torsemide based on daily weights. Recommend 20  mg daily PRN for weight gain of 3 lbs in 1 day or 5 lbs in 1 week     Diabetes mellitus  -Insulin dependent. Resume on DC      COPD  -Patient was started on inhalers recently  -Continue DuoNebs     Chronic pain  -Continue home regimen of MS Contin 15 mg every 12 hours     Hypertension  Hyperlipidemia  -Continue clonidine, Coreg and hydralazine; resume hytrin (sub for cardura); amlodipine 5 mg daily started with improvement.   - Lisinopril held due to renal function    Discharge Follow Up Recommendations for outpatient labs/diagnostics:  PCP Dr. Alicea 1 week  [facility] 2 weeks   Electrophysiology next available   Repeat CBC in 3 days. Recommend monitoring PVR.   Day of Discharge     HPI:   No acute events.  States that she feels about the same.  Some shortness of breath improved with nebulizer.  Reviewed plans for discharge today patient is agreeable.  Reviewed discussion with Dr. Geronimo.  Answered all questions to the best my ability.        Vital Signs:   Temp:  [97.5 °F (36.4 °C)-98.4 °F (36.9 °C)] 97.5 °F (36.4 °C)  Heart Rate:  [63-79] 74  Resp:  [16-18] 18  BP: (124-173)/(48-67) 124/48      Physical Exam:  Constitutional: No acute distress, awake, alert; frail  Respiratory: Clear to auscultation bilaterally, respiratory effort normal; diminished   Cardiovascular: RRR, no murmurs  Gastrointestinal: Positive bowel sounds, soft, nontender, nondistended  Musculoskeletal: No bilateral ankle edema  Psychiatric: Appropriate affect, cooperative  Neurologic: strength symmetric in all extremities, Cranial Nerves grossly intact to confrontation, speech clear        Pertinent  and/or Most Recent Results     LAB RESULTS:      Lab 08/19/24  0704 08/18/24  0608 08/17/24  0437 08/16/24  0713 08/15/24  0833 08/13/24  1342 08/13/24  0645 08/13/24  0100 08/12/24  1806   WBC 5.05 5.87 6.08 5.53 5.39   < > 7.01  --  4.77   HEMOGLOBIN 7.7* 7.6* 8.1* 8.1* 8.0*   < > 8.1*   < > 6.3*   HEMATOCRIT 24.3* 24.6*  25.7* 26.4* 26.2*   < > 26.0*   < > 21.1*   PLATELETS 105* 106* 120* 130* 121*   < > 133*  --  125*   NEUTROS ABS  --   --   --   --   --   --  5.51  --  2.88   IMMATURE GRANS (ABS)  --   --   --   --   --   --  0.03  --  0.01   LYMPHS ABS  --   --   --   --   --   --  0.94  --  1.23   MONOS ABS  --   --   --   --   --   --  0.39  --  0.45   EOS ABS  --   --   --   --   --   --  0.11  --  0.16   MCV 85.9 85.7 85.4 87.7 89.7   < > 87.2  --  88.3    < > = values in this interval not displayed.         Lab 08/19/24  0704 08/18/24  0608 08/17/24  0437 08/16/24  0713 08/15/24  0833 08/14/24  0732 08/12/24  1806 08/12/24  1805   SODIUM 138 137 141 144 142   < >  --  138   POTASSIUM 4.1 4.0 3.7 4.1 4.6   < >  --  4.4   CHLORIDE 108* 106 106 109* 112*   < >  --  108*   CO2 21.0* 22.0 22.0 21.0* 23.0   < >  --  20.0*   ANION GAP 9.0 9.0 13.0 14.0 7.0   < >  --  10.0   BUN 35* 36* 28* 27* 39*   < >  --  46*   CREATININE 2.09* 2.05* 1.94* 1.96* 2.25*   < >  --  2.77*   EGFR 23.1* 23.7* 25.3* 25.0* 21.2*   < >  --  16.5*   GLUCOSE 145* 151* 135* 134* 134*   < >  --  163*   CALCIUM 9.3 9.3 9.9 9.6 9.6   < >  --  9.5   MAGNESIUM  --   --   --   --   --   --   --  1.8   PHOSPHORUS  --   --   --   --   --   --   --  4.3   HEMOGLOBIN A1C  --   --   --   --   --   --  5.2  --     < > = values in this interval not displayed.         Lab 08/16/24  0713 08/14/24  0732 08/12/24  1805   TOTAL PROTEIN 5.3*  --  5.7*   ALBUMIN 3.6 3.0 3.5   GLOBULIN 1.7  --  2.2   ALT (SGPT) 9  --  9   AST (SGOT) 16  --  15   BILIRUBIN 0.5  --  0.3   ALK PHOS 172*  --  161*                 Lab 08/13/24  0645 08/12/24  1853 08/12/24  1805   IRON  --   --  39   IRON SATURATION (TSAT)  --   --  14*   TIBC  --   --  280*   TRANSFERRIN  --   --  188*   FERRITIN  --   --  40.23   FOLATE 10.50  --   --    VITAMIN B 12 465  --   --    ABO TYPING  --  O  --    RH TYPING  --  Negative  --    ANTIBODY SCREEN  --  Negative  --          Brief Urine Lab Results  (Last  result in the past 365 days)        Color   Clarity   Blood   Leuk Est   Nitrite   Protein   CREAT   Urine HCG        08/13/24 2000             52.4         08/13/24 2000 Yellow   Clear   Negative   Negative   Negative   >=300 mg/dL (3+)                 Microbiology Results (last 10 days)       ** No results found for the last 240 hours. **            XR Chest 1 View    Result Date: 8/16/2024  XR CHEST 1 VW Date of Exam: 8/16/2024 3:42 AM EDT Indication: dyspnea Comparison: Chest radiograph August 12, 2024 Findings: The heart is enlarged. There is pulmonary vascular congestion. There is a small left pleural effusion. There is a small right effusion. There is bilateral basilar atelectasis. Lucency along the left aspect of the lung is most likely skinfold and does not  have the typical appearance of pneumothorax. A follow-up could be performed to ensure.     Impression: Cardiomegaly with pulmonary vascular congestion and bilateral pleural effusions. Lucency along the left hemithorax is most likely skinfold. Follow-up radiograph could be performed to ensure. Electronically Signed: Jerry Hooper MD  8/16/2024 4:03 AM EDT  Workstation ID: [id number]    XR Chest 1 View    Result Date: 8/12/2024  XR CHEST 1 VW Date of Exam: 8/12/2024 6:56 PM EDT Indication: SOB Comparison: Chest radiograph performed on August 2, 2024 Findings: Overlying monitoring wires limit diagnostic sensitivity. No focal consolidation or effusion.  There is no evidence of pneumothorax. There is mild pulmonary vascular congestion. Cardiac silhouette is enlarged. No acute osseous abnormality identified.     Impression: Findings compatible with mild CHF. Electronically Signed: Uri Cotto MD  8/12/2024 7:34 PM EDT  Workstation ID: [id number]     Results for orders placed during the hospital encounter of 04/10/24    Duplex Carotid Ultrasound CAR    Interpretation Summary    Right internal carotid artery demonstrates a less than 50% stenosis.    Left internal  carotid artery demonstrates a less than 50% stenosis.    Antegrade flow in the vertebral arteries bilaterally.      Results for orders placed during the hospital encounter of 04/10/24    Duplex Carotid Ultrasound CAR    Interpretation Summary    Right internal carotid artery demonstrates a less than 50% stenosis.    Left internal carotid artery demonstrates a less than 50% stenosis.    Antegrade flow in the vertebral arteries bilaterally.      Results for orders placed during the hospital encounter of 12/30/23    Adult Transthoracic Echo Complete W/ Cont if Necessary Per Protocol    Interpretation Summary    Left ventricular ejection fraction appears to be 56 - 60%.    Left ventricular wall thickness is consistent with mild concentric hypertrophy    The aortic valve exhibits sclerosis.    Mild aortic valve regurgitation is present. No hemodynamically significant aortic valve stenosis is present.    Mild tricuspid valve regurgitation is present. Estimated right ventricular systolic pressure from tricuspid regurgitation is moderately elevated (45-55 mmHg    Moderate mitral annular calcification is present. Trace to mild mitral valve regurgitation is present. No significant mitral valve stenosis is present.      Plan for Follow-up of Pending Labs/Results:     Discharge Details        Discharge Medications        New Medications        Instructions Start Date   amLODIPine 5 MG tablet  Commonly known as: NORVASC   5 mg, Oral, Every 24 Hours Scheduled   Start Date: August 20, 2024     calcitriol 0.5 MCG capsule  Commonly known as: ROCALTROL   0.5 mcg, Oral, Daily   Start Date: August 20, 2024     ferrous sulfate 325 (65 FE) MG tablet   325 mg, Oral, Daily With Breakfast      ipratropium-albuterol 0.5-2.5 mg/3 ml nebulizer  Commonly known as: DUO-NEB   3 mL, Nebulization, Every 4 Hours PRN      vitamin B-12 1000 MCG tablet  Commonly known as: CYANOCOBALAMIN   1,000 mcg, Oral, Daily             Changes to Medications         Instructions Start Date   cloNIDine 0.1 MG tablet  Commonly known as: CATAPRES  What changed: when to take this   0.1 mg, Oral, 3 times daily      hydrALAZINE 100 MG tablet  Commonly known as: APRESOLINE  What changed:   medication strength  how much to take   100 mg, Oral, Every 8 Hours Scheduled      pantoprazole 40 MG EC tablet  Commonly known as: PROTONIX  What changed: when to take this   40 mg, Oral, 2 Times Daily      torsemide 20 MG tablet  Commonly known as: DEMADEX  What changed:   how much to take  when to take this  reasons to take this   20 mg, Oral, Daily PRN             Continue These Medications        Instructions Start Date   Accu-Chek Guide w/Device kit   1 kit, Does not apply, See Admin Instructions, Use to check blood sugar once daily.  Dx E11.65      acetaminophen 500 MG tablet  Commonly known as: TYLENOL   1,000 mg, Oral, Every 6 Hours      atorvastatin 80 MG tablet  Commonly known as: LIPITOR   80 mg, Oral, Daily      BD Pen Needle Veda 2nd Gen 32G X 4 MM misc  Generic drug: Insulin Pen Needle   1 each, Does not apply, Daily      Blood Glucose Test strip   Check blood sugar 1 time a day dx e11.65      carvedilol 25 MG tablet  Commonly known as: COREG   25 mg, Oral, Every 12 Hours Scheduled      doxazosin 4 MG tablet  Commonly known as: CARDURA       DULoxetine 60 MG capsule  Commonly known as: CYMBALTA   60 mg, Oral, Daily      Insulin Glargine 100 UNIT/ML injection pen  Commonly known as: LANTUS SOLOSTAR   10 Units, Subcutaneous, Nightly      Lancets 33G misc   1 each, Does not apply, Daily, Dx e11.65      MiraLax 17 g packet  Generic drug: polyethylene glycol   17 g, Oral, Daily PRN      Morphine 15 MG 12 hr tablet  Commonly known as: MS CONTIN   15 mg, Oral, 2 Times Daily      probiotic capsule capsule   1 capsule, Oral, Daily, Bifidobacterium - lactobacillus      rivaroxaban 15 MG tablet  Commonly known as: Xarelto   15 mg, Oral, Daily With Dinner             Stop These Medications       lisinopril 20 MG tablet  Commonly known as: PRINIVIL,ZESTRIL              Allergies   Allergen Reactions    Penicillins     Latex Rash     Not an immediate reaction    Nickel Rash    Penicillins Rash     unk         Discharge Disposition:  Rehab Facility or Unit (DC - External)    Diet:  Hospital:  Diet Order   Procedures    Diet: Diabetic; Consistent Carbohydrate; Fluid Consistency: Thin (IDDSI 0)       Diet Instructions       Diet: Cardiac Diets, Diabetic Diets; Healthy Heart (2-3 Na+); Thin (IDDSI 0); Consistent Carbohydrate      Discharge Diet:  Cardiac Diets  Diabetic Diets       Cardiac Diet: Healthy Heart (2-3 Na+)    Fluid Consistency: Thin (IDDSI 0)    Diabetic Diet: Consistent Carbohydrate             Activity:  Activity Instructions       Activity as Tolerated              Restrictions or Other Recommendations:         CODE STATUS:    Code Status and Medical Interventions: CPR (Attempt to Resuscitate); Full Support   Ordered at: 08/12/24 1827     Code Status (Patient has no pulse and is not breathing):    CPR (Attempt to Resuscitate)     Medical Interventions (Patient has pulse or is breathing):    Full Support       Future Appointments   Date Time Provider Department Center   10/14/2024 10:30 AM Steve Geronimo MD [facility]   10/29/2024 11:45 AM Emilio Regalado MD [facility]   12/27/2024 11:30 AM Seymour Harrington MD [facility]       Additional Instructions for the Follow-ups that You Need to Schedule       Discharge Follow-up with PCP   As directed       Currently Documented PCP:    Sebas Alicea MD    PCP Phone Number:    [phone]     Follow Up Details: PCP Dr. Alicea 1 week        Discharge Follow-up with Specified Provider: Electrophysiology next available   As directed      To: Electrophysiology next available        Discharge Follow-up with Specified Provider: [facility]   As directed      To: [facility]  valerie Adan DO  08/19/24      Time Spent on Discharge:  I spent  40  minutes on this discharge activity which included: face-to-face encounter with the patient, reviewing the data in the system, coordination of the care with the nursing staff as well as consultants, documentation, and entering orders.            Electronically signed by Fany Adan DO at 08/19/24 1040

## 2024-08-19 NOTE — DISCHARGE PLACEMENT REQUEST
"Colleen Webster (84 y.o. Female)   [facility] MED unit  From Jennifer MCKEON       Date of Birth   1940    Social Security Number       Address   [address]  [address] [place] [id number]    Home Phone   [phone]    MRN   [id number]       Hinduism   Roman Catholic    Marital Status                               Admission Date   8/12/24    Admission Type   Urgent    Admitting Provider   Fany Adan DO    Attending Provider   Fany Adan DO    Department, Room/Bed   [address], S[address]/1       Discharge Date       Discharge Disposition   Rehab Facility or Unit (DC - External)    Discharge Destination                                 Attending Provider: Fany Adan DO    Allergies: Penicillins, Latex, Nickel, Penicillins    Isolation: None   Infection: None   Code Status: CPR    Ht: 162.6 cm (64.02\")   Wt: 85.7 kg (189 lb)    Admission Cmt: None   Principal Problem: Symptomatic anemia [D64.9]                   Active Insurance as of 8/12/2024       Primary Coverage       Payor Plan Insurance Group Employer/Plan Group    MEDICARE MEDICARE A & B        Payor Plan Address Payor Plan Phone Number Payor Plan Fax Number Effective Dates    [address] [phone]  1/1/2006 - None Entered    [facility] [id number]         Subscriber Name Subscriber Birth Date Member ID       COLLEEN WEBSTER 1940 [id number]               Secondary Coverage       Payor Plan Insurance Group Employer/Plan Group    ANTHEM BLUE CROSS [facility] SUPP [id number]       Payor Plan Address Payor Plan Phone Number Payor Plan Fax Number Effective Dates    [address]   1/1/2006 - None Entered    [facility] [address]         Subscriber Name Subscriber Birth Date Member ID       COLLEEN WEBSTER 1940 [id number]                     Emergency Contacts        (Rel.) Home Phone Work Phone Mobile Phone    MANJINDER NORRIS (Son) [phone] -- [phone]    KATELIN WEBSTER (Son) " [phone] -- [phone]              Insurance Information                  MEDICARE/MEDICARE A & B Phone: [phone]    Subscriber: Yanique Webster Subscriber#: [id number]    Group#: -- Precert#: --        ANTHEM BLUE CROSS/ANTHEM BC  SUPP Phone: --    Subscriber: Yanique Webster Subscriber#: [id number]    Group#: [id number] Precert#: --             Discharge Summary        Fany Adan DO at 24 0932              [facility] Medicine Services  DISCHARGE SUMMARY    Patient Name: Yanique Webster  : 1940  MRN: [id number]    Date of Admission: 2024  4:40 PM  Date of Discharge:  24  Primary Care Physician: Sebas Alicea MD    Consults       Date and Time Order Name Status Description    2024  9:35 PM Inpatient Nephrology Consult Completed     2024  8:30 PM Inpatient Gastroenterology Consult Completed             Hospital Course     Presenting Problem: anemia     Active Hospital Problems    Diagnosis  POA    Secondary hyperparathyroidism [N25.81]  Unknown    Paroxysmal atrial fibrillation [I48.0]  Yes    CKD (chronic kidney disease) stage 3, GFR 30-59 ml/min [N18.30]  Yes    Essential hypertension [I10]  Yes    Coronary artery disease involving native coronary artery of native heart without angina pectoris [I25.10]  Yes    Hyperlipidemia LDL goal <70 [E78.5]  Yes    COPD (chronic obstructive pulmonary disease) [J44.9]  Yes    GERD (gastroesophageal reflux disease) [K21.9]  Yes    PVD (peripheral vascular disease) [I73.9]  Yes    Type 2 diabetes mellitus [E11.9]  Yes      Resolved Hospital Problems    Diagnosis Date Resolved POA    **Symptomatic anemia [D64.9] 2024 Yes    Melena [K92.1] 2024 Unknown          Hospital Course:  Yanique Webster is a 84 y.o. female with past medical history significant for CAD, COPD, hypertension, chronic pain and diabetes, was admitted for acute symptomatic anemia.  She received 1 unit PRBC  with improvement.  Nephrology was consulted for KINDRA. GI performed EGD on 8/14 with no source of bleeding identified. Colonoscopy 8/16 with no acute findings.      Acute symptomatic anemia  Weakness and deconditioning  -s/p 1 unit pRBC with appropriate response   -No active signs of bleeding noted while inpatient   -Iron panel reviewed, B12 and folate WNL  -GI consulted -- s/p EGD 8/14 and colonoscopy 8/16, no active source of bleeding identified    -Likely nutritional component of anemia   -GI recommended alternative to stroke prophylaxis; I reviewed this at length with patient/son and daughter in law. Will resume xarelto as patient is a high risk of stroke with previous history of CVA. Reviewed peripherally with Dr. Geronimo as well. Agreeable to trial anticoagulation with high CHADSVASC. Referral placed to EP for consideration of Watchman. Patient may not need to be anticoagulation for a Watchman if she does have persistent anemia and can do ASA or DAPT per Dr. Geronimo.   - Close monitoring of hgb in 3 days.   -Continue PPI BID      KINDRA on CKD  Secondary hyperparathyroid   -FeNa 1.7%  - SPEP with no M spike observed   -Ur Pr/Cr -> 2.8g per day   -Nephrology consulted --likely progressive CKD in the setting of diabetes and hypertension.  Patient's blood pressure medications were adjusted and she tolerated diuresis.  Renal function stable at time of discharge.  -She was started on calcitriol for secondary hyperparathyroidism and recommended to check intact PTH in 8 weeks  -Close follow-up with [facility] in 2 weeks.    Paroxysmal atrial fibrillation  Takotsubo Cardiomyopathy   Acute on chronic HFimEF   -Has been on renally dosed Xarelto  -GI recommends alternative to pharmacologic stroke prophylaxis. Reviewed this with patient, son and daughter in law. Reviewed peripherally with Dr. Geronimo per above. Trial anticoagulation with close monitoring. Referral made to EP for consideration of Watchman. May  not need to be anticoagulated if watchman is a consideration and she has persistent anemia/bleeding.   -Acute CHF exacerbation improved with IV diuretics   - Resume torsemide based on daily weights. Recommend 20 mg daily PRN for weight gain of 3 lbs in 1 day or 5 lbs in 1 week     Diabetes mellitus  -Insulin dependent. Resume on DC      COPD  -Patient was started on inhalers recently  -Continue DuoNebs     Chronic pain  -Continue home regimen of MS Contin 15 mg every 12 hours     Hypertension  Hyperlipidemia  -Continue clonidine, Coreg and hydralazine; resume hytrin (sub for cardura); amlodipine 5 mg daily started with improvement.   - Lisinopril held due to renal function    Discharge Follow Up Recommendations for outpatient labs/diagnostics:  PCP Dr. Alicea 1 week  [facility] 2 weeks   Electrophysiology next available     Day of Discharge     HPI:   No acute events.  States that she feels about the same.  Some shortness of breath improved with nebulizer.  Reviewed plans for discharge today patient is agreeable.  Reviewed discussion with Dr. Geronimo.  Answered all questions to the best my ability.        Vital Signs:   Temp:  [97.5 °F (36.4 °C)-98.4 °F (36.9 °C)] 97.5 °F (36.4 °C)  Heart Rate:  [63-79] 74  Resp:  [16-18] 18  BP: (124-173)/(48-67) 124/48      Physical Exam:  Constitutional: No acute distress, awake, alert; frail  Respiratory: Clear to auscultation bilaterally, respiratory effort normal; diminished   Cardiovascular: RRR, no murmurs  Gastrointestinal: Positive bowel sounds, soft, nontender, nondistended  Musculoskeletal: No bilateral ankle edema  Psychiatric: Appropriate affect, cooperative  Neurologic: strength symmetric in all extremities, Cranial Nerves grossly intact to confrontation, speech clear        Pertinent  and/or Most Recent Results     LAB RESULTS:      Lab 08/19/24  0704 08/18/24  0608 08/17/24  0437 08/16/24  0713 08/15/24  0833 08/13/24  1342 08/13/24  0645  08/13/24  0100 08/12/24  1806   WBC 5.05 5.87 6.08 5.53 5.39   < > 7.01  --  4.77   HEMOGLOBIN 7.7* 7.6* 8.1* 8.1* 8.0*   < > 8.1*   < > 6.3*   HEMATOCRIT 24.3* 24.6* 25.7* 26.4* 26.2*   < > 26.0*   < > 21.1*   PLATELETS 105* 106* 120* 130* 121*   < > 133*  --  125*   NEUTROS ABS  --   --   --   --   --   --  5.51  --  2.88   IMMATURE GRANS (ABS)  --   --   --   --   --   --  0.03  --  0.01   LYMPHS ABS  --   --   --   --   --   --  0.94  --  1.23   MONOS ABS  --   --   --   --   --   --  0.39  --  0.45   EOS ABS  --   --   --   --   --   --  0.11  --  0.16   MCV 85.9 85.7 85.4 87.7 89.7   < > 87.2  --  88.3    < > = values in this interval not displayed.         Lab 08/19/24  0704 08/18/24  0608 08/17/24  0437 08/16/24  0713 08/15/24  0833 08/14/24  0732 08/12/24  1806 08/12/24  1805   SODIUM 138 137 141 144 142   < >  --  138   POTASSIUM 4.1 4.0 3.7 4.1 4.6   < >  --  4.4   CHLORIDE 108* 106 106 109* 112*   < >  --  108*   CO2 21.0* 22.0 22.0 21.0* 23.0   < >  --  20.0*   ANION GAP 9.0 9.0 13.0 14.0 7.0   < >  --  10.0   BUN 35* 36* 28* 27* 39*   < >  --  46*   CREATININE 2.09* 2.05* 1.94* 1.96* 2.25*   < >  --  2.77*   EGFR 23.1* 23.7* 25.3* 25.0* 21.2*   < >  --  16.5*   GLUCOSE 145* 151* 135* 134* 134*   < >  --  163*   CALCIUM 9.3 9.3 9.9 9.6 9.6   < >  --  9.5   MAGNESIUM  --   --   --   --   --   --   --  1.8   PHOSPHORUS  --   --   --   --   --   --   --  4.3   HEMOGLOBIN A1C  --   --   --   --   --   --  5.2  --     < > = values in this interval not displayed.         Lab 08/16/24  0713 08/14/24  0732 08/12/24  1805   TOTAL PROTEIN 5.3*  --  5.7*   ALBUMIN 3.6 3.0 3.5   GLOBULIN 1.7  --  2.2   ALT (SGPT) 9  --  9   AST (SGOT) 16  --  15   BILIRUBIN 0.5  --  0.3   ALK PHOS 172*  --  161*                 Lab 08/13/24  0645 08/12/24  1853 08/12/24  1805   IRON  --   --  39   IRON SATURATION (TSAT)  --   --  14*   TIBC  --   --  280*   TRANSFERRIN  --   --  188*   FERRITIN  --   --  40.23   FOLATE 10.50  --    --    VITAMIN B 12 465  --   --    ABO TYPING  --  O  --    RH TYPING  --  Negative  --    ANTIBODY SCREEN  --  Negative  --          Brief Urine Lab Results  (Last result in the past 365 days)        Color   Clarity   Blood   Leuk Est   Nitrite   Protein   CREAT   Urine HCG        08/13/24 2000             52.4         08/13/24 2000 Yellow   Clear   Negative   Negative   Negative   >=300 mg/dL (3+)                 Microbiology Results (last 10 days)       ** No results found for the last 240 hours. **            XR Chest 1 View    Result Date: 8/16/2024  XR CHEST 1 VW Date of Exam: 8/16/2024 3:42 AM EDT Indication: dyspnea Comparison: Chest radiograph August 12, 2024 Findings: The heart is enlarged. There is pulmonary vascular congestion. There is a small left pleural effusion. There is a small right effusion. There is bilateral basilar atelectasis. Lucency along the left aspect of the lung is most likely skinfold and does not  have the typical appearance of pneumothorax. A follow-up could be performed to ensure.     Impression: Cardiomegaly with pulmonary vascular congestion and bilateral pleural effusions. Lucency along the left hemithorax is most likely skinfold. Follow-up radiograph could be performed to ensure. Electronically Signed: Jerry Hooper MD  8/16/2024 4:03 AM EDT  Workstation ID: [id number]    XR Chest 1 View    Result Date: 8/12/2024  XR CHEST 1 VW Date of Exam: 8/12/2024 6:56 PM EDT Indication: SOB Comparison: Chest radiograph performed on August 2, 2024 Findings: Overlying monitoring wires limit diagnostic sensitivity. No focal consolidation or effusion.  There is no evidence of pneumothorax. There is mild pulmonary vascular congestion. Cardiac silhouette is enlarged. No acute osseous abnormality identified.     Impression: Findings compatible with mild CHF. Electronically Signed: Uri Cotto MD  8/12/2024 7:34 PM EDT  Workstation ID: [id number]     Results for orders placed during the hospital  encounter of 04/10/24    Duplex Carotid Ultrasound CAR    Interpretation Summary    Right internal carotid artery demonstrates a less than 50% stenosis.    Left internal carotid artery demonstrates a less than 50% stenosis.    Antegrade flow in the vertebral arteries bilaterally.      Results for orders placed during the hospital encounter of 04/10/24    Duplex Carotid Ultrasound CAR    Interpretation Summary    Right internal carotid artery demonstrates a less than 50% stenosis.    Left internal carotid artery demonstrates a less than 50% stenosis.    Antegrade flow in the vertebral arteries bilaterally.      Results for orders placed during the hospital encounter of 12/30/23    Adult Transthoracic Echo Complete W/ Cont if Necessary Per Protocol    Interpretation Summary    Left ventricular ejection fraction appears to be 56 - 60%.    Left ventricular wall thickness is consistent with mild concentric hypertrophy    The aortic valve exhibits sclerosis.    Mild aortic valve regurgitation is present. No hemodynamically significant aortic valve stenosis is present.    Mild tricuspid valve regurgitation is present. Estimated right ventricular systolic pressure from tricuspid regurgitation is moderately elevated (45-55 mmHg    Moderate mitral annular calcification is present. Trace to mild mitral valve regurgitation is present. No significant mitral valve stenosis is present.      Plan for Follow-up of Pending Labs/Results:     Discharge Details        Discharge Medications        New Medications        Instructions Start Date   amLODIPine 5 MG tablet  Commonly known as: NORVASC   5 mg, Oral, Every 24 Hours Scheduled   Start Date: August 20, 2024     calcitriol 0.5 MCG capsule  Commonly known as: ROCALTROL   0.5 mcg, Oral, Daily   Start Date: August 20, 2024     ferrous sulfate 325 (65 FE) MG tablet   325 mg, Oral, Daily With Breakfast      ipratropium-albuterol 0.5-2.5 mg/3 ml nebulizer  Commonly known as: DUO-NEB   3  mL, Nebulization, Every 4 Hours PRN      vitamin B-12 1000 MCG tablet  Commonly known as: CYANOCOBALAMIN   1,000 mcg, Oral, Daily             Changes to Medications        Instructions Start Date   cloNIDine 0.1 MG tablet  Commonly known as: CATAPRES  What changed: when to take this   0.1 mg, Oral, 3 times daily      hydrALAZINE 100 MG tablet  Commonly known as: APRESOLINE  What changed:   medication strength  how much to take   100 mg, Oral, Every 8 Hours Scheduled      pantoprazole 40 MG EC tablet  Commonly known as: PROTONIX  What changed: when to take this   40 mg, Oral, 2 Times Daily      torsemide 20 MG tablet  Commonly known as: DEMADEX  What changed:   how much to take  when to take this  reasons to take this   20 mg, Oral, Daily PRN             Continue These Medications        Instructions Start Date   Accu-Chek Guide w/Device kit   1 kit, Does not apply, See Admin Instructions, Use to check blood sugar once daily.  Dx E11.65      acetaminophen 500 MG tablet  Commonly known as: TYLENOL   1,000 mg, Oral, Every 6 Hours      atorvastatin 80 MG tablet  Commonly known as: LIPITOR   80 mg, Oral, Daily      BD Pen Needle Veda 2nd Gen 32G X 4 MM misc  Generic drug: Insulin Pen Needle   1 each, Does not apply, Daily      Blood Glucose Test strip   Check blood sugar 1 time a day dx e11.65      carvedilol 25 MG tablet  Commonly known as: COREG   25 mg, Oral, Every 12 Hours Scheduled      doxazosin 4 MG tablet  Commonly known as: CARDURA       DULoxetine 60 MG capsule  Commonly known as: CYMBALTA   60 mg, Oral, Daily      Insulin Glargine 100 UNIT/ML injection pen  Commonly known as: LANTUS SOLOSTAR   10 Units, Subcutaneous, Nightly      Lancets 33G misc   1 each, Does not apply, Daily, Dx e11.65      MiraLax 17 g packet  Generic drug: polyethylene glycol   17 g, Oral, Daily PRN      Morphine 15 MG 12 hr tablet  Commonly known as: MS CONTIN   15 mg, Oral, 2 Times Daily      probiotic capsule capsule   1 capsule, Oral,  Daily, Bifidobacterium - lactobacillus      rivaroxaban 15 MG tablet  Commonly known as: Xarelto   15 mg, Oral, Daily With Dinner             Stop These Medications      lisinopril 20 MG tablet  Commonly known as: PRINIVIL,ZESTRIL              Allergies   Allergen Reactions    Penicillins     Latex Rash     Not an immediate reaction    Nickel Rash    Penicillins Rash     unk         Discharge Disposition:  Rehab Facility or Unit (DC - External)    Diet:  Hospital:  Diet Order   Procedures    Diet: Diabetic; Consistent Carbohydrate; Fluid Consistency: Thin (IDDSI 0)       Diet Instructions       Diet: Cardiac Diets, Diabetic Diets; Healthy Heart (2-3 Na+); Thin (IDDSI 0); Consistent Carbohydrate      Discharge Diet:  Cardiac Diets  Diabetic Diets       Cardiac Diet: Healthy Heart (2-3 Na+)    Fluid Consistency: Thin (IDDSI 0)    Diabetic Diet: Consistent Carbohydrate             Activity:  Activity Instructions       Activity as Tolerated              Restrictions or Other Recommendations:         CODE STATUS:    Code Status and Medical Interventions: CPR (Attempt to Resuscitate); Full Support   Ordered at: 08/12/24 1827     Code Status (Patient has no pulse and is not breathing):    CPR (Attempt to Resuscitate)     Medical Interventions (Patient has pulse or is breathing):    Full Support       Future Appointments   Date Time Provider Department Center   10/14/2024 10:30 AM Steve Geronimo MD [facility]   10/29/2024 11:45 AM Emilio Regalado MD [facility]   12/27/2024 11:30 AM Seymour Harrington MD [facility]       Additional Instructions for the Follow-ups that You Need to Schedule       Discharge Follow-up with PCP   As directed       Currently Documented PCP:    Sebas Alicea MD    PCP Phone Number:    [phone]     Follow Up Details: PCP Dr. Alicea 1 week        Discharge Follow-up with Specified Provider: Electrophysiology next available   As directed      To: Electrophysiology  next available        Discharge Follow-up with Specified Provider: [facility]   As directed      To: [facility]                      Fany Adan DO  08/19/24      Time Spent on Discharge:  I spent  40  minutes on this discharge activity which included: face-to-face encounter with the patient, reviewing the data in the system, coordination of the care with the nursing staff as well as consultants, documentation, and entering orders.            Electronically signed by Fany Adan DO at 08/19/24 0957

## 2024-09-05 ENCOUNTER — TELEPHONE (OUTPATIENT)
Dept: CARDIOLOGY | Facility: CLINIC | Age: 84
End: 2024-09-05
Payer: MEDICARE

## 2024-09-05 NOTE — TELEPHONE ENCOUNTER
Name: MANJINDER NORRIS    Relationship: Emergency Contact    Best Callback Number: [phone] (home)      Incoming call to the Hub, requesting to  Reschedule their HFU appointment on 09.09.24.     Per Hub workflow, further review is needed before the task can be completed.    Result of Call: Please reach out to the patient to reschedule

## 2024-09-05 NOTE — TELEPHONE ENCOUNTER
PT WANTED TO KEEP APT WITH DR. HOSKINS. SHE WANTED TO CANCEL AND R/S THE APT WITH DR. REGALADO. PT IS AWARE OF THE NEW APT WITH DR. REGALADO.

## 2024-10-22 NOTE — PROGRESS NOTES
Electrophysiology Clinic Consult     Yanique Webster  [id number]  1940    Referring Provider: Steve Geronimo MD   PCP: Sebas Alicea MD  [address] [facility] [address] / 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  Now has been explanted.  Hypertension  Hyperlipidemia  Peripheral vascular disease  Diabetes  Anemia  Anxiety  Claustrophobia    History of Present Illness  Yanique Webster is a 84 y.o. female who presents to my electrophysiology clinic for evaluation of PAF. Patient has AF that is paroxysmal in nature and for the most part relatively asymptomatic. She does have some trouble taking AC as she had unexplained anemia requiring mutliple transfusion. She was referred to my office for further evaluation and her candidacy re: LAAO.    Review of Systems   Constitutional:  Negative for activity change, fatigue and fever.   Respiratory:  Negative for chest tightness and shortness of breath.    Cardiovascular:  Negative for chest pain, palpitations and leg swelling.   Gastrointestinal:  Negative for constipation and diarrhea.   Genitourinary:  Negative for decreased urine volume and difficulty urinating.   Skin:  Negative for wound.   Neurological:  Negative for dizziness, syncope, weakness and light-headedness.   Hematological:  Bruises/bleeds easily.   Psychiatric/Behavioral:  Negative for suicidal ideas.        Outpatient Medications Marked as Taking for the 10/24/24 encounter (Office Visit) with Axel Ribeiro  MD   Medication Sig Dispense Refill    amLODIPine (NORVASC) 5 MG tablet Take 1 tablet by mouth Daily.      atorvastatin (LIPITOR) 80 MG tablet Take 1 tablet by mouth Daily.      Blood Glucose Monitoring Suppl (Accu-Chek Guide) w/Device kit Use 1 kit See Admin Instructions. Use to check blood sugar once daily.  Dx E11.65 1 kit 0    calcitriol (ROCALTROL) 0.5 MCG capsule Take 1 capsule by mouth Daily.      carvedilol (COREG) 25 MG tablet Take 1 tablet by mouth Every 12 (Twelve) Hours. 60 tablet 1    cloNIDine (CATAPRES) 0.1 MG tablet Take 1 tablet by mouth 3 times a day.      doxazosin (CARDURA) 4 MG tablet       DULoxetine (CYMBALTA) 60 MG capsule Take 1 capsule by mouth Daily. 90 capsule 3    ferrous sulfate 325 (65 FE) MG tablet Take 1 tablet by mouth Daily With Breakfast.      Glucose Blood (Blood Glucose Test) strip Check blood sugar 1 time a day dx e11.65 100 each 3    hydrALAZINE (APRESOLINE) 100 MG tablet Take 1 tablet by mouth Every 8 (Eight) Hours.      Insulin Glargine (LANTUS SOLOSTAR) 100 UNIT/ML injection pen Inject 10 Units under the skin into the appropriate area as directed Every Night. 15 mL 3    Insulin Pen Needle (BD Pen Needle Veda 2nd Gen) 32G X 4 MM misc Use 1 each Daily. 100 each 3    ipratropium-albuterol (DUO-NEB) 0.5-2.5 mg/3 ml nebulizer Take 3 mL by nebulization Every 4 (Four) Hours As Needed for Shortness of Air or Wheezing.      Lancets 33G misc Use 1 each Daily. Dx e11.65 100 each 3    Morphine (MS CONTIN) 15 MG 12 hr tablet Take 1 tablet by mouth 2 (Two) Times a Day.      pantoprazole (PROTONIX) 40 MG EC tablet Take 1 tablet by mouth 2 (Two) Times a Day.      probiotic (CULTURELLE) capsule capsule Take 1 capsule by mouth Daily. Bifidobacterium - lactobacillus      rivaroxaban (Xarelto) 15 MG tablet Take 1 tablet by mouth Daily With Dinner. 30 tablet 11    torsemide (DEMADEX) 20 MG tablet Take 1 tablet by mouth Daily As Needed (weight gain 3 lbs in 1 day or 5 lbs in 1 week).       "vitamin B-12 (CYANOCOBALAMIN) 1000 MCG tablet Take 1 tablet by mouth Daily.         Physical Exam  Vitals:    10/24/24 1306   BP: 114/50   BP Location: Left arm   Patient Position: Sitting   Cuff Size: Adult   Pulse: 61   SpO2: 97%   Weight: 83.6 kg (184 lb 6.4 oz)   Height: 162.6 cm (64.02\")     Body mass index is 31.64 kg/m².    Vitals and nursing note reviewed.   Constitutional:       Appearance: Healthy appearance.   HENT:      Head: Normocephalic and atraumatic.      Nose: Nose normal.   Neck:      Vascular: No JVD.   Pulmonary:      Effort: Pulmonary effort is normal.      Breath sounds: Normal breath sounds.   Cardiovascular:      PMI at left midclavicular line. Normal rate. Regular rhythm. Normal S1. Normal S2.       Murmurs: There is no murmur.      No gallop.    Edema:     Peripheral edema absent.   Skin:     General: Skin is warm and dry.   Neurological:      Mental Status: Oriented to person, place and time.   Psychiatric:         Behavior: Behavior normal.          Diagnostic Data  Procedures    Lab Results   Component Value Date    GLUCOSE 145 (H) 08/19/2024    CALCIUM 9.3 08/19/2024     08/19/2024    K 4.1 08/19/2024    CO2 21.0 (L) 08/19/2024     (H) 08/19/2024    BUN 35 (H) 08/19/2024    CREATININE 2.09 (H) 08/19/2024    EGFRIFNONA 29 (L) 11/04/2020    BCR 16.7 08/19/2024    ANIONGAP 9.0 08/19/2024     Lab Results   Component Value Date    WBC 5.05 08/19/2024    HGB 7.7 (L) 08/19/2024    HCT 24.3 (L) 08/19/2024    MCV 85.9 08/19/2024     (L) 08/19/2024     Lab Results   Component Value Date    INR 1.19 (H) 01/02/2024    INR 1.27 (H) 01/01/2024    INR 2.12 (H) 12/31/2023    PROTIME 15.2 (H) 01/02/2024    PROTIME 16.1 (H) 01/01/2024    PROTIME 23.9 (H) 12/31/2023     Lab Results   Component Value Date    TSH 0.258 (L) 12/31/2023         I personally viewed and interpreted the patient's EKG/Telemetry/lab data    Yanique Webster  reports that she quit smoking about 21 years ago. " Her smoking use included cigarettes. She has never used smokeless tobacco. I have educated her on the risk of diseases from using tobacco products such as cancer, COPD, and heart disease.     I spent 3  minutes counseling the patient.           ACP discussion was declined by the patient. Patient has an advance directive (not in EMR), copy requested.    Assessment and Plan   Diagnoses and all orders for this visit:    1. Paroxysmal atrial fibrillation (Primary)    2. Anemia, chronic disease    3. Takotsubo cardiomyopathy    4. Coronary artery disease involving native coronary artery of native heart without angina pectoris    5. Essential hypertension        Paroxysmal atrial fibrillation  Chronic anemia requiring transfusion  -[id number] 8 (age, sex, HTN, CAD, DM, CVA), anticoagulated on xarelto  -After extensive conversation regarding risks, benefits, or alternatives to the therapy, patient elected to think more about her options, especially re: LAAO.   - we discussed that this would be a high risk procedure given her advanced age  - discussed options of AC with close surveillance, stopping AC, and LAAO. All risks and benefits of each options were carefully discussed. For now resume renally dosed Xarelto  - patient will call back re: her decision re: LAAO: if she wants to proceed, plan for LAAO with Watchman. No CTA given CKD.       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 Geronimo     Coronary artery disease  -[facility], 2016: mild diffuse multivessel coronary artery disease at that time     Hypertension  -Well controlled, continue current medication regimen     Follow Up  Return in about 3 months (around 1/24/2025).      Thank you for allowing me to participate in the care of your patient. Please to not hesitate to contact me with additional questions or concerns.        Axel Ribeiro MD [facility]  Cardiac Electrophysiologist  [facility] / [facility]

## 2024-10-24 ENCOUNTER — OFFICE VISIT (OUTPATIENT)
Dept: CARDIOLOGY | Facility: CLINIC | Age: 84
End: 2024-10-24
Payer: MEDICARE

## 2024-10-24 VITALS
WEIGHT: 184.4 LBS | HEART RATE: 61 BPM | HEIGHT: 64 IN | OXYGEN SATURATION: 97 % | SYSTOLIC BLOOD PRESSURE: 114 MMHG | BODY MASS INDEX: 31.48 KG/M2 | DIASTOLIC BLOOD PRESSURE: 50 MMHG

## 2024-10-24 DIAGNOSIS — I48.0 PAROXYSMAL ATRIAL FIBRILLATION: Primary | Chronic | ICD-10-CM

## 2024-10-24 DIAGNOSIS — I25.10 CORONARY ARTERY DISEASE INVOLVING NATIVE CORONARY ARTERY OF NATIVE HEART WITHOUT ANGINA PECTORIS: ICD-10-CM

## 2024-10-24 DIAGNOSIS — I51.81 TAKOTSUBO CARDIOMYOPATHY: ICD-10-CM

## 2024-10-24 DIAGNOSIS — D63.8 ANEMIA, CHRONIC DISEASE: Chronic | ICD-10-CM

## 2024-10-24 DIAGNOSIS — I10 ESSENTIAL HYPERTENSION: Chronic | ICD-10-CM

## 2024-10-29 ENCOUNTER — OFFICE VISIT (OUTPATIENT)
Age: 84
End: 2024-10-29
Payer: MEDICARE

## 2024-10-29 VITALS
WEIGHT: 183 LBS | OXYGEN SATURATION: 97 % | HEIGHT: 64 IN | DIASTOLIC BLOOD PRESSURE: 62 MMHG | HEART RATE: 65 BPM | SYSTOLIC BLOOD PRESSURE: 128 MMHG | BODY MASS INDEX: 31.24 KG/M2

## 2024-10-29 DIAGNOSIS — E11.649 TYPE 2 DIABETES MELLITUS WITH HYPOGLYCEMIA WITHOUT COMA, WITH LONG-TERM CURRENT USE OF INSULIN: Primary | ICD-10-CM

## 2024-10-29 DIAGNOSIS — Z79.4 TYPE 2 DIABETES MELLITUS WITH HYPOGLYCEMIA WITHOUT COMA, WITH LONG-TERM CURRENT USE OF INSULIN: Primary | ICD-10-CM

## 2024-10-29 LAB
EXPIRATION DATE: ABNORMAL
EXPIRATION DATE: NORMAL
GLUCOSE BLDC GLUCOMTR-MCNC: 162 MG/DL (ref 70–130)
HBA1C MFR BLD: 5 % (ref 4.5–5.7)
Lab: ABNORMAL
Lab: NORMAL

## 2024-10-29 NOTE — PROGRESS NOTES
"     Office Note      Date: 10/29/2024  Patient Name: Yanique Webster  MRN: [id number]  : 1940    Chief Complaint   Patient presents with    Diabetes       History of Present Illness:   Yanique Webster is a 84 y.o. female who presents for Diabetes type 2.   Current RX daily insulin     Bg checks are done: couple times per week  Hypoglycemia :none       Last A1c:  Hemoglobin A1C   Date Value Ref Range Status   10/29/2024 5.0 4.5 - 5.7 % Final   2023 5.10 4.80 - 5.60 % Final       Changes in health since last visit: in the hospital with symptomatic anemia . Last eye exam up to date.    Subjective              Review of Systems:   Review of Systems    The following portions of the patient's history were reviewed and updated as appropriate: allergies, current medications, past family history, past medical history, past social history, past surgical history, and problem list.    Objective     Visit Vitals  /62 (BP Location: Right arm, Patient Position: Sitting, Cuff Size: Adult)   Pulse 65   Ht 162.6 cm (64\")   Wt 83 kg (183 lb)   LMP  (LMP Unknown)   SpO2 97%   BMI 31.41 kg/m²           Physical Exam:  Physical Exam  Vitals reviewed.   Constitutional:       Appearance: Normal appearance.      Comments: Looks really pale    Neurological:      Mental Status: She is alert.   Psychiatric:         Mood and Affect: Mood normal.         Behavior: Behavior normal.         Thought Content: Thought content normal.         Judgment: Judgment normal.          Assessment / Plan      Assessment & Plan:  Problem List Items Addressed This Visit       Type 2 diabetes mellitus - Primary    Current Assessment & Plan      Glycemic control is fine  Her anemia  is the biggest issue .          Relevant Medications    DULoxetine (CYMBALTA) 60 MG capsule    Insulin Glargine (LANTUS SOLOSTAR) 100 UNIT/ML injection pen    Other Relevant Orders    POC Glucose, Blood (Completed)    POC Glycosylated Hemoglobin (Hb A1C) " (Completed)         Electronically signed by : Emilio Regalado MD  10/29/2024

## 2024-10-30 ENCOUNTER — APPOINTMENT (OUTPATIENT)
Dept: GENERAL RADIOLOGY | Facility: HOSPITAL | Age: 84
DRG: 812 | End: 2024-10-30
Payer: MEDICARE

## 2024-10-30 ENCOUNTER — HOSPITAL ENCOUNTER (INPATIENT)
Facility: HOSPITAL | Age: 84
LOS: 9 days | Discharge: NURSING, REHAB OR LONG-TERM CARE | DRG: 812 | End: 2024-11-08
Attending: EMERGENCY MEDICINE | Admitting: FAMILY MEDICINE
Payer: MEDICARE

## 2024-10-30 DIAGNOSIS — I51.81 TAKOTSUBO CARDIOMYOPATHY: ICD-10-CM

## 2024-10-30 DIAGNOSIS — D64.9 SYMPTOMATIC ANEMIA: ICD-10-CM

## 2024-10-30 DIAGNOSIS — G89.4 CHRONIC PAIN SYNDROME: ICD-10-CM

## 2024-10-30 DIAGNOSIS — N17.9 AKI (ACUTE KIDNEY INJURY): Primary | ICD-10-CM

## 2024-10-30 DIAGNOSIS — I48.0 PAROXYSMAL ATRIAL FIBRILLATION: ICD-10-CM

## 2024-10-30 LAB
ABO GROUP BLD: NORMAL
ALBUMIN SERPL-MCNC: 3.4 G/DL (ref 3.5–5.2)
ALBUMIN/GLOB SERPL: 1.5 G/DL
ALP SERPL-CCNC: 91 U/L (ref 39–117)
ALT SERPL W P-5'-P-CCNC: 18 U/L (ref 1–33)
ANION GAP SERPL CALCULATED.3IONS-SCNC: 13 MMOL/L (ref 5–15)
ARTERIAL PATENCY WRIST A: POSITIVE
AST SERPL-CCNC: 24 U/L (ref 1–32)
ATMOSPHERIC PRESS: ABNORMAL MM[HG]
BASE EXCESS BLDA CALC-SCNC: -7.8 MMOL/L (ref 0–2)
BASOPHILS # BLD AUTO: 0.04 10*3/MM3 (ref 0–0.2)
BASOPHILS NFR BLD AUTO: 0.8 % (ref 0–1.5)
BDY SITE: ABNORMAL
BILIRUB SERPL-MCNC: 0.3 MG/DL (ref 0–1.2)
BLD GP AB SCN SERPL QL: NEGATIVE
BODY TEMPERATURE: 37
BUN SERPL-MCNC: 59 MG/DL (ref 8–23)
BUN/CREAT SERPL: 16.2 (ref 7–25)
CALCIUM SPEC-SCNC: 9.7 MG/DL (ref 8.6–10.5)
CHLORIDE SERPL-SCNC: 106 MMOL/L (ref 98–107)
CO2 BLDA-SCNC: 19.1 MMOL/L (ref 22–33)
CO2 SERPL-SCNC: 18 MMOL/L (ref 22–29)
COHGB MFR BLD: 1.4 % (ref 0–2)
CREAT SERPL-MCNC: 3.65 MG/DL (ref 0.57–1)
D-LACTATE SERPL-SCNC: 0.6 MMOL/L (ref 0.5–2)
DEPRECATED RDW RBC AUTO: 54.5 FL (ref 37–54)
DEVELOPER EXPIRATION DATE: NORMAL
DEVELOPER LOT NUMBER: 8888
EGFRCR SERPLBLD CKD-EPI 2021: 11.8 ML/MIN/1.73
EOSINOPHIL # BLD AUTO: 0.13 10*3/MM3 (ref 0–0.4)
EOSINOPHIL NFR BLD AUTO: 2.8 % (ref 0.3–6.2)
EPAP: 0
ERYTHROCYTE [DISTWIDTH] IN BLOOD BY AUTOMATED COUNT: 16.6 % (ref 12.3–15.4)
EXPIRATION DATE: NORMAL
FECAL OCCULT BLOOD SCREEN, POC: NEGATIVE
FERRITIN SERPL-MCNC: 103 NG/ML (ref 13–150)
GEN 5 2HR TROPONIN T REFLEX: 62 NG/L
GLOBULIN UR ELPH-MCNC: 2.3 GM/DL
GLUCOSE BLDC GLUCOMTR-MCNC: 117 MG/DL (ref 70–130)
GLUCOSE SERPL-MCNC: 115 MG/DL (ref 65–99)
HCO3 BLDA-SCNC: 18 MMOL/L (ref 20–26)
HCT VFR BLD AUTO: 19.6 % (ref 34–46.6)
HCT VFR BLD CALC: 18.7 % (ref 38–51)
HGB BLD-MCNC: 6.1 G/DL (ref 12–15.9)
HGB BLDA-MCNC: 6.1 G/DL (ref 14–18)
HOLD SPECIMEN: NORMAL
IMM GRANULOCYTES # BLD AUTO: 0.02 10*3/MM3 (ref 0–0.05)
IMM GRANULOCYTES NFR BLD AUTO: 0.4 % (ref 0–0.5)
INHALED O2 CONCENTRATION: 21 %
IPAP: 0
IRON 24H UR-MRATE: 42 MCG/DL (ref 37–145)
IRON 24H UR-MRATE: 42 MCG/DL (ref 37–145)
IRON SATN MFR SERPL: 17 % (ref 20–50)
LYMPHOCYTES # BLD AUTO: 1.15 10*3/MM3 (ref 0.7–3.1)
LYMPHOCYTES NFR BLD AUTO: 24.4 % (ref 19.6–45.3)
Lab: 8888
Lab: ABNORMAL
MCH RBC QN AUTO: 28.1 PG (ref 26.6–33)
MCHC RBC AUTO-ENTMCNC: 31.1 G/DL (ref 31.5–35.7)
MCV RBC AUTO: 90.3 FL (ref 79–97)
METHGB BLD QL: 0.9 % (ref 0–1.5)
MODALITY: ABNORMAL
MONOCYTES # BLD AUTO: 0.46 10*3/MM3 (ref 0.1–0.9)
MONOCYTES NFR BLD AUTO: 9.8 % (ref 5–12)
NEGATIVE CONTROL: NEGATIVE
NEUTROPHILS NFR BLD AUTO: 2.91 10*3/MM3 (ref 1.7–7)
NEUTROPHILS NFR BLD AUTO: 61.8 % (ref 42.7–76)
NOTIFIED BY: ABNORMAL
NOTIFIED WHO: ABNORMAL
NRBC BLD AUTO-RTO: 0 /100 WBC (ref 0–0.2)
NT-PROBNP SERPL-MCNC: 5438 PG/ML (ref 0–1800)
OXYHGB MFR BLDV: 93 % (ref 94–99)
PAW @ PEAK INSP FLOW SETTING VENT: 0 CMH2O
PCO2 BLDA: 36.5 MM HG (ref 35–45)
PCO2 TEMP ADJ BLD: 36.5 MM HG (ref 35–45)
PH BLDA: 7.3 PH UNITS (ref 7.35–7.45)
PH, TEMP CORRECTED: 7.3 PH UNITS
PLATELET # BLD AUTO: 128 10*3/MM3 (ref 140–450)
PMV BLD AUTO: 10.7 FL (ref 6–12)
PO2 BLDA: 73.3 MM HG (ref 83–108)
PO2 TEMP ADJ BLD: 73.3 MM HG (ref 83–108)
POSITIVE CONTROL: POSITIVE
POTASSIUM SERPL-SCNC: 4.3 MMOL/L (ref 3.5–5.2)
PROCALCITONIN SERPL-MCNC: 0.09 NG/ML (ref 0–0.25)
PROT SERPL-MCNC: 5.7 G/DL (ref 6–8.5)
RBC # BLD AUTO: 2.17 10*6/MM3 (ref 3.77–5.28)
RETICS # AUTO: 0.05 10*6/MM3 (ref 0.02–0.13)
RETICS/RBC NFR AUTO: 2.58 % (ref 0.7–1.9)
RH BLD: NEGATIVE
SODIUM SERPL-SCNC: 137 MMOL/L (ref 136–145)
T&S EXPIRATION DATE: NORMAL
TIBC SERPL-MCNC: 244 MCG/DL (ref 298–536)
TOTAL RATE: 0 BREATHS/MINUTE
TRANSFERRIN SERPL-MCNC: 164 MG/DL (ref 200–360)
TROPONIN T DELTA: -4 NG/L
TROPONIN T SERPL HS-MCNC: 66 NG/L
WBC NRBC COR # BLD AUTO: 4.71 10*3/MM3 (ref 3.4–10.8)
WHOLE BLOOD HOLD COAG: NORMAL
WHOLE BLOOD HOLD SPECIMEN: NORMAL

## 2024-10-30 PROCEDURE — 93005 ELECTROCARDIOGRAM TRACING: CPT | Performed by: EMERGENCY MEDICINE

## 2024-10-30 PROCEDURE — 82805 BLOOD GASES W/O2 SATURATION: CPT

## 2024-10-30 PROCEDURE — 99223 1ST HOSP IP/OBS HIGH 75: CPT | Performed by: NURSE PRACTITIONER

## 2024-10-30 PROCEDURE — 82607 VITAMIN B-12: CPT | Performed by: NURSE PRACTITIONER

## 2024-10-30 PROCEDURE — 85025 COMPLETE CBC W/AUTO DIFF WBC: CPT | Performed by: EMERGENCY MEDICINE

## 2024-10-30 PROCEDURE — P9016 RBC LEUKOCYTES REDUCED: HCPCS

## 2024-10-30 PROCEDURE — 82948 REAGENT STRIP/BLOOD GLUCOSE: CPT

## 2024-10-30 PROCEDURE — 82270 OCCULT BLOOD FECES: CPT | Performed by: PHYSICIAN ASSISTANT

## 2024-10-30 PROCEDURE — 86901 BLOOD TYPING SEROLOGIC RH(D): CPT | Performed by: PHYSICIAN ASSISTANT

## 2024-10-30 PROCEDURE — 82746 ASSAY OF FOLIC ACID SERUM: CPT | Performed by: NURSE PRACTITIONER

## 2024-10-30 PROCEDURE — 86900 BLOOD TYPING SEROLOGIC ABO: CPT | Performed by: PHYSICIAN ASSISTANT

## 2024-10-30 PROCEDURE — 83050 HGB METHEMOGLOBIN QUAN: CPT

## 2024-10-30 PROCEDURE — 82375 ASSAY CARBOXYHB QUANT: CPT

## 2024-10-30 PROCEDURE — 71045 X-RAY EXAM CHEST 1 VIEW: CPT

## 2024-10-30 PROCEDURE — 36600 WITHDRAWAL OF ARTERIAL BLOOD: CPT

## 2024-10-30 PROCEDURE — 36430 TRANSFUSION BLD/BLD COMPNT: CPT

## 2024-10-30 PROCEDURE — 82728 ASSAY OF FERRITIN: CPT | Performed by: NURSE PRACTITIONER

## 2024-10-30 PROCEDURE — 84466 ASSAY OF TRANSFERRIN: CPT | Performed by: NURSE PRACTITIONER

## 2024-10-30 PROCEDURE — 83605 ASSAY OF LACTIC ACID: CPT | Performed by: PHYSICIAN ASSISTANT

## 2024-10-30 PROCEDURE — 83880 ASSAY OF NATRIURETIC PEPTIDE: CPT | Performed by: EMERGENCY MEDICINE

## 2024-10-30 PROCEDURE — 86900 BLOOD TYPING SEROLOGIC ABO: CPT

## 2024-10-30 PROCEDURE — 86850 RBC ANTIBODY SCREEN: CPT | Performed by: PHYSICIAN ASSISTANT

## 2024-10-30 PROCEDURE — 83540 ASSAY OF IRON: CPT | Performed by: NURSE PRACTITIONER

## 2024-10-30 PROCEDURE — 36415 COLL VENOUS BLD VENIPUNCTURE: CPT

## 2024-10-30 PROCEDURE — 80053 COMPREHEN METABOLIC PANEL: CPT | Performed by: EMERGENCY MEDICINE

## 2024-10-30 PROCEDURE — 84484 ASSAY OF TROPONIN QUANT: CPT | Performed by: EMERGENCY MEDICINE

## 2024-10-30 PROCEDURE — 86923 COMPATIBILITY TEST ELECTRIC: CPT

## 2024-10-30 PROCEDURE — 99291 CRITICAL CARE FIRST HOUR: CPT

## 2024-10-30 PROCEDURE — 84145 PROCALCITONIN (PCT): CPT | Performed by: PHYSICIAN ASSISTANT

## 2024-10-30 PROCEDURE — 85045 AUTOMATED RETICULOCYTE COUNT: CPT | Performed by: NURSE PRACTITIONER

## 2024-10-30 RX ORDER — POLYETHYLENE GLYCOL 3350 17 G/17G
17 POWDER, FOR SOLUTION ORAL DAILY PRN
Status: DISCONTINUED | OUTPATIENT
Start: 2024-10-30 | End: 2024-11-08 | Stop reason: HOSPADM

## 2024-10-30 RX ORDER — NICOTINE POLACRILEX 4 MG
15 LOZENGE BUCCAL
Status: DISCONTINUED | OUTPATIENT
Start: 2024-10-30 | End: 2024-10-31

## 2024-10-30 RX ORDER — NITROGLYCERIN 0.4 MG/1
0.4 TABLET SUBLINGUAL
Status: DISCONTINUED | OUTPATIENT
Start: 2024-10-30 | End: 2024-11-08 | Stop reason: HOSPADM

## 2024-10-30 RX ORDER — HYDRALAZINE HYDROCHLORIDE 50 MG/1
100 TABLET, FILM COATED ORAL EVERY 8 HOURS SCHEDULED
Status: DISCONTINUED | OUTPATIENT
Start: 2024-10-30 | End: 2024-11-08 | Stop reason: HOSPADM

## 2024-10-30 RX ORDER — ACETAMINOPHEN 325 MG/1
650 TABLET ORAL EVERY 4 HOURS PRN
Status: DISCONTINUED | OUTPATIENT
Start: 2024-10-30 | End: 2024-11-08 | Stop reason: HOSPADM

## 2024-10-30 RX ORDER — LANOLIN ALCOHOL/MO/W.PET/CERES
1000 CREAM (GRAM) TOPICAL DAILY
Status: DISCONTINUED | OUTPATIENT
Start: 2024-10-31 | End: 2024-11-08 | Stop reason: HOSPADM

## 2024-10-30 RX ORDER — TERAZOSIN 5 MG/1
5 CAPSULE ORAL NIGHTLY
Status: DISCONTINUED | OUTPATIENT
Start: 2024-10-30 | End: 2024-11-08 | Stop reason: HOSPADM

## 2024-10-30 RX ORDER — MORPHINE SULFATE 15 MG/1
15 TABLET, FILM COATED, EXTENDED RELEASE ORAL 2 TIMES DAILY
Status: DISCONTINUED | OUTPATIENT
Start: 2024-10-30 | End: 2024-11-07

## 2024-10-30 RX ORDER — DEXTROSE MONOHYDRATE 25 G/50ML
25 INJECTION, SOLUTION INTRAVENOUS
Status: DISCONTINUED | OUTPATIENT
Start: 2024-10-30 | End: 2024-10-31

## 2024-10-30 RX ORDER — FUROSEMIDE 10 MG/ML
60 INJECTION INTRAMUSCULAR; INTRAVENOUS ONCE
Status: COMPLETED | OUTPATIENT
Start: 2024-10-30 | End: 2024-10-31

## 2024-10-30 RX ORDER — SODIUM CHLORIDE 0.9 % (FLUSH) 0.9 %
10 SYRINGE (ML) INJECTION AS NEEDED
Status: DISCONTINUED | OUTPATIENT
Start: 2024-10-30 | End: 2024-11-08 | Stop reason: HOSPADM

## 2024-10-30 RX ORDER — L.ACID,PARA/B.BIFIDUM/S.THERM 8B CELL
1 CAPSULE ORAL DAILY
Status: DISCONTINUED | OUTPATIENT
Start: 2024-10-31 | End: 2024-11-08 | Stop reason: HOSPADM

## 2024-10-30 RX ORDER — AMLODIPINE BESYLATE 5 MG/1
5 TABLET ORAL
Status: DISCONTINUED | OUTPATIENT
Start: 2024-10-31 | End: 2024-11-03

## 2024-10-30 RX ORDER — CLONIDINE HYDROCHLORIDE 0.1 MG/1
0.1 TABLET ORAL 3 TIMES DAILY
Status: DISCONTINUED | OUTPATIENT
Start: 2024-10-30 | End: 2024-10-31

## 2024-10-30 RX ORDER — FERROUS SULFATE 325(65) MG
325 TABLET ORAL
Status: DISCONTINUED | OUTPATIENT
Start: 2024-10-31 | End: 2024-11-08 | Stop reason: HOSPADM

## 2024-10-30 RX ORDER — IBUPROFEN 600 MG/1
1 TABLET ORAL
Status: DISCONTINUED | OUTPATIENT
Start: 2024-10-30 | End: 2024-10-31

## 2024-10-30 RX ORDER — CALCITRIOL 0.25 UG/1
0.5 CAPSULE, LIQUID FILLED ORAL DAILY
Status: DISCONTINUED | OUTPATIENT
Start: 2024-10-31 | End: 2024-11-08 | Stop reason: HOSPADM

## 2024-10-30 RX ORDER — INSULIN LISPRO 100 [IU]/ML
2-7 INJECTION, SOLUTION INTRAVENOUS; SUBCUTANEOUS
Status: DISCONTINUED | OUTPATIENT
Start: 2024-10-30 | End: 2024-10-31

## 2024-10-30 RX ORDER — AMOXICILLIN 250 MG
2 CAPSULE ORAL 2 TIMES DAILY PRN
Status: DISCONTINUED | OUTPATIENT
Start: 2024-10-30 | End: 2024-11-08 | Stop reason: HOSPADM

## 2024-10-30 RX ORDER — TORSEMIDE 20 MG/1
20 TABLET ORAL DAILY PRN
Status: DISCONTINUED | OUTPATIENT
Start: 2024-10-30 | End: 2024-11-08 | Stop reason: HOSPADM

## 2024-10-30 RX ORDER — ACETAMINOPHEN 650 MG/1
650 SUPPOSITORY RECTAL EVERY 4 HOURS PRN
Status: DISCONTINUED | OUTPATIENT
Start: 2024-10-30 | End: 2024-11-08 | Stop reason: HOSPADM

## 2024-10-30 RX ORDER — BISACODYL 5 MG/1
5 TABLET, DELAYED RELEASE ORAL DAILY PRN
Status: DISCONTINUED | OUTPATIENT
Start: 2024-10-30 | End: 2024-11-08 | Stop reason: HOSPADM

## 2024-10-30 RX ORDER — PANTOPRAZOLE SODIUM 40 MG/1
40 TABLET, DELAYED RELEASE ORAL 2 TIMES DAILY
Status: DISCONTINUED | OUTPATIENT
Start: 2024-10-30 | End: 2024-11-08 | Stop reason: HOSPADM

## 2024-10-30 RX ORDER — BISACODYL 10 MG
10 SUPPOSITORY, RECTAL RECTAL DAILY PRN
Status: DISCONTINUED | OUTPATIENT
Start: 2024-10-30 | End: 2024-11-08 | Stop reason: HOSPADM

## 2024-10-30 RX ORDER — ACETAMINOPHEN 160 MG/5ML
650 SOLUTION ORAL EVERY 4 HOURS PRN
Status: DISCONTINUED | OUTPATIENT
Start: 2024-10-30 | End: 2024-11-08 | Stop reason: HOSPADM

## 2024-10-30 RX ORDER — ATORVASTATIN CALCIUM 40 MG/1
80 TABLET, FILM COATED ORAL NIGHTLY
Status: DISCONTINUED | OUTPATIENT
Start: 2024-10-30 | End: 2024-11-08 | Stop reason: HOSPADM

## 2024-10-30 RX ORDER — SODIUM CHLORIDE 0.9 % (FLUSH) 0.9 %
10 SYRINGE (ML) INJECTION EVERY 12 HOURS SCHEDULED
Status: DISCONTINUED | OUTPATIENT
Start: 2024-10-30 | End: 2024-11-08 | Stop reason: HOSPADM

## 2024-10-30 RX ORDER — CARVEDILOL 12.5 MG/1
25 TABLET ORAL EVERY 12 HOURS SCHEDULED
Status: DISCONTINUED | OUTPATIENT
Start: 2024-10-30 | End: 2024-11-08 | Stop reason: HOSPADM

## 2024-10-30 RX ADMIN — PANTOPRAZOLE SODIUM 40 MG: 40 TABLET, DELAYED RELEASE ORAL at 22:37

## 2024-10-30 RX ADMIN — MORPHINE SULFATE 15 MG: 15 TABLET, FILM COATED, EXTENDED RELEASE ORAL at 22:37

## 2024-10-30 RX ADMIN — ATORVASTATIN CALCIUM 80 MG: 40 TABLET, FILM COATED ORAL at 22:37

## 2024-10-30 RX ADMIN — CARVEDILOL 25 MG: 12.5 TABLET, FILM COATED ORAL at 22:37

## 2024-10-30 RX ADMIN — CLONIDINE HYDROCHLORIDE 0.1 MG: 0.1 TABLET ORAL at 22:38

## 2024-10-30 RX ADMIN — TERAZOSIN HYDROCHLORIDE 5 MG: 5 CAPSULE ORAL at 22:37

## 2024-10-30 NOTE — H&P
[facility] Medicine Services  HISTORY AND PHYSICAL    Patient Name: Yanique Webster  : 1940  MRN: [id number]  Primary Care Physician: Sebas Alicea MD  Date of admission: 10/30/2024    Subjective   Subjective     Chief Complaint:  Shortness of air    HPI:  Yanique Webster is a 84 y.o. female with past medical history significant for CAD, COPD, essential hypertension, chronic pain, Takotsubo cardiomyopathy, GERD, hyperlipidemia, CKD stage III, paroxysmal atrial fibrillation on Eliquis and PVD presents to the ED with worsening shortness of breath over the past 24 hours.  Patient describes increased shortness of breath even at rest.  She denies any associated cough, fever, chills, nausea, vomiting, abdominal pain, dizziness or chest pain.  Workup in the ED tonight revealed hemoglobin of 6.1 and hematocrit of 19.6.  Patient denies any melena or hematochezia.      She was admitted to [facility] from 2024 to 2024 secondary to symptomatic anemia.  GI was consulted and patient underwent EGD and colonoscopy that showed no active source of bleeding and was likely nutritional component of anemia.  Audiology was consulted and agreeable to trial anticoagulation due to high [id number].  Referral was placed to EP for consideration of watchman's device.        Review of Systems   Constitutional:  Positive for activity change and fatigue. Negative for appetite change, chills, diaphoresis, fever and unexpected weight change.   HENT: Negative.     Eyes:  Negative for photophobia and visual disturbance.   Respiratory:  Positive for shortness of breath. Negative for cough.    Cardiovascular:  Positive for leg swelling. Negative for chest pain and palpitations.   Gastrointestinal:  Negative for abdominal distention, abdominal pain, blood in stool, constipation, diarrhea, nausea and vomiting.   Genitourinary:  Negative for difficulty urinating, dysuria, flank pain  and frequency.   Musculoskeletal:  Negative for back pain, neck pain and neck stiffness.   Skin: Negative.    Neurological:  Negative for dizziness, speech difficulty, weakness, light-headedness and headaches.   Psychiatric/Behavioral: Negative.                  Personal History     Past Medical History:   Diagnosis Date    Blurry vision     Chronic joint pain     Chronic pain     COPD (chronic obstructive pulmonary disease)     Coronary artery disease     Diabetic gastroparesis 08/24/2016    Esophageal stricture     s/p dilation in 2007    GERD (gastroesophageal reflux disease)     Gout     Headache     secondary to hypertension    Hyperlipidemia     Hypertension     Long term current use of insulin     Neuropathy     Neuropathy due to type 2 diabetes mellitus     Obesity     Osteoarthritis     Pericarditis 2008    Stroke 03/2019    Type 2 diabetes mellitus              Past Surgical History:   Procedure Laterality Date    BRONCHOSCOPY N/A 8/23/2016    Procedure: BRONCHOSCOPY BIOPSY AT BEDSIDE;  Surgeon: Mookie Willard MD;  Location:  [facility] ENDOSCOPY;  Service:     CARDIAC CATHETERIZATION N/A 8/30/2016    Procedure: Left Heart Cath;  Surgeon: Steve Geronimo MD;  Location:  [facility] CATH INVASIVE [facility]ATION;  Service:     CARDIAC CATHETERIZATION N/A 8/30/2016    Procedure: Right Heart Cath;  Surgeon: Steve Geronimo MD;  Location:  [facility] CATH INVASIVE LOCATION;  Service:     CARDIAC ELECTROPHYSIOLOGY PROCEDURE N/A 7/2/2019    Procedure: Loop insertion;  Surgeon: Steve Geronimo MD;  Location:  [facility] CATH INVASIVE LOCATION;  Service: Cardiovascular    CARDIAC ELECTROPHYSIOLOGY PROCEDURE N/A 9/26/2023    Procedure: Loop recorder removal;  Surgeon: Steve Geronimo MD;  Location:  [facility] CATH INVASIVE LOCATION;  Service: Cardiovascular;  Laterality: N/A;    CATARACT EXTRACTION      COLONOSCOPY N/A 8/16/2024    Procedure: COLONOSCOPY;  Surgeon: Brunner, Mark I, MD;  Location:  [facility] END[facility];  Service: Gastroenterology;   Laterality: N/A;    ENDOSCOPY N/A 8/14/2024    Procedure: ESOPHAGOGASTRODUODENOSCOPY;  Surgeon: Brunner, Mark I, MD;  Location: [facility] ENDOSCOPY;  Service: Gastroenterology;  Laterality: N/A;    ESOPHAGEAL DILATATION  2007    HERNIA REPAIR      HIP CANNULATED SCREW PLACEMENT Left 1/2/2024    Procedure: HIP CANNULATED SCREW PLACEMENT LEFT;  Surgeon: Seymour Harrington MD;  Location: [facility] OR;  Service: Orthopedics;  Laterality: Left;    HYSTERECTOMY  1998    WRIST FRACTURE SURGERY Left        Family History:  family history includes Breast cancer (age of onset: 67) in her sister; Cancer in her father, mother, and another family member.     Social History:  reports that she quit smoking about 21 years ago. Her smoking use included cigarettes. She has never used smokeless tobacco. She reports that she does not drink alcohol and does not use drugs.  Social History     Social History Narrative    ** Merged History Encounter **         Mrs. Webster is  and lives with her  in [place]. 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 She smoked 1ppd for 25 years but quit in 2000. Denies ETOH/illicit drug use.        Medications:  Accu-Chek Guide, Blood Glucose Test, DULoxetine, Insulin Glargine, Insulin Pen Needle, Lancets 33G, Morphine, amLODIPine, atorvastatin, calcitriol, carvedilol, cloNIDine, doxazosin, ferrous sulfate, hydrALAZINE, ipratropium-albuterol, pantoprazole, probiotic, rivaroxaban, torsemide, and vitamin B-12    Allergies   Allergen Reactions    Penicillins     Latex Rash     Not an immediate reaction    Nickel Rash    Penicillins Rash     unk       Objective   Objective     Vital Signs:   Temp:  [97.8 °F (36.6 °C)-98.3 °F (36.8 °C)] 97.8 °F (36.6 °C)  Heart Rate:  [63-68] 68  Resp:  [20] 20  BP: (145-169)/(59-76) 159/73    Physical Exam  Vitals and nursing note reviewed.   Constitutional:       General: She is not in acute distress.     Appearance: Normal appearance.  She is not ill-appearing, toxic-appearing or diaphoretic.   HENT:      Head: Normocephalic.      Nose: Nose normal.      Mouth/Throat:      Mouth: Mucous membranes are dry.      Pharynx: Oropharynx is clear.   Eyes:      Extraocular Movements: Extraocular movements intact.      Conjunctiva/sclera: Conjunctivae normal.      Pupils: Pupils are equal, round, and reactive to light.   Cardiovascular:      Rate and Rhythm: Normal rate and regular rhythm.      Pulses: Normal pulses.      Heart sounds: Normal heart sounds.   Pulmonary:      Effort: Pulmonary effort is normal.      Breath sounds: Rales present.   Abdominal:      General: Bowel sounds are normal. There is no distension.      Palpations: Abdomen is soft. There is no mass.      Tenderness: There is no abdominal tenderness. There is no right CVA tenderness, left CVA tenderness, guarding or rebound.      Hernia: No hernia is present.   Musculoskeletal:         General: No swelling, tenderness, deformity or signs of injury. Normal range of motion.      Cervical back: Normal range of motion and neck supple.      Right lower leg: Edema present.      Left lower leg: Edema present.      Comments: 3+ pitting edema to right lower extremity, 2+ pitting edema to left lower extremity   Skin:     General: Skin is warm and dry.   Neurological:      General: No focal deficit present.      Mental Status: She is alert and oriented to person, place, and time. Mental status is at baseline.   Psychiatric:         Mood and Affect: Mood normal.         Behavior: Behavior normal.         Thought Content: Thought content normal.         Judgment: Judgment normal.            Result Review:  I have personally reviewed the results from the time of this admission to 10/30/2024 19:33 EDT and agree with these findings:  [x]  Laboratory list / accordion  [x]  Microbiology  [x]  Radiology  [x]  EKG/Telemetry   []  Cardiology/Vascular   []  Pathology  []  Old records  []  Other:  Most notable  findings include:     LAB RESULTS:      Lab 10/30/24  1626   WBC 4.71   HEMOGLOBIN 6.1*   HEMATOCRIT 19.6*   PLATELETS 128*   NEUTROS ABS 2.91   IMMATURE GRANS (ABS) 0.02   LYMPHS ABS 1.15   MONOS ABS 0.46   EOS ABS 0.13   MCV 90.3   PROCALCITONIN 0.09   LACTATE 0.6         Lab 10/30/24  1626 10/29/24  1143   SODIUM 137  --    POTASSIUM 4.3  --    CHLORIDE 106  --    CO2 18.0*  --    ANION GAP 13.0  --    BUN 59*  --    CREATININE 3.65*  --    EGFR 11.8*  --    GLUCOSE 115*  --    CALCIUM 9.7  --    HEMOGLOBIN A1C  --  5.0         Lab 10/30/24  1626   TOTAL PROTEIN 5.7*   ALBUMIN 3.4*   GLOBULIN 2.3   ALT (SGPT) 18   AST (SGOT) 24   BILIRUBIN 0.3   ALK PHOS 91         Lab 10/30/24  1840 10/30/24  1626   PROBNP  --  5,438.0*   HSTROP T 62* 66*             Lab 10/30/24  1654   ABO TYPING O   RH TYPING Negative   ANTIBODY SCREEN Negative         Lab 10/30/24  1640   PH, ARTERIAL 7.301*   PCO2, ARTERIAL 36.5   PO2 ART 73.3*   FIO2 21   HCO3 ART 18.0*   BASE EXCESS ART -7.8*   CARBOXYHEMOGLOBIN 1.4     Brief Urine Lab Results  (Last result in the past 365 days)        Color   Clarity   Blood   Leuk Est   Nitrite   Protein   CREAT   Urine HCG        08/13/24 2000             52.4         08/13/24 2000 Yellow   Clear   Negative   Negative   Negative   >=300 mg/dL (3+)                 Microbiology Results (last 10 days)       ** No results found for the last 240 hours. **            XR Chest 1 View    Result Date: 10/30/2024  XR CHEST 1 VW Date of Exam: 10/30/2024 3:59 PM EDT Indication: SOA triage protocol Comparison: 8/16/2024 Findings: Unchanged enlarged cardiac silhouette. Mild diffuse interstitial opacities. Trace bilateral pleural effusions. No pneumothorax. No acute osseous abnormality..     Impression: Impression: Enlarged cardiac silhouette with mild pulmonary edema and trace bilateral pleural effusions. Electronically Signed: Adrian Barnett MD  10/30/2024 4:27 PM EDT  Workstation ID: [id number]     Results for orders  placed during the hospital encounter of 12/30/23    Adult Transthoracic Echo Complete W/ Cont if Necessary Per Protocol    Interpretation Summary    Left ventricular ejection fraction appears to be 56 - 60%.    Left ventricular wall thickness is consistent with mild concentric hypertrophy    The aortic valve exhibits sclerosis.    Mild aortic valve regurgitation is present. No hemodynamically significant aortic valve stenosis is present.    Mild tricuspid valve regurgitation is present. Estimated right ventricular systolic pressure from tricuspid regurgitation is moderately elevated (45-55 mmHg    Moderate mitral annular calcification is present. Trace to mild mitral valve regurgitation is present. No significant mitral valve stenosis is present.      Assessment & Plan   Assessment & Plan       Type 2 diabetes mellitus    Takotsubo cardiomyopathy    Hyperlipidemia LDL goal <70    COPD (chronic obstructive pulmonary disease)    GERD (gastroesophageal reflux disease)    Coronary artery disease involving native coronary artery of native heart without angina pectoris    Essential hypertension    History of CVA (cerebrovascular accident)    CKD (chronic kidney disease) stage 3, GFR 30-59 ml/min    Paroxysmal atrial fibrillation    Anxiety associated with depression    Symptomatic anemia    84-year-old female presents to the ED with worsening shortness of breath over the past 24 hours.    1) symptomatic anemia      Dyspnea  - ABG noted above  - H&H 6.1 and 19.6  - Type and screen  - Transfuse 2 units of packed red blood cells, will give Lasix in between.  -Occult stool negative  - Check anemia panel  - Obtain posttransfusion H&H  -Hold Eliquis  -Chest x-ray shows enlarged cardiac silhouette with mild pulmonary edema and trace bilateral pleural effusions.    2) acute renal failure superimposed on CKD stage III      Secondary hyperparathyroid  -Baseline creatinine 1.9-2, currently 3.65  - Hold nephrotoxic agents  - Consult  nephrology in a.m.  - Check PTH  -Check SPEP    3) paroxysmal atrial fibrillation      Takotsubo cardiomyopathy  -Hold Xarelto due to #1  - Chest x-ray mentioned above  - Continue home torsemide  -Follows with cardiology  - Recently seen by EP for possible LAAO with Watchman    4) diabetes mellitus type 2  - Check hemoglobin A1c  - Fingersticks before meals and at bedtime  - Start sliding scale insulin    5) COPD  -Continue home inhalers  - Continuous pulse ox  - Keep O2 sat greater than 90%  - Scheduled and as needed DuoNebs    6) essential hypertension      Hyperlipidemia  - Continue clonidine, Coreg and hydralazine, amlodipine  -Hold lisinopril due to #2    7) chronic pain  - Continue home regimen of MS Contin 15 mg every 12 hours        DVT prophylaxis:  scds    CODE STATUS: Full code       Expected Discharge  TBD     This note has been completed as part of a split-shared workflow.     Signature: Electronically signed by CHRIS Claros, 10/30/24, 7:52 PM EDT.

## 2024-10-30 NOTE — ED PROVIDER NOTES
Subjective  History of Present Illness:    Chief Complaint: Shortness of breath, weakness, reported abnormal lab low hemoglobin  History of Present Illness: 84-year-old female brought in via EMS for report of low hemoglobin that was drawn at her primary care physician's office this week, and shortness of breath.  She was placed on oxygen upon arrival, she states she is not normally on oxygen.  She is significant past medical history of coronary artery disease, COPD, hypertension, diabetes, chronic pain.  She was recently in the [facility] on August 12 through August 19, 2024.  At that time she had symptomatic anemia, and melena.  She received 1 unit packed red blood cells, she had an EGD and a colonoscopy performed at that time that showed no active bleeding.  She denies any dark stools.  She states that is difficult to perform activities because she is so short of breath.  Patient is on Xarelto, for A-fib  Onset: Gradual  Duration: Several days  Exacerbating / Alleviating factors: Patient states that she feels weak, when performing any activities  Associated symptoms: Shortness of breath, weakness      Nurses Notes reviewed and agree, including vitals, allergies, social history and prior medical history.     REVIEW OF SYSTEMS: All systems reviewed and not pertinent unless noted.    Review of Systems   Constitutional:  Positive for fatigue.   Respiratory:  Positive for shortness of breath.    Neurological:  Positive for weakness.   All other systems reviewed and are negative.      Past Medical History:   Diagnosis Date    Blurry vision     Chronic joint pain     Chronic pain     COPD (chronic obstructive pulmonary disease)     Coronary artery disease     Diabetic gastroparesis 08/24/2016    Esophageal stricture     s/p dilation in 2007    GERD (gastroesophageal reflux disease)     Gout     Headache     secondary to hypertension    Hyperlipidemia     Hypertension     Long term current use of  insulin     Neuropathy     Neuropathy due to type 2 diabetes mellitus     Obesity     Osteoarthritis     Pericarditis 2008    Stroke 03/2019    Type 2 diabetes mellitus        Allergies:    Penicillins, Latex, Nickel, and Penicillins      Past Surgical History:   Procedure Laterality Date    BRONCHOSCOPY N/A 8/23/2016    Procedure: BRONCHOSCOPY BIOPSY AT BEDSIDE;  Surgeon: Mookie Willard MD;  Location:  [facility];  Service:     CARDIAC CATHETERIZATION N/A 8/30/2016    Procedure: Left Heart Cath;  Surgeon: Steve Geronimo MD;  Location:  [facility];  Service:     CARDIAC CATHETERIZATION N/A 8/30/2016    Procedure: Right Heart Cath;  Surgeon: Steve Geronimo MD;  Location:  [facility];  Service:     CARDIAC ELECTROPHYSIOLOGY PROCEDURE N/A 7/2/2019    Procedure: Loop insertion;  Surgeon: Steve Geronimo MD;  Location:  [facility];  Service: Cardiovascular    CARDIAC ELECTROPHYSIOLOGY PROCEDURE N/A 9/26/2023    Procedure: Loop recorder removal;  Surgeon: Steve Geronimo MD;  Location:  [facility];  Service: Cardiovascular;  Laterality: N/A;    CATARACT EXTRACTION      COLONOSCOPY N/A 8/16/2024    Procedure: COLONOSCOPY;  Surgeon: Brunner, Mark I, MD;  Location:  [facility];  Service: Gastroenterology;  Laterality: N/A;    ENDOSCOPY N/A 8/14/2024    Procedure: ESOPHAGOGASTRODUODENOSCOPY;  Surgeon: Brunner, Mark I, MD;  Location:  [facility];  Service: Gastroenterology;  Laterality: N/A;    ESOPHAGEAL DILATATION  2007    HERNIA REPAIR      HIP CANNULATED SCREW PLACEMENT Left 1/2/2024    Procedure: HIP CANNULATED SCREW PLACEMENT LEFT;  Surgeon: Seymour Harrington MD;  Location:  [facility] OR;  Service: Orthopedics;  Laterality: Left;    HYSTERECTOMY  1998    WRIST FRACTURE SURGERY Left          Social History     Socioeconomic History    Marital status:    Tobacco Use    Smoking status: Former     Current packs/day: 0.00     Types:  "Cigarettes     Quit date: 2003     Years since quittin.8    Smokeless tobacco: Never   Vaping Use    Vaping status: Never Used   Substance and Sexual Activity    Alcohol use: No    Drug use: No    Sexual activity: Not Currently     Partners: Male     Birth control/protection: Pill, Hysterectomy         Family History   Problem Relation Age of Onset    Cancer Mother     Cancer Father     Cancer Other     Breast cancer Sister 67    Ovarian cancer Neg Hx        Objective  Physical Exam:  /77   Pulse 71   Temp 97.7 °F (36.5 °C) (Oral)   Resp 18   Ht 162.6 cm (64\")   Wt 83.5 kg (184 lb)   LMP  (LMP Unknown)   SpO2 95%   BMI 31.58 kg/m²      Physical Exam  Vitals and nursing note reviewed.   Constitutional:       Appearance: She is ill-appearing.   HENT:      Head: Normocephalic and atraumatic.   Cardiovascular:      Rate and Rhythm: Normal rate and regular rhythm.   Pulmonary:      Effort: Pulmonary effort is normal.      Breath sounds: Decreased breath sounds present.   Abdominal:      Palpations: Abdomen is soft.   Musculoskeletal:         General: Normal range of motion.      Cervical back: Normal range of motion and neck supple.   Skin:     General: Skin is warm and dry.   Neurological:      Mental Status: She is oriented to person, place, and time.      Deep Tendon Reflexes: Reflexes are normal and symmetric.           Critical Care    Performed by: Terrence Patel Jr. PAAyanaC  Authorized by: Cristhian Rivera DO    Critical care provider statement:     Critical care time (minutes):  30    Critical care time was exclusive of:  Separately billable procedures and treating other patients and teaching time    Critical care was necessary to treat or prevent imminent or life-threatening deterioration of the following conditions: Acute blood loss anemia requiring 2 units packed red blood cells, and admission.    Critical care was time spent personally by me on the following activities:  Blood " draw for specimens, development of treatment plan with patient or surrogate, discussions with consultants, discussions with primary provider, evaluation of patient's response to treatment, interpretation of cardiac output measurements, obtaining history from patient or surrogate, ordering and performing treatments and interventions, ordering and review of laboratory studies, re-evaluation of patient's condition, pulse oximetry, ordering and review of radiographic studies, review of old charts and examination of patient    Care discussed with: admitting provider        ED Course:    ED Course as of 10/30/24 2224   Wed Oct 30, 2024   1711 Occult stool negative [CS]   1711 Review of previous  non ED visits, prior labs, prior imaging, available notes from prior evaluations or visits with specialists, medication list, allergies, past medical history, past surgical history     [CS]   1712 I Personally reviewed all laboratory studies performed in the emergency department  [CS]   1739 Message sent to the hospitalist for admission [CS]      ED Course User Index  [CS] Terrence Patel Jr., NEGAR       Lab Results (last 24 hours)       Procedure Component Value Units Date/Time    CBC & Differential [[id number]]  (Abnormal) Collected: 10/30/24 1626    Specimen: Blood Updated: 10/30/24 1649    Narrative:      The following orders were created for panel order CBC & Differential.  Procedure                               Abnormality         Status                     ---------                               -----------         ------                     CBC Auto Differential[[id number]]        Abnormal            Final result                 Please view results for these tests on the individual orders.    Comprehensive Metabolic Panel [[id number]]  (Abnormal) Collected: 10/30/24 1626    Specimen: Blood Updated: 10/30/24 1704     Glucose 115 mg/dL      BUN 59 mg/dL      Creatinine 3.65 mg/dL      Sodium 137 mmol/L      Potassium 4.3  mmol/L      Chloride 106 mmol/L      CO2 18.0 mmol/L      Calcium 9.7 mg/dL      Total Protein 5.7 g/dL      Albumin 3.4 g/dL      ALT (SGPT) 18 U/L      AST (SGOT) 24 U/L      Alkaline Phosphatase 91 U/L      Total Bilirubin 0.3 mg/dL      Globulin 2.3 gm/dL      Comment: Calculated Result        A/G Ratio 1.5 g/dL      BUN/Creatinine Ratio 16.2     Anion Gap 13.0 mmol/L      eGFR 11.8 mL/min/1.73      Comment: <15 Indicative of kidney failure       Narrative:      GFR Normal >60  Chronic Kidney Disease <60  Kidney Failure <15    The GFR formula is only valid for adults with stable renal function between ages 18 and 70.    BNP [[id number]]  (Abnormal) Collected: 10/30/24 1626    Specimen: Blood Updated: 10/30/24 1703     proBNP 5,438.0 pg/mL     Narrative:      This assay is used as an aid in the diagnosis of individuals suspected of having heart failure. It can be used as an aid in the diagnosis of acute decompensated heart failure (ADHF) in patients presenting with signs and symptoms of ADHF to the emergency department (ED). In addition, NT-proBNP of <300 pg/mL indicates ADHF is not likely.    Age Range Result Interpretation  NT-proBNP Concentration (pg/mL:      <50             Positive            >450                   Gray                 300-450                    Negative             <300    50-75           Positive            >900                  Gray                300-900                  Negative            <300      >75             Positive            >1800                  Gray                300-1800                  Negative            <300    Single High Sensitivity Troponin T [[id number]]  (Abnormal) Collected: 10/30/24 1626    Specimen: Blood Updated: 10/30/24 1712     HS Troponin T 66 ng/L     Narrative:      High Sensitive Troponin T Reference Range:  <14.0 ng/L- Negative Female for AMI  <22.0 ng/L- Negative Male for AMI  >=14 - Abnormal Female indicating possible myocardial injury.  >=22 -  Abnormal Male indicating possible myocardial injury.   Clinicians would have to utilize clinical acumen, EKG, Troponin, and serial changes to determine if it is an Acute Myocardial Infarction or myocardial injury due to an underlying chronic condition.         CBC Auto Differential [[id number]]  (Abnormal) Collected: 10/30/24 1626    Specimen: Blood Updated: 10/30/24 1649     WBC 4.71 10*3/mm3      RBC 2.17 10*6/mm3      Hemoglobin 6.1 g/dL      Hematocrit 19.6 %      MCV 90.3 fL      MCH 28.1 pg      MCHC 31.1 g/dL      RDW 16.6 %      RDW-SD 54.5 fl      MPV 10.7 fL      Platelets 128 10*3/mm3      Neutrophil % 61.8 %      Lymphocyte % 24.4 %      Monocyte % 9.8 %      Eosinophil % 2.8 %      Basophil % 0.8 %      Immature Grans % 0.4 %      Neutrophils, Absolute 2.91 10*3/mm3      Lymphocytes, Absolute 1.15 10*3/mm3      Monocytes, Absolute 0.46 10*3/mm3      Eosinophils, Absolute 0.13 10*3/mm3      Basophils, Absolute 0.04 10*3/mm3      Immature Grans, Absolute 0.02 10*3/mm3      nRBC 0.0 /100 WBC     Lactic Acid, Plasma [[id number]]  (Normal) Collected: 10/30/24 1626    Specimen: Blood Updated: 10/30/24 1656     Lactate 0.6 mmol/L      Comment: Falsely depressed results may occur on samples drawn from patients receiving N-Acetylcysteine (NAC) or Metamizole.       Procalcitonin [[id number]]  (Normal) Collected: 10/30/24 1626    Specimen: Blood Updated: 10/30/24 1706     Procalcitonin 0.09 ng/mL     Narrative:      As a Marker for Sepsis (Non-Neonates):    1. <0.5 ng/mL represents a low risk of severe sepsis and/or septic shock.  2. >2 ng/mL represents a high risk of severe sepsis and/or septic shock.    As a Marker for Lower Respiratory Tract Infections that require antibiotic therapy:    PCT on Admission    Antibiotic Therapy       6-12 Hrs later    >0.5                Strongly Recommended  >0.25 - <0.5        Recommended   0.1 - 0.25          Discouraged              Remeasure/reassess PCT  <0.1                 "Strongly Discouraged     Remeasure/reassess PCT    As 28 day mortality risk marker: \"Change in Procalcitonin Result\" (>80% or <=80%) if Day 0 (or Day 1) and Day 4 values are available. Refer to [email or web address]-pct-[email or web address]    Change in PCT <=80%  A decrease of PCT levels below or equal to 80% defines a positive change in PCT test result representing a higher risk for 28-day all-cause mortality of patients diagnosed with severe sepsis for septic shock.    Change in PCT >80%  A decrease of PCT levels of more than 80% defines a negative change in PCT result representing a lower risk for 28-day all-cause mortality of patients diagnosed with severe sepsis or septic shock.       Reticulocytes [[id number]]  (Abnormal) Collected: 10/30/24 1626    Specimen: Blood Updated: 10/30/24 2043     Reticulocyte % 2.58 %      Reticulocyte Absolute 0.0547 10*6/mm3     Vitamin B12 [[id number]] Collected: 10/30/24 1626    Specimen: Blood Updated: 10/30/24 2053    Folate [[id number]] Collected: 10/30/24 1626    Specimen: Blood Updated: 10/30/24 2053    Blood Gas, Arterial With Co-Ox [[id number]]  (Abnormal) Collected: 10/30/24 1640    Specimen: Arterial Blood Updated: 10/30/24 1640     Site Left Radial     Mikey's Test Positive     pH, Arterial 7.301 pH units      Comment: 84 Value below reference range        pCO2, Arterial 36.5 mm Hg      pO2, Arterial 73.3 mm Hg      Comment: 84 Value below reference range        HCO3, Arterial 18.0 mmol/L      Base Excess, Arterial -7.8 mmol/L      Hemoglobin, Blood Gas 6.1 g/dL      Comment: 85 Value below critical limit        Hematocrit, Blood Gas 18.7 %      Oxyhemoglobin 93.0 %      Comment: 84 Value below reference range        Methemoglobin 0.90 %      Carboxyhemoglobin 1.4 %      CO2 Content 19.1 mmol/L      Temperature 37.0     Barometric Pressure for Blood Gas --     Comment: N/A        Modality Room Air     FIO2 21 %      Rate 0 Breaths/minute      PIP 0 cmH2O      Comment: Meter: " [id number]     :  [id number]        IPAP 0     EPAP 0     Notified [facility] HARI HENSON PA-C     Notified By [id number]     Notified Time 10/30/2024 16:46     pH, Temp Corrected 7.301 pH Units      pCO2, Temperature Corrected 36.5 mm Hg      pO2, Temperature Corrected 73.3 mm Hg     POC Occult Blood Stool [[id number]]  (Normal) Resulted: 10/30/24 1758    Specimen: Stool from Per Rectum Updated: 10/30/24 1759     Fecal Occult Blood Negative     Lot Number [id number]     Expiration Date 8/26     DEVELOPER LOT NUMBER [id number]     DEVELOPER EXPIRATION DATE 8/26     Positive Control Positive     Negative Control Negative    High Sensitivity Troponin T 2Hr [[id number]]  (Abnormal) Collected: 10/30/24 1840    Specimen: Blood Updated: 10/30/24 1921     HS Troponin T 62 ng/L      Troponin T Delta -4 ng/L     Narrative:      High Sensitive Troponin T Reference Range:  <14.0 ng/L- Negative Female for AMI  <22.0 ng/L- Negative Male for AMI  >=14 - Abnormal Female indicating possible myocardial injury.  >=22 - Abnormal Male indicating possible myocardial injury.   Clinicians would have to utilize clinical acumen, EKG, Troponin, and serial changes to determine if it is an Acute Myocardial Infarction or myocardial injury due to an underlying chronic condition.         Ferritin [[id number]]  (Normal) Collected: 10/30/24 1840    Specimen: Blood Updated: 10/30/24 2119     Ferritin 103.00 ng/mL     Narrative:      Results may be falsely decreased if patient taking Biotin.      Iron [[id number]]  (Normal) Collected: 10/30/24 1840    Specimen: Blood Updated: 10/30/24 2117     Iron 42 mcg/dL     Iron Profile [[id number]]  (Abnormal) Collected: 10/30/24 1840    Specimen: Blood Updated: 10/30/24 2117     Iron 42 mcg/dL      Iron Saturation (TSAT) 17 %      Transferrin 164 mg/dL      TIBC 244 mcg/dL     POC Glucose Once [[id number]]  (Normal) Collected: 10/30/24 2210    Specimen: Blood Updated: 10/30/24 2211     Glucose 117 mg/dL              XR Chest  1 View    Result Date: 10/30/2024  XR CHEST 1 VW Date of Exam: 10/30/2024 3:59 PM EDT Indication: SOA triage protocol Comparison: 8/16/2024 Findings: Unchanged enlarged cardiac silhouette. Mild diffuse interstitial opacities. Trace bilateral pleural effusions. No pneumothorax. No acute osseous abnormality..     Impression: Impression: Enlarged cardiac silhouette with mild pulmonary edema and trace bilateral pleural effusions. Electronically Signed: Adrian Barnett MD  10/30/2024 4:27 PM EDT  Workstation ID: [id number]                [unfilled]                                       @[facility]@                    Medical Decision Making  Patient Presentation 84-year-old female presented with weakness, shortness of breath, she had outpatient labs prior to arrival and was told that her hemoglobin was low and that she needed to come to Emergency Department for evaluation.  She was ill-appearing and short of breath on my evaluation    DDX blood loss anemia, dehydration, electrolyte abnormality, pneumonia, urinary tract infection, sepsis, bacteremia    Data Review/ Non ED Records /Analysis/Ordering unique tests  Review of previous  non ED visits, prior labs, prior imaging, available notes from prior evaluations or visits with specialists, medication list, allergies, past medical history, past surgical history        Independent Review Studies  I Personally reviewed all laboratory studies performed in the emergency department     Intervention/Re-evaluation intervention included 2 units packed red blood cells    Independent Clinician discussed the case with Dr. Larsen patient was admitted for further care    Risk Stratification tools/clinical decision rules patient presented with weakness, shortness of breath, was concerned about blood loss anemia, pneumonia, dehydration, electrolyte abnormality, she had previous history of acute blood loss anemia with similar symptoms and presentation.  Labs were drawn that showed hemoglobin  of 6, packed red blood cells were ordered, discussed the case with the hospitalist, and patient was admitted for further care    Shared Decision Making discussed all finds with patient and family they were agreeable    Disposition patient stable for admission    Problems Addressed:  KINDRA (acute kidney injury): complicated acute illness or injury  Symptomatic anemia: complicated acute illness or injury    Amount and/or Complexity of Data Reviewed  Labs: ordered.  Radiology: ordered.  ECG/medicine tests: ordered.    Risk  Prescription drug management.  Decision regarding hospitalization.          Final diagnoses:   KINDRA (acute kidney injury)   Symptomatic anemia           Disposition admission       Terrence Patel Jr., PA-C  10/30/24 1324

## 2024-10-30 NOTE — ED NOTES
Yanique Quintin Webster    Nursing Report ED to Floor:  Mental status: A & O x 4  Ambulatory status:rollator  Oxygen Therapy:  RA  Cardiac Rhythm: sinus  Admitted from: [facility]  Safety Concerns:  N/A  Social Issues: N/A  ED Room #:  14    ED Nurse Phone Extension - [phone] or may call [phone].      HPI:   Chief Complaint   Patient presents with    Shortness of Breath       Past Medical History:  Past Medical History:   Diagnosis Date    Blurry vision     Chronic joint pain     Chronic pain     COPD (chronic obstructive pulmonary disease)     Coronary artery disease     Diabetic gastroparesis 08/24/2016    Esophageal stricture     s/p dilation in 2007    GERD (gastroesophageal reflux disease)     Gout     Headache     secondary to hypertension    Hyperlipidemia     Hypertension     Long term current use of insulin     Neuropathy     Neuropathy due to type 2 diabetes mellitus     Obesity     Osteoarthritis     Pericarditis 2008    Stroke 03/2019    Type 2 diabetes mellitus         Past Surgical History:  Past Surgical History:   Procedure Laterality Date    BRONCHOSCOPY N/A 8/23/2016    Procedure: BRONCHOSCOPY BIOPSY AT BEDSIDE;  Surgeon: Mookie Willard MD;  Location:  [facility];  Service:     CARDIAC CATHETERIZATION N/A 8/30/2016    Procedure: Left Heart Cath;  Surgeon: Steve Geronimo MD;  Location:  [facility];  Service:     CARDIAC CATHETERIZATION N/A 8/30/2016    Procedure: Right Heart Cath;  Surgeon: Steve Geronimo MD;  Location:  [facility];  Service:     CARDIAC ELECTROPHYSIOLOGY PROCEDURE N/A 7/2/2019    Procedure: Loop insertion;  Surgeon: Steve Geronimo MD;  Location:  [facility];  Service: Cardiovascular    CARDIAC ELECTROPHYSIOLOGY PROCEDURE N/A 9/26/2023    Procedure: Loop recorder removal;  Surgeon: Steve Geronimo MD;  Location:  [facility];  Service: Cardiovascular;  Laterality: N/A;    CATARACT EXTRACTION       COLONOSCOPY N/A 8/16/2024    Procedure: COLONOSCOPY;  Surgeon: Brunner, Mark I, MD;  Location:  [facility];  Service: Gastroenterology;  Laterality: N/A;    ENDOSCOPY N/A 8/14/2024    Procedure: ESOPHAGOGASTRODUODENOSCOPY;  Surgeon: Brunner, Mark I, MD;  Location:  [facility];  Service: Gastroenterology;  Laterality: N/A;    ESOPHAGEAL DILATATION  2007    HERNIA REPAIR      HIP CANNULATED SCREW PLACEMENT Left 1/2/2024    Procedure: HIP CANNULATED SCREW PLACEMENT LEFT;  Surgeon: Seymour Harrington MD;  Location:  [facility];  Service: Orthopedics;  Laterality: Left;    HYSTERECTOMY  1998    WRIST FRACTURE SURGERY Left         Admitting Doctor:   Seda Larsen MD    Consulting Provider(s):  Consults       No orders found from 10/1/2024 to 10/31/2024.             Admitting Diagnosis:   The primary encounter diagnosis was KINDRA (acute kidney injury). A diagnosis of Symptomatic anemia was also pertinent to this visit.    Most Recent Vitals:   Vitals:    10/30/24 1731 10/30/24 1745 10/30/24 1812 10/30/24 1830   BP:  168/73 163/73 145/67   BP Location:  Right arm     Patient Position:  Sitting     Pulse: 65 66 66 66   Resp:       Temp:   97.8 °F (36.6 °C)    TempSrc:   Oral    SpO2: 96% 95% 95% 96%   Weight:       Height:           Active LDAs/IV Access:   Lines, Drains & Airways       Active LDAs       Name Placement date Placement time Site Days    Peripheral IV 10/30/24 1705 Anterior;Left Forearm 10/30/24  1705  Forearm  less than 1    External Urinary Catheter 08/16/24  0430  --  75                    Labs (abnormal labs have a star):   Labs Reviewed   COMPREHENSIVE METABOLIC PANEL - Abnormal; Notable for the following components:       Result Value    Glucose 115 (*)     BUN 59 (*)     Creatinine 3.65 (*)     CO2 18.0 (*)     Total Protein 5.7 (*)     Albumin 3.4 (*)     eGFR 11.8 (*)     All other components within normal limits    Narrative:     GFR Normal >60  Chronic Kidney Disease <60  Kidney Failure <15    The  GFR formula is only valid for adults with stable renal function between ages 18 and 70.   BNP (IN-HOUSE) - Abnormal; Notable for the following components:    proBNP 5,438.0 (*)     All other components within normal limits    Narrative:     This assay is used as an aid in the diagnosis of individuals suspected of having heart failure. It can be used as an aid in the diagnosis of acute decompensated heart failure (ADHF) in patients presenting with signs and symptoms of ADHF to the emergency department (ED). In addition, NT-proBNP of <300 pg/mL indicates ADHF is not likely.    Age Range Result Interpretation  NT-proBNP Concentration (pg/mL:      <50             Positive            >450                   Gray                 300-450                    Negative             <300    50-75           Positive            >900                  Gray                300-900                  Negative            <300      >75             Positive            >1800                  Gray                300-1800                  Negative            <300   SINGLE HS TROPONIN T - Abnormal; Notable for the following components:    HS Troponin T 66 (*)     All other components within normal limits    Narrative:     High Sensitive Troponin T Reference Range:  <14.0 ng/L- Negative Female for AMI  <22.0 ng/L- Negative Male for AMI  >=14 - Abnormal Female indicating possible myocardial injury.  >=22 - Abnormal Male indicating possible myocardial injury.   Clinicians would have to utilize clinical acumen, EKG, Troponin, and serial changes to determine if it is an Acute Myocardial Infarction or myocardial injury due to an underlying chronic condition.        CBC WITH AUTO DIFFERENTIAL - Abnormal; Notable for the following components:    RBC 2.17 (*)     Hemoglobin 6.1 (*)     Hematocrit 19.6 (*)     MCHC 31.1 (*)     RDW 16.6 (*)     RDW-SD 54.5 (*)     Platelets 128 (*)     All other components within normal limits   BLOOD GAS, ARTERIAL  "W/CO-OXIMETRY - Abnormal; Notable for the following components:    pH, Arterial 7.301 (*)     pO2, Arterial 73.3 (*)     HCO3, Arterial 18.0 (*)     Base Excess, Arterial -7.8 (*)     Hemoglobin, Blood Gas 6.1 (*)     Hematocrit, Blood Gas 18.7 (*)     Oxyhemoglobin 93.0 (*)     CO2 Content 19.1 (*)     pO2, Temperature Corrected 73.3 (*)     All other components within normal limits   LACTIC ACID, PLASMA - Normal   PROCALCITONIN - Normal    Narrative:     As a Marker for Sepsis (Non-Neonates):    1. <0.5 ng/mL represents a low risk of severe sepsis and/or septic shock.  2. >2 ng/mL represents a high risk of severe sepsis and/or septic shock.    As a Marker for Lower Respiratory Tract Infections that require antibiotic therapy:    PCT on Admission    Antibiotic Therapy       6-12 Hrs later    >0.5                Strongly Recommended  >0.25 - <0.5        Recommended   0.1 - 0.25          Discouraged              Remeasure/reassess PCT  <0.1                Strongly Discouraged     Remeasure/reassess PCT    As 28 day mortality risk marker: \"Change in Procalcitonin Result\" (>80% or <=80%) if Day 0 (or Day 1) and Day 4 values are available. Refer to http://[email or web address]-calculator.com    Change in PCT <=80%  A decrease of PCT levels below or equal to 80% defines a positive change in PCT test result representing a higher risk for 28-day all-cause mortality of patients diagnosed with severe sepsis for septic shock.    Change in PCT >80%  A decrease of PCT levels of more than 80% defines a negative change in PCT result representing a lower risk for 28-day all-cause mortality of patients diagnosed with severe sepsis or septic shock.      POCT OCCULT BLOOD STOOL - Normal   RAINBOW DRAW    Narrative:     The following orders were created for panel order [place] Draw.  Procedure                               Abnormality         Status                     ---------                               -----------         ------        "              Green Top (Gel)[[id number]]                                  Final result               Lavender Top[[id number]]                                     Final result               Gold Top - SST[[id number]]                                   Final result               Ha Top[[id number]]                                         Final result               Light Blue Top[[id number]]                                   Final result                 Please view results for these tests on the individual orders.   BLOOD GAS, ARTERIAL   HIGH SENSITIVITIY TROPONIN T 2HR   PREPARE RBC   TYPE AND SCREEN   CBC AND DIFFERENTIAL    Narrative:     The following orders were created for panel order CBC & Differential.  Procedure                               Abnormality         Status                     ---------                               -----------         ------                     CBC Auto Differential[[id number]]        Abnormal            Final result                 Please view results for these tests on the individual orders.   GREEN TOP   LAVENDER TOP   GOLD TOP - SST   GRAY TOP   LIGHT BLUE TOP       Meds Given in ED:   Medications   sodium chloride 0.9 % flush 10 mL (has no administration in time range)           Last NIH score:                                                          Dysphagia screening results:  Patient Factors Component (Dysphagia:Stroke or Rule-out)  Best Eye Response: 4-->(E4) spontaneous (10/30/24 1558)  Best Motor Response: 6-->(M6) obeys commands (10/30/24 1558)  Best Verbal Response: 5-->(V5) oriented (10/30/24 1558)  [place] Coma Scale Score: 15 (10/30/24 1558)     [place] Coma Scale:  No data recorded     CIWA:        Restraint Type:            Isolation Status:  No active isolations

## 2024-10-31 PROBLEM — N18.4 CKD (CHRONIC KIDNEY DISEASE) STAGE 4, GFR 15-29 ML/MIN: Status: ACTIVE | Noted: 2019-03-18

## 2024-10-31 LAB
ANION GAP SERPL CALCULATED.3IONS-SCNC: 11 MMOL/L (ref 5–15)
BASOPHILS # BLD AUTO: 0.05 10*3/MM3 (ref 0–0.2)
BASOPHILS NFR BLD AUTO: 0.7 % (ref 0–1.5)
BH BB BLOOD EXPIRATION DATE: NORMAL
BH BB BLOOD EXPIRATION DATE: NORMAL
BH BB BLOOD TYPE BARCODE: 9500
BH BB BLOOD TYPE BARCODE: 9500
BH BB DISPENSE STATUS: NORMAL
BH BB DISPENSE STATUS: NORMAL
BH BB PRODUCT CODE: NORMAL
BH BB PRODUCT CODE: NORMAL
BH BB UNIT NUMBER: NORMAL
BH BB UNIT NUMBER: NORMAL
BUN SERPL-MCNC: 60 MG/DL (ref 8–23)
BUN/CREAT SERPL: 18.4 (ref 7–25)
CALCIUM SPEC-SCNC: 10 MG/DL (ref 8.6–10.5)
CALCIUM SPEC-SCNC: 9.7 MG/DL (ref 8.6–10.5)
CHLORIDE SERPL-SCNC: 109 MMOL/L (ref 98–107)
CO2 SERPL-SCNC: 19 MMOL/L (ref 22–29)
CREAT SERPL-MCNC: 3.26 MG/DL (ref 0.57–1)
CROSSMATCH INTERPRETATION: NORMAL
CROSSMATCH INTERPRETATION: NORMAL
DEPRECATED RDW RBC AUTO: 50.1 FL (ref 37–54)
EGFRCR SERPLBLD CKD-EPI 2021: 13.6 ML/MIN/1.73
EOSINOPHIL # BLD AUTO: 0.2 10*3/MM3 (ref 0–0.4)
EOSINOPHIL NFR BLD AUTO: 3 % (ref 0.3–6.2)
ERYTHROCYTE [DISTWIDTH] IN BLOOD BY AUTOMATED COUNT: 15.4 % (ref 12.3–15.4)
FOLATE SERPL-MCNC: 7.53 NG/ML (ref 4.78–24.2)
GLUCOSE BLDC GLUCOMTR-MCNC: 107 MG/DL (ref 70–130)
GLUCOSE BLDC GLUCOMTR-MCNC: 139 MG/DL (ref 70–130)
GLUCOSE SERPL-MCNC: 107 MG/DL (ref 65–99)
HBA1C MFR BLD: 5.4 % (ref 4.8–5.6)
HCT VFR BLD AUTO: 25.1 % (ref 34–46.6)
HGB BLD-MCNC: 8.2 G/DL (ref 12–15.9)
IMM GRANULOCYTES # BLD AUTO: 0.03 10*3/MM3 (ref 0–0.05)
IMM GRANULOCYTES NFR BLD AUTO: 0.4 % (ref 0–0.5)
LYMPHOCYTES # BLD AUTO: 1.3 10*3/MM3 (ref 0.7–3.1)
LYMPHOCYTES NFR BLD AUTO: 19.4 % (ref 19.6–45.3)
MCH RBC QN AUTO: 29.2 PG (ref 26.6–33)
MCHC RBC AUTO-ENTMCNC: 32.7 G/DL (ref 31.5–35.7)
MCV RBC AUTO: 89.3 FL (ref 79–97)
MONOCYTES # BLD AUTO: 0.6 10*3/MM3 (ref 0.1–0.9)
MONOCYTES NFR BLD AUTO: 8.9 % (ref 5–12)
NEUTROPHILS NFR BLD AUTO: 4.53 10*3/MM3 (ref 1.7–7)
NEUTROPHILS NFR BLD AUTO: 67.6 % (ref 42.7–76)
NRBC BLD AUTO-RTO: 0 /100 WBC (ref 0–0.2)
PLATELET # BLD AUTO: 123 10*3/MM3 (ref 140–450)
PMV BLD AUTO: 11 FL (ref 6–12)
POTASSIUM SERPL-SCNC: 4.4 MMOL/L (ref 3.5–5.2)
PTH-INTACT SERPL-MCNC: 618 PG/ML (ref 15–65)
RBC # BLD AUTO: 2.81 10*6/MM3 (ref 3.77–5.28)
SODIUM SERPL-SCNC: 139 MMOL/L (ref 136–145)
UNIT  ABO: NORMAL
UNIT  ABO: NORMAL
UNIT  RH: NORMAL
UNIT  RH: NORMAL
VIT B12 BLD-MCNC: 1518 PG/ML (ref 211–946)
WBC NRBC COR # BLD AUTO: 6.71 10*3/MM3 (ref 3.4–10.8)

## 2024-10-31 PROCEDURE — 82948 REAGENT STRIP/BLOOD GLUCOSE: CPT

## 2024-10-31 PROCEDURE — 83036 HEMOGLOBIN GLYCOSYLATED A1C: CPT | Performed by: NURSE PRACTITIONER

## 2024-10-31 PROCEDURE — 80048 BASIC METABOLIC PNL TOTAL CA: CPT | Performed by: NURSE PRACTITIONER

## 2024-10-31 PROCEDURE — 25010000002 EPOETIN ALFA-EPBX 40000 UNIT/ML SOLUTION: Performed by: INTERNAL MEDICINE

## 2024-10-31 PROCEDURE — 99221 1ST HOSP IP/OBS SF/LOW 40: CPT | Performed by: INTERNAL MEDICINE

## 2024-10-31 PROCEDURE — 82668 ASSAY OF ERYTHROPOIETIN: CPT | Performed by: INTERNAL MEDICINE

## 2024-10-31 PROCEDURE — 99221 1ST HOSP IP/OBS SF/LOW 40: CPT | Performed by: PHYSICIAN ASSISTANT

## 2024-10-31 PROCEDURE — 85025 COMPLETE CBC W/AUTO DIFF WBC: CPT | Performed by: NURSE PRACTITIONER

## 2024-10-31 PROCEDURE — 87481 CANDIDA DNA AMP PROBE: CPT | Performed by: INTERNAL MEDICINE

## 2024-10-31 PROCEDURE — 99232 SBSQ HOSP IP/OBS MODERATE 35: CPT | Performed by: INTERNAL MEDICINE

## 2024-10-31 PROCEDURE — 25010000002 FUROSEMIDE PER 20 MG: Performed by: NURSE PRACTITIONER

## 2024-10-31 PROCEDURE — 83970 ASSAY OF PARATHORMONE: CPT | Performed by: NURSE PRACTITIONER

## 2024-10-31 RX ORDER — SODIUM BICARBONATE 650 MG/1
650 TABLET ORAL 2 TIMES DAILY
Status: ON HOLD | COMMUNITY

## 2024-10-31 RX ORDER — IPRATROPIUM BROMIDE AND ALBUTEROL SULFATE 2.5; .5 MG/3ML; MG/3ML
3 SOLUTION RESPIRATORY (INHALATION) EVERY 4 HOURS PRN
Status: DISCONTINUED | OUTPATIENT
Start: 2024-10-31 | End: 2024-11-08 | Stop reason: HOSPADM

## 2024-10-31 RX ORDER — SODIUM BICARBONATE 650 MG/1
650 TABLET ORAL 2 TIMES DAILY
Status: DISCONTINUED | OUTPATIENT
Start: 2024-10-31 | End: 2024-11-08 | Stop reason: HOSPADM

## 2024-10-31 RX ORDER — ASPIRIN 81 MG/1
81 TABLET ORAL DAILY
Status: DISCONTINUED | OUTPATIENT
Start: 2024-10-31 | End: 2024-11-07

## 2024-10-31 RX ORDER — POLYETHYLENE GLYCOL 3350 17 G/17G
17 POWDER, FOR SOLUTION ORAL DAILY
Status: DISCONTINUED | OUTPATIENT
Start: 2024-10-31 | End: 2024-11-08 | Stop reason: HOSPADM

## 2024-10-31 RX ORDER — ASPIRIN 81 MG/1
81 TABLET ORAL DAILY
COMMUNITY
End: 2024-11-08 | Stop reason: HOSPADM

## 2024-10-31 RX ADMIN — HYDRALAZINE HYDROCHLORIDE 100 MG: 50 TABLET ORAL at 20:47

## 2024-10-31 RX ADMIN — SODIUM BICARBONATE 650 MG TABLET 650 MG: at 20:47

## 2024-10-31 RX ADMIN — TERAZOSIN HYDROCHLORIDE 5 MG: 5 CAPSULE ORAL at 20:47

## 2024-10-31 RX ADMIN — CYANOCOBALAMIN TAB 1000 MCG 1000 MCG: 1000 TAB at 08:55

## 2024-10-31 RX ADMIN — CLONIDINE HYDROCHLORIDE 0.1 MG: 0.1 TABLET ORAL at 08:55

## 2024-10-31 RX ADMIN — PANTOPRAZOLE SODIUM 40 MG: 40 TABLET, DELAYED RELEASE ORAL at 08:54

## 2024-10-31 RX ADMIN — CALCITRIOL CAPSULES 0.25 MCG 0.5 MCG: 0.25 CAPSULE ORAL at 08:55

## 2024-10-31 RX ADMIN — AMLODIPINE BESYLATE 5 MG: 5 TABLET ORAL at 08:55

## 2024-10-31 RX ADMIN — Medication 10 ML: at 08:55

## 2024-10-31 RX ADMIN — HYDRALAZINE HYDROCHLORIDE 100 MG: 50 TABLET ORAL at 06:07

## 2024-10-31 RX ADMIN — CARVEDILOL 25 MG: 12.5 TABLET, FILM COATED ORAL at 08:55

## 2024-10-31 RX ADMIN — ATORVASTATIN CALCIUM 80 MG: 40 TABLET, FILM COATED ORAL at 20:47

## 2024-10-31 RX ADMIN — HYDRALAZINE HYDROCHLORIDE 100 MG: 50 TABLET ORAL at 15:00

## 2024-10-31 RX ADMIN — MORPHINE SULFATE 15 MG: 15 TABLET, FILM COATED, EXTENDED RELEASE ORAL at 08:54

## 2024-10-31 RX ADMIN — MORPHINE SULFATE 15 MG: 15 TABLET, FILM COATED, EXTENDED RELEASE ORAL at 20:47

## 2024-10-31 RX ADMIN — FERROUS SULFATE TAB 325 MG (65 MG ELEMENTAL FE) 325 MG: 325 (65 FE) TAB at 08:55

## 2024-10-31 RX ADMIN — PANTOPRAZOLE SODIUM 40 MG: 40 TABLET, DELAYED RELEASE ORAL at 20:47

## 2024-10-31 RX ADMIN — EPOETIN ALFA-EPBX 40000 UNITS: 40000 INJECTION, SOLUTION INTRAVENOUS; SUBCUTANEOUS at 17:12

## 2024-10-31 RX ADMIN — CARVEDILOL 25 MG: 12.5 TABLET, FILM COATED ORAL at 20:47

## 2024-10-31 RX ADMIN — Medication 10 ML: at 20:47

## 2024-10-31 RX ADMIN — Medication 1 CAPSULE: at 08:54

## 2024-10-31 RX ADMIN — FUROSEMIDE 60 MG: 10 INJECTION, SOLUTION INTRAMUSCULAR; INTRAVENOUS at 00:19

## 2024-10-31 NOTE — CONSULTS
Patient Care Team:  Sebas Alicea MD as PCP - General (Family Medicine)  Steve Geronimo MD as Consulting Physician (Cardiology)  Emilio Regalado MD as Consulting Physician (Endocrinology)  Axel Ribeiro MD as Consulting Physician (Cardiology)    Chief complaint: Anemia   KINDRA on CKD stage IV    Inpatient Nephrology Consult  Consult performed by: Zurdo Levine MD  Consult ordered by: Loren Coyle APRN  Reason for consult: KINDRA on CKD stage IIIb/IV        History of Present Illness: Yanique Webster is a 84 y.o. female with past medical history significant for CAD, COPD, essential hypertension, chronic pain, Takotsubo cardiomyopathy, GERD, hyperlipidemia, CKD stage III, paroxysmal atrial fibrillation on Eliquis and PVD presents to the ED with worsening shortness of breath over the past 24 hours. On admission labs remarkable for hemoglobin of 6.1 and hematocrit of 19.6. Patient denies any melena or hematochezia. Nephrology has been consulted for evaluation of worsening renal function. Patient was seen by Nephrology during last admission for abnormal renal function. Cr baseline is around 1.9-2.2 mg/dl. Labs on this admission showed cr ~ 3.26 BUN ~ 60 mg/dl.     Review of Systems   Constitutional: Negative.    Respiratory:  Positive for shortness of breath.    Cardiovascular: Negative.    Gastrointestinal: Negative.    Genitourinary: Negative.    Musculoskeletal: Negative.    Skin: Negative.    Neurological: Negative.    Hematological: Negative.         Past Medical History:   Diagnosis Date    Blurry vision     Chronic joint pain     Chronic pain     COPD (chronic obstructive pulmonary disease)     Coronary artery disease     Diabetic gastroparesis 08/24/2016    Esophageal stricture     s/p dilation in 2007    GERD (gastroesophageal reflux disease)     Gout     Headache     secondary to hypertension    Hyperlipidemia     Hypertension     Long term current use of insulin     Neuropathy      Neuropathy due to type 2 diabetes mellitus     Obesity     Osteoarthritis     Pericarditis 2008    Stroke 03/2019    Type 2 diabetes mellitus    ,   Past Surgical History:   Procedure Laterality Date    BRONCHOSCOPY N/A 8/23/2016    Procedure: BRONCHOSCOPY BIOPSY AT BEDSIDE;  Surgeon: Mookie Willard MD;  Location:  [facility];  Service:     CARDIAC CATHETERIZATION N/A 8/30/2016    Procedure: Left Heart Cath;  Surgeon: Steve Geronimo MD;  Location:  [facility];  Service:     CARDIAC CATHETERIZATION N/A 8/30/2016    Procedure: Right Heart Cath;  Surgeon: Steve Geronimo MD;  Location:  [facility];  Service:     CARDIAC ELECTROPHYSIOLOGY PROCEDURE N/A 7/2/2019    Procedure: Loop insertion;  Surgeon: Steve Geronimo MD;  Location:  [facility];  Service: Cardiovascular    CARDIAC ELECTROPHYSIOLOGY PROCEDURE N/A 9/26/2023    Procedure: Loop recorder removal;  Surgeon: Steve Geronimo MD;  Location:  [facility];  Service: Cardiovascular;  Laterality: N/A;    CATARACT EXTRACTION      COLONOSCOPY N/A 8/16/2024    Procedure: COLONOSCOPY;  Surgeon: Brunner, Mark I, MD;  Location:  [facility];  Service: Gastroenterology;  Laterality: N/A;    ENDOSCOPY N/A 8/14/2024    Procedure: ESOPHAGOGASTRODUODENOSCOPY;  Surgeon: Brunner, Mark I, MD;  Location:  [facility];  Service: Gastroenterology;  Laterality: N/A;    ESOPHAGEAL DILATATION  2007    HERNIA REPAIR      HIP CANNULATED SCREW PLACEMENT Left 1/2/2024    Procedure: HIP CANNULATED SCREW PLACEMENT LEFT;  Surgeon: Seymour Harrington MD;  Location:  [facility];  Service: Orthopedics;  Laterality: Left;    HYSTERECTOMY  1998    WRIST FRACTURE SURGERY Left    ,   Family History   Problem Relation Age of Onset    Cancer Mother     Cancer Father     Cancer Other     Breast cancer Sister 67    Ovarian cancer Neg Hx    ,   Social History     Socioeconomic History    Marital status:    Tobacco Use     Smoking status: Former     Current packs/day: 0.00     Types: Cigarettes     Quit date: 2003     Years since quittin.8    Smokeless tobacco: Never   Vaping Use    Vaping status: Never Used   Substance and Sexual Activity    Alcohol use: No    Drug use: No    Sexual activity: Not Currently     Partners: Male     Birth control/protection: Pill, Hysterectomy     E-cigarette/Vaping    E-cigarette/Vaping Use Never User     Passive Exposure No     Counseling Given No      E-cigarette/Vaping Substances    Nicotine No     THC No     CBD No     Flavoring No      E-cigarette/Vaping Devices    Disposable No     Pre-filled or Refillable Cartridge No     Refillable Tank No     Pre-filled Pod No          ,   Medications Prior to Admission   Medication Sig Dispense Refill Last Dose/Taking    amLODIPine (NORVASC) 5 MG tablet Take 1 tablet by mouth Daily. (Patient taking differently: Take 0.5 tablets by mouth Daily.)       aspirin 81 MG EC tablet Take 1 tablet by mouth Daily.       atorvastatin (LIPITOR) 80 MG tablet Take 1 tablet by mouth Daily.       Blood Glucose Monitoring Suppl (Accu-Chek Guide) w/Device kit Use 1 kit See Admin Instructions. Use to check blood sugar once daily.  Dx E11.65 1 kit 0     calcitriol (ROCALTROL) 0.5 MCG capsule Take 1 capsule by mouth Daily.       carvedilol (COREG) 25 MG tablet Take 1 tablet by mouth Every 12 (Twelve) Hours. 60 tablet 1     cloNIDine (CATAPRES) 0.1 MG tablet Take 1 tablet by mouth 3 times a day. (Patient not taking: Reported on 10/31/2024)   Not Taking    doxazosin (CARDURA) 4 MG tablet        DULoxetine (CYMBALTA) 60 MG capsule Take 1 capsule by mouth Daily. 90 capsule 3     ferrous sulfate 325 (65 FE) MG tablet Take 1 tablet by mouth Daily With Breakfast.       Glucose Blood (Blood Glucose Test) strip Check blood sugar 1 time a day dx e11.65 100 each 3     hydrALAZINE (APRESOLINE) 100 MG tablet Take 1 tablet by mouth Every 8 (Eight) Hours.       Insulin Glargine (LANTUS  SOLOSTAR) 100 UNIT/ML injection pen Inject 10 Units under the skin into the appropriate area as directed Every Night. (Patient taking differently: Inject 11 Units under the skin into the appropriate area as directed Every Night.) 15 mL 3     Insulin Pen Needle (BD Pen Needle Veda 2nd Gen) 32G X 4 MM misc Use 1 each Daily. 100 each 3     ipratropium-albuterol (DUO-NEB) 0.5-2.5 mg/3 ml nebulizer Take 3 mL by nebulization Every 4 (Four) Hours As Needed for Shortness of Air or Wheezing.       Lancets 33G misc Use 1 each Daily. Dx e11.65 100 each 3     Morphine (MS CONTIN) 15 MG 12 hr tablet Take 1 tablet by mouth 2 (Two) Times a Day.       pantoprazole (PROTONIX) 40 MG EC tablet Take 1 tablet by mouth 2 (Two) Times a Day.       probiotic (CULTURELLE) capsule capsule Take 1 capsule by mouth Daily. Bifidobacterium - lactobacillus       rivaroxaban (Xarelto) 15 MG tablet Take 1 tablet by mouth Daily With Dinner. 30 tablet 11     sodium bicarbonate 650 MG tablet Take 1 tablet by mouth 2 (Two) Times a Day.       torsemide (DEMADEX) 20 MG tablet Take 1 tablet by mouth Daily As Needed (weight gain 3 lbs in 1 day or 5 lbs in 1 week). (Patient taking differently: Take 1 tablet by mouth Daily.)       vitamin B-12 (CYANOCOBALAMIN) 1000 MCG tablet Take 1 tablet by mouth Daily.       vitamin D3 125 MCG (5000 UT) capsule capsule Take 1 capsule by mouth Daily.      , Scheduled Meds:  amLODIPine, 5 mg, Oral, Q24H  [Held by provider] aspirin, 81 mg, Oral, Daily  atorvastatin, 80 mg, Oral, Nightly  calcitriol, 0.5 mcg, Oral, Daily  carvedilol, 25 mg, Oral, Q12H  epoetin adonis/adonis-epbx, 40,000 Units, Subcutaneous, Weekly  ferrous sulfate, 325 mg, Oral, Daily With Breakfast  hydrALAZINE, 100 mg, Oral, Q8H  lactobacillus acidophilus, 1 capsule, Oral, Daily  Morphine, 15 mg, Oral, BID  pantoprazole, 40 mg, Oral, BID  polyethylene glycol, 17 g, Oral, Daily  sodium bicarbonate, 650 mg, Oral, BID  sodium chloride, 10 mL, Intravenous,  "Q12H  terazosin, 5 mg, Oral, Nightly  vitamin B-12, 1,000 mcg, Oral, Daily   , and Continuous Infusions:       Objective     Vital Signs  Temp:  [97.6 °F (36.4 °C)-98.2 °F (36.8 °C)] 97.6 °F (36.4 °C)  Heart Rate:  [58-71] 60  Resp:  [18-22] 18  BP: (142-160)/(61-88) 153/63    No intake/output data recorded.  I/O last 3 completed shifts:  In: 645 [Blood:645]  Out: 1700 [Urine:1700]    Physical Exam  Constitutional:       General: She is not in acute distress.     Appearance: Normal appearance. She is not ill-appearing.   HENT:      Nose: Nose normal.   Cardiovascular:      Rate and Rhythm: Normal rate and regular rhythm.      Pulses: Normal pulses.      Heart sounds: Normal heart sounds.   Pulmonary:      Effort: Pulmonary effort is normal.   Abdominal:      General: Abdomen is flat.   Musculoskeletal:      Cervical back: Normal range of motion.      Right lower leg: No edema.      Left lower leg: No edema.   Skin:     General: Skin is warm.   Neurological:      General: No focal deficit present.      Mental Status: She is alert and oriented to person, place, and time.   Psychiatric:         Mood and Affect: Mood normal.         Results Review:    I reviewed the patient's new clinical results.    WBC WBC   Date Value Ref Range Status   10/31/2024 6.71 3.40 - 10.80 10*3/mm3 Final   10/30/2024 4.71 3.40 - 10.80 10*3/mm3 Final      HGB Hemoglobin   Date Value Ref Range Status   10/31/2024 8.2 (L) 12.0 - 15.9 g/dL Final   10/30/2024 6.1 (C) 12.0 - 15.9 g/dL Final      HCT Hematocrit   Date Value Ref Range Status   10/31/2024 25.1 (L) 34.0 - 46.6 % Final   10/30/2024 19.6 (C) 34.0 - 46.6 % Final      Platlets No results found for: \"LABPLAT\"   MCV MCV   Date Value Ref Range Status   10/31/2024 89.3 79.0 - 97.0 fL Final   10/30/2024 90.3 79.0 - 97.0 fL Final          Sodium Sodium   Date Value Ref Range Status   10/31/2024 139 136 - 145 mmol/L Final   10/30/2024 137 136 - 145 mmol/L Final      Potassium Potassium   Date " "Value Ref Range Status   10/31/2024 4.4 3.5 - 5.2 mmol/L Final   10/30/2024 4.3 3.5 - 5.2 mmol/L Final      Chloride Chloride   Date Value Ref Range Status   10/31/2024 109 (H) 98 - 107 mmol/L Final   10/30/2024 106 98 - 107 mmol/L Final      CO2 CO2   Date Value Ref Range Status   10/31/2024 19.0 (L) 22.0 - 29.0 mmol/L Final   10/30/2024 18.0 (L) 22.0 - 29.0 mmol/L Final      BUN BUN   Date Value Ref Range Status   10/31/2024 60 (H) 8 - 23 mg/dL Final   10/30/2024 59 (H) 8 - 23 mg/dL Final      Creatinine Creatinine   Date Value Ref Range Status   10/31/2024 3.26 (H) 0.57 - 1.00 mg/dL Final   10/30/2024 3.65 (H) 0.57 - 1.00 mg/dL Final      Calcium Calcium   Date Value Ref Range Status   10/31/2024 10.0 8.6 - 10.5 mg/dL Final   10/31/2024 9.7 8.6 - 10.5 mg/dL Final   10/30/2024 9.7 8.6 - 10.5 mg/dL Final      PO4 No results found for: \"CAPO4\"   Albumin Albumin   Date Value Ref Range Status   10/30/2024 3.4 (L) 3.5 - 5.2 g/dL Final      Magnesium No results found for: \"MG\"   Uric Acid No results found for: \"URICACID\"         Assessment & Plan       Symptomatic anemia    Hyperlipidemia LDL goal <70    COPD (chronic obstructive pulmonary disease)    GERD (gastroesophageal reflux disease)    Coronary artery disease involving native coronary artery of native heart without angina pectoris    Essential hypertension    History of CVA (cerebrovascular accident)    CKD (chronic kidney disease) stage 4, GFR 15-29 ml/min    Paroxysmal atrial fibrillation    Anxiety associated with depression    KINDRA (acute kidney injury)      Assessment & Plan    KINDRA: Baseline cr btw 1.9-2.2mg/dl. Cr 3.23.5 on this admission. Suspect hemodynamic injury in the setting of anemia.     CKD stage IV: Hx of proteinuric renal disease. UPC 2 gm in the past. Duplex negative in 2016    Anemia: Acute on chronic worsening. ACD due to CKD. SPEP negative in the past. Nadya any bleeding. Started on EDISON weekly     Volume status: No significant edema on " examination     HTN: No ANABEL per duplex in 2016.     Recs  KINDRA component due to hemodynamic injury. Expect improvement in renal function with blood transfusion. May need IV fluids depending upon renal response. Suspect advance CKD at baseline  Agree with EDISON weekly   Transfuse at Hb<7  Will check PLA2R, MITCH, ANCA for completion of workup for proteinuria   Daily labs.   No indication for dialysis at present.     I discussed the patients findings and my recommendations with patient    Zurdo Levine MD  10/31/24  19:41 EDT

## 2024-10-31 NOTE — PROGRESS NOTES
[facility]  PROGRESS NOTE    Patient Name: Yanique Webster  : 1940  MRN: [id number]    Date of Admission: 10/30/2024  Primary Care Physician: Sebas Alicea MD    Subjective   Subjective     CC:  F/u anemia    HPI:  Feels better after blood transfusion. Denies acute complaint, has not noted any bloody BM or melena      Objective   Objective     Vital Signs:   Temp:  [97.6 °F (36.4 °C)-98.3 °F (36.8 °C)] 97.6 °F (36.4 °C)  Heart Rate:  [58-71] 63  Resp:  [18-22] 18  BP: (142-169)/(59-88) 153/61     Physical Exam:  Constitutional: Awake, alert, laying in bed in NAD  Respiratory: Clear to auscultation bilaterally, respiratory effort normal   Cardiovascular: RRR, palpable radial pulse  Gastrointestinal: Positive bowel sounds, soft, nontender, nondistended  Psychiatric: Appropriate affect, cooperative  Neurologic: Speech clear and fluent    Results Reviewed:  LAB RESULTS:      Lab 10/31/24  0202 10/30/24  1840 10/30/24  1626   WBC 6.71  --  4.71   HEMOGLOBIN 8.2*  --  6.1*   HEMATOCRIT 25.1*  --  19.6*   PLATELETS 123*  --  128*   NEUTROS ABS 4.53  --  2.91   IMMATURE GRANS (ABS) 0.03  --  0.02   LYMPHS ABS 1.30  --  1.15   MONOS ABS 0.60  --  0.46   EOS ABS 0.20  --  0.13   MCV 89.3  --  90.3   PROCALCITONIN  --   --  0.09   LACTATE  --   --  0.6   HSTROP T  --  62* 66*         Lab 10/31/24  0202 10/30/24  1626 10/29/24  1143   SODIUM 139 137  --    POTASSIUM 4.4 4.3  --    CHLORIDE 109* 106  --    CO2 19.0* 18.0*  --    ANION GAP 11.0 13.0  --    BUN 60* 59*  --    CREATININE 3.26* 3.65*  --    EGFR 13.6* 11.8*  --    GLUCOSE 107* 115*  --    CALCIUM 10.0  9.7 9.7  --    HEMOGLOBIN A1C 5.40  --  5.0         Lab 10/30/24  1626   TOTAL PROTEIN 5.7*   ALBUMIN 3.4*   GLOBULIN 2.3   ALT (SGPT) 18   AST (SGOT) 24   BILIRUBIN 0.3   ALK PHOS 91         Lab 10/30/24  1840 10/30/24  1626   PROBNP  --  5,438.0*   HSTROP T 62* 66*             Lab 10/30/24  1840  10/30/24  1654 10/30/24  1626   IRON 42  42  --   --    IRON SATURATION (TSAT) 17*  --   --    TIBC 244*  --   --    TRANSFERRIN 164*  --   --    FERRITIN 103.00  --   --    FOLATE  --   --  7.53   VITAMIN B 12  --   --  1,518*   ABO TYPING  --  O  --    RH TYPING  --  Negative  --    ANTIBODY SCREEN  --  Negative  --          Lab 10/30/24  1640   PH, ARTERIAL 7.301*   PCO2, ARTERIAL 36.5   PO2 ART 73.3*   FIO2 21   HCO3 ART 18.0*   BASE EXCESS ART -7.8*   CARBOXYHEMOGLOBIN 1.4     Brief Urine Lab Results  (Last result in the past 365 days)        Color   Clarity   Blood   Leuk Est   Nitrite   Protein   CREAT   Urine HCG        08/13/24 2000             52.4         08/13/24 2000 Yellow   Clear   Negative   Negative   Negative   >=300 mg/dL (3+)                   Microbiology Results Abnormal       None            XR Chest 1 View    Result Date: 10/30/2024  XR CHEST 1 VW Date of Exam: 10/30/2024 3:59 PM EDT Indication: SOA triage protocol Comparison: 8/16/2024 Findings: Unchanged enlarged cardiac silhouette. Mild diffuse interstitial opacities. Trace bilateral pleural effusions. No pneumothorax. No acute osseous abnormality..     Impression: Impression: Enlarged cardiac silhouette with mild pulmonary edema and trace bilateral pleural effusions. Electronically Signed: Adrian Barnett MD  10/30/2024 4:27 PM EDT  Workstation ID: [id number]     Results for orders placed during the hospital encounter of 12/30/23    Adult Transthoracic Echo Complete W/ Cont if Necessary Per Protocol    Interpretation Summary    Left ventricular ejection fraction appears to be 56 - 60%.    Left ventricular wall thickness is consistent with mild concentric hypertrophy    The aortic valve exhibits sclerosis.    Mild aortic valve regurgitation is present. No hemodynamically significant aortic valve stenosis is present.    Mild tricuspid valve regurgitation is present. Estimated right ventricular systolic pressure from tricuspid regurgitation is  moderately elevated (45-55 mmHg    Moderate mitral annular calcification is present. Trace to mild mitral valve regurgitation is present. No significant mitral valve stenosis is present.      Current medications:  Scheduled Meds:amLODIPine, 5 mg, Oral, Q24H  [Held by provider] aspirin, 81 mg, Oral, Daily  atorvastatin, 80 mg, Oral, Nightly  calcitriol, 0.5 mcg, Oral, Daily  carvedilol, 25 mg, Oral, Q12H  epoetin adonis/adonis-epbx, 40,000 Units, Subcutaneous, Weekly  ferrous sulfate, 325 mg, Oral, Daily With Breakfast  hydrALAZINE, 100 mg, Oral, Q8H  insulin lispro, 2-7 Units, Subcutaneous, 4x Daily AC & at Bedtime  lactobacillus acidophilus, 1 capsule, Oral, Daily  Morphine, 15 mg, Oral, BID  pantoprazole, 40 mg, Oral, BID  sodium bicarbonate, 650 mg, Oral, BID  sodium chloride, 10 mL, Intravenous, Q12H  terazosin, 5 mg, Oral, Nightly  vitamin B-12, 1,000 mcg, Oral, Daily      Continuous Infusions:   PRN Meds:.  acetaminophen **OR** acetaminophen **OR** acetaminophen    senna-docusate sodium **AND** polyethylene glycol **AND** bisacodyl **AND** bisacodyl    dextrose    dextrose    glucagon (human recombinant)    nitroglycerin    sodium chloride    sodium chloride    torsemide    Assessment & Plan   Assessment & Plan     Active Hospital Problems    Diagnosis  POA    **Symptomatic anemia [D64.9]  Yes    KINDRA (acute kidney injury) [N17.9]  Yes    Anxiety associated with depression [F41.8]  Yes    Paroxysmal atrial fibrillation [I48.0]  Yes    CKD (chronic kidney disease) stage 4, GFR 15-29 ml/min [N18.4]  Yes    History of CVA (cerebrovascular accident) [Z86.73]  Not Applicable    Essential hypertension [I10]  Yes    Coronary artery disease involving native coronary artery of native heart without angina pectoris [I25.10]  Yes    COPD (chronic obstructive pulmonary disease) [J44.9]  Yes    GERD (gastroesophageal reflux disease) [K21.9]  Yes    Hyperlipidemia LDL goal <70 [E78.5]  Yes      Resolved Hospital Problems   No  resolved problems to display.        Brief Hospital Course to date:  Yanique Webster is a 84 y.o. female w/ CAD, COPD, HTN, HLD, chronic pain, Takotsubo cardiomyopathy, CKD4, Afib on eliquis; she was admitted 8/12/24-8/19/24 w/ anemia requiring transfusion, during that say she had EGD 8/14 and colo 8/16 both without source of bleeding; pt/fam elected to resume oral AC for stroke ppx; she has seen EP in the clinic and is contemplating Watchman device; she developed new SOB and had labs drawn which showed significant anemia and she was sent to the ED where Hgb was 6.1; she was admitted and transfused blood    The following problems are new to me today    Assessment/Plan    Acute/Chronic normocytic anemia, recurrent  Thrombocytopenia  -recent EGD/[place] 8/2024 w/o evidence for bleeding  -iron studies c/w anemia of chronic disease  -s/p 2U PRBC  -second episode of transfusion requiring anemia, noted TCP as well  -favor she has an anemia of chronic disease/renal disease, in the context of ongoing oral AC for stroke ppx, will ask GI and heme input as this may help guide decisions on Watchman procedure (high risk per EP clinic note) vs ongoing AC  -monitor H&H    KINDRA on CKD4  Secondary hyperparathyroidism  -baseline Cr 1.8-2.3; eGFR 20's  -above baseline, slightly improved this AM  -nephro consulted  -restart home PO bicarb    pAfib w/ chronic oral AC  -xarelto on hold  -seen by EP for consideration of Watchman, pending consultant recs anticipate opt f/u    CAD  HTN  HLD  Hx Takotsubo cardiomyopathy  -most recent EF 56-60%  -cont amlodipine, statin, coreg, hydralazine, terazosin    COPD - add prn nebs  Chronic pain - cont ms contin    Expected Discharge Location and Transportation: back to facility  Expected Discharge   Expected Discharge Date: 11/2/2024; Expected Discharge Time:      VTE Prophylaxis:  Mechanical VTE prophylaxis orders are present.         AM-PAC 6 Clicks Score (PT): 17 (10/30/24 2202)    CODE STATUS:    Code Status and Medical Interventions: CPR (Attempt to Resuscitate); Full Support   Ordered at: 10/30/24 1958     Level Of Support Discussed With:    Patient     Code Status (Patient has no pulse and is not breathing):    CPR (Attempt to Resuscitate)     Medical Interventions (Patient has pulse or is breathing):    Full Support       Adrian Gregorio, DO  10/31/24

## 2024-10-31 NOTE — CONSULTS
[facility] [place] [facility]ology Consult    Referring Provider: Adrian Gregorio DO     PCP: Sebas Alicea MD    Reason for Consultation: Anemia     Chief complaint: Fatigue and shortness of breath     History of present illness:    Yanique Webster is a 84 y.o. female who is admitted with symptomatic anemia.  She complains of significant fatigue, generalized weakness and dyspnea on exertion.   H&H was 6.1 and 19.   She has mild thrombocytopenia with platelets of 128K.    She was admitted to our facility for similar presentation in August, Hgb at that time was 6.3.   She underwent EGD and Colonoscopy during that admission without any source of bleeding found.     She denies any melena, hematochezia nor hematemesis.         She is on chronic anticoagulation with Xarelto for atrial fibrillation.  It was recommended during her last admission that she be evaluated for alternatives to anticoagulation for stroke prevention.   She underwent outpatient evaluation with Dr. Axel Ribeiro last week regarding LAAO and elected to think further about it.        Labs show worsening renal function, Creatinine is 3.26, GFR is 13.       Allergies:  Penicillins, Latex, Nickel, and Penicillins    Scheduled Meds:  amLODIPine, 5 mg, Oral, Q24H  atorvastatin, 80 mg, Oral, Nightly  calcitriol, 0.5 mcg, Oral, Daily  carvedilol, 25 mg, Oral, Q12H  cloNIDine, 0.1 mg, Oral, TID  ferrous sulfate, 325 mg, Oral, Daily With Breakfast  hydrALAZINE, 100 mg, Oral, Q8H  insulin lispro, 2-7 Units, Subcutaneous, 4x Daily AC & at Bedtime  lactobacillus acidophilus, 1 capsule, Oral, Daily  Morphine, 15 mg, Oral, BID  pantoprazole, 40 mg, Oral, BID  sodium chloride, 10 mL, Intravenous, Q12H  terazosin, 5 mg, Oral, Nightly  vitamin B-12, 1,000 mcg, Oral, Daily         Infusions:       PRN Meds:    acetaminophen **OR** acetaminophen **OR** acetaminophen    senna-docusate sodium **AND** polyethylene glycol **AND** bisacodyl **AND** bisacodyl    dextrose    dextrose     glucagon (human recombinant)    nitroglycerin    sodium chloride    sodium chloride    torsemide    Home Meds:  Medications Prior to Admission   Medication Sig Dispense Refill Last Dose/Taking    amLODIPine (NORVASC) 5 MG tablet Take 1 tablet by mouth Daily.       atorvastatin (LIPITOR) 80 MG tablet Take 1 tablet by mouth Daily.       Blood Glucose Monitoring Suppl (Accu-Chek Guide) w/Device kit Use 1 kit See Admin Instructions. Use to check blood sugar once daily.  Dx E11.65 1 kit 0     calcitriol (ROCALTROL) 0.5 MCG capsule Take 1 capsule by mouth Daily.       carvedilol (COREG) 25 MG tablet Take 1 tablet by mouth Every 12 (Twelve) Hours. 60 tablet 1     cloNIDine (CATAPRES) 0.1 MG tablet Take 1 tablet by mouth 3 times a day.       doxazosin (CARDURA) 4 MG tablet        DULoxetine (CYMBALTA) 60 MG capsule Take 1 capsule by mouth Daily. 90 capsule 3     ferrous sulfate 325 (65 FE) MG tablet Take 1 tablet by mouth Daily With Breakfast.       Glucose Blood (Blood Glucose Test) strip Check blood sugar 1 time a day dx e11.65 100 each 3     hydrALAZINE (APRESOLINE) 100 MG tablet Take 1 tablet by mouth Every 8 (Eight) Hours.       Insulin Glargine (LANTUS SOLOSTAR) 100 UNIT/ML injection pen Inject 10 Units under the skin into the appropriate area as directed Every Night. 15 mL 3     Insulin Pen Needle (BD Pen Needle Veda 2nd Gen) 32G X 4 MM misc Use 1 each Daily. 100 each 3     ipratropium-albuterol (DUO-NEB) 0.5-2.5 mg/3 ml nebulizer Take 3 mL by nebulization Every 4 (Four) Hours As Needed for Shortness of Air or Wheezing.       Lancets 33G misc Use 1 each Daily. Dx e11.65 100 each 3     Morphine (MS CONTIN) 15 MG 12 hr tablet Take 1 tablet by mouth 2 (Two) Times a Day.       pantoprazole (PROTONIX) 40 MG EC tablet Take 1 tablet by mouth 2 (Two) Times a Day.       probiotic (CULTURELLE) capsule capsule Take 1 capsule by mouth Daily. Bifidobacterium - lactobacillus       rivaroxaban (Xarelto) 15 MG tablet Take 1 tablet  by mouth Daily With Dinner. 30 tablet 11     torsemide (DEMADEX) 20 MG tablet Take 1 tablet by mouth Daily As Needed (weight gain 3 lbs in 1 day or 5 lbs in 1 week).       vitamin B-12 (CYANOCOBALAMIN) 1000 MCG tablet Take 1 tablet by mouth Daily.          ROS: Review of Systems   Constitutional:  Positive for fatigue.   Respiratory:  Positive for shortness of breath.    Cardiovascular: Negative.    Gastrointestinal: Negative.    Genitourinary: Negative.    Musculoskeletal: Negative.    Skin:  Positive for pallor.   Neurological:  Positive for weakness.       PAST MED HX:  Past Medical History:   Diagnosis Date    Blurry vision     Chronic joint pain     Chronic pain     COPD (chronic obstructive pulmonary disease)     Coronary artery disease     Diabetic gastroparesis 08/24/2016    Esophageal stricture     s/p dilation in 2007    GERD (gastroesophageal reflux disease)     Gout     Headache     secondary to hypertension    Hyperlipidemia     Hypertension     Long term current use of insulin     Neuropathy     Neuropathy due to type 2 diabetes mellitus     Obesity     Osteoarthritis     Pericarditis 2008    Stroke 03/2019    Type 2 diabetes mellitus        PAST SURG HX:  Past Surgical History:   Procedure Laterality Date    BRONCHOSCOPY N/A 8/23/2016    Procedure: BRONCHOSCOPY BIOPSY AT BEDSIDE;  Surgeon: Mookie Willard MD;  Location:  [facility];  Service:     CARDIAC CATHETERIZATION N/A 8/30/2016    Procedure: Left Heart Cath;  Surgeon: Steve Geronimo MD;  Location:  [facility];  Service:     CARDIAC CATHETERIZATION N/A 8/30/2016    Procedure: Right Heart Cath;  Surgeon: Steve Geronimo MD;  Location:  [facility];  Service:     CARDIAC ELECTROPHYSIOLOGY PROCEDURE N/A 7/2/2019    Procedure: Loop insertion;  Surgeon: Steve Geronimo MD;  Location:  [facility];  Service: Cardiovascular    CARDIAC ELECTROPHYSIOLOGY PROCEDURE N/A 9/26/2023    Procedure: Loop  "recorder removal;  Surgeon: Steve Geronimo MD;  Location:  [facility];  Service: Cardiovascular;  Laterality: N/A;    CATARACT EXTRACTION      COLONOSCOPY N/A 2024    Procedure: COLONOSCOPY;  Surgeon: Brunner, Mark I, MD;  Location:  [facility];  Service: Gastroenterology;  Laterality: N/A;    ENDOSCOPY N/A 2024    Procedure: ESOPHAGOGASTRODUODENOSCOPY;  Surgeon: Brunner, Mark I, MD;  Location:  [facility];  Service: Gastroenterology;  Laterality: N/A;    ESOPHAGEAL DILATATION      HERNIA REPAIR      HIP CANNULATED SCREW PLACEMENT Left 2024    Procedure: HIP CANNULATED SCREW PLACEMENT LEFT;  Surgeon: Seymour Harrington MD;  Location:  [facility];  Service: Orthopedics;  Laterality: Left;    HYSTERECTOMY      WRIST FRACTURE SURGERY Left        FAM HX:  Family History   Problem Relation Age of Onset    Cancer Mother     Cancer Father     Cancer Other     Breast cancer Sister 67    Ovarian cancer Neg Hx        SOC HX:  Social History     Socioeconomic History    Marital status:    Tobacco Use    Smoking status: Former     Current packs/day: 0.00     Types: Cigarettes     Quit date: 2003     Years since quittin.8    Smokeless tobacco: Never   Vaping Use    Vaping status: Never Used   Substance and Sexual Activity    Alcohol use: No    Drug use: No    Sexual activity: Not Currently     Partners: Male     Birth control/protection: Pill, Hysterectomy       PHYSICAL EXAM  /61 (BP Location: Right arm, Patient Position: Lying)   Pulse 63   Temp 97.6 °F (36.4 °C) (Oral)   Resp 18   Ht 162.6 cm (64\")   Wt 83.5 kg (184 lb)   LMP  (LMP Unknown)   SpO2 94%   BMI 31.58 kg/m²   Wt Readings from Last 3 Encounters:   10/30/24 83.5 kg (184 lb)   10/29/24 83 kg (183 lb)   10/24/24 83.6 kg (184 lb 6.4 oz)   ,body mass index is 31.58 kg/m².  Physical Exam  Constitutional:       General: She is not in acute distress.     Appearance: She is not ill-appearing.      " Comments: Pleasant to speak with, no acute distress    Cardiovascular:      Rate and Rhythm: Normal rate and regular rhythm.   Pulmonary:      Effort: Pulmonary effort is normal. No respiratory distress.   Abdominal:      General: Bowel sounds are normal. There is no distension.      Palpations: Abdomen is soft.      Tenderness: There is no abdominal tenderness.   Skin:     Coloration: Skin is pale.   Neurological:      Mental Status: She is alert and oriented to person, place, and time.       Results Review:   I reviewed the patient's new clinical results.    Lab Results   Component Value Date    WBC 6.71 10/31/2024    HGB 8.2 (L) 10/31/2024    HGB 6.1 (C) 10/30/2024    HGB 7.7 (L) 08/19/2024    HCT 25.1 (L) 10/31/2024    MCV 89.3 10/31/2024     (L) 10/31/2024       Lab Results   Component Value Date    INR 1.19 (H) 01/02/2024    INR 1.27 (H) 01/01/2024    INR 2.12 (H) 12/31/2023       Lab Results   Component Value Date    GLUCOSE 107 (H) 10/31/2024    BUN 60 (H) 10/31/2024    CREATININE 3.26 (H) 10/31/2024    EGFRIFNONA 29 (L) 11/04/2020    BCR 18.4 10/31/2024     10/31/2024    K 4.4 10/31/2024    CO2 19.0 (L) 10/31/2024    CALCIUM 10.0 10/31/2024    CALCIUM 9.7 10/31/2024    PROTENTOTREF 5.0 (L) 08/14/2024    ALBUMIN 3.4 (L) 10/30/2024    ALKPHOS 91 10/30/2024    BILITOT 0.3 10/30/2024    ALT 18 10/30/2024    AST 24 10/30/2024       ASSESSMENTS/PLANS    Acute on chronic normocytic anemia.   Iron stores suggestive of anemia of chronic disease  Anticoagulation use, on Xarelto   KINDRA on CKD.  GFR is 13.     Thrombocytopenia     Mrs. Webster is a pleasant 84 year old female admitted for recurrent symptomatic anemia.   She denies any overt GI bleeding.  Fecal occult is negative.  She had recent bidirectional endoscopies in August that did not show any blood in the GI tract.   A small bowel video capsule would be very low yield in the absence of bleeding.   Suspect her anemia is related in part to anemia  of chronic disease.        >> Agree with hematology and nephrology consultation  >> Patient was recently evaluated outpatient by Dr. Ribeiro for considerations of alternatives to anticoagulation for stroke prevention.       Will sign off.  Please call for any questions or concerns.      I discussed the patient's findings and my recommendations with patient and her hospitalist Dr. Gregorio.      INDIRA Sanchez  10/31/24  10:39 EDT

## 2024-10-31 NOTE — CONSULTS
HEMATOLOGY/ONCOLOGY INPATIENT CONSULTATION      REFERRING PHYSICIAN: Adrian Gregorio DO    PRIMARY CARE PROVIDER: Sebas Alicea MD    REASON FOR CONSULTATION: Severe anemia      HISTORY OF PRESENT ILLNESS: 84-year-old lady presents to the hospital with significant dyspnea.  She has underlying cardiomyopathy and also stage III renal disease.  She is on Eliquis for atrial fibrillation.  She presented to the hospital with a hemoglobin of 6.1 with no melena hematochezia.  In August she actually had a similar finding in had an EGD and colonoscopy which were both negative.  Her lab workup does not show any deficiencies that are present.      Allergies   Allergen Reactions    Penicillins     Latex Rash     Not an immediate reaction    Nickel Rash    Penicillins Rash     unk       Past Medical History:   Diagnosis Date    Blurry vision     Chronic joint pain     Chronic pain     COPD (chronic obstructive pulmonary disease)     Coronary artery disease     Diabetic gastroparesis 08/24/2016    Esophageal stricture     s/p dilation in 2007    GERD (gastroesophageal reflux disease)     Gout     Headache     secondary to hypertension    Hyperlipidemia     Hypertension     Long term current use of insulin     Neuropathy     Neuropathy due to type 2 diabetes mellitus     Obesity     Osteoarthritis     Pericarditis 2008    Stroke 03/2019    Type 2 diabetes mellitus          Current Facility-Administered Medications:     acetaminophen (TYLENOL) tablet 650 mg, 650 mg, Oral, Q4H PRN **OR** acetaminophen (TYLENOL) 160 MG/5ML oral solution 650 mg, 650 mg, Oral, Q4H PRN **OR** acetaminophen (TYLENOL) suppository 650 mg, 650 mg, Rectal, Q4H PRN, Jonna, Loren, APRN    amLODIPine (NORVASC) tablet 5 mg, 5 mg, Oral, Q24H, Jonna, Loren, APRN, 5 mg at 10/31/24 0855    atorvastatin (LIPITOR) tablet 80 mg, 80 mg, Oral, Nightly, Jonna, Loren, APRN, 80 mg at 10/30/24 2237    sennosides-docusate (PERICOLACE) 8.6-50 MG per  tablet 2 tablet, 2 tablet, Oral, BID PRN **AND** polyethylene glycol (MIRALAX) packet 17 g, 17 g, Oral, Daily PRN **AND** bisacodyl (DULCOLAX) EC tablet 5 mg, 5 mg, Oral, Daily PRN **AND** bisacodyl (DULCOLAX) suppository 10 mg, 10 mg, Rectal, Daily PRN, Jonna, Loren, APRN    calcitriol (ROCALTROL) capsule 0.5 mcg, 0.5 mcg, Oral, Daily, Jonna, Loren, APRN, 0.5 mcg at 10/31/24 0855    carvedilol (COREG) tablet 25 mg, 25 mg, Oral, Q12H, Jonna, Loren, APRN, 25 mg at 10/31/24 0855    cloNIDine (CATAPRES) tablet 0.1 mg, 0.1 mg, Oral, TID, Jonna, Loren, APRN, 0.1 mg at 10/31/24 0855    dextrose (D50W) (25 g/50 mL) IV injection 25 g, 25 g, Intravenous, Q15 Min PRN, Jonna, Loren, APRN    dextrose (GLUTOSE) oral gel 15 g, 15 g, Oral, Q15 Min PRN, Jonna, Loren, APRN    epoetin adonis-epbx (RETACRIT) injection 40,000 Units, 40,000 Units, Subcutaneous, Weekly, Gorge Barrett MD    ferrous sulfate tablet 325 mg, 325 mg, Oral, Daily With Breakfast, Jonna, Loren, APRN, 325 mg at 10/31/24 0855    glucagon (GLUCAGEN) injection 1 mg, 1 mg, Intramuscular, Q15 Min PRN, Jonna, Loren, APRN    hydrALAZINE (APRESOLINE) tablet 100 mg, 100 mg, Oral, Q8H, Jonna, Loren, APRN, 100 mg at 10/31/24 0607    Insulin Lispro (humaLOG) injection 2-7 Units, 2-7 Units, Subcutaneous, 4x Daily AC & at Bedtime, Jonna, Loren, APRN    lactobacillus acidophilus (RISAQUAD) capsule 1 capsule, 1 capsule, Oral, Daily, Jonna, Loren, APRN, 1 capsule at 10/31/24 0854    Morphine (MS CONTIN) 12 hr tablet 15 mg, 15 mg, Oral, BID, Jonna, Loren, APRN, 15 mg at 10/31/24 0854    nitroglycerin (NITROSTAT) SL tablet 0.4 mg, 0.4 mg, Sublingual, Q5 Min PRN, Jonna, Loren, APRN    pantoprazole (PROTONIX) EC tablet 40 mg, 40 mg, Oral, BID, Jonna, Loren, APRN, 40 mg at 10/31/24 0854    sodium chloride 0.9 % flush 10 mL, 10 mL, Intravenous, PRN, Cristhian Rivera,     sodium chloride 0.9 % flush 10 mL, 10 mL,  Intravenous, Q12H, Jonna, Loren, APRN, 10 mL at 10/31/24 0855    sodium chloride 0.9 % flush 10 mL, 10 mL, Intravenous, PRN, Jonna, Loren, APRN    terazosin (HYTRIN) capsule 5 mg, 5 mg, Oral, Nightly, Jonna, Loren, APRN, 5 mg at 10/30/24 2237    torsemide (DEMADEX) tablet 20 mg, 20 mg, Oral, Daily PRN, Jonna, Loren, APRN    vitamin B-12 (CYANOCOBALAMIN) tablet 1,000 mcg, 1,000 mcg, Oral, Daily, Jonna, Loren, APRN, 1,000 mcg at 10/31/24 0855    Past Surgical History:   Procedure Laterality Date    BRONCHOSCOPY N/A 8/23/2016    Procedure: BRONCHOSCOPY BIOPSY AT BEDSIDE;  Surgeon: Mookie Willard MD;  Location:  [facility];  Service:     CARDIAC CATHETERIZATION N/A 8/30/2016    Procedure: Left Heart Cath;  Surgeon: Steve Geronimo MD;  Location:  [facility];  Service:     CARDIAC CATHETERIZATION N/A 8/30/2016    Procedure: Right Heart Cath;  Surgeon: Steve Geronimo MD;  Location:  [facility];  Service:     CARDIAC ELECTROPHYSIOLOGY PROCEDURE N/A 7/2/2019    Procedure: Loop insertion;  Surgeon: Steve Geronimo MD;  Location:  [facility];  Service: Cardiovascular    CARDIAC ELECTROPHYSIOLOGY PROCEDURE N/A 9/26/2023    Procedure: Loop recorder removal;  Surgeon: Steve Geronimo MD;  Location:  [facility];  Service: Cardiovascular;  Laterality: N/A;    CATARACT EXTRACTION      COLONOSCOPY N/A 8/16/2024    Procedure: COLONOSCOPY;  Surgeon: Brunner, Mark I, MD;  Location:  [facility];  Service: Gastroenterology;  Laterality: N/A;    ENDOSCOPY N/A 8/14/2024    Procedure: ESOPHAGOGASTRODUODENOSCOPY;  Surgeon: Brunner, Mark I, MD;  Location:  [facility];  Service: Gastroenterology;  Laterality: N/A;    ESOPHAGEAL DILATATION  2007    HERNIA REPAIR      HIP CANNULATED SCREW PLACEMENT Left 1/2/2024    Procedure: HIP CANNULATED SCREW PLACEMENT LEFT;  Surgeon: Seymour Harrington MD;  Location:  [facility];  Service: Orthopedics;   Laterality: Left;    HYSTERECTOMY      WRIST FRACTURE SURGERY Left        Social History     Socioeconomic History    Marital status:    Tobacco Use    Smoking status: Former     Current packs/day: 0.00     Types: Cigarettes     Quit date: 2003     Years since quittin.8    Smokeless tobacco: Never   Vaping Use    Vaping status: Never Used   Substance and Sexual Activity    Alcohol use: No    Drug use: No    Sexual activity: Not Currently     Partners: Male     Birth control/protection: Pill, Hysterectomy       Family History   Problem Relation Age of Onset    Cancer Mother     Cancer Father     Cancer Other     Breast cancer Sister 67    Ovarian cancer Neg Hx        Oncology/Hematology History    No history exists.         REVIEW OF SYSTEMS:  A 14 point review of systems was performed and is negative except as noted below.    Review of Systems   Constitutional:  Positive for fatigue.   HENT:  Negative.     Eyes: Negative.    Respiratory:  Positive for shortness of breath.    Cardiovascular: Negative.    Gastrointestinal: Negative.    Endocrine: Negative.    Genitourinary: Negative.     Skin: Negative.    Neurological:  Positive for extremity weakness.   Hematological: Negative.    Psychiatric/Behavioral: Negative.           Objective     Vitals:    10/31/24 0018 10/31/24 0331 10/31/24 0607 10/31/24 0700   BP: 142/61 149/73 147/70 153/61   BP Location: Right arm Right arm  Right arm   Patient Position: Lying Lying  Lying   Pulse: 63 58 62 63   Resp: 22 20  18   Temp: 97.8 °F (36.6 °C) 97.6 °F (36.4 °C)  97.6 °F (36.4 °C)   TempSrc: Oral Axillary  Oral   SpO2: 92%   94%   Weight:       Height:                       Temp:  [97.6 °F (36.4 °C)-98.3 °F (36.8 °C)] 97.6 °F (36.4 °C)     Performance Status: 3    Physical Exam    General: wElderly appearing female in no acute distress  HEENT: sclerae anicteric, oropharynx clear  Lymphatics: no cervical, supraclavicular, or axillary  adenopathy  Cardiovascular: regular rate and rhythm, no murmurs  Lungs: clear to auscultation bilaterally  Abdomen: soft, nontender, nondistended.  No palpable organomegaly  Extremities: no lower extremity edema  Skin: no rashes, lesions, bruising, or petechiae  Seems fairly weak    LABS:    Lab Results   Component Value Date    HGB 8.2 (L) 10/31/2024    HCT 25.1 (L) 10/31/2024    MCV 89.3 10/31/2024     (L) 10/31/2024    WBC 6.71 10/31/2024    NEUTROABS 4.53 10/31/2024    LYMPHSABS 1.30 10/31/2024    MONOSABS 0.60 10/31/2024    EOSABS 0.20 10/31/2024    BASOSABS 0.05 10/31/2024     Lab Results   Component Value Date    GLUCOSE 107 (H) 10/31/2024    BUN 60 (H) 10/31/2024    CREATININE 3.26 (H) 10/31/2024     10/31/2024    K 4.4 10/31/2024     (H) 10/31/2024    CO2 19.0 (L) 10/31/2024    CALCIUM 10.0 10/31/2024    CALCIUM 9.7 10/31/2024    PROTEINTOT 5.7 (L) 10/30/2024    ALBUMIN 3.4 (L) 10/30/2024    BILITOT 0.3 10/30/2024    ALKPHOS 91 10/30/2024    AST 24 10/30/2024    ALT 18 10/30/2024     Lab Results   Component Value Date    HGB 8.2 (L) 10/31/2024    HGB 6.1 (C) 10/30/2024    HGB 7.7 (L) 08/19/2024     Lab Results   Component Value Date    FERRITIN 103.00 10/30/2024    FERRITIN 40.23 08/12/2024    FERRITIN 83.11 12/31/2023     Lab Results   Component Value Date    MCV 89.3 10/31/2024    MCV 90.3 10/30/2024    MCV 85.9 08/19/2024     Lab Results   Component Value Date    TIBC 244 (L) 10/30/2024    TIBC 280 (L) 08/12/2024    TIBC 247 (L) 12/31/2023     Lab Results   Component Value Date    LABIRON 17 (L) 10/30/2024    LABIRON 14 (L) 08/12/2024    LABIRON 13 (L) 12/31/2023     Lab Results   Component Value Date    IRON 42 10/30/2024    IRON 42 10/30/2024    IRON 39 08/12/2024       Lab Results   Component Value Date    [id number] 1,518 (H) 10/30/2024     Lab Results   Component Value Date    FOLATE 7.53 10/30/2024     Lab Results   Component Value Date    RETIC 0.0547 10/30/2024    RETIC 0.0502  12/31/2023         IMAGING    XR Chest 1 View    Result Date: 10/30/2024  XR CHEST 1 VW Date of Exam: 10/30/2024 3:59 PM EDT Indication: SOA triage protocol Comparison: 8/16/2024 Findings: Unchanged enlarged cardiac silhouette. Mild diffuse interstitial opacities. Trace bilateral pleural effusions. No pneumothorax. No acute osseous abnormality..     Impression: Enlarged cardiac silhouette with mild pulmonary edema and trace bilateral pleural effusions. Electronically Signed: Adrian Barnett MD  10/30/2024 4:27 PM EDT  Workstation ID: [id number]      ASSESSMENT/PLAN:    1.  Severe anemia of chronic renal disease likely intermixed with an anemia of chronic disease.  She does not have any significant deficiency of iron B12 or folate to suggest that would be the cause.  With her age and underlying MDS is a possibility.  At this time I recommend symptomatic treatment of her anemia with blood transfusion and placing her on Procrit.  This will adjust for the renal dysfunction that is probably playing a role in her significant anemia.        Gorge Barrett MD    10/31/2024

## 2024-10-31 NOTE — CASE MANAGEMENT/SOCIAL WORK
Discharge Planning Assessment  [facility]     Patient Name: Yanique Webster  MRN: [id number]  Today's Date: 10/31/2024    Admit Date: 10/30/2024    Plan: Home   Discharge Needs Assessment       Row Name 10/31/24 1508       Living Environment    People in Home alone    Current Living Arrangements independent living facility;other (see comments)  [facility]    Primary Care Provided by self    Provides Primary Care For no one    Family Caregiver if Needed child(dian), adult    Family Caregiver Names J Luis Whitt-son [phone],  Jacob Webster-son [phone]    Quality of Family Relationships unable to assess    Able to Return to Prior Arrangements yes       Transition Planning    Patient/Family Anticipates Transition to other (see comments)  [facility]    Transportation Anticipated family or friend will provide       Discharge Needs Assessment    Equipment Currently Used at Home rollator;shower chair                   Discharge Plan       Row Name 10/31/24 5699       Plan    Plan Home    Patient/Family in Agreement with Plan yes    Plan Comments Met with Ms. Webster in her room, to initiate discharge planning. She lives alone in an apartment at [facility]. She verified she has Medicare and [place] [place] [employer] Insurance. She uses [facility], to get her prescriptions filled.  Ms. Webster's PCP is Sebas Alicea MD. Prior to admission, she was independent with ADL's. She uses a rollator at time for mobility and a shower chair when bathing. No other DMe. She is not current with home health. Her plan is to go home, when she is medically ready for discharge. Family will transport her. CM will continue to follow for medical readiness.    Final Discharge Disposition Code 01 - home or self-care                  Continued Care and Services - Admitted Since 10/30/2024    No active coordination exists for this encounter.       Selected Continued  Care - Prior Encounters Includes continued care and service providers with selected services from prior encounters from 8/1/2024 to 10/31/2024      Discharged on 8/19/2024 Admission date: 8/12/2024 - Discharge disposition: Rehab Facility or Unit (DC - External)      Destination       Service Provider Services Address Phone Fax Patient Preferred    [facility] [address] [id number] [phone] --                          Expected Discharge Date and Time       Expected Discharge Date Expected Discharge Time    Nov 2, 2024            Demographic Summary       Row Name 10/31/24 1504       General Information    Admission Type inpatient    Arrived From emergency department    Referral Source admission list    Reason for Consult discharge planning    Preferred Language English       Contact Information    Permission Granted to Share Info With     Contact Information Obtained for                    Functional Status       Row Name 10/31/24 1504       Functional Status    Usual Activity Tolerance moderate    Current Activity Tolerance moderate       Functional Status, IADL    Medications independent    Meal Preparation independent    Housekeeping independent    Laundry independent    Shopping independent                   Psychosocial    No documentation.                  Abuse/Neglect    No documentation.                  Legal    No documentation.                  Substance Abuse    No documentation.                  Patient Forms    No documentation.                     Mitra Fan RN

## 2024-10-31 NOTE — PROGRESS NOTES
"Nutrition Services    Patient Name:  Yanique Webster  YOB: 1940  MRN: [id number]  Admit Date:  10/30/2024    Patient identified to be at nutrition risk per nurse admission screen based on indicator of \"unsure weight loss\". Per EMR weight stable > past year. Pt does not currently meet screen criteria for nutrition risk. RD will continue to follow per protocol.    Electronically signed by:  Ana Estes MS,RD,LD  10/31/24 13:41 EDT   "

## 2024-11-01 LAB
ALBUMIN SERPL-MCNC: 3.4 G/DL (ref 3.5–5.2)
ANION GAP SERPL CALCULATED.3IONS-SCNC: 14 MMOL/L (ref 5–15)
BUN SERPL-MCNC: 64 MG/DL (ref 8–23)
BUN/CREAT SERPL: 17.9 (ref 7–25)
C AURIS DNA SPEC QL NAA+NON-PROBE: NOT DETECTED
CALCIUM SPEC-SCNC: 9.9 MG/DL (ref 8.6–10.5)
CHLORIDE SERPL-SCNC: 110 MMOL/L (ref 98–107)
CO2 SERPL-SCNC: 17 MMOL/L (ref 22–29)
CREAT SERPL-MCNC: 3.58 MG/DL (ref 0.57–1)
DEPRECATED RDW RBC AUTO: 54.2 FL (ref 37–54)
EGFRCR SERPLBLD CKD-EPI 2021: 12.1 ML/MIN/1.73
EPO SERPL-ACNC: 9.2 MIU/ML (ref 2.6–18.5)
ERYTHROCYTE [DISTWIDTH] IN BLOOD BY AUTOMATED COUNT: 16.3 % (ref 12.3–15.4)
GLUCOSE SERPL-MCNC: 113 MG/DL (ref 65–99)
HCT VFR BLD AUTO: 27.9 % (ref 34–46.6)
HGB BLD-MCNC: 9 G/DL (ref 12–15.9)
MCH RBC QN AUTO: 29.3 PG (ref 26.6–33)
MCHC RBC AUTO-ENTMCNC: 32.3 G/DL (ref 31.5–35.7)
MCV RBC AUTO: 90.9 FL (ref 79–97)
PHOSPHATE SERPL-MCNC: 5.2 MG/DL (ref 2.5–4.5)
PLATELET # BLD AUTO: 131 10*3/MM3 (ref 140–450)
PMV BLD AUTO: 11.3 FL (ref 6–12)
POTASSIUM SERPL-SCNC: 4.4 MMOL/L (ref 3.5–5.2)
RBC # BLD AUTO: 3.07 10*6/MM3 (ref 3.77–5.28)
SODIUM SERPL-SCNC: 141 MMOL/L (ref 136–145)
WBC NRBC COR # BLD AUTO: 6.3 10*3/MM3 (ref 3.4–10.8)

## 2024-11-01 PROCEDURE — 80069 RENAL FUNCTION PANEL: CPT | Performed by: INTERNAL MEDICINE

## 2024-11-01 PROCEDURE — 97162 PT EVAL MOD COMPLEX 30 MIN: CPT

## 2024-11-01 PROCEDURE — 99232 SBSQ HOSP IP/OBS MODERATE 35: CPT | Performed by: INTERNAL MEDICINE

## 2024-11-01 PROCEDURE — 97535 SELF CARE MNGMENT TRAINING: CPT

## 2024-11-01 PROCEDURE — 85027 COMPLETE CBC AUTOMATED: CPT | Performed by: INTERNAL MEDICINE

## 2024-11-01 PROCEDURE — 97530 THERAPEUTIC ACTIVITIES: CPT

## 2024-11-01 PROCEDURE — 25010000002 LABETALOL 5 MG/ML SOLUTION: Performed by: PHYSICIAN ASSISTANT

## 2024-11-01 PROCEDURE — 97166 OT EVAL MOD COMPLEX 45 MIN: CPT

## 2024-11-01 RX ORDER — LABETALOL HYDROCHLORIDE 5 MG/ML
10 INJECTION, SOLUTION INTRAVENOUS ONCE
Status: COMPLETED | OUTPATIENT
Start: 2024-11-02 | End: 2024-11-01

## 2024-11-01 RX ADMIN — Medication 10 ML: at 21:19

## 2024-11-01 RX ADMIN — MORPHINE SULFATE 15 MG: 15 TABLET, FILM COATED, EXTENDED RELEASE ORAL at 21:18

## 2024-11-01 RX ADMIN — CARVEDILOL 25 MG: 12.5 TABLET, FILM COATED ORAL at 09:11

## 2024-11-01 RX ADMIN — HYDRALAZINE HYDROCHLORIDE 100 MG: 50 TABLET ORAL at 21:17

## 2024-11-01 RX ADMIN — SODIUM BICARBONATE 650 MG TABLET 650 MG: at 09:11

## 2024-11-01 RX ADMIN — HYDRALAZINE HYDROCHLORIDE 100 MG: 50 TABLET ORAL at 13:43

## 2024-11-01 RX ADMIN — SODIUM BICARBONATE 650 MG TABLET 650 MG: at 21:17

## 2024-11-01 RX ADMIN — CYANOCOBALAMIN TAB 1000 MCG 1000 MCG: 1000 TAB at 09:11

## 2024-11-01 RX ADMIN — TERAZOSIN HYDROCHLORIDE 5 MG: 5 CAPSULE ORAL at 21:18

## 2024-11-01 RX ADMIN — PANTOPRAZOLE SODIUM 40 MG: 40 TABLET, DELAYED RELEASE ORAL at 09:11

## 2024-11-01 RX ADMIN — LABETALOL HYDROCHLORIDE 10 MG: 5 INJECTION, SOLUTION INTRAVENOUS at 23:23

## 2024-11-01 RX ADMIN — MORPHINE SULFATE 15 MG: 15 TABLET, FILM COATED, EXTENDED RELEASE ORAL at 09:11

## 2024-11-01 RX ADMIN — POLYETHYLENE GLYCOL 3350 17 G: 17 POWDER, FOR SOLUTION ORAL at 06:00

## 2024-11-01 RX ADMIN — PANTOPRAZOLE SODIUM 40 MG: 40 TABLET, DELAYED RELEASE ORAL at 21:17

## 2024-11-01 RX ADMIN — CARVEDILOL 25 MG: 12.5 TABLET, FILM COATED ORAL at 21:19

## 2024-11-01 RX ADMIN — ATORVASTATIN CALCIUM 80 MG: 40 TABLET, FILM COATED ORAL at 21:18

## 2024-11-01 RX ADMIN — Medication 10 ML: at 09:11

## 2024-11-01 RX ADMIN — CALCITRIOL CAPSULES 0.25 MCG 0.5 MCG: 0.25 CAPSULE ORAL at 09:11

## 2024-11-01 RX ADMIN — FERROUS SULFATE TAB 325 MG (65 MG ELEMENTAL FE) 325 MG: 325 (65 FE) TAB at 09:11

## 2024-11-01 RX ADMIN — Medication 1 CAPSULE: at 09:11

## 2024-11-01 RX ADMIN — HYDRALAZINE HYDROCHLORIDE 100 MG: 50 TABLET ORAL at 05:59

## 2024-11-01 NOTE — PLAN OF CARE
Goal Outcome Evaluation:  Plan of Care Reviewed With: patient        Progress: no change (OT IE)  Outcome Evaluation: OT evaluation completed. Pt presents with decreased I in ADLs, related t/fs and mobility limited by decreased activity tolerance, impaired balance, muscle weakness at BUEs, fatigue with more dynamic demands and dizziness during standing and transfers. RN aware. Pt demonstrated need for CGA during supine to sit bed mobility, min A x 1 for all fxl t/fs with need for increased effort and time to stand from toilet at lower height. Pt req'd variable A for ADLs observed including min A for LBD, min A for UBD and SBA to CGA for toileting hygiene and clothing mgmt, respectively. Pt is below occupational performance baseline and would benefit from IPOT POC and IRF at d/c when medically ready. If pt declines IRF pt would need to receive 24/7 A and HHOT as needed for support and continued training to address underlying impairments impacting function.    Anticipated Discharge Disposition (OT): inpatient rehabilitation facility, home with 24/7 care, home with home health

## 2024-11-01 NOTE — PROGRESS NOTES
" LOS: 2 days   Patient Care Team:  Sebas Alicea MD as PCP - General (Family Medicine)  Steve Geronimo MD as Consulting Physician (Cardiology)  Emilio Regalado MD as Consulting Physician (Endocrinology)  Axel Ribeiro MD as Consulting Physician (Cardiology)    Chief Complaint: KINDRA on CKD stage IV   Anemia   SOB    Subjective         Subjective:  Symptoms:  No shortness of breath, chest pain, chest pressure or anxiety.        History taken from: patient    Objective     Vital Sign Min/Max for last 24 hours  Temp  Min: 97.7 °F (36.5 °C)  Max: 98.4 °F (36.9 °C)   BP  Min: 136/66  Max: 174/73   Pulse  Min: 60  Max: 64   Resp  Min: 18  Max: 18   SpO2  Min: 95 %  Max: 97 %   No data recorded   Weight  Min: 81.6 kg (179 lb 12.8 oz)  Max: 81.6 kg (179 lb 12.8 oz)     Flowsheet Rows      Flowsheet Row First Filed Value   Admission Height 162.6 cm (64\") Documented at 10/30/2024 1632   Admission Weight 83.5 kg (184 lb) Documented at 10/30/2024 1632            I/O this shift:  In: 240 [P.O.:240]  Out: 650 [Urine:650]  I/O last 3 completed shifts:  In: 645 [Blood:645]  Out: 2300 [Urine:2300]    Objective:  General Appearance:  Comfortable.    Vital signs: (most recent): Blood pressure 167/71, pulse 62, temperature 98 °F (36.7 °C), temperature source Oral, resp. rate 18, height 162.6 cm (64\"), weight 81.6 kg (179 lb 12.8 oz), SpO2 97%.  Vital signs are normal.    Output: Producing urine.    HEENT: Normal HEENT exam.    Lungs:  Normal effort and normal respiratory rate.  Breath sounds clear to auscultation.  She is not in respiratory distress.  No decreased breath sounds.    Heart: Normal rate.  Regular rhythm.  S1 normal and S2 normal.  No murmur or gallop.   Abdomen: Abdomen is soft.  Bowel sounds are normal.     Extremities: Normal range of motion.  There is no dependent edema or local swelling.    Pulses: Distal pulses are intact.    Neurological: Patient is alert.                Results Review:     I reviewed " "the patient's new clinical results.    WBC WBC   Date Value Ref Range Status   11/01/2024 6.30 3.40 - 10.80 10*3/mm3 Final   10/31/2024 6.71 3.40 - 10.80 10*3/mm3 Final   10/30/2024 4.71 3.40 - 10.80 10*3/mm3 Final      HGB Hemoglobin   Date Value Ref Range Status   11/01/2024 9.0 (L) 12.0 - 15.9 g/dL Final   10/31/2024 8.2 (L) 12.0 - 15.9 g/dL Final   10/30/2024 6.1 (C) 12.0 - 15.9 g/dL Final      HCT Hematocrit   Date Value Ref Range Status   11/01/2024 27.9 (L) 34.0 - 46.6 % Final   10/31/2024 25.1 (L) 34.0 - 46.6 % Final   10/30/2024 19.6 (C) 34.0 - 46.6 % Final      Platlets No results found for: \"LABPLAT\"   MCV MCV   Date Value Ref Range Status   11/01/2024 90.9 79.0 - 97.0 fL Final   10/31/2024 89.3 79.0 - 97.0 fL Final   10/30/2024 90.3 79.0 - 97.0 fL Final          Sodium Sodium   Date Value Ref Range Status   11/01/2024 141 136 - 145 mmol/L Final   10/31/2024 139 136 - 145 mmol/L Final   10/30/2024 137 136 - 145 mmol/L Final      Potassium Potassium   Date Value Ref Range Status   11/01/2024 4.4 3.5 - 5.2 mmol/L Final   10/31/2024 4.4 3.5 - 5.2 mmol/L Final   10/30/2024 4.3 3.5 - 5.2 mmol/L Final      Chloride Chloride   Date Value Ref Range Status   11/01/2024 110 (H) 98 - 107 mmol/L Final   10/31/2024 109 (H) 98 - 107 mmol/L Final   10/30/2024 106 98 - 107 mmol/L Final      CO2 CO2   Date Value Ref Range Status   11/01/2024 17.0 (L) 22.0 - 29.0 mmol/L Final   10/31/2024 19.0 (L) 22.0 - 29.0 mmol/L Final   10/30/2024 18.0 (L) 22.0 - 29.0 mmol/L Final      BUN BUN   Date Value Ref Range Status   11/01/2024 64 (H) 8 - 23 mg/dL Final   10/31/2024 60 (H) 8 - 23 mg/dL Final   10/30/2024 59 (H) 8 - 23 mg/dL Final      Creatinine Creatinine   Date Value Ref Range Status   11/01/2024 3.58 (H) 0.57 - 1.00 mg/dL Final   10/31/2024 3.26 (H) 0.57 - 1.00 mg/dL Final   10/30/2024 3.65 (H) 0.57 - 1.00 mg/dL Final      Calcium Calcium   Date Value Ref Range Status   11/01/2024 9.9 8.6 - 10.5 mg/dL Final   10/31/2024 10.0 " "8.6 - 10.5 mg/dL Final   10/31/2024 9.7 8.6 - 10.5 mg/dL Final   10/30/2024 9.7 8.6 - 10.5 mg/dL Final      PO4 No results found for: \"CAPO4\"   Albumin Albumin   Date Value Ref Range Status   11/01/2024 3.4 (L) 3.5 - 5.2 g/dL Final   10/30/2024 3.4 (L) 3.5 - 5.2 g/dL Final      Magnesium No results found for: \"MG\"   Uric Acid No results found for: \"URICACID\"     Medication Review: yes    Assessment & Plan       Symptomatic anemia    Hyperlipidemia LDL goal <70    COPD (chronic obstructive pulmonary disease)    GERD (gastroesophageal reflux disease)    Coronary artery disease involving native coronary artery of native heart without angina pectoris    Essential hypertension    History of CVA (cerebrovascular accident)    CKD (chronic kidney disease) stage 4, GFR 15-29 ml/min    Paroxysmal atrial fibrillation    Anxiety associated with depression    KINDRA (acute kidney injury)      Assessment & Plan    KINDRA: Baseline cr btw 1.9-2.2mg/dl. Cr 3.23.5 on this admission. Suspect hemodynamic injury in the setting of anemia.      CKD stage IV: Hx of proteinuric renal disease. UPC 2 gm in the past. Duplex negative in 2016     Anemia: Acute on chronic worsening. ACD due to CKD. SPEP negative in the past. Nadya any bleeding. Started on EDISON weekly      Volume status: No significant edema on examination      HTN: No ANABEL per duplex in 2016.     Proteinuria: UPC 2.8 gm. Etiology unspecified.     Met acidosis: persistent in the setting of CKD. Hold off on bicarb supp at present.      Recs  KINDRA component due to hemodynamic injury so far no improvement in renal function. Suspect advance CKD at baseline  Agree with EDISON weekly   Transfuse at Hb<7   Daily labs.   No indication for dialysis at present.      I discussed the patients findings and my recommendations with patient    Zurdo Levine MD  11/01/24  16:45 EDT            "

## 2024-11-01 NOTE — PLAN OF CARE
Goal Outcome Evaluation:  Plan of Care Reviewed With: (P) patient        Progress: (P) no change  Outcome Evaluation: (P) Pt presents w/ dec functional mobility, strength, and activity tolerance compared to baseline. Pt ambulated 15 ft in room using FWW w/ min A x1. Pt w/ inc SOA and mild dizziness during ambulation. Pt requires skilled IPPT services to return to PLOF. Pt would benefit from d/c to IRF, but if pt refuses, rec return to ILF w/ HHPT.    Anticipated Discharge Disposition (PT): (P) inpatient rehabilitation facility

## 2024-11-01 NOTE — CASE MANAGEMENT/SOCIAL WORK
"Continued Stay Note  [facility]     Patient Name: Yanique Webster  MRN: [id number]  Today's Date: 11/1/2024    Admit Date: 10/30/2024    Plan: Home   Discharge Plan       Row Name 11/01/24 1432       Plan    Plan Home    Patient/Family in Agreement with Plan yes    Plan Comments Discussed in MDR. The plan remains going back to [facility] at discharge. Therapy is recommending inpatient rehab or home health for PT. Ms. Webster declined both recommendations stating, \"I don't need that, I'm back to myself.\" CM called and spoke with the  at [facility] to see if the RN needs to call report and do they need to do an onsite visit.  said \"No\". CM will continue to follow for medical readiness.    Final Discharge Disposition Code 01 - home or self-care                   Discharge Codes    No documentation.                 Expected Discharge Date and Time       Expected Discharge Date Expected Discharge Time    Nov 5, 2024               Mitra Fan RN    "

## 2024-11-01 NOTE — PROGRESS NOTES
[facility]  PROGRESS NOTE    Patient Name: Yanique Webster  : 1940  MRN: [id number]    Date of Admission: 10/30/2024  Primary Care Physician: Sebas Alicea MD    Subjective   Subjective     CC:  F/u anemia    HPI:  Resting in bed in no acute distress complains of back pain and lower extremity pain.  Denies any fever or chills.  No chest pain palpitation shortness of breath at rest.  No nausea vomiting or diarrhea.      Objective   Objective     Vital Signs:   Temp:  [97.7 °F (36.5 °C)-98.4 °F (36.9 °C)] 98 °F (36.7 °C)  Heart Rate:  [60-64] 62  Resp:  [18] 18  BP: (136-174)/(62-73) 167/71     Physical Exam:  Constitutional: No acute distress  HENT: NCAT, mucous membranes moist  Respiratory: Clear to auscultation bilaterally, respiratory effort normal   Cardiovascular: RRR, no murmurs, rubs, or gallops  Gastrointestinal: Positive bowel sounds, soft, nontender, nondistended  Musculoskeletal:  bilateral ankle edema  Psychiatric: Appropriate affect, cooperative  Neurologic: Oriented x 3, no focality appreciated, speech clear  Skin: No rashes     Results Reviewed:  LAB RESULTS:      Lab 11/01/24  0523 10/31/24  0202 10/30/24  1840 10/30/24  1626   WBC 6.30 6.71  --  4.71   HEMOGLOBIN 9.0* 8.2*  --  6.1*   HEMATOCRIT 27.9* 25.1*  --  19.6*   PLATELETS 131* 123*  --  128*   NEUTROS ABS  --  4.53  --  2.91   IMMATURE GRANS (ABS)  --  0.03  --  0.02   LYMPHS ABS  --  1.30  --  1.15   MONOS ABS  --  0.60  --  0.46   EOS ABS  --  0.20  --  0.13   MCV 90.9 89.3  --  90.3   PROCALCITONIN  --   --   --  0.09   LACTATE  --   --   --  0.6   HSTROP T  --   --  62* 66*         Lab 11/01/24  0523 10/31/24  0202 10/30/24  1626 10/29/24  1143   SODIUM 141 139 137  --    POTASSIUM 4.4 4.4 4.3  --    CHLORIDE 110* 109* 106  --    CO2 17.0* 19.0* 18.0*  --    ANION GAP 14.0 11.0 13.0  --    BUN 64* 60* 59*  --    CREATININE 3.58* 3.26* 3.65*  --    EGFR 12.1* 13.6* 11.8*  --     GLUCOSE 113* 107* 115*  --    CALCIUM 9.9 10.0  9.7 9.7  --    PHOSPHORUS 5.2*  --   --   --    HEMOGLOBIN A1C  --  5.40  --  5.0         Lab 11/01/24  0523 10/30/24  1626   TOTAL PROTEIN  --  5.7*   ALBUMIN 3.4* 3.4*   GLOBULIN  --  2.3   ALT (SGPT)  --  18   AST (SGOT)  --  24   BILIRUBIN  --  0.3   ALK PHOS  --  91         Lab 10/30/24  1840 10/30/24  1626   PROBNP  --  5,438.0*   HSTROP T 62* 66*             Lab 10/30/24  1840 10/30/24  1654 10/30/24  1626   IRON 42  42  --   --    IRON SATURATION (TSAT) 17*  --   --    TIBC 244*  --   --    TRANSFERRIN 164*  --   --    FERRITIN 103.00  --   --    FOLATE  --   --  7.53   VITAMIN B 12  --   --  1,518*   ABO TYPING  --  O  --    RH TYPING  --  Negative  --    ANTIBODY SCREEN  --  Negative  --          Lab 10/30/24  1640   PH, ARTERIAL 7.301*   PCO2, ARTERIAL 36.5   PO2 ART 73.3*   FIO2 21   HCO3 ART 18.0*   BASE EXCESS ART -7.8*   CARBOXYHEMOGLOBIN 1.4     Brief Urine Lab Results  (Last result in the past 365 days)        Color   Clarity   Blood   Leuk Est   Nitrite   Protein   CREAT   Urine HCG        08/13/24 2000             52.4         08/13/24 2000 Yellow   Clear   Negative   Negative   Negative   >=300 mg/dL (3+)                   Microbiology Results Abnormal       None            No radiology results from the last 24 hrs    Results for orders placed during the hospital encounter of 12/30/23    Adult Transthoracic Echo Complete W/ Cont if Necessary Per Protocol    Interpretation Summary    Left ventricular ejection fraction appears to be 56 - 60%.    Left ventricular wall thickness is consistent with mild concentric hypertrophy    The aortic valve exhibits sclerosis.    Mild aortic valve regurgitation is present. No hemodynamically significant aortic valve stenosis is present.    Mild tricuspid valve regurgitation is present. Estimated right ventricular systolic pressure from tricuspid regurgitation is moderately elevated (45-55 mmHg    Moderate mitral  annular calcification is present. Trace to mild mitral valve regurgitation is present. No significant mitral valve stenosis is present.      Current medications:  Scheduled Meds:amLODIPine, 5 mg, Oral, Q24H  [Held by provider] aspirin, 81 mg, Oral, Daily  atorvastatin, 80 mg, Oral, Nightly  calcitriol, 0.5 mcg, Oral, Daily  carvedilol, 25 mg, Oral, Q12H  epoetin adonis/adonis-epbx, 40,000 Units, Subcutaneous, Weekly  ferrous sulfate, 325 mg, Oral, Daily With Breakfast  hydrALAZINE, 100 mg, Oral, Q8H  lactobacillus acidophilus, 1 capsule, Oral, Daily  Morphine, 15 mg, Oral, BID  pantoprazole, 40 mg, Oral, BID  polyethylene glycol, 17 g, Oral, Daily  sodium bicarbonate, 650 mg, Oral, BID  sodium chloride, 10 mL, Intravenous, Q12H  terazosin, 5 mg, Oral, Nightly  vitamin B-12, 1,000 mcg, Oral, Daily      Continuous Infusions:   PRN Meds:.  acetaminophen **OR** acetaminophen **OR** acetaminophen    senna-docusate sodium **AND** polyethylene glycol **AND** bisacodyl **AND** bisacodyl    ipratropium-albuterol    nitroglycerin    sodium chloride    sodium chloride    torsemide    Assessment & Plan   Assessment & Plan     Active Hospital Problems    Diagnosis  POA    **Symptomatic anemia [D64.9]  Yes    KINDRA (acute kidney injury) [N17.9]  Yes    Anxiety associated with depression [F41.8]  Yes    Paroxysmal atrial fibrillation [I48.0]  Yes    CKD (chronic kidney disease) stage 4, GFR 15-29 ml/min [N18.4]  Yes    History of CVA (cerebrovascular accident) [Z86.73]  Not Applicable    Essential hypertension [I10]  Yes    Coronary artery disease involving native coronary artery of native heart without angina pectoris [I25.10]  Yes    COPD (chronic obstructive pulmonary disease) [J44.9]  Yes    GERD (gastroesophageal reflux disease) [K21.9]  Yes    Hyperlipidemia LDL goal <70 [E78.5]  Yes      Resolved Hospital Problems   No resolved problems to display.        Brief Hospital Course to date:  Yanique Webster is a 84 y.o. female w/  CAD, COPD, HTN, HLD, chronic pain, Takotsubo cardiomyopathy, CKD4, Afib on eliquis; she was admitted 8/12/24-8/19/24 w/ anemia requiring transfusion, during that say she had EGD 8/14 and colo 8/16 both without source of bleeding; pt/fam elected to resume oral AC for stroke ppx; she has seen EP in the clinic and is contemplating Watchman device; she developed new SOB and had labs drawn which showed significant anemia and she was sent to the ED where Hgb was 6.1; she was admitted and transfused blood    The following problems are new to me today    Assessment/Plan    Acute/Chronic normocytic anemia, recurrent  Thrombocytopenia  -recent EGD/[place] 8/2024 w/o evidence for bleeding  -iron studies c/w anemia of chronic disease  -s/p 2U PRBC  -second episode of transfusion requiring anemia, noted TCP as well  -favor she has an anemia of chronic disease/renal disease, in the context of ongoing oral AC for stroke ppx    KINDRA on CKD4  Secondary hyperparathyroidism  -baseline Cr about 2.3; eGFR 20's  -nephrology following    pAfib w/ chronic oral AC  -xarelto on hold  -seen by EP for consideration of Watchman, pending consultant recs anticipate opt f/u    CAD  HTN  HLD  Hx Takotsubo cardiomyopathy  -most recent EF 56-60%  -cont amlodipine, statin, coreg, hydralazine, terazosin  -Will check echocardiogram as patient has edema of lower extremity and very elevated proBNP    COPD - add prn nebs  Chronic pain - cont ms contin    Expected Discharge Location and Transportation: back to facility  Expected Discharge   Expected Discharge Date: 11/5/2024; Expected Discharge Time:      VTE Prophylaxis:  Mechanical VTE prophylaxis orders are present.         AM-PAC 6 Clicks Score (PT): 17 (11/01/24 1313)    CODE STATUS:   Code Status and Medical Interventions: CPR (Attempt to Resuscitate); Full Support   Ordered at: 10/30/24 1958     Level Of Support Discussed With:    Patient     Code Status (Patient has no pulse and is not breathing):     CPR (Attempt to Resuscitate)     Medical Interventions (Patient has pulse or is breathing):    Full Support       Axel Belle MD  11/01/24

## 2024-11-01 NOTE — THERAPY EVALUATION
Patient Name: Yanique Webster  : 1940    MRN: [id number]                              Today's Date: 2024       Admit Date: 10/30/2024    Visit Dx:     ICD-10-CM ICD-9-CM   1. KINDRA (acute kidney injury)  N17.9 584.9   2. Symptomatic anemia  D64.9 285.9     Patient Active Problem List   Diagnosis    Fibromyalgia    PVD (peripheral vascular disease)    Type 2 diabetes mellitus    Diabetic gastroparesis    Takotsubo cardiomyopathy    Elevated serum creatinine    Hyperlipidemia LDL goal <70    COPD (chronic obstructive pulmonary disease)    Obesity    Osteoarthritis    Chronic pain    GERD (gastroesophageal reflux disease)    Gout    Chronic joint pain    Coronary artery disease involving native coronary artery of native heart without angina pectoris    Nonrheumatic aortic valve insufficiency    Altered mental status    Essential hypertension    History of CVA (cerebrovascular accident)    CKD (chronic kidney disease) stage 4, GFR 15-29 ml/min    Hypertensive emergency    Status post placement of implantable loop recorder    Paroxysmal atrial fibrillation    Acute exacerbation of COPD with asthma    Encounter for loop recorder at end of battery life    Closed left hip fracture    Anxiety associated with depression    Anemia, chronic disease    Surgery follow-up    Borderline abnormal TFTs    Acute blood loss anemia    Secondary hyperparathyroidism    Symptomatic anemia    KINDRA (acute kidney injury)     Past Medical History:   Diagnosis Date    Blurry vision     Chronic joint pain     Chronic pain     COPD (chronic obstructive pulmonary disease)     Coronary artery disease     Diabetic gastroparesis 2016    Esophageal stricture     s/p dilation in     GERD (gastroesophageal reflux disease)     Gout     Headache     secondary to hypertension    Hyperlipidemia     Hypertension     Long term current use of insulin     Neuropathy     Neuropathy due to type 2 diabetes mellitus     Obesity      Osteoarthritis     Pericarditis 2008    Stroke 03/2019    Type 2 diabetes mellitus      Past Surgical History:   Procedure Laterality Date    BRONCHOSCOPY N/A 8/23/2016    Procedure: BRONCHOSCOPY BIOPSY AT BEDSIDE;  Surgeon: Mookie Willard MD;  Location:  [facility];  Service:     CARDIAC CATHETERIZATION N/A 8/30/2016    Procedure: Left Heart Cath;  Surgeon: Steve Geronimo MD;  Location:  [facility];  Service:     CARDIAC CATHETERIZATION N/A 8/30/2016    Procedure: Right Heart Cath;  Surgeon: Steve Geronimo MD;  Location:  [facility];  Service:     CARDIAC ELECTROPHYSIOLOGY PROCEDURE N/A 7/2/2019    Procedure: Loop insertion;  Surgeon: Steve Geronimo MD;  Location:  [facility];  Service: Cardiovascular    CARDIAC ELECTROPHYSIOLOGY PROCEDURE N/A 9/26/2023    Procedure: Loop recorder removal;  Surgeon: Steve Geronimo MD;  Location:  [facility];  Service: Cardiovascular;  Laterality: N/A;    CATARACT EXTRACTION      COLONOSCOPY N/A 8/16/2024    Procedure: COLONOSCOPY;  Surgeon: Brunner, Mark I, MD;  Location:  [facility];  Service: Gastroenterology;  Laterality: N/A;    ENDOSCOPY N/A 8/14/2024    Procedure: ESOPHAGOGASTRODUODENOSCOPY;  Surgeon: Brunner, Mark I, MD;  Location:  [facility];  Service: Gastroenterology;  Laterality: N/A;    ESOPHAGEAL DILATATION  2007    HERNIA REPAIR      HIP CANNULATED SCREW PLACEMENT Left 1/2/2024    Procedure: HIP CANNULATED SCREW PLACEMENT LEFT;  Surgeon: Seymour Harrington MD;  [facility];  Service: Orthopedics;  Laterality: Left;    HYSTERECTOMY  1998    WRIST FRACTURE SURGERY Left       General Information       Row Name 11/01/24 1340          OT Time and Intention    Document Type evaluation  -JY     Mode of Treatment occupational therapy  -JY       Row Name 11/01/24 1340          General Information    Patient Profile Reviewed yes  -JY     Prior Level of Function independent:;all  household mobility;gait;transfer;bed mobility;ADL's;feeding;grooming;dressing;bathing;dependent:;home management;cooking;cleaning;driving  pt lives at [facility] where pt was I in ADLs, related t/fs and mobility w/ use of 4WW, pt goes to DR for all meals eaten (self prepares snack for lunch), facility assists w/ cleaning, laundry and pt does not drive; denies any falls over last 6 months  -JY     Existing Precautions/Restrictions fall;other (see comments)  [facility], chronic pain, neuropathy B feet and hands  -JY     Barriers to Rehab medically complex;previous functional deficit  -JY       Row Name 11/01/24 1340          Occupational Profile    Environmental Supports and Barriers (Occupational Profile) step over tub/shower combo w/ seat where pt sits at baseline, elevated toilet height w/ UE supports; DME: 4WW, shower seat  -JY     Patient Goals (Occupational Profile) to return to OF  -JY       Row Name 11/01/24 1340          Living Environment    People in Home facility resident;other (see comments)  Pt lives at [facility]  -JY       Row Name 11/01/24 1340          Home Main Entrance    Number of Stairs, Main Entrance none  -JY       Row Name 11/01/24 1340          Stairs Within Home, Primary    Number of Stairs, Within Home, Primary none  -JY       Row Name 11/01/24 1340          Cognition    Orientation Status (Cognition) oriented x 4  -JY       Row Name 11/01/24 1340          Safety Issues/Impairments Affecting Functional Mobility    Safety Issues Affecting Function (Mobility) awareness of need for assistance;insight into deficits/self-awareness;positioning of assistive device;safety precaution awareness;safety precautions follow-through/compliance;sequencing abilities  -JY     Impairments Affecting Function (Mobility) balance;endurance/activity tolerance;pain;strength;other (see comments)  this date pt with dizziness reported  -JY     Comment, Safety Issues/Impairments (Mobility) pt alert and able to  follow commands; progressive mobility limited by neuropathic pain at BLEs and dizziness chayo when standing  -RENATE               User Key  (r) = Recorded By, (t) = Taken By, (c) = Cosigned By      Initials Name Provider Type    Alexa Bardales, OT Occupational Therapist                     Mobility/ADL's       Row Name 11/01/24 1342          Bed Mobility    Bed Mobility supine-sit;scooting/bridging  -JY     Scooting/Bridging [place] (Bed Mobility) contact guard;verbal cues  -RENATE     Supine-Sit [place] (Bed Mobility) contact guard;verbal cues  -RENATE     Assistive Device (Bed Mobility) bed rails  -RENATE     Comment, (Bed Mobility) attempted to simulate bed set up at home yet pt reported sleeping on couch thus positioned bed with HOB down and lower height and pt req'd skilled cues for optimal hand placement, seq to include advancing LEs to EOB and over side, reaching for bedrail as needed to pull self toward side and to upright trunk into sitting; pt denied dizziness in sitting EOB yet later reported in standing, t/fs  -RENATE       Row Name 11/01/24 1346          Transfers    Transfers sit-stand transfer;stand-sit transfer;toilet transfer  -RENATE     Comment, (Transfers) skilled cues for optimal hand placement for controlled ascend, descend specifically to push up from seated surface, reach back prior to sitting once aligned and in close proximity to seated surface; cues for cont'd use of FWW throughout t/fs and not neglecting prior to time of sitting  -RENATE       Row Name 11/01/24 1348          Sit-Stand Transfer    Sit-Stand [place] (Transfers) minimum assist (75% patient effort);verbal cues  -RENATE     Assistive Device (Sit-Stand Transfers) walker, front-wheeled  -RENATE       Row Name 11/01/24 1340          Stand-Sit Transfer    Stand-Sit [place] (Transfers) minimum assist (75% patient effort);verbal cues  -RENATE     Assistive Device (Stand-Sit Transfers) walker, front-wheeled  -RENATE       Row Name 11/01/24 1347           Toilet Transfer    Type (Toilet Transfer) sit-stand;stand-sit  -JY     [place] Level (Toilet Transfer) minimum assist (75% patient effort);1 person assist;verbal cues  -JY     Assistive Device (Toilet Transfer) commode;grab bars/safety frame  -J     Comment, (Toilet Transfer) toilet at lower height and pt presented with more difficulty in ascend, descend given lower height and elevated arm support available; min A for controlled descend and to assist in ascend  -CHAKA       Row Name 11/01/24 1349          Functional Mobility    Functional Mobility- Ind. Level minimum assist (75% patient effort);1 person;verbal cues required  -Y     Functional Mobility- Device walker, front-wheeled  -     Functional Mobility-Distance (Feet) --  in room ADL related mobility  -     Functional Mobility- Comment defer to PT for specifics however during in room ADL related mobility pt req'd gross min A x 1 w/ FWW support; pt reported intermittent dizziness in standing with BP assessed after pt returned from bathroom and close monitoring of pt status throughout, BP did not decrease upon assessment  -JY     Patient was able to Ambulate yes  -CHAKA       Row Name 11/01/24 1349          Activities of Daily Living    BADL Assessment/Intervention upper body dressing;lower body dressing;grooming;toileting  -CHAKA       Row Name 11/01/24 1349          Upper Body Dressing Assessment/Training    [place] Level (Upper Body Dressing) doff;don;pajama/robe;minimum assist (75% patient effort);verbal cues  -JY     Position (Upper Body Dressing) edge of bed sitting  -JY     Comment, (Upper Body Dressing) min A for proximal and posterior mgmt of gown  -CHAKA       Row Name 11/01/24 1349          Lower Body Dressing Assessment/Training    [place] Level (Lower Body Dressing) doff;don;shoes/slippers;minimum assist (75% patient effort);verbal cues  -JY     Position (Lower Body Dressing) edge of bed sitting  -JY     Comment, (Lower Body Dressing) pt  grossly able to don slippers however req'd min A for full mgnt of slipper over R heel with increased edema noted at LEs  -[facility] Name 11/01/24 1349          Grooming Assessment/Training    [place] Level (Grooming) wash face, hands;set up  -     Position (Grooming) supported sitting  -JY       Row Name 11/01/24 1349          Toileting Assessment/Training    [place] Level (Toileting) adjust/manage clothing;contact guard assist;perform perineal hygiene;standby assist  -     Assistive Devices (Toileting) commode;grab bar/safety frame  -     Position (Toileting) unsupported sitting;supported standing  -     Comment, (Toileting) pt req'd CGA for balance purposes when managing clothing up/down pre and post toileting, no physical A for hygiene when seated and attempting reach anteriorly; anticipate greater A with more involved hygiene needs  -               User Key  (r) = Recorded By, (t) = Taken By, (c) = Cosigned By      Initials Name Provider Type    Alexa Bardales OT Occupational Therapist                   Obj/Interventions       Row Name 11/01/24 1409          Sensory Assessment (Somatosensory)    Sensory Assessment (Somatosensory) bilateral UE;sensation intact  -     Bilateral UE Sensory Assessment general sensation;light touch awareness;intact  -     Sensory Assessment denies any numbness or tingling and able to recognize LT stimuli as intact and symmetrical at BUEs  -JY       Row Name 11/01/24 1409          Vision Assessment/Intervention    Visual Impairment/Limitations corrective lenses full-time  -     Vision Assessment Comment denies any acute changes to vision  -JY       Row Name 11/01/24 1409          Range of Motion Comprehensive    General Range of Motion bilateral upper extremity ROM WFL  -JY       Row Name 11/01/24 1409          Strength Comprehensive (MMT)    General Manual Muscle Testing (MMT) Assessment upper extremity strength deficits identified  -     Comment,  General Manual Muscle Testing (MMT) Assessment BUE MMS grossly 4 to 4+/5 per MMT  -JY       Row Name 11/01/24 1409          Motor Skills    Motor Skills functional endurance;coordination  -JY     Coordination bilateral;upper extremity;finger to nose;other (see comments);WFL  finger thumb opposition  -JY     Functional Endurance decreased activity tolerance toward more dynamic demands  -JY       Row Name 11/01/24 1409          Balance    Balance Assessment sitting static balance;sitting dynamic balance;standing static balance;standing dynamic balance  -JY     Static Sitting Balance standby assist  -JY     Dynamic Sitting Balance contact guard  -JY     Position, Sitting Balance unsupported;sitting edge of bed  -JY     Static Standing Balance minimal assist  -JY     Dynamic Standing Balance minimal assist;verbal cues  -JY     Position/Device Used, Standing Balance supported;walker, front-wheeled  -JY     Balance Interventions sitting;standing;static;dynamic;sit to stand;supported;occupation based/functional task  -JY     Comment, Balance no overt LOB during seated or standing tasks, reported dizziness in standing w/ very mild sway noted when turning, pivoting  -JY               User Key  (r) = Recorded By, (t) = Taken By, (c) = Cosigned By      Initials Name Provider Type    Alexa Bardales OT Occupational Therapist                   Goals/Plan       Row Name 11/01/24 1422          Transfer Goal 1 (OT)    Activity/Assistive Device (Transfer Goal 1, OT) sit-to-stand/stand-to-sit;bed-to-chair/chair-to-bed;toilet;commode;commode, bedside without drop arms;walker, rolling  -JY     [place] Level/Cues Needed (Transfer Goal 1, OT) contact guard required;verbal cues required  -JY     Time Frame (Transfer Goal 1, OT) long term goal (LTG);10 days  -JY     Progress/Outcome (Transfer Goal 1, OT) new goal  -JY       Row Name 11/01/24 1422          Dressing Goal 1 (OT)    Activity/Device (Dressing Goal 1, OT) upper body  dressing;lower body dressing;other (see comments)  d/d TB garments with LH AE PRN  -JY     [place]/Cues Needed (Dressing Goal 1, OT) contact guard required;verbal cues required  -JY     Time Frame (Dressing Goal 1, OT) long term goal (LTG);10 days  -JY     Progress/Outcome (Dressing Goal 1, OT) new goal  -JY       Row Name 11/01/24 1422          Toileting Goal 1 (OT)    Activity/Device (Toileting Goal 1, OT) adjust/manage clothing;perform perineal hygiene;commode;commode, bedside without drop arms;grab bar/safety frame;raised toilet seat  -JY     [place] Level/Cues Needed (Toileting Goal 1, OT) standby assist;verbal cues required  -JY     Time Frame (Toileting Goal 1, OT) long term goal (LTG);5 days  -JY     Progress/Outcome (Toileting Goal 1, OT) new goal  -JY       Row Name 11/01/24 1422          Strength Goal 1 (OT)    Strength Goal 1 (OT) Pt to complete seated HEP encompassing BUEs targeting strength and endurance with progressive sets, reps, resistance in order to improve integration in ADLs, related t/fs and mobility  -JY     Time Frame (Strength Goal 1, OT) short term goal (STG);5 days  -JY     Progress/Outcome (Strength Goal 1, OT) new goal  -JY       Row Name 11/01/24 1422          Therapy Assessment/Plan (OT)    Planned Therapy Interventions (OT) activity tolerance training;adaptive equipment training;BADL retraining;functional balance retraining;neuromuscular control/coordination retraining;occupation/activity based interventions;patient/caregiver education/training;ROM/therapeutic exercise;strengthening exercise;transfer/mobility retraining  -JY               User Key  (r) = Recorded By, (t) = Taken By, (c) = Cosigned By      Initials Name Provider Type    Alexa Bardales OT Occupational Therapist                   Clinical Impression       Row Name 11/01/24 1412          Pain Assessment    Pretreatment Pain Rating 4/10  -JY     Posttreatment Pain Rating 4/10  -JY     Pain Location extremity   -JY     Pain Side/Orientation generalized;upper;lower  -JY     Pain Management Interventions exercise or physical activity utilized;activity modification encouraged  -JY     Response to Pain Interventions activity participation with tolerable pain  -JY     Pre/Posttreatment Pain Comment pt reported persistent 4/10 pain, specifically neuropathy pain at BUEs and LEs, was able to tolerate OT interventions  -JY       Row Name 11/01/24 1412          Plan of Care Review    Plan of Care Reviewed With patient  -JY     Progress no change  OT IE  -JY     Outcome Evaluation OT evaluation completed. Pt presents with decreased I in ADLs, related t/fs and mobility limited by decreased activity tolerance, impaired balance, muscle weakness at BUEs, fatigue with more dynamic demands and dizziness during standing and transfers. RN aware. Pt demonstrated need for CGA during supine to sit bed mobility, min A x 1 for all fxl t/fs with need for increased effort and time to stand from toilet at lower height. Pt req'd variable A for ADLs observed including min A for LBD, min A for UBD and SBA to CGA for toileting hygiene and clothing mgmt, respectively. Pt is below occupational performance baseline and would benefit from IPOT POC and IRF at d/c when medically ready. If pt declines IRF pt would need to receive 24/7 A and HHOT as needed for support and continued training to address underlying impairments impacting function.  -JY       Row Name 11/01/24 1412          Therapy Assessment/Plan (OT)    Patient/Family Therapy Goal Statement (OT) to return to PLOF  -JY     Rehab Potential (OT) good  -JY     Criteria for Skilled Therapeutic Interventions Met (OT) yes;skilled treatment is necessary  -JY     Therapy Frequency (OT) daily  -JY     Predicted Duration of Therapy Intervention (OT) 4 days  -JY       Row Name 11/01/24 1412          Therapy Plan Review/Discharge Plan (OT)    Anticipated Discharge Disposition (OT) inpatient rehabilitation  facility;home with 24/7 care;home with home health  -RENATE       Row Name 11/01/24 1412          Vital Signs    Pre Systolic BP Rehab 136  -JY     Pre Treatment Diastolic BP 66  -JY     Post Systolic BP Rehab 192  -JY     Post Treatment Diastolic BP 85  -JY     Pretreatment Heart Rate (beats/min) 67  -JY     Posttreatment Heart Rate (beats/min) 64  -JY     Pre SpO2 (%) 92  -JY     O2 Delivery Pre Treatment room air  -JY     O2 Delivery Intra Treatment room air  -JY     Post SpO2 (%) 97  -JY     O2 Delivery Post Treatment room air  -JY     Pre Patient Position Supine  -JY     Intra Patient Position Sitting  -JY     Post Patient Position Sitting  -JY       Row Name 11/01/24 1412          Positioning and Restraints    Pre-Treatment Position in bed  -JY     Post Treatment Position chair  -JY     In Chair notified nsg;reclined;call light within reach;encouraged to call for assist;exit alarm on;waffle cushion;legs elevated;heels elevated  -JY               User Key  (r) = Recorded By, (t) = Taken By, (c) = Cosigned By      Initials Name Provider Type    Alexa Bardales, BETO Occupational Therapist                   Outcome Measures       Row Name 11/01/24 1424          How much help from another is currently needed...    Putting on and taking off regular lower body clothing? 3  -JY     Bathing (including washing, rinsing, and drying) 2  -JY     Toileting (which includes using toilet bed pan or urinal) 3  -JY     Putting on and taking off regular upper body clothing 3  -JY     Taking care of personal grooming (such as brushing teeth) 3  -JY     Eating meals 4  -JY     AM-PAC 6 Clicks Score (OT) 18  -JY       Row Name 11/01/24 1313 11/01/24 1200       How much help from another person do you currently need...    Turning from your back to your side while in flat bed without using bedrails? 3 (P)   -TM 3  -AR    Moving from lying on back to sitting on the side of a flat bed without bedrails? 3 (P)   -TM 3  -AR    Moving to and  from a bed to a chair (including a wheelchair)? 3 (P)   -TM 3  -AR    Standing up from a chair using your arms (e.g., wheelchair, bedside chair)? 3 (P)   -TM 3  -AR    Climbing 3-5 steps with a railing? 2 (P)   -TM 2  -AR    To walk in hospital room? 3 (P)   -TM 3  -AR    AM-PAC 6 Clicks Score (PT) 17 (P)   -TM 17  -AR    Highest Level of Mobility Goal 5 --> Static standing (P)   -TM 5 --> Static standing  -AR      Row Name 11/01/24 0800          How much help from another person do you currently need...    Turning from your back to your side while in flat bed without using bedrails? 3  -AR     Moving from lying on back to sitting on the side of a flat bed without bedrails? 3  -AR     Moving to and from a bed to a chair (including a wheelchair)? 3  -AR     Standing up from a chair using your arms (e.g., wheelchair, bedside chair)? 3  -AR     Climbing 3-5 steps with a railing? 2  -AR     To walk in hospital room? 3  -AR     AM-PAC 6 Clicks Score (PT) 17  -AR     Highest Level of Mobility Goal 5 --> Static standing  -AR       Row Name 11/01/24 1424 11/01/24 1313       Functional Assessment    Outcome Measure Options AM-PAC 6 Clicks Daily Activity (OT)  -RENATE AM-PAC 6 Clicks Basic Mobility (PT) (P)   -TM              User Key  (r) = Recorded By, (t) = Taken By, (c) = Cosigned By      Initials Name Provider Type    Alexa Bardales, BETO Occupational Therapist    Cassie Garnica, RN Registered Nurse    TM Marciano Mcgraw, PT Student PT Student                    Occupational Therapy Education       Title: PT OT SLP Therapies (In Progress)       Topic: Occupational Therapy (In Progress)       Point: ADL training (In Progress)       Description:   Instruct learner(s) on proper safety adaptation and remediation techniques during self care or transfers.   Instruct in proper use of assistive devices.                  Learning Progress Summary            Patient Acceptance, E,D, NR by RENATE at 11/1/2024 0902                       Point: Home exercise program (Not Started)       Description:   Instruct learner(s) on appropriate technique for monitoring, assisting and/or progressing therapeutic exercises/activities.                  Learner Progress:  Not documented in this visit.              Point: Precautions (In Progress)       Description:   Instruct learner(s) on prescribed precautions during self-care and functional transfers.                  Learning Progress Summary            Patient Acceptance, E,D, NR by RENATE at 11/1/2024 0959                      Point: Body mechanics (In Progress)       Description:   Instruct learner(s) on proper positioning and spine alignment during self-care, functional mobility activities and/or exercises.                  Learning Progress Summary            Patient Acceptance, E,D, NR by RENATE at 11/1/2024 0959                                      User Key       Initials Effective Dates Name Provider Type Discipline    RENATE 06/16/21 -  Alexa Lainez OT Occupational Therapist OT                  OT Recommendation and Plan  Planned Therapy Interventions (OT): activity tolerance training, adaptive equipment training, BADL retraining, functional balance retraining, neuromuscular control/coordination retraining, occupation/activity based interventions, patient/caregiver education/training, ROM/therapeutic exercise, strengthening exercise, transfer/mobility retraining  Therapy Frequency (OT): daily  Plan of Care Review  Plan of Care Reviewed With: patient  Progress: no change (OT IE)  Outcome Evaluation: OT evaluation completed. Pt presents with decreased I in ADLs, related t/fs and mobility limited by decreased activity tolerance, impaired balance, muscle weakness at BUEs, fatigue with more dynamic demands and dizziness during standing and transfers. RN aware. Pt demonstrated need for CGA during supine to sit bed mobility, min A x 1 for all fxl t/fs with need for increased effort and time to stand from  toilet at lower height. Pt req'd variable A for ADLs observed including min A for LBD, min A for UBD and SBA to CGA for toileting hygiene and clothing mgmt, respectively. Pt is below occupational performance baseline and would benefit from IPOT POC and IRF at d/c when medically ready. If pt declines IRF pt would need to receive 24/7 A and HHOT as needed for support and continued training to address underlying impairments impacting function.     Time Calculation:   Evaluation Complexity (OT)  Review Occupational Profile/Medical/Therapy History Complexity: expanded/moderate complexity  Assessment, Occupational Performance/Identification of Deficit Complexity: 3-5 performance deficits  Clinical Decision Making Complexity (OT): detailed assessment/moderate complexity  Overall Complexity of Evaluation (OT): moderate complexity     Time Calculation- OT       Row Name 11/01/24 1426             Time Calculation- OT    OT Start Time 0959  -JY      OT Received On 11/01/24  -JY      OT Goal Re-Cert Due Date 11/11/24  -JY         Timed Charges    82420 - OT Self Care/Mgmt Minutes 15  -JY         Untimed Charges    OT Eval/Re-eval Minutes 50  -JY         Total Minutes    Timed Charges Total Minutes 15  -JY      Untimed Charges Total Minutes 50  -JY       Total Minutes 65  -JY                User Key  (r) = Recorded By, (t) = Taken By, (c) = Cosigned By      Initials Name Provider Type    Alexa Bardales OT Occupational Therapist                  Therapy Charges for Today       Code Description Service Date Service Provider Modifiers Qty    42700904986 HC OT SELF CARE/MGMT/TRAIN EA 15 MIN 11/1/2024 Alexa Lainez OT GO 1    25821542850 HC OT EVAL MOD COMPLEXITY 4 11/1/2024 Alexa Lainez OT GO 1                 Alexa Lainez OT  11/1/2024

## 2024-11-01 NOTE — THERAPY EVALUATION
Patient Name: Yanique Webster  : 1940    MRN: [id number]                              Today's Date: 2024       Admit Date: 10/30/2024    Visit Dx:     ICD-10-CM ICD-9-CM   1. KINDRA (acute kidney injury)  N17.9 584.9   2. Symptomatic anemia  D64.9 285.9     Patient Active Problem List   Diagnosis    Fibromyalgia    PVD (peripheral vascular disease)    Type 2 diabetes mellitus    Diabetic gastroparesis    Takotsubo cardiomyopathy    Elevated serum creatinine    Hyperlipidemia LDL goal <70    COPD (chronic obstructive pulmonary disease)    Obesity    Osteoarthritis    Chronic pain    GERD (gastroesophageal reflux disease)    Gout    Chronic joint pain    Coronary artery disease involving native coronary artery of native heart without angina pectoris    Nonrheumatic aortic valve insufficiency    Altered mental status    Essential hypertension    History of CVA (cerebrovascular accident)    CKD (chronic kidney disease) stage 4, GFR 15-29 ml/min    Hypertensive emergency    Status post placement of implantable loop recorder    Paroxysmal atrial fibrillation    Acute exacerbation of COPD with asthma    Encounter for loop recorder at end of battery life    Closed left hip fracture    Anxiety associated with depression    Anemia, chronic disease    Surgery follow-up    Borderline abnormal TFTs    Acute blood loss anemia    Secondary hyperparathyroidism    Symptomatic anemia    KINDRA (acute kidney injury)     Past Medical History:   Diagnosis Date    Blurry vision     Chronic joint pain     Chronic pain     COPD (chronic obstructive pulmonary disease)     Coronary artery disease     Diabetic gastroparesis 2016    Esophageal stricture     s/p dilation in     GERD (gastroesophageal reflux disease)     Gout     Headache     secondary to hypertension    Hyperlipidemia     Hypertension     Long term current use of insulin     Neuropathy     Neuropathy due to type 2 diabetes mellitus     Obesity      Osteoarthritis     Pericarditis 2008    Stroke 03/2019    Type 2 diabetes mellitus      Past Surgical History:   Procedure Laterality Date    BRONCHOSCOPY N/A 8/23/2016    Procedure: BRONCHOSCOPY BIOPSY AT BEDSIDE;  Surgeon: Mookie Willard MD;  Location:  [facility];  Service:     CARDIAC CATHETERIZATION N/A 8/30/2016    Procedure: Left Heart Cath;  Surgeon: Steve Geronimo MD;  Location:  [facility];  Service:     CARDIAC CATHETERIZATION N/A 8/30/2016    Procedure: Right Heart Cath;  Surgeon: Steve Geronimo MD;  Location:  [facility];  Service:     CARDIAC ELECTROPHYSIOLOGY PROCEDURE N/A 7/2/2019    Procedure: Loop insertion;  Surgeon: Steve Geronimo MD;  Location:  [facility];  Service: Cardiovascular    CARDIAC ELECTROPHYSIOLOGY PROCEDURE N/A 9/26/2023    Procedure: Loop recorder removal;  Surgeon: Steve Geronimo MD;  Location:  [facility];  Service: Cardiovascular;  Laterality: N/A;    CATARACT EXTRACTION      COLONOSCOPY N/A 8/16/2024    Procedure: COLONOSCOPY;  Surgeon: Brunner, Mark I, MD;  Location:  [facility];  Service: Gastroenterology;  Laterality: N/A;    ENDOSCOPY N/A 8/14/2024    Procedure: ESOPHAGOGASTRODUODENOSCOPY;  Surgeon: Brunner, Mark I, MD;  Location:  [facility];  Service: Gastroenterology;  Laterality: N/A;    ESOPHAGEAL DILATATION  2007    HERNIA REPAIR      HIP CANNULATED SCREW PLACEMENT Left 1/2/2024    Procedure: HIP CANNULATED SCREW PLACEMENT LEFT;  Surgeon: Seymour Harrington MD;  Location:  [facility];  Service: Orthopedics;  Laterality: Left;    HYSTERECTOMY  1998    WRIST FRACTURE SURGERY Left       General Information       Row Name 11/01/24 1150          Physical Therapy Time and Intention    Document Type evaluation (P)   -TM     Mode of Treatment physical therapy (P)   -TM       Row Name 11/01/24 1150          General Information    Patient Profile Reviewed yes (P)   -TM     Prior Level of Function  independent:;all household mobility;gait;transfer;dressing;bed mobility (P)   rollator for mobility  -TM     Existing Precautions/Restrictions fall;other (see comments) (P)   low H+H  -TM     Barriers to Rehab medically complex;hearing deficit (P)   -TM       Row Name 11/01/24 1150          Living Environment    People in Home facility resident (P)   [facility]  -TM       Row Name 11/01/24 1150          Home Main Entrance    Number of Stairs, Main Entrance none (P)   -TM       Row Name 11/01/24 1150          Stairs Within Home, Primary    Number of Stairs, Within Home, Primary none (P)   -TM       Row Name 11/01/24 1150          Cognition    Orientation Status (Cognition) oriented x 4 (P)   -TM       Row Name 11/01/24 1150          Safety Issues/Impairments Affecting Functional Mobility    Safety Issues Affecting Function (Mobility) awareness of need for assistance;insight into deficits/self-awareness;safety precaution awareness;safety precautions follow-through/compliance (P)   -TM     Impairments Affecting Function (Mobility) balance;endurance/activity tolerance;pain;postural/trunk control;sensation/sensory awareness;shortness of breath;strength (P)   -TM               User Key  (r) = Recorded By, (t) = Taken By, (c) = Cosigned By      Initials Name Provider Type    TM Marciano Mcgraw, PT Student PT Student                   Mobility       Row Name 11/01/24 1153          Bed Mobility    Bed Mobility supine-sit (P)   -TM     Supine-Sit [place] (Bed Mobility) contact guard;1 person assist (P)   -TM     Assistive Device (Bed Mobility) bed rails;head of bed elevated (P)   -TM     Comment, (Bed Mobility) VCs for sequencing and hand placement. (P)   -TM       Row Name 11/01/24 1153          Sit-Stand Transfer    Sit-Stand [place] (Transfers) verbal cues;nonverbal cues (demo/gesture);minimum assist (75% patient effort);1 person assist (P)   -TM     Assistive Device (Sit-Stand  Transfers) walker, front-wheeled (P)   -TM     Comment, (Sit-Stand Transfer) VCs for sequencing and hand placement. 2 STS compleed (from EOB and toilet). Inc effort required from toilet. (P)   -TM       Row Name 11/01/24 1153          Gait/Stairs (Locomotion)    [place] Level (Gait) verbal cues;nonverbal cues (demo/gesture);minimum assist (75% patient effort);1 person assist (P)   -TM     Assistive Device (Gait) walker, front-wheeled (P)   -TM     Distance in Feet (Gait) 15 (P)   8+7  -TM     Deviations/Abnormal Patterns (Gait) bilateral deviations;myke decreased;gait speed decreased;stride length decreased (P)   -TM     Bilateral Gait Deviations forward flexed posture;heel strike decreased (P)   -TM     Comment, (Gait/Stairs) Pt demo'd step through gait pattern w/ dec gait speed. Inc VCs/TCs for AD management. Pt c/o mild dizziness during ambulation. BP taken following return to chair an was 192/85. (P)   -TM               User Key  (r) = Recorded By, (t) = Taken By, (c) = Cosigned By      Initials Name Provider Type    TM Marciano Mcgraw, PT Student PT Student                   Obj/Interventions       Row Name 11/01/24 1157          Range of Motion Comprehensive    General Range of Motion bilateral lower extremity ROM WFL (P)   -TM       Row Name 11/01/24 1157          Strength Comprehensive (MMT)    General Manual Muscle Testing (MMT) Assessment lower extremity strength deficits identified (P)   -TM     Comment, General Manual Muscle Testing (MMT) Assessment B LE 4-/5 (P)   -TM       Row Name 11/01/24 1157          Balance    Balance Assessment sitting static balance;sitting dynamic balance;standing static balance;standing dynamic balance (P)   -TM     Static Sitting Balance standby assist (P)   -TM     Dynamic Sitting Balance standby assist (P)   -TM     Position, Sitting Balance unsupported;sitting edge of bed (P)   -TM     Static Standing Balance contact guard (P)   -TM     Dynamic Standing Balance  minimal assist (P)   -TM     Position/Device Used, Standing Balance supported;walker, front-wheeled (P)   -TM       Row Name 11/01/24 1157          Sensory Assessment (Somatosensory)    Sensory Assessment (Somatosensory) -- (P)   Pt reported bilateral LE peripheral neuropathy  -TM               User Key  (r) = Recorded By, (t) = Taken By, (c) = Cosigned By      Initials Name Provider Type    TM Marciano Mcgraw, PT Student PT Student                   Goals/Plan       Row Name 11/01/24 1311          Bed Mobility Goal 1 (PT)    Activity/Assistive Device (Bed Mobility Goal 1, PT) sit to supine/supine to sit (P)   -TM     [place] Level/Cues Needed (Bed Mobility Goal 1, PT) modified independence (P)   -TM     Time Frame (Bed Mobility Goal 1, PT) short term goal (STG);5 days (P)   -TM     Progress/Outcomes (Bed Mobility Goal 1, PT) new goal (P)   -TM       Row Name 11/01/24 1311          Transfer Goal 1 (PT)    Activity/Assistive Device (Transfer Goal 1, PT) sit-to-stand/stand-to-sit;bed-to-chair/chair-to-bed (P)   -TM     [place] Level/Cues Needed (Transfer Goal 1, PT) standby assist (P)   -TM     Time Frame (Transfer Goal 1, PT) short term goal (STG);5 days (P)   -TM     Progress/Outcome (Transfer Goal 1, PT) new goal (P)   -TM       Row Name 11/01/24 1311          Gait Training Goal 1 (PT)    Activity/Assistive Device (Gait Training Goal 1, PT) gait (walking locomotion) (P)   -TM     [place] Level (Gait Training Goal 1, PT) standby assist (P)   -TM     Distance (Gait Training Goal 1, PT) 75 ft (P)   -TM     Time Frame (Gait Training Goal 1, PT) long term goal (LTG);10 days (P)   -TM     Progress/Outcome (Gait Training Goal 1, PT) new goal (P)   -TM       Row Name 11/01/24 1311          Therapy Assessment/Plan (PT)    Planned Therapy Interventions (PT) balance training;bed mobility training;gait training;home exercise program;patient/family education;postural re-education;ROM (range of  motion);strengthening;stretching;transfer training (P)   -TM               User Key  (r) = Recorded By, (t) = Taken By, (c) = Cosigned By      Initials Name Provider Type    TM Marciano Mcgraw, PT Student PT Student                   Clinical Impression       Row Name 11/01/24 1305          Pain    Pretreatment Pain Rating 4/10 (P)   -TM     Posttreatment Pain Rating 4/10 (P)   -TM     Pain Location extremity (P)   -TM     Pain Side/Orientation other (see comments);bilateral;lower (P)   pt reported pain d/t neuropathy  -TM     Pain Management Interventions exercise or physical activity utilized (P)   -TM     Response to Pain Interventions activity participation with tolerable pain (P)   -TM       Row Name 11/01/24 1305          Plan of Care Review    Plan of Care Reviewed With patient (P)   -TM     Progress no change (P)   -TM     Outcome Evaluation Pt presents w/ dec functional mobility, strength, and activity tolerance compared to baseline. Pt ambulated 15 ft in room using FWW w/ min A x1. Pt w/ inc SOA and mild dizziness during ambulation. Pt requires skilled IPPT services to return to PLOF. Pt would benefit from d/c to IRF, but if pt refuses, rec return to ILF w/ HHPT. (P)   -TM       Row Name 11/01/24 1309          Therapy Assessment/Plan (PT)    Patient/Family Therapy Goals Statement (PT) get back to ILF (P)   -TM     Rehab Potential (PT) fair (P)   -TM     Criteria for Skilled Interventions Met (PT) yes (P)   -TM     Therapy Frequency (PT) daily (P)   -TM     Predicted Duration of Therapy Intervention (PT) 2 weeks (P)   -TM       Row Name 11/01/24 1305          Vital Signs    Pre Systolic BP Rehab 136 (P)   -TM     Pre Treatment Diastolic BP 66 (P)   -TM     Post Systolic BP Rehab 192 (P)   -TM     Post Treatment Diastolic BP 85 (P)   -TM     Pretreatment Heart Rate (beats/min) 60 (P)   -TM     Posttreatment Heart Rate (beats/min) 60 (P)   -TM     Pre SpO2 (%) 95 (P)   -TM     O2 Delivery Pre Treatment room  air (P)   -TM     Post SpO2 (%) 97 (P)   -TM     O2 Delivery Post Treatment room air (P)   -TM     Pre Patient Position Supine (P)   -TM     Post Patient Position Sitting (P)   -TM       Row Name 11/01/24 1305          Positioning and Restraints    Pre-Treatment Position in bed (P)   -TM     Post Treatment Position chair (P)   -TM     In Chair notified nsg;reclined;call light within reach;encouraged to call for assist;exit alarm on;waffle cushion;legs elevated (P)   -TM               User Key  (r) = Recorded By, (t) = Taken By, (c) = Cosigned By      Initials Name Provider Type    Marciano Rizzo, PT Student PT Student                   Outcome Measures       Row Name 11/01/24 1313 11/01/24 0800       How much help from another person do you currently need...    Turning from your back to your side while in flat bed without using bedrails? 3 (P)   -TM 3  -AR    Moving from lying on back to sitting on the side of a flat bed without bedrails? 3 (P)   -TM 3  -AR    Moving to and from a bed to a chair (including a wheelchair)? 3 (P)   -TM 3  -AR    Standing up from a chair using your arms (e.g., wheelchair, bedside chair)? 3 (P)   -TM 3  -AR    Climbing 3-5 steps with a railing? 2 (P)   -TM 2  -AR    To walk in hospital room? 3 (P)   -TM 3  -AR    AM-PAC 6 Clicks Score (PT) 17 (P)   -TM 17  -AR    Highest Level of Mobility Goal 5 --> Static standing (P)   -TM 5 --> Static standing  -AR      Row Name 11/01/24 1313          Functional Assessment    Outcome Measure Options AM-PAC 6 Clicks Basic Mobility (PT) (P)   -TM               User Key  (r) = Recorded By, (t) = Taken By, (c) = Cosigned By      Initials Name Provider Type    Cassie Garnica, RN Registered Nurse    Marciano Rizzo, PT Student PT Student                                 Physical Therapy Education       Title: PT OT SLP Therapies (In Progress)       Topic: Physical Therapy (In Progress)       Point: Mobility training (In Progress)        Learning Progress Summary            Patient Acceptance, E, NR by TM at 11/1/2024 1313                      Point: Home exercise program (Not Started)       Learner Progress:  Not documented in this visit.              Point: Body mechanics (In Progress)       Learning Progress Summary            Patient Acceptance, E, NR by TM at 11/1/2024 1313                      Point: Precautions (In Progress)       Learning Progress Summary            Patient Acceptance, E, NR by TM at 11/1/2024 1313                                      User Key       Initials Effective Dates Name Provider Type Discipline     08/13/24 -  Marciano Mcgraw, PT Student PT Student PT                  PT Recommendation and Plan  Planned Therapy Interventions (PT): (P) balance training, bed mobility training, gait training, home exercise program, patient/family education, postural re-education, ROM (range of motion), strengthening, stretching, transfer training  Progress: (P) no change  Outcome Evaluation: (P) Pt presents w/ dec functional mobility, strength, and activity tolerance compared to baseline. Pt ambulated 15 ft in room using FWW w/ min A x1. Pt w/ inc SOA and mild dizziness during ambulation. Pt requires skilled IPPT services to return to PLOF. Pt would benefit from d/c to IRF, but if pt refuses, rec return to ILF w/ HHPT.     Time Calculation:   PT Evaluation Complexity  History, PT Evaluation Complexity: (P) 3 or more personal factors and/or comorbidities  Examination of Body Systems (PT Eval Complexity): (P) total of 3 or more elements  Clinical Presentation (PT Evaluation Complexity): (P) evolving  Clinical Decision Making (PT Evaluation Complexity): (P) moderate complexity  Overall Complexity (PT Evaluation Complexity): (P) moderate complexity     PT Charges       Row Name 11/01/24 1315             Time Calculation    Start Time 1010 (P)   -      PT Received On 11/01/24 (P)   -      PT Goal Re-Cert Due Date 11/11/24 (P)   -          Timed Charges    98995 - PT Therapeutic Activity Minutes 16 (P)   -TM         Untimed Charges    PT Eval/Re-eval Minutes 50 (P)   -TM         Total Minutes    Timed Charges Total Minutes 16 (P)   -TM      Untimed Charges Total Minutes 50 (P)   -TM       Total Minutes 66 (P)   -TM                User Key  (r) = Recorded By, (t) = Taken By, (c) = Cosigned By      Initials Name Provider Type    TM Marciano Mcgraw, PT Student PT Student                  Therapy Charges for Today       Code Description Service Date Service Provider Modifiers Qty    14565268635  PT THERAPEUTIC ACT EA 15 MIN 11/1/2024 Marciano Mcgraw, PT Student GP 1    44889847357 HC PT EVAL MOD COMPLEXITY 4 11/1/2024 Marciano Mcgraw, PT Student GP 1            PT G-Codes  Outcome Measure Options: (P) AM-PAC 6 Clicks Basic Mobility (PT)  AM-PAC 6 Clicks Score (PT): (P) 17  PT Discharge Summary  Anticipated Discharge Disposition (PT): (P) inpatient rehabilitation facility    Marciano Mcgraw PT Student  11/1/2024

## 2024-11-02 ENCOUNTER — APPOINTMENT (OUTPATIENT)
Dept: CARDIOLOGY | Facility: HOSPITAL | Age: 84
DRG: 812 | End: 2024-11-02
Payer: MEDICARE

## 2024-11-02 LAB
ANION GAP SERPL CALCULATED.3IONS-SCNC: 12 MMOL/L (ref 5–15)
AORTIC ARCH: 3.5 CM
BH CV ECHO MEAS - AI P1/2T: 470.1 MSEC
BH CV ECHO MEAS - AO MAX PG: 10.8 MMHG
BH CV ECHO MEAS - AO MEAN PG: 6 MMHG
BH CV ECHO MEAS - AO ROOT DIAM: 3.4 CM
BH CV ECHO MEAS - AO V2 MAX: 164.5 CM/SEC
BH CV ECHO MEAS - AO V2 VTI: 37.1 CM
BH CV ECHO MEAS - AVA(I,D): 2.6 CM2
BH CV ECHO MEAS - EDV(CUBED): 132.7 ML
BH CV ECHO MEAS - EDV(MOD-SP2): 153.3 ML
BH CV ECHO MEAS - EDV(MOD-SP4): 165.1 ML
BH CV ECHO MEAS - EF(MOD-SP2): 53.4 %
BH CV ECHO MEAS - EF(MOD-SP4): 52.2 %
BH CV ECHO MEAS - ESV(CUBED): 27 ML
BH CV ECHO MEAS - ESV(MOD-SP2): 71.5 ML
BH CV ECHO MEAS - ESV(MOD-SP4): 78.9 ML
BH CV ECHO MEAS - FS: 41.2 %
BH CV ECHO MEAS - IVS/LVPW: 1 CM
BH CV ECHO MEAS - IVSD: 1.2 CM
BH CV ECHO MEAS - LA DIMENSION: 5 CM
BH CV ECHO MEAS - LAT PEAK E' VEL: 11.5 CM/SEC
BH CV ECHO MEAS - LV MASS(C)D: 241.2 GRAMS
BH CV ECHO MEAS - LV MAX PG: 7.2 MMHG
BH CV ECHO MEAS - LV MEAN PG: 4 MMHG
BH CV ECHO MEAS - LV V1 MAX: 134 CM/SEC
BH CV ECHO MEAS - LV V1 VTI: 31.3 CM
BH CV ECHO MEAS - LVIDD: 5.1 CM
BH CV ECHO MEAS - LVIDS: 3 CM
BH CV ECHO MEAS - LVOT AREA: 3.1 CM2
BH CV ECHO MEAS - LVOT DIAM: 2 CM
BH CV ECHO MEAS - LVPWD: 1.2 CM
BH CV ECHO MEAS - MED PEAK E' VEL: 8.7 CM/SEC
BH CV ECHO MEAS - MV A MAX VEL: 95.7 CM/SEC
BH CV ECHO MEAS - MV DEC SLOPE: 665 CM/SEC2
BH CV ECHO MEAS - MV DEC TIME: 0.17 SEC
BH CV ECHO MEAS - MV E MAX VEL: 137 CM/SEC
BH CV ECHO MEAS - MV E/A: 1.43
BH CV ECHO MEAS - MV MAX PG: 8 MMHG
BH CV ECHO MEAS - MV MEAN PG: 3 MMHG
BH CV ECHO MEAS - MV P1/2T: 63.9 MSEC
BH CV ECHO MEAS - MV V2 VTI: 37.6 CM
BH CV ECHO MEAS - MVA(P1/2T): 3.4 CM2
BH CV ECHO MEAS - MVA(VTI): 2.6 CM2
BH CV ECHO MEAS - PA ACC TIME: 0.09 SEC
BH CV ECHO MEAS - PA V2 MAX: 135 CM/SEC
BH CV ECHO MEAS - PAPD(PI EDV): 7 MMHG
BH CV ECHO MEAS - PI END-D VEL: 135 CM/SEC
BH CV ECHO MEAS - SV(LVOT): 98.2 ML
BH CV ECHO MEAS - SV(MOD-SP2): 81.8 ML
BH CV ECHO MEAS - SV(MOD-SP4): 86.1 ML
BH CV ECHO MEAS - TAPSE (>1.6): 2.4 CM
BH CV ECHO MEASUREMENTS AVERAGE E/E' RATIO: 13.56
BH CV VAS BP RIGHT ARM: NORMAL MMHG
BH CV XLRA - RV BASE: 4.4 CM
BH CV XLRA - RV LENGTH: 8.1 CM
BH CV XLRA - RV MID: 3.5 CM
BH CV XLRA - TDI S': 11.7 CM/SEC
BUN SERPL-MCNC: 58 MG/DL (ref 8–23)
BUN/CREAT SERPL: 17.1 (ref 7–25)
CALCIUM SPEC-SCNC: 9.8 MG/DL (ref 8.6–10.5)
CHLORIDE SERPL-SCNC: 107 MMOL/L (ref 98–107)
CO2 SERPL-SCNC: 19 MMOL/L (ref 22–29)
CREAT SERPL-MCNC: 3.4 MG/DL (ref 0.57–1)
EGFRCR SERPLBLD CKD-EPI 2021: 12.9 ML/MIN/1.73
GLUCOSE SERPL-MCNC: 146 MG/DL (ref 65–99)
LEFT ATRIUM VOLUME INDEX: 36.8 ML/M2
LV EF 2D ECHO EST: 60 %
MAGNESIUM SERPL-MCNC: 2 MG/DL (ref 1.6–2.4)
PHOSPHATE SERPL-MCNC: 3.7 MG/DL (ref 2.5–4.5)
POTASSIUM SERPL-SCNC: 4.7 MMOL/L (ref 3.5–5.2)
SODIUM SERPL-SCNC: 138 MMOL/L (ref 136–145)

## 2024-11-02 PROCEDURE — 84100 ASSAY OF PHOSPHORUS: CPT | Performed by: INTERNAL MEDICINE

## 2024-11-02 PROCEDURE — 93306 TTE W/DOPPLER COMPLETE: CPT | Performed by: INTERNAL MEDICINE

## 2024-11-02 PROCEDURE — 83735 ASSAY OF MAGNESIUM: CPT | Performed by: INTERNAL MEDICINE

## 2024-11-02 PROCEDURE — 99222 1ST HOSP IP/OBS MODERATE 55: CPT

## 2024-11-02 PROCEDURE — 80048 BASIC METABOLIC PNL TOTAL CA: CPT | Performed by: INTERNAL MEDICINE

## 2024-11-02 PROCEDURE — 93306 TTE W/DOPPLER COMPLETE: CPT

## 2024-11-02 PROCEDURE — 99232 SBSQ HOSP IP/OBS MODERATE 35: CPT | Performed by: INTERNAL MEDICINE

## 2024-11-02 RX ORDER — CLONIDINE 0.3 MG/24H
1 PATCH, EXTENDED RELEASE TRANSDERMAL WEEKLY
Status: DISCONTINUED | OUTPATIENT
Start: 2024-11-02 | End: 2024-11-03

## 2024-11-02 RX ADMIN — HYDRALAZINE HYDROCHLORIDE 100 MG: 50 TABLET ORAL at 22:05

## 2024-11-02 RX ADMIN — CARVEDILOL 25 MG: 12.5 TABLET, FILM COATED ORAL at 20:52

## 2024-11-02 RX ADMIN — PANTOPRAZOLE SODIUM 40 MG: 40 TABLET, DELAYED RELEASE ORAL at 09:57

## 2024-11-02 RX ADMIN — MORPHINE SULFATE 15 MG: 15 TABLET, FILM COATED, EXTENDED RELEASE ORAL at 09:57

## 2024-11-02 RX ADMIN — HYDRALAZINE HYDROCHLORIDE 100 MG: 50 TABLET ORAL at 13:31

## 2024-11-02 RX ADMIN — FERROUS SULFATE TAB 325 MG (65 MG ELEMENTAL FE) 325 MG: 325 (65 FE) TAB at 09:57

## 2024-11-02 RX ADMIN — HYDRALAZINE HYDROCHLORIDE 100 MG: 50 TABLET ORAL at 05:22

## 2024-11-02 RX ADMIN — CALCITRIOL CAPSULES 0.25 MCG 0.5 MCG: 0.25 CAPSULE ORAL at 09:57

## 2024-11-02 RX ADMIN — Medication 10 ML: at 20:53

## 2024-11-02 RX ADMIN — PANTOPRAZOLE SODIUM 40 MG: 40 TABLET, DELAYED RELEASE ORAL at 20:53

## 2024-11-02 RX ADMIN — SODIUM BICARBONATE 650 MG TABLET 650 MG: at 09:57

## 2024-11-02 RX ADMIN — CLONIDINE 1 PATCH: 0.3 PATCH TRANSDERMAL at 09:57

## 2024-11-02 RX ADMIN — Medication 1 CAPSULE: at 09:57

## 2024-11-02 RX ADMIN — TERAZOSIN HYDROCHLORIDE 5 MG: 5 CAPSULE ORAL at 20:53

## 2024-11-02 RX ADMIN — SODIUM BICARBONATE 650 MG TABLET 650 MG: at 20:52

## 2024-11-02 RX ADMIN — CARVEDILOL 25 MG: 12.5 TABLET, FILM COATED ORAL at 09:57

## 2024-11-02 RX ADMIN — MORPHINE SULFATE 15 MG: 15 TABLET, FILM COATED, EXTENDED RELEASE ORAL at 20:52

## 2024-11-02 RX ADMIN — POLYETHYLENE GLYCOL 3350 17 G: 17 POWDER, FOR SOLUTION ORAL at 09:58

## 2024-11-02 RX ADMIN — Medication 10 ML: at 09:58

## 2024-11-02 RX ADMIN — CYANOCOBALAMIN TAB 1000 MCG 1000 MCG: 1000 TAB at 09:57

## 2024-11-02 RX ADMIN — AMLODIPINE BESYLATE 5 MG: 5 TABLET ORAL at 09:57

## 2024-11-02 RX ADMIN — ATORVASTATIN CALCIUM 80 MG: 40 TABLET, FILM COATED ORAL at 20:52

## 2024-11-02 NOTE — PLAN OF CARE
Problem: Adult Inpatient Plan of Care  Goal: Plan of Care Review  Outcome: Progressing     Problem: Adult Inpatient Plan of Care  Goal: Plan of Care Review  Outcome: Progressing  Goal: Patient-Specific Goal (Individualized)  Outcome: Progressing  Goal: Absence of Hospital-Acquired Illness or Injury  Outcome: Progressing  Intervention: Identify and Manage Fall Risk  Recent Flowsheet Documentation  Taken 11/2/2024 1600 by Cassie Chin RN  Safety Promotion/Fall Prevention:   assistive device/personal items within reach   clutter free environment maintained   activity supervised   safety round/check completed   toileting scheduled  Taken 11/2/2024 1215 by Cassie Chin RN  Safety Promotion/Fall Prevention:   activity supervised   clutter free environment maintained   assistive device/personal items within reach   toileting scheduled   safety round/check completed  Taken 11/2/2024 0800 by Cassie Chin RN  Safety Promotion/Fall Prevention:   activity supervised   assistive device/personal items within reach   clutter free environment maintained   safety round/check completed   toileting scheduled  Intervention: Prevent Skin Injury  Recent Flowsheet Documentation  Taken 11/2/2024 1600 by Cassie Chin RN  Body Position: dangle, side of bed  Skin Protection: incontinence pads utilized  Taken 11/2/2024 1215 by Cassie Chin RN  Body Position:   side-lying   right  Skin Protection: incontinence pads utilized  Taken 11/2/2024 0800 by Cassie Chin RN  Body Position: supine  Skin Protection: incontinence pads utilized  Intervention: Prevent Infection  Recent Flowsheet Documentation  Taken 11/2/2024 1600 by Cassie Chin RN  Infection Prevention: environmental surveillance performed  Taken 11/2/2024 1215 by Cassie Chin RN  Infection Prevention: environmental surveillance performed  Taken 11/2/2024 0800 by Cassie Chin RN  Infection Prevention: environmental  surveillance performed  Goal: Optimal Comfort and Wellbeing  Outcome: Progressing  Intervention: Provide Person-Centered Care  Recent Flowsheet Documentation  Taken 11/2/2024 0800 by Cassie Chin, RN  Trust Relationship/Rapport:   care explained   choices provided  Goal: Readiness for Transition of Care  Outcome: Progressing   Goal Outcome Evaluation:                 Pt declined non slip socks and heel Allevyns.

## 2024-11-02 NOTE — PROGRESS NOTES
" LOS: 3 days   Patient Care Team:  Sebas Alicea MD as PCP - General (Family Medicine)  Steve Geronimo MD as Consulting Physician (Cardiology)  Emilio Regalado MD as Consulting Physician (Endocrinology)  Axel Ribeiro MD as Consulting Physician (Cardiology)    Chief Complaint: KINDRA on CKD stage IV   Anemia   SOB    Subjective     No significant change in renal function.     Subjective:  Symptoms:  No shortness of breath, chest pain, chest pressure or anxiety.        History taken from: patient    Objective     Vital Sign Min/Max for last 24 hours  Temp  Min: 97.5 °F (36.4 °C)  Max: 98.5 °F (36.9 °C)   BP  Min: 155/73  Max: 194/77   Pulse  Min: 71  Max: 79   Resp  Min: 18  Max: 18   SpO2  Min: 94 %  Max: 97 %   No data recorded   Weight  Min: 80.5 kg (177 lb 7.5 oz)  Max: 80.5 kg (177 lb 8 oz)     Flowsheet Rows      Flowsheet Row First Filed Value   Admission Height 162.6 cm (64\") Documented at 10/30/2024 1632   Admission Weight 83.5 kg (184 lb) Documented at 10/30/2024 1632            I/O this shift:  In: 600 [P.O.:600]  Out: -   I/O last 3 completed shifts:  In: 480 [P.O.:480]  Out: 2400 [Urine:2400]    Objective:  General Appearance:  Comfortable.    Vital signs: (most recent): Blood pressure 155/73, pulse 76, temperature 98 °F (36.7 °C), temperature source Oral, resp. rate 18, height 162.6 cm (64.02\"), weight 80.5 kg (177 lb 7.5 oz), SpO2 97%.  Vital signs are normal.    Output: Producing urine.    HEENT: Normal HEENT exam.    Lungs:  Normal effort and normal respiratory rate.  Breath sounds clear to auscultation.  She is not in respiratory distress.  No decreased breath sounds.    Heart: Normal rate.  Regular rhythm.  S1 normal and S2 normal.  No murmur or gallop.   Abdomen: Abdomen is soft.  Bowel sounds are normal.     Extremities: Normal range of motion.  There is no dependent edema or local swelling.    Pulses: Distal pulses are intact.    Neurological: Patient is alert.                Results " "Review:     I reviewed the patient's new clinical results.    WBC WBC   Date Value Ref Range Status   11/01/2024 6.30 3.40 - 10.80 10*3/mm3 Final   10/31/2024 6.71 3.40 - 10.80 10*3/mm3 Final   10/30/2024 4.71 3.40 - 10.80 10*3/mm3 Final      HGB Hemoglobin   Date Value Ref Range Status   11/01/2024 9.0 (L) 12.0 - 15.9 g/dL Final   10/31/2024 8.2 (L) 12.0 - 15.9 g/dL Final   10/30/2024 6.1 (C) 12.0 - 15.9 g/dL Final      HCT Hematocrit   Date Value Ref Range Status   11/01/2024 27.9 (L) 34.0 - 46.6 % Final   10/31/2024 25.1 (L) 34.0 - 46.6 % Final   10/30/2024 19.6 (C) 34.0 - 46.6 % Final      Platlets No results found for: \"LABPLAT\"   MCV MCV   Date Value Ref Range Status   11/01/2024 90.9 79.0 - 97.0 fL Final   10/31/2024 89.3 79.0 - 97.0 fL Final   10/30/2024 90.3 79.0 - 97.0 fL Final          Sodium Sodium   Date Value Ref Range Status   11/02/2024 138 136 - 145 mmol/L Final   11/01/2024 141 136 - 145 mmol/L Final   10/31/2024 139 136 - 145 mmol/L Final   10/30/2024 137 136 - 145 mmol/L Final      Potassium Potassium   Date Value Ref Range Status   11/02/2024 4.7 3.5 - 5.2 mmol/L Final   11/01/2024 4.4 3.5 - 5.2 mmol/L Final   10/31/2024 4.4 3.5 - 5.2 mmol/L Final   10/30/2024 4.3 3.5 - 5.2 mmol/L Final      Chloride Chloride   Date Value Ref Range Status   11/02/2024 107 98 - 107 mmol/L Final   11/01/2024 110 (H) 98 - 107 mmol/L Final   10/31/2024 109 (H) 98 - 107 mmol/L Final   10/30/2024 106 98 - 107 mmol/L Final      CO2 CO2   Date Value Ref Range Status   11/02/2024 19.0 (L) 22.0 - 29.0 mmol/L Final   11/01/2024 17.0 (L) 22.0 - 29.0 mmol/L Final   10/31/2024 19.0 (L) 22.0 - 29.0 mmol/L Final   10/30/2024 18.0 (L) 22.0 - 29.0 mmol/L Final      BUN BUN   Date Value Ref Range Status   11/02/2024 58 (H) 8 - 23 mg/dL Final   11/01/2024 64 (H) 8 - 23 mg/dL Final   10/31/2024 60 (H) 8 - 23 mg/dL Final   10/30/2024 59 (H) 8 - 23 mg/dL Final      Creatinine Creatinine   Date Value Ref Range Status   11/02/2024 3.40 " "(H) 0.57 - 1.00 mg/dL Final   11/01/2024 3.58 (H) 0.57 - 1.00 mg/dL Final   10/31/2024 3.26 (H) 0.57 - 1.00 mg/dL Final   10/30/2024 3.65 (H) 0.57 - 1.00 mg/dL Final      Calcium Calcium   Date Value Ref Range Status   11/02/2024 9.8 8.6 - 10.5 mg/dL Final   11/01/2024 9.9 8.6 - 10.5 mg/dL Final   10/31/2024 10.0 8.6 - 10.5 mg/dL Final   10/31/2024 9.7 8.6 - 10.5 mg/dL Final   10/30/2024 9.7 8.6 - 10.5 mg/dL Final      PO4 No results found for: \"CAPO4\"   Albumin Albumin   Date Value Ref Range Status   11/01/2024 3.4 (L) 3.5 - 5.2 g/dL Final   10/30/2024 3.4 (L) 3.5 - 5.2 g/dL Final      Magnesium Magnesium   Date Value Ref Range Status   11/02/2024 2.0 1.6 - 2.4 mg/dL Final      Uric Acid No results found for: \"URICACID\"     Medication Review: yes    Assessment & Plan       Symptomatic anemia    Hyperlipidemia LDL goal <70    COPD (chronic obstructive pulmonary disease)    GERD (gastroesophageal reflux disease)    Coronary artery disease involving native coronary artery of native heart without angina pectoris    Essential hypertension    History of CVA (cerebrovascular accident)    CKD (chronic kidney disease) stage 4, GFR 15-29 ml/min    Paroxysmal atrial fibrillation    Anxiety associated with depression    KINDRA (acute kidney injury)      Assessment & Plan    KINDRA: Baseline cr btw 1.9-2.2mg/dl. Cr 3.23.5 on this admission. Suspect hemodynamic injury in the setting of anemia.      CKD stage IV: Hx of proteinuric renal disease. UPC 2 gm in the past. Duplex negative in 2016     Anemia: Acute on chronic worsening. ACD due to CKD. SPEP negative in the past. Nadya any bleeding. Started on EDISON weekly      Volume status: No significant edema on examination      HTN: No ANABEL per duplex in 2016.     Proteinuria: UPC 2.8 gm. Etiology unspecified.     Met acidosis: persistent in the setting of CKD. Hold off on bicarb supp at present.      Recs  KINDRA component due to hemodynamic injury so far no improvement in renal function. " Suspect advance CKD at baseline  Agree with EDISON weekly   Transfuse at Hb<7   Daily labs.   No indication for dialysis at present.      I discussed the patients findings and my recommendations with patient    Zurdo Levine MD  11/02/24  14:46 EDT

## 2024-11-02 NOTE — CONSULTS
Date of Hospital Visit: 24  Encounter Provider: Alexa Rosenberg PA-C  Place of Service: [facility]  Patient Name: Yanique Webster  :1940  Referral Provider: Provider, No Known  Primary Care Provider: Sebas Alicea MD    Chief complaint/Reason for Consultation: shortness of breath, anemia    Problem List:  Paroxysmal atrial fibrillation  [id number] (age, sex, HTN, CAD, DM, CVA), anticoagulated on xarelto  ILR explanted   Stress-induced cardiomyopathy, 2016  EF 15%, improved to 60% by 10/2016  Echo 2023: EF 56-60%, mild AI with no hemodynamically significant AS, mild TR  Coronary artery disease  [facility], : mild diffuse multivessel coronary artery disease at that time   Aortic regurgitation, previously moderate now mild  Cryptogenic CVA 3/2019  ILR implanted which later showed atrial fibrillation.  Now has been explanted.  Hypertension  Hyperlipidemia  Recurrent anemia  Peripheral vascular disease  Diabetes  Anemia  Anxiety  Claustrophobia     History of Present Illness:  Patient is an 84-year-old female with the above past medical history who is a primary patient of Dr. Geronimo and has recently been seen by Dr. Ribeiro for consideration of watchman due to recurrent anemia.    Patient presented to the emergency department with complaints of shortness of breath.  Was found to be significantly anemic with a hemoglobin of 6.  Has been transfused with improvement in hemoglobin to 9.  Patient reports that her shortness of breath has also improved since then.  She denies any chest pain or pressure.  Denies palpitations, orthopnea, edema, presyncope or syncope.    Past Medical History:   Diagnosis Date    Blurry vision     Chronic joint pain     Chronic pain     COPD (chronic obstructive pulmonary disease)     Coronary artery disease     Diabetic gastroparesis 2016    Esophageal stricture     s/p dilation in     GERD (gastroesophageal reflux disease)     Gout      Headache     secondary to hypertension    Hyperlipidemia     Hypertension     Long term current use of insulin     Neuropathy     Neuropathy due to type 2 diabetes mellitus     Obesity     Osteoarthritis     Pericarditis 2008    Stroke 03/2019    Type 2 diabetes mellitus        Past Surgical History:   Procedure Laterality Date    BRONCHOSCOPY N/A 8/23/2016    Procedure: BRONCHOSCOPY BIOPSY AT BEDSIDE;  Surgeon: Mookie Willard MD;  Location:  [facility];  Service:     CARDIAC CATHETERIZATION N/A 8/30/2016    Procedure: Left Heart Cath;  Surgeon: Steve Geronimo MD;  Location:  [facility];  Service:     CARDIAC CATHETERIZATION N/A 8/30/2016    Procedure: Right Heart Cath;  Surgeon: Steve Geronimo MD;  Location:  [facility];  Service:     CARDIAC ELECTROPHYSIOLOGY PROCEDURE N/A 7/2/2019    Procedure: Loop insertion;  Surgeon: Steve Geronimo MD;  Location:  [facility];  Service: Cardiovascular    CARDIAC ELECTROPHYSIOLOGY PROCEDURE N/A 9/26/2023    Procedure: Loop recorder removal;  Surgeon: Steve Geronimo MD;  Location:  [facility];  Service: Cardiovascular;  Laterality: N/A;    CATARACT EXTRACTION      COLONOSCOPY N/A 8/16/2024    Procedure: COLONOSCOPY;  Surgeon: Brunner, Mark I, MD;  Location:  [facility];  Service: Gastroenterology;  Laterality: N/A;    ENDOSCOPY N/A 8/14/2024    Procedure: ESOPHAGOGASTRODUODENOSCOPY;  Surgeon: Brunner, Mark I, MD;  Location:  [facility];  Service: Gastroenterology;  Laterality: N/A;    ESOPHAGEAL DILATATION  2007    HERNIA REPAIR      HIP CANNULATED SCREW PLACEMENT Left 1/2/2024    Procedure: HIP CANNULATED SCREW PLACEMENT LEFT;  Surgeon: Seymour Harrington MD;  Location:  [facility] OR;  Service: Orthopedics;  Laterality: Left;    HYSTERECTOMY  1998    WRIST FRACTURE SURGERY Left        Medications Prior to Admission   Medication Sig Dispense Refill Last Dose/Taking    amLODIPine (NORVASC) 5 MG tablet  Take 1 tablet by mouth Daily. (Patient taking differently: Take 0.5 tablets by mouth Daily.)       aspirin 81 MG EC tablet Take 1 tablet by mouth Daily.       atorvastatin (LIPITOR) 80 MG tablet Take 1 tablet by mouth Daily.       Blood Glucose Monitoring Suppl (Accu-Chek Guide) w/Device kit Use 1 kit See Admin Instructions. Use to check blood sugar once daily.  Dx E11.65 1 kit 0     calcitriol (ROCALTROL) 0.5 MCG capsule Take 1 capsule by mouth Daily.       carvedilol (COREG) 25 MG tablet Take 1 tablet by mouth Every 12 (Twelve) Hours. 60 tablet 1     cloNIDine (CATAPRES) 0.1 MG tablet Take 1 tablet by mouth 3 times a day. (Patient not taking: Reported on 10/31/2024)   Not Taking    doxazosin (CARDURA) 4 MG tablet        DULoxetine (CYMBALTA) 60 MG capsule Take 1 capsule by mouth Daily. 90 capsule 3     ferrous sulfate 325 (65 FE) MG tablet Take 1 tablet by mouth Daily With Breakfast.       Glucose Blood (Blood Glucose Test) strip Check blood sugar 1 time a day dx e11.65 100 each 3     hydrALAZINE (APRESOLINE) 100 MG tablet Take 1 tablet by mouth Every 8 (Eight) Hours.       Insulin Glargine (LANTUS SOLOSTAR) 100 UNIT/ML injection pen Inject 10 Units under the skin into the appropriate area as directed Every Night. (Patient taking differently: Inject 11 Units under the skin into the appropriate area as directed Every Night.) 15 mL 3     Insulin Pen Needle (BD Pen Needle Veda 2nd Gen) 32G X 4 MM misc Use 1 each Daily. 100 each 3     ipratropium-albuterol (DUO-NEB) 0.5-2.5 mg/3 ml nebulizer Take 3 mL by nebulization Every 4 (Four) Hours As Needed for Shortness of Air or Wheezing.       Lancets 33G misc Use 1 each Daily. Dx e11.65 100 each 3     Morphine (MS CONTIN) 15 MG 12 hr tablet Take 1 tablet by mouth 2 (Two) Times a Day.       pantoprazole (PROTONIX) 40 MG EC tablet Take 1 tablet by mouth 2 (Two) Times a Day.       probiotic (CULTURELLE) capsule capsule Take 1 capsule by mouth Daily. Bifidobacterium -  "lactobacillus       rivaroxaban (Xarelto) 15 MG tablet Take 1 tablet by mouth Daily With Dinner. 30 tablet 11     sodium bicarbonate 650 MG tablet Take 1 tablet by mouth 2 (Two) Times a Day.       torsemide (DEMADEX) 20 MG tablet Take 1 tablet by mouth Daily As Needed (weight gain 3 lbs in 1 day or 5 lbs in 1 week). (Patient taking differently: Take 1 tablet by mouth Daily.)       vitamin B-12 (CYANOCOBALAMIN) 1000 MCG tablet Take 1 tablet by mouth Daily.       vitamin D3 125 MCG (5000 UT) capsule capsule Take 1 capsule by mouth Daily.          Social History     Socioeconomic History    Marital status:    Tobacco Use    Smoking status: Former     Current packs/day: 0.00     Types: Cigarettes     Quit date: 2003     Years since quittin.8    Smokeless tobacco: Never   Vaping Use    Vaping status: Never Used   Substance and Sexual Activity    Alcohol use: No    Drug use: No    Sexual activity: Not Currently     Partners: Male     Birth control/protection: Pill, Hysterectomy       Family History   Problem Relation Age of Onset    Cancer Mother     Cancer Father     Cancer Other     Breast cancer Sister 67    Ovarian cancer Neg Hx         Objective:     Vitals:    24 0522 24 0953 24 0955 24 1121   BP: 172/74 172/74 173/74 174/76   BP Location:       Patient Position:       Pulse: 71  78 79   Resp:       Temp:       TempSrc:       SpO2:   97%    Weight:  80.5 kg (177 lb 7.5 oz)     Height:  162.6 cm (64.02\")       Body mass index is 30.45 kg/m².  Flowsheet Rows      Flowsheet Row First Filed Value   Admission Height 162.6 cm (64\") Documented at 10/30/2024 1632   Admission Weight 83.5 kg (184 lb) Documented at 10/30/2024 1632            Physical Exam   General: Chronically ill appearing, No acute distress   Neck: Supple, no JVD. Normal carotid upstrokes, no bruits.    Chest:No respiratory distress. No chest wall tenderness. Breath sounds are normal. No wheezes, rhonchi or " rales.  Cardiovascular: Normal S1 and S2, no murmur, gallop or rub. PMI is not displaced.    Pulses:Radial and pedal pulses are 2+ and symmetric.    Abdomen: Soft, nontender, normal bowel sounds.   Musculoskeletal/Extremities:  No clubbing, cyanosis or edema. No gross deformity.   Neurological: Alert and oriented to person, place, and time, no gross focal deficits.   Psychiatric: Normal mood and affect.Speech and behavior is normal.    Lab Review:                Results from last 7 days   Lab Units 11/02/24  0802   SODIUM mmol/L 138   POTASSIUM mmol/L 4.7   CHLORIDE mmol/L 107   CO2 mmol/L 19.0*   BUN mg/dL 58*   CREATININE mg/dL 3.40*   GLUCOSE mg/dL 146*   CALCIUM mg/dL 9.8     Results from last 7 days   Lab Units 10/30/24  1840 10/30/24  1626   HSTROP T ng/L 62* 66*     Results from last 7 days   Lab Units 11/01/24  0523   WBC 10*3/mm3 6.30   HEMOGLOBIN g/dL 9.0*   HEMATOCRIT % 27.9*   PLATELETS 10*3/mm3 131*         Results from last 7 days   Lab Units 11/02/24  0802   MAGNESIUM mg/dL 2.0         @LABRCNT(bnp)@  Imaging Results (Last 24 Hours)       ** No results found for the last 24 hours. **             EKG: NSR         Assessment:   Paroxysmal atrial fibrillation, anticoagulated with Xarelto, now on hold due to recurrent anemia.    Has been seen by EP recently to discuss Watchman versus anticoagulation discontinuation versus anticoagulation continuation. Wanted to think about her options.  Recurrent anemia, hemoglobin 6.1 on arrival now status post transfusion  History of Takotsubo cardiomyopathy now with recovered EF based on echo this admission  CKD, creatinine 3.4  CAD, [facility] 2016 with mild diffuse multivessel disease  Hypertension    Plan:   Due to recurrent anemia, will discontinue Xarelto.  After further discussion, patient has decided that she would like to proceed with Watchman.  We will discuss with EP and arrange for this to be performed as an outpatient.   Again, due to recurrence of anemia  requiring transfusion, would hold Xarelto.  Since she has had an EGD without any evidence of GI bleed, I would resume aspirin when hemoglobin is stable.  Suspect elevated proBNP related to significant anemia.  Breathing is improved with transfusion.  Thank you for the consult.  If patient is still here on Monday, cardiology will revisit.  Otherwise, we will reach out to EP and they will contact patient regarding outpatient watchman.    Alexa Rosenberg PA-C

## 2024-11-02 NOTE — PROGRESS NOTES
[facility] Medicine Services  PROGRESS NOTE    Patient Name: Yanique Webster  : 1940  MRN: [id number]    Date of Admission: 10/30/2024  Primary Care Physician: Sebas Alicea MD    Subjective   Subjective     CC:  F/u anemia    HPI:  Resting in bed in no acute distress tells me she feels better today.  Denies any fever or chills.  No chest pain palpitation shortness of breath at rest.  No nausea vomiting or diarrhea.      Objective   Objective     Vital Signs:   Temp:  [97.5 °F (36.4 °C)-98.5 °F (36.9 °C)] 98 °F (36.7 °C)  Heart Rate:  [71-79] 76  Resp:  [18] 18  BP: (155-194)/(72-78) 155/73     Physical Exam:  Constitutional: No acute distress  HENT: NCAT, mucous membranes moist  Respiratory: Clear to auscultation bilaterally, decreased breath sound bilaterally, respiratory effort very mildly labored  Cardiovascular: RRR, no murmurs, rubs, or gallops  Gastrointestinal: Positive bowel sounds, soft, nontender, nondistended  Musculoskeletal: Trace bilateral ankle edema  Psychiatric: appropriate affect, cooperative  Neurologic: Oriented x 3, no focality appreciated, speech clear  Skin: No rashes     Results Reviewed:  LAB RESULTS:      Lab 24  0523 10/31/24  0202 10/30/24  1840 10/30/24  1626   WBC 6.30 6.71  --  4.71   HEMOGLOBIN 9.0* 8.2*  --  6.1*   HEMATOCRIT 27.9* 25.1*  --  19.6*   PLATELETS 131* 123*  --  128*   NEUTROS ABS  --  4.53  --  2.91   IMMATURE GRANS (ABS)  --  0.03  --  0.02   LYMPHS ABS  --  1.30  --  1.15   MONOS ABS  --  0.60  --  0.46   EOS ABS  --  0.20  --  0.13   MCV 90.9 89.3  --  90.3   PROCALCITONIN  --   --   --  0.09   LACTATE  --   --   --  0.6   HSTROP T  --   --  62* 66*         Lab 24  0802 24  0523 10/31/24  0202 10/30/24  1626 10/29/24  1143   SODIUM 138 141 139 137  --    POTASSIUM 4.7 4.4 4.4 4.3  --    CHLORIDE 107 110* 109* 106  --    CO2 19.0* 17.0* 19.0* 18.0*  --    ANION GAP 12.0 14.0 11.0 13.0  --    BUN 58* 64*  60* 59*  --    CREATININE 3.40* 3.58* 3.26* 3.65*  --    EGFR 12.9* 12.1* 13.6* 11.8*  --    GLUCOSE 146* 113* 107* 115*  --    CALCIUM 9.8 9.9 10.0  9.7 9.7  --    MAGNESIUM 2.0  --   --   --   --    PHOSPHORUS 3.7 5.2*  --   --   --    HEMOGLOBIN A1C  --   --  5.40  --  5.0         Lab 11/01/24  0523 10/30/24  1626   TOTAL PROTEIN  --  5.7*   ALBUMIN 3.4* 3.4*   GLOBULIN  --  2.3   ALT (SGPT)  --  18   AST (SGOT)  --  24   BILIRUBIN  --  0.3   ALK PHOS  --  91         Lab 10/30/24  1840 10/30/24  1626   PROBNP  --  5,438.0*   HSTROP T 62* 66*             Lab 10/30/24  1840 10/30/24  1654 10/30/24  1626   IRON 42  42  --   --    IRON SATURATION (TSAT) 17*  --   --    TIBC 244*  --   --    TRANSFERRIN 164*  --   --    FERRITIN 103.00  --   --    FOLATE  --   --  7.53   VITAMIN B 12  --   --  1,518*   ABO TYPING  --  O  --    RH TYPING  --  Negative  --    ANTIBODY SCREEN  --  Negative  --          Lab 10/30/24  1640   PH, ARTERIAL 7.301*   PCO2, ARTERIAL 36.5   PO2 ART 73.3*   FIO2 21   HCO3 ART 18.0*   BASE EXCESS ART -7.8*   CARBOXYHEMOGLOBIN 1.4     Brief Urine Lab Results  (Last result in the past 365 days)        Color   Clarity   Blood   Leuk Est   Nitrite   Protein   CREAT   Urine HCG        08/13/24 2000             52.4         08/13/24 2000 Yellow   Clear   Negative   Negative   Negative   >=300 mg/dL (3+)                   Microbiology Results Abnormal       None            Adult Transthoracic Echo Complete W/ Cont if Necessary Per Protocol    Result Date: 11/2/2024    Left ventricular systolic function is normal. Estimated left ventricular EF = 60%   Left ventricular wall thickness is consistent with mild concentric hypertrophy.   The left atrial cavity is moderately dilated.   Aortic sclerosis without aortic stenosis.  Mild to moderate aortic insufficiency   Mild mitral regurgitation.      Results for orders placed during the hospital encounter of 10/30/24    Adult Transthoracic Echo Complete W/ Cont  if Necessary Per Protocol    Interpretation Summary    Left ventricular systolic function is normal. Estimated left ventricular EF = 60%    Left ventricular wall thickness is consistent with mild concentric hypertrophy.    The left atrial cavity is moderately dilated.    Aortic sclerosis without aortic stenosis.  Mild to moderate aortic insufficiency    Mild mitral regurgitation.      Current medications:  Scheduled Meds:amLODIPine, 5 mg, Oral, Q24H  [Held by provider] aspirin, 81 mg, Oral, Daily  atorvastatin, 80 mg, Oral, Nightly  calcitriol, 0.5 mcg, Oral, Daily  carvedilol, 25 mg, Oral, Q12H  cloNIDine, 1 patch, Transdermal, Weekly  epoetin adonis/adonis-epbx, 40,000 Units, Subcutaneous, Weekly  ferrous sulfate, 325 mg, Oral, Daily With Breakfast  hydrALAZINE, 100 mg, Oral, Q8H  lactobacillus acidophilus, 1 capsule, Oral, Daily  Morphine, 15 mg, Oral, BID  pantoprazole, 40 mg, Oral, BID  polyethylene glycol, 17 g, Oral, Daily  sodium bicarbonate, 650 mg, Oral, BID  sodium chloride, 10 mL, Intravenous, Q12H  terazosin, 5 mg, Oral, Nightly  vitamin B-12, 1,000 mcg, Oral, Daily      Continuous Infusions:   PRN Meds:.  acetaminophen **OR** acetaminophen **OR** acetaminophen    senna-docusate sodium **AND** polyethylene glycol **AND** bisacodyl **AND** bisacodyl    ipratropium-albuterol    nitroglycerin    sodium chloride    sodium chloride    torsemide    Assessment & Plan   Assessment & Plan     Active Hospital Problems    Diagnosis  POA    **Symptomatic anemia [D64.9]  Yes    KINDRA (acute kidney injury) [N17.9]  Yes    Anxiety associated with depression [F41.8]  Yes    Paroxysmal atrial fibrillation [I48.0]  Yes    CKD (chronic kidney disease) stage 4, GFR 15-29 ml/min [N18.4]  Yes    History of CVA (cerebrovascular accident) [Z86.73]  Not Applicable    Essential hypertension [I10]  Yes    Coronary artery disease involving native coronary artery of native heart without angina pectoris [I25.10]  Yes    COPD (chronic  obstructive pulmonary disease) [J44.9]  Yes    GERD (gastroesophageal reflux disease) [K21.9]  Yes    Hyperlipidemia LDL goal <70 [E78.5]  Yes      Resolved Hospital Problems   No resolved problems to display.        Brief Hospital Course to date:  Yanique Webster is a 84 y.o. female w/ CAD, COPD, HTN, HLD, chronic pain, Takotsubo cardiomyopathy, CKD4, Afib on eliquis; she was admitted 8/12/24-8/19/24 w/ anemia requiring transfusion, during that say she had EGD 8/14 and colo 8/16 both without source of bleeding; pt/fam elected to resume oral AC for stroke ppx; she has seen EP in the clinic and is contemplating Watchman device; she developed new SOB and had labs drawn which showed significant anemia and she was sent to the ED where Hgb was 6.1; she was admitted and transfused blood    The following problems are new to me today    Assessment/Plan    Acute/Chronic normocytic anemia, recurrent  Thrombocytopenia  -recent EGD/[place] 8/2024 w/o evidence for bleeding  -iron studies c/w anemia of chronic disease  -s/p 2U PRBC  -second episode of transfusion requiring anemia, noted TCP as well  -favor she has an anemia of chronic disease/renal disease, in the context of ongoing oral AC for stroke ppx    KINDRA on CKD4  Secondary hyperparathyroidism  -baseline Cr about 2.3; eGFR 20's  -nephrology following    pAfib w/ chronic oral AC  -xarelto on hold  -seen by EP for consideration of Watchman, pending consultant recs anticipate opt f/u  -Cardiology was consulted and they have seen and evaluated patient on this admission also.  They do recommend discontinuation of Xarelto in light of anemia's.    CAD  HTN  HLD  Hx Takotsubo cardiomyopathy  -most recent EF 56-60%  -cont amlodipine, statin, coreg, hydralazine, terazosin  -Will check echocardiogram as patient has edema of lower extremity and very elevated proBNP    Valvular heart disease  -We did echocardiogram yesterday which shows normal ejection fraction, mild to moderate aortic  regurgitation, mild mitral regurgitation.  Previous echocardiogram shows evidence of tricuspid regurgitation with elevated right ventricular systolic pressure between 45 and 55 mmHg.  -On admission patient had evidence of pulmonary edema.  Most likely tricuspid and mitral regurgitation contributed to that.    COPD   - add prn nebs  -Patient has history of heavy smoking but stopped about 20 years ago.  -Tells me that neb treatment actually helps her significantly.    Chronic pain - cont ms contin    Expected Discharge Location and Transportation: back to facility  Expected Discharge   Expected Discharge Date: 11/5/2024; Expected Discharge Time:      VTE Prophylaxis:  Mechanical VTE prophylaxis orders are present.         AM-PAC 6 Clicks Score (PT): 18 (11/02/24 0800)    CODE STATUS:   Code Status and Medical Interventions: CPR (Attempt to Resuscitate); Full Support   Ordered at: 10/30/24 1958     Level Of Support Discussed With:    Patient     Code Status (Patient has no pulse and is not breathing):    CPR (Attempt to Resuscitate)     Medical Interventions (Patient has pulse or is breathing):    Full Support       Axel Belle MD  11/02/24

## 2024-11-03 PROCEDURE — 99232 SBSQ HOSP IP/OBS MODERATE 35: CPT | Performed by: INTERNAL MEDICINE

## 2024-11-03 RX ORDER — CLONIDINE HYDROCHLORIDE 0.1 MG/1
0.1 TABLET ORAL EVERY 6 HOURS SCHEDULED
Status: DISCONTINUED | OUTPATIENT
Start: 2024-11-03 | End: 2024-11-08 | Stop reason: HOSPADM

## 2024-11-03 RX ORDER — AMLODIPINE BESYLATE 10 MG/1
10 TABLET ORAL
Status: DISCONTINUED | OUTPATIENT
Start: 2024-11-03 | End: 2024-11-08 | Stop reason: HOSPADM

## 2024-11-03 RX ADMIN — SODIUM BICARBONATE 650 MG TABLET 650 MG: at 20:58

## 2024-11-03 RX ADMIN — PANTOPRAZOLE SODIUM 40 MG: 40 TABLET, DELAYED RELEASE ORAL at 20:58

## 2024-11-03 RX ADMIN — TERAZOSIN HYDROCHLORIDE 5 MG: 5 CAPSULE ORAL at 20:58

## 2024-11-03 RX ADMIN — BENZOCAINE: 0.1 GEL TOPICAL at 20:57

## 2024-11-03 RX ADMIN — CLONIDINE HYDROCHLORIDE 0.1 MG: 0.1 TABLET ORAL at 17:42

## 2024-11-03 RX ADMIN — MORPHINE SULFATE 15 MG: 15 TABLET, FILM COATED, EXTENDED RELEASE ORAL at 10:41

## 2024-11-03 RX ADMIN — CARVEDILOL 25 MG: 12.5 TABLET, FILM COATED ORAL at 20:58

## 2024-11-03 RX ADMIN — HYDRALAZINE HYDROCHLORIDE 100 MG: 50 TABLET ORAL at 21:00

## 2024-11-03 RX ADMIN — CYANOCOBALAMIN TAB 1000 MCG 1000 MCG: 1000 TAB at 10:15

## 2024-11-03 RX ADMIN — FERROUS SULFATE TAB 325 MG (65 MG ELEMENTAL FE) 325 MG: 325 (65 FE) TAB at 10:15

## 2024-11-03 RX ADMIN — Medication 10 ML: at 20:59

## 2024-11-03 RX ADMIN — CLONIDINE HYDROCHLORIDE 0.1 MG: 0.1 TABLET ORAL at 10:15

## 2024-11-03 RX ADMIN — SODIUM BICARBONATE 650 MG TABLET 650 MG: at 10:15

## 2024-11-03 RX ADMIN — MORPHINE SULFATE 15 MG: 15 TABLET, FILM COATED, EXTENDED RELEASE ORAL at 20:58

## 2024-11-03 RX ADMIN — PANTOPRAZOLE SODIUM 40 MG: 40 TABLET, DELAYED RELEASE ORAL at 10:15

## 2024-11-03 RX ADMIN — CARVEDILOL 25 MG: 12.5 TABLET, FILM COATED ORAL at 10:16

## 2024-11-03 RX ADMIN — Medication 1 CAPSULE: at 10:15

## 2024-11-03 RX ADMIN — HYDRALAZINE HYDROCHLORIDE 100 MG: 50 TABLET ORAL at 15:52

## 2024-11-03 RX ADMIN — Medication 10 ML: at 10:17

## 2024-11-03 RX ADMIN — ATORVASTATIN CALCIUM 80 MG: 40 TABLET, FILM COATED ORAL at 20:58

## 2024-11-03 RX ADMIN — CALCITRIOL CAPSULES 0.25 MCG 0.5 MCG: 0.25 CAPSULE ORAL at 10:17

## 2024-11-03 RX ADMIN — AMLODIPINE BESYLATE 10 MG: 10 TABLET ORAL at 10:16

## 2024-11-03 NOTE — PROGRESS NOTES
" LOS: 4 days   Patient Care Team:  Sebas Alicea MD as PCP - General (Family Medicine)  Steve Geronimo MD as Consulting Physician (Cardiology)  Emilio Regalado MD as Consulting Physician (Endocrinology)  Axel Ribeiro MD as Consulting Physician (Cardiology)    Chief Complaint: KINDRA on CKD stage IV   Anemia   SOB    Subjective     No significant change in renal function. Resting comfortably in bed    Subjective:  Symptoms:  No shortness of breath, chest pain, chest pressure or anxiety.        History taken from: patient    Objective     Vital Sign Min/Max for last 24 hours  Temp  Min: 96.4 °F (35.8 °C)  Max: 98 °F (36.7 °C)   BP  Min: 137/73  Max: 174/76   Pulse  Min: 55  Max: 82   Resp  Min: 16  Max: 20   SpO2  Min: 89 %  Max: 96 %   No data recorded   Weight  Min: 80.8 kg (178 lb 3.2 oz)  Max: 80.8 kg (178 lb 3.2 oz)     Flowsheet Rows      Flowsheet Row First Filed Value   Admission Height 162.6 cm (64\") Documented at 10/30/2024 1632   Admission Weight 83.5 kg (184 lb) Documented at 10/30/2024 1632            I/O this shift:  In: 118 [P.O.:118]  Out: -   I/O last 3 completed shifts:  In: 840 [P.O.:840]  Out: 1400 [Urine:1400]    Objective:  General Appearance:  Comfortable.    Vital signs: (most recent): Blood pressure 137/73, pulse 64, temperature 96.4 °F (35.8 °C), temperature source Oral, resp. rate 16, height 162.6 cm (64.02\"), weight 80.8 kg (178 lb 3.2 oz), SpO2 96%.  Vital signs are normal.    Output: Producing urine.    HEENT: Normal HEENT exam.    Lungs:  Normal effort and normal respiratory rate.  Breath sounds clear to auscultation.  She is not in respiratory distress.  No decreased breath sounds.    Heart: Normal rate.  Regular rhythm.  S1 normal and S2 normal.  No murmur or gallop.   Abdomen: Abdomen is soft.  Bowel sounds are normal.     Extremities: Normal range of motion.  There is no dependent edema or local swelling.    Pulses: Distal pulses are intact.    Neurological: Patient is " "alert.                Results Review:     I reviewed the patient's new clinical results.    WBC WBC   Date Value Ref Range Status   11/01/2024 6.30 3.40 - 10.80 10*3/mm3 Final      HGB Hemoglobin   Date Value Ref Range Status   11/01/2024 9.0 (L) 12.0 - 15.9 g/dL Final      HCT Hematocrit   Date Value Ref Range Status   11/01/2024 27.9 (L) 34.0 - 46.6 % Final      Platlets No results found for: \"LABPLAT\"   MCV MCV   Date Value Ref Range Status   11/01/2024 90.9 79.0 - 97.0 fL Final          Sodium Sodium   Date Value Ref Range Status   11/02/2024 138 136 - 145 mmol/L Final   11/01/2024 141 136 - 145 mmol/L Final      Potassium Potassium   Date Value Ref Range Status   11/02/2024 4.7 3.5 - 5.2 mmol/L Final   11/01/2024 4.4 3.5 - 5.2 mmol/L Final      Chloride Chloride   Date Value Ref Range Status   11/02/2024 107 98 - 107 mmol/L Final   11/01/2024 110 (H) 98 - 107 mmol/L Final      CO2 CO2   Date Value Ref Range Status   11/02/2024 19.0 (L) 22.0 - 29.0 mmol/L Final   11/01/2024 17.0 (L) 22.0 - 29.0 mmol/L Final      BUN BUN   Date Value Ref Range Status   11/02/2024 58 (H) 8 - 23 mg/dL Final   11/01/2024 64 (H) 8 - 23 mg/dL Final      Creatinine Creatinine   Date Value Ref Range Status   11/02/2024 3.40 (H) 0.57 - 1.00 mg/dL Final   11/01/2024 3.58 (H) 0.57 - 1.00 mg/dL Final      Calcium Calcium   Date Value Ref Range Status   11/02/2024 9.8 8.6 - 10.5 mg/dL Final   11/01/2024 9.9 8.6 - 10.5 mg/dL Final      PO4 No results found for: \"CAPO4\"   Albumin Albumin   Date Value Ref Range Status   11/01/2024 3.4 (L) 3.5 - 5.2 g/dL Final      Magnesium Magnesium   Date Value Ref Range Status   11/02/2024 2.0 1.6 - 2.4 mg/dL Final      Uric Acid No results found for: \"URICACID\"     Medication Review: yes    Assessment & Plan       Symptomatic anemia    Hyperlipidemia LDL goal <70    COPD (chronic obstructive pulmonary disease)    GERD (gastroesophageal reflux disease)    Coronary artery disease involving native coronary " artery of native heart without angina pectoris    Essential hypertension    History of CVA (cerebrovascular accident)    CKD (chronic kidney disease) stage 4, GFR 15-29 ml/min    Paroxysmal atrial fibrillation    Anxiety associated with depression    KINDRA (acute kidney injury)      Assessment & Plan    KINDRA: Baseline cr btw 1.9-2.2mg/dl. Cr 3.23.5 on this admission. Suspect hemodynamic injury in the setting of anemia.      CKD stage IV: Hx of proteinuric renal disease. UPC 2 gm in the past. Duplex negative in 2016     Anemia: Acute on chronic worsening. ACD due to CKD. SPEP negative in the past. Nadya any bleeding. Started on EDISON weekly      Volume status: No significant edema on examination      HTN: No ANABEL per duplex in 2016.     Proteinuria: UPC 2.8 gm. Etiology unspecified.     Met acidosis: persistent in the setting of CKD. Hold off on bicarb supp at present.      Recs  KINDRA component due to hemodynamic injury so far no improvement in renal function. Suspect advance CKD at baseline  Agree with EDISON weekly   Transfuse at Hb<7   Daily labs.   No indication for dialysis at present.      I discussed the patients findings and my recommendations with patient    Zurdo Levine MD  11/03/24  15:53 EST

## 2024-11-03 NOTE — PLAN OF CARE
Problem: Adult Inpatient Plan of Care  Goal: Plan of Care Review  Outcome: Progressing  Flowsheets (Taken 11/3/2024 1719)  Progress: improving  Plan of Care Reviewed With: patient  Goal: Patient-Specific Goal (Individualized)  Outcome: Progressing  Goal: Absence of Hospital-Acquired Illness or Injury  Outcome: Progressing  Intervention: Identify and Manage Fall Risk  Recent Flowsheet Documentation  Taken 11/3/2024 1600 by Adrian Orozco RN  Safety Promotion/Fall Prevention:   activity supervised   nonskid shoes/slippers when out of bed   room organization consistent   safety round/check completed  Taken 11/3/2024 1200 by Adrian Orozco RN  Safety Promotion/Fall Prevention:   activity supervised   nonskid shoes/slippers when out of bed   room organization consistent   safety round/check completed  Taken 11/3/2024 1014 by Adrian Orozco RN  Safety Promotion/Fall Prevention:   activity supervised   nonskid shoes/slippers when out of bed   room organization consistent   safety round/check completed  Intervention: Prevent Skin Injury  Recent Flowsheet Documentation  Taken 11/3/2024 1600 by Adrian Orozco RN  Body Position:   position changed independently   weight shifting  Skin Protection:   silicone foam dressing in place   transparent dressing maintained   skin sealant/moisture barrier applied  Taken 11/3/2024 1200 by Adrian Orozco RN  Skin Protection:   incontinence pads utilized   silicone foam dressing in place   transparent dressing maintained  Taken 11/3/2024 1014 by Adrian Orozco RN  Body Position:   position changed independently   weight shifting  Skin Protection:   silicone foam dressing in place   transparent dressing maintained   skin sealant/moisture barrier applied  Intervention: Prevent and Manage VTE (Venous Thromboembolism) Risk  Recent Flowsheet Documentation  Taken 11/3/2024 1014 by Adrian Orozco RN  VTE Prevention/Management:   bilateral   SCDs (sequential compression devices)  off  Intervention: Prevent Infection  Recent Flowsheet Documentation  Taken 11/3/2024 1600 by Adrian Orozco RN  Infection Prevention:   environmental surveillance performed   equipment surfaces disinfected   hand hygiene promoted   personal protective equipment utilized   rest/sleep promoted   single patient room provided  Taken 11/3/2024 1200 by Adrian Orozco RN  Infection Prevention:   environmental surveillance performed   equipment surfaces disinfected   hand hygiene promoted   personal protective equipment utilized   rest/sleep promoted   single patient room provided  Taken 11/3/2024 1014 by Adrian Orozco RN  Infection Prevention:   environmental surveillance performed   equipment surfaces disinfected   hand hygiene promoted   personal protective equipment utilized   rest/sleep promoted   single patient room provided  Goal: Optimal Comfort and Wellbeing  Outcome: Progressing  Intervention: Monitor Pain and Promote Comfort  Recent Flowsheet Documentation  Taken 11/3/2024 1014 by Adrian Orozco RN  Pain Management Interventions: relaxation techniques promoted  Intervention: Provide Person-Centered Care  Recent Flowsheet Documentation  Taken 11/3/2024 1014 by Adrian Orozco RN  Trust Relationship/Rapport:   care explained   emotional support provided   empathic listening provided   questions answered   questions encouraged   reassurance provided   thoughts/feelings acknowledged  Goal: Readiness for Transition of Care  Outcome: Progressing     Problem: Skin Injury Risk Increased  Goal: Skin Health and Integrity  Outcome: Progressing  Intervention: Optimize Skin Protection  Recent Flowsheet Documentation  Taken 11/3/2024 1600 by Adrian Orozco RN  Activity Management: activity encouraged  Pressure Reduction Techniques:   frequent weight shift encouraged   heels elevated off bed   rest period provided between sit times   weight shift assistance provided  Head of Bed (HOB) Positioning: HOB elevated  Pressure  Reduction Devices:   pressure-redistributing mattress utilized   positioning supports utilized   heel offloading device utilized  Skin Protection:   silicone foam dressing in place   transparent dressing maintained   skin sealant/moisture barrier applied  Taken 11/3/2024 1200 by Adrian Orozco RN  Activity Management: activity encouraged  Pressure Reduction Techniques:   frequent weight shift encouraged   heels elevated off bed   rest period provided between sit times   sit time limited to 2 hours   weight shift assistance provided  Head of Bed (HOB) Positioning: HOB elevated  Pressure Reduction Devices:   pressure-redistributing mattress utilized   positioning supports utilized   foam padding utilized  Skin Protection:   incontinence pads utilized   silicone foam dressing in place   transparent dressing maintained  Taken 11/3/2024 1014 by Adrian Orozco RN  Activity Management: activity encouraged  Pressure Reduction Techniques:   frequent weight shift encouraged   heels elevated off bed   rest period provided between sit times   weight shift assistance provided  Head of Bed (HOB) Positioning: HOB elevated  Pressure Reduction Devices:   positioning supports utilized   pressure-redistributing mattress utilized   specialty bed utilized  Skin Protection:   silicone foam dressing in place   transparent dressing maintained   skin sealant/moisture barrier applied     Problem: Fall Injury Risk  Goal: Absence of Fall and Fall-Related Injury  Outcome: Progressing  Intervention: Identify and Manage Contributors  Recent Flowsheet Documentation  Taken 11/3/2024 1600 by Adrian Orozco, RN  Medication Review/Management: medications reviewed  Self-Care Promotion:   BADL personal objects within reach   BADL personal routines maintained   adaptive equipment use encouraged  Taken 11/3/2024 1200 by Adrian Orozco RN  Medication Review/Management: medications reviewed  Self-Care Promotion:   BADL personal objects within reach    BADL personal routines maintained   adaptive equipment use encouraged  Taken 11/3/2024 1014 by Adrian Orozco RN  Medication Review/Management: medications reviewed  Self-Care Promotion:   BADL personal objects within reach   BADL personal routines maintained   adaptive equipment use encouraged  Intervention: Promote Injury-Free Environment  Recent Flowsheet Documentation  Taken 11/3/2024 1600 by Adrian Orozco RN  Safety Promotion/Fall Prevention:   activity supervised   nonskid shoes/slippers when out of bed   room organization consistent   safety round/check completed  Taken 11/3/2024 1200 by Adrian Orozco RN  Safety Promotion/Fall Prevention:   activity supervised   nonskid shoes/slippers when out of bed   room organization consistent   safety round/check completed  Taken 11/3/2024 1014 by Adrian Orozco RN  Safety Promotion/Fall Prevention:   activity supervised   nonskid shoes/slippers when out of bed   room organization consistent   safety round/check completed     Problem: Pain Acute  Goal: Optimal Pain Control and Function  Outcome: Progressing  Intervention: Optimize Psychosocial Wellbeing  Recent Flowsheet Documentation  Taken 11/3/2024 1014 by Adrian Orozco RN  Diversional Activities: television  Intervention: Develop Pain Management Plan  Recent Flowsheet Documentation  Taken 11/3/2024 1014 by Adrian Orozco RN  Pain Management Interventions: relaxation techniques promoted  Intervention: Prevent or Manage Pain  Recent Flowsheet Documentation  Taken 11/3/2024 1600 by Adrian Orozco RN  Medication Review/Management: medications reviewed  Taken 11/3/2024 1200 by Adrian Orozco RN  Medication Review/Management: medications reviewed  Taken 11/3/2024 1014 by Adrian Orozco RN  Medication Review/Management: medications reviewed     Problem: Infection  Goal: Absence of Infection Signs and Symptoms  Outcome: Progressing   Goal Outcome Evaluation:  Plan of Care Reviewed With: patient        Progress:  improving

## 2024-11-03 NOTE — PROGRESS NOTES
[facility] Medicine Services  PROGRESS NOTE    Patient Name: Yanique Webster  : 1940  MRN: [id number]    Date of Admission: 10/30/2024  Primary Care Physician: Sebas Alicea MD    Subjective   Subjective     CC:  F/u anemia    HPI:  Resting in bed in no acute distress tells me she feels better today.  Tells me she has more energy and pain is better controlled.  Denies any fever or chills.  No chest pain palpitation shortness of breath at rest.  No nausea vomiting or diarrhea.      Objective   Objective     Vital Signs:   Temp:  [96.4 °F (35.8 °C)-98 °F (36.7 °C)] 96.5 °F (35.8 °C)  Heart Rate:  [55-82] 55  Resp:  [18-20] 18  BP: (144-174)/(63-76) 144/63     Physical Exam:  Constitutional: No acute distress  HENT: NCAT, mucous membranes moist  Respiratory: Clear to auscultation bilaterally, decreased breath sound bilaterally, respiratory effort very mildly labored  Cardiovascular: RRR, no murmurs, rubs, or gallops  Gastrointestinal: Positive bowel sounds, soft, nontender, nondistended  Musculoskeletal: Trace bilateral ankle edema  Psychiatric: appropriate affect, cooperative  Neurologic: Oriented x 3, no focality appreciated, speech clear  Skin: No rashes     Results Reviewed:  LAB RESULTS:      Lab 24  0523 10/31/24  0202 10/30/24  1840 10/30/24  1626   WBC 6.30 6.71  --  4.71   HEMOGLOBIN 9.0* 8.2*  --  6.1*   HEMATOCRIT 27.9* 25.1*  --  19.6*   PLATELETS 131* 123*  --  128*   NEUTROS ABS  --  4.53  --  2.91   IMMATURE GRANS (ABS)  --  0.03  --  0.02   LYMPHS ABS  --  1.30  --  1.15   MONOS ABS  --  0.60  --  0.46   EOS ABS  --  0.20  --  0.13   MCV 90.9 89.3  --  90.3   PROCALCITONIN  --   --   --  0.09   LACTATE  --   --   --  0.6   HSTROP T  --   --  62* 66*         Lab 24  0802 24  0523 10/31/24  0202 10/30/24  1626 10/29/24  1143   SODIUM 138 141 139 137  --    POTASSIUM 4.7 4.4 4.4 4.3  --    CHLORIDE 107 110* 109* 106  --    CO2 19.0* 17.0* 19.0*  18.0*  --    ANION GAP 12.0 14.0 11.0 13.0  --    BUN 58* 64* 60* 59*  --    CREATININE 3.40* 3.58* 3.26* 3.65*  --    EGFR 12.9* 12.1* 13.6* 11.8*  --    GLUCOSE 146* 113* 107* 115*  --    CALCIUM 9.8 9.9 10.0  9.7 9.7  --    MAGNESIUM 2.0  --   --   --   --    PHOSPHORUS 3.7 5.2*  --   --   --    HEMOGLOBIN A1C  --   --  5.40  --  5.0         Lab 11/01/24  0523 10/30/24  1626   TOTAL PROTEIN  --  5.7*   ALBUMIN 3.4* 3.4*   GLOBULIN  --  2.3   ALT (SGPT)  --  18   AST (SGOT)  --  24   BILIRUBIN  --  0.3   ALK PHOS  --  91         Lab 10/30/24  1840 10/30/24  1626   PROBNP  --  5,438.0*   HSTROP T 62* 66*             Lab 10/30/24  1840 10/30/24  1654 10/30/24  1626   IRON 42  42  --   --    IRON SATURATION (TSAT) 17*  --   --    TIBC 244*  --   --    TRANSFERRIN 164*  --   --    FERRITIN 103.00  --   --    FOLATE  --   --  7.53   VITAMIN B 12  --   --  1,518*   ABO TYPING  --  O  --    RH TYPING  --  Negative  --    ANTIBODY SCREEN  --  Negative  --          Lab 10/30/24  1640   PH, ARTERIAL 7.301*   PCO2, ARTERIAL 36.5   PO2 ART 73.3*   FIO2 21   HCO3 ART 18.0*   BASE EXCESS ART -7.8*   CARBOXYHEMOGLOBIN 1.4     Brief Urine Lab Results  (Last result in the past 365 days)        Color   Clarity   Blood   Leuk Est   Nitrite   Protein   CREAT   Urine HCG        08/13/24 2000             52.4         08/13/24 2000 Yellow   Clear   Negative   Negative   Negative   >=300 mg/dL (3+)                   Microbiology Results Abnormal       None            Adult Transthoracic Echo Complete W/ Cont if Necessary Per Protocol    Result Date: 11/2/2024    Left ventricular systolic function is normal. Estimated left ventricular EF = 60%   Left ventricular wall thickness is consistent with mild concentric hypertrophy.   The left atrial cavity is moderately dilated.   Aortic sclerosis without aortic stenosis.  Mild to moderate aortic insufficiency   Mild mitral regurgitation.      Results for orders placed during the hospital  encounter of 10/30/24    Adult Transthoracic Echo Complete W/ Cont if Necessary Per Protocol    Interpretation Summary    Left ventricular systolic function is normal. Estimated left ventricular EF = 60%    Left ventricular wall thickness is consistent with mild concentric hypertrophy.    The left atrial cavity is moderately dilated.    Aortic sclerosis without aortic stenosis.  Mild to moderate aortic insufficiency    Mild mitral regurgitation.      Current medications:  Scheduled Meds:amLODIPine, 10 mg, Oral, Q24H  [Held by provider] aspirin, 81 mg, Oral, Daily  atorvastatin, 80 mg, Oral, Nightly  calcitriol, 0.5 mcg, Oral, Daily  carvedilol, 25 mg, Oral, Q12H  cloNIDine, 0.1 mg, Oral, Q6H  epoetin adonis/adonis-epbx, 40,000 Units, Subcutaneous, Weekly  ferrous sulfate, 325 mg, Oral, Daily With Breakfast  hydrALAZINE, 100 mg, Oral, Q8H  lactobacillus acidophilus, 1 capsule, Oral, Daily  Morphine, 15 mg, Oral, BID  pantoprazole, 40 mg, Oral, BID  polyethylene glycol, 17 g, Oral, Daily  sodium bicarbonate, 650 mg, Oral, BID  sodium chloride, 10 mL, Intravenous, Q12H  terazosin, 5 mg, Oral, Nightly  vitamin B-12, 1,000 mcg, Oral, Daily      Continuous Infusions:   PRN Meds:.  acetaminophen **OR** acetaminophen **OR** acetaminophen    senna-docusate sodium **AND** polyethylene glycol **AND** bisacodyl **AND** bisacodyl    ipratropium-albuterol    nitroglycerin    sodium chloride    sodium chloride    torsemide    Assessment & Plan   Assessment & Plan     Active Hospital Problems    Diagnosis  POA    **Symptomatic anemia [D64.9]  Yes    KINDRA (acute kidney injury) [N17.9]  Yes    Anxiety associated with depression [F41.8]  Yes    Paroxysmal atrial fibrillation [I48.0]  Yes    CKD (chronic kidney disease) stage 4, GFR 15-29 ml/min [N18.4]  Yes    History of CVA (cerebrovascular accident) [Z86.73]  Not Applicable    Essential hypertension [I10]  Yes    Coronary artery disease involving native coronary artery of native heart  without angina pectoris [I25.10]  Yes    COPD (chronic obstructive pulmonary disease) [J44.9]  Yes    GERD (gastroesophageal reflux disease) [K21.9]  Yes    Hyperlipidemia LDL goal <70 [E78.5]  Yes      Resolved Hospital Problems   No resolved problems to display.        Brief Hospital Course to date:  Yanique Webster is a 84 y.o. female w/ CAD, COPD, HTN, HLD, chronic pain, Takotsubo cardiomyopathy, CKD4, Afib on eliquis; she was admitted 8/12/24-8/19/24 w/ anemia requiring transfusion, during that say she had EGD 8/14 and colo 8/16 both without source of bleeding; pt/fam elected to resume oral AC for stroke ppx; she has seen EP in the clinic and is contemplating Watchman device; she developed new SOB and had labs drawn which showed significant anemia and she was sent to the ED where Hgb was 6.1; she was admitted and transfused blood    The following problems are new to me today    Assessment/Plan    Acute/Chronic normocytic anemia, recurrent  Thrombocytopenia  -recent EGD/[place] 8/2024 w/o evidence for bleeding  -iron studies c/w anemia of chronic disease  -s/p 2U PRBC  -second episode of transfusion requiring anemia, noted TCP as well  -favor she has an anemia of chronic disease/renal disease, in the context of ongoing oral AC for stroke ppx    KINDRA on CKD4  Secondary hyperparathyroidism  -baseline Cr about 2.3; eGFR 20's  -nephrology following    pAfib w/ chronic oral AC  -xarelto on hold  -seen by EP for consideration of Watchman, pending consultant recs anticipate opt f/u  -Cardiology was consulted and they have seen and evaluated patient on this admission also.  They do recommend discontinuation of Xarelto in light of anemia's.    CAD  HTN  HLD  Hx Takotsubo cardiomyopathy  -most recent EF 56-60%  -Blood pressure is still not controlled.  Will increase amlodipine to 10 mg p.o. daily.  Will also increase clonidine to 0.1 mg p.o. every 6 hours and monitor.      Valvular heart disease  -We did echocardiogram  yesterday which shows normal ejection fraction, mild to moderate aortic regurgitation, mild mitral regurgitation.  Previous echocardiogram shows evidence of tricuspid regurgitation with elevated right ventricular systolic pressure between 45 and 55 mmHg.  -On admission patient had evidence of pulmonary edema.  Most likely tricuspid and mitral regurgitation contributed to that.  Currently patient breathing okay and saturating well.    COPD   - add prn nebs  -Patient has history of heavy smoking but stopped about 20 years ago.  -Tells me that neb treatment actually helps her significantly.    Chronic pain - cont ms contin    Expected Discharge Location and Transportation: back to facility  Expected Discharge   Expected Discharge Date: 11/5/2024; Expected Discharge Time:      VTE Prophylaxis:  Mechanical VTE prophylaxis orders are present.         AM-PAC 6 Clicks Score (PT): 18 (11/02/24 0800)    CODE STATUS:   Code Status and Medical Interventions: CPR (Attempt to Resuscitate); Full Support   Ordered at: 10/30/24 1958     Level Of Support Discussed With:    Patient     Code Status (Patient has no pulse and is not breathing):    CPR (Attempt to Resuscitate)     Medical Interventions (Patient has pulse or is breathing):    Full Support       Axel Belle MD  11/03/24

## 2024-11-04 LAB
ANION GAP SERPL CALCULATED.3IONS-SCNC: 11 MMOL/L (ref 5–15)
BUN SERPL-MCNC: 53 MG/DL (ref 8–23)
BUN/CREAT SERPL: 14.6 (ref 7–25)
CALCIUM SPEC-SCNC: 9.9 MG/DL (ref 8.6–10.5)
CHLORIDE SERPL-SCNC: 110 MMOL/L (ref 98–107)
CO2 SERPL-SCNC: 20 MMOL/L (ref 22–29)
CREAT SERPL-MCNC: 3.62 MG/DL (ref 0.57–1)
EGFRCR SERPLBLD CKD-EPI 2021: 12 ML/MIN/1.73
GLUCOSE SERPL-MCNC: 169 MG/DL (ref 65–99)
POTASSIUM SERPL-SCNC: 4.7 MMOL/L (ref 3.5–5.2)
QT INTERVAL: 414 MS
QTC INTERVAL: 427 MS
SODIUM SERPL-SCNC: 141 MMOL/L (ref 136–145)

## 2024-11-04 PROCEDURE — 99232 SBSQ HOSP IP/OBS MODERATE 35: CPT | Performed by: INTERNAL MEDICINE

## 2024-11-04 PROCEDURE — 97116 GAIT TRAINING THERAPY: CPT

## 2024-11-04 PROCEDURE — 80048 BASIC METABOLIC PNL TOTAL CA: CPT | Performed by: INTERNAL MEDICINE

## 2024-11-04 PROCEDURE — 97110 THERAPEUTIC EXERCISES: CPT

## 2024-11-04 RX ADMIN — PANTOPRAZOLE SODIUM 40 MG: 40 TABLET, DELAYED RELEASE ORAL at 10:26

## 2024-11-04 RX ADMIN — AMLODIPINE BESYLATE 10 MG: 10 TABLET ORAL at 10:26

## 2024-11-04 RX ADMIN — CALCITRIOL CAPSULES 0.25 MCG 0.5 MCG: 0.25 CAPSULE ORAL at 10:26

## 2024-11-04 RX ADMIN — TERAZOSIN HYDROCHLORIDE 5 MG: 5 CAPSULE ORAL at 20:57

## 2024-11-04 RX ADMIN — FERROUS SULFATE TAB 325 MG (65 MG ELEMENTAL FE) 325 MG: 325 (65 FE) TAB at 10:26

## 2024-11-04 RX ADMIN — SODIUM BICARBONATE 650 MG TABLET 650 MG: at 20:57

## 2024-11-04 RX ADMIN — CYANOCOBALAMIN TAB 1000 MCG 1000 MCG: 1000 TAB at 10:26

## 2024-11-04 RX ADMIN — Medication 10 ML: at 10:27

## 2024-11-04 RX ADMIN — HYDRALAZINE HYDROCHLORIDE 100 MG: 50 TABLET ORAL at 21:00

## 2024-11-04 RX ADMIN — PANTOPRAZOLE SODIUM 40 MG: 40 TABLET, DELAYED RELEASE ORAL at 20:57

## 2024-11-04 RX ADMIN — SODIUM BICARBONATE 650 MG TABLET 650 MG: at 10:26

## 2024-11-04 RX ADMIN — Medication 10 ML: at 20:57

## 2024-11-04 RX ADMIN — ATORVASTATIN CALCIUM 80 MG: 40 TABLET, FILM COATED ORAL at 20:57

## 2024-11-04 RX ADMIN — MORPHINE SULFATE 15 MG: 15 TABLET, FILM COATED, EXTENDED RELEASE ORAL at 10:26

## 2024-11-04 RX ADMIN — CLONIDINE HYDROCHLORIDE 0.1 MG: 0.1 TABLET ORAL at 17:33

## 2024-11-04 RX ADMIN — HYDRALAZINE HYDROCHLORIDE 100 MG: 50 TABLET ORAL at 05:39

## 2024-11-04 RX ADMIN — CARVEDILOL 25 MG: 12.5 TABLET, FILM COATED ORAL at 20:57

## 2024-11-04 RX ADMIN — CLONIDINE HYDROCHLORIDE 0.1 MG: 0.1 TABLET ORAL at 05:39

## 2024-11-04 RX ADMIN — MORPHINE SULFATE 15 MG: 15 TABLET, FILM COATED, EXTENDED RELEASE ORAL at 20:57

## 2024-11-04 RX ADMIN — CARVEDILOL 25 MG: 12.5 TABLET, FILM COATED ORAL at 10:26

## 2024-11-04 RX ADMIN — Medication 1 CAPSULE: at 10:26

## 2024-11-04 NOTE — PROGRESS NOTES
[facility] Cardiology    Inpatient Progress Note      Chief Complaint/Reason for service:    Anemia         Subjective:       Remains in sinus rhythm    Past medical, surgical, social and family history reviewed in the patient's electronic medical record.         Objective:      Infusions:        Medications:    Current Facility-Administered Medications:     acetaminophen (TYLENOL) tablet 650 mg, 650 mg, Oral, Q4H PRN **OR** acetaminophen (TYLENOL) 160 MG/5ML oral solution 650 mg, 650 mg, Oral, Q4H PRN **OR** acetaminophen (TYLENOL) suppository 650 mg, 650 mg, Rectal, Q4H PRN, Jonna, Loren, APRN    amLODIPine (NORVASC) tablet 10 mg, 10 mg, Oral, Q24H, Axel Belle MD, 10 mg at 11/03/24 1016    [Held by provider] aspirin EC tablet 81 mg, 81 mg, Oral, Daily, Adrian Gregorio,     atorvastatin (LIPITOR) tablet 80 mg, 80 mg, Oral, Nightly, Jonna, Loren, APRN, 80 mg at 11/03/24 2058    benzocaine (ORAJEL) 10 % mucosal gel, , Mouth/Throat, 4x Daily PRN, Axel Belel MD, Given at 11/03/24 2057    sennosides-docusate (PERICOLACE) 8.6-50 MG per tablet 2 tablet, 2 tablet, Oral, BID PRN **AND** polyethylene glycol (MIRALAX) packet 17 g, 17 g, Oral, Daily PRN, 17 g at 11/01/24 0600 **AND** bisacodyl (DULCOLAX) EC tablet 5 mg, 5 mg, Oral, Daily PRN **AND** bisacodyl (DULCOLAX) suppository 10 mg, 10 mg, Rectal, Daily PRN, Jonna, Loren, APRN    calcitriol (ROCALTROL) capsule 0.5 mcg, 0.5 mcg, Oral, Daily, Jonna, Loren, APRN, 0.5 mcg at 11/03/24 1017    carvedilol (COREG) tablet 25 mg, 25 mg, Oral, Q12H, Jonna, Loren, APRN, 25 mg at 11/03/24 2058    cloNIDine (CATAPRES) tablet 0.1 mg, 0.1 mg, Oral, Q6H, Axel Belle MD, 0.1 mg at 11/04/24 0539    epoetin adonis-epbx (RETACRIT) injection 40,000 Units, 40,000 Units, Subcutaneous, Weekly, Gorge Barrett MD, 40,000 Units at 10/31/24 1712    ferrous sulfate tablet 325 mg, 325 mg, Oral, Daily With Breakfast, Jonna,  Loren, APRN, 325 mg at 11/03/24 1015    hydrALAZINE (APRESOLINE) tablet 100 mg, 100 mg, Oral, Q8H, Jonna, Loren, APRN, 100 mg at 11/04/24 0539    ipratropium-albuterol (DUO-NEB) nebulizer solution 3 mL, 3 mL, Nebulization, Q4H PRN, Adrian Gregorio DO    lactobacillus acidophilus (RISAQUAD) capsule 1 capsule, 1 capsule, Oral, Daily, Jonna, Loren, APRN, 1 capsule at 11/03/24 1015    Morphine (MS CONTIN) 12 hr tablet 15 mg, 15 mg, Oral, BID, Jonna, Loren, APRN, 15 mg at 11/03/24 2058    nitroglycerin (NITROSTAT) SL tablet 0.4 mg, 0.4 mg, Sublingual, Q5 Min PRN, Jonna, Loren, APRN    pantoprazole (PROTONIX) EC tablet 40 mg, 40 mg, Oral, BID, Jonna, Loren, APRN, 40 mg at 11/03/24 2058    polyethylene glycol (MIRALAX) packet 17 g, 17 g, Oral, Daily, Adrian Gregorio DO, 17 g at 11/02/24 0958    sodium bicarbonate tablet 650 mg, 650 mg, Oral, BID, Adrian Gregorio, , 650 mg at 11/03/24 2058    sodium chloride 0.9 % flush 10 mL, 10 mL, Intravenous, PRN, Adrian Gregorio DO    sodium chloride 0.9 % flush 10 mL, 10 mL, Intravenous, Q12H, Jonna, Loren, APRN, 10 mL at 11/03/24 2059    sodium chloride 0.9 % flush 10 mL, 10 mL, Intravenous, PRN, Jonna, Loren, APRN    terazosin (HYTRIN) capsule 5 mg, 5 mg, Oral, Nightly, Jonna, Loren, APRN, 5 mg at 11/03/24 2058    torsemide (DEMADEX) tablet 20 mg, 20 mg, Oral, Daily PRN, Jonna, Loren, APRN    vitamin B-12 (CYANOCOBALAMIN) tablet 1,000 mcg, 1,000 mcg, Oral, Daily, Jonna, Loren, APRN, 1,000 mcg at 11/03/24 1015    Vital Sign Min/Max for last 24 hours  Temp  Min: 96.4 °F (35.8 °C)  Max: 98.5 °F (36.9 °C)   BP  Min: 127/63  Max: 172/73   Pulse  Min: 55  Max: 75   Resp  Min: 16  Max: 20   SpO2  Min: 93 %  Max: 96 %   No data recorded      Intake/Output Summary (Last 24 hours) at 11/4/2024 0741  Last data filed at 11/3/2024 2251  Gross per 24 hour   Intake 478 ml   Output 1300 ml   Net -822 ml           CONSTITUTIONAL: No  acute distress    Labs/studies:  Available lab and imaging results were reviewed by myself today    Results for orders placed during the hospital encounter of 10/30/24    Adult Transthoracic Echo Complete W/ Cont if Necessary Per Protocol    Interpretation Summary    Left ventricular systolic function is normal. Estimated left ventricular EF = 60%    Left ventricular wall thickness is consistent with mild concentric hypertrophy.    The left atrial cavity is moderately dilated.    Aortic sclerosis without aortic stenosis.  Mild to moderate aortic insufficiency    Mild mitral regurgitation.             Assessment/Plan:         Symptomatic anemia    Hyperlipidemia LDL goal <70    COPD (chronic obstructive pulmonary disease)    GERD (gastroesophageal reflux disease)    Coronary artery disease involving native coronary artery of native heart without angina pectoris    Essential hypertension    History of CVA (cerebrovascular accident)    CKD (chronic kidney disease) stage 4, GFR 15-29 ml/min    Paroxysmal atrial fibrillation    Anxiety associated with depression    KINDRA (acute kidney injury)       ASSESSMENT:  Paroxysmal atrial fibrillation, anticoagulated with Xarelto, now on hold due to recurrent anemia.    Has been seen by EP recently to discuss Watchman versus anticoagulation discontinuation versus anticoagulation continuation. Wanted to think about her options.  Recurrent anemia  Hemoglobin 6.1 on arrival now status post transfusion  Recent EGD/[place] 8/2024 w/o evidence for bleeding   Serologic studies consistent with anemia of chronic disease  History of Takotsubo cardiomyopathy now with recovered EF based on echo this admission  CKD, creatinine 3.4  CAD, [facility] 2016 with mild diffuse multivessel disease  Hypertension    PLAN:  Message sent to Dr Ribeiro/EP staff that patient is now interested in Watchman.  They will reach out to the patient to set up next steps  Continue holding aspirin and Xarelto  Continuing other  cardiac related medications  Cardiology to see on an as-needed basis.  Please feel free to call with any questions or concerns in the interim  Keep follow-up in the cardiology office is currently scheduled      Steve Geronimo MD, MSc, FACC, [facility]  Interventional Cardiology  [facility]

## 2024-11-04 NOTE — PROGRESS NOTES
[facility] Medicine Services  PROGRESS NOTE    Patient Name: Yanique Webster  : 1940  MRN: [id number]    Date of Admission: 10/30/2024  Primary Care Physician: Sebas Alicea MD    Subjective   Subjective     CC:  F/u anemia    HPI:  Resting in bed in no acute distress tells me she feels ok.  .  Denies any fever or chills.  No chest pain palpitation shortness of breath at rest.  No nausea vomiting or diarrhea.      Objective   Objective     Vital Signs:   Temp:  [97.4 °F (36.3 °C)-98.5 °F (36.9 °C)] 97.6 °F (36.4 °C)  Heart Rate:  [55-67] 59  Resp:  [16-18] 16  BP: (122-153)/(56-87) 144/61     Physical Exam:  Constitutional: No acute distress  HENT: NCAT, mucous membranes moist  Respiratory: Clear to auscultation bilaterally, decreased breath sound bilaterally, respiratory effort very mildly labored  Cardiovascular: RRR, no murmurs, rubs, or gallops  Gastrointestinal: Positive bowel sounds, soft, nontender, nondistended  Musculoskeletal: Trace bilateral ankle edema  Psychiatric: appropriate affect, cooperative  Neurologic: Oriented x 3, no focality appreciated, speech clear  Skin: No rashes     Results Reviewed:  LAB RESULTS:      Lab 24  0523 10/31/24  0202 10/30/24  1840 10/30/24  1626   WBC 6.30 6.71  --  4.71   HEMOGLOBIN 9.0* 8.2*  --  6.1*   HEMATOCRIT 27.9* 25.1*  --  19.6*   PLATELETS 131* 123*  --  128*   NEUTROS ABS  --  4.53  --  2.91   IMMATURE GRANS (ABS)  --  0.03  --  0.02   LYMPHS ABS  --  1.30  --  1.15   MONOS ABS  --  0.60  --  0.46   EOS ABS  --  0.20  --  0.13   MCV 90.9 89.3  --  90.3   PROCALCITONIN  --   --   --  0.09   LACTATE  --   --   --  0.6   HSTROP T  --   --  62* 66*         Lab 24  1306 24  0802 24  0523 10/31/24  0202 10/30/24  1626 10/29/24  1143   SODIUM 141 138 141 139 137  --    POTASSIUM 4.7 4.7 4.4 4.4 4.3  --    CHLORIDE 110* 107 110* 109* 106  --    CO2 20.0* 19.0* 17.0* 19.0* 18.0*  --    ANION GAP 11.0 12.0  14.0 11.0 13.0  --    BUN 53* 58* 64* 60* 59*  --    CREATININE 3.62* 3.40* 3.58* 3.26* 3.65*  --    EGFR 12.0* 12.9* 12.1* 13.6* 11.8*  --    GLUCOSE 169* 146* 113* 107* 115*  --    CALCIUM 9.9 9.8 9.9 10.0  9.7 9.7  --    MAGNESIUM  --  2.0  --   --   --   --    PHOSPHORUS  --  3.7 5.2*  --   --   --    HEMOGLOBIN A1C  --   --   --  5.40  --  5.0         Lab 11/01/24  0523 10/30/24  1626   TOTAL PROTEIN  --  5.7*   ALBUMIN 3.4* 3.4*   GLOBULIN  --  2.3   ALT (SGPT)  --  18   AST (SGOT)  --  24   BILIRUBIN  --  0.3   ALK PHOS  --  91         Lab 10/30/24  1840 10/30/24  1626   PROBNP  --  5,438.0*   HSTROP T 62* 66*             Lab 10/30/24  1840 10/30/24  1654 10/30/24  1626   IRON 42  42  --   --    IRON SATURATION (TSAT) 17*  --   --    TIBC 244*  --   --    TRANSFERRIN 164*  --   --    FERRITIN 103.00  --   --    FOLATE  --   --  7.53   VITAMIN B 12  --   --  1,518*   ABO TYPING  --  O  --    RH TYPING  --  Negative  --    ANTIBODY SCREEN  --  Negative  --          Lab 10/30/24  1640   PH, ARTERIAL 7.301*   PCO2, ARTERIAL 36.5   PO2 ART 73.3*   FIO2 21   HCO3 ART 18.0*   BASE EXCESS ART -7.8*   CARBOXYHEMOGLOBIN 1.4     Brief Urine Lab Results  (Last result in the past 365 days)        Color   Clarity   Blood   Leuk Est   Nitrite   Protein   CREAT   Urine HCG        08/13/24 2000             52.4         08/13/24 2000 Yellow   Clear   Negative   Negative   Negative   >=300 mg/dL (3+)                   Microbiology Results Abnormal       None            No radiology results from the last 24 hrs    Results for orders placed during the hospital encounter of 10/30/24    Adult Transthoracic Echo Complete W/ Cont if Necessary Per Protocol    Interpretation Summary    Left ventricular systolic function is normal. Estimated left ventricular EF = 60%    Left ventricular wall thickness is consistent with mild concentric hypertrophy.    The left atrial cavity is moderately dilated.    Aortic sclerosis without aortic  stenosis.  Mild to moderate aortic insufficiency    Mild mitral regurgitation.      Current medications:  Scheduled Meds:amLODIPine, 10 mg, Oral, Q24H  [Held by provider] aspirin, 81 mg, Oral, Daily  atorvastatin, 80 mg, Oral, Nightly  calcitriol, 0.5 mcg, Oral, Daily  carvedilol, 25 mg, Oral, Q12H  cloNIDine, 0.1 mg, Oral, Q6H  epoetin adonis/adonis-epbx, 40,000 Units, Subcutaneous, Weekly  ferrous sulfate, 325 mg, Oral, Daily With Breakfast  hydrALAZINE, 100 mg, Oral, Q8H  lactobacillus acidophilus, 1 capsule, Oral, Daily  Morphine, 15 mg, Oral, BID  pantoprazole, 40 mg, Oral, BID  polyethylene glycol, 17 g, Oral, Daily  sodium bicarbonate, 650 mg, Oral, BID  sodium chloride, 10 mL, Intravenous, Q12H  terazosin, 5 mg, Oral, Nightly  vitamin B-12, 1,000 mcg, Oral, Daily      Continuous Infusions:   PRN Meds:.  acetaminophen **OR** acetaminophen **OR** acetaminophen    benzocaine    senna-docusate sodium **AND** polyethylene glycol **AND** bisacodyl **AND** bisacodyl    ipratropium-albuterol    nitroglycerin    sodium chloride    sodium chloride    torsemide    Assessment & Plan   Assessment & Plan     Active Hospital Problems    Diagnosis  POA    **Symptomatic anemia [D64.9]  Yes    KINDRA (acute kidney injury) [N17.9]  Yes    Anxiety associated with depression [F41.8]  Yes    Paroxysmal atrial fibrillation [I48.0]  Yes    CKD (chronic kidney disease) stage 4, GFR 15-29 ml/min [N18.4]  Yes    History of CVA (cerebrovascular accident) [Z86.73]  Not Applicable    Essential hypertension [I10]  Yes    Coronary artery disease involving native coronary artery of native heart without angina pectoris [I25.10]  Yes    COPD (chronic obstructive pulmonary disease) [J44.9]  Yes    GERD (gastroesophageal reflux disease) [K21.9]  Yes    Hyperlipidemia LDL goal <70 [E78.5]  Yes      Resolved Hospital Problems   No resolved problems to display.        Brief Hospital Course to date:  Yanique Webster is a 84 y.o. female w/ CAD, COPD,  HTN, HLD, chronic pain, Takotsubo cardiomyopathy, CKD4, Afib on eliquis; she was admitted 8/12/24-8/19/24 w/ anemia requiring transfusion, during that say she had EGD 8/14 and colo 8/16 both without source of bleeding; pt/fam elected to resume oral AC for stroke ppx; she has seen EP in the clinic and is contemplating Watchman device; she developed new SOB and had labs drawn which showed significant anemia and she was sent to the ED where Hgb was 6.1; she was admitted and transfused blood    The following problems are new to me today    Assessment/Plan    Acute/Chronic normocytic anemia, recurrent  Thrombocytopenia  -recent EGD/[facility] 8/2024 w/o evidence for bleeding  -iron studies c/w anemia of chronic disease  -s/p 2U PRBC  -second episode of transfusion requiring anemia, noted TCP as well  -favor she has an anemia of chronic disease/renal disease, in the context of ongoing oral AC for stroke ppx    KINDRA on CKD4  Secondary hyperparathyroidism  -baseline Cr about 2.3; eGFR 20's  -nephrology following    pAfib w/ chronic oral AC  -xarelto on hold  -seen by EP for consideration of Watchman, pending consultant recs anticipate opt f/u  -Cardiology was consulted and they have seen and evaluated patient on this admission also.  They do recommend discontinuation of Xarelto in light of anemia's.    CAD  HTN  HLD  Hx Takotsubo cardiomyopathy  -most recent EF 56-60%  -Blood pressure is still not controlled.  Will increase amlodipine to 10 mg p.o. daily.  Will also increase clonidine to 0.1 mg p.o. every 6 hours and monitor.      Valvular heart disease  -We did echocardiogram yesterday which shows normal ejection fraction, mild to moderate aortic regurgitation, mild mitral regurgitation.  Previous echocardiogram shows evidence of tricuspid regurgitation with elevated right ventricular systolic pressure between 45 and 55 mmHg.  -On admission patient had evidence of pulmonary edema.  Most likely tricuspid and mitral regurgitation  contributed to that.  Currently patient breathing okay and saturating well.    COPD   - add prn nebs  -Patient has history of heavy smoking but stopped about 20 years ago.  -Tells me that neb treatment actually helps her significantly.    Chronic pain - cont ms contin    Severe weakness  -PT has worked with the patient and they recommend rehab at discharge  -I had a long conversation with the patient today and she is now agreeable to go to rehab for a few days.    Expected Discharge Location and Transportation: Rehab  Expected Discharge when bed available  Expected Discharge Date: 11/5/2024; Expected Discharge Time:      VTE Prophylaxis:  Mechanical VTE prophylaxis orders are present.         AM-PAC 6 Clicks Score (PT): 17 (11/04/24 1200)    CODE STATUS:   Code Status and Medical Interventions: CPR (Attempt to Resuscitate); Full Support   Ordered at: 10/30/24 1958     Level Of Support Discussed With:    Patient     Code Status (Patient has no pulse and is not breathing):    CPR (Attempt to Resuscitate)     Medical Interventions (Patient has pulse or is breathing):    Full Support       Axel Belle MD  11/04/24

## 2024-11-04 NOTE — PLAN OF CARE
Goal Outcome Evaluation:  Plan of Care Reviewed With: patient        Progress: improving  Outcome Evaluation: patient ambulated 50' with min assist x1, rolling walker for support and close follow with chair for support. Verbal cues for upright posture, walker placement and widening PRAVIN to prevent unsteadiness and LOB. Further mobility limited by weakness and fatigue. Completed B LE exercises. Recommend IRF at D/C for best functional outcome.

## 2024-11-04 NOTE — THERAPY TREATMENT NOTE
Patient Name: Yanique Webster  : 1940    MRN: [id number]                              Today's Date: 2024       Admit Date: 10/30/2024    Visit Dx:     ICD-10-CM ICD-9-CM   1. KINDRA (acute kidney injury)  N17.9 584.9   2. Symptomatic anemia  D64.9 285.9   3. Paroxysmal atrial fibrillation  I48.0 427.31     Patient Active Problem List   Diagnosis    Fibromyalgia    PVD (peripheral vascular disease)    Type 2 diabetes mellitus    Diabetic gastroparesis    Takotsubo cardiomyopathy    Elevated serum creatinine    Hyperlipidemia LDL goal <70    COPD (chronic obstructive pulmonary disease)    Obesity    Osteoarthritis    Chronic pain    GERD (gastroesophageal reflux disease)    Gout    Chronic joint pain    Coronary artery disease involving native coronary artery of native heart without angina pectoris    Nonrheumatic aortic valve insufficiency    Altered mental status    Essential hypertension    History of CVA (cerebrovascular accident)    CKD (chronic kidney disease) stage 4, GFR 15-29 ml/min    Hypertensive emergency    Status post placement of implantable loop recorder    Paroxysmal atrial fibrillation    Acute exacerbation of COPD with asthma    Encounter for loop recorder at end of battery life    Closed left hip fracture    Anxiety associated with depression    Anemia, chronic disease    Surgery follow-up    Borderline abnormal TFTs    Acute blood loss anemia    Secondary hyperparathyroidism    Symptomatic anemia    KINDRA (acute kidney injury)     Past Medical History:   Diagnosis Date    Blurry vision     Chronic joint pain     Chronic pain     COPD (chronic obstructive pulmonary disease)     Coronary artery disease     Diabetic gastroparesis 2016    Esophageal stricture     s/p dilation in     GERD (gastroesophageal reflux disease)     Gout     Headache     secondary to hypertension    Hyperlipidemia     Hypertension     Long term current use of insulin     Neuropathy     Neuropathy due to  type 2 diabetes mellitus     Obesity     Osteoarthritis     Pericarditis 2008    Stroke 03/2019    Type 2 diabetes mellitus      Past Surgical History:   Procedure Laterality Date    BRONCHOSCOPY N/A 8/23/2016    Procedure: BRONCHOSCOPY BIOPSY AT BEDSIDE;  Surgeon: Mookie Willard MD;  Location:  [facility];  Service:     CARDIAC CATHETERIZATION N/A 8/30/2016    Procedure: Left Heart Cath;  Surgeon: Steve Geronimo MD;  Location:  [facility];  Service:     CARDIAC CATHETERIZATION N/A 8/30/2016    Procedure: Right Heart Cath;  Surgeon: Steve Geronimo MD;  Location:  [facility];  Service:     CARDIAC ELECTROPHYSIOLOGY PROCEDURE N/A 7/2/2019    Procedure: Loop insertion;  Surgeon: Steve Geronimo MD;  Location: [facility];  Service: Cardiovascular    CARDIAC ELECTROPHYSIOLOGY PROCEDURE N/A 9/26/2023    Procedure: Loop recorder removal;  Surgeon: Steve Geronimo MD;  Location: [facility];  Service: Cardiovascular;  Laterality: N/A;    CATARACT EXTRACTION      COLONOSCOPY N/A 8/16/2024    Procedure: COLONOSCOPY;  Surgeon: Brunner, Mark I, MD;  Location:  [facility];  Service: Gastroenterology;  Laterality: N/A;    ENDOSCOPY N/A 8/14/2024    Procedure: ESOPHAGOGASTRODUODENOSCOPY;  Surgeon: Brunner, Mark I, MD;  Location:  [facility];  Service: Gastroenterology;  Laterality: N/A;    ESOPHAGEAL DILATATION  2007    HERNIA REPAIR      HIP CANNULATED SCREW PLACEMENT Left 1/2/2024    Procedure: HIP CANNULATED SCREW PLACEMENT LEFT;  Surgeon: Seymour Harrington MD;  Location:  [facility];  Service: Orthopedics;  Laterality: Left;    HYSTERECTOMY  1998    WRIST FRACTURE SURGERY Left       General Information       Row Name 11/04/24 0943          Physical Therapy Time and Intention    Document Type therapy note (daily note)  -AS     Mode of Treatment physical therapy  -AS       Row Name 11/04/24 0943          General Information    Patient Profile  Reviewed yes  -AS     Existing Precautions/Restrictions fall;other (see comments)  neuropathy B feet and hands, chronic pain  -AS     Barriers to Rehab medically complex;previous functional deficit  -AS       Row Name 11/04/24 0943          Cognition    Orientation Status (Cognition) oriented x 4  -AS       Row Name 11/04/24 0943          Safety Issues/Impairments Affecting Functional Mobility    Safety Issues Affecting Function (Mobility) awareness of need for assistance;safety precaution awareness;insight into deficits/self-awareness;positioning of assistive device  -AS     Impairments Affecting Function (Mobility) balance;endurance/activity tolerance;pain;strength;other (see comments)  -AS     Comment, Safety Issues/Impairments (Mobility) alert and following commands, no complaints of dizziness this date  -AS               User Key  (r) = Recorded By, (t) = Taken By, (c) = Cosigned By      Initials Name Provider Type    AS Terri Avila PTA Physical Therapist Assistant                   Mobility       Row Name 11/04/24 0945          Bed Mobility    Supine-Sit [place] (Bed Mobility) verbal cues;contact guard;1 person assist  -AS     Assistive Device (Bed Mobility) head of bed elevated;bed rails  -AS     Comment, (Bed Mobility) cues for technique, assist at trunk to reach EOB, increased time and effort to complete  -AS       Row Name 11/04/24 0945          Transfers    Comment, (Transfers) cues for hand placement  -AS       Row Name 11/04/24 0945          Bed-Chair Transfer    Bed-Chair [place] (Transfers) verbal cues;1 person assist;minimum assist (75% patient effort)  -AS     Assistive Device (Bed-Chair Transfers) walker, front-wheeled  -AS       Row Name 11/04/24 0945          Sit-Stand Transfer    Sit-Stand [place] (Transfers) verbal cues;minimum assist (75% patient effort);1 person assist  -AS     Assistive Device (Sit-Stand Transfers) walker, front-wheeled  -AS       Row Name 11/04/24  0945          Gait/Stairs (Locomotion)    [place] Level (Gait) verbal cues;minimum assist (75% patient effort);1 person assist;1 person to manage equipment  -AS     Assistive Device (Gait) walker, front-wheeled  -AS     Distance in Feet (Gait) 50  -AS     Deviations/Abnormal Patterns (Gait) bilateral deviations;myke decreased;gait speed decreased;stride length decreased;base of support, narrow  -AS     Bilateral Gait Deviations forward flexed posture;heel strike decreased  -AS     Comment, (Gait/Stairs) patient ambulated 50' with min assist x1, rolling walker for support and close follow with chair for support. Verbal cues for upright posture, walker placement and widening PRAVIN to prevent unsteadiness and LOB. Further mobility limited by weakness and fatigue.  -AS               User Key  (r) = Recorded By, (t) = Taken By, (c) = Cosigned By      Initials Name Provider Type    AS Terri Avila PTA Physical Therapist Assistant                   Obj/Interventions       Row Name 11/04/24 0948          Motor Skills    Therapeutic Exercise knee;ankle  -AS       Row Name 11/04/24 0948          Knee (Therapeutic Exercise)    Knee (Therapeutic Exercise) strengthening exercise  -AS     Knee Strengthening (Therapeutic Exercise) bilateral;marching while seated;LAQ (long arc quad);sitting;10 repetitions  -AS       Row Name 11/04/24 0948          Ankle (Therapeutic Exercise)    Ankle (Therapeutic Exercise) AROM (active range of motion)  -AS     Ankle AROM (Therapeutic Exercise) bilateral;dorsiflexion;plantarflexion;sitting;10 repetitions  -AS       Row Name 11/04/24 0948          Balance    Dynamic Standing Balance verbal cues;minimal assist;1-person assist;1 person to manage equipment  -AS     Position/Device Used, Standing Balance supported;walker, front-wheeled  -AS     Comment, Balance mild unsteadiness with 1 episode of LOB d/t scissoring  -AS               User Key  (r) = Recorded By, (t) = Taken By, (c) =  Cosigned By      Initials Name Provider Type    AS Terri Avila PTA Physical Therapist Assistant                   Goals/Plan    No documentation.                  Clinical Impression       Row Name 11/04/24 0949          Pain    Pretreatment Pain Rating 4/10  -AS     Posttreatment Pain Rating 4/10  -AS     Pain Location extremity  -AS     Pain Side/Orientation bilateral  -AS     Pain Management Interventions exercise or physical activity utilized  -AS     Response to Pain Interventions activity level improved  -AS       Row Name 11/04/24 0949          Plan of Care Review    Plan of Care Reviewed With patient  -AS     Progress improving  -AS     Outcome Evaluation patient ambulated 50' with min assist x1, rolling walker for support and close follow with chair for support. Verbal cues for upright posture, walker placement and widening PRAVIN to prevent unsteadiness and LOB. Further mobility limited by weakness and fatigue. Completed B LE exercises. Recommend IRF at D/C for best functional outcome.  -AS       Row Name 11/04/24 0949          Positioning and Restraints    Pre-Treatment Position in bed  -AS     Post Treatment Position chair  -AS     In Chair reclined;call light within reach;encouraged to call for assist;exit alarm on;waffle cushion;legs elevated  -AS               User Key  (r) = Recorded By, (t) = Taken By, (c) = Cosigned By      Initials Name Provider Type    AS Terri Avila PTA Physical Therapist Assistant                   Outcome Measures       Row Name 11/04/24 0952          How much help from another person do you currently need...    Turning from your back to your side while in flat bed without using bedrails? 3  -AS     Moving from lying on back to sitting on the side of a flat bed without bedrails? 3  -AS     Moving to and from a bed to a chair (including a wheelchair)? 3  -AS     Standing up from a chair using your arms (e.g., wheelchair, bedside chair)? 3  -AS     Climbing 3-5  steps with a railing? 2  -AS     To walk in hospital room? 3  -AS     AM-PAC 6 Clicks Score (PT) 17  -AS     Highest Level of Mobility Goal 5 --> Static standing  -AS       Row Name 11/04/24 0952          Functional Assessment    Outcome Measure Options AM-PAC 6 Clicks Basic Mobility (PT)  -AS               User Key  (r) = Recorded By, (t) = Taken By, (c) = Cosigned By      Initials Name Provider Type    AS Terri Avila PTA Physical Therapist Assistant                                 Physical Therapy Education       Title: PT OT SLP Therapies (In Progress)       Topic: Physical Therapy (In Progress)       Point: Mobility training (In Progress)       Learning Progress Summary            Patient Acceptance, E, NR by AS at 11/4/2024 0952    Acceptance, E, NR by TM at 11/1/2024 1313                      Point: Home exercise program (In Progress)       Learning Progress Summary            Patient Acceptance, E, NR by AS at 11/4/2024 0952                      Point: Body mechanics (In Progress)       Learning Progress Summary            Patient Acceptance, E, NR by AS at 11/4/2024 0952    Acceptance, E, NR by TM at 11/1/2024 1313                      Point: Precautions (In Progress)       Learning Progress Summary            Patient Acceptance, E, NR by AS at 11/4/2024 0952    Acceptance, E, NR by TM at 11/1/2024 1313                                      User Key       Initials Effective Dates Name Provider Type Discipline    AS 04/28/23 -  Terri Avila, SHO Physical Therapist Assistant PT    TM 08/13/24 -  Marciano Mcgraw PT Student PT Student PT                  PT Recommendation and Plan     Progress: improving  Outcome Evaluation: patient ambulated 50' with min assist x1, rolling walker for support and close follow with chair for support. Verbal cues for upright posture, walker placement and widening PRAVIN to prevent unsteadiness and LOB. Further mobility limited by weakness and fatigue. Completed B LE  exercises. Recommend IRF at D/C for best functional outcome.     Time Calculation:         PT Charges       Row Name 11/04/24 0953             Time Calculation    Start Time 0842  -AS      PT Received On 11/04/24  -AS      PT Goal Re-Cert Due Date 11/11/24  -AS         Timed Charges    81591 - PT Therapeutic Exercise Minutes 10  -AS      95190 - Gait Training Minutes  14  -AS         Total Minutes    Timed Charges Total Minutes 24  -AS       Total Minutes 24  -AS                User Key  (r) = Recorded By, (t) = Taken By, (c) = Cosigned By      Initials Name Provider Type    AS Terri Avila PTA Physical Therapist Assistant                  Therapy Charges for Today       Code Description Service Date Service Provider Modifiers Qty    90506001401 HC PT THER PROC EA 15 MIN 11/4/2024 Terri Avila, SHO GP 1    09168312740 HC GAIT TRAINING EA 15 MIN 11/4/2024 Terri Avila, SHO GP 1    78448962959 HC PT THER SUPP EA 15 MIN 11/4/2024 Terri Avila, SHO GP 2            PT G-Codes  Outcome Measure Options: AM-PAC 6 Clicks Basic Mobility (PT)  AM-PAC 6 Clicks Score (PT): 17  AM-PAC 6 Clicks Score (OT): 18       Terri Avila PTA  11/4/2024

## 2024-11-04 NOTE — PLAN OF CARE
Problem: Adult Inpatient Plan of Care  Goal: Plan of Care Review  Outcome: Progressing  Flowsheets (Taken 11/4/2024 1533)  Progress: improving  Plan of Care Reviewed With: patient  Goal: Patient-Specific Goal (Individualized)  Outcome: Progressing  Goal: Absence of Hospital-Acquired Illness or Injury  Outcome: Progressing  Intervention: Identify and Manage Fall Risk  Recent Flowsheet Documentation  Taken 11/4/2024 1200 by Adrian Orozco RN  Safety Promotion/Fall Prevention:   activity supervised   nonskid shoes/slippers when out of bed   room organization consistent   safety round/check completed  Taken 11/4/2024 0835 by Adrian Orozco RN  Safety Promotion/Fall Prevention:   activity supervised   nonskid shoes/slippers when out of bed   room organization consistent   safety round/check completed  Intervention: Prevent Skin Injury  Recent Flowsheet Documentation  Taken 11/4/2024 1200 by Adrian Orozco RN  Body Position: weight shifting  Skin Protection:   incontinence pads utilized   skin sealant/moisture barrier applied  Taken 11/4/2024 0835 by Adrian Orozco RN  Body Position:   weight shifting   turned   supine  Skin Protection:   incontinence pads utilized   silicone foam dressing in place   transparent dressing maintained  Intervention: Prevent and Manage VTE (Venous Thromboembolism) Risk  Recent Flowsheet Documentation  Taken 11/4/2024 0835 by Adrian Orozco RN  VTE Prevention/Management:   bilateral   SCDs (sequential compression devices) off  Intervention: Prevent Infection  Recent Flowsheet Documentation  Taken 11/4/2024 1200 by Adrian Orozco RN  Infection Prevention:   environmental surveillance performed   equipment surfaces disinfected   hand hygiene promoted   personal protective equipment utilized   rest/sleep promoted   single patient room provided  Taken 11/4/2024 0835 by Adrian Orozco RN  Infection Prevention:   environmental surveillance performed   equipment surfaces disinfected   hand  hygiene promoted   personal protective equipment utilized   rest/sleep promoted   single patient room provided  Goal: Optimal Comfort and Wellbeing  Outcome: Progressing  Intervention: Provide Person-Centered Care  Recent Flowsheet Documentation  Taken 11/4/2024 0835 by Adrian Orozco RN  Trust Relationship/Rapport:   care explained   emotional support provided   questions answered   thoughts/feelings acknowledged  Goal: Readiness for Transition of Care  Outcome: Progressing     Problem: Skin Injury Risk Increased  Goal: Skin Health and Integrity  Outcome: Progressing  Intervention: Optimize Skin Protection  Recent Flowsheet Documentation  Taken 11/4/2024 1310 by Adrian Orozco, RN  Activity Management: up in chair  Taken 11/4/2024 1200 by Adrian Orozco RN  Activity Management:   activity encouraged   up in chair  Pressure Reduction Techniques:   frequent weight shift encouraged   heels elevated off bed   rest period provided between sit times   weight shift assistance provided  Head of Bed (HOB) Positioning: HOB elevated  Pressure Reduction Devices: pressure-redistributing mattress utilized  Skin Protection:   incontinence pads utilized   skin sealant/moisture barrier applied  Taken 11/4/2024 0835 by Adrian Orozco RN  Activity Management: activity encouraged  Pressure Reduction Techniques:   frequent weight shift encouraged   heels elevated off bed   rest period provided between sit times   weight shift assistance provided  Head of Bed (HOB) Positioning: HOB elevated  Pressure Reduction Devices:   pressure-redistributing mattress utilized   positioning supports utilized  Skin Protection:   incontinence pads utilized   silicone foam dressing in place   transparent dressing maintained     Problem: Fall Injury Risk  Goal: Absence of Fall and Fall-Related Injury  Outcome: Progressing  Intervention: Identify and Manage Contributors  Recent Flowsheet Documentation  Taken 11/4/2024 1200 by Adrian Orozco  RN  Medication Review/Management: medications reviewed  Self-Care Promotion:   BADL personal objects within reach   BADL personal routines maintained   adaptive equipment use encouraged  Taken 11/4/2024 0835 by Adrian Orozco RN  Medication Review/Management: medications reviewed  Self-Care Promotion:   BADL personal objects within reach   BADL personal routines maintained   adaptive equipment use encouraged  Intervention: Promote Injury-Free Environment  Recent Flowsheet Documentation  Taken 11/4/2024 1200 by Adrian Orozco RN  Safety Promotion/Fall Prevention:   activity supervised   nonskid shoes/slippers when out of bed   room organization consistent   safety round/check completed  Taken 11/4/2024 0835 by Adrian Orozco RN  Safety Promotion/Fall Prevention:   activity supervised   nonskid shoes/slippers when out of bed   room organization consistent   safety round/check completed     Problem: Pain Acute  Goal: Optimal Pain Control and Function  Outcome: Progressing  Intervention: Optimize Psychosocial Wellbeing  Recent Flowsheet Documentation  Taken 11/4/2024 1200 by Adrian Orozco RN  Supportive Measures:   active listening utilized   decision-making supported   mindfulness techniques promoted   verbalization of feelings encouraged  Spiritual Activities Assistance:   affirmation provided   personal rituals encouraged  Taken 11/4/2024 0835 by Adrian Orozco RN  Supportive Measures:   active listening utilized   mindfulness techniques promoted   verbalization of feelings encouraged  Spiritual Activities Assistance:   affirmation provided   personal rituals encouraged  Intervention: Prevent or Manage Pain  Recent Flowsheet Documentation  Taken 11/4/2024 1200 by Adrian Orozco RN  Sensory Stimulation Regulation:   auditory stimulation minimized   care clustered   quiet environment promoted   tactile stimulation minimized   visual stimulation minimized  Sleep/Rest Enhancement:   awakenings minimized    consistent schedule promoted   natural light exposure provided   regular sleep/rest pattern promoted  Medication Review/Management: medications reviewed  Taken 11/4/2024 0835 by Adrian Orozco, RN  Sensory Stimulation Regulation:   auditory stimulation minimized   care clustered   quiet environment promoted   tactile stimulation minimized   visual stimulation minimized  Sleep/Rest Enhancement:   awakenings minimized   consistent schedule promoted   natural light exposure provided   regular sleep/rest pattern promoted  Medication Review/Management: medications reviewed     Problem: Infection  Goal: Absence of Infection Signs and Symptoms  Outcome: Progressing  Intervention: Prevent or Manage Infection  Recent Flowsheet Documentation  Taken 11/4/2024 1200 by Adrian Orozco, RN  Infection Management: aseptic technique maintained  Taken 11/4/2024 0835 by Adrian Orozco, RN  Infection Management: aseptic technique maintained   Goal Outcome Evaluation:  Plan of Care Reviewed With: patient        Progress: improving

## 2024-11-04 NOTE — CASE MANAGEMENT/SOCIAL WORK
Continued Stay Note  [facility]     Patient Name: Yanique Webster  MRN: [id number]  Today's Date: 11/4/2024    Admit Date: 10/30/2024    Plan: Home   Discharge Plan       Row Name 11/04/24 1152       Plan    Plan Home    Patient/Family in Agreement with Plan yes    Plan Comments CM spoke to patient at bedside to discuss therapy's recommendation for IRF. Patient ambulated 50 feet with min assist X1 today. Patient declines. Her plan is back home to [facility]. Family will transport per patient. CM will continue to follow.    Final Discharge Disposition Code 01 - home or self-care                   Discharge Codes    No documentation.                 Expected Discharge Date and Time       Expected Discharge Date Expected Discharge Time    Nov 5, 2024               Maddison Ignacio RN

## 2024-11-04 NOTE — PROGRESS NOTES
" LOS: 5 days   Patient Care Team:  Sebas Alicea MD as PCP - General (Family Medicine)  Steve Geronimo MD as Consulting Physician (Cardiology)  Emilio Regalado MD as Consulting Physician (Endocrinology)  Axel Ribeiro MD as Consulting Physician (Cardiology)    Chief Complaint: KINDRA on CKD stage IV   Anemia   SOB    Subjective     Reviewed vitals,labs and medications list  Seen and examined   No new complaints         History taken from: patient    Objective     Vital Sign Min/Max for last 24 hours  Temp  Min: 96.4 °F (35.8 °C)  Max: 98.5 °F (36.9 °C)   BP  Min: 125/63  Max: 172/73   Pulse  Min: 55  Max: 75   Resp  Min: 16  Max: 20   SpO2  Min: 93 %  Max: 96 %   No data recorded   Weight  Min: 79.8 kg (176 lb)  Max: 79.8 kg (176 lb)     Flowsheet Rows      Flowsheet Row First Filed Value   Admission Height 162.6 cm (64\") Documented at 10/30/2024 1632   Admission Weight 83.5 kg (184 lb) Documented at 10/30/2024 1632            No intake/output data recorded.  I/O last 3 completed shifts:  In: 478 [P.O.:478]  Out: 2000 [Urine:2000]    Objective:  General Appearance:  Comfortable.    Vital signs: (most recent): Blood pressure 144/83, pulse 63, temperature 97.9 °F (36.6 °C), temperature source Oral, resp. rate 18, height 162.6 cm (64.02\"), weight 79.8 kg (176 lb), SpO2 95%.  Vital signs are normal.    Output: Producing urine.    HEENT: Normal HEENT exam.    Lungs:  Normal effort and normal respiratory rate.  Breath sounds clear to auscultation.  She is not in respiratory distress.  No decreased breath sounds.    Heart: Normal rate.  Regular rhythm.  S1 normal and S2 normal.  No murmur or gallop.   Abdomen: Abdomen is soft.  Bowel sounds are normal.     Extremities: Normal range of motion.  There is no dependent edema or local swelling.    Pulses: Distal pulses are intact.    Neurological: Patient is alert.                Results Review:     I reviewed the patient's new clinical results.    WBC No results " "found for: \"WBC\"     HGB No results found for: \"HGB\"     HCT No results found for: \"HCT\"     Platlets No results found for: \"LABPLAT\"   MCV No results found for: \"MCV\"         Sodium Sodium   Date Value Ref Range Status   11/02/2024 138 136 - 145 mmol/L Final      Potassium Potassium   Date Value Ref Range Status   11/02/2024 4.7 3.5 - 5.2 mmol/L Final      Chloride Chloride   Date Value Ref Range Status   11/02/2024 107 98 - 107 mmol/L Final      CO2 CO2   Date Value Ref Range Status   11/02/2024 19.0 (L) 22.0 - 29.0 mmol/L Final      BUN BUN   Date Value Ref Range Status   11/02/2024 58 (H) 8 - 23 mg/dL Final      Creatinine Creatinine   Date Value Ref Range Status   11/02/2024 3.40 (H) 0.57 - 1.00 mg/dL Final      Calcium Calcium   Date Value Ref Range Status   11/02/2024 9.8 8.6 - 10.5 mg/dL Final      PO4 No results found for: \"CAPO4\"   Albumin No results found for: \"ALBUMIN\"     Magnesium Magnesium   Date Value Ref Range Status   11/02/2024 2.0 1.6 - 2.4 mg/dL Final      Uric Acid No results found for: \"URICACID\"     Medication Review: yes    Assessment & Plan       Symptomatic anemia    Hyperlipidemia LDL goal <70    COPD (chronic obstructive pulmonary disease)    GERD (gastroesophageal reflux disease)    Coronary artery disease involving native coronary artery of native heart without angina pectoris    Essential hypertension    History of CVA (cerebrovascular accident)    CKD (chronic kidney disease) stage 4, GFR 15-29 ml/min    Paroxysmal atrial fibrillation    Anxiety associated with depression    KINDRA (acute kidney injury)      Assessment & Plan    KINDRA: Baseline cr btw 1.9-2.2mg/dl. Cr 3.23.5 on this admission. Suspect hemodynamic injury in the setting of anemia.      CKD stage IV: Hx of proteinuric renal disease. UPC 2 gm in the past. Duplex negative in 2016     Anemia: Acute on chronic worsening. ACD due to CKD. SPEP negative in the past. Nadya any bleeding. Started on EDISON weekly      Volume status: No " significant edema on examination      HTN: No ANABEL per duplex in 2016.     Proteinuria: UPC 2.8 gm. Etiology unspecified.     Met acidosis: persistent in the setting of CKD. Hold off on bicarb supp at present.      PLAN  KINDRA component due to hemodynamic injury so far no improvement in renal function. Suspect advance CKD at baseline  Agree with EDISON weekly   Transfuse at Hb<7   Daily labs.   No indication for dialysis at present.      I discussed the patients findings and my recommendations with patient    Cassia Cross MD  11/04/24  10:11 EST

## 2024-11-05 DIAGNOSIS — Z91.09 NICKEL ALLERGY: Primary | ICD-10-CM

## 2024-11-05 DIAGNOSIS — I48.0 PAROXYSMAL ATRIAL FIBRILLATION: ICD-10-CM

## 2024-11-05 DIAGNOSIS — D63.8 ANEMIA, CHRONIC DISEASE: ICD-10-CM

## 2024-11-05 LAB
ANION GAP SERPL CALCULATED.3IONS-SCNC: 10 MMOL/L (ref 5–15)
BUN SERPL-MCNC: 55 MG/DL (ref 8–23)
BUN/CREAT SERPL: 15.4 (ref 7–25)
CALCIUM SPEC-SCNC: 9.7 MG/DL (ref 8.6–10.5)
CHLORIDE SERPL-SCNC: 108 MMOL/L (ref 98–107)
CO2 SERPL-SCNC: 21 MMOL/L (ref 22–29)
CREAT SERPL-MCNC: 3.56 MG/DL (ref 0.57–1)
EGFRCR SERPLBLD CKD-EPI 2021: 12.2 ML/MIN/1.73
GLUCOSE SERPL-MCNC: 164 MG/DL (ref 65–99)
POTASSIUM SERPL-SCNC: 4.5 MMOL/L (ref 3.5–5.2)
SODIUM SERPL-SCNC: 139 MMOL/L (ref 136–145)

## 2024-11-05 PROCEDURE — 80048 BASIC METABOLIC PNL TOTAL CA: CPT | Performed by: INTERNAL MEDICINE

## 2024-11-05 PROCEDURE — 99232 SBSQ HOSP IP/OBS MODERATE 35: CPT | Performed by: INTERNAL MEDICINE

## 2024-11-05 RX ORDER — AMOXICILLIN AND CLAVULANATE POTASSIUM 500; 125 MG/1; MG/1
1 TABLET, FILM COATED ORAL 2 TIMES DAILY
Status: DISCONTINUED | OUTPATIENT
Start: 2024-11-05 | End: 2024-11-08 | Stop reason: HOSPADM

## 2024-11-05 RX ADMIN — AMOXICILLIN AND CLAVULANATE POTASSIUM 500 MG: 500; 125 TABLET, FILM COATED ORAL at 15:08

## 2024-11-05 RX ADMIN — SODIUM BICARBONATE 650 MG TABLET 650 MG: at 21:01

## 2024-11-05 RX ADMIN — CLONIDINE HYDROCHLORIDE 0.1 MG: 0.1 TABLET ORAL at 18:03

## 2024-11-05 RX ADMIN — Medication 1 CAPSULE: at 07:34

## 2024-11-05 RX ADMIN — CLONIDINE HYDROCHLORIDE 0.1 MG: 0.1 TABLET ORAL at 11:50

## 2024-11-05 RX ADMIN — Medication 10 ML: at 07:38

## 2024-11-05 RX ADMIN — CLONIDINE HYDROCHLORIDE 0.1 MG: 0.1 TABLET ORAL at 05:15

## 2024-11-05 RX ADMIN — CALCITRIOL CAPSULES 0.25 MCG 0.5 MCG: 0.25 CAPSULE ORAL at 07:36

## 2024-11-05 RX ADMIN — SODIUM BICARBONATE 650 MG TABLET 650 MG: at 07:35

## 2024-11-05 RX ADMIN — CARVEDILOL 25 MG: 12.5 TABLET, FILM COATED ORAL at 21:01

## 2024-11-05 RX ADMIN — HYDRALAZINE HYDROCHLORIDE 100 MG: 50 TABLET ORAL at 15:08

## 2024-11-05 RX ADMIN — HYDRALAZINE HYDROCHLORIDE 100 MG: 50 TABLET ORAL at 21:01

## 2024-11-05 RX ADMIN — MORPHINE SULFATE 15 MG: 15 TABLET, FILM COATED, EXTENDED RELEASE ORAL at 07:35

## 2024-11-05 RX ADMIN — FERROUS SULFATE TAB 325 MG (65 MG ELEMENTAL FE) 325 MG: 325 (65 FE) TAB at 07:34

## 2024-11-05 RX ADMIN — ATORVASTATIN CALCIUM 80 MG: 40 TABLET, FILM COATED ORAL at 21:01

## 2024-11-05 RX ADMIN — PANTOPRAZOLE SODIUM 40 MG: 40 TABLET, DELAYED RELEASE ORAL at 21:01

## 2024-11-05 RX ADMIN — MORPHINE SULFATE 15 MG: 15 TABLET, FILM COATED, EXTENDED RELEASE ORAL at 21:01

## 2024-11-05 RX ADMIN — TERAZOSIN HYDROCHLORIDE 5 MG: 5 CAPSULE ORAL at 21:01

## 2024-11-05 RX ADMIN — HYDRALAZINE HYDROCHLORIDE 100 MG: 50 TABLET ORAL at 05:15

## 2024-11-05 RX ADMIN — CYANOCOBALAMIN TAB 1000 MCG 1000 MCG: 1000 TAB at 07:36

## 2024-11-05 RX ADMIN — AMLODIPINE BESYLATE 10 MG: 10 TABLET ORAL at 07:37

## 2024-11-05 RX ADMIN — Medication 10 ML: at 21:02

## 2024-11-05 RX ADMIN — PANTOPRAZOLE SODIUM 40 MG: 40 TABLET, DELAYED RELEASE ORAL at 07:35

## 2024-11-05 RX ADMIN — AMOXICILLIN AND CLAVULANATE POTASSIUM 500 MG: 500; 125 TABLET, FILM COATED ORAL at 21:01

## 2024-11-05 NOTE — PROGRESS NOTES
[facility]  PROGRESS NOTE    Patient Name: Yanique Webster  : 1940  MRN: [id number]    Date of Admission: 10/30/2024  Primary Care Physician: Sebas Alicea MD    Subjective   Subjective     CC:  F/u anemia    HPI:  Resting in a chair in no acute distress tells me she feels ok.  Her only complaint is toothache on the right lower jaw.  Denies any fever or chills.  No chest pain palpitation shortness of breath at rest.  No nausea vomiting or diarrhea.        Objective   Objective     Vital Signs:   Temp:  [96.4 °F (35.8 °C)-97.9 °F (36.6 °C)] 96.4 °F (35.8 °C)  Heart Rate:  [54-63] 55  Resp:  [16-18] 16  BP: (115-144)/(48-83) 115/61     Physical Exam:  Constitutional: No acute distress  HENT: NCAT, mucous membranes moist  Respiratory: Clear to auscultation bilaterally, decreased breath sound bilaterally, respiratory effort very mildly labored  Cardiovascular: RRR, no murmurs, rubs, or gallops  Gastrointestinal: Positive bowel sounds, soft, nontender, nondistended  Musculoskeletal: Trace bilateral ankle edema  Psychiatric: appropriate affect, cooperative  Neurologic: Oriented x 3, no focality appreciated, speech clear  Skin: No rashes     Results Reviewed:  LAB RESULTS:      Lab 24  0523 10/31/24  0202 10/30/24  1840 10/30/24  1626   WBC 6.30 6.71  --  4.71   HEMOGLOBIN 9.0* 8.2*  --  6.1*   HEMATOCRIT 27.9* 25.1*  --  19.6*   PLATELETS 131* 123*  --  128*   NEUTROS ABS  --  4.53  --  2.91   IMMATURE GRANS (ABS)  --  0.03  --  0.02   LYMPHS ABS  --  1.30  --  1.15   MONOS ABS  --  0.60  --  0.46   EOS ABS  --  0.20  --  0.13   MCV 90.9 89.3  --  90.3   PROCALCITONIN  --   --   --  0.09   LACTATE  --   --   --  0.6   HSTROP T  --   --  62* 66*         Lab 24  0921 24  1306 24  0802 24  0523 10/31/24  0202   SODIUM 139 141 138 141 139   POTASSIUM 4.5 4.7 4.7 4.4 4.4   CHLORIDE 108* 110* 107 110* 109*   CO2 21.0* 20.0* 19.0* 17.0*  19.0*   ANION GAP 10.0 11.0 12.0 14.0 11.0   BUN 55* 53* 58* 64* 60*   CREATININE 3.56* 3.62* 3.40* 3.58* 3.26*   EGFR 12.2* 12.0* 12.9* 12.1* 13.6*   GLUCOSE 164* 169* 146* 113* 107*   CALCIUM 9.7 9.9 9.8 9.9 10.0  9.7   MAGNESIUM  --   --  2.0  --   --    PHOSPHORUS  --   --  3.7 5.2*  --    HEMOGLOBIN A1C  --   --   --   --  5.40         Lab 11/01/24  0523 10/30/24  1626   TOTAL PROTEIN  --  5.7*   ALBUMIN 3.4* 3.4*   GLOBULIN  --  2.3   ALT (SGPT)  --  18   AST (SGOT)  --  24   BILIRUBIN  --  0.3   ALK PHOS  --  91         Lab 10/30/24  1840 10/30/24  1626   PROBNP  --  5,438.0*   HSTROP T 62* 66*             Lab 10/30/24  1840 10/30/24  1654 10/30/24  1626   IRON 42  42  --   --    IRON SATURATION (TSAT) 17*  --   --    TIBC 244*  --   --    TRANSFERRIN 164*  --   --    FERRITIN 103.00  --   --    FOLATE  --   --  7.53   VITAMIN B 12  --   --  1,518*   ABO TYPING  --  O  --    RH TYPING  --  Negative  --    ANTIBODY SCREEN  --  Negative  --          Lab 10/30/24  1640   PH, ARTERIAL 7.301*   PCO2, ARTERIAL 36.5   PO2 ART 73.3*   FIO2 21   HCO3 ART 18.0*   BASE EXCESS ART -7.8*   CARBOXYHEMOGLOBIN 1.4     Brief Urine Lab Results  (Last result in the past 365 days)        Color   Clarity   Blood   Leuk Est   Nitrite   Protein   CREAT   Urine HCG        08/13/24 2000             52.4         08/13/24 2000 Yellow   Clear   Negative   Negative   Negative   >=300 mg/dL (3+)                   Microbiology Results Abnormal       None            No radiology results from the last 24 hrs    Results for orders placed during the hospital encounter of 10/30/24    Adult Transthoracic Echo Complete W/ Cont if Necessary Per Protocol    Interpretation Summary    Left ventricular systolic function is normal. Estimated left ventricular EF = 60%    Left ventricular wall thickness is consistent with mild concentric hypertrophy.    The left atrial cavity is moderately dilated.    Aortic sclerosis without aortic stenosis.  Mild to  moderate aortic insufficiency    Mild mitral regurgitation.      Current medications:  Scheduled Meds:amLODIPine, 10 mg, Oral, Q24H  amoxicillin-clavulanate, 1 tablet, Oral, BID  [Held by provider] aspirin, 81 mg, Oral, Daily  atorvastatin, 80 mg, Oral, Nightly  calcitriol, 0.5 mcg, Oral, Daily  carvedilol, 25 mg, Oral, Q12H  cloNIDine, 0.1 mg, Oral, Q6H  epoetin adonis/adonis-epbx, 40,000 Units, Subcutaneous, Weekly  ferrous sulfate, 325 mg, Oral, Daily With Breakfast  hydrALAZINE, 100 mg, Oral, Q8H  lactobacillus acidophilus, 1 capsule, Oral, Daily  Morphine, 15 mg, Oral, BID  pantoprazole, 40 mg, Oral, BID  polyethylene glycol, 17 g, Oral, Daily  sodium bicarbonate, 650 mg, Oral, BID  sodium chloride, 10 mL, Intravenous, Q12H  terazosin, 5 mg, Oral, Nightly  vitamin B-12, 1,000 mcg, Oral, Daily      Continuous Infusions:   PRN Meds:.  acetaminophen **OR** acetaminophen **OR** acetaminophen    benzocaine    senna-docusate sodium **AND** polyethylene glycol **AND** bisacodyl **AND** bisacodyl    ipratropium-albuterol    nitroglycerin    sodium chloride    sodium chloride    torsemide    Assessment & Plan   Assessment & Plan     Active Hospital Problems    Diagnosis  POA    **Symptomatic anemia [D64.9]  Yes    KINDRA (acute kidney injury) [N17.9]  Yes    Anxiety associated with depression [F41.8]  Yes    Paroxysmal atrial fibrillation [I48.0]  Yes    CKD (chronic kidney disease) stage 4, GFR 15-29 ml/min [N18.4]  Yes    History of CVA (cerebrovascular accident) [Z86.73]  Not Applicable    Essential hypertension [I10]  Yes    Coronary artery disease involving native coronary artery of native heart without angina pectoris [I25.10]  Yes    COPD (chronic obstructive pulmonary disease) [J44.9]  Yes    GERD (gastroesophageal reflux disease) [K21.9]  Yes    Hyperlipidemia LDL goal <70 [E78.5]  Yes      Resolved Hospital Problems   No resolved problems to display.        Brief Hospital Course to date:  Yanique Webster is a 84  y.o. female w/ CAD, COPD, HTN, HLD, chronic pain, Takotsubo cardiomyopathy, CKD4, Afib on eliquis; she was admitted 8/12/24-8/19/24 w/ anemia requiring transfusion, during that say she had EGD 8/14 and colo 8/16 both without source of bleeding; pt/fam elected to resume oral AC for stroke ppx; she has seen EP in the clinic and is contemplating Watchman device; she developed new SOB and had labs drawn which showed significant anemia and she was sent to the ED where Hgb was 6.1; she was admitted and transfused blood    The following problems are new to me today    Assessment/Plan    Acute/Chronic normocytic anemia, recurrent  Thrombocytopenia  -recent EGD/[place] 8/2024 w/o evidence for bleeding  -iron studies c/w anemia of chronic disease  -s/p 2U PRBC  -second episode of transfusion requiring anemia, noted TCP as well  -favor she has an anemia of chronic disease/renal disease, in the context of ongoing oral AC for stroke ppx    KINDRA on CKD4  Secondary hyperparathyroidism  -baseline Cr about 2.3; eGFR 20's  -nephrology following.  Creatinine has not improved greatly.    pAfib w/ chronic oral AC  -xarelto on hold  -seen by EP for consideration of Watchman, pending consultant recs anticipate opt f/u  -Cardiology was consulted and they have seen and evaluated patient on this admission also.  They do recommend discontinuation of Xarelto in light of anemia's.    CAD  HTN  HLD  Hx Takotsubo cardiomyopathy  -most recent EF 56-60%  -Final blood pressure is well controlled      Valvular heart disease  -We did echocardiogram yesterday which shows normal ejection fraction, mild to moderate aortic regurgitation, mild mitral regurgitation.  Previous echocardiogram shows evidence of tricuspid regurgitation with elevated right ventricular systolic pressure between 45 and 55 mmHg.  -On admission patient had evidence of pulmonary edema.  Most likely tricuspid and mitral regurgitation contributed to that.  Currently patient breathing okay  and saturating well.    COPD   - add prn nebs  -Patient has history of heavy smoking but stopped about 20 years ago.  -Tells me that neb treatment actually helps her significantly.    Chronic pain - cont ms contin    Severe weakness  -PT has worked with the patient and they recommend rehab at discharge  -I had a long conversation with the patient today and she is now agreeable to go to rehab for a few days.    Expected Discharge Location and Transportation: Rehab  Expected Discharge when bed available  Expected Discharge Date: 11/5/2024; Expected Discharge Time:      VTE Prophylaxis:  Mechanical VTE prophylaxis orders are present.         AM-PAC 6 Clicks Score (PT): 17 (11/05/24 0800)    CODE STATUS:   Code Status and Medical Interventions: CPR (Attempt to Resuscitate); Full Support   Ordered at: 10/30/24 1958     Level Of Support Discussed With:    Patient     Code Status (Patient has no pulse and is not breathing):    CPR (Attempt to Resuscitate)     Medical Interventions (Patient has pulse or is breathing):    Full Support       Axel Belle MD  11/05/24

## 2024-11-05 NOTE — PLAN OF CARE
Problem: Adult Inpatient Plan of Care  Goal: Plan of Care Review  Outcome: Progressing  Flowsheets (Taken 11/5/2024 0941)  Progress: improving  Goal: Patient-Specific Goal (Individualized)  Outcome: Progressing  Goal: Absence of Hospital-Acquired Illness or Injury  Outcome: Progressing  Intervention: Identify and Manage Fall Risk  Recent Flowsheet Documentation  Taken 11/5/2024 0800 by Adrian Orozco RN  Safety Promotion/Fall Prevention:   activity supervised   nonskid shoes/slippers when out of bed   room organization consistent   safety round/check completed  Taken 11/5/2024 0753 by Adrian Orozco RN  Safety Promotion/Fall Prevention:   activity supervised   nonskid shoes/slippers when out of bed   room organization consistent   safety round/check completed  Intervention: Prevent Skin Injury  Recent Flowsheet Documentation  Taken 11/5/2024 0800 by Adrian Orozco RN  Body Position: position changed independently  Skin Protection:   drying agents applied   silicone foam dressing in place   transparent dressing maintained  Taken 11/5/2024 0753 by Adrian Orozco RN  Body Position: position changed independently  Intervention: Prevent Infection  Recent Flowsheet Documentation  Taken 11/5/2024 0800 by Adrian Orozco RN  Infection Prevention:   environmental surveillance performed   equipment surfaces disinfected   hand hygiene promoted   personal protective equipment utilized   rest/sleep promoted   single patient room provided  Taken 11/5/2024 0753 by Adrian Orozco RN  Infection Prevention:   environmental surveillance performed   equipment surfaces disinfected   hand hygiene promoted   personal protective equipment utilized   rest/sleep promoted   single patient room provided  Goal: Optimal Comfort and Wellbeing  Outcome: Progressing  Intervention: Provide Person-Centered Care  Recent Flowsheet Documentation  Taken 11/5/2024 0800 by Adrian Orozco RN  Trust Relationship/Rapport:   care explained   choices  provided   emotional support provided   empathic listening provided   questions answered   questions encouraged   reassurance provided   thoughts/feelings acknowledged  Goal: Readiness for Transition of Care  Outcome: Progressing     Problem: Skin Injury Risk Increased  Goal: Skin Health and Integrity  Outcome: Progressing  Intervention: Optimize Skin Protection  Recent Flowsheet Documentation  Taken 11/5/2024 0800 by Adrian Orozco RN  Activity Management: up in chair  Pressure Reduction Techniques:   frequent weight shift encouraged   heels elevated off bed   rest period provided between sit times   weight shift assistance provided  Head of Bed (HOB) Positioning: HOB elevated  Pressure Reduction Devices:   positioning supports utilized   heel offloading device utilized   pressure-redistributing mattress utilized  Skin Protection:   drying agents applied   silicone foam dressing in place   transparent dressing maintained  Taken 11/5/2024 0753 by Adrian Orozco RN  Activity Management:   ambulated to bathroom   up in chair  Head of Bed (HOB) Positioning: HOB elevated  Intervention: Promote and Optimize Oral Intake  Recent Flowsheet Documentation  Taken 11/5/2024 0800 by Adrian Orozco RN  Oral Nutrition Promotion: rest periods promoted     Problem: Fall Injury Risk  Goal: Absence of Fall and Fall-Related Injury  Outcome: Progressing  Intervention: Identify and Manage Contributors  Recent Flowsheet Documentation  Taken 11/5/2024 0800 by Adrian Orozco RN  Medication Review/Management: medications reviewed  Self-Care Promotion:   BADL personal objects within reach   BADL personal routines maintained   adaptive equipment use encouraged  Taken 11/5/2024 0753 by Adrian Orozco RN  Medication Review/Management: medications reviewed  Self-Care Promotion:   BADL personal objects within reach   BADL personal routines maintained   adaptive equipment use encouraged  Intervention: Promote Injury-Free Environment  Recent  Flowsheet Documentation  Taken 11/5/2024 0800 by Adrian Orozco RN  Safety Promotion/Fall Prevention:   activity supervised   nonskid shoes/slippers when out of bed   room organization consistent   safety round/check completed  Taken 11/5/2024 0753 by Adrian Orozco RN  Safety Promotion/Fall Prevention:   activity supervised   nonskid shoes/slippers when out of bed   room organization consistent   safety round/check completed     Problem: Pain Acute  Goal: Optimal Pain Control and Function  Outcome: Progressing  Intervention: Optimize Psychosocial Wellbeing  Recent Flowsheet Documentation  Taken 11/5/2024 0800 by Adrian Orozco RN  Supportive Measures:   active listening utilized   decision-making supported   mindfulness techniques promoted   verbalization of feelings encouraged  Diversional Activities: television  Spiritual Activities Assistance:   affirmation provided   personal rituals encouraged  Intervention: Prevent or Manage Pain  Recent Flowsheet Documentation  Taken 11/5/2024 0800 by Adrian Orozco RN  Sensory Stimulation Regulation:   auditory stimulation minimized   quiet environment promoted   tactile stimulation minimized   visual stimulation minimized  Sleep/Rest Enhancement:   awakenings minimized   consistent schedule promoted   natural light exposure provided   regular sleep/rest pattern promoted   relaxation techniques promoted  Medication Review/Management: medications reviewed  Taken 11/5/2024 0753 by Adrian Orozco RN  Medication Review/Management: medications reviewed     Problem: Infection  Goal: Absence of Infection Signs and Symptoms  Outcome: Progressing  Intervention: Prevent or Manage Infection  Recent Flowsheet Documentation  Taken 11/5/2024 0800 by Adrian Orozco RN  Infection Management: aseptic technique maintained  Fever Reduction/Comfort Measures:   lightweight bedding   lightweight clothing   Goal Outcome Evaluation:  Plan of Care Reviewed With: patient        Progress:  improving

## 2024-11-05 NOTE — PROGRESS NOTES
" LOS: 6 days   Patient Care Team:  Sebas Alicea MD as PCP - General (Family Medicine)  Steve Geronimo MD as Consulting Physician (Cardiology)  Emilio Regalado MD as Consulting Physician (Endocrinology)  Axel Ribeiro MD as Consulting Physician (Cardiology)    Chief Complaint: KINDRA on CKD stage IV   Anemia   SOB    Subjective     Reviewed vitals,labs and medications list  Seen and examined   No new complaints         History taken from: patient    Objective     Vital Sign Min/Max for last 24 hours  Temp  Min: 96.4 °F (35.8 °C)  Max: 97.9 °F (36.6 °C)   BP  Min: 115/61  Max: 144/83   Pulse  Min: 54  Max: 63   Resp  Min: 16  Max: 18   SpO2  Min: 95 %  Max: 100 %   No data recorded   No data recorded     Flowsheet Rows      Flowsheet Row First Filed Value   Admission Height 162.6 cm (64\") Documented at 10/30/2024 1632   Admission Weight 83.5 kg (184 lb) Documented at 10/30/2024 1632            I/O this shift:  In: -   Out: 450 [Urine:450]  I/O last 3 completed shifts:  In: 480 [P.O.:480]  Out: 2200 [Urine:2200]    Objective:  General Appearance:  Comfortable.    Vital signs: (most recent): Blood pressure 115/61, pulse 55, temperature 96.4 °F (35.8 °C), temperature source Oral, resp. rate 16, height 162.6 cm (64.02\"), weight 79.8 kg (176 lb), SpO2 97%.  Vital signs are normal.    Output: Producing urine.    HEENT: Normal HEENT exam.    Lungs:  Normal effort and normal respiratory rate.  Breath sounds clear to auscultation.  She is not in respiratory distress.  No decreased breath sounds.    Heart: Normal rate.  Regular rhythm.  S1 normal and S2 normal.  No murmur or gallop.   Abdomen: Abdomen is soft.  Bowel sounds are normal.     Extremities: Normal range of motion.  There is no dependent edema or local swelling.    Pulses: Distal pulses are intact.    Neurological: Patient is alert.                Results Review:     I reviewed the patient's new clinical results.    WBC No results found for: \"WBC\"     HGB " "No results found for: \"HGB\"     HCT No results found for: \"HCT\"     Platlets No results found for: \"LABPLAT\"   MCV No results found for: \"MCV\"         Sodium Sodium   Date Value Ref Range Status   11/05/2024 139 136 - 145 mmol/L Final   11/04/2024 141 136 - 145 mmol/L Final      Potassium Potassium   Date Value Ref Range Status   11/05/2024 4.5 3.5 - 5.2 mmol/L Final   11/04/2024 4.7 3.5 - 5.2 mmol/L Final      Chloride Chloride   Date Value Ref Range Status   11/05/2024 108 (H) 98 - 107 mmol/L Final   11/04/2024 110 (H) 98 - 107 mmol/L Final      CO2 CO2   Date Value Ref Range Status   11/05/2024 21.0 (L) 22.0 - 29.0 mmol/L Final   11/04/2024 20.0 (L) 22.0 - 29.0 mmol/L Final      BUN BUN   Date Value Ref Range Status   11/05/2024 55 (H) 8 - 23 mg/dL Final   11/04/2024 53 (H) 8 - 23 mg/dL Final      Creatinine Creatinine   Date Value Ref Range Status   11/05/2024 3.56 (H) 0.57 - 1.00 mg/dL Final   11/04/2024 3.62 (H) 0.57 - 1.00 mg/dL Final      Calcium Calcium   Date Value Ref Range Status   11/05/2024 9.7 8.6 - 10.5 mg/dL Final   11/04/2024 9.9 8.6 - 10.5 mg/dL Final      PO4 No results found for: \"CAPO4\"   Albumin No results found for: \"ALBUMIN\"     Magnesium No results found for: \"MG\"     Uric Acid No results found for: \"URICACID\"     Medication Review: yes    Assessment & Plan       Symptomatic anemia    Hyperlipidemia LDL goal <70    COPD (chronic obstructive pulmonary disease)    GERD (gastroesophageal reflux disease)    Coronary artery disease involving native coronary artery of native heart without angina pectoris    Essential hypertension    History of CVA (cerebrovascular accident)    CKD (chronic kidney disease) stage 4, GFR 15-29 ml/min    Paroxysmal atrial fibrillation    Anxiety associated with depression    KINDRA (acute kidney injury)      Assessment & Plan    KINDRA: Baseline cr btw 1.9-2.2mg/dl. Cr 3.23.5 on this admission. Suspect hemodynamic injury in the setting of anemia.      CKD stage IV: Hx of " proteinuric renal disease. UPC 2 gm in the past. Duplex negative in 2016     Anemia: Acute on chronic worsening. ACD due to CKD. SPEP negative in the past. Nadya any bleeding. Started on EDISON weekly      Volume status: No significant edema on examination      HTN: No ANABEL per duplex in 2016.     Proteinuria: UPC 2.8 gm. Etiology unspecified.     Met acidosis: persistent in the setting of CKD. Hold off on bicarb supp at present.      PLAN  KINDRA component due to hemodynamic injury so far no improvement in renal function. Suspect advance CKD at baseline  Agree with EDISON weekly   Transfuse at Hb<7   Daily labs.   No indication for dialysis at present.      I discussed the patients findings and my recommendations with patient    Zurdo Levine MD  11/05/24  16:08 EST

## 2024-11-06 LAB
ANION GAP SERPL CALCULATED.3IONS-SCNC: 12 MMOL/L (ref 5–15)
BUN SERPL-MCNC: 56 MG/DL (ref 8–23)
BUN/CREAT SERPL: 15.1 (ref 7–25)
CALCIUM SPEC-SCNC: 9.9 MG/DL (ref 8.6–10.5)
CHLORIDE SERPL-SCNC: 107 MMOL/L (ref 98–107)
CO2 SERPL-SCNC: 19 MMOL/L (ref 22–29)
CREAT SERPL-MCNC: 3.71 MG/DL (ref 0.57–1)
DEPRECATED RDW RBC AUTO: 56.2 FL (ref 37–54)
EGFRCR SERPLBLD CKD-EPI 2021: 11.6 ML/MIN/1.73
ERYTHROCYTE [DISTWIDTH] IN BLOOD BY AUTOMATED COUNT: 17.3 % (ref 12.3–15.4)
GLUCOSE SERPL-MCNC: 150 MG/DL (ref 65–99)
HCT VFR BLD AUTO: 27.3 % (ref 34–46.6)
HGB BLD-MCNC: 8.4 G/DL (ref 12–15.9)
MCH RBC QN AUTO: 28.9 PG (ref 26.6–33)
MCHC RBC AUTO-ENTMCNC: 30.8 G/DL (ref 31.5–35.7)
MCV RBC AUTO: 93.8 FL (ref 79–97)
PLATELET # BLD AUTO: 131 10*3/MM3 (ref 140–450)
PMV BLD AUTO: 10.4 FL (ref 6–12)
POTASSIUM SERPL-SCNC: 4.6 MMOL/L (ref 3.5–5.2)
RBC # BLD AUTO: 2.91 10*6/MM3 (ref 3.77–5.28)
SODIUM SERPL-SCNC: 138 MMOL/L (ref 136–145)
WBC NRBC COR # BLD AUTO: 5.86 10*3/MM3 (ref 3.4–10.8)

## 2024-11-06 PROCEDURE — 83516 IMMUNOASSAY NONANTIBODY: CPT | Performed by: INTERNAL MEDICINE

## 2024-11-06 PROCEDURE — 85027 COMPLETE CBC AUTOMATED: CPT | Performed by: FAMILY MEDICINE

## 2024-11-06 PROCEDURE — 86037 ANCA TITER EACH ANTIBODY: CPT | Performed by: INTERNAL MEDICINE

## 2024-11-06 PROCEDURE — 99232 SBSQ HOSP IP/OBS MODERATE 35: CPT | Performed by: FAMILY MEDICINE

## 2024-11-06 PROCEDURE — 80048 BASIC METABOLIC PNL TOTAL CA: CPT | Performed by: FAMILY MEDICINE

## 2024-11-06 RX ADMIN — HYDRALAZINE HYDROCHLORIDE 100 MG: 50 TABLET ORAL at 14:26

## 2024-11-06 RX ADMIN — SODIUM BICARBONATE 650 MG TABLET 650 MG: at 09:02

## 2024-11-06 RX ADMIN — SODIUM BICARBONATE 650 MG TABLET 650 MG: at 20:39

## 2024-11-06 RX ADMIN — AMLODIPINE BESYLATE 10 MG: 10 TABLET ORAL at 09:02

## 2024-11-06 RX ADMIN — PANTOPRAZOLE SODIUM 40 MG: 40 TABLET, DELAYED RELEASE ORAL at 20:39

## 2024-11-06 RX ADMIN — MORPHINE SULFATE 15 MG: 15 TABLET, FILM COATED, EXTENDED RELEASE ORAL at 09:02

## 2024-11-06 RX ADMIN — CYANOCOBALAMIN TAB 1000 MCG 1000 MCG: 1000 TAB at 09:02

## 2024-11-06 RX ADMIN — AMOXICILLIN AND CLAVULANATE POTASSIUM 500 MG: 500; 125 TABLET, FILM COATED ORAL at 09:02

## 2024-11-06 RX ADMIN — HYDRALAZINE HYDROCHLORIDE 100 MG: 50 TABLET ORAL at 21:45

## 2024-11-06 RX ADMIN — CALCITRIOL CAPSULES 0.25 MCG 0.5 MCG: 0.25 CAPSULE ORAL at 09:02

## 2024-11-06 RX ADMIN — Medication 1 CAPSULE: at 09:02

## 2024-11-06 RX ADMIN — AMOXICILLIN AND CLAVULANATE POTASSIUM 500 MG: 500; 125 TABLET, FILM COATED ORAL at 20:39

## 2024-11-06 RX ADMIN — PANTOPRAZOLE SODIUM 40 MG: 40 TABLET, DELAYED RELEASE ORAL at 09:02

## 2024-11-06 RX ADMIN — FERROUS SULFATE TAB 325 MG (65 MG ELEMENTAL FE) 325 MG: 325 (65 FE) TAB at 09:02

## 2024-11-06 RX ADMIN — CARVEDILOL 25 MG: 12.5 TABLET, FILM COATED ORAL at 09:02

## 2024-11-06 RX ADMIN — CARVEDILOL 25 MG: 12.5 TABLET, FILM COATED ORAL at 20:39

## 2024-11-06 RX ADMIN — TERAZOSIN HYDROCHLORIDE 5 MG: 5 CAPSULE ORAL at 20:39

## 2024-11-06 RX ADMIN — Medication 10 ML: at 20:40

## 2024-11-06 RX ADMIN — Medication 10 ML: at 09:04

## 2024-11-06 RX ADMIN — ATORVASTATIN CALCIUM 80 MG: 40 TABLET, FILM COATED ORAL at 20:39

## 2024-11-06 RX ADMIN — MORPHINE SULFATE 15 MG: 15 TABLET, FILM COATED, EXTENDED RELEASE ORAL at 21:45

## 2024-11-06 NOTE — PROGRESS NOTES
" LOS: 7 days   Patient Care Team:  Sebas lAicea MD as PCP - General (Family Medicine)  Steve Geronimo MD as Consulting Physician (Cardiology)  Emilio Regalado MD as Consulting Physician (Endocrinology)  Axel Ribeiro MD as Consulting Physician (Cardiology)    Chief Complaint: KINDRA on CKD stage IV   Anemia   SOB    Subjective     So far no significant change in renal function. Cr ~ 3.5         History taken from: patient    Objective     Vital Sign Min/Max for last 24 hours  Temp  Min: 97.1 °F (36.2 °C)  Max: 98.5 °F (36.9 °C)   BP  Min: 126/107  Max: 145/73   Pulse  Min: 60  Max: 67   Resp  Min: 18  Max: 19   SpO2  Min: 97 %  Max: 98 %   No data recorded   Weight  Min: 80.7 kg (178 lb)  Max: 80.7 kg (178 lb)     Flowsheet Rows      Flowsheet Row First Filed Value   Admission Height 162.6 cm (64\") Documented at 10/30/2024 1632   Admission Weight 83.5 kg (184 lb) Documented at 10/30/2024 1632            I/O this shift:  In: 360 [P.O.:360]  Out: -   I/O last 3 completed shifts:  In: 960 [P.O.:960]  Out: 450 [Urine:450]    Objective:  General Appearance:  Comfortable.    Vital signs: (most recent): Blood pressure 140/69, pulse 67, temperature 97.1 °F (36.2 °C), temperature source Oral, resp. rate 18, height 162.6 cm (64.02\"), weight 80.7 kg (178 lb), SpO2 97%.  Vital signs are normal.    Output: Producing urine.    HEENT: Normal HEENT exam.    Lungs:  Normal effort and normal respiratory rate.  Breath sounds clear to auscultation.  She is not in respiratory distress.  No decreased breath sounds.    Heart: Normal rate.  Regular rhythm.  S1 normal and S2 normal.  No murmur or gallop.   Abdomen: Abdomen is soft.  Bowel sounds are normal.     Extremities: Normal range of motion.  There is no dependent edema or local swelling.    Pulses: Distal pulses are intact.    Neurological: Patient is alert.                Results Review:     I reviewed the patient's new clinical results.    WBC WBC   Date Value Ref Range " "Status   11/06/2024 5.86 3.40 - 10.80 10*3/mm3 Final        HGB Hemoglobin   Date Value Ref Range Status   11/06/2024 8.4 (L) 12.0 - 15.9 g/dL Final        HCT Hematocrit   Date Value Ref Range Status   11/06/2024 27.3 (L) 34.0 - 46.6 % Final        Platlets No results found for: \"LABPLAT\"   MCV MCV   Date Value Ref Range Status   11/06/2024 93.8 79.0 - 97.0 fL Final            Sodium Sodium   Date Value Ref Range Status   11/06/2024 138 136 - 145 mmol/L Final   11/05/2024 139 136 - 145 mmol/L Final   11/04/2024 141 136 - 145 mmol/L Final      Potassium Potassium   Date Value Ref Range Status   11/06/2024 4.6 3.5 - 5.2 mmol/L Final   11/05/2024 4.5 3.5 - 5.2 mmol/L Final   11/04/2024 4.7 3.5 - 5.2 mmol/L Final      Chloride Chloride   Date Value Ref Range Status   11/06/2024 107 98 - 107 mmol/L Final   11/05/2024 108 (H) 98 - 107 mmol/L Final   11/04/2024 110 (H) 98 - 107 mmol/L Final      CO2 CO2   Date Value Ref Range Status   11/06/2024 19.0 (L) 22.0 - 29.0 mmol/L Final   11/05/2024 21.0 (L) 22.0 - 29.0 mmol/L Final   11/04/2024 20.0 (L) 22.0 - 29.0 mmol/L Final      BUN BUN   Date Value Ref Range Status   11/06/2024 56 (H) 8 - 23 mg/dL Final   11/05/2024 55 (H) 8 - 23 mg/dL Final   11/04/2024 53 (H) 8 - 23 mg/dL Final      Creatinine Creatinine   Date Value Ref Range Status   11/06/2024 3.71 (H) 0.57 - 1.00 mg/dL Final   11/05/2024 3.56 (H) 0.57 - 1.00 mg/dL Final   11/04/2024 3.62 (H) 0.57 - 1.00 mg/dL Final      Calcium Calcium   Date Value Ref Range Status   11/06/2024 9.9 8.6 - 10.5 mg/dL Final   11/05/2024 9.7 8.6 - 10.5 mg/dL Final   11/04/2024 9.9 8.6 - 10.5 mg/dL Final      PO4 No results found for: \"CAPO4\"   Albumin No results found for: \"ALBUMIN\"     Magnesium No results found for: \"MG\"     Uric Acid No results found for: \"URICACID\"     Medication Review: yes    Assessment & Plan       Symptomatic anemia    Hyperlipidemia LDL goal <70    COPD (chronic obstructive pulmonary disease)    GERD " (gastroesophageal reflux disease)    Coronary artery disease involving native coronary artery of native heart without angina pectoris    Essential hypertension    History of CVA (cerebrovascular accident)    CKD (chronic kidney disease) stage 4, GFR 15-29 ml/min    Paroxysmal atrial fibrillation    Anxiety associated with depression    KINDRA (acute kidney injury)      Assessment & Plan    KINDRA: Baseline cr btw 1.9-2.2mg/dl. Cr 3.23.5 on this admission. Suspect hemodynamic injury in the setting of anemia.      CKD stage IV: Hx of proteinuric renal disease. UPC 2 gm in the past. Duplex negative in 2016     Anemia: Acute on chronic worsening. ACD due to CKD. SPEP negative in the past. Nadya any bleeding. Started on EDISON weekly      Volume status: No significant edema on examination      HTN: No ANABEL per duplex in 2016.     Proteinuria: UPC 2.8 gm. Etiology unspecified.     Met acidosis: persistent in the setting of CKD. Hold off on bicarb supp at present.      PLAN  KINDRA component due to hemodynamic injury so far no improvement in renal function. Suspect advance CKD at baseline. Discussed with family Son and daughter in law ( Anesthesiologist) Explained about advance renal dysfunction and possibility of dialysis vs conservative care in future  Will check ANCA before she is discharge  F/u in renal clinic in 2 weeks.   Agree with EDISON weekly as outpatient  Transfuse at Hb<7   Daily labs.   No indication for dialysis at present.      I discussed the patients findings and my recommendations with patient    Zurdo Levine MD  11/06/24  16:36 EST

## 2024-11-06 NOTE — PLAN OF CARE
Problem: Adult Inpatient Plan of Care  Goal: Optimal Comfort and Wellbeing  Outcome: Progressing  Intervention: Provide Person-Centered Care  Recent Flowsheet Documentation  Taken 11/6/2024 1020 by Marty Hernandez, RN  Trust Relationship/Rapport:   choices provided   care explained   Goal Outcome Evaluation:  Plan of Care Reviewed With: patient        Progress: improving             No complaints per patient. Plan is now Home with , hopeful tomorrow. Pt up with stand by + walker. BM 11/5

## 2024-11-06 NOTE — CASE MANAGEMENT/SOCIAL WORK
Continued Stay Note  [facility]     Patient Name: Yanique Webster  MRN: [id number]  Today's Date: 11/6/2024    Admit Date: 10/30/2024    Plan: Home with home health   Discharge Plan       Row Name 11/06/24 1501       Plan    Plan Home with home health    Patient/Family in Agreement with Plan yes    Plan Comments Update to earlier note today: Patient's son had asked for [facility] to evaluate his mother to see if she would be appropriate to go home with home health PT. After evaluation, her plan is now to go home with home health & [facility]. Patient and family are agreeable with the plan. Home health order is in [facility]. Referral was called and accepted by Terri for SN & PT. CM updated Alen with [facility]. Son will transport. No other needs noted. CM will continue to follow.    Final Discharge Disposition Code 06 - home with home health care      Row Name 11/06/24 1313       Plan    Plan IRF    Patient/Family in Agreement with Plan yes    Plan Comments Patient has an available acute bed at [facility] tomorrow, pending allergist appointment is re-scheduled. Patient has an appointment with an allergist on Friday in preparation for a watchman, scheduled for 11/20. This appointment would need to be re-scheduled for Monday at the earliest. CM spoke to patient at bedside and yris Dietz per patient's request. J Luis is checking to see if appointment can be changed and if this is acceptable with the nurse navigator for cardiology. CM will continue to follow.    Final Discharge Disposition Code 62 - inpatient rehab facility                   Discharge Codes    No documentation.                 Expected Discharge Date and Time       Expected Discharge Date Expected Discharge Time    Nov 7, 2024               Maddison Ignacio RN

## 2024-11-06 NOTE — PROGRESS NOTES
Nutrition Services    Patient Name:  Yanique Webster  YOB: 1940  MRN: [id number]  Admit Date:  10/30/2024    Pt identified 2/2 LOS. No nutrition risk noted at this time. Patient eating average 68% x 10 meals.  Please consult for intake avg <50% or significant weight change.  RD will follow per protocol.       Electronically signed by:  Nica Blanton RD  11/06/24 10:53 EST

## 2024-11-06 NOTE — CASE MANAGEMENT/SOCIAL WORK
Continued Stay Note  [facility]     Patient Name: Yanique Webster  MRN: [id number]  Today's Date: 11/6/2024    Admit Date: 10/30/2024    Plan: IRF   Discharge Plan       Row Name 11/06/24 1313       Plan    Plan IRF    Patient/Family in Agreement with Plan yes    Plan Comments Patient has an available acute bed at [facility] tomorrow, pending allergist appointment is re-scheduled. Patient has an appointment with an allergist on Friday in preparation for a watchman, scheduled for 11/20. This appointment would need to be re-scheduled for Monday at the earliest. CM spoke to patient at bedside and son J Luis per patient's request. J Luis is checking to see if appointment can be changed and if this is acceptable with the nurse navigator for cardiology. CM will continue to follow.    Final Discharge Disposition Code 62 - inpatient rehab facility                   Discharge Codes    No documentation.                 Expected Discharge Date and Time       Expected Discharge Date Expected Discharge Time    Nov 7, 2024               Maddison Ignacio RN

## 2024-11-06 NOTE — PROGRESS NOTES
[facility] Medicine Services  PROGRESS NOTE    Patient Name: Yanique Webster  : 1940  MRN: [id number]    Date of Admission: 10/30/2024  Primary Care Physician: Sebas Alicea MD    Subjective   Subjective     CC:  F/u anemia     HPI:  Hemoglobin relatively stable.  She is resting comfortably at this time no complaints.  Denies nausea vomiting fevers chills. Slight increase in Cr      Objective   Objective     Vital Signs:   Temp:  [97.1 °F (36.2 °C)-98.5 °F (36.9 °C)] 97.1 °F (36.2 °C)  Heart Rate:  [60-67] 67  Resp:  [18-19] 18  BP: (126-145)/() 140/69     Physical Exam:  Constitutional: No acute distress, awake, alert  HENT: NCAT, mucous membranes moist  Respiratory: Clear to auscultation bilaterally, respiratory effort normal   Cardiovascular: RRR, no murmurs, rubs, or gallops  Gastrointestinal: Positive bowel sounds, soft, nontender, nondistended  Musculoskeletal: No bilateral ankle edema  Psychiatric: Appropriate affect, cooperative  Neurologic: Oriented x 3, speech clear  Skin: No rashes    Results Reviewed:  LAB RESULTS:      Lab 24  0830 24  0523 10/31/24  0202   WBC 5.86 6.30 6.71   HEMOGLOBIN 8.4* 9.0* 8.2*   HEMATOCRIT 27.3* 27.9* 25.1*   PLATELETS 131* 131* 123*   NEUTROS ABS  --   --  4.53   IMMATURE GRANS (ABS)  --   --  0.03   LYMPHS ABS  --   --  1.30   MONOS ABS  --   --  0.60   EOS ABS  --   --  0.20   MCV 93.8 90.9 89.3         Lab 24  0830 24  0921 24  1306 24  0802 24  0523 10/31/24  0202   SODIUM 138 139 141 138 141 139   POTASSIUM 4.6 4.5 4.7 4.7 4.4 4.4   CHLORIDE 107 108* 110* 107 110* 109*   CO2 19.0* 21.0* 20.0* 19.0* 17.0* 19.0*   ANION GAP 12.0 10.0 11.0 12.0 14.0 11.0   BUN 56* 55* 53* 58* 64* 60*   CREATININE 3.71* 3.56* 3.62* 3.40* 3.58* 3.26*   EGFR 11.6* 12.2* 12.0* 12.9* 12.1* 13.6*   GLUCOSE 150* 164* 169* 146* 113* 107*   CALCIUM 9.9 9.7 9.9 9.8 9.9 10.0  9.7   MAGNESIUM  --   --   --  2.0  " --   --    PHOSPHORUS  --   --   --  3.7 5.2*  --    HEMOGLOBIN A1C  --   --   --   --   --  5.40         Lab 11/01/24  0523   ALBUMIN 3.4*         Lab 10/30/24  1840   HSTROP T 62*             Lab 10/30/24  1840 10/30/24  1654   IRON 42  42  --    IRON SATURATION (TSAT) 17*  --    TIBC 244*  --    TRANSFERRIN 164*  --    FERRITIN 103.00  --    ABO TYPING  --  O   RH TYPING  --  Negative   ANTIBODY SCREEN  --  Negative         Lab 10/30/24  1640   PH, ARTERIAL 7.301*   PCO2, ARTERIAL 36.5   PO2 ART 73.3*   FIO2 21   HCO3 ART 18.0*   BASE EXCESS ART -7.8*   CARBOXYHEMOGLOBIN 1.4     Brief Urine Lab Results  (Last result in the past 365 days)        Color   Clarity   Blood   Leuk Est   Nitrite   Protein   CREAT   Urine HCG        08/13/24 2000             52.4         08/13/24 2000 Yellow   Clear   Negative   Negative   Negative   >=300 mg/dL (3+)                   Cultures:  No results found for: \"BLOODCX\", \"URINECX\", \"WOUNDCX\", \"MRSACX\", \"RESPCX\", \"STOOLCX\"    Microbiology Results Abnormal       None            No radiology results from the last 24 hrs    Results for orders placed during the hospital encounter of 10/30/24    Adult Transthoracic Echo Complete W/ Cont if Necessary Per Protocol    Interpretation Summary    Left ventricular systolic function is normal. Estimated left ventricular EF = 60%    Left ventricular wall thickness is consistent with mild concentric hypertrophy.    The left atrial cavity is moderately dilated.    Aortic sclerosis without aortic stenosis.  Mild to moderate aortic insufficiency    Mild mitral regurgitation.      Current medications:  Scheduled Meds:amLODIPine, 10 mg, Oral, Q24H  amoxicillin-clavulanate, 1 tablet, Oral, BID  [Held by provider] aspirin, 81 mg, Oral, Daily  atorvastatin, 80 mg, Oral, Nightly  calcitriol, 0.5 mcg, Oral, Daily  carvedilol, 25 mg, Oral, Q12H  cloNIDine, 0.1 mg, Oral, Q6H  epoetin adonis/adonis-epbx, 40,000 Units, Subcutaneous, Weekly  ferrous sulfate, 325 " mg, Oral, Daily With Breakfast  hydrALAZINE, 100 mg, Oral, Q8H  lactobacillus acidophilus, 1 capsule, Oral, Daily  Morphine, 15 mg, Oral, BID  pantoprazole, 40 mg, Oral, BID  polyethylene glycol, 17 g, Oral, Daily  sodium bicarbonate, 650 mg, Oral, BID  sodium chloride, 10 mL, Intravenous, Q12H  terazosin, 5 mg, Oral, Nightly  vitamin B-12, 1,000 mcg, Oral, Daily      Continuous Infusions:   PRN Meds:.  acetaminophen **OR** acetaminophen **OR** acetaminophen    benzocaine    senna-docusate sodium **AND** polyethylene glycol **AND** bisacodyl **AND** bisacodyl    ipratropium-albuterol    nitroglycerin    sodium chloride    sodium chloride    torsemide    Assessment & Plan   Assessment & Plan     Active Hospital Problems    Diagnosis  POA    **Symptomatic anemia [D64.9]  Yes    KINDRA (acute kidney injury) [N17.9]  Yes    Anxiety associated with depression [F41.8]  Yes    Paroxysmal atrial fibrillation [I48.0]  Yes    CKD (chronic kidney disease) stage 4, GFR 15-29 ml/min [N18.4]  Yes    History of CVA (cerebrovascular accident) [Z86.73]  Not Applicable    Essential hypertension [I10]  Yes    Coronary artery disease involving native coronary artery of native heart without angina pectoris [I25.10]  Yes    COPD (chronic obstructive pulmonary disease) [J44.9]  Yes    GERD (gastroesophageal reflux disease) [K21.9]  Yes    Hyperlipidemia LDL goal <70 [E78.5]  Yes      Resolved Hospital Problems   No resolved problems to display.        Brief Hospital Course to date:  Yanique Webster is a 84 y.o. female w/ CAD, COPD, HTN, HLD, chronic pain, Takotsubo cardiomyopathy, CKD4, Afib on eliquis; she was admitted 8/12/24-8/19/24 w/ anemia requiring transfusion, during that say she had EGD 8/14 and colo 8/16 both without source of bleeding; pt/fam elected to resume oral AC for stroke ppx; she has seen EP in the clinic and is contemplating Watchman device; she developed new SOB and had labs drawn which showed significant anemia and  she was sent to the ED where Hgb was 6.1; she was admitted and transfused blood     The following problems are new to me today     Assessment/Plan     Acute/Chronic normocytic anemia, recurrent  Thrombocytopenia  -recent EGD/[place] 8/2024 w/o evidence for bleeding  -iron studies c/w anemia of chronic disease  -s/p 2U PRBC  -second episode of transfusion requiring anemia, noted TCP as well  -favor she has an anemia of chronic disease/renal disease, in the context of ongoing oral AC for stroke ppx     KINDRA on CKD4  Secondary hyperparathyroidism  -baseline Cr about 2.3; eGFR 20's  -nephrology following.  Creatinine has not improved greatly.     pAfib w/ chronic oral AC  -xarelto on hold  -seen by EP for consideration of Watchman, pending consultant recs anticipate opt f/u  -Cardiology was consulted and they have seen and evaluated patient on this admission also.  They do recommend discontinuation of Xarelto in light of anemia's.     CAD  HTN  HLD  Hx Takotsubo cardiomyopathy  -most recent EF 56-60%  -Final blood pressure is well controlled        Valvular heart disease  -ECHO 11/02: normal ejection fraction, mild to moderate aortic regurgitation, mild mitral regurgitation.  Previous echocardiogram shows evidence of tricuspid regurgitation with elevated right ventricular systolic pressure between 45 and 55 mmHg.  -On admission patient had evidence of pulmonary edema.  Most likely tricuspid and mitral regurgitation contributed to that.  Currently patient breathing okay and saturating well.     COPD   - add prn nebs  -Patient has history of heavy smoking but stopped about 20 years ago.  -Tells me that neb treatment actually helps her significantly.     Chronic pain - cont ms contin     Severe weakness  -PT has worked with the patient and they recommend rehab at discharge  -My colleague discussed rehab with the patient and she is agreeable to go     Addendum: Spoke with Dr. Levine. Patient can be discharged tomorrow as long as  Cr remains stable       Expected Discharge Location and Transportation: rehab   Expected Discharge 11/07/2024  Expected Discharge Date: 11/7/2024; Expected Discharge Time:      VTE Prophylaxis:  Mechanical VTE prophylaxis orders are present.         AM-PAC 6 Clicks Score (PT): 17 (11/06/24 1020)    CODE STATUS:   Code Status and Medical Interventions: CPR (Attempt to Resuscitate); Full Support   Ordered at: 10/30/24 1958     Level Of Support Discussed With:    Patient     Code Status (Patient has no pulse and is not breathing):    CPR (Attempt to Resuscitate)     Medical Interventions (Patient has pulse or is breathing):    Full Support       Savanah Yancey MD  11/06/24

## 2024-11-07 PROBLEM — Z86.73 HISTORY OF CVA (CEREBROVASCULAR ACCIDENT): Status: RESOLVED | Noted: 2019-03-15 | Resolved: 2024-11-07

## 2024-11-07 LAB
ANION GAP SERPL CALCULATED.3IONS-SCNC: 14 MMOL/L (ref 5–15)
BUN SERPL-MCNC: 56 MG/DL (ref 8–23)
BUN/CREAT SERPL: 14.8 (ref 7–25)
CALCIUM SPEC-SCNC: 10 MG/DL (ref 8.6–10.5)
CHLORIDE SERPL-SCNC: 110 MMOL/L (ref 98–107)
CO2 SERPL-SCNC: 17 MMOL/L (ref 22–29)
CREAT SERPL-MCNC: 3.78 MG/DL (ref 0.57–1)
DEPRECATED RDW RBC AUTO: 57.8 FL (ref 37–54)
EGFRCR SERPLBLD CKD-EPI 2021: 11.4 ML/MIN/1.73
ERYTHROCYTE [DISTWIDTH] IN BLOOD BY AUTOMATED COUNT: 17.7 % (ref 12.3–15.4)
GLUCOSE BLDC GLUCOMTR-MCNC: 137 MG/DL (ref 70–130)
GLUCOSE BLDC GLUCOMTR-MCNC: 188 MG/DL (ref 70–130)
GLUCOSE SERPL-MCNC: 115 MG/DL (ref 65–99)
HCT VFR BLD AUTO: 27.1 % (ref 34–46.6)
HGB BLD-MCNC: 8.4 G/DL (ref 12–15.9)
MCH RBC QN AUTO: 29.3 PG (ref 26.6–33)
MCHC RBC AUTO-ENTMCNC: 31 G/DL (ref 31.5–35.7)
MCV RBC AUTO: 94.4 FL (ref 79–97)
PLATELET # BLD AUTO: 141 10*3/MM3 (ref 140–450)
PMV BLD AUTO: 11.2 FL (ref 6–12)
POTASSIUM SERPL-SCNC: 5 MMOL/L (ref 3.5–5.2)
POTASSIUM SERPL-SCNC: 5.1 MMOL/L (ref 3.5–5.2)
RBC # BLD AUTO: 2.87 10*6/MM3 (ref 3.77–5.28)
SODIUM SERPL-SCNC: 141 MMOL/L (ref 136–145)
WBC NRBC COR # BLD AUTO: 4.59 10*3/MM3 (ref 3.4–10.8)

## 2024-11-07 PROCEDURE — 82948 REAGENT STRIP/BLOOD GLUCOSE: CPT

## 2024-11-07 PROCEDURE — 63710000001 INSULIN LISPRO (HUMAN) PER 5 UNITS: Performed by: FAMILY MEDICINE

## 2024-11-07 PROCEDURE — 99239 HOSP IP/OBS DSCHRG MGMT >30: CPT | Performed by: FAMILY MEDICINE

## 2024-11-07 PROCEDURE — 85027 COMPLETE CBC AUTOMATED: CPT | Performed by: FAMILY MEDICINE

## 2024-11-07 PROCEDURE — 84132 ASSAY OF SERUM POTASSIUM: CPT | Performed by: FAMILY MEDICINE

## 2024-11-07 PROCEDURE — 80048 BASIC METABOLIC PNL TOTAL CA: CPT | Performed by: FAMILY MEDICINE

## 2024-11-07 PROCEDURE — 97535 SELF CARE MNGMENT TRAINING: CPT

## 2024-11-07 PROCEDURE — 97530 THERAPEUTIC ACTIVITIES: CPT

## 2024-11-07 PROCEDURE — 97116 GAIT TRAINING THERAPY: CPT

## 2024-11-07 PROCEDURE — 97110 THERAPEUTIC EXERCISES: CPT

## 2024-11-07 PROCEDURE — 25010000002 EPOETIN ALFA-EPBX 40000 UNIT/ML SOLUTION: Performed by: INTERNAL MEDICINE

## 2024-11-07 RX ORDER — FENTANYL 12.5 UG/1
1 PATCH TRANSDERMAL
Status: DISCONTINUED | OUTPATIENT
Start: 2024-11-07 | End: 2024-11-08 | Stop reason: HOSPADM

## 2024-11-07 RX ORDER — AMLODIPINE BESYLATE 10 MG/1
10 TABLET ORAL
Qty: 30 TABLET | Refills: 0 | Status: ON HOLD | OUTPATIENT
Start: 2024-11-08 | End: 2024-12-08

## 2024-11-07 RX ORDER — DEXTROSE MONOHYDRATE 25 G/50ML
25 INJECTION, SOLUTION INTRAVENOUS
Status: DISCONTINUED | OUTPATIENT
Start: 2024-11-07 | End: 2024-11-08 | Stop reason: HOSPADM

## 2024-11-07 RX ORDER — IBUPROFEN 600 MG/1
1 TABLET ORAL
Status: DISCONTINUED | OUTPATIENT
Start: 2024-11-07 | End: 2024-11-08 | Stop reason: HOSPADM

## 2024-11-07 RX ORDER — CLONIDINE HYDROCHLORIDE 0.1 MG/1
0.1 TABLET ORAL EVERY 6 HOURS SCHEDULED
Qty: 120 TABLET | Refills: 0 | Status: ON HOLD | OUTPATIENT
Start: 2024-11-07 | End: 2024-12-07

## 2024-11-07 RX ORDER — NICOTINE POLACRILEX 4 MG
15 LOZENGE BUCCAL
Status: DISCONTINUED | OUTPATIENT
Start: 2024-11-07 | End: 2024-11-08 | Stop reason: HOSPADM

## 2024-11-07 RX ORDER — OXYCODONE HYDROCHLORIDE 5 MG/1
5 TABLET ORAL EVERY 4 HOURS PRN
Status: DISCONTINUED | OUTPATIENT
Start: 2024-11-07 | End: 2024-11-08

## 2024-11-07 RX ORDER — AMOXICILLIN AND CLAVULANATE POTASSIUM 500; 125 MG/1; MG/1
1 TABLET, FILM COATED ORAL 2 TIMES DAILY
Qty: 5 TABLET | Refills: 0 | Status: SHIPPED | OUTPATIENT
Start: 2024-11-07 | End: 2024-11-08

## 2024-11-07 RX ORDER — NALOXONE HCL 0.4 MG/ML
0.4 VIAL (ML) INJECTION
Status: DISCONTINUED | OUTPATIENT
Start: 2024-11-07 | End: 2024-11-08 | Stop reason: HOSPADM

## 2024-11-07 RX ORDER — INSULIN LISPRO 100 [IU]/ML
2-7 INJECTION, SOLUTION INTRAVENOUS; SUBCUTANEOUS
Status: DISCONTINUED | OUTPATIENT
Start: 2024-11-07 | End: 2024-11-08 | Stop reason: HOSPADM

## 2024-11-07 RX ADMIN — CALCITRIOL CAPSULES 0.25 MCG 0.5 MCG: 0.25 CAPSULE ORAL at 12:54

## 2024-11-07 RX ADMIN — INSULIN LISPRO 2 UNITS: 100 INJECTION, SOLUTION INTRAVENOUS; SUBCUTANEOUS at 20:50

## 2024-11-07 RX ADMIN — CLONIDINE HYDROCHLORIDE 0.1 MG: 0.1 TABLET ORAL at 17:42

## 2024-11-07 RX ADMIN — CLONIDINE HYDROCHLORIDE 0.1 MG: 0.1 TABLET ORAL at 05:12

## 2024-11-07 RX ADMIN — EPOETIN ALFA-EPBX 40000 UNITS: 40000 INJECTION, SOLUTION INTRAVENOUS; SUBCUTANEOUS at 12:54

## 2024-11-07 RX ADMIN — FERROUS SULFATE TAB 325 MG (65 MG ELEMENTAL FE) 325 MG: 325 (65 FE) TAB at 08:59

## 2024-11-07 RX ADMIN — CLONIDINE HYDROCHLORIDE 0.1 MG: 0.1 TABLET ORAL at 00:57

## 2024-11-07 RX ADMIN — AMOXICILLIN AND CLAVULANATE POTASSIUM 500 MG: 500; 125 TABLET, FILM COATED ORAL at 12:53

## 2024-11-07 RX ADMIN — TERAZOSIN HYDROCHLORIDE 5 MG: 5 CAPSULE ORAL at 20:49

## 2024-11-07 RX ADMIN — HYDRALAZINE HYDROCHLORIDE 100 MG: 50 TABLET ORAL at 05:12

## 2024-11-07 RX ADMIN — Medication 10 ML: at 22:36

## 2024-11-07 RX ADMIN — SODIUM BICARBONATE 650 MG TABLET 650 MG: at 20:50

## 2024-11-07 RX ADMIN — SODIUM BICARBONATE 650 MG TABLET 650 MG: at 09:00

## 2024-11-07 RX ADMIN — SODIUM ZIRCONIUM CYCLOSILICATE 10 G: 10 POWDER, FOR SUSPENSION ORAL at 17:42

## 2024-11-07 RX ADMIN — Medication 1 CAPSULE: at 09:00

## 2024-11-07 RX ADMIN — POLYETHYLENE GLYCOL 3350 17 G: 17 POWDER, FOR SOLUTION ORAL at 09:00

## 2024-11-07 RX ADMIN — CYANOCOBALAMIN TAB 1000 MCG 1000 MCG: 1000 TAB at 09:00

## 2024-11-07 RX ADMIN — CARVEDILOL 25 MG: 12.5 TABLET, FILM COATED ORAL at 20:49

## 2024-11-07 RX ADMIN — PANTOPRAZOLE SODIUM 40 MG: 40 TABLET, DELAYED RELEASE ORAL at 08:59

## 2024-11-07 RX ADMIN — ATORVASTATIN CALCIUM 80 MG: 40 TABLET, FILM COATED ORAL at 20:49

## 2024-11-07 RX ADMIN — PANTOPRAZOLE SODIUM 40 MG: 40 TABLET, DELAYED RELEASE ORAL at 20:49

## 2024-11-07 RX ADMIN — AMOXICILLIN AND CLAVULANATE POTASSIUM 500 MG: 500; 125 TABLET, FILM COATED ORAL at 20:50

## 2024-11-07 RX ADMIN — BENZOCAINE: 0.1 GEL TOPICAL at 22:37

## 2024-11-07 RX ADMIN — MORPHINE SULFATE 15 MG: 15 TABLET, FILM COATED, EXTENDED RELEASE ORAL at 09:00

## 2024-11-07 RX ADMIN — FENTANYL 1 PATCH: 12 PATCH TRANSDERMAL at 20:50

## 2024-11-07 NOTE — PROGRESS NOTES
[facility] Medicine Services  PROGRESS NOTE    Patient Name: Yanique Webster  : 1940  MRN: [id number]    Date of Admission: 10/30/2024  Primary Care Physician: Sebas Alicea MD    Subjective   Subjective     CC:  F/u anemia     HPI:  Hemoglobin relatively stable.  She is resting comfortably at this time no complaints.  Denies nausea vomiting fevers chills. Slight increase in Cr      Objective   Objective     Vital Signs:   Temp:  [97 °F (36.1 °C)-97.5 °F (36.4 °C)] 97.1 °F (36.2 °C)  Heart Rate:  [60-65] 60  Resp:  [16-18] 18  BP: (117-150)/(44-88) 134/57     Physical Exam:  Constitutional: No acute distress, awake, alert  HENT: NCAT, mucous membranes moist  Respiratory: Clear to auscultation bilaterally, respiratory effort normal   Cardiovascular: RRR, no murmurs, rubs, or gallops  Gastrointestinal: Positive bowel sounds, soft, nontender, nondistended  Musculoskeletal: No bilateral ankle edema  Psychiatric: Appropriate affect, cooperative  Neurologic: Oriented x 3, speech clear  Skin: No rashes    Results Reviewed:  LAB RESULTS:      Lab 24  0934 24  0830 24  0523   WBC 4.59 5.86 6.30   HEMOGLOBIN 8.4* 8.4* 9.0*   HEMATOCRIT 27.1* 27.3* 27.9*   PLATELETS 141 131* 131*   MCV 94.4 93.8 90.9         Lab 24  1240 24  0436 24  0830 24  0921 24  1306 24  0802 24  0523   SODIUM  --  141 138 139 141 138 141   POTASSIUM 5.1 5.0 4.6 4.5 4.7 4.7 4.4   CHLORIDE  --  110* 107 108* 110* 107 110*   CO2  --  17.0* 19.0* 21.0* 20.0* 19.0* 17.0*   ANION GAP  --  14.0 12.0 10.0 11.0 12.0 14.0   BUN  --  56* 56* 55* 53* 58* 64*   CREATININE  --  3.78* 3.71* 3.56* 3.62* 3.40* 3.58*   EGFR  --  11.4* 11.6* 12.2* 12.0* 12.9* 12.1*   GLUCOSE  --  115* 150* 164* 169* 146* 113*   CALCIUM  --  10.0 9.9 9.7 9.9 9.8 9.9   MAGNESIUM  --   --   --   --   --  2.0  --    PHOSPHORUS  --   --   --   --   --  3.7 5.2*         Lab 24  0523  "  ALBUMIN 3.4*                           Brief Urine Lab Results  (Last result in the past 365 days)        Color   Clarity   Blood   Leuk Est   Nitrite   Protein   CREAT   Urine HCG        08/13/24 2000             52.4         08/13/24 2000 Yellow   Clear   Negative   Negative   Negative   >=300 mg/dL (3+)                   Cultures:  No results found for: \"BLOODCX\", \"URINECX\", \"WOUNDCX\", \"MRSACX\", \"RESPCX\", \"STOOLCX\"    Microbiology Results Abnormal       None            No radiology results from the last 24 hrs    Results for orders placed during the hospital encounter of 10/30/24    Adult Transthoracic Echo Complete W/ Cont if Necessary Per Protocol    Interpretation Summary    Left ventricular systolic function is normal. Estimated left ventricular EF = 60%    Left ventricular wall thickness is consistent with mild concentric hypertrophy.    The left atrial cavity is moderately dilated.    Aortic sclerosis without aortic stenosis.  Mild to moderate aortic insufficiency    Mild mitral regurgitation.      Current medications:  Scheduled Meds:amLODIPine, 10 mg, Oral, Q24H  amoxicillin-clavulanate, 1 tablet, Oral, BID  atorvastatin, 80 mg, Oral, Nightly  calcitriol, 0.5 mcg, Oral, Daily  carvedilol, 25 mg, Oral, Q12H  fentaNYL, 1 patch, Transdermal, Q72H   And  [START ON 11/8/2024] Check Fentanyl Patch Placement, 1 each, Does not apply, Q12H  cloNIDine, 0.1 mg, Oral, Q6H  epoetin adonis/adonis-epbx, 40,000 Units, Subcutaneous, Weekly  ferrous sulfate, 325 mg, Oral, Daily With Breakfast  hydrALAZINE, 100 mg, Oral, Q8H  lactobacillus acidophilus, 1 capsule, Oral, Daily  pantoprazole, 40 mg, Oral, BID  polyethylene glycol, 17 g, Oral, Daily  sodium bicarbonate, 650 mg, Oral, BID  sodium chloride, 10 mL, Intravenous, Q12H  sodium zirconium cyclosilicate, 10 g, Oral, Once  terazosin, 5 mg, Oral, Nightly  vitamin B-12, 1,000 mcg, Oral, Daily      Continuous Infusions:   PRN Meds:.  acetaminophen **OR** acetaminophen **OR** " acetaminophen    benzocaine    senna-docusate sodium **AND** polyethylene glycol **AND** bisacodyl **AND** bisacodyl    ipratropium-albuterol    naloxone    nitroglycerin    oxyCODONE    sodium chloride    sodium chloride    torsemide    Assessment & Plan   Assessment & Plan     Active Hospital Problems    Diagnosis  POA    **Symptomatic anemia [D64.9]  Yes    KINDRA (acute kidney injury) [N17.9]  Yes    Anxiety associated with depression [F41.8]  Yes    Paroxysmal atrial fibrillation [I48.0]  Yes    CKD (chronic kidney disease) stage 4, GFR 15-29 ml/min [N18.4]  Yes    Essential hypertension [I10]  Yes    Coronary artery disease involving native coronary artery of native heart without angina pectoris [I25.10]  Yes    COPD (chronic obstructive pulmonary disease) [J44.9]  Yes    GERD (gastroesophageal reflux disease) [K21.9]  Yes    Hyperlipidemia LDL goal <70 [E78.5]  Yes      Resolved Hospital Problems    Diagnosis Date Resolved POA    History of CVA (cerebrovascular accident) [Z86.73] 11/07/2024 Not Applicable        Brief Hospital Course to date:  Yanique Webster is a 84 y.o. female w/ CAD, COPD, HTN, HLD, chronic pain, Takotsubo cardiomyopathy, CKD4, Afib on eliquis; she was admitted 8/12/24-8/19/24 w/ anemia requiring transfusion, during that say she had EGD 8/14 and colo 8/16 both without source of bleeding; pt/fam elected to resume oral AC for stroke ppx; she has seen EP in the clinic and is contemplating Watchman device; she developed new SOB and had labs drawn which showed significant anemia and she was sent to the ED where Hgb was 6.1; she was admitted and transfused blood        Assessment/Plan     Acute/Chronic normocytic anemia, recurrent  Thrombocytopenia POA  -recent EGD/[place] 8/2024 w/o evidence for bleeding  -iron studies c/w anemia of chronic disease  -s/p 2U PRBC  -second episode of transfusion requiring anemia, noted TCP as well  -Patient was seen by hematology. Likely anemia of chronic  disease/renal disease  -Received procrit on 10/31 and 11/07  -Thrombocytopenia resolved      KINDRA on CKD4  Secondary hyperparathyroidism  -baseline Cr about 2.3; eGFR 20's  -nephrology following.  Creatinine has not improved greatly.  -Will transition to Fentanyl 12 mcg TD q72 hr and stop MS ER  -11/07: Lokelma xs 1 dose     pAfib w/ previous use of oral AC  -seen by EP for consideration of Watchman-> recommend OP follow up   -Cardiology was consulted and they have seen and evaluated patient on this admission also.  They do recommend discontinuation of Xarelto in light of anemia's.  -11/7: Dr. Geronimo recommend that patient's Xarelto and ASA be held      CAD  HTN  HLD  Hx Takotsubo cardiomyopathy  -most recent EF 56-60%  -Final blood pressure is well controlled     Valvular heart disease  -ECHO 11/02: normal ejection fraction, mild to moderate aortic regurgitation, mild mitral regurgitation.  Previous echocardiogram shows evidence of tricuspid regurgitation with elevated right ventricular systolic pressure between 45 and 55 mmHg.     Type 2 DM, insulin use  -A1c: 5.0%  -Patient's blood sugars have been well controlled without insulin during hospitalization.  Will hold long acting insulin at this time.      COPD   - add prn nebs  -Patient has history of heavy smoking but stopped about 20 years ago.     Chronic pain   -11/07: Spoke with Rayna Harris NP from Palliative Care who agrees with transitioning from MS ER to Fentanyl TD. MEDD 30. Will start at Fentanyl 12 mcg TD with oxycodone 5 mg q4hr prn BTP     Severe weakness     Concern for odontogenic infection   - my colleague started Augmentin on 11/05    GOC: Spoke with patient's son, J Luis Whitt and wife over the phone. Permission obtained from the patient to discuss medical care plan.  We discussed labs from today as well as patient has received 2 doses of Procrit while being admitted.  We also discussed likelihood of dialysis given no significant improvement in  renal function.  At this time they wish to pursue inpatient rehab at [facility] with the understanding that if patient does not significantly improved the alternative will be pursuing comfort care/quality life focus.  Family wishes for palliative care consult to help assist with overall goals of care and symptom management.  As outlined above I did discuss with palliative care nurse practitioner, Rayna Harris who agrees with opioid rotation.  Family in agreement with plan and also in agreement of holding her long-acting insulin at this time.    Total time spent 45 minutes with greater than 50% of the time in counseling with patient, patient's family coordination of care with subspecialties      Expected Discharge Location and Transportation: rehab   Expected Discharge 11/07/2024  Expected Discharge Date: 11/7/2024; Expected Discharge Time:      VTE Prophylaxis:  Mechanical VTE prophylaxis orders are present.         AM-PAC 6 Clicks Score (PT): 17 (11/07/24 1112)    CODE STATUS:   Code Status and Medical Interventions: CPR (Attempt to Resuscitate); Full Support   Ordered at: 10/30/24 1828     Level Of Support Discussed With:    Patient     Code Status (Patient has no pulse and is not breathing):    CPR (Attempt to Resuscitate)     Medical Interventions (Patient has pulse or is breathing):    Full Support       Savanah Yancey MD  11/07/24

## 2024-11-07 NOTE — CASE MANAGEMENT/SOCIAL WORK
Continued Stay Note  [facility]     Patient Name: Yanique Webster  MRN: [id number]  Today's Date: 11/7/2024    Admit Date: 10/30/2024    Plan: Home with home health   Discharge Plan       Row Name 11/07/24 1118       Plan    Plan Home with home health    Patient/Family in Agreement with Plan yes    Plan Comments CM spoke to patint at bedside. Therapy recommends IRF. Patient ambulated 55' with min assist x1 & rolling walker. She declines. Her plan is home with [facility] for SN & PT. Order in [facility]. Son to transport per patient. No other discharge needs noted at this time. CM will continue to follow.    Final Discharge Disposition Code 06 - home with home health care                   Discharge Codes    No documentation.                 Expected Discharge Date and Time       Expected Discharge Date Expected Discharge Time    Nov 7, 2024               Maddison Ignacio RN

## 2024-11-07 NOTE — PLAN OF CARE
Goal Outcome Evaluation:  Plan of Care Reviewed With: patient        Progress: improving          Problem: Adult Inpatient Plan of Care  Goal: Absence of Hospital-Acquired Illness or Injury  Intervention: Identify and Manage Fall Risk  Recent Flowsheet Documentation  Taken 11/6/2024 2000 by Anabel Aguirre RN  Safety Promotion/Fall Prevention: activity supervised  Intervention: Prevent Skin Injury  Recent Flowsheet Documentation  Taken 11/6/2024 2000 by Anabel Aguirre RN  Body Position: position changed independently     Problem: Adult Inpatient Plan of Care  Goal: Absence of Hospital-Acquired Illness or Injury  Intervention: Prevent Skin Injury  Recent Flowsheet Documentation  Taken 11/6/2024 2000 by Anabel Aguirre RN  Body Position: position changed independently     Problem: Skin Injury Risk Increased  Goal: Skin Health and Integrity  Intervention: Optimize Skin Protection  Recent Flowsheet Documentation  Taken 11/6/2024 2000 by Anabel Aguirre RN  Activity Management: up in chair     Problem: Fall Injury Risk  Goal: Absence of Fall and Fall-Related Injury  Intervention: Promote Injury-Free Environment  Recent Flowsheet Documentation  Taken 11/6/2024 2000 by Anabel Aguirre RN  Safety Promotion/Fall Prevention: activity supervised

## 2024-11-07 NOTE — DISCHARGE SUMMARY
[facility]  DISCHARGE SUMMARY    Patient Name: Yanique Webster  : 1940  MRN: [id number]    Date of Admission: 10/30/2024  3:55 PM  Date of Discharge:  2024  Primary Care Physician: Sebas Alicea MD    Consults       Date and Time Order Name Status Description    2024  4:38 PM Inpatient Palliative Care MD Consult      2024  7:25 AM Inpatient Cardiology Consult Completed     10/31/2024  7:47 AM Inpatient Hematology & Oncology Consult Completed     10/31/2024  7:47 AM Inpatient Gastroenterology Consult Completed     10/30/2024  8:37 PM Inpatient Nephrology Consult Completed             Hospital Course     Presenting Problem: F/u anemia and CKD    Active Hospital Problems    Diagnosis  POA    **Symptomatic anemia [D64.9]  Yes    KINDRA (acute kidney injury) [N17.9]  Yes    Anxiety associated with depression [F41.8]  Yes    Paroxysmal atrial fibrillation [I48.0]  Yes    CKD (chronic kidney disease) stage 4, GFR 15-29 ml/min [N18.4]  Yes    Essential hypertension [I10]  Yes    Coronary artery disease involving native coronary artery of native heart without angina pectoris [I25.10]  Yes    COPD (chronic obstructive pulmonary disease) [J44.9]  Yes    GERD (gastroesophageal reflux disease) [K21.9]  Yes    Hyperlipidemia LDL goal <70 [E78.5]  Yes      Resolved Hospital Problems    Diagnosis Date Resolved POA    History of CVA (cerebrovascular accident) [Z86.73] 2024 Not Applicable          Hospital Course:  Yanique Webster is a 84 y.o. female w/ CAD, COPD, HTN, HLD, chronic pain, Takotsubo cardiomyopathy, CKD4, Afib on eliquis; she was admitted 24-24 w/ anemia requiring transfusion, during that say she had EGD  and colo  both without source of bleeding; pt/fam elected to resume oral AC for stroke ppx; she has seen EP in the clinic and is contemplating Watchman device; she developed new SOB and had labs drawn which showed  significant anemia and she was sent to the ED where Hgb was 6.1; she was admitted and transfused blood       Assessment/Plan     Acute/Chronic normocytic anemia, recurrent  Thrombocytopenia   -recent EGD/Odin 8/2024 w/o evidence for bleeding  -iron studies c/w anemia of chronic disease  -s/p 2U PRBC  -second episode of transfusion requiring anemia, noted TCP as well  -Patient was seen by hematology. Likely anemia of chronic disease/renal disease  -Received procrit on 10/31 and 11/07    KINDRA on CKD4  Secondary hyperparathyroidism  -baseline Cr about 2.3; eGFR 20's  -nephrology following.  Creatinine has not improved greatly.  -on day of discharge, patient was cleared by nephrology      pAfib w/ previous use of oral AC  -seen by EP for consideration of Watchman-> recommend OP follow up   -Cardiology was consulted and they have seen and evaluated patient on this admission also.  They do recommend discontinuation of Xarelto in light of anemia.  -Dr. Geronimo recommended on discharge that patient's Xarelto and ASA be held      CAD  HTN  HLD  Hx Takotsubo cardiomyopathy  -most recent EF 56-60%  -Final blood pressure is well controlled     Valvular heart disease  -ECHO 11/02: normal ejection fraction, mild to moderate aortic regurgitation, mild mitral regurgitation.  Previous echocardiogram shows evidence of tricuspid regurgitation with elevated right ventricular systolic pressure between 45 and 55 mmHg.    Type 2 DM, insulin use  -A1c: 5.0%  -Patient's blood sugars have been well controlled without insulin during hospitalization. Advised patient and her family to hold long acting insulin at this time, monitor blood sugars at home, and follow up with PCP and Endocrinology. At , recommend low dose sliding scale of insulin only.     COPD   - add prn nebs  -Patient has history of heavy smoking but stopped about 20 years ago.     Chronic pain   -continue with Fentanyl 12 mcg TD q72 hrs   -Patient not on Duloxetine during  hospitalization due to CrCl    Severe weakness  -Family wanting to take patient to [facility]     Concern for odontogenic infection   - my colleague started Augmentin on 11/05    GOC 11/07: Spoke with patient's son, J Luis Whitt and wife over the phone. Permission obtained from the patient to discuss medical care plan.  We discussed labs from today as well as patient has received 2 doses of Procrit while being admitted.  We also discussed likelihood of dialysis given no significant improvement in renal function.  At this time they wish to pursue inpatient rehab at [facility] with the understanding that if patient does not significantly improved the alternative will be pursuing comfort care/quality life focus.  Family wishes for palliative care consult to help assist with overall goals of care and symptom management.  As outlined above I did discuss with palliative care nurse practitioner, Rayna Harris who agrees with opioid rotation.  Family in agreement with plan and also in agreement of holding her long-acting insulin at this time.     Discharge Follow Up Recommendations for outpatient labs/diagnostics:  Follow up with ENT as scheduled appointment   Follow up with PCP in one week   Follow up with nephrology in 1 weeks   Follow up with EP as scheduled for Watchman   Recommend follow up with dentist regarding tooth pain within 1-2 weeks     Day of Discharge     HPI:   Sitting comfortably in chair she is eating well. Did spill coffee on her self accidentally this morning.  Patient aware of plan to go to [facility].  Again nephrology cleared patient for discharge and for outpatient follow-up.  She has no other concerns this morning.  She reports her pain is controlled.      Vital Signs:   Temp:  [97 °F (36.1 °C)-97.9 °F (36.6 °C)] 97.9 °F (36.6 °C)  Heart Rate:  [63-90] 90  Resp:  [16-18] 16  BP: (137-146)/(55-86) 137/63      Physical Exam:  Constitutional: No acute distress, awake, alert  HENT: NCAT, mucous  membranes moist  Respiratory: Clear to auscultation bilaterally, respiratory effort normal   Cardiovascular: RRR, no murmurs, rubs, or gallops  Gastrointestinal: Positive bowel sounds, soft, nontender, nondistended  Musculoskeletal: No bilateral ankle edema  Psychiatric: Appropriate affect, cooperative  Neurologic: Oriented x 3, speech clear  Skin: No rashes    Pertinent  and/or Most Recent Results     LAB RESULTS:      Lab 11/08/24  0447 11/07/24  0934 11/06/24  0830   WBC 4.82 4.59 5.86   HEMOGLOBIN 8.4* 8.4* 8.4*   HEMATOCRIT 26.9* 27.1* 27.3*   PLATELETS 134* 141 131*   MCV 94.1 94.4 93.8         Lab 11/08/24  0447 11/07/24  1240 11/07/24  0436 11/06/24  0830 11/05/24  0921 11/04/24  1306 11/02/24  0802   SODIUM 136  --  141 138 139 141 138   POTASSIUM 4.6 5.1 5.0 4.6 4.5 4.7 4.7   CHLORIDE 105  --  110* 107 108* 110* 107   CO2 18.0*  --  17.0* 19.0* 21.0* 20.0* 19.0*   ANION GAP 13.0  --  14.0 12.0 10.0 11.0 12.0   BUN 59*  --  56* 56* 55* 53* 58*   CREATININE 3.76*  --  3.78* 3.71* 3.56* 3.62* 3.40*   EGFR 11.4*  --  11.4* 11.6* 12.2* 12.0* 12.9*   GLUCOSE 118*  --  115* 150* 164* 169* 146*   CALCIUM 10.0  --  10.0 9.9 9.7 9.9 9.8   MAGNESIUM  --   --   --   --   --   --  2.0   PHOSPHORUS  --   --   --   --   --   --  3.7                           Brief Urine Lab Results  (Last result in the past 365 days)        Color   Clarity   Blood   Leuk Est   Nitrite   Protein   CREAT   Urine HCG        08/13/24 2000             52.4         08/13/24 2000 Yellow   Clear   Negative   Negative   Negative   >=300 mg/dL (3+)                 Microbiology Results (last 10 days)       Procedure Component Value - Date/Time    CANDIDA AURIS PCR - Swab, Axilla Right, Axilla Left and Groin [[id number]]  (Normal) Collected: 10/31/24 0100    Lab Status: Final result Specimen: Swab from Axilla Right, Axilla Left and Groin Updated: 11/01/24 1130     JANICE AURIS PCR Not Detected    Narrative:      See scanned report              XR  Chest 1 View    Result Date: 10/30/2024  XR CHEST 1 VW Date of Exam: 10/30/2024 3:59 PM EDT Indication: SOA triage protocol Comparison: 8/16/2024 Findings: Unchanged enlarged cardiac silhouette. Mild diffuse interstitial opacities. Trace bilateral pleural effusions. No pneumothorax. No acute osseous abnormality..     Impression: Enlarged cardiac silhouette with mild pulmonary edema and trace bilateral pleural effusions. Electronically Signed: Adrian Barnett MD  10/30/2024 4:27 PM EDT  Workstation ID: [id number]     Results for orders placed during the hospital encounter of 04/10/24    Duplex Carotid Ultrasound CAR    Interpretation Summary    Right internal carotid artery demonstrates a less than 50% stenosis.    Left internal carotid artery demonstrates a less than 50% stenosis.    Antegrade flow in the vertebral arteries bilaterally.      Results for orders placed during the hospital encounter of 04/10/24    Duplex Carotid Ultrasound CAR    Interpretation Summary    Right internal carotid artery demonstrates a less than 50% stenosis.    Left internal carotid artery demonstrates a less than 50% stenosis.    Antegrade flow in the vertebral arteries bilaterally.      Results for orders placed during the hospital encounter of 10/30/24    Adult Transthoracic Echo Complete W/ Cont if Necessary Per Protocol    Interpretation Summary    Left ventricular systolic function is normal. Estimated left ventricular EF = 60%    Left ventricular wall thickness is consistent with mild concentric hypertrophy.    The left atrial cavity is moderately dilated.    Aortic sclerosis without aortic stenosis.  Mild to moderate aortic insufficiency    Mild mitral regurgitation.      Plan for Follow-up of Pending Labs/Results: Follow up with Nephrology  Pending Labs       Order Current Status    ANCA Panel In process          Discharge Details        Discharge Medications        New Medications        Instructions Start Date    amoxicillin-clavulanate 500-125 MG per tablet  Commonly known as: AUGMENTIN   500 mg, Oral, 2 Times Daily      fentaNYL 12 MCG/HR  Commonly known as: DURAGESIC   1 patch, Transdermal, Every 72 Hours      Insulin Lispro 100 UNIT/ML injection  Commonly known as: humaLOG   Blood Glucose 150-199 mg/dL - 2 units Blood Glucose 200-249 mg/dL - 3 units Blood Glucose 250-299 mg/dL - 4 units Blood Glucose 300-349 mg/dL - 5 units Blood Glucose 350-400 mg/dL - 6 units Blood Glucose Greater Than 400 mg/dL - 7 units & Call Provider      naloxone 4 MG/0.1ML nasal spray  Commonly known as: NARCAN   Call 911. Don't prime. Blunt in 1 nostril for overdose. Repeat in 2-3 minutes in other nostril if no or minimal breathing/responsiveness.      oxyCODONE 5 MG immediate release tablet  Commonly known as: ROXICODONE   5 mg, Oral, Every 4 Hours PRN             Changes to Medications        Instructions Start Date   amLODIPine 10 MG tablet  Commonly known as: NORVASC  What changed:   medication strength  how much to take   10 mg, Oral, Every 24 Hours Scheduled      cloNIDine 0.1 MG tablet  Commonly known as: CATAPRES  What changed: when to take this   0.1 mg, Oral, Every 6 Hours Scheduled      torsemide 20 MG tablet  Commonly known as: DEMADEX   20 mg, Oral, Daily PRN             Continue These Medications        Instructions Start Date   Accu-Chek Guide w/Device kit   1 kit, Does not apply, See Admin Instructions, Use to check blood sugar once daily.  Dx E11.65      atorvastatin 80 MG tablet  Commonly known as: LIPITOR   80 mg, Daily      BD Pen Needle Veda 2nd Gen 32G X 4 MM misc  Generic drug: Insulin Pen Needle   1 each, Does not apply, Daily      Blood Glucose Test strip   Check blood sugar 1 time a day dx e11.65      calcitriol 0.5 MCG capsule  Commonly known as: ROCALTROL   0.5 mcg, Oral, Daily      carvedilol 25 MG tablet  Commonly known as: COREG   25 mg, Oral, Every 12 Hours Scheduled      doxazosin 4 MG tablet  Commonly known  as: CARDURA       ferrous sulfate 325 (65 FE) MG tablet   325 mg, Oral, Daily With Breakfast      hydrALAZINE 100 MG tablet  Commonly known as: APRESOLINE   100 mg, Oral, Every 8 Hours Scheduled      ipratropium-albuterol 0.5-2.5 mg/3 ml nebulizer  Commonly known as: DUO-NEB   3 mL, Nebulization, Every 4 Hours PRN      Lancets 33G misc   1 each, Does not apply, Daily, Dx e11.65      pantoprazole 40 MG EC tablet  Commonly known as: PROTONIX   40 mg, Oral, 2 Times Daily      probiotic capsule capsule   1 capsule, Daily      sodium bicarbonate 650 MG tablet   650 mg, Oral, 2 Times Daily      vitamin B-12 1000 MCG tablet  Commonly known as: CYANOCOBALAMIN   1,000 mcg, Oral, Daily             Stop These Medications      aspirin 81 MG EC tablet     DULoxetine 60 MG capsule  Commonly known as: CYMBALTA     Insulin Glargine 100 UNIT/ML injection pen  Commonly known as: LANTUS SOLOSTAR     Morphine 15 MG 12 hr tablet  Commonly known as: MS CONTIN     rivaroxaban 15 MG tablet  Commonly known as: Xarelto     vitamin D3 125 MCG (5000 UT) capsule capsule              Allergies   Allergen Reactions    Latex Rash     Not an immediate reaction    Nickel Rash    Penicillins Rash     Patient has tolerated amoxicillin in the past. Had PCN reaction of hives when she was 15.    Penicillins Rash     unk         Diet:  Hospital:  Diet Order   Procedures    Diet: Cardiac, Diabetic; Healthy Heart (2-3 Na+); Consistent Carbohydrate; Fluid Consistency: Thin (IDDSI 0)           Restrictions or Other Recommendations:  Patient declined rehab. Patient to be discharged to        CODE STATUS:    Code Status and Medical Interventions: CPR (Attempt to Resuscitate); Full Support   Ordered at: 10/30/24 1958     Level Of Support Discussed With:    Patient     Code Status (Patient has no pulse and is not breathing):    CPR (Attempt to Resuscitate)     Medical Interventions (Patient has pulse or is breathing):    Full Support       Future Appointments    Date Time Provider Department Center   12/27/2024 11:30 AM Seymour Harrington MD [facility]   1/30/2025  3:15 PM Axel Ribeiro MD [facility]   4/28/2025 11:45 AM Emilio Regalado MD [facility]   5/12/2025 11:45 AM Judith Morin APRN [facility]       Additional Instructions for the Follow-ups that You Need to Schedule       Ambulatory Referral to Home Health   As directed      Face to Face Visit Date: 11/6/2024   Follow-up provider for Plan of Care?: I treated the patient in an acute care facility and will not continue treatment after discharge.   Follow-up provider: AXEL BEE [[id number]]   Reason/Clinical Findings: symptomatic anemia   Describe mobility limitations that make leaving home difficult: impaired functional mobility, balance, gait and mobility.   Nursing/Therapeutic Services Requested: Skilled Nursing Physical Therapy   Skilled nursing orders: Medication education Cardiopulmonary assessments Neurovascular assessments   PT orders: Therapeutic exercise Gait Training Transfer training Strengthening Home safety assessment   Weight Bearing Status: As Tolerated   Frequency: 1 Week 1        Discharge Follow-up with Specialty: Follow-up with CHRIS Kurtz or Dr. Geronimo in the cardiology clinic in 6 months   As directed      Specialty: Follow-up with CHRIS Kurtz or Dr. Geronimo in the cardiology clinic in 6 months                      Savanah Yancey MD  11/08/24      Time Spent on Discharge:  I spent  35  minutes on this discharge activity which included: face-to-face encounter with the patient, reviewing the data in the system, coordination of the care with the nursing staff as well as consultants, documentation, and entering orders.

## 2024-11-07 NOTE — PLAN OF CARE
Goal Outcome Evaluation:  Plan of Care Reviewed With: patient           Outcome Evaluation: Pt min A LBD,  setup to comb hair, ind self feeding,  CGA to ambulate 16 ft with RW.  Pt with good effort , but continues below baseline d/t weakness and decr activity tolerance.  Recommend IP rehab upon d/c for best outcome.

## 2024-11-07 NOTE — THERAPY TREATMENT NOTE
Patient Name: Yanique Webster  : 1940    MRN: [id number]                              Today's Date: 2024       Admit Date: 10/30/2024    Visit Dx:     ICD-10-CM ICD-9-CM   1. KINDRA (acute kidney injury)  N17.9 584.9   2. Symptomatic anemia  D64.9 285.9   3. Paroxysmal atrial fibrillation  I48.0 427.31   4. Takotsubo cardiomyopathy  I51.81 429.83     Patient Active Problem List   Diagnosis    Fibromyalgia    PVD (peripheral vascular disease)    Type 2 diabetes mellitus    Diabetic gastroparesis    Takotsubo cardiomyopathy    Elevated serum creatinine    Hyperlipidemia LDL goal <70    COPD (chronic obstructive pulmonary disease)    Obesity    Osteoarthritis    Chronic pain    GERD (gastroesophageal reflux disease)    Gout    Chronic joint pain    Coronary artery disease involving native coronary artery of native heart without angina pectoris    Nonrheumatic aortic valve insufficiency    Altered mental status    Essential hypertension    History of CVA (cerebrovascular accident)    CKD (chronic kidney disease) stage 4, GFR 15-29 ml/min    Hypertensive emergency    Status post placement of implantable loop recorder    Paroxysmal atrial fibrillation    Acute exacerbation of COPD with asthma    Encounter for loop recorder at end of battery life    Closed left hip fracture    Anxiety associated with depression    Anemia, chronic disease    Surgery follow-up    Borderline abnormal TFTs    Acute blood loss anemia    Secondary hyperparathyroidism    Symptomatic anemia    KINDRA (acute kidney injury)     Past Medical History:   Diagnosis Date    Blurry vision     Chronic joint pain     Chronic pain     COPD (chronic obstructive pulmonary disease)     Coronary artery disease     Diabetic gastroparesis 2016    Esophageal stricture     s/p dilation in     GERD (gastroesophageal reflux disease)     Gout     Headache     secondary to hypertension    Hyperlipidemia     Hypertension     Long term current use of  insulin     Neuropathy     Neuropathy due to type 2 diabetes mellitus     Obesity     Osteoarthritis     Pericarditis 2008    Stroke 03/2019    Type 2 diabetes mellitus      Past Surgical History:   Procedure Laterality Date    BRONCHOSCOPY N/A 8/23/2016    Procedure: BRONCHOSCOPY BIOPSY AT BEDSIDE;  Surgeon: Mookie Willard MD;  Location:  [facility];  Service:     CARDIAC CATHETERIZATION N/A 8/30/2016    Procedure: Left Heart Cath;  Surgeon: Steve Geronimo MD;  Location:  [facility];  Service:     CARDIAC CATHETERIZATION N/A 8/30/2016    Procedure: Right Heart Cath;  Surgeon: Steve Geronimo MD;  Location:  [facility];  Service:     CARDIAC ELECTROPHYSIOLOGY PROCEDURE N/A 7/2/2019    Procedure: Loop insertion;  Surgeon: Steve Geronimo MD;  Location:  [facility];  Service: Cardiovascular    CARDIAC ELECTROPHYSIOLOGY PROCEDURE N/A 9/26/2023    Procedure: Loop recorder removal;  Surgeon: Steve Geronimo MD;  Location:  [facility];  Service: Cardiovascular;  Laterality: N/A;    CATARACT EXTRACTION      COLONOSCOPY N/A 8/16/2024    Procedure: COLONOSCOPY;  Surgeon: Brunner, Mark I, MD;  Location:  [facility];  Service: Gastroenterology;  Laterality: N/A;    ENDOSCOPY N/A 8/14/2024    Procedure: ESOPHAGOGASTRODUODENOSCOPY;  Surgeon: Brunner, Mark I, MD;  Location:  [facility];  Service: Gastroenterology;  Laterality: N/A;    ESOPHAGEAL DILATATION  2007    HERNIA REPAIR      HIP CANNULATED SCREW PLACEMENT Left 1/2/2024    Procedure: HIP CANNULATED SCREW PLACEMENT LEFT;  Surgeon: Seymour Harrington MD;  Location:  [facility];  Service: Orthopedics;  Laterality: Left;    HYSTERECTOMY  1998    WRIST FRACTURE SURGERY Left       General Information       Row Name 11/07/24 0740          OT Time and Intention    Document Type therapy note (daily note)  -AC     Mode of Treatment occupational therapy  -AC       Row Name 11/07/24 0740          General  Information    Patient Profile Reviewed yes  -     Existing Precautions/Restrictions fall;other (see comments)  neuropathy B feet and hands, chronic pain  -     Barriers to Rehab medically complex;previous functional deficit  -       Row Name 11/07/24 0740          Cognition    Orientation Status (Cognition) oriented x 4  -       Row Name 11/07/24 0740          Safety Issues/Impairments Affecting Functional Mobility    Safety Issues Affecting Function (Mobility) awareness of need for assistance;insight into deficits/self-awareness  -     Impairments Affecting Function (Mobility) balance;endurance/activity tolerance;pain;strength;other (see comments)  -               User Key  (r) = Recorded By, (t) = Taken By, (c) = Cosigned By      Initials Name Provider Type     Marcella Drake OT Occupational Therapist                     Mobility/ADL's       Row Name 11/07/24 0825          Bed Mobility    Bed Mobility supine-sit  -     Supine-Sit Metter (Bed Mobility) verbal cues;minimum assist (75% patient effort)  -     Assistive Device (Bed Mobility) bed rails;head of bed elevated  -       Row Name 11/07/24 0825          Transfers    Transfers sit-stand transfer  -       Row Name 11/07/24 0825          Sit-Stand Transfer    Sit-Stand Metter (Transfers) verbal cues;contact guard  -     Assistive Device (Sit-Stand Transfers) walker, front-wheeled  -       Row Name 11/07/24 0825          Functional Mobility    Functional Mobility- Ind. Level 1 person;verbal cues required;contact guard assist  -     Functional Mobility- Device walker, front-wheeled  -     Functional Mobility-Distance (Feet) 16  -       Row Name 11/07/24 0825          Activities of Daily Living    BADL Assessment/Intervention lower body dressing;grooming;feeding  -       Row Name 11/07/24 0825          Lower Body Dressing Assessment/Training    Metter Level (Lower Body Dressing) doff;don;shoes/slippers;minimum  assist (75% patient effort);verbal cues  -AC     Position (Lower Body Dressing) edge of bed sitting  -AC       Row Name 11/07/24 0825          Grooming Assessment/Training    Gaston Level (Grooming) hair care, combing/brushing;set up  -AC     Position (Grooming) unsupported sitting  -AC       Row Name 11/07/24 0825          Self-Feeding Assessment/Training    Gaston Level (Feeding) feeding skills  -AC     Position (Feeding) supported sitting  -AC               User Key  (r) = Recorded By, (t) = Taken By, (c) = Cosigned By      Initials Name Provider Type    Marcella Mcintyre, OT Occupational Therapist                   Obj/Interventions       Row Name 11/07/24 0740          Balance    Balance Assessment sitting static balance;sitting dynamic balance;standing static balance;standing dynamic balance  -AC     Static Sitting Balance standby assist  -AC     Dynamic Sitting Balance contact guard  -AC     Position, Sitting Balance unsupported;sitting edge of bed  -AC     Static Standing Balance verbal cues;contact guard  -AC     Dynamic Standing Balance verbal cues;contact guard  -AC     Position/Device Used, Standing Balance supported;walker, front-wheeled  -AC               User Key  (r) = Recorded By, (t) = Taken By, (c) = Cosigned By      Initials Name Provider Type    Marcella Mcintyre, OT Occupational Therapist                   Goals/Plan    No documentation.                  Clinical Impression       Row Name 11/07/24 0828          Pain Assessment    Pretreatment Pain Rating 4/10  -AC     Posttreatment Pain Rating 4/10  -AC     Pain Location foot;hand  -AC     Pain Side/Orientation bilateral  -AC     Pain Management Interventions exercise or physical activity utilized  -AC     Response to Pain Interventions activity participation with tolerable pain  -AC       Row Name 11/07/24 0828          Plan of Care Review    Plan of Care Reviewed With patient  -AC     Outcome Evaluation Pt min A LBD,  setup to  comb hair, ind self feeding,  CGA to ambulate 16 ft with RW.  Pt with good effort , but continues below baseline d/t weakness and decr activity tolerance.  Recommend IP rehab upon d/c for best outcome.  -AC       Row Name 11/07/24 0828          Vital Signs    Pre Systolic BP Rehab 132  -AC     Pre Treatment Diastolic BP 88  -AC     Pretreatment Heart Rate (beats/min) 56  -AC     Posttreatment Heart Rate (beats/min) 61  -AC     Pre SpO2 (%) 95  -AC     O2 Delivery Pre Treatment room air  -AC     O2 Delivery Intra Treatment room air  -AC     Post SpO2 (%) 94  -AC     O2 Delivery Post Treatment room air  -AC     Pre Patient Position Supine  -AC     Post Patient Position Sitting  -AC       Row Name 11/07/24 0828          Positioning and Restraints    Pre-Treatment Position in bed  -AC     Post Treatment Position chair  -AC     In Chair notified nsg;reclined;call light within reach;encouraged to call for assist;exit alarm on  -AC               User Key  (r) = Recorded By, (t) = Taken By, (c) = Cosigned By      Initials Name Provider Type    AC Marcella Drake, OT Occupational Therapist                   Outcome Measures       Row Name 11/07/24 0831          How much help from another is currently needed...    Putting on and taking off regular lower body clothing? 3  -AC     Bathing (including washing, rinsing, and drying) 3  -AC     Toileting (which includes using toilet bed pan or urinal) 3  -AC     Putting on and taking off regular upper body clothing 3  -AC     Taking care of personal grooming (such as brushing teeth) 3  -AC     Eating meals 4  -AC     AM-PAC 6 Clicks Score (OT) 19  -AC       Row Name 11/07/24 0400          How much help from another person do you currently need...    Turning from your back to your side while in flat bed without using bedrails? 3  -DZ     Moving from lying on back to sitting on the side of a flat bed without bedrails? 3  -DZ     Moving to and from a bed to a chair (including a  wheelchair)? 3  -DZ     Standing up from a chair using your arms (e.g., wheelchair, bedside chair)? 3  -DZ     Climbing 3-5 steps with a railing? 2  -DZ     To walk in hospital room? 3  -DZ     AM-PAC 6 Clicks Score (PT) 17  -DZ     Highest Level of Mobility Goal 5 --> Static standing  -DZ       Row Name 11/07/24 0831          Functional Assessment    Outcome Measure Options AM-PAC 6 Clicks Daily Activity (OT)  -AC               User Key  (r) = Recorded By, (t) = Taken By, (c) = Cosigned By      Initials Name Provider Type    AC Marcella Drake, OT Occupational Therapist    Anabel Brown, RN Registered Nurse                    Occupational Therapy Education       Title: PT OT SLP Therapies (In Progress)       Topic: Occupational Therapy (In Progress)       Point: ADL training (In Progress)       Description:   Instruct learner(s) on proper safety adaptation and remediation techniques during self care or transfers.   Instruct in proper use of assistive devices.                  Learning Progress Summary            Patient Acceptance, E, NR by JUDE at 11/7/2024 0831    Acceptance, E,D, NR by RENATE at 11/1/2024 0959                      Point: Home exercise program (Not Started)       Description:   Instruct learner(s) on appropriate technique for monitoring, assisting and/or progressing therapeutic exercises/activities.                  Learner Progress:  Not documented in this visit.              Point: Precautions (In Progress)       Description:   Instruct learner(s) on prescribed precautions during self-care and functional transfers.                  Learning Progress Summary            Patient Acceptance, E,D, NR by RENATE at 11/1/2024 0959                      Point: Body mechanics (In Progress)       Description:   Instruct learner(s) on proper positioning and spine alignment during self-care, functional mobility activities and/or exercises.                  Learning Progress Summary            Patient Acceptance,  E,D, NR by RENATE at 11/1/2024 0959                                      User Key       Initials Effective Dates Name Provider Type Discipline     02/03/23 -  Marcella Drake, OT Occupational Therapist OT    RENATE 06/16/21 -  Alexa Lainez OT Occupational Therapist OT                  OT Recommendation and Plan     Plan of Care Review  Plan of Care Reviewed With: patient  Outcome Evaluation: Pt min A LBD,  setup to comb hair, ind self feeding,  CGA to ambulate 16 ft with RW.  Pt with good effort , but continues below baseline d/t weakness and decr activity tolerance.  Recommend IP rehab upon d/c for best outcome.     Time Calculation:         Time Calculation- OT       Row Name 11/07/24 0740             Time Calculation- OT    OT Start Time 0740  -AC      OT Received On 11/07/24  -AC      OT Goal Re-Cert Due Date 11/11/24  -         Timed Charges    81019 - OT Therapeutic Activity Minutes 20  -AC      79659 - OT Self Care/Mgmt Minutes 20  -AC         Total Minutes    Timed Charges Total Minutes 40  -AC       Total Minutes 40  -AC                User Key  (r) = Recorded By, (t) = Taken By, (c) = Cosigned By      Initials Name Provider Type    AC Marcella Drake, OT Occupational Therapist                  Therapy Charges for Today       Code Description Service Date Service Provider Modifiers Qty    88512825405 HC OT THERAPEUTIC ACT EA 15 MIN 11/7/2024 Marcella Drake, OT GO 2    71151347133 HC OT SELF CARE/MGMT/TRAIN EA 15 MIN 11/7/2024 Marcella Drake OT GO 1                 Marcella Drake OT  11/7/2024

## 2024-11-07 NOTE — PLAN OF CARE
Goal Outcome Evaluation:  Plan of Care Reviewed With: patient        Progress: improving  Outcome Evaluation: patient ambulated 55' with Min assist x1, rolling walker for support and close follow with chair for safety. Verbal cues for increased step length, upright posture and staying close to walker. Mild unsteadiness and occassional assist to guide walker. Further mobility limited by weakness and fatigue. Recommend IRF at D/C for best functional outcome.

## 2024-11-07 NOTE — THERAPY TREATMENT NOTE
Patient Name: Yanique Webster  : 1940    MRN: [id number]                              Today's Date: 2024       Admit Date: 10/30/2024    Visit Dx:     ICD-10-CM ICD-9-CM   1. KINDRA (acute kidney injury)  N17.9 584.9   2. Symptomatic anemia  D64.9 285.9   3. Paroxysmal atrial fibrillation  I48.0 427.31   4. Takotsubo cardiomyopathy  I51.81 429.83     Patient Active Problem List   Diagnosis    Fibromyalgia    PVD (peripheral vascular disease)    Type 2 diabetes mellitus    Diabetic gastroparesis    Takotsubo cardiomyopathy    Elevated serum creatinine    Hyperlipidemia LDL goal <70    COPD (chronic obstructive pulmonary disease)    Obesity    Osteoarthritis    Chronic pain    GERD (gastroesophageal reflux disease)    Gout    Chronic joint pain    Coronary artery disease involving native coronary artery of native heart without angina pectoris    Nonrheumatic aortic valve insufficiency    Altered mental status    Essential hypertension    History of CVA (cerebrovascular accident)    CKD (chronic kidney disease) stage 4, GFR 15-29 ml/min    Hypertensive emergency    Status post placement of implantable loop recorder    Paroxysmal atrial fibrillation    Acute exacerbation of COPD with asthma    Encounter for loop recorder at end of battery life    Closed left hip fracture    Anxiety associated with depression    Anemia, chronic disease    Surgery follow-up    Borderline abnormal TFTs    Acute blood loss anemia    Secondary hyperparathyroidism    Symptomatic anemia    KINDRA (acute kidney injury)     Past Medical History:   Diagnosis Date    Blurry vision     Chronic joint pain     Chronic pain     COPD (chronic obstructive pulmonary disease)     Coronary artery disease     Diabetic gastroparesis 2016    Esophageal stricture     s/p dilation in     GERD (gastroesophageal reflux disease)     Gout     Headache     secondary to hypertension    Hyperlipidemia     Hypertension     Long term current use of  insulin     Neuropathy     Neuropathy due to type 2 diabetes mellitus     Obesity     Osteoarthritis     Pericarditis 2008    Stroke 03/2019    Type 2 diabetes mellitus      Past Surgical History:   Procedure Laterality Date    BRONCHOSCOPY N/A 8/23/2016    Procedure: BRONCHOSCOPY BIOPSY AT BEDSIDE;  Surgeon: Mookie Willard MD;  Location:  [facility];  Service:     CARDIAC CATHETERIZATION N/A 8/30/2016    Procedure: Left Heart Cath;  Surgeon: Steve Geronimo MD;  Location:  [facility];  Service:     CARDIAC CATHETERIZATION N/A 8/30/2016    Procedure: Right Heart Cath;  Surgeon: Steve Geronimo MD;  Location:  [facility];  Service:     CARDIAC ELECTROPHYSIOLOGY PROCEDURE N/A 7/2/2019    Procedure: Loop insertion;  Surgeon: Steve Geronimo MD;  Location:  [facility];  Service: Cardiovascular    CARDIAC ELECTROPHYSIOLOGY PROCEDURE N/A 9/26/2023    Procedure: Loop recorder removal;  Surgeon: Steve Geronimo MD;  Location:  [facility];  Service: Cardiovascular;  Laterality: N/A;    CATARACT EXTRACTION      COLONOSCOPY N/A 8/16/2024    Procedure: COLONOSCOPY;  Surgeon: Brunner, Mark I, MD;  Location:  [facility];  Service: Gastroenterology;  Laterality: N/A;    ENDOSCOPY N/A 8/14/2024    Procedure: ESOPHAGOGASTRODUODENOSCOPY;  Surgeon: Brunner, Mark I, MD;  Location:  [facility];  Service: Gastroenterology;  Laterality: N/A;    ESOPHAGEAL DILATATION  2007    HERNIA REPAIR      HIP CANNULATED SCREW PLACEMENT Left 1/2/2024    Procedure: HIP CANNULATED SCREW PLACEMENT LEFT;  Surgeon: Seymour Harrington MD;  Location:  [facility];  Service: Orthopedics;  Laterality: Left;    HYSTERECTOMY  1998    WRIST FRACTURE SURGERY Left       General Information       Row Name 11/07/24 1107          Physical Therapy Time and Intention    Document Type therapy note (daily note)  -AS     Mode of Treatment physical therapy  -AS       Row Name 11/07/24 1104           General Information    Patient Profile Reviewed yes  -AS     Existing Precautions/Restrictions fall;other (see comments)  neuropathy B feet/hands, chronic pain  -AS     Barriers to Rehab medically complex;previous functional deficit  -AS       Row Name 11/07/24 1107          Cognition    Orientation Status (Cognition) oriented x 4  -AS       Row Name 11/07/24 1107          Safety Issues/Impairments Affecting Functional Mobility    Safety Issues Affecting Function (Mobility) awareness of need for assistance;insight into deficits/self-awareness;positioning of assistive device;sequencing abilities;safety precautions follow-through/compliance  -AS     Impairments Affecting Function (Mobility) balance;endurance/activity tolerance;pain;strength;other (see comments)  -AS     Comment, Safety Issues/Impairments (Mobility) alert and following commands  -AS               User Key  (r) = Recorded By, (t) = Taken By, (c) = Cosigned By      Initials Name Provider Type    AS Terri Avila PTA Physical Therapist Assistant                   Mobility       Row Name 11/07/24 1108          Bed Mobility    Comment, (Bed Mobility) UIC pre/post tx  -AS       Row Name 11/07/24 1108          Transfers    Comment, (Transfers) ceus for hand placement  -AS       Row Name 11/07/24 1108          Bed-Chair Transfer    Bed-Chair [place] (Transfers) verbal cues;contact guard;1 person assist  -AS     Assistive Device (Bed-Chair Transfers) walker, front-wheeled  -AS     Comment, (Bed-Chair Transfer) chair>BSC  -AS       Row Name 11/07/24 1108          Sit-Stand Transfer    Sit-Stand [place] (Transfers) verbal cues;contact guard;minimum assist (75% patient effort);1 person assist  -AS     Assistive Device (Sit-Stand Transfers) walker, front-wheeled  -AS     Comment, (Sit-Stand Transfer) cues for hand and feet placement  -AS       Row Name 11/07/24 1108          Gait/Stairs (Locomotion)    [place] Level (Gait) verbal cues;minimum  assist (75% patient effort);1 person assist;1 person to manage equipment  -AS     Assistive Device (Gait) walker, front-wheeled  -AS     Distance in Feet (Gait) 55  -AS     Deviations/Abnormal Patterns (Gait) bilateral deviations;myke decreased;gait speed decreased;stride length decreased;base of support, narrow  -AS     Bilateral Gait Deviations forward flexed posture;heel strike decreased  -AS     Comment, (Gait/Stairs) patient ambulated 55' with Min assist x1, rolling walker for support and close follow with chair for safety. Verbal cues for increased step length, upright posture and staying close to walker. Mild unsteadiness and occassional assist to guide walker. Further mobility limited by weakness and fatigue.  -AS               User Key  (r) = Recorded By, (t) = Taken By, (c) = Cosigned By      Initials Name Provider Type    AS Terri Avila PTA Physical Therapist Assistant                   Obj/Interventions       Row Name 11/07/24 1111          Motor Skills    Therapeutic Exercise knee;ankle  -AS       Row Name 11/07/24 1111          Knee (Therapeutic Exercise)    Knee (Therapeutic Exercise) strengthening exercise  -AS     Knee Strengthening (Therapeutic Exercise) bilateral;marching while seated;LAQ (long arc quad);sitting;10 repetitions  -AS       Row Name 11/07/24 1111          Ankle (Therapeutic Exercise)    Ankle (Therapeutic Exercise) AROM (active range of motion)  -AS     Ankle AROM (Therapeutic Exercise) bilateral;dorsiflexion;plantarflexion;sitting;10 repetitions  -AS       Row Name 11/07/24 1111          Balance    Dynamic Standing Balance verbal cues;minimal assist;1-person assist;1 person to manage equipment  -AS     Position/Device Used, Standing Balance supported;walker, front-wheeled  -AS     Comment, Balance mild unsteadiness  -AS               User Key  (r) = Recorded By, (t) = Taken By, (c) = Cosigned By      Initials Name Provider Type    AS Terri Avila PTA Physical  Therapist Assistant                   Goals/Plan    No documentation.                  Clinical Impression       Row Name 11/07/24 1112          Pain    Pretreatment Pain Rating 0/10 - no pain  -AS     Posttreatment Pain Rating 4/10  -AS     Pain Side/Orientation generalized  -AS     Pain Management Interventions exercise or physical activity utilized  -AS     Response to Pain Interventions activity participation with tolerable pain  -AS       Row Name 11/07/24 1112          Plan of Care Review    Plan of Care Reviewed With patient  -AS     Progress improving  -AS     Outcome Evaluation patient ambulated 55' with Min assist x1, rolling walker for support and close follow with chair for safety. Verbal cues for increased step length, upright posture and staying close to walker. Mild unsteadiness and occassional assist to guide walker. Further mobility limited by weakness and fatigue. Recommend IRF at D/C for best functional outcome.  -AS       Row Name 11/07/24 1112          Positioning and Restraints    Pre-Treatment Position sitting in chair/recliner  -AS     Post Treatment Position chair  -AS     In Chair reclined;call light within reach;encouraged to call for assist;exit alarm on;waffle cushion;legs elevated  -AS               User Key  (r) = Recorded By, (t) = Taken By, (c) = Cosigned By      Initials Name Provider Type    AS Terri Avila, SHO Physical Therapist Assistant                   Outcome Measures       Row Name 11/07/24 1112 11/07/24 0400       How much help from another person do you currently need...    Turning from your back to your side while in flat bed without using bedrails? 3  -AS 3  -DZ    Moving from lying on back to sitting on the side of a flat bed without bedrails? 3  -AS 3  -DZ    Moving to and from a bed to a chair (including a wheelchair)? 3  -AS 3  -DZ    Standing up from a chair using your arms (e.g., wheelchair, bedside chair)? 3  -AS 3  -DZ    Climbing 3-5 steps with a  railing? 2  -AS 2  -DZ    To walk in hospital room? 3  -AS 3  -DZ    AM-PAC 6 Clicks Score (PT) 17  -AS 17  -DZ    Highest Level of Mobility Goal 5 --> Static standing  -AS 5 --> Static standing  -DZ      Row Name 11/07/24 1112 11/07/24 0831       Functional Assessment    Outcome Measure Options AM-PAC 6 Clicks Basic Mobility (PT)  -AS AM-PAC 6 Clicks Daily Activity (OT)  -AC              User Key  (r) = Recorded By, (t) = Taken By, (c) = Cosigned By      Initials Name Provider Type    AC Marcella Drake OT Occupational Therapist    AS Terri Avila, PTA Physical Therapist Assistant    Anabel Brown RN Registered Nurse                                 Physical Therapy Education       Title: PT OT SLP Therapies (In Progress)       Topic: Physical Therapy (In Progress)       Point: Mobility training (In Progress)       Learning Progress Summary            Patient Acceptance, E, NR by AS at 11/7/2024 1113    Acceptance, E, NR by AS at 11/4/2024 0952    Acceptance, E, NR by TM at 11/1/2024 1313                      Point: Home exercise program (In Progress)       Learning Progress Summary            Patient Acceptance, E, NR by AS at 11/7/2024 1113    Acceptance, E, NR by AS at 11/4/2024 0952                      Point: Body mechanics (In Progress)       Learning Progress Summary            Patient Acceptance, E, NR by AS at 11/7/2024 1113    Acceptance, E, NR by AS at 11/4/2024 0952    Acceptance, E, NR by TM at 11/1/2024 1313                      Point: Precautions (In Progress)       Learning Progress Summary            Patient Acceptance, E, NR by AS at 11/7/2024 1113    Acceptance, E, NR by AS at 11/4/2024 0952    Acceptance, E, NR by TM at 11/1/2024 1313                                      User Key       Initials Effective Dates Name Provider Type Discipline    AS 04/28/23 -  Terri Avila, PTA Physical Therapist Assistant PT    TM 08/13/24 -  Marciano Mcgraw PT Student PT Student PT                   PT Recommendation and Plan     Progress: improving  Outcome Evaluation: patient ambulated 55' with Min assist x1, rolling walker for support and close follow with chair for safety. Verbal cues for increased step length, upright posture and staying close to walker. Mild unsteadiness and occassional assist to guide walker. Further mobility limited by weakness and fatigue. Recommend IRF at D/C for best functional outcome.     Time Calculation:         PT Charges       Row Name 11/07/24 1113             Time Calculation    Start Time 1027  -AS      PT Received On 11/07/24  -AS      PT Goal Re-Cert Due Date 11/11/24  -AS         Timed Charges    19858 - PT Therapeutic Exercise Minutes 10  -AS      47522 - Gait Training Minutes  14  -AS         Total Minutes    Timed Charges Total Minutes 24  -AS       Total Minutes 24  -AS                User Key  (r) = Recorded By, (t) = Taken By, (c) = Cosigned By      Initials Name Provider Type    AS Terri Avila PTA Physical Therapist Assistant                  Therapy Charges for Today       Code Description Service Date Service Provider Modifiers Qty    97045650722 HC PT THER PROC EA 15 MIN 11/7/2024 Terri Avila PTA GP 1    82165593986 HC GAIT TRAINING EA 15 MIN 11/7/2024 Terri Avila PTA GP 1    28117161931 HC PT THER SUPP EA 15 MIN 11/7/2024 Terri Avila PTA GP 2            PT G-Codes  Outcome Measure Options: AM-PAC 6 Clicks Basic Mobility (PT)  AM-PAC 6 Clicks Score (PT): 17  AM-PAC 6 Clicks Score (OT): 19       Terri Avila PTA  11/7/2024

## 2024-11-07 NOTE — PROGRESS NOTES
" LOS: 8 days   Patient Care Team:  Sebas Alicea MD as PCP - General (Family Medicine)  Steve Geronimo MD as Consulting Physician (Cardiology)  Emilio Regalado MD as Consulting Physician (Endocrinology)  Axel Ribeiro MD as Consulting Physician (Cardiology)    Chief Complaint: KINRDA on CKD stage IV   Anemia   SOB    Subjective     Slow uptrend in serum cr 3.5>3.71>3.78. Clinically patient is stable. Patient has advance CKD at baseline small cr variation doesn't signify major change in GFR         History taken from: patient    Objective     Vital Sign Min/Max for last 24 hours  Temp  Min: 97 °F (36.1 °C)  Max: 97.5 °F (36.4 °C)   BP  Min: 117/44  Max: 150/64   Pulse  Min: 60  Max: 65   Resp  Min: 16  Max: 18   SpO2  Min: 96 %  Max: 98 %   No data recorded   Weight  Min: 86.9 kg (191 lb 8 oz)  Max: 86.9 kg (191 lb 8 oz)     Flowsheet Rows      Flowsheet Row First Filed Value   Admission Height 162.6 cm (64\") Documented at 10/30/2024 1632   Admission Weight 83.5 kg (184 lb) Documented at 10/30/2024 1632            I/O this shift:  In: 240 [P.O.:240]  Out: -   I/O last 3 completed shifts:  In: 360 [P.O.:360]  Out: 1050 [Urine:1050]    Objective:  General Appearance:  Comfortable.    Vital signs: (most recent): Blood pressure 134/57, pulse 60, temperature 97.1 °F (36.2 °C), temperature source Oral, resp. rate 18, height 162.6 cm (64.02\"), weight 86.9 kg (191 lb 8 oz), SpO2 96%.  Vital signs are normal.    Output: Producing urine.    HEENT: Normal HEENT exam.    Lungs:  Normal effort and normal respiratory rate.  Breath sounds clear to auscultation.  She is not in respiratory distress.  No decreased breath sounds.    Heart: Normal rate.  Regular rhythm.  S1 normal and S2 normal.  No murmur or gallop.   Abdomen: Abdomen is soft.  Bowel sounds are normal.     Extremities: Normal range of motion.  There is no dependent edema or local swelling.    Pulses: Distal pulses are intact.    Neurological: Patient is " "alert.                Results Review:     I reviewed the patient's new clinical results.    WBC WBC   Date Value Ref Range Status   11/07/2024 4.59 3.40 - 10.80 10*3/mm3 Final   11/06/2024 5.86 3.40 - 10.80 10*3/mm3 Final        HGB Hemoglobin   Date Value Ref Range Status   11/07/2024 8.4 (L) 12.0 - 15.9 g/dL Final   11/06/2024 8.4 (L) 12.0 - 15.9 g/dL Final        HCT Hematocrit   Date Value Ref Range Status   11/07/2024 27.1 (L) 34.0 - 46.6 % Final   11/06/2024 27.3 (L) 34.0 - 46.6 % Final        Platlets No results found for: \"LABPLAT\"   MCV MCV   Date Value Ref Range Status   11/07/2024 94.4 79.0 - 97.0 fL Final   11/06/2024 93.8 79.0 - 97.0 fL Final            Sodium Sodium   Date Value Ref Range Status   11/07/2024 141 136 - 145 mmol/L Final   11/06/2024 138 136 - 145 mmol/L Final   11/05/2024 139 136 - 145 mmol/L Final      Potassium Potassium   Date Value Ref Range Status   11/07/2024 5.1 3.5 - 5.2 mmol/L Final   11/07/2024 5.0 3.5 - 5.2 mmol/L Final     Comment:     Slight hemolysis detected by analyzer. Result may be falsely elevated.   11/06/2024 4.6 3.5 - 5.2 mmol/L Final   11/05/2024 4.5 3.5 - 5.2 mmol/L Final      Chloride Chloride   Date Value Ref Range Status   11/07/2024 110 (H) 98 - 107 mmol/L Final   11/06/2024 107 98 - 107 mmol/L Final   11/05/2024 108 (H) 98 - 107 mmol/L Final      CO2 CO2   Date Value Ref Range Status   11/07/2024 17.0 (L) 22.0 - 29.0 mmol/L Final   11/06/2024 19.0 (L) 22.0 - 29.0 mmol/L Final   11/05/2024 21.0 (L) 22.0 - 29.0 mmol/L Final      BUN BUN   Date Value Ref Range Status   11/07/2024 56 (H) 8 - 23 mg/dL Final   11/06/2024 56 (H) 8 - 23 mg/dL Final   11/05/2024 55 (H) 8 - 23 mg/dL Final      Creatinine Creatinine   Date Value Ref Range Status   11/07/2024 3.78 (H) 0.57 - 1.00 mg/dL Final   11/06/2024 3.71 (H) 0.57 - 1.00 mg/dL Final   11/05/2024 3.56 (H) 0.57 - 1.00 mg/dL Final      Calcium Calcium   Date Value Ref Range Status   11/07/2024 10.0 8.6 - 10.5 mg/dL " "Final   11/06/2024 9.9 8.6 - 10.5 mg/dL Final   11/05/2024 9.7 8.6 - 10.5 mg/dL Final      PO4 No results found for: \"CAPO4\"   Albumin No results found for: \"ALBUMIN\"     Magnesium No results found for: \"MG\"     Uric Acid No results found for: \"URICACID\"     Medication Review: yes    Assessment & Plan       Symptomatic anemia    Hyperlipidemia LDL goal <70    COPD (chronic obstructive pulmonary disease)    GERD (gastroesophageal reflux disease)    Coronary artery disease involving native coronary artery of native heart without angina pectoris    Essential hypertension    CKD (chronic kidney disease) stage 4, GFR 15-29 ml/min    Paroxysmal atrial fibrillation    Anxiety associated with depression    KINDRA (acute kidney injury)      Assessment & Plan    KINDRA: Baseline cr btw 1.9-2.2mg/dl. Cr 3.23.5 on this admission. Suspect hemodynamic injury in the setting of anemia.      CKD stage IV: Hx of proteinuric renal disease. UPC 2 gm in the past. Duplex negative in 2016     Anemia: Acute on chronic worsening. ACD due to CKD. SPEP negative in the past. Nadya any bleeding. Started on EDISON weekly. Will continue weekly EDISON as outpatient.      Volume status: No significant edema on examination      HTN: No ANABEL per duplex in 2016.     Proteinuria: UPC 2.8 gm. Etiology unspecified.     Met acidosis: persistent in the setting of CKD. Hold off on bicarb supp at present.      PLAN  KINDRA component due to hemodynamic injury so far no improvement in renal function. Suspect advance CKD at baseline. Discussed with family Son and daughter in law ( Anesthesiologist) Explained about advance renal dysfunction and possibility of dialysis vs conservative care in future  Will f/u with ANCA serology sent on this visit  Continue EDISON weekly as outpatient.   F/u in renal clinic in 2 weeks.  Lokelma x 1 prior to discharge  Renal diet.   No indication for dialysis at present.      I discussed the patients findings and my recommendations with " patient    Zurdo Levine MD  11/07/24  16:05 EST

## 2024-11-08 VITALS
RESPIRATION RATE: 16 BRPM | SYSTOLIC BLOOD PRESSURE: 137 MMHG | BODY MASS INDEX: 32.27 KG/M2 | HEART RATE: 90 BPM | DIASTOLIC BLOOD PRESSURE: 63 MMHG | HEIGHT: 64 IN | TEMPERATURE: 97.4 F | WEIGHT: 189 LBS | OXYGEN SATURATION: 95 %

## 2024-11-08 LAB
ANION GAP SERPL CALCULATED.3IONS-SCNC: 13 MMOL/L (ref 5–15)
BUN SERPL-MCNC: 59 MG/DL (ref 8–23)
BUN/CREAT SERPL: 15.7 (ref 7–25)
CALCIUM SPEC-SCNC: 10 MG/DL (ref 8.6–10.5)
CHLORIDE SERPL-SCNC: 105 MMOL/L (ref 98–107)
CO2 SERPL-SCNC: 18 MMOL/L (ref 22–29)
CREAT SERPL-MCNC: 3.76 MG/DL (ref 0.57–1)
DEPRECATED RDW RBC AUTO: 58.5 FL (ref 37–54)
EGFRCR SERPLBLD CKD-EPI 2021: 11.4 ML/MIN/1.73
ERYTHROCYTE [DISTWIDTH] IN BLOOD BY AUTOMATED COUNT: 17.7 % (ref 12.3–15.4)
GLUCOSE BLDC GLUCOMTR-MCNC: 124 MG/DL (ref 70–130)
GLUCOSE BLDC GLUCOMTR-MCNC: 130 MG/DL (ref 70–130)
GLUCOSE SERPL-MCNC: 118 MG/DL (ref 65–99)
HCT VFR BLD AUTO: 26.9 % (ref 34–46.6)
HGB BLD-MCNC: 8.4 G/DL (ref 12–15.9)
MCH RBC QN AUTO: 29.4 PG (ref 26.6–33)
MCHC RBC AUTO-ENTMCNC: 31.2 G/DL (ref 31.5–35.7)
MCV RBC AUTO: 94.1 FL (ref 79–97)
PLATELET # BLD AUTO: 134 10*3/MM3 (ref 140–450)
PMV BLD AUTO: 11.1 FL (ref 6–12)
POTASSIUM SERPL-SCNC: 4.6 MMOL/L (ref 3.5–5.2)
RBC # BLD AUTO: 2.86 10*6/MM3 (ref 3.77–5.28)
SODIUM SERPL-SCNC: 136 MMOL/L (ref 136–145)
WBC NRBC COR # BLD AUTO: 4.82 10*3/MM3 (ref 3.4–10.8)

## 2024-11-08 PROCEDURE — 82948 REAGENT STRIP/BLOOD GLUCOSE: CPT

## 2024-11-08 PROCEDURE — 80048 BASIC METABOLIC PNL TOTAL CA: CPT | Performed by: FAMILY MEDICINE

## 2024-11-08 PROCEDURE — 85027 COMPLETE CBC AUTOMATED: CPT | Performed by: FAMILY MEDICINE

## 2024-11-08 RX ORDER — FENTANYL 12.5 UG/1
1 PATCH TRANSDERMAL
Status: ON HOLD
Start: 2024-11-08

## 2024-11-08 RX ORDER — INSULIN LISPRO 100 [IU]/ML
INJECTION, SOLUTION INTRAVENOUS; SUBCUTANEOUS
Status: ON HOLD
Start: 2024-11-08

## 2024-11-08 RX ORDER — OXYCODONE HYDROCHLORIDE 5 MG/1
5 TABLET ORAL EVERY 4 HOURS PRN
Start: 2024-11-08 | End: 2024-11-08 | Stop reason: HOSPADM

## 2024-11-08 RX ORDER — AMOXICILLIN AND CLAVULANATE POTASSIUM 500; 125 MG/1; MG/1
1 TABLET, FILM COATED ORAL 2 TIMES DAILY
Start: 2024-11-08 | End: 2024-11-11

## 2024-11-08 RX ADMIN — BENZOCAINE: 0.1 GEL TOPICAL at 10:01

## 2024-11-08 RX ADMIN — CALCITRIOL CAPSULES 0.25 MCG 0.5 MCG: 0.25 CAPSULE ORAL at 10:16

## 2024-11-08 RX ADMIN — HYDRALAZINE HYDROCHLORIDE 100 MG: 50 TABLET ORAL at 05:33

## 2024-11-08 RX ADMIN — Medication 1 CAPSULE: at 10:17

## 2024-11-08 RX ADMIN — Medication 10 ML: at 10:18

## 2024-11-08 RX ADMIN — OXYCODONE HYDROCHLORIDE 5 MG: 5 TABLET ORAL at 10:16

## 2024-11-08 RX ADMIN — CARVEDILOL 25 MG: 12.5 TABLET, FILM COATED ORAL at 10:17

## 2024-11-08 RX ADMIN — CLONIDINE HYDROCHLORIDE 0.1 MG: 0.1 TABLET ORAL at 00:06

## 2024-11-08 RX ADMIN — CYANOCOBALAMIN TAB 1000 MCG 1000 MCG: 1000 TAB at 10:18

## 2024-11-08 RX ADMIN — PANTOPRAZOLE SODIUM 40 MG: 40 TABLET, DELAYED RELEASE ORAL at 10:17

## 2024-11-08 RX ADMIN — CLONIDINE HYDROCHLORIDE 0.1 MG: 0.1 TABLET ORAL at 12:50

## 2024-11-08 RX ADMIN — AMLODIPINE BESYLATE 10 MG: 10 TABLET ORAL at 10:17

## 2024-11-08 RX ADMIN — CLONIDINE HYDROCHLORIDE 0.1 MG: 0.1 TABLET ORAL at 05:33

## 2024-11-08 RX ADMIN — HYDRALAZINE HYDROCHLORIDE 100 MG: 50 TABLET ORAL at 12:50

## 2024-11-08 RX ADMIN — AMOXICILLIN AND CLAVULANATE POTASSIUM 500 MG: 500; 125 TABLET, FILM COATED ORAL at 10:17

## 2024-11-08 RX ADMIN — FERROUS SULFATE TAB 325 MG (65 MG ELEMENTAL FE) 325 MG: 325 (65 FE) TAB at 10:18

## 2024-11-08 RX ADMIN — SODIUM BICARBONATE 650 MG TABLET 650 MG: at 10:17

## 2024-11-08 NOTE — CONSULTS
Palliative Care Initial Consult   Attending Physician: Savanah Yancey MD  Referring Provider: Dr. Savanah Yancey    Reason for Referral:  assistance with clarification of goals of care, non-pain symptoms, and pain    Code Status:   Code Status and Medical Interventions: CPR (Attempt to Resuscitate); Full Support   Ordered at: 10/30/24 1958     Level Of Support Discussed With:    Patient     Code Status (Patient has no pulse and is not breathing):    CPR (Attempt to Resuscitate)     Medical Interventions (Patient has pulse or is breathing):    Full Support      Advanced Directives: Advance Directive Status: Patient has advance directive, copy requested   Family/Support: J Luis Whitt (son), Jacob Webster (son)  Goals of Care: TBD.    HPI: Yanique Webster is a 84 y.o. female with PMH significant for CAD, COPD, HTN, chronic pain, Takotsubo cardiomyopathy, GERD, HLD, CKD III, PAF on Eliquis, PVD, recent hospitalization 8/12-8/19 secondary to symptomatic anemia. Patient presented to [facility] ED on 10/30 with worsening shortness of breath even at rest. Work up revealed anemia of chronic disease most likely secondary to renal disease, KINDRA on CKDIV. ECHO 11/2 with LVEF 56-60%. Cardiology following with recommendation to stop anticoagulation in light of anemia. Nephrology following, KINDRA on CKD, no indication for dialysis at present. Palliative Care consulted for [facility] in the context of complex medical decision making and symptom management.      Review of PDMP with [place] ER 15mg PO BID for chronic pain. Now transitioned to Fentanyl 12mcg/hr TD due to poor kidney function. Patient confirms following with [facility]. She reports pain to hands, feet as sharp/stabbing, joints aching, current pain level 4/10. She denies shortness of breath and nausea. She does use a RW at baseline. Patient has six children.    ROS:+pain, feet, hands sharp stabbing neuropathic pain, joint pain, 4/10 at present. Denies nausea, shortness of  breath, anxiety.        Past Medical History:   Diagnosis Date    Blurry vision     Chronic joint pain     Chronic pain     COPD (chronic obstructive pulmonary disease)     Coronary artery disease     Diabetic gastroparesis 08/24/2016    Esophageal stricture     s/p dilation in 2007    GERD (gastroesophageal reflux disease)     Gout     Headache     secondary to hypertension    Hyperlipidemia     Hypertension     Long term current use of insulin     Neuropathy     Neuropathy due to type 2 diabetes mellitus     Obesity     Osteoarthritis     Pericarditis 2008    Stroke 03/2019    Type 2 diabetes mellitus      Past Surgical History:   Procedure Laterality Date    BRONCHOSCOPY N/A 8/23/2016    Procedure: BRONCHOSCOPY BIOPSY AT BEDSIDE;  Surgeon: Mookie Willard MD;  Location:  [facility];  Service:     CARDIAC CATHETERIZATION N/A 8/30/2016    Procedure: Left Heart Cath;  Surgeon: Steve Geronimo MD;  Location:  [facility];  Service:     CARDIAC CATHETERIZATION N/A 8/30/2016    Procedure: Right Heart Cath;  Surgeon: Steve Geronimo MD;  Location:  [facility];  Service:     CARDIAC ELECTROPHYSIOLOGY PROCEDURE N/A 7/2/2019    Procedure: Loop insertion;  Surgeon: Steve Geronimo MD;  Location:  [facility];  Service: Cardiovascular    CARDIAC ELECTROPHYSIOLOGY PROCEDURE N/A 9/26/2023    Procedure: Loop recorder removal;  Surgeon: Steve Geronimo MD;  Location:  [facility];  Service: Cardiovascular;  Laterality: N/A;    CATARACT EXTRACTION      COLONOSCOPY N/A 8/16/2024    Procedure: COLONOSCOPY;  Surgeon: Brunner, Mark I, MD;  Location:  [facility];  Service: Gastroenterology;  Laterality: N/A;    ENDOSCOPY N/A 8/14/2024    Procedure: ESOPHAGOGASTRODUODENOSCOPY;  Surgeon: Brunner, Mark I, MD;  Location:  [facility];  Service: Gastroenterology;  Laterality: N/A;    ESOPHAGEAL DILATATION  2007    HERNIA REPAIR      HIP CANNULATED SCREW  "PLACEMENT Left 2024    Procedure: HIP CANNULATED SCREW PLACEMENT LEFT;  Surgeon: Seymour Harrington MD;  Location: [facility];  Service: Orthopedics;  Laterality: Left;    HYSTERECTOMY      WRIST FRACTURE SURGERY Left      Social History     Socioeconomic History    Marital status:    Tobacco Use    Smoking status: Former     Current packs/day: 0.00     Types: Cigarettes     Quit date: 2003     Years since quittin.8    Smokeless tobacco: Never   Vaping Use    Vaping status: Never Used   Substance and Sexual Activity    Alcohol use: No    Drug use: No    Sexual activity: Not Currently     Partners: Male     Birth control/protection: Pill, Hysterectomy     Family History   Problem Relation Age of Onset    Cancer Mother     Cancer Father     Cancer Other     Breast cancer Sister 67    Ovarian cancer Neg Hx        Allergies   Allergen Reactions    Latex Rash     Not an immediate reaction    Nickel Rash    Penicillins Rash     Patient has tolerated amoxicillin in the past. Had PCN reaction of hives when she was 15.    Penicillins Rash     unk       Current medication reviewed for route, type, dose and frequency and are current per MAR at time of dictation.    Palliative Performance Scale Score:  50%    /73   Pulse 68   Temp 97 °F (36.1 °C) (Oral)   Resp 18   Ht 162.6 cm (64.02\")   Wt 85.7 kg (189 lb)   LMP  (LMP Unknown)   SpO2 95%   BMI 32.43 kg/m²     Intake/Output Summary (Last 24 hours) at 2024 0825  Last data filed at 2024 0416  Gross per 24 hour   Intake 240 ml   Output 900 ml   Net -660 ml       Physical Exam:    General Appearance:    Patient sitting up in chair, awake, alert, chronically ill appearing, cooperative, NAD   HEENT:    NC/AT, EOMI, anicteric, MMM, face relaxed   Neck:   supple, trachea midline, no JVD   Lungs:     CTA bilat, diminished in bases; respirations regular, even and unlabored; RR 16-18 on exam    Heart:    RRR, normal S1 and S2, no M/R/G, HR 90  "   Abdomen:     Normal bowel sounds, soft, nontender, nondistended   G/U:   Deferred   MSK/Extremities:   no edema   Pulses:   Pulses palpable and equal bilaterally   Skin:   Warm, dry   Neurologic:   A/Ox3, cooperative, MONTANEZ   Psych:   Calm, appropriate         Labs:   Results from last 7 days   Lab Units 11/08/24  0447   WBC 10*3/mm3 4.82   HEMOGLOBIN g/dL 8.4*   HEMATOCRIT % 26.9*   PLATELETS 10*3/mm3 134*     Results from last 7 days   Lab Units 11/08/24  0447   SODIUM mmol/L 136   POTASSIUM mmol/L 4.6   CHLORIDE mmol/L 105   CO2 mmol/L 18.0*   BUN mg/dL 59*   CREATININE mg/dL 3.76*   GLUCOSE mg/dL 118*   CALCIUM mg/dL 10.0     Results from last 7 days   Lab Units 11/08/24  0447   SODIUM mmol/L 136   POTASSIUM mmol/L 4.6   CHLORIDE mmol/L 105   CO2 mmol/L 18.0*   BUN mg/dL 59*   CREATININE mg/dL 3.76*   CALCIUM mg/dL 10.0   GLUCOSE mg/dL 118*     Imaging Results (Last 72 Hours)       ** No results found for the last 72 hours. **                  Diagnostics: Reviewed    A:   Symptomatic anemia    Hyperlipidemia LDL goal <70    COPD (chronic obstructive pulmonary disease)    GERD (gastroesophageal reflux disease)    Coronary artery disease involving native coronary artery of native heart without angina pectoris    Essential hypertension    CKD (chronic kidney disease) stage 4, GFR 15-29 ml/min    Paroxysmal atrial fibrillation    Anxiety associated with depression    KINDRA (acute kidney injury)     84 y.o. female with KINDRA on CKD, COPD, symptomatic anemia, CAD, anxiety.   S/S:   Pain -chronic neuropathic, arthritic pain  -continue Fentanyl 12mcg/hr TD    2. Debility -PT/OT following  -plan for rehab    3. GOC -Full Code/Full Support -per review of chart  -patient agreeable to outpatient Palliative referral for ongoing symptom management  -follows at [facility]    P: Introduced Palliative Care and services. Patient successfully transitioned to Fentanyl patch for pain control in setting of renal disease.  Patient agreeable to outpatient palliative referral for ongoing symptom management and GOC discussion. Will continue to follow at [facility]. Will send referral to palliative to follow.     Thank you for this consult and allowing us to participate in patient's plan of care. Palliative Care Team will continue to follow patient. Please do not hesitate to contact us regarding further symptom management or goals of care needs.  Time: 45 minutes spent reviewing medical and medication records, assessing and examining patient, discussing with family, answering questions, providing some guidance about a plan and documentation of care, and coordinating care with other healthcare members, with > 50% time spent face to face.         Rayna Harris, APRN  11/8/2024

## 2024-11-08 NOTE — PLAN OF CARE
Goal Outcome Evaluation:  Plan of Care Reviewed With: patient        Progress: improving            Problem: Adult Inpatient Plan of Care  Goal: Absence of Hospital-Acquired Illness or Injury  Intervention: Identify and Manage Fall Risk  Recent Flowsheet Documentation  Taken 11/7/2024 2000 by Anabel Aguirre RN  Safety Promotion/Fall Prevention: activity supervised  Intervention: Prevent Skin Injury  Recent Flowsheet Documentation  Taken 11/7/2024 2000 by Anabel Aguirre RN  Body Position: position changed independently     Problem: Skin Injury Risk Increased  Goal: Skin Health and Integrity  Intervention: Optimize Skin Protection  Recent Flowsheet Documentation  Taken 11/7/2024 2000 by Anabel Aguirre RN  Activity Management: up in chair  Pressure Reduction Devices: pressure-redistributing mattress utilized     Problem: Fall Injury Risk  Goal: Absence of Fall and Fall-Related Injury  Intervention: Promote Injury-Free Environment  Recent Flowsheet Documentation  Taken 11/7/2024 2000 by Anabel Aguirre RN  Safety Promotion/Fall Prevention: activity supervised

## 2024-11-08 NOTE — CASE MANAGEMENT/SOCIAL WORK
Case Management Discharge Note      Final Note: Patient is discharging today. Her plan is an acute bed at [facility]. Please call report to [phone]. House Sup # if needed [phone]. Facility will get discharge summary from epic. Confirmed son will transport. Son aware that patient needs to be at [facility] no later than 1530 today. RN to please call son at [phone] when ready for pick-up. Patient and family are agreeable with the plan. No other needs noted.         Selected Continued Care - Admitted Since 10/30/2024       Destination Coordination complete.      Service Provider Services Address Phone Fax Patient Preferred    [facility] [address] [address] [phone] --              Durable Medical Equipment    No services have been selected for the patient.                Dialysis/Infusion    No services have been selected for the patient.                Home Medical Care       Service Provider Services Address Phone Fax Patient Preferred    [facility] [address], [place] [address] [phone] --              Therapy    No services have been selected for the patient.                Community Resources    No services have been selected for the patient.                Community & DME    No services have been selected for the patient.                    Selected Continued Care - Prior Encounters Includes continued care and service providers with selected services from prior encounters from 8/1/2024 to 11/8/2024      Discharged on 8/19/2024 Admission date: 8/12/2024 - Discharge disposition: Rehab Facility or Unit (DC - External)      Destination       Service Provider Services Address Phone Fax Patient Preferred    [facility] [address] [address] [phone]  [phone] --                          Transportation Services  Private: Car    Final Discharge Disposition Code: 62 - inpatient rehab facility

## 2024-11-08 NOTE — CASE MANAGEMENT/SOCIAL WORK
Continued Stay Note   [place]     Patient Name: Yanique Webster  MRN: [id number]  Today's Date: 11/8/2024    Admit Date: 10/30/2024    Plan: IRF   Discharge Plan       Row Name 11/08/24 0946       Plan    Plan IRF    Patient/Family in Agreement with Plan yes    Plan Comments Patient is medically ready. Patient and family would now like a referral to [facility]. CM called referral to [facility] this morning. Pending. If a bed is offered, she will not need insurance pre-cert. Palliative consulted. Therapy recommends IRF. IMM delivered to patient at bedside. CM will continue to follow.    Final Discharge Disposition Code 62 - inpatient rehab facility                   Discharge Codes    No documentation.                 Expected Discharge Date and Time       Expected Discharge Date Expected Discharge Time    Nov 7, 2024               Maddison Ignacio RN

## 2024-11-08 NOTE — DISCHARGE SUMMARY
[facility] Medicine Services  DISCHARGE SUMMARY    Patient Name: Yanique Webster  : 1940  MRN: [id number]    Date of Admission: 10/30/2024  3:55 PM  Date of Discharge:  2024  Primary Care Physician: Sebas Alicea MD    Consults       Date and Time Order Name Status Description    2024  4:38 PM Inpatient Palliative Care MD Consult Completed     2024  7:25 AM Inpatient Cardiology Consult Completed     10/31/2024  7:47 AM Inpatient Hematology & Oncology Consult Completed     10/31/2024  7:47 AM Inpatient Gastroenterology Consult Completed     10/30/2024  8:37 PM Inpatient Nephrology Consult Completed             Hospital Course     Presenting Problem: F/u anemia and CKD    Active Hospital Problems    Diagnosis  POA    **Symptomatic anemia [D64.9]  Yes    KINDRA (acute kidney injury) [N17.9]  Yes    Anxiety associated with depression [F41.8]  Yes    Paroxysmal atrial fibrillation [I48.0]  Yes    CKD (chronic kidney disease) stage 4, GFR 15-29 ml/min [N18.4]  Yes    Essential hypertension [I10]  Yes    Coronary artery disease involving native coronary artery of native heart without angina pectoris [I25.10]  Yes    COPD (chronic obstructive pulmonary disease) [J44.9]  Yes    GERD (gastroesophageal reflux disease) [K21.9]  Yes    Hyperlipidemia LDL goal <70 [E78.5]  Yes      Resolved Hospital Problems    Diagnosis Date Resolved POA    History of CVA (cerebrovascular accident) [Z86.73] 2024 Not Applicable          Hospital Course:  Yanique Webster is a 84 y.o. female w/ CAD, COPD, HTN, HLD, chronic pain, Takotsubo cardiomyopathy, CKD4, Afib on eliquis; she was admitted 24-24 w/ anemia requiring transfusion, during that say she had EGD  and colo  both without source of bleeding; pt/fam elected to resume oral AC for stroke ppx; she has seen EP in the clinic and is contemplating Watchman device; she developed new SOB and had labs drawn which  showed significant anemia and she was sent to the ED where Hgb was 6.1; she was admitted and transfused blood       Assessment/Plan     Acute/Chronic normocytic anemia, recurrent  Thrombocytopenia   -recent EGD/[place] 8/2024 w/o evidence for bleeding  -iron studies c/w anemia of chronic disease  -s/p 2U PRBC  -second episode of transfusion requiring anemia, noted TCP as well  -Patient was seen by hematology. Likely anemia of chronic disease/renal disease  -Received procrit on 10/31 and 11/07    KINDRA on CKD4  Secondary hyperparathyroidism  -baseline Cr about 2.3; eGFR 20's  -nephrology following.  Creatinine has not improved greatly.  -on day of discharge, patient was cleared by nephrology      pAfib w/ previous use of oral AC  -seen by EP for consideration of Watchman-> recommend OP follow up   -Cardiology was consulted and they have seen and evaluated patient on this admission also.  They do recommend discontinuation of Xarelto in light of anemia.  -Dr. Geronimo recommended on discharge that patient's Xarelto and ASA be held      CAD  HTN  HLD  Hx Takotsubo cardiomyopathy  -most recent EF 56-60%  -Final blood pressure is well controlled     Valvular heart disease  -ECHO 11/02: normal ejection fraction, mild to moderate aortic regurgitation, mild mitral regurgitation.  Previous echocardiogram shows evidence of tricuspid regurgitation with elevated right ventricular systolic pressure between 45 and 55 mmHg.    Type 2 DM, insulin use  -A1c: 5.0%  -Patient's blood sugars have been well controlled without insulin during hospitalization. Advised patient and her family to hold long acting insulin at this time, monitor blood sugars at home, and follow up with PCP and Endocrinology. At , recommend low dose sliding scale of insulin only.     COPD   - add prn nebs  -Patient has history of heavy smoking but stopped about 20 years ago.     Chronic pain   -continue with Fentanyl 12 mcg TD q72 hrs   -Patient not on Duloxetine during  hospitalization due to CrCl    Severe weakness  -Family wanting to take patient to [facility]     Concern for odontogenic infection   - my colleague started Augmentin on 11/05    GOC 11/07: Spoke with patient's son, J Luis Whitt and wife over the phone. Permission obtained from the patient to discuss medical care plan.  We discussed labs from today as well as patient has received 2 doses of Procrit while being admitted.  We also discussed likelihood of dialysis given no significant improvement in renal function.  At this time they wish to pursue inpatient rehab at [facility] with the understanding that if patient does not significantly improved the alternative will be pursuing comfort care/quality life focus.  Family wishes for palliative care consult to help assist with overall goals of care and symptom management.  As outlined above I did discuss with palliative care nurse practitioner, Rayna Harris who agrees with opioid rotation.  Family in agreement with plan and also in agreement of holding her long-acting insulin at this time.     11/08: Updated son, J Luis, by phone. I discussed Dr. Geronimo's recommendation of remaining off of Eliquis and ASA. I did discuss potential risk of stroke. Her son expressed understanding. He is aware of follow up appointments.     Discharge Follow Up Recommendations for outpatient labs/diagnostics:  Follow up with ENT as scheduled appointment   Follow up with PCP in one week   Follow up with nephrology in 2 weeks   Follow up with EP as scheduled for Watchman   Recommend follow up with dentist regarding tooth pain within 1-2 weeks     Day of Discharge     HPI:   Sitting comfortably in chair she is eating well. Did spill coffee on her self accidentally this morning.  Patient aware of plan to go to [facility].  Again nephrology cleared patient for discharge and for outpatient follow-up.  She has no other concerns this morning.  She reports her pain is controlled.      Vital Signs:    Temp:  [97 °F (36.1 °C)-97.9 °F (36.6 °C)] 97.4 °F (36.3 °C)  Heart Rate:  [63-90] 90  Resp:  [16-18] 16  BP: (137-146)/(55-86) 137/63      Physical Exam:  Constitutional: No acute distress, awake, alert  HENT: NCAT, mucous membranes moist  Respiratory: Clear to auscultation bilaterally, respiratory effort normal   Cardiovascular: RRR, no murmurs, rubs, or gallops  Gastrointestinal: Positive bowel sounds, soft, nontender, nondistended  Musculoskeletal: No bilateral ankle edema  Psychiatric: Appropriate affect, cooperative  Neurologic: Oriented x 3, speech clear  Skin: No rashes    Pertinent  and/or Most Recent Results     LAB RESULTS:      Lab 11/08/24  0447 11/07/24  0934 11/06/24  0830   WBC 4.82 4.59 5.86   HEMOGLOBIN 8.4* 8.4* 8.4*   HEMATOCRIT 26.9* 27.1* 27.3*   PLATELETS 134* 141 131*   MCV 94.1 94.4 93.8         Lab 11/08/24  0447 11/07/24  1240 11/07/24  0436 11/06/24  0830 11/05/24  0921 11/04/24  1306 11/02/24  0802   SODIUM 136  --  141 138 139 141 138   POTASSIUM 4.6 5.1 5.0 4.6 4.5 4.7 4.7   CHLORIDE 105  --  110* 107 108* 110* 107   CO2 18.0*  --  17.0* 19.0* 21.0* 20.0* 19.0*   ANION GAP 13.0  --  14.0 12.0 10.0 11.0 12.0   BUN 59*  --  56* 56* 55* 53* 58*   CREATININE 3.76*  --  3.78* 3.71* 3.56* 3.62* 3.40*   EGFR 11.4*  --  11.4* 11.6* 12.2* 12.0* 12.9*   GLUCOSE 118*  --  115* 150* 164* 169* 146*   CALCIUM 10.0  --  10.0 9.9 9.7 9.9 9.8   MAGNESIUM  --   --   --   --   --   --  2.0   PHOSPHORUS  --   --   --   --   --   --  3.7                           Brief Urine Lab Results  (Last result in the past 365 days)        Color   Clarity   Blood   Leuk Est   Nitrite   Protein   CREAT   Urine HCG        08/13/24 2000             52.4         08/13/24 2000 Yellow   Clear   Negative   Negative   Negative   >=300 mg/dL (3+)                 Microbiology Results (last 10 days)       Procedure Component Value - Date/Time    CANDIDA AURIS PCR - Swab, Axilla Right, Axilla Left and Groin [[id number]]   (Normal) Collected: 10/31/24 0100    Lab Status: Final result Specimen: Swab from Axilla Right, Axilla Left and Groin Updated: 11/01/24 1130     JANICE AURIS PCR Not Detected    Narrative:      See scanned report              XR Chest 1 View    Result Date: 10/30/2024  XR CHEST 1 VW Date of Exam: 10/30/2024 3:59 PM EDT Indication: SOA triage protocol Comparison: 8/16/2024 Findings: Unchanged enlarged cardiac silhouette. Mild diffuse interstitial opacities. Trace bilateral pleural effusions. No pneumothorax. No acute osseous abnormality..     Impression: Enlarged cardiac silhouette with mild pulmonary edema and trace bilateral pleural effusions. Electronically Signed: Adrian Barnett MD  10/30/2024 4:27 PM EDT  Workstation ID: [id number]     Results for orders placed during the hospital encounter of 04/10/24    Duplex Carotid Ultrasound CAR    Interpretation Summary    Right internal carotid artery demonstrates a less than 50% stenosis.    Left internal carotid artery demonstrates a less than 50% stenosis.    Antegrade flow in the vertebral arteries bilaterally.      Results for orders placed during the hospital encounter of 04/10/24    Duplex Carotid Ultrasound CAR    Interpretation Summary    Right internal carotid artery demonstrates a less than 50% stenosis.    Left internal carotid artery demonstrates a less than 50% stenosis.    Antegrade flow in the vertebral arteries bilaterally.      Results for orders placed during the hospital encounter of 10/30/24    Adult Transthoracic Echo Complete W/ Cont if Necessary Per Protocol    Interpretation Summary    Left ventricular systolic function is normal. Estimated left ventricular EF = 60%    Left ventricular wall thickness is consistent with mild concentric hypertrophy.    The left atrial cavity is moderately dilated.    Aortic sclerosis without aortic stenosis.  Mild to moderate aortic insufficiency    Mild mitral regurgitation.      Plan for Follow-up of Pending  Labs/Results: Follow up with Nephrology  Pending Labs       Order Current Status    ANCA Panel In process          Discharge Details        Discharge Medications        New Medications        Instructions Start Date   amoxicillin-clavulanate 500-125 MG per tablet  Commonly known as: AUGMENTIN   500 mg, Oral, 2 Times Daily      fentaNYL 12 MCG/HR  Commonly known as: DURAGESIC   1 patch, Transdermal, Every 72 Hours      Insulin Lispro 100 UNIT/ML injection  Commonly known as: humaLOG   Blood Glucose 150-199 mg/dL - 2 units Blood Glucose 200-249 mg/dL - 3 units Blood Glucose 250-299 mg/dL - 4 units Blood Glucose 300-349 mg/dL - 5 units Blood Glucose 350-400 mg/dL - 6 units Blood Glucose Greater Than 400 mg/dL - 7 units & Call Provider      naloxone 4 MG/0.1ML nasal spray  Commonly known as: NARCAN   Call 911. Don't prime. [place] in 1 nostril for overdose. Repeat in 2-3 minutes in other nostril if no or minimal breathing/responsiveness.             Changes to Medications        Instructions Start Date   amLODIPine 10 MG tablet  Commonly known as: NORVASC  What changed:   medication strength  how much to take   10 mg, Oral, Every 24 Hours Scheduled      cloNIDine 0.1 MG tablet  Commonly known as: CATAPRES  What changed: when to take this   0.1 mg, Oral, Every 6 Hours Scheduled      torsemide 20 MG tablet  Commonly known as: DEMADEX   20 mg, Oral, Daily PRN             Continue These Medications        Instructions Start Date   Accu-Chek Guide w/Device kit   1 kit, Does not apply, See Admin Instructions, Use to check blood sugar once daily.  Dx E11.65      atorvastatin 80 MG tablet  Commonly known as: LIPITOR   80 mg, Daily      BD Pen Needle Veda 2nd Gen 32G X 4 MM misc  Generic drug: Insulin Pen Needle   1 each, Does not apply, Daily      Blood Glucose Test strip   Check blood sugar 1 time a day dx e11.65      calcitriol 0.5 MCG capsule  Commonly known as: ROCALTROL   0.5 mcg, Oral, Daily      carvedilol 25 MG  tablet  Commonly known as: COREG   25 mg, Oral, Every 12 Hours Scheduled      doxazosin 4 MG tablet  Commonly known as: CARDURA       ferrous sulfate 325 (65 FE) MG tablet   325 mg, Oral, Daily With Breakfast      hydrALAZINE 100 MG tablet  Commonly known as: APRESOLINE   100 mg, Oral, Every 8 Hours Scheduled      ipratropium-albuterol 0.5-2.5 mg/3 ml nebulizer  Commonly known as: DUO-NEB   3 mL, Nebulization, Every 4 Hours PRN      Lancets 33G misc   1 each, Does not apply, Daily, Dx e11.65      pantoprazole 40 MG EC tablet  Commonly known as: PROTONIX   40 mg, Oral, 2 Times Daily      probiotic capsule capsule   1 capsule, Daily      sodium bicarbonate 650 MG tablet   650 mg, Oral, 2 Times Daily      vitamin B-12 1000 MCG tablet  Commonly known as: CYANOCOBALAMIN   1,000 mcg, Oral, Daily             Stop These Medications      aspirin 81 MG EC tablet     DULoxetine 60 MG capsule  Commonly known as: CYMBALTA     Insulin Glargine 100 UNIT/ML injection pen  Commonly known as: LANTUS SOLOSTAR     Morphine 15 MG 12 hr tablet  Commonly known as: MS CONTIN     rivaroxaban 15 MG tablet  Commonly known as: Xarelto     vitamin D3 125 MCG (5000 UT) capsule capsule              Allergies   Allergen Reactions    Latex Rash     Not an immediate reaction    Nickel Rash    Penicillins Rash     Patient has tolerated amoxicillin in the past. Had PCN reaction of hives when she was 15.    Penicillins Rash     unk         Diet:  Hospital:  Diet Order   Procedures    Diet: Cardiac, Diabetic; Healthy Heart (2-3 Na+); Consistent Carbohydrate; Fluid Consistency: Thin (IDDSI 0)           Restrictions or Other Recommendations:  Patient declined rehab. Patient to be discharged to        CODE STATUS:    Code Status and Medical Interventions: CPR (Attempt to Resuscitate); Full Support   Ordered at: 10/30/24 7713     Level Of Support Discussed With:    Patient     Code Status (Patient has no pulse and is not breathing):    CPR (Attempt to  Resuscitate)     Medical Interventions (Patient has pulse or is breathing):    Full Support       Future Appointments   Date Time Provider Department Center   12/27/2024 11:30 AM Seymour Harrington MD [facility]   1/30/2025  3:15 PM Axel Ribeiro MD [facility]   4/28/2025 11:45 AM Emilio Regalado MD [facility]   5/12/2025 11:45 AM Judith Morin APRN [facility]       Additional Instructions for the Follow-ups that You Need to Schedule       Ambulatory Referral to Home Health   As directed      Face to Face Visit Date: 11/6/2024   Follow-up provider for Plan of Care?: I treated the patient in an acute care facility and will not continue treatment after discharge.   Follow-up provider: SEBAS ALICEA [[id number]]   Reason/Clinical Findings: symptomatic anemia   Describe mobility limitations that make leaving home difficult: impaired functional mobility, balance, gait and mobility.   Nursing/Therapeutic Services Requested: Skilled Nursing Physical Therapy   Skilled nursing orders: Medication education Cardiopulmonary assessments Neurovascular assessments   PT orders: Therapeutic exercise Gait Training Transfer training Strengthening Home safety assessment   Weight Bearing Status: As Tolerated   Frequency: 1 Week 1        Discharge Follow-up with PCP   As directed       Currently Documented PCP:    Sebas Alicea MD    PCP Phone Number:    [phone]     Follow Up Details: Follow up with PCP in one week        Discharge Follow-up with Specialty: Follow-up with CHRIS Kurtz or Dr. Geronimo in the cardiology clinic in 6 months   As directed      Specialty: Follow-up with CHRIS Kurtz or Dr. Geronimo in the cardiology clinic in 6 months        Discharge Follow-up with Specified Provider: Follow up with EP clinic and Cardiology clinic as scheduled   As directed      To: Follow up with EP clinic and Cardiology clinic as scheduled        Discharge Follow-up with Specified Provider: Recommend  follow up with dentist regarding tooth pain; 1 Week   As directed      To: Recommend follow up with dentist regarding tooth pain   Follow Up: 1 Week                      Savanah Yancey MD  11/08/24      Time Spent on Discharge:  I spent  35  minutes on this discharge activity which included: face-to-face encounter with the patient, reviewing the data in the system, coordination of the care with the nursing staff as well as consultants, documentation, and entering orders.

## 2024-11-08 NOTE — PLAN OF CARE
Goal Outcome Evaluation:  Plan of Care Reviewed With: patient        Progress: no change  Outcome Evaluation: Palliative consult for GOC/ACP and symptom management; seen by Palliative APRN this morning, by Palliative RN at 1025. Pt reported chronic neuropathic pain to all extremities, follows with pain management outpatient. Pt with unlabored respirations on RA at times of Palliative encounters; reported improved from yesterday, although still occasional short period of SOA on exertion. Code status confirmed; CPR and full support. Pt discharging today to [facility]. Pt agreeable to Palliative home services following completion of rehab; referral faxed to [facility].     [id number] Palliative IDT meeting:  MD, CHRIS, RN,   After hours, weekends and holidays, contact Palliative Provider by calling [phone]       Problem: Palliative Care  Goal: Enhanced Quality of Life  Outcome: Adequate for Care Transition  Intervention: Maximize Comfort  Flowsheets (Taken 11/8/2024 1448)  Pain Management Interventions: pain management plan reviewed with patient/caregiver  Intervention: Optimize Function  Flowsheets (Taken 11/8/2024 1448)  Fatigue Management: paced activity encouraged  Sleep/Rest Enhancement: consistent schedule promoted  Intervention: Promote Advance Care Planning  Flowsheets (Taken 11/8/2024 1448)  Life Transition/Adjustment: (Palliative referral for outpatient services after completes STR)   palliative care discussed   palliative care initiated   other (see comments)  Intervention: Optimize Psychosocial Wellbeing  Flowsheets (Taken 11/8/2024 1448)  Supportive Measures:   active listening utilized   positive reinforcement provided   self-reflection promoted   self-responsibility promoted   verbalization of feelings encouraged  Spiritual Activities Assistance: (Spiritual Care consult) other (see comments)

## 2024-11-11 ENCOUNTER — TELEPHONE (OUTPATIENT)
Dept: CARDIOLOGY | Facility: HOSPITAL | Age: 84
End: 2024-11-11
Payer: MEDICARE

## 2024-11-11 DIAGNOSIS — I48.0 PAROXYSMAL ATRIAL FIBRILLATION: Primary | ICD-10-CM

## 2024-11-11 LAB
C-ANCA TITR SER IF: NORMAL TITER
MYELOPEROXIDASE AB SER IA-ACNC: <0.2 UNITS (ref 0–0.9)
P-ANCA ATYPICAL TITR SER IF: NORMAL TITER
P-ANCA TITR SER IF: NORMAL TITER
PROTEINASE3 AB SER IA-ACNC: <0.2 UNITS (ref 0–0.9)

## 2024-11-11 NOTE — TELEPHONE ENCOUNTER
Called [facility] on the [facility] and let them know to expect to restart Xarelto 15mg per Dr. Ribeiro and with Dr. Geronimo's recommendations ASA will not be restarted at this time.

## 2024-11-11 NOTE — TELEPHONE ENCOUNTER
Received a call from rupali Dietz's son she is at [facility] but not on ASA or Xarelto. They are concerned about stroke risk. Due to her CKD and her nickel allergy. (They were not able to go to the allergy specialist due to late discharge from  on Friday. They feel like the watchman is not an option for her at this time.     Should Xarelto/ASA be restarted?

## 2024-11-15 ENCOUNTER — HOSPITAL ENCOUNTER (INPATIENT)
Facility: HOSPITAL | Age: 84
LOS: 11 days | Discharge: NURSING, REHAB OR LONG-TERM CARE | End: 2024-11-27
Attending: STUDENT IN AN ORGANIZED HEALTH CARE EDUCATION/TRAINING PROGRAM | Admitting: FAMILY MEDICINE
Payer: MEDICARE

## 2024-11-15 ENCOUNTER — APPOINTMENT (OUTPATIENT)
Dept: CT IMAGING | Facility: HOSPITAL | Age: 84
End: 2024-11-15
Payer: MEDICARE

## 2024-11-15 DIAGNOSIS — M19.90 OSTEOARTHRITIS, UNSPECIFIED OSTEOARTHRITIS TYPE, UNSPECIFIED SITE: ICD-10-CM

## 2024-11-15 DIAGNOSIS — I73.9 PVD (PERIPHERAL VASCULAR DISEASE): ICD-10-CM

## 2024-11-15 DIAGNOSIS — D64.9 SYMPTOMATIC ANEMIA: ICD-10-CM

## 2024-11-15 DIAGNOSIS — J42 CHRONIC BRONCHITIS, UNSPECIFIED CHRONIC BRONCHITIS TYPE: ICD-10-CM

## 2024-11-15 DIAGNOSIS — I10 ESSENTIAL HYPERTENSION: ICD-10-CM

## 2024-11-15 DIAGNOSIS — M79.7 FIBROMYALGIA: ICD-10-CM

## 2024-11-15 DIAGNOSIS — J44.1 ACUTE EXACERBATION OF COPD WITH ASTHMA: ICD-10-CM

## 2024-11-15 DIAGNOSIS — E11.649 TYPE 2 DIABETES MELLITUS WITH HYPOGLYCEMIA WITHOUT COMA, WITH LONG-TERM CURRENT USE OF INSULIN: ICD-10-CM

## 2024-11-15 DIAGNOSIS — Z74.09 IMPAIRED FUNCTIONAL MOBILITY, BALANCE, GAIT, AND ENDURANCE: ICD-10-CM

## 2024-11-15 DIAGNOSIS — D62 ACUTE BLOOD LOSS ANEMIA: ICD-10-CM

## 2024-11-15 DIAGNOSIS — I25.10 CORONARY ARTERY DISEASE INVOLVING NATIVE CORONARY ARTERY OF NATIVE HEART WITHOUT ANGINA PECTORIS: ICD-10-CM

## 2024-11-15 DIAGNOSIS — Z79.4 TYPE 2 DIABETES MELLITUS WITH HYPOGLYCEMIA WITHOUT COMA, WITH LONG-TERM CURRENT USE OF INSULIN: ICD-10-CM

## 2024-11-15 DIAGNOSIS — D64.9 CHRONIC ANEMIA: ICD-10-CM

## 2024-11-15 DIAGNOSIS — R79.89 ELEVATED SERUM CREATININE: Primary | ICD-10-CM

## 2024-11-15 DIAGNOSIS — N18.4 CKD (CHRONIC KIDNEY DISEASE) STAGE 4, GFR 15-29 ML/MIN: ICD-10-CM

## 2024-11-15 PROBLEM — N18.9 ACUTE ON CHRONIC RENAL FAILURE: Status: ACTIVE | Noted: 2024-11-15

## 2024-11-15 PROBLEM — N17.9 ACUTE ON CHRONIC RENAL FAILURE: Status: ACTIVE | Noted: 2024-11-15

## 2024-11-15 PROBLEM — R06.00 DYSPNEA: Status: ACTIVE | Noted: 2024-11-15

## 2024-11-15 LAB
ABO GROUP BLD: NORMAL
ALBUMIN SERPL-MCNC: 3.8 G/DL (ref 3.5–5.2)
ALBUMIN/GLOB SERPL: 1.8 G/DL
ALP SERPL-CCNC: 83 U/L (ref 39–117)
ALT SERPL W P-5'-P-CCNC: 13 U/L (ref 1–33)
ANION GAP SERPL CALCULATED.3IONS-SCNC: 14 MMOL/L (ref 5–15)
APTT PPP: 39.9 SECONDS (ref 60–90)
AST SERPL-CCNC: 24 U/L (ref 1–32)
BACTERIA UR QL AUTO: NORMAL /HPF
BASOPHILS # BLD AUTO: 0.05 10*3/MM3 (ref 0–0.2)
BASOPHILS NFR BLD AUTO: 0.7 % (ref 0–1.5)
BILIRUB SERPL-MCNC: 0.4 MG/DL (ref 0–1.2)
BILIRUB UR QL STRIP: NEGATIVE
BLD GP AB SCN SERPL QL: NEGATIVE
BUN SERPL-MCNC: 80 MG/DL (ref 8–23)
BUN/CREAT SERPL: 19.1 (ref 7–25)
CALCIUM SPEC-SCNC: 9.5 MG/DL (ref 8.6–10.5)
CHLORIDE SERPL-SCNC: 106 MMOL/L (ref 98–107)
CLARITY UR: CLEAR
CO2 SERPL-SCNC: 16 MMOL/L (ref 22–29)
COLOR UR: YELLOW
CREAT SERPL-MCNC: 4.18 MG/DL (ref 0.57–1)
D-LACTATE SERPL-SCNC: 0.9 MMOL/L (ref 0.5–2)
DEPRECATED RDW RBC AUTO: 63.8 FL (ref 37–54)
EGFRCR SERPLBLD CKD-EPI 2021: 10.1 ML/MIN/1.73
EOSINOPHIL # BLD AUTO: 0.3 10*3/MM3 (ref 0–0.4)
EOSINOPHIL NFR BLD AUTO: 4.1 % (ref 0.3–6.2)
ERYTHROCYTE [DISTWIDTH] IN BLOOD BY AUTOMATED COUNT: 18.1 % (ref 12.3–15.4)
GLOBULIN UR ELPH-MCNC: 2.1 GM/DL
GLUCOSE BLDC GLUCOMTR-MCNC: 179 MG/DL (ref 70–130)
GLUCOSE SERPL-MCNC: 109 MG/DL (ref 65–99)
GLUCOSE UR STRIP-MCNC: ABNORMAL MG/DL
HCT VFR BLD AUTO: 30.5 % (ref 34–46.6)
HGB BLD-MCNC: 9.2 G/DL (ref 12–15.9)
HGB UR QL STRIP.AUTO: NEGATIVE
HOLD SPECIMEN: NORMAL
HYALINE CASTS UR QL AUTO: NORMAL /LPF
IMM GRANULOCYTES # BLD AUTO: 0.02 10*3/MM3 (ref 0–0.05)
IMM GRANULOCYTES NFR BLD AUTO: 0.3 % (ref 0–0.5)
INR PPP: 1.48 (ref 0.89–1.12)
KETONES UR QL STRIP: NEGATIVE
LEUKOCYTE ESTERASE UR QL STRIP.AUTO: NEGATIVE
LYMPHOCYTES # BLD AUTO: 1.24 10*3/MM3 (ref 0.7–3.1)
LYMPHOCYTES NFR BLD AUTO: 17.2 % (ref 19.6–45.3)
MAGNESIUM SERPL-MCNC: 2 MG/DL (ref 1.6–2.4)
MCH RBC QN AUTO: 29.1 PG (ref 26.6–33)
MCHC RBC AUTO-ENTMCNC: 30.2 G/DL (ref 31.5–35.7)
MCV RBC AUTO: 96.5 FL (ref 79–97)
MONOCYTES # BLD AUTO: 0.78 10*3/MM3 (ref 0.1–0.9)
MONOCYTES NFR BLD AUTO: 10.8 % (ref 5–12)
NEUTROPHILS NFR BLD AUTO: 4.84 10*3/MM3 (ref 1.7–7)
NEUTROPHILS NFR BLD AUTO: 66.9 % (ref 42.7–76)
NITRITE UR QL STRIP: NEGATIVE
NRBC BLD AUTO-RTO: 0 /100 WBC (ref 0–0.2)
PH UR STRIP.AUTO: 6.5 [PH] (ref 5–8)
PLATELET # BLD AUTO: 151 10*3/MM3 (ref 140–450)
PMV BLD AUTO: 11.1 FL (ref 6–12)
POTASSIUM SERPL-SCNC: 5.1 MMOL/L (ref 3.5–5.2)
PROCALCITONIN SERPL-MCNC: 0.11 NG/ML (ref 0–0.25)
PROT SERPL-MCNC: 5.9 G/DL (ref 6–8.5)
PROT UR QL STRIP: ABNORMAL
PROTHROMBIN TIME: 18 SECONDS (ref 12.2–14.5)
RBC # BLD AUTO: 3.16 10*6/MM3 (ref 3.77–5.28)
RBC # UR STRIP: NORMAL /HPF
REF LAB TEST METHOD: NORMAL
RH BLD: NEGATIVE
SODIUM SERPL-SCNC: 136 MMOL/L (ref 136–145)
SP GR UR STRIP: 1.01 (ref 1–1.03)
SQUAMOUS #/AREA URNS HPF: NORMAL /HPF
T&S EXPIRATION DATE: NORMAL
UROBILINOGEN UR QL STRIP: ABNORMAL
WBC # UR STRIP: NORMAL /HPF
WBC NRBC COR # BLD AUTO: 7.23 10*3/MM3 (ref 3.4–10.8)
WHOLE BLOOD HOLD COAG: NORMAL
WHOLE BLOOD HOLD SPECIMEN: NORMAL

## 2024-11-15 PROCEDURE — 36415 COLL VENOUS BLD VENIPUNCTURE: CPT

## 2024-11-15 PROCEDURE — 63710000001 INSULIN LISPRO (HUMAN) PER 5 UNITS: Performed by: NURSE PRACTITIONER

## 2024-11-15 PROCEDURE — 71250 CT THORAX DX C-: CPT

## 2024-11-15 PROCEDURE — 99285 EMERGENCY DEPT VISIT HI MDM: CPT

## 2024-11-15 PROCEDURE — 99222 1ST HOSP IP/OBS MODERATE 55: CPT | Performed by: NURSE PRACTITIONER

## 2024-11-15 PROCEDURE — 81001 URINALYSIS AUTO W/SCOPE: CPT | Performed by: STUDENT IN AN ORGANIZED HEALTH CARE EDUCATION/TRAINING PROGRAM

## 2024-11-15 PROCEDURE — 86900 BLOOD TYPING SEROLOGIC ABO: CPT | Performed by: STUDENT IN AN ORGANIZED HEALTH CARE EDUCATION/TRAINING PROGRAM

## 2024-11-15 PROCEDURE — G0378 HOSPITAL OBSERVATION PER HR: HCPCS

## 2024-11-15 PROCEDURE — 93005 ELECTROCARDIOGRAM TRACING: CPT | Performed by: STUDENT IN AN ORGANIZED HEALTH CARE EDUCATION/TRAINING PROGRAM

## 2024-11-15 PROCEDURE — 86901 BLOOD TYPING SEROLOGIC RH(D): CPT | Performed by: STUDENT IN AN ORGANIZED HEALTH CARE EDUCATION/TRAINING PROGRAM

## 2024-11-15 PROCEDURE — 83605 ASSAY OF LACTIC ACID: CPT | Performed by: STUDENT IN AN ORGANIZED HEALTH CARE EDUCATION/TRAINING PROGRAM

## 2024-11-15 PROCEDURE — 84145 PROCALCITONIN (PCT): CPT | Performed by: INTERNAL MEDICINE

## 2024-11-15 PROCEDURE — 0202U NFCT DS 22 TRGT SARS-COV-2: CPT | Performed by: INTERNAL MEDICINE

## 2024-11-15 PROCEDURE — 86850 RBC ANTIBODY SCREEN: CPT | Performed by: STUDENT IN AN ORGANIZED HEALTH CARE EDUCATION/TRAINING PROGRAM

## 2024-11-15 PROCEDURE — 83735 ASSAY OF MAGNESIUM: CPT | Performed by: STUDENT IN AN ORGANIZED HEALTH CARE EDUCATION/TRAINING PROGRAM

## 2024-11-15 PROCEDURE — 85730 THROMBOPLASTIN TIME PARTIAL: CPT | Performed by: STUDENT IN AN ORGANIZED HEALTH CARE EDUCATION/TRAINING PROGRAM

## 2024-11-15 PROCEDURE — 25810000003 SODIUM CHLORIDE 0.9 % SOLUTION: Performed by: STUDENT IN AN ORGANIZED HEALTH CARE EDUCATION/TRAINING PROGRAM

## 2024-11-15 PROCEDURE — 85025 COMPLETE CBC W/AUTO DIFF WBC: CPT | Performed by: STUDENT IN AN ORGANIZED HEALTH CARE EDUCATION/TRAINING PROGRAM

## 2024-11-15 PROCEDURE — 80053 COMPREHEN METABOLIC PANEL: CPT | Performed by: STUDENT IN AN ORGANIZED HEALTH CARE EDUCATION/TRAINING PROGRAM

## 2024-11-15 PROCEDURE — 85610 PROTHROMBIN TIME: CPT | Performed by: STUDENT IN AN ORGANIZED HEALTH CARE EDUCATION/TRAINING PROGRAM

## 2024-11-15 PROCEDURE — 82948 REAGENT STRIP/BLOOD GLUCOSE: CPT

## 2024-11-15 RX ORDER — SODIUM CHLORIDE 0.9 % (FLUSH) 0.9 %
10 SYRINGE (ML) INJECTION EVERY 12 HOURS SCHEDULED
Status: DISCONTINUED | OUTPATIENT
Start: 2024-11-15 | End: 2024-11-27 | Stop reason: HOSPADM

## 2024-11-15 RX ORDER — POLYETHYLENE GLYCOL 3350 17 G/17G
17 POWDER, FOR SOLUTION ORAL EVERY MORNING
Status: DISCONTINUED | OUTPATIENT
Start: 2024-11-16 | End: 2024-11-25

## 2024-11-15 RX ORDER — SODIUM CHLORIDE 0.9 % (FLUSH) 0.9 %
10 SYRINGE (ML) INJECTION AS NEEDED
Status: DISCONTINUED | OUTPATIENT
Start: 2024-11-15 | End: 2024-11-27 | Stop reason: HOSPADM

## 2024-11-15 RX ORDER — OXYCODONE HYDROCHLORIDE 5 MG/1
5 CAPSULE ORAL EVERY 4 HOURS PRN
COMMUNITY

## 2024-11-15 RX ORDER — ATORVASTATIN CALCIUM 40 MG/1
80 TABLET, FILM COATED ORAL DAILY
Status: DISCONTINUED | OUTPATIENT
Start: 2024-11-16 | End: 2024-11-27 | Stop reason: HOSPADM

## 2024-11-15 RX ORDER — POLYETHYLENE GLYCOL 3350 17 G/17G
17 POWDER, FOR SOLUTION ORAL EVERY MORNING
COMMUNITY

## 2024-11-15 RX ORDER — TERAZOSIN 5 MG/1
5 CAPSULE ORAL NIGHTLY
COMMUNITY
End: 2024-11-27 | Stop reason: HOSPADM

## 2024-11-15 RX ORDER — DEXTROSE MONOHYDRATE 25 G/50ML
25 INJECTION, SOLUTION INTRAVENOUS
Status: DISCONTINUED | OUTPATIENT
Start: 2024-11-15 | End: 2024-11-27 | Stop reason: HOSPADM

## 2024-11-15 RX ORDER — NITROGLYCERIN 0.4 MG/1
0.4 TABLET SUBLINGUAL
Status: DISCONTINUED | OUTPATIENT
Start: 2024-11-15 | End: 2024-11-27 | Stop reason: HOSPADM

## 2024-11-15 RX ORDER — L.ACID,PARA/B.BIFIDUM/S.THERM 8B CELL
1 CAPSULE ORAL DAILY
Status: DISCONTINUED | OUTPATIENT
Start: 2024-11-16 | End: 2024-11-27 | Stop reason: HOSPADM

## 2024-11-15 RX ORDER — IPRATROPIUM BROMIDE AND ALBUTEROL SULFATE 2.5; .5 MG/3ML; MG/3ML
3 SOLUTION RESPIRATORY (INHALATION) EVERY 4 HOURS PRN
Status: DISCONTINUED | OUTPATIENT
Start: 2024-11-15 | End: 2024-11-27 | Stop reason: HOSPADM

## 2024-11-15 RX ORDER — SODIUM CHLORIDE 9 MG/ML
50 INJECTION, SOLUTION INTRAVENOUS CONTINUOUS
Status: DISCONTINUED | OUTPATIENT
Start: 2024-11-15 | End: 2024-11-15

## 2024-11-15 RX ORDER — BISACODYL 10 MG
10 SUPPOSITORY, RECTAL RECTAL DAILY PRN
Status: DISCONTINUED | OUTPATIENT
Start: 2024-11-15 | End: 2024-11-27 | Stop reason: HOSPADM

## 2024-11-15 RX ORDER — HYDRALAZINE HYDROCHLORIDE 50 MG/1
50 TABLET, FILM COATED ORAL 3 TIMES DAILY
COMMUNITY
End: 2024-11-27 | Stop reason: HOSPADM

## 2024-11-15 RX ORDER — ACETAMINOPHEN 325 MG/1
650 TABLET ORAL EVERY 4 HOURS PRN
Status: DISCONTINUED | OUTPATIENT
Start: 2024-11-15 | End: 2024-11-27 | Stop reason: HOSPADM

## 2024-11-15 RX ORDER — ACETAMINOPHEN 325 MG/1
650 TABLET ORAL EVERY 4 HOURS PRN
COMMUNITY

## 2024-11-15 RX ORDER — IBUPROFEN 600 MG/1
1 TABLET ORAL
Status: DISCONTINUED | OUTPATIENT
Start: 2024-11-15 | End: 2024-11-27 | Stop reason: HOSPADM

## 2024-11-15 RX ORDER — FERROUS SULFATE 325(65) MG
325 TABLET ORAL
Status: DISCONTINUED | OUTPATIENT
Start: 2024-11-16 | End: 2024-11-27 | Stop reason: HOSPADM

## 2024-11-15 RX ORDER — PANTOPRAZOLE SODIUM 40 MG/1
40 TABLET, DELAYED RELEASE ORAL
Status: DISCONTINUED | OUTPATIENT
Start: 2024-11-15 | End: 2024-11-27 | Stop reason: HOSPADM

## 2024-11-15 RX ORDER — IPRATROPIUM BROMIDE AND ALBUTEROL SULFATE 2.5; .5 MG/3ML; MG/3ML
3 SOLUTION RESPIRATORY (INHALATION)
Status: DISCONTINUED | OUTPATIENT
Start: 2024-11-15 | End: 2024-11-27 | Stop reason: HOSPADM

## 2024-11-15 RX ORDER — FENTANYL 12.5 UG/1
1 PATCH TRANSDERMAL
Status: DISCONTINUED | OUTPATIENT
Start: 2024-11-18 | End: 2024-11-21

## 2024-11-15 RX ORDER — AMOXICILLIN 250 MG
2 CAPSULE ORAL 2 TIMES DAILY PRN
Status: DISCONTINUED | OUTPATIENT
Start: 2024-11-15 | End: 2024-11-27 | Stop reason: HOSPADM

## 2024-11-15 RX ORDER — ASPIRIN 81 MG/1
81 TABLET ORAL DAILY
Status: DISCONTINUED | OUTPATIENT
Start: 2024-11-16 | End: 2024-11-27 | Stop reason: HOSPADM

## 2024-11-15 RX ORDER — ISOSORBIDE MONONITRATE 30 MG/1
30 TABLET, EXTENDED RELEASE ORAL DAILY
COMMUNITY

## 2024-11-15 RX ORDER — HYDRALAZINE HYDROCHLORIDE 50 MG/1
100 TABLET, FILM COATED ORAL EVERY 8 HOURS SCHEDULED
Status: DISCONTINUED | OUTPATIENT
Start: 2024-11-15 | End: 2024-11-27 | Stop reason: HOSPADM

## 2024-11-15 RX ORDER — TERAZOSIN 5 MG/1
5 CAPSULE ORAL NIGHTLY
Status: DISCONTINUED | OUTPATIENT
Start: 2024-11-15 | End: 2024-11-19

## 2024-11-15 RX ORDER — ASPIRIN 81 MG/1
81 TABLET ORAL DAILY
COMMUNITY

## 2024-11-15 RX ORDER — POLYETHYLENE GLYCOL 3350 17 G/17G
17 POWDER, FOR SOLUTION ORAL DAILY PRN
Status: DISCONTINUED | OUTPATIENT
Start: 2024-11-15 | End: 2024-11-27 | Stop reason: HOSPADM

## 2024-11-15 RX ORDER — OXYCODONE HYDROCHLORIDE 5 MG/1
5 TABLET ORAL ONCE
Status: COMPLETED | OUTPATIENT
Start: 2024-11-15 | End: 2024-11-15

## 2024-11-15 RX ORDER — CLONIDINE HYDROCHLORIDE 0.1 MG/1
0.1 TABLET ORAL EVERY 6 HOURS SCHEDULED
Status: DISCONTINUED | OUTPATIENT
Start: 2024-11-15 | End: 2024-11-27 | Stop reason: HOSPADM

## 2024-11-15 RX ORDER — DOXEPIN HYDROCHLORIDE 10 MG/1
10 CAPSULE ORAL NIGHTLY PRN
COMMUNITY
End: 2024-11-27 | Stop reason: HOSPADM

## 2024-11-15 RX ORDER — BISACODYL 5 MG/1
10 TABLET, DELAYED RELEASE ORAL DAILY PRN
COMMUNITY

## 2024-11-15 RX ORDER — ONDANSETRON 4 MG/1
4 TABLET, ORALLY DISINTEGRATING ORAL EVERY 6 HOURS PRN
COMMUNITY

## 2024-11-15 RX ORDER — TORSEMIDE 20 MG/1
20 TABLET ORAL DAILY PRN
Status: DISCONTINUED | OUTPATIENT
Start: 2024-11-15 | End: 2024-11-27 | Stop reason: HOSPADM

## 2024-11-15 RX ORDER — SODIUM BICARBONATE 650 MG/1
650 TABLET ORAL 2 TIMES DAILY
Status: DISCONTINUED | OUTPATIENT
Start: 2024-11-15 | End: 2024-11-16

## 2024-11-15 RX ORDER — CALCITRIOL 0.25 UG/1
0.5 CAPSULE, LIQUID FILLED ORAL DAILY
Status: DISCONTINUED | OUTPATIENT
Start: 2024-11-16 | End: 2024-11-27 | Stop reason: HOSPADM

## 2024-11-15 RX ORDER — LANOLIN ALCOHOL/MO/W.PET/CERES
1000 CREAM (GRAM) TOPICAL DAILY
Status: DISCONTINUED | OUTPATIENT
Start: 2024-11-16 | End: 2024-11-27 | Stop reason: HOSPADM

## 2024-11-15 RX ORDER — NICOTINE POLACRILEX 4 MG
15 LOZENGE BUCCAL
Status: DISCONTINUED | OUTPATIENT
Start: 2024-11-15 | End: 2024-11-27 | Stop reason: HOSPADM

## 2024-11-15 RX ORDER — BISACODYL 5 MG/1
5 TABLET, DELAYED RELEASE ORAL DAILY PRN
Status: DISCONTINUED | OUTPATIENT
Start: 2024-11-15 | End: 2024-11-27 | Stop reason: HOSPADM

## 2024-11-15 RX ORDER — INSULIN LISPRO 100 [IU]/ML
2-7 INJECTION, SOLUTION INTRAVENOUS; SUBCUTANEOUS
Status: DISCONTINUED | OUTPATIENT
Start: 2024-11-15 | End: 2024-11-23

## 2024-11-15 RX ORDER — SODIUM CHLORIDE 9 MG/ML
40 INJECTION, SOLUTION INTRAVENOUS AS NEEDED
Status: DISCONTINUED | OUTPATIENT
Start: 2024-11-15 | End: 2024-11-27 | Stop reason: HOSPADM

## 2024-11-15 RX ORDER — CARVEDILOL 6.25 MG/1
6.25 TABLET ORAL EVERY 12 HOURS SCHEDULED
Status: DISCONTINUED | OUTPATIENT
Start: 2024-11-15 | End: 2024-11-16

## 2024-11-15 RX ORDER — OXYCODONE HYDROCHLORIDE 5 MG/1
5 TABLET ORAL EVERY 4 HOURS PRN
Status: DISCONTINUED | OUTPATIENT
Start: 2024-11-15 | End: 2024-11-27 | Stop reason: HOSPADM

## 2024-11-15 RX ORDER — DOXEPIN HYDROCHLORIDE 10 MG/1
10 CAPSULE ORAL NIGHTLY PRN
Status: DISCONTINUED | OUTPATIENT
Start: 2024-11-15 | End: 2024-11-25

## 2024-11-15 RX ADMIN — OXYCODONE HYDROCHLORIDE 5 MG: 5 TABLET ORAL at 13:59

## 2024-11-15 RX ADMIN — HYDRALAZINE HYDROCHLORIDE 100 MG: 50 TABLET ORAL at 20:34

## 2024-11-15 RX ADMIN — INSULIN LISPRO 2 UNITS: 100 INJECTION, SOLUTION INTRAVENOUS; SUBCUTANEOUS at 20:35

## 2024-11-15 RX ADMIN — SODIUM CHLORIDE 1000 ML: 9 INJECTION, SOLUTION INTRAVENOUS at 15:48

## 2024-11-15 RX ADMIN — PANTOPRAZOLE SODIUM 40 MG: 40 TABLET, DELAYED RELEASE ORAL at 18:20

## 2024-11-15 RX ADMIN — CLONIDINE HYDROCHLORIDE 0.1 MG: 0.1 TABLET ORAL at 18:20

## 2024-11-15 RX ADMIN — SODIUM BICARBONATE 650 MG TABLET 650 MG: at 20:35

## 2024-11-15 RX ADMIN — OXYCODONE 5 MG: 5 TABLET ORAL at 20:35

## 2024-11-15 RX ADMIN — CARVEDILOL 6.25 MG: 6.25 TABLET, FILM COATED ORAL at 20:35

## 2024-11-15 RX ADMIN — TERAZOSIN HYDROCHLORIDE 5 MG: 5 CAPSULE ORAL at 20:35

## 2024-11-15 RX ADMIN — CLONIDINE HYDROCHLORIDE 0.1 MG: 0.1 TABLET ORAL at 23:25

## 2024-11-15 NOTE — H&P
[facility] Medicine Services  HISTORY AND PHYSICAL    Patient Name: Yanique Webster  : 1940  MRN: [id number]  Primary Care Physician: Sebas Alicea MD  Date of admission: 11/15/2024    Subjective   Subjective     Chief Complaint:  Abdnormal labs    HPI:  Yanique Webster is a 84 y.o. female with PMH of afib, HTN, CKD, prior stroke, T2DM with neuropathy, chronic anemia, and GERD.  She is currently at patient at [facility] and was set to be discharged home with  today, but labs returned with worsening creatinine despite IVF 24.  She is being admitted to the hospitalist service for further treatment and will ask nephrology to see tomorrow        Review of Systems   Constitutional:  Negative for activity change, appetite change and fever.   HENT:  Negative for congestion and hearing loss.    Eyes:  Negative for visual disturbance.   Respiratory:  Positive for shortness of breath. Negative for cough and chest tightness.    Cardiovascular:  Negative for chest pain and leg swelling.   Gastrointestinal:  Negative for abdominal pain, blood in stool, diarrhea, nausea and vomiting.   Genitourinary:  Negative for dysuria and hematuria.   Musculoskeletal:  Positive for arthralgias.   Neurological:  Positive for weakness. Negative for dizziness.   Psychiatric/Behavioral:  Negative for behavioral problems and confusion.         Personal History     Past Medical History:   Diagnosis Date    Blurry vision     Chronic joint pain     Chronic pain     COPD (chronic obstructive pulmonary disease)     Coronary artery disease     Diabetic gastroparesis 2016    Esophageal stricture     s/p dilation in     GERD (gastroesophageal reflux disease)     Gout     Headache     secondary to hypertension    Hyperlipidemia     Hypertension     Long term current use of insulin     Neuropathy     Neuropathy due to type 2 diabetes mellitus     Obesity     Osteoarthritis     Pericarditis      Stroke 03/2019    Type 2 diabetes mellitus          Past Surgical History:   Procedure Laterality Date    BRONCHOSCOPY N/A 8/23/2016    Procedure: BRONCHOSCOPY BIOPSY AT BEDSIDE;  Surgeon: Mookie Willard MD;  Location:  [facility];  Service:     CARDIAC CATHETERIZATION N/A 8/30/2016    Procedure: Left Heart Cath;  Surgeon: Steve Geronimo MD;  Location:  [facility];  Service:     CARDIAC CATHETERIZATION N/A 8/30/2016    Procedure: Right Heart Cath;  Surgeon: Steve Geronimo MD;  Location:  [facility];  Service:     CARDIAC ELECTROPHYSIOLOGY PROCEDURE N/A 7/2/2019    Procedure: Loop insertion;  Surgeon: Steve Geronimo MD;  Location:  [facility];  Service: Cardiovascular    CARDIAC ELECTROPHYSIOLOGY PROCEDURE N/A 9/26/2023    Procedure: Loop recorder removal;  Surgeon: Steve Geronimo MD;  Location:  [facility];  Service: Cardiovascular;  Laterality: N/A;    CATARACT EXTRACTION      COLONOSCOPY N/A 8/16/2024    Procedure: COLONOSCOPY;  Surgeon: Brunner, Mark I, MD;  Location:  [facility];  Service: Gastroenterology;  Laterality: N/A;    ENDOSCOPY N/A 8/14/2024    Procedure: ESOPHAGOGASTRODUODENOSCOPY;  Surgeon: Brunner, Mark I, MD;  Location:  [facility];  Service: Gastroenterology;  Laterality: N/A;    ESOPHAGEAL DILATATION  2007    HERNIA REPAIR      HIP CANNULATED SCREW PLACEMENT Left 1/2/2024    Procedure: HIP CANNULATED SCREW PLACEMENT LEFT;  Surgeon: Seymour Harrington MD;  Location: [facility] OR;  Service: Orthopedics;  Laterality: Left;    HYSTERECTOMY  1998    WRIST FRACTURE SURGERY Left        Family History:  family history includes Breast cancer (age of onset: 67) in her sister; Cancer in her father, mother, and another family member.     Social History:  reports that she quit smoking about 21 years ago. Her smoking use included cigarettes. She has never used smokeless tobacco. She reports that she does not drink alcohol and does  not use drugs.  Social History     Social History Narrative    Mrs. Webster lives at [facility]. Was sent from [facility] today.     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     She smoked 1ppd for 25 years but quit in 2000.     Denies ETOH/illicit drug use.        Medications:  Accu-Chek Guide, Blood Glucose Test, Glucagon, Insulin Lispro, Insulin Pen Needle, Lancets 33G, Polyethylene Glycol 3350, acetaminophen, amLODIPine, aspirin, atorvastatin, bisacodyl, calcitriol, carvedilol, cloNIDine, doxazosin, doxepin, fentaNYL, ferrous sulfate, hydrALAZINE, ipratropium-albuterol, melatonin, naloxone, ondansetron ODT, oxyCODONE, pantoprazole, probiotic, rivaroxaban, sodium bicarbonate, terazosin, torsemide, and vitamin B-12    Allergies   Allergen Reactions    Latex Rash     Not an immediate reaction    Nickel Rash    Penicillins Rash     Patient has tolerated amoxicillin in the past. Had PCN reaction of hives when she was 15.    Penicillins Rash     unk       Objective   Objective     Vital Signs:   Temp:  [98.1 °F (36.7 °C)] 98.1 °F (36.7 °C)  Heart Rate:  [55-63] 59  Resp:  [18-20] 18  BP: (129-170)/(63-94) 170/74    Physical Exam   Constitutional: Awake, alert, son at bedside  Eyes: PERRLA, sclerae anicteric, no conjunctival injection  HENT: NCAT, mucous membranes moist  Neck: Supple, no thyromegaly, no lymphadenopathy, trachea midline  Respiratory: Clear to auscultation bilaterally, nonlabored respirations   Cardiovascular: irregular, no murmurs, rubs, or gallops, palpable pedal pulses bilaterally  Gastrointestinal: Positive bowel sounds, soft, nontender, nondistended  Musculoskeletal: No bilateral ankle edema, no clubbing or cyanosis to extremities  Psychiatric: Appropriate affect, cooperative  Neurologic: Oriented x 3, MONTANEZ, speech clear  Skin: No rashes noted      Result Review:  I have personally reviewed the results from the time of this admission to 11/15/2024 17:53 EST  and agree with these findings:  [x]  Laboratory list / accordion  []  Microbiology  []  Radiology  [x]  EKG/Telemetry   []  Cardiology/Vascular   []  Pathology  [x]  Old records  []  Other:  Most notable findings include: creatinine 4.18    LAB RESULTS:      Lab 11/15/24  1230   WBC 7.23   HEMOGLOBIN 9.2*   HEMATOCRIT 30.5*   PLATELETS 151   NEUTROS ABS 4.84   IMMATURE GRANS (ABS) 0.02   LYMPHS ABS 1.24   MONOS ABS 0.78   EOS ABS 0.30   MCV 96.5   LACTATE 0.9   PROTIME 18.0*   APTT 39.9*         Lab 11/15/24  1230   SODIUM 136   POTASSIUM 5.1   CHLORIDE 106   CO2 16.0*   ANION GAP 14.0   BUN 80*   CREATININE 4.18*   EGFR 10.1*   GLUCOSE 109*   CALCIUM 9.5   MAGNESIUM 2.0         Lab 11/15/24  1230   TOTAL PROTEIN 5.9*   ALBUMIN 3.8   GLOBULIN 2.1   ALT (SGPT) 13   AST (SGOT) 24   BILIRUBIN 0.4   ALK PHOS 83         Lab 11/15/24  1230   PROTIME 18.0*   INR 1.48*             Lab 11/15/24  1334   ABO TYPING O   RH TYPING Negative   ANTIBODY SCREEN Negative         Brief Urine Lab Results  (Last result in the past 365 days)        Color   Clarity   Blood   Leuk Est   Nitrite   Protein   CREAT   Urine HCG        11/15/24 1410 Yellow   Clear   Negative   Negative   Negative   100 mg/dL (2+)                 Microbiology Results (last 10 days)       ** No results found for the last 240 hours. **            No radiology results from the last 24 hrs    Results for orders placed during the hospital encounter of 10/30/24    Adult Transthoracic Echo Complete W/ Cont if Necessary Per Protocol    Interpretation Summary    Left ventricular systolic function is normal. Estimated left ventricular EF = 60%    Left ventricular wall thickness is consistent with mild concentric hypertrophy.    The left atrial cavity is moderately dilated.    Aortic sclerosis without aortic stenosis.  Mild to moderate aortic insufficiency    Mild mitral regurgitation.      Assessment & Plan   Assessment & Plan       Acute on chronic renal failure    Type  2 diabetes mellitus    Essential hypertension    Paroxysmal atrial fibrillation    Anemia, chronic disease    Dyspnea    KINDRA on CKD  --baseline unclear. last admission pt's baseline was documented 1.9-2.2 but wasn't clear if her CKD had advanced.  When dc'd from hospital 11/8/24 creatinine was 3.76. While she has been at [facility], it has stayed in 4 range. Yesterday was 4.3, bumped to 4.5 today (11/15/24)  --nephrology consult    Dyspnea  --hold off on further IVF for now as she received 1L at [facility] 11/14/24 and just under 0.5L in ED today  --sats stable on RA    PAF  --xarelto restarted a few days ago, was held on dc per cardiology recs but family requested it be restarted due to stroke risk  --will hold for now due to renal function    HTN  --continue meds    T2DM  --SSI    Anemia of chronic disease  --hgb stable    Total time spent: 60 minutes  Time spent includes time reviewing chart, face-to-face time, counseling patient/family/caregiver, ordering medications/tests/procedures, communicating with other health care professionals, documenting clinical information in the electronic health record, and coordination of care.      DVT prophylaxis:  mechanical only for now    CODE STATUS:    Code Status (Patient has no pulse and is not breathing): CPR (Attempt to Resuscitate)  Medical Interventions (Patient has pulse or is breathing): Full Support      Expected Discharge home with HH, was supposed to be dc'd from [facility] 11/15/24  Expected discharge date/ time has not been documented.        Signature: Electronically signed by CHRIS Cote, 11/15/24, 6:00 PM EST

## 2024-11-15 NOTE — PLAN OF CARE
Problem: Adult Inpatient Plan of Care  Goal: Plan of Care Review  Outcome: Progressing  Goal: Patient-Specific Goal (Individualized)  Outcome: Progressing  Goal: Absence of Hospital-Acquired Illness or Injury  Outcome: Progressing  Intervention: Identify and Manage Fall Risk  Recent Flowsheet Documentation  Taken 11/15/2024 1800 by Kiarra Middleton RN  Safety Promotion/Fall Prevention:   activity supervised   clutter free environment maintained   fall prevention program maintained   nonskid shoes/slippers when out of bed   room organization consistent   safety round/check completed  Taken 11/15/2024 1636 by Kiarra Middleton RN  Safety Promotion/Fall Prevention:   activity supervised   clutter free environment maintained   fall prevention program maintained   nonskid shoes/slippers when out of bed   room organization consistent   safety round/check completed  Intervention: Prevent Skin Injury  Recent Flowsheet Documentation  Taken 11/15/2024 1800 by Kiarra Middleton RN  Body Position: position changed independently  Skin Protection:   incontinence pads utilized   transparent dressing maintained  Taken 11/15/2024 1636 by Kiarra Middleton RN  Body Position: position changed independently  Skin Protection:   incontinence pads utilized   transparent dressing maintained  Intervention: Prevent Infection  Recent Flowsheet Documentation  Taken 11/15/2024 1800 by Kiarra Middleton RN  Infection Prevention:   rest/sleep promoted   single patient room provided  Taken 11/15/2024 1636 by Kiarra Middleton RN  Infection Prevention:   environmental surveillance performed   hand hygiene promoted   rest/sleep promoted   single patient room provided  Goal: Optimal Comfort and Wellbeing  Outcome: Progressing  Intervention: Provide Person-Centered Care  Recent Flowsheet Documentation  Taken 11/15/2024 1636 by Kiarra Middleton RN  Trust Relationship/Rapport:   care explained   emotional support provided   empathic listening provided   questions  answered   reassurance provided   thoughts/feelings acknowledged  Goal: Readiness for Transition of Care  Outcome: Progressing  Intervention: Mutually Develop Transition Plan  Recent Flowsheet Documentation  Taken 11/15/2024 1750 by Kiarra Middleton, RN  Transportation Anticipated: family or friend will provide  Patient/Family Anticipated Services at Transition: skilled nursing  Patient/Family Anticipates Transition to: long-term care facility     Problem: Skin Injury Risk Increased  Goal: Skin Health and Integrity  Outcome: Progressing  Intervention: Optimize Skin Protection  Recent Flowsheet Documentation  Taken 11/15/2024 1800 by Kiarra Middleton, RN  Activity Management: activity encouraged  Pressure Reduction Techniques: frequent weight shift encouraged  Head of Bed (HOB) Positioning: HOB elevated  Pressure Reduction Devices: pressure-redistributing mattress utilized  Skin Protection:   incontinence pads utilized   transparent dressing maintained  Taken 11/15/2024 1636 by Kiarra Middleton, RN  Activity Management: activity encouraged  Pressure Reduction Techniques: frequent weight shift encouraged  Head of Bed (HOB) Positioning: HOB elevated  Pressure Reduction Devices: pressure-redistributing mattress utilized  Skin Protection:   incontinence pads utilized   transparent dressing maintained   Goal Outcome Evaluation:

## 2024-11-15 NOTE — ED NOTES
Yanique Webster    Nursing Report ED to Floor:  Mental status: A&Ox4  Ambulatory status: x2 assist  Oxygen Therapy:  room air  Cardiac Rhythm: NSR  Admitted from: [facility]  Safety Concerns:  none  Social Issues: none family at bedside  ED Room #:  33    ED Nurse Phone Extension - [phone] or may call [phone].      HPI:   Chief Complaint   Patient presents with    Abnormal Lab       Past Medical History:  Past Medical History:   Diagnosis Date    Blurry vision     Chronic joint pain     Chronic pain     COPD (chronic obstructive pulmonary disease)     Coronary artery disease     Diabetic gastroparesis 08/24/2016    Esophageal stricture     s/p dilation in 2007    GERD (gastroesophageal reflux disease)     Gout     Headache     secondary to hypertension    Hyperlipidemia     Hypertension     Long term current use of insulin     Neuropathy     Neuropathy due to type 2 diabetes mellitus     Obesity     Osteoarthritis     Pericarditis 2008    Stroke 03/2019    Type 2 diabetes mellitus         Past Surgical History:  Past Surgical History:   Procedure Laterality Date    BRONCHOSCOPY N/A 8/23/2016    Procedure: BRONCHOSCOPY BIOPSY AT BEDSIDE;  Surgeon: Mookie Willard MD;  Location:  [facility];  Service:     CARDIAC CATHETERIZATION N/A 8/30/2016    Procedure: Left Heart Cath;  Surgeon: Steve Geronimo MD;  Location:  [facility];  Service:     CARDIAC CATHETERIZATION N/A 8/30/2016    Procedure: Right Heart Cath;  Surgeon: Steve Geronimo MD;  Location:  [facility];  Service:     CARDIAC ELECTROPHYSIOLOGY PROCEDURE N/A 7/2/2019    Procedure: Loop insertion;  Surgeon: Steve Geronimo MD;  Location:  [facility];  Service: Cardiovascular    CARDIAC ELECTROPHYSIOLOGY PROCEDURE N/A 9/26/2023    Procedure: Loop recorder removal;  Surgeon: Steve Geronimo MD;  Location:  [facility];  Service: Cardiovascular;  Laterality: N/A;    CATARACT EXTRACTION       COLONOSCOPY N/A 8/16/2024    Procedure: COLONOSCOPY;  Surgeon: Brunner, Mark I, MD;  Location:  [facility];  Service: Gastroenterology;  Laterality: N/A;    ENDOSCOPY N/A 8/14/2024    Procedure: ESOPHAGOGASTRODUODENOSCOPY;  Surgeon: Brunner, Mark I, MD;  Location:  [facility];  Service: Gastroenterology;  Laterality: N/A;    ESOPHAGEAL DILATATION  2007    HERNIA REPAIR      HIP CANNULATED SCREW PLACEMENT Left 1/2/2024    Procedure: HIP CANNULATED SCREW PLACEMENT LEFT;  Surgeon: Seymour Harrington MD;  Location:  [facility] OR;  Service: Orthopedics;  Laterality: Left;    HYSTERECTOMY  1998    WRIST FRACTURE SURGERY Left         Admitting Doctor:   Lilo Owens MD    Consulting Provider(s):  Consults       Date and Time Order Name Status Description    11/7/2024  4:38 PM Inpatient Palliative Care MD Consult Completed     11/2/2024  7:25 AM Inpatient Cardiology Consult Completed     10/31/2024  7:47 AM Inpatient Hematology & Oncology Consult Completed     10/31/2024  7:47 AM Inpatient Gastroenterology Consult Completed     10/30/2024  8:37 PM Inpatient Nephrology Consult Completed              Admitting Diagnosis:   The primary encounter diagnosis was Elevated serum creatinine. Diagnoses of CKD (chronic kidney disease) stage 4, GFR 15-29 ml/min and Chronic anemia were also pertinent to this visit.    Most Recent Vitals:   Vitals:    11/15/24 1430 11/15/24 1500 11/15/24 1501 11/15/24 1530   BP: 156/66 143/65  129/63   BP Location:       Patient Position:       Pulse: 60 59 59 59   Resp:       Temp:       TempSrc:       SpO2: 100%  98% 98%   Weight:       Height:           Active LDAs/IV Access:   Lines, Drains & Airways       Active LDAs       Name Placement date Placement time Site Days    Peripheral IV 11/15/24 1227 Anterior;Distal;Left Wrist 11/15/24  1227  Wrist  less than 1    Peripheral IV 11/15/24 1230 Anterior;Right Forearm 11/15/24  1230  Forearm  less than 1                    Labs (abnormal labs have a  star):   Labs Reviewed   COMPREHENSIVE METABOLIC PANEL - Abnormal; Notable for the following components:       Result Value    Glucose 109 (*)     BUN 80 (*)     Creatinine 4.18 (*)     CO2 16.0 (*)     Total Protein 5.9 (*)     eGFR 10.1 (*)     All other components within normal limits    Narrative:     GFR Normal >60  Chronic Kidney Disease <60  Kidney Failure <15    The GFR formula is only valid for adults with stable renal function between ages 18 and 70.   PROTIME-INR - Abnormal; Notable for the following components:    Protime 18.0 (*)     INR 1.48 (*)     All other components within normal limits   APTT - Abnormal; Notable for the following components:    PTT 39.9 (*)     All other components within normal limits    Narrative:     PTT = The equivalent PTT values for the therapeutic range of heparin levels at 0.3 to 0.5 U/ml are 60 to 70 seconds.   URINALYSIS W/ MICROSCOPIC IF INDICATED (NO CULTURE) - Abnormal; Notable for the following components:    Glucose,  mg/dL (Trace) (*)     Protein,  mg/dL (2+) (*)     All other components within normal limits   CBC WITH AUTO DIFFERENTIAL - Abnormal; Notable for the following components:    RBC 3.16 (*)     Hemoglobin 9.2 (*)     Hematocrit 30.5 (*)     MCHC 30.2 (*)     RDW 18.1 (*)     RDW-SD 63.8 (*)     Lymphocyte % 17.2 (*)     All other components within normal limits   LACTIC ACID, PLASMA - Normal   MAGNESIUM - Normal   RAINBOW DRAW    Narrative:     The following orders were created for panel order [facility] Draw.  Procedure                               Abnormality         Status                     ---------                               -----------         ------                     Green Top (Gel)[[id number]]                                  Final result               Lavender Top[[id number]]                                     Final result               Gold Top - SST[[id number]]                                   Final result               Ha  Top[[id number]]                                         Final result               Light Blue Top[[id number]]                                   Final result                 Please view results for these tests on the individual orders.   URINALYSIS, MICROSCOPIC ONLY   TYPE AND SCREEN   GREEN TOP   LAVENDER TOP   GOLD TOP - SST   GRAY TOP   LIGHT BLUE TOP   CBC AND DIFFERENTIAL    Narrative:     The following orders were created for panel order CBC & Differential.  Procedure                               Abnormality         Status                     ---------                               -----------         ------                     CBC Auto Differential[[id number]]        Abnormal            Final result                 Please view results for these tests on the individual orders.       Meds Given in ED:   Medications   sodium chloride 0.9 % flush 10 mL (has no administration in time range)   sodium chloride 0.9 % bolus 1,000 mL (1,000 mL Intravenous New Bag 11/15/24 5218)   oxyCODONE (ROXICODONE) immediate release tablet 5 mg (5 mg Oral Given 11/15/24 1359)           Last NIH score:                                                          Dysphagia screening results:  Patient Factors Component (Dysphagia:Stroke or Rule-out)  Best Eye Response: 4-->(E4) spontaneous (11/15/24 1234)  Best Motor Response: 6-->(M6) obeys commands (11/15/24 1234)  Best Verbal Response: 5-->(V5) oriented (11/15/24 1234)  [place] Coma Scale Score: 15 (11/15/24 1234)     [place] Coma Scale:  No data recorded     CIWA:        Restraint Type:            Isolation Status:  No active isolations

## 2024-11-16 PROBLEM — R89.9 ABNORMAL LABORATORY TEST: Status: ACTIVE | Noted: 2024-11-16

## 2024-11-16 LAB
ANION GAP SERPL CALCULATED.3IONS-SCNC: 13 MMOL/L (ref 5–15)
B PARAPERT DNA SPEC QL NAA+PROBE: NOT DETECTED
B PERT DNA SPEC QL NAA+PROBE: NOT DETECTED
BASOPHILS # BLD AUTO: 0.04 10*3/MM3 (ref 0–0.2)
BASOPHILS NFR BLD AUTO: 0.8 % (ref 0–1.5)
BUN SERPL-MCNC: 80 MG/DL (ref 8–23)
BUN/CREAT SERPL: 20.6 (ref 7–25)
C PNEUM DNA NPH QL NAA+NON-PROBE: NOT DETECTED
CALCIUM SPEC-SCNC: 9.8 MG/DL (ref 8.6–10.5)
CHLORIDE SERPL-SCNC: 112 MMOL/L (ref 98–107)
CO2 SERPL-SCNC: 16 MMOL/L (ref 22–29)
CREAT SERPL-MCNC: 3.89 MG/DL (ref 0.57–1)
DEPRECATED RDW RBC AUTO: 64.7 FL (ref 37–54)
EGFRCR SERPLBLD CKD-EPI 2021: 11 ML/MIN/1.73
EOSINOPHIL # BLD AUTO: 0.26 10*3/MM3 (ref 0–0.4)
EOSINOPHIL NFR BLD AUTO: 5.2 % (ref 0.3–6.2)
ERYTHROCYTE [DISTWIDTH] IN BLOOD BY AUTOMATED COUNT: 18.3 % (ref 12.3–15.4)
FLUAV SUBTYP SPEC NAA+PROBE: NOT DETECTED
FLUBV RNA ISLT QL NAA+PROBE: NOT DETECTED
GLUCOSE BLDC GLUCOMTR-MCNC: 126 MG/DL (ref 70–130)
GLUCOSE BLDC GLUCOMTR-MCNC: 130 MG/DL (ref 70–130)
GLUCOSE BLDC GLUCOMTR-MCNC: 137 MG/DL (ref 70–130)
GLUCOSE BLDC GLUCOMTR-MCNC: 145 MG/DL (ref 70–130)
GLUCOSE SERPL-MCNC: 110 MG/DL (ref 65–99)
HADV DNA SPEC NAA+PROBE: NOT DETECTED
HCOV 229E RNA SPEC QL NAA+PROBE: NOT DETECTED
HCOV HKU1 RNA SPEC QL NAA+PROBE: NOT DETECTED
HCOV NL63 RNA SPEC QL NAA+PROBE: NOT DETECTED
HCOV OC43 RNA SPEC QL NAA+PROBE: NOT DETECTED
HCT VFR BLD AUTO: 30.4 % (ref 34–46.6)
HGB BLD-MCNC: 9.1 G/DL (ref 12–15.9)
HMPV RNA NPH QL NAA+NON-PROBE: NOT DETECTED
HPIV1 RNA ISLT QL NAA+PROBE: NOT DETECTED
HPIV2 RNA SPEC QL NAA+PROBE: NOT DETECTED
HPIV3 RNA NPH QL NAA+PROBE: NOT DETECTED
HPIV4 P GENE NPH QL NAA+PROBE: NOT DETECTED
IMM GRANULOCYTES # BLD AUTO: 0.01 10*3/MM3 (ref 0–0.05)
IMM GRANULOCYTES NFR BLD AUTO: 0.2 % (ref 0–0.5)
LYMPHOCYTES # BLD AUTO: 1.08 10*3/MM3 (ref 0.7–3.1)
LYMPHOCYTES NFR BLD AUTO: 21.7 % (ref 19.6–45.3)
M PNEUMO IGG SER IA-ACNC: NOT DETECTED
MCH RBC QN AUTO: 29 PG (ref 26.6–33)
MCHC RBC AUTO-ENTMCNC: 29.9 G/DL (ref 31.5–35.7)
MCV RBC AUTO: 96.8 FL (ref 79–97)
MONOCYTES # BLD AUTO: 0.54 10*3/MM3 (ref 0.1–0.9)
MONOCYTES NFR BLD AUTO: 10.8 % (ref 5–12)
NEUTROPHILS NFR BLD AUTO: 3.05 10*3/MM3 (ref 1.7–7)
NEUTROPHILS NFR BLD AUTO: 61.3 % (ref 42.7–76)
NRBC BLD AUTO-RTO: 0 /100 WBC (ref 0–0.2)
PLATELET # BLD AUTO: 134 10*3/MM3 (ref 140–450)
PMV BLD AUTO: 10.9 FL (ref 6–12)
POTASSIUM SERPL-SCNC: 4.8 MMOL/L (ref 3.5–5.2)
QT INTERVAL: 440 MS
QTC INTERVAL: 428 MS
RBC # BLD AUTO: 3.14 10*6/MM3 (ref 3.77–5.28)
RHINOVIRUS RNA SPEC NAA+PROBE: NOT DETECTED
RSV RNA NPH QL NAA+NON-PROBE: NOT DETECTED
SARS-COV-2 RNA NPH QL NAA+NON-PROBE: NOT DETECTED
SODIUM SERPL-SCNC: 141 MMOL/L (ref 136–145)
WBC NRBC COR # BLD AUTO: 4.98 10*3/MM3 (ref 3.4–10.8)

## 2024-11-16 PROCEDURE — 94799 UNLISTED PULMONARY SVC/PX: CPT

## 2024-11-16 PROCEDURE — 94664 DEMO&/EVAL PT USE INHALER: CPT

## 2024-11-16 PROCEDURE — 82948 REAGENT STRIP/BLOOD GLUCOSE: CPT

## 2024-11-16 PROCEDURE — 25810000003 SODIUM CHLORIDE 0.9 % SOLUTION: Performed by: INTERNAL MEDICINE

## 2024-11-16 PROCEDURE — 97166 OT EVAL MOD COMPLEX 45 MIN: CPT

## 2024-11-16 PROCEDURE — 99233 SBSQ HOSP IP/OBS HIGH 50: CPT | Performed by: INTERNAL MEDICINE

## 2024-11-16 PROCEDURE — 85025 COMPLETE CBC W/AUTO DIFF WBC: CPT | Performed by: NURSE PRACTITIONER

## 2024-11-16 PROCEDURE — 80048 BASIC METABOLIC PNL TOTAL CA: CPT | Performed by: NURSE PRACTITIONER

## 2024-11-16 PROCEDURE — 94640 AIRWAY INHALATION TREATMENT: CPT

## 2024-11-16 RX ORDER — ISOSORBIDE MONONITRATE 30 MG/1
30 TABLET, EXTENDED RELEASE ORAL DAILY
Status: DISCONTINUED | OUTPATIENT
Start: 2024-11-16 | End: 2024-11-27 | Stop reason: HOSPADM

## 2024-11-16 RX ORDER — CARVEDILOL 12.5 MG/1
12.5 TABLET ORAL EVERY 12 HOURS SCHEDULED
Status: DISCONTINUED | OUTPATIENT
Start: 2024-11-16 | End: 2024-11-17

## 2024-11-16 RX ORDER — SODIUM CHLORIDE 9 MG/ML
75 INJECTION, SOLUTION INTRAVENOUS CONTINUOUS
Status: ACTIVE | OUTPATIENT
Start: 2024-11-16 | End: 2024-11-17

## 2024-11-16 RX ORDER — SODIUM BICARBONATE 650 MG/1
1300 TABLET ORAL 2 TIMES DAILY
Status: DISCONTINUED | OUTPATIENT
Start: 2024-11-16 | End: 2024-11-21

## 2024-11-16 RX ADMIN — ACETAMINOPHEN 650 MG: 325 TABLET ORAL at 08:20

## 2024-11-16 RX ADMIN — ATORVASTATIN CALCIUM 80 MG: 40 TABLET, FILM COATED ORAL at 08:21

## 2024-11-16 RX ADMIN — CARVEDILOL 6.25 MG: 6.25 TABLET, FILM COATED ORAL at 08:20

## 2024-11-16 RX ADMIN — Medication 10 ML: at 08:22

## 2024-11-16 RX ADMIN — ACETAMINOPHEN 650 MG: 325 TABLET ORAL at 21:46

## 2024-11-16 RX ADMIN — SODIUM BICARBONATE 650 MG TABLET 1300 MG: at 21:46

## 2024-11-16 RX ADMIN — Medication 10 ML: at 21:50

## 2024-11-16 RX ADMIN — PANTOPRAZOLE SODIUM 40 MG: 40 TABLET, DELAYED RELEASE ORAL at 08:21

## 2024-11-16 RX ADMIN — HYDRALAZINE HYDROCHLORIDE 100 MG: 50 TABLET ORAL at 14:51

## 2024-11-16 RX ADMIN — IPRATROPIUM BROMIDE AND ALBUTEROL SULFATE 3 ML: 2.5; .5 SOLUTION RESPIRATORY (INHALATION) at 13:38

## 2024-11-16 RX ADMIN — CALCITRIOL CAPSULES 0.25 MCG 0.5 MCG: 0.25 CAPSULE ORAL at 08:20

## 2024-11-16 RX ADMIN — HYDRALAZINE HYDROCHLORIDE 100 MG: 50 TABLET ORAL at 21:46

## 2024-11-16 RX ADMIN — Medication 1 CAPSULE: at 08:21

## 2024-11-16 RX ADMIN — CARVEDILOL 12.5 MG: 12.5 TABLET, FILM COATED ORAL at 21:46

## 2024-11-16 RX ADMIN — HYDRALAZINE HYDROCHLORIDE 100 MG: 50 TABLET ORAL at 05:14

## 2024-11-16 RX ADMIN — IPRATROPIUM BROMIDE AND ALBUTEROL SULFATE 3 ML: 2.5; .5 SOLUTION RESPIRATORY (INHALATION) at 20:10

## 2024-11-16 RX ADMIN — OXYCODONE 5 MG: 5 TABLET ORAL at 18:11

## 2024-11-16 RX ADMIN — ASPIRIN 81 MG: 81 TABLET, COATED ORAL at 08:20

## 2024-11-16 RX ADMIN — Medication 2.5 MG: at 21:46

## 2024-11-16 RX ADMIN — POLYETHYLENE GLYCOL 3350 17 G: 17 POWDER, FOR SOLUTION ORAL at 08:21

## 2024-11-16 RX ADMIN — FERROUS SULFATE TAB 325 MG (65 MG ELEMENTAL FE) 325 MG: 325 (65 FE) TAB at 08:21

## 2024-11-16 RX ADMIN — PANTOPRAZOLE SODIUM 40 MG: 40 TABLET, DELAYED RELEASE ORAL at 18:11

## 2024-11-16 RX ADMIN — OXYCODONE 5 MG: 5 TABLET ORAL at 23:02

## 2024-11-16 RX ADMIN — CLONIDINE HYDROCHLORIDE 0.1 MG: 0.1 TABLET ORAL at 18:11

## 2024-11-16 RX ADMIN — OXYCODONE 5 MG: 5 TABLET ORAL at 05:13

## 2024-11-16 RX ADMIN — CYANOCOBALAMIN TAB 1000 MCG 1000 MCG: 1000 TAB at 08:20

## 2024-11-16 RX ADMIN — OXYCODONE 5 MG: 5 TABLET ORAL at 12:21

## 2024-11-16 RX ADMIN — ISOSORBIDE MONONITRATE 30 MG: 30 TABLET, EXTENDED RELEASE ORAL at 08:21

## 2024-11-16 RX ADMIN — IPRATROPIUM BROMIDE AND ALBUTEROL SULFATE 3 ML: 2.5; .5 SOLUTION RESPIRATORY (INHALATION) at 00:37

## 2024-11-16 RX ADMIN — CLONIDINE HYDROCHLORIDE 0.1 MG: 0.1 TABLET ORAL at 05:13

## 2024-11-16 RX ADMIN — SODIUM CHLORIDE 75 ML/HR: 9 INJECTION, SOLUTION INTRAVENOUS at 12:21

## 2024-11-16 RX ADMIN — TERAZOSIN HYDROCHLORIDE 5 MG: 5 CAPSULE ORAL at 21:46

## 2024-11-16 RX ADMIN — CLONIDINE HYDROCHLORIDE 0.1 MG: 0.1 TABLET ORAL at 23:02

## 2024-11-16 RX ADMIN — IPRATROPIUM BROMIDE AND ALBUTEROL SULFATE 3 ML: 2.5; .5 SOLUTION RESPIRATORY (INHALATION) at 17:18

## 2024-11-16 RX ADMIN — SODIUM BICARBONATE 650 MG TABLET 650 MG: at 08:21

## 2024-11-16 RX ADMIN — IPRATROPIUM BROMIDE AND ALBUTEROL SULFATE 3 ML: 2.5; .5 SOLUTION RESPIRATORY (INHALATION) at 09:57

## 2024-11-16 RX ADMIN — CLONIDINE HYDROCHLORIDE 0.1 MG: 0.1 TABLET ORAL at 12:23

## 2024-11-16 NOTE — PROGRESS NOTES
[facility] Medicine Services  PROGRESS NOTE    Patient Name: Yanique Webster  : 1940  MRN: [id number]    Date of Admission: 11/15/2024  Primary Care Physician: Sebas Alicea MD    Subjective   Subjective     CC:  KINDRA on CKD    HPI:  Doing okay today. Complains of pain in her legs due to RLS      Objective   Objective     Vital Signs:   Temp:  [97.7 °F (36.5 °C)-98.3 °F (36.8 °C)] 98.3 °F (36.8 °C)  Heart Rate:  [55-70] 66  Resp:  [16-20] 16  BP: (129-175)/(63-94) 161/67  Flow (L/min) (Oxygen Therapy):  [1] 1     Physical Exam:  Constitutional: No acute distress, awake, alert  HENT: NCAT, mucous membranes moist  Respiratory: Clear to auscultation bilaterally, respiratory effort normal   Cardiovascular: RRR, no murmurs, rubs, or gallops  Gastrointestinal: Positive bowel sounds, soft, nontender, nondistended  Musculoskeletal: No bilateral ankle edema  Psychiatric: Appropriate affect, cooperative  Neurologic: Oriented x 3, strength symmetric in all extremities, Cranial Nerves grossly intact to confrontation, speech clear  Skin: No rashes     Results Reviewed:  LAB RESULTS:      Lab 24  0456 11/15/24  1230   WBC 4.98 7.23   HEMOGLOBIN 9.1* 9.2*   HEMATOCRIT 30.4* 30.5*   PLATELETS 134* 151   NEUTROS ABS 3.05 4.84   IMMATURE GRANS (ABS) 0.01 0.02   LYMPHS ABS 1.08 1.24   MONOS ABS 0.54 0.78   EOS ABS 0.26 0.30   MCV 96.8 96.5   PROCALCITONIN  --  0.11   LACTATE  --  0.9   PROTIME  --  18.0*   APTT  --  39.9*         Lab 11/15/24  1230   SODIUM 136   POTASSIUM 5.1   CHLORIDE 106   CO2 16.0*   ANION GAP 14.0   BUN 80*   CREATININE 4.18*   EGFR 10.1*   GLUCOSE 109*   CALCIUM 9.5   MAGNESIUM 2.0         Lab 11/15/24  1230   TOTAL PROTEIN 5.9*   ALBUMIN 3.8   GLOBULIN 2.1   ALT (SGPT) 13   AST (SGOT) 24   BILIRUBIN 0.4   ALK PHOS 83         Lab 11/15/24  1230   PROTIME 18.0*   INR 1.48*             Lab 11/15/24  1334   ABO TYPING O   RH TYPING Negative   ANTIBODY SCREEN  Negative         Brief Urine Lab Results  (Last result in the past 365 days)        Color   Clarity   Blood   Leuk Est   Nitrite   Protein   CREAT   Urine HCG        11/15/24 1410 Yellow   Clear   Negative   Negative   Negative   100 mg/dL (2+)                   Microbiology Results Abnormal       None            No radiology results from the last 24 hrs    Results for orders placed during the hospital encounter of 10/30/24    Adult Transthoracic Echo Complete W/ Cont if Necessary Per Protocol    Interpretation Summary    Left ventricular systolic function is normal. Estimated left ventricular EF = 60%    Left ventricular wall thickness is consistent with mild concentric hypertrophy.    The left atrial cavity is moderately dilated.    Aortic sclerosis without aortic stenosis.  Mild to moderate aortic insufficiency    Mild mitral regurgitation.      Current medications:  Scheduled Meds:aspirin, 81 mg, Oral, Daily  atorvastatin, 80 mg, Oral, Daily  calcitriol, 0.5 mcg, Oral, Daily  carvedilol, 6.25 mg, Oral, Q12H  [START ON 11/18/2024] fentaNYL, 1 patch, Transdermal, Q3 Days   And  Check Fentanyl Patch Placement, 1 each, Not Applicable, Q12H  cloNIDine, 0.1 mg, Oral, Q6H  ferrous sulfate, 325 mg, Oral, Daily With Breakfast  hydrALAZINE, 100 mg, Oral, Q8H  insulin lispro, 2-7 Units, Subcutaneous, 4x Daily AC & at Bedtime  ipratropium-albuterol, 3 mL, Nebulization, 4x Daily - RT  lactobacillus acidophilus, 1 capsule, Oral, Daily  pantoprazole, 40 mg, Oral, BID AC  polyethylene glycol, 17 g, Oral, QAM  [Held by provider] rivaroxaban, 15 mg, Oral, Daily  sodium bicarbonate, 650 mg, Oral, BID  sodium chloride, 10 mL, Intravenous, Q12H  terazosin, 5 mg, Oral, Nightly  vitamin B-12, 1,000 mcg, Oral, Daily      Continuous Infusions:   PRN Meds:.  acetaminophen    senna-docusate sodium **AND** polyethylene glycol **AND** bisacodyl **AND** bisacodyl    dextrose    dextrose    doxepin    glucagon (human recombinant)     ipratropium-albuterol    melatonin    naloxone    nitroglycerin    oxyCODONE    sodium chloride    sodium chloride    sodium chloride    [Held by provider] torsemide    Assessment & Plan   Assessment & Plan     Active Hospital Problems    Diagnosis  POA    **Acute on chronic renal failure [N17.9, N18.9]  Yes    Dyspnea [R06.00]  Yes    Anemia, chronic disease [D63.8]  Yes    Paroxysmal atrial fibrillation [I48.0]  Yes    Essential hypertension [I10]  Yes    Type 2 diabetes mellitus [E11.9]  Yes      Resolved Hospital Problems   No resolved problems to display.        Brief Hospital Course to date:  Yanique Webster is a 84 y.o. female with PMH of Afib on Xarelto, HTN, CKD, prior stroke, T2DM with neuropathy, chronic anemia, and GERD who presented from [facility] with KINDRA on CKD.    Plan:    KINDRA on CKD  --baseline unclear. last admission pt's baseline was documented 1.9-2.2 but wasn't clear if her CKD had advanced.  When dc'd from hospital 11/8/24 creatinine was 3.76. While she has been at [facility], it has stayed in 4 range. On 11/14, was 4.3, bumped to 4.5 on day of admission here (11/15/24)  --nephrology consulted     Dyspnea  --hold off on further IVF for now as she received 1L at [facility] 11/14/24 and just under 0.5L in ED today  --sats stable on RA  -- CT chest without contrast shows small bilateral effusions as well as atelectasis    TCP  -- new today and mild. Monitor for now     PAF  --xarelto restarted a few days ago, was held on dc per cardiology recs but family requested it be restarted due to stroke risk  --will hold for now due to renal function     HTN  --continue meds     T2DM  --SSI     Anemia of chronic disease  --hgb stable    Total time spent: Time Spent: Time Spent: 35 minutes  Time spent includes time reviewing chart, face-to-face time, counseling patient/family/caregiver, ordering medications/tests/procedures, communicating with other health care professionals, documenting clinical information in the  electronic health record, and coordination of care.      Expected Discharge Location and Transportation: home ([facility] had planned to discharge patient home on the day of her admission here, however, due to her KINDRA, she was sent here)  Expected Discharge   Expected Discharge Date: 11/17/2024; Expected Discharge Time:      VTE Prophylaxis:  Pharmacologic & mechanical VTE prophylaxis orders are present.         AM-PAC 6 Clicks Score (PT): 20 (11/15/24 3132)    CODE STATUS:   Code Status and Medical Interventions: CPR (Attempt to Resuscitate); Full Support   Ordered at: 11/15/24 3100     Code Status (Patient has no pulse and is not breathing):    CPR (Attempt to Resuscitate)     Medical Interventions (Patient has pulse or is breathing):    Full Support       Evonne Love MD  11/16/24

## 2024-11-16 NOTE — PLAN OF CARE
Goal Outcome Evaluation:  Plan of Care Reviewed With: patient           Outcome Evaluation: OT eval completed. Pt presents below baseline for ADL performance, limited by generalized weakness, decreased activity tolerance, and SOA. Pt completed bed mobility with Jayson, completed grooming tasks seated EOB with REYES, declined OOB activity or transfer to recliner chair d/t fatigue. IP OT services warranted. Recommend home with 24/7 assist and HHOT at discharge, pending further mobility assessment.    Anticipated Discharge Disposition (OT): home with 24/7 care, home with home health

## 2024-11-16 NOTE — THERAPY EVALUATION
Patient Name: Yanique Webster  : 1940    MRN: [id number]                              Today's Date: 2024       Admit Date: 11/15/2024    Visit Dx:     ICD-10-CM ICD-9-CM   1. Elevated serum creatinine  R79.89 790.99   2. CKD (chronic kidney disease) stage 4, GFR 15-29 ml/min  N18.4 585.4   3. Chronic anemia  D64.9 285.9     Patient Active Problem List   Diagnosis    Fibromyalgia    PVD (peripheral vascular disease)    Type 2 diabetes mellitus    Diabetic gastroparesis    Takotsubo cardiomyopathy    Elevated serum creatinine    Hyperlipidemia LDL goal <70    COPD (chronic obstructive pulmonary disease)    Obesity    Osteoarthritis    Chronic pain    GERD (gastroesophageal reflux disease)    Gout    Chronic joint pain    Coronary artery disease involving native coronary artery of native heart without angina pectoris    Nonrheumatic aortic valve insufficiency    Altered mental status    Essential hypertension    CKD (chronic kidney disease) stage 4, GFR 15-29 ml/min    Hypertensive emergency    Status post placement of implantable loop recorder    Paroxysmal atrial fibrillation    Acute exacerbation of COPD with asthma    Encounter for loop recorder at end of battery life    Closed left hip fracture    Anxiety associated with depression    Anemia, chronic disease    Surgery follow-up    Borderline abnormal TFTs    Acute blood loss anemia    Secondary hyperparathyroidism    Symptomatic anemia    KINDRA (acute kidney injury)    Acute on chronic renal failure    Dyspnea    Abnormal laboratory test     Past Medical History:   Diagnosis Date    Blurry vision     Chronic joint pain     Chronic pain     COPD (chronic obstructive pulmonary disease)     Coronary artery disease     Diabetic gastroparesis 2016    Esophageal stricture     s/p dilation in     GERD (gastroesophageal reflux disease)     Gout     Headache     secondary to hypertension    Hyperlipidemia     Hypertension     Long term current use  of insulin     Neuropathy     Neuropathy due to type 2 diabetes mellitus     Obesity     Osteoarthritis     Pericarditis 2008    Stroke 03/2019    Type 2 diabetes mellitus      Past Surgical History:   Procedure Laterality Date    BRONCHOSCOPY N/A 8/23/2016    Procedure: BRONCHOSCOPY BIOPSY AT BEDSIDE;  Surgeon: Mookie Willard MD;  Location:  [facility];  Service:     CARDIAC CATHETERIZATION N/A 8/30/2016    Procedure: Left Heart Cath;  Surgeon: Steve Geronimo MD;  Location:  [facility];  Service:     CARDIAC CATHETERIZATION N/A 8/30/2016    Procedure: Right Heart Cath;  Surgeon: Steve Geronimo MD;  Location:  [facility];  Service:     CARDIAC ELECTROPHYSIOLOGY PROCEDURE N/A 7/2/2019    Procedure: Loop insertion;  Surgeon: Steve Geronimo MD;  Location:  [facility];  Service: Cardiovascular    CARDIAC ELECTROPHYSIOLOGY PROCEDURE N/A 9/26/2023    Procedure: Loop recorder removal;  Surgeon: Steve Geronimo MD;  Location:  [facility];  Service: Cardiovascular;  Laterality: N/A;    CATARACT EXTRACTION      COLONOSCOPY N/A 8/16/2024    Procedure: COLONOSCOPY;  Surgeon: Brunner, Mark I, MD;  Location:  [facility];  Service: Gastroenterology;  Laterality: N/A;    ENDOSCOPY N/A 8/14/2024    Procedure: ESOPHAGOGASTRODUODENOSCOPY;  Surgeon: Brunner, Mark I, MD;  Location:  [facility];  Service: Gastroenterology;  Laterality: N/A;    ESOPHAGEAL DILATATION  2007    HERNIA REPAIR      HIP CANNULATED SCREW PLACEMENT Left 1/2/2024    Procedure: HIP CANNULATED SCREW PLACEMENT LEFT;  Surgeon: Seymour Harrington MD;  Location:  [facility];  Service: Orthopedics;  Laterality: Left;    HYSTERECTOMY  1998    WRIST FRACTURE SURGERY Left       General Information       Row Name 11/16/24 1553          OT Time and Intention    Document Type evaluation  -EMILY     Mode of Treatment occupational therapy  -EMILY       Row Name 11/16/24 1557          General Information     Patient Profile Reviewed yes  -EMILY     Prior Level of Function independent:;ADL's;bed mobility;transfer;all household mobility;community mobility;dependent:;home management;driving  Ambulates to diningroom for meals at [facility] using rollator; uses facility van for MD appts. and shopping; on RA at baseline  -EMILY     Existing Precautions/Restrictions fall;oxygen therapy device and L/min;other (see comments)  chronic pain in hands and feet; monitor vitals  -EMILY     Barriers to Rehab medically complex;previous functional deficit  -EMILY       Row Name 11/16/24 7674          Occupational Profile    Environmental Supports and Barriers (Occupational Profile) Resident of [facility]; ambulates using rollator; on RA at baseline; admitted from [facility] on planned day of discharge home with   -EMILY       Row Name 11/16/24 7973          Living Environment    People in Home facility resident  -EMILY       Row Name 11/16/24 1554          Home Main Entrance    Number of Stairs, Main Entrance none  -EMILY       Row Name 11/16/24 2553          Stairs Within Home, Primary    Number of Stairs, Within Home, Primary none  -EMILY       Row Name 11/16/24 3184          Cognition    Orientation Status (Cognition) oriented x 3  -EMILY       Row Name 11/16/24 7366          Safety Issues/Impairments Affecting Functional Mobility    Safety Issues Affecting Function (Mobility) awareness of need for assistance;insight into deficits/self-awareness;judgment;problem-solving;safety precaution awareness;safety precautions follow-through/compliance;sequencing abilities  -EMILY     Impairments Affecting Function (Mobility) balance;endurance/activity tolerance;pain;shortness of breath;strength  -EMILY               User Key  (r) = Recorded By, (t) = Taken By, (c) = Cosigned By      Initials Name Provider Type    Alexa Ly OT Occupational Therapist                     Mobility/ADL's       Row Name 11/16/24 3583          Bed Mobility    Bed Mobility  supine-sit;sit-supine  -EMILY     Supine-Sit [place] (Bed Mobility) modified independence  -EMILY     Sit-Supine [place] (Bed Mobility) modified independence  -EMILY     Assistive Device (Bed Mobility) head of bed elevated;bed rails  -EMILY     Comment, (Bed Mobility) Increased time and effort needed; no reports of dizziness or lightheadedness with positional changes  -       Row Name 11/16/24 1557          Transfers    Comment, (Transfers) Pt declined OOB activity or transfer to recliner chair as she sat up in chair earlier this date  -       Row Name 11/16/24 1557          Functional Mobility    Functional Mobility- Ind. Level not tested  -       Row Name 11/16/24 1557          Activities of Daily Living    BADL Assessment/Intervention grooming  -       Row Name 11/16/24 1557          Grooming Assessment/Training    [place] Level (Grooming) hair care, combing/brushing;oral care regimen;wash face, hands;set up  -     Position (Grooming) edge of bed sitting  -               User Key  (r) = Recorded By, (t) = Taken By, (c) = Cosigned By      Initials Name Provider Type    Alexa Ly OT Occupational Therapist                   Obj/Interventions       Row Name 11/16/24 1559          Sensory Assessment (Somatosensory)    Sensory Assessment (Somatosensory) UE sensation intact  -[facility] Name 11/16/24 1559          Vision Assessment/Intervention    Visual Impairment/Limitations WFL;corrective lenses full-time  -       Row Name 11/16/24 1559          Range of Motion Comprehensive    General Range of Motion bilateral upper extremity ROM WFL  -[facility] Name 11/16/24 1559          Strength Comprehensive (MMT)    Comment, General Manual Muscle Testing (MMT) Assessment BUE's grossly 4+/5  -       Row Name 11/16/24 1559          Balance    Balance Assessment sitting static balance;sitting dynamic balance  -     Static Sitting Balance standby assist  -     Dynamic Sitting Balance supervision   -EMILY     Position, Sitting Balance unsupported;sitting edge of bed  -EMILY     Balance Interventions sitting;occupation based/functional task  -EMILY     Comment, Balance SBA for sitting balance at EOB  -EMILY               User Key  (r) = Recorded By, (t) = Taken By, (c) = Cosigned By      Initials Name Provider Type    Alexa Ly OT Occupational Therapist                   Goals/Plan       Row Name 11/16/24 1607          Transfer Goal 1 (OT)    Activity/Assistive Device (Transfer Goal 1, OT) sit-to-stand/stand-to-sit;bed-to-chair/chair-to-bed;toilet;walker, rolling  -EMILY     [place] Level/Cues Needed (Transfer Goal 1, OT) supervision required  -EMILY     Time Frame (Transfer Goal 1, OT) long term goal (LTG);10 days  -EMILY     Progress/Outcome (Transfer Goal 1, OT) new goal  -EMILY       Row Name 11/16/24 1607          Toileting Goal 1 (OT)    Activity/Device (Toileting Goal 1, OT) adjust/manage clothing;perform perineal hygiene;commode;grab bar/safety frame  -EMILY     [place] Level/Cues Needed (Toileting Goal 1, OT) supervision required  -EMILY     Time Frame (Toileting Goal 1, OT) short term goal (STG);1 week  -EMILY     Progress/Outcome (Toileting Goal 1, OT) new goal  -       Row Name 11/16/24 1607          Therapy Assessment/Plan (OT)    Planned Therapy Interventions (OT) activity tolerance training;BADL retraining;functional balance retraining;occupation/activity based interventions;patient/caregiver education/training;ROM/therapeutic exercise;strengthening exercise;transfer/mobility retraining  -EMILY               User Key  (r) = Recorded By, (t) = Taken By, (c) = Cosigned By      Initials Name Provider Type    Alexa Ly OT Occupational Therapist                   Clinical Impression       Row Name 11/16/24 1601          Pain Assessment    Pain Location hand;foot  -EMILY     Pain Side/Orientation bilateral  -EMILY     Pain Management Interventions exercise or physical activity utilized;positioning techniques  utilized  -EMILY     Response to Pain Interventions activity participation with tolerable pain  -EMILY     Pre/Posttreatment Pain Comment Pt reported pain in B hands and feet; RN aware and managing  -EMILY     Additional Documentation Pain Scale: FACES Pre/Post-Treatment (Group)  -       Row Name 11/16/24 1601          Pain Scale: FACES Pre/Post-Treatment    Pain: FACES Scale, Pretreatment 4-->hurts little more  -EMILY     Posttreatment Pain Rating 4-->hurts little more  -EMILY       Row Name 11/16/24 1601          Plan of Care Review    Plan of Care Reviewed With patient  -EMILY     Outcome Evaluation OT eval completed. Pt presents below baseline for ADL performance, limited by generalized weakness, decreased activity tolerance, and SOA. Pt completed bed mobility with Jayson, completed grooming tasks seated EOB with REYES, declined OOB activity or transfer to recliner chair d/t fatigue. IP OT services warranted. Recommend home with 24/7 assist and HHOT at discharge, pending further mobility assessment.  -       Row Name 11/16/24 1601          Therapy Assessment/Plan (OT)    Patient/Family Therapy Goal Statement (OT) To return to OF  -EMILY     Rehab Potential (OT) good  -EMILY     Criteria for Skilled Therapeutic Interventions Met (OT) yes;meets criteria;skilled treatment is necessary  -EMILY     Therapy Frequency (OT) daily  -EMILY     Predicted Duration of Therapy Intervention (OT) 10 days  -       Row Name 11/16/24 1601          Therapy Plan Review/Discharge Plan (OT)    Anticipated Discharge Disposition (OT) home with 24/7 care;home with home health  -       Row Name 11/16/24 1601          Vital Signs    Pre Systolic BP Rehab 171  -EMILY     Pre Treatment Diastolic BP 70  -EMILY     Post Systolic BP Rehab 190  -EMILY     Post Treatment Diastolic BP 75  -EMILY     Pretreatment Heart Rate (beats/min) 82  -EMILY     Posttreatment Heart Rate (beats/min) 69  -EMILY     Pre SpO2 (%) 98  -EMILY     O2 Delivery Pre Treatment nasal cannula  -EMILY     Intra SpO2 (%)  97  -EMILY     O2 Delivery Intra Treatment room air  -EMILY     Post SpO2 (%) 99  -EMILY     O2 Delivery Post Treatment nasal cannula  -EMILY     Pre Patient Position Supine  -EMILY     Intra Patient Position Sitting  -EMILY     Post Patient Position Supine  -EMILY       Row Name 11/16/24 1601          Positioning and Restraints    Pre-Treatment Position in bed  -EMILY     Post Treatment Position bed  -EMILY     In Bed notified nsg;fowlers;call light within reach;encouraged to call for assist;exit alarm on;legs elevated  -EMILY               User Key  (r) = Recorded By, (t) = Taken By, (c) = Cosigned By      Initials Name Provider Type    Alexa Ly OT Occupational Therapist                   Outcome Measures       Row Name 11/16/24 1607          How much help from another is currently needed...    Putting on and taking off regular lower body clothing? 3  -EMILY     Bathing (including washing, rinsing, and drying) 3  -EMILY     Toileting (which includes using toilet bed pan or urinal) 3  -EMILY     Putting on and taking off regular upper body clothing 3  -EMILY     Taking care of personal grooming (such as brushing teeth) 3  -EMILY     Eating meals 3  -EMILY     AM-PAC 6 Clicks Score (OT) 18  -EMILY       Row Name 11/16/24 0820          How much help from another person do you currently need...    Turning from your back to your side while in flat bed without using bedrails? 4  -NM     Moving from lying on back to sitting on the side of a flat bed without bedrails? 4  -NM     Moving to and from a bed to a chair (including a wheelchair)? 3  -NM     Standing up from a chair using your arms (e.g., wheelchair, bedside chair)? 3  -NM     Climbing 3-5 steps with a railing? 3  -NM     To walk in hospital room? 3  -NM     AM-PAC 6 Clicks Score (PT) 20  -NM       Row Name 11/16/24 1607          Functional Assessment    Outcome Measure Options AM-PAC 6 Clicks Daily Activity (OT)  -EMILY               User Key  (r) = Recorded By, (t) = Taken By, (c) = Cosigned By       Initials Name Provider Type    Alexa Ly OT Occupational Therapist    Jerri Zee RN Registered Nurse                    Occupational Therapy Education       Title: PT OT SLP Therapies (In Progress)       Topic: Occupational Therapy (In Progress)       Point: ADL training (Done)       Description:   Instruct learner(s) on proper safety adaptation and remediation techniques during self care or transfers.   Instruct in proper use of assistive devices.                  Learning Progress Summary            Patient Acceptance, E, VU by EMILY at 11/16/2024 1608    Comment: OT POC; discharge planning                      Point: Home exercise program (Not Started)       Description:   Instruct learner(s) on appropriate technique for monitoring, assisting and/or progressing therapeutic exercises/activities.                  Learner Progress:  Not documented in this visit.              Point: Precautions (Done)       Description:   Instruct learner(s) on prescribed precautions during self-care and functional transfers.                  Learning Progress Summary            Patient Acceptance, E, VU by EMILY at 11/16/2024 1609    Comment: OT POC; discharge planning                      Point: Body mechanics (Not Started)       Description:   Instruct learner(s) on proper positioning and spine alignment during self-care, functional mobility activities and/or exercises.                  Learner Progress:  Not documented in this visit.                              User Key       Initials Effective Dates Name Provider Type Discipline    EMILY 06/16/21 -  Alexa Lu OT Occupational Therapist OT                  OT Recommendation and Plan  Planned Therapy Interventions (OT): activity tolerance training, BADL retraining, functional balance retraining, occupation/activity based interventions, patient/caregiver education/training, ROM/therapeutic exercise, strengthening exercise, transfer/mobility retraining  Therapy  Frequency (OT): daily  Plan of Care Review  Plan of Care Reviewed With: patient  Outcome Evaluation: OT eval completed. Pt presents below baseline for ADL performance, limited by generalized weakness, decreased activity tolerance, and SOA. Pt completed bed mobility with Jayson, completed grooming tasks seated EOB with REYES, declined OOB activity or transfer to recliner chair d/t fatigue. IP OT services warranted. Recommend home with 24/7 assist and HHOT at discharge, pending further mobility assessment.     Time Calculation:   Evaluation Complexity (OT)  Review Occupational Profile/Medical/Therapy History Complexity: expanded/moderate complexity  Assessment, Occupational Performance/Identification of Deficit Complexity: 3-5 performance deficits  Clinical Decision Making Complexity (OT): detailed assessment/moderate complexity  Overall Complexity of Evaluation (OT): moderate complexity     Time Calculation- OT       Row Name 11/16/24 1510             Time Calculation- OT    OT Start Time 1510  -EMILY      OT Received On 11/16/24  -EMILY      OT Goal Re-Cert Due Date 11/26/24  -EMILY         Untimed Charges    OT Eval/Re-eval Minutes 53  -EMILY         Total Minutes    Untimed Charges Total Minutes 53  -EMILY       Total Minutes 53  -EMILY                User Key  (r) = Recorded By, (t) = Taken By, (c) = Cosigned By      Initials Name Provider Type    Alexa Ly OT Occupational Therapist                  Therapy Charges for Today       Code Description Service Date Service Provider Modifiers Qty    25260229799 HC OT EVAL MOD COMPLEXITY 4 11/16/2024 Alexa Lu OT GO 1                 Alexa Lu OT  11/16/2024

## 2024-11-16 NOTE — PLAN OF CARE
Problem: Adult Inpatient Plan of Care  Goal: Plan of Care Review  Outcome: Progressing  Goal: Patient-Specific Goal (Individualized)  Outcome: Progressing  Goal: Absence of Hospital-Acquired Illness or Injury  Outcome: Progressing  Intervention: Identify and Manage Fall Risk  Recent Flowsheet Documentation  Taken 11/16/2024 1811 by Jerri Tenorio RN  Safety Promotion/Fall Prevention:   activity supervised   room organization consistent   safety round/check completed  Taken 11/16/2024 1650 by Jerri Tenorio RN  Safety Promotion/Fall Prevention:   activity supervised   room organization consistent   safety round/check completed  Taken 11/16/2024 1445 by Jerri Tenorio RN  Safety Promotion/Fall Prevention:   activity supervised   room organization consistent   safety round/check completed  Taken 11/16/2024 1240 by Jerri Tenorio RN  Safety Promotion/Fall Prevention:   safety round/check completed   room organization consistent  Taken 11/16/2024 1020 by Jerri Tenorio RN  Safety Promotion/Fall Prevention:   activity supervised   room organization consistent   safety round/check completed  Taken 11/16/2024 0820 by Jerri Tenorio RN  Safety Promotion/Fall Prevention:   activity supervised   room organization consistent   safety round/check completed  Intervention: Prevent Skin Injury  Recent Flowsheet Documentation  Taken 11/16/2024 1811 by Jerri Tenorio RN  Body Position: position changed independently  Skin Protection:   incontinence pads utilized   transparent dressing maintained  Taken 11/16/2024 1650 by Jerri Tenorio RN  Body Position: position changed independently  Skin Protection:   incontinence pads utilized   transparent dressing maintained  Taken 11/16/2024 1445 by Jerri Tenorio RN  Body Position: position changed independently  Skin Protection:   incontinence pads utilized   transparent dressing maintained  Taken 11/16/2024 1240 by Jerri Tenorio RN  Body Position: position changed  independently  Skin Protection: incontinence pads utilized  Taken 11/16/2024 1020 by Jerri Tenorio RN  Body Position: position changed independently  Skin Protection:   incontinence pads utilized   transparent dressing maintained  Taken 11/16/2024 0820 by Jerri Tenorio RN  Body Position: position changed independently  Skin Protection: incontinence pads utilized  Goal: Optimal Comfort and Wellbeing  Outcome: Progressing  Goal: Readiness for Transition of Care  Outcome: Progressing     Problem: Skin Injury Risk Increased  Goal: Skin Health and Integrity  Outcome: Progressing  Intervention: Optimize Skin Protection  Recent Flowsheet Documentation  Taken 11/16/2024 1811 by Jerri Tenorio RN  Activity Management: activity encouraged  Pressure Reduction Techniques: frequent weight shift encouraged  Head of Bed (HOB) Positioning: HOB elevated  Pressure Reduction Devices: pressure-redistributing mattress utilized  Skin Protection:   incontinence pads utilized   transparent dressing maintained  Taken 11/16/2024 1650 by Jerri Tenorio RN  Activity Management: activity encouraged  Pressure Reduction Techniques: frequent weight shift encouraged  Head of Bed (HOB) Positioning: HOB elevated  Pressure Reduction Devices: pressure-redistributing mattress utilized  Skin Protection:   incontinence pads utilized   transparent dressing maintained  Taken 11/16/2024 1445 by Jerri Tenorio RN  Pressure Reduction Techniques: frequent weight shift encouraged  Head of Bed (HOB) Positioning: HOB elevated  Pressure Reduction Devices: pressure-redistributing mattress utilized  Skin Protection:   incontinence pads utilized   transparent dressing maintained  Taken 11/16/2024 1240 by Jerri Tenorio RN  Activity Management: activity encouraged  Pressure Reduction Techniques: frequent weight shift encouraged  Head of Bed (HOB) Positioning: HOB elevated  Pressure Reduction Devices: pressure-redistributing mattress utilized  Skin  Protection: incontinence pads utilized  Taken 11/16/2024 1020 by Jerri Tenorio RN  Activity Management: activity encouraged  Pressure Reduction Techniques: frequent weight shift encouraged  Head of Bed (HOB) Positioning: HOB elevated  Pressure Reduction Devices: pressure-redistributing mattress utilized  Skin Protection:   incontinence pads utilized   transparent dressing maintained  Taken 11/16/2024 0820 by Jerri Tenorio RN  Activity Management: activity encouraged  Pressure Reduction Techniques: frequent weight shift encouraged  Head of Bed (HOB) Positioning: HOB elevated  Pressure Reduction Devices: pressure-redistributing mattress utilized  Skin Protection: incontinence pads utilized     Problem: Fall Injury Risk  Goal: Absence of Fall and Fall-Related Injury  Outcome: Progressing  Intervention: Promote Injury-Free Environment  Recent Flowsheet Documentation  Taken 11/16/2024 1811 by Jerri Tenorio RN  Safety Promotion/Fall Prevention:   activity supervised   room organization consistent   safety round/check completed  Taken 11/16/2024 1650 by Jerri Tenorio RN  Safety Promotion/Fall Prevention:   activity supervised   room organization consistent   safety round/check completed  Taken 11/16/2024 1445 by Jerri Tenorio RN  Safety Promotion/Fall Prevention:   activity supervised   room organization consistent   safety round/check completed  Taken 11/16/2024 1240 by Jerri Tenorio RN  Safety Promotion/Fall Prevention:   safety round/check completed   room organization consistent  Taken 11/16/2024 1020 by Jerri Tenorio RN  Safety Promotion/Fall Prevention:   activity supervised   room organization consistent   safety round/check completed  Taken 11/16/2024 0820 by Jerri Tenorio RN  Safety Promotion/Fall Prevention:   activity supervised   room organization consistent   safety round/check completed   Goal Outcome Evaluation:

## 2024-11-16 NOTE — PLAN OF CARE
Goal Outcome Evaluation:   Pt progressing well. Calm, Cooperative and accepting of plan of care. Pt has complaints of legs soreness through the night. Resting in bed, call light within reach.

## 2024-11-16 NOTE — CONSULTS
Referring Provider: Dr. Evonne Love  Reason for Consultation: Acute on chronic renal failure    Subjective     Chief complaint abnormal labs    History of present illness:    84-year-old female who recently discharged on 11/7/2024, Baseline creatinine 1.9-2.2 mg/dL, since last admission was running between 3.2-3.5 range, history of proteinuric renal disease UPC 2 g in the past duplex scan negative in 2016, ANCA negative.  Patient returned from [facility] [place] found to have low hemoglobin.  Complains of weakness, arthralgia and shortness of breath on admission.  Labs showed a creatinine 4.18, BUN 80, bicarb 16, potassium 5.1, sodium 136, hemoglobin 9.2.  Renal been consulted at this time.  Patient denies any epistaxis, hemoptysis, hematemesis, dysuria, hematuria, fever, chills, melena.  PMH included CAD, COPD, HTN, GERD, HLD, chronic pain, Takotsubo cardiomyopathy, paroxysmal atrial fibrillation on Eliquis, peripheral vascular disease.  Severe anemia.    History  Past Medical History:   Diagnosis Date    Blurry vision     Chronic joint pain     Chronic pain     COPD (chronic obstructive pulmonary disease)     Coronary artery disease     Diabetic gastroparesis 08/24/2016    Esophageal stricture     s/p dilation in 2007    GERD (gastroesophageal reflux disease)     Gout     Headache     secondary to hypertension    Hyperlipidemia     Hypertension     Long term current use of insulin     Neuropathy     Neuropathy due to type 2 diabetes mellitus     Obesity     Osteoarthritis     Pericarditis 2008    Stroke 03/2019    Type 2 diabetes mellitus    ,   Past Surgical History:   Procedure Laterality Date    BRONCHOSCOPY N/A 8/23/2016    Procedure: BRONCHOSCOPY BIOPSY AT BEDSIDE;  Surgeon: Mookie Willard MD;  Location:  [facility] ENDOSCOPY;  Service:     CARDIAC CATHETERIZATION N/A 8/30/2016    Procedure: Left Heart Cath;  Surgeon: Steve Geronimo MD;  Location:  [facility] CATH INVASIVE LOCATION;  Service:      CARDIAC CATHETERIZATION N/A 2016    Procedure: Right Heart Cath;  Surgeon: Steve Geronimo MD;  Location:  [facility] CATH INVASIVE LOCATION;  Service:     CARDIAC ELECTROPHYSIOLOGY PROCEDURE N/A 2019    Procedure: Loop insertion;  Surgeon: Steve Geronimo MD;  Location:  [facility] CATH INVASIVE LOCATION;  Service: Cardiovascular    CARDIAC ELECTROPHYSIOLOGY PROCEDURE N/A 2023    Procedure: Loop recorder removal;  Surgeon: Steve Geronimo MD;  Location:  [facility] CATH INVASIVE LOCATION;  Service: Cardiovascular;  Laterality: N/A;    CATARACT EXTRACTION      COLONOSCOPY N/A 2024    Procedure: COLONOSCOPY;  Surgeon: Brunner, Mark I, MD;  Location:  [facility] ENDOSCOPY;  Service: Gastroenterology;  Laterality: N/A;    ENDOSCOPY N/A 2024    Procedure: ESOPHAGOGASTRODUODENOSCOPY;  Surgeon: Brunner, Mark I, MD;  Location:  [facility] ENDOSCOPY;  Service: Gastroenterology;  Laterality: N/A;    ESOPHAGEAL DILATATION      HERNIA REPAIR      HIP CANNULATED SCREW PLACEMENT Left 2024    Procedure: HIP CANNULATED SCREW PLACEMENT LEFT;  Surgeon: Seymour Harrington MD;  Location:  [facility] OR;  Service: Orthopedics;  Laterality: Left;    HYSTERECTOMY      WRIST FRACTURE SURGERY Left    ,   Family History   Problem Relation Age of Onset    Cancer Mother     Cancer Father     Cancer Other     Breast cancer Sister 67    Ovarian cancer Neg Hx    ,   Social History     Socioeconomic History    Marital status:    Tobacco Use    Smoking status: Former     Current packs/day: 0.00     Types: Cigarettes     Quit date: 2003     Years since quittin.8    Smokeless tobacco: Never   Vaping Use    Vaping status: Never Used   Substance and Sexual Activity    Alcohol use: No    Drug use: No    Sexual activity: Not Currently     Partners: Male     Birth control/protection: Pill, Hysterectomy     E-cigarette/Vaping    E-cigarette/Vaping Use Never User     Passive Exposure No     Counseling Given No      E-cigarette/Vaping  Substances    Nicotine No     THC No     CBD No     Flavoring No      E-cigarette/Vaping Devices    Disposable No     Pre-filled or Refillable Cartridge No     Refillable Tank No     Pre-filled Pod No          ,   Medications Prior to Admission   Medication Sig Dispense Refill Last Dose/Taking    atorvastatin (LIPITOR) 80 MG tablet Take 1 tablet by mouth Daily.   11/15/2024    calcitriol (ROCALTROL) 0.5 MCG capsule Take 1 capsule by mouth Daily.   11/15/2024    carvedilol (COREG) 25 MG tablet Take 1 tablet by mouth Every 12 (Twelve) Hours. 60 tablet 1 11/15/2024    cloNIDine (CATAPRES) 0.1 MG tablet Take 1 tablet by mouth Every 6 (Six) Hours for 30 days. 120 tablet 0 11/15/2024    fentaNYL (DURAGESIC) 12 MCG/HR Place 1 patch on the skin as directed by provider Every 72 (Seventy-Two) Hours.   11/15/2024    ferrous sulfate 325 (65 FE) MG tablet Take 1 tablet by mouth Daily With Breakfast.   11/14/2024    hydrALAZINE (APRESOLINE) 100 MG tablet Take 1 tablet by mouth Every 8 (Eight) Hours.   11/15/2024    hydrALAZINE (APRESOLINE) 50 MG tablet Take 1 tablet by mouth 3 (Three) Times a Day.   11/15/2024    Insulin Lispro (humaLOG) 100 UNIT/ML injection Blood Glucose 150-199 mg/dL - 2 units Blood Glucose 200-249 mg/dL - 3 units Blood Glucose 250-299 mg/dL - 4 units Blood Glucose 300-349 mg/dL - 5 units Blood Glucose 350-400 mg/dL - 6 units Blood Glucose Greater Than 400 mg/dL - 7 units & Call Provider   11/15/2024    isosorbide mononitrate (IMDUR) 30 MG 24 hr tablet Take 1 tablet by mouth Daily.   11/15/2024    melatonin 1 MG tablet Take 3 tablets by mouth At Night As Needed for Sleep.   11/14/2024    pantoprazole (PROTONIX) 40 MG EC tablet Take 1 tablet by mouth 2 (Two) Times a Day.   11/15/2024    Polyethylene Glycol 3350 4 g pack Take 17 g by mouth Every Morning.   11/15/2024    probiotic (CULTURELLE) capsule capsule Take 1 capsule by mouth Daily. Bifidobacterium - lactobacillus   11/15/2024    rivaroxaban (XARELTO) 15  MG tablet Take 1 tablet by mouth Daily. 90 tablet 3 11/14/2024    sodium bicarbonate 650 MG tablet Take 1 tablet by mouth 2 (Two) Times a Day.   11/15/2024    terazosin (HYTRIN) 5 MG capsule Take 1 capsule by mouth Every Night.   11/14/2024    torsemide (DEMADEX) 20 MG tablet Take 1 tablet by mouth Daily As Needed (weight gain 3 lbs in 1 day or 5 lbs in 1 week).   11/15/2024    vitamin B-12 (CYANOCOBALAMIN) 1000 MCG tablet Take 1 tablet by mouth Daily.   11/15/2024    acetaminophen (TYLENOL) 325 MG tablet Take 2 tablets by mouth Every 4 (Four) Hours As Needed for Mild Pain.       amLODIPine (NORVASC) 10 MG tablet Take 1 tablet by mouth Daily for 30 days. 30 tablet 0     aspirin 81 MG EC tablet Take 1 tablet by mouth Daily.       bisacodyl (DULCOLAX) 5 MG EC tablet Take 2 tablets by mouth Daily As Needed for Constipation.       Blood Glucose Monitoring Suppl (Accu-Chek Guide) w/Device kit Use 1 kit See Admin Instructions. Use to check blood sugar once daily.  Dx E11.65 1 kit 0     doxazosin (CARDURA) 4 MG tablet        doxepin (SINEquan) 10 MG capsule Take 1 capsule by mouth At Night As Needed for Sleep.       Glucagon 0.5 MG/0.1ML solution auto-injector Inject 1 mg under the skin into the appropriate area as directed As Needed (Low blood sugar).       Glucose Blood (Blood Glucose Test) strip Check blood sugar 1 time a day dx e11.65 100 each 3     Insulin Pen Needle (BD Pen Needle Veda 2nd Gen) 32G X 4 MM misc Use 1 each Daily. 100 each 3     ipratropium-albuterol (DUO-NEB) 0.5-2.5 mg/3 ml nebulizer Take 3 mL by nebulization Every 4 (Four) Hours As Needed for Shortness of Air or Wheezing.       Lancets 33G misc Use 1 each Daily. Dx e11.65 100 each 3     naloxone (NARCAN) 4 MG/0.1ML nasal spray Call 911. Don't prime. [place] in 1 nostril for overdose. Repeat in 2-3 minutes in other nostril if no or minimal breathing/responsiveness.       ondansetron ODT (ZOFRAN-ODT) 4 MG disintegrating tablet Place 1 tablet on the  tongue Every 6 (Six) Hours As Needed for Nausea or Vomiting.       oxyCODONE (OXY-IR) 5 MG capsule Take 1 capsule by mouth Every 4 (Four) Hours As Needed for Moderate Pain.      , Scheduled Meds:  aspirin, 81 mg, Oral, Daily  atorvastatin, 80 mg, Oral, Daily  calcitriol, 0.5 mcg, Oral, Daily  carvedilol, 6.25 mg, Oral, Q12H  [START ON 11/18/2024] fentaNYL, 1 patch, Transdermal, Q3 Days   And  Check Fentanyl Patch Placement, 1 each, Not Applicable, Q12H  cloNIDine, 0.1 mg, Oral, Q6H  ferrous sulfate, 325 mg, Oral, Daily With Breakfast  hydrALAZINE, 100 mg, Oral, Q8H  insulin lispro, 2-7 Units, Subcutaneous, 4x Daily AC & at Bedtime  ipratropium-albuterol, 3 mL, Nebulization, 4x Daily - RT  isosorbide mononitrate, 30 mg, Oral, Daily  lactobacillus acidophilus, 1 capsule, Oral, Daily  pantoprazole, 40 mg, Oral, BID AC  polyethylene glycol, 17 g, Oral, QAM  [Held by provider] rivaroxaban, 15 mg, Oral, Daily  sodium bicarbonate, 650 mg, Oral, BID  sodium chloride, 10 mL, Intravenous, Q12H  terazosin, 5 mg, Oral, Nightly  vitamin B-12, 1,000 mcg, Oral, Daily   , Continuous Infusions:   , PRN Meds:    acetaminophen    senna-docusate sodium **AND** polyethylene glycol **AND** bisacodyl **AND** bisacodyl    dextrose    dextrose    doxepin    glucagon (human recombinant)    ipratropium-albuterol    melatonin    naloxone    nitroglycerin    oxyCODONE    sodium chloride    sodium chloride    sodium chloride    [Held by provider] torsemide, and Allergies:  Latex, Nickel, Penicillins, and Penicillins    Review of Systems  Pertinent items are noted in HPI, all other systems reviewed and negative    Objective     Vital Signs  Temp:  [97.7 °F (36.5 °C)-98.3 °F (36.8 °C)] 97.9 °F (36.6 °C)  Heart Rate:  [55-70] 69  Resp:  [16-20] 16  BP: (129-183)/(63-94) 183/78    No intake/output data recorded.  I/O last 3 completed shifts:  In: -   Out: 1300 [Urine:1300]    Physical Exam:  General appearance:  female no obvious distress  "laying comfortably in bed.  HEENT: Atraumatic normocephalic head, eyes pupil reactive, extraocular muscle intact, nose no bleed, oral mucosa dry, neck is supple, no JVD, no lymph node enlargement, trachea midline.  Lungs: Clear to auscultation, equal chest movement, no crepitation.  Heart: Normal S1, S2, no gallop, murmur, RRR.  Abdomen: Soft, nontender, positive bowel sounds, no organomegaly.  Extremities: No edema, no cyanosis, no joint swelling.  Neuro: Alert, oriented x4, no focal deficit.  Psych: Mood and affect are normal and appropriate.  Skin: Skin is warm and dry.  : No suprapubic fullness or tenderness, no Lopez catheter.    Results Review:   I reviewed the patient's new clinical results.    WBC WBC   Date Value Ref Range Status   11/16/2024 4.98 3.40 - 10.80 10*3/mm3 Final   11/15/2024 7.23 3.40 - 10.80 10*3/mm3 Final      HGB Hemoglobin   Date Value Ref Range Status   11/16/2024 9.1 (L) 12.0 - 15.9 g/dL Final   11/15/2024 9.2 (L) 12.0 - 15.9 g/dL Final      HCT Hematocrit   Date Value Ref Range Status   11/16/2024 30.4 (L) 34.0 - 46.6 % Final   11/15/2024 30.5 (L) 34.0 - 46.6 % Final      Platlets No results found for: \"LABPLAT\"   MCV MCV   Date Value Ref Range Status   11/16/2024 96.8 79.0 - 97.0 fL Final   11/15/2024 96.5 79.0 - 97.0 fL Final          Sodium Sodium   Date Value Ref Range Status   11/16/2024 141 136 - 145 mmol/L Final   11/15/2024 136 136 - 145 mmol/L Final      Potassium Potassium   Date Value Ref Range Status   11/16/2024 4.8 3.5 - 5.2 mmol/L Final   11/15/2024 5.1 3.5 - 5.2 mmol/L Final     Comment:     Slight hemolysis detected by analyzer. Result may be falsely elevated.      Chloride Chloride   Date Value Ref Range Status   11/16/2024 112 (H) 98 - 107 mmol/L Final   11/15/2024 106 98 - 107 mmol/L Final      CO2 CO2   Date Value Ref Range Status   11/16/2024 16.0 (L) 22.0 - 29.0 mmol/L Final   11/15/2024 16.0 (L) 22.0 - 29.0 mmol/L Final      BUN BUN   Date Value Ref Range Status " "  11/16/2024 80 (H) 8 - 23 mg/dL Final   11/15/2024 80 (H) 8 - 23 mg/dL Final      Creatinine Creatinine   Date Value Ref Range Status   11/16/2024 3.89 (H) 0.57 - 1.00 mg/dL Final   11/15/2024 4.18 (H) 0.57 - 1.00 mg/dL Final      Calcium Calcium   Date Value Ref Range Status   11/16/2024 9.8 8.6 - 10.5 mg/dL Final   11/15/2024 9.5 8.6 - 10.5 mg/dL Final      PO4 No results found for: \"CAPO4\"   Albumin Albumin   Date Value Ref Range Status   11/15/2024 3.8 3.5 - 5.2 g/dL Final      Magnesium Magnesium   Date Value Ref Range Status   11/15/2024 2.0 1.6 - 2.4 mg/dL Final      Uric Acid No results found for: \"URICACID\"         aspirin, 81 mg, Oral, Daily  atorvastatin, 80 mg, Oral, Daily  calcitriol, 0.5 mcg, Oral, Daily  carvedilol, 6.25 mg, Oral, Q12H  [START ON 11/18/2024] fentaNYL, 1 patch, Transdermal, Q3 Days   And  Check Fentanyl Patch Placement, 1 each, Not Applicable, Q12H  cloNIDine, 0.1 mg, Oral, Q6H  ferrous sulfate, 325 mg, Oral, Daily With Breakfast  hydrALAZINE, 100 mg, Oral, Q8H  insulin lispro, 2-7 Units, Subcutaneous, 4x Daily AC & at Bedtime  ipratropium-albuterol, 3 mL, Nebulization, 4x Daily - RT  isosorbide mononitrate, 30 mg, Oral, Daily  lactobacillus acidophilus, 1 capsule, Oral, Daily  pantoprazole, 40 mg, Oral, BID AC  polyethylene glycol, 17 g, Oral, QAM  [Held by provider] rivaroxaban, 15 mg, Oral, Daily  sodium bicarbonate, 650 mg, Oral, BID  sodium chloride, 10 mL, Intravenous, Q12H  terazosin, 5 mg, Oral, Nightly  vitamin B-12, 1,000 mcg, Oral, Daily           Assessment & Plan       Acute on chronic renal failure    Type 2 diabetes mellitus    Essential hypertension    Paroxysmal atrial fibrillation    Anemia, chronic disease    Dyspnea    Abnormal laboratory test      1.  KINDRA on CKD: Patient was recently discharged on 11/7/2024 with KINDRA is likely from hemodynamic injury without much improvement.  ANCA negative.  Since discharge creatinine has gone up from 3.5-4.1, metabolic acidosis " bicarb 16, BUN of 80.    2.  CKD stage IV: Baseline creatinine 1.9-2.2 mg/dL, since last admission was running between 3.2-3.5 range, history of proteinuric renal disease UPC 2 g in the past duplex scan negative in 2016, on previous hospitalization discussion with daughter-in-law anesthesiologist, was explained regard to advanced renal dysfunction possibility of dialysis versus conservative treatment.    3.  Anemia of CKD: EDISON was started hemoglobin 9.2.    4.  Volume status:    5.  Hypertension: No ANABEL per duplex in 2016.  On terazosin, carvedilol, hydralazine, clonidine    6.  Metabolic acidosis: In the setting of CKD, patient on sodium bicarb 650 mg twice daily    7.  Hyperparathyroidism: On calcitriol 0.5 mcg daily    8.  Iron deficiency on oral iron    9.  History of CAD    10.  COPD    11.  Hyperlipidemia    12.  Anxiety and depression.    Plan and recommendation.    Hypertension needs to better control increase carvedilol 12.5 mg twice daily.  Metabolic acidosis increase sodium bicarb 1300 mg twice daily.  Hold diuretics for now.  Check intact PTH vitamin D level.  Check iron studies.  Start EDISON with blood pressure better control  Check labs in AM.  IV fluids order with gentle hydration.  Avoid nephrotoxic medications.  Keep systolic blood pressure greater than 100.  Check volume status.  Check labs in the morning.  Daily evaluation for renal replacement therapy will be done.  Adjust medication for the new GFR.  Case discussed with the medical staff taking care of the patient.  I discussed the patients findings and my recommendations with patient    Ben Day MD  11/16/24  @[facility]

## 2024-11-16 NOTE — ED PROVIDER NOTES
EMERGENCY DEPARTMENT ENCOUNTER    Pt Name: Yanique Webster  MRN: [id number]  Pt :   1940  Room Number:  S518/1  Date of encounter:  11/15/2024  PCP: Sebas Alicea MD  ED Provider: Adan Calero MD    Historian: EMS, patient      HPI:  Chief Complaint: Worsening renal function        Context: Yanique Webster is a 84-year-old woman sent by because of worsening renal function on outside labs today.  She was at [facility] for activities of daily living after hospitalization for anemia of chronic disease with blood transfusions and known chronic kidney disease.  She was post be discharged today but her renal function was found to be worse she denies any dysuria or symptoms she has chronic neuropathy pain but says that is unchanged and has no other complaints at this time.       PAST MEDICAL HISTORY  Past Medical History:   Diagnosis Date    Blurry vision     Chronic joint pain     Chronic pain     COPD (chronic obstructive pulmonary disease)     Coronary artery disease     Diabetic gastroparesis 2016    Esophageal stricture     s/p dilation in     GERD (gastroesophageal reflux disease)     Gout     Headache     secondary to hypertension    Hyperlipidemia     Hypertension     Long term current use of insulin     Neuropathy     Neuropathy due to type 2 diabetes mellitus     Obesity     Osteoarthritis     Pericarditis     Stroke 2019    Type 2 diabetes mellitus          PAST SURGICAL HISTORY  Past Surgical History:   Procedure Laterality Date    BRONCHOSCOPY N/A 2016    Procedure: BRONCHOSCOPY BIOPSY AT BEDSIDE;  Surgeon: Mookie Willard MD;  Location:  [facility];  Service:     CARDIAC CATHETERIZATION N/A 2016    Procedure: Left Heart Cath;  Surgeon: Steve Geronimo MD;  Location:  [facility];  Service:     CARDIAC CATHETERIZATION N/A 2016    Procedure: Right Heart Cath;  Surgeon: Steve Geronimo MD;  Location:   [facility];  Service:     CARDIAC ELECTROPHYSIOLOGY PROCEDURE N/A 2019    Procedure: Loop insertion;  Surgeon: Steve Geronimo MD;  Location:  [facility];  Service: Cardiovascular    CARDIAC ELECTROPHYSIOLOGY PROCEDURE N/A 2023    Procedure: Loop recorder removal;  Surgeon: Steve Geronimo MD;  Location:  [facility];  Service: Cardiovascular;  Laterality: N/A;    CATARACT EXTRACTION      COLONOSCOPY N/A 2024    Procedure: COLONOSCOPY;  Surgeon: Brunner, Mark I, MD;  Location:  [facility];  Service: Gastroenterology;  Laterality: N/A;    ENDOSCOPY N/A 2024    Procedure: ESOPHAGOGASTRODUODENOSCOPY;  Surgeon: Brunner, Mark I, MD;  Location:  [facility];  Service: Gastroenterology;  Laterality: N/A;    ESOPHAGEAL DILATATION  2007    HERNIA REPAIR      HIP CANNULATED SCREW PLACEMENT Left 2024    Procedure: HIP CANNULATED SCREW PLACEMENT LEFT;  Surgeon: Seymour Harrington MD;  Location:  [facility];  Service: Orthopedics;  Laterality: Left;    HYSTERECTOMY  1998    WRIST FRACTURE SURGERY Left          FAMILY HISTORY  Family History   Problem Relation Age of Onset    Cancer Mother     Cancer Father     Cancer Other     Breast cancer Sister 67    Ovarian cancer Neg Hx          SOCIAL HISTORY  Social History     Socioeconomic History    Marital status:    Tobacco Use    Smoking status: Former     Current packs/day: 0.00     Types: Cigarettes     Quit date: 2003     Years since quittin.8    Smokeless tobacco: Never   Vaping Use    Vaping status: Never Used   Substance and Sexual Activity    Alcohol use: No    Drug use: No    Sexual activity: Not Currently     Partners: Male     Birth control/protection: Pill, Hysterectomy         ALLERGIES  Latex, Nickel, Penicillins, and Penicillins        REVIEW OF SYSTEMS  Review of Systems       All systems reviewed and negative except for those discussed in HPI.       PHYSICAL EXAM    I have  reviewed the triage vital signs and nursing notes.    ED Triage Vitals [11/15/24 1228]   Temp Heart Rate Resp BP SpO2   98.1 °F (36.7 °C) 63 20 159/74 100 %      Temp src Heart Rate Source Patient Position BP Location FiO2 (%)   Oral Monitor Lying Left arm --       Physical Exam  GENERAL:   Appears in no acute distress.   HENT: Nares patent.  Dry mucous membrane  EYES: No scleral icterus.  CV: Regular rhythm, regular rate.  RESPIRATORY: Normal effort.  No audible wheezes, rales or rhonchi.  ABDOMEN: Soft, nontender  MUSCULOSKELETAL: No deformities.   NEURO: Alert, moves all extremities, follows commands.  SKIN: Warm, dry, no rash visualized.      LAB RESULTS  Recent Results (from the past 24 hours)   Green Top (Gel)    Collection Time: 11/15/24 12:30 PM   Result Value Ref Range    Extra Tube Hold for add-ons.    Lavender Top    Collection Time: 11/15/24 12:30 PM   Result Value Ref Range    Extra Tube hold for add-on    Gold Top - SST    Collection Time: 11/15/24 12:30 PM   Result Value Ref Range    Extra Tube Hold for add-ons.    Gray Top    Collection Time: 11/15/24 12:30 PM   Result Value Ref Range    Extra Tube Hold for add-ons.    Light Blue Top    Collection Time: 11/15/24 12:30 PM   Result Value Ref Range    Extra Tube Hold for add-ons.    Comprehensive Metabolic Panel    Collection Time: 11/15/24 12:30 PM    Specimen: Blood   Result Value Ref Range    Glucose 109 (H) 65 - 99 mg/dL    BUN 80 (H) 8 - 23 mg/dL    Creatinine 4.18 (H) 0.57 - 1.00 mg/dL    Sodium 136 136 - 145 mmol/L    Potassium 5.1 3.5 - 5.2 mmol/L    Chloride 106 98 - 107 mmol/L    CO2 16.0 (L) 22.0 - 29.0 mmol/L    Calcium 9.5 8.6 - 10.5 mg/dL    Total Protein 5.9 (L) 6.0 - 8.5 g/dL    Albumin 3.8 3.5 - 5.2 g/dL    ALT (SGPT) 13 1 - 33 U/L    AST (SGOT) 24 1 - 32 U/L    Alkaline Phosphatase 83 39 - 117 U/L    Total Bilirubin 0.4 0.0 - 1.2 mg/dL    Globulin 2.1 gm/dL    A/G Ratio 1.8 g/dL    BUN/Creatinine Ratio 19.1 7.0 - 25.0    Anion Gap 14.0  5.0 - 15.0 mmol/L    eGFR 10.1 (L) >60.0 mL/min/1.73   Protime-INR    Collection Time: 11/15/24 12:30 PM    Specimen: Blood   Result Value Ref Range    Protime 18.0 (H) 12.2 - 14.5 Seconds    INR 1.48 (H) 0.89 - 1.12   aPTT    Collection Time: 11/15/24 12:30 PM    Specimen: Blood   Result Value Ref Range    PTT 39.9 (L) 60.0 - 90.0 seconds   Lactic Acid, Plasma    Collection Time: 11/15/24 12:30 PM    Specimen: Blood   Result Value Ref Range    Lactate 0.9 0.5 - 2.0 mmol/L   Magnesium    Collection Time: 11/15/24 12:30 PM    Specimen: Blood   Result Value Ref Range    Magnesium 2.0 1.6 - 2.4 mg/dL   CBC Auto Differential    Collection Time: 11/15/24 12:30 PM    Specimen: Blood   Result Value Ref Range    WBC 7.23 3.40 - 10.80 10*3/mm3    RBC 3.16 (L) 3.77 - 5.28 10*6/mm3    Hemoglobin 9.2 (L) 12.0 - 15.9 g/dL    Hematocrit 30.5 (L) 34.0 - 46.6 %    MCV 96.5 79.0 - 97.0 fL    MCH 29.1 26.6 - 33.0 pg    MCHC 30.2 (L) 31.5 - 35.7 g/dL    RDW 18.1 (H) 12.3 - 15.4 %    RDW-SD 63.8 (H) 37.0 - 54.0 fl    MPV 11.1 6.0 - 12.0 fL    Platelets 151 140 - 450 10*3/mm3    Neutrophil % 66.9 42.7 - 76.0 %    Lymphocyte % 17.2 (L) 19.6 - 45.3 %    Monocyte % 10.8 5.0 - 12.0 %    Eosinophil % 4.1 0.3 - 6.2 %    Basophil % 0.7 0.0 - 1.5 %    Immature Grans % 0.3 0.0 - 0.5 %    Neutrophils, Absolute 4.84 1.70 - 7.00 10*3/mm3    Lymphocytes, Absolute 1.24 0.70 - 3.10 10*3/mm3    Monocytes, Absolute 0.78 0.10 - 0.90 10*3/mm3    Eosinophils, Absolute 0.30 0.00 - 0.40 10*3/mm3    Basophils, Absolute 0.05 0.00 - 0.20 10*3/mm3    Immature Grans, Absolute 0.02 0.00 - 0.05 10*3/mm3    nRBC 0.0 0.0 - 0.2 /100 WBC   ECG 12 Lead Electrolyte Imbalance    Collection Time: 11/15/24  1:21 PM   Result Value Ref Range    QT Interval 440 ms    QTC Interval 428 ms   Type & Screen    Collection Time: 11/15/24  1:34 PM    Specimen: Arm, Right; Blood   Result Value Ref Range    ABO Type O     RH type Negative     Antibody Screen Negative     T&S Expiration  Date 11/18/2024 11:59:59 PM    Urinalysis With Microscopic If Indicated (No Culture) - Urine, Clean Catch    Collection Time: 11/15/24  2:10 PM    Specimen: Urine, Clean Catch   Result Value Ref Range    Color, UA Yellow Yellow, Straw    Appearance, UA Clear Clear    pH, UA 6.5 5.0 - 8.0    Specific Gravity, UA 1.008 1.001 - 1.030    Glucose,  mg/dL (Trace) (A) Negative    Ketones, UA Negative Negative    Bilirubin, UA Negative Negative    Blood, UA Negative Negative    Protein,  mg/dL (2+) (A) Negative    Leuk Esterase, UA Negative Negative    Nitrite, UA Negative Negative    Urobilinogen, UA 0.2 E.U./dL 0.2 - 1.0 E.U./dL   Urinalysis, Microscopic Only - Urine, Clean Catch    Collection Time: 11/15/24  2:10 PM    Specimen: Urine, Clean Catch   Result Value Ref Range    RBC, UA 0-2 None Seen, 0-2 /HPF    WBC, UA 0-2 None Seen, 0-2 /HPF    Bacteria, UA None Seen None Seen, Trace /HPF    Squamous Epithelial Cells, UA None Seen None Seen, 0-2 /HPF    Hyaline Casts, UA None Seen 0 - 6 /LPF    Methodology Automated Microscopy        If labs were ordered, I independently reviewed the results and considered them in treating the patient.        RADIOLOGY  No Radiology Exams Resulted Within Past 24 Hours    I ordered and independently reviewed the above noted radiographic studies.        See radiologist's dictation for official interpretation.        PROCEDURES    Procedures    ECG 12 Lead Electrolyte Imbalance   Preliminary Result   Test Reason : other   Blood Pressure :   */*   mmHG   Vent. Rate :  57 BPM     Atrial Rate :  57 BPM      P-R Int : 170 ms          QRS Dur :  90 ms       QT Int : 440 ms       P-R-T Axes :  70   4  34 degrees     QTcB Int : 428 ms      Sinus bradycardia   Otherwise normal ECG   When compared with ECG of 30-Oct-2024 16:30,   No significant change was found      Referred By: er md           Confirmed By:           MEDICATIONS GIVEN IN ER    Medications   sodium chloride 0.9 % flush 10  mL (has no administration in time range)   sodium chloride 0.9 % flush 10 mL (has no administration in time range)   sodium chloride 0.9 % flush 10 mL (has no administration in time range)   sodium chloride 0.9 % infusion 40 mL (has no administration in time range)   dextrose (GLUTOSE) oral gel 15 g (has no administration in time range)   dextrose (D50W) (25 g/50 mL) IV injection 25 g (has no administration in time range)   glucagon (GLUCAGEN) injection 1 mg (has no administration in time range)   Insulin Lispro (humaLOG) injection 2-7 Units ( Subcutaneous Not Given 11/15/24 1815)   nitroglycerin (NITROSTAT) SL tablet 0.4 mg (has no administration in time range)   sennosides-docusate (PERICOLACE) 8.6-50 MG per tablet 2 tablet (has no administration in time range)     And   polyethylene glycol (MIRALAX) packet 17 g (has no administration in time range)     And   bisacodyl (DULCOLAX) EC tablet 5 mg (has no administration in time range)     And   bisacodyl (DULCOLAX) suppository 10 mg (has no administration in time range)   aspirin EC tablet 81 mg (has no administration in time range)   sodium bicarbonate tablet 650 mg (has no administration in time range)   terazosin (HYTRIN) capsule 5 mg (has no administration in time range)   vitamin B-12 (CYANOCOBALAMIN) tablet 1,000 mcg (has no administration in time range)   lactobacillus acidophilus (RISAQUAD) capsule 1 capsule (has no administration in time range)   polyethylene glycol (MIRALAX) packet 17 g (has no administration in time range)   pantoprazole (PROTONIX) EC tablet 40 mg (40 mg Oral Given 11/15/24 1820)   oxyCODONE (ROXICODONE) immediate release tablet 5 mg (has no administration in time range)   naloxone (NARCAN) nasal spray 1 spray (has no administration in time range)   ferrous sulfate tablet 325 mg (has no administration in time range)   fentaNYL (DURAGESIC) 12 MCG/HR 1 patch (has no administration in time range)     And   Check Fentanyl Patch Placement (has  no administration in time range)   acetaminophen (TYLENOL) tablet 650 mg (has no administration in time range)   atorvastatin (LIPITOR) tablet 80 mg (has no administration in time range)   calcitriol (ROCALTROL) capsule 0.5 mcg (has no administration in time range)   doxepin (SINEquan) capsule 10 mg (has no administration in time range)   ipratropium-albuterol (DUO-NEB) nebulizer solution 3 mL (has no administration in time range)   melatonin tablet 2.5 mg (has no administration in time range)   rivaroxaban (XARELTO) tablet 15 mg ( Oral Dose Auto Held 11/23/24 0900)   torsemide (DEMADEX) tablet 20 mg ( Oral Held by provider 11/15/24 1743)   cloNIDine (CATAPRES) tablet 0.1 mg (0.1 mg Oral Given 11/15/24 1820)   hydrALAZINE (APRESOLINE) tablet 100 mg (has no administration in time range)   carvedilol (COREG) tablet 6.25 mg (has no administration in time range)   oxyCODONE (ROXICODONE) immediate release tablet 5 mg (5 mg Oral Given 11/15/24 1359)   sodium chloride 0.9 % bolus 1,000 mL (1,000 mL Intravenous New Bag 11/15/24 1548)         MEDICAL DECISION MAKING, PROGRESS, and CONSULTS    All labs, if obtained, have been independently reviewed by me.  All radiology studies, if obtained, have been reviewed by me and the radiologist dictating the report.  All EKG's, if obtained, have been independently viewed and interpreted by me/my attending physician.      Discussion below represents my analysis of pertinent findings related to patient's condition, differential diagnosis, treatment plan and final disposition.                                          Differential diagnosis:    Acute renal failure, urinary retention, sepsis, anemia, electrolyte abnormality, urinary tract infection      Additional sources:    - Discussed/ obtained information from independent historians: EMS    - External (non-ED) record review:  Chart recent hospitalization for symptomatic anemia shows history of:  CAD, COPD, HTN, HLD, chronic pain,  Takotsubo cardiomyopathy, CKD4, Afib on eliquis;    At the time of this hospitalization her baseline creatinine was reported to be 2.3    - Chronic or social conditions impacting care: Chronic kidney disease        Orders placed during this visit:  Orders Placed This Encounter   Procedures    Cecil Draw    Comprehensive Metabolic Panel    Protime-INR    aPTT    Urinalysis With Microscopic If Indicated (No Culture) - Urine, Clean Catch    Lactic Acid, Plasma    Magnesium    CBC Auto Differential    Urinalysis, Microscopic Only - Urine, Clean Catch    CBC Auto Differential    Basic Metabolic Panel    Diet: Regular/House, Diabetic, Cardiac; Healthy Heart (2-3 Na+); Consistent Carbohydrate; Fluid Consistency: Thin (IDDSI 0)    Measure Weight    Vital Signs    Intake & Output    Weigh Patient    Oral Care    Place Sequential Compression Device    Maintain Sequential Compression Device    Maintain IV Access    Telemetry - Place Orders & Notify Provider of Results When Patient Experiences Acute Chest Pain, Dysrhythmia or Respiratory Distress    May Be Off Telemetry for Tests    Continuous Pulse Oximetry    Up With Assistance    Up in Chair    Ambulate Patient    Code Status and Medical Interventions: CPR (Attempt to Resuscitate); Full Support    Inpatient Nephrology Consult    Inpatient Case Management  Consult    DIET MESSAGE Please send a supper tray to patient ASAP She would also like some tea.    OT Consult: Eval & Treat ADL Performance Below Baseline    PT Consult: Eval & Treat Functional Mobility Below Baseline    POC Glucose 4x Daily Before Meals & at Bedtime    ECG 12 Lead Electrolyte Imbalance    Type & Screen    Insert peripheral IV    Insert Peripheral IV    Initiate Observation Status    Green Top (Gel)    Lavender Top    Gold Top - SST    Gray Top    Light Blue Top    CBC & Differential         Additional orders considered but not ordered:      ED Course:    Consultants:      ED Course as of  11/15/24 1930   Fri Nov 15, 2024   1237 Chart recent hospitalization for symptomatic anemia shows history of:  CAD, COPD, HTN, HLD, chronic pain, Takotsubo cardiomyopathy, CKD4, Afib on eliquis; [CC]   1242 This is a very nice 84-year-old woman sent by because of worsening renal function on outside labs today.  She was at [facility] for activities of daily living after hospitalization for anemia of chronic disease with blood transfusions and known chronic kidney disease.  She was post be discharged today but her renal function was found to be worse she denies any dysuria or symptoms she has chronic neuropathy pain but says that is unchanged and has no other complaints at this time. [CC]   1243 She arrived awake and alert vitals within normal limits and generally well-appearing.  Obtain bladder scan to evaluate for urinary retention and obtaining laboratory workup including renal functions coags type and screen will reevaluate pending initial workup. [CC]   1928 Patient was able to urinate, labs today show creatinine of 4.18 this is mildly increased from her discharge labs and significantly increased from her baseline.  Does have somewhat dry mucous membranes so giving IV fluids. [CC]   1928 Labs also demonstrating anemia however this is improved from her previous hospitalization.  No elevation in lactic acid.  At this point she does have slightly increased creatinine with significantly elevated BUN and dry mucous membranes I think this is likely prerenal in nature we discussed giving IV fluids here and having her PCP recheck creatinine on Monday versus observation admission overnight for recheck of labs in the morning.  Patient is more comfortable with lab recheck in the morning discussed with  for hospital admission. [CC]      ED Course User Index  [CC] Adan Calero MD              Shared Decision Making:  After my consideration of clinical presentation and any  laboratory/radiology studies obtained, I discussed the findings with the patient/patient representative who is in agreement with the treatment plan and the final disposition.   Risks and benefits of discharge and/or observation/admission were discussed.       AS OF 19:30 EST VITALS:    BP - 170/74  HR - 59  TEMP - 98.1 °F (36.7 °C) (Oral)  O2 SATS - 98%                  DIAGNOSIS  Final diagnoses:   Elevated serum creatinine   CKD (chronic kidney disease) stage 4, GFR 15-29 ml/min   Chronic anemia         DISPOSITION  Admit      Please note that portions of this document were completed with voice recognition software.        Adan Calero MD  11/15/24 1930

## 2024-11-17 LAB
25(OH)D3 SERPL-MCNC: 18.8 NG/ML (ref 30–100)
ALBUMIN SERPL-MCNC: 3.7 G/DL (ref 3.5–5.2)
ANION GAP SERPL CALCULATED.3IONS-SCNC: 13 MMOL/L (ref 5–15)
BASOPHILS # BLD AUTO: 0.05 10*3/MM3 (ref 0–0.2)
BASOPHILS NFR BLD AUTO: 0.9 % (ref 0–1.5)
BUN SERPL-MCNC: 71 MG/DL (ref 8–23)
BUN/CREAT SERPL: 17.8 (ref 7–25)
CALCIUM SPEC-SCNC: 9.3 MG/DL (ref 8.6–10.5)
CALCIUM SPEC-SCNC: 9.5 MG/DL (ref 8.6–10.5)
CHLORIDE SERPL-SCNC: 109 MMOL/L (ref 98–107)
CO2 SERPL-SCNC: 17 MMOL/L (ref 22–29)
CREAT SERPL-MCNC: 4 MG/DL (ref 0.57–1)
DEPRECATED RDW RBC AUTO: 64.8 FL (ref 37–54)
EGFRCR SERPLBLD CKD-EPI 2021: 10.6 ML/MIN/1.73
EOSINOPHIL # BLD AUTO: 0.27 10*3/MM3 (ref 0–0.4)
EOSINOPHIL NFR BLD AUTO: 4.7 % (ref 0.3–6.2)
ERYTHROCYTE [DISTWIDTH] IN BLOOD BY AUTOMATED COUNT: 18.1 % (ref 12.3–15.4)
GLUCOSE BLDC GLUCOMTR-MCNC: 103 MG/DL (ref 70–130)
GLUCOSE BLDC GLUCOMTR-MCNC: 128 MG/DL (ref 70–130)
GLUCOSE BLDC GLUCOMTR-MCNC: 145 MG/DL (ref 70–130)
GLUCOSE BLDC GLUCOMTR-MCNC: 146 MG/DL (ref 70–130)
GLUCOSE SERPL-MCNC: 107 MG/DL (ref 65–99)
HCT VFR BLD AUTO: 29.6 % (ref 34–46.6)
HGB BLD-MCNC: 8.7 G/DL (ref 12–15.9)
IMM GRANULOCYTES # BLD AUTO: 0.03 10*3/MM3 (ref 0–0.05)
IMM GRANULOCYTES NFR BLD AUTO: 0.5 % (ref 0–0.5)
IRON 24H UR-MRATE: 61 MCG/DL (ref 37–145)
IRON SATN MFR SERPL: 24 % (ref 20–50)
LYMPHOCYTES # BLD AUTO: 1.14 10*3/MM3 (ref 0.7–3.1)
LYMPHOCYTES NFR BLD AUTO: 19.8 % (ref 19.6–45.3)
MCH RBC QN AUTO: 28.7 PG (ref 26.6–33)
MCHC RBC AUTO-ENTMCNC: 29.4 G/DL (ref 31.5–35.7)
MCV RBC AUTO: 97.7 FL (ref 79–97)
MONOCYTES # BLD AUTO: 0.63 10*3/MM3 (ref 0.1–0.9)
MONOCYTES NFR BLD AUTO: 11 % (ref 5–12)
NEUTROPHILS NFR BLD AUTO: 3.63 10*3/MM3 (ref 1.7–7)
NEUTROPHILS NFR BLD AUTO: 63.1 % (ref 42.7–76)
NRBC BLD AUTO-RTO: 0 /100 WBC (ref 0–0.2)
PHOSPHATE SERPL-MCNC: 4.8 MG/DL (ref 2.5–4.5)
PLATELET # BLD AUTO: 126 10*3/MM3 (ref 140–450)
PMV BLD AUTO: 11 FL (ref 6–12)
POTASSIUM SERPL-SCNC: 5.1 MMOL/L (ref 3.5–5.2)
PTH-INTACT SERPL-MCNC: 663 PG/ML (ref 15–65)
RBC # BLD AUTO: 3.03 10*6/MM3 (ref 3.77–5.28)
SODIUM SERPL-SCNC: 139 MMOL/L (ref 136–145)
TIBC SERPL-MCNC: 256 MCG/DL (ref 298–536)
TRANSFERRIN SERPL-MCNC: 172 MG/DL (ref 200–360)
WBC NRBC COR # BLD AUTO: 5.75 10*3/MM3 (ref 3.4–10.8)

## 2024-11-17 PROCEDURE — 99232 SBSQ HOSP IP/OBS MODERATE 35: CPT | Performed by: INTERNAL MEDICINE

## 2024-11-17 PROCEDURE — 94799 UNLISTED PULMONARY SVC/PX: CPT

## 2024-11-17 PROCEDURE — 82306 VITAMIN D 25 HYDROXY: CPT | Performed by: INTERNAL MEDICINE

## 2024-11-17 PROCEDURE — 85025 COMPLETE CBC W/AUTO DIFF WBC: CPT | Performed by: INTERNAL MEDICINE

## 2024-11-17 PROCEDURE — 83540 ASSAY OF IRON: CPT | Performed by: INTERNAL MEDICINE

## 2024-11-17 PROCEDURE — 82310 ASSAY OF CALCIUM: CPT | Performed by: INTERNAL MEDICINE

## 2024-11-17 PROCEDURE — 84466 ASSAY OF TRANSFERRIN: CPT | Performed by: INTERNAL MEDICINE

## 2024-11-17 PROCEDURE — 25810000003 SODIUM CHLORIDE 0.9 % SOLUTION: Performed by: INTERNAL MEDICINE

## 2024-11-17 PROCEDURE — 80069 RENAL FUNCTION PANEL: CPT | Performed by: INTERNAL MEDICINE

## 2024-11-17 PROCEDURE — 83970 ASSAY OF PARATHORMONE: CPT | Performed by: INTERNAL MEDICINE

## 2024-11-17 PROCEDURE — 82948 REAGENT STRIP/BLOOD GLUCOSE: CPT

## 2024-11-17 RX ORDER — CARVEDILOL 12.5 MG/1
25 TABLET ORAL EVERY 12 HOURS SCHEDULED
Status: DISCONTINUED | OUTPATIENT
Start: 2024-11-17 | End: 2024-11-27 | Stop reason: HOSPADM

## 2024-11-17 RX ADMIN — FERROUS SULFATE TAB 325 MG (65 MG ELEMENTAL FE) 325 MG: 325 (65 FE) TAB at 09:58

## 2024-11-17 RX ADMIN — PANTOPRAZOLE SODIUM 40 MG: 40 TABLET, DELAYED RELEASE ORAL at 17:14

## 2024-11-17 RX ADMIN — CLONIDINE HYDROCHLORIDE 0.1 MG: 0.1 TABLET ORAL at 06:18

## 2024-11-17 RX ADMIN — IPRATROPIUM BROMIDE AND ALBUTEROL SULFATE 3 ML: 2.5; .5 SOLUTION RESPIRATORY (INHALATION) at 08:00

## 2024-11-17 RX ADMIN — Medication 10 MG: at 21:28

## 2024-11-17 RX ADMIN — PANTOPRAZOLE SODIUM 40 MG: 40 TABLET, DELAYED RELEASE ORAL at 09:58

## 2024-11-17 RX ADMIN — DOXEPIN HYDROCHLORIDE 10 MG: 10 CAPSULE ORAL at 02:35

## 2024-11-17 RX ADMIN — HYDRALAZINE HYDROCHLORIDE 100 MG: 50 TABLET ORAL at 21:26

## 2024-11-17 RX ADMIN — SODIUM CHLORIDE 75 ML/HR: 9 INJECTION, SOLUTION INTRAVENOUS at 00:33

## 2024-11-17 RX ADMIN — ACETAMINOPHEN 650 MG: 325 TABLET ORAL at 06:18

## 2024-11-17 RX ADMIN — SODIUM BICARBONATE 650 MG TABLET 1300 MG: at 21:26

## 2024-11-17 RX ADMIN — ASPIRIN 81 MG: 81 TABLET, COATED ORAL at 09:58

## 2024-11-17 RX ADMIN — Medication 10 ML: at 21:27

## 2024-11-17 RX ADMIN — Medication 1 CAPSULE: at 09:59

## 2024-11-17 RX ADMIN — OXYCODONE 5 MG: 5 TABLET ORAL at 02:35

## 2024-11-17 RX ADMIN — HYDRALAZINE HYDROCHLORIDE 100 MG: 50 TABLET ORAL at 13:10

## 2024-11-17 RX ADMIN — OXYCODONE 5 MG: 5 TABLET ORAL at 21:26

## 2024-11-17 RX ADMIN — HYDRALAZINE HYDROCHLORIDE 100 MG: 50 TABLET ORAL at 06:18

## 2024-11-17 RX ADMIN — SODIUM BICARBONATE 650 MG TABLET 1300 MG: at 09:58

## 2024-11-17 RX ADMIN — CLONIDINE HYDROCHLORIDE 0.1 MG: 0.1 TABLET ORAL at 17:14

## 2024-11-17 RX ADMIN — IPRATROPIUM BROMIDE AND ALBUTEROL SULFATE 3 ML: 2.5; .5 SOLUTION RESPIRATORY (INHALATION) at 16:24

## 2024-11-17 RX ADMIN — IPRATROPIUM BROMIDE AND ALBUTEROL SULFATE 3 ML: 2.5; .5 SOLUTION RESPIRATORY (INHALATION) at 13:26

## 2024-11-17 RX ADMIN — CLONIDINE HYDROCHLORIDE 0.1 MG: 0.1 TABLET ORAL at 13:10

## 2024-11-17 RX ADMIN — TERAZOSIN HYDROCHLORIDE 5 MG: 5 CAPSULE ORAL at 21:26

## 2024-11-17 RX ADMIN — Medication 10 ML: at 09:59

## 2024-11-17 RX ADMIN — IPRATROPIUM BROMIDE AND ALBUTEROL SULFATE 3 ML: 2.5; .5 SOLUTION RESPIRATORY (INHALATION) at 20:11

## 2024-11-17 RX ADMIN — CARVEDILOL 25 MG: 12.5 TABLET, FILM COATED ORAL at 21:26

## 2024-11-17 RX ADMIN — OXYCODONE 5 MG: 5 TABLET ORAL at 17:14

## 2024-11-17 RX ADMIN — CYANOCOBALAMIN TAB 1000 MCG 1000 MCG: 1000 TAB at 09:59

## 2024-11-17 RX ADMIN — ATORVASTATIN CALCIUM 80 MG: 40 TABLET, FILM COATED ORAL at 09:59

## 2024-11-17 RX ADMIN — ISOSORBIDE MONONITRATE 30 MG: 30 TABLET, EXTENDED RELEASE ORAL at 09:58

## 2024-11-17 RX ADMIN — CALCITRIOL CAPSULES 0.25 MCG 0.5 MCG: 0.25 CAPSULE ORAL at 09:58

## 2024-11-17 NOTE — PLAN OF CARE
Problem: Adult Inpatient Plan of Care  Goal: Plan of Care Review  Outcome: Progressing  Goal: Patient-Specific Goal (Individualized)  Outcome: Progressing  Goal: Absence of Hospital-Acquired Illness or Injury  Outcome: Progressing  Intervention: Identify and Manage Fall Risk  Recent Flowsheet Documentation  Taken 11/17/2024 1630 by Jerri Tenorio RN  Safety Promotion/Fall Prevention:   activity supervised   room organization consistent   safety round/check completed  Taken 11/17/2024 1420 by Jerri Tenorio RN  Safety Promotion/Fall Prevention:   activity supervised   room organization consistent   safety round/check completed  Taken 11/17/2024 1230 by Jerri Tenorio RN  Safety Promotion/Fall Prevention:   activity supervised   room organization consistent   safety round/check completed  Taken 11/17/2024 1020 by Jerri Tenorio RN  Safety Promotion/Fall Prevention:   activity supervised   room organization consistent   safety round/check completed  Taken 11/17/2024 0830 by Jerri Tenorio RN  Safety Promotion/Fall Prevention:   activity supervised   room organization consistent   safety round/check completed  Intervention: Prevent Skin Injury  Recent Flowsheet Documentation  Taken 11/17/2024 1630 by Jerri Tenorio RN  Body Position: position changed independently  Skin Protection:   incontinence pads utilized   transparent dressing maintained  Taken 11/17/2024 1420 by Jerri Tenorio RN  Body Position: position changed independently  Skin Protection: incontinence pads utilized  Taken 11/17/2024 1230 by Jerri Tenorio RN  Body Position: position changed independently  Skin Protection:   incontinence pads utilized   transparent dressing maintained  Taken 11/17/2024 1020 by Jerri Tenorio RN  Body Position: position changed independently  Skin Protection: incontinence pads utilized  Taken 11/17/2024 0830 by Jerri Tenorio RN  Body Position: position changed independently  Skin Protection: incontinence  pads utilized  Goal: Optimal Comfort and Wellbeing  Outcome: Progressing  Goal: Readiness for Transition of Care  Outcome: Progressing     Problem: Skin Injury Risk Increased  Goal: Skin Health and Integrity  Outcome: Progressing  Intervention: Optimize Skin Protection  Recent Flowsheet Documentation  Taken 11/17/2024 1630 by Jerri Tenorio RN  Activity Management: activity encouraged  Pressure Reduction Techniques: frequent weight shift encouraged  Head of Bed (HOB) Positioning: HOB elevated  Pressure Reduction Devices: pressure-redistributing mattress utilized  Skin Protection:   incontinence pads utilized   transparent dressing maintained  Taken 11/17/2024 1420 by Jerri Tenorio RN  Activity Management: activity encouraged  Pressure Reduction Techniques: frequent weight shift encouraged  Head of Bed (HOB) Positioning: HOB elevated  Pressure Reduction Devices: pressure-redistributing mattress utilized  Skin Protection: incontinence pads utilized  Taken 11/17/2024 1230 by Jerri Tenorio RN  Activity Management: activity encouraged  Pressure Reduction Techniques: frequent weight shift encouraged  Head of Bed (HOB) Positioning: HOB elevated  Pressure Reduction Devices: pressure-redistributing mattress utilized  Skin Protection:   incontinence pads utilized   transparent dressing maintained  Taken 11/17/2024 1020 by Jerri Tenorio RN  Activity Management: activity encouraged  Pressure Reduction Techniques: frequent weight shift encouraged  Head of Bed (HOB) Positioning: HOB elevated  Pressure Reduction Devices: pressure-redistributing mattress utilized  Skin Protection: incontinence pads utilized  Taken 11/17/2024 0830 by Jerri Tenorio RN  Activity Management: activity encouraged  Pressure Reduction Techniques: frequent weight shift encouraged  Head of Bed (HOB) Positioning: HOB elevated  Pressure Reduction Devices: pressure-redistributing mattress utilized  Skin Protection: incontinence pads utilized      Problem: Fall Injury Risk  Goal: Absence of Fall and Fall-Related Injury  Outcome: Progressing  Intervention: Promote Injury-Free Environment  Recent Flowsheet Documentation  Taken 11/17/2024 1630 by Jerri Tenorio RN  Safety Promotion/Fall Prevention:   activity supervised   room organization consistent   safety round/check completed  Taken 11/17/2024 1420 by Jerri Tenorio RN  Safety Promotion/Fall Prevention:   activity supervised   room organization consistent   safety round/check completed  Taken 11/17/2024 1230 by Jerri Tenorio RN  Safety Promotion/Fall Prevention:   activity supervised   room organization consistent   safety round/check completed  Taken 11/17/2024 1020 by Jerri Tenorio RN  Safety Promotion/Fall Prevention:   activity supervised   room organization consistent   safety round/check completed  Taken 11/17/2024 0830 by Jerri Tenoiro RN  Safety Promotion/Fall Prevention:   activity supervised   room organization consistent   safety round/check completed   Goal Outcome Evaluation:

## 2024-11-17 NOTE — PROGRESS NOTES
[facility] Medicine Services  PROGRESS NOTE    Patient Name: Yanique Webster  : 1940  MRN: [id number]    Date of Admission: 11/15/2024  Primary Care Physician: Sebas Alicea MD    Subjective   Subjective     CC:  KINDRA on CKD    HPI:  Doing well today, but didn't sleep well last night despite PRNs.       Objective   Objective     Vital Signs:   Temp:  [96.6 °F (35.9 °C)-97.8 °F (36.6 °C)] 97.8 °F (36.6 °C)  Heart Rate:  [] 71  Resp:  [16-20] 16  BP: (135-190)/(54-76) 161/66  Flow (L/min) (Oxygen Therapy):  [1-3] 3     Physical Exam:  Constitutional: No acute distress, awake, alert  HENT: NCAT, mucous membranes moist  Respiratory: Clear to auscultation bilaterally, respiratory effort normal   Cardiovascular: RRR, no murmurs, rubs, or gallops  Gastrointestinal: Positive bowel sounds, soft, nontender, nondistended  Musculoskeletal: No bilateral ankle edema  Psychiatric: Appropriate affect, cooperative  Neurologic: Oriented x 3, strength symmetric in all extremities, Cranial Nerves grossly intact to confrontation, speech clear  Skin: No rashes     Results Reviewed:  LAB RESULTS:      Lab 24  0529 11/16/24  0456 11/15/24  1230   WBC 5.75 4.98 7.23   HEMOGLOBIN 8.7* 9.1* 9.2*   HEMATOCRIT 29.6* 30.4* 30.5*   PLATELETS 126* 134* 151   NEUTROS ABS 3.63 3.05 4.84   IMMATURE GRANS (ABS) 0.03 0.01 0.02   LYMPHS ABS 1.14 1.08 1.24   MONOS ABS 0.63 0.54 0.78   EOS ABS 0.27 0.26 0.30   MCV 97.7* 96.8 96.5   PROCALCITONIN  --   --  0.11   LACTATE  --   --  0.9   PROTIME  --   --  18.0*   APTT  --   --  39.9*         Lab 24  0529 24  0456 11/15/24  1230   SODIUM 139 141 136   POTASSIUM 5.1 4.8 5.1   CHLORIDE 109* 112* 106   CO2 17.0* 16.0* 16.0*   ANION GAP 13.0 13.0 14.0   BUN 71* 80* 80*   CREATININE 4.00* 3.89* 4.18*   EGFR 10.6* 11.0* 10.1*   GLUCOSE 107* 110* 109*   CALCIUM 9.5 9.8 9.5   MAGNESIUM  --   --  2.0   PHOSPHORUS 4.8*  --   --          Lab  11/17/24  0529 11/15/24  1230   TOTAL PROTEIN  --  5.9*   ALBUMIN 3.7 3.8   GLOBULIN  --  2.1   ALT (SGPT)  --  13   AST (SGOT)  --  24   BILIRUBIN  --  0.4   ALK PHOS  --  83         Lab 11/15/24  1230   PROTIME 18.0*   INR 1.48*             Lab 11/17/24  0529 11/15/24  1334   IRON 61  --    IRON SATURATION (TSAT) 24  --    TIBC 256*  --    TRANSFERRIN 172*  --    ABO TYPING  --  O   RH TYPING  --  Negative   ANTIBODY SCREEN  --  Negative         Brief Urine Lab Results  (Last result in the past 365 days)        Color   Clarity   Blood   Leuk Est   Nitrite   Protein   CREAT   Urine HCG        11/15/24 1410 Yellow   Clear   Negative   Negative   Negative   100 mg/dL (2+)                   Microbiology Results Abnormal       None            CT Chest Without Contrast Diagnostic    Result Date: 11/16/2024  CT CHEST WO CONTRAST DIAGNOSTIC Date of Exam: 11/15/2024 11:04 PM EST Indication: resp distress out of proporetion to her CXR. Comparison: Chest x-ray October 30, 2024, CT chest February 13, 2023 Technique: Axial CT images were obtained of the chest without contrast administration.  Reconstructed coronal and sagittal images were also obtained. Automated exposure control and iterative construction methods were used. Findings: There is substernal extension of the thyroid with what looks like a nodule in the right lobe with associated coarse calcification. This area measures about 1.8 x 1.6 cm. There is vascular calcification including coronary artery calcification. There is fusiform dilatation of the ascending thoracic aorta which measures 4.1 x 3.9 cm at the level of the main pulmonary artery. Pulmonary artery appears prominent in size measuring 3.7 cm in transverse dimension which could reflect some pulmonary arterial hypertension. There is mitral annular calcification. There are small bilateral pleural effusions. There is some chronic atelectasis in the lingular area. There is some bibasilar atelectasis as well.  There are subtle groundglass changes in the upper lobes that could reflect a nonspecific pneumonitis. Lower slices through the upper abdomen reveal a right adrenal lesion which has been noted measuring 4 x 3.3 cm which could relate to adenoma. There is a suggested cyst off the upper pole of the left kidney. Visualized osseous structures do not appear unusual.     Impression: Impression: 1.Small bibasilar effusions as well as bibasilar atelectasis. There is some chronic atelectasis lingular area. 2.Subtle groundglass changes upper lobes that could reflect a nonspecific pneumonitis 3.Fusiform dilatation of the ascending thoracic aorta 4.Enlargement of the pulmonary arteries which could relate to pulmonary arterial hypertension 5.Coronary artery calcification 6.Substernal extension right lobe thyroid with underlying nodule. If not worked up in the past a follow-up ultrasound could be obtained to reassess. 7.Stable right adrenal lesion which could relate to adenoma. Correlation with adrenal labs could be considered if there is any concern by history. 8.Left renal cyst Electronically Signed: Jose Manuel Solis MD  11/16/2024 8:10 AM EST  Workstation ID: [id number]     Results for orders placed during the hospital encounter of 10/30/24    Adult Transthoracic Echo Complete W/ Cont if Necessary Per Protocol    Interpretation Summary    Left ventricular systolic function is normal. Estimated left ventricular EF = 60%    Left ventricular wall thickness is consistent with mild concentric hypertrophy.    The left atrial cavity is moderately dilated.    Aortic sclerosis without aortic stenosis.  Mild to moderate aortic insufficiency    Mild mitral regurgitation.      Current medications:  Scheduled Meds:aspirin, 81 mg, Oral, Daily  atorvastatin, 80 mg, Oral, Daily  calcitriol, 0.5 mcg, Oral, Daily  carvedilol, 25 mg, Oral, Q12H  [START ON 11/18/2024] fentaNYL, 1 patch, Transdermal, Q3 Days   And  Check Fentanyl Patch Placement, 1 each,  Not Applicable, Q12H  cloNIDine, 0.1 mg, Oral, Q6H  ferrous sulfate, 325 mg, Oral, Daily With Breakfast  hydrALAZINE, 100 mg, Oral, Q8H  insulin lispro, 2-7 Units, Subcutaneous, 4x Daily AC & at Bedtime  ipratropium-albuterol, 3 mL, Nebulization, 4x Daily - RT  isosorbide mononitrate, 30 mg, Oral, Daily  lactobacillus acidophilus, 1 capsule, Oral, Daily  pantoprazole, 40 mg, Oral, BID AC  polyethylene glycol, 17 g, Oral, QAM  [Held by provider] rivaroxaban, 15 mg, Oral, Daily  sodium bicarbonate, 1,300 mg, Oral, BID  sodium chloride, 10 mL, Intravenous, Q12H  terazosin, 5 mg, Oral, Nightly  vitamin B-12, 1,000 mcg, Oral, Daily      Continuous Infusions:sodium chloride, 75 mL/hr, Last Rate: 75 mL/hr (11/17/24 0033)      PRN Meds:.  acetaminophen    senna-docusate sodium **AND** polyethylene glycol **AND** bisacodyl **AND** bisacodyl    dextrose    dextrose    doxepin    glucagon (human recombinant)    ipratropium-albuterol    melatonin    naloxone    nitroglycerin    oxyCODONE    sodium chloride    sodium chloride    sodium chloride    [Held by provider] torsemide    Assessment & Plan   Assessment & Plan     Active Hospital Problems    Diagnosis  POA    **Acute on chronic renal failure [N17.9, N18.9]  Yes    Abnormal laboratory test [R89.9]  Yes    Dyspnea [R06.00]  Yes    Anemia, chronic disease [D63.8]  Yes    Paroxysmal atrial fibrillation [I48.0]  Yes    Essential hypertension [I10]  Yes    Type 2 diabetes mellitus [E11.9]  Yes      Resolved Hospital Problems   No resolved problems to display.        Brief Hospital Course to date:  Yanique Webster is a 84 y.o. female with PMH of Afib on Xarelto, HTN, CKD, prior stroke, T2DM with neuropathy, chronic anemia, and GERD who presented from [facility] with KINDRA on CKD.    Plan:    KINDRA on CKD  --baseline unclear. last admission pt's baseline was documented 1.9-2.2 but wasn't clear if her CKD had advanced.  When dc'd from hospital 11/8/24 creatinine was 3.76. While she has  been at [facility], it has stayed in 4 range. On 11/14, was 4.3, bumped to 4.5 on day of admission here (11/15/24)  --nephrology consulted     Dyspnea  --hold off on further IVF for now as she received 1L at [facility] 11/14/24 and just under 0.5L in ED   --sats stable on RA  -- CT chest without contrast shows small bilateral effusions as well as atelectasis  -- encourage use of IS     TCP  -- new and mild. Monitor for now (slight drop today)      PAF  --xarelto restarted a few days ago, was held on dc per cardiology recs but family requested it be restarted due to stroke risk  --will hold for now due to renal function     HTN  --continue meds, increase dose of Carvedilol more as BP still not controlled.   - may need to increase other agents as well as renal recommends tight BP control to improve renal function.      T2DM  --SSI     Anemia of chronic disease  --hgb stable    Total time spent: Time Spent: Time Spent: 35 minutes  Time spent includes time reviewing chart, face-to-face time, counseling patient/family/caregiver, ordering medications/tests/procedures, communicating with other health care professionals, documenting clinical information in the electronic health record, and coordination of care.      Expected Discharge Location and Transportation: home ([facility] had planned to discharge patient home on the day of her admission here, however, due to her KINDRA, she was sent here). OT recommends home.   Expected Discharge   Expected Discharge Date: 11/18/2024; Expected Discharge Time:      VTE Prophylaxis:  Pharmacologic & mechanical VTE prophylaxis orders are present.         AM-PAC 6 Clicks Score (PT): 20 (11/17/24 0100)    CODE STATUS:   Code Status and Medical Interventions: CPR (Attempt to Resuscitate); Full Support   Ordered at: 11/15/24 8170     Code Status (Patient has no pulse and is not breathing):    CPR (Attempt to Resuscitate)     Medical Interventions (Patient has pulse or is breathing):    Full Support        Evonne Love MD  11/17/24

## 2024-11-17 NOTE — PROGRESS NOTES
"   LOS: 1 day    Patient Care Team:  Sebas Alicea MD as PCP - General (Family Medicine)  Steve Geronimo MD as Consulting Physician (Cardiology)  Emilio Regalado MD as Consulting Physician (Endocrinology)  Axel Ribeiro MD as Consulting Physician (Cardiology)    Chief Complaint: Abnormal labs KINDRA  84-year-old recent discharge 11/7/2024, baseline creatinine 1.9-2.22 mg/dL, previous admission creatinine between 3.2-3.5 mg/dL range, history of proteinuria 2 g, duplex scan negative ANABEL 2016, ANCA negative patient returned from [facility] with creatinine 4.18 BUN 80, bicarb 16, potassium 5.1, sodium 136, hemoglobin 9.2.  PMH include CAD COPD, hypertension, GERD, HLD, chronic back pain, Takotsubo cardiomyopathy, paroxysmal atrial for 1 Eliquis, PVD, anemia    Subjective     Interval History:   Renal function remains poor creatinine 4.0 slightly improved from yesterday 4.18.  .  Bicarb 18, potassium 5.1  No new events no added distress.    Review of Systems:   Denies any nausea vomiting chest pain or shortness of breath.    Objective     Vital Sign Min/Max for last 24 hours  Temp  Min: 96.6 °F (35.9 °C)  Max: 97.8 °F (36.6 °C)   BP  Min: 135/54  Max: 190/75   Pulse  Min: 68  Max: 117   Resp  Min: 16  Max: 20   SpO2  Min: 96 %  Max: 100 %   Flow (L/min) (Oxygen Therapy)  Min: 1  Max: 3   No data recorded     Flowsheet Rows      Flowsheet Row First Filed Value   Admission Height 162.6 cm (64\") Documented at 11/15/2024 1228   Admission Weight 85.7 kg (189 lb) Documented at 11/15/2024 1228            No intake/output data recorded.  I/O last 3 completed shifts:  In: -   Out: 2500 [Urine:2500]    Physical Exam:  General Appearance: Alert, oriented, no obvious distress.  Eyes: PER, EOMI.  Neck: Supple no JVD.  Lungs: Clear auscultation, no rales rhonchi's, equal chest movement, nonlabored.  Heart: No gallop, murmur, rub, RRR.  Abdomen: Soft, nontender, positive bowel sounds.  Extremities: No edema, " "no cyanosis.  Neuro: No focal deficit, moving all extremities, alert oriented X 3   no suprapubic fullness or tenderness.    WBC WBC   Date Value Ref Range Status   11/17/2024 5.75 3.40 - 10.80 10*3/mm3 Final   11/16/2024 4.98 3.40 - 10.80 10*3/mm3 Final   11/15/2024 7.23 3.40 - 10.80 10*3/mm3 Final      HGB Hemoglobin   Date Value Ref Range Status   11/17/2024 8.7 (L) 12.0 - 15.9 g/dL Final   11/16/2024 9.1 (L) 12.0 - 15.9 g/dL Final   11/15/2024 9.2 (L) 12.0 - 15.9 g/dL Final      HCT Hematocrit   Date Value Ref Range Status   11/17/2024 29.6 (L) 34.0 - 46.6 % Final   11/16/2024 30.4 (L) 34.0 - 46.6 % Final   11/15/2024 30.5 (L) 34.0 - 46.6 % Final      Platlets No results found for: \"LABPLAT\"   MCV MCV   Date Value Ref Range Status   11/17/2024 97.7 (H) 79.0 - 97.0 fL Final   11/16/2024 96.8 79.0 - 97.0 fL Final   11/15/2024 96.5 79.0 - 97.0 fL Final          Sodium Sodium   Date Value Ref Range Status   11/17/2024 139 136 - 145 mmol/L Final   11/16/2024 141 136 - 145 mmol/L Final   11/15/2024 136 136 - 145 mmol/L Final      Potassium Potassium   Date Value Ref Range Status   11/17/2024 5.1 3.5 - 5.2 mmol/L Final   11/16/2024 4.8 3.5 - 5.2 mmol/L Final   11/15/2024 5.1 3.5 - 5.2 mmol/L Final     Comment:     Slight hemolysis detected by analyzer. Result may be falsely elevated.      Chloride Chloride   Date Value Ref Range Status   11/17/2024 109 (H) 98 - 107 mmol/L Final   11/16/2024 112 (H) 98 - 107 mmol/L Final   11/15/2024 106 98 - 107 mmol/L Final      CO2 CO2   Date Value Ref Range Status   11/17/2024 17.0 (L) 22.0 - 29.0 mmol/L Final   11/16/2024 16.0 (L) 22.0 - 29.0 mmol/L Final   11/15/2024 16.0 (L) 22.0 - 29.0 mmol/L Final      BUN BUN   Date Value Ref Range Status   11/17/2024 71 (H) 8 - 23 mg/dL Final   11/16/2024 80 (H) 8 - 23 mg/dL Final   11/15/2024 80 (H) 8 - 23 mg/dL Final      Creatinine Creatinine   Date Value Ref Range Status   11/17/2024 4.00 (H) 0.57 - 1.00 mg/dL Final   11/16/2024 3.89 " "(H) 0.57 - 1.00 mg/dL Final   11/15/2024 4.18 (H) 0.57 - 1.00 mg/dL Final      Calcium Calcium   Date Value Ref Range Status   11/17/2024 9.5 8.6 - 10.5 mg/dL Final   11/16/2024 9.8 8.6 - 10.5 mg/dL Final   11/15/2024 9.5 8.6 - 10.5 mg/dL Final      PO4 No results found for: \"CAPO4\"   Albumin Albumin   Date Value Ref Range Status   11/17/2024 3.7 3.5 - 5.2 g/dL Final   11/15/2024 3.8 3.5 - 5.2 g/dL Final      Magnesium Magnesium   Date Value Ref Range Status   11/15/2024 2.0 1.6 - 2.4 mg/dL Final      Uric Acid No results found for: \"URICACID\"        Results Review:     I reviewed the patient's new clinical results.    aspirin, 81 mg, Oral, Daily  atorvastatin, 80 mg, Oral, Daily  calcitriol, 0.5 mcg, Oral, Daily  carvedilol, 25 mg, Oral, Q12H  [START ON 11/18/2024] fentaNYL, 1 patch, Transdermal, Q3 Days   And  Check Fentanyl Patch Placement, 1 each, Not Applicable, Q12H  cloNIDine, 0.1 mg, Oral, Q6H  ferrous sulfate, 325 mg, Oral, Daily With Breakfast  hydrALAZINE, 100 mg, Oral, Q8H  insulin lispro, 2-7 Units, Subcutaneous, 4x Daily AC & at Bedtime  ipratropium-albuterol, 3 mL, Nebulization, 4x Daily - RT  isosorbide mononitrate, 30 mg, Oral, Daily  lactobacillus acidophilus, 1 capsule, Oral, Daily  pantoprazole, 40 mg, Oral, BID AC  polyethylene glycol, 17 g, Oral, QAM  [Held by provider] rivaroxaban, 15 mg, Oral, Daily  sodium bicarbonate, 1,300 mg, Oral, BID  sodium chloride, 10 mL, Intravenous, Q12H  terazosin, 5 mg, Oral, Nightly  vitamin B-12, 1,000 mcg, Oral, Daily      sodium chloride, 75 mL/hr, Last Rate: 75 mL/hr (11/17/24 0033)        Medication Review: Reviewed    Assessment & Plan       Acute on chronic renal failure    Type 2 diabetes mellitus    Essential hypertension    Paroxysmal atrial fibrillation    Anemia, chronic disease    Dyspnea    Abnormal laboratory test  1.  KINDRA on CKD: Patient was recently discharged on 11/7/2024 with KINDRA is likely from hemodynamic injury without much improvement.  " ANCA negative.  Since discharge creatinine has gone up from 3.5-4.1, metabolic acidosis bicarb 16, BUN of 80.     2.  CKD stage IV: Baseline creatinine 1.9-2.2 mg/dL, since last admission was running between 3.2-3.5 range, history of proteinuric renal disease UPC 2 g in the past duplex scan negative in 2016, on previous hospitalization discussion with daughter-in-law anesthesiologist, was explained regard to advanced renal dysfunction possibility of dialysis versus conservative treatment.     3.  Anemia of CKD: EDISON was started hemoglobin 9.2.  Iron saturation 24%     4.  Volume status:     5.  Hypertension: No ANABEL per duplex in 2016.  On terazosin, carvedilol, hydralazine, clonidine     6.  Metabolic acidosis: In the setting of CKD, patient on sodium bicarb 650 mg twice daily     7.  Hyperparathyroidism: On calcitriol 0.5 mcg daily     8.  Iron deficiency on oral iron  9.  History of CAD  10.  COPD  11.  Hyperlipidemia  12.  Anxiety and depression.     Plan and recommendation.  Renal function creatinine remains high 4.0 with a BUN 71.  Hypertension needs to better control increase carvedilol 12.5 mg twice daily.  Metabolic acidosis increase sodium bicarb 1300 mg twice daily.  Hold diuretics for now.  Pending intact PTH vitamin D level.    Start EDISON with blood pressure better control  Check labs in AM.  IV fluids order with gentle hydration.  Avoid nephrotoxic medications.  Keep systolic blood pressure greater than 100.  Check volume status.  Check labs in the morning.  No indication for dialysis at this time, daily evaluation will be done for RRT  Adjust medication for the new GFR.  Discussed with the patient in detail.          Ben Day MD  11/17/24  09:42 EST

## 2024-11-17 NOTE — NURSING NOTE
Pt has complaints of severe pain in her legs, cramping and aching that is not helped with use of pain medication per patient.

## 2024-11-18 ENCOUNTER — APPOINTMENT (OUTPATIENT)
Dept: ULTRASOUND IMAGING | Facility: HOSPITAL | Age: 84
End: 2024-11-18
Payer: MEDICARE

## 2024-11-18 LAB
ANION GAP SERPL CALCULATED.3IONS-SCNC: 12 MMOL/L (ref 5–15)
BASOPHILS # BLD AUTO: 0.04 10*3/MM3 (ref 0–0.2)
BASOPHILS NFR BLD AUTO: 0.7 % (ref 0–1.5)
BUN SERPL-MCNC: 68 MG/DL (ref 8–23)
BUN/CREAT SERPL: 16 (ref 7–25)
CALCIUM SPEC-SCNC: 9.4 MG/DL (ref 8.6–10.5)
CHLORIDE SERPL-SCNC: 110 MMOL/L (ref 98–107)
CO2 SERPL-SCNC: 16 MMOL/L (ref 22–29)
CREAT SERPL-MCNC: 4.24 MG/DL (ref 0.57–1)
DEPRECATED RDW RBC AUTO: 65.5 FL (ref 37–54)
EGFRCR SERPLBLD CKD-EPI 2021: 9.9 ML/MIN/1.73
EOSINOPHIL # BLD AUTO: 0.28 10*3/MM3 (ref 0–0.4)
EOSINOPHIL NFR BLD AUTO: 4.7 % (ref 0.3–6.2)
ERYTHROCYTE [DISTWIDTH] IN BLOOD BY AUTOMATED COUNT: 18 % (ref 12.3–15.4)
GLUCOSE BLDC GLUCOMTR-MCNC: 129 MG/DL (ref 70–130)
GLUCOSE BLDC GLUCOMTR-MCNC: 139 MG/DL (ref 70–130)
GLUCOSE BLDC GLUCOMTR-MCNC: 139 MG/DL (ref 70–130)
GLUCOSE BLDC GLUCOMTR-MCNC: 144 MG/DL (ref 70–130)
GLUCOSE SERPL-MCNC: 121 MG/DL (ref 65–99)
HCT VFR BLD AUTO: 28.2 % (ref 34–46.6)
HGB BLD-MCNC: 8.3 G/DL (ref 12–15.9)
IMM GRANULOCYTES # BLD AUTO: 0.01 10*3/MM3 (ref 0–0.05)
IMM GRANULOCYTES NFR BLD AUTO: 0.2 % (ref 0–0.5)
LYMPHOCYTES # BLD AUTO: 1.16 10*3/MM3 (ref 0.7–3.1)
LYMPHOCYTES NFR BLD AUTO: 19.7 % (ref 19.6–45.3)
MCH RBC QN AUTO: 29.1 PG (ref 26.6–33)
MCHC RBC AUTO-ENTMCNC: 29.4 G/DL (ref 31.5–35.7)
MCV RBC AUTO: 98.9 FL (ref 79–97)
MONOCYTES # BLD AUTO: 0.61 10*3/MM3 (ref 0.1–0.9)
MONOCYTES NFR BLD AUTO: 10.3 % (ref 5–12)
NEUTROPHILS NFR BLD AUTO: 3.8 10*3/MM3 (ref 1.7–7)
NEUTROPHILS NFR BLD AUTO: 64.4 % (ref 42.7–76)
NRBC BLD AUTO-RTO: 0 /100 WBC (ref 0–0.2)
PLATELET # BLD AUTO: 107 10*3/MM3 (ref 140–450)
PMV BLD AUTO: 10.8 FL (ref 6–12)
POTASSIUM SERPL-SCNC: 4.8 MMOL/L (ref 3.5–5.2)
RBC # BLD AUTO: 2.85 10*6/MM3 (ref 3.77–5.28)
SODIUM SERPL-SCNC: 138 MMOL/L (ref 136–145)
WBC NRBC COR # BLD AUTO: 5.9 10*3/MM3 (ref 3.4–10.8)

## 2024-11-18 PROCEDURE — 97162 PT EVAL MOD COMPLEX 30 MIN: CPT

## 2024-11-18 PROCEDURE — 99232 SBSQ HOSP IP/OBS MODERATE 35: CPT | Performed by: INTERNAL MEDICINE

## 2024-11-18 PROCEDURE — 85025 COMPLETE CBC W/AUTO DIFF WBC: CPT | Performed by: INTERNAL MEDICINE

## 2024-11-18 PROCEDURE — 76775 US EXAM ABDO BACK WALL LIM: CPT

## 2024-11-18 PROCEDURE — 94799 UNLISTED PULMONARY SVC/PX: CPT

## 2024-11-18 PROCEDURE — 82948 REAGENT STRIP/BLOOD GLUCOSE: CPT

## 2024-11-18 PROCEDURE — 80048 BASIC METABOLIC PNL TOTAL CA: CPT | Performed by: INTERNAL MEDICINE

## 2024-11-18 RX ORDER — DIPHENHYDRAMINE HCL 25 MG
25 CAPSULE ORAL NIGHTLY PRN
Status: DISCONTINUED | OUTPATIENT
Start: 2024-11-18 | End: 2024-11-27 | Stop reason: HOSPADM

## 2024-11-18 RX ADMIN — PANTOPRAZOLE SODIUM 40 MG: 40 TABLET, DELAYED RELEASE ORAL at 18:04

## 2024-11-18 RX ADMIN — HYDRALAZINE HYDROCHLORIDE 100 MG: 50 TABLET ORAL at 05:11

## 2024-11-18 RX ADMIN — PANTOPRAZOLE SODIUM 40 MG: 40 TABLET, DELAYED RELEASE ORAL at 08:57

## 2024-11-18 RX ADMIN — Medication 1 CAPSULE: at 08:57

## 2024-11-18 RX ADMIN — CARVEDILOL 25 MG: 12.5 TABLET, FILM COATED ORAL at 08:57

## 2024-11-18 RX ADMIN — IPRATROPIUM BROMIDE AND ALBUTEROL SULFATE 3 ML: 2.5; .5 SOLUTION RESPIRATORY (INHALATION) at 19:48

## 2024-11-18 RX ADMIN — CLONIDINE HYDROCHLORIDE 0.1 MG: 0.1 TABLET ORAL at 00:24

## 2024-11-18 RX ADMIN — ASPIRIN 81 MG: 81 TABLET, COATED ORAL at 08:57

## 2024-11-18 RX ADMIN — ACETAMINOPHEN 650 MG: 325 TABLET ORAL at 00:24

## 2024-11-18 RX ADMIN — CARVEDILOL 25 MG: 12.5 TABLET, FILM COATED ORAL at 21:03

## 2024-11-18 RX ADMIN — SODIUM BICARBONATE 650 MG TABLET 1300 MG: at 21:03

## 2024-11-18 RX ADMIN — Medication 10 ML: at 08:58

## 2024-11-18 RX ADMIN — CLONIDINE HYDROCHLORIDE 0.1 MG: 0.1 TABLET ORAL at 18:04

## 2024-11-18 RX ADMIN — OXYCODONE 5 MG: 5 TABLET ORAL at 05:11

## 2024-11-18 RX ADMIN — IPRATROPIUM BROMIDE AND ALBUTEROL SULFATE 3 ML: 2.5; .5 SOLUTION RESPIRATORY (INHALATION) at 12:55

## 2024-11-18 RX ADMIN — HYDRALAZINE HYDROCHLORIDE 100 MG: 50 TABLET ORAL at 21:03

## 2024-11-18 RX ADMIN — OXYCODONE 5 MG: 5 TABLET ORAL at 15:59

## 2024-11-18 RX ADMIN — ATORVASTATIN CALCIUM 80 MG: 40 TABLET, FILM COATED ORAL at 08:57

## 2024-11-18 RX ADMIN — CLONIDINE HYDROCHLORIDE 0.1 MG: 0.1 TABLET ORAL at 05:11

## 2024-11-18 RX ADMIN — TERAZOSIN HYDROCHLORIDE 5 MG: 5 CAPSULE ORAL at 21:03

## 2024-11-18 RX ADMIN — CYANOCOBALAMIN TAB 1000 MCG 1000 MCG: 1000 TAB at 08:57

## 2024-11-18 RX ADMIN — ISOSORBIDE MONONITRATE 30 MG: 30 TABLET, EXTENDED RELEASE ORAL at 08:57

## 2024-11-18 RX ADMIN — SODIUM BICARBONATE 650 MG TABLET 1300 MG: at 08:57

## 2024-11-18 RX ADMIN — ACETAMINOPHEN 650 MG: 325 TABLET ORAL at 04:28

## 2024-11-18 RX ADMIN — CLONIDINE HYDROCHLORIDE 0.1 MG: 0.1 TABLET ORAL at 11:12

## 2024-11-18 RX ADMIN — POLYETHYLENE GLYCOL 3350 17 G: 17 POWDER, FOR SOLUTION ORAL at 08:57

## 2024-11-18 RX ADMIN — HYDRALAZINE HYDROCHLORIDE 100 MG: 50 TABLET ORAL at 13:12

## 2024-11-18 RX ADMIN — IPRATROPIUM BROMIDE AND ALBUTEROL SULFATE 3 ML: 2.5; .5 SOLUTION RESPIRATORY (INHALATION) at 08:22

## 2024-11-18 RX ADMIN — FENTANYL TRANSDERMAL 1 PATCH: 12.5 PATCH, EXTENDED RELEASE TRANSDERMAL at 11:12

## 2024-11-18 RX ADMIN — CALCITRIOL CAPSULES 0.25 MCG 0.5 MCG: 0.25 CAPSULE ORAL at 08:57

## 2024-11-18 RX ADMIN — FERROUS SULFATE TAB 325 MG (65 MG ELEMENTAL FE) 325 MG: 325 (65 FE) TAB at 08:57

## 2024-11-18 RX ADMIN — Medication 10 ML: at 21:06

## 2024-11-18 RX ADMIN — Medication 10 MG: at 21:03

## 2024-11-18 NOTE — PLAN OF CARE
Goal Outcome Evaluation:  Plan of Care Reviewed With: patient           Outcome Evaluation: Pt presents with weakness, decreased activity jeremy, difficulty walking and is below baseline level of function for mobility. Pt req Odilon to CGA for transfers, amb in hallway with RW and CGA on 2LO2nc, dem slow gait speed and forward flexed posture. Distance limited by weakness and fatigue.Pt will benefit from IPPT services to address limitations. PT rec d/c rehab pending progress with mobility independence.    Anticipated Discharge Disposition (PT): inpatient rehabilitation facility

## 2024-11-18 NOTE — CASE MANAGEMENT/SOCIAL WORK
Discharge Planning Assessment  [facility]     Patient Name: Yanique Webster  MRN: [id number]  Today's Date: 11/18/2024    Admit Date: 11/15/2024    Plan: Home at [place]   Discharge Needs Assessment       Row Name 11/18/24 1146       Living Environment    People in Home alone    Current Living Arrangements independent living facility    Living Arrangement Comments Lives at [facility] in independent living. Meals provided in dining room. Housekeeping provided.       Discharge Needs Assessment    Equipment Currently Used at Home cane, straight;wheelchair;rollator    Equipment Needed After Discharge none    Discharge Coordination/Progress Came from [facility]. Was about to be DCed from [facility]. No HH or outpt services.                   Discharge Plan       Row Name 11/18/24 1149       Plan    Plan Home at [place]    Patient/Family in Agreement with Plan yes    Plan Comments I spoke with the pt. She wants to go home at [place] and not return to [facility]. She is not interested in HH at this time. States family will  transport. I will follow.    Final Discharge Disposition Code 01 - home or self-care      Row Name 11/18/24 0912       Plan    Final Discharge Disposition Code 01 - home or self-care                  Continued Care and Services - Admitted Since 11/15/2024    No active coordination exists for this encounter.       Selected Continued Care - Prior Encounters Includes continued care and service providers with selected services from prior encounters from 8/17/2024 to 11/18/2024      Discharged on 11/8/2024 Admission date: 10/30/2024 - Discharge disposition: Rehab Facility or Unit (DC - External)      Destination       Service Provider Services Address Phone Fax Patient Preferred    [facility] [address] [address] [phone] --                      Discharged on 8/19/2024 Admission date: 8/12/2024 - Discharge disposition: Rehab  Facility or Unit (DC - External)      Destination       Service Provider Services Address Phone Fax Patient Preferred    [facility] [address] [address] [phone] --                          Expected Discharge Date and Time       Expected Discharge Date Expected Discharge Time    Nov 18, 2024            Demographic Summary    No documentation.                  Functional Status       Row Name 11/18/24 1146       Functional Status    Usual Activity Tolerance moderate       Functional Status, IADL    Medications independent    Meal Preparation other (see comments)    Housekeeping other (see comments)    Laundry other (see comments)    Shopping other (see comments)                   Psychosocial    No documentation.                  Abuse/Neglect    No documentation.                  Legal    No documentation.                  Substance Abuse    No documentation.                  Patient Forms    No documentation.                     Izzy Rosas RN

## 2024-11-18 NOTE — THERAPY EVALUATION
Patient Name: Yanique Webster  : 1940    MRN: [id number]                              Today's Date: 2024       Admit Date: 11/15/2024    Visit Dx:     ICD-10-CM ICD-9-CM   1. Elevated serum creatinine  R79.89 790.99   2. CKD (chronic kidney disease) stage 4, GFR 15-29 ml/min  N18.4 585.4   3. Chronic anemia  D64.9 285.9   4. Impaired functional mobility, balance, gait, and endurance  Z74.09 V49.89     Patient Active Problem List   Diagnosis    Fibromyalgia    PVD (peripheral vascular disease)    Type 2 diabetes mellitus    Diabetic gastroparesis    Takotsubo cardiomyopathy    Elevated serum creatinine    Hyperlipidemia LDL goal <70    COPD (chronic obstructive pulmonary disease)    Obesity    Osteoarthritis    Chronic pain    GERD (gastroesophageal reflux disease)    Gout    Chronic joint pain    Coronary artery disease involving native coronary artery of native heart without angina pectoris    Nonrheumatic aortic valve insufficiency    Altered mental status    Essential hypertension    CKD (chronic kidney disease) stage 4, GFR 15-29 ml/min    Hypertensive emergency    Status post placement of implantable loop recorder    Paroxysmal atrial fibrillation    Acute exacerbation of COPD with asthma    Encounter for loop recorder at end of battery life    Closed left hip fracture    Anxiety associated with depression    Anemia, chronic disease    Surgery follow-up    Borderline abnormal TFTs    Acute blood loss anemia    Secondary hyperparathyroidism    Symptomatic anemia    KINDRA (acute kidney injury)    Acute on chronic renal failure    Dyspnea    Abnormal laboratory test     Past Medical History:   Diagnosis Date    Blurry vision     Chronic joint pain     Chronic pain     COPD (chronic obstructive pulmonary disease)     Coronary artery disease     Diabetic gastroparesis 2016    Esophageal stricture     s/p dilation in     GERD (gastroesophageal reflux disease)     Gout     Headache      secondary to hypertension    Hyperlipidemia     Hypertension     Long term current use of insulin     Neuropathy     Neuropathy due to type 2 diabetes mellitus     Obesity     Osteoarthritis     Pericarditis 2008    Stroke 03/2019    Type 2 diabetes mellitus      Past Surgical History:   Procedure Laterality Date    BRONCHOSCOPY N/A 8/23/2016    Procedure: BRONCHOSCOPY BIOPSY AT BEDSIDE;  Surgeon: Mookie Willard MD;  Location:  [facility];  Service:     CARDIAC CATHETERIZATION N/A 8/30/2016    Procedure: Left Heart Cath;  Surgeon: Steve Geronimo MD;  Location:  [facility];  Service:     CARDIAC CATHETERIZATION N/A 8/30/2016    Procedure: Right Heart Cath;  Surgeon: Steve Geronimo MD;  Location:  [facility];  Service:     CARDIAC ELECTROPHYSIOLOGY PROCEDURE N/A 7/2/2019    Procedure: Loop insertion;  Surgeon: Steve Geronimo MD;  Location:  [facility];  Service: Cardiovascular    CARDIAC ELECTROPHYSIOLOGY PROCEDURE N/A 9/26/2023    Procedure: Loop recorder removal;  Surgeon: Steve Geronimo MD;  Location:  [facility];  Service: Cardiovascular;  Laterality: N/A;    CATARACT EXTRACTION      COLONOSCOPY N/A 8/16/2024    Procedure: COLONOSCOPY;  Surgeon: Brunner, Mark I, MD;  Location:  [facility];  Service: Gastroenterology;  Laterality: N/A;    ENDOSCOPY N/A 8/14/2024    Procedure: ESOPHAGOGASTRODUODENOSCOPY;  Surgeon: Brunner, Mark I, MD;  Location:  [facility];  Service: Gastroenterology;  Laterality: N/A;    ESOPHAGEAL DILATATION  2007    HERNIA REPAIR      HIP CANNULATED SCREW PLACEMENT Left 1/2/2024    Procedure: HIP CANNULATED SCREW PLACEMENT LEFT;  Surgeon: Seymour Harrington MD;  Location:  [facility];  Service: Orthopedics;  Laterality: Left;    HYSTERECTOMY  1998    WRIST FRACTURE SURGERY Left       General Information       Row Name 11/18/24 1014          Physical Therapy Time and Intention    Document Type evaluation  -SD      Mode of Treatment physical therapy;individual therapy  -SD       Row Name 11/18/24 1014          General Information    Patient Profile Reviewed yes  -SD     Prior Level of Function independent:;gait;transfer;bed mobility;ADL's  Ambulates to dining room for meals at [facility] using rollator; uses facility van for MD appts. and shopping; on RA at baseline  -SD     Existing Precautions/Restrictions fall;oxygen therapy device and L/min;other (see comments)  chronic pain in hands and feet, incontinent - don brief prior to mobility  -SD     Barriers to Rehab medically complex;previous functional deficit;impaired sensation  -SD       Row Name 11/18/24 1014          Living Environment    People in Home facility resident  -SD       Row Name 11/18/24 1014          Home Main Entrance    Number of Stairs, Main Entrance none  -SD       Row Name 11/18/24 1014          Stairs Within Home, Primary    Number of Stairs, Within Home, Primary none  -SD       Row Name 11/18/24 1014          Cognition    Orientation Status (Cognition) oriented x 3  -SD       Row Name 11/18/24 1014          Safety Issues/Impairments Affecting Functional Mobility    Safety Issues Affecting Function (Mobility) insight into deficits/self-awareness;judgment;problem-solving;sequencing abilities;safety precautions follow-through/compliance;safety precaution awareness  -SD     Impairments Affecting Function (Mobility) balance;endurance/activity tolerance;pain;shortness of breath;strength;sensation/sensory awareness  -SD     Comment, Safety Issues/Impairments (Mobility) chronic neuropathic pain in feet, hands, legs  -SD               User Key  (r) = Recorded By, (t) = Taken By, (c) = Cosigned By      Initials Name Provider Type    Bertha Jacome PT Physical Therapist                   Mobility       Row Name 11/18/24 1110          Bed Mobility    Comment, (Bed Mobility) seated on [facility] with nursing aid upon arrival  -SD       Row Name 11/18/24 1110           Transfers    Comment, (Transfers) Pt t/f BSC > EOB with Odilon and RW, then STS from EOB with CGA. Brief donned in standing per pt request due to incontinence.  -SD       Row Name 11/18/24 1110          Bed-Chair Transfer    Bed-Chair [place] (Transfers) minimum assist (75% patient effort);1 person assist;verbal cues  -SD     Assistive Device (Bed-Chair Transfers) walker, front-wheeled  -SD       Row Name 11/18/24 1110          Sit-Stand Transfer    Sit-Stand [place] (Transfers) contact guard;1 person assist;verbal cues  -SD     Assistive Device (Sit-Stand Transfers) walker, front-wheeled  -SD       Row Name 11/18/24 1110          Gait/Stairs (Locomotion)    [place] Level (Gait) contact guard;1 person assist;verbal cues;nonverbal cues (demo/gesture)  -SD     Assistive Device (Gait) walker, front-wheeled  -SD     Distance in Feet (Gait) 75  -SD     Deviations/Abnormal Patterns (Gait) bilateral deviations;base of support, wide;myke decreased;gait speed decreased  -SD     Bilateral Gait Deviations forward flexed posture;heel strike decreased  -SD     Comment, (Gait/Stairs) pt amb in hallway with RW and CGA on 2LO2nc, dem slow gait speed and forward flexed posture. Distance limited by weakness and fatigue.  -SD               User Key  (r) = Recorded By, (t) = Taken By, (c) = Cosigned By      Initials Name Provider Type    Bertha Jacome, PT Physical Therapist                   Obj/Interventions       Row Name 11/18/24 1114          Range of Motion Comprehensive    General Range of Motion bilateral lower extremity ROM WFL  -SD       Row Name 11/18/24 1114          Strength Comprehensive (MMT)    General Manual Muscle Testing (MMT) Assessment lower extremity strength deficits identified  -SD     Comment, General Manual Muscle Testing (MMT) Assessment BLE's 4/5  -SD       Row Name 11/18/24 1114          Balance    Balance Assessment sitting static balance;standing static balance;sitting dynamic  balance  -SD     Static Sitting Balance independent  -SD     Dynamic Sitting Balance contact guard  -SD     Position, Sitting Balance unsupported;other (see comments)  BSC  -SD     Static Standing Balance contact guard  -SD     Position/Device Used, Standing Balance supported;walker, rolling  -SD     Balance Interventions sitting;standing;occupation based/functional task  -SD               User Key  (r) = Recorded By, (t) = Taken By, (c) = Cosigned By      Initials Name Provider Type    SD Bertha Neely, PT Physical Therapist                   Goals/Plan       Row Name 11/18/24 1119          Bed Mobility Goal 1 (PT)    Activity/Assistive Device (Bed Mobility Goal 1, PT) sit to supine;supine to sit  -SD     [place] Level/Cues Needed (Bed Mobility Goal 1, PT) modified independence  -SD     Time Frame (Bed Mobility Goal 1, PT) short term goal (STG);3 days  -SD       Row Name 11/18/24 1119          Transfer Goal 1 (PT)    Activity/Assistive Device (Transfer Goal 1, PT) sit-to-stand/stand-to-sit;bed-to-chair/chair-to-bed  -SD     [place] Level/Cues Needed (Transfer Goal 1, PT) modified independence  -SD     Time Frame (Transfer Goal 1, PT) long term goal (LTG);10 days  -SD       Row Name 11/18/24 1119          Gait Training Goal 1 (PT)    Activity/Assistive Device (Gait Training Goal 1, PT) gait (walking locomotion);increase endurance/gait distance  -SD     [place] Level (Gait Training Goal 1, PT) supervision required  -SD     Distance (Gait Training Goal 1, PT) 150  -SD     Time Frame (Gait Training Goal 1, PT) long term goal (LTG);10 days  -SD       Row Name 11/18/24 1119          Therapy Assessment/Plan (PT)    Planned Therapy Interventions (PT) balance training;bed mobility training;gait training;home exercise program;patient/family education;neuromuscular re-education;transfer training;strengthening  -SD               User Key  (r) = Recorded By, (t) = Taken By, (c) = Cosigned By      Initials  Name Provider Type    SD Bertha Neely, PT Physical Therapist                   Clinical Impression       Row Name 11/18/24 1115          Pain    Pretreatment Pain Rating 3/10  -SD     Posttreatment Pain Rating 3/10  -SD     Pain Location foot;hand  -SD     Pain Side/Orientation bilateral  -SD     Pain Management Interventions exercise or physical activity utilized  -SD     Response to Pain Interventions activity participation with tolerable pain  -SD       Row Name 11/18/24 1115          Plan of Care Review    Plan of Care Reviewed With patient  -SD     Outcome Evaluation Pt presents with weakness, decreased activity jeremy, difficulty walking and is below baseline level of function for mobility. Pt req Odilon to CGA for transfers, amb in hallway with RW and CGA on 2LO2nc, dem slow gait speed and forward flexed posture. Distance limited by weakness and fatigue.Pt will benefit from IPPT services to address limitations. PT rec d/c rehab pending progress with mobility independence.  -SD       Row Name 11/18/24 1115          Therapy Assessment/Plan (PT)    Patient/Family Therapy Goals Statement (PT) get stronger  -SD     Rehab Potential (PT) good  -SD     Criteria for Skilled Interventions Met (PT) yes;skilled treatment is necessary  -SD     Therapy Frequency (PT) daily  -SD     Predicted Duration of Therapy Intervention (PT) 10 days  -SD       Row Name 11/18/24 1115          Vital Signs    Pre Systolic BP Rehab 139  -SD     Pre Treatment Diastolic BP 63  -SD     Pretreatment Heart Rate (beats/min) 66  -SD     Posttreatment Heart Rate (beats/min) 64  -SD     Pre SpO2 (%) 96  -SD     O2 Delivery Pre Treatment nasal cannula  -SD     Post SpO2 (%) 98  -SD     O2 Delivery Post Treatment nasal cannula  -SD     Pre Patient Position Sitting  -SD     Post Patient Position Sitting  -SD       Row Name 11/18/24 1115          Positioning and Restraints    Pre-Treatment Position bedside commode  -SD     Post Treatment Position  chair  -SD     In Chair notified nsg;reclined;call light within reach;encouraged to call for assist;exit alarm on;waffle cushion;heels elevated  -SD               User Key  (r) = Recorded By, (t) = Taken By, (c) = Cosigned By      Initials Name Provider Type    Bertha Jacome, ANGY Physical Therapist                   Outcome Measures       Row Name 11/18/24 1119 11/18/24 0800       How much help from another person do you currently need...    Turning from your back to your side while in flat bed without using bedrails? 3  -SD 3  -BC    Moving from lying on back to sitting on the side of a flat bed without bedrails? 3  -SD 3  -BC    Moving to and from a bed to a chair (including a wheelchair)? 3  -SD 3  -BC    Standing up from a chair using your arms (e.g., wheelchair, bedside chair)? 3  -SD 3  -BC    Climbing 3-5 steps with a railing? 3  -SD 3  -BC    To walk in hospital room? 3  -SD 3  -BC    AM-PAC 6 Clicks Score (PT) 18  -SD 18  -BC    Highest Level of Mobility Goal 6 --> Walk 10 steps or more  -SD 6 --> Walk 10 steps or more  -BC      Row Name 11/18/24 1119          Functional Assessment    Outcome Measure Options AM-PAC 6 Clicks Basic Mobility (PT)  -SD               User Key  (r) = Recorded By, (t) = Taken By, (c) = Cosigned By      Initials Name Provider Type    Bertha Jacome, ANGY Physical Therapist    BC Angélica Coyle RN Registered Nurse                                 Physical Therapy Education       Title: PT OT SLP Therapies (In Progress)       Topic: Physical Therapy (In Progress)       Point: Mobility training (Done)       Learning Progress Summary            Patient Acceptance, E, VU,NR by SD at 11/18/2024 1120                      Point: Home exercise program (Not Started)       Learner Progress:  Not documented in this visit.              Point: Body mechanics (Done)       Learning Progress Summary            Patient Acceptance, E, VU,NR by SD at 11/18/2024 1120                       Point: Precautions (Done)       Learning Progress Summary            Patient Acceptance, E, VU,NR by SD at 11/18/2024 1120                                      User Key       Initials Effective Dates Name Provider Type Discipline    SD 03/13/23 -  Bertha Neely PT Physical Therapist PT                  PT Recommendation and Plan  Planned Therapy Interventions (PT): balance training, bed mobility training, gait training, home exercise program, patient/family education, neuromuscular re-education, transfer training, strengthening  Outcome Evaluation: Pt presents with weakness, decreased activity jeremy, difficulty walking and is below baseline level of function for mobility. Pt req Odilon to CGA for transfers, amb in hallway with RW and CGA on 2LO2nc, dem slow gait speed and forward flexed posture. Distance limited by weakness and fatigue.Pt will benefit from IPPT services to address limitations. PT rec d/c rehab pending progress with mobility independence.     Time Calculation:   PT Evaluation Complexity  History, PT Evaluation Complexity: 3 or more personal factors and/or comorbidities  Examination of Body Systems (PT Eval Complexity): total of 3 or more elements  Clinical Presentation (PT Evaluation Complexity): unstable  Clinical Decision Making (PT Evaluation Complexity): moderate complexity  Overall Complexity (PT Evaluation Complexity): moderate complexity     PT Charges       Row Name 11/18/24 1120             Time Calculation    Start Time 1014  -SD      PT Non-Billable Time (min) 52 min  -SD      PT Received On 11/18/24  -SD      PT Goal Re-Cert Due Date 11/28/24  -SD                User Key  (r) = Recorded By, (t) = Taken By, (c) = Cosigned By      Initials Name Provider Type    SD Bertha Neely PT Physical Therapist                  Therapy Charges for Today       Code Description Service Date Service Provider Modifiers Qty    [id number] HC PT EVAL MOD COMPLEXITY 4 11/18/2024 Chin  Bertha, PT GP 1            PT G-Codes  Outcome Measure Options: AM-PAC 6 Clicks Basic Mobility (PT)  AM-PAC 6 Clicks Score (PT): 18  AM-PAC 6 Clicks Score (OT): 18  PT Discharge Summary  Anticipated Discharge Disposition (PT): inpatient rehabilitation facility    Bertha Neely, PT  11/18/2024

## 2024-11-18 NOTE — PROGRESS NOTES
[facility] Medicine Services  PROGRESS NOTE    Patient Name: Yanique Webster  : 1940  MRN: [id number]    Date of Admission: 11/15/2024  Primary Care Physician: Sebas Alicea MD    Subjective   Subjective     CC:  KINDRA on CKD    HPI:  Doing okay today, denies any issues other than being unable to sleep again.       Objective   Objective     Vital Signs:   Temp:  [97.4 °F (36.3 °C)-98.8 °F (37.1 °C)] 98.5 °F (36.9 °C)  Heart Rate:  [59-76] 59  Resp:  [16-20] 18  BP: (139-180)/(60-78) 153/60  Flow (L/min) (Oxygen Therapy):  [2] 2     Physical Exam:  Constitutional: No acute distress, awake, alert  HENT: NCAT, mucous membranes moist  Respiratory: Clear to auscultation bilaterally, respiratory effort normal   Cardiovascular: RRR, no murmurs, rubs, or gallops  Gastrointestinal: Positive bowel sounds, soft, nontender, nondistended  Musculoskeletal: No bilateral ankle edema  Psychiatric: Appropriate affect, cooperative  Neurologic: Oriented x 3, strength symmetric in all extremities, Cranial Nerves grossly intact to confrontation, speech clear  Skin: No rashes     Results Reviewed:  LAB RESULTS:      Lab 24  0512 24  0529 24  0456 11/15/24  1230   WBC 5.90 5.75 4.98 7.23   HEMOGLOBIN 8.3* 8.7* 9.1* 9.2*   HEMATOCRIT 28.2* 29.6* 30.4* 30.5*   PLATELETS 107* 126* 134* 151   NEUTROS ABS 3.80 3.63 3.05 4.84   IMMATURE GRANS (ABS) 0.01 0.03 0.01 0.02   LYMPHS ABS 1.16 1.14 1.08 1.24   MONOS ABS 0.61 0.63 0.54 0.78   EOS ABS 0.28 0.27 0.26 0.30   MCV 98.9* 97.7* 96.8 96.5   PROCALCITONIN  --   --   --  0.11   LACTATE  --   --   --  0.9   PROTIME  --   --   --  18.0*   APTT  --   --   --  39.9*         Lab 24  0512 24  0529 24  0456 11/15/24  1230   SODIUM 138 139 141 136   POTASSIUM 4.8 5.1 4.8 5.1   CHLORIDE 110* 109* 112* 106   CO2 16.0* 17.0* 16.0* 16.0*   ANION GAP 12.0 13.0 13.0 14.0   BUN 68* 71* 80* 80*   CREATININE 4.24* 4.00* 3.89* 4.18*    EGFR 9.9* 10.6* 11.0* 10.1*   GLUCOSE 121* 107* 110* 109*   CALCIUM 9.4 9.3  9.5 9.8 9.5   MAGNESIUM  --   --   --  2.0   PHOSPHORUS  --  4.8*  --   --          Lab 11/17/24  0529 11/15/24  1230   TOTAL PROTEIN  --  5.9*   ALBUMIN 3.7 3.8   GLOBULIN  --  2.1   ALT (SGPT)  --  13   AST (SGOT)  --  24   BILIRUBIN  --  0.4   ALK PHOS  --  83         Lab 11/15/24  1230   PROTIME 18.0*   INR 1.48*             Lab 11/17/24  0529 11/15/24  1334   IRON 61  --    IRON SATURATION (TSAT) 24  --    TIBC 256*  --    TRANSFERRIN 172*  --    ABO TYPING  --  O   RH TYPING  --  Negative   ANTIBODY SCREEN  --  Negative         Brief Urine Lab Results  (Last result in the past 365 days)        Color   Clarity   Blood   Leuk Est   Nitrite   Protein   CREAT   Urine HCG        11/15/24 1410 Yellow   Clear   Negative   Negative   Negative   100 mg/dL (2+)                   Microbiology Results Abnormal       None            No radiology results from the last 24 hrs    Results for orders placed during the hospital encounter of 10/30/24    Adult Transthoracic Echo Complete W/ Cont if Necessary Per Protocol    Interpretation Summary    Left ventricular systolic function is normal. Estimated left ventricular EF = 60%    Left ventricular wall thickness is consistent with mild concentric hypertrophy.    The left atrial cavity is moderately dilated.    Aortic sclerosis without aortic stenosis.  Mild to moderate aortic insufficiency    Mild mitral regurgitation.      Current medications:  Scheduled Meds:aspirin, 81 mg, Oral, Daily  atorvastatin, 80 mg, Oral, Daily  calcitriol, 0.5 mcg, Oral, Daily  carvedilol, 25 mg, Oral, Q12H  fentaNYL, 1 patch, Transdermal, Q3 Days   And  Check Fentanyl Patch Placement, 1 each, Not Applicable, Q12H  cloNIDine, 0.1 mg, Oral, Q6H  ferrous sulfate, 325 mg, Oral, Daily With Breakfast  hydrALAZINE, 100 mg, Oral, Q8H  insulin lispro, 2-7 Units, Subcutaneous, 4x Daily AC & at Bedtime  ipratropium-albuterol, 3 mL,  Nebulization, 4x Daily - RT  isosorbide mononitrate, 30 mg, Oral, Daily  lactobacillus acidophilus, 1 capsule, Oral, Daily  pantoprazole, 40 mg, Oral, BID AC  polyethylene glycol, 17 g, Oral, QAM  [Held by provider] rivaroxaban, 15 mg, Oral, Daily  sodium bicarbonate, 1,300 mg, Oral, BID  sodium chloride, 10 mL, Intravenous, Q12H  terazosin, 5 mg, Oral, Nightly  vitamin B-12, 1,000 mcg, Oral, Daily      Continuous Infusions:     PRN Meds:.  acetaminophen    senna-docusate sodium **AND** polyethylene glycol **AND** bisacodyl **AND** bisacodyl    dextrose    dextrose    doxepin    glucagon (human recombinant)    ipratropium-albuterol    melatonin    naloxone    nitroglycerin    oxyCODONE    sodium chloride    sodium chloride    sodium chloride    [Held by provider] torsemide    Assessment & Plan   Assessment & Plan     Active Hospital Problems    Diagnosis  POA    **Acute on chronic renal failure [N17.9, N18.9]  Yes    Abnormal laboratory test [R89.9]  Yes    Dyspnea [R06.00]  Yes    Anemia, chronic disease [D63.8]  Yes    Paroxysmal atrial fibrillation [I48.0]  Yes    Essential hypertension [I10]  Yes    Type 2 diabetes mellitus [E11.9]  Yes      Resolved Hospital Problems   No resolved problems to display.        Brief Hospital Course to date:  Yanique Webster is a 84 y.o. female with PMH of Afib on Xarelto, HTN, CKD, prior stroke, T2DM with neuropathy, chronic anemia, and GERD who presented from [facility] with KINDRA on CKD.    Plan:    KINRDA on CKD  --baseline unclear. last admission pt's baseline was documented 1.9-2.2 but wasn't clear if her CKD had advanced.  When dc'd from hospital 11/8/24 creatinine was 3.76. While she has been at [facility], it has stayed in 4 range. On 11/14, was 4.3, bumped to 4.5 on day of admission here (11/15/24). Continued worsening of Cr today.   --nephrology consulted, further management as per their instructions      Dyspnea  --hold off on further IVF for now as she received 1L at [facility] 11/14/24  and just under 0.5L in ED   --sats stable on RA  -- CT chest without contrast shows small bilateral effusions as well as atelectasis  -- encourage use of IS     TCP  -- new and mild. Monitor for now (slight drop today)      PAF  --xarelto restarted a few days ago, was held on dc per cardiology recs but family requested it be restarted due to stroke risk  --will hold for now due to renal function     HTN  --continue meds, increased dose of Carvedilol more as BP still not controlled.   - Hydralazine now maxed out.    -- May need to increase Clonidine.  -- Renal recommends tight BP control to improve renal function.      T2DM  --SSI     Anemia of chronic disease  --hgb stable    Insomnia  -- will add Benadryl for tonight as Melatonin not working     Total time spent: Time Spent: Time Spent: 35 minutes  Time spent includes time reviewing chart, face-to-face time, counseling patient/family/caregiver, ordering medications/tests/procedures, communicating with other health care professionals, documenting clinical information in the electronic health record, and coordination of care.      Expected Discharge Location and Transportation: home ([facility] had planned to discharge patient home on the day of her admission here, however, due to her KINDRA, she was sent here). OT recommends home.   Expected Discharge   Expected Discharge Date: 11/19/2024; Expected Discharge Time:      VTE Prophylaxis:  Pharmacologic & mechanical VTE prophylaxis orders are present.         AM-PAC 6 Clicks Score (PT): 18 (11/18/24 1439)    CODE STATUS:   Code Status and Medical Interventions: CPR (Attempt to Resuscitate); Full Support   Ordered at: 11/15/24 9147     Code Status (Patient has no pulse and is not breathing):    CPR (Attempt to Resuscitate)     Medical Interventions (Patient has pulse or is breathing):    Full Support       Evonne Love MD  11/18/24

## 2024-11-18 NOTE — PROGRESS NOTES
" LOS: 2 days   Patient Care Team:  Sebas Alicea MD as PCP - General (Family Medicine)  Steve Geronimo MD as Consulting Physician (Cardiology)  Emilio Regalado MD as Consulting Physician (Endocrinology)  Axel Ribeiro MD as Consulting Physician (Cardiology)        Subjective     Reviewed vitals, labs and medications list  Seen and examined       Objective     Vital Sign Min/Max for last 24 hours  Temp  Min: 97.4 °F (36.3 °C)  Max: 98.8 °F (37.1 °C)   BP  Min: 139/63  Max: 180/78   Pulse  Min: 60  Max: 76   Resp  Min: 16  Max: 20   SpO2  Min: 97 %  Max: 100 %   Flow (L/min) (Oxygen Therapy)  Min: 2  Max: 2   No data recorded     Flowsheet Rows      Flowsheet Row First Filed Value   Admission Height 162.6 cm (64\") Documented at 11/15/2024 1228   Admission Weight 85.7 kg (189 lb) Documented at 11/15/2024 1228            No intake/output data recorded.  I/O last 3 completed shifts:  In: -   Out: 1500 [Urine:1500]    Objective:  Vital signs: (most recent): Blood pressure 152/73, pulse 65, temperature 97.8 °F (36.6 °C), temperature source Oral, resp. rate 18, height 162.6 cm (64\"), weight 83.2 kg (183 lb 8 oz), SpO2 100%.              Awake, NAD  LE no edema   Abd soft  Neuro non focal   Lungs clear  S1S2  Results Review:     I reviewed the patient's new clinical results.    WBC WBC   Date Value Ref Range Status   11/18/2024 5.90 3.40 - 10.80 10*3/mm3 Final   11/17/2024 5.75 3.40 - 10.80 10*3/mm3 Final   11/16/2024 4.98 3.40 - 10.80 10*3/mm3 Final   11/15/2024 7.23 3.40 - 10.80 10*3/mm3 Final      HGB Hemoglobin   Date Value Ref Range Status   11/18/2024 8.3 (L) 12.0 - 15.9 g/dL Final   11/17/2024 8.7 (L) 12.0 - 15.9 g/dL Final   11/16/2024 9.1 (L) 12.0 - 15.9 g/dL Final   11/15/2024 9.2 (L) 12.0 - 15.9 g/dL Final      HCT Hematocrit   Date Value Ref Range Status   11/18/2024 28.2 (L) 34.0 - 46.6 % Final   11/17/2024 29.6 (L) 34.0 - 46.6 % Final   11/16/2024 30.4 (L) 34.0 - 46.6 % Final   11/15/2024 30.5 (L) " "34.0 - 46.6 % Final      Platlets No results found for: \"LABPLAT\"   MCV MCV   Date Value Ref Range Status   11/18/2024 98.9 (H) 79.0 - 97.0 fL Final   11/17/2024 97.7 (H) 79.0 - 97.0 fL Final   11/16/2024 96.8 79.0 - 97.0 fL Final   11/15/2024 96.5 79.0 - 97.0 fL Final          Sodium Sodium   Date Value Ref Range Status   11/18/2024 138 136 - 145 mmol/L Final   11/17/2024 139 136 - 145 mmol/L Final   11/16/2024 141 136 - 145 mmol/L Final   11/15/2024 136 136 - 145 mmol/L Final      Potassium Potassium   Date Value Ref Range Status   11/18/2024 4.8 3.5 - 5.2 mmol/L Final   11/17/2024 5.1 3.5 - 5.2 mmol/L Final   11/16/2024 4.8 3.5 - 5.2 mmol/L Final   11/15/2024 5.1 3.5 - 5.2 mmol/L Final     Comment:     Slight hemolysis detected by analyzer. Result may be falsely elevated.      Chloride Chloride   Date Value Ref Range Status   11/18/2024 110 (H) 98 - 107 mmol/L Final   11/17/2024 109 (H) 98 - 107 mmol/L Final   11/16/2024 112 (H) 98 - 107 mmol/L Final   11/15/2024 106 98 - 107 mmol/L Final      CO2 CO2   Date Value Ref Range Status   11/18/2024 16.0 (L) 22.0 - 29.0 mmol/L Final   11/17/2024 17.0 (L) 22.0 - 29.0 mmol/L Final   11/16/2024 16.0 (L) 22.0 - 29.0 mmol/L Final   11/15/2024 16.0 (L) 22.0 - 29.0 mmol/L Final      BUN BUN   Date Value Ref Range Status   11/18/2024 68 (H) 8 - 23 mg/dL Final   11/17/2024 71 (H) 8 - 23 mg/dL Final   11/16/2024 80 (H) 8 - 23 mg/dL Final   11/15/2024 80 (H) 8 - 23 mg/dL Final      Creatinine Creatinine   Date Value Ref Range Status   11/18/2024 4.24 (H) 0.57 - 1.00 mg/dL Final   11/17/2024 4.00 (H) 0.57 - 1.00 mg/dL Final   11/16/2024 3.89 (H) 0.57 - 1.00 mg/dL Final   11/15/2024 4.18 (H) 0.57 - 1.00 mg/dL Final      Calcium Calcium   Date Value Ref Range Status   11/18/2024 9.4 8.6 - 10.5 mg/dL Final   11/17/2024 9.5 8.6 - 10.5 mg/dL Final   11/17/2024 9.3 8.6 - 10.5 mg/dL Final   11/16/2024 9.8 8.6 - 10.5 mg/dL Final   11/15/2024 9.5 8.6 - 10.5 mg/dL Final      PO4 No results " "found for: \"CAPO4\"   Albumin Albumin   Date Value Ref Range Status   11/17/2024 3.7 3.5 - 5.2 g/dL Final   11/15/2024 3.8 3.5 - 5.2 g/dL Final      Magnesium Magnesium   Date Value Ref Range Status   11/15/2024 2.0 1.6 - 2.4 mg/dL Final      Uric Acid No results found for: \"URICACID\"       Assessment & Plan       Acute on chronic renal failure    Type 2 diabetes mellitus    Essential hypertension    Paroxysmal atrial fibrillation    Anemia, chronic disease    Dyspnea    Abnormal laboratory test      Assessment & Plan  ====================================  1.  KINDRA on CKD: Patient was recently discharged on 11/7/2024 with KINDRA is likely from hemodynamic injury without much improvement.  ANCA negative.  Since discharge creatinine has gone up from 3.5-4.1, metabolic acidosis bicarb 16, BUN of 80.     2.  CKD stage IV: Baseline creatinine 1.9-2.2 mg/dL, since last admission was running between 3.2-3.5 range, history of proteinuric renal disease UPC 2 g in the past duplex scan negative in 2016, on previous hospitalization discussion with daughter-in-law anesthesiologist, was explained regard to advanced renal dysfunction possibility of dialysis versus conservative treatment.     3.  Anemia of CKD: EDISON was started hemoglobin 9.2.     4.  Volume status:     5.  Hypertension: No ANABEL per duplex in 2016.  On terazosin, carvedilol, hydralazine, clonidine     6.  Metabolic acidosis: In the setting of CKD, patient on sodium bicarb 650 mg twice daily     7.  Hyperparathyroidism: On calcitriol 0.5 mcg daily     8.  Iron deficiency on oral iron     9.  History of CAD     10.  COPD     11.  Hyperlipidemia     12.  Anxiety and depression.     Plan and recommendation.  Stable renal functions/ pending RENAL US   Avoid nephrotoxic medications.  Keep systolic blood pressure greater than 100.  Daily evaluation for renal replacement therapy will be done.  Adjust medication for the new GFR.    I discussed the patients findings and my " recommendations with patient        Cassia Cross MD  11/18/24  09:42 EST

## 2024-11-18 NOTE — PLAN OF CARE
Problem: Adult Inpatient Plan of Care  Goal: Plan of Care Review  Outcome: Progressing  Goal: Patient-Specific Goal (Individualized)  Outcome: Progressing  Goal: Absence of Hospital-Acquired Illness or Injury  Outcome: Progressing  Intervention: Identify and Manage Fall Risk  Recent Flowsheet Documentation  Taken 11/18/2024 1200 by Angélica Coyle RN  Safety Promotion/Fall Prevention:   activity supervised   clutter free environment maintained   assistive device/personal items within reach   fall prevention program maintained   toileting scheduled   safety round/check completed   room organization consistent   nonskid shoes/slippers when out of bed  Taken 11/18/2024 1000 by Angélica Coyle RN  Safety Promotion/Fall Prevention:   activity supervised   clutter free environment maintained   assistive device/personal items within reach   fall prevention program maintained   toileting scheduled   safety round/check completed   room organization consistent   nonskid shoes/slippers when out of bed  Taken 11/18/2024 0800 by Angélica Coyle RN  Safety Promotion/Fall Prevention:   activity supervised   assistive device/personal items within reach   clutter free environment maintained   fall prevention program maintained   toileting scheduled   safety round/check completed   room organization consistent   nonskid shoes/slippers when out of bed  Intervention: Prevent Skin Injury  Recent Flowsheet Documentation  Taken 11/18/2024 1200 by Angélica Coyle RN  Body Position: position changed independently  Skin Protection:   transparent dressing maintained   incontinence pads utilized  Taken 11/18/2024 1000 by Angélica Coyle RN  Body Position: position changed independently  Skin Protection:   transparent dressing maintained   incontinence pads utilized  Taken 11/18/2024 0800 by Angélica Coyle RN  Body Position: position changed independently  Skin Protection:   transparent dressing maintained   incontinence pads  utilized  Intervention: Prevent and Manage VTE (Venous Thromboembolism) Risk  Recent Flowsheet Documentation  Taken 11/18/2024 0800 by Angélica Coyle RN  VTE Prevention/Management:   bilateral   SCDs (sequential compression devices) on  Intervention: Prevent Infection  Recent Flowsheet Documentation  Taken 11/18/2024 1200 by Angélica Coyle RN  Infection Prevention:   environmental surveillance performed   hand hygiene promoted   single patient room provided   rest/sleep promoted  Taken 11/18/2024 1000 by Angélica Coyle RN  Infection Prevention:   environmental surveillance performed   hand hygiene promoted   single patient room provided   rest/sleep promoted  Taken 11/18/2024 0800 by Angélica Coyle RN  Infection Prevention:   environmental surveillance performed   hand hygiene promoted   rest/sleep promoted   single patient room provided  Goal: Optimal Comfort and Wellbeing  Outcome: Progressing  Intervention: Provide Person-Centered Care  Recent Flowsheet Documentation  Taken 11/18/2024 0800 by Angélica Coyle RN  Trust Relationship/Rapport:   care explained   choices provided   questions answered   questions encouraged   reassurance provided   thoughts/feelings acknowledged  Goal: Readiness for Transition of Care  Outcome: Progressing     Problem: Skin Injury Risk Increased  Goal: Skin Health and Integrity  Outcome: Progressing  Intervention: Optimize Skin Protection  Recent Flowsheet Documentation  Taken 11/18/2024 1200 by Angélica Coyle RN  Activity Management:   activity encouraged   up in chair  Pressure Reduction Techniques: frequent weight shift encouraged  Head of Bed (HOB) Positioning: HOB elevated  Pressure Reduction Devices: pressure-redistributing mattress utilized  Skin Protection:   transparent dressing maintained   incontinence pads utilized  Taken 11/18/2024 1000 by Angélica Coyle RN  Activity Management: activity encouraged  Pressure Reduction Techniques: frequent weight shift  encouraged  Head of Bed (HOB) Positioning: HOB elevated  Pressure Reduction Devices: pressure-redistributing mattress utilized  Skin Protection:   transparent dressing maintained   incontinence pads utilized  Taken 11/18/2024 0800 by Angélica Coyle RN  Activity Management: activity encouraged  Pressure Reduction Techniques:   frequent weight shift encouraged   heels elevated off bed   weight shift assistance provided   pressure points protected  Head of Bed (HOB) Positioning: [facility] elevated  Pressure Reduction Devices:   pressure-redistributing mattress utilized   positioning supports utilized  Skin Protection:   transparent dressing maintained   incontinence pads utilized     Problem: Fall Injury Risk  Goal: Absence of Fall and Fall-Related Injury  Outcome: Progressing  Intervention: Identify and Manage Contributors  Recent Flowsheet Documentation  Taken 11/18/2024 1200 by Angélica Coyle RN  Medication Review/Management: medications reviewed  Taken 11/18/2024 1000 by Angélica Coyle RN  Medication Review/Management: medications reviewed  Taken 11/18/2024 0800 by Angélica Coyle RN  Medication Review/Management: medications reviewed  Intervention: Promote Injury-Free Environment  Recent Flowsheet Documentation  Taken 11/18/2024 1200 by Angélica Coyle RN  Safety Promotion/Fall Prevention:   activity supervised   clutter free environment maintained   assistive device/personal items within reach   fall prevention program maintained   toileting scheduled   safety round/check completed   room organization consistent   nonskid shoes/slippers when out of bed  Taken 11/18/2024 1000 by Angélica Coyle RN  Safety Promotion/Fall Prevention:   activity supervised   clutter free environment maintained   assistive device/personal items within reach   fall prevention program maintained   toileting scheduled   safety round/check completed   room organization consistent   nonskid shoes/slippers when out of bed  Taken  11/18/2024 0800 by Angélica Coyle RN  Safety Promotion/Fall Prevention:   activity supervised   assistive device/personal items within reach   clutter free environment maintained   fall prevention program maintained   toileting scheduled   safety round/check completed   room organization consistent   nonskid shoes/slippers when out of bed   Goal Outcome Evaluation:

## 2024-11-18 NOTE — PLAN OF CARE
Problem: Adult Inpatient Plan of Care  Goal: Plan of Care Review  Outcome: Progressing  Goal: Patient-Specific Goal (Individualized)  Outcome: Progressing  Goal: Absence of Hospital-Acquired Illness or Injury  Outcome: Progressing  Intervention: Identify and Manage Fall Risk  Recent Flowsheet Documentation  Taken 11/18/2024 0428 by Krish Bran, RN  Safety Promotion/Fall Prevention:   safety round/check completed   clutter free environment maintained   assistive device/personal items within reach   activity supervised   lighting adjusted   nonskid shoes/slippers when out of bed  Intervention: Prevent Skin Injury  Recent Flowsheet Documentation  Taken 11/18/2024 0428 by Krish Bran RN  Body Position: position changed independently  Skin Protection:   transparent dressing maintained   incontinence pads utilized  Intervention: Prevent Infection  Recent Flowsheet Documentation  Taken 11/18/2024 0428 by Krish Bran RN  Infection Prevention:   cohorting utilized   single patient room provided   rest/sleep promoted   hand hygiene promoted  Goal: Optimal Comfort and Wellbeing  Outcome: Progressing  Intervention: Provide Person-Centered Care  Recent Flowsheet Documentation  Taken 11/18/2024 0428 by Krish Bran RN  Trust Relationship/Rapport:   care explained   choices provided   questions encouraged  Goal: Readiness for Transition of Care  Outcome: Progressing     Problem: Skin Injury Risk Increased  Goal: Skin Health and Integrity  Outcome: Progressing  Intervention: Optimize Skin Protection  Recent Flowsheet Documentation  Taken 11/18/2024 0428 by Krish Bran, RN  Activity Management:   sitting, edge of bed   up to bedside commode  Pressure Reduction Techniques:   frequent weight shift encouraged   heels elevated off bed   weight shift assistance provided  Head of Bed (HOB) Positioning:   HOB lowered   HOB elevated  Pressure Reduction Devices:   specialty bed utilized   heel offloading device utilized    positioning supports utilized   pressure-redistributing mattress utilized  Skin Protection:   transparent dressing maintained   incontinence pads utilized     Problem: Fall Injury Risk  Goal: Absence of Fall and Fall-Related Injury  Outcome: Progressing  Intervention: Promote Injury-Free Environment  Recent Flowsheet Documentation  Taken 11/18/2024 7963 by Krish Bran, RN  Safety Promotion/Fall Prevention:   safety round/check completed   clutter free environment maintained   assistive device/personal items within reach   activity supervised   lighting adjusted   nonskid shoes/slippers when out of bed   Goal Outcome Evaluation:

## 2024-11-19 ENCOUNTER — APPOINTMENT (OUTPATIENT)
Dept: GENERAL RADIOLOGY | Facility: HOSPITAL | Age: 84
End: 2024-11-19
Payer: MEDICARE

## 2024-11-19 LAB
ANION GAP SERPL CALCULATED.3IONS-SCNC: 13 MMOL/L (ref 5–15)
ARTERIAL PATENCY WRIST A: ABNORMAL
ATMOSPHERIC PRESS: ABNORMAL MM[HG]
BASE EXCESS BLDA CALC-SCNC: -10 MMOL/L (ref 0–2)
BASOPHILS # BLD AUTO: 0.04 10*3/MM3 (ref 0–0.2)
BASOPHILS NFR BLD AUTO: 0.6 % (ref 0–1.5)
BDY SITE: ABNORMAL
BODY TEMPERATURE: 37
BUN SERPL-MCNC: 72 MG/DL (ref 8–23)
BUN/CREAT SERPL: 17.2 (ref 7–25)
CALCIUM SPEC-SCNC: 9.7 MG/DL (ref 8.6–10.5)
CHLORIDE SERPL-SCNC: 110 MMOL/L (ref 98–107)
CO2 BLDA-SCNC: 17.3 MMOL/L (ref 22–33)
CO2 SERPL-SCNC: 16 MMOL/L (ref 22–29)
COHGB MFR BLD: 1.5 % (ref 0–2)
CREAT SERPL-MCNC: 4.19 MG/DL (ref 0.57–1)
DEPRECATED RDW RBC AUTO: 63.4 FL (ref 37–54)
EGFRCR SERPLBLD CKD-EPI 2021: 10 ML/MIN/1.73
EOSINOPHIL # BLD AUTO: 0.31 10*3/MM3 (ref 0–0.4)
EOSINOPHIL NFR BLD AUTO: 4.4 % (ref 0.3–6.2)
EPAP: 0
ERYTHROCYTE [DISTWIDTH] IN BLOOD BY AUTOMATED COUNT: 17.9 % (ref 12.3–15.4)
GLUCOSE BLDC GLUCOMTR-MCNC: 127 MG/DL (ref 70–130)
GLUCOSE BLDC GLUCOMTR-MCNC: 132 MG/DL (ref 70–130)
GLUCOSE BLDC GLUCOMTR-MCNC: 135 MG/DL (ref 70–130)
GLUCOSE BLDC GLUCOMTR-MCNC: 163 MG/DL (ref 70–130)
GLUCOSE SERPL-MCNC: 122 MG/DL (ref 65–99)
HCO3 BLDA-SCNC: 16.2 MMOL/L (ref 20–26)
HCT VFR BLD AUTO: 28 % (ref 34–46.6)
HCT VFR BLD AUTO: 28.1 % (ref 34–46.6)
HCT VFR BLD CALC: 26.5 % (ref 38–51)
HGB BLD-MCNC: 8.4 G/DL (ref 12–15.9)
HGB BLD-MCNC: 8.5 G/DL (ref 12–15.9)
HGB BLDA-MCNC: 8.6 G/DL (ref 14–18)
IMM GRANULOCYTES # BLD AUTO: 0.02 10*3/MM3 (ref 0–0.05)
IMM GRANULOCYTES NFR BLD AUTO: 0.3 % (ref 0–0.5)
INHALED O2 CONCENTRATION: 28 %
IPAP: 0
LYMPHOCYTES # BLD AUTO: 1.26 10*3/MM3 (ref 0.7–3.1)
LYMPHOCYTES NFR BLD AUTO: 18 % (ref 19.6–45.3)
MCH RBC QN AUTO: 29.3 PG (ref 26.6–33)
MCHC RBC AUTO-ENTMCNC: 30.2 G/DL (ref 31.5–35.7)
MCV RBC AUTO: 96.9 FL (ref 79–97)
METHGB BLD QL: 0.9 % (ref 0–1.5)
MODALITY: ABNORMAL
MONOCYTES # BLD AUTO: 0.62 10*3/MM3 (ref 0.1–0.9)
MONOCYTES NFR BLD AUTO: 8.9 % (ref 5–12)
NEUTROPHILS NFR BLD AUTO: 4.74 10*3/MM3 (ref 1.7–7)
NEUTROPHILS NFR BLD AUTO: 67.8 % (ref 42.7–76)
NRBC BLD AUTO-RTO: 0 /100 WBC (ref 0–0.2)
OXYHGB MFR BLDV: 96.8 % (ref 94–99)
PAW @ PEAK INSP FLOW SETTING VENT: 0 CMH2O
PCO2 BLDA: 35.7 MM HG (ref 35–45)
PCO2 TEMP ADJ BLD: 35.7 MM HG (ref 35–45)
PH BLDA: 7.26 PH UNITS (ref 7.35–7.45)
PH, TEMP CORRECTED: 7.26 PH UNITS
PLATELET # BLD AUTO: 101 10*3/MM3 (ref 140–450)
PMV BLD AUTO: 11 FL (ref 6–12)
PO2 BLDA: 119 MM HG (ref 83–108)
PO2 TEMP ADJ BLD: 119 MM HG (ref 83–108)
POTASSIUM SERPL-SCNC: 4.9 MMOL/L (ref 3.5–5.2)
RBC # BLD AUTO: 2.9 10*6/MM3 (ref 3.77–5.28)
SODIUM SERPL-SCNC: 139 MMOL/L (ref 136–145)
TOTAL RATE: 0 BREATHS/MINUTE
WBC NRBC COR # BLD AUTO: 6.99 10*3/MM3 (ref 3.4–10.8)

## 2024-11-19 PROCEDURE — 85025 COMPLETE CBC W/AUTO DIFF WBC: CPT | Performed by: INTERNAL MEDICINE

## 2024-11-19 PROCEDURE — 85014 HEMATOCRIT: CPT | Performed by: INTERNAL MEDICINE

## 2024-11-19 PROCEDURE — 82805 BLOOD GASES W/O2 SATURATION: CPT

## 2024-11-19 PROCEDURE — 82948 REAGENT STRIP/BLOOD GLUCOSE: CPT

## 2024-11-19 PROCEDURE — 94799 UNLISTED PULMONARY SVC/PX: CPT

## 2024-11-19 PROCEDURE — 94761 N-INVAS EAR/PLS OXIMETRY MLT: CPT

## 2024-11-19 PROCEDURE — 85018 HEMOGLOBIN: CPT | Performed by: INTERNAL MEDICINE

## 2024-11-19 PROCEDURE — 25010000002 HYDRALAZINE PER 20 MG: Performed by: INTERNAL MEDICINE

## 2024-11-19 PROCEDURE — 63710000001 DIPHENHYDRAMINE PER 50 MG: Performed by: INTERNAL MEDICINE

## 2024-11-19 PROCEDURE — 83050 HGB METHEMOGLOBIN QUAN: CPT

## 2024-11-19 PROCEDURE — 94664 DEMO&/EVAL PT USE INHALER: CPT

## 2024-11-19 PROCEDURE — 99233 SBSQ HOSP IP/OBS HIGH 50: CPT | Performed by: HOSPITALIST

## 2024-11-19 PROCEDURE — 71045 X-RAY EXAM CHEST 1 VIEW: CPT

## 2024-11-19 PROCEDURE — 80048 BASIC METABOLIC PNL TOTAL CA: CPT | Performed by: INTERNAL MEDICINE

## 2024-11-19 PROCEDURE — 82375 ASSAY CARBOXYHB QUANT: CPT

## 2024-11-19 PROCEDURE — 63710000001 INSULIN LISPRO (HUMAN) PER 5 UNITS: Performed by: NURSE PRACTITIONER

## 2024-11-19 PROCEDURE — 36600 WITHDRAWAL OF ARTERIAL BLOOD: CPT

## 2024-11-19 RX ORDER — HYDRALAZINE HYDROCHLORIDE 20 MG/ML
10 INJECTION INTRAMUSCULAR; INTRAVENOUS ONCE
Status: COMPLETED | OUTPATIENT
Start: 2024-11-19 | End: 2024-11-19

## 2024-11-19 RX ORDER — TERAZOSIN 5 MG/1
10 CAPSULE ORAL NIGHTLY
Status: DISCONTINUED | OUTPATIENT
Start: 2024-11-19 | End: 2024-11-27 | Stop reason: HOSPADM

## 2024-11-19 RX ADMIN — HYDRALAZINE HYDROCHLORIDE 100 MG: 50 TABLET ORAL at 15:59

## 2024-11-19 RX ADMIN — HYDRALAZINE HYDROCHLORIDE 100 MG: 50 TABLET ORAL at 05:44

## 2024-11-19 RX ADMIN — IPRATROPIUM BROMIDE AND ALBUTEROL SULFATE 3 ML: 2.5; .5 SOLUTION RESPIRATORY (INHALATION) at 07:32

## 2024-11-19 RX ADMIN — OXYCODONE 5 MG: 5 TABLET ORAL at 00:28

## 2024-11-19 RX ADMIN — CLONIDINE HYDROCHLORIDE 0.1 MG: 0.1 TABLET ORAL at 00:28

## 2024-11-19 RX ADMIN — Medication 50000 UNITS: at 14:32

## 2024-11-19 RX ADMIN — FERROUS SULFATE TAB 325 MG (65 MG ELEMENTAL FE) 325 MG: 325 (65 FE) TAB at 08:46

## 2024-11-19 RX ADMIN — IPRATROPIUM BROMIDE AND ALBUTEROL SULFATE 3 ML: 2.5; .5 SOLUTION RESPIRATORY (INHALATION) at 12:16

## 2024-11-19 RX ADMIN — IPRATROPIUM BROMIDE AND ALBUTEROL SULFATE 3 ML: 2.5; .5 SOLUTION RESPIRATORY (INHALATION) at 21:12

## 2024-11-19 RX ADMIN — ASPIRIN 81 MG: 81 TABLET, COATED ORAL at 08:46

## 2024-11-19 RX ADMIN — CLONIDINE HYDROCHLORIDE 0.1 MG: 0.1 TABLET ORAL at 17:03

## 2024-11-19 RX ADMIN — CARVEDILOL 25 MG: 12.5 TABLET, FILM COATED ORAL at 21:04

## 2024-11-19 RX ADMIN — ACETAMINOPHEN 650 MG: 325 TABLET ORAL at 08:46

## 2024-11-19 RX ADMIN — CLONIDINE HYDROCHLORIDE 0.1 MG: 0.1 TABLET ORAL at 05:44

## 2024-11-19 RX ADMIN — SODIUM BICARBONATE 650 MG TABLET 1300 MG: at 08:46

## 2024-11-19 RX ADMIN — CALCITRIOL CAPSULES 0.25 MCG 0.5 MCG: 0.25 CAPSULE ORAL at 08:46

## 2024-11-19 RX ADMIN — Medication 10 ML: at 08:52

## 2024-11-19 RX ADMIN — Medication 1 CAPSULE: at 08:46

## 2024-11-19 RX ADMIN — PANTOPRAZOLE SODIUM 40 MG: 40 TABLET, DELAYED RELEASE ORAL at 08:46

## 2024-11-19 RX ADMIN — HYDRALAZINE HYDROCHLORIDE 10 MG: 20 INJECTION INTRAMUSCULAR; INTRAVENOUS at 04:42

## 2024-11-19 RX ADMIN — POLYETHYLENE GLYCOL 3350 17 G: 17 POWDER, FOR SOLUTION ORAL at 08:46

## 2024-11-19 RX ADMIN — CLONIDINE HYDROCHLORIDE 0.1 MG: 0.1 TABLET ORAL at 11:26

## 2024-11-19 RX ADMIN — DIPHENHYDRAMINE HYDROCHLORIDE 25 MG: 25 CAPSULE ORAL at 21:04

## 2024-11-19 RX ADMIN — ISOSORBIDE MONONITRATE 30 MG: 30 TABLET, EXTENDED RELEASE ORAL at 08:53

## 2024-11-19 RX ADMIN — OXYCODONE 5 MG: 5 TABLET ORAL at 17:03

## 2024-11-19 RX ADMIN — PANTOPRAZOLE SODIUM 40 MG: 40 TABLET, DELAYED RELEASE ORAL at 17:03

## 2024-11-19 RX ADMIN — Medication 10 ML: at 21:04

## 2024-11-19 RX ADMIN — OXYCODONE 5 MG: 5 TABLET ORAL at 11:26

## 2024-11-19 RX ADMIN — TERAZOSIN HYDROCHLORIDE ANHYDROUS 10 MG: 5 CAPSULE ORAL at 21:04

## 2024-11-19 RX ADMIN — HYDRALAZINE HYDROCHLORIDE 100 MG: 50 TABLET ORAL at 21:04

## 2024-11-19 RX ADMIN — INSULIN LISPRO 2 UNITS: 100 INJECTION, SOLUTION INTRAVENOUS; SUBCUTANEOUS at 11:26

## 2024-11-19 RX ADMIN — CARVEDILOL 25 MG: 12.5 TABLET, FILM COATED ORAL at 08:46

## 2024-11-19 RX ADMIN — ATORVASTATIN CALCIUM 80 MG: 40 TABLET, FILM COATED ORAL at 08:46

## 2024-11-19 RX ADMIN — SODIUM BICARBONATE 650 MG TABLET 1300 MG: at 21:04

## 2024-11-19 RX ADMIN — OXYCODONE 5 MG: 5 TABLET ORAL at 21:04

## 2024-11-19 RX ADMIN — CYANOCOBALAMIN TAB 1000 MCG 1000 MCG: 1000 TAB at 08:46

## 2024-11-19 RX ADMIN — OXYCODONE 5 MG: 5 TABLET ORAL at 05:44

## 2024-11-19 NOTE — PROGRESS NOTES
[facility] Medicine Services  PROGRESS NOTE    Patient Name: Yanique Webster  : 1940  MRN: [id number]    Date of Admission: 11/15/2024  Primary Care Physician: Sebas Alicea MD    Subjective   Subjective     CC:  KINDRA on CKD    HPI:  Doing okay today, denies any issues other than being unable to sleep again.  Creatinine stable around 4.1      Objective   Objective     Vital Signs:   Temp:  [97.5 °F (36.4 °C)-98.5 °F (36.9 °C)] 97.6 °F (36.4 °C)  Heart Rate:  [59-73] 61  Resp:  [16-20] 20  BP: (110-197)/(60-96) 110/90  Flow (L/min) (Oxygen Therapy):  [1-2] 2     Physical Exam:  Constitutional: No acute distress, awake, alert  HENT: NCAT, mucous membranes moist  Respiratory: Clear to auscultation bilaterally, respiratory effort normal   Cardiovascular: RRR, no murmurs, rubs, or gallops  Gastrointestinal: Positive bowel sounds, soft, nontender, nondistended  Musculoskeletal: No bilateral ankle edema  Psychiatric: Appropriate affect, cooperative  Neurologic: Oriented x 3, strength symmetric in all extremities, Cranial Nerves grossly intact to confrontation, speech clear  Skin: No rashes     Results Reviewed:  LAB RESULTS:      Lab 24  0544 24  0512 24  0529 24  0456 11/15/24  1230   WBC 6.99 5.90 5.75 4.98 7.23   HEMOGLOBIN 8.5* 8.3* 8.7* 9.1* 9.2*   HEMATOCRIT 28.1* 28.2* 29.6* 30.4* 30.5*   PLATELETS 101* 107* 126* 134* 151   NEUTROS ABS 4.74 3.80 3.63 3.05 4.84   IMMATURE GRANS (ABS) 0.02 0.01 0.03 0.01 0.02   LYMPHS ABS 1.26 1.16 1.14 1.08 1.24   MONOS ABS 0.62 0.61 0.63 0.54 0.78   EOS ABS 0.31 0.28 0.27 0.26 0.30   MCV 96.9 98.9* 97.7* 96.8 96.5   PROCALCITONIN  --   --   --   --  0.11   LACTATE  --   --   --   --  0.9   PROTIME  --   --   --   --  18.0*   APTT  --   --   --   --  39.9*         Lab 24  0544 24  0512 24  0529 24  0456 11/15/24  1230   SODIUM 139 138 139 141 136   POTASSIUM 4.9 4.8 5.1 4.8 5.1   CHLORIDE 110*  110* 109* 112* 106   CO2 16.0* 16.0* 17.0* 16.0* 16.0*   ANION GAP 13.0 12.0 13.0 13.0 14.0   BUN 72* 68* 71* 80* 80*   CREATININE 4.19* 4.24* 4.00* 3.89* 4.18*   EGFR 10.0* 9.9* 10.6* 11.0* 10.1*   GLUCOSE 122* 121* 107* 110* 109*   CALCIUM 9.7 9.4 9.3  9.5 9.8 9.5   MAGNESIUM  --   --   --   --  2.0   PHOSPHORUS  --   --  4.8*  --   --          Lab 11/17/24  0529 11/15/24  1230   TOTAL PROTEIN  --  5.9*   ALBUMIN 3.7 3.8   GLOBULIN  --  2.1   ALT (SGPT)  --  13   AST (SGOT)  --  24   BILIRUBIN  --  0.4   ALK PHOS  --  83         Lab 11/15/24  1230   PROTIME 18.0*   INR 1.48*             Lab 11/17/24  0529 11/15/24  1334   IRON 61  --    IRON SATURATION (TSAT) 24  --    TIBC 256*  --    TRANSFERRIN 172*  --    ABO TYPING  --  O   RH TYPING  --  Negative   ANTIBODY SCREEN  --  Negative         Brief Urine Lab Results  (Last result in the past 365 days)        Color   Clarity   Blood   Leuk Est   Nitrite   Protein   CREAT   Urine HCG        11/15/24 1410 Yellow   Clear   Negative   Negative   Negative   100 mg/dL (2+)                   Microbiology Results Abnormal       None            US Renal Limited    Result Date: 11/18/2024  US RENAL LIMITED Date of Exam: 11/18/2024 6:15 PM EST Indication: worsening KINDRA. Comparison: CT abdomen dated 3/3/2017. Technique: Grayscale and color Doppler ultrasound evaluation of the kidneys and urinary bladder was performed. Findings: The right kidney measures 8.9 x 4.5 x 4.4 cm. Renal cortical thickness measures 9 mm. There is increased echogenicity. There is no hydronephrosis. The left kidney measures 9.6 x 4.7 x 4.4 cm. Renal cortical thickness measures 9 mm. There is increased echogenicity. There is no hydronephrosis. There is a single cyst arising from the left kidney measuring 3.5 cm. The urinary bladder is not well visualized.     Impression: Impression: 1. No hydronephrosis. 2. Increased echogenicity of the kidneys likely related to chronic medical renal disease. 3. Single  3.5 cm left renal cyst. Electronically Signed: Rodger Jessica  11/18/2024 11:16 PM EST  Workstation ID: [id number]     Results for orders placed during the hospital encounter of 10/30/24    Adult Transthoracic Echo Complete W/ Cont if Necessary Per Protocol    Interpretation Summary    Left ventricular systolic function is normal. Estimated left ventricular EF = 60%    Left ventricular wall thickness is consistent with mild concentric hypertrophy.    The left atrial cavity is moderately dilated.    Aortic sclerosis without aortic stenosis.  Mild to moderate aortic insufficiency    Mild mitral regurgitation.      Current medications:  Scheduled Meds:aspirin, 81 mg, Oral, Daily  atorvastatin, 80 mg, Oral, Daily  calcitriol, 0.5 mcg, Oral, Daily  carvedilol, 25 mg, Oral, Q12H  fentaNYL, 1 patch, Transdermal, Q3 Days   And  Check Fentanyl Patch Placement, 1 each, Not Applicable, Q12H  cloNIDine, 0.1 mg, Oral, Q6H  ferrous sulfate, 325 mg, Oral, Daily With Breakfast  hydrALAZINE, 100 mg, Oral, Q8H  insulin lispro, 2-7 Units, Subcutaneous, 4x Daily AC & at Bedtime  ipratropium-albuterol, 3 mL, Nebulization, 4x Daily - RT  isosorbide mononitrate, 30 mg, Oral, Daily  lactobacillus acidophilus, 1 capsule, Oral, Daily  pantoprazole, 40 mg, Oral, BID AC  polyethylene glycol, 17 g, Oral, QAM  [Held by provider] rivaroxaban, 15 mg, Oral, Daily  sodium bicarbonate, 1,300 mg, Oral, BID  sodium chloride, 10 mL, Intravenous, Q12H  terazosin, 5 mg, Oral, Nightly  vitamin B-12, 1,000 mcg, Oral, Daily      Continuous Infusions:     PRN Meds:.  acetaminophen    senna-docusate sodium **AND** polyethylene glycol **AND** bisacodyl **AND** bisacodyl    dextrose    dextrose    diphenhydrAMINE    doxepin    glucagon (human recombinant)    ipratropium-albuterol    melatonin    naloxone    nitroglycerin    oxyCODONE    sodium chloride    sodium chloride    sodium chloride    [Held by provider] torsemide    Assessment & Plan   Assessment & Plan      Active Hospital Problems    Diagnosis  POA    **Acute on chronic renal failure [N17.9, N18.9]  Yes    Abnormal laboratory test [R89.9]  Yes    Dyspnea [R06.00]  Yes    Anemia, chronic disease [D63.8]  Yes    Paroxysmal atrial fibrillation [I48.0]  Yes    Essential hypertension [I10]  Yes    Type 2 diabetes mellitus [E11.9]  Yes      Resolved Hospital Problems   No resolved problems to display.        Brief Hospital Course to date:  Yanique Webster is a 84 y.o. female with PMH of Afib on Xarelto, HTN, CKD, prior stroke, T2DM with neuropathy, chronic anemia, and GERD who presented from [facility] with KINDRA on CKD.    Copied text in this note has been reviewed and is accurate as of 11/19/2024      Plan:    KINDRA on CKD  --baseline unclear. last admission pt's baseline was documented 1.9-2.2 but wasn't clear if her CKD had advanced.  When dc'd from hospital 11/8/24 creatinine was 3.76. While she has been at [facility], it has stayed in 4 range. On 11/14, was 4.3, bumped to 4.5 on day of admission here (11/15/24). Continued worsening of Cr today.   --nephrology consulted, further management as per their instructions   -Renal ultrasound showed chronic kidney disease with single 3.5 cm left renal cyst..  -Hold Xarelto in case patient need kidney biopsy.    Dyspnea  --hold off on further IVF for now as she received 1L at [facility] 11/14/24 and just under 0.5L in ED   --sats stable on RA  -- CT chest without contrast shows small bilateral effusions as well as atelectasis  -- encourage use of IS     TCP  -- new and mild. Monitor for now (slight drop today)      PAF  --xarelto restarted a few days ago, was held on dc per cardiology recs but family requested it be restarted due to stroke risk  --will hold for now due to renal function     HTN  -- Patient on Coreg 25 mg twice daily  - Hydralazine 100 mg 3 times daily  -- Clonidine 0.1 4 times daily.  -Imdur 30 mg daily.  - Terazosin 5 mg nightly.  Will increase to 10 mg.  -- Renal recommends  tight BP control to improve renal function.     T2DM  --SSI     Anemia of chronic disease  --hgb stable    Insomnia  -- will add Benadryl for tonight as Melatonin not working     Vitamin D deficiency.  25-hydroxy level low at 18.  Will start weekly 50K supplements.  First dose 11/19.      Total time spent: Time Spent: Time Spent: 35 minutes  Time spent includes time reviewing chart, face-to-face time, counseling patient/family/caregiver, ordering medications/tests/procedures, communicating with other health care professionals, documenting clinical information in the electronic health record, and coordination of care.      Expected Discharge Location and Transportation: home ([facility] had planned to discharge patient home on the day of her admission here, however, due to her KINDRA, she was sent here). OT recommends home.   Expected Discharge   Expected Discharge Date: 11/20/2024; Expected Discharge Time:      VTE Prophylaxis:  Pharmacologic & mechanical VTE prophylaxis orders are present.         AM-PAC 6 Clicks Score (PT): 18 (11/18/24 2045)    CODE STATUS:   Code Status and Medical Interventions: CPR (Attempt to Resuscitate); Full Support   Ordered at: 11/15/24 1542     Code Status (Patient has no pulse and is not breathing):    CPR (Attempt to Resuscitate)     Medical Interventions (Patient has pulse or is breathing):    Full Support       Maeve Whitt MD  11/19/24

## 2024-11-19 NOTE — PROGRESS NOTES
" LOS: 3 days   Patient Care Team:  Sebas Alicea MD as PCP - General (Family Medicine)  Steve Geronimo MD as Consulting Physician (Cardiology)  Emilio Regalado MD as Consulting Physician (Endocrinology)  Axel Ribeiro MD as Consulting Physician (Cardiology)        Subjective     Reviewed vitals, labs and medications list  Seen and examined   Non oliguric  No uremic symptoms     Objective     Vital Sign Min/Max for last 24 hours  Temp  Min: 97.5 °F (36.4 °C)  Max: 98.5 °F (36.9 °C)   BP  Min: 110/90  Max: 197/85   Pulse  Min: 59  Max: 73   Resp  Min: 16  Max: 20   SpO2  Min: 96 %  Max: 100 %   Flow (L/min) (Oxygen Therapy)  Min: 1  Max: 2   No data recorded     Flowsheet Rows      Flowsheet Row First Filed Value   Admission Height 162.6 cm (64\") Documented at 11/15/2024 1228   Admission Weight 85.7 kg (189 lb) Documented at 11/15/2024 1228            No intake/output data recorded.  I/O last 3 completed shifts:  In: -   Out: 1550 [Urine:1550]    Objective:  Vital signs: (most recent): Blood pressure (!) 193/84, pulse 71, temperature 97.7 °F (36.5 °C), temperature source Oral, resp. rate 28, height 162.6 cm (64\"), weight 83.2 kg (183 lb 8 oz), SpO2 97%.              Awake, NAD  LE no edema   Abd soft  Neuro non focal   Lungs clear  S1S2  Results Review:     I reviewed the patient's new clinical results.    WBC WBC   Date Value Ref Range Status   11/19/2024 6.99 3.40 - 10.80 10*3/mm3 Final   11/18/2024 5.90 3.40 - 10.80 10*3/mm3 Final   11/17/2024 5.75 3.40 - 10.80 10*3/mm3 Final      HGB Hemoglobin   Date Value Ref Range Status   11/19/2024 8.5 (L) 12.0 - 15.9 g/dL Final   11/18/2024 8.3 (L) 12.0 - 15.9 g/dL Final   11/17/2024 8.7 (L) 12.0 - 15.9 g/dL Final      HCT Hematocrit   Date Value Ref Range Status   11/19/2024 28.1 (L) 34.0 - 46.6 % Final   11/18/2024 28.2 (L) 34.0 - 46.6 % Final   11/17/2024 29.6 (L) 34.0 - 46.6 % Final      Platlets No results found for: \"LABPLAT\"   MCV MCV   Date Value Ref " "Range Status   11/19/2024 96.9 79.0 - 97.0 fL Final   11/18/2024 98.9 (H) 79.0 - 97.0 fL Final   11/17/2024 97.7 (H) 79.0 - 97.0 fL Final          Sodium Sodium   Date Value Ref Range Status   11/19/2024 139 136 - 145 mmol/L Final   11/18/2024 138 136 - 145 mmol/L Final   11/17/2024 139 136 - 145 mmol/L Final      Potassium Potassium   Date Value Ref Range Status   11/19/2024 4.9 3.5 - 5.2 mmol/L Final   11/18/2024 4.8 3.5 - 5.2 mmol/L Final   11/17/2024 5.1 3.5 - 5.2 mmol/L Final      Chloride Chloride   Date Value Ref Range Status   11/19/2024 110 (H) 98 - 107 mmol/L Final   11/18/2024 110 (H) 98 - 107 mmol/L Final   11/17/2024 109 (H) 98 - 107 mmol/L Final      CO2 CO2   Date Value Ref Range Status   11/19/2024 16.0 (L) 22.0 - 29.0 mmol/L Final   11/18/2024 16.0 (L) 22.0 - 29.0 mmol/L Final   11/17/2024 17.0 (L) 22.0 - 29.0 mmol/L Final      BUN BUN   Date Value Ref Range Status   11/19/2024 72 (H) 8 - 23 mg/dL Final   11/18/2024 68 (H) 8 - 23 mg/dL Final   11/17/2024 71 (H) 8 - 23 mg/dL Final      Creatinine Creatinine   Date Value Ref Range Status   11/19/2024 4.19 (H) 0.57 - 1.00 mg/dL Final   11/18/2024 4.24 (H) 0.57 - 1.00 mg/dL Final   11/17/2024 4.00 (H) 0.57 - 1.00 mg/dL Final      Calcium Calcium   Date Value Ref Range Status   11/19/2024 9.7 8.6 - 10.5 mg/dL Final   11/18/2024 9.4 8.6 - 10.5 mg/dL Final   11/17/2024 9.5 8.6 - 10.5 mg/dL Final   11/17/2024 9.3 8.6 - 10.5 mg/dL Final      PO4 No results found for: \"CAPO4\"   Albumin Albumin   Date Value Ref Range Status   11/17/2024 3.7 3.5 - 5.2 g/dL Final      Magnesium No results found for: \"MG\"     Uric Acid No results found for: \"URICACID\"       Assessment & Plan       Acute on chronic renal failure    Type 2 diabetes mellitus    Essential hypertension    Paroxysmal atrial fibrillation    Anemia, chronic disease    Dyspnea    Abnormal laboratory test      Assessment & Plan  ====================================  1.  KINDRA on CKD: Patient was recently " discharged on 11/7/2024 with KINDRA is likely from hemodynamic injury without much improvement.  ANCA negative.  Since discharge creatinine has gone up from 3.5-4.1, metabolic acidosis bicarb 16, BUN of 80.     2.  CKD stage IV: Baseline creatinine 1.9-2.2 mg/dL, since last admission was running between 3.2-3.5 range, history of proteinuric renal disease UPC 2 g in the past duplex scan negative in 2016, on previous hospitalization discussion with daughter-in-law anesthesiologist, was explained regard to advanced renal dysfunction possibility of dialysis versus conservative treatment.     3.  Anemia of CKD: EDISON was started hemoglobin 9.2.     4.  Volume status:     5.  Hypertension: No ANABEL per duplex in 2016.  On terazosin, carvedilol, hydralazine, clonidine     6.  Metabolic acidosis: In the setting of CKD, patient on sodium bicarb 650 mg twice daily     7.  Hyperparathyroidism: On calcitriol 0.5 mcg daily     8.  Iron deficiency on oral iron     9.  History of CAD     10.  COPD     11.  Hyperlipidemia     12.  Anxiety and depression.      RENAL US 11/18/24: Right K 8.9 cm and Left K 9.6 cm      Plan and recommendation.  No acute indications for RRT at this time- Will need to discuss goal of care.   Avoid nephrotoxic medications.  Keep systolic blood pressure greater than 100.  Daily evaluation for renal replacement therapy will be done.  Adjust medication for the new GFR.    I discussed the patients findings and my recommendations with patient        Cassia Cross MD  11/19/24  09:31 EST

## 2024-11-20 LAB
ANION GAP SERPL CALCULATED.3IONS-SCNC: 12 MMOL/L (ref 5–15)
BUN SERPL-MCNC: 71 MG/DL (ref 8–23)
BUN/CREAT SERPL: 18 (ref 7–25)
CALCIUM SPEC-SCNC: 9.4 MG/DL (ref 8.6–10.5)
CHLORIDE SERPL-SCNC: 112 MMOL/L (ref 98–107)
CO2 SERPL-SCNC: 15 MMOL/L (ref 22–29)
CREAT SERPL-MCNC: 3.94 MG/DL (ref 0.57–1)
EGFRCR SERPLBLD CKD-EPI 2021: 10.8 ML/MIN/1.73
GLUCOSE BLDC GLUCOMTR-MCNC: 131 MG/DL (ref 70–130)
GLUCOSE BLDC GLUCOMTR-MCNC: 141 MG/DL (ref 70–130)
GLUCOSE BLDC GLUCOMTR-MCNC: 147 MG/DL (ref 70–130)
GLUCOSE BLDC GLUCOMTR-MCNC: 155 MG/DL (ref 70–130)
GLUCOSE SERPL-MCNC: 111 MG/DL (ref 65–99)
POTASSIUM SERPL-SCNC: 5.1 MMOL/L (ref 3.5–5.2)
SODIUM SERPL-SCNC: 139 MMOL/L (ref 136–145)

## 2024-11-20 PROCEDURE — 82948 REAGENT STRIP/BLOOD GLUCOSE: CPT

## 2024-11-20 PROCEDURE — 80048 BASIC METABOLIC PNL TOTAL CA: CPT | Performed by: HOSPITALIST

## 2024-11-20 PROCEDURE — 94664 DEMO&/EVAL PT USE INHALER: CPT

## 2024-11-20 PROCEDURE — 94761 N-INVAS EAR/PLS OXIMETRY MLT: CPT

## 2024-11-20 PROCEDURE — 94799 UNLISTED PULMONARY SVC/PX: CPT

## 2024-11-20 PROCEDURE — 97535 SELF CARE MNGMENT TRAINING: CPT

## 2024-11-20 PROCEDURE — 99232 SBSQ HOSP IP/OBS MODERATE 35: CPT | Performed by: HOSPITALIST

## 2024-11-20 PROCEDURE — 97530 THERAPEUTIC ACTIVITIES: CPT

## 2024-11-20 RX ADMIN — CLONIDINE HYDROCHLORIDE 0.1 MG: 0.1 TABLET ORAL at 05:03

## 2024-11-20 RX ADMIN — IPRATROPIUM BROMIDE AND ALBUTEROL SULFATE 3 ML: 2.5; .5 SOLUTION RESPIRATORY (INHALATION) at 07:17

## 2024-11-20 RX ADMIN — ISOSORBIDE MONONITRATE 30 MG: 30 TABLET, EXTENDED RELEASE ORAL at 08:37

## 2024-11-20 RX ADMIN — CYANOCOBALAMIN TAB 1000 MCG 1000 MCG: 1000 TAB at 08:38

## 2024-11-20 RX ADMIN — Medication 10 ML: at 22:42

## 2024-11-20 RX ADMIN — IPRATROPIUM BROMIDE AND ALBUTEROL SULFATE 3 ML: 2.5; .5 SOLUTION RESPIRATORY (INHALATION) at 22:22

## 2024-11-20 RX ADMIN — PANTOPRAZOLE SODIUM 40 MG: 40 TABLET, DELAYED RELEASE ORAL at 17:04

## 2024-11-20 RX ADMIN — RIVAROXABAN 15 MG: 15 TABLET, FILM COATED ORAL at 08:46

## 2024-11-20 RX ADMIN — PANTOPRAZOLE SODIUM 40 MG: 40 TABLET, DELAYED RELEASE ORAL at 07:49

## 2024-11-20 RX ADMIN — FERROUS SULFATE TAB 325 MG (65 MG ELEMENTAL FE) 325 MG: 325 (65 FE) TAB at 07:49

## 2024-11-20 RX ADMIN — Medication 10 ML: at 08:47

## 2024-11-20 RX ADMIN — HYDRALAZINE HYDROCHLORIDE 100 MG: 50 TABLET ORAL at 22:41

## 2024-11-20 RX ADMIN — HYDRALAZINE HYDROCHLORIDE 100 MG: 50 TABLET ORAL at 05:03

## 2024-11-20 RX ADMIN — ACETAMINOPHEN 650 MG: 325 TABLET ORAL at 05:06

## 2024-11-20 RX ADMIN — OXYCODONE 5 MG: 5 TABLET ORAL at 23:30

## 2024-11-20 RX ADMIN — ASPIRIN 81 MG: 81 TABLET, COATED ORAL at 08:39

## 2024-11-20 RX ADMIN — TERAZOSIN HYDROCHLORIDE ANHYDROUS 10 MG: 5 CAPSULE ORAL at 22:41

## 2024-11-20 RX ADMIN — CLONIDINE HYDROCHLORIDE 0.1 MG: 0.1 TABLET ORAL at 13:33

## 2024-11-20 RX ADMIN — CALCITRIOL CAPSULES 0.25 MCG 0.5 MCG: 0.25 CAPSULE ORAL at 08:39

## 2024-11-20 RX ADMIN — CLONIDINE HYDROCHLORIDE 0.1 MG: 0.1 TABLET ORAL at 23:31

## 2024-11-20 RX ADMIN — SODIUM BICARBONATE 650 MG TABLET 1300 MG: at 22:42

## 2024-11-20 RX ADMIN — IPRATROPIUM BROMIDE AND ALBUTEROL SULFATE 3 ML: 2.5; .5 SOLUTION RESPIRATORY (INHALATION) at 13:05

## 2024-11-20 RX ADMIN — ACETAMINOPHEN 650 MG: 325 TABLET ORAL at 00:10

## 2024-11-20 RX ADMIN — OXYCODONE 5 MG: 5 TABLET ORAL at 10:28

## 2024-11-20 RX ADMIN — Medication 1 CAPSULE: at 08:39

## 2024-11-20 RX ADMIN — ATORVASTATIN CALCIUM 80 MG: 40 TABLET, FILM COATED ORAL at 08:38

## 2024-11-20 RX ADMIN — SODIUM BICARBONATE 650 MG TABLET 1300 MG: at 08:40

## 2024-11-20 RX ADMIN — OXYCODONE 5 MG: 5 TABLET ORAL at 05:41

## 2024-11-20 RX ADMIN — HYDRALAZINE HYDROCHLORIDE 100 MG: 50 TABLET ORAL at 13:36

## 2024-11-20 RX ADMIN — OXYCODONE 5 MG: 5 TABLET ORAL at 17:52

## 2024-11-20 RX ADMIN — CLONIDINE HYDROCHLORIDE 0.1 MG: 0.1 TABLET ORAL at 00:05

## 2024-11-20 RX ADMIN — OXYCODONE 5 MG: 5 TABLET ORAL at 02:04

## 2024-11-20 RX ADMIN — CARVEDILOL 25 MG: 12.5 TABLET, FILM COATED ORAL at 22:40

## 2024-11-20 RX ADMIN — CLONIDINE HYDROCHLORIDE 0.1 MG: 0.1 TABLET ORAL at 17:04

## 2024-11-20 NOTE — PLAN OF CARE
Goal Outcome Evaluation:      VSS. Good appetite. Blood sugars well controlled. Breathing not progressing. Pt remains on 2.5 L/min NC and gets SOB with activity. Occasional expiratory wheezing.

## 2024-11-20 NOTE — THERAPY TREATMENT NOTE
Patient Name: Yanique Webster  : 1940    MRN: [id number]                              Today's Date: 2024       Admit Date: 11/15/2024    Visit Dx:     ICD-10-CM ICD-9-CM   1. Elevated serum creatinine  R79.89 790.99   2. CKD (chronic kidney disease) stage 4, GFR 15-29 ml/min  N18.4 585.4   3. Chronic anemia  D64.9 285.9   4. Impaired functional mobility, balance, gait, and endurance  Z74.09 V49.89     Patient Active Problem List   Diagnosis    Fibromyalgia    PVD (peripheral vascular disease)    Type 2 diabetes mellitus    Diabetic gastroparesis    Takotsubo cardiomyopathy    Elevated serum creatinine    Hyperlipidemia LDL goal <70    COPD (chronic obstructive pulmonary disease)    Obesity    Osteoarthritis    Chronic pain    GERD (gastroesophageal reflux disease)    Gout    Chronic joint pain    Coronary artery disease involving native coronary artery of native heart without angina pectoris    Nonrheumatic aortic valve insufficiency    Altered mental status    Essential hypertension    CKD (chronic kidney disease) stage 4, GFR 15-29 ml/min    Hypertensive emergency    Status post placement of implantable loop recorder    Paroxysmal atrial fibrillation    Acute exacerbation of COPD with asthma    Encounter for loop recorder at end of battery life    Closed left hip fracture    Anxiety associated with depression    Anemia, chronic disease    Surgery follow-up    Borderline abnormal TFTs    Acute blood loss anemia    Secondary hyperparathyroidism    Symptomatic anemia    KINDRA (acute kidney injury)    Acute on chronic renal failure    Dyspnea    Abnormal laboratory test     Past Medical History:   Diagnosis Date    Blurry vision     Chronic joint pain     Chronic pain     COPD (chronic obstructive pulmonary disease)     Coronary artery disease     Diabetic gastroparesis 2016    Esophageal stricture     s/p dilation in     GERD (gastroesophageal reflux disease)     Gout     Headache      secondary to hypertension    Hyperlipidemia     Hypertension     Long term current use of insulin     Neuropathy     Neuropathy due to type 2 diabetes mellitus     Obesity     Osteoarthritis     Pericarditis 2008    Stroke 03/2019    Type 2 diabetes mellitus      Past Surgical History:   Procedure Laterality Date    BRONCHOSCOPY N/A 8/23/2016    Procedure: BRONCHOSCOPY BIOPSY AT BEDSIDE;  Surgeon: Mookie Willard MD;  Location:  [facility];  Service:     CARDIAC CATHETERIZATION N/A 8/30/2016    Procedure: Left Heart Cath;  Surgeon: Steve Geronimo MD;  Location:  [facility];  Service:     CARDIAC CATHETERIZATION N/A 8/30/2016    Procedure: Right Heart Cath;  Surgeon: Steve Geronimo MD;  Location:  [facility];  Service:     CARDIAC ELECTROPHYSIOLOGY PROCEDURE N/A 7/2/2019    Procedure: Loop insertion;  Surgeon: Steve Geronimo MD;  Location:  [facility];  Service: Cardiovascular    CARDIAC ELECTROPHYSIOLOGY PROCEDURE N/A 9/26/2023    Procedure: Loop recorder removal;  Surgeon: Steve Geronimo MD;  Location:  [facility];  Service: Cardiovascular;  Laterality: N/A;    CATARACT EXTRACTION      COLONOSCOPY N/A 8/16/2024    Procedure: COLONOSCOPY;  Surgeon: Brunner, Mark I, MD;  Location:  [facility];  Service: Gastroenterology;  Laterality: N/A;    ENDOSCOPY N/A 8/14/2024    Procedure: ESOPHAGOGASTRODUODENOSCOPY;  Surgeon: Brunner, Mark I, MD;  Location:  [facility];  Service: Gastroenterology;  Laterality: N/A;    ESOPHAGEAL DILATATION  2007    HERNIA REPAIR      HIP CANNULATED SCREW PLACEMENT Left 1/2/2024    Procedure: HIP CANNULATED SCREW PLACEMENT LEFT;  Surgeon: Seymour Harrington MD;  Location:  [facility];  Service: Orthopedics;  Laterality: Left;    HYSTERECTOMY  1998    WRIST FRACTURE SURGERY Left       General Information       Row Name 11/20/24 1116          OT Time and Intention    Document Type therapy note (daily note)  -AC      Mode of Treatment occupational therapy  -       Row Name 11/20/24 1116          General Information    Patient Profile Reviewed yes  -     Existing Precautions/Restrictions fall;oxygen therapy device and L/min;other (see comments)  chronic pain in hands and feet, incontinent - don brief prior to mobility  -     Barriers to Rehab medically complex;previous functional deficit;impaired sensation  -       Row Name 11/20/24 1116          Cognition    Orientation Status (Cognition) oriented x 3  -       Row Name 11/20/24 1116          Safety Issues/Impairments Affecting Functional Mobility    Safety Issues Affecting Function (Mobility) insight into deficits/self-awareness;judgment;safety precaution awareness;safety precautions follow-through/compliance;sequencing abilities  -     Impairments Affecting Function (Mobility) balance;endurance/activity tolerance;pain;shortness of breath;strength;sensation/sensory awareness  -               User Key  (r) = Recorded By, (t) = Taken By, (c) = Cosigned By      Initials Name Provider Type     Marcella Drake OT Occupational Therapist                     Mobility/ADL's       Row Name 11/20/24 1118          Transfers    Transfers sit-stand transfer;toilet transfer  -       Row Name 11/20/24 1118          Sit-Stand Transfer    Sit-Stand [place] (Transfers) verbal cues;minimum assist (75% patient effort)  -     Assistive Device (Sit-Stand Transfers) walker, front-wheeled  -       Row Name 11/20/24 1118          Toilet Transfer    [place] Level (Toilet Transfer) verbal cues;minimum assist (75% patient effort)  -     Assistive Device (Toilet Transfer) commode, bedside without drop arms;walker, front-wheeled  -       Row Name 11/20/24 1118          Functional Mobility    Functional Mobility- Ind. Level verbal cues required;contact guard assist  -     Functional Mobility- Device walker, front-wheeled  -     Functional Mobility-Distance (Feet) 14  -      Functional Mobility- Safety Issues step length decreased  -       Row Name 11/20/24 1118          Activities of Daily Living    BADL Assessment/Intervention toileting;grooming;lower body dressing  -       Row Name 11/20/24 1118          Grooming Assessment/Training    [place] Level (Grooming) hair care, combing/brushing;wash face, hands;set up  -     Position (Grooming) edge of bed sitting  -       Row Name 11/20/24 1118          Toileting Assessment/Training    [place] Level (Toileting) maximum assist (25% patient effort)  -     Assistive Devices (Toileting) commode, bedside without drop arms  -AC     Position (Toileting) supported standing;unsupported sitting  -       Row Name 11/20/24 1118          Lower Body Dressing Assessment/Training    [place] Level (Lower Body Dressing) don;shoes/slippers;moderate assist (50% patient effort);doff;supervision  -     Position (Lower Body Dressing) unsupported sitting  -               User Key  (r) = Recorded By, (t) = Taken By, (c) = Cosigned By      Initials Name Provider Type    AC Marcella Drake, OT Occupational Therapist                   Obj/Interventions    No documentation.                  Goals/Plan    No documentation.                  Clinical Impression       Row Name 11/20/24 1120          Pain Assessment    Pretreatment Pain Rating 4/10  -     Posttreatment Pain Rating 4/10  -     Pain Location foot;hand  -AC     Pain Side/Orientation bilateral  -     Pain Management Interventions exercise or physical activity utilized;premedicated for activity  -     Response to Pain Interventions activity participation with tolerable pain  -       Row Name 11/20/24 1120          Plan of Care Review    Plan of Care Reviewed With patient  -     Outcome Evaluation Pt mod A to don slippers, setup to wash face, min A BSC transfer, CGA to ambulate with RW.  Pt continues below baseline d/t pain, weakness, decr balance and activity  tolerance.  Recommend IP rehab upon d/c.  -       Row Name 11/20/24 1120          Therapy Plan Review/Discharge Plan (OT)    Anticipated Discharge Disposition (OT) inpatient rehabilitation facility  -       Row Name 11/20/24 1120          Vital Signs    Posttreatment Heart Rate (beats/min) 63  -AC     Post SpO2 (%) 97  -AC     O2 Delivery Post Treatment supplemental O2  -AC     Pre Patient Position Sitting  -AC     Post Patient Position Sitting  -AC       Row Name 11/20/24 1120          Positioning and Restraints    Pre-Treatment Position in bed  -AC     Post Treatment Position chair  -AC     In Chair notified nsg;reclined;call light within reach;encouraged to call for assist;exit alarm on;waffle cushion  -               User Key  (r) = Recorded By, (t) = Taken By, (c) = Cosigned By      Initials Name Provider Type    Marcella Mcintyre, OT Occupational Therapist                   Outcome Measures       Row Name 11/20/24 1123          How much help from another is currently needed...    Putting on and taking off regular lower body clothing? 2  -AC     Bathing (including washing, rinsing, and drying) 2  -AC     Toileting (which includes using toilet bed pan or urinal) 2  -AC     Putting on and taking off regular upper body clothing 3  -AC     Taking care of personal grooming (such as brushing teeth) 3  -AC     Eating meals 4  -AC     AM-PAC 6 Clicks Score (OT) 16  -       Row Name 11/20/24 1123          Functional Assessment    Outcome Measure Options AM-PAC 6 Clicks Daily Activity (OT)  -AC               User Key  (r) = Recorded By, (t) = Taken By, (c) = Cosigned By      Initials Name Provider Type    Marcella Mcintyre, BETO Occupational Therapist                    Occupational Therapy Education       Title: PT OT SLP Therapies (In Progress)       Topic: Occupational Therapy (In Progress)       Point: ADL training (In Progress)       Description:   Instruct learner(s) on proper safety adaptation and  remediation techniques during self care or transfers.   Instruct in proper use of assistive devices.                  Learning Progress Summary            Patient Acceptance, E, NR by  at 11/20/2024 1123    Acceptance, E, VU by EMILY at 11/16/2024 1608    Comment: OT POC; discharge planning                      Point: Home exercise program (Not Started)       Description:   Instruct learner(s) on appropriate technique for monitoring, assisting and/or progressing therapeutic exercises/activities.                  Learner Progress:  Not documented in this visit.              Point: Precautions (Done)       Description:   Instruct learner(s) on prescribed precautions during self-care and functional transfers.                  Learning Progress Summary            Patient Acceptance, E, VU by EMILY at 11/16/2024 1608    Comment: OT POC; discharge planning                      Point: Body mechanics (Not Started)       Description:   Instruct learner(s) on proper positioning and spine alignment during self-care, functional mobility activities and/or exercises.                  Learner Progress:  Not documented in this visit.                              User Key       Initials Effective Dates Name Provider Type Discipline     02/03/23 -  Marcella Drake, OT Occupational Therapist OT    EMILY 06/16/21 -  Alexa Lu OT Occupational Therapist OT                  OT Recommendation and Plan     Plan of Care Review  Plan of Care Reviewed With: patient  Outcome Evaluation: Pt mod A to don slippers, setup to wash face, min A BSC transfer, CGA to ambulate with RW.  Pt continues below baseline d/t pain, weakness, decr balance and activity tolerance.  Recommend IP rehab upon d/c.     Time Calculation:         Time Calculation- OT       Row Name 11/20/24 1050             Time Calculation- OT    OT Start Time 1050  -      OT Received On 11/20/24  -      OT Goal Re-Cert Due Date 11/26/24  -         Timed Charges    04190 - OT  Therapeutic Activity Minutes 12  -AC      91345 - OT Self Care/Mgmt Minutes 12  -AC         Total Minutes    Timed Charges Total Minutes 24  -AC       Total Minutes 24  -AC                User Key  (r) = Recorded By, (t) = Taken By, (c) = Cosigned By      Initials Name Provider Type    Marcella Mcintyre, OT Occupational Therapist                  Therapy Charges for Today       Code Description Service Date Service Provider Modifiers Qty    [id number]  OT THERAPEUTIC ACT EA 15 MIN 11/20/2024 Marcella Drake OT GO 1    [id number]  OT SELF CARE/MGMT/TRAIN EA 15 MIN 11/20/2024 Marcella Drake OT GO 1                 Marcella Drake OT  11/20/2024

## 2024-11-20 NOTE — CASE MANAGEMENT/SOCIAL WORK
Continued Stay Note   [facility]     Patient Name: Yanique Webster  MRN: [id number]  Today's Date: 11/20/2024    Admit Date: 11/15/2024    Plan: Home with    Discharge Plan       Row Name 11/20/24 1346       Plan    Plan Home with     Patient/Family in Agreement with Plan yes    Plan Comments I spoke with the pt. She states she wants to go home at DC with HH. She is not on home 02. She says [facility] was about to DC her home when she was sent here. She denies any other DC needs. She gave me permission to speak with her son J Luis. I spoke with J Luis. He is agreeable to home at DC if she is able. He is concerned about her being SOB. I notified Terri with [facility] of the  order. If plans change, [facility] can be re-consulted.    Final Discharge Disposition Code 06 - home with home health care                   Discharge Codes    No documentation.                 Expected Discharge Date and Time       Expected Discharge Date Expected Discharge Time    Nov 20, 2024               Izzy Rosas RN

## 2024-11-20 NOTE — PROGRESS NOTES
" LOS: 4 days   Patient Care Team:  Sebas Alicea MD as PCP - General (Family Medicine)  Steve Geronimo MD as Consulting Physician (Cardiology)  Emilio Regalado MD as Consulting Physician (Endocrinology)  Axel Ribeiro MD as Consulting Physician (Cardiology)        Subjective     Reviewed vitals, labs and medications list  Seen and examined   Non oliguric  No uremic symptoms     Objective     Vital Sign Min/Max for last 24 hours  Temp  Min: 97.4 °F (36.3 °C)  Max: 97.8 °F (36.6 °C)   BP  Min: 121/50  Max: 193/84   Pulse  Min: 56  Max: 72   Resp  Min: 16  Max: 28   SpO2  Min: 96 %  Max: 99 %   Flow (L/min) (Oxygen Therapy)  Min: 2  Max: 2   No data recorded     Flowsheet Rows      Flowsheet Row First Filed Value   Admission Height 162.6 cm (64\") Documented at 11/15/2024 1228   Admission Weight 85.7 kg (189 lb) Documented at 11/15/2024 1228            I/O this shift:  In: 360 [P.O.:360]  Out: -   I/O last 3 completed shifts:  In: -   Out: 1000 [Urine:1000]    Objective:  Vital signs: (most recent): Blood pressure 143/59, pulse 68, temperature 97.7 °F (36.5 °C), temperature source Oral, resp. rate 11, height 162.6 cm (64\"), weight 83.2 kg (183 lb 8 oz), SpO2 100%.              Awake, NAD  LE no edema   Abd soft  Neuro non focal   Lungs clear  S1S2  Results Review:     I reviewed the patient's new clinical results.    WBC WBC   Date Value Ref Range Status   11/19/2024 6.99 3.40 - 10.80 10*3/mm3 Final   11/18/2024 5.90 3.40 - 10.80 10*3/mm3 Final      HGB Hemoglobin   Date Value Ref Range Status   11/19/2024 8.4 (L) 12.0 - 15.9 g/dL Final   11/19/2024 8.5 (L) 12.0 - 15.9 g/dL Final   11/18/2024 8.3 (L) 12.0 - 15.9 g/dL Final      HCT Hematocrit   Date Value Ref Range Status   11/19/2024 28.0 (L) 34.0 - 46.6 % Final   11/19/2024 28.1 (L) 34.0 - 46.6 % Final   11/18/2024 28.2 (L) 34.0 - 46.6 % Final      Platlets No results found for: \"LABPLAT\"   MCV MCV   Date Value Ref Range Status   11/19/2024 96.9 79.0 - " "97.0 fL Final   11/18/2024 98.9 (H) 79.0 - 97.0 fL Final          Sodium Sodium   Date Value Ref Range Status   11/20/2024 139 136 - 145 mmol/L Final   11/19/2024 139 136 - 145 mmol/L Final   11/18/2024 138 136 - 145 mmol/L Final      Potassium Potassium   Date Value Ref Range Status   11/20/2024 5.1 3.5 - 5.2 mmol/L Final   11/19/2024 4.9 3.5 - 5.2 mmol/L Final   11/18/2024 4.8 3.5 - 5.2 mmol/L Final      Chloride Chloride   Date Value Ref Range Status   11/20/2024 112 (H) 98 - 107 mmol/L Final   11/19/2024 110 (H) 98 - 107 mmol/L Final   11/18/2024 110 (H) 98 - 107 mmol/L Final      CO2 CO2   Date Value Ref Range Status   11/20/2024 15.0 (L) 22.0 - 29.0 mmol/L Final   11/19/2024 16.0 (L) 22.0 - 29.0 mmol/L Final   11/18/2024 16.0 (L) 22.0 - 29.0 mmol/L Final      BUN BUN   Date Value Ref Range Status   11/20/2024 71 (H) 8 - 23 mg/dL Final   11/19/2024 72 (H) 8 - 23 mg/dL Final   11/18/2024 68 (H) 8 - 23 mg/dL Final      Creatinine Creatinine   Date Value Ref Range Status   11/20/2024 3.94 (H) 0.57 - 1.00 mg/dL Final   11/19/2024 4.19 (H) 0.57 - 1.00 mg/dL Final   11/18/2024 4.24 (H) 0.57 - 1.00 mg/dL Final      Calcium Calcium   Date Value Ref Range Status   11/20/2024 9.4 8.6 - 10.5 mg/dL Final   11/19/2024 9.7 8.6 - 10.5 mg/dL Final   11/18/2024 9.4 8.6 - 10.5 mg/dL Final      PO4 No results found for: \"CAPO4\"   Albumin No results found for: \"ALBUMIN\"     Magnesium No results found for: \"MG\"     Uric Acid No results found for: \"URICACID\"       Assessment & Plan       Acute on chronic renal failure    Type 2 diabetes mellitus    Essential hypertension    Paroxysmal atrial fibrillation    Anemia, chronic disease    Dyspnea    Abnormal laboratory test      Assessment & Plan  ====================================  1.  KINDRA on CKD: Patient was recently discharged on 11/7/2024 with KINDRA is likely from hemodynamic injury without much improvement.  ANCA negative.  Since discharge creatinine has gone up from 3.5-4.1, " metabolic acidosis bicarb 16, BUN of 80.     2.  CKD stage IV: Baseline creatinine 1.9-2.2 mg/dL, since last admission was running between 3.2-3.5 range, history of proteinuric renal disease UPC 2 g in the past duplex scan negative in 2016, on previous hospitalization discussion with daughter-in-law anesthesiologist, was explained regard to advanced renal dysfunction possibility of dialysis versus conservative treatment.     3.  Anemia of CKD: EDISON was started hemoglobin 9.2.     4.  Volume status:     5.  Hypertension: No ANABEL per duplex in 2016.  On terazosin, carvedilol, hydralazine, clonidine     6.  Metabolic acidosis: In the setting of CKD, patient on sodium bicarb 650 mg twice daily     7.  Hyperparathyroidism: On calcitriol 0.5 mcg daily     8.  Iron deficiency on oral iron     9.  History of CAD     10.  COPD     11.  Hyperlipidemia     12.  Anxiety and depression.      RENAL US 11/18/24: Right K 8.9 cm and Left K 9.6 cm      Plan and recommendation.  No acute indications for RRT at this time- Will need to discuss goal of care.   Avoid nephrotoxic medications.  Keep systolic blood pressure greater than 100.  Daily evaluation for renal replacement therapy will be done.  Adjust medication for the new GFR.    I discussed the patients findings and my recommendations with patient        Cassia Cross MD  11/20/24  09:35 EST

## 2024-11-20 NOTE — CASE MANAGEMENT/SOCIAL WORK
Continued Stay Note  [facility]     Patient Name: Yanique Webster  MRN: [id number]  Today's Date: 11/20/2024    Admit Date: 11/15/2024    Plan: [facility] vs    Discharge Plan       Row Name 11/20/24 7656       Plan    Plan [facility] vs     Patient/Family in Agreement with Plan yes    Plan Comments I received a call back from the pts son and daughter in law. They have concerns about the pt going home and taking care of herself. I have asked Shannen with [facility] to re-evaluate. I did speak to the pt about this. She is agreeable to have [facility] re-evaluate but feels she can go home at DC. She will discuss with her family. I have asked PT to see tomorrow.    Final Discharge Disposition Code 62 - inpatient rehab facility      Row Name 11/20/24 1346       Plan    Plan Home with     Patient/Family in Agreement with Plan yes    Plan Comments I spoke with the pt. She states she wants to go home at DC with . She is not on home 02. She says [facility] was about to DC her home when she was sent here. She denies any other DC needs. She gave me permission to speak with her son J Luis. I spoke with J Luis. He is agreeable to home at DC if she is able. He is concerned about her being SOB. I notified Terri with [facility] beds at the [facility]  order. If plans change, [facility] can be re-consulted.    Final Discharge Disposition Code 06 - home with home health care                   Discharge Codes    No documentation.                 Expected Discharge Date and Time       Expected Discharge Date Expected Discharge Time    Nov 21, 2024               Izzy Rosas RN

## 2024-11-20 NOTE — PROGRESS NOTES
[facility] Medicine Services  PROGRESS NOTE    Patient Name: Yanique Webster  : 1940  MRN: [id number]    Date of Admission: 11/15/2024  Primary Care Physician: Sebas Alicea MD    Subjective   Subjective     CC:  KINDRA on CKD    HPI:  NAEON. Cr mildly down to 3.9. nephrology o/b.      Objective   Objective     Vital Signs:   Temp:  [97.4 °F (36.3 °C)-97.8 °F (36.6 °C)] 97.4 °F (36.3 °C)  Heart Rate:  [56-72] 72  Resp:  [16-28] 16  BP: (121-193)/(50-84) 121/50  Flow (L/min) (Oxygen Therapy):  [2] 2     Physical Exam:  Constitutional: No acute distress, awake, alert  HENT: NCAT, mucous membranes moist  Respiratory: Clear to auscultation bilaterally, respiratory effort normal   Cardiovascular: RRR, no murmurs, rubs, or gallops  Gastrointestinal: Positive bowel sounds, soft, nontender, nondistended  Musculoskeletal: No bilateral ankle edema  Psychiatric: Appropriate affect, cooperative  Neurologic: Oriented x 3, strength symmetric in all extremities, Cranial Nerves grossly intact to confrontation, speech clear  Skin: No rashes     Results Reviewed:  LAB RESULTS:      Lab 24  2214 24  0544 24  0512 24  0529 24  0456 11/15/24  1230   WBC  --  6.99 5.90 5.75 4.98 7.23   HEMOGLOBIN 8.4* 8.5* 8.3* 8.7* 9.1* 9.2*   HEMATOCRIT 28.0* 28.1* 28.2* 29.6* 30.4* 30.5*   PLATELETS  --  101* 107* 126* 134* 151   NEUTROS ABS  --  4.74 3.80 3.63 3.05 4.84   IMMATURE GRANS (ABS)  --  0.02 0.01 0.03 0.01 0.02   LYMPHS ABS  --  1.26 1.16 1.14 1.08 1.24   MONOS ABS  --  0.62 0.61 0.63 0.54 0.78   EOS ABS  --  0.31 0.28 0.27 0.26 0.30   MCV  --  96.9 98.9* 97.7* 96.8 96.5   PROCALCITONIN  --   --   --   --   --  0.11   LACTATE  --   --   --   --   --  0.9   PROTIME  --   --   --   --   --  18.0*   APTT  --   --   --   --   --  39.9*         Lab 24  0710 24  0544 24  0512 24  0529 24  0456 11/15/24  1230   SODIUM 139 139 138 139 141 136    POTASSIUM 5.1 4.9 4.8 5.1 4.8 5.1   CHLORIDE 112* 110* 110* 109* 112* 106   CO2 15.0* 16.0* 16.0* 17.0* 16.0* 16.0*   ANION GAP 12.0 13.0 12.0 13.0 13.0 14.0   BUN 71* 72* 68* 71* 80* 80*   CREATININE 3.94* 4.19* 4.24* 4.00* 3.89* 4.18*   EGFR 10.8* 10.0* 9.9* 10.6* 11.0* 10.1*   GLUCOSE 111* 122* 121* 107* 110* 109*   CALCIUM 9.4 9.7 9.4 9.3  9.5 9.8 9.5   MAGNESIUM  --   --   --   --   --  2.0   PHOSPHORUS  --   --   --  4.8*  --   --          Lab 11/17/24  0529 11/15/24  1230   TOTAL PROTEIN  --  5.9*   ALBUMIN 3.7 3.8   GLOBULIN  --  2.1   ALT (SGPT)  --  13   AST (SGOT)  --  24   BILIRUBIN  --  0.4   ALK PHOS  --  83         Lab 11/15/24  1230   PROTIME 18.0*   INR 1.48*             Lab 11/17/24  0529 11/15/24  1334   IRON 61  --    IRON SATURATION (TSAT) 24  --    TIBC 256*  --    TRANSFERRIN 172*  --    ABO TYPING  --  O   RH TYPING  --  Negative   ANTIBODY SCREEN  --  Negative         Lab 11/19/24  2215   PH, ARTERIAL 7.264*   PCO2, ARTERIAL 35.7   PO2 .0*   FIO2 28   HCO3 ART 16.2*   BASE EXCESS ART -10.0*   CARBOXYHEMOGLOBIN 1.5     Brief Urine Lab Results  (Last result in the past 365 days)        Color   Clarity   Blood   Leuk Est   Nitrite   Protein   CREAT   Urine HCG        11/15/24 1410 Yellow   Clear   Negative   Negative   Negative   100 mg/dL (2+)                   Microbiology Results Abnormal       None            XR Chest 1 View    Result Date: 11/19/2024  XR CHEST 1 VW Date of Exam: 11/19/2024 9:56 PM EST Indication: dypsnea Comparison: 10/30/2024 Findings: Cardiomegaly is stable. Upper lobe vessels are prominent consistent with pulmonary vascular congestion. Interstitial markings are also prominent throughout both lungs suggesting developing interstitial edema. No definite pleural effusion or pneumothorax.  Bony structures are unremarkable.     Impression: Impression: 1. Cardiomegaly and pulmonary vascular congestion with prominent tissue markings likely related to interstitial  edema. Electronically Signed: Tod Tomlin MD  11/19/2024 10:39 PM EST  Workstation ID: [id number]    US Renal Limited    Result Date: 11/18/2024  US RENAL LIMITED Date of Exam: 11/18/2024 6:15 PM EST Indication: worsening KINDRA. Comparison: CT abdomen dated 3/3/2017. Technique: Grayscale and color Doppler ultrasound evaluation of the kidneys and urinary bladder was performed. Findings: The right kidney measures 8.9 x 4.5 x 4.4 cm. Renal cortical thickness measures 9 mm. There is increased echogenicity. There is no hydronephrosis. The left kidney measures 9.6 x 4.7 x 4.4 cm. Renal cortical thickness measures 9 mm. There is increased echogenicity. There is no hydronephrosis. There is a single cyst arising from the left kidney measuring 3.5 cm. The urinary bladder is not well visualized.     Impression: Impression: 1. No hydronephrosis. 2. Increased echogenicity of the kidneys likely related to chronic medical renal disease. 3. Single 3.5 cm left renal cyst. Electronically Signed: Rodger Lopez  11/18/2024 11:16 PM EST  Workstation ID: [id number]     Results for orders placed during the hospital encounter of 10/30/24    Adult Transthoracic Echo Complete W/ Cont if Necessary Per Protocol    Interpretation Summary    Left ventricular systolic function is normal. Estimated left ventricular EF = 60%    Left ventricular wall thickness is consistent with mild concentric hypertrophy.    The left atrial cavity is moderately dilated.    Aortic sclerosis without aortic stenosis.  Mild to moderate aortic insufficiency    Mild mitral regurgitation.      Current medications:  Scheduled Meds:aspirin, 81 mg, Oral, Daily  atorvastatin, 80 mg, Oral, Daily  calcitriol, 0.5 mcg, Oral, Daily  carvedilol, 25 mg, Oral, Q12H  fentaNYL, 1 patch, Transdermal, Q3 Days   And  Check Fentanyl Patch Placement, 1 each, Not Applicable, Q12H  cloNIDine, 0.1 mg, Oral, Q6H  ferrous sulfate, 325 mg, Oral, Daily With Breakfast  hydrALAZINE, 100 mg, Oral,  Q8H  insulin lispro, 2-7 Units, Subcutaneous, 4x Daily AC & at Bedtime  ipratropium-albuterol, 3 mL, Nebulization, 4x Daily - RT  isosorbide mononitrate, 30 mg, Oral, Daily  lactobacillus acidophilus, 1 capsule, Oral, Daily  pantoprazole, 40 mg, Oral, BID AC  polyethylene glycol, 17 g, Oral, QAM  [Held by provider] rivaroxaban, 15 mg, Oral, Daily  sodium bicarbonate, 1,300 mg, Oral, BID  sodium chloride, 10 mL, Intravenous, Q12H  terazosin, 10 mg, Oral, Nightly  vitamin B-12, 1,000 mcg, Oral, Daily  cholecalciferol, 50,000 Units, Oral, Weekly      Continuous Infusions:     PRN Meds:.  acetaminophen    senna-docusate sodium **AND** polyethylene glycol **AND** bisacodyl **AND** bisacodyl    dextrose    dextrose    diphenhydrAMINE    doxepin    glucagon (human recombinant)    ipratropium-albuterol    melatonin    naloxone    nitroglycerin    oxyCODONE    sodium chloride    sodium chloride    sodium chloride    [Held by provider] torsemide    Assessment & Plan   Assessment & Plan     Active Hospital Problems    Diagnosis  POA    **Acute on chronic renal failure [N17.9, N18.9]  Yes    Abnormal laboratory test [R89.9]  Yes    Dyspnea [R06.00]  Yes    Anemia, chronic disease [D63.8]  Yes    Paroxysmal atrial fibrillation [I48.0]  Yes    Essential hypertension [I10]  Yes    Type 2 diabetes mellitus [E11.9]  Yes      Resolved Hospital Problems   No resolved problems to display.        Brief Hospital Course to date:  Yanique Webster is a 84 y.o. female with PMH of Afib on Xarelto, HTN, CKD, prior stroke, T2DM with neuropathy, chronic anemia, and GERD who presented from [facility] with KINDRA on CKD.    Copied text in this note has been reviewed and is accurate as of 11/19/2024      Plan:    KINDRA on CKD.  Very mild improvement  High anion gap metabolic acidosis likely secondary to the above  --baseline unclear. last admission pt's baseline was documented 1.9-2.2 but wasn't clear if her CKD had advanced.  When dc'd from hospital  11/8/24 creatinine was 3.76. While she has been at [facility], it has stayed in 4 range. On 11/14, was 4.3, bumped to 4.5 on day of admission here (11/15/24). Continued worsening of Cr today.   -Creatinine trending down slowly to 3.9> 4.1.  --nephrology consulted, further management as per their instructions   -Renal ultrasound showed chronic kidney disease with single 3.5 cm left renal cyst..  -Will restart Xarelto if no plans for kidney biopsy.    Dyspnea  --hold off on further IVF for now as she received 1L at [facility] 11/14/24 and just under 0.5L in ED   --sats stable on RA  -- CT chest without contrast shows small bilateral effusions as well as atelectasis  -Repeated chest x-ray on 11/19 showed cardiomegaly and pulmonary vascular congestion with prominent tissue   -- encourage use of IS     TCP  -- new and mild. Monitor for now (slight drop today)      PAF  --xarelto restarted a few days ago, was held on dc per cardiology recs but family requested it be restarted due to stroke risk  --will hold for now due to renal function     HTN.  Improving  -- Patient on Coreg 25 mg twice daily  - Hydralazine 100 mg 3 times daily  -- Clonidine 0.1 4 times daily.  -Imdur 30 mg daily.  - Terazosin 5 mg nightly.  Will increase to 10 mg.  -- Renal recommends tight BP control to improve renal function.     T2DM  --SSI     Anemia of chronic disease  --hgb stable    Insomnia  -- will add Benadryl for tonight as Melatonin not working     Vitamin D deficiency.  25-hydroxy level low at 18.  Will start weekly 50K supplements.  First dose 11/19.          Expected Discharge Location and Transportation: Rehab  Expected Discharge   Expected Discharge Date: 11/21/2024; Expected Discharge Time:      VTE Prophylaxis:  Pharmacologic & mechanical VTE prophylaxis orders are present.         AM-PAC 6 Clicks Score (PT): 20 (11/19/24 2107)    CODE STATUS:   Code Status and Medical Interventions: CPR (Attempt to Resuscitate); Full Support   Ordered at:  11/15/24 1719     Code Status (Patient has no pulse and is not breathing):    CPR (Attempt to Resuscitate)     Medical Interventions (Patient has pulse or is breathing):    Full Support       Maeve Whitt MD  11/20/24

## 2024-11-20 NOTE — PLAN OF CARE
Goal Outcome Evaluation:  Plan of Care Reviewed With: patient           Outcome Evaluation: Pt mod A to don slippers, setup to wash face, min A BSC transfer, CGA to ambulate with RW.  Pt continues below baseline d/t pain, weakness, decr balance and activity tolerance.  Recommend IP rehab upon d/c.    Anticipated Discharge Disposition (OT): inpatient rehabilitation facility

## 2024-11-20 NOTE — PLAN OF CARE
Problem: Adult Inpatient Plan of Care  Goal: Plan of Care Review  11/20/2024 0539 by Aubree Rose RN  Outcome: Progressing  11/20/2024 0154 by Aubree Rose RN  Outcome: Progressing  Goal: Patient-Specific Goal (Individualized)  11/20/2024 0539 by Aubree Rose RN  Outcome: Progressing  11/20/2024 0154 by Aubree Rose RN  Outcome: Progressing  Goal: Absence of Hospital-Acquired Illness or Injury  11/20/2024 0539 by Aubree Rose RN  Outcome: Progressing  11/20/2024 0154 by Aubree Rose RN  Outcome: Progressing  Intervention: Identify and Manage Fall Risk  Recent Flowsheet Documentation  Taken 11/20/2024 0400 by Aubree Rose RN  Safety Promotion/Fall Prevention:   nonskid shoes/slippers when out of bed   safety round/check completed  Taken 11/20/2024 0200 by Aubree Rose RN  Safety Promotion/Fall Prevention:   nonskid shoes/slippers when out of bed   safety round/check completed  Taken 11/20/2024 0000 by Aubree Rose RN  Safety Promotion/Fall Prevention:   nonskid shoes/slippers when out of bed   safety round/check completed  Taken 11/19/2024 2107 by Aubree Rose RN  Safety Promotion/Fall Prevention:   nonskid shoes/slippers when out of bed   safety round/check completed   assistive device/personal items within reach   clutter free environment maintained   fall prevention program maintained   lighting adjusted   room organization consistent  Taken 11/19/2024 1915 by Aubree Rose RN  Safety Promotion/Fall Prevention:   nonskid shoes/slippers when out of bed   safety round/check completed  Intervention: Prevent Skin Injury  Recent Flowsheet Documentation  Taken 11/20/2024 0400 by Aubree Rose RN  Body Position: position changed independently  Taken 11/20/2024 0200 by Aubree Rose RN  Body Position: position changed independently  Taken 11/20/2024 0000 by Aubree Rose RN  Body Position: position changed independently  Taken 11/19/2024 2107 by Aubree Rose RN  Body Position: position changed  independently  Skin Protection: incontinence pads utilized  Taken 11/19/2024 1915 by Aubree Rose RN  Body Position: position changed independently  Skin Protection: incontinence pads utilized  Goal: Optimal Comfort and Wellbeing  11/20/2024 0539 by Aubree Rose RN  Outcome: Progressing  11/20/2024 0154 by Aubree Rose RN  Outcome: Progressing  Intervention: Provide Person-Centered Care  Recent Flowsheet Documentation  Taken 11/20/2024 0200 by Aubree Rose RN  Trust Relationship/Rapport:   care explained   choices provided   questions encouraged   questions answered  Taken 11/19/2024 2107 by Aubree Rose RN  Trust Relationship/Rapport:   care explained   choices provided   questions answered   questions encouraged  Goal: Readiness for Transition of Care  11/20/2024 0539 by Aubree Rose RN  Outcome: Progressing  11/20/2024 0154 by Aubree Rose RN  Outcome: Progressing     Problem: Skin Injury Risk Increased  Goal: Skin Health and Integrity  11/20/2024 0539 by Aubree Rose RN  Outcome: Progressing  11/20/2024 0154 by Aubree Rose RN  Outcome: Progressing  Intervention: Optimize Skin Protection  Recent Flowsheet Documentation  Taken 11/20/2024 0400 by Aubree Rose RN  Activity Management: activity minimized  Head of Bed (HOB) Positioning: HOB elevated  Taken 11/20/2024 0200 by Aubree Rose RN  Activity Management: activity minimized  Head of Bed (HOB) Positioning: HOB elevated  Taken 11/20/2024 0000 by Aubree Rose RN  Activity Management: activity minimized  Head of Bed (HOB) Positioning: HOB elevated  Taken 11/19/2024 2107 by Aubree Rose RN  Activity Management: activity minimized  Pressure Reduction Techniques: frequent weight shift encouraged  Head of Bed (HOB) Positioning: HOB elevated  Pressure Reduction Devices:   pressure-redistributing mattress utilized   positioning supports utilized  Skin Protection: incontinence pads utilized  Taken 11/19/2024 1915 by Aubree Rose RN  Activity Management:  activity minimized  Pressure Reduction Techniques: frequent weight shift encouraged  Head of Bed (HOB) Positioning: HOB elevated  Pressure Reduction Devices:   pressure-redistributing mattress utilized   positioning supports utilized  Skin Protection: incontinence pads utilized     Problem: Fall Injury Risk  Goal: Absence of Fall and Fall-Related Injury  11/20/2024 0539 by Aubree Rose RN  Outcome: Progressing  11/20/2024 0154 by Aubree Rose RN  Outcome: Progressing  Intervention: Identify and Manage Contributors  Recent Flowsheet Documentation  Taken 11/19/2024 2107 by Aubree Rose RN  Medication Review/Management: medications reviewed  Intervention: Promote Injury-Free Environment  Recent Flowsheet Documentation  Taken 11/20/2024 0400 by Aubree Rose RN  Safety Promotion/Fall Prevention:   nonskid shoes/slippers when out of bed   safety round/check completed  Taken 11/20/2024 0200 by Aubree Rose RN  Safety Promotion/Fall Prevention:   nonskid shoes/slippers when out of bed   safety round/check completed  Taken 11/20/2024 0000 by Aubree Rose RN  Safety Promotion/Fall Prevention:   nonskid shoes/slippers when out of bed   safety round/check completed  Taken 11/19/2024 2107 by Aubree Rose RN  Safety Promotion/Fall Prevention:   nonskid shoes/slippers when out of bed   safety round/check completed   assistive device/personal items within reach   clutter free environment maintained   fall prevention program maintained   lighting adjusted   room organization consistent  Taken 11/19/2024 1915 by Aubree Rose, RN  Safety Promotion/Fall Prevention:   nonskid shoes/slippers when out of bed   safety round/check completed   Goal Outcome Evaluation:

## 2024-11-21 ENCOUNTER — APPOINTMENT (OUTPATIENT)
Dept: GENERAL RADIOLOGY | Facility: HOSPITAL | Age: 84
End: 2024-11-21
Payer: MEDICARE

## 2024-11-21 ENCOUNTER — APPOINTMENT (OUTPATIENT)
Dept: CT IMAGING | Facility: HOSPITAL | Age: 84
End: 2024-11-21
Payer: MEDICARE

## 2024-11-21 LAB
ALBUMIN SERPL-MCNC: 3.6 G/DL (ref 3.5–5.2)
ALBUMIN/GLOB SERPL: 1.7 G/DL
ALP SERPL-CCNC: 71 U/L (ref 39–117)
ALT SERPL W P-5'-P-CCNC: 13 U/L (ref 1–33)
ANION GAP SERPL CALCULATED.3IONS-SCNC: 11 MMOL/L (ref 5–15)
ANION GAP SERPL CALCULATED.3IONS-SCNC: 11 MMOL/L (ref 5–15)
ARTERIAL PATENCY WRIST A: ABNORMAL
ARTERIAL PATENCY WRIST A: ABNORMAL
ARTERIAL PATENCY WRIST A: POSITIVE
AST SERPL-CCNC: 22 U/L (ref 1–32)
ATMOSPHERIC PRESS: ABNORMAL MM[HG]
BASE EXCESS BLDA CALC-SCNC: -6.2 MMOL/L (ref 0–2)
BASE EXCESS BLDA CALC-SCNC: -6.6 MMOL/L (ref 0–2)
BASE EXCESS BLDA CALC-SCNC: -8.9 MMOL/L (ref 0–2)
BDY SITE: ABNORMAL
BILIRUB SERPL-MCNC: 0.4 MG/DL (ref 0–1.2)
BODY TEMPERATURE: 37
BUN SERPL-MCNC: 73 MG/DL (ref 8–23)
BUN SERPL-MCNC: 78 MG/DL (ref 8–23)
BUN/CREAT SERPL: 18.1 (ref 7–25)
BUN/CREAT SERPL: 19.1 (ref 7–25)
CALCIUM SPEC-SCNC: 9.7 MG/DL (ref 8.6–10.5)
CALCIUM SPEC-SCNC: 9.7 MG/DL (ref 8.6–10.5)
CHLORIDE SERPL-SCNC: 112 MMOL/L (ref 98–107)
CHLORIDE SERPL-SCNC: 113 MMOL/L (ref 98–107)
CO2 BLDA-SCNC: 18.8 MMOL/L (ref 22–33)
CO2 BLDA-SCNC: 21 MMOL/L (ref 22–33)
CO2 BLDA-SCNC: 21.6 MMOL/L (ref 22–33)
CO2 SERPL-SCNC: 16 MMOL/L (ref 22–29)
CO2 SERPL-SCNC: 19 MMOL/L (ref 22–29)
COHGB MFR BLD: 1.5 % (ref 0–2)
COHGB MFR BLD: 1.6 % (ref 0–2)
COHGB MFR BLD: 1.7 % (ref 0–2)
CREAT SERPL-MCNC: 4.04 MG/DL (ref 0.57–1)
CREAT SERPL-MCNC: 4.08 MG/DL (ref 0.57–1)
D-LACTATE SERPL-SCNC: 0.6 MMOL/L (ref 0.5–2)
EGFRCR SERPLBLD CKD-EPI 2021: 10.4 ML/MIN/1.73
EGFRCR SERPLBLD CKD-EPI 2021: 10.5 ML/MIN/1.73
EPAP: 0
GLOBULIN UR ELPH-MCNC: 2.1 GM/DL
GLUCOSE BLDC GLUCOMTR-MCNC: 129 MG/DL (ref 70–130)
GLUCOSE BLDC GLUCOMTR-MCNC: 130 MG/DL (ref 70–130)
GLUCOSE BLDC GLUCOMTR-MCNC: 132 MG/DL (ref 70–130)
GLUCOSE BLDC GLUCOMTR-MCNC: 132 MG/DL (ref 70–130)
GLUCOSE BLDC GLUCOMTR-MCNC: 189 MG/DL (ref 70–130)
GLUCOSE BLDC GLUCOMTR-MCNC: 97 MG/DL (ref 70–130)
GLUCOSE SERPL-MCNC: 125 MG/DL (ref 65–99)
GLUCOSE SERPL-MCNC: 131 MG/DL (ref 65–99)
HCO3 BLDA-SCNC: 17.6 MMOL/L (ref 20–26)
HCO3 BLDA-SCNC: 19.7 MMOL/L (ref 20–26)
HCO3 BLDA-SCNC: 20.2 MMOL/L (ref 20–26)
HCT VFR BLD CALC: 24.7 % (ref 38–51)
HCT VFR BLD CALC: 25 % (ref 38–51)
HCT VFR BLD CALC: 25.3 % (ref 38–51)
HGB BLDA-MCNC: 8.1 G/DL (ref 14–18)
HGB BLDA-MCNC: 8.2 G/DL (ref 14–18)
HGB BLDA-MCNC: 8.3 G/DL (ref 14–18)
INHALED O2 CONCENTRATION: 28 %
IPAP: 0
METHGB BLD QL: 0.1 % (ref 0–1.5)
METHGB BLD QL: 0.6 % (ref 0–1.5)
METHGB BLD QL: 0.8 % (ref 0–1.5)
MODALITY: ABNORMAL
OXYHGB MFR BLDV: 95.9 % (ref 94–99)
OXYHGB MFR BLDV: 96.2 % (ref 94–99)
OXYHGB MFR BLDV: 96.3 % (ref 94–99)
PAW @ PEAK INSP FLOW SETTING VENT: 0 CMH2O
PCO2 BLDA: 39.7 MM HG (ref 35–45)
PCO2 BLDA: 42.3 MM HG (ref 35–45)
PCO2 BLDA: 43.5 MM HG (ref 35–45)
PCO2 TEMP ADJ BLD: 39.7 MM HG (ref 35–45)
PCO2 TEMP ADJ BLD: 42.3 MM HG (ref 35–45)
PCO2 TEMP ADJ BLD: 43.5 MM HG (ref 35–45)
PH BLDA: 7.25 PH UNITS (ref 7.35–7.45)
PH BLDA: 7.28 PH UNITS (ref 7.35–7.45)
PH BLDA: 7.28 PH UNITS (ref 7.35–7.45)
PH, TEMP CORRECTED: 7.25 PH UNITS
PH, TEMP CORRECTED: 7.28 PH UNITS
PH, TEMP CORRECTED: 7.28 PH UNITS
PO2 BLDA: 101 MM HG (ref 83–108)
PO2 BLDA: 103 MM HG (ref 83–108)
PO2 BLDA: 92.8 MM HG (ref 83–108)
PO2 TEMP ADJ BLD: 101 MM HG (ref 83–108)
PO2 TEMP ADJ BLD: 103 MM HG (ref 83–108)
PO2 TEMP ADJ BLD: 92.8 MM HG (ref 83–108)
POTASSIUM SERPL-SCNC: 5.2 MMOL/L (ref 3.5–5.2)
POTASSIUM SERPL-SCNC: 5.3 MMOL/L (ref 3.5–5.2)
PROT SERPL-MCNC: 5.7 G/DL (ref 6–8.5)
SODIUM SERPL-SCNC: 139 MMOL/L (ref 136–145)
SODIUM SERPL-SCNC: 143 MMOL/L (ref 136–145)
TOTAL RATE: 0 BREATHS/MINUTE

## 2024-11-21 PROCEDURE — 94664 DEMO&/EVAL PT USE INHALER: CPT

## 2024-11-21 PROCEDURE — 82805 BLOOD GASES W/O2 SATURATION: CPT

## 2024-11-21 PROCEDURE — 99233 SBSQ HOSP IP/OBS HIGH 50: CPT | Performed by: HOSPITALIST

## 2024-11-21 PROCEDURE — 83050 HGB METHEMOGLOBIN QUAN: CPT

## 2024-11-21 PROCEDURE — 94761 N-INVAS EAR/PLS OXIMETRY MLT: CPT

## 2024-11-21 PROCEDURE — 82375 ASSAY CARBOXYHB QUANT: CPT

## 2024-11-21 PROCEDURE — 25010000003 DEXTROSE 5 % SOLUTION 1,000 ML FLEX CONT: Performed by: STUDENT IN AN ORGANIZED HEALTH CARE EDUCATION/TRAINING PROGRAM

## 2024-11-21 PROCEDURE — 94799 UNLISTED PULMONARY SVC/PX: CPT

## 2024-11-21 PROCEDURE — 36600 WITHDRAWAL OF ARTERIAL BLOOD: CPT

## 2024-11-21 PROCEDURE — 80053 COMPREHEN METABOLIC PANEL: CPT | Performed by: STUDENT IN AN ORGANIZED HEALTH CARE EDUCATION/TRAINING PROGRAM

## 2024-11-21 PROCEDURE — 83605 ASSAY OF LACTIC ACID: CPT | Performed by: STUDENT IN AN ORGANIZED HEALTH CARE EDUCATION/TRAINING PROGRAM

## 2024-11-21 PROCEDURE — 25010000002 BUMETANIDE PER 0.5 MG: Performed by: INTERNAL MEDICINE

## 2024-11-21 PROCEDURE — 63710000001 INSULIN LISPRO (HUMAN) PER 5 UNITS: Performed by: NURSE PRACTITIONER

## 2024-11-21 PROCEDURE — 70450 CT HEAD/BRAIN W/O DYE: CPT

## 2024-11-21 PROCEDURE — 97530 THERAPEUTIC ACTIVITIES: CPT

## 2024-11-21 PROCEDURE — 25010000002 HYALURONIDASE (HUMAN) 150 UNIT/ML SOLUTION 1 ML VIAL: Performed by: HOSPITALIST

## 2024-11-21 PROCEDURE — 82948 REAGENT STRIP/BLOOD GLUCOSE: CPT

## 2024-11-21 PROCEDURE — 71045 X-RAY EXAM CHEST 1 VIEW: CPT

## 2024-11-21 RX ORDER — MORPHINE SULFATE 15 MG/1
15 TABLET, FILM COATED, EXTENDED RELEASE ORAL EVERY 12 HOURS SCHEDULED
Status: DISCONTINUED | OUTPATIENT
Start: 2024-11-21 | End: 2024-11-21

## 2024-11-21 RX ORDER — FENTANYL 12.5 UG/1
1 PATCH TRANSDERMAL
Status: DISCONTINUED | OUTPATIENT
Start: 2024-11-21 | End: 2024-11-27 | Stop reason: HOSPADM

## 2024-11-21 RX ORDER — FENTANYL 12.5 UG/1
1 PATCH TRANSDERMAL
Status: DISCONTINUED | OUTPATIENT
Start: 2024-11-24 | End: 2024-11-21

## 2024-11-21 RX ORDER — BUMETANIDE 0.25 MG/ML
2 INJECTION, SOLUTION INTRAMUSCULAR; INTRAVENOUS ONCE
Status: COMPLETED | OUTPATIENT
Start: 2024-11-21 | End: 2024-11-21

## 2024-11-21 RX ADMIN — CLONIDINE HYDROCHLORIDE 0.1 MG: 0.1 TABLET ORAL at 06:08

## 2024-11-21 RX ADMIN — CYANOCOBALAMIN TAB 1000 MCG 1000 MCG: 1000 TAB at 09:22

## 2024-11-21 RX ADMIN — SODIUM BICARBONATE 150 MEQ: 84 INJECTION, SOLUTION INTRAVENOUS at 08:19

## 2024-11-21 RX ADMIN — CLONIDINE HYDROCHLORIDE 0.1 MG: 0.1 TABLET ORAL at 17:28

## 2024-11-21 RX ADMIN — SODIUM BICARBONATE 50 MEQ: 84 INJECTION INTRAVENOUS at 03:47

## 2024-11-21 RX ADMIN — FERROUS SULFATE TAB 325 MG (65 MG ELEMENTAL FE) 325 MG: 325 (65 FE) TAB at 09:20

## 2024-11-21 RX ADMIN — PANTOPRAZOLE SODIUM 40 MG: 40 TABLET, DELAYED RELEASE ORAL at 17:28

## 2024-11-21 RX ADMIN — RIVAROXABAN 15 MG: 15 TABLET, FILM COATED ORAL at 09:21

## 2024-11-21 RX ADMIN — PANTOPRAZOLE SODIUM 40 MG: 40 TABLET, DELAYED RELEASE ORAL at 09:21

## 2024-11-21 RX ADMIN — Medication 10 ML: at 09:23

## 2024-11-21 RX ADMIN — IPRATROPIUM BROMIDE AND ALBUTEROL SULFATE 3 ML: 2.5; .5 SOLUTION RESPIRATORY (INHALATION) at 21:16

## 2024-11-21 RX ADMIN — CARVEDILOL 25 MG: 12.5 TABLET, FILM COATED ORAL at 20:03

## 2024-11-21 RX ADMIN — IPRATROPIUM BROMIDE AND ALBUTEROL SULFATE 3 ML: 2.5; .5 SOLUTION RESPIRATORY (INHALATION) at 12:39

## 2024-11-21 RX ADMIN — ASPIRIN 81 MG: 81 TABLET, COATED ORAL at 09:21

## 2024-11-21 RX ADMIN — Medication 10 ML: at 20:03

## 2024-11-21 RX ADMIN — ISOSORBIDE MONONITRATE 30 MG: 30 TABLET, EXTENDED RELEASE ORAL at 09:22

## 2024-11-21 RX ADMIN — HYDRALAZINE HYDROCHLORIDE 100 MG: 50 TABLET ORAL at 06:08

## 2024-11-21 RX ADMIN — SODIUM BICARBONATE 50 MEQ: 84 INJECTION INTRAVENOUS at 02:37

## 2024-11-21 RX ADMIN — FENTANYL TRANSDERMAL 1 PATCH: 12.5 PATCH, EXTENDED RELEASE TRANSDERMAL at 17:35

## 2024-11-21 RX ADMIN — BUMETANIDE 2 MG: 0.25 INJECTION INTRAMUSCULAR; INTRAVENOUS at 17:28

## 2024-11-21 RX ADMIN — TERAZOSIN HYDROCHLORIDE ANHYDROUS 10 MG: 5 CAPSULE ORAL at 20:03

## 2024-11-21 RX ADMIN — CLONIDINE HYDROCHLORIDE 0.1 MG: 0.1 TABLET ORAL at 11:51

## 2024-11-21 RX ADMIN — CALCITRIOL CAPSULES 0.25 MCG 0.5 MCG: 0.25 CAPSULE ORAL at 09:22

## 2024-11-21 RX ADMIN — INSULIN LISPRO 2 UNITS: 100 INJECTION, SOLUTION INTRAVENOUS; SUBCUTANEOUS at 17:29

## 2024-11-21 RX ADMIN — HYDRALAZINE HYDROCHLORIDE 100 MG: 50 TABLET ORAL at 20:02

## 2024-11-21 RX ADMIN — HYALURONIDASE (HUMAN RECOMBINANT) 150 UNITS: 150 INJECTION, SOLUTION SUBCUTANEOUS at 10:55

## 2024-11-21 RX ADMIN — CARVEDILOL 25 MG: 12.5 TABLET, FILM COATED ORAL at 09:22

## 2024-11-21 RX ADMIN — IPRATROPIUM BROMIDE AND ALBUTEROL SULFATE 3 ML: 2.5; .5 SOLUTION RESPIRATORY (INHALATION) at 06:42

## 2024-11-21 RX ADMIN — Medication 1 CAPSULE: at 09:23

## 2024-11-21 RX ADMIN — POLYETHYLENE GLYCOL 3350 17 G: 17 POWDER, FOR SOLUTION ORAL at 09:24

## 2024-11-21 RX ADMIN — SODIUM BICARBONATE 650 MG TABLET 1300 MG: at 09:19

## 2024-11-21 RX ADMIN — ATORVASTATIN CALCIUM 80 MG: 40 TABLET, FILM COATED ORAL at 09:20

## 2024-11-21 RX ADMIN — IPRATROPIUM BROMIDE AND ALBUTEROL SULFATE 3 ML: 2.5; .5 SOLUTION RESPIRATORY (INHALATION) at 16:01

## 2024-11-21 NOTE — PLAN OF CARE
Goal Outcome Evaluation:  Plan of Care Reviewed With: patient        Progress: no change  Outcome Evaluation: Palliative consult for GOC/ACP; seen by Palliative RN at 1240, and Dr. HAKEEM Willard this afternoon. Pt known to service from prior admission. Per discussion with pt by Dr. Willard, pt requested to change her code status to DNR/DNI; may be interested in pursuing dialysis; would like to complete ACP to designate her son, J Luis Whitt, as HCS (ACP consult placed). Dr. Willard recommended to continue low-dose Fentanyl TD for chronic back pain, per documentation of discussion by Dr. Whitt. NAL following; may be heading toward dialysis. Palliative following for continued support to pt in management of symptoms, completion of ACP document, ongoing GOC/POC discussion.     0930 Palliative IDT meeting:  CHRIS CID, RN,   After hours, weekends and holidays, contact Palliative Provider by calling [phone]       Problem: Palliative Care  Goal: Enhanced Quality of Life  Outcome: Progressing  Intervention: Maximize Comfort  Flowsheets (Taken 11/21/2024 1556)  Pain Management Interventions: (Dr. Willard recommended remaining on lowest dose Fentanyl TD)   pain management plan reviewed with patient/caregiver   other (see comments)  Intervention: Optimize Function  Flowsheets (Taken 11/21/2024 1556)  Fatigue Management: paced activity encouraged  Sleep/Rest Enhancement: consistent schedule promoted  Intervention: Promote Advance Care Planning  Flowsheets (Taken 11/21/2024 1556)  Life Transition/Adjustment:   palliative care discussed   palliative care initiated   advance care planning facilitated  Intervention: Optimize Psychosocial Wellbeing  Flowsheets (Taken 11/21/2024 1556)  Supportive Measures:   active listening utilized   decision-making supported   positive reinforcement provided   self-reflection promoted   self-responsibility promoted   verbalization of feelings encouraged  Spiritual Activities Assistance:  (Spiritual Care consult) other (see comments)  Family/Support System Care: (Palliative information and meal discount card provided at bedside) other (see comments)

## 2024-11-21 NOTE — PROGRESS NOTES
" LOS: 5 days   Patient Care Team:  Sebas Alicea MD as PCP - General (Family Medicine)  Steve Geronimo MD as Consulting Physician (Cardiology)  Emilio Regalado MD as Consulting Physician (Endocrinology)  Axel Ribeiro MD as Consulting Physician (Cardiology)        Subjective     Patient is sitting in chair. Getting bicarb based fluids. Bicarb improved slightly ~ 19.     Objective     Vital Sign Min/Max for last 24 hours  Temp  Min: 97.9 °F (36.6 °C)  Max: 98.4 °F (36.9 °C)   BP  Min: 119/54  Max: 187/87   Pulse  Min: 62  Max: 83   Resp  Min: 18  Max: 24   SpO2  Min: 95 %  Max: 100 %   Flow (L/min) (Oxygen Therapy)  Min: 2  Max: 2.5   No data recorded     Flowsheet Rows      Flowsheet Row First Filed Value   Admission Height 162.6 cm (64\") Documented at 11/15/2024 1228   Admission Weight 85.7 kg (189 lb) Documented at 11/15/2024 1228            I/O this shift:  In: 200 [P.O.:200]  Out: -   I/O last 3 completed shifts:  In: 760 [P.O.:760]  Out: 1100 [Urine:1100]    Objective:  General Appearance:  Comfortable.    Vital signs: (most recent): Blood pressure 156/68, pulse 62, temperature 98.2 °F (36.8 °C), temperature source Oral, resp. rate 20, height 162.6 cm (64\"), weight 83.2 kg (183 lb 8 oz), SpO2 98%.    Abdomen: Abdomen is soft.  Bowel sounds are normal.     Extremities: Normal range of motion.  There is no dependent edema or local swelling.    Pulses: Distal pulses are intact.    Neurological: Patient is alert and oriented to person, place and time.  Normal strength.    Pupils:  Pupils are equal, round, and reactive to light.    Skin:  Warm.              Awake, NAD  LE no edema   Abd soft  Neuro non focal   Lungs clear  S1S2  Results Review:     I reviewed the patient's new clinical results.    WBC WBC   Date Value Ref Range Status   11/19/2024 6.99 3.40 - 10.80 10*3/mm3 Final      HGB Hemoglobin   Date Value Ref Range Status   11/19/2024 8.4 (L) 12.0 - 15.9 g/dL Final   11/19/2024 8.5 (L) 12.0 - " "15.9 g/dL Final      HCT Hematocrit   Date Value Ref Range Status   11/19/2024 28.0 (L) 34.0 - 46.6 % Final   11/19/2024 28.1 (L) 34.0 - 46.6 % Final      Platlets No results found for: \"LABPLAT\"   MCV MCV   Date Value Ref Range Status   11/19/2024 96.9 79.0 - 97.0 fL Final          Sodium Sodium   Date Value Ref Range Status   11/21/2024 143 136 - 145 mmol/L Final   11/21/2024 139 136 - 145 mmol/L Final   11/20/2024 139 136 - 145 mmol/L Final   11/19/2024 139 136 - 145 mmol/L Final      Potassium Potassium   Date Value Ref Range Status   11/21/2024 5.2 3.5 - 5.2 mmol/L Final   11/21/2024 5.3 (H) 3.5 - 5.2 mmol/L Final   11/20/2024 5.1 3.5 - 5.2 mmol/L Final   11/19/2024 4.9 3.5 - 5.2 mmol/L Final      Chloride Chloride   Date Value Ref Range Status   11/21/2024 113 (H) 98 - 107 mmol/L Final   11/21/2024 112 (H) 98 - 107 mmol/L Final   11/20/2024 112 (H) 98 - 107 mmol/L Final   11/19/2024 110 (H) 98 - 107 mmol/L Final      CO2 CO2   Date Value Ref Range Status   11/21/2024 19.0 (L) 22.0 - 29.0 mmol/L Final   11/21/2024 16.0 (L) 22.0 - 29.0 mmol/L Final   11/20/2024 15.0 (L) 22.0 - 29.0 mmol/L Final   11/19/2024 16.0 (L) 22.0 - 29.0 mmol/L Final      BUN BUN   Date Value Ref Range Status   11/21/2024 78 (H) 8 - 23 mg/dL Final   11/21/2024 73 (H) 8 - 23 mg/dL Final   11/20/2024 71 (H) 8 - 23 mg/dL Final   11/19/2024 72 (H) 8 - 23 mg/dL Final      Creatinine Creatinine   Date Value Ref Range Status   11/21/2024 4.08 (H) 0.57 - 1.00 mg/dL Final   11/21/2024 4.04 (H) 0.57 - 1.00 mg/dL Final   11/20/2024 3.94 (H) 0.57 - 1.00 mg/dL Final   11/19/2024 4.19 (H) 0.57 - 1.00 mg/dL Final      Calcium Calcium   Date Value Ref Range Status   11/21/2024 9.7 8.6 - 10.5 mg/dL Final   11/21/2024 9.7 8.6 - 10.5 mg/dL Final   11/20/2024 9.4 8.6 - 10.5 mg/dL Final   11/19/2024 9.7 8.6 - 10.5 mg/dL Final      PO4 No results found for: \"CAPO4\"   Albumin Albumin   Date Value Ref Range Status   11/21/2024 3.6 3.5 - 5.2 g/dL Final      " "  Magnesium No results found for: \"MG\"     Uric Acid No results found for: \"URICACID\"       Assessment & Plan       Acute on chronic renal failure    Type 2 diabetes mellitus    Essential hypertension    Paroxysmal atrial fibrillation    Anemia, chronic disease    Dyspnea    Abnormal laboratory test      Assessment & Plan  ====================================  1.  KINDRA on CKD: Patient was recently discharged on 11/7/2024 with KINDRA is likely from hemodynamic injury without much improvement.  ANCA negative.  Since discharge creatinine has gone up from 3.5-4.1, metabolic acidosis bicarb 16, BUN of 80.     2.  CKD stage IV: Baseline creatinine 1.9-2.2 mg/dL, since last admission was running between 3.2-3.5 range, history of proteinuric renal disease UPC 2 g in the past duplex scan negative in 2016, on previous hospitalization discussion with daughter-in-law anesthesiologist, was explained regard to advanced renal dysfunction possibility of dialysis versus conservative treatment.     3.  Anemia of CKD: EDISON was started hemoglobin 9.2.     4.  Volume status:     5.  Hypertension: No ANABEL per duplex in 2016.  On terazosin, carvedilol, hydralazine, clonidine     6.  Metabolic acidosis: In the setting of CKD. Ph ~ 7.27 PCO 2 42 not able to compensate.      7.  Hyperparathyroidism: On calcitriol 0.5 mcg daily     8.  Iron deficiency on oral iron     9.  History of CAD     10.  COPD     11.  Hyperlipidemia     12.  Anxiety and depression.      RENAL US 11/18/24: Right K 8.9 cm and Left K 9.6 cm      Plan and recommendation.  Will discuss with family about long term dialysis option.   D/C Na-bicarb infusion due to risk of volume overload.   Bumex 2 mg IV x 1  Keep systolic blood pressure greater than 100.  Adjust medication for the new GFR.    I discussed the patients findings and my recommendations with patient        Zurdo Levine MD  11/21/24  16:34 EST            "

## 2024-11-21 NOTE — CONSULTS
Palliative Care Initial Consult   Attending Physician: Maeve Whitt MD  Referring Provider: Maeve Whitt      Reason for Referral:  assistance with clarification of goals of care    Code Status:   Code Status and Medical Interventions: No CPR (Do Not Attempt to Resuscitate); Limited Support; No intubation (DNI)   Ordered at: 11/21/24 1414     Medical Intervention Limits:    No intubation (DNI)     Level Of Support Discussed With:    Patient     Code Status (Patient has no pulse and is not breathing):    No CPR (Do Not Attempt to Resuscitate)     Medical Interventions (Patient has pulse or is breathing):    Limited Support      Advanced Directives: Advance Directive Status: Patient has advance directive, copy requested   Family/Support: children     Goals of Care:    Goals of Care/Treatment Preferences:    Treat what we can.       HPI:   85 yo female with hx HFpEF, COPD and now A/CKD.  Renal also following.  Had been at [facility] for rehab and planned for discharge when noted with increased Cr.  When did not respond to IVF returned for further eval and admitted.  Previously admitted for anemia and received PRBCs.  Xarelto discontinued due to anemia.  Previously resumed at pt request but now held due to renal status.  Reports she has had slow decline with hip fracture requiring operative repair last December.  Reports chronic pain had been responsive to fentanyl but appears held this AM. Is being evaluated for possible [facility]/rehab again.     ROS:   + chronic pain for which she was on fentanyl patch - was changed due to renal status  Denied N/V      Palliative Dyspnea Assessment:   Are you short of breath at the present time or have you recently taken medication to treat shortness of breath?  No        Past Medical History:   Diagnosis Date    Blurry vision     Chronic joint pain     Chronic pain     COPD (chronic obstructive pulmonary disease)     Coronary artery disease     Diabetic gastroparesis 08/24/2016     Esophageal stricture     s/p dilation in 2007    GERD (gastroesophageal reflux disease)     Gout     Headache     secondary to hypertension    Hyperlipidemia     Hypertension     Long term current use of insulin     Neuropathy     Neuropathy due to type 2 diabetes mellitus     Obesity     Osteoarthritis     Pericarditis 2008    Stroke 03/2019    Type 2 diabetes mellitus      Past Surgical History:   Procedure Laterality Date    BRONCHOSCOPY N/A 8/23/2016    Procedure: BRONCHOSCOPY BIOPSY AT BEDSIDE;  Surgeon: Mookie Willard MD;  Location:  [facility];  Service:     CARDIAC CATHETERIZATION N/A 8/30/2016    Procedure: Left Heart Cath;  Surgeon: Steve Geronimo MD;  Location:  [facility];  Service:     CARDIAC CATHETERIZATION N/A 8/30/2016    Procedure: Right Heart Cath;  Surgeon: Steve Geronimo MD;  Location:  [facility];  Service:     CARDIAC ELECTROPHYSIOLOGY PROCEDURE N/A 7/2/2019    Procedure: Loop insertion;  Surgeon: Steve Geronimo MD;  Location:  [facility];  Service: Cardiovascular    CARDIAC ELECTROPHYSIOLOGY PROCEDURE N/A 9/26/2023    Procedure: Loop recorder removal;  Surgeon: Steve Geronimo MD;  Location:  [facility];  Service: Cardiovascular;  Laterality: N/A;    CATARACT EXTRACTION      COLONOSCOPY N/A 8/16/2024    Procedure: COLONOSCOPY;  Surgeon: Brunner, Mark I, MD;  Location:  [facility];  Service: Gastroenterology;  Laterality: N/A;    ENDOSCOPY N/A 8/14/2024    Procedure: ESOPHAGOGASTRODUODENOSCOPY;  Surgeon: Brunner, Mark I, MD;  Location:  [facility];  Service: Gastroenterology;  Laterality: N/A;    ESOPHAGEAL DILATATION  2007    HERNIA REPAIR      HIP CANNULATED SCREW PLACEMENT Left 1/2/2024    Procedure: HIP CANNULATED SCREW PLACEMENT LEFT;  Surgeon: Seymour Harrington MD;  Location:  [facility];  Service: Orthopedics;  Laterality: Left;    HYSTERECTOMY  1998    WRIST FRACTURE SURGERY Left      Social History      Socioeconomic History    Marital status:    Tobacco Use    Smoking status: Former     Current packs/day: 0.00     Types: Cigarettes     Quit date: 2003     Years since quittin.9    Smokeless tobacco: Never   Vaping Use    Vaping status: Never Used   Substance and Sexual Activity    Alcohol use: No    Drug use: No    Sexual activity: Not Currently     Partners: Male     Birth control/protection: Pill, Hysterectomy     Family History   Problem Relation Age of Onset    Cancer Mother     Cancer Father     Cancer Other     Breast cancer Sister 67    Ovarian cancer Neg Hx        Allergies   Allergen Reactions    Latex Rash     Not an immediate reaction    Nickel Rash    Penicillins Rash     Patient has tolerated amoxicillin in the past. Had PCN reaction of hives when she was 15.    Penicillins Rash     unk         Current Facility-Administered Medications:     acetaminophen (TYLENOL) tablet 650 mg, 650 mg, Oral, Q4H PRN, [place], Aubree, APRN, 650 mg at 24 0506    aspirin EC tablet 81 mg, 81 mg, Oral, Daily, [place], Aubree, APRN, 81 mg at 24 0921    [Held by provider] atorvastatin (LIPITOR) tablet 80 mg, 80 mg, Oral, Daily, [place], Aubree, APRN, 80 mg at 24 0920    sennosides-docusate (PERICOLACE) 8.6-50 MG per tablet 2 tablet, 2 tablet, Oral, BID PRN **AND** polyethylene glycol (MIRALAX) packet 17 g, 17 g, Oral, Daily PRN **AND** bisacodyl (DULCOLAX) EC tablet 5 mg, 5 mg, Oral, Daily PRN **AND** bisacodyl (DULCOLAX) suppository 10 mg, 10 mg, Rectal, Daily PRN, [place], Aubree, APRN    calcitriol (ROCALTROL) capsule 0.5 mcg, 0.5 mcg, Oral, Daily, [place] mor[place], APRN, 0.5 mcg at 24 0922    carvedilol (COREG) tablet 25 mg, 25 mg, Oral, Q12H, Evonne Love MD, 25 mg at 24 0922    [Held by provider] fentaNYL (DURAGESIC) 12 MCG/HR 1 patch, 1 patch, Transdermal, Q3 Days, 1 patch at 24 1112 **AND** [Held by provider] Check Fentanyl Patch Placement, 1 each, Not Applicable,  Q12H, Aubree Grubbs APRN    cloNIDine (CATAPRES) tablet 0.1 mg, 0.1 mg, Oral, Q6H, Aubree Grubbs APRN, 0.1 mg at 11/21/24 1151    dextrose (D50W) (25 g/50 mL) IV injection 25 g, 25 g, Intravenous, Q15 Min PRN, Aubree Grubbs APRN    dextrose (GLUTOSE) oral gel 15 g, 15 g, Oral, Q15 Min PRN, Aubree Grubbs APRN    diphenhydrAMINE (BENADRYL) capsule 25 mg, 25 mg, Oral, Nightly PRN, Evonne Love MD, 25 mg at 11/19/24 2104    doxepin (SINEquan) capsule 10 mg, 10 mg, Oral, Nightly PRN, Aubree Grubbs APRN, 10 mg at 11/17/24 0235    ferrous sulfate tablet 325 mg, 325 mg, Oral, Daily With Breakfast, Aubree Grubbs APRN, 325 mg at 11/21/24 0920    glucagon (GLUCAGEN) injection 1 mg, 1 mg, Intramuscular, Q15 Min PRN, Aubree Grubbs APRN    hydrALAZINE (APRESOLINE) tablet 100 mg, 100 mg, Oral, Q8H, Aubree Grubbs APRN, 100 mg at 11/21/24 0608    Insulin Lispro (humaLOG) injection 2-7 Units, 2-7 Units, Subcutaneous, 4x Daily AC & at Bedtime, Aubree Grubbs APRN, 2 Units at 11/19/24 1126    ipratropium-albuterol (DUO-NEB) nebulizer solution 3 mL, 3 mL, Nebulization, Q4H PRN, Aubree Grubbs APRN    ipratropium-albuterol (DUO-NEB) nebulizer solution 3 mL, 3 mL, Nebulization, 4x Daily - RT, Lilo Owens MD, 3 mL at 11/21/24 1239    isosorbide mononitrate (IMDUR) 24 hr tablet 30 mg, 30 mg, Oral, Daily, Evonne Love MD, 30 mg at 11/21/24 0922    lactobacillus acidophilus (RISAQUAD) capsule 1 capsule, 1 capsule, Oral, Daily, Aubree Grubbs APRN, 1 capsule at 11/21/24 0923    melatonin tablet 10 mg, 10 mg, Oral, Nightly PRN, Evonne Love MD, 10 mg at 11/18/24 2103    Morphine (MS CONTIN) 12 hr tablet 15 mg, 15 mg, Oral, Q12H, Maeve Whitt MD    naloxone (NARCAN) nasal spray 1 spray, 1 spray, Nasal, PRN, Aubree Grubbs APRN    nitroglycerin (NITROSTAT) SL tablet 0.4 mg, 0.4 mg, Sublingual, Q5 Min PRN, Aubree Grubbs APRN    oxyCODONE (ROXICODONE) immediate release tablet 5 mg, 5 mg, Oral, Q4H PRN, Humera,  "CHRIS Mak, 5 mg at 11/20/24 2330    pantoprazole (PROTONIX) EC tablet 40 mg, 40 mg, Oral, BID AC, [place] APRN, 40 mg at 11/21/24 0921    polyethylene glycol (MIRALAX) packet 17 g, 17 g, Oral, QAM, Aubree Grubbs APRN, 17 g at 11/21/24 0924    rivaroxaban (XARELTO) tablet 15 mg, 15 mg, Oral, Daily, Maeve Whitt MD, 15 mg at 11/21/24 0921    sodium bicarbonate 8.4 % 150 mEq in dextrose (D5W) 5 % 1,000 mL infusion (greater than 100 mEq), 150 mEq, Intravenous, Continuous, Abhinav Ewing MD, Stopped at 11/21/24 0845    sodium bicarbonate tablet 1,300 mg, 1,300 mg, Oral, BID, Ben Day MD, 1,300 mg at 11/21/24 0919    sodium chloride 0.9 % flush 10 mL, 10 mL, Intravenous, PRN, Aubree Grubbs APRN    sodium chloride 0.9 % flush 10 mL, 10 mL, Intravenous, Q12H, Aubree Grubbs APRN, 10 mL at 11/21/24 0923    sodium chloride 0.9 % flush 10 mL, 10 mL, Intravenous, PRN, Aubree Grubbs APRN    sodium chloride 0.9 % infusion 40 mL, 40 mL, Intravenous, PRN, Aubree Grubbs APRN    terazosin (HYTRIN) capsule 10 mg, 10 mg, Oral, Nightly, Maeve Whitt MD, 10 mg at 11/20/24 2241    [Held by provider] torsemide (DEMADEX) tablet 20 mg, 20 mg, Oral, Daily PRN, Aubree Grubbs APRN    vitamin B-12 (CYANOCOBALAMIN) tablet 1,000 mcg, 1,000 mcg, Oral, Daily, Aubree Grubbs APRN, 1,000 mcg at 11/21/24 0922     Palliative Performance Scale Score:  50    /61   Pulse 63   Temp 98.2 °F (36.8 °C) (Oral)   Resp 20   Ht 162.6 cm (64\")   Wt 83.2 kg (183 lb 8 oz)   LMP  (LMP Unknown)   SpO2 100%   BMI 31.50 kg/m²     Intake/Output Summary (Last 24 hours) at 11/21/2024 1415  Last data filed at 11/21/2024 0536  Gross per 24 hour   Intake 200 ml   Output 900 ml   Net -700 ml       Physical Exam:    General Appearance:    Alert, cooperative, NAD   HEENT:    NC/AT, EOMI, anicteric, MMM, face relaxed   Neck:   supple, trachea midline, no JVD   Lungs:     CTA bilat, diminished in bases; respirations regular, even     and " unlabored    Heart:    Irreg, normal S1 and S2, no M/R/G   Abdomen:     Normal bowel sounds, soft, nontender, nondistended   G/U:   Deferred   MSK/Extremities:   No clubbing , cyanosis or edema, No wasting   Pulses:   Pulses palpable and equal bilaterally   Skin:   Warm, dry, no mottling   Neurologic:   A/Ox4, cooperative, moves extremities x 4, no tremor, nl     tone   Psych:   Calm, appropriate         Labs:   Results from last 7 days   Lab Units 11/19/24  2214 11/19/24  0544   WBC 10*3/mm3  --  6.99   HEMOGLOBIN g/dL 8.4* 8.5*   HEMATOCRIT % 28.0* 28.1*   PLATELETS 10*3/mm3  --  101*     Results from last 7 days   Lab Units 11/21/24  0848 11/21/24  0125   SODIUM mmol/L 143 139   POTASSIUM mmol/L 5.2 5.3*   CHLORIDE mmol/L 113* 112*   CO2 mmol/L 19.0* 16.0*   BUN mg/dL 78* 73*   CREATININE mg/dL 4.08* 4.04*   CALCIUM mg/dL 9.7 9.7   BILIRUBIN mg/dL  --  0.4   ALK PHOS U/L  --  71   ALT (SGPT) U/L  --  13   AST (SGOT) U/L  --  22   GLUCOSE mg/dL 125* 131*     Imaging Results (Last 72 Hours)       Procedure Component Value Units Date/Time    CT Head Without Contrast [[id number]] Collected: 11/21/24 1254     Updated: 11/21/24 1301    Narrative:      CT HEAD WO CONTRAST    Date of Exam: 11/21/2024 12:06 PM EST    Indication: Encephalopathy.    Comparison: Head CT 7/6/2021    Technique: Axial CT images were obtained of the head without contrast administration.  Automated exposure control and iterative construction methods were used.      Findings:  No acute intracranial hemorrhage. Redemonstration of chronic infarct in the left occipital lobe. Redemonstration of chronic lacunar infarct of the right thalamus. Redemonstration of small chronic linear infarcts in the right and left cerebellar   hemispheres. No evidence of acute large territory infarct. There are scattered white matter hypodensities which are nonspecific and can be seen in the setting of chronic small vessel ischemic change. No extra-axial collections. No  midline shift or   herniation. Normal size and configuration of the ventricles. There is intracranial atherosclerosis. Unremarkable appearance of the orbits. There is minimal mucosal thickening in the inferior left frontal sinus with otherwise clear paranasal sinuses.   There are bilateral mastoid air cell effusions, nonspecific, new or worsened compared to prior head CT. No acute or suspicious bony findings.      Impression:      Impression:  No acute intracranial findings.    Chronic scattered infarcts appear similar to prior.    New or increased bilateral mastoid air cell effusions, nonspecific.        Electronically Signed: Iglesia Bernard MD    11/21/2024 12:58 PM EST    Workstation ID: [id number]    XR Chest 1 View [[id number]] Collected: 11/21/24 0148     Updated: 11/21/24 0152    Narrative:      XR CHEST 1 VW    Date of Exam: 11/21/2024 1:42 AM EST    Indication: Abdominal breathing    Comparison: 11/19/2024.    Findings:  There are no airspace consolidations. Small bilateral pleural effusions are present. No pneumothorax. The pulmonary vasculature appears mildly indistinct. The heart appears enlarged. Findings appear similar to improved as compared to the previous study.   No acute osseous abnormality identified.      Impression:      Impression:  Mild pulmonary edema pattern with small bilateral pleural effusions, similar to improved as compared to the previous study.        Electronically Signed: Talita Murrell MD    11/21/2024 1:49 AM EST    Workstation ID: [id number]    XR Chest 1 View [[id number]] Collected: 11/19/24 2238     Updated: 11/19/24 2243    Narrative:      XR CHEST 1 VW    Date of Exam: 11/19/2024 9:56 PM EST    Indication: dypsnea    Comparison: 10/30/2024    Findings:  Cardiomegaly is stable. Upper lobe vessels are prominent consistent with pulmonary vascular congestion. Interstitial markings are also prominent throughout both lungs suggesting developing interstitial edema. No definite pleural  effusion or pneumothorax.   Bony structures are unremarkable.      Impression:      Impression:    1. Cardiomegaly and pulmonary vascular congestion with prominent tissue markings likely related to interstitial edema.      Electronically Signed: Tod Tomlin MD    11/19/2024 10:39 PM EST    Workstation ID: [id number]    US Renal Limited [[id number]] Collected: 11/18/24 2313     Updated: 11/18/24 2319    Narrative:      US RENAL LIMITED    Date of Exam: 11/18/2024 6:15 PM EST    Indication: worsening KINDRA.    Comparison: CT abdomen dated 3/3/2017.    Technique: Grayscale and color Doppler ultrasound evaluation of the kidneys and urinary bladder was performed.      Findings:  The right kidney measures 8.9 x 4.5 x 4.4 cm. Renal cortical thickness measures 9 mm. There is increased echogenicity. There is no hydronephrosis.    The left kidney measures 9.6 x 4.7 x 4.4 cm. Renal cortical thickness measures 9 mm. There is increased echogenicity. There is no hydronephrosis. There is a single cyst arising from the left kidney measuring 3.5 cm.    The urinary bladder is not well visualized.      Impression:      Impression:  1. No hydronephrosis.  2. Increased echogenicity of the kidneys likely related to chronic medical renal disease.  3. Single 3.5 cm left renal cyst.        Electronically Signed: Rodger Lopez    11/18/2024 11:16 PM EST    Workstation ID: [id number]                Diagnostics:   No valid procedures specified.    A:     Acute on chronic renal failure    Type 2 diabetes mellitus    Essential hypertension    Paroxysmal atrial fibrillation    Anemia, chronic disease    Dyspnea    Abnormal laboratory test         Impression:   A/CKD  DM 2  Htn  PAF  Anemia of chronic disease  Hx chronic pain      Symptoms:  Debility       P:    Met with pt in room.  Observed with PT.  Ambulated in espino with PT.  Agreed to ACP referral for living will. Will monitor for further needs. Thank you for this consult and allowing us to  participate in patient's plan of care. Palliative Care Team will continue to follow patient.       Conchita Willard MD, 11/21/2024, 14:15 EST

## 2024-11-21 NOTE — CASE MANAGEMENT/SOCIAL WORK
Continued Stay Note   [place]     Patient Name: Yanique Webster  MRN: [id number]  Today's Date: 11/21/2024    Admit Date: 11/15/2024    Plan: [facility]   Discharge Plan       Row Name 11/21/24 1058       Plan    Plan [facility]    Patient/Family in Agreement with Plan yes    Plan Comments Spoke to patient at bedside. Plan is [facility] is following. Patient is not sure she wants to go to [facility].  will continue to follow.    Final Discharge Disposition Code 62 - inpatient rehab facility                   Discharge Codes    No documentation.                 Expected Discharge Date and Time       Expected Discharge Date Expected Discharge Time    Nov 21, 2024               Heber Valencia RN

## 2024-11-21 NOTE — PROGRESS NOTES
[facility] Medicine Services  PROGRESS NOTE    Patient Name: Yanique Webster  : 1940  MRN: [id number]    Date of Admission: 11/15/2024  Primary Care Physician: Sebas Alicea MD    Subjective   Subjective     CC:  KINDRA on CKD    HPI:  NAEON. Cr mildly down to 3.9. nephrology o/b.      Objective   Objective     Vital Signs:   Temp:  [97.7 °F (36.5 °C)-98.4 °F (36.9 °C)] 98.2 °F (36.8 °C)  Heart Rate:  [63-83] 67  Resp:  [-24] 20  BP: (119-187)/(54-87) 140/56  Flow (L/min) (Oxygen Therapy):  [2-2.5] 2     Physical Exam:  Constitutional: No acute distress, awake, alert  HENT: NCAT, mucous membranes moist  Respiratory: Clear to auscultation bilaterally, respiratory effort normal   Cardiovascular: RRR, no murmurs, rubs, or gallops  Gastrointestinal: Positive bowel sounds, soft, nontender, nondistended  Musculoskeletal: No bilateral ankle edema  Psychiatric: Appropriate affect, cooperative  Neurologic: Oriented x 3, strength symmetric in all extremities, Cranial Nerves grossly intact to confrontation, speech clear  Skin: No rashes     Results Reviewed:  LAB RESULTS:      Lab 24  0125 24  2214 24  0544 24  0512 24  0529 24  0456 11/15/24  1230   WBC  --   --  6.99 5.90 5.75 4.98 7.23   HEMOGLOBIN  --  8.4* 8.5* 8.3* 8.7* 9.1* 9.2*   HEMATOCRIT  --  28.0* 28.1* 28.2* 29.6* 30.4* 30.5*   PLATELETS  --   --  101* 107* 126* 134* 151   NEUTROS ABS  --   --  4.74 3.80 3.63 3.05 4.84   IMMATURE GRANS (ABS)  --   --  0.02 0.01 0.03 0.01 0.02   LYMPHS ABS  --   --  1.26 1.16 1.14 1.08 1.24   MONOS ABS  --   --  0.62 0.61 0.63 0.54 0.78   EOS ABS  --   --  0.31 0.28 0.27 0.26 0.30   MCV  --   --  96.9 98.9* 97.7* 96.8 96.5   PROCALCITONIN  --   --   --   --   --   --  0.11   LACTATE 0.6  --   --   --   --   --  0.9   PROTIME  --   --   --   --   --   --  18.0*   APTT  --   --   --   --   --   --  39.9*         Lab 24  0848 24  0125  11/20/24  0710 11/19/24  0544 11/18/24  0512 11/17/24  0529 11/16/24  0456 11/15/24  1230   SODIUM 143 139 139 139 138 139   < > 136   POTASSIUM 5.2 5.3* 5.1 4.9 4.8 5.1   < > 5.1   CHLORIDE 113* 112* 112* 110* 110* 109*   < > 106   CO2 19.0* 16.0* 15.0* 16.0* 16.0* 17.0*   < > 16.0*   ANION GAP 11.0 11.0 12.0 13.0 12.0 13.0   < > 14.0   BUN 78* 73* 71* 72* 68* 71*   < > 80*   CREATININE 4.08* 4.04* 3.94* 4.19* 4.24* 4.00*   < > 4.18*   EGFR 10.4* 10.5* 10.8* 10.0* 9.9* 10.6*   < > 10.1*   GLUCOSE 125* 131* 111* 122* 121* 107*   < > 109*   CALCIUM 9.7 9.7 9.4 9.7 9.4 9.3  9.5   < > 9.5   MAGNESIUM  --   --   --   --   --   --   --  2.0   PHOSPHORUS  --   --   --   --   --  4.8*  --   --     < > = values in this interval not displayed.         Lab 11/21/24  0125 11/17/24  0529 11/15/24  1230   TOTAL PROTEIN 5.7*  --  5.9*   ALBUMIN 3.6 3.7 3.8   GLOBULIN 2.1  --  2.1   ALT (SGPT) 13  --  13   AST (SGOT) 22  --  24   BILIRUBIN 0.4  --  0.4   ALK PHOS 71  --  83         Lab 11/15/24  1230   PROTIME 18.0*   INR 1.48*             Lab 11/17/24  0529 11/15/24  1334   IRON 61  --    IRON SATURATION (TSAT) 24  --    TIBC 256*  --    TRANSFERRIN 172*  --    ABO TYPING  --  O   RH TYPING  --  Negative   ANTIBODY SCREEN  --  Negative         Lab 11/21/24  0856 11/21/24  0511 11/21/24  0120   PH, ARTERIAL 7.277* 7.276* 7.255*   PCO2, ARTERIAL 42.3 43.5 39.7   PO2 .0 92.8 103.0   FIO2 28 28 28   HCO3 ART 19.7* 20.2 17.6*   BASE EXCESS ART -6.6* -6.2* -8.9*   CARBOXYHEMOGLOBIN 1.7 1.5 1.6     Brief Urine Lab Results  (Last result in the past 365 days)        Color   Clarity   Blood   Leuk Est   Nitrite   Protein   CREAT   Urine HCG        11/15/24 1410 Yellow   Clear   Negative   Negative   Negative   100 mg/dL (2+)                   Microbiology Results Abnormal       None            CT Head Without Contrast    Result Date: 11/21/2024  CT HEAD WO CONTRAST Date of Exam: 11/21/2024 12:06 PM EST Indication: Encephalopathy.  Comparison: Head CT 7/6/2021 Technique: Axial CT images were obtained of the head without contrast administration.  Automated exposure control and iterative construction methods were used. Findings: No acute intracranial hemorrhage. Redemonstration of chronic infarct in the left occipital lobe. Redemonstration of chronic lacunar infarct of the right thalamus. Redemonstration of small chronic linear infarcts in the right and left cerebellar hemispheres. No evidence of acute large territory infarct. There are scattered white matter hypodensities which are nonspecific and can be seen in the setting of chronic small vessel ischemic change. No extra-axial collections. No midline shift or herniation. Normal size and configuration of the ventricles. There is intracranial atherosclerosis. Unremarkable appearance of the orbits. There is minimal mucosal thickening in the inferior left frontal sinus with otherwise clear paranasal sinuses. There are bilateral mastoid air cell effusions, nonspecific, new or worsened compared to prior head CT. No acute or suspicious bony findings.     Impression: Impression: No acute intracranial findings. Chronic scattered infarcts appear similar to prior. New or increased bilateral mastoid air cell effusions, nonspecific. Electronically Signed: Iglesia Bernard MD  11/21/2024 12:58 PM EST  Workstation ID: [id number]    XR Chest 1 View    Result Date: 11/21/2024  XR CHEST 1 VW Date of Exam: 11/21/2024 1:42 AM EST Indication: Abdominal breathing Comparison: 11/19/2024. Findings: There are no airspace consolidations. Small bilateral pleural effusions are present. No pneumothorax. The pulmonary vasculature appears mildly indistinct. The heart appears enlarged. Findings appear similar to improved as compared to the previous study. No acute osseous abnormality identified.     Impression: Impression: Mild pulmonary edema pattern with small bilateral pleural effusions, similar to improved as compared to  the previous study. Electronically Signed: Talita Murrell MD  11/21/2024 1:49 AM EST  Workstation ID: [id number]    XR Chest 1 View    Result Date: 11/19/2024  XR CHEST 1 VW Date of Exam: 11/19/2024 9:56 PM EST Indication: dypsnea Comparison: 10/30/2024 Findings: Cardiomegaly is stable. Upper lobe vessels are prominent consistent with pulmonary vascular congestion. Interstitial markings are also prominent throughout both lungs suggesting developing interstitial edema. No definite pleural effusion or pneumothorax.  Bony structures are unremarkable.     Impression: Impression: 1. Cardiomegaly and pulmonary vascular congestion with prominent tissue markings likely related to interstitial edema. Electronically Signed: Tod Tomlin MD  11/19/2024 10:39 PM EST  Workstation ID: [id number]     Results for orders placed during the hospital encounter of 10/30/24    Adult Transthoracic Echo Complete W/ Cont if Necessary Per Protocol    Interpretation Summary    Left ventricular systolic function is normal. Estimated left ventricular EF = 60%    Left ventricular wall thickness is consistent with mild concentric hypertrophy.    The left atrial cavity is moderately dilated.    Aortic sclerosis without aortic stenosis.  Mild to moderate aortic insufficiency    Mild mitral regurgitation.      Current medications:  Scheduled Meds:aspirin, 81 mg, Oral, Daily  [Held by provider] atorvastatin, 80 mg, Oral, Daily  calcitriol, 0.5 mcg, Oral, Daily  carvedilol, 25 mg, Oral, Q12H  [Held by provider] fentaNYL, 1 patch, Transdermal, Q3 Days   And  [Held by provider] Check Fentanyl Patch Placement, 1 each, Not Applicable, Q12H  cloNIDine, 0.1 mg, Oral, Q6H  ferrous sulfate, 325 mg, Oral, Daily With Breakfast  hydrALAZINE, 100 mg, Oral, Q8H  insulin lispro, 2-7 Units, Subcutaneous, 4x Daily AC & at Bedtime  ipratropium-albuterol, 3 mL, Nebulization, 4x Daily - RT  isosorbide mononitrate, 30 mg, Oral, Daily  lactobacillus acidophilus, 1 capsule,  Oral, Daily  Morphine, 15 mg, Oral, Q12H  pantoprazole, 40 mg, Oral, BID AC  polyethylene glycol, 17 g, Oral, QAM  rivaroxaban, 15 mg, Oral, Daily  sodium bicarbonate, 1,300 mg, Oral, BID  sodium chloride, 10 mL, Intravenous, Q12H  terazosin, 10 mg, Oral, Nightly  vitamin B-12, 1,000 mcg, Oral, Daily      Continuous Infusions:sodium bicarbonate 8.4 % 150 mEq in dextrose (D5W) 5 % 1,000 mL infusion (greater than 100 mEq), 150 mEq, Last Rate: 75 mL/hr at 11/21/24 1000        PRN Meds:.  acetaminophen    senna-docusate sodium **AND** polyethylene glycol **AND** bisacodyl **AND** bisacodyl    dextrose    dextrose    diphenhydrAMINE    doxepin    glucagon (human recombinant)    ipratropium-albuterol    melatonin    naloxone    nitroglycerin    oxyCODONE    sodium chloride    sodium chloride    sodium chloride    [Held by provider] torsemide    Assessment & Plan   Assessment & Plan     Active Hospital Problems    Diagnosis  POA    **Acute on chronic renal failure [N17.9, N18.9]  Yes    Abnormal laboratory test [R89.9]  Yes    Dyspnea [R06.00]  Yes    Anemia, chronic disease [D63.8]  Yes    Paroxysmal atrial fibrillation [I48.0]  Yes    Essential hypertension [I10]  Yes    Type 2 diabetes mellitus [E11.9]  Yes      Resolved Hospital Problems   No resolved problems to display.        Brief Hospital Course to date:  Yanique Webster is a 84 y.o. female with PMH of Afib on Xarelto, HTN, CKD, prior stroke, T2DM with neuropathy, chronic anemia, and GERD who presented from [facility] with KINDRA on CKD.    Copied text in this note has been reviewed and is accurate as of 11/21/2024      KINDRA on advanced CKD.  High anion gap metabolic acidosis likely secondary to the above  --baseline unclear. last admission pt's baseline was documented 1.9-2.2 but wasn't clear if her CKD had advanced.  When dc'd from hospital 11/8/24 creatinine was 3.76. While she has been at [facility], it has stayed in 4 range. On 11/14, was 4.3, bumped to 4.5 on day of  admission here (11/15/24). Continued worsening of Cr today.   -Creatinine trending down slowly to 3.9> 4.1.  --Nephrology consulted, further management as per their instructions   --Renal ultrasound showed chronic kidney disease with single 3.5 cm left renal cyst..    Dyspnea  --hold off on further IVF for now as she received 1L at [facility] 11/14/24 and just under 0.5L in ED   --sats stable on RA  -- CT chest without contrast shows small bilateral effusions as well as atelectasis  -Repeated chest x-ray on 11/19 showed cardiomegaly and pulmonary vascular congestion with prominent tissue   -- encourage use of IS     TCP  -- new and mild. Monitor for now (slight drop today)      PAF  --xarelto restarted a few days ago, was held on dc per cardiology recs but family requested it be restarted due to stroke risk  --will hold for now due to renal function     HTN. Improving  -- Patient on Coreg 25 mg twice daily  -- Hydralazine 100 mg 3 times daily  -- Clonidine 0.1 4 times daily.  -- Imdur 30 mg daily.  -- Terazosin 5 mg nightly. Increased to 10 mg.  -- Renal recommends tight BP control to improve renal function.     T2DM  --SSI     Anemia of chronic disease  --hgb stable    Insomnia  -- will add Benadryl for tonight as Melatonin not working     Vitamin D deficiency.  --25-hydroxy level low at 18.    Acute encephalopathy Multifactorial:  Chronic pain: On fentanyl patch.  - Due to mentation.  Plan was to monitor patient off the fentanyl patch.  Patient previously was on morphine but that was DC'd due to kidney functions.  I personally discussed the case with Dr. Willard.  She recommended to keep the low-dose of fentanyl patch at this point.  Will minimize the as needed opioids.  -Will discuss with palliative care pain management going forward.    Goals of care. Care consulted. Patient now is DNR/DNI. Pt is interested in hemodialysis. I discussed the case with nephrology Dr. Levine. Patient is heading toward hemodialysis.  I  personally called son Jun and his wife with updated plans. They are interested to talk to the patient until palliative care team to discuss starting hemodialysis versus other options, comfort measures?.  I updated Dr. Willard with the request.    Expected Discharge Location and Transportation: Rehab  Expected Discharge   Expected Discharge Date: 11/21/2024; Expected Discharge Time:      VTE Prophylaxis:  Pharmacologic & mechanical VTE prophylaxis orders are present.         AM-PAC 6 Clicks Score (PT): 17 (11/20/24 0800)    CODE STATUS:   Code Status and Medical Interventions: No CPR (Do Not Attempt to Resuscitate); Limited Support; No intubation (DNI)   Ordered at: 11/21/24 1414     Medical Intervention Limits:    No intubation (DNI)     Level Of Support Discussed With:    Patient     Code Status (Patient has no pulse and is not breathing):    No CPR (Do Not Attempt to Resuscitate)     Medical Interventions (Patient has pulse or is breathing):    Limited Support       Maeve Whitt MD  11/21/24

## 2024-11-21 NOTE — PLAN OF CARE
Problem: Adult Inpatient Plan of Care  Goal: Plan of Care Review  Outcome: Not Progressing  Goal: Patient-Specific Goal (Individualized)  Outcome: Not Progressing  Goal: Absence of Hospital-Acquired Illness or Injury  Outcome: Not Progressing  Intervention: Identify and Manage Fall Risk  Recent Flowsheet Documentation  Taken 11/21/2024 1815 by Ana Blank RN  Safety Promotion/Fall Prevention:   safety round/check completed   nonskid shoes/slippers when out of bed   activity supervised   fall prevention program maintained  Taken 11/21/2024 1603 by Ana Blank RN  Safety Promotion/Fall Prevention:   safety round/check completed   nonskid shoes/slippers when out of bed   fall prevention program maintained   activity supervised  Taken 11/21/2024 1414 by Ana Blank RN  Safety Promotion/Fall Prevention:   safety round/check completed   activity supervised   clutter free environment maintained   fall prevention program maintained   nonskid shoes/slippers when out of bed  Taken 11/21/2024 1242 by Ana Blank RN  Safety Promotion/Fall Prevention:   safety round/check completed   room organization consistent   nonskid shoes/slippers when out of bed   fall prevention program maintained   clutter free environment maintained   activity supervised  Taken 11/21/2024 1200 by Ana Blank RN  Safety Promotion/Fall Prevention: patient off unit  Taken 11/21/2024 1055 by Ana Blank RN  Safety Promotion/Fall Prevention:   activity supervised   fall prevention program maintained   nonskid shoes/slippers when out of bed   safety round/check completed  Taken 11/21/2024 0818 by Ana Blank RN  Safety Promotion/Fall Prevention:   activity supervised   assistive device/personal items within reach   clutter free environment maintained   fall prevention program maintained   lighting adjusted   nonskid shoes/slippers when out of bed   room organization consistent   safety round/check completed   toileting scheduled  Intervention:  Prevent Skin Injury  Recent Flowsheet Documentation  Taken 11/21/2024 1815 by Ana Blank RN  Body Position: sitting up in bed  Skin Protection: incontinence pads utilized  Taken 11/21/2024 1603 by Ana Blank RN  Body Position: neutral body alignment  Skin Protection: incontinence pads utilized  Taken 11/21/2024 1414 by Ana Blank RN  Body Position: position maintained  Skin Protection: (allevyn on coccyx)   silicone foam dressing in place   incontinence pads utilized  Taken 11/21/2024 1242 by Ana Blank RN  Body Position:   supine   legs elevated  Skin Protection: incontinence pads utilized  Taken 11/21/2024 1055 by Ana Blank RN  Body Position:   legs elevated   neutral body alignment  Skin Protection: incontinence pads utilized  Taken 11/21/2024 0818 by Ana Blank RN  Body Position: supine  Goal: Optimal Comfort and Wellbeing  Outcome: Not Progressing  Intervention: Provide Person-Centered Care  Recent Flowsheet Documentation  Taken 11/21/2024 1815 by Ana Blank RN  Trust Relationship/Rapport:   care explained   choices provided  Taken 11/21/2024 1603 by Ana Blank RN  Trust Relationship/Rapport:   care explained   choices provided  Taken 11/21/2024 1414 by Ana Blank RN  Trust Relationship/Rapport:   care explained   choices provided  Taken 11/21/2024 1242 by Ana Blank RN  Trust Relationship/Rapport:   care explained   choices provided  Taken 11/21/2024 1055 by Ana Blank RN  Trust Relationship/Rapport:   care explained   choices provided  Taken 11/21/2024 0818 by Ana Blank RN  Trust Relationship/Rapport:   care explained   choices provided   emotional support provided   empathic listening provided  Goal: Readiness for Transition of Care  Outcome: Not Progressing     Problem: Skin Injury Risk Increased  Goal: Skin Health and Integrity  Outcome: Not Progressing  Intervention: Optimize Skin Protection  Recent Flowsheet Documentation  Taken 11/21/2024 1815 by Ana Blank RN  Activity  Management: activity encouraged  Pressure Reduction Techniques: heels elevated off bed  Head of Bed (HOB) Positioning: [facility] elevated  Pressure Reduction Devices: pressure-redistributing mattress utilized  Skin Protection: incontinence pads utilized  Taken 11/21/2024 1603 by Ana Blank RN  Activity Management: back to bed  Pressure Reduction Techniques: heels elevated off bed  Head of Bed (HOB) Positioning: [facility] elevated  Pressure Reduction Devices: pressure-redistributing mattress utilized  Skin Protection: incontinence pads utilized  Taken 11/21/2024 1414 by Ana Blank RN  Activity Management: activity minimized  Pressure Reduction Techniques: pressure points protected  Head of Bed (HOB) Positioning: [facility] elevated  Pressure Reduction Devices: chair cushion utilized  Skin Protection: (allevyn on coccyx)   silicone foam dressing in place   incontinence pads utilized  Taken 11/21/2024 1242 by Ana Blank RN  Activity Management: activity minimized  Pressure Reduction Techniques: heels elevated off bed  Head of Bed (HOB) Positioning: [facility] lowered  Pressure Reduction Devices: pressure-redistributing mattress utilized  Skin Protection: incontinence pads utilized  Taken 11/21/2024 1055 by Ana Blank RN  Activity Management: activity minimized  Pressure Reduction Techniques: heels elevated off bed  Head of Bed (HOB) Positioning: [facility] elevated  Pressure Reduction Devices: pressure-redistributing mattress utilized  Skin Protection: incontinence pads utilized  Taken 11/21/2024 0818 by Ana Blank RN  Activity Management: bedrest  Head of Bed (HOB) Positioning: HOB lowered     Problem: Fall Injury Risk  Goal: Absence of Fall and Fall-Related Injury  Outcome: Not Progressing  Intervention: Promote Injury-Free Environment  Recent Flowsheet Documentation  Taken 11/21/2024 1815 by Ana Blank RN  Safety Promotion/Fall Prevention:   safety round/check completed   nonskid shoes/slippers when out of bed   activity supervised    fall prevention program maintained  Taken 11/21/2024 1603 by Ana Blank RN  Safety Promotion/Fall Prevention:   safety round/check completed   nonskid shoes/slippers when out of bed   fall prevention program maintained   activity supervised  Taken 11/21/2024 1414 by Ana Blank RN  Safety Promotion/Fall Prevention:   safety round/check completed   activity supervised   clutter free environment maintained   fall prevention program maintained   nonskid shoes/slippers when out of bed  Taken 11/21/2024 1242 by Ana Blank RN  Safety Promotion/Fall Prevention:   safety round/check completed   room organization consistent   nonskid shoes/slippers when out of bed   fall prevention program maintained   clutter free environment maintained   activity supervised  Taken 11/21/2024 1200 by Ana Blank RN  Safety Promotion/Fall Prevention: patient off unit  Taken 11/21/2024 1055 by Ana Blank RN  Safety Promotion/Fall Prevention:   activity supervised   fall prevention program maintained   nonskid shoes/slippers when out of bed   safety round/check completed  Taken 11/21/2024 0818 by Ana Blank RN  Safety Promotion/Fall Prevention:   activity supervised   assistive device/personal items within reach   clutter free environment maintained   fall prevention program maintained   lighting adjusted   nonskid shoes/slippers when out of bed   room organization consistent   safety round/check completed   toileting scheduled     Problem: Palliative Care  Goal: Enhanced Quality of Life  Outcome: Not Progressing  Intervention: Optimize Psychosocial Wellbeing  Recent Flowsheet Documentation  Taken 11/21/2024 1815 by Ana Blank RN  Supportive Measures: active listening utilized  Taken 11/21/2024 1603 by Ana Blank RN  Supportive Measures: active listening utilized  Taken 11/21/2024 0818 by Ana Blank RN  Family/Support System Care: support provided   Goal Outcome Evaluation:   0818- Pt lethargic and difficult to arouse w/ abd  breathing during AM assessment. MD aware, see orders/results.  1123- No UOP, bladder scan 330ml  1414- alert and able to stay awake. Worked/walked w PT and sat in chair for a few hrs  Currently resting in bed and arouses w touch. C/o pain in legs, fentanyl patch restarted and on right arm. No further complaints. VSS on 2L NC. Fall precautions in place. Call bell within reach.

## 2024-11-21 NOTE — THERAPY TREATMENT NOTE
Patient Name: Yanique Webster  : 1940    MRN: [id number]                              Today's Date: 2024       Admit Date: 11/15/2024    Visit Dx:     ICD-10-CM ICD-9-CM   1. Elevated serum creatinine  R79.89 790.99   2. CKD (chronic kidney disease) stage 4, GFR 15-29 ml/min  N18.4 585.4   3. Chronic anemia  D64.9 285.9   4. Impaired functional mobility, balance, gait, and endurance  Z74.09 V49.89   5. Fibromyalgia  M79.7 729.1   6. PVD (peripheral vascular disease)  I73.9 443.9   7. Type 2 diabetes mellitus with hypoglycemia without coma, with long-term current use of insulin  E11.649 250.80    Z79.4 251.2     V58.67   8. Chronic bronchitis, unspecified chronic bronchitis type  J42 491.9   9. Osteoarthritis, unspecified osteoarthritis type, unspecified site  M19.90 715.90   10. Coronary artery disease involving native coronary artery of native heart without angina pectoris  I25.10 414.01   11. Essential hypertension  I10 401.9   12. Symptomatic anemia  D64.9 285.9   13. Acute blood loss anemia  D62 285.1   14. Acute exacerbation of COPD with asthma  J44.1 493.22     Patient Active Problem List   Diagnosis    Fibromyalgia    PVD (peripheral vascular disease)    Type 2 diabetes mellitus    Diabetic gastroparesis    Takotsubo cardiomyopathy    Elevated serum creatinine    Hyperlipidemia LDL goal <70    COPD (chronic obstructive pulmonary disease)    Obesity    Osteoarthritis    Chronic pain    GERD (gastroesophageal reflux disease)    Gout    Chronic joint pain    Coronary artery disease involving native coronary artery of native heart without angina pectoris    Nonrheumatic aortic valve insufficiency    Altered mental status    Essential hypertension    CKD (chronic kidney disease) stage 4, GFR 15-29 ml/min    Hypertensive emergency    Status post placement of implantable loop recorder    Paroxysmal atrial fibrillation    Acute exacerbation of COPD with asthma    Encounter for loop recorder at end of  battery life    Closed left hip fracture    Anxiety associated with depression    Anemia, chronic disease    Surgery follow-up    Borderline abnormal TFTs    Acute blood loss anemia    Secondary hyperparathyroidism    Symptomatic anemia    KINDRA (acute kidney injury)    Acute on chronic renal failure    Dyspnea    Abnormal laboratory test     Past Medical History:   Diagnosis Date    Blurry vision     Chronic joint pain     Chronic pain     COPD (chronic obstructive pulmonary disease)     Coronary artery disease     Diabetic gastroparesis 08/24/2016    Esophageal stricture     s/p dilation in 2007    GERD (gastroesophageal reflux disease)     Gout     Headache     secondary to hypertension    Hyperlipidemia     Hypertension     Long term current use of insulin     Neuropathy     Neuropathy due to type 2 diabetes mellitus     Obesity     Osteoarthritis     Pericarditis 2008    Stroke 03/2019    Type 2 diabetes mellitus      Past Surgical History:   Procedure Laterality Date    BRONCHOSCOPY N/A 8/23/2016    Procedure: BRONCHOSCOPY BIOPSY AT BEDSIDE;  Surgeon: Mookie Willard MD;  Location:  [facility];  Service:     CARDIAC CATHETERIZATION N/A 8/30/2016    Procedure: Left Heart Cath;  Surgeon: Steve Geronimo MD;  Location:  [facility];  Service:     CARDIAC CATHETERIZATION N/A 8/30/2016    Procedure: Right Heart Cath;  Surgeon: Steve Geronimo MD;  Location:  [facility];  Service:     CARDIAC ELECTROPHYSIOLOGY PROCEDURE N/A 7/2/2019    Procedure: Loop insertion;  Surgeon: Steve Geronimo MD;  Location:  [facility];  Service: Cardiovascular    CARDIAC ELECTROPHYSIOLOGY PROCEDURE N/A 9/26/2023    Procedure: Loop recorder removal;  Surgeon: Steve Geronimo MD;  Location:  [facility];  Service: Cardiovascular;  Laterality: N/A;    CATARACT EXTRACTION      COLONOSCOPY N/A 8/16/2024    Procedure: COLONOSCOPY;  Surgeon: Brunner, Mark I, MD;  Location:   [facility];  Service: Gastroenterology;  Laterality: N/A;    ENDOSCOPY N/A 8/14/2024    Procedure: ESOPHAGOGASTRODUODENOSCOPY;  Surgeon: Brunner, Mark I, MD;  Location:  [facility];  Service: Gastroenterology;  Laterality: N/A;    ESOPHAGEAL DILATATION  2007    HERNIA REPAIR      HIP CANNULATED SCREW PLACEMENT Left 1/2/2024    Procedure: HIP CANNULATED SCREW PLACEMENT LEFT;  Surgeon: Seymour Harrington MD;  Location:  [facility];  Service: Orthopedics;  Laterality: Left;    HYSTERECTOMY  1998    WRIST FRACTURE SURGERY Left       General Information       Row Name 11/21/24 1430          Physical Therapy Time and Intention    Document Type therapy note (daily note)  -AE     Mode of Treatment physical therapy  -AE       Row Name 11/21/24 1430          General Information    Patient Profile Reviewed yes  -AE     Existing Precautions/Restrictions fall;oxygen therapy device and L/min;other (see comments)  chronic pain in hands and feet, incontinent - don brief prior to mobility  -AE     Barriers to Rehab medically complex;previous functional deficit;impaired sensation  -AE       Row Name 11/21/24 1430          Cognition    Orientation Status (Cognition) oriented x 3  -AE       Row Name 11/21/24 1430          Safety Issues/Impairments Affecting Functional Mobility    Safety Issues Affecting Function (Mobility) awareness of need for assistance;insight into deficits/self-awareness;safety precaution awareness;safety precautions follow-through/compliance;sequencing abilities  -AE     Impairments Affecting Function (Mobility) balance;endurance/activity tolerance;pain;shortness of breath;strength;sensation/sensory awareness  -AE               User Key  (r) = Recorded By, (t) = Taken By, (c) = Cosigned By      Initials Name Provider Type    AE Harrison Meyer PT Physical Therapist                   Mobility       Row Name 11/21/24 1433          Bed Mobility    Bed Mobility supine-sit  -AE     Supine-Sit [place] (Bed  Mobility) contact guard  -AE     Assistive Device (Bed Mobility) head of bed elevated  -AE     Comment, (Bed Mobility) Increased difficulty with bed mobility this session d/t weakness. Still required no physical assist.  -AE       Row Name 11/21/24 1433          Transfers    Comment, (Transfers) VCs for hand placement and sequencing. Donned brief in standing at EOB.  -AE       Row Name 11/21/24 1433          Sit-Stand Transfer    Sit-Stand [place] (Transfers) minimum assist (75% patient effort);verbal cues  -AE     Assistive Device (Sit-Stand Transfers) walker, front-wheeled  -AE       Row Name 11/21/24 1433          Gait/Stairs (Locomotion)    [place] Level (Gait) contact guard;verbal cues  -AE     Assistive Device (Gait) walker, front-wheeled  -AE     Distance in Feet (Gait) 60  -AE     Deviations/Abnormal Patterns (Gait) bilateral deviations;base of support, wide;myke decreased;gait speed decreased  -AE     Bilateral Gait Deviations forward flexed posture;heel strike decreased  -AE     Comment, (Gait/Stairs) Pt demo step through gait pattern with slowed myke, decreased step length, and forward flexed posture. Pt required min A at times for positioning of RW. Decreased step length and height also noted while ambulating. Further distance limited by fatigue.  -AE               User Key  (r) = Recorded By, (t) = Taken By, (c) = Cosigned By      Initials Name Provider Type    AE Harrison Meyer PT Physical Therapist                   Obj/Interventions       Row Name 11/21/24 1446          Balance    Balance Assessment sitting static balance;sitting dynamic balance;standing static balance;standing dynamic balance  -AE     Static Sitting Balance contact guard  -AE     Dynamic Sitting Balance contact guard  -AE     Position, Sitting Balance unsupported;sitting edge of bed  -AE     Static Standing Balance contact guard  -AE     Dynamic Standing Balance contact guard;minimal assist;1-person assist;verbal  cues  -AE     Position/Device Used, Standing Balance supported;walker, front-wheeled  -AE               User Key  (r) = Recorded By, (t) = Taken By, (c) = Cosigned By      Initials Name Provider Type    AE Harrison Meyer, PT Physical Therapist                   Goals/Plan    No documentation.                  Clinical Impression       Row Name 11/21/24 1448          Pain    Pain Location foot;head  -AE     Pain Side/Orientation bilateral  -AE     Pain Management Interventions activity modification encouraged;positioning techniques utilized;exercise or physical activity utilized  -AE     Response to Pain Interventions activity participation with tolerable pain  -AE       Row Name 11/21/24 1448          Pain Scale: FACES Pre/Post-Treatment    Pain: FACES Scale, Pretreatment 4-->hurts little more  -AE     Posttreatment Pain Rating 4-->hurts little more  -AE       Row Name 11/21/24 1448          Plan of Care Review    Plan of Care Reviewed With patient  -AE     Progress no change  -AE     Outcome Evaluation Pt continues to present with decreased functional independence and decreased activity tolerance. Pt ambulated 60ft with CGA-min A and RW for support. Recommend continued skilled IP PT interventions. Recommend D/C to IRF.  -AE       Row Name 11/21/24 1448          Vital Signs    Pre Systolic BP Rehab 134  -AE     Pre Treatment Diastolic BP 61  -AE     Pretreatment Heart Rate (beats/min) 69  -AE     Posttreatment Heart Rate (beats/min) 68  -AE     O2 Delivery Pre Treatment nasal cannula  -AE     O2 Delivery Intra Treatment nasal cannula  -AE     Post SpO2 (%) 98  -AE     O2 Delivery Post Treatment nasal cannula  -AE     Pre Patient Position Supine  -AE     Intra Patient Position Standing  -AE     Post Patient Position Sitting  -AE       Row Name 11/21/24 1448          Positioning and Restraints    Pre-Treatment Position in bed  -AE     Post Treatment Position chair  -AE     In Chair notified nsg;reclined;call light  within reach;encouraged to call for assist;exit alarm on;waffle cushion  -AE               User Key  (r) = Recorded By, (t) = Taken By, (c) = Cosigned By      Initials Name Provider Type    Harrison Coleman PT Physical Therapist                   Outcome Measures       Row Name 11/21/24 1452          How much help from another person do you currently need...    Turning from your back to your side while in flat bed without using bedrails? 3  -AE     Moving from lying on back to sitting on the side of a flat bed without bedrails? 3  -AE     Moving to and from a bed to a chair (including a wheelchair)? 3  -AE     Standing up from a chair using your arms (e.g., wheelchair, bedside chair)? 3  -AE     Climbing 3-5 steps with a railing? 2  -AE     To walk in hospital room? 3  -AE     AM-PAC 6 Clicks Score (PT) 17  -AE     Highest Level of Mobility Goal 5 --> Static standing  -AE       Row Name 11/21/24 1452          Functional Assessment    Outcome Measure Options AM-PAC 6 Clicks Basic Mobility (PT)  -AE               User Key  (r) = Recorded By, (t) = Taken By, (c) = Cosigned By      Initials Name Provider Type    Harrison Coleman PT Physical Therapist                                 Physical Therapy Education       Title: PT OT SLP Therapies (In Progress)       Topic: Physical Therapy (In Progress)       Point: Mobility training (Done)       Learning Progress Summary            Patient Acceptance, E, VU by AE at 11/21/2024 1312    Acceptance, E, VU,NR by SD at 11/18/2024 1120                      Point: Home exercise program (Not Started)       Learner Progress:  Not documented in this visit.              Point: Body mechanics (Done)       Learning Progress Summary            Patient Acceptance, E, VU by AE at 11/21/2024 1312    Acceptance, E, VU,NR by SD at 11/18/2024 1120                      Point: Precautions (Done)       Learning Progress Summary            Patient Acceptance, E, VU by AE at 11/21/2024 1312     Acceptance, E, VU,NR by SD at 11/18/2024 1120                                      User Key       Initials Effective Dates Name Provider Type Discipline    SD 03/13/23 -  Bertha Neely PT Physical Therapist PT    AE 09/21/21 -  Harrison Meyer PT Physical Therapist PT                  PT Recommendation and Plan     Progress: no change  Outcome Evaluation: Pt continues to present with decreased functional independence and decreased activity tolerance. Pt ambulated 60ft with CGA-min A and RW for support. Recommend continued skilled IP PT interventions. Recommend D/C to IRF.     Time Calculation:         PT Charges       Row Name 11/21/24 1453             Time Calculation    Start Time 1312  -AE      PT Received On 11/21/24  -AE      PT Goal Re-Cert Due Date 11/28/24  -AE         Timed Charges    12549 - PT Therapeutic Activity Minutes 28  -AE         Total Minutes    Timed Charges Total Minutes 28  -AE       Total Minutes 28  -AE                User Key  (r) = Recorded By, (t) = Taken By, (c) = Cosigned By      Initials Name Provider Type    AE Harrison Meyer PT Physical Therapist                  Therapy Charges for Today       Code Description Service Date Service Provider Modifiers Qty    99640050512 HC PT THERAPEUTIC ACT EA 15 MIN 11/21/2024 Harrison Meyer PT GP 2            PT G-Codes  Outcome Measure Options: AM-PAC 6 Clicks Basic Mobility (PT)  AM-PAC 6 Clicks Score (PT): 17  AM-PAC 6 Clicks Score (OT): 16  PT Discharge Summary  Anticipated Discharge Disposition (PT): inpatient rehabilitation facility    Harrison Meyer PT  11/21/2024

## 2024-11-21 NOTE — PLAN OF CARE
Goal Outcome Evaluation:  Plan of Care Reviewed With: patient        Progress: no change  Outcome Evaluation: Pt continues to present with decreased functional independence and decreased activity tolerance. Pt ambulated 60ft with CGA-min A and RW for support. Recommend continued skilled IP PT interventions. Recommend D/C to IRF.    Anticipated Discharge Disposition (PT): inpatient rehabilitation facility

## 2024-11-21 NOTE — ACP (ADVANCE CARE PLANNING)
Advance Care Planning     Date of Shared Decision Making Discussion: 11/21/24    Pt was/were present for the discussion.    Decision Maker:  wants to name yris REYES if she becomes unable.    SDMSI SUMMARY:    Patient/family describes current medical condition as more difficulty with more hospitalizations since fall with hip fracture last December.     Patient/family identified the following as being most important to living well: [place].      Explored understanding, benefits and burdens, goals for treatment, fears and concerns of  interventions as rehab, resuscitation.    Reviewed goals of care for additional medical interventions using decision making framework:      MOST form, Living Will and EMS DNR  introduced if not previously completed.    DECISIONS/RECOMMENDATIONS for FOLLOW-UP:   Reports she wants yris Dietz to be HCS and for him to make decisions if needed.  Also very clear that she would not want CPR or intubation and would let her son know herself.  Hospitalist updated and order entered.    Time started: 1318     Time ended: 1337    Total time: 19 minutes

## 2024-11-22 ENCOUNTER — APPOINTMENT (OUTPATIENT)
Dept: NEPHROLOGY | Facility: HOSPITAL | Age: 84
End: 2024-11-22
Payer: MEDICARE

## 2024-11-22 ENCOUNTER — APPOINTMENT (OUTPATIENT)
Dept: INTERVENTIONAL RADIOLOGY/VASCULAR | Facility: HOSPITAL | Age: 84
End: 2024-11-22
Payer: MEDICARE

## 2024-11-22 LAB
ALBUMIN SERPL-MCNC: 3.4 G/DL (ref 3.5–5.2)
ALBUMIN/GLOB SERPL: 1.6 G/DL
ALP SERPL-CCNC: 73 U/L (ref 39–117)
ALT SERPL W P-5'-P-CCNC: 14 U/L (ref 1–33)
ANION GAP SERPL CALCULATED.3IONS-SCNC: 12 MMOL/L (ref 5–15)
AST SERPL-CCNC: 22 U/L (ref 1–32)
BASOPHILS # BLD AUTO: 0.05 10*3/MM3 (ref 0–0.2)
BASOPHILS NFR BLD AUTO: 0.8 % (ref 0–1.5)
BILIRUB SERPL-MCNC: 0.4 MG/DL (ref 0–1.2)
BUN SERPL-MCNC: 74 MG/DL (ref 8–23)
BUN/CREAT SERPL: 18.8 (ref 7–25)
CALCIUM SPEC-SCNC: 9.7 MG/DL (ref 8.6–10.5)
CHLORIDE SERPL-SCNC: 110 MMOL/L (ref 98–107)
CO2 SERPL-SCNC: 19 MMOL/L (ref 22–29)
CREAT SERPL-MCNC: 3.94 MG/DL (ref 0.57–1)
DEPRECATED RDW RBC AUTO: 64.7 FL (ref 37–54)
EGFRCR SERPLBLD CKD-EPI 2021: 10.8 ML/MIN/1.73
EOSINOPHIL # BLD AUTO: 0.23 10*3/MM3 (ref 0–0.4)
EOSINOPHIL NFR BLD AUTO: 3.8 % (ref 0.3–6.2)
ERYTHROCYTE [DISTWIDTH] IN BLOOD BY AUTOMATED COUNT: 18.2 % (ref 12.3–15.4)
GLOBULIN UR ELPH-MCNC: 2.1 GM/DL
GLUCOSE BLDC GLUCOMTR-MCNC: 125 MG/DL (ref 70–130)
GLUCOSE BLDC GLUCOMTR-MCNC: 131 MG/DL (ref 70–130)
GLUCOSE BLDC GLUCOMTR-MCNC: 145 MG/DL (ref 70–130)
GLUCOSE BLDC GLUCOMTR-MCNC: 176 MG/DL (ref 70–130)
GLUCOSE SERPL-MCNC: 125 MG/DL (ref 65–99)
HAV IGM SERPL QL IA: NORMAL
HBV CORE IGM SERPL QL IA: NORMAL
HBV SURFACE AG SERPL QL IA: NORMAL
HCT VFR BLD AUTO: 26.8 % (ref 34–46.6)
HCV AB SER QL: NORMAL
HGB BLD-MCNC: 7.9 G/DL (ref 12–15.9)
IMM GRANULOCYTES # BLD AUTO: 0.03 10*3/MM3 (ref 0–0.05)
IMM GRANULOCYTES NFR BLD AUTO: 0.5 % (ref 0–0.5)
LYMPHOCYTES # BLD AUTO: 0.94 10*3/MM3 (ref 0.7–3.1)
LYMPHOCYTES NFR BLD AUTO: 15.4 % (ref 19.6–45.3)
MCH RBC QN AUTO: 28.8 PG (ref 26.6–33)
MCHC RBC AUTO-ENTMCNC: 29.5 G/DL (ref 31.5–35.7)
MCV RBC AUTO: 97.8 FL (ref 79–97)
MONOCYTES # BLD AUTO: 0.49 10*3/MM3 (ref 0.1–0.9)
MONOCYTES NFR BLD AUTO: 8 % (ref 5–12)
NEUTROPHILS NFR BLD AUTO: 4.37 10*3/MM3 (ref 1.7–7)
NEUTROPHILS NFR BLD AUTO: 71.5 % (ref 42.7–76)
NRBC BLD AUTO-RTO: 0 /100 WBC (ref 0–0.2)
PLATELET # BLD AUTO: 107 10*3/MM3 (ref 140–450)
PMV BLD AUTO: 12 FL (ref 6–12)
POTASSIUM SERPL-SCNC: 5.1 MMOL/L (ref 3.5–5.2)
PROT SERPL-MCNC: 5.5 G/DL (ref 6–8.5)
RBC # BLD AUTO: 2.74 10*6/MM3 (ref 3.77–5.28)
SODIUM SERPL-SCNC: 141 MMOL/L (ref 136–145)
WBC NRBC COR # BLD AUTO: 6.11 10*3/MM3 (ref 3.4–10.8)

## 2024-11-22 PROCEDURE — 82948 REAGENT STRIP/BLOOD GLUCOSE: CPT

## 2024-11-22 PROCEDURE — 80074 ACUTE HEPATITIS PANEL: CPT | Performed by: INTERNAL MEDICINE

## 2024-11-22 PROCEDURE — 85025 COMPLETE CBC W/AUTO DIFF WBC: CPT | Performed by: HOSPITALIST

## 2024-11-22 PROCEDURE — 94799 UNLISTED PULMONARY SVC/PX: CPT

## 2024-11-22 PROCEDURE — 80053 COMPREHEN METABOLIC PANEL: CPT | Performed by: HOSPITALIST

## 2024-11-22 PROCEDURE — 77001 FLUOROGUIDE FOR VEIN DEVICE: CPT

## 2024-11-22 PROCEDURE — 99233 SBSQ HOSP IP/OBS HIGH 50: CPT | Performed by: HOSPITALIST

## 2024-11-22 PROCEDURE — 25010000002 LIDOCAINE PF 1% 1 % SOLUTION

## 2024-11-22 PROCEDURE — 25010000002 HEPARIN (PORCINE) PER 1000 UNITS: Performed by: INTERNAL MEDICINE

## 2024-11-22 PROCEDURE — 5A1D70Z PERFORMANCE OF URINARY FILTRATION, INTERMITTENT, LESS THAN 6 HOURS PER DAY: ICD-10-PCS | Performed by: FAMILY MEDICINE

## 2024-11-22 PROCEDURE — 05HM33Z INSERTION OF INFUSION DEVICE INTO RIGHT INTERNAL JUGULAR VEIN, PERCUTANEOUS APPROACH: ICD-10-PCS | Performed by: RADIOLOGY

## 2024-11-22 PROCEDURE — 76937 US GUIDE VASCULAR ACCESS: CPT

## 2024-11-22 PROCEDURE — 94761 N-INVAS EAR/PLS OXIMETRY MLT: CPT

## 2024-11-22 PROCEDURE — 63710000001 INSULIN LISPRO (HUMAN) PER 5 UNITS: Performed by: NURSE PRACTITIONER

## 2024-11-22 PROCEDURE — 94664 DEMO&/EVAL PT USE INHALER: CPT

## 2024-11-22 PROCEDURE — 25010000002 HEPARIN (PORCINE) PER 1000 UNITS

## 2024-11-22 PROCEDURE — C1894 INTRO/SHEATH, NON-LASER: HCPCS

## 2024-11-22 PROCEDURE — C1752 CATH,HEMODIALYSIS,SHORT-TERM: HCPCS

## 2024-11-22 RX ORDER — HEPARIN SODIUM 1000 [USP'U]/ML
1000 INJECTION, SOLUTION INTRAVENOUS; SUBCUTANEOUS AS NEEDED
Status: DISCONTINUED | OUTPATIENT
Start: 2024-11-22 | End: 2024-11-27 | Stop reason: HOSPADM

## 2024-11-22 RX ORDER — LIDOCAINE HYDROCHLORIDE 10 MG/ML
INJECTION, SOLUTION EPIDURAL; INFILTRATION; INTRACAUDAL; PERINEURAL
Status: COMPLETED
Start: 2024-11-22 | End: 2024-11-22

## 2024-11-22 RX ORDER — HEPARIN SODIUM 1000 [USP'U]/ML
INJECTION, SOLUTION INTRAVENOUS; SUBCUTANEOUS
Status: COMPLETED
Start: 2024-11-22 | End: 2024-11-22

## 2024-11-22 RX ORDER — FENTANYL CITRATE 50 UG/ML
INJECTION, SOLUTION INTRAMUSCULAR; INTRAVENOUS
Status: DISPENSED
Start: 2024-11-22 | End: 2024-11-23

## 2024-11-22 RX ORDER — ALBUMIN (HUMAN) 12.5 G/50ML
12.5 SOLUTION INTRAVENOUS AS NEEDED
Status: ACTIVE | OUTPATIENT
Start: 2024-11-22 | End: 2024-11-23

## 2024-11-22 RX ADMIN — CLONIDINE HYDROCHLORIDE 0.1 MG: 0.1 TABLET ORAL at 17:25

## 2024-11-22 RX ADMIN — HYDRALAZINE HYDROCHLORIDE 100 MG: 50 TABLET ORAL at 05:09

## 2024-11-22 RX ADMIN — POLYETHYLENE GLYCOL 3350 17 G: 17 POWDER, FOR SOLUTION ORAL at 09:20

## 2024-11-22 RX ADMIN — IPRATROPIUM BROMIDE AND ALBUTEROL SULFATE 3 ML: 2.5; .5 SOLUTION RESPIRATORY (INHALATION) at 21:18

## 2024-11-22 RX ADMIN — CARVEDILOL 25 MG: 12.5 TABLET, FILM COATED ORAL at 20:19

## 2024-11-22 RX ADMIN — TERAZOSIN HYDROCHLORIDE ANHYDROUS 10 MG: 5 CAPSULE ORAL at 20:19

## 2024-11-22 RX ADMIN — Medication 10 ML: at 10:59

## 2024-11-22 RX ADMIN — Medication 10 ML: at 20:20

## 2024-11-22 RX ADMIN — IPRATROPIUM BROMIDE AND ALBUTEROL SULFATE 3 ML: 2.5; .5 SOLUTION RESPIRATORY (INHALATION) at 08:35

## 2024-11-22 RX ADMIN — PANTOPRAZOLE SODIUM 40 MG: 40 TABLET, DELAYED RELEASE ORAL at 17:25

## 2024-11-22 RX ADMIN — OXYCODONE 5 MG: 5 TABLET ORAL at 17:25

## 2024-11-22 RX ADMIN — HYDRALAZINE HYDROCHLORIDE 100 MG: 50 TABLET ORAL at 20:19

## 2024-11-22 RX ADMIN — FERROUS SULFATE TAB 325 MG (65 MG ELEMENTAL FE) 325 MG: 325 (65 FE) TAB at 09:21

## 2024-11-22 RX ADMIN — ASPIRIN 81 MG: 81 TABLET, COATED ORAL at 09:21

## 2024-11-22 RX ADMIN — CYANOCOBALAMIN TAB 1000 MCG 1000 MCG: 1000 TAB at 09:21

## 2024-11-22 RX ADMIN — ISOSORBIDE MONONITRATE 30 MG: 30 TABLET, EXTENDED RELEASE ORAL at 10:58

## 2024-11-22 RX ADMIN — CLONIDINE HYDROCHLORIDE 0.1 MG: 0.1 TABLET ORAL at 05:09

## 2024-11-22 RX ADMIN — HEPARIN SODIUM 2200 UNITS: 1000 INJECTION INTRAVENOUS; SUBCUTANEOUS at 13:46

## 2024-11-22 RX ADMIN — Medication 1 CAPSULE: at 09:21

## 2024-11-22 RX ADMIN — PANTOPRAZOLE SODIUM 40 MG: 40 TABLET, DELAYED RELEASE ORAL at 09:21

## 2024-11-22 RX ADMIN — CALCITRIOL CAPSULES 0.25 MCG 0.5 MCG: 0.25 CAPSULE ORAL at 09:20

## 2024-11-22 RX ADMIN — CLONIDINE HYDROCHLORIDE 0.1 MG: 0.1 TABLET ORAL at 00:05

## 2024-11-22 RX ADMIN — LIDOCAINE HYDROCHLORIDE 3 ML: 10 INJECTION, SOLUTION EPIDURAL; INFILTRATION; INTRACAUDAL; PERINEURAL at 13:47

## 2024-11-22 RX ADMIN — CLONIDINE HYDROCHLORIDE 0.1 MG: 0.1 TABLET ORAL at 12:47

## 2024-11-22 RX ADMIN — HEPARIN SODIUM 1000 UNITS: 1000 INJECTION INTRAVENOUS; SUBCUTANEOUS at 16:46

## 2024-11-22 RX ADMIN — OXYCODONE 5 MG: 5 TABLET ORAL at 02:59

## 2024-11-22 RX ADMIN — CLONIDINE HYDROCHLORIDE 0.1 MG: 0.1 TABLET ORAL at 23:27

## 2024-11-22 RX ADMIN — INSULIN LISPRO 2 UNITS: 100 INJECTION, SOLUTION INTRAVENOUS; SUBCUTANEOUS at 20:19

## 2024-11-22 NOTE — PLAN OF CARE
Problem: Hemodialysis  Goal: Safe, Effective Therapy Delivery  Outcome: Progressing  Goal: Effective Tissue Perfusion  Outcome: Progressing  Goal: Absence of Infection Signs and Symptoms  Outcome: Progressing  Goal Outcome Evaluation: HD complete, blood reinfused. Goal of 1 liter removal reached. Report called to primary RN Jerri.

## 2024-11-22 NOTE — CONSULTS
Patient welcomed  visit.  provided education regarding a living will and patient stated that she has one already, and that her son has it. I plan to call son and request that he submit a copy to hospital. She indicated no spiritual needs and was most interested in returning to watching a new channel.    Patient has completed a living will, per sonJ Luis, and I asked him to present a copy to the patient's RN when he is able to.

## 2024-11-22 NOTE — PROGRESS NOTES
Nutrition Services    Patient Name:  Yanique Webster  YOB: 1940  MRN: [id number]  Admit Date:  11/15/2024    Pt identified 2/2 LOS. No nutrition risk noted at this time. Wt fluctuations likely 2/2 fluid status. Patient eating adequately per documentation. Please consult for intake avg <50% or significant weight change.      Electronically signed by:  Heidi Banuelos RD  11/22/24 15:27 EST

## 2024-11-22 NOTE — CASE MANAGEMENT/SOCIAL WORK
Continued Stay Note  [facility]     Patient Name: Yanique Webster  MRN: [id number]  Today's Date: 11/22/2024    Admit Date: 11/15/2024    Plan: [facility]   Discharge Plan       Row Name 11/22/24 1412       Plan    Plan [facility]    Patient/Family in Agreement with Plan other (see comments)    Plan Comments Pt wa discussed in Medical Rounds today. Dr. Whitt states that Palliative team is following. He states that pt may need to start Hemodialysis and they are waiting to discuss this with family today. Referral had been made to [facility] for Acute rehab.  updated [facility]. Pt is not medically ready.  will continue to follow.                   Discharge Codes    No documentation.                 Expected Discharge Date and Time       Expected Discharge Date Expected Discharge Time    Nov 25, 2024               Romy Crockett RN

## 2024-11-22 NOTE — PROGRESS NOTES
[facility] Medicine Services  PROGRESS NOTE    Patient Name: Yanique Webster  : 1940  MRN: [id number]    Date of Admission: 11/15/2024  Primary Care Physician: Sebas Alicea MD    Subjective   Subjective     CC:  KINDRA on CKD    HPI:  NAEON.  Is more awake and alert this morning.  Creatinine stable.  Patient decided to start hemodialysis.  IR consulted by nephrology for dialysis line.  Plan for this afternoon.      Objective   Objective     Vital Signs:   Temp:  [96.7 °F (35.9 °C)-98.2 °F (36.8 °C)] 96.7 °F (35.9 °C)  Heart Rate:  [62-75] 68  Resp:  [18-20] 20  BP: (119-162)/(54-81) 135/64  Flow (L/min) (Oxygen Therapy):  [2] 2     Physical Exam:  Constitutional: No acute distress, awake, alert  HENT: NCAT, mucous membranes moist  Respiratory: Clear to auscultation bilaterally, respiratory effort normal   Cardiovascular: RRR, no murmurs, rubs, or gallops  Gastrointestinal: Positive bowel sounds, soft, nontender, nondistended  Musculoskeletal: No bilateral ankle edema  Psychiatric: Appropriate affect, cooperative  Neurologic: Oriented x 3, strength symmetric in all extremities, Cranial Nerves grossly intact to confrontation, speech clear  Skin: No rashes     Results Reviewed:  LAB RESULTS:      Lab 24  0435 24  0125 24  2214 24  0544 24  0512 24  0529 24  0456 11/15/24  1230   WBC 6.11  --   --  6.99 5.90 5.75 4.98 7.23   HEMOGLOBIN 7.9*  --  8.4* 8.5* 8.3* 8.7* 9.1* 9.2*   HEMATOCRIT 26.8*  --  28.0* 28.1* 28.2* 29.6* 30.4* 30.5*   PLATELETS 107*  --   --  101* 107* 126* 134* 151   NEUTROS ABS 4.37  --   --  4.74 3.80 3.63 3.05 4.84   IMMATURE GRANS (ABS) 0.03  --   --  0.02 0.01 0.03 0.01 0.02   LYMPHS ABS 0.94  --   --  1.26 1.16 1.14 1.08 1.24   MONOS ABS 0.49  --   --  0.62 0.61 0.63 0.54 0.78   EOS ABS 0.23  --   --  0.31 0.28 0.27 0.26 0.30   MCV 97.8*  --   --  96.9 98.9* 97.7* 96.8 96.5   PROCALCITONIN  --   --   --   --   --    --   --  0.11   LACTATE  --  0.6  --   --   --   --   --  0.9   PROTIME  --   --   --   --   --   --   --  18.0*   APTT  --   --   --   --   --   --   --  39.9*         Lab 11/22/24 0435 11/21/24  0848 11/21/24  0125 11/20/24  0710 11/19/24  0544 11/18/24  0512 11/17/24  0529 11/16/24  0456 11/15/24  1230   SODIUM 141 143 139 139 139   < > 139   < > 136   POTASSIUM 5.1 5.2 5.3* 5.1 4.9   < > 5.1   < > 5.1   CHLORIDE 110* 113* 112* 112* 110*   < > 109*   < > 106   CO2 19.0* 19.0* 16.0* 15.0* 16.0*   < > 17.0*   < > 16.0*   ANION GAP 12.0 11.0 11.0 12.0 13.0   < > 13.0   < > 14.0   BUN 74* 78* 73* 71* 72*   < > 71*   < > 80*   CREATININE 3.94* 4.08* 4.04* 3.94* 4.19*   < > 4.00*   < > 4.18*   EGFR 10.8* 10.4* 10.5* 10.8* 10.0*   < > 10.6*   < > 10.1*   GLUCOSE 125* 125* 131* 111* 122*   < > 107*   < > 109*   CALCIUM 9.7 9.7 9.7 9.4 9.7   < > 9.3  9.5   < > 9.5   MAGNESIUM  --   --   --   --   --   --   --   --  2.0   PHOSPHORUS  --   --   --   --   --   --  4.8*  --   --     < > = values in this interval not displayed.         Lab 11/22/24 0435 11/21/24 0125 11/17/24  0529 11/15/24  1230   TOTAL PROTEIN 5.5* 5.7*  --  5.9*   ALBUMIN 3.4* 3.6 3.7 3.8   GLOBULIN 2.1 2.1  --  2.1   ALT (SGPT) 14 13  --  13   AST (SGOT) 22 22  --  24   BILIRUBIN 0.4 0.4  --  0.4   ALK PHOS 73 71  --  83         Lab 11/15/24  1230   PROTIME 18.0*   INR 1.48*             Lab 11/17/24  0529 11/15/24  1334   IRON 61  --    IRON SATURATION (TSAT) 24  --    TIBC 256*  --    TRANSFERRIN 172*  --    ABO TYPING  --  O   RH TYPING  --  Negative   ANTIBODY SCREEN  --  Negative         Lab 11/21/24  0856 11/21/24  0511 11/21/24  0120   PH, ARTERIAL 7.277* 7.276* 7.255*   PCO2, ARTERIAL 42.3 43.5 39.7   PO2 .0 92.8 103.0   FIO2 28 28 28   HCO3 ART 19.7* 20.2 17.6*   BASE EXCESS ART -6.6* -6.2* -8.9*   CARBOXYHEMOGLOBIN 1.7 1.5 1.6     Brief Urine Lab Results  (Last result in the past 365 days)        Color   Clarity   Blood   Leuk Est    Nitrite   Protein   CREAT   Urine HCG        11/15/24 1410 Yellow   Clear   Negative   Negative   Negative   100 mg/dL (2+)                   Microbiology Results Abnormal       None            CT Head Without Contrast    Result Date: 11/21/2024  CT HEAD WO CONTRAST Date of Exam: 11/21/2024 12:06 PM EST Indication: Encephalopathy. Comparison: Head CT 7/6/2021 Technique: Axial CT images were obtained of the head without contrast administration.  Automated exposure control and iterative construction methods were used. Findings: No acute intracranial hemorrhage. Redemonstration of chronic infarct in the left occipital lobe. Redemonstration of chronic lacunar infarct of the right thalamus. Redemonstration of small chronic linear infarcts in the right and left cerebellar hemispheres. No evidence of acute large territory infarct. There are scattered white matter hypodensities which are nonspecific and can be seen in the setting of chronic small vessel ischemic change. No extra-axial collections. No midline shift or herniation. Normal size and configuration of the ventricles. There is intracranial atherosclerosis. Unremarkable appearance of the orbits. There is minimal mucosal thickening in the inferior left frontal sinus with otherwise clear paranasal sinuses. There are bilateral mastoid air cell effusions, nonspecific, new or worsened compared to prior head CT. No acute or suspicious bony findings.     Impression: Impression: No acute intracranial findings. Chronic scattered infarcts appear similar to prior. New or increased bilateral mastoid air cell effusions, nonspecific. Electronically Signed: Iglesia Bernard MD  11/21/2024 12:58 PM EST  Workstation ID: [id number]    XR Chest 1 View    Result Date: 11/21/2024  XR CHEST 1 VW Date of Exam: 11/21/2024 1:42 AM EST Indication: Abdominal breathing Comparison: 11/19/2024. Findings: There are no airspace consolidations. Small bilateral pleural effusions are present. No  pneumothorax. The pulmonary vasculature appears mildly indistinct. The heart appears enlarged. Findings appear similar to improved as compared to the previous study. No acute osseous abnormality identified.     Impression: Impression: Mild pulmonary edema pattern with small bilateral pleural effusions, similar to improved as compared to the previous study. Electronically Signed: Talita Murrell MD  11/21/2024 1:49 AM EST  Workstation ID: [id number]     Results for orders placed during the hospital encounter of 10/30/24    Adult Transthoracic Echo Complete W/ Cont if Necessary Per Protocol    Interpretation Summary    Left ventricular systolic function is normal. Estimated left ventricular EF = 60%    Left ventricular wall thickness is consistent with mild concentric hypertrophy.    The left atrial cavity is moderately dilated.    Aortic sclerosis without aortic stenosis.  Mild to moderate aortic insufficiency    Mild mitral regurgitation.      Current medications:  Scheduled Meds:aspirin, 81 mg, Oral, Daily  [Held by provider] atorvastatin, 80 mg, Oral, Daily  calcitriol, 0.5 mcg, Oral, Daily  carvedilol, 25 mg, Oral, Q12H  fentaNYL, 1 patch, Transdermal, Q72H   And  Check Fentanyl Patch Placement, 1 each, Not Applicable, Q12H  cloNIDine, 0.1 mg, Oral, Q6H  ferrous sulfate, 325 mg, Oral, Daily With Breakfast  hydrALAZINE, 100 mg, Oral, Q8H  insulin lispro, 2-7 Units, Subcutaneous, 4x Daily AC & at Bedtime  ipratropium-albuterol, 3 mL, Nebulization, 4x Daily - RT  isosorbide mononitrate, 30 mg, Oral, Daily  lactobacillus acidophilus, 1 capsule, Oral, Daily  pantoprazole, 40 mg, Oral, BID AC  polyethylene glycol, 17 g, Oral, QAM  [Held by provider] rivaroxaban, 15 mg, Oral, Daily  sodium chloride, 10 mL, Intravenous, Q12H  terazosin, 10 mg, Oral, Nightly  vitamin B-12, 1,000 mcg, Oral, Daily      Continuous Infusions:       PRN Meds:.  acetaminophen    senna-docusate sodium **AND** polyethylene glycol **AND** bisacodyl  **AND** bisacodyl    dextrose    dextrose    diphenhydrAMINE    doxepin    glucagon (human recombinant)    ipratropium-albuterol    melatonin    naloxone    nitroglycerin    oxyCODONE    sodium chloride    sodium chloride    sodium chloride    [Held by provider] torsemide    Assessment & Plan   Assessment & Plan     Active Hospital Problems    Diagnosis  POA    **Acute on chronic renal failure [N17.9, N18.9]  Yes    Abnormal laboratory test [R89.9]  Yes    Dyspnea [R06.00]  Yes    Anemia, chronic disease [D63.8]  Yes    Paroxysmal atrial fibrillation [I48.0]  Yes    Essential hypertension [I10]  Yes    Type 2 diabetes mellitus [E11.9]  Yes      Resolved Hospital Problems   No resolved problems to display.        Brief Hospital Course to date:  Yanique Webster is a 84 y.o. female with PMH of Afib on Xarelto, HTN, CKD, prior stroke, T2DM with neuropathy, chronic anemia, and GERD who presented from [facility] with KINDRA on CKD.    Copied text in this note has been reviewed and is accurate as of 11/22/2024      KINDRA on advanced CKD.  High anion gap metabolic acidosis likely secondary to the above  --baseline unclear. last admission pt's baseline was documented 1.9-2.2 but wasn't clear if her CKD had advanced.  When dc'd from hospital 11/8/24 creatinine was 3.76. While she has been at [facility], it has stayed in 4 range. On 11/14, was 4.3, bumped to 4.5 on day of admission here (11/15/24). Continued worsening of Cr today.   -Creatinine trending down slowly to 3.9> 4.1.  --Renal ultrasound showed chronic kidney disease with single 3.5 cm left renal cyst.  -- Patient will likely need dialysis in the near future.  Management per nephrology  Patient decided to start hemodialysis.  IR consulted by nephrology for dialysis line.  Plan for this afternoon.  Dialysis either later today or tomorrow.  Nephrology on board.  Appreciate their help.    Dyspnea  --hold off on further IVF for now as she received 1L at [facility] 11/14/24 and just under  0.5L in ED   --sats stable on RA  -- CT chest without contrast shows small bilateral effusions as well as atelectasis  -Repeated chest x-ray on 11/19 showed cardiomegaly and pulmonary vascular congestion with prominent tissue   -- encourage use of IS     TCP  -- new and mild. Monitor for now (slight drop today)      PAF  --xarelto restarted a few days ago, was held on dc per cardiology recs but family requested it be restarted due to stroke risk  --will hold for now due to renal function     HTN. Improving  -- Patient on Coreg 25 mg twice daily  -- Hydralazine 100 mg 3 times daily  -- Clonidine 0.1 4 times daily.  -- Imdur 30 mg daily.  -- Terazosin 5 mg nightly. Increased to 10 mg.  -- Renal recommends tight BP control to improve renal function.     T2DM  --SSI     Anemia of chronic disease  --hgb stable    Insomnia  -- will add Benadryl for tonight as Melatonin not working     Vitamin D deficiency.  --25-hydroxy level low at 18.  -- Continue Calcitrol.    Acute encephalopathy Multifactorial: Likely metabolic and hospital delirium  Chronic pain: On fentanyl patch.  - Due to mentation.  Plan was to monitor patient off the fentanyl patch.  Patient previously was on morphine but that was DC'd due to kidney functions.  I personally discussed the case with Dr. Willard.  She recommended to keep the low-dose of fentanyl patch at this point.  Will minimize the as needed opioids.  -Will discuss with palliative care pain management going forward.    Goals of care.  Palliative care consulted. Patient now is DNR/DNI. Pt is interested in hemodialysis. I discussed the case with nephrology Dr. Levine. Patient is heading toward hemodialysis.  I personally called son Jun and his wife with updated plans. They are interested to talk to the patient until palliative care team to discuss starting hemodialysis versus other options,  I updated Dr. Willard with the request.  Patient/family now agreeable to start hemodialysis.  Nephrology  updated.    Expected Discharge Location and Transportation: Rehab.  Patient will likely need hemodialysis chair as an outpatient.  CM to facilitate the discharge process.  Expected Discharge   Expected Discharge Date: 11/25/2024; Expected Discharge Time:      VTE Prophylaxis:  Pharmacologic & mechanical VTE prophylaxis orders are present.         AM-PAC 6 Clicks Score (PT): 17 (11/21/24 2003)    CODE STATUS:   Code Status and Medical Interventions: No CPR (Do Not Attempt to Resuscitate); Limited Support; No intubation (DNI)   Ordered at: 11/21/24 1414     Medical Intervention Limits:    No intubation (DNI)     Level Of Support Discussed With:    Patient     Code Status (Patient has no pulse and is not breathing):    No CPR (Do Not Attempt to Resuscitate)     Medical Interventions (Patient has pulse or is breathing):    Limited Support       Maeve Whitt MD  11/22/24

## 2024-11-22 NOTE — PLAN OF CARE
Goal Outcome Evaluation:         Pt had first dialysis today, NSR on tele, safety precautions and maintained.

## 2024-11-22 NOTE — PROGRESS NOTES
"Palliative Care Daily Progress Note     Referring: Senchris Caiobrijesh    C/C: none    S: Medical record reviewed.  Events noted. Eating well.  Did tell her son re: decision to change code status.  Asked a few questions re: HD and discussed in greater detail.  Agreed for provider to update son.    ROS: Denied pain or SOA when seen.    O: Code Status:   Code Status and Medical Interventions: No CPR (Do Not Attempt to Resuscitate); Limited Support; No intubation (DNI)   Ordered at: 11/21/24 1414     Medical Intervention Limits:    No intubation (DNI)     Level Of Support Discussed With:    Patient     Code Status (Patient has no pulse and is not breathing):    No CPR (Do Not Attempt to Resuscitate)     Medical Interventions (Patient has pulse or is breathing):    Limited Support      Advanced Directives: Advance Directive Status: Patient has advance directive, copy requested   Goals of Care: Ongoing.   Palliative Performance Scale Score: 50%     /64 (BP Location: Left arm, Patient Position: Lying)   Pulse 68   Temp 96.7 °F (35.9 °C) (Axillary)   Resp 20   Ht 162.6 cm (64\")   Wt 83.2 kg (183 lb 8 oz)   LMP  (LMP Unknown)   SpO2 99%   BMI 31.50 kg/m²     Intake/Output Summary (Last 24 hours) at 11/22/2024 0921  Last data filed at 11/21/2024 2100  Gross per 24 hour   Intake 200 ml   Output 750 ml   Net -550 ml       Physical Exam:    General Appearance:    Alert, cooperative, NAD   HEENT:    NC/AT, EOMI, anicteric, MMM, face relaxed   Neck:   supple, trachea midline, no JVD   Lungs:     CTA bilat, diminished in bases; respirations regular, even     and unlabored    Heart:    RRR, normal S1 and S2, no M/R/G   Abdomen:     Normal bowel sounds, soft, nontender, nondistended   G/U:   Deferred   MSK/Extremities:   No clubbing , cyanosis or edema, No wasting   Pulses:   Pulses palpable and equal bilaterally   Skin:   Warm, dry, no mottling   Neurologic:   A/Ox4, cooperative, moves extremities x 4, no tremor, nl     " tone   Psych:   Calm, appropriate       Current Medications:      Current Facility-Administered Medications:     acetaminophen (TYLENOL) tablet 650 mg, 650 mg, Oral, Q4H PRN, BurlingtonAubree morales APRN, 650 mg at 11/20/24 0506    aspirin EC tablet 81 mg, 81 mg, Oral, Daily, [place], Aubree, APRN, 81 mg at 11/21/24 0921    [Held by provider] atorvastatin (LIPITOR) tablet 80 mg, 80 mg, Oral, Daily, [place], Aubree, APRN, 80 mg at 11/21/24 0920    sennosides-docusate (PERICOLACE) 8.6-50 MG per tablet 2 tablet, 2 tablet, Oral, BID PRN **AND** polyethylene glycol (MIRALAX) packet 17 g, 17 g, Oral, Daily PRN **AND** bisacodyl (DULCOLAX) EC tablet 5 mg, 5 mg, Oral, Daily PRN **AND** bisacodyl (DULCOLAX) suppository 10 mg, 10 mg, Rectal, Daily PRN, Aubree Grubbs APRN    calcitriol (ROCALTROL) capsule 0.5 mcg, 0.5 mcg, Oral, Daily, BurlingtonAubree morales APRN, 0.5 mcg at 11/21/24 0922    carvedilol (COREG) tablet 25 mg, 25 mg, Oral, Q12H, Evonne Love MD, 25 mg at 11/21/24 2003    fentaNYL (DURAGESIC) 12 MCG/HR 1 patch, 1 patch, Transdermal, Q72H, 1 patch at 11/21/24 1735 **AND** Check Fentanyl Patch Placement, 1 each, Not Applicable, Q12H, Maeve Whitt MD    cloNIDine (CATAPRES) tablet 0.1 mg, 0.1 mg, Oral, Q6H, Aubree Grubbs APRN, 0.1 mg at 11/22/24 0509    dextrose (D50W) (25 g/50 mL) IV injection 25 g, 25 g, Intravenous, Q15 Min PRN, Aubree Grubbs APRN    dextrose (GLUTOSE) oral gel 15 g, 15 g, Oral, Q15 Min PRN, [place], Aubree, APRN    diphenhydrAMINE (BENADRYL) capsule 25 mg, 25 mg, Oral, Nightly PRN, Evonne Love MD, 25 mg at 11/19/24 2104    doxepin (SINEquan) capsule 10 mg, 10 mg, Oral, Nightly PRN, Aubree Grubbs APRN, 10 mg at 11/17/24 0235    ferrous sulfate tablet 325 mg, 325 mg, Oral, Daily With Breakfast, Aubree Grubbs APRN, 325 mg at 11/21/24 0920    glucagon (GLUCAGEN) injection 1 mg, 1 mg, Intramuscular, Q15 Min PRN, Aubree Grubbs APRN    hydrALAZINE (APRESOLINE) tablet 100 mg, 100 mg, Oral, Q8H, Aubree Grubbs,  APRN, 100 mg at 11/22/24 0509    Insulin Lispro (humaLOG) injection 2-7 Units, 2-7 Units, Subcutaneous, 4x Daily AC & at Bedtime, Aubree Grubbs APRN, 2 Units at 11/21/24 1729    ipratropium-albuterol (DUO-NEB) nebulizer solution 3 mL, 3 mL, Nebulization, Q4H PRN, Aubree Grubbs APRN    ipratropium-albuterol (DUO-NEB) nebulizer solution 3 mL, 3 mL, Nebulization, 4x Daily - RT, Lilo Owens MD, 3 mL at 11/22/24 0835    isosorbide mononitrate (IMDUR) 24 hr tablet 30 mg, 30 mg, Oral, Daily, Evonne Love MD, 30 mg at 11/21/24 0922    lactobacillus acidophilus (RISAQUAD) capsule 1 capsule, 1 capsule, Oral, Daily, Aubree Grubbs APRN, 1 capsule at 11/21/24 0923    melatonin tablet 10 mg, 10 mg, Oral, Nightly PRN, Evonne Love MD, 10 mg at 11/18/24 2103    naloxone (NARCAN) nasal spray 1 spray, 1 spray, Nasal, PRN, Aubree Grubbs APRN    nitroglycerin (NITROSTAT) SL tablet 0.4 mg, 0.4 mg, Sublingual, Q5 Min PRN, Aubree Grubbs APRN    oxyCODONE (ROXICODONE) immediate release tablet 5 mg, 5 mg, Oral, Q4H PRN, Aubree Grubbs APRN, 5 mg at 11/22/24 0259    pantoprazole (PROTONIX) EC tablet 40 mg, 40 mg, Oral, BID AC, Aubree Grubbs APRN, 40 mg at 11/21/24 1728    polyethylene glycol (MIRALAX) packet 17 g, 17 g, Oral, QAM, Aubree Grubbs APRN, 17 g at 11/21/24 0924    [Held by provider] rivaroxaban (XARELTO) tablet 15 mg, 15 mg, Oral, Daily, Maeve Whitt MD, 15 mg at 11/21/24 0921    sodium chloride 0.9 % flush 10 mL, 10 mL, Intravenous, PRN, [place], Aubree, APRN    sodium chloride 0.9 % flush 10 mL, 10 mL, Intravenous, Q12H, [place], Aubree, CHRIS, 10 mL at 11/21/24 2003    sodium chloride 0.9 % flush 10 mL, 10 mL, Intravenous, PRN, [place], Aubree, APRN    sodium chloride 0.9 % infusion 40 mL, 40 mL, Intravenous, PRN, [place], Aubree, APRN    terazosin (HYTRIN) capsule 10 mg, 10 mg, Oral, Nightly, Maeve Whitt MD, 10 mg at 11/21/24 2003    [Held by provider] torsemide (DEMADEX) tablet 20 mg, 20 mg, Oral, Daily  PRN, Aubree Grubbs APRN    vitamin B-12 (CYANOCOBALAMIN) tablet 1,000 mcg, 1,000 mcg, Oral, Daily, Aubree Grubbs APRN, 1,000 mcg at 11/21/24 0922     Labs:   Results from last 7 days   Lab Units 11/22/24  0435   WBC 10*3/mm3 6.11   HEMOGLOBIN g/dL 7.9*   HEMATOCRIT % 26.8*   PLATELETS 10*3/mm3 107*     Results from last 7 days   Lab Units 11/22/24  0435   SODIUM mmol/L 141   POTASSIUM mmol/L 5.1   CHLORIDE mmol/L 110*   CO2 mmol/L 19.0*   BUN mg/dL 74*   CREATININE mg/dL 3.94*   CALCIUM mg/dL 9.7   BILIRUBIN mg/dL 0.4   ALK PHOS U/L 73   ALT (SGPT) U/L 14   AST (SGOT) U/L 22   GLUCOSE mg/dL 125*     Imaging Results (Last 72 Hours)       Procedure Component Value Units Date/Time    CT Head Without Contrast [[id number]] Collected: 11/21/24 1254     Updated: 11/21/24 1301    Narrative:      CT HEAD WO CONTRAST    Date of Exam: 11/21/2024 12:06 PM EST    Indication: Encephalopathy.    Comparison: Head CT 7/6/2021    Technique: Axial CT images were obtained of the head without contrast administration.  Automated exposure control and iterative construction methods were used.      Findings:  No acute intracranial hemorrhage. Redemonstration of chronic infarct in the left occipital lobe. Redemonstration of chronic lacunar infarct of the right thalamus. Redemonstration of small chronic linear infarcts in the right and left cerebellar   hemispheres. No evidence of acute large territory infarct. There are scattered white matter hypodensities which are nonspecific and can be seen in the setting of chronic small vessel ischemic change. No extra-axial collections. No midline shift or   herniation. Normal size and configuration of the ventricles. There is intracranial atherosclerosis. Unremarkable appearance of the orbits. There is minimal mucosal thickening in the inferior left frontal sinus with otherwise clear paranasal sinuses.   There are bilateral mastoid air cell effusions, nonspecific, new or worsened compared to prior head  CT. No acute or suspicious bony findings.      Impression:      Impression:  No acute intracranial findings.    Chronic scattered infarcts appear similar to prior.    New or increased bilateral mastoid air cell effusions, nonspecific.        Electronically Signed: Iglesia Bernard MD    11/21/2024 12:58 PM EST    Workstation ID: [id number]    XR Chest 1 View [[id number]] Collected: 11/21/24 0148     Updated: 11/21/24 0152    Narrative:      XR CHEST 1 VW    Date of Exam: 11/21/2024 1:42 AM EST    Indication: Abdominal breathing    Comparison: 11/19/2024.    Findings:  There are no airspace consolidations. Small bilateral pleural effusions are present. No pneumothorax. The pulmonary vasculature appears mildly indistinct. The heart appears enlarged. Findings appear similar to improved as compared to the previous study.   No acute osseous abnormality identified.      Impression:      Impression:  Mild pulmonary edema pattern with small bilateral pleural effusions, similar to improved as compared to the previous study.        Electronically Signed: Talita Murrell MD    11/21/2024 1:49 AM EST    Workstation ID: [id number]    XR Chest 1 View [[id number]] Collected: 11/19/24 2238     Updated: 11/19/24 2243    Narrative:      XR CHEST 1 VW    Date of Exam: 11/19/2024 9:56 PM EST    Indication: dypsnea    Comparison: 10/30/2024    Findings:  Cardiomegaly is stable. Upper lobe vessels are prominent consistent with pulmonary vascular congestion. Interstitial markings are also prominent throughout both lungs suggesting developing interstitial edema. No definite pleural effusion or pneumothorax.   Bony structures are unremarkable.      Impression:      Impression:    1. Cardiomegaly and pulmonary vascular congestion with prominent tissue markings likely related to interstitial edema.      Electronically Signed: Tod Tomlin MD    11/19/2024 10:39 PM EST    Workstation ID: [id number]                  Diagnostics: Reviewed    A:     Acute  on chronic renal failure    Type 2 diabetes mellitus    Essential hypertension    Paroxysmal atrial fibrillation    Anemia, chronic disease    Dyspnea    Abnormal laboratory test       Impression:  A/CKD  DM 2  Htn  PAF  Anemia of chronic disease  Hx chronic pain    Symptoms:   Debility    P:   Spoke with son.  Reviewed pt decisions re: code status and to try HD.  Expressed support.  Also reports has LW at home and will bring.  Reviewed with hospitalist and renal. Palliative Care Team will continue to follow patient.     Conchita Willard MD, 11/22/2024, 09:21 EST

## 2024-11-22 NOTE — H&P (VIEW-ONLY)
" LOS: 6 days   Patient Care Team:  Sebas Alicea MD as PCP - General (Family Medicine)  Steve Geronimo MD as Consulting Physician (Cardiology)  Emilio Regalado MD as Consulting Physician (Endocrinology)  Axel Ribeiro MD as Consulting Physician (Cardiology)        Subjective     Discussed with patient and son ( on the ph). Patient wants to try dialysis and see if she can tolerate. Plan for temporary HD cath today and HD afterwards.     Objective     Vital Sign Min/Max for last 24 hours  Temp  Min: 96.7 °F (35.9 °C)  Max: 98.2 °F (36.8 °C)   BP  Min: 119/54  Max: 162/68   Pulse  Min: 62  Max: 75   Resp  Min: 18  Max: 20   SpO2  Min: 97 %  Max: 100 %   Flow (L/min) (Oxygen Therapy)  Min: 2  Max: 2   No data recorded     Flowsheet Rows      Flowsheet Row First Filed Value   Admission Height 162.6 cm (64\") Documented at 11/15/2024 1228   Admission Weight 85.7 kg (189 lb) Documented at 11/15/2024 1228            I/O this shift:  In: -   Out: 300 [Urine:300]  I/O last 3 completed shifts:  In: 200 [P.O.:200]  Out: 1650 [Urine:1650]    Objective:  General Appearance:  Comfortable.    Vital signs: (most recent): Blood pressure 155/68, pulse 68, temperature 97.8 °F (36.6 °C), temperature source Oral, resp. rate 18, height 162.6 cm (64\"), weight 83.2 kg (183 lb 8 oz), SpO2 99%.    Output: Producing urine.    Lungs:  Normal effort.    Abdomen: Abdomen is soft.  Bowel sounds are normal.     Extremities: Normal range of motion.  There is no dependent edema or local swelling.    Pulses: Distal pulses are intact.    Neurological: Patient is alert and oriented to person, place and time.  Normal strength.    Pupils:  Pupils are equal, round, and reactive to light.    Skin:  Warm.              Awake, NAD  LE no edema   Abd soft  Neuro non focal   Lungs clear  S1S2  Results Review:     I reviewed the patient's new clinical results.    WBC WBC   Date Value Ref Range Status   11/22/2024 6.11 3.40 - 10.80 10*3/mm3 Final    " "  HGB Hemoglobin   Date Value Ref Range Status   11/22/2024 7.9 (L) 12.0 - 15.9 g/dL Final   11/19/2024 8.4 (L) 12.0 - 15.9 g/dL Final      HCT Hematocrit   Date Value Ref Range Status   11/22/2024 26.8 (L) 34.0 - 46.6 % Final   11/19/2024 28.0 (L) 34.0 - 46.6 % Final      Platlets No results found for: \"LABPLAT\"   MCV MCV   Date Value Ref Range Status   11/22/2024 97.8 (H) 79.0 - 97.0 fL Final          Sodium Sodium   Date Value Ref Range Status   11/22/2024 141 136 - 145 mmol/L Final   11/21/2024 143 136 - 145 mmol/L Final   11/21/2024 139 136 - 145 mmol/L Final   11/20/2024 139 136 - 145 mmol/L Final      Potassium Potassium   Date Value Ref Range Status   11/22/2024 5.1 3.5 - 5.2 mmol/L Final   11/21/2024 5.2 3.5 - 5.2 mmol/L Final   11/21/2024 5.3 (H) 3.5 - 5.2 mmol/L Final   11/20/2024 5.1 3.5 - 5.2 mmol/L Final      Chloride Chloride   Date Value Ref Range Status   11/22/2024 110 (H) 98 - 107 mmol/L Final   11/21/2024 113 (H) 98 - 107 mmol/L Final   11/21/2024 112 (H) 98 - 107 mmol/L Final   11/20/2024 112 (H) 98 - 107 mmol/L Final      CO2 CO2   Date Value Ref Range Status   11/22/2024 19.0 (L) 22.0 - 29.0 mmol/L Final   11/21/2024 19.0 (L) 22.0 - 29.0 mmol/L Final   11/21/2024 16.0 (L) 22.0 - 29.0 mmol/L Final   11/20/2024 15.0 (L) 22.0 - 29.0 mmol/L Final      BUN BUN   Date Value Ref Range Status   11/22/2024 74 (H) 8 - 23 mg/dL Final   11/21/2024 78 (H) 8 - 23 mg/dL Final   11/21/2024 73 (H) 8 - 23 mg/dL Final   11/20/2024 71 (H) 8 - 23 mg/dL Final      Creatinine Creatinine   Date Value Ref Range Status   11/22/2024 3.94 (H) 0.57 - 1.00 mg/dL Final   11/21/2024 4.08 (H) 0.57 - 1.00 mg/dL Final   11/21/2024 4.04 (H) 0.57 - 1.00 mg/dL Final   11/20/2024 3.94 (H) 0.57 - 1.00 mg/dL Final      Calcium Calcium   Date Value Ref Range Status   11/22/2024 9.7 8.6 - 10.5 mg/dL Final   11/21/2024 9.7 8.6 - 10.5 mg/dL Final   11/21/2024 9.7 8.6 - 10.5 mg/dL Final   11/20/2024 9.4 8.6 - 10.5 mg/dL Final      PO4 No " "results found for: \"CAPO4\"   Albumin Albumin   Date Value Ref Range Status   11/22/2024 3.4 (L) 3.5 - 5.2 g/dL Final   11/21/2024 3.6 3.5 - 5.2 g/dL Final        Magnesium No results found for: \"MG\"     Uric Acid No results found for: \"URICACID\"       Assessment & Plan       Acute on chronic renal failure    Type 2 diabetes mellitus    Essential hypertension    Paroxysmal atrial fibrillation    Anemia, chronic disease    Dyspnea    Abnormal laboratory test      Assessment & Plan  ====================================  1.  Progression of CKD: Likely esrd. Discussed with patient and family. Wants to try iHD. She may be able to do 2 x weekly. Plan for HD cath placement today and HD afterwards.      2.  CKD stage IV: Baseline creatinine 1.9-2.2 mg/dL, since last admission was running between 3.2-3.5 range, history of proteinuric renal disease UPC 2 g in the past duplex scan negative in 2016, on previous hospitalization discussion with daughter-in-law anesthesiologist, was explained regard to advanced renal dysfunction possibility of dialysis versus conservative treatment.     3.  Anemia of CKD: EDISON now on HD days.      4.  Volume status:     5.  Hypertension: No ANABEL per duplex in 2016.  On terazosin, carvedilol, hydralazine, clonidine     6.  Metabolic acidosis: Management with HD now.     7.  Hyperparathyroidism: On calcitriol 0.5 mcg daily     8.  Iron deficiency on oral iron     9.  History of CAD     10.  COPD     11.  Hyperlipidemia     12.  Anxiety and depression.      RENAL US 11/18/24: Right K 8.9 cm and Left K 9.6 cm      Plan and recommendation.  Plan for 1st HD session today only 2 hr treatment. 1 liter UF  Would recommend swtiching AC to eliquis 2.5 mg BID from xeralto.   EDISON on HD days.     I discussed the patients findings and my recommendations with patient        Zurdo Levine MD  11/22/24  13:07 EST            "

## 2024-11-23 LAB
ANION GAP SERPL CALCULATED.3IONS-SCNC: 9 MMOL/L (ref 5–15)
BUN SERPL-MCNC: 55 MG/DL (ref 8–23)
BUN/CREAT SERPL: 20.1 (ref 7–25)
CALCIUM SPEC-SCNC: 9.7 MG/DL (ref 8.6–10.5)
CHLORIDE SERPL-SCNC: 110 MMOL/L (ref 98–107)
CO2 SERPL-SCNC: 23 MMOL/L (ref 22–29)
CREAT SERPL-MCNC: 2.73 MG/DL (ref 0.57–1)
EGFRCR SERPLBLD CKD-EPI 2021: 16.8 ML/MIN/1.73
GLUCOSE BLDC GLUCOMTR-MCNC: 185 MG/DL (ref 70–130)
GLUCOSE SERPL-MCNC: 127 MG/DL (ref 65–99)
POTASSIUM SERPL-SCNC: 5.2 MMOL/L (ref 3.5–5.2)
SODIUM SERPL-SCNC: 142 MMOL/L (ref 136–145)

## 2024-11-23 PROCEDURE — 94664 DEMO&/EVAL PT USE INHALER: CPT

## 2024-11-23 PROCEDURE — 94799 UNLISTED PULMONARY SVC/PX: CPT

## 2024-11-23 PROCEDURE — 25010000002 HEPARIN (PORCINE) PER 1000 UNITS: Performed by: INTERNAL MEDICINE

## 2024-11-23 PROCEDURE — 99233 SBSQ HOSP IP/OBS HIGH 50: CPT | Performed by: FAMILY MEDICINE

## 2024-11-23 PROCEDURE — 80048 BASIC METABOLIC PNL TOTAL CA: CPT | Performed by: HOSPITALIST

## 2024-11-23 PROCEDURE — 82948 REAGENT STRIP/BLOOD GLUCOSE: CPT

## 2024-11-23 RX ADMIN — Medication 1 CAPSULE: at 10:18

## 2024-11-23 RX ADMIN — HYDRALAZINE HYDROCHLORIDE 100 MG: 50 TABLET ORAL at 13:44

## 2024-11-23 RX ADMIN — IPRATROPIUM BROMIDE AND ALBUTEROL SULFATE 3 ML: 2.5; .5 SOLUTION RESPIRATORY (INHALATION) at 04:22

## 2024-11-23 RX ADMIN — IPRATROPIUM BROMIDE AND ALBUTEROL SULFATE 3 ML: 2.5; .5 SOLUTION RESPIRATORY (INHALATION) at 19:54

## 2024-11-23 RX ADMIN — CARVEDILOL 25 MG: 12.5 TABLET, FILM COATED ORAL at 10:17

## 2024-11-23 RX ADMIN — IPRATROPIUM BROMIDE AND ALBUTEROL SULFATE 3 ML: 2.5; .5 SOLUTION RESPIRATORY (INHALATION) at 13:13

## 2024-11-23 RX ADMIN — ASPIRIN 81 MG: 81 TABLET, COATED ORAL at 10:18

## 2024-11-23 RX ADMIN — HYDRALAZINE HYDROCHLORIDE 100 MG: 50 TABLET ORAL at 05:00

## 2024-11-23 RX ADMIN — CLONIDINE HYDROCHLORIDE 0.1 MG: 0.1 TABLET ORAL at 13:45

## 2024-11-23 RX ADMIN — FERROUS SULFATE TAB 325 MG (65 MG ELEMENTAL FE) 325 MG: 325 (65 FE) TAB at 10:17

## 2024-11-23 RX ADMIN — POLYETHYLENE GLYCOL 3350 17 G: 17 POWDER, FOR SOLUTION ORAL at 10:18

## 2024-11-23 RX ADMIN — APIXABAN 2.5 MG: 2.5 TABLET, FILM COATED ORAL at 20:36

## 2024-11-23 RX ADMIN — Medication 10 ML: at 20:38

## 2024-11-23 RX ADMIN — HYDRALAZINE HYDROCHLORIDE 100 MG: 50 TABLET ORAL at 20:38

## 2024-11-23 RX ADMIN — CARVEDILOL 25 MG: 12.5 TABLET, FILM COATED ORAL at 20:36

## 2024-11-23 RX ADMIN — CALCITRIOL CAPSULES 0.25 MCG 0.5 MCG: 0.25 CAPSULE ORAL at 10:17

## 2024-11-23 RX ADMIN — CLONIDINE HYDROCHLORIDE 0.1 MG: 0.1 TABLET ORAL at 05:00

## 2024-11-23 RX ADMIN — CLONIDINE HYDROCHLORIDE 0.1 MG: 0.1 TABLET ORAL at 17:18

## 2024-11-23 RX ADMIN — CYANOCOBALAMIN TAB 1000 MCG 1000 MCG: 1000 TAB at 10:18

## 2024-11-23 RX ADMIN — Medication 10 MG: at 20:36

## 2024-11-23 RX ADMIN — Medication 10 ML: at 10:19

## 2024-11-23 RX ADMIN — PANTOPRAZOLE SODIUM 40 MG: 40 TABLET, DELAYED RELEASE ORAL at 10:18

## 2024-11-23 RX ADMIN — IPRATROPIUM BROMIDE AND ALBUTEROL SULFATE 3 ML: 2.5; .5 SOLUTION RESPIRATORY (INHALATION) at 10:24

## 2024-11-23 RX ADMIN — TERAZOSIN HYDROCHLORIDE ANHYDROUS 10 MG: 5 CAPSULE ORAL at 20:35

## 2024-11-23 RX ADMIN — PANTOPRAZOLE SODIUM 40 MG: 40 TABLET, DELAYED RELEASE ORAL at 17:18

## 2024-11-23 RX ADMIN — IPRATROPIUM BROMIDE AND ALBUTEROL SULFATE 3 ML: 2.5; .5 SOLUTION RESPIRATORY (INHALATION) at 15:25

## 2024-11-23 RX ADMIN — HEPARIN SODIUM 1000 UNITS: 1000 INJECTION INTRAVENOUS; SUBCUTANEOUS at 09:57

## 2024-11-23 RX ADMIN — OXYCODONE 5 MG: 5 TABLET ORAL at 20:36

## 2024-11-23 RX ADMIN — ISOSORBIDE MONONITRATE 30 MG: 30 TABLET, EXTENDED RELEASE ORAL at 10:17

## 2024-11-23 NOTE — PLAN OF CARE
Goal Outcome Evaluation:Scheduled HD completed. Pt tolerated well. Blood returned to pt. Goal reached. Called report to CASIMIRO Guthrie      Problem: Hemodialysis  Goal: Safe, Effective Therapy Delivery  Outcome: Progressing  Goal: Effective Tissue Perfusion  Outcome: Progressing  Goal: Absence of Infection Signs and Symptoms  Outcome: Progressing

## 2024-11-23 NOTE — PLAN OF CARE
Problem: Adult Inpatient Plan of Care  Goal: Plan of Care Review  Outcome: Progressing  Goal: Patient-Specific Goal (Individualized)  Outcome: Progressing  Goal: Absence of Hospital-Acquired Illness or Injury  Outcome: Progressing  Intervention: Identify and Manage Fall Risk  Recent Flowsheet Documentation  Taken 11/23/2024 1600 by Kaleigh Singh RN  Safety Promotion/Fall Prevention:   activity supervised   assistive device/personal items within reach   clutter free environment maintained   lighting adjusted   nonskid shoes/slippers when out of bed  Intervention: Prevent Skin Injury  Recent Flowsheet Documentation  Taken 11/23/2024 1600 by Kaleigh Singh RN  Body Position: position changed independently  Skin Protection: transparent dressing maintained  Intervention: Prevent Infection  Recent Flowsheet Documentation  Taken 11/23/2024 1600 by Kaleigh Singh RN  Infection Prevention:   environmental surveillance performed   single patient room provided  Goal: Optimal Comfort and Wellbeing  Outcome: Progressing  Goal: Readiness for Transition of Care  Outcome: Progressing     Problem: Skin Injury Risk Increased  Goal: Skin Health and Integrity  Outcome: Progressing  Intervention: Optimize Skin Protection  Recent Flowsheet Documentation  Taken 11/23/2024 1600 by Kaleigh Singh RN  Activity Management: activity minimized  Pressure Reduction Techniques:   frequent weight shift encouraged   weight shift assistance provided  Head of Bed (HOB) Positioning: HOB at 30-45 degrees  Pressure Reduction Devices: pressure-redistributing mattress utilized  Skin Protection: transparent dressing maintained     Problem: Fall Injury Risk  Goal: Absence of Fall and Fall-Related Injury  Outcome: Progressing  Intervention: Identify and Manage Contributors  Recent Flowsheet Documentation  Taken 11/23/2024 1600 by Kaleigh Singh RN  Medication Review/Management: medications reviewed  Intervention: Promote Injury-Free  Environment  Recent Flowsheet Documentation  Taken 11/23/2024 1600 by Kaleigh Singh RN  Safety Promotion/Fall Prevention:   activity supervised   assistive device/personal items within reach   clutter free environment maintained   lighting adjusted   nonskid shoes/slippers when out of bed     Problem: Palliative Care  Goal: Enhanced Quality of Life  Outcome: Progressing     Problem: Hemodialysis  Goal: Safe, Effective Therapy Delivery  Outcome: Progressing  Intervention: Optimize Device Care and Function  Recent Flowsheet Documentation  Taken 11/23/2024 1600 by Kaleigh Singh RN  Medication Review/Management: medications reviewed  Goal: Effective Tissue Perfusion  Outcome: Progressing  Goal: Absence of Infection Signs and Symptoms  Outcome: Progressing  Intervention: Prevent or Manage Infection  Recent Flowsheet Documentation  Taken 11/23/2024 1600 by Kaleigh Singh RN  Infection Prevention:   environmental surveillance performed   single patient room provided   Goal Outcome Evaluation:   VSS. 3L NC. A&O x4. Pt went to dialysis this AM. No complaints at this time. Call bell within reach.

## 2024-11-23 NOTE — PROGRESS NOTES
[facility] Medicine Services  PROGRESS NOTE    Patient Name: Yanique Webster  : 1940  MRN: [id number]    Date of Admission: 11/15/2024  Primary Care Physician: Sebas Alicea MD    Subjective   Subjective     CC:  F/U KINDRA on CKD    HPI:  Patient seen and examined on dialysis. Very [place]. No complaints. Tolerating HD.     Objective   Objective     Vital Signs:   Temp:  [97.6 °F (36.4 °C)-98.5 °F (36.9 °C)] 97.8 °F (36.6 °C)  Heart Rate:  [58-81] 71  Resp:  [15-23] 16  BP: (126-180)/(51-80) 136/58  Flow (L/min) (Oxygen Therapy):  [2] 2     Physical Exam:  Constitutional: No acute distress, awake, alert  HENT: NCAT, mucous membranes moist, [place]  Respiratory: Decreased in bases bilaterally, respiratory effort normal 2L NC  Cardiovascular: RRR, no murmurs, rubs, or gallops  Gastrointestinal: Positive bowel sounds, soft, nontender, nondistended  Musculoskeletal: No bilateral ankle edema  Psychiatric: Flat affect, cooperative  Neurologic: No focal deficits, speech clear  Skin: No rashes     Results Reviewed:  LAB RESULTS:      Lab 24  0435 24  0125 24  2214 24  0544 24  0512 24  0529   WBC 6.11  --   --  6.99 5.90 5.75   HEMOGLOBIN 7.9*  --  8.4* 8.5* 8.3* 8.7*   HEMATOCRIT 26.8*  --  28.0* 28.1* 28.2* 29.6*   PLATELETS 107*  --   --  101* 107* 126*   NEUTROS ABS 4.37  --   --  4.74 3.80 3.63   IMMATURE GRANS (ABS) 0.03  --   --  0.02 0.01 0.03   LYMPHS ABS 0.94  --   --  1.26 1.16 1.14   MONOS ABS 0.49  --   --  0.62 0.61 0.63   EOS ABS 0.23  --   --  0.31 0.28 0.27   MCV 97.8*  --   --  96.9 98.9* 97.7*   LACTATE  --  0.6  --   --   --   --          Lab 24  0724 24  0435 24  0848 24  0125 24  0710 24  0512 24  0529   SODIUM 142 141 143 139 139   < > 139   POTASSIUM 5.2 5.1 5.2 5.3* 5.1   < > 5.1   CHLORIDE 110* 110* 113* 112* 112*   < > 109*   CO2 23.0 19.0* 19.0* 16.0* 15.0*   < > 17.0*   ANION GAP 9.0  12.0 11.0 11.0 12.0   < > 13.0   BUN 55* 74* 78* 73* 71*   < > 71*   CREATININE 2.73* 3.94* 4.08* 4.04* 3.94*   < > 4.00*   EGFR 16.8* 10.8* 10.4* 10.5* 10.8*   < > 10.6*   GLUCOSE 127* 125* 125* 131* 111*   < > 107*   CALCIUM 9.7 9.7 9.7 9.7 9.4   < > 9.3  9.5   PHOSPHORUS  --   --   --   --   --   --  4.8*    < > = values in this interval not displayed.         Lab 11/22/24  0435 11/21/24  0125 11/17/24  0529   TOTAL PROTEIN 5.5* 5.7*  --    ALBUMIN 3.4* 3.6 3.7   GLOBULIN 2.1 2.1  --    ALT (SGPT) 14 13  --    AST (SGOT) 22 22  --    BILIRUBIN 0.4 0.4  --    ALK PHOS 73 71  --                    Lab 11/17/24  0529   IRON 61   IRON SATURATION (TSAT) 24   TIBC 256*   TRANSFERRIN 172*         Lab 11/21/24  0856 11/21/24  0511 11/21/24  0120   PH, ARTERIAL 7.277* 7.276* 7.255*   PCO2, ARTERIAL 42.3 43.5 39.7   PO2 .0 92.8 103.0   FIO2 28 28 28   HCO3 ART 19.7* 20.2 17.6*   BASE EXCESS ART -6.6* -6.2* -8.9*   CARBOXYHEMOGLOBIN 1.7 1.5 1.6     Brief Urine Lab Results  (Last result in the past 365 days)        Color   Clarity   Blood   Leuk Est   Nitrite   Protein   CREAT   Urine HCG        11/15/24 1410 Yellow   Clear   Negative   Negative   Negative   100 mg/dL (2+)                   Microbiology Results Abnormal       None            IR insert non-tunneled central line 5+    Result Date: 11/22/2024  IR INSERT NON-TUNNELED CENTRAL LINE 5+  History: CKD stage V. NON tunneled central line; Lido 3ml; Heparin 2.2 ml; FT 0; mGy 0; uGym 0.75; no sedation; 14F X 15cm Schon XL double lumen  : Marquis Galloway MD. Assistant: None  Modality: Sonography and fluoroscopy  DOSE REDUCTION: The examination was performed according to departmental dose-optimization program which includes automated exposure control, adjustment of the mA and/or kV according to patient size. Fluoro time: Less than 6 seconds. Radiation dose: 0 mGy air Kerma. 0.75 uGy square meters  Sedation: No IV sedation was given. Anesthesia:  Lidocaine 1% local infiltration. Estimated blood loss:  < 5 cc.        Technique: A thorough discussion of the risks, benefits, and alternatives of the procedure, and if applicable, moderate sedation, was carried out with the patient. They were encouraged to ask any questions. Any questions were answered. They verbalized understanding. A written informed consent was then signed.  A multi-component timeout was performed prior to starting the procedure using the departmental protocol. The procedure room personnel used personal protective equipment. The operators used sterile gloves and if indicated, sterile gowns. The surgical site was prepped with chlorhexidine gluconate  and draped in the maximal applicable sterile fashion. A preliminary ultrasonogram was performed of the target site that revealed a patent and compressible Right internal jugular vein. Pertinent ultrasound images were stored in the PACS for documentation. A sterile prep and drape of the neck and upper chest was performed using maximal technique. Using aseptic precautions, real-time ultrasound guidance, the target vein was accessed after local anesthetic infiltration and dermatotomy with an access needle. A guidewire was advanced into the central venous system under fluoroscopic guidance. Over the wire following standard exchanges a nontunneled temporary dialysis catheter was placed. The catheter ports aspirated and flushed well and were terminally packed with heparin. The catheter was secured to skin with nonabsorbable suture. An aseptic dressing was applied. The patient was transferred to the recovery area and was discharged from the department in stable condition.  Complications: None immediate.    Device: 14 [place] x15 cm Dual-lumen nontunneled dialysis catheter. Findings: Patent and compressible Right internal jugular vein. Final image shows the catheter to be in good position with the catheter tip at the cavoatrial junction and an excellent  position for use. There is no complication.      Impression: Impression:    Successful ultrasound and fluoroscopic guided Right internal jugular vein route nontunneled temporary dialysis catheter placement as described above. Thank you for the opportunity to assist in the care of your patient. Electronically Signed: Marquis Galloway MD  11/22/2024 2:41 PM EST  Workstation ID: [id number]    CT Head Without Contrast    Result Date: 11/21/2024  CT HEAD WO CONTRAST Date of Exam: 11/21/2024 12:06 PM EST Indication: Encephalopathy. Comparison: Head CT 7/6/2021 Technique: Axial CT images were obtained of the head without contrast administration.  Automated exposure control and iterative construction methods were used. Findings: No acute intracranial hemorrhage. Redemonstration of chronic infarct in the left occipital lobe. Redemonstration of chronic lacunar infarct of the right thalamus. Redemonstration of small chronic linear infarcts in the right and left cerebellar hemispheres. No evidence of acute large territory infarct. There are scattered white matter hypodensities which are nonspecific and can be seen in the setting of chronic small vessel ischemic change. No extra-axial collections. No midline shift or herniation. Normal size and configuration of the ventricles. There is intracranial atherosclerosis. Unremarkable appearance of the orbits. There is minimal mucosal thickening in the inferior left frontal sinus with otherwise clear paranasal sinuses. There are bilateral mastoid air cell effusions, nonspecific, new or worsened compared to prior head CT. No acute or suspicious bony findings.     Impression: Impression: No acute intracranial findings. Chronic scattered infarcts appear similar to prior. New or increased bilateral mastoid air cell effusions, nonspecific. Electronically Signed: Iglesia Bernard MD  11/21/2024 12:58 PM EST  Workstation ID: [id number]     Results for orders placed during the hospital encounter of  10/30/24    Adult Transthoracic Echo Complete W/ Cont if Necessary Per Protocol    Interpretation Summary    Left ventricular systolic function is normal. Estimated left ventricular EF = 60%    Left ventricular wall thickness is consistent with mild concentric hypertrophy.    The left atrial cavity is moderately dilated.    Aortic sclerosis without aortic stenosis.  Mild to moderate aortic insufficiency    Mild mitral regurgitation.      Current medications:  Scheduled Meds:aspirin, 81 mg, Oral, Daily  [Held by provider] atorvastatin, 80 mg, Oral, Daily  calcitriol, 0.5 mcg, Oral, Daily  carvedilol, 25 mg, Oral, Q12H  fentaNYL, 1 patch, Transdermal, Q72H   And  Check Fentanyl Patch Placement, 1 each, Not Applicable, Q12H  cloNIDine, 0.1 mg, Oral, Q6H  ferrous sulfate, 325 mg, Oral, Daily With Breakfast  hydrALAZINE, 100 mg, Oral, Q8H  insulin lispro, 2-7 Units, Subcutaneous, 4x Daily AC & at Bedtime  ipratropium-albuterol, 3 mL, Nebulization, 4x Daily - RT  isosorbide mononitrate, 30 mg, Oral, Daily  lactobacillus acidophilus, 1 capsule, Oral, Daily  pantoprazole, 40 mg, Oral, BID AC  polyethylene glycol, 17 g, Oral, QAM  [Held by provider] rivaroxaban, 15 mg, Oral, Daily  sodium chloride, 10 mL, Intravenous, Q12H  terazosin, 10 mg, Oral, Nightly  vitamin B-12, 1,000 mcg, Oral, Daily      Continuous Infusions:       PRN Meds:.  acetaminophen    albumin human    senna-docusate sodium **AND** polyethylene glycol **AND** bisacodyl **AND** bisacodyl    dextrose    dextrose    diphenhydrAMINE    doxepin    glucagon (human recombinant)    heparin (porcine)    ipratropium-albuterol    melatonin    naloxone    nitroglycerin    oxyCODONE    sodium chloride    sodium chloride    sodium chloride    [Held by provider] torsemide    Assessment & Plan   Assessment & Plan     Active Hospital Problems    Diagnosis  POA    **Acute on chronic renal failure [N17.9, N18.9]  Yes    Abnormal laboratory test [R89.9]  Yes    Dyspnea  [R06.00]  Yes    Anemia, chronic disease [D63.8]  Yes    Paroxysmal atrial fibrillation [I48.0]  Yes    Essential hypertension [I10]  Yes    Type 2 diabetes mellitus [E11.9]  Yes      Resolved Hospital Problems   No resolved problems to display.        Brief Hospital Course to date:  Yanique Webster is a 84 y.o. female with PMH of Afib on Xarelto, HTN, CKD, prior stroke, T2DM with neuropathy, chronic anemia, and GERD who presented from [facility] with KINDRA on CKD.    This patient's problems and plans were partially entered by my partner and updated as appropriate by me 11/23/24.   All problems are new to me today    KINDRA on advanced CKD  Anion gap metabolic acidosis -> resolved  --Nephrology follows, appreciate assistance  --S/P R IJ temporary dialysis catheter per IR 11/22   --Will need outpatient HD arranged, 2x weekly     TCP  --Trending back up      PAF  --change xarelto to eliquis 2.5mg BID per Nephrology recommendations. Start tonight.      HTN. Improving  -- Continue Coreg 25 mg twice daily  -- Continue Hydralazine 100 mg 3 times daily  -- Continue Clonidine 0.1 4 times daily.  -- Continue Imdur 30 mg daily.  -- Continue Terazosin 10 mg nightly.   -- Renal recommends tight BP control      T2DM  --Minimal SSI use, DC accu-checks     Anemia of chronic disease  --CBC in AM  --Monitor, on Eliquis + ASA     Vitamin D deficiency  -- Continue Calcitrol    Acute encephalopathy Multifactorial: Likely metabolic and hospital delirium  Chronic pain: On fentanyl patch.  -Mentation seems at baseline today    Goals of care.  Palliative care follows. Patient DNR/DNI.     Expected Discharge Location and Transportation: [facility], needs outpatient HD arranged   Expected Discharge   Expected Discharge Date: 11/25/2024; Expected Discharge Time:      VTE Prophylaxis:  Pharmacologic & mechanical VTE prophylaxis orders are present.         AM-PAC 6 Clicks Score (PT): 17 (11/22/24 2019)    CODE STATUS:   Code Status and Medical Interventions:  No CPR (Do Not Attempt to Resuscitate); Limited Support; No intubation (DNI)   Ordered at: 11/21/24 1414     Medical Intervention Limits:    No intubation (DNI)     Level Of Support Discussed With:    Patient     Code Status (Patient has no pulse and is not breathing):    No CPR (Do Not Attempt to Resuscitate)     Medical Interventions (Patient has pulse or is breathing):    Limited Support       Shayy Littlejohn, DO  11/23/24

## 2024-11-23 NOTE — PROGRESS NOTES
" LOS: 7 days   Patient Care Team:  Sebas Alicea MD as PCP - General (Family Medicine)  Steve Geronimo MD as Consulting Physician (Cardiology)  Emilio Regalado MD as Consulting Physician (Endocrinology)  Axel Ribeiro MD as Consulting Physician (Cardiology)        Subjective     Seen on HD today. 2nd treatment. Doing well. Extremely hard of hearing. But devonte any cp or sob. On supp o2. UF 1.5 liter as tolerated with HD. Labs reviewed.     Objective     Vital Sign Min/Max for last 24 hours  Temp  Min: 97.6 °F (36.4 °C)  Max: 98.5 °F (36.9 °C)   BP  Min: 126/51  Max: 180/80   Pulse  Min: 58  Max: 81   Resp  Min: 15  Max: 23   SpO2  Min: 95 %  Max: 99 %   Flow (L/min) (Oxygen Therapy)  Min: 2  Max: 2   No data recorded     Flowsheet Rows      Flowsheet Row First Filed Value   Admission Height 162.6 cm (64\") Documented at 11/15/2024 1228   Admission Weight 85.7 kg (189 lb) Documented at 11/15/2024 1228            No intake/output data recorded.  I/O last 3 completed shifts:  In: -   Out: 3160 [Urine:2150]    Objective:  General Appearance:  Comfortable.    Vital signs: (most recent): Blood pressure 143/59, pulse 75, temperature 98.5 °F (36.9 °C), temperature source Axillary, resp. rate 23, height 162.6 cm (64\"), weight 83.2 kg (183 lb 8 oz), SpO2 96%.    Output: Producing urine.    Lungs:  Normal effort.    Abdomen: Abdomen is soft.  Bowel sounds are normal.     Extremities: Normal range of motion.  There is no dependent edema or local swelling.    Pulses: Distal pulses are intact.    Neurological: Patient is alert and oriented to person, place and time.  Normal strength.    Pupils:  Pupils are equal, round, and reactive to light.    Skin:  Warm.              Awake, NAD  LE no edema   Abd soft  Neuro non focal   Lungs clear  S1S2  Results Review:     I reviewed the patient's new clinical results.    WBC WBC   Date Value Ref Range Status   11/22/2024 6.11 3.40 - 10.80 10*3/mm3 Final      HGB Hemoglobin " "  Date Value Ref Range Status   11/22/2024 7.9 (L) 12.0 - 15.9 g/dL Final      HCT Hematocrit   Date Value Ref Range Status   11/22/2024 26.8 (L) 34.0 - 46.6 % Final      Platlets No results found for: \"LABPLAT\"   MCV MCV   Date Value Ref Range Status   11/22/2024 97.8 (H) 79.0 - 97.0 fL Final          Sodium Sodium   Date Value Ref Range Status   11/23/2024 142 136 - 145 mmol/L Final   11/22/2024 141 136 - 145 mmol/L Final   11/21/2024 143 136 - 145 mmol/L Final   11/21/2024 139 136 - 145 mmol/L Final      Potassium Potassium   Date Value Ref Range Status   11/23/2024 5.2 3.5 - 5.2 mmol/L Final   11/22/2024 5.1 3.5 - 5.2 mmol/L Final   11/21/2024 5.2 3.5 - 5.2 mmol/L Final   11/21/2024 5.3 (H) 3.5 - 5.2 mmol/L Final      Chloride Chloride   Date Value Ref Range Status   11/23/2024 110 (H) 98 - 107 mmol/L Final   11/22/2024 110 (H) 98 - 107 mmol/L Final   11/21/2024 113 (H) 98 - 107 mmol/L Final   11/21/2024 112 (H) 98 - 107 mmol/L Final      CO2 CO2   Date Value Ref Range Status   11/23/2024 23.0 22.0 - 29.0 mmol/L Final   11/22/2024 19.0 (L) 22.0 - 29.0 mmol/L Final   11/21/2024 19.0 (L) 22.0 - 29.0 mmol/L Final   11/21/2024 16.0 (L) 22.0 - 29.0 mmol/L Final      BUN BUN   Date Value Ref Range Status   11/23/2024 55 (H) 8 - 23 mg/dL Final   11/22/2024 74 (H) 8 - 23 mg/dL Final   11/21/2024 78 (H) 8 - 23 mg/dL Final   11/21/2024 73 (H) 8 - 23 mg/dL Final      Creatinine Creatinine   Date Value Ref Range Status   11/23/2024 2.73 (H) 0.57 - 1.00 mg/dL Final   11/22/2024 3.94 (H) 0.57 - 1.00 mg/dL Final   11/21/2024 4.08 (H) 0.57 - 1.00 mg/dL Final   11/21/2024 4.04 (H) 0.57 - 1.00 mg/dL Final      Calcium Calcium   Date Value Ref Range Status   11/23/2024 9.7 8.6 - 10.5 mg/dL Final   11/22/2024 9.7 8.6 - 10.5 mg/dL Final   11/21/2024 9.7 8.6 - 10.5 mg/dL Final   11/21/2024 9.7 8.6 - 10.5 mg/dL Final      PO4 No results found for: \"CAPO4\"   Albumin Albumin   Date Value Ref Range Status   11/22/2024 3.4 (L) 3.5 - 5.2 " "g/dL Final   11/21/2024 3.6 3.5 - 5.2 g/dL Final        Magnesium No results found for: \"MG\"     Uric Acid No results found for: \"URICACID\"       Assessment & Plan       Acute on chronic renal failure    Type 2 diabetes mellitus    Essential hypertension    Paroxysmal atrial fibrillation    Anemia, chronic disease    Dyspnea    Abnormal laboratory test      Assessment & Plan  ====================================  1.  Progression of CKD: Likely esrd. Discussed with patient and family. Wants to try iHD. She may be able to do 2 x weekly. Plan for HD cath placement today and HD afterwards.      2.  CKD stage IV: Baseline creatinine 1.9-2.2 mg/dL, since last admission was running between 3.2-3.5 range, history of proteinuric renal disease UPC 2 g in the past duplex scan negative in 2016, on previous hospitalization discussion with daughter-in-law anesthesiologist, was explained regard to advanced renal dysfunction possibility of dialysis versus conservative treatment.     3.  Anemia of CKD: EDISON now on HD days.      4.  Volume status:     5.  Hypertension: No ANABEL per duplex in 2016.  On terazosin, carvedilol, hydralazine, clonidine     6.  Metabolic acidosis: Management with HD now.     7.  Hyperparathyroidism: On calcitriol 0.5 mcg daily     8.  Iron deficiency on oral iron     9.  History of CAD     10.  COPD     11.  Hyperlipidemia     12.  Anxiety and depression.      RENAL US 11/18/24: Right K 8.9 cm and Left K 9.6 cm      Plan and recommendation.  2nd HD session today. Tolerating well so far.   Need to setup HD chair as outpatient. She can start with 2 x weekly HD treatment.     I discussed the patients findings and my recommendations with patient        Zurdo Levine MD  11/23/24  09:52 EST            "

## 2024-11-24 LAB
ANION GAP SERPL CALCULATED.3IONS-SCNC: 11 MMOL/L (ref 5–15)
BUN SERPL-MCNC: 40 MG/DL (ref 8–23)
BUN/CREAT SERPL: 15 (ref 7–25)
CALCIUM SPEC-SCNC: 9.4 MG/DL (ref 8.6–10.5)
CHLORIDE SERPL-SCNC: 104 MMOL/L (ref 98–107)
CO2 SERPL-SCNC: 23 MMOL/L (ref 22–29)
CREAT SERPL-MCNC: 2.67 MG/DL (ref 0.57–1)
DEPRECATED RDW RBC AUTO: 60.5 FL (ref 37–54)
EGFRCR SERPLBLD CKD-EPI 2021: 17.2 ML/MIN/1.73
ERYTHROCYTE [DISTWIDTH] IN BLOOD BY AUTOMATED COUNT: 17.4 % (ref 12.3–15.4)
GLUCOSE SERPL-MCNC: 231 MG/DL (ref 65–99)
HCT VFR BLD AUTO: 24.7 % (ref 34–46.6)
HGB BLD-MCNC: 7.5 G/DL (ref 12–15.9)
MCH RBC QN AUTO: 28.6 PG (ref 26.6–33)
MCHC RBC AUTO-ENTMCNC: 30.4 G/DL (ref 31.5–35.7)
MCV RBC AUTO: 94.3 FL (ref 79–97)
PLATELET # BLD AUTO: 108 10*3/MM3 (ref 140–450)
PMV BLD AUTO: 11.5 FL (ref 6–12)
POTASSIUM SERPL-SCNC: 4.3 MMOL/L (ref 3.5–5.2)
RBC # BLD AUTO: 2.62 10*6/MM3 (ref 3.77–5.28)
SODIUM SERPL-SCNC: 138 MMOL/L (ref 136–145)
WBC NRBC COR # BLD AUTO: 5.31 10*3/MM3 (ref 3.4–10.8)

## 2024-11-24 PROCEDURE — 94799 UNLISTED PULMONARY SVC/PX: CPT

## 2024-11-24 PROCEDURE — 94664 DEMO&/EVAL PT USE INHALER: CPT

## 2024-11-24 PROCEDURE — 99231 SBSQ HOSP IP/OBS SF/LOW 25: CPT | Performed by: FAMILY MEDICINE

## 2024-11-24 PROCEDURE — 94761 N-INVAS EAR/PLS OXIMETRY MLT: CPT

## 2024-11-24 PROCEDURE — 85027 COMPLETE CBC AUTOMATED: CPT | Performed by: FAMILY MEDICINE

## 2024-11-24 PROCEDURE — 80048 BASIC METABOLIC PNL TOTAL CA: CPT | Performed by: FAMILY MEDICINE

## 2024-11-24 RX ADMIN — Medication 10 ML: at 08:11

## 2024-11-24 RX ADMIN — APIXABAN 2.5 MG: 2.5 TABLET, FILM COATED ORAL at 08:09

## 2024-11-24 RX ADMIN — ISOSORBIDE MONONITRATE 30 MG: 30 TABLET, EXTENDED RELEASE ORAL at 08:09

## 2024-11-24 RX ADMIN — PANTOPRAZOLE SODIUM 40 MG: 40 TABLET, DELAYED RELEASE ORAL at 08:09

## 2024-11-24 RX ADMIN — IPRATROPIUM BROMIDE AND ALBUTEROL SULFATE 3 ML: 2.5; .5 SOLUTION RESPIRATORY (INHALATION) at 15:12

## 2024-11-24 RX ADMIN — OXYCODONE 5 MG: 5 TABLET ORAL at 21:39

## 2024-11-24 RX ADMIN — CARVEDILOL 25 MG: 12.5 TABLET, FILM COATED ORAL at 21:39

## 2024-11-24 RX ADMIN — CALCITRIOL CAPSULES 0.25 MCG 0.5 MCG: 0.25 CAPSULE ORAL at 08:09

## 2024-11-24 RX ADMIN — OXYCODONE 5 MG: 5 TABLET ORAL at 03:54

## 2024-11-24 RX ADMIN — IPRATROPIUM BROMIDE AND ALBUTEROL SULFATE 3 ML: 2.5; .5 SOLUTION RESPIRATORY (INHALATION) at 06:29

## 2024-11-24 RX ADMIN — FERROUS SULFATE TAB 325 MG (65 MG ELEMENTAL FE) 325 MG: 325 (65 FE) TAB at 08:09

## 2024-11-24 RX ADMIN — PANTOPRAZOLE SODIUM 40 MG: 40 TABLET, DELAYED RELEASE ORAL at 17:06

## 2024-11-24 RX ADMIN — Medication 1 CAPSULE: at 08:09

## 2024-11-24 RX ADMIN — CYANOCOBALAMIN TAB 1000 MCG 1000 MCG: 1000 TAB at 08:09

## 2024-11-24 RX ADMIN — ATORVASTATIN CALCIUM 80 MG: 40 TABLET, FILM COATED ORAL at 08:09

## 2024-11-24 RX ADMIN — IPRATROPIUM BROMIDE AND ALBUTEROL SULFATE 3 ML: 2.5; .5 SOLUTION RESPIRATORY (INHALATION) at 11:07

## 2024-11-24 RX ADMIN — FENTANYL TRANSDERMAL 1 PATCH: 12.5 PATCH, EXTENDED RELEASE TRANSDERMAL at 09:52

## 2024-11-24 RX ADMIN — HYDRALAZINE HYDROCHLORIDE 100 MG: 50 TABLET ORAL at 21:39

## 2024-11-24 RX ADMIN — IPRATROPIUM BROMIDE AND ALBUTEROL SULFATE 3 ML: 2.5; .5 SOLUTION RESPIRATORY (INHALATION) at 22:09

## 2024-11-24 RX ADMIN — Medication 10 ML: at 21:41

## 2024-11-24 RX ADMIN — CLONIDINE HYDROCHLORIDE 0.1 MG: 0.1 TABLET ORAL at 17:06

## 2024-11-24 RX ADMIN — TERAZOSIN HYDROCHLORIDE ANHYDROUS 10 MG: 5 CAPSULE ORAL at 21:37

## 2024-11-24 RX ADMIN — Medication 10 MG: at 21:39

## 2024-11-24 RX ADMIN — HYDRALAZINE HYDROCHLORIDE 100 MG: 50 TABLET ORAL at 13:18

## 2024-11-24 RX ADMIN — ASPIRIN 81 MG: 81 TABLET, COATED ORAL at 08:09

## 2024-11-24 RX ADMIN — APIXABAN 2.5 MG: 2.5 TABLET, FILM COATED ORAL at 21:39

## 2024-11-24 RX ADMIN — CLONIDINE HYDROCHLORIDE 0.1 MG: 0.1 TABLET ORAL at 13:18

## 2024-11-24 NOTE — PROGRESS NOTES
[facility]  PROGRESS NOTE    Patient Name: Yanique Webster  : 1940  MRN: [id number]    Date of Admission: 11/15/2024  Primary Care Physician: Sebas Alicea MD    Subjective   Subjective     CC:  F/U KINDRA on CKD    HPI:  Patient seen and examined. Very [employer] [place] but pleasant. No new complaints. Says she has chronic pain.     Objective   Objective     Vital Signs:   Temp:  [98 °F (36.7 °C)-98.7 °F (37.1 °C)] 98.4 °F (36.9 °C)  Heart Rate:  [60-74] 71  Resp:  [16-18] 18  BP: (100-157)/(47-65) 139/57  Flow (L/min) (Oxygen Therapy):  [2-3] 2     Physical Exam:  Constitutional: No acute distress, awake, alert  HENT: NCAT, mucous membranes moist, [place] [employer] [place]  Respiratory: Decreased in bases bilaterally, respiratory effort normal 2L NC  Cardiovascular: RRR, no murmurs, rubs, or gallops  Gastrointestinal: Positive bowel sounds, soft, nontender, nondistended  Musculoskeletal: No bilateral ankle edema  Psychiatric: Pleasant affect, cooperative  Neurologic: No focal deficits, speech clear  Skin: No rashes     Results Reviewed:  LAB RESULTS:      Lab 24  0947 24  0435 24  0125 24  2214 24  0544 24  0512   WBC 5.31 6.11  --   --  6.99 5.90   HEMOGLOBIN 7.5* 7.9*  --  8.4* 8.5* 8.3*   HEMATOCRIT 24.7* 26.8*  --  28.0* 28.1* 28.2*   PLATELETS 108* 107*  --   --  101* 107*   NEUTROS ABS  --  4.37  --   --  4.74 3.80   IMMATURE GRANS (ABS)  --  0.03  --   --  0.02 0.01   LYMPHS ABS  --  0.94  --   --  1.26 1.16   MONOS ABS  --  0.49  --   --  0.62 0.61   EOS ABS  --  0.23  --   --  0.31 0.28   MCV 94.3 97.8*  --   --  96.9 98.9*   LACTATE  --   --  0.6  --   --   --          Lab 24  0947 24  0724 24  0435 24  0848 24  0125   SODIUM 138 142 141 143 139   POTASSIUM 4.3 5.2 5.1 5.2 5.3*   CHLORIDE 104 110* 110* 113* 112*   CO2 23.0 23.0 19.0* 19.0* 16.0*   ANION GAP 11.0 9.0 12.0 11.0 11.0   BUN 40* 55* 74* 78* 73*    CREATININE 2.67* 2.73* 3.94* 4.08* 4.04*   EGFR 17.2* 16.8* 10.8* 10.4* 10.5*   GLUCOSE 231* 127* 125* 125* 131*   CALCIUM 9.4 9.7 9.7 9.7 9.7         Lab 11/22/24  0435 11/21/24  0125   TOTAL PROTEIN 5.5* 5.7*   ALBUMIN 3.4* 3.6   GLOBULIN 2.1 2.1   ALT (SGPT) 14 13   AST (SGOT) 22 22   BILIRUBIN 0.4 0.4   ALK PHOS 73 71                         Lab 11/21/24  0856 11/21/24  0511 11/21/24  0120   PH, ARTERIAL 7.277* 7.276* 7.255*   PCO2, ARTERIAL 42.3 43.5 39.7   PO2 .0 92.8 103.0   FIO2 28 28 28   HCO3 ART 19.7* 20.2 17.6*   BASE EXCESS ART -6.6* -6.2* -8.9*   CARBOXYHEMOGLOBIN 1.7 1.5 1.6     Brief Urine Lab Results  (Last result in the past 365 days)        Color   Clarity   Blood   Leuk Est   Nitrite   Protein   CREAT   Urine HCG        11/15/24 1410 Yellow   Clear   Negative   Negative   Negative   100 mg/dL (2+)                   Microbiology Results Abnormal       None            IR insert non-tunneled central line 5+    Result Date: 11/22/2024  IR INSERT NON-TUNNELED CENTRAL LINE 5+  History: CKD stage V. NON tunneled central line; Lido 3ml; Heparin 2.2 ml; FT 0; mGy 0; uGym 0.75; no sedation; 14F X 15cm Schon XL double lumen  : Marquis Galloway MD. Assistant: None  Modality: Sonography and fluoroscopy  DOSE REDUCTION: The examination was performed according to departmental dose-optimization program which includes automated exposure control, adjustment of the mA and/or kV according to patient size. Fluoro time: Less than 6 seconds. Radiation dose: 0 mGy air Kerma. 0.75 uGy square meters  Sedation: No IV sedation was given. Anesthesia: Lidocaine 1% local infiltration. Estimated blood loss:  < 5 cc.        Technique: A thorough discussion of the risks, benefits, and alternatives of the procedure, and if applicable, moderate sedation, was carried out with the patient. They were encouraged to ask any questions. Any questions were answered. They verbalized understanding. A written informed  consent was then signed.  A multi-component timeout was performed prior to starting the procedure using the departmental protocol. The procedure room personnel used personal protective equipment. The operators used sterile gloves and if indicated, sterile gowns. The surgical site was prepped with chlorhexidine gluconate  and draped in the maximal applicable sterile fashion. A preliminary ultrasonogram was performed of the target site that revealed a patent and compressible Right internal jugular vein. Pertinent ultrasound images were stored in the PACS for documentation. A sterile prep and drape of the neck and upper chest was performed using maximal technique. Using aseptic precautions, real-time ultrasound guidance, the target vein was accessed after local anesthetic infiltration and dermatotomy with an access needle. A guidewire was advanced into the central venous system under fluoroscopic guidance. Over the wire following standard exchanges a nontunneled temporary dialysis catheter was placed. The catheter ports aspirated and flushed well and were terminally packed with heparin. The catheter was secured to skin with nonabsorbable suture. An aseptic dressing was applied. The patient was transferred to the recovery area and was discharged from the department in stable condition.  Complications: None immediate.    Device: 14 [place] x15 cm Dual-lumen nontunneled dialysis catheter. Findings: Patent and compressible Right internal jugular vein. Final image shows the catheter to be in good position with the catheter tip at the cavoatrial junction and an excellent position for use. There is no complication.      Impression: Impression:    Successful ultrasound and fluoroscopic guided Right internal jugular vein route nontunneled temporary dialysis catheter placement as described above. Thank you for the opportunity to assist in the care of your patient. Electronically Signed: Marquis Galloway MD  11/22/2024 2:41 PM EST   Workstation ID: [id number]     Results for orders placed during the hospital encounter of 10/30/24    Adult Transthoracic Echo Complete W/ Cont if Necessary Per Protocol    Interpretation Summary    Left ventricular systolic function is normal. Estimated left ventricular EF = 60%    Left ventricular wall thickness is consistent with mild concentric hypertrophy.    The left atrial cavity is moderately dilated.    Aortic sclerosis without aortic stenosis.  Mild to moderate aortic insufficiency    Mild mitral regurgitation.      Current medications:  Scheduled Meds:apixaban, 2.5 mg, Oral, Q12H  aspirin, 81 mg, Oral, Daily  atorvastatin, 80 mg, Oral, Daily  calcitriol, 0.5 mcg, Oral, Daily  carvedilol, 25 mg, Oral, Q12H  fentaNYL, 1 patch, Transdermal, Q72H   And  Check Fentanyl Patch Placement, 1 each, Not Applicable, Q12H  cloNIDine, 0.1 mg, Oral, Q6H  ferrous sulfate, 325 mg, Oral, Daily With Breakfast  hydrALAZINE, 100 mg, Oral, Q8H  ipratropium-albuterol, 3 mL, Nebulization, 4x Daily - RT  isosorbide mononitrate, 30 mg, Oral, Daily  lactobacillus acidophilus, 1 capsule, Oral, Daily  pantoprazole, 40 mg, Oral, BID AC  polyethylene glycol, 17 g, Oral, QAM  sodium chloride, 10 mL, Intravenous, Q12H  terazosin, 10 mg, Oral, Nightly  vitamin B-12, 1,000 mcg, Oral, Daily      Continuous Infusions:       PRN Meds:.  acetaminophen    senna-docusate sodium **AND** polyethylene glycol **AND** bisacodyl **AND** bisacodyl    dextrose    dextrose    diphenhydrAMINE    doxepin    glucagon (human recombinant)    heparin (porcine)    ipratropium-albuterol    melatonin    naloxone    nitroglycerin    oxyCODONE    sodium chloride    sodium chloride    sodium chloride    [Held by provider] torsemide    Assessment & Plan   Assessment & Plan     Active Hospital Problems    Diagnosis  POA    **Acute on chronic renal failure [N17.9, N18.9]  Yes    Abnormal laboratory test [R89.9]  Yes    Dyspnea [R06.00]  Yes    Anemia, chronic disease  [D63.8]  Yes    Paroxysmal atrial fibrillation [I48.0]  Yes    Essential hypertension [I10]  Yes    Type 2 diabetes mellitus [E11.9]  Yes      Resolved Hospital Problems   No resolved problems to display.        Brief Hospital Course to date:  Yanique Webster is a 84 y.o. female with PMH of Afib on Xarelto, HTN, CKD, prior stroke, T2DM with neuropathy, chronic anemia, and GERD who presented from [facility] with KINDRA on CKD.    This patient's problems and plans were partially entered by my partner and updated as appropriate by me 11/24/24.     KINDRA on advanced CKD  Anion gap metabolic acidosis -> resolved  --Nephrology follows, appreciate assistance  --S/P R IJ temporary dialysis catheter per IR 11/22   --Will need outpatient HD arranged, 2x weekly     TCP  --Trending back up      PAF  --changed xarelto to eliquis 2.5mg BID per Nephrology recommendations  --Monitor H&H     HTN. Improving  -- Continue Coreg 25 mg twice daily  -- Continue Hydralazine 100 mg 3 times daily  -- Continue Clonidine 0.1 4 times daily.  -- Continue Imdur 30 mg daily.  -- Continue Terazosin 10 mg nightly.   -- Renal recommends tight BP control      T2DM  --Minimal SSI use, DC'd accu-checks     Anemia of chronic disease  --Monitor, on Eliquis + ASA   --May need EPO, defer to Nephrology     Vitamin D deficiency  -- Continue Calcitrol    Acute encephalopathy Multifactorial: Likely metabolic and hospital delirium  Chronic pain: On fentanyl patch.  -No issues overnight     Goals of care.  Palliative care follows. Patient DNR/DNI.     Expected Discharge Location and Transportation: [facility], needs outpatient HD arranged   Expected Discharge   Expected Discharge Date: 11/25/2024; Expected Discharge Time:      VTE Prophylaxis:  Pharmacologic & mechanical VTE prophylaxis orders are present.         AM-PAC 6 Clicks Score (PT): 17 (11/24/24 7691)    CODE STATUS:   Code Status and Medical Interventions: No CPR (Do Not Attempt to Resuscitate); Limited Support; No  intubation (DNI)   Ordered at: 11/21/24 1414     Medical Intervention Limits:    No intubation (DNI)     Level Of Support Discussed With:    Patient     Code Status (Patient has no pulse and is not breathing):    No CPR (Do Not Attempt to Resuscitate)     Medical Interventions (Patient has pulse or is breathing):    Limited Support       Shayy Littlejohn, DO  11/24/24

## 2024-11-24 NOTE — PROGRESS NOTES
" LOS: 8 days   Patient Care Team:  Sebas Alicea MD as PCP - General (Family Medicine)  Steve Geronimo MD as Consulting Physician (Cardiology)  Emilio Regalado MD as Consulting Physician (Endocrinology)  Axel Ribeiro MD as Consulting Physician (Cardiology)        Subjective     So far 2 treatments for HD. Tolerated well.     Objective     Vital Sign Min/Max for last 24 hours  Temp  Min: 98 °F (36.7 °C)  Max: 98.7 °F (37.1 °C)   BP  Min: 100/56  Max: 157/57   Pulse  Min: 60  Max: 72   Resp  Min: 16  Max: 18   SpO2  Min: 94 %  Max: 100 %   Flow (L/min) (Oxygen Therapy)  Min: 2  Max: 3   No data recorded     Flowsheet Rows      Flowsheet Row First Filed Value   Admission Height 162.6 cm (64\") Documented at 11/15/2024 1228   Admission Weight 85.7 kg (189 lb) Documented at 11/15/2024 1228            No intake/output data recorded.  I/O last 3 completed shifts:  In: -   Out: 2850 [Urine:1350]    Objective:  General Appearance:  Comfortable.    Vital signs: (most recent): Blood pressure 139/57, pulse 71, temperature 98.4 °F (36.9 °C), temperature source Oral, resp. rate 18, height 162.6 cm (64\"), weight 83.2 kg (183 lb 8 oz), SpO2 100%.    Output: Producing urine.    Lungs:  Normal effort.    Abdomen: Abdomen is soft.  Bowel sounds are normal.     Extremities: Normal range of motion.  There is no dependent edema or local swelling.    Pulses: Distal pulses are intact.    Neurological: Patient is alert and oriented to person, place and time.  Normal strength.    Pupils:  Pupils are equal, round, and reactive to light.    Skin:  Warm.              Awake, NAD  LE no edema   Abd soft  Neuro non focal   Lungs clear  S1S2  Results Review:     I reviewed the patient's new clinical results.    WBC WBC   Date Value Ref Range Status   11/24/2024 5.31 3.40 - 10.80 10*3/mm3 Final   11/22/2024 6.11 3.40 - 10.80 10*3/mm3 Final      HGB Hemoglobin   Date Value Ref Range Status   11/24/2024 7.5 (L) 12.0 - 15.9 g/dL Final " "  11/22/2024 7.9 (L) 12.0 - 15.9 g/dL Final      HCT Hematocrit   Date Value Ref Range Status   11/24/2024 24.7 (L) 34.0 - 46.6 % Final   11/22/2024 26.8 (L) 34.0 - 46.6 % Final      Platlets No results found for: \"LABPLAT\"   MCV MCV   Date Value Ref Range Status   11/24/2024 94.3 79.0 - 97.0 fL Final   11/22/2024 97.8 (H) 79.0 - 97.0 fL Final          Sodium Sodium   Date Value Ref Range Status   11/24/2024 138 136 - 145 mmol/L Final   11/23/2024 142 136 - 145 mmol/L Final   11/22/2024 141 136 - 145 mmol/L Final      Potassium Potassium   Date Value Ref Range Status   11/24/2024 4.3 3.5 - 5.2 mmol/L Final   11/23/2024 5.2 3.5 - 5.2 mmol/L Final   11/22/2024 5.1 3.5 - 5.2 mmol/L Final      Chloride Chloride   Date Value Ref Range Status   11/24/2024 104 98 - 107 mmol/L Final   11/23/2024 110 (H) 98 - 107 mmol/L Final   11/22/2024 110 (H) 98 - 107 mmol/L Final      CO2 CO2   Date Value Ref Range Status   11/24/2024 23.0 22.0 - 29.0 mmol/L Final   11/23/2024 23.0 22.0 - 29.0 mmol/L Final   11/22/2024 19.0 (L) 22.0 - 29.0 mmol/L Final      BUN BUN   Date Value Ref Range Status   11/24/2024 40 (H) 8 - 23 mg/dL Final   11/23/2024 55 (H) 8 - 23 mg/dL Final   11/22/2024 74 (H) 8 - 23 mg/dL Final      Creatinine Creatinine   Date Value Ref Range Status   11/24/2024 2.67 (H) 0.57 - 1.00 mg/dL Final   11/23/2024 2.73 (H) 0.57 - 1.00 mg/dL Final   11/22/2024 3.94 (H) 0.57 - 1.00 mg/dL Final      Calcium Calcium   Date Value Ref Range Status   11/24/2024 9.4 8.6 - 10.5 mg/dL Final   11/23/2024 9.7 8.6 - 10.5 mg/dL Final   11/22/2024 9.7 8.6 - 10.5 mg/dL Final      PO4 No results found for: \"CAPO4\"   Albumin Albumin   Date Value Ref Range Status   11/22/2024 3.4 (L) 3.5 - 5.2 g/dL Final        Magnesium No results found for: \"MG\"     Uric Acid No results found for: \"URICACID\"       Assessment & Plan       Acute on chronic renal failure    Type 2 diabetes mellitus    Essential hypertension    Paroxysmal atrial fibrillation    " Anemia, chronic disease    Dyspnea    Abnormal laboratory test      Assessment & Plan  ====================================  1.  Progression of CKD: Likely esrd. Discussed with patient and family. Wants to try iHD. She may be able to do 2 x weekly. Started on HD 11/22/24.      2.  CKD stage IV: Baseline creatinine 1.9-2.2 mg/dL, since last admission was running between 3.2-3.5 range, history of proteinuric renal disease UPC 2 g in the past duplex scan negative in 2016, on previous hospitalization discussion with daughter-in-law anesthesiologist, was explained regard to advanced renal dysfunction possibility of dialysis versus conservative treatment. Ultimately patient decided for dialysis.      3.  Anemia of CKD: EDISON now on HD days.      4.  Volume status:     5.  Hypertension: No ANABEL per duplex in 2016.  On terazosin, carvedilol, hydralazine, clonidine     6.  Metabolic acidosis: Management with HD now.     7.  Hyperparathyroidism: On calcitriol 0.5 mcg daily     8.  Iron deficiency on oral iron     9.  History of CAD     10.  COPD     11.  Hyperlipidemia     12.  Anxiety and depression.      RENAL US 11/18/24: Right K 8.9 cm and Left K 9.6 cm      Plan and recommendation.  3rd session of HD tomorrow.   Need to setup HD chair as outpatient. She can start with 2 x weekly HD treatment as outpatient.       I discussed the patients findings and my recommendations with patient        Zurdo Levine MD  11/24/24  14:14 EST

## 2024-11-24 NOTE — PLAN OF CARE
Goal Outcome Evaluation:         Pt resting in bed, pt got up to eat meals at the edge of the bed, NSR on tele. 1l o2. Safety precautions utilized and maintained.  Problem: Adult Inpatient Plan of Care  Goal: Absence of Hospital-Acquired Illness or Injury  Intervention: Identify and Manage Fall Risk  Recent Flowsheet Documentation  Taken 11/24/2024 1630 by Jerri Tenorio RN  Safety Promotion/Fall Prevention:   activity supervised   room organization consistent   safety round/check completed  Taken 11/24/2024 1440 by Jerri Tenorio RN  Safety Promotion/Fall Prevention:   activity supervised   room organization consistent   safety round/check completed  Taken 11/24/2024 1245 by Jerri Tenorio RN  Safety Promotion/Fall Prevention:   activity supervised   room organization consistent   safety round/check completed  Taken 11/24/2024 1030 by Jerri Tenorio RN  Safety Promotion/Fall Prevention:   activity supervised   room organization consistent   safety round/check completed  Taken 11/24/2024 0810 by Jerri Tenorio RN  Safety Promotion/Fall Prevention:   activity supervised   room organization consistent   safety round/check completed     Problem: Adult Inpatient Plan of Care  Goal: Absence of Hospital-Acquired Illness or Injury  Intervention: Prevent Skin Injury  Recent Flowsheet Documentation  Taken 11/24/2024 1630 by Jerri Tenorio RN  Body Position: position changed independently  Taken 11/24/2024 1440 by Jerri Tenorio RN  Body Position: position changed independently  Taken 11/24/2024 1245 by Jerri Tenorio RN  Body Position: position changed independently  Skin Protection: incontinence pads utilized  Taken 11/24/2024 1030 by Jerri Tenorio RN  Body Position: position changed independently  Skin Protection: incontinence pads utilized  Taken 11/24/2024 0810 by Jerri Tenorio RN  Body Position: position changed independently  Skin Protection: incontinence pads utilized

## 2024-11-24 NOTE — PLAN OF CARE
Goal Outcome Evaluation:      Patient had no acute events during shift  3LNC, A&Ox4, NSR  Pain control -see RUTH HE

## 2024-11-25 ENCOUNTER — APPOINTMENT (OUTPATIENT)
Dept: NEPHROLOGY | Facility: HOSPITAL | Age: 84
End: 2024-11-25
Payer: MEDICARE

## 2024-11-25 LAB
ANION GAP SERPL CALCULATED.3IONS-SCNC: 9 MMOL/L (ref 5–15)
BUN SERPL-MCNC: 20 MG/DL (ref 8–23)
BUN/CREAT SERPL: 14.3 (ref 7–25)
CALCIUM SPEC-SCNC: 9.2 MG/DL (ref 8.6–10.5)
CHLORIDE SERPL-SCNC: 103 MMOL/L (ref 98–107)
CO2 SERPL-SCNC: 26 MMOL/L (ref 22–29)
CREAT SERPL-MCNC: 1.4 MG/DL (ref 0.57–1)
DEPRECATED RDW RBC AUTO: 59 FL (ref 37–54)
EGFRCR SERPLBLD CKD-EPI 2021: 37.4 ML/MIN/1.73
ERYTHROCYTE [DISTWIDTH] IN BLOOD BY AUTOMATED COUNT: 17 % (ref 12.3–15.4)
GLUCOSE SERPL-MCNC: 228 MG/DL (ref 65–99)
HCT VFR BLD AUTO: 28.5 % (ref 34–46.6)
HGB BLD-MCNC: 8.7 G/DL (ref 12–15.9)
MCH RBC QN AUTO: 29 PG (ref 26.6–33)
MCHC RBC AUTO-ENTMCNC: 30.5 G/DL (ref 31.5–35.7)
MCV RBC AUTO: 95 FL (ref 79–97)
PLATELET # BLD AUTO: 139 10*3/MM3 (ref 140–450)
PMV BLD AUTO: 11.3 FL (ref 6–12)
POTASSIUM SERPL-SCNC: 3.7 MMOL/L (ref 3.5–5.2)
RBC # BLD AUTO: 3 10*6/MM3 (ref 3.77–5.28)
SODIUM SERPL-SCNC: 138 MMOL/L (ref 136–145)
WBC NRBC COR # BLD AUTO: 5.66 10*3/MM3 (ref 3.4–10.8)

## 2024-11-25 PROCEDURE — 86707 HEPATITIS BE ANTIBODY: CPT | Performed by: INTERNAL MEDICINE

## 2024-11-25 PROCEDURE — 94664 DEMO&/EVAL PT USE INHALER: CPT

## 2024-11-25 PROCEDURE — 85027 COMPLETE CBC AUTOMATED: CPT | Performed by: FAMILY MEDICINE

## 2024-11-25 PROCEDURE — 97530 THERAPEUTIC ACTIVITIES: CPT

## 2024-11-25 PROCEDURE — 02HV33Z INSERTION OF INFUSION DEVICE INTO SUPERIOR VENA CAVA, PERCUTANEOUS APPROACH: ICD-10-PCS | Performed by: RADIOLOGY

## 2024-11-25 PROCEDURE — 94799 UNLISTED PULMONARY SVC/PX: CPT

## 2024-11-25 PROCEDURE — 80048 BASIC METABOLIC PNL TOTAL CA: CPT | Performed by: FAMILY MEDICINE

## 2024-11-25 PROCEDURE — 25010000002 EPOETIN ALFA PER 1000 UNITS: Performed by: FAMILY MEDICINE

## 2024-11-25 PROCEDURE — 0JH63XZ INSERTION OF TUNNELED VASCULAR ACCESS DEVICE INTO CHEST SUBCUTANEOUS TISSUE AND FASCIA, PERCUTANEOUS APPROACH: ICD-10-PCS | Performed by: RADIOLOGY

## 2024-11-25 PROCEDURE — 99232 SBSQ HOSP IP/OBS MODERATE 35: CPT | Performed by: FAMILY MEDICINE

## 2024-11-25 PROCEDURE — 25010000002 HEPARIN (PORCINE) PER 1000 UNITS: Performed by: INTERNAL MEDICINE

## 2024-11-25 RX ORDER — DOXEPIN HYDROCHLORIDE 10 MG/1
10 CAPSULE ORAL NIGHTLY
Status: DISCONTINUED | OUTPATIENT
Start: 2024-11-25 | End: 2024-11-27 | Stop reason: HOSPADM

## 2024-11-25 RX ORDER — LOPERAMIDE HYDROCHLORIDE 2 MG/1
2 CAPSULE ORAL 4 TIMES DAILY PRN
Status: DISCONTINUED | OUTPATIENT
Start: 2024-11-25 | End: 2024-11-27 | Stop reason: HOSPADM

## 2024-11-25 RX ADMIN — Medication 10 ML: at 21:04

## 2024-11-25 RX ADMIN — APIXABAN 2.5 MG: 2.5 TABLET, FILM COATED ORAL at 20:56

## 2024-11-25 RX ADMIN — Medication 10 ML: at 08:40

## 2024-11-25 RX ADMIN — CARVEDILOL 25 MG: 12.5 TABLET, FILM COATED ORAL at 10:49

## 2024-11-25 RX ADMIN — PANTOPRAZOLE SODIUM 40 MG: 40 TABLET, DELAYED RELEASE ORAL at 18:14

## 2024-11-25 RX ADMIN — TERAZOSIN HYDROCHLORIDE ANHYDROUS 10 MG: 5 CAPSULE ORAL at 20:55

## 2024-11-25 RX ADMIN — IPRATROPIUM BROMIDE AND ALBUTEROL SULFATE 3 ML: 2.5; .5 SOLUTION RESPIRATORY (INHALATION) at 04:32

## 2024-11-25 RX ADMIN — CLONIDINE HYDROCHLORIDE 0.1 MG: 0.1 TABLET ORAL at 01:33

## 2024-11-25 RX ADMIN — CALCITRIOL CAPSULES 0.25 MCG 0.5 MCG: 0.25 CAPSULE ORAL at 08:36

## 2024-11-25 RX ADMIN — OXYCODONE 5 MG: 5 TABLET ORAL at 07:39

## 2024-11-25 RX ADMIN — DOXEPIN HYDROCHLORIDE 10 MG: 10 CAPSULE ORAL at 20:56

## 2024-11-25 RX ADMIN — HYDRALAZINE HYDROCHLORIDE 100 MG: 50 TABLET ORAL at 20:56

## 2024-11-25 RX ADMIN — ATORVASTATIN CALCIUM 80 MG: 40 TABLET, FILM COATED ORAL at 08:35

## 2024-11-25 RX ADMIN — LOPERAMIDE HYDROCHLORIDE 2 MG: 2 CAPSULE ORAL at 18:44

## 2024-11-25 RX ADMIN — ISOSORBIDE MONONITRATE 30 MG: 30 TABLET, EXTENDED RELEASE ORAL at 10:49

## 2024-11-25 RX ADMIN — IPRATROPIUM BROMIDE AND ALBUTEROL SULFATE 3 ML: 2.5; .5 SOLUTION RESPIRATORY (INHALATION) at 17:03

## 2024-11-25 RX ADMIN — OXYCODONE 5 MG: 5 TABLET ORAL at 20:56

## 2024-11-25 RX ADMIN — Medication 1 CAPSULE: at 08:34

## 2024-11-25 RX ADMIN — HEPARIN SODIUM 1000 UNITS: 1000 INJECTION INTRAVENOUS; SUBCUTANEOUS at 13:06

## 2024-11-25 RX ADMIN — APIXABAN 2.5 MG: 2.5 TABLET, FILM COATED ORAL at 08:33

## 2024-11-25 RX ADMIN — CARVEDILOL 25 MG: 12.5 TABLET, FILM COATED ORAL at 20:56

## 2024-11-25 RX ADMIN — CYANOCOBALAMIN TAB 1000 MCG 1000 MCG: 1000 TAB at 08:34

## 2024-11-25 RX ADMIN — PANTOPRAZOLE SODIUM 40 MG: 40 TABLET, DELAYED RELEASE ORAL at 05:36

## 2024-11-25 RX ADMIN — PANTOPRAZOLE SODIUM 40 MG: 40 TABLET, DELAYED RELEASE ORAL at 07:52

## 2024-11-25 RX ADMIN — ASPIRIN 81 MG: 81 TABLET, COATED ORAL at 08:34

## 2024-11-25 RX ADMIN — FERROUS SULFATE TAB 325 MG (65 MG ELEMENTAL FE) 325 MG: 325 (65 FE) TAB at 08:35

## 2024-11-25 RX ADMIN — CLONIDINE HYDROCHLORIDE 0.1 MG: 0.1 TABLET ORAL at 18:14

## 2024-11-25 RX ADMIN — IPRATROPIUM BROMIDE AND ALBUTEROL SULFATE 3 ML: 2.5; .5 SOLUTION RESPIRATORY (INHALATION) at 07:26

## 2024-11-25 RX ADMIN — Medication 10 MG: at 20:55

## 2024-11-25 RX ADMIN — ERYTHROPOIETIN 3000 UNITS: 3000 INJECTION, SOLUTION INTRAVENOUS; SUBCUTANEOUS at 13:07

## 2024-11-25 RX ADMIN — HYDRALAZINE HYDROCHLORIDE 100 MG: 50 TABLET ORAL at 21:05

## 2024-11-25 NOTE — PLAN OF CARE
Problem: Hemodialysis  Goal: Safe, Effective Therapy Delivery  Outcome: Progressing  Goal: Effective Tissue Perfusion  Outcome: Progressing  Goal: Absence of Infection Signs and Symptoms  Outcome: Progressing     HD completed. Tolerated tx of 3.0 hrs well. Access functioned well, reversing lines. UF goal of 1 liter was not reached, Dr. Lane gave verbal orders to turn uf off from pull, stop removal of fluid, total net removed was 910 ml this tx. Blood returned with no complications. Report given to Gilson alcantara RN. Goal Outcome Evaluation:

## 2024-11-25 NOTE — PLAN OF CARE
Goal Outcome Evaluation:      Patient had no acute events during shift  1LNC, A&Ox4, NSR  Incontinent B&B

## 2024-11-25 NOTE — PROGRESS NOTES
[facility] Medicine Services  PROGRESS NOTE    Patient Name: Yanique Webster  : 1940  MRN: [id number]    Date of Admission: 11/15/2024  Primary Care Physician: Sebas Alicea MD    Subjective   Subjective     CC:  F/U KINDRA on CKD    HPI:  Patient seen and examined. No complaints. Plan for HD today.     Objective   Objective     Vital Signs:   Temp:  [98.3 °F (36.8 °C)-99.3 °F (37.4 °C)] 98.3 °F (36.8 °C)  Heart Rate:  [63-87] 70  Resp:  [18] 18  BP: (133-181)/(53-81) 152/72  Flow (L/min) (Oxygen Therapy):  [1-2] 1     Physical Exam:  Constitutional: No acute distress, awake, alert  HENT: NCAT, mucous membranes moist, [place]  Respiratory: Decreased in bases bilaterally, respiratory effort normal 1L NC  Cardiovascular: RRR, no murmurs, rubs, or gallops  Gastrointestinal: Positive bowel sounds, soft, nontender, nondistended  Musculoskeletal: No bilateral ankle edema  Psychiatric: Pleasant affect, cooperative  Neurologic: No focal deficits, speech clear  Skin: No rashes     Results Reviewed:  LAB RESULTS:      Lab 24  0947 24  0435 24  0125 24  2214 24  0544   WBC 5.31 6.11  --   --  6.99   HEMOGLOBIN 7.5* 7.9*  --  8.4* 8.5*   HEMATOCRIT 24.7* 26.8*  --  28.0* 28.1*   PLATELETS 108* 107*  --   --  101*   NEUTROS ABS  --  4.37  --   --  4.74   IMMATURE GRANS (ABS)  --  0.03  --   --  0.02   LYMPHS ABS  --  0.94  --   --  1.26   MONOS ABS  --  0.49  --   --  0.62   EOS ABS  --  0.23  --   --  0.31   MCV 94.3 97.8*  --   --  96.9   LACTATE  --   --  0.6  --   --          Lab 24  0947 24  0724 24  0435 24  0848 24  0125   SODIUM 138 142 141 143 139   POTASSIUM 4.3 5.2 5.1 5.2 5.3*   CHLORIDE 104 110* 110* 113* 112*   CO2 23.0 23.0 19.0* 19.0* 16.0*   ANION GAP 11.0 9.0 12.0 11.0 11.0   BUN 40* 55* 74* 78* 73*   CREATININE 2.67* 2.73* 3.94* 4.08* 4.04*   EGFR 17.2* 16.8* 10.8* 10.4* 10.5*   GLUCOSE 231* 127* 125* 125* 131*    CALCIUM 9.4 9.7 9.7 9.7 9.7         Lab 11/22/24  0435 11/21/24  0125   TOTAL PROTEIN 5.5* 5.7*   ALBUMIN 3.4* 3.6   GLOBULIN 2.1 2.1   ALT (SGPT) 14 13   AST (SGOT) 22 22   BILIRUBIN 0.4 0.4   ALK PHOS 73 71                         Lab 11/21/24  0856 11/21/24  0511 11/21/24  0120   PH, ARTERIAL 7.277* 7.276* 7.255*   PCO2, ARTERIAL 42.3 43.5 39.7   PO2 .0 92.8 103.0   FIO2 28 28 28   HCO3 ART 19.7* 20.2 17.6*   BASE EXCESS ART -6.6* -6.2* -8.9*   CARBOXYHEMOGLOBIN 1.7 1.5 1.6     Brief Urine Lab Results  (Last result in the past 365 days)        Color   Clarity   Blood   Leuk Est   Nitrite   Protein   CREAT   Urine HCG        11/15/24 1410 Yellow   Clear   Negative   Negative   Negative   100 mg/dL (2+)                   Microbiology Results Abnormal       None            No radiology results from the last 24 hrs    Results for orders placed during the hospital encounter of 10/30/24    Adult Transthoracic Echo Complete W/ Cont if Necessary Per Protocol    Interpretation Summary    Left ventricular systolic function is normal. Estimated left ventricular EF = 60%    Left ventricular wall thickness is consistent with mild concentric hypertrophy.    The left atrial cavity is moderately dilated.    Aortic sclerosis without aortic stenosis.  Mild to moderate aortic insufficiency    Mild mitral regurgitation.      Current medications:  Scheduled Meds:apixaban, 2.5 mg, Oral, Q12H  aspirin, 81 mg, Oral, Daily  atorvastatin, 80 mg, Oral, Daily  calcitriol, 0.5 mcg, Oral, Daily  carvedilol, 25 mg, Oral, Q12H  fentaNYL, 1 patch, Transdermal, Q72H   And  Check Fentanyl Patch Placement, 1 each, Not Applicable, Q12H  cloNIDine, 0.1 mg, Oral, Q6H  ferrous sulfate, 325 mg, Oral, Daily With Breakfast  hydrALAZINE, 100 mg, Oral, Q8H  ipratropium-albuterol, 3 mL, Nebulization, 4x Daily - RT  isosorbide mononitrate, 30 mg, Oral, Daily  lactobacillus acidophilus, 1 capsule, Oral, Daily  pantoprazole, 40 mg, Oral, BID  AC  polyethylene glycol, 17 g, Oral, QAM  sodium chloride, 10 mL, Intravenous, Q12H  terazosin, 10 mg, Oral, Nightly  vitamin B-12, 1,000 mcg, Oral, Daily      Continuous Infusions:       PRN Meds:.  acetaminophen    senna-docusate sodium **AND** polyethylene glycol **AND** bisacodyl **AND** bisacodyl    dextrose    dextrose    diphenhydrAMINE    doxepin    glucagon (human recombinant)    heparin (porcine)    ipratropium-albuterol    melatonin    naloxone    nitroglycerin    oxyCODONE    sodium chloride    sodium chloride    sodium chloride    [Held by provider] torsemide    Assessment & Plan   Assessment & Plan     Active Hospital Problems    Diagnosis  POA    **Acute on chronic renal failure [N17.9, N18.9]  Yes    Abnormal laboratory test [R89.9]  Yes    Dyspnea [R06.00]  Yes    Anemia, chronic disease [D63.8]  Yes    Paroxysmal atrial fibrillation [I48.0]  Yes    Essential hypertension [I10]  Yes    Type 2 diabetes mellitus [E11.9]  Yes      Resolved Hospital Problems   No resolved problems to display.        Brief Hospital Course to date:  Yanique Webster is a 84 y.o. female with PMH of Afib on Xarelto, HTN, CKD, prior stroke, T2DM with neuropathy, chronic anemia, and GERD who presented from [facility] with KINDRA on CKD.    This patient's problems and plans were partially entered by my partner and updated as appropriate by me 11/25/24.     KINDRA on advanced CKD  Anion gap metabolic acidosis -> resolved  --Nephrology follows, appreciate assistance  --S/P R IJ temporary dialysis catheter per IR 11/22   --Will need outpatient HD arranged, 2x weekly   --Patient also needs a tunneled line, will order     TCP  --PLT Trending back up, awaiting AM CBC     PAF  --changed xarelto to eliquis 2.5mg BID per Nephrology recommendations  --Monitor H&H     HTN. Improving  -- Continue Coreg 25 mg twice daily  -- Continue Hydralazine 100 mg 3 times daily  -- Continue Clonidine 0.1 4 times daily.  -- Continue Imdur 30 mg daily.  --  Continue Terazosin 10 mg nightly.   -- Renal recommends tight BP control      T2DM  --Minimal SSI use, DC'd accu-checks     Anemia of chronic disease  --Monitor, on Eliquis + ASA   --Add EPO     Vitamin D deficiency  -- Continue Calcitrol    Acute encephalopathy Multifactorial: Likely metabolic and hospital delirium  Chronic pain: On fentanyl patch.  --Didn't sleep well last night. Schedule Melatonin and Doxepin instead of PRN    Goals of care.  Palliative care follows. Patient DNR/DNI.     Expected Discharge Location and Transportation: [facility], needs outpatient HD arranged and tunneled line   Expected Discharge   Expected Discharge Date: 11/25/2024; Expected Discharge Time:      VTE Prophylaxis:  Pharmacologic & mechanical VTE prophylaxis orders are present.         AM-PAC 6 Clicks Score (PT): 17 (11/25/24 1143)    CODE STATUS:   Code Status and Medical Interventions: No CPR (Do Not Attempt to Resuscitate); Limited Support; No intubation (DNI)   Ordered at: 11/21/24 1414     Medical Intervention Limits:    No intubation (DNI)     Level Of Support Discussed With:    Patient     Code Status (Patient has no pulse and is not breathing):    No CPR (Do Not Attempt to Resuscitate)     Medical Interventions (Patient has pulse or is breathing):    Limited Support       Shayy Littlejohn, DO  11/25/24

## 2024-11-25 NOTE — SIGNIFICANT NOTE
11/23/24 0729   Assessments (Pre/Post)   Consent Obtained yes   Safety Alarms verified;Reverse osmosis (RO) log completed;Machine log completed;Auto alarm test Narrow Venous Limits (NVL) enabled;Auto alarm test passed   Hepatitis Status negative   Date Hepatitis Profile Obtained 11/22/24   Transport Modality bed   Cognitive/Neuro/Behavioral WDL   Cognitive/Neuro/Behavioral WDL WDL   Safety WDL   Safety WDL WDL   Safety Factors wheels locked;upper side rails raised x 2;ID band on;call light in reach;bed in low position   Cardiovascular WDL   Cardiac Rhythm apical pulse regular   ECG   Rhythm normal sinus rhythm   Respiratory WDL   Rhythm/Pattern, Respiratory unlabored;pattern regular;depth regular;no shortness of breath reported   Expansion/Accessory Muscles/Retractions no retractions;expansion symmetric   Genitourinary WDL   Voiding Characteristics external catheter   Machine Checks   Machine Number 5   Station Number 3   RO Number central   Machine Temperature 96.8 °F (36 °C)   Hemodialysis Conductivity 14   pH 7.1   Chloramines 0   Alarms Activated yes   Hemodialysis Prescription   Mode of Treatment HD (hemodialysis)   Treatment Duration (hours) 2.5 Hours   Blood Flow (BFR) 300   Dialysate K (mEq/L) 2 mEq/L   Dialysate CA (mEq/L) 2.5 mEq/L   Na+ Program 140   Bicarb (mEq/L) 35   UF Goal 1.5L   Heparin Concentration not applicable   Heparin Dosage none   Heparin Comment no heparin   Dialysate Flow 500   Dialyzer Revaclear   Special Orders keep systolic BP above (specify);keep MAP above (specify)  (BP 90 or > MAP 65 or >)   Vitals   Initiation air foam detector engaged;all connections secured;saline line double clamped;prime given (specify mL);parameters set, venous;parameters set, arterial   Temp 98.5 °F (36.9 °C)   Temp src Axillary   Heart Rate 67   Heart Rate Source Monitor   Resp 23   Resp Rate Source Monitor   /57   Noninvasive MAP (mmHg) 68   BP Location Right arm   BP Method Automatic   Patient  "Position Lying   Oxygen Therapy   SpO2 99 %   Pulse Oximetry Type Continuous   Device (Oxygen Therapy) nasal cannula   Flow (L/min) (Oxygen Therapy) 2   Treatment Assessment   Blood Flow Rate (mL/min) 300 mL/min   Venous Pressure (mmHg) 80   Arterial Pressure (mmHg) -120 mmHg   Hemodialysis, Fluids 300   Total Mean Pressure (TMP) 15   Ultrafiltration Rate (mL/min) 720 mL/min   Safety Check WDL WDL   Hemodialysis, Safety Factors vital signs stable;access site visible and intact;supplemental oxygenation;treatment initiated   Hemodialysis, Comments HD started   Hemodialysis Cath Double with Pigtail   Placement date: If unknown, DO NOT use \"Add Comment\" note/Placement time: If unknown, DO NOT use \"Add Comment\" note: 11/22/24 1344   Hand Hygiene Completed: Yes  Verification by X-ray: Yes  Site Prep: Chlorhexidine  Site Prep Agent has Completely Drie...   Site Assessment Intact;Dry;Clean   #1 Lumen Status Blood return noted;Flushed;Infusing   #2 Lumen Status Blood return noted;Flushed;Infusing   Dressing Type Antimicrobial dressing/disc;Transparent   Dressing Status Clean;Dry;Intact;Old drainage   Dressing Change Due 11/29/24   Indication/Daily Review of Necessity CRRT/hemodialysis     "

## 2024-11-25 NOTE — PLAN OF CARE
Goal Outcome Evaluation:  Plan of Care Reviewed With: patient        Progress: improving  Outcome Evaluation: Pt continues to present with decreased functional independence and decreased activity tolerance compared to baseline. Pt ambulated 80ft with CGA and RW for support. Limited by SOA this session. Continue to progress per pt tolerance.    Anticipated Discharge Disposition (PT): inpatient rehabilitation facility

## 2024-11-25 NOTE — PROGRESS NOTES
"Palliative Care Daily Progress Note     Referring: Caiobrijesh Peri    C/C: none    S: Medical record reviewed. Events noted. Reports no difficulty with HD and glad she decided to do it.      ROS: Denied pain, nausea or SOA    O: Code Status:   Code Status and Medical Interventions: No CPR (Do Not Attempt to Resuscitate); Limited Support; No intubation (DNI)   Ordered at: 11/21/24 1414     Medical Intervention Limits:    No intubation (DNI)     Level Of Support Discussed With:    Patient     Code Status (Patient has no pulse and is not breathing):    No CPR (Do Not Attempt to Resuscitate)     Medical Interventions (Patient has pulse or is breathing):    Limited Support      Advanced Directives: Advance Directive Status: Patient has advance directive, copy requested   Goals of Care: Ongoing.   Palliative Performance Scale Score: 50%     /54 (BP Location: Left arm, Patient Position: Lying)   Pulse 69   Temp 98.8 °F (37.1 °C) (Oral)   Resp 18   Ht 162.6 cm (64\")   Wt 83.2 kg (183 lb 8 oz)   LMP  (LMP Unknown)   SpO2 94%   BMI 31.50 kg/m²     Intake/Output Summary (Last 24 hours) at 11/25/2024 0913  Last data filed at 11/25/2024 0830  Gross per 24 hour   Intake 600 ml   Output 350 ml   Net 250 ml       Physical Exam:    General Appearance:    Alert, cooperative, NAD   HEENT:    NC/AT, EOMI, anicteric, MMM, face relaxed   Neck:   supple, trachea midline, no JVD   Lungs:     CTA bilat, diminished in bases; respirations regular, even     and unlabored    Heart:    RRR, normal S1 and S2, no M/R/G   Abdomen:     Normal bowel sounds, soft, nontender, nondistended   G/U:   Deferred   MSK/Extremities:   No clubbing , cyanosis or edema, No wasting   Pulses:   Pulses palpable and equal bilaterally   Skin:   Warm, dry, no mottling   Neurologic:   A/Ox4, cooperative, moves extremities x 4, no tremor, nl     tone   Psych:   Calm, appropriate       Current Medications:      Current Facility-Administered Medications:     " acetaminophen (TYLENOL) tablet 650 mg, 650 mg, Oral, Q4H PRN, Aubree Grubbs APRN, 650 mg at 11/20/24 0506    apixaban (ELIQUIS) tablet 2.5 mg, 2.5 mg, Oral, Q12H, Shayy Littlejohn, DO, 2.5 mg at 11/25/24 0833    aspirin EC tablet 81 mg, 81 mg, Oral, Daily, Aubree Grubbs APRN, 81 mg at 11/25/24 0834    atorvastatin (LIPITOR) tablet 80 mg, 80 mg, Oral, Daily, Shayy Littlejohn, DO, 80 mg at 11/25/24 0835    sennosides-docusate (PERICOLACE) 8.6-50 MG per tablet 2 tablet, 2 tablet, Oral, BID PRN **AND** polyethylene glycol (MIRALAX) packet 17 g, 17 g, Oral, Daily PRN **AND** bisacodyl (DULCOLAX) EC tablet 5 mg, 5 mg, Oral, Daily PRN **AND** bisacodyl (DULCOLAX) suppository 10 mg, 10 mg, Rectal, Daily PRN, Aubree Grubbs APRN    calcitriol (ROCALTROL) capsule 0.5 mcg, 0.5 mcg, Oral, Daily, Aubree Grubbs APRN, 0.5 mcg at 11/25/24 0836    carvedilol (COREG) tablet 25 mg, 25 mg, Oral, Q12H, Evonne Love MD, 25 mg at 11/24/24 2139    fentaNYL (DURAGESIC) 12 MCG/HR 1 patch, 1 patch, Transdermal, Q72H, 1 patch at 11/24/24 0952 **AND** Check Fentanyl Patch Placement, 1 each, Not Applicable, Q12H, Maeve Whitt MD    cloNIDine (CATAPRES) tablet 0.1 mg, 0.1 mg, Oral, Q6H, Aubree Grubbs APRN, 0.1 mg at 11/25/24 0133    dextrose (D50W) (25 g/50 mL) IV injection 25 g, 25 g, Intravenous, Q15 Min PRN, Aubree Grubbs APRN    dextrose (GLUTOSE) oral gel 15 g, 15 g, Oral, Q15 Min PRN, Aubree Grubbs APRN    diphenhydrAMINE (BENADRYL) capsule 25 mg, 25 mg, Oral, Nightly PRN, Evonne Love MD, 25 mg at 11/19/24 2104    doxepin (SINEquan) capsule 10 mg, 10 mg, Oral, Nightly PRN, Aubree Grubbs APRN, 10 mg at 11/17/24 0235    ferrous sulfate tablet 325 mg, 325 mg, Oral, Daily With Breakfast, Aubree Grubbs APRN, 325 mg at 11/25/24 0835    glucagon (GLUCAGEN) injection 1 mg, 1 mg, Intramuscular, Q15 Min PRN, Aubree Grubbs APRN    heparin (porcine) injection 1,000 Units, 1,000 Units, Intracatheter, PRN, Zurdo Levine MD,  1,000 Units at 11/23/24 0957    hydrALAZINE (APRESOLINE) tablet 100 mg, 100 mg, Oral, Q8H, PlacitasAubree morales APRN, 100 mg at 11/24/24 2139    ipratropium-albuterol (DUO-NEB) nebulizer solution 3 mL, 3 mL, Nebulization, Q4H PRN, Aubree Grubbs APRN, 3 mL at 11/25/24 0432    ipratropium-albuterol (DUO-NEB) nebulizer solution 3 mL, 3 mL, Nebulization, 4x Daily - RT, Lilo Owens MD, 3 mL at 11/25/24 0726    isosorbide mononitrate (IMDUR) 24 hr tablet 30 mg, 30 mg, Oral, Daily, Evonne Love MD, 30 mg at 11/24/24 0809    lactobacillus acidophilus (RISAQUAD) capsule 1 capsule, 1 capsule, Oral, Daily, Aubree Grubbs APRN, 1 capsule at 11/25/24 0834    melatonin tablet 10 mg, 10 mg, Oral, Nightly PRN, Evonne Love MD, 10 mg at 11/24/24 2139    naloxone (NARCAN) nasal spray 1 spray, 1 spray, Nasal, PRN, Aubree Grubbs APRN    nitroglycerin (NITROSTAT) SL tablet 0.4 mg, 0.4 mg, Sublingual, Q5 Min PRN, Aubree Grubbs APRN    oxyCODONE (ROXICODONE) immediate release tablet 5 mg, 5 mg, Oral, Q4H PRN, Aubree Grubbs APRN, 5 mg at 11/25/24 0739    pantoprazole (PROTONIX) EC tablet 40 mg, 40 mg, Oral, BID AC, Aubree Grubbs APRN, 40 mg at 11/25/24 0752    polyethylene glycol (MIRALAX) packet 17 g, 17 g, Oral, QAM, Aubree Grubbs, APRN, 17 g at 11/23/24 1018    sodium chloride 0.9 % flush 10 mL, 10 mL, Intravenous, PRN, Aubree Grubbs APRN    sodium chloride 0.9 % flush 10 mL, 10 mL, Intravenous, Q12H, Aubree Grubbs APRN, 10 mL at 11/25/24 0840    sodium chloride 0.9 % flush 10 mL, 10 mL, Intravenous, PRN, Aubree Grubbs APRN    sodium chloride 0.9 % infusion 40 mL, 40 mL, Intravenous, PRN, Aubree Grubbs APRN    terazosin (HYTRIN) capsule 10 mg, 10 mg, Oral, Nightly, Maeve Whitt MD, 10 mg at 11/24/24 4693    [Held by provider] torsemide (DEMADEX) tablet 20 mg, 20 mg, Oral, Daily PRN, Aubree Grubbs APRN    vitamin B-12 (CYANOCOBALAMIN) tablet 1,000 mcg, 1,000 mcg, Oral, Daily, Aubree Grubbs APRN, 1,000 mcg at 11/25/24  0834     Labs:   Results from last 7 days   Lab Units 11/24/24  0947   WBC 10*3/mm3 5.31   HEMOGLOBIN g/dL 7.5*   HEMATOCRIT % 24.7*   PLATELETS 10*3/mm3 108*     Results from last 7 days   Lab Units 11/24/24  0947 11/23/24  0724 11/22/24  0435   SODIUM mmol/L 138   < > 141   POTASSIUM mmol/L 4.3   < > 5.1   CHLORIDE mmol/L 104   < > 110*   CO2 mmol/L 23.0   < > 19.0*   BUN mg/dL 40*   < > 74*   CREATININE mg/dL 2.67*   < > 3.94*   CALCIUM mg/dL 9.4   < > 9.7   BILIRUBIN mg/dL  --   --  0.4   ALK PHOS U/L  --   --  73   ALT (SGPT) U/L  --   --  14   AST (SGOT) U/L  --   --  22   GLUCOSE mg/dL 231*   < > 125*    < > = values in this interval not displayed.     Imaging Results (Last 72 Hours)       Procedure Component Value Units Date/Time    IR insert non-tunneled central line 5+ [[id number]] Collected: 11/22/24 1440     Updated: 11/22/24 1444    Narrative:      IR INSERT NON-TUNNELED CENTRAL LINE 5+     History: CKD stage V.   NON tunneled central line; Lido 3ml; Heparin 2.2 ml; FT 0; mGy 0; uGym 0.75; no sedation; 14F X 15cm Schon XL double lumen     : Marquis Galloway MD.    Assistant: None     Modality: Sonography and fluoroscopy     DOSE REDUCTION: The examination was performed according to departmental dose-optimization program which includes automated exposure control, adjustment of the mA and/or kV according to patient size.    Fluoro time: Less than 6 seconds.    Radiation dose: 0 mGy air Kerma. 0.75 uGy square meters     Sedation: No IV sedation was given.    Anesthesia:  Lidocaine 1% local infiltration.    Estimated blood loss:  < 5 cc.            Technique:  A thorough discussion of the risks, benefits, and alternatives of the procedure, and if applicable, moderate sedation, was carried out with the patient. They were encouraged to ask any questions. Any questions were answered. They verbalized   understanding. A written informed consent was then signed.      A multi-component timeout was  performed prior to starting the procedure using the departmental protocol.     The procedure room personnel used personal protective equipment. The operators used sterile gloves and if indicated, sterile gowns. The surgical site was prepped with chlorhexidine gluconate  and draped in the maximal applicable sterile fashion.    A preliminary ultrasonogram was performed of the target site that revealed a patent and compressible Right internal jugular vein. Pertinent ultrasound images were stored in the PACS for documentation.    A sterile prep and drape of the neck and upper chest was performed using maximal technique.    Using aseptic precautions, real-time ultrasound guidance, the target vein was accessed after local anesthetic infiltration and dermatotomy with an access needle. A guidewire was advanced into the central venous system under fluoroscopic guidance. Over   the wire following standard exchanges a nontunneled temporary dialysis catheter was placed. The catheter ports aspirated and flushed well and were terminally packed with heparin.    The catheter was secured to skin with nonabsorbable suture. An aseptic dressing was applied.    The patient was transferred to the recovery area and was discharged from the department in stable condition.     Complications: None immediate.        Device: 14 Greek x15 cm Dual-lumen nontunneled dialysis catheter.    Findings: Patent and compressible Right internal jugular vein. Final image shows the catheter to be in good position with the catheter tip at the cavoatrial junction and an excellent position for use. There is no complication.       Impression:      Impression:      Successful ultrasound and fluoroscopic guided Right internal jugular vein route nontunneled temporary dialysis catheter placement as described above.    Thank you for the opportunity to assist in the care of your patient.          Electronically Signed: Marquis Galloway MD    11/22/2024 2:41 PM EST     Workstation ID: [id number]                  Diagnostics: Reviewed    A:     Acute on chronic renal failure    Type 2 diabetes mellitus    Essential hypertension    Paroxysmal atrial fibrillation    Anemia, chronic disease    Dyspnea    Abnormal laboratory test       Impression:  A/CKD  DM 2  Htn  PAF  Anemia of chronic disease  Hx chronic pain    Symptoms:   Debility       P:   Pt tolerating HD.  Will monitor for needs and see prn only.     Conchita Willard MD, 11/25/2024, 09:13 EST

## 2024-11-25 NOTE — H&P (VIEW-ONLY)
"   LOS: 9 days    Patient Care Team:  Sebas Alicea MD as PCP - General (Family Medicine)  Steve Geronimo MD as Consulting Physician (Cardiology)  Emilio Regalado MD as Consulting Physician (Endocrinology)  Axel Ribeiro MD as Consulting Physician (Cardiology)    Subjective     No new events    Objective     Vital Signs:  Blood pressure 152/72, pulse 70, temperature 98.3 °F (36.8 °C), temperature source Oral, resp. rate 18, height 162.6 cm (64\"), weight 83.2 kg (183 lb 8 oz), SpO2 99%.      Intake/Output Summary (Last 24 hours) at 11/25/2024 1058  Last data filed at 11/25/2024 0830  Gross per 24 hour   Intake 600 ml   Output 350 ml   Net 250 ml        11/24 0701 - 11/25 0700  In: -   Out: 350 [Urine:350]    Physical Exam:        GENERAL:  NAD  NEURO: Awake and alert, oriented. No focal deficit  PSYCHIATRIC: NMA. Cooperative with PE  EYE: PE, no icterus, no conjunctivitis  ENT: ommm, dentition intact,  Hearing intact  NECK: Supple , No JVD discernable,  Trachea midline  CV: No edema, RRR  LUNGS:  Quiet,  Nonlabored resp.  Symmetrical expansion  ABDOMEN: Nondistended, soft nontender.  : No Lopez, no palp bladder  SKIN: Warm and dry without rash      Labs:  Results from last 7 days   Lab Units 11/24/24  0947 11/22/24  0435 11/19/24  2214 11/19/24  0544   WBC 10*3/mm3 5.31 6.11  --  6.99   HEMOGLOBIN g/dL 7.5* 7.9* 8.4* 8.5*   PLATELETS 10*3/mm3 108* 107*  --  101*     Results from last 7 days   Lab Units 11/24/24  0947 11/23/24  0724 11/22/24  0435 11/21/24  0848 11/21/24  0125   SODIUM mmol/L 138 142 141 143 139   POTASSIUM mmol/L 4.3 5.2 5.1 5.2 5.3*   CHLORIDE mmol/L 104 110* 110* 113* 112*   CO2 mmol/L 23.0 23.0 19.0* 19.0* 16.0*   BUN mg/dL 40* 55* 74* 78* 73*   CREATININE mg/dL 2.67* 2.73* 3.94* 4.08* 4.04*   CALCIUM mg/dL 9.4 9.7 9.7 9.7 9.7   ALBUMIN g/dL  --   --  3.4*  --  3.6     Results from last 7 days   Lab Units 11/22/24  0435   ALK PHOS U/L 73   BILIRUBIN mg/dL 0.4   ALT (SGPT) U/L 14 "   AST (SGOT) U/L 22     Results from last 7 days   Lab Units 11/21/24  0856   PH, ARTERIAL pH units 7.277*   PO2 ART mm Hg 101.0   PCO2, ARTERIAL mm Hg 42.3   HCO3 ART mmol/L 19.7*               Estimated Creatinine Clearance: 16.7 mL/min (A) (by C-G formula based on SCr of 2.67 mg/dL (H)).        RENAL US 11/18/24: Right K 8.9 cm and Left K 9.6 cm            A/P:      ARF:  No sign of renal recovery.  Likely with progression to esrd. Started on HD 11/22/24. Will ru n M/F for now.  Needs tunneled HD cath and outpt HD arrangements     CKD stage IV: Baseline creatinine 1.9-2.2 mg/dL, since last admission was running between 3.2-3.5 range, history of proteinuric renal disease UPC 2 g in the past duplex scan negative in 2016, on previous hospitalization discussion with daughter-in-law anesthesiologist, was explained regard to advanced renal dysfunction possibility of dialysis versus conservative treatment. Ultimately patient decided for dialysis.     Hypertension: No ANABEL per duplex in 2016.  On terazosin, carvedilol, hydralazine, clonidine.  Monitor with HD.     Metabolic acidosis:  Adjust with HD.    Anemia: Hgb below goal.  Iron stores adequate.  Cont EDISON. Transfuse as needed for Hgb < 7     Volume status: stable.  Avoid aggressive UF unles Edema or SOB>      Hyperparathyroidism:  .  On calcitriol 0.5 mcg daily. Calcium stable. Phos stable.  No binders for now.     Nutrition: High protein low K diet. Give renal vit.    High risk and complexity pt.       Edwin Lane MD  11/25/24  10:58 EST

## 2024-11-25 NOTE — THERAPY TREATMENT NOTE
Patient Name: Yanique Webster  : 1940    MRN: [id number]                              Today's Date: 2024       Admit Date: 11/15/2024    Visit Dx:     ICD-10-CM ICD-9-CM   1. Elevated serum creatinine  R79.89 790.99   2. CKD (chronic kidney disease) stage 4, GFR 15-29 ml/min  N18.4 585.4   3. Chronic anemia  D64.9 285.9   4. Impaired functional mobility, balance, gait, and endurance  Z74.09 V49.89   5. Fibromyalgia  M79.7 729.1   6. PVD (peripheral vascular disease)  I73.9 443.9   7. Type 2 diabetes mellitus with hypoglycemia without coma, with long-term current use of insulin  E11.649 250.80    Z79.4 251.2     V58.67   8. Chronic bronchitis, unspecified chronic bronchitis type  J42 491.9   9. Osteoarthritis, unspecified osteoarthritis type, unspecified site  M19.90 715.90   10. Coronary artery disease involving native coronary artery of native heart without angina pectoris  I25.10 414.01   11. Essential hypertension  I10 401.9   12. Symptomatic anemia  D64.9 285.9   13. Acute blood loss anemia  D62 285.1   14. Acute exacerbation of COPD with asthma  J44.1 493.22     Patient Active Problem List   Diagnosis    Fibromyalgia    PVD (peripheral vascular disease)    Type 2 diabetes mellitus    Diabetic gastroparesis    Takotsubo cardiomyopathy    Elevated serum creatinine    Hyperlipidemia LDL goal <70    COPD (chronic obstructive pulmonary disease)    Obesity    Osteoarthritis    Chronic pain    GERD (gastroesophageal reflux disease)    Gout    Chronic joint pain    Coronary artery disease involving native coronary artery of native heart without angina pectoris    Nonrheumatic aortic valve insufficiency    Altered mental status    Essential hypertension    CKD (chronic kidney disease) stage 4, GFR 15-29 ml/min    Hypertensive emergency    Status post placement of implantable loop recorder    Paroxysmal atrial fibrillation    Acute exacerbation of COPD with asthma    Encounter for loop recorder at end of  battery life    Closed left hip fracture    Anxiety associated with depression    Anemia, chronic disease    Surgery follow-up    Borderline abnormal TFTs    Acute blood loss anemia    Secondary hyperparathyroidism    Symptomatic anemia    KINDRA (acute kidney injury)    Acute on chronic renal failure    Dyspnea    Abnormal laboratory test     Past Medical History:   Diagnosis Date    Blurry vision     Chronic joint pain     Chronic pain     COPD (chronic obstructive pulmonary disease)     Coronary artery disease     Diabetic gastroparesis 08/24/2016    Esophageal stricture     s/p dilation in 2007    GERD (gastroesophageal reflux disease)     Gout     Headache     secondary to hypertension    Hyperlipidemia     Hypertension     Long term current use of insulin     Neuropathy     Neuropathy due to type 2 diabetes mellitus     Obesity     Osteoarthritis     Pericarditis 2008    Stroke 03/2019    Type 2 diabetes mellitus      Past Surgical History:   Procedure Laterality Date    BRONCHOSCOPY N/A 8/23/2016    Procedure: BRONCHOSCOPY BIOPSY AT BEDSIDE;  Surgeon: Mookie Willard MD;  Location:  [facility];  Service:     CARDIAC CATHETERIZATION N/A 8/30/2016    Procedure: Left Heart Cath;  Surgeon: Steve Geronimo MD;  Location:  [facility];  Service:     CARDIAC CATHETERIZATION N/A 8/30/2016    Procedure: Right Heart Cath;  Surgeon: Steve Geronimo MD;  Location:  [facility];  Service:     CARDIAC ELECTROPHYSIOLOGY PROCEDURE N/A 7/2/2019    Procedure: Loop insertion;  Surgeon: Steve Geronimo MD;  Location:  [facility];  Service: Cardiovascular    CARDIAC ELECTROPHYSIOLOGY PROCEDURE N/A 9/26/2023    Procedure: Loop recorder removal;  Surgeon: Steve Geronimo MD;  Location:  [facility];  Service: Cardiovascular;  Laterality: N/A;    CATARACT EXTRACTION      COLONOSCOPY N/A 8/16/2024    Procedure: COLONOSCOPY;  Surgeon: Brunner, Mark I, MD;  Location:   [facility];  Service: Gastroenterology;  Laterality: N/A;    ENDOSCOPY N/A 8/14/2024    Procedure: ESOPHAGOGASTRODUODENOSCOPY;  Surgeon: Brunner, Mark I, MD;  Location:  [facility];  Service: Gastroenterology;  Laterality: N/A;    ESOPHAGEAL DILATATION  2007    HERNIA REPAIR      HIP CANNULATED SCREW PLACEMENT Left 1/2/2024    Procedure: HIP CANNULATED SCREW PLACEMENT LEFT;  Surgeon: Seymour Harrington MD;  Location:  [facility];  Service: Orthopedics;  Laterality: Left;    HYSTERECTOMY  1998    WRIST FRACTURE SURGERY Left       General Information       Row Name 11/25/24 1123          Physical Therapy Time and Intention    Document Type therapy note (daily note)  -AE     Mode of Treatment physical therapy  -AE       Row Name 11/25/24 1123          General Information    Patient Profile Reviewed yes  -AE     Existing Precautions/Restrictions fall;oxygen therapy device and L/min;other (see comments)  chronic pain in hands and feet, incontinent - don brief prior to mobility  -AE     Barriers to Rehab medically complex;previous functional deficit;impaired sensation  -AE       Row Name 11/25/24 1123          Cognition    Orientation Status (Cognition) oriented x 3  -AE       Row Name 11/25/24 1123          Safety Issues/Impairments Affecting Functional Mobility    Safety Issues Affecting Function (Mobility) awareness of need for assistance;insight into deficits/self-awareness;safety precaution awareness;sequencing abilities  -AE     Impairments Affecting Function (Mobility) balance;endurance/activity tolerance;pain;shortness of breath;strength;sensation/sensory awareness  -AE               User Key  (r) = Recorded By, (t) = Taken By, (c) = Cosigned By      Initials Name Provider Type    AE Harrison Meyer PT Physical Therapist                   Mobility       Row Name 11/25/24 1130          Bed Mobility    Bed Mobility sit-supine  -AE     Sit-Supine [place] (Bed Mobility) moderate assist (50% patient effort);1  person assist;verbal cues  -AE     Assistive Device (Bed Mobility) head of bed elevated  -AE     Comment, (Bed Mobility) VCs for hand placement and sequencing. Pt required increased assist to advance BLE into bed.  -AE       Row Name 11/25/24 1130          Transfers    Comment, (Transfers) VCs for hand placement and sequencing. Brief donned in standing.  -AE       Row Name 11/25/24 1130          Sit-Stand Transfer    Sit-Stand [place] (Transfers) minimum assist (75% patient effort);verbal cues  -AE     Assistive Device (Sit-Stand Transfers) walker, front-wheeled  -AE       Row Name 11/25/24 1130          Gait/Stairs (Locomotion)    [place] Level (Gait) contact guard;verbal cues  -AE     Assistive Device (Gait) walker, front-wheeled  -AE     Distance in Feet (Gait) 80  -AE     Deviations/Abnormal Patterns (Gait) bilateral deviations;base of support, wide;myke decreased;gait speed decreased  -AE     Bilateral Gait Deviations forward flexed posture;heel strike decreased  -AE     Comment, (Gait/Stairs) Pt demo step through gait pattern with slowed myke, decreased step length, and forward flexed posture. Pt required increased cues to improve upright posture and sequencing of AD. Overall, pt demo good safety awareness but does fatigue with increased mobility. Further distance limited by SOA.  -AE               User Key  (r) = Recorded By, (t) = Taken By, (c) = Cosigned By      Initials Name Provider Type    Harrison Coleman PT Physical Therapist                   Obj/Interventions       Row Name 11/25/24 1133          Balance    Balance Assessment sitting static balance;sitting dynamic balance;standing static balance;standing dynamic balance  -AE     Static Sitting Balance standby assist  -AE     Dynamic Sitting Balance contact guard  -AE     Position, Sitting Balance unsupported;sitting in chair  -AE     Static Standing Balance contact guard  -AE     Dynamic Standing Balance contact guard  -AE      Position/Device Used, Standing Balance supported;walker, front-wheeled  -AE               User Key  (r) = Recorded By, (t) = Taken By, (c) = Cosigned By      Initials Name Provider Type    AE Harrison Meyer, PT Physical Therapist                   Goals/Plan    No documentation.                  Clinical Impression       Row Name 11/25/24 1137          Pain    Pain Location foot  -AE     Pain Side/Orientation bilateral  -AE     Pain Management Interventions activity modification encouraged;positioning techniques utilized  -AE     Response to Pain Interventions functional ability improved;activity participation with tolerable pain  -AE     Pre/Posttreatment Pain Comment chronic B feet neuropathy  -AE       Row Name 11/25/24 1137          Pain Scale: FACES Pre/Post-Treatment    Pain: FACES Scale, Pretreatment 2-->hurts little bit  -AE     Posttreatment Pain Rating 2-->hurts little bit  -AE       Row Name 11/25/24 1137          Plan of Care Review    Plan of Care Reviewed With patient  -AE     Progress improving  -AE     Outcome Evaluation Pt continues to present with decreased functional independence and decreased activity tolerance compared to baseline. Pt ambulated 80ft with CGA and RW for support. Limited by SOA this session. Continue to progress per pt tolerance.  -AE       Row Name 11/25/24 1137          Vital Signs    Pre Systolic BP Rehab 152  -AE     Pre Treatment Diastolic BP 72  -AE     Pretreatment Heart Rate (beats/min) 67  -AE     Posttreatment Heart Rate (beats/min) 72  -AE     Pre SpO2 (%) 96  -AE     O2 Delivery Pre Treatment nasal cannula  -AE     O2 Delivery Intra Treatment nasal cannula  -AE     Post SpO2 (%) 98  -AE     O2 Delivery Post Treatment nasal cannula  -AE     Pre Patient Position Sitting  -AE     Intra Patient Position Standing  -AE     Post Patient Position Supine  -AE       Row Name 11/25/24 1137          Positioning and Restraints    Pre-Treatment Position sitting in chair/recliner   -AE     Post Treatment Position bed  -AE     In Bed notified nsg;fowlers;call light within reach;encouraged to call for assist;exit alarm on;side rails up x2;legs elevated  -AE               User Key  (r) = Recorded By, (t) = Taken By, (c) = Cosigned By      Initials Name Provider Type    Harrison Coleman, ANGY Physical Therapist                   Outcome Measures       Row Name 11/25/24 1143 11/25/24 0437       How much help from another person do you currently need...    Turning from your back to your side while in flat bed without using bedrails? 3  -AE 3  -LM    Moving from lying on back to sitting on the side of a flat bed without bedrails? 3  -AE 3  -LM    Moving to and from a bed to a chair (including a wheelchair)? 3  -AE 3  -LM    Standing up from a chair using your arms (e.g., wheelchair, bedside chair)? 3  -AE 2  -LM    Climbing 3-5 steps with a railing? 2  -AE 3  -LM    To walk in hospital room? 3  -AE 3  -LM    AM-PAC 6 Clicks Score (PT) 17  -AE 17  -LM    Highest Level of Mobility Goal 5 --> Static standing  -AE 5 --> Static standing  -LM      Row Name 11/25/24 1143          Functional Assessment    Outcome Measure Options AM-PAC 6 Clicks Basic Mobility (PT)  -AE               User Key  (r) = Recorded By, (t) = Taken By, (c) = Cosigned By      Initials Name Provider Type    Ena Navarrete RN Registered Nurse    Harrison Coleman, PT Physical Therapist                                 Physical Therapy Education       Title: PT OT SLP Therapies (In Progress)       Topic: Physical Therapy (In Progress)       Point: Mobility training (Done)       Learning Progress Summary            Patient Acceptance, E, VU by AE at 11/25/2024 1051    Acceptance, E, VU by AE at 11/21/2024 1312    Acceptance, E, VU,NR by SD at 11/18/2024 1120                      Point: Home exercise program (Not Started)       Learner Progress:  Not documented in this visit.              Point: Body mechanics (Done)        Learning Progress Summary            Patient Acceptance, E, VU by AE at 11/25/2024 1051    Acceptance, E, VU by AE at 11/21/2024 1312    Acceptance, E, VU,NR by SD at 11/18/2024 1120                      Point: Precautions (Done)       Learning Progress Summary            Patient Acceptance, E, VU by AE at 11/25/2024 1051    Acceptance, E, VU by AE at 11/21/2024 1312    Acceptance, E, VU,NR by SD at 11/18/2024 1120                                      User Key       Initials Effective Dates Name Provider Type Discipline    SD 03/13/23 -  Bertha Neely, PT Physical Therapist PT    AE 09/21/21 -  Harrison Meyer PT Physical Therapist PT                  PT Recommendation and Plan     Progress: improving  Outcome Evaluation: Pt continues to present with decreased functional independence and decreased activity tolerance compared to baseline. Pt ambulated 80ft with CGA and RW for support. Limited by SOA this session. Continue to progress per pt tolerance.     Time Calculation:         PT Charges       Row Name 11/25/24 1144             Time Calculation    Start Time 1051  -AE      PT Received On 11/25/24  -AE      PT Goal Re-Cert Due Date 11/28/24  -AE         Timed Charges    79083 - PT Therapeutic Activity Minutes 26  -AE         Total Minutes    Timed Charges Total Minutes 26  -AE       Total Minutes 26  -AE                User Key  (r) = Recorded By, (t) = Taken By, (c) = Cosigned By      Initials Name Provider Type    AE Harrison Meyer PT Physical Therapist                  Therapy Charges for Today       Code Description Service Date Service Provider Modifiers Qty    28016647669 HC PT THERAPEUTIC ACT EA 15 MIN 11/25/2024 Harrison Meyer, PT GP 2    94093294450 HC PT THER SUPP EA 15 MIN 11/25/2024 Harrison Meyer, PT GP 1            PT G-Codes  Outcome Measure Options: AM-PAC 6 Clicks Basic Mobility (PT)  AM-PAC 6 Clicks Score (PT): 17  AM-PAC 6 Clicks Score (OT): 16  PT Discharge Summary  Anticipated  Discharge Disposition (PT): inpatient rehabilitation facility    Harrison Meyer, PT  11/25/2024

## 2024-11-25 NOTE — DISCHARGE PLACEMENT REQUEST
"Colleen Webster (84 y.o. Female)       Date of Birth   1940    Social Security Number       Address   [address]  [address] [place] [address]    Home Phone   [phone]    MRN   [id number]       Mormonism   Islam    Marital Status                               Admission Date   11/15/24    Admission Type   Emergency    Admitting Provider   Shayy Littlejohn DO    Attending Provider   Shayy Littlejohn DO    Department, Room/Bed   [address], S[address]/1       Discharge Date       Discharge Disposition       Discharge Destination                                 Attending Provider: Shayy Littlejohn DO    Allergies: Latex, Nickel, Penicillins, Penicillins    Isolation: None   Infection: None   Code Status: No CPR    Ht: 162.6 cm (64\")   Wt: 83.2 kg (183 lb 8 oz)    Admission Cmt: None   Principal Problem: Acute on chronic renal failure [N17.9,N18.9]                   Active Insurance as of 11/15/2024       Primary Coverage       Payor Plan Insurance Group Employer/Plan Group    MEDICARE MEDICARE A & B        Payor Plan Address Payor Plan Phone Number Payor Plan Fax Number Effective Dates    [address] [phone]  1/1/2006 - None Entered    [facility] [id number]         Subscriber Name Subscriber Birth Date Member ID       COLLEEN WEBSTER 1940 [id number]               Secondary Coverage       Payor Plan Insurance Group Employer/Plan Group    [facility] SUPP [id number]       Payor Plan Address Payor Plan Phone Number Payor Plan Fax Number Effective Dates    [address]   1/1/2006 - None Entered    [facility] [id number]         Subscriber Name Subscriber Birth Date Member ID       COLLEEN WEBSTER 1940 [id number]                     Emergency Contacts        (Rel.) Home Phone Work Phone Mobile Phone    RICARDO NORRISY (Son) [phone] -- [phone]    KATELIN WEBSTER (Son) [phone] -- [phone]               " "  History & Physical        Marquis Galloway MD at 11/22/24 1309            H&P reviewed. The patient was examined and there are no changes to the H&P.          Electronically signed by Marquis Galloway MD at 11/22/24 1311   Source Note: H&P (View-Only)           LOS: 6 days   Patient Care Team:  Sebas Alicea MD as PCP - General (Family Medicine)  Steve Geronimo MD as Consulting Physician (Cardiology)  Emilio Regalado MD as Consulting Physician (Endocrinology)  Axel Ribeiro MD as Consulting Physician (Cardiology)        Subjective    Discussed with patient and son ( on the ph). Patient wants to try dialysis and see if she can tolerate. Plan for temporary HD cath today and HD afterwards.     Objective    Vital Sign Min/Max for last 24 hours  Temp  Min: 96.7 °F (35.9 °C)  Max: 98.2 °F (36.8 °C)   BP  Min: 119/54  Max: 162/68   Pulse  Min: 62  Max: 75   Resp  Min: 18  Max: 20   SpO2  Min: 97 %  Max: 100 %   Flow (L/min) (Oxygen Therapy)  Min: 2  Max: 2   No data recorded     Flowsheet Rows      Flowsheet Row First Filed Value   Admission Height 162.6 cm (64\") Documented at 11/15/2024 1228   Admission Weight 85.7 kg (189 lb) Documented at 11/15/2024 1228            I/O this shift:  In: -   Out: 300 [Urine:300]  I/O last 3 completed shifts:  In: 200 [P.O.:200]  Out: 1650 [Urine:1650]    Objective:  General Appearance:  Comfortable.    Vital signs: (most recent): Blood pressure 155/68, pulse 68, temperature 97.8 °F (36.6 °C), temperature source Oral, resp. rate 18, height 162.6 cm (64\"), weight 83.2 kg (183 lb 8 oz), SpO2 99%.    Output: Producing urine.    Lungs:  Normal effort.    Abdomen: Abdomen is soft.  Bowel sounds are normal.     Extremities: Normal range of motion.  There is no dependent edema or local swelling.    Pulses: Distal pulses are intact.    Neurological: Patient is alert and oriented to person, place and time.  Normal strength.    Pupils:  Pupils are equal, round, and " "reactive to light.    Skin:  Warm.              Awake, NAD  LE no edema   Abd soft  Neuro non focal   Lungs clear  S1S2  Results Review:     I reviewed the patient's new clinical results.    WBC WBC   Date Value Ref Range Status   11/22/2024 6.11 3.40 - 10.80 10*3/mm3 Final      HGB Hemoglobin   Date Value Ref Range Status   11/22/2024 7.9 (L) 12.0 - 15.9 g/dL Final   11/19/2024 8.4 (L) 12.0 - 15.9 g/dL Final      HCT Hematocrit   Date Value Ref Range Status   11/22/2024 26.8 (L) 34.0 - 46.6 % Final   11/19/2024 28.0 (L) 34.0 - 46.6 % Final      Platlets No results found for: \"LABPLAT\"   MCV MCV   Date Value Ref Range Status   11/22/2024 97.8 (H) 79.0 - 97.0 fL Final          Sodium Sodium   Date Value Ref Range Status   11/22/2024 141 136 - 145 mmol/L Final   11/21/2024 143 136 - 145 mmol/L Final   11/21/2024 139 136 - 145 mmol/L Final   11/20/2024 139 136 - 145 mmol/L Final      Potassium Potassium   Date Value Ref Range Status   11/22/2024 5.1 3.5 - 5.2 mmol/L Final   11/21/2024 5.2 3.5 - 5.2 mmol/L Final   11/21/2024 5.3 (H) 3.5 - 5.2 mmol/L Final   11/20/2024 5.1 3.5 - 5.2 mmol/L Final      Chloride Chloride   Date Value Ref Range Status   11/22/2024 110 (H) 98 - 107 mmol/L Final   11/21/2024 113 (H) 98 - 107 mmol/L Final   11/21/2024 112 (H) 98 - 107 mmol/L Final   11/20/2024 112 (H) 98 - 107 mmol/L Final      CO2 CO2   Date Value Ref Range Status   11/22/2024 19.0 (L) 22.0 - 29.0 mmol/L Final   11/21/2024 19.0 (L) 22.0 - 29.0 mmol/L Final   11/21/2024 16.0 (L) 22.0 - 29.0 mmol/L Final   11/20/2024 15.0 (L) 22.0 - 29.0 mmol/L Final      BUN BUN   Date Value Ref Range Status   11/22/2024 74 (H) 8 - 23 mg/dL Final   11/21/2024 78 (H) 8 - 23 mg/dL Final   11/21/2024 73 (H) 8 - 23 mg/dL Final   11/20/2024 71 (H) 8 - 23 mg/dL Final      Creatinine Creatinine   Date Value Ref Range Status   11/22/2024 3.94 (H) 0.57 - 1.00 mg/dL Final   11/21/2024 4.08 (H) 0.57 - 1.00 mg/dL Final   11/21/2024 4.04 (H) 0.57 - 1.00 " "mg/dL Final   11/20/2024 3.94 (H) 0.57 - 1.00 mg/dL Final      Calcium Calcium   Date Value Ref Range Status   11/22/2024 9.7 8.6 - 10.5 mg/dL Final   11/21/2024 9.7 8.6 - 10.5 mg/dL Final   11/21/2024 9.7 8.6 - 10.5 mg/dL Final   11/20/2024 9.4 8.6 - 10.5 mg/dL Final      PO4 No results found for: \"CAPO4\"   Albumin Albumin   Date Value Ref Range Status   11/22/2024 3.4 (L) 3.5 - 5.2 g/dL Final   11/21/2024 3.6 3.5 - 5.2 g/dL Final        Magnesium No results found for: \"MG\"     Uric Acid No results found for: \"URICACID\"       Assessment & Plan      Acute on chronic renal failure    Type 2 diabetes mellitus    Essential hypertension    Paroxysmal atrial fibrillation    Anemia, chronic disease    Dyspnea    Abnormal laboratory test      Assessment & Plan  ====================================  1.  Progression of CKD: Likely esrd. Discussed with patient and family. Wants to try iHD. She may be able to do 2 x weekly. Plan for HD cath placement today and HD afterwards.      2.  CKD stage IV: Baseline creatinine 1.9-2.2 mg/dL, since last admission was running between 3.2-3.5 range, history of proteinuric renal disease UPC 2 g in the past duplex scan negative in 2016, on previous hospitalization discussion with daughter-in-law anesthesiologist, was explained regard to advanced renal dysfunction possibility of dialysis versus conservative treatment.     3.  Anemia of CKD: EDISON now on HD days.      4.  Volume status:     5.  Hypertension: No ANABEL per duplex in 2016.  On terazosin, carvedilol, hydralazine, clonidine     6.  Metabolic acidosis: Management with HD now.     7.  Hyperparathyroidism: On calcitriol 0.5 mcg daily     8.  Iron deficiency on oral iron     9.  History of CAD     10.  COPD     11.  Hyperlipidemia     12.  Anxiety and depression.      RENAL US 11/18/24: Right K 8.9 cm and Left K 9.6 cm      Plan and recommendation.  Plan for 1st HD session today only 2 hr treatment. 1 liter UF  Would recommend marcelo ROQUE " "to eliquis 2.5 mg BID from xeralto.   EDISON on HD days.     I discussed the patients findings and my recommendations with patient        Zurdo Levine MD  11/22/24  13:07 EST              Electronically signed by Zurdo Levine MD at 11/22/24 1309                 Zurdo Levine MD at 11/22/24 1307           LOS: 6 days   Patient Care Team:  Sebas Alicea MD as PCP - General (Family Medicine)  Steve Geronimo MD as Consulting Physician (Cardiology)  Emilio Regalado MD as Consulting Physician (Endocrinology)  Axel Ribeiro MD as Consulting Physician (Cardiology)        Subjective    Discussed with patient and son ( on the ph). Patient wants to try dialysis and see if she can tolerate. Plan for temporary HD cath today and HD afterwards.     Objective    Vital Sign Min/Max for last 24 hours  Temp  Min: 96.7 °F (35.9 °C)  Max: 98.2 °F (36.8 °C)   BP  Min: 119/54  Max: 162/68   Pulse  Min: 62  Max: 75   Resp  Min: 18  Max: 20   SpO2  Min: 97 %  Max: 100 %   Flow (L/min) (Oxygen Therapy)  Min: 2  Max: 2   No data recorded     Flowsheet Rows      Flowsheet Row First Filed Value   Admission Height 162.6 cm (64\") Documented at 11/15/2024 1228   Admission Weight 85.7 kg (189 lb) Documented at 11/15/2024 1228            I/O this shift:  In: -   Out: 300 [Urine:300]  I/O last 3 completed shifts:  In: 200 [P.O.:200]  Out: 1650 [Urine:1650]    Objective:  General Appearance:  Comfortable.    Vital signs: (most recent): Blood pressure 155/68, pulse 68, temperature 97.8 °F (36.6 °C), temperature source Oral, resp. rate 18, height 162.6 cm (64\"), weight 83.2 kg (183 lb 8 oz), SpO2 99%.    Output: Producing urine.    Lungs:  Normal effort.    Abdomen: Abdomen is soft.  Bowel sounds are normal.     Extremities: Normal range of motion.  There is no dependent edema or local swelling.    Pulses: Distal pulses are intact.    Neurological: Patient is alert and oriented to person, place and time.  Normal strength.    Pupils:  " "Pupils are equal, round, and reactive to light.    Skin:  Warm.              Awake, NAD  LE no edema   Abd soft  Neuro non focal   Lungs clear  S1S2  Results Review:     I reviewed the patient's new clinical results.    WBC WBC   Date Value Ref Range Status   11/22/2024 6.11 3.40 - 10.80 10*3/mm3 Final      HGB Hemoglobin   Date Value Ref Range Status   11/22/2024 7.9 (L) 12.0 - 15.9 g/dL Final   11/19/2024 8.4 (L) 12.0 - 15.9 g/dL Final      HCT Hematocrit   Date Value Ref Range Status   11/22/2024 26.8 (L) 34.0 - 46.6 % Final   11/19/2024 28.0 (L) 34.0 - 46.6 % Final      Platlets No results found for: \"LABPLAT\"   MCV MCV   Date Value Ref Range Status   11/22/2024 97.8 (H) 79.0 - 97.0 fL Final          Sodium Sodium   Date Value Ref Range Status   11/22/2024 141 136 - 145 mmol/L Final   11/21/2024 143 136 - 145 mmol/L Final   11/21/2024 139 136 - 145 mmol/L Final   11/20/2024 139 136 - 145 mmol/L Final      Potassium Potassium   Date Value Ref Range Status   11/22/2024 5.1 3.5 - 5.2 mmol/L Final   11/21/2024 5.2 3.5 - 5.2 mmol/L Final   11/21/2024 5.3 (H) 3.5 - 5.2 mmol/L Final   11/20/2024 5.1 3.5 - 5.2 mmol/L Final      Chloride Chloride   Date Value Ref Range Status   11/22/2024 110 (H) 98 - 107 mmol/L Final   11/21/2024 113 (H) 98 - 107 mmol/L Final   11/21/2024 112 (H) 98 - 107 mmol/L Final   11/20/2024 112 (H) 98 - 107 mmol/L Final      CO2 CO2   Date Value Ref Range Status   11/22/2024 19.0 (L) 22.0 - 29.0 mmol/L Final   11/21/2024 19.0 (L) 22.0 - 29.0 mmol/L Final   11/21/2024 16.0 (L) 22.0 - 29.0 mmol/L Final   11/20/2024 15.0 (L) 22.0 - 29.0 mmol/L Final      BUN BUN   Date Value Ref Range Status   11/22/2024 74 (H) 8 - 23 mg/dL Final   11/21/2024 78 (H) 8 - 23 mg/dL Final   11/21/2024 73 (H) 8 - 23 mg/dL Final   11/20/2024 71 (H) 8 - 23 mg/dL Final      Creatinine Creatinine   Date Value Ref Range Status   11/22/2024 3.94 (H) 0.57 - 1.00 mg/dL Final   11/21/2024 4.08 (H) 0.57 - 1.00 mg/dL Final " "  11/21/2024 4.04 (H) 0.57 - 1.00 mg/dL Final   11/20/2024 3.94 (H) 0.57 - 1.00 mg/dL Final      Calcium Calcium   Date Value Ref Range Status   11/22/2024 9.7 8.6 - 10.5 mg/dL Final   11/21/2024 9.7 8.6 - 10.5 mg/dL Final   11/21/2024 9.7 8.6 - 10.5 mg/dL Final   11/20/2024 9.4 8.6 - 10.5 mg/dL Final      PO4 No results found for: \"CAPO4\"   Albumin Albumin   Date Value Ref Range Status   11/22/2024 3.4 (L) 3.5 - 5.2 g/dL Final   11/21/2024 3.6 3.5 - 5.2 g/dL Final        Magnesium No results found for: \"MG\"     Uric Acid No results found for: \"URICACID\"       Assessment & Plan      Acute on chronic renal failure    Type 2 diabetes mellitus    Essential hypertension    Paroxysmal atrial fibrillation    Anemia, chronic disease    Dyspnea    Abnormal laboratory test      Assessment & Plan  ====================================  1.  Progression of CKD: Likely esrd. Discussed with patient and family. Wants to try iHD. She may be able to do 2 x weekly. Plan for HD cath placement today and HD afterwards.      2.  CKD stage IV: Baseline creatinine 1.9-2.2 mg/dL, since last admission was running between 3.2-3.5 range, history of proteinuric renal disease UPC 2 g in the past duplex scan negative in 2016, on previous hospitalization discussion with daughter-in-law anesthesiologist, was explained regard to advanced renal dysfunction possibility of dialysis versus conservative treatment.     3.  Anemia of CKD: EDISON now on HD days.      4.  Volume status:     5.  Hypertension: No ANABEL per duplex in 2016.  On terazosin, carvedilol, hydralazine, clonidine     6.  Metabolic acidosis: Management with HD now.     7.  Hyperparathyroidism: On calcitriol 0.5 mcg daily     8.  Iron deficiency on oral iron     9.  History of CAD     10.  COPD     11.  Hyperlipidemia     12.  Anxiety and depression.      RENAL US 11/18/24: Right K 8.9 cm and Left K 9.6 cm      Plan and recommendation.  Plan for 1st HD session today only 2 hr treatment. 1 " liter UF  Would recommend swtiching AC to eliquis 2.5 mg BID from xeralto.   EDISON on HD days.     I discussed the patients findings and my recommendations with patient        Zurdo Levine MD  24  13:07 EST              Electronically signed by Zurdo Levine MD at 24 1309       Aubree Grubbs APRN at 11/15/24 1753       Attestation signed by Lilo Owens MD at 24 0020    I have reviewed this documentation and agree.  She is quite short of breath at rest, sitting up with oxygen,  Will check a CT chest, try a neb.     Lilo Owens MD 24 00:20 EST                         [facility]  HISTORY AND PHYSICAL    Patient Name: Yanique Webster  : 1940  MRN: [id number]  Primary Care Physician: Sebas Alicea MD  Date of admission: 11/15/2024    Subjective  Subjective     Chief Complaint:  Abdnormal labs    HPI:  Yainque Webster is a 84 y.o. female with PMH of afib, HTN, CKD, prior stroke, T2DM with neuropathy, chronic anemia, and GERD.  She is currently at patient at [facility] and was set to be discharged home with  today, but labs returned with worsening creatinine despite IVF 24.  She is being admitted to the hospitalist service for further treatment and will ask nephrology to see tomorrow        Review of Systems   Constitutional:  Negative for activity change, appetite change and fever.   HENT:  Negative for congestion and hearing loss.    Eyes:  Negative for visual disturbance.   Respiratory:  Positive for shortness of breath. Negative for cough and chest tightness.    Cardiovascular:  Negative for chest pain and leg swelling.   Gastrointestinal:  Negative for abdominal pain, blood in stool, diarrhea, nausea and vomiting.   Genitourinary:  Negative for dysuria and hematuria.   Musculoskeletal:  Positive for arthralgias.   Neurological:  Positive for weakness. Negative for dizziness.   Psychiatric/Behavioral:  Negative for  behavioral problems and confusion.         Personal History     Past Medical History:   Diagnosis Date    Blurry vision     Chronic joint pain     Chronic pain     COPD (chronic obstructive pulmonary disease)     Coronary artery disease     Diabetic gastroparesis 08/24/2016    Esophageal stricture     s/p dilation in 2007    GERD (gastroesophageal reflux disease)     Gout     Headache     secondary to hypertension    Hyperlipidemia     Hypertension     Long term current use of insulin     Neuropathy     Neuropathy due to type 2 diabetes mellitus     Obesity     Osteoarthritis     Pericarditis 2008    Stroke 03/2019    Type 2 diabetes mellitus          Past Surgical History:   Procedure Laterality Date    BRONCHOSCOPY N/A 8/23/2016    Procedure: BRONCHOSCOPY BIOPSY AT BEDSIDE;  Surgeon: Mookie Willard MD;  Location:  [facility];  Service:     CARDIAC CATHETERIZATION N/A 8/30/2016    Procedure: Left Heart Cath;  Surgeon: Steve Geronimo MD;  Location:  [facility];  Service:     CARDIAC CATHETERIZATION N/A 8/30/2016    Procedure: Right Heart Cath;  Surgeon: Steve Geronimo MD;  Location:  [facility];  Service:     CARDIAC ELECTROPHYSIOLOGY PROCEDURE N/A 7/2/2019    Procedure: Loop insertion;  Surgeon: Steve Geronimo MD;  Location:  [facility];  Service: Cardiovascular    CARDIAC ELECTROPHYSIOLOGY PROCEDURE N/A 9/26/2023    Procedure: Loop recorder removal;  Surgeon: Steve Geronimo MD;  Location:  [facility];  Service: Cardiovascular;  Laterality: N/A;    CATARACT EXTRACTION      COLONOSCOPY N/A 8/16/2024    Procedure: COLONOSCOPY;  Surgeon: Brunner, Mark I, MD;  Location:  [facility];  Service: Gastroenterology;  Laterality: N/A;    ENDOSCOPY N/A 8/14/2024    Procedure: ESOPHAGOGASTRODUODENOSCOPY;  Surgeon: Brunner, Mark I, MD;  Location:  [facility];  Service: Gastroenterology;  Laterality: N/A;    ESOPHAGEAL DILATATION  2007     HERNIA REPAIR      HIP CANNULATED SCREW PLACEMENT Left 1/2/2024    Procedure: HIP CANNULATED SCREW PLACEMENT LEFT;  Surgeon: Seymour Harrington MD;  [facility];  Service: Orthopedics;  Laterality: Left;    HYSTERECTOMY  1998    WRIST FRACTURE SURGERY Left        Family History:  family history includes Breast cancer (age of onset: 67) in her sister; Cancer in her father, mother, and another family member.     Social History:  reports that she quit smoking about 21 years ago. Her smoking use included cigarettes. She has never used smokeless tobacco. She reports that she does not drink alcohol and does not use drugs.  Social History     Social History Narrative    Mrs. Webster lives at [facility]. Was sent from [facility] today.     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     She smoked 1ppd for 25 years but quit in 2000.     Denies ETOH/illicit drug use.        Medications:  Accu-Chek Guide, Blood Glucose Test, Glucagon, Insulin Lispro, Insulin Pen Needle, Lancets 33G, Polyethylene Glycol 3350, acetaminophen, amLODIPine, aspirin, atorvastatin, bisacodyl, calcitriol, carvedilol, cloNIDine, doxazosin, doxepin, fentaNYL, ferrous sulfate, hydrALAZINE, ipratropium-albuterol, melatonin, naloxone, ondansetron ODT, oxyCODONE, pantoprazole, probiotic, rivaroxaban, sodium bicarbonate, terazosin, torsemide, and vitamin B-12    Allergies   Allergen Reactions    Latex Rash     Not an immediate reaction    Nickel Rash    Penicillins Rash     Patient has tolerated amoxicillin in the past. Had PCN reaction of hives when she was 15.    Penicillins Rash     unk       Objective  Objective     Vital Signs:   Temp:  [98.1 °F (36.7 °C)] 98.1 °F (36.7 °C)  Heart Rate:  [55-63] 59  Resp:  [18-20] 18  BP: (129-170)/(63-94) 170/74    Physical Exam   Constitutional: Awake, alert, son at bedside  Eyes: PERRLA, sclerae anicteric, no conjunctival injection  HENT: NCAT, mucous membranes moist  Neck:  Supple, no thyromegaly, no lymphadenopathy, trachea midline  Respiratory: Clear to auscultation bilaterally, nonlabored respirations   Cardiovascular: irregular, no murmurs, rubs, or gallops, palpable pedal pulses bilaterally  Gastrointestinal: Positive bowel sounds, soft, nontender, nondistended  Musculoskeletal: No bilateral ankle edema, no clubbing or cyanosis to extremities  Psychiatric: Appropriate affect, cooperative  Neurologic: Oriented x 3, MONTANEZ, speech clear  Skin: No rashes noted      Result Review:  I have personally reviewed the results from the time of this admission to 11/15/2024 17:53 EST and agree with these findings:  [x]  Laboratory list / accordion  []  Microbiology  []  Radiology  [x]  EKG/Telemetry   []  Cardiology/Vascular   []  Pathology  [x]  Old records  []  Other:  Most notable findings include: creatinine 4.18    LAB RESULTS:      Lab 11/15/24  1230   WBC 7.23   HEMOGLOBIN 9.2*   HEMATOCRIT 30.5*   PLATELETS 151   NEUTROS ABS 4.84   IMMATURE GRANS (ABS) 0.02   LYMPHS ABS 1.24   MONOS ABS 0.78   EOS ABS 0.30   MCV 96.5   LACTATE 0.9   PROTIME 18.0*   APTT 39.9*         Lab 11/15/24  1230   SODIUM 136   POTASSIUM 5.1   CHLORIDE 106   CO2 16.0*   ANION GAP 14.0   BUN 80*   CREATININE 4.18*   EGFR 10.1*   GLUCOSE 109*   CALCIUM 9.5   MAGNESIUM 2.0         Lab 11/15/24  1230   TOTAL PROTEIN 5.9*   ALBUMIN 3.8   GLOBULIN 2.1   ALT (SGPT) 13   AST (SGOT) 24   BILIRUBIN 0.4   ALK PHOS 83         Lab 11/15/24  1230   PROTIME 18.0*   INR 1.48*             Lab 11/15/24  1334   ABO TYPING O   RH TYPING Negative   ANTIBODY SCREEN Negative         Brief Urine Lab Results  (Last result in the past 365 days)        Color   Clarity   Blood   Leuk Est   Nitrite   Protein   CREAT   Urine HCG        11/15/24 1410 Yellow   Clear   Negative   Negative   Negative   100 mg/dL (2+)                 Microbiology Results (last 10 days)       ** No results found for the last 240 hours. **            No radiology  results from the last 24 hrs    Results for orders placed during the hospital encounter of 10/30/24    Adult Transthoracic Echo Complete W/ Cont if Necessary Per Protocol    Interpretation Summary    Left ventricular systolic function is normal. Estimated left ventricular EF = 60%    Left ventricular wall thickness is consistent with mild concentric hypertrophy.    The left atrial cavity is moderately dilated.    Aortic sclerosis without aortic stenosis.  Mild to moderate aortic insufficiency    Mild mitral regurgitation.      Assessment & Plan  Assessment & Plan       Acute on chronic renal failure    Type 2 diabetes mellitus    Essential hypertension    Paroxysmal atrial fibrillation    Anemia, chronic disease    Dyspnea    KINDRA on CKD  --baseline unclear. last admission pt's baseline was documented 1.9-2.2 but wasn't clear if her CKD had advanced.  When dc'd from hospital 11/8/24 creatinine was 3.76. While she has been at [facility], it has stayed in 4 range. Yesterday was 4.3, bumped to 4.5 today (11/15/24)  --nephrology consult    Dyspnea  --hold off on further IVF for now as she received 1L at [facility] 11/14/24 and just under 0.5L in ED today  --sats stable on RA    PAF  --xarelto restarted a few days ago, was held on dc per cardiology recs but family requested it be restarted due to stroke risk  --will hold for now due to renal function    HTN  --continue meds    T2DM  --SSI    Anemia of chronic disease  --hgb stable    Total time spent: 60 minutes  Time spent includes time reviewing chart, face-to-face time, counseling patient/family/caregiver, ordering medications/tests/procedures, communicating with other health care professionals, documenting clinical information in the electronic health record, and coordination of care.      DVT prophylaxis:  mechanical only for now    CODE STATUS:    Code Status (Patient has no pulse and is not breathing): CPR (Attempt to Resuscitate)  Medical Interventions (Patient has pulse or  "is breathing): Full Support      Expected Discharge home with HH, was supposed to be dc'd from [facility] 11/15/24  Expected discharge date/ time has not been documented.        Signature: Electronically signed by CHRIS Cote, 11/15/24, 6:00 PM EST                Electronically signed by Lilo Owens MD at 11/16/24 0020          Physician Progress Notes (most recent note)        Zurdo Levine MD at 11/24/24 1414           LOS: 8 days   Patient Care Team:  Sebas Alicea MD as PCP - General (Family Medicine)  Steve Geronimo MD as Consulting Physician (Cardiology)  Emilio Regalado MD as Consulting Physician (Endocrinology)  Axel Ribeiro MD as Consulting Physician (Cardiology)        Subjective     So far 2 treatments for HD. Tolerated well.     Objective     Vital Sign Min/Max for last 24 hours  Temp  Min: 98 °F (36.7 °C)  Max: 98.7 °F (37.1 °C)   BP  Min: 100/56  Max: 157/57   Pulse  Min: 60  Max: 72   Resp  Min: 16  Max: 18   SpO2  Min: 94 %  Max: 100 %   Flow (L/min) (Oxygen Therapy)  Min: 2  Max: 3   No data recorded     Flowsheet Rows      Flowsheet Row First Filed Value   Admission Height 162.6 cm (64\") Documented at 11/15/2024 1228   Admission Weight 85.7 kg (189 lb) Documented at 11/15/2024 1228            No intake/output data recorded.  I/O last 3 completed shifts:  In: -   Out: 2850 [Urine:1350]    Objective:  General Appearance:  Comfortable.    Vital signs: (most recent): Blood pressure 139/57, pulse 71, temperature 98.4 °F (36.9 °C), temperature source Oral, resp. rate 18, height 162.6 cm (64\"), weight 83.2 kg (183 lb 8 oz), SpO2 100%.    Output: Producing urine.    Lungs:  Normal effort.    Abdomen: Abdomen is soft.  Bowel sounds are normal.     Extremities: Normal range of motion.  There is no dependent edema or local swelling.    Pulses: Distal pulses are intact.    Neurological: Patient is alert and oriented to person, place and time.  Normal strength.    Pupils:  Pupils are " "equal, round, and reactive to light.    Skin:  Warm.              Awake, NAD  LE no edema   Abd soft  Neuro non focal   Lungs clear  S1S2  Results Review:     I reviewed the patient's new clinical results.    WBC WBC   Date Value Ref Range Status   11/24/2024 5.31 3.40 - 10.80 10*3/mm3 Final   11/22/2024 6.11 3.40 - 10.80 10*3/mm3 Final      HGB Hemoglobin   Date Value Ref Range Status   11/24/2024 7.5 (L) 12.0 - 15.9 g/dL Final   11/22/2024 7.9 (L) 12.0 - 15.9 g/dL Final      HCT Hematocrit   Date Value Ref Range Status   11/24/2024 24.7 (L) 34.0 - 46.6 % Final   11/22/2024 26.8 (L) 34.0 - 46.6 % Final      Platlets No results found for: \"LABPLAT\"   MCV MCV   Date Value Ref Range Status   11/24/2024 94.3 79.0 - 97.0 fL Final   11/22/2024 97.8 (H) 79.0 - 97.0 fL Final          Sodium Sodium   Date Value Ref Range Status   11/24/2024 138 136 - 145 mmol/L Final   11/23/2024 142 136 - 145 mmol/L Final   11/22/2024 141 136 - 145 mmol/L Final      Potassium Potassium   Date Value Ref Range Status   11/24/2024 4.3 3.5 - 5.2 mmol/L Final   11/23/2024 5.2 3.5 - 5.2 mmol/L Final   11/22/2024 5.1 3.5 - 5.2 mmol/L Final      Chloride Chloride   Date Value Ref Range Status   11/24/2024 104 98 - 107 mmol/L Final   11/23/2024 110 (H) 98 - 107 mmol/L Final   11/22/2024 110 (H) 98 - 107 mmol/L Final      CO2 CO2   Date Value Ref Range Status   11/24/2024 23.0 22.0 - 29.0 mmol/L Final   11/23/2024 23.0 22.0 - 29.0 mmol/L Final   11/22/2024 19.0 (L) 22.0 - 29.0 mmol/L Final      BUN BUN   Date Value Ref Range Status   11/24/2024 40 (H) 8 - 23 mg/dL Final   11/23/2024 55 (H) 8 - 23 mg/dL Final   11/22/2024 74 (H) 8 - 23 mg/dL Final      Creatinine Creatinine   Date Value Ref Range Status   11/24/2024 2.67 (H) 0.57 - 1.00 mg/dL Final   11/23/2024 2.73 (H) 0.57 - 1.00 mg/dL Final   11/22/2024 3.94 (H) 0.57 - 1.00 mg/dL Final      Calcium Calcium   Date Value Ref Range Status   11/24/2024 9.4 8.6 - 10.5 mg/dL Final   11/23/2024 9.7 8.6 - " "10.5 mg/dL Final   11/22/2024 9.7 8.6 - 10.5 mg/dL Final      PO4 No results found for: \"CAPO4\"   Albumin Albumin   Date Value Ref Range Status   11/22/2024 3.4 (L) 3.5 - 5.2 g/dL Final        Magnesium No results found for: \"MG\"     Uric Acid No results found for: \"URICACID\"       Assessment & Plan       Acute on chronic renal failure    Type 2 diabetes mellitus    Essential hypertension    Paroxysmal atrial fibrillation    Anemia, chronic disease    Dyspnea    Abnormal laboratory test      Assessment & Plan  ====================================  1.  Progression of CKD: Likely esrd. Discussed with patient and family. Wants to try iHD. She may be able to do 2 x weekly. Started on HD 11/22/24.      2.  CKD stage IV: Baseline creatinine 1.9-2.2 mg/dL, since last admission was running between 3.2-3.5 range, history of proteinuric renal disease UPC 2 g in the past duplex scan negative in 2016, on previous hospitalization discussion with daughter-in-law anesthesiologist, was explained regard to advanced renal dysfunction possibility of dialysis versus conservative treatment. Ultimately patient decided for dialysis.      3.  Anemia of CKD: EDISON now on HD days.      4.  Volume status:     5.  Hypertension: No ANABEL per duplex in 2016.  On terazosin, carvedilol, hydralazine, clonidine     6.  Metabolic acidosis: Management with HD now.     7.  Hyperparathyroidism: On calcitriol 0.5 mcg daily     8.  Iron deficiency on oral iron     9.  History of CAD     10.  COPD     11.  Hyperlipidemia     12.  Anxiety and depression.      RENAL US 11/18/24: Right K 8.9 cm and Left K 9.6 cm      Plan and recommendation.  3rd session of HD tomorrow.   Need to setup HD chair as outpatient. She can start with 2 x weekly HD treatment as outpatient.       I discussed the patients findings and my recommendations with patient        Zurdo Levine MD  11/24/24  14:14 EST              Electronically signed by Zurdo Levine MD at 11/24/24 5187        "   Consult Notes (most recent note)        Izzy Jensen at 11/22/24 1121        Consult Orders    1. Inpatient Advance Care Planning Consult [[id number]] ordered by Conchita Willard MD at 11/21/24 1414    2. Spiritual Care Consult [[id number]] ordered by Conchita Willard MD at 11/21/24 1126                 Patient welcomed  visit.  provided education regarding a living will and patient stated that she has one already, and that her son has it. I plan to call son and request that he submit a copy to hospital. She indicated no spiritual needs and was most interested in returning to watching a new channel.    Patient has completed a living will, per son J Luis, and I asked him to present a copy to the patient's RN when he is able to.    Electronically signed by Izzy Jensen at 11/22/24 1121

## 2024-11-25 NOTE — CASE MANAGEMENT/SOCIAL WORK
Continued Stay Note  [facility]     Patient Name: Yanique Webster  MRN: [id number]  Today's Date: 11/25/2024    Admit Date: 11/15/2024    Plan: [facility] Acute with HD   Discharge Plan       Row Name 11/25/24 0859       Plan    Plan [facility] Acute with HD    Patient/Family in Agreement with Plan other (see comments)    Plan Comments Pt started Hemodialysis on 11/22/24.  spoke with [facility], today. Shannen states they can still accept pt for Acute rehab and then have her Hemodialysis done at [facility]. Shannen states that pt will need to have her outpatient HD chair set up before she can come to [facility].  faxed HD initiation packet to [facility] today. CM will update pt, at the bedside.  will continue to follow.    MIKE spoke with Aby at [facility]. She states that they do not have chair times at [facility] SW, but can place pt at [facility] S on MWF at 0845. Start date on 12/11/24. MIKE updated Shannen/[facility], left voicemail. CM messaged Dr. Littlejohn for medical readiness. Dr. Littlejohn states that the pt needs a tunneled catheter before going to Acute rehab. IR consult placed today.      attempted to see pt, at bedside, to update pt on D/C plan. Pt off unit at Hemodialysis. MIKE spoke with pt's son, J Luis, on the phone. CM updated J Luis on D/C plan to [facility]. MIKE is awaiting [facility] to call back to confirm with [facility] Nephrologist that they can accept pt. J Luis is agreeable with D/C plan. J Luis states that Dr. Levine told family they had transportation, through his office, for pt to get back and forth to dialysis, once home from rehab. MIKE spoke with Dr. Levnie's office. CM was given [employer] [phone]. MIKE gave this information to J Luis along with [employer] wheelchair van transport number. MIKE spoke with Dr. Littlejohn. She states that pt will only have dialysis on Monday and Friday. CM attempted to call [facility] to update them on dialysis days. No answer, left voicemail.     Final  Discharge Disposition Code 62 - inpatient rehab facility                   Discharge Codes    No documentation.                 Expected Discharge Date and Time       Expected Discharge Date Expected Discharge Time    Nov 25, 2024               Romy Crockett RN

## 2024-11-25 NOTE — PLAN OF CARE
Problem: Palliative Care  Goal: Enhanced Quality of Life  Intervention: Optimize Psychosocial Wellbeing  Flowsheets (Taken 11/25/2024 9159)  Supportive Measures:   active listening utilized   verbalization of feelings encouraged   Goal Outcome Evaluation:  Plan of Care Reviewed With: patient        Progress: no change  Outcome Evaluation: Pt reports dyspnea with walking with therapy.  Pt is going to dialysis today.  She had some pain earlier today in back and hips and was given prn oxycodone.  Pt denies nausea.  Pt is no cpr/intubation.  Continue dilaysis 2x a week.  Plans for [facility] rehab once outpt dialysis chair set up and tunneled catheter is placed.    Palliative IDT: RN, APRN, MD   After hours# [phone]

## 2024-11-26 ENCOUNTER — APPOINTMENT (OUTPATIENT)
Dept: INTERVENTIONAL RADIOLOGY/VASCULAR | Facility: HOSPITAL | Age: 84
End: 2024-11-26
Payer: MEDICARE

## 2024-11-26 LAB
ANION GAP SERPL CALCULATED.3IONS-SCNC: 13 MMOL/L (ref 5–15)
BUN SERPL-MCNC: 29 MG/DL (ref 8–23)
BUN/CREAT SERPL: 11.9 (ref 7–25)
CALCIUM SPEC-SCNC: 9.7 MG/DL (ref 8.6–10.5)
CHLORIDE SERPL-SCNC: 105 MMOL/L (ref 98–107)
CO2 SERPL-SCNC: 23 MMOL/L (ref 22–29)
CREAT SERPL-MCNC: 2.43 MG/DL (ref 0.57–1)
DEPRECATED RDW RBC AUTO: 58.7 FL (ref 37–54)
EGFRCR SERPLBLD CKD-EPI 2021: 19.3 ML/MIN/1.73
ERYTHROCYTE [DISTWIDTH] IN BLOOD BY AUTOMATED COUNT: 17 % (ref 12.3–15.4)
GLUCOSE SERPL-MCNC: 128 MG/DL (ref 65–99)
HCT VFR BLD AUTO: 26.9 % (ref 34–46.6)
HGB BLD-MCNC: 8.5 G/DL (ref 12–15.9)
MCH RBC QN AUTO: 29.1 PG (ref 26.6–33)
MCHC RBC AUTO-ENTMCNC: 31.6 G/DL (ref 31.5–35.7)
MCV RBC AUTO: 92.1 FL (ref 79–97)
PLATELET # BLD AUTO: 112 10*3/MM3 (ref 140–450)
PMV BLD AUTO: 12.1 FL (ref 6–12)
POTASSIUM SERPL-SCNC: 4 MMOL/L (ref 3.5–5.2)
RBC # BLD AUTO: 2.92 10*6/MM3 (ref 3.77–5.28)
SODIUM SERPL-SCNC: 141 MMOL/L (ref 136–145)
WBC NRBC COR # BLD AUTO: 5.47 10*3/MM3 (ref 3.4–10.8)

## 2024-11-26 PROCEDURE — C1750 CATH, HEMODIALYSIS,LONG-TERM: HCPCS

## 2024-11-26 PROCEDURE — C1769 GUIDE WIRE: HCPCS

## 2024-11-26 PROCEDURE — 25010000002 LIDOCAINE 1% - EPINEPHRINE 1:100000 1 %-1:100000 SOLUTION

## 2024-11-26 PROCEDURE — 99232 SBSQ HOSP IP/OBS MODERATE 35: CPT | Performed by: FAMILY MEDICINE

## 2024-11-26 PROCEDURE — 25010000002 HEPARIN (PORCINE) PER 1000 UNITS

## 2024-11-26 PROCEDURE — 25010000002 FENTANYL CITRATE (PF) 50 MCG/ML SOLUTION: Performed by: RADIOLOGY

## 2024-11-26 PROCEDURE — 77001 FLUOROGUIDE FOR VEIN DEVICE: CPT

## 2024-11-26 PROCEDURE — 94761 N-INVAS EAR/PLS OXIMETRY MLT: CPT

## 2024-11-26 PROCEDURE — 85027 COMPLETE CBC AUTOMATED: CPT | Performed by: FAMILY MEDICINE

## 2024-11-26 PROCEDURE — 94799 UNLISTED PULMONARY SVC/PX: CPT

## 2024-11-26 PROCEDURE — 80048 BASIC METABOLIC PNL TOTAL CA: CPT | Performed by: FAMILY MEDICINE

## 2024-11-26 PROCEDURE — 97535 SELF CARE MNGMENT TRAINING: CPT

## 2024-11-26 PROCEDURE — 36558 INSERT TUNNELED CV CATH: CPT

## 2024-11-26 RX ORDER — LIDOCAINE HYDROCHLORIDE AND EPINEPHRINE 10; 10 MG/ML; UG/ML
INJECTION, SOLUTION INFILTRATION; PERINEURAL
Status: COMPLETED
Start: 2024-11-26 | End: 2024-11-26

## 2024-11-26 RX ORDER — FENTANYL CITRATE 50 UG/ML
INJECTION, SOLUTION INTRAMUSCULAR; INTRAVENOUS
Status: DISPENSED
Start: 2024-11-26 | End: 2024-11-26

## 2024-11-26 RX ORDER — FENTANYL CITRATE 50 UG/ML
INJECTION, SOLUTION INTRAMUSCULAR; INTRAVENOUS AS NEEDED
Status: COMPLETED | OUTPATIENT
Start: 2024-11-26 | End: 2024-11-26

## 2024-11-26 RX ORDER — HEPARIN SODIUM 1000 [USP'U]/ML
INJECTION, SOLUTION INTRAVENOUS; SUBCUTANEOUS
Status: COMPLETED
Start: 2024-11-26 | End: 2024-11-26

## 2024-11-26 RX ADMIN — OXYCODONE 5 MG: 5 TABLET ORAL at 11:46

## 2024-11-26 RX ADMIN — ATORVASTATIN CALCIUM 80 MG: 40 TABLET, FILM COATED ORAL at 11:14

## 2024-11-26 RX ADMIN — DOXEPIN HYDROCHLORIDE 10 MG: 10 CAPSULE ORAL at 21:12

## 2024-11-26 RX ADMIN — CALCITRIOL CAPSULES 0.25 MCG 0.5 MCG: 0.25 CAPSULE ORAL at 11:14

## 2024-11-26 RX ADMIN — ASPIRIN 81 MG: 81 TABLET, COATED ORAL at 11:14

## 2024-11-26 RX ADMIN — OXYCODONE 5 MG: 5 TABLET ORAL at 18:20

## 2024-11-26 RX ADMIN — PANTOPRAZOLE SODIUM 40 MG: 40 TABLET, DELAYED RELEASE ORAL at 17:09

## 2024-11-26 RX ADMIN — CARVEDILOL 25 MG: 12.5 TABLET, FILM COATED ORAL at 21:13

## 2024-11-26 RX ADMIN — IPRATROPIUM BROMIDE AND ALBUTEROL SULFATE 3 ML: 2.5; .5 SOLUTION RESPIRATORY (INHALATION) at 08:45

## 2024-11-26 RX ADMIN — IPRATROPIUM BROMIDE AND ALBUTEROL SULFATE 3 ML: 2.5; .5 SOLUTION RESPIRATORY (INHALATION) at 13:05

## 2024-11-26 RX ADMIN — HYDRALAZINE HYDROCHLORIDE 100 MG: 50 TABLET ORAL at 15:01

## 2024-11-26 RX ADMIN — Medication 1 CAPSULE: at 11:14

## 2024-11-26 RX ADMIN — FENTANYL CITRATE 25 MCG: 50 INJECTION, SOLUTION INTRAMUSCULAR; INTRAVENOUS at 10:09

## 2024-11-26 RX ADMIN — TERAZOSIN HYDROCHLORIDE ANHYDROUS 10 MG: 5 CAPSULE ORAL at 21:00

## 2024-11-26 RX ADMIN — LIDOCAINE HYDROCHLORIDE AND EPINEPHRINE 9 ML: 10; 10 INJECTION, SOLUTION INFILTRATION; PERINEURAL at 10:41

## 2024-11-26 RX ADMIN — CYANOCOBALAMIN TAB 1000 MCG 1000 MCG: 1000 TAB at 11:14

## 2024-11-26 RX ADMIN — HEPARIN SODIUM 4100 UNITS: 1000 INJECTION INTRAVENOUS; SUBCUTANEOUS at 10:41

## 2024-11-26 RX ADMIN — IPRATROPIUM BROMIDE AND ALBUTEROL SULFATE 3 ML: 2.5; .5 SOLUTION RESPIRATORY (INHALATION) at 16:20

## 2024-11-26 RX ADMIN — Medication 10 MG: at 21:12

## 2024-11-26 RX ADMIN — OXYCODONE 5 MG: 5 TABLET ORAL at 22:57

## 2024-11-26 RX ADMIN — FENTANYL CITRATE 25 MCG: 50 INJECTION, SOLUTION INTRAMUSCULAR; INTRAVENOUS at 10:14

## 2024-11-26 RX ADMIN — ISOSORBIDE MONONITRATE 30 MG: 30 TABLET, EXTENDED RELEASE ORAL at 11:14

## 2024-11-26 RX ADMIN — IPRATROPIUM BROMIDE AND ALBUTEROL SULFATE 3 ML: 2.5; .5 SOLUTION RESPIRATORY (INHALATION) at 20:57

## 2024-11-26 RX ADMIN — CLONIDINE HYDROCHLORIDE 0.1 MG: 0.1 TABLET ORAL at 17:09

## 2024-11-26 RX ADMIN — APIXABAN 2.5 MG: 2.5 TABLET, FILM COATED ORAL at 21:13

## 2024-11-26 RX ADMIN — CARVEDILOL 25 MG: 12.5 TABLET, FILM COATED ORAL at 11:14

## 2024-11-26 RX ADMIN — APIXABAN 2.5 MG: 2.5 TABLET, FILM COATED ORAL at 11:14

## 2024-11-26 RX ADMIN — Medication 10 ML: at 11:15

## 2024-11-26 RX ADMIN — FERROUS SULFATE TAB 325 MG (65 MG ELEMENTAL FE) 325 MG: 325 (65 FE) TAB at 11:14

## 2024-11-26 RX ADMIN — HYDRALAZINE HYDROCHLORIDE 100 MG: 50 TABLET ORAL at 21:13

## 2024-11-26 NOTE — PLAN OF CARE
Problem: Adult Inpatient Plan of Care  Goal: Plan of Care Review  Outcome: Progressing  Goal: Patient-Specific Goal (Individualized)  Outcome: Progressing  Goal: Absence of Hospital-Acquired Illness or Injury  Outcome: Progressing  Intervention: Identify and Manage Fall Risk  Recent Flowsheet Documentation  Taken 11/26/2024 1800 by Kaleigh Singh RN  Safety Promotion/Fall Prevention:   activity supervised   assistive device/personal items within reach   clutter free environment maintained   lighting adjusted   nonskid shoes/slippers when out of bed  Taken 11/26/2024 1600 by Kaleigh Singh RN  Safety Promotion/Fall Prevention:   activity supervised   assistive device/personal items within reach   clutter free environment maintained   lighting adjusted   nonskid shoes/slippers when out of bed  Taken 11/26/2024 1400 by Kaleigh Singh RN  Safety Promotion/Fall Prevention: safety round/check completed  Taken 11/26/2024 1200 by Kaleigh Singh RN  Safety Promotion/Fall Prevention:   safety round/check completed   assistive device/personal items within reach   clutter free environment maintained  Taken 11/26/2024 1000 by Kaleigh Singh RN  Safety Promotion/Fall Prevention: patient off unit  Taken 11/26/2024 0800 by Kaleigh Singh RN  Safety Promotion/Fall Prevention: safety round/check completed  Intervention: Prevent Skin Injury  Recent Flowsheet Documentation  Taken 11/26/2024 1800 by Kaleigh Singh RN  Body Position: position changed independently  Skin Protection:   incontinence pads utilized   transparent dressing maintained  Taken 11/26/2024 1600 by Kaleigh Singh RN  Body Position: position changed independently  Skin Protection:   incontinence pads utilized   transparent dressing maintained  Taken 11/26/2024 1400 by Kaleigh Singh RN  Body Position: position changed independently  Skin Protection:   incontinence pads utilized   transparent dressing maintained  Taken 11/26/2024  1200 by Kaleigh Singh RN  Body Position: position changed independently  Skin Protection:   incontinence pads utilized   transparent dressing maintained  Taken 11/26/2024 0800 by Kaleigh Singh RN  Body Position: position changed independently  Skin Protection:   incontinence pads utilized   skin sealant/moisture barrier applied  Intervention: Prevent and Manage VTE (Venous Thromboembolism) Risk  Recent Flowsheet Documentation  Taken 11/26/2024 0800 by Kaleigh Singh RN  VTE Prevention/Management: (see MAR) other (see comments)  Intervention: Prevent Infection  Recent Flowsheet Documentation  Taken 11/26/2024 1600 by Kaleigh Singh RN  Infection Prevention:   environmental surveillance performed   single patient room provided  Taken 11/26/2024 1400 by Kaleigh Singh RN  Infection Prevention:   single patient room provided   environmental surveillance performed  Taken 11/26/2024 1200 by Kaleigh Singh RN  Infection Prevention: single patient room provided  Taken 11/26/2024 0800 by Kaleigh Singh RN  Infection Prevention: single patient room provided  Goal: Optimal Comfort and Wellbeing  Outcome: Progressing  Goal: Readiness for Transition of Care  Outcome: Progressing     Problem: Skin Injury Risk Increased  Goal: Skin Health and Integrity  Outcome: Progressing  Intervention: Optimize Skin Protection  Recent Flowsheet Documentation  Taken 11/26/2024 1800 by Kaleigh Singh RN  Activity Management: activity encouraged  Pressure Reduction Techniques:   frequent weight shift encouraged   weight shift assistance provided  Head of Bed (HOB) Positioning: HOB at 30-45 degrees  Pressure Reduction Devices: pressure-redistributing mattress utilized  Skin Protection:   incontinence pads utilized   transparent dressing maintained  Taken 11/26/2024 1600 by Kaleigh Singh RN  Activity Management:   up to bedside commode   back to bed  Pressure Reduction Techniques:   frequent weight shift  encouraged   weight shift assistance provided  Head of Bed (HOB) Positioning: [facility] elevated  Pressure Reduction Devices: pressure-redistributing mattress utilized  Skin Protection:   incontinence pads utilized   transparent dressing maintained  Taken 11/26/2024 1400 by Kaleigh Singh RN  Activity Management: up in chair  Pressure Reduction Techniques:   frequent weight shift encouraged   weight shift assistance provided  Head of Bed (HOB) Positioning: [facility] elevated  Pressure Reduction Devices: pressure-redistributing mattress utilized  Skin Protection:   incontinence pads utilized   transparent dressing maintained  Taken 11/26/2024 1200 by Kaleigh Singh RN  Activity Management: activity encouraged  Pressure Reduction Techniques:   frequent weight shift encouraged   weight shift assistance provided  Head of Bed (HOB) Positioning: HOB at 30-45 degrees  Pressure Reduction Devices: pressure-redistributing mattress utilized  Skin Protection:   incontinence pads utilized   transparent dressing maintained  Taken 11/26/2024 0930 by Kaleigh Singh RN  Activity Management: up to bedside commode  Taken 11/26/2024 0800 by Kaleigh Singh RN  Activity Management: activity encouraged  Pressure Reduction Techniques: frequent weight shift encouraged  Head of Bed (HOB) Positioning: HOB at 30-45 degrees  Pressure Reduction Devices: pressure-redistributing mattress utilized  Skin Protection:   incontinence pads utilized   skin sealant/moisture barrier applied     Problem: Fall Injury Risk  Goal: Absence of Fall and Fall-Related Injury  Outcome: Progressing  Intervention: Identify and Manage Contributors  Recent Flowsheet Documentation  Taken 11/26/2024 1800 by Kaleigh Singh RN  Medication Review/Management: medications reviewed  Taken 11/26/2024 1600 by Kaleigh Singh RN  Medication Review/Management: medications reviewed  Taken 11/26/2024 1400 by Kaleigh Singh RN  Medication Review/Management: medications  reviewed  Taken 11/26/2024 1200 by Kaleigh Singh RN  Medication Review/Management: medications reviewed  Taken 11/26/2024 0800 by Kaleigh Singh RN  Medication Review/Management: medications reviewed  Intervention: Promote Injury-Free Environment  Recent Flowsheet Documentation  Taken 11/26/2024 1800 by Kaleigh Singh RN  Safety Promotion/Fall Prevention:   activity supervised   assistive device/personal items within reach   clutter free environment maintained   lighting adjusted   nonskid shoes/slippers when out of bed  Taken 11/26/2024 1600 by Kaleigh Singh RN  Safety Promotion/Fall Prevention:   activity supervised   assistive device/personal items within reach   clutter free environment maintained   lighting adjusted   nonskid shoes/slippers when out of bed  Taken 11/26/2024 1400 by Kaleigh Singh RN  Safety Promotion/Fall Prevention: safety round/check completed  Taken 11/26/2024 1200 by Kaleigh Singh RN  Safety Promotion/Fall Prevention:   safety round/check completed   assistive device/personal items within reach   clutter free environment maintained  Taken 11/26/2024 1000 by Kaleigh Singh RN  Safety Promotion/Fall Prevention: patient off unit  Taken 11/26/2024 0800 by Kaleigh Singh RN  Safety Promotion/Fall Prevention: safety round/check completed     Problem: Palliative Care  Goal: Enhanced Quality of Life  Outcome: Progressing     Problem: Hemodialysis  Goal: Safe, Effective Therapy Delivery  Outcome: Progressing  Intervention: Optimize Device Care and Function  Recent Flowsheet Documentation  Taken 11/26/2024 1800 by Kaleigh Singh RN  Medication Review/Management: medications reviewed  Taken 11/26/2024 1600 by Kaleigh Singh RN  Medication Review/Management: medications reviewed  Taken 11/26/2024 1400 by Kaleigh Singh RN  Medication Review/Management: medications reviewed  Taken 11/26/2024 1200 by Kaleigh Singh RN  Medication Review/Management:  medications reviewed  Taken 11/26/2024 0800 by Kaleigh Singh RN  Medication Review/Management: medications reviewed  Goal: Effective Tissue Perfusion  Outcome: Progressing  Goal: Absence of Infection Signs and Symptoms  Outcome: Progressing  Intervention: Prevent or Manage Infection  Recent Flowsheet Documentation  Taken 11/26/2024 1600 by Kaleigh Singh, RN  Infection Prevention:   environmental surveillance performed   single patient room provided  Taken 11/26/2024 1400 by Kaleigh Singh RN  Infection Prevention:   single patient room provided   environmental surveillance performed  Taken 11/26/2024 1200 by Kaleigh Singh RN  Infection Prevention: single patient room provided  Taken 11/26/2024 0800 by Kaleigh Singh RN  Infection Prevention: single patient room provided   Goal Outcome Evaluation:   HD port placed today. Oxycodone given for pain throughout shift. Up to chair and BSC with walker x1 assist. 2L humidified NC. NSR. VSS. No complaints at this time. Call bell within reach.

## 2024-11-26 NOTE — THERAPY PROGRESS REPORT/RE-CERT
Patient Name: Yanique Webster  : 1940    MRN: [id number]                              Today's Date: 2024       Admit Date: 11/15/2024    Visit Dx:     ICD-10-CM ICD-9-CM   1. Elevated serum creatinine  R79.89 790.99   2. CKD (chronic kidney disease) stage 4, GFR 15-29 ml/min  N18.4 585.4   3. Chronic anemia  D64.9 285.9   4. Impaired functional mobility, balance, gait, and endurance  Z74.09 V49.89   5. Fibromyalgia  M79.7 729.1   6. PVD (peripheral vascular disease)  I73.9 443.9   7. Type 2 diabetes mellitus with hypoglycemia without coma, with long-term current use of insulin  E11.649 250.80    Z79.4 251.2     V58.67   8. Chronic bronchitis, unspecified chronic bronchitis type  J42 491.9   9. Osteoarthritis, unspecified osteoarthritis type, unspecified site  M19.90 715.90   10. Coronary artery disease involving native coronary artery of native heart without angina pectoris  I25.10 414.01   11. Essential hypertension  I10 401.9   12. Symptomatic anemia  D64.9 285.9   13. Acute blood loss anemia  D62 285.1   14. Acute exacerbation of COPD with asthma  J44.1 493.22     Patient Active Problem List   Diagnosis    Fibromyalgia    PVD (peripheral vascular disease)    Type 2 diabetes mellitus    Diabetic gastroparesis    Takotsubo cardiomyopathy    Elevated serum creatinine    Hyperlipidemia LDL goal <70    COPD (chronic obstructive pulmonary disease)    Obesity    Osteoarthritis    Chronic pain    GERD (gastroesophageal reflux disease)    Gout    Chronic joint pain    Coronary artery disease involving native coronary artery of native heart without angina pectoris    Nonrheumatic aortic valve insufficiency    Altered mental status    Essential hypertension    CKD (chronic kidney disease) stage 4, GFR 15-29 ml/min    Hypertensive emergency    Status post placement of implantable loop recorder    Paroxysmal atrial fibrillation    Acute exacerbation of COPD with asthma    Encounter for loop recorder at end of  battery life    Closed left hip fracture    Anxiety associated with depression    Anemia, chronic disease    Surgery follow-up    Borderline abnormal TFTs    Acute blood loss anemia    Secondary hyperparathyroidism    Symptomatic anemia    KINDRA (acute kidney injury)    Acute on chronic renal failure    Dyspnea    Abnormal laboratory test     Past Medical History:   Diagnosis Date    Blurry vision     Chronic joint pain     Chronic pain     COPD (chronic obstructive pulmonary disease)     Coronary artery disease     Diabetic gastroparesis 08/24/2016    Esophageal stricture     s/p dilation in 2007    GERD (gastroesophageal reflux disease)     Gout     Headache     secondary to hypertension    Hyperlipidemia     Hypertension     Long term current use of insulin     Neuropathy     Neuropathy due to type 2 diabetes mellitus     Obesity     Osteoarthritis     Pericarditis 2008    Stroke 03/2019    Type 2 diabetes mellitus      Past Surgical History:   Procedure Laterality Date    BRONCHOSCOPY N/A 8/23/2016    Procedure: BRONCHOSCOPY BIOPSY AT BEDSIDE;  Surgeon: Mookie Willard MD;  Location:  [facility];  Service:     CARDIAC CATHETERIZATION N/A 8/30/2016    Procedure: Left Heart Cath;  Surgeon: Steve Geronimo MD;  Location:  [facility];  Service:     CARDIAC CATHETERIZATION N/A 8/30/2016    Procedure: Right Heart Cath;  Surgeon: Steve Geronimo MD;  Location:  [facility];  Service:     CARDIAC ELECTROPHYSIOLOGY PROCEDURE N/A 7/2/2019    Procedure: Loop insertion;  Surgeon: Steve Geronimo MD;  Location:  [facility];  Service: Cardiovascular    CARDIAC ELECTROPHYSIOLOGY PROCEDURE N/A 9/26/2023    Procedure: Loop recorder removal;  Surgeon: Steve Geronimo MD;  Location:  [facility];  Service: Cardiovascular;  Laterality: N/A;    CATARACT EXTRACTION      COLONOSCOPY N/A 8/16/2024    Procedure: COLONOSCOPY;  Surgeon: Brunner, Mark I, MD;  Location:   [facility];  Service: Gastroenterology;  Laterality: N/A;    ENDOSCOPY N/A 8/14/2024    Procedure: ESOPHAGOGASTRODUODENOSCOPY;  Surgeon: Brunner, Mark I, MD;  Location:  [facility];  Service: Gastroenterology;  Laterality: N/A;    ESOPHAGEAL DILATATION  2007    HERNIA REPAIR      HIP CANNULATED SCREW PLACEMENT Left 1/2/2024    Procedure: HIP CANNULATED SCREW PLACEMENT LEFT;  Surgeon: Seymour Harrington MD;  Location: [facility];  Service: Orthopedics;  Laterality: Left;    HYSTERECTOMY  1998    WRIST FRACTURE SURGERY Left       General Information       Row Name 11/26/24 [facility]3          OT Time and Intention    Document Type progress note/recertification  -     Mode of Treatment occupational therapy  -       Row Name 11/26/24 1356          General Information    Patient Profile Reviewed yes  -     Prior Level of Function --  See IE  -     Existing Precautions/Restrictions fall;oxygen therapy device and L/min  chronic pain in hands and feet, incontinent - don brief prior to mobility  -     Barriers to Rehab medically complex;previous functional deficit;impaired sensation;hearing deficit  -       Row Name 11/26/24 1113          Cognition    Orientation Status (Cognition) oriented x 3  -       Row Name 11/26/24 [facility]5          Safety Issues/Impairments Affecting Functional Mobility    Safety Issues Affecting Function (Mobility) insight into deficits/self-awareness;safety precaution awareness;safety precautions follow-through/compliance;sequencing abilities  -     Impairments Affecting Function (Mobility) balance;endurance/activity tolerance;pain;shortness of breath;strength;postural/trunk control  -               User Key  (r) = Recorded By, (t) = Taken By, (c) = Cosigned By      Initials Name Provider Type     Raiza Ignacio OT Occupational Therapist                     Mobility/ADL's       Row Name 11/26/24 7119          Bed Mobility    Bed Mobility scooting/bridging;supine-sit  -      Scooting/Bridging [place] (Bed Mobility) contact guard;verbal cues  -     Supine-Sit [place] (Bed Mobility) contact guard;verbal cues  -     Comment, (Bed Mobility) VC's to sequence  -       Row Name 11/26/24 [facility]          Transfers    Transfers sit-stand transfer;bed-chair transfer  -     Comment, (Transfers) VC's for hand placement and sequencing.  -       Row Name 11/26/24 [facility]          Bed-Chair Transfer    Bed-Chair [place] (Transfers) contact guard;verbal cues  -     Assistive Device (Bed-Chair Transfers) walker, front-wheeled  -LC       [facility] Name 11/26/24 [facility]          Sit-Stand Transfer    Sit-Stand [place] (Transfers) contact guard;verbal cues  -     Assistive Device (Sit-Stand Transfers) walker, front-wheeled  -[facility] Name 11/26/24 [facility]          Activities of Daily Living    BADL Assessment/Intervention lower body dressing;grooming  -LC       Row Name 11/26/24 [facility]          Grooming Assessment/Training    [place] Level (Grooming) grooming skills;hair care, combing/brushing;wash face, hands;set up  -     Position (Grooming) unsupported sitting  -LC       Row Name 11/26/24 [facility]          Lower Body Dressing Assessment/Training    [place] Level (Lower Body Dressing) don;shoes/slippers;moderate assist (50% patient effort)  -     Position (Lower Body Dressing) edge of bed sitting  -               User Key  (r) = Recorded By, (t) = Taken By, (c) = Cosigned By      Initials Name Provider Type     Raiza Ignacio OT Occupational Therapist                   Obj/Interventions       Row Name 11/26/24 1400          Balance    Balance Assessment sitting static balance;sitting dynamic balance;standing static balance;standing dynamic balance  -     Static Sitting Balance standby assist  -     Dynamic Sitting Balance contact guard  -     Position, Sitting Balance unsupported;sitting edge of bed  -     Static Standing Balance contact guard  -     Dynamic  Standing Balance contact guard  -     Position/Device Used, Standing Balance supported;walker, front-wheeled  -     Balance Interventions sitting;standing;sit to stand;occupation based/functional task;weight shifting activity  -     Comment, Balance CGA for safety  -               User Key  (r) = Recorded By, (t) = Taken By, (c) = Cosigned By      Initials Name Provider Type    Raiza Nichols OT Occupational Therapist                   Goals/Plan       Row Name 11/26/24 1402          Transfer Goal 1 (OT)    Activity/Assistive Device (Transfer Goal 1, OT) sit-to-stand/stand-to-sit;bed-to-chair/chair-to-bed;toilet;walker, rolling  -     [place] Level/Cues Needed (Transfer Goal 1, OT) standby assist  -     Time Frame (Transfer Goal 1, OT) long term goal (LTG);10 days  -     Progress/Outcome (Transfer Goal 1, OT) goal revised this date;goal ongoing  -       Row Name 11/26/24 1402          Toileting Goal 1 (OT)    Activity/Device (Toileting Goal 1, OT) adjust/manage clothing;perform perineal hygiene;commode;grab bar/safety frame  -     [place] Level/Cues Needed (Toileting Goal 1, OT) minimum assist (75% or more patient effort)  -     Time Frame (Toileting Goal 1, OT) short term goal (STG);5 days  -     Progress/Outcome (Toileting Goal 1, OT) goal revised this date;goal ongoing  -       Row Name 11/26/24 1402          Therapy Assessment/Plan (OT)    Planned Therapy Interventions (OT) activity tolerance training;adaptive equipment training;BADL retraining;functional balance retraining;occupation/activity based interventions;patient/caregiver education/training;strengthening exercise;transfer/mobility retraining  -               User Key  (r) = Recorded By, (t) = Taken By, (c) = Cosigned By      Initials Name Provider Type    Raiza Nichols OT Occupational Therapist                   Clinical Impression       Row Name 11/26/24 1400          Pain Assessment    Pretreatment Pain  Rating 4/10  -     Posttreatment Pain Rating 4/10  -     Pain Location back  -     Pain Side/Orientation generalized  -     Pain Management Interventions activity modification encouraged;positioning techniques utilized  -     Response to Pain Interventions activity level improved;activity participation with tolerable pain  -       Row Name 11/26/24 1400          Plan of Care Review    Plan of Care Reviewed With patient  -     Progress improving  -     Outcome Evaluation Pt. progressing towards baseline with ADLs and functional mobility. Completes bed mobility and T/Fs with CGA. Demonstrates LBD with Mod A and grooming with SUA. Will continue to progress as able. Recommend IPR at discharge.  -       Row Name 11/26/24 1400          Positioning and Restraints    Pre-Treatment Position in bed  -LC     Post Treatment Position chair  -LC     In Chair notified nsg;reclined;call light within reach;encouraged to call for assist;exit alarm on;waffle cushion;legs elevated;with nsg  -LC               User Key  (r) = Recorded By, (t) = Taken By, (c) = Cosigned By      Initials Name Provider Type     Raiza Ignacio, OT Occupational Therapist                   Outcome Measures       Row Name 11/26/24 1404          How much help from another is currently needed...    Putting on and taking off regular lower body clothing? 2  -LC     Bathing (including washing, rinsing, and drying) 2  -LC     Toileting (which includes using toilet bed pan or urinal) 2  -LC     Putting on and taking off regular upper body clothing 3  -LC     Taking care of personal grooming (such as brushing teeth) 3  -LC     Eating meals 4  -LC     AM-PAC 6 Clicks Score (OT) 16  -       Row Name 11/26/24 0800          How much help from another person do you currently need...    Turning from your back to your side while in flat bed without using bedrails? 3  -KM     Moving from lying on back to sitting on the side of a flat bed without bedrails?  3  -KM     Moving to and from a bed to a chair (including a wheelchair)? 3  -KM     Standing up from a chair using your arms (e.g., wheelchair, bedside chair)? 3  -KM     Climbing 3-5 steps with a railing? 2  -KM     To walk in hospital room? 3  -KM     AM-PAC 6 Clicks Score (PT) 17  -KM     Highest Level of Mobility Goal 5 --> Static standing  -KM       Row Name 11/26/24 1404          Functional Assessment    Outcome Measure Options AM-PAC 6 Clicks Daily Activity (OT)  -               User Key  (r) = Recorded By, (t) = Taken By, (c) = Cosigned By      Initials Name Provider Type     Raiza Ignacio OT Occupational Therapist     Kaleigh Singh, RN Registered Nurse                    Occupational Therapy Education       Title: PT OT SLP Therapies (In Progress)       Topic: Occupational Therapy (In Progress)       Point: ADL training (In Progress)       Description:   Instruct learner(s) on proper safety adaptation and remediation techniques during self care or transfers.   Instruct in proper use of assistive devices.                  Learning Progress Summary            Patient Acceptance, E, NR by  at 11/26/2024 1132    Acceptance, E, NR by JUDE at 11/20/2024 1123    Acceptance, E, VU by EMILY at 11/16/2024 1608    Comment: OT POC; discharge planning                      Point: Home exercise program (Not Started)       Description:   Instruct learner(s) on appropriate technique for monitoring, assisting and/or progressing therapeutic exercises/activities.                  Learner Progress:  Not documented in this visit.              Point: Precautions (In Progress)       Description:   Instruct learner(s) on prescribed precautions during self-care and functional transfers.                  Learning Progress Summary            Patient Acceptance, E, NR by  at 11/26/2024 1132    Acceptance, E, VU by EMILY at 11/16/2024 1608    Comment: OT POC; discharge planning                      Point: Body mechanics (In  Progress)       Description:   Instruct learner(s) on proper positioning and spine alignment during self-care, functional mobility activities and/or exercises.                  Learning Progress Summary            Patient Acceptance, E, NR by  at 11/26/2024 1132                                      User Key       Initials Effective Dates Name Provider Type Discipline    AC 02/03/23 -  Marcella Drake, OT Occupational Therapist OT     06/16/21 -  Raiza Ignacio, OT Occupational Therapist OT    EMILY 06/16/21 -  Alexa Lu OT Occupational Therapist OT                  OT Recommendation and Plan  Planned Therapy Interventions (OT): activity tolerance training, adaptive equipment training, BADL retraining, functional balance retraining, occupation/activity based interventions, patient/caregiver education/training, strengthening exercise, transfer/mobility retraining  Plan of Care Review  Plan of Care Reviewed With: patient  Progress: improving  Outcome Evaluation: Pt. progressing towards baseline with ADLs and functional mobility. Completes bed mobility and T/Fs with CGA. Demonstrates LBD with Mod A and grooming with SUA. Will continue to progress as able. Recommend IPR at discharge.     Time Calculation:         Time Calculation- OT       Row Name 11/26/24 1405             Time Calculation- OT    OT Start Time 1132  -      OT Received On 11/26/24  -      OT Goal Re-Cert Due Date 12/06/24  -         Timed Charges    98844 - OT Therapeutic Activity Minutes 6  -      69159 - OT Self Care/Mgmt Minutes 10  -         Total Minutes    Timed Charges Total Minutes 16  -       Total Minutes 16  -                User Key  (r) = Recorded By, (t) = Taken By, (c) = Cosigned By      Initials Name Provider Type     Raiza Ignacio, OT Occupational Therapist                  Therapy Charges for Today       Code Description Service Date Service Provider Modifiers Qty    [id number]  OT SELF CARE/MGMT/TRAIN EA 15 MIN  11/26/2024 Raiza Ignacio, OT GO 1                 Raiza Ignacio, OT  11/26/2024

## 2024-11-26 NOTE — PLAN OF CARE
Goal Outcome Evaluation:  Plan of Care Reviewed With: patient        Progress: improving  Outcome Evaluation: Pt. progressing towards baseline with ADLs and functional mobility. Completes bed mobility and T/Fs with CGA. Demonstrates LBD with Mod A and grooming with SUA. Will continue to progress as able. Recommend IPR at discharge.

## 2024-11-26 NOTE — PROGRESS NOTES
"   LOS: 10 days    Patient Care Team:  Sebas Alicea MD as PCP - General (Family Medicine)  Steve Geronimo MD as Consulting Physician (Cardiology)  Emilio Regalado MD as Consulting Physician (Endocrinology)  Axel Ribeiro MD as Consulting Physician (Cardiology)    Subjective     Patient post TDC placed this a.m.  Tolerated well.    Objective     Vital Signs:  Blood pressure 156/62, pulse 91, temperature 97.9 °F (36.6 °C), temperature source Axillary, resp. rate 20, height 162.6 cm (64\"), weight 83.2 kg (183 lb 8 oz), SpO2 95%.      Intake/Output Summary (Last 24 hours) at 11/26/2024 1129  Last data filed at 11/26/2024 0700  Gross per 24 hour   Intake 1870 ml   Output 1685 ml   Net 185 ml        11/25 0701 - 11/26 0700  In: 2470 [P.O.:2170; I.V.:300]  Out: 2085 [Urine:875]    Physical Exam:        GENERAL:  NAD  NEURO: Awake and alert, oriented. No focal deficit  PSYCHIATRIC: NMA. Cooperative with PE  EYE: PE, no icterus, no conjunctivitis  ENT: ommm, dentition intact,  Hearing intact  NECK: Supple , No JVD discernable,  Trachea midline  CV: No edema, RRR  LUNGS:  Quiet,  Nonlabored resp.  Symmetrical expansion  ABDOMEN: Nondistended, soft nontender.  : No Lopez, no palp bladder  SKIN: Warm and dry without rash      Labs:  Results from last 7 days   Lab Units 11/26/24  0628 11/25/24  1810 11/24/24  0947   WBC 10*3/mm3 5.47 5.66 5.31   HEMOGLOBIN g/dL 8.5* 8.7* 7.5*   PLATELETS 10*3/mm3 112* 139* 108*     Results from last 7 days   Lab Units 11/26/24  0628 11/25/24  1731 11/24/24  0947 11/23/24  0724 11/22/24  0435 11/21/24  0848 11/21/24  0125   SODIUM mmol/L 141 138 138 142 141   < > 139   POTASSIUM mmol/L 4.0 3.7 4.3 5.2 5.1   < > 5.3*   CHLORIDE mmol/L 105 103 104 110* 110*   < > 112*   CO2 mmol/L 23.0 26.0 23.0 23.0 19.0*   < > 16.0*   BUN mg/dL 29* 20 40* 55* 74*   < > 73*   CREATININE mg/dL 2.43* 1.40* 2.67* 2.73* 3.94*   < > 4.04*   CALCIUM mg/dL 9.7 9.2 9.4 9.7 9.7   < > 9.7   ALBUMIN g/dL  -- "   --   --   --  3.4*  --  3.6    < > = values in this interval not displayed.     Results from last 7 days   Lab Units 11/22/24  0435   ALK PHOS U/L 73   BILIRUBIN mg/dL 0.4   ALT (SGPT) U/L 14   AST (SGOT) U/L 22     Results from last 7 days   Lab Units 11/21/24  0856   PH, ARTERIAL pH units 7.277*   PO2 ART mm Hg 101.0   PCO2, ARTERIAL mm Hg 42.3   HCO3 ART mmol/L 19.7*               Estimated Creatinine Clearance: 18.3 mL/min (A) (by C-G formula based on SCr of 2.43 mg/dL (H)).        RENAL US 11/18/24: Right K 8.9 cm and Left K 9.6 cm            A/P:      ARF:  Started on HD 11/22/24.  Urine output stable.  Running twice a week on M/F for now.  Tunneled catheter in place 11/26/2024.  Awaiting outpatient HD arrangements.     CKD stage IV: Baseline creatinine 1.9-2.2 mg/dL, since last admission was running between 3.2-3.5 range, history of proteinuric renal disease UPC 2 g in the past duplex scan negative in 2016, on previous hospitalization discussion with daughter-in-law anesthesiologist, was explained regard to advanced renal dysfunction possibility of dialysis versus conservative treatment. Ultimately patient decided for dialysis. Likely with progression to esrd.     Hypertension: No ANABEL per duplex in 2016.  On terazosin, carvedilol, hydralazine, clonidine.  Monitor with HD.     Metabolic acidosis:  Adjust with HD.    Anemia: Hgb below goal.  Iron stores adequate.  Cont EDISON. Transfuse as needed for Hgb < 7     Volume status: stable.  Avoid aggressive UF unles Edema or SOB.      Hyperparathyroidism:  .  On calcitriol 0.5 mcg daily. Calcium stable. Phos stable.  No binders for now.     Nutrition: High protein low K diet. Give renal vit.    High risk and complexity pt.       Edwin Lane MD  11/26/24  11:29 EST

## 2024-11-26 NOTE — INTERVAL H&P NOTE
H&P reviewed. The patient was examined and there are no changes to the H&P.      TDC placement. Pertinent labs reviewed.

## 2024-11-26 NOTE — PLAN OF CARE
Goal Outcome Evaluation:   Patient had no acute events during shift

## 2024-11-26 NOTE — PROCEDURES
The following procedure was performed: Exchanged right IJ Zeferino for a tunneled dialysis catheter.  Okay to use.    Please see corresponding Radiology report for in detail procedural information. The Radiology report will be dictated shortly, if not done so already. Please see the IR RN note for the information regarding medicines administered if any, [place]-procedural vitals and I/O information.

## 2024-11-26 NOTE — CASE MANAGEMENT/SOCIAL WORK
Continued Stay Note  [facility]     Patient Name: Yanique Webster  MRN: [id number]  Today's Date: 11/26/2024    Admit Date: 11/15/2024    Plan: [facility] Acute MED Unit   Discharge Plan       Row Name 11/26/24 1458       Plan    Plan [facility] Acute MED Unit    Patient/Family in Agreement with Plan yes    Plan Comments [facility] has accepted the pt and has a bed on the MED Unit tomorrow if ready. The [facility] van will transport at 1430 with 02. I spoke with Mariajose at [facility] and informed her of the [facility] bed tomorrow. I faxed her some info to [phone]. [facility] will need to F/U with this HD clinic closer to [place] from [facility].  I updated the pt and her son J Luis by phone.    Final Discharge Disposition Code 62 - inpatient rehab facility                   Discharge Codes    No documentation.                 Expected Discharge Date and Time       Expected Discharge Date Expected Discharge Time    Nov 26, 2024               Izzy Rosas RN

## 2024-11-26 NOTE — THERAPY TREATMENT NOTE
Patient Name: Yanique Webster  : 1940    MRN: [id number]                              Today's Date: 2024       Admit Date: 11/15/2024    Visit Dx:     ICD-10-CM ICD-9-CM   1. Elevated serum creatinine  R79.89 790.99   2. CKD (chronic kidney disease) stage 4, GFR 15-29 ml/min  N18.4 585.4   3. Chronic anemia  D64.9 285.9   4. Impaired functional mobility, balance, gait, and endurance  Z74.09 V49.89   5. Fibromyalgia  M79.7 729.1   6. PVD (peripheral vascular disease)  I73.9 443.9   7. Type 2 diabetes mellitus with hypoglycemia without coma, with long-term current use of insulin  E11.649 250.80    Z79.4 251.2     V58.67   8. Chronic bronchitis, unspecified chronic bronchitis type  J42 491.9   9. Osteoarthritis, unspecified osteoarthritis type, unspecified site  M19.90 715.90   10. Coronary artery disease involving native coronary artery of native heart without angina pectoris  I25.10 414.01   11. Essential hypertension  I10 401.9   12. Symptomatic anemia  D64.9 285.9   13. Acute blood loss anemia  D62 285.1   14. Acute exacerbation of COPD with asthma  J44.1 493.22     Patient Active Problem List   Diagnosis    Fibromyalgia    PVD (peripheral vascular disease)    Type 2 diabetes mellitus    Diabetic gastroparesis    Takotsubo cardiomyopathy    Elevated serum creatinine    Hyperlipidemia LDL goal <70    COPD (chronic obstructive pulmonary disease)    Obesity    Osteoarthritis    Chronic pain    GERD (gastroesophageal reflux disease)    Gout    Chronic joint pain    Coronary artery disease involving native coronary artery of native heart without angina pectoris    Nonrheumatic aortic valve insufficiency    Altered mental status    Essential hypertension    CKD (chronic kidney disease) stage 4, GFR 15-29 ml/min    Hypertensive emergency    Status post placement of implantable loop recorder    Paroxysmal atrial fibrillation    Acute exacerbation of COPD with asthma    Encounter for loop recorder at end of  battery life    Closed left hip fracture    Anxiety associated with depression    Anemia, chronic disease    Surgery follow-up    Borderline abnormal TFTs    Acute blood loss anemia    Secondary hyperparathyroidism    Symptomatic anemia    KINDRA (acute kidney injury)    Acute on chronic renal failure    Dyspnea    Abnormal laboratory test     Past Medical History:   Diagnosis Date    Blurry vision     Chronic joint pain     Chronic pain     COPD (chronic obstructive pulmonary disease)     Coronary artery disease     Diabetic gastroparesis 08/24/2016    Esophageal stricture     s/p dilation in 2007    GERD (gastroesophageal reflux disease)     Gout     Headache     secondary to hypertension    Hyperlipidemia     Hypertension     Long term current use of insulin     Neuropathy     Neuropathy due to type 2 diabetes mellitus     Obesity     Osteoarthritis     Pericarditis 2008    Stroke 03/2019    Type 2 diabetes mellitus      Past Surgical History:   Procedure Laterality Date    BRONCHOSCOPY N/A 8/23/2016    Procedure: BRONCHOSCOPY BIOPSY AT BEDSIDE;  Surgeon: Mookie Willard MD;  Location:  [facility];  Service:     CARDIAC CATHETERIZATION N/A 8/30/2016    Procedure: Left Heart Cath;  Surgeon: Steve Geronimo MD;  Location:  [facility];  Service:     CARDIAC CATHETERIZATION N/A 8/30/2016    Procedure: Right Heart Cath;  Surgeon: Steve Geronimo MD;  Location:  [facility];  Service:     CARDIAC ELECTROPHYSIOLOGY PROCEDURE N/A 7/2/2019    Procedure: Loop insertion;  Surgeon: Steve Geronimo MD;  Location:  [facility];  Service: Cardiovascular    CARDIAC ELECTROPHYSIOLOGY PROCEDURE N/A 9/26/2023    Procedure: Loop recorder removal;  Surgeon: Steve Geronimo MD;  Location:  [facility];  Service: Cardiovascular;  Laterality: N/A;    CATARACT EXTRACTION      COLONOSCOPY N/A 8/16/2024    Procedure: COLONOSCOPY;  Surgeon: Brunner, Mark I, MD;  Location:   [facility];  Service: Gastroenterology;  Laterality: N/A;    ENDOSCOPY N/A 8/14/2024    Procedure: ESOPHAGOGASTRODUODENOSCOPY;  Surgeon: Brunner, Mark I, MD;  Location:  [facility];  Service: Gastroenterology;  Laterality: N/A;    ESOPHAGEAL DILATATION  2007    HERNIA REPAIR      HIP CANNULATED SCREW PLACEMENT Left 1/2/2024    Procedure: HIP CANNULATED SCREW PLACEMENT LEFT;  Surgeon: Seymour Harrington MD;  [facility];  Service: Orthopedics;  Laterality: Left;    HYSTERECTOMY  1998    WRIST FRACTURE SURGERY Left       General Information       Row Name 11/26/24 [facility]3          OT Time and Intention    Document Type progress note/recertification  -     Mode of Treatment occupational therapy  -       Row Name 11/26/24 1356          General Information    Patient Profile Reviewed yes  -     Prior Level of Function --  See IE  -     Existing Precautions/Restrictions fall;oxygen therapy device and L/min  chronic pain in hands and feet, incontinent - don brief prior to mobility  -     Barriers to Rehab medically complex;previous functional deficit;impaired sensation;hearing deficit  -       Row Name 11/26/24 5951          Cognition    Orientation Status (Cognition) oriented x 3  -       Row Name 11/26/24 [facility]7          Safety Issues/Impairments Affecting Functional Mobility    Safety Issues Affecting Function (Mobility) insight into deficits/self-awareness;safety precaution awareness;safety precautions follow-through/compliance;sequencing abilities  -     Impairments Affecting Function (Mobility) balance;endurance/activity tolerance;pain;shortness of breath;strength;postural/trunk control  -               User Key  (r) = Recorded By, (t) = Taken By, (c) = Cosigned By      Initials Name Provider Type     Raiza Ignacio OT Occupational Therapist                     Mobility/ADL's       Row Name 11/26/24 0028          Bed Mobility    Bed Mobility scooting/bridging;supine-sit  -      Scooting/Bridging [place] (Bed Mobility) contact guard;verbal cues  -     Supine-Sit [place] (Bed Mobility) contact guard;verbal cues  -     Comment, (Bed Mobility) VC's to sequence  -       Row Name 11/26/24 [facility]          Transfers    Transfers sit-stand transfer;bed-chair transfer  -     Comment, (Transfers) VC's for hand placement and sequencing.  -       Row Name 11/26/24 [facility]          Bed-Chair Transfer    Bed-Chair [place] (Transfers) contact guard;verbal cues  -     Assistive Device (Bed-Chair Transfers) walker, front-wheeled  -LC       [facility] Name 11/26/24 [facility]          Sit-Stand Transfer    Sit-Stand [place] (Transfers) contact guard;verbal cues  -     Assistive Device (Sit-Stand Transfers) walker, front-wheeled  -[facility] Name 11/26/24 [facility]          Activities of Daily Living    BADL Assessment/Intervention lower body dressing;grooming  -LC       Row Name 11/26/24 [facility]          Grooming Assessment/Training    [place] Level (Grooming) grooming skills;hair care, combing/brushing;wash face, hands;set up  -     Position (Grooming) unsupported sitting  -LC       Row Name 11/26/24 [facility]          Lower Body Dressing Assessment/Training    [place] Level (Lower Body Dressing) don;shoes/slippers;moderate assist (50% patient effort)  -     Position (Lower Body Dressing) edge of bed sitting  -               User Key  (r) = Recorded By, (t) = Taken By, (c) = Cosigned By      Initials Name Provider Type     Raiza Ignacio OT Occupational Therapist                   Obj/Interventions       Row Name 11/26/24 1400          Balance    Balance Assessment sitting static balance;sitting dynamic balance;standing static balance;standing dynamic balance  -     Static Sitting Balance standby assist  -     Dynamic Sitting Balance contact guard  -     Position, Sitting Balance unsupported;sitting edge of bed  -     Static Standing Balance contact guard  -     Dynamic  Standing Balance contact guard  -     Position/Device Used, Standing Balance supported;walker, front-wheeled  -     Balance Interventions sitting;standing;sit to stand;occupation based/functional task;weight shifting activity  -     Comment, Balance CGA for safety  -               User Key  (r) = Recorded By, (t) = Taken By, (c) = Cosigned By      Initials Name Provider Type    Raiza Nichols OT Occupational Therapist                   Goals/Plan       Row Name 11/26/24 1402          Transfer Goal 1 (OT)    Activity/Assistive Device (Transfer Goal 1, OT) sit-to-stand/stand-to-sit;bed-to-chair/chair-to-bed;toilet;walker, rolling  -     [place] Level/Cues Needed (Transfer Goal 1, OT) standby assist  -     Time Frame (Transfer Goal 1, OT) long term goal (LTG);10 days  -     Progress/Outcome (Transfer Goal 1, OT) goal revised this date;goal ongoing  -       Row Name 11/26/24 1402          Toileting Goal 1 (OT)    Activity/Device (Toileting Goal 1, OT) adjust/manage clothing;perform perineal hygiene;commode;grab bar/safety frame  -     [place] Level/Cues Needed (Toileting Goal 1, OT) minimum assist (75% or more patient effort)  -     Time Frame (Toileting Goal 1, OT) short term goal (STG);5 days  -     Progress/Outcome (Toileting Goal 1, OT) goal revised this date;goal ongoing  -       Row Name 11/26/24 1402          Therapy Assessment/Plan (OT)    Planned Therapy Interventions (OT) activity tolerance training;adaptive equipment training;BADL retraining;functional balance retraining;occupation/activity based interventions;patient/caregiver education/training;strengthening exercise;transfer/mobility retraining  -               User Key  (r) = Recorded By, (t) = Taken By, (c) = Cosigned By      Initials Name Provider Type    Raiza Nichols OT Occupational Therapist                   Clinical Impression       Row Name 11/26/24 1400          Pain Assessment    Pretreatment Pain  Rating 4/10  -     Posttreatment Pain Rating 4/10  -     Pain Location back  -     Pain Side/Orientation generalized  -     Pain Management Interventions activity modification encouraged;positioning techniques utilized  -     Response to Pain Interventions activity level improved;activity participation with tolerable pain  -       Row Name 11/26/24 1400          Plan of Care Review    Plan of Care Reviewed With patient  -     Progress improving  -     Outcome Evaluation Pt. progressing towards baseline with ADLs and functional mobility. Completes bed mobility and T/Fs with CGA. Demonstrates LBD with Mod A and grooming with SUA. Will continue to progress as able. Recommend IPR at discharge.  -       Row Name 11/26/24 1400          Positioning and Restraints    Pre-Treatment Position in bed  -LC     Post Treatment Position chair  -LC     In Chair notified nsg;reclined;call light within reach;encouraged to call for assist;exit alarm on;waffle cushion;legs elevated;with nsg  -LC               User Key  (r) = Recorded By, (t) = Taken By, (c) = Cosigned By      Initials Name Provider Type     Raiza Ignacio, OT Occupational Therapist                   Outcome Measures       Row Name 11/26/24 1404          How much help from another is currently needed...    Putting on and taking off regular lower body clothing? 2  -LC     Bathing (including washing, rinsing, and drying) 2  -LC     Toileting (which includes using toilet bed pan or urinal) 2  -LC     Putting on and taking off regular upper body clothing 3  -LC     Taking care of personal grooming (such as brushing teeth) 3  -LC     Eating meals 4  -LC     AM-PAC 6 Clicks Score (OT) 16  -       Row Name 11/26/24 0800          How much help from another person do you currently need...    Turning from your back to your side while in flat bed without using bedrails? 3  -KM     Moving from lying on back to sitting on the side of a flat bed without bedrails?  3  -KM     Moving to and from a bed to a chair (including a wheelchair)? 3  -KM     Standing up from a chair using your arms (e.g., wheelchair, bedside chair)? 3  -KM     Climbing 3-5 steps with a railing? 2  -KM     To walk in hospital room? 3  -KM     AM-PAC 6 Clicks Score (PT) 17  -KM     Highest Level of Mobility Goal 5 --> Static standing  -KM       Row Name 11/26/24 1404          Functional Assessment    Outcome Measure Options AM-PAC 6 Clicks Daily Activity (OT)  -               User Key  (r) = Recorded By, (t) = Taken By, (c) = Cosigned By      Initials Name Provider Type     Raiza Ignacio OT Occupational Therapist     Kaleigh Singh, RN Registered Nurse                    Occupational Therapy Education       Title: PT OT SLP Therapies (In Progress)       Topic: Occupational Therapy (In Progress)       Point: ADL training (In Progress)       Description:   Instruct learner(s) on proper safety adaptation and remediation techniques during self care or transfers.   Instruct in proper use of assistive devices.                  Learning Progress Summary            Patient Acceptance, E, NR by  at 11/26/2024 1132    Acceptance, E, NR by JUDE at 11/20/2024 1123    Acceptance, E, VU by EMILY at 11/16/2024 1608    Comment: OT POC; discharge planning                      Point: Home exercise program (Not Started)       Description:   Instruct learner(s) on appropriate technique for monitoring, assisting and/or progressing therapeutic exercises/activities.                  Learner Progress:  Not documented in this visit.              Point: Precautions (In Progress)       Description:   Instruct learner(s) on prescribed precautions during self-care and functional transfers.                  Learning Progress Summary            Patient Acceptance, E, NR by  at 11/26/2024 1132    Acceptance, E, VU by EMILY at 11/16/2024 1608    Comment: OT POC; discharge planning                      Point: Body mechanics (In  Progress)       Description:   Instruct learner(s) on proper positioning and spine alignment during self-care, functional mobility activities and/or exercises.                  Learning Progress Summary            Patient Acceptance, E, NR by  at 11/26/2024 1132                                      User Key       Initials Effective Dates Name Provider Type Discipline    AC 02/03/23 -  Marcella Drake, OT Occupational Therapist OT     06/16/21 -  Raiza Ignacio, OT Occupational Therapist OT    EMILY 06/16/21 -  Alexa Lu OT Occupational Therapist OT                  OT Recommendation and Plan  Planned Therapy Interventions (OT): activity tolerance training, adaptive equipment training, BADL retraining, functional balance retraining, occupation/activity based interventions, patient/caregiver education/training, strengthening exercise, transfer/mobility retraining  Plan of Care Review  Plan of Care Reviewed With: patient  Progress: improving  Outcome Evaluation: Pt. progressing towards baseline with ADLs and functional mobility. Completes bed mobility and T/Fs with CGA. Demonstrates LBD with Mod A and grooming with SUA. Will continue to progress as able. Recommend IPR at discharge.     Time Calculation:         Time Calculation- OT       Row Name 11/26/24 1405             Time Calculation- OT    OT Start Time 1132  -      OT Received On 11/26/24  -      OT Goal Re-Cert Due Date 12/06/24  -         Timed Charges    58317 - OT Therapeutic Activity Minutes 6  -      13632 - OT Self Care/Mgmt Minutes 10  -         Total Minutes    Timed Charges Total Minutes 16  -       Total Minutes 16  -                User Key  (r) = Recorded By, (t) = Taken By, (c) = Cosigned By      Initials Name Provider Type     Raiza Ignacio, OT Occupational Therapist                  Therapy Charges for Today       Code Description Service Date Service Provider Modifiers Qty    [id number]  OT SELF CARE/MGMT/TRAIN EA 15 MIN  11/26/2024 Raiza Ignacio, OT GO 1                 Raiza Ignacio, OT  11/26/2024

## 2024-11-26 NOTE — DISCHARGE PLACEMENT REQUEST
"[facility]  From Izzy Rosas [phone]    Going to [facility] 11-27-24    Yanique Webster (84 y.o. Female)     Marquis Galloway MD   Physician  Interventional Radiology     Procedures     Signed     Date of Service: 11/26/24 1225  Creation Time: 11/26/24 1225     Signed         The following procedure was performed: Exchanged right IJ Zeferino for a tunneled dialysis catheter.  Okay to use.     Please see corresponding Radiology report for in detail procedural information. The Radiology report will be dictated shortly, if not done so already. Please see the IR RN note for the information regarding medicines administered if any, [place]-procedural vitals and I/O information.                     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    Marital Status                               Admission Date   11/15/24    Admission Type   Emergency    Admitting Provider   Shayy Littlejohn DO    Attending Provider   Shayy Littlejohn DO    Department, Room/Bed   [address], S518/1       Discharge Date       Discharge Disposition       Discharge Destination                                 Attending Provider: Shayy Littlejohn DO    Allergies: Latex, Nickel, Penicillins, Penicillins    Isolation: None   Infection: None   Code Status: No CPR    Ht: 162.6 cm (64\")   Wt: 83.2 kg (183 lb 8 oz)    Admission Cmt: None   Principal Problem: Acute on chronic renal failure [N17.9,N18.9]                   Active Insurance as of 11/15/2024       Primary Coverage       Payor Plan Insurance Group Employer/Plan Group    MEDICARE MEDICARE A & B        Payor Plan Address Payor Plan Phone Number Payor Plan Fax Number Effective Dates    [address] [phone]  1/1/2006 - None Entered    [place] [id number]         Subscriber Name Subscriber Birth Date Member ID    " "   COLLEEN GAUTAM 1940 [id number]               Secondary Coverage       Payor Plan Insurance Group Employer/Plan Group    ANTHMELISSA BLUE [employer]OSS ANTHMELISSA [facility] SUPP [id number]       Payor Plan Address Payor Plan Phone Number Payor Plan Fax Number Effective Dates    [address]   1/1/2006 - None Entered    [place] [id number]         Subscriber Name Subscriber Birth Date Member ID       COLLEEN GAUTAM 1940 [id number]                     Emergency Contacts        (Rel.) Home Phone Work Phone Mobile Phone    MANJINDER NORRIS (Son) [phone] -- [phone]    KATELIN GAUTAM (Son) [phone] -- [phone]                 History & Physical        PilloWhit APRN at 11/26/24 1010       Attestation signed by Marquis Galloway MD at 11/26/24 1053    Agreed.                  H&P reviewed. The patient was examined and there are no changes to the H&P.      TDC placement. Pertinent labs reviewed.        Electronically signed by Marquis Galloway MD at 11/26/24 1053   Source Note: H&P (View-Only)             LOS: 9 days    Patient Care Team:  Sebas Alicea MD as PCP - General (Family Medicine)  Steve Geronimo MD as Consulting Physician (Cardiology)  Emilio Regalado MD as Consulting Physician (Endocrinology)  Axel Ribeiro MD as Consulting Physician (Cardiology)    Subjective    No new events    Objective    Vital Signs:  Blood pressure 152/72, pulse 70, temperature 98.3 °F (36.8 °C), temperature source Oral, resp. rate 18, height 162.6 cm (64\"), weight 83.2 kg (183 lb 8 oz), SpO2 99%.      Intake/Output Summary (Last 24 hours) at 11/25/2024 1058  Last data filed at 11/25/2024 0830  Gross per 24 hour   Intake 600 ml   Output 350 ml   Net 250 ml        11/24 0701 - 11/25 0700  In: -   Out: 350 [Urine:350]    Physical Exam:        GENERAL:  NAD  NEURO: Awake and alert, oriented. No focal deficit  PSYCHIATRIC: NMA. Cooperative with PE  EYE: PE, no icterus, no " conjunctivitis  ENT: ommm, dentition intact,  Hearing intact  NECK: Supple , No JVD discernable,  Trachea midline  CV: No edema, RRR  LUNGS:  Quiet,  Nonlabored resp.  Symmetrical expansion  ABDOMEN: Nondistended, soft nontender.  : No Lopez, no palp bladder  SKIN: Warm and dry without rash      Labs:  Results from last 7 days   Lab Units 11/24/24  0947 11/22/24  0435 11/19/24  2214 11/19/24  0544   WBC 10*3/mm3 5.31 6.11  --  6.99   HEMOGLOBIN g/dL 7.5* 7.9* 8.4* 8.5*   PLATELETS 10*3/mm3 108* 107*  --  101*     Results from last 7 days   Lab Units 11/24/24  0947 11/23/24  0724 11/22/24  0435 11/21/24  0848 11/21/24  0125   SODIUM mmol/L 138 142 141 143 139   POTASSIUM mmol/L 4.3 5.2 5.1 5.2 5.3*   CHLORIDE mmol/L 104 110* 110* 113* 112*   CO2 mmol/L 23.0 23.0 19.0* 19.0* 16.0*   BUN mg/dL 40* 55* 74* 78* 73*   CREATININE mg/dL 2.67* 2.73* 3.94* 4.08* 4.04*   CALCIUM mg/dL 9.4 9.7 9.7 9.7 9.7   ALBUMIN g/dL  --   --  3.4*  --  3.6     Results from last 7 days   Lab Units 11/22/24  0435   ALK PHOS U/L 73   BILIRUBIN mg/dL 0.4   ALT (SGPT) U/L 14   AST (SGOT) U/L 22     Results from last 7 days   Lab Units 11/21/24  0856   PH, ARTERIAL pH units 7.277*   PO2 ART mm Hg 101.0   PCO2, ARTERIAL mm Hg 42.3   HCO3 ART mmol/L 19.7*               Estimated Creatinine Clearance: 16.7 mL/min (A) (by C-G formula based on SCr of 2.67 mg/dL (H)).        RENAL US 11/18/24: Right K 8.9 cm and Left K 9.6 cm            A/P:      ARF:  No sign of renal recovery.  Likely with progression to esrd. Started on HD 11/22/24. Will ru n M/F for now.  Needs tunneled HD cath and outpt HD arrangements     CKD stage IV: Baseline creatinine 1.9-2.2 mg/dL, since last admission was running between 3.2-3.5 range, history of proteinuric renal disease UPC 2 g in the past duplex scan negative in 2016, on previous hospitalization discussion with daughter-in-law anesthesiologist, was explained regard to advanced renal dysfunction possibility of dialysis  "versus conservative treatment. Ultimately patient decided for dialysis.     Hypertension: No ANABEL per duplex in 2016.  On terazosin, carvedilol, hydralazine, clonidine.  Monitor with HD.     Metabolic acidosis:  Adjust with HD.    Anemia: Hgb below goal.  Iron stores adequate.  Cont EDISON. Transfuse as needed for Hgb < 7     Volume status: stable.  Avoid aggressive UF unles Edema or SOB>      Hyperparathyroidism:  .  On calcitriol 0.5 mcg daily. Calcium stable. Phos stable.  No binders for now.     Nutrition: High protein low K diet. Give renal vit.    High risk and complexity pt.       Edwin Lane MD  11/25/24  10:58 EST          Electronically signed by Edwin Lane MD at 11/25/24 1110                 Marquis Galloway MD at 11/26/24 1006            H&P reviewed. The patient was examined and there are no changes to the H&P.          Electronically signed by Marquis Galloway MD at 11/26/24 1006   Source Note: H&P (View-Only)             LOS: 9 days    Patient Care Team:  Sebas Alicea MD as PCP - General (Family Medicine)  Steve Geronimo MD as Consulting Physician (Cardiology)  Emilio Regalado MD as Consulting Physician (Endocrinology)  Axel Ribeiro MD as Consulting Physician (Cardiology)    Subjective    No new events    Objective    Vital Signs:  Blood pressure 152/72, pulse 70, temperature 98.3 °F (36.8 °C), temperature source Oral, resp. rate 18, height 162.6 cm (64\"), weight 83.2 kg (183 lb 8 oz), SpO2 99%.      Intake/Output Summary (Last 24 hours) at 11/25/2024 1058  Last data filed at 11/25/2024 0830  Gross per 24 hour   Intake 600 ml   Output 350 ml   Net 250 ml        11/24 0701 - 11/25 0700  In: -   Out: 350 [Urine:350]    Physical Exam:        GENERAL:  NAD  NEURO: Awake and alert, oriented. No focal deficit  PSYCHIATRIC: NMA. Cooperative with PE  EYE: PE, no icterus, no conjunctivitis  ENT: ommm, dentition intact,  Hearing intact  NECK: Supple , No " JVD discernable,  Trachea midline  CV: No edema, RRR  LUNGS:  Quiet,  Nonlabored resp.  Symmetrical expansion  ABDOMEN: Nondistended, soft nontender.  : No Lopez, no palp bladder  SKIN: Warm and dry without rash      Labs:  Results from last 7 days   Lab Units 11/24/24  0947 11/22/24  0435 11/19/24  2214 11/19/24  0544   WBC 10*3/mm3 5.31 6.11  --  6.99   HEMOGLOBIN g/dL 7.5* 7.9* 8.4* 8.5*   PLATELETS 10*3/mm3 108* 107*  --  101*     Results from last 7 days   Lab Units 11/24/24  0947 11/23/24  0724 11/22/24  0435 11/21/24  0848 11/21/24  0125   SODIUM mmol/L 138 142 141 143 139   POTASSIUM mmol/L 4.3 5.2 5.1 5.2 5.3*   CHLORIDE mmol/L 104 110* 110* 113* 112*   CO2 mmol/L 23.0 23.0 19.0* 19.0* 16.0*   BUN mg/dL 40* 55* 74* 78* 73*   CREATININE mg/dL 2.67* 2.73* 3.94* 4.08* 4.04*   CALCIUM mg/dL 9.4 9.7 9.7 9.7 9.7   ALBUMIN g/dL  --   --  3.4*  --  3.6     Results from last 7 days   Lab Units 11/22/24  0435   ALK PHOS U/L 73   BILIRUBIN mg/dL 0.4   ALT (SGPT) U/L 14   AST (SGOT) U/L 22     Results from last 7 days   Lab Units 11/21/24  0856   PH, ARTERIAL pH units 7.277*   PO2 ART mm Hg 101.0   PCO2, ARTERIAL mm Hg 42.3   HCO3 ART mmol/L 19.7*               Estimated Creatinine Clearance: 16.7 mL/min (A) (by C-G formula based on SCr of 2.67 mg/dL (H)).        RENAL US 11/18/24: Right K 8.9 cm and Left K 9.6 cm            A/P:      ARF:  No sign of renal recovery.  Likely with progression to esrd. Started on HD 11/22/24. Will ru n M/F for now.  Needs tunneled HD cath and outpt HD arrangements     CKD stage IV: Baseline creatinine 1.9-2.2 mg/dL, since last admission was running between 3.2-3.5 range, history of proteinuric renal disease UPC 2 g in the past duplex scan negative in 2016, on previous hospitalization discussion with daughter-in-law anesthesiologist, was explained regard to advanced renal dysfunction possibility of dialysis versus conservative treatment. Ultimately patient decided for dialysis.  "    Hypertension: No ANABEL per duplex in 2016.  On terazosin, carvedilol, hydralazine, clonidine.  Monitor with HD.     Metabolic acidosis:  Adjust with HD.    Anemia: Hgb below goal.  Iron stores adequate.  Cont EDISON. Transfuse as needed for Hgb < 7     Volume status: stable.  Avoid aggressive UF unles Edema or SOB>      Hyperparathyroidism:  .  On calcitriol 0.5 mcg daily. Calcium stable. Phos stable.  No binders for now.     Nutrition: High protein low K diet. Give renal vit.    High risk and complexity pt.       Edwin Lane MD  11/25/24  10:58 EST          Electronically signed by Edwin Lane MD at 11/25/24 1110                 Edwin Lane MD at 11/25/24 1058             LOS: 9 days    Patient Care Team:  Sebas Alicea MD as PCP - General (Family Medicine)  Steve Geronimo MD as Consulting Physician (Cardiology)  Emilio Regalado MD as Consulting Physician (Endocrinology)  Axel Ribeiro MD as Consulting Physician (Cardiology)    Subjective    No new events    Objective    Vital Signs:  Blood pressure 152/72, pulse 70, temperature 98.3 °F (36.8 °C), temperature source Oral, resp. rate 18, height 162.6 cm (64\"), weight 83.2 kg (183 lb 8 oz), SpO2 99%.      Intake/Output Summary (Last 24 hours) at 11/25/2024 1058  Last data filed at 11/25/2024 0830  Gross per 24 hour   Intake 600 ml   Output 350 ml   Net 250 ml        11/24 0701 - 11/25 0700  In: -   Out: 350 [Urine:350]    Physical Exam:        GENERAL:  NAD  NEURO: Awake and alert, oriented. No focal deficit  PSYCHIATRIC: NMA. Cooperative with PE  EYE: PE, no icterus, no conjunctivitis  ENT: ommm, dentition intact,  Hearing intact  NECK: Supple , No JVD discernable,  Trachea midline  CV: No edema, RRR  LUNGS:  Quiet,  Nonlabored resp.  Symmetrical expansion  ABDOMEN: Nondistended, soft nontender.  : No Lopez, no palp bladder  SKIN: Warm and dry without rash      Labs:  Results from last 7 days   Lab Units " 11/24/24  0947 11/22/24  0435 11/19/24  2214 11/19/24  0544   WBC 10*3/mm3 5.31 6.11  --  6.99   HEMOGLOBIN g/dL 7.5* 7.9* 8.4* 8.5*   PLATELETS 10*3/mm3 108* 107*  --  101*     Results from last 7 days   Lab Units 11/24/24  0947 11/23/24  0724 11/22/24  0435 11/21/24  0848 11/21/24  0125   SODIUM mmol/L 138 142 141 143 139   POTASSIUM mmol/L 4.3 5.2 5.1 5.2 5.3*   CHLORIDE mmol/L 104 110* 110* 113* 112*   CO2 mmol/L 23.0 23.0 19.0* 19.0* 16.0*   BUN mg/dL 40* 55* 74* 78* 73*   CREATININE mg/dL 2.67* 2.73* 3.94* 4.08* 4.04*   CALCIUM mg/dL 9.4 9.7 9.7 9.7 9.7   ALBUMIN g/dL  --   --  3.4*  --  3.6     Results from last 7 days   Lab Units 11/22/24  0435   ALK PHOS U/L 73   BILIRUBIN mg/dL 0.4   ALT (SGPT) U/L 14   AST (SGOT) U/L 22     Results from last 7 days   Lab Units 11/21/24  0856   PH, ARTERIAL pH units 7.277*   PO2 ART mm Hg 101.0   PCO2, ARTERIAL mm Hg 42.3   HCO3 ART mmol/L 19.7*               Estimated Creatinine Clearance: 16.7 mL/min (A) (by C-G formula based on SCr of 2.67 mg/dL (H)).        RENAL US 11/18/24: Right K 8.9 cm and Left K 9.6 cm            A/P:      ARF:  No sign of renal recovery.  Likely with progression to esrd. Started on HD 11/22/24. Will ru n M/F for now.  Needs tunneled HD cath and outpt HD arrangements     CKD stage IV: Baseline creatinine 1.9-2.2 mg/dL, since last admission was running between 3.2-3.5 range, history of proteinuric renal disease UPC 2 g in the past duplex scan negative in 2016, on previous hospitalization discussion with daughter-in-law anesthesiologist, was explained regard to advanced renal dysfunction possibility of dialysis versus conservative treatment. Ultimately patient decided for dialysis.     Hypertension: No ANABEL per duplex in 2016.  On terazosin, carvedilol, hydralazine, clonidine.  Monitor with HD.     Metabolic acidosis:  Adjust with HD.    Anemia: Hgb below goal.  Iron stores adequate.  Cont EDISON. Transfuse as needed for Hgb < 7     Volume status:  "stable.  Avoid aggressive UF unles Edema or SOB>      Hyperparathyroidism:  .  On calcitriol 0.5 mcg daily. Calcium stable. Phos stable.  No binders for now.     Nutrition: High protein low K diet. Give renal vit.    High risk and complexity pt.       Edwin Lane MD  11/25/24  10:58 EST          Electronically signed by Edwin Lane MD at 11/25/24 1110       Marquis Galloway MD at 11/22/24 1309            H&P reviewed. The patient was examined and there are no changes to the H&P.          Electronically signed by Marquis Galloway MD at 11/22/24 1311   Source Note: H&P (View-Only)           LOS: 6 days   Patient Care Team:  Sebas Alicea MD as PCP - General (Family Medicine)  Steve Geronimo MD as Consulting Physician (Cardiology)  Emilio Regalado MD as Consulting Physician (Endocrinology)  Axel Ribeiro MD as Consulting Physician (Cardiology)        Subjective    Discussed with patient and son ( on the ph). Patient wants to try dialysis and see if she can tolerate. Plan for temporary HD cath today and HD afterwards.     Objective    Vital Sign Min/Max for last 24 hours  Temp  Min: 96.7 °F (35.9 °C)  Max: 98.2 °F (36.8 °C)   BP  Min: 119/54  Max: 162/68   Pulse  Min: 62  Max: 75   Resp  Min: 18  Max: 20   SpO2  Min: 97 %  Max: 100 %   Flow (L/min) (Oxygen Therapy)  Min: 2  Max: 2   No data recorded     Flowsheet Rows      Flowsheet Row First Filed Value   Admission Height 162.6 cm (64\") Documented at 11/15/2024 1228   Admission Weight 85.7 kg (189 lb) Documented at 11/15/2024 1228            I/O this shift:  In: -   Out: 300 [Urine:300]  I/O last 3 completed shifts:  In: 200 [P.O.:200]  Out: 1650 [Urine:1650]    Objective:  General Appearance:  Comfortable.    Vital signs: (most recent): Blood pressure 155/68, pulse 68, temperature 97.8 °F (36.6 °C), temperature source Oral, resp. rate 18, height 162.6 cm (64\"), weight 83.2 kg (183 lb 8 oz), SpO2 99%.    Output: " "Producing urine.    Lungs:  Normal effort.    Abdomen: Abdomen is soft.  Bowel sounds are normal.     Extremities: Normal range of motion.  There is no dependent edema or local swelling.    Pulses: Distal pulses are intact.    Neurological: Patient is alert and oriented to person, place and time.  Normal strength.    Pupils:  Pupils are equal, round, and reactive to light.    Skin:  Warm.              Awake, NAD  LE no edema   Abd soft  Neuro non focal   Lungs clear  S1S2  Results Review:     I reviewed the patient's new clinical results.    WBC WBC   Date Value Ref Range Status   11/22/2024 6.11 3.40 - 10.80 10*3/mm3 Final      HGB Hemoglobin   Date Value Ref Range Status   11/22/2024 7.9 (L) 12.0 - 15.9 g/dL Final   11/19/2024 8.4 (L) 12.0 - 15.9 g/dL Final      HCT Hematocrit   Date Value Ref Range Status   11/22/2024 26.8 (L) 34.0 - 46.6 % Final   11/19/2024 28.0 (L) 34.0 - 46.6 % Final      Platlets No results found for: \"LABPLAT\"   MCV MCV   Date Value Ref Range Status   11/22/2024 97.8 (H) 79.0 - 97.0 fL Final          Sodium Sodium   Date Value Ref Range Status   11/22/2024 141 136 - 145 mmol/L Final   11/21/2024 143 136 - 145 mmol/L Final   11/21/2024 139 136 - 145 mmol/L Final   11/20/2024 139 136 - 145 mmol/L Final      Potassium Potassium   Date Value Ref Range Status   11/22/2024 5.1 3.5 - 5.2 mmol/L Final   11/21/2024 5.2 3.5 - 5.2 mmol/L Final   11/21/2024 5.3 (H) 3.5 - 5.2 mmol/L Final   11/20/2024 5.1 3.5 - 5.2 mmol/L Final      Chloride Chloride   Date Value Ref Range Status   11/22/2024 110 (H) 98 - 107 mmol/L Final   11/21/2024 113 (H) 98 - 107 mmol/L Final   11/21/2024 112 (H) 98 - 107 mmol/L Final   11/20/2024 112 (H) 98 - 107 mmol/L Final      CO2 CO2   Date Value Ref Range Status   11/22/2024 19.0 (L) 22.0 - 29.0 mmol/L Final   11/21/2024 19.0 (L) 22.0 - 29.0 mmol/L Final   11/21/2024 16.0 (L) 22.0 - 29.0 mmol/L Final   11/20/2024 15.0 (L) 22.0 - 29.0 mmol/L Final      BUN BUN   Date Value " "Ref Range Status   11/22/2024 74 (H) 8 - 23 mg/dL Final   11/21/2024 78 (H) 8 - 23 mg/dL Final   11/21/2024 73 (H) 8 - 23 mg/dL Final   11/20/2024 71 (H) 8 - 23 mg/dL Final      Creatinine Creatinine   Date Value Ref Range Status   11/22/2024 3.94 (H) 0.57 - 1.00 mg/dL Final   11/21/2024 4.08 (H) 0.57 - 1.00 mg/dL Final   11/21/2024 4.04 (H) 0.57 - 1.00 mg/dL Final   11/20/2024 3.94 (H) 0.57 - 1.00 mg/dL Final      Calcium Calcium   Date Value Ref Range Status   11/22/2024 9.7 8.6 - 10.5 mg/dL Final   11/21/2024 9.7 8.6 - 10.5 mg/dL Final   11/21/2024 9.7 8.6 - 10.5 mg/dL Final   11/20/2024 9.4 8.6 - 10.5 mg/dL Final      PO4 No results found for: \"CAPO4\"   Albumin Albumin   Date Value Ref Range Status   11/22/2024 3.4 (L) 3.5 - 5.2 g/dL Final   11/21/2024 3.6 3.5 - 5.2 g/dL Final        Magnesium No results found for: \"MG\"     Uric Acid No results found for: \"URICACID\"       Assessment & Plan      Acute on chronic renal failure    Type 2 diabetes mellitus    Essential hypertension    Paroxysmal atrial fibrillation    Anemia, chronic disease    Dyspnea    Abnormal laboratory test      Assessment & Plan  ====================================  1.  Progression of CKD: Likely esrd. Discussed with patient and family. Wants to try iHD. She may be able to do 2 x weekly. Plan for HD cath placement today and HD afterwards.      2.  CKD stage IV: Baseline creatinine 1.9-2.2 mg/dL, since last admission was running between 3.2-3.5 range, history of proteinuric renal disease UPC 2 g in the past duplex scan negative in 2016, on previous hospitalization discussion with daughter-in-law anesthesiologist, was explained regard to advanced renal dysfunction possibility of dialysis versus conservative treatment.     3.  Anemia of CKD: EDISON now on HD days.      4.  Volume status:     5.  Hypertension: No ANABEL per duplex in 2016.  On terazosin, carvedilol, hydralazine, clonidine     6.  Metabolic acidosis: Management with HD now.     7.  " "Hyperparathyroidism: On calcitriol 0.5 mcg daily     8.  Iron deficiency on oral iron     9.  History of CAD     10.  COPD     11.  Hyperlipidemia     12.  Anxiety and depression.      RENAL US 11/18/24: Right K 8.9 cm and Left K 9.6 cm      Plan and recommendation.  Plan for 1st HD session today only 2 hr treatment. 1 liter UF  Would recommend swtiching AC to eliquis 2.5 mg BID from xeralto.   EDISON on HD days.     I discussed the patients findings and my recommendations with patient        Zurdo Levine MD  11/22/24  13:07 EST              Electronically signed by Zurdo Levine MD at 11/22/24 1309                 Zurdo Levine MD at 11/22/24 1307           LOS: 6 days   Patient Care Team:  Sebas Alicea MD as PCP - General (Family Medicine)  Steve Geronimo MD as Consulting Physician (Cardiology)  Emilio Regalado MD as Consulting Physician (Endocrinology)  Axel Ribeiro MD as Consulting Physician (Cardiology)        Subjective    Discussed with patient and son ( on the ph). Patient wants to try dialysis and see if she can tolerate. Plan for temporary HD cath today and HD afterwards.     Objective    Vital Sign Min/Max for last 24 hours  Temp  Min: 96.7 °F (35.9 °C)  Max: 98.2 °F (36.8 °C)   BP  Min: 119/54  Max: 162/68   Pulse  Min: 62  Max: 75   Resp  Min: 18  Max: 20   SpO2  Min: 97 %  Max: 100 %   Flow (L/min) (Oxygen Therapy)  Min: 2  Max: 2   No data recorded     Flowsheet Rows      Flowsheet Row First Filed Value   Admission Height 162.6 cm (64\") Documented at 11/15/2024 1228   Admission Weight 85.7 kg (189 lb) Documented at 11/15/2024 1228            I/O this shift:  In: -   Out: 300 [Urine:300]  I/O last 3 completed shifts:  In: 200 [P.O.:200]  Out: 1650 [Urine:1650]    Objective:  General Appearance:  Comfortable.    Vital signs: (most recent): Blood pressure 155/68, pulse 68, temperature 97.8 °F (36.6 °C), temperature source Oral, resp. rate 18, height 162.6 cm (64\"), weight 83.2 kg (183 lb " "8 oz), SpO2 99%.    Output: Producing urine.    Lungs:  Normal effort.    Abdomen: Abdomen is soft.  Bowel sounds are normal.     Extremities: Normal range of motion.  There is no dependent edema or local swelling.    Pulses: Distal pulses are intact.    Neurological: Patient is alert and oriented to person, place and time.  Normal strength.    Pupils:  Pupils are equal, round, and reactive to light.    Skin:  Warm.              Awake, NAD  LE no edema   Abd soft  Neuro non focal   Lungs clear  S1S2  Results Review:     I reviewed the patient's new clinical results.    WBC WBC   Date Value Ref Range Status   11/22/2024 6.11 3.40 - 10.80 10*3/mm3 Final      HGB Hemoglobin   Date Value Ref Range Status   11/22/2024 7.9 (L) 12.0 - 15.9 g/dL Final   11/19/2024 8.4 (L) 12.0 - 15.9 g/dL Final      HCT Hematocrit   Date Value Ref Range Status   11/22/2024 26.8 (L) 34.0 - 46.6 % Final   11/19/2024 28.0 (L) 34.0 - 46.6 % Final      Platlets No results found for: \"LABPLAT\"   MCV MCV   Date Value Ref Range Status   11/22/2024 97.8 (H) 79.0 - 97.0 fL Final          Sodium Sodium   Date Value Ref Range Status   11/22/2024 141 136 - 145 mmol/L Final   11/21/2024 143 136 - 145 mmol/L Final   11/21/2024 139 136 - 145 mmol/L Final   11/20/2024 139 136 - 145 mmol/L Final      Potassium Potassium   Date Value Ref Range Status   11/22/2024 5.1 3.5 - 5.2 mmol/L Final   11/21/2024 5.2 3.5 - 5.2 mmol/L Final   11/21/2024 5.3 (H) 3.5 - 5.2 mmol/L Final   11/20/2024 5.1 3.5 - 5.2 mmol/L Final      Chloride Chloride   Date Value Ref Range Status   11/22/2024 110 (H) 98 - 107 mmol/L Final   11/21/2024 113 (H) 98 - 107 mmol/L Final   11/21/2024 112 (H) 98 - 107 mmol/L Final   11/20/2024 112 (H) 98 - 107 mmol/L Final      CO2 CO2   Date Value Ref Range Status   11/22/2024 19.0 (L) 22.0 - 29.0 mmol/L Final   11/21/2024 19.0 (L) 22.0 - 29.0 mmol/L Final   11/21/2024 16.0 (L) 22.0 - 29.0 mmol/L Final   11/20/2024 15.0 (L) 22.0 - 29.0 mmol/L Final " "     BUN BUN   Date Value Ref Range Status   11/22/2024 74 (H) 8 - 23 mg/dL Final   11/21/2024 78 (H) 8 - 23 mg/dL Final   11/21/2024 73 (H) 8 - 23 mg/dL Final   11/20/2024 71 (H) 8 - 23 mg/dL Final      Creatinine Creatinine   Date Value Ref Range Status   11/22/2024 3.94 (H) 0.57 - 1.00 mg/dL Final   11/21/2024 4.08 (H) 0.57 - 1.00 mg/dL Final   11/21/2024 4.04 (H) 0.57 - 1.00 mg/dL Final   11/20/2024 3.94 (H) 0.57 - 1.00 mg/dL Final      Calcium Calcium   Date Value Ref Range Status   11/22/2024 9.7 8.6 - 10.5 mg/dL Final   11/21/2024 9.7 8.6 - 10.5 mg/dL Final   11/21/2024 9.7 8.6 - 10.5 mg/dL Final   11/20/2024 9.4 8.6 - 10.5 mg/dL Final      PO4 No results found for: \"CAPO4\"   Albumin Albumin   Date Value Ref Range Status   11/22/2024 3.4 (L) 3.5 - 5.2 g/dL Final   11/21/2024 3.6 3.5 - 5.2 g/dL Final        Magnesium No results found for: \"MG\"     Uric Acid No results found for: \"URICACID\"       Assessment & Plan      Acute on chronic renal failure    Type 2 diabetes mellitus    Essential hypertension    Paroxysmal atrial fibrillation    Anemia, chronic disease    Dyspnea    Abnormal laboratory test      Assessment & Plan  ====================================  1.  Progression of CKD: Likely esrd. Discussed with patient and family. Wants to try iHD. She may be able to do 2 x weekly. Plan for HD cath placement today and HD afterwards.      2.  CKD stage IV: Baseline creatinine 1.9-2.2 mg/dL, since last admission was running between 3.2-3.5 range, history of proteinuric renal disease UPC 2 g in the past duplex scan negative in 2016, on previous hospitalization discussion with daughter-in-law anesthesiologist, was explained regard to advanced renal dysfunction possibility of dialysis versus conservative treatment.     3.  Anemia of CKD: EDISON now on HD days.      4.  Volume status:     5.  Hypertension: No ANABEL per duplex in 2016.  On terazosin, carvedilol, hydralazine, clonidine     6.  Metabolic acidosis: " Management with HD now.     7.  Hyperparathyroidism: On calcitriol 0.5 mcg daily     8.  Iron deficiency on oral iron     9.  History of CAD     10.  COPD     11.  Hyperlipidemia     12.  Anxiety and depression.      RENAL US 24: Right K 8.9 cm and Left K 9.6 cm      Plan and recommendation.  Plan for 1st HD session today only 2 hr treatment. 1 liter UF  Would recommend swtiching AC to eliquis 2.5 mg BID from xeralto.   EDISON on HD days.     I discussed the patients findings and my recommendations with patient        Zurdo Levine MD  24  13:07 EST              Electronically signed by Zurdo Levine MD at 24 1309       Aubree Grubbs APRN at 11/15/24 1753       Attestation signed by Lilo Owens MD at 24 0020    I have reviewed this documentation and agree.  She is quite short of breath at rest, sitting up with oxygen,  Will check a CT chest, try a neb.     Lilo Owens MD 24 00:20 EST                         [facility] Medicine Services  HISTORY AND PHYSICAL    Patient Name: Yanique Webster  : 1940  MRN: [id number]  Primary Care Physician: Sebas Alicea MD  Date of admission: 11/15/2024    Subjective  Subjective     Chief Complaint:  Abdnormal labs    HPI:  Yanique Webster is a 84 y.o. female with PMH of afib, HTN, CKD, prior stroke, T2DM with neuropathy, chronic anemia, and GERD.  She is currently at patient at [facility] and was set to be discharged home with  today, but labs returned with worsening creatinine despite IVF 24.  She is being admitted to the hospitalist service for further treatment and will ask nephrology to see tomorrow        Review of Systems   Constitutional:  Negative for activity change, appetite change and fever.   HENT:  Negative for congestion and hearing loss.    Eyes:  Negative for visual disturbance.   Respiratory:  Positive for shortness of breath. Negative for cough and chest tightness.     Cardiovascular:  Negative for chest pain and leg swelling.   Gastrointestinal:  Negative for abdominal pain, blood in stool, diarrhea, nausea and vomiting.   Genitourinary:  Negative for dysuria and hematuria.   Musculoskeletal:  Positive for arthralgias.   Neurological:  Positive for weakness. Negative for dizziness.   Psychiatric/Behavioral:  Negative for behavioral problems and confusion.         Personal History     Past Medical History:   Diagnosis Date    Blurry vision     Chronic joint pain     Chronic pain     COPD (chronic obstructive pulmonary disease)     Coronary artery disease     Diabetic gastroparesis 08/24/2016    Esophageal stricture     s/p dilation in 2007    GERD (gastroesophageal reflux disease)     Gout     Headache     secondary to hypertension    Hyperlipidemia     Hypertension     Long term current use of insulin     Neuropathy     Neuropathy due to type 2 diabetes mellitus     Obesity     Osteoarthritis     Pericarditis 2008    Stroke 03/2019    Type 2 diabetes mellitus          Past Surgical History:   Procedure Laterality Date    BRONCHOSCOPY N/A 8/23/2016    Procedure: BRONCHOSCOPY BIOPSY AT BEDSIDE;  Surgeon: Mookie Willard MD;  Location:  [facility];  Service:     CARDIAC CATHETERIZATION N/A 8/30/2016    Procedure: Left Heart Cath;  Surgeon: Steve Geronimo MD;  Location:  [facility] INVASIVE [facility]ATION;  Service:     CARDIAC CATHETERIZATION N/A 8/30/2016    Procedure: Right Heart Cath;  Surgeon: Steve Geronimo MD;  Location:  [facility] INVASIVE LOCATION;  Service:     CARDIAC ELECTROPHYSIOLOGY PROCEDURE N/A 7/2/2019    Procedure: Loop insertion;  Surgeon: Steve Geronimo MD;  Location:  [facility] CATH INVASIVE LOCATION;  Service: Cardiovascular    CARDIAC ELECTROPHYSIOLOGY PROCEDURE N/A 9/26/2023    Procedure: Loop recorder removal;  Surgeon: Steve Geronimo MD;  Location:  [facility] CATH INVASIVE LOCATION;  Service: Cardiovascular;  Laterality: N/A;    CATARACT EXTRACTION       COLONOSCOPY N/A 8/16/2024    Procedure: COLONOSCOPY;  Surgeon: Brunner, Mark I, MD;  Location:  [facility];  Service: Gastroenterology;  Laterality: N/A;    ENDOSCOPY N/A 8/14/2024    Procedure: ESOPHAGOGASTRODUODENOSCOPY;  Surgeon: Brunner, Mark I, MD;  Location:  [facility];  Service: Gastroenterology;  Laterality: N/A;    ESOPHAGEAL DILATATION  2007    HERNIA REPAIR      HIP CANNULATED SCREW PLACEMENT Left 1/2/2024    Procedure: HIP CANNULATED SCREW PLACEMENT LEFT;  Surgeon: Seymour Harrington MD;  Location:  [facility] OR;  Service: Orthopedics;  Laterality: Left;    HYSTERECTOMY  1998    WRIST FRACTURE SURGERY Left        Family History:  family history includes Breast cancer (age of onset: 67) in her sister; Cancer in her father, mother, and another family member.     Social History:  reports that she quit smoking about 21 years ago. Her smoking use included cigarettes. She has never used smokeless tobacco. She reports that she does not drink alcohol and does not use drugs.  Social History     Social History Narrative    Mrs. Webster lives at [facility]. Was sent from [facility] today.     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     She smoked 1ppd for 25 years but quit in 2000.     Denies ETOH/illicit drug use.        Medications:  Accu-Chek Guide, Blood Glucose Test, Glucagon, Insulin Lispro, Insulin Pen Needle, Lancets 33G, Polyethylene Glycol 3350, acetaminophen, amLODIPine, aspirin, atorvastatin, bisacodyl, calcitriol, carvedilol, cloNIDine, doxazosin, doxepin, fentaNYL, ferrous sulfate, hydrALAZINE, ipratropium-albuterol, melatonin, naloxone, ondansetron ODT, oxyCODONE, pantoprazole, probiotic, rivaroxaban, sodium bicarbonate, terazosin, torsemide, and vitamin B-12    Allergies   Allergen Reactions    Latex Rash     Not an immediate reaction    Nickel Rash    Penicillins Rash     Patient has tolerated amoxicillin in the past. Had PCN reaction of hives when she  was 15.    Penicillins Rash     unk       Objective  Objective     Vital Signs:   Temp:  [98.1 °F (36.7 °C)] 98.1 °F (36.7 °C)  Heart Rate:  [55-63] 59  Resp:  [18-20] 18  BP: (129-170)/(63-94) 170/74    Physical Exam   Constitutional: Awake, alert, son at bedside  Eyes: PERRLA, sclerae anicteric, no conjunctival injection  HENT: NCAT, mucous membranes moist  Neck: Supple, no thyromegaly, no lymphadenopathy, trachea midline  Respiratory: Clear to auscultation bilaterally, nonlabored respirations   Cardiovascular: irregular, no murmurs, rubs, or gallops, palpable pedal pulses bilaterally  Gastrointestinal: Positive bowel sounds, soft, nontender, nondistended  Musculoskeletal: No bilateral ankle edema, no clubbing or cyanosis to extremities  Psychiatric: Appropriate affect, cooperative  Neurologic: Oriented x 3, MONTANEZ, speech clear  Skin: No rashes noted      Result Review:  I have personally reviewed the results from the time of this admission to 11/15/2024 17:53 EST and agree with these findings:  [x]  Laboratory list / accordion  []  Microbiology  []  Radiology  [x]  EKG/Telemetry   []  Cardiology/Vascular   []  Pathology  [x]  Old records  []  Other:  Most notable findings include: creatinine 4.18    LAB RESULTS:      Lab 11/15/24  1230   WBC 7.23   HEMOGLOBIN 9.2*   HEMATOCRIT 30.5*   PLATELETS 151   NEUTROS ABS 4.84   IMMATURE GRANS (ABS) 0.02   LYMPHS ABS 1.24   MONOS ABS 0.78   EOS ABS 0.30   MCV 96.5   LACTATE 0.9   PROTIME 18.0*   APTT 39.9*         Lab 11/15/24  1230   SODIUM 136   POTASSIUM 5.1   CHLORIDE 106   CO2 16.0*   ANION GAP 14.0   BUN 80*   CREATININE 4.18*   EGFR 10.1*   GLUCOSE 109*   CALCIUM 9.5   MAGNESIUM 2.0         Lab 11/15/24  1230   TOTAL PROTEIN 5.9*   ALBUMIN 3.8   GLOBULIN 2.1   ALT (SGPT) 13   AST (SGOT) 24   BILIRUBIN 0.4   ALK PHOS 83         Lab 11/15/24  1230   PROTIME 18.0*   INR 1.48*             Lab 11/15/24  1334   ABO TYPING O   RH TYPING Negative   ANTIBODY SCREEN Negative          Brief Urine Lab Results  (Last result in the past 365 days)        Color   Clarity   Blood   Leuk Est   Nitrite   Protein   CREAT   Urine HCG        11/15/24 1410 Yellow   Clear   Negative   Negative   Negative   100 mg/dL (2+)                 Microbiology Results (last 10 days)       ** No results found for the last 240 hours. **            No radiology results from the last 24 hrs    Results for orders placed during the hospital encounter of 10/30/24    Adult Transthoracic Echo Complete W/ Cont if Necessary Per Protocol    Interpretation Summary    Left ventricular systolic function is normal. Estimated left ventricular EF = 60%    Left ventricular wall thickness is consistent with mild concentric hypertrophy.    The left atrial cavity is moderately dilated.    Aortic sclerosis without aortic stenosis.  Mild to moderate aortic insufficiency    Mild mitral regurgitation.      Assessment & Plan  Assessment & Plan       Acute on chronic renal failure    Type 2 diabetes mellitus    Essential hypertension    Paroxysmal atrial fibrillation    Anemia, chronic disease    Dyspnea    KINDRA on CKD  --baseline unclear. last admission pt's baseline was documented 1.9-2.2 but wasn't clear if her CKD had advanced.  When dc'd from hospital 11/8/24 creatinine was 3.76. While she has been at [facility], it has stayed in 4 range. Yesterday was 4.3, bumped to 4.5 today (11/15/24)  --nephrology consult    Dyspnea  --hold off on further IVF for now as she received 1L at [facility] 11/14/24 and just under 0.5L in ED today  --sats stable on RA    PAF  --xarelto restarted a few days ago, was held on dc per cardiology recs but family requested it be restarted due to stroke risk  --will hold for now due to renal function    HTN  --continue meds    T2DM  --SSI    Anemia of chronic disease  --hgb stable    Total time spent: 60 minutes  Time spent includes time reviewing chart, face-to-face time, counseling patient/family/caregiver, ordering  medications/tests/procedures, communicating with other health care professionals, documenting clinical information in the electronic health record, and coordination of care.      DVT prophylaxis:  mechanical only for now    CODE STATUS:    Code Status (Patient has no pulse and is not breathing): CPR (Attempt to Resuscitate)  Medical Interventions (Patient has pulse or is breathing): Full Support      Expected Discharge home with HH, was supposed to be dc'd from [facility] 11/15/24  Expected discharge date/ time has not been documented.        Signature: Electronically signed by CHRIS Cote, 11/15/24, 6:00 PM EST                Electronically signed by Lilo Owens MD at 11/16/24 0020       Current Facility-Administered Medications   Medication Dose Route Frequency Provider Last Rate Last Admin    acetaminophen (TYLENOL) tablet 650 mg  650 mg Oral Q4H PRN Aubree Grubbs APRN   650 mg at 11/20/24 0506    apixaban (ELIQUIS) tablet 2.5 mg  2.5 mg Oral Q12H Shayy Littlejohn DO   2.5 mg at 11/26/24 1114    aspirin EC tablet 81 mg  81 mg Oral Daily Aubree Grubbs APRN   81 mg at 11/26/24 1114    atorvastatin (LIPITOR) tablet 80 mg  80 mg Oral Daily Shayy Littlejohn DO   80 mg at 11/26/24 1114    sennosides-docusate (PERICOLACE) 8.6-50 MG per tablet 2 tablet  2 tablet Oral BID PRN Aubree Grubbs APRN        And    polyethylene glycol (MIRALAX) packet 17 g  17 g Oral Daily PRN Aubree Grubbs APRN        And    bisacodyl (DULCOLAX) EC tablet 5 mg  5 mg Oral Daily PRN Aubree Grubbs APRN        And    bisacodyl (DULCOLAX) suppository 10 mg  10 mg Rectal Daily PRN Aubree Grubbs APRN        calcitriol (ROCALTROL) capsule 0.5 mcg  0.5 mcg Oral Daily Aubree Grubbs APRN   0.5 mcg at 11/26/24 1114    carvedilol (COREG) tablet 25 mg  25 mg Oral Q12H Evonne Love MD   25 mg at 11/26/24 1114    fentaNYL (DURAGESIC) 12 MCG/HR 1 patch  1 patch Transdermal Q72H Maeve Whitt MD   1 patch at 11/24/24 0952    And    Check  Fentanyl Patch Placement  1 each Not Applicable Q12H Maeve Whitt MD        cloNIDine (CATAPRES) tablet 0.1 mg  0.1 mg Oral Q6H Aubree Grubbs APRN   0.1 mg at 11/25/24 1814    dextrose (D50W) (25 g/50 mL) IV injection 25 g  25 g Intravenous Q15 Min PRN Aubree Grubbs APRN        dextrose (GLUTOSE) oral gel 15 g  15 g Oral Q15 Min PRN Aubree Grubbs APRN        diphenhydrAMINE (BENADRYL) capsule 25 mg  25 mg Oral Nightly PRN Evonne Love MD   25 mg at 11/19/24 2104    doxepin (SINEquan) capsule 10 mg  10 mg Oral Nightly Shayy Littlejohn DO   10 mg at 11/25/24 2056    epoetin adonis (EPOGEN,PROCRIT) injection 3,000 Units  3,000 Units Subcutaneous Once per day on Monday Wednesday Friday Shayy Littlejohn DO   3,000 Units at 11/25/24 1307    ferrous sulfate tablet 325 mg  325 mg Oral Daily With Breakfast Aubree Grubbs APRN   325 mg at 11/26/24 1114    glucagon (GLUCAGEN) injection 1 mg  1 mg Intramuscular Q15 Min PRN Aubree Grubbs APRN        heparin (porcine) injection 1,000 Units  1,000 Units Intracatheter PRN Zurdo Levine MD   1,000 Units at 11/25/24 1306    hydrALAZINE (APRESOLINE) tablet 100 mg  100 mg Oral Q8H Aubree Grubbs APRN   100 mg at 11/25/24 2105    ipratropium-albuterol (DUO-NEB) nebulizer solution 3 mL  3 mL Nebulization Q4H PRN Aubree Grubbs APRN   3 mL at 11/25/24 0432    ipratropium-albuterol (DUO-NEB) nebulizer solution 3 mL  3 mL Nebulization 4x Daily - RT Lilo Owens MD   3 mL at 11/26/24 1305    isosorbide mononitrate (IMDUR) 24 hr tablet 30 mg  30 mg Oral Daily Evonne Love MD   30 mg at 11/26/24 1114    lactobacillus acidophilus (RISAQUAD) capsule 1 capsule  1 capsule Oral Daily Aubree Grubbs APRN   1 capsule at 11/26/24 1114    loperamide (IMODIUM) capsule 2 mg  2 mg Oral 4x Daily PRN Shayy Littlejohn DO   2 mg at 11/25/24 1844    melatonin tablet 10 mg  10 mg Oral Nightly Shayy Littlejohn DO   10 mg at 11/25/24 2055    naloxone (NARCAN) nasal spray 1 spray  " 1 spray Nasal PRN [place], Aubree APRN        nitroglycerin (NITROSTAT) SL tablet 0.4 mg  0.4 mg Sublingual Q5 Min PRN [place], Aubree, APRN        oxyCODONE (ROXICODONE) immediate release tablet 5 mg  5 mg Oral Q4H PRN [place], Aubree, APRN   5 mg at 11/26/24 1146    pantoprazole (PROTONIX) EC tablet 40 mg  40 mg Oral BID AC [place], Aubree, APRN   40 mg at 11/25/24 1814    sodium chloride 0.9 % flush 10 mL  10 mL Intravenous PRN [place], Aubree, APRN        sodium chloride 0.9 % flush 10 mL  10 mL Intravenous Q12H [place], Aubree, APRN   10 mL at 11/26/24 1115    sodium chloride 0.9 % flush 10 mL  10 mL Intravenous PRN [place], Aubree, APRN        sodium chloride 0.9 % infusion 40 mL  40 mL Intravenous PRN [place], Aubree, APRN        terazosin (HYTRIN) capsule 10 mg  10 mg Oral Nightly Maeve Whitt MD   10 mg at 11/25/24 2055    [Held by provider] torsemide (DEMADEX) tablet 20 mg  20 mg Oral Daily PRN [place], Aubree APRSERGE        vitamin B-12 (CYANOCOBALAMIN) tablet 1,000 mcg  1,000 mcg Oral Daily [place], Aubree, APRN   1,000 mcg at 11/26/24 1114        Physician Progress Notes (most recent note)        Edwin Lane MD at 11/26/24 1129             LOS: 10 days    Patient Care Team:  Sebas Alicea MD as PCP - General (Family Medicine)  Steve Geronimo MD as Consulting Physician (Cardiology)  Emilio Regalado MD as Consulting Physician (Endocrinology)  Axel Ribeiro MD as Consulting Physician (Cardiology)    Subjective     Patient post TDC placed this a.m.  Tolerated well.    Objective     Vital Signs:  Blood pressure 156/62, pulse 91, temperature 97.9 °F (36.6 °C), temperature source Axillary, resp. rate 20, height 162.6 cm (64\"), weight 83.2 kg (183 lb 8 oz), SpO2 95%.      Intake/Output Summary (Last 24 hours) at 11/26/2024 1129  Last data filed at 11/26/2024 0700  Gross per 24 hour   Intake 1870 ml   Output 1685 ml   Net 185 ml        11/25 0701 - 11/26 0700  In: 2470 [P.O.:2170; I.V.:300]  Out: 2085 " [Urine:875]    Physical Exam:        GENERAL:  NAD  NEURO: Awake and alert, oriented. No focal deficit  PSYCHIATRIC: NMA. Cooperative with PE  EYE: PE, no icterus, no conjunctivitis  ENT: ommm, dentition intact,  Hearing intact  NECK: Supple , No JVD discernable,  Trachea midline  CV: No edema, RRR  LUNGS:  Quiet,  Nonlabored resp.  Symmetrical expansion  ABDOMEN: Nondistended, soft nontender.  : No Lopez, no palp bladder  SKIN: Warm and dry without rash      Labs:  Results from last 7 days   Lab Units 11/26/24  0628 11/25/24  1810 11/24/24  0947   WBC 10*3/mm3 5.47 5.66 5.31   HEMOGLOBIN g/dL 8.5* 8.7* 7.5*   PLATELETS 10*3/mm3 112* 139* 108*     Results from last 7 days   Lab Units 11/26/24  0628 11/25/24  1731 11/24/24  0947 11/23/24  0724 11/22/24  0435 11/21/24  0848 11/21/24  0125   SODIUM mmol/L 141 138 138 142 141   < > 139   POTASSIUM mmol/L 4.0 3.7 4.3 5.2 5.1   < > 5.3*   CHLORIDE mmol/L 105 103 104 110* 110*   < > 112*   CO2 mmol/L 23.0 26.0 23.0 23.0 19.0*   < > 16.0*   BUN mg/dL 29* 20 40* 55* 74*   < > 73*   CREATININE mg/dL 2.43* 1.40* 2.67* 2.73* 3.94*   < > 4.04*   CALCIUM mg/dL 9.7 9.2 9.4 9.7 9.7   < > 9.7   ALBUMIN g/dL  --   --   --   --  3.4*  --  3.6    < > = values in this interval not displayed.     Results from last 7 days   Lab Units 11/22/24  0435   ALK PHOS U/L 73   BILIRUBIN mg/dL 0.4   ALT (SGPT) U/L 14   AST (SGOT) U/L 22     Results from last 7 days   Lab Units 11/21/24  0856   PH, ARTERIAL pH units 7.277*   PO2 ART mm Hg 101.0   PCO2, ARTERIAL mm Hg 42.3   HCO3 ART mmol/L 19.7*               Estimated Creatinine Clearance: 18.3 mL/min (A) (by C-G formula based on SCr of 2.43 mg/dL (H)).       RENAL US 11/18/24: Right K 8.9 cm and Left K 9.6 cm            A/P:      ARF:  Started on HD 11/22/24.  Urine output stable.  Running twice a week on M/F for now.  Tunneled catheter in place 11/26/2024.  Awaiting outpatient HD arrangements.     CKD stage IV: Baseline creatinine 1.9-2.2  mg/dL, since last admission was running between 3.2-3.5 range, history of proteinuric renal disease UPC 2 g in the past duplex scan negative in 2016, on previous hospitalization discussion with daughter-in-law anesthesiologist, was explained regard to advanced renal dysfunction possibility of dialysis versus conservative treatment. Ultimately patient decided for dialysis. Likely with progression to esrd.     Hypertension: No ANABEL per duplex in 2016.  On terazosin, carvedilol, hydralazine, clonidine.  Monitor with HD.     Metabolic acidosis:  Adjust with HD.    Anemia: Hgb below goal.  Iron stores adequate.  Cont EDISON. Transfuse as needed for Hgb < 7     Volume status: stable.  Avoid aggressive UF unles Edema or SOB.      Hyperparathyroidism:  .  On calcitriol 0.5 mcg daily. Calcium stable. Phos stable.  No binders for now.     Nutrition: High protein low K diet. Give renal vit.    High risk and complexity pt.       Edwin Lane MD  11/26/24  11:29 EST          Electronically signed by Edwin Lane MD at 11/26/24 9141

## 2024-11-26 NOTE — PLAN OF CARE
"Goal Outcome Evaluation:  Plan of Care Reviewed With: patient        Progress: no change  Outcome Evaluation: Pt seen at 1120; reported pain 8/10, described as \"neuropathy.\" Unit RN present, to administer prn pain medication. Tunnelled HD cath placed today. CM working on transfer to [facility] with outpatient HD after rehab completed. Palliative following for continued support in management of symptoms, ongoing GOC/POC.     0930 Palliative IDT meeting:  MD, CHRIS, RN, SW,   After hours, weekends and holidays, contact Palliative Provider by calling [phone]       Problem: Palliative Care  Goal: Enhanced Quality of Life  Outcome: Progressing  Intervention: Maximize Comfort  Flowsheets (Taken 11/26/2024 1300)  Pain Management Interventions: (encourage to request prn pain medication early in pain process)   pain management plan reviewed with patient/caregiver   other (see comments)  Intervention: Optimize Function  Flowsheets (Taken 11/26/2024 1300)  Fatigue Management: paced activity encouraged  Sleep/Rest Enhancement: consistent schedule promoted  Intervention: Optimize Psychosocial Wellbeing  Flowsheets (Taken 11/26/2024 1300)  Supportive Measures:   positive reinforcement provided   self-care encouraged   self-responsibility promoted   verbalization of feelings encouraged     "

## 2024-11-26 NOTE — PROGRESS NOTES
Patient has been initiated on Apixaban (Eliquis) during admission. Education provided on 11/26/2024 verbally and in writing. Information provided includes effects of medication, drug-drug and drug-food interactions, and signs/symptoms of bleeding and clotting. Patient/family verbalized understanding through teach back. All pertinent questions were answered by pharmacist.

## 2024-11-26 NOTE — PROGRESS NOTES
[facility] Medicine Services  PROGRESS NOTE    Patient Name: Yanique Webster  : 1940  MRN: [id number]    Date of Admission: 11/15/2024  Primary Care Physician: Sebas Alicea MD    Subjective   Subjective     CC:  F/U KINDRA on CKD    HPI:  Patient seen and examined. No complaints. Going for tunneled HD cath today.    Objective   Objective     Vital Signs:   Temp:  [97.3 °F (36.3 °C)-98.5 °F (36.9 °C)] 97.9 °F (36.6 °C)  Heart Rate:  [56-91] 91  Resp:  [15-22] 20  BP: (108-173)/(53-76) 156/62  Flow (L/min) (Oxygen Therapy):  [2-2.5] 2     Physical Exam:  Constitutional: No acute distress, awake, alert  HENT: NCAT, mucous membranes moist, [place]  Respiratory: Decreased in bases bilaterally, respiratory effort normal 2L NC  Cardiovascular: RRR, no murmurs, rubs, or gallops  Gastrointestinal: Positive bowel sounds, soft, nontender, nondistended  Musculoskeletal: No bilateral ankle edema  Psychiatric: Pleasant affect, cooperative  Neurologic: No focal deficits, speech clear  Skin: No rashes     Results Reviewed:  LAB RESULTS:      Lab 24  0628 24  1810 24  0947 24  0435 24  0125 24  2214   WBC 5.47 5.66 5.31 6.11  --   --    HEMOGLOBIN 8.5* 8.7* 7.5* 7.9*  --  8.4*   HEMATOCRIT 26.9* 28.5* 24.7* 26.8*  --  28.0*   PLATELETS 112* 139* 108* 107*  --   --    NEUTROS ABS  --   --   --  4.37  --   --    IMMATURE GRANS (ABS)  --   --   --  0.03  --   --    LYMPHS ABS  --   --   --  0.94  --   --    MONOS ABS  --   --   --  0.49  --   --    EOS ABS  --   --   --  0.23  --   --    MCV 92.1 95.0 94.3 97.8*  --   --    LACTATE  --   --   --   --  0.6  --          Lab 24  0628 24  1731 24  0947 24  0724 24  0435   SODIUM 141 138 138 142 141   POTASSIUM 4.0 3.7 4.3 5.2 5.1   CHLORIDE 105 103 104 110* 110*   CO2 23.0 26.0 23.0 23.0 19.0*   ANION GAP 13.0 9.0 11.0 9.0 12.0   BUN 29* 20 40* 55* 74*   CREATININE 2.43* 1.40* 2.67*  2.73* 3.94*   EGFR 19.3* 37.4* 17.2* 16.8* 10.8*   GLUCOSE 128* 228* 231* 127* 125*   CALCIUM 9.7 9.2 9.4 9.7 9.7         Lab 11/22/24  0435 11/21/24  0125   TOTAL PROTEIN 5.5* 5.7*   ALBUMIN 3.4* 3.6   GLOBULIN 2.1 2.1   ALT (SGPT) 14 13   AST (SGOT) 22 22   BILIRUBIN 0.4 0.4   ALK PHOS 73 71                         Lab 11/21/24  0856 11/21/24  0511 11/21/24  0120   PH, ARTERIAL 7.277* 7.276* 7.255*   PCO2, ARTERIAL 42.3 43.5 39.7   PO2 .0 92.8 103.0   FIO2 28 28 28   HCO3 ART 19.7* 20.2 17.6*   BASE EXCESS ART -6.6* -6.2* -8.9*   CARBOXYHEMOGLOBIN 1.7 1.5 1.6     Brief Urine Lab Results  (Last result in the past 365 days)        Color   Clarity   Blood   Leuk Est   Nitrite   Protein   CREAT   Urine HCG        11/15/24 1410 Yellow   Clear   Negative   Negative   Negative   100 mg/dL (2+)                   Microbiology Results Abnormal       None            No radiology results from the last 24 hrs    Results for orders placed during the hospital encounter of 10/30/24    Adult Transthoracic Echo Complete W/ Cont if Necessary Per Protocol    Interpretation Summary    Left ventricular systolic function is normal. Estimated left ventricular EF = 60%    Left ventricular wall thickness is consistent with mild concentric hypertrophy.    The left atrial cavity is moderately dilated.    Aortic sclerosis without aortic stenosis.  Mild to moderate aortic insufficiency    Mild mitral regurgitation.      Current medications:  Scheduled Meds:apixaban, 2.5 mg, Oral, Q12H  aspirin, 81 mg, Oral, Daily  atorvastatin, 80 mg, Oral, Daily  calcitriol, 0.5 mcg, Oral, Daily  carvedilol, 25 mg, Oral, Q12H  fentaNYL, 1 patch, Transdermal, Q72H   And  Check Fentanyl Patch Placement, 1 each, Not Applicable, Q12H  cloNIDine, 0.1 mg, Oral, Q6H  doxepin, 10 mg, Oral, Nightly  epoetin adonis/adonis-epbx, 3,000 Units, Subcutaneous, Once per day on Monday Wednesday Friday  ferrous sulfate, 325 mg, Oral, Daily With Breakfast  hydrALAZINE, 100  mg, Oral, Q8H  ipratropium-albuterol, 3 mL, Nebulization, 4x Daily - RT  isosorbide mononitrate, 30 mg, Oral, Daily  lactobacillus acidophilus, 1 capsule, Oral, Daily  melatonin, 10 mg, Oral, Nightly  pantoprazole, 40 mg, Oral, BID AC  sodium chloride, 10 mL, Intravenous, Q12H  terazosin, 10 mg, Oral, Nightly  vitamin B-12, 1,000 mcg, Oral, Daily      Continuous Infusions:       PRN Meds:.  acetaminophen    senna-docusate sodium **AND** polyethylene glycol **AND** bisacodyl **AND** bisacodyl    dextrose    dextrose    diphenhydrAMINE    glucagon (human recombinant)    heparin (porcine)    ipratropium-albuterol    loperamide    naloxone    nitroglycerin    oxyCODONE    sodium chloride    sodium chloride    sodium chloride    [Held by provider] torsemide    Assessment & Plan   Assessment & Plan     Active Hospital Problems    Diagnosis  POA    **Acute on chronic renal failure [N17.9, N18.9]  Yes    Abnormal laboratory test [R89.9]  Yes    Dyspnea [R06.00]  Yes    Anemia, chronic disease [D63.8]  Yes    Paroxysmal atrial fibrillation [I48.0]  Yes    Essential hypertension [I10]  Yes    Type 2 diabetes mellitus [E11.9]  Yes      Resolved Hospital Problems   No resolved problems to display.        Brief Hospital Course to date:  Yanique Webster is a 84 y.o. female with PMH of Afib on Xarelto, HTN, CKD, prior stroke, T2DM with neuropathy, chronic anemia, and GERD who presented from [facility] with KINDRA on CKD.    This patient's problems and plans were partially entered by my partner and updated as appropriate by me 11/26/24.     KINDRA on advanced CKD  Anion gap metabolic acidosis -> resolved  --Nephrology follows, appreciate assistance  --S/P R IJ temporary dialysis catheter per IR 11/22   --Will need outpatient HD arranged, 2x weekly   --Tunneled HD cath placed today per IR     TCP  --Stable     PAF  --changed xarelto to eliquis 2.5mg BID per Nephrology recommendations  --Monitor H&H     HTN. Improving  -- Continue Coreg 25 mg  twice daily  -- Continue Hydralazine 100 mg 3 times daily  -- Continue Clonidine 0.1 4 times daily.  -- Continue Imdur 30 mg daily.  -- Continue Terazosin 10 mg nightly.   -- Renal recommends tight BP control      T2DM  --Minimal SSI use, DC'd accu-checks     Anemia of chronic disease  --Monitor, on Eliquis + ASA   --Continue EPO     Vitamin D deficiency  -- Continue Calcitrol    Acute encephalopathy Multifactorial: Likely metabolic and hospital delirium  Chronic pain: On fentanyl patch.  --Continue scheduled Melatonin and Doxepin     Goals of care.  Palliative care follows. Patient DNR/DNI.     Expected Discharge Location and Transportation: [facility], has outpatient HD chair and line in place now   Expected Discharge   Expected Discharge Date: 11/26/2024; Expected Discharge Time:      VTE Prophylaxis:  Pharmacologic & mechanical VTE prophylaxis orders are present.         AM-PAC 6 Clicks Score (PT): 17 (11/25/24 2055)    CODE STATUS:   Code Status and Medical Interventions: No CPR (Do Not Attempt to Resuscitate); Limited Support; No intubation (DNI)   Ordered at: 11/21/24 1414     Medical Intervention Limits:    No intubation (DNI)     Level Of Support Discussed With:    Patient     Code Status (Patient has no pulse and is not breathing):    No CPR (Do Not Attempt to Resuscitate)     Medical Interventions (Patient has pulse or is breathing):    Limited Support       Shayy Littlejohn, DO  11/26/24

## 2024-11-26 NOTE — NURSING NOTE
15.5 Setswana Image guided tunneled dialysis catheter placed to right internal jugular by Dr Galloway. Patient tolerated well. Patient was given 50 mcg Fentanyl for pain control. Report called to Macy KIRKPATRICK.

## 2024-11-27 VITALS
WEIGHT: 183.5 LBS | RESPIRATION RATE: 18 BRPM | OXYGEN SATURATION: 94 % | DIASTOLIC BLOOD PRESSURE: 64 MMHG | HEART RATE: 74 BPM | BODY MASS INDEX: 31.33 KG/M2 | HEIGHT: 64 IN | SYSTOLIC BLOOD PRESSURE: 140 MMHG | TEMPERATURE: 98.3 F

## 2024-11-27 PROBLEM — R89.9 ABNORMAL LABORATORY TEST: Status: RESOLVED | Noted: 2024-11-16 | Resolved: 2024-11-27

## 2024-11-27 PROBLEM — R06.00 DYSPNEA: Status: RESOLVED | Noted: 2024-11-15 | Resolved: 2024-11-27

## 2024-11-27 LAB — HBV E AB SERPL QL IA: NON REACTIVE

## 2024-11-27 PROCEDURE — 99239 HOSP IP/OBS DSCHRG MGMT >30: CPT | Performed by: FAMILY MEDICINE

## 2024-11-27 PROCEDURE — 94664 DEMO&/EVAL PT USE INHALER: CPT

## 2024-11-27 PROCEDURE — 25010000002 EPOETIN ALFA PER 1000 UNITS: Performed by: FAMILY MEDICINE

## 2024-11-27 PROCEDURE — 94799 UNLISTED PULMONARY SVC/PX: CPT

## 2024-11-27 PROCEDURE — 94761 N-INVAS EAR/PLS OXIMETRY MLT: CPT

## 2024-11-27 RX ORDER — TERAZOSIN 10 MG/1
10 CAPSULE ORAL NIGHTLY
Start: 2024-11-27

## 2024-11-27 RX ORDER — DOXEPIN HYDROCHLORIDE 10 MG/1
10 CAPSULE ORAL NIGHTLY
Start: 2024-11-27

## 2024-11-27 RX ORDER — LOPERAMIDE HYDROCHLORIDE 2 MG/1
2 CAPSULE ORAL 4 TIMES DAILY PRN
Start: 2024-11-27

## 2024-11-27 RX ORDER — HEPARIN SODIUM 1000 [USP'U]/ML
1000 INJECTION, SOLUTION INTRAVENOUS; SUBCUTANEOUS AS NEEDED
Start: 2024-11-27

## 2024-11-27 RX ADMIN — FERROUS SULFATE TAB 325 MG (65 MG ELEMENTAL FE) 325 MG: 325 (65 FE) TAB at 09:21

## 2024-11-27 RX ADMIN — Medication 10 ML: at 09:24

## 2024-11-27 RX ADMIN — FENTANYL TRANSDERMAL 1 PATCH: 12.5 PATCH, EXTENDED RELEASE TRANSDERMAL at 11:01

## 2024-11-27 RX ADMIN — Medication 1 CAPSULE: at 09:21

## 2024-11-27 RX ADMIN — APIXABAN 2.5 MG: 2.5 TABLET, FILM COATED ORAL at 09:22

## 2024-11-27 RX ADMIN — CALCITRIOL CAPSULES 0.25 MCG 0.5 MCG: 0.25 CAPSULE ORAL at 09:21

## 2024-11-27 RX ADMIN — OXYCODONE 5 MG: 5 TABLET ORAL at 14:15

## 2024-11-27 RX ADMIN — ISOSORBIDE MONONITRATE 30 MG: 30 TABLET, EXTENDED RELEASE ORAL at 09:21

## 2024-11-27 RX ADMIN — IPRATROPIUM BROMIDE AND ALBUTEROL SULFATE 3 ML: 2.5; .5 SOLUTION RESPIRATORY (INHALATION) at 13:17

## 2024-11-27 RX ADMIN — ERYTHROPOIETIN 3000 UNITS: 3000 INJECTION, SOLUTION INTRAVENOUS; SUBCUTANEOUS at 11:00

## 2024-11-27 RX ADMIN — ASPIRIN 81 MG: 81 TABLET, COATED ORAL at 09:22

## 2024-11-27 RX ADMIN — CYANOCOBALAMIN TAB 1000 MCG 1000 MCG: 1000 TAB at 09:22

## 2024-11-27 RX ADMIN — IPRATROPIUM BROMIDE AND ALBUTEROL SULFATE 3 ML: 2.5; .5 SOLUTION RESPIRATORY (INHALATION) at 08:56

## 2024-11-27 RX ADMIN — ATORVASTATIN CALCIUM 80 MG: 40 TABLET, FILM COATED ORAL at 09:22

## 2024-11-27 RX ADMIN — PANTOPRAZOLE SODIUM 40 MG: 40 TABLET, DELAYED RELEASE ORAL at 09:22

## 2024-11-27 NOTE — PROGRESS NOTES
"   LOS: 11 days    Patient Care Team:  Sebas Alicea MD as PCP - General (Family Medicine)  Steve Geronimo MD as Consulting Physician (Cardiology)  Emilio Regalado MD as Consulting Physician (Endocrinology)  Axel Ribeiro MD as Consulting Physician (Cardiology)    Subjective     Sitting in chair, comfortable.   Denies chest pain or shortness of breath.  Pending discharge to cardiology today.     Objective     Vital Signs:  Blood pressure 140/64, pulse 74, temperature 98.3 °F (36.8 °C), temperature source Oral, resp. rate 18, height 162.6 cm (64\"), weight 83.2 kg (183 lb 8 oz), SpO2 94%.      Intake/Output Summary (Last 24 hours) at 11/27/2024 1559  Last data filed at 11/27/2024 1200  Gross per 24 hour   Intake --   Output 1075 ml   Net -1075 ml        11/26 0701 - 11/27 0700  In: -   Out: 225 [Urine:225]    Physical Exam:  GENERAL:  NAD  NEURO: Awake and alert, oriented. No focal deficit  PSYCHIATRIC: NMA. Cooperative with PE  EYE: PE, no icterus, no conjunctivitis  ENT: ommm, dentition intact,  Hearing intact  NECK: Supple , No JVD discernable,  Trachea midline  CV: No edema, RRR  LUNGS:  Quiet,  Nonlabored resp.  Symmetrical expansion  ABDOMEN: Nondistended, soft nontender.  : No Lopez, no palp bladder  SKIN: Warm and dry without rash    Labs:  Results from last 7 days   Lab Units 11/26/24  0628 11/25/24  1810 11/24/24  0947   WBC 10*3/mm3 5.47 5.66 5.31   HEMOGLOBIN g/dL 8.5* 8.7* 7.5*   PLATELETS 10*3/mm3 112* 139* 108*     Results from last 7 days   Lab Units 11/26/24  0628 11/25/24  1731 11/24/24  0947 11/23/24  0724 11/22/24  0435 11/21/24  0848 11/21/24  0125   SODIUM mmol/L 141 138 138 142 141   < > 139   POTASSIUM mmol/L 4.0 3.7 4.3 5.2 5.1   < > 5.3*   CHLORIDE mmol/L 105 103 104 110* 110*   < > 112*   CO2 mmol/L 23.0 26.0 23.0 23.0 19.0*   < > 16.0*   BUN mg/dL 29* 20 40* 55* 74*   < > 73*   CREATININE mg/dL 2.43* 1.40* 2.67* 2.73* 3.94*   < > 4.04*   CALCIUM mg/dL 9.7 9.2 9.4 9.7 9.7   < " > 9.7   ALBUMIN g/dL  --   --   --   --  3.4*  --  3.6    < > = values in this interval not displayed.     Results from last 7 days   Lab Units 11/22/24  0435   ALK PHOS U/L 73   BILIRUBIN mg/dL 0.4   ALT (SGPT) U/L 14   AST (SGOT) U/L 22     Results from last 7 days   Lab Units 11/21/24  0856   PH, ARTERIAL pH units 7.277*   PO2 ART mm Hg 101.0   PCO2, ARTERIAL mm Hg 42.3   HCO3 ART mmol/L 19.7*       RENAL US 11/18/24: Right K 8.9 cm and Left K 9.6 cm      A/P:    ARF:  Started on HD 11/22/24.  Urine output stable.  Running twice a week on M/F for now.  Tunneled catheter in place 11/26/2024.  Awaiting outpatient HD arrangements.     CKD stage IV: Baseline creatinine 1.9-2.2 mg/dL, since last admission was running between 3.2-3.5 range, history of proteinuric renal disease UPC 2 g in the past duplex scan negative in 2016, on previous hospitalization discussion with daughter-in-law anesthesiologist, was explained regard to advanced renal dysfunction possibility of dialysis versus conservative treatment. Ultimately patient decided for dialysis. Likely with progression to esrd.     Hypertension: No ANABEL per duplex in 2016.  On terazosin, carvedilol, hydralazine, clonidine.  Monitor with HD.     Metabolic acidosis:  Adjust with HD.    Anemia: Hgb below goal.  Iron stores adequate.  Cont EDISON. Transfuse as needed for Hgb < 7     Volume status: stable.  Avoid aggressive UF unles Edema or SOB.      Hyperparathyroidism:  .  On calcitriol 0.5 mcg daily. Calcium stable. Phos stable.  No binders for now.     Nutrition: High protein low K diet. Give renal vit.    High risk and complexity pt.       Addi Reyes MD  11/27/24  15:59 EST

## 2024-11-27 NOTE — DISCHARGE SUMMARY
[facility] Medicine Services  DISCHARGE SUMMARY    Patient Name: Yanique Webster  : 1940  MRN: [id number]    Date of Admission: 11/15/2024 12:23 PM  Date of Discharge:  2024  Primary Care Physician: Sebas Alicea MD    Consults       Date and Time Order Name Status Description    2024 11:19 AM Inpatient Palliative Care MD Consult Completed     11/15/2024  5:19 PM Inpatient Nephrology Consult Completed     2024  4:38 PM Inpatient Palliative Care MD Consult Completed     2024  7:25 AM Inpatient Cardiology Consult Completed     10/31/2024  7:47 AM Inpatient Hematology & Oncology Consult Completed     10/31/2024  7:47 AM Inpatient Gastroenterology Consult Completed     10/30/2024  8:37 PM Inpatient Nephrology Consult Completed             Hospital Course       Active Hospital Problems    Diagnosis  POA    **Acute on chronic renal failure [N17.9, N18.9]  Yes    Anemia, chronic disease [D63.8]  Yes    Paroxysmal atrial fibrillation [I48.0]  Yes    Essential hypertension [I10]  Yes    Type 2 diabetes mellitus [E11.9]  Yes      Resolved Hospital Problems    Diagnosis Date Resolved POA    Abnormal laboratory test [R89.9] 2024 Yes    Dyspnea [R06.00] 2024 Yes          Hospital Course:  Yanique Webster is a 84 y.o. female with PMH of Afib on Xarelto, HTN, CKD, prior stroke, T2DM with neuropathy, chronic anemia, and GERD who presented from [facility] with KINDRA on CKD.      KINDRA on advanced CKD  Anion gap metabolic acidosis -> resolved  --Nephrology followed, appreciate assistance  --S/P R IJ temporary dialysis catheter per IR    --Outpatient HD arranged, 2x weekly (Monday and Friday)  --Tunneled HD cath placed  per IR      TCP  --Stable     PAF  --changed xarelto to eliquis 2.5mg BID per Nephrology recommendations     HTN. Improving  -- Continue Coreg 25 mg twice daily  -- Continue Hydralazine 100 mg 3 times daily  -- Continue Clonidine 0.1 4  times daily.  -- Continue Imdur 30 mg daily.  -- Continue Terazosin 10 mg nightly.   -- Renal recommends tight BP control      T2DM  --Minimal SSI use, DC'd accu-checks     Anemia of chronic disease  --Monitor, on Eliquis + ASA   --Continue EPO      Vitamin D deficiency  -- Continue Calcitrol     Acute encephalopathy Multifactorial: Likely metabolic and hospital delirium  Chronic pain: On fentanyl patch.  --Continue scheduled Melatonin and Doxepin      Goals of care.  Palliative care follows. Patient DNR/DNI.     Discharge Follow Up Recommendations for outpatient labs/diagnostics:  -PCP 1 week   -NAL 2 weeks     Day of Discharge     HPI:   Patient seen and examined. No issues overnight. She has no complaints.     Review of Systems  Gen- No fevers, chills  CV- No chest pain, palpitations  Resp- No cough, dyspnea  GI- No N/V/D, abd pain    Vital Signs:   Temp:  [97.5 °F (36.4 °C)-98.3 °F (36.8 °C)] 98.3 °F (36.8 °C)  Heart Rate:  [70-91] 77  Resp:  [16-20] 18  BP: (111-161)/(50-93) 140/52  Flow (L/min) (Oxygen Therapy):  [1-2] 1      Physical Exam:  Constitutional: No acute distress, awake, alert  HENT: NCAT, mucous membranes moist  Respiratory: Clear to auscultation bilaterally, respiratory effort normal 1L NC  Cardiovascular: RRR, no murmurs, rubs, or gallops  Gastrointestinal: Positive bowel sounds, soft, nontender, nondistended  Musculoskeletal: No bilateral ankle edema  Psychiatric: Appropriate affect, cooperative  Neurologic: No focal deficits, speech clear  Skin: No rashes     Pertinent  and/or Most Recent Results     LAB RESULTS:      Lab 11/26/24  0628 11/25/24  1810 11/24/24  0947 11/22/24  0435 11/21/24  0125   WBC 5.47 5.66 5.31 6.11  --    HEMOGLOBIN 8.5* 8.7* 7.5* 7.9*  --    HEMATOCRIT 26.9* 28.5* 24.7* 26.8*  --    PLATELETS 112* 139* 108* 107*  --    NEUTROS ABS  --   --   --  4.37  --    IMMATURE GRANS (ABS)  --   --   --  0.03  --    LYMPHS ABS  --   --   --  0.94  --    MONOS ABS  --   --   --   0.49  --    EOS ABS  --   --   --  0.23  --    MCV 92.1 95.0 94.3 97.8*  --    LACTATE  --   --   --   --  0.6         Lab 11/26/24  0628 11/25/24  1731 11/24/24  0947 11/23/24  0724 11/22/24  0435   SODIUM 141 138 138 142 141   POTASSIUM 4.0 3.7 4.3 5.2 5.1   CHLORIDE 105 103 104 110* 110*   CO2 23.0 26.0 23.0 23.0 19.0*   ANION GAP 13.0 9.0 11.0 9.0 12.0   BUN 29* 20 40* 55* 74*   CREATININE 2.43* 1.40* 2.67* 2.73* 3.94*   EGFR 19.3* 37.4* 17.2* 16.8* 10.8*   GLUCOSE 128* 228* 231* 127* 125*   CALCIUM 9.7 9.2 9.4 9.7 9.7         Lab 11/22/24  0435 11/21/24  0125   TOTAL PROTEIN 5.5* 5.7*   ALBUMIN 3.4* 3.6   GLOBULIN 2.1 2.1   ALT (SGPT) 14 13   AST (SGOT) 22 22   BILIRUBIN 0.4 0.4   ALK PHOS 73 71                     Lab 11/21/24  0856 11/21/24  0511 11/21/24  0120   PH, ARTERIAL 7.277* 7.276* 7.255*   PCO2, ARTERIAL 42.3 43.5 39.7   PO2 .0 92.8 103.0   FIO2 28 28 28   HCO3 ART 19.7* 20.2 17.6*   BASE EXCESS ART -6.6* -6.2* -8.9*   CARBOXYHEMOGLOBIN 1.7 1.5 1.6     Brief Urine Lab Results  (Last result in the past 365 days)        Color   Clarity   Blood   Leuk Est   Nitrite   Protein   CREAT   Urine HCG        11/15/24 1410 Yellow   Clear   Negative   Negative   Negative   100 mg/dL (2+)                 Microbiology Results (last 10 days)       ** No results found for the last 240 hours. **            IR Ins Tunneled Dialysis Catheter WO Port    Result Date: 11/27/2024  Procedure: Tunneled dialysis catheter placement in exchange for the existing temporary hemodialysis catheter.  History: Need for long-term hemodialysis.  : Marquis Galloway MD.  Modality: Fluoroscopy  DOSE REDUCTION: The examination was performed according to departmental dose-optimization program. Fluoro time: Less than 6 seconds Radiation dose: 0 mGy air Kerma. 1.10 uGy square meters  SEDATION: Procedural analgesia was administered. 0 milligram of Versed and 50 micrograms of fentanyl IV was used. Total intra service time of  sedation was not applicable minutes. The sedation was administered and the patient's vital signs monitored throughout the procedure and recorded in the patient's medical record by the nurse under my direct supervision. Medicines: Ancef 1 g IV. Anesthesia: Lidocaine 1% with epinephrine, local infiltration Estimated blood loss:  < 5 cc.        Technique: Discussion of risks, benefits, and alternatives were made with the patient. The patient expressed understanding and agreed to proceed. Informed written consent was obtained.  A universal timeout was performed prior to starting the procedure. The procedure room personnel used personal protective equipment. The operators used sterile gowns and gloves additionally. A sterile prep and drape of the ipsilateral neck and upper chest was performed using maximal sterile technique. Using aseptic precautions, after local anesthesia, a 035 guidewire was advanced into the central venous system under fluoroscopic guidance through the existing catheter.  Over the wire a peel-away sheath was placed in exchange for the temporary catheter. After local anesthesia, an incision was created in the subclavicular exit site location and a cuffed tunneled dialysis catheter of an appropriate length was tunneled from the exit site to the venotomy site using a tunneling device. The catheter was advanced into the venous system through the peel-away sheath which was removed. The catheter aspirated and flushed well and was terminally packed with heparin 1000 units per cc. The catheter was secured to skin using nonabsorbable suture and a CHG dressing applied. The venotomy site was closed using Dermabond. An aseptic dressing was applied using the protocol for Dermabond. The patient was transferred to the recovery area and was discharged from the department in stable condition.  Complications: None immediate.    Device: 14.5 Swedish x19 cm cuff to tip Palindrome catheter. Findings: Final image shows the  catheter to be in good position with the catheter tip in the right atrium, an excellent position for use.      Impression:    Successful fluoroscopic guided right internal jugular vein route cuffed tunneled hemodialysis catheter placement in exchange for an existing temporary hemodialysis catheter using the same venous access. A new exit site and subcutaneous tunnel were created as described above. Thank you for the opportunity to assist in the care of your patient. Electronically Signed: Marquis Galloway MD  11/27/2024 9:00 AM EST  Workstation ID: [id number]    IR insert non-tunneled central line 5+    Result Date: 11/22/2024  IR INSERT NON-TUNNELED CENTRAL LINE 5+  History: CKD stage V. NON tunneled central line; Lido 3ml; Heparin 2.2 ml; FT 0; mGy 0; uGym 0.75; no sedation; 14F X 15cm Schon XL double lumen  : Marquis Galloway MD. Assistant: None  Modality: Sonography and fluoroscopy  DOSE REDUCTION: The examination was performed according to departmental dose-optimization program which includes automated exposure control, adjustment of the mA and/or kV according to patient size. Fluoro time: Less than 6 seconds. Radiation dose: 0 mGy air Kerma. 0.75 uGy square meters  Sedation: No IV sedation was given. Anesthesia: Lidocaine 1% local infiltration. Estimated blood loss:  < 5 cc.        Technique: A thorough discussion of the risks, benefits, and alternatives of the procedure, and if applicable, moderate sedation, was carried out with the patient. They were encouraged to ask any questions. Any questions were answered. They verbalized understanding. A written informed consent was then signed.  A multi-component timeout was performed prior to starting the procedure using the departmental protocol. The procedure room personnel used personal protective equipment. The operators used sterile gloves and if indicated, sterile gowns. The surgical site was prepped with chlorhexidine gluconate  and draped in the  maximal applicable sterile fashion. A preliminary ultrasonogram was performed of the target site that revealed a patent and compressible Right internal jugular vein. Pertinent ultrasound images were stored in the PACS for documentation. A sterile prep and drape of the neck and upper chest was performed using maximal technique. Using aseptic precautions, real-time ultrasound guidance, the target vein was accessed after local anesthetic infiltration and dermatotomy with an access needle. A guidewire was advanced into the central venous system under fluoroscopic guidance. Over the wire following standard exchanges a nontunneled temporary dialysis catheter was placed. The catheter ports aspirated and flushed well and were terminally packed with heparin. The catheter was secured to skin with nonabsorbable suture. An aseptic dressing was applied. The patient was transferred to the recovery area and was discharged from the department in stable condition.  Complications: None immediate.    Device: 14 Romanian x15 cm Dual-lumen nontunneled dialysis catheter. Findings: Patent and compressible Right internal jugular vein. Final image shows the catheter to be in good position with the catheter tip at the cavoatrial junction and an excellent position for use. There is no complication.      Impression:    Successful ultrasound and fluoroscopic guided Right internal jugular vein route nontunneled temporary dialysis catheter placement as described above. Thank you for the opportunity to assist in the care of your patient. Electronically Signed: Marquis Galloway MD  11/22/2024 2:41 PM EST  Workstation ID: [id number]    CT Head Without Contrast    Result Date: 11/21/2024  CT HEAD WO CONTRAST Date of Exam: 11/21/2024 12:06 PM EST Indication: Encephalopathy. Comparison: Head CT 7/6/2021 Technique: Axial CT images were obtained of the head without contrast administration.  Automated exposure control and iterative construction methods were  used. Findings: No acute intracranial hemorrhage. Redemonstration of chronic infarct in the left occipital lobe. Redemonstration of chronic lacunar infarct of the right thalamus. Redemonstration of small chronic linear infarcts in the right and left cerebellar hemispheres. No evidence of acute large territory infarct. There are scattered white matter hypodensities which are nonspecific and can be seen in the setting of chronic small vessel ischemic change. No extra-axial collections. No midline shift or herniation. Normal size and configuration of the ventricles. There is intracranial atherosclerosis. Unremarkable appearance of the orbits. There is minimal mucosal thickening in the inferior left frontal sinus with otherwise clear paranasal sinuses. There are bilateral mastoid air cell effusions, nonspecific, new or worsened compared to prior head CT. No acute or suspicious bony findings.     Impression: No acute intracranial findings. Chronic scattered infarcts appear similar to prior. New or increased bilateral mastoid air cell effusions, nonspecific. Electronically Signed: Iglesia Bernard MD  11/21/2024 12:58 PM EST  Workstation ID: [id number]    XR Chest 1 View    Result Date: 11/21/2024  XR CHEST 1 VW Date of Exam: 11/21/2024 1:42 AM EST Indication: Abdominal breathing Comparison: 11/19/2024. Findings: There are no airspace consolidations. Small bilateral pleural effusions are present. No pneumothorax. The pulmonary vasculature appears mildly indistinct. The heart appears enlarged. Findings appear similar to improved as compared to the previous study. No acute osseous abnormality identified.     Impression: Mild pulmonary edema pattern with small bilateral pleural effusions, similar to improved as compared to the previous study. Electronically Signed: Talita Murrell MD  11/21/2024 1:49 AM EST  Workstation ID: [id number]    XR Chest 1 View    Result Date: 11/19/2024  XR CHEST 1 VW Date of Exam: 11/19/2024 9:56 PM EST  Indication: dypsnea Comparison: 10/30/2024 Findings: Cardiomegaly is stable. Upper lobe vessels are prominent consistent with pulmonary vascular congestion. Interstitial markings are also prominent throughout both lungs suggesting developing interstitial edema. No definite pleural effusion or pneumothorax.  Bony structures are unremarkable.     Impression: 1. Cardiomegaly and pulmonary vascular congestion with prominent tissue markings likely related to interstitial edema. Electronically Signed: Tod Tomlin MD  11/19/2024 10:39 PM EST  Workstation ID: [id number]    US Renal Limited    Result Date: 11/18/2024  US RENAL LIMITED Date of Exam: 11/18/2024 6:15 PM EST Indication: worsening KINDRA. Comparison: CT abdomen dated 3/3/2017. Technique: Grayscale and color Doppler ultrasound evaluation of the kidneys and urinary bladder was performed. Findings: The right kidney measures 8.9 x 4.5 x 4.4 cm. Renal cortical thickness measures 9 mm. There is increased echogenicity. There is no hydronephrosis. The left kidney measures 9.6 x 4.7 x 4.4 cm. Renal cortical thickness measures 9 mm. There is increased echogenicity. There is no hydronephrosis. There is a single cyst arising from the left kidney measuring 3.5 cm. The urinary bladder is not well visualized.     Impression: 1. No hydronephrosis. 2. Increased echogenicity of the kidneys likely related to chronic medical renal disease. 3. Single 3.5 cm left renal cyst. Electronically Signed: Rodger Lopez  11/18/2024 11:16 PM EST  Workstation ID: [id number]     Results for orders placed during the hospital encounter of 04/10/24    Duplex Carotid Ultrasound CAR    Interpretation Summary    Right internal carotid artery demonstrates a less than 50% stenosis.    Left internal carotid artery demonstrates a less than 50% stenosis.    Antegrade flow in the vertebral arteries bilaterally.      Results for orders placed during the hospital encounter of 04/10/24    Duplex Carotid  Ultrasound CAR    Interpretation Summary    Right internal carotid artery demonstrates a less than 50% stenosis.    Left internal carotid artery demonstrates a less than 50% stenosis.    Antegrade flow in the vertebral arteries bilaterally.      Results for orders placed during the hospital encounter of 10/30/24    Adult Transthoracic Echo Complete W/ Cont if Necessary Per Protocol    Interpretation Summary    Left ventricular systolic function is normal. Estimated left ventricular EF = 60%    Left ventricular wall thickness is consistent with mild concentric hypertrophy.    The left atrial cavity is moderately dilated.    Aortic sclerosis without aortic stenosis.  Mild to moderate aortic insufficiency    Mild mitral regurgitation.      Plan for Follow-up of Pending Labs/Results: Inbox     Discharge Details        Discharge Medications        New Medications        Instructions Start Date   apixaban 2.5 MG tablet tablet  Commonly known as: ELIQUIS   2.5 mg, Oral, Every 12 Hours Scheduled      epoetin adonis 3000 UNIT/ML injection  Commonly known as: EPOGEN,PROCRIT   3,000 Units, Subcutaneous, 3 Times Weekly      heparin (porcine) 1000 UNIT/ML injection   1,000 Units, Intracatheter, As Needed      loperamide 2 MG capsule  Commonly known as: IMODIUM   2 mg, Oral, 4 Times Daily PRN             Changes to Medications        Instructions Start Date   doxepin 10 MG capsule  Commonly known as: SINEquan  What changed:   when to take this  reasons to take this   10 mg, Oral, Nightly      hydrALAZINE 100 MG tablet  Commonly known as: APRESOLINE  What changed: Another medication with the same name was removed. Continue taking this medication, and follow the directions you see here.   100 mg, Oral, Every 8 Hours Scheduled      melatonin 5 MG tablet tablet  What changed:   medication strength  how much to take  when to take this  reasons to take this   10 mg, Oral, Nightly      terazosin 10 MG capsule  Commonly known as:  HYTRIN  What changed:   medication strength  how much to take   10 mg, Oral, Nightly             Continue These Medications        Instructions Start Date   Accu-Chek Guide w/Device kit   1 kit, Not Applicable, See Admin Instructions, Use to check blood sugar once daily.  Dx E11.65      acetaminophen 325 MG tablet  Commonly known as: TYLENOL   650 mg, Oral, Every 4 Hours PRN      aspirin 81 MG EC tablet   81 mg, Oral, Daily      atorvastatin 80 MG tablet  Commonly known as: LIPITOR   80 mg, Daily      BD Pen Needle Veda 2nd Gen 32G X 4 MM misc  Generic drug: Insulin Pen Needle   1 each, Not Applicable, Daily      bisacodyl 5 MG EC tablet  Commonly known as: DULCOLAX   10 mg, Oral, Daily PRN      Blood Glucose Test strip   Check blood sugar 1 time a day dx e11.65      calcitriol 0.5 MCG capsule  Commonly known as: ROCALTROL   0.5 mcg, Oral, Daily      carvedilol 25 MG tablet  Commonly known as: COREG   25 mg, Oral, Every 12 Hours Scheduled      cloNIDine 0.1 MG tablet  Commonly known as: CATAPRES   0.1 mg, Oral, Every 6 Hours Scheduled      fentaNYL 12 MCG/HR  Commonly known as: DURAGESIC   1 patch, Transdermal, Every 72 Hours      ferrous sulfate 325 (65 FE) MG tablet   325 mg, Oral, Daily With Breakfast      ipratropium-albuterol 0.5-2.5 mg/3 ml nebulizer  Commonly known as: DUO-NEB   3 mL, Nebulization, Every 4 Hours PRN      isosorbide mononitrate 30 MG 24 hr tablet  Commonly known as: IMDUR   30 mg, Daily      Lancets 33G misc   1 each, Not Applicable, Daily, Dx e11.65      naloxone 4 MG/0.1ML nasal spray  Commonly known as: NARCAN   Call 911. Don't prime. [place] in 1 nostril for overdose. Repeat in 2-3 minutes in other nostril if no or minimal breathing/responsiveness.      ondansetron ODT 4 MG disintegrating tablet  Commonly known as: ZOFRAN-ODT   4 mg, Translingual, Every 6 Hours PRN      oxyCODONE 5 MG capsule  Commonly known as: OXY-IR   5 mg, Oral, Every 4 Hours PRN      pantoprazole 40 MG EC  tablet  Commonly known as: PROTONIX   40 mg, Oral, 2 Times Daily      Polyethylene Glycol 3350 4 g pack   17 g, Every Morning      probiotic capsule capsule   1 capsule, Daily      sodium bicarbonate 650 MG tablet   650 mg, 2 Times Daily      vitamin B-12 1000 MCG tablet  Commonly known as: CYANOCOBALAMIN   1,000 mcg, Oral, Daily             Stop These Medications      amLODIPine 10 MG tablet  Commonly known as: NORVASC     doxazosin 4 MG tablet  Commonly known as: CARDURA     Glucagon 0.5 MG/0.1ML solution auto-injector     Insulin Lispro 100 UNIT/ML injection  Commonly known as: humaLOG     rivaroxaban 15 MG tablet  Commonly known as: XARELTO     torsemide 20 MG tablet  Commonly known as: DEMADEX              Allergies   Allergen Reactions    Latex Rash     Not an immediate reaction    Nickel Rash    Penicillins Rash     Patient has tolerated amoxicillin in the past. Had PCN reaction of hives when she was 15.    Penicillins Rash     unk         Discharge Disposition:  Skilled Nursing Facility (DC - External)    Diet:  Hospital:  Diet Order   Procedures    Diet: Cardiac, Renal; Healthy Heart (2-3 Na+); Low Potassium, Low Sodium (2-3g), Low Phosphorus; Fluid Consistency: Thin (IDDSI 0)            Activity:      Restrictions or Other Recommendations:       CODE STATUS:    Code Status and Medical Interventions: No CPR (Do Not Attempt to Resuscitate); Limited Support; No intubation (DNI)   Ordered at: 11/21/24 1414     Medical Intervention Limits:    No intubation (DNI)     Level Of Support Discussed With:    Patient     Code Status (Patient has no pulse and is not breathing):    No CPR (Do Not Attempt to Resuscitate)     Medical Interventions (Patient has pulse or is breathing):    Limited Support       Future Appointments   Date Time Provider Department Center   12/27/2024 11:30 AM Seymour Harrington MD [facility]   1/30/2025  3:15 PM Axel Ribeiro MD [facility]   4/28/2025 11:45 AM Emilio Regalado MD  [facility]   5/12/2025 11:45 AM Judith Morin APRN [facility]       Additional Instructions for the Follow-ups that You Need to Schedule       Ambulatory Referral to Home Health   As directed      Face to Face Visit Date: 11/20/2024   Follow-up provider for Plan of Care?: I treated the patient in an acute care facility and will not continue treatment after discharge.   Follow-up provider: SEBAS ALICEA [[id number]]   Reason/Clinical Findings: sp hospital stay   Describe mobility limitations that make leaving home difficult: weakness, SOB   Nursing/Therapeutic Services Requested: Skilled Nursing Physical Therapy Occupational Therapy   Skilled nursing orders: Medication education Cardiopulmonary assessments   PT orders: Therapeutic exercise Gait Training Transfer training Strengthening   Weight Bearing Status: As Tolerated   Occupational orders: Activities of daily living Home safety assessment Strengthening Cognition Fine motor   Frequency: 1 Week 1        Discharge Follow-up with PCP   As directed       Currently Documented PCP:    Sebas Alicea MD    PCP Phone Number:    [phone]     Follow Up Details: 1 week        Discharge Follow-up with Specified Provider: Nephrology Dr Domingo Odonnell; 2 Weeks   As directed      To: Nephrology Dr Domingo Odonnell   Follow Up: 2 Weeks                      Shayy Littlejohn DO  11/27/24      Time Spent on Discharge:  I spent  50  minutes on this discharge activity which included: face-to-face encounter with the patient, reviewing the data in the system, coordination of the care with the nursing staff as well as consultants, documentation, and entering orders.

## 2024-11-27 NOTE — DISCHARGE PLACEMENT REQUEST
"To [facility]  From Izzy Rosas [phone]    DC to [facility] today    Colleen Webster (84 y.o. Female)       Date of Birth   1940    Social Security Number       Address   [address]  [address] [place] [id number]    Home Phone   [phone]    MRN   [id number]       Religious   Caodaism    Marital Status                               Admission Date   11/15/24    Admission Type   Emergency    Admitting Provider   Shayy Littlejohn DO    Attending Provider   Shayy Littlejohn DO    Department, Room/Bed   [address], S518/1       Discharge Date       Discharge Disposition   Skilled Nursing Facility (DC - External)    Discharge Destination                                 Attending Provider: Shayy Littlejohn DO    Allergies: Latex, Nickel, Penicillins, Penicillins    Isolation: None   Infection: None   Code Status: No CPR    Ht: 162.6 cm (64\")   Wt: 83.2 kg (183 lb 8 oz)    Admission Cmt: None   Principal Problem: Acute on chronic renal failure [N17.9,N18.9]                   Active Insurance as of 11/15/2024       Primary Coverage       Payor Plan Insurance Group Employer/Plan Group    MEDICARE MEDICARE A & B        Payor Plan Address Payor Plan Phone Number Payor Plan Fax Number Effective Dates    [address] [phone]  1/1/2006 - None Entered    [facility] [id number]         Subscriber Name Subscriber Birth Date Member ID       COLLEEN WEBSTER 1940 [id number]               Secondary Coverage       Payor Plan Insurance Group Employer/Plan Group    ANTHEM BLUE CROSS [facility] SUPP [id number]       Payor Plan Address Payor Plan Phone Number Payor Plan Fax Number Effective Dates    [address]   1/1/2006 - None Entered    [facility] [id number]         Subscriber Name Subscriber Birth Date Member ID       COLLEEN WEBSTER 1940 [id number]                     Emergency Contacts        (Rel.) Home Phone Work Phone Mobile Phone    " MANJINDER NORRIS (Son) [phone] -- [phone]    KATELIN WEBSTER (Son) [phone] -- [phone]                   Discharge Summary        Shayy Littlejohn DO at [address] [place] [address] Medicine Services  DISCHARGE SUMMARY    Patient Name: Yanique Webster  : 1940  MRN: [id number]    Date of Admission: 11/15/2024 12:23 PM  Date of Discharge:  2024  Primary Care Physician: Sebas Alicea MD    Consults       Date and Time Order Name Status Description    2024 11:19 AM Inpatient Palliative Care MD Consult Completed     11/15/2024  5:19 PM Inpatient Nephrology Consult Completed     2024  4:38 PM Inpatient Palliative Care MD Consult Completed     2024  7:25 AM Inpatient Cardiology Consult Completed     10/31/2024  7:47 AM Inpatient Hematology & Oncology Consult Completed     10/31/2024  7:47 AM Inpatient Gastroenterology Consult Completed     10/30/2024  8:37 PM Inpatient Nephrology Consult Completed             Hospital Course       Active Hospital Problems    Diagnosis  POA    **Acute on chronic renal failure [N17.9, N18.9]  Yes    Anemia, chronic disease [D63.8]  Yes    Paroxysmal atrial fibrillation [I48.0]  Yes    Essential hypertension [I10]  Yes    Type 2 diabetes mellitus [E11.9]  Yes      Resolved Hospital Problems    Diagnosis Date Resolved POA    Abnormal laboratory test [R89.9] 2024 Yes    Dyspnea [R06.00] 2024 Yes          Hospital Course:  Yanique Webster is a 84 y.o. female with PMH of Afib on Xarelto, HTN, CKD, prior stroke, T2DM with neuropathy, chronic anemia, and GERD who presented from [facility] with KINDRA on CKD.      KINDRA on advanced CKD  Anion gap metabolic acidosis -> resolved  --Nephrology followed, appreciate assistance  --S/P R IJ temporary dialysis catheter per IR    --Outpatient HD arranged, 2x weekly (Monday and Friday)  --Tunneled HD cath placed  per IR      TCP  --Stable     PAF  --changed  xarelto to eliquis 2.5mg BID per Nephrology recommendations     HTN. Improving  -- Continue Coreg 25 mg twice daily  -- Continue Hydralazine 100 mg 3 times daily  -- Continue Clonidine 0.1 4 times daily.  -- Continue Imdur 30 mg daily.  -- Continue Terazosin 10 mg nightly.   -- Renal recommends tight BP control      T2DM  --Minimal SSI use, DC'd accu-checks     Anemia of chronic disease  --Monitor, on Eliquis + ASA   --Continue EPO      Vitamin D deficiency  -- Continue Calcitrol     Acute encephalopathy Multifactorial: Likely metabolic and hospital delirium  Chronic pain: On fentanyl patch.  --Continue scheduled Melatonin and Doxepin      Goals of care.  Palliative care follows. Patient DNR/DNI.     Discharge Follow Up Recommendations for outpatient labs/diagnostics:  -PCP 1 week   -NAL 2 weeks     Day of Discharge     HPI:   Patient seen and examined. No issues overnight. She has no complaints.     Review of Systems  Gen- No fevers, chills  CV- No chest pain, palpitations  Resp- No cough, dyspnea  GI- No N/V/D, abd pain    Vital Signs:   Temp:  [97.5 °F (36.4 °C)-98.3 °F (36.8 °C)] 98.3 °F (36.8 °C)  Heart Rate:  [70-91] 77  Resp:  [16-20] 18  BP: (111-161)/(50-93) 140/52  Flow (L/min) (Oxygen Therapy):  [1-2] 1      Physical Exam:  Constitutional: No acute distress, awake, alert  HENT: NCAT, mucous membranes moist  Respiratory: Clear to auscultation bilaterally, respiratory effort normal 1L NC  Cardiovascular: RRR, no murmurs, rubs, or gallops  Gastrointestinal: Positive bowel sounds, soft, nontender, nondistended  Musculoskeletal: No bilateral ankle edema  Psychiatric: Appropriate affect, cooperative  Neurologic: No focal deficits, speech clear  Skin: No rashes     Pertinent  and/or Most Recent Results     LAB RESULTS:      Lab 11/26/24  0628 11/25/24  1810 11/24/24  0947 11/22/24  0435 11/21/24  0125   WBC 5.47 5.66 5.31 6.11  --    HEMOGLOBIN 8.5* 8.7* 7.5* 7.9*  --    HEMATOCRIT 26.9* 28.5* 24.7* 26.8*  --     PLATELETS 112* 139* 108* 107*  --    NEUTROS ABS  --   --   --  4.37  --    IMMATURE GRANS (ABS)  --   --   --  0.03  --    LYMPHS ABS  --   --   --  0.94  --    MONOS ABS  --   --   --  0.49  --    EOS ABS  --   --   --  0.23  --    MCV 92.1 95.0 94.3 97.8*  --    LACTATE  --   --   --   --  0.6         Lab 11/26/24  0628 11/25/24  1731 11/24/24  0947 11/23/24  0724 11/22/24  0435   SODIUM 141 138 138 142 141   POTASSIUM 4.0 3.7 4.3 5.2 5.1   CHLORIDE 105 103 104 110* 110*   CO2 23.0 26.0 23.0 23.0 19.0*   ANION GAP 13.0 9.0 11.0 9.0 12.0   BUN 29* 20 40* 55* 74*   CREATININE 2.43* 1.40* 2.67* 2.73* 3.94*   EGFR 19.3* 37.4* 17.2* 16.8* 10.8*   GLUCOSE 128* 228* 231* 127* 125*   CALCIUM 9.7 9.2 9.4 9.7 9.7         Lab 11/22/24  0435 11/21/24  0125   TOTAL PROTEIN 5.5* 5.7*   ALBUMIN 3.4* 3.6   GLOBULIN 2.1 2.1   ALT (SGPT) 14 13   AST (SGOT) 22 22   BILIRUBIN 0.4 0.4   ALK PHOS 73 71                     Lab 11/21/24  0856 11/21/24  0511 11/21/24  0120   PH, ARTERIAL 7.277* 7.276* 7.255*   PCO2, ARTERIAL 42.3 43.5 39.7   PO2 .0 92.8 103.0   FIO2 28 28 28   HCO3 ART 19.7* 20.2 17.6*   BASE EXCESS ART -6.6* -6.2* -8.9*   CARBOXYHEMOGLOBIN 1.7 1.5 1.6     Brief Urine Lab Results  (Last result in the past 365 days)        Color   Clarity   Blood   Leuk Est   Nitrite   Protein   CREAT   Urine HCG        11/15/24 1410 Yellow   Clear   Negative   Negative   Negative   100 mg/dL (2+)                 Microbiology Results (last 10 days)       ** No results found for the last 240 hours. **            IR Ins Tunneled Dialysis Catheter WO Port    Result Date: 11/27/2024  Procedure: Tunneled dialysis catheter placement in exchange for the existing temporary hemodialysis catheter.  History: Need for long-term hemodialysis.  : Marquis Galloway MD.  Modality: Fluoroscopy  DOSE REDUCTION: The examination was performed according to departmental dose-optimization program. Fluoro time: Less than 6 seconds Radiation  dose: 0 mGy air Kerma. 1.10 uGy square meters  SEDATION: Procedural analgesia was administered. 0 milligram of Versed and 50 micrograms of fentanyl IV was used. Total intra service time of sedation was not applicable minutes. The sedation was administered and the patient's vital signs monitored throughout the procedure and recorded in the patient's medical record by the nurse under my direct supervision. Medicines: Ancef 1 g IV. Anesthesia: Lidocaine 1% with epinephrine, local infiltration Estimated blood loss:  < 5 cc.        Technique: Discussion of risks, benefits, and alternatives were made with the patient. The patient expressed understanding and agreed to proceed. Informed written consent was obtained.  A universal timeout was performed prior to starting the procedure. The procedure room personnel used personal protective equipment. The operators used sterile gowns and gloves additionally. A sterile prep and drape of the ipsilateral neck and upper chest was performed using maximal sterile technique. Using aseptic precautions, after local anesthesia, a 035 guidewire was advanced into the central venous system under fluoroscopic guidance through the existing catheter.  Over the wire a peel-away sheath was placed in exchange for the temporary catheter. After local anesthesia, an incision was created in the subclavicular exit site location and a cuffed tunneled dialysis catheter of an appropriate length was tunneled from the exit site to the venotomy site using a tunneling device. The catheter was advanced into the venous system through the peel-away sheath which was removed. The catheter aspirated and flushed well and was terminally packed with heparin 1000 units per cc. The catheter was secured to skin using nonabsorbable suture and a CHG dressing applied. The venotomy site was closed using Dermabond. An aseptic dressing was applied using the protocol for Dermabond. The patient was transferred to the recovery  area and was discharged from the department in stable condition.  Complications: None immediate.    Device: 14.5 Lebanese x19 cm cuff to tip Palindrome catheter. Findings: Final image shows the catheter to be in good position with the catheter tip in the right atrium, an excellent position for use.      Impression:    Successful fluoroscopic guided right internal jugular vein route cuffed tunneled hemodialysis catheter placement in exchange for an existing temporary hemodialysis catheter using the same venous access. A new exit site and subcutaneous tunnel were created as described above. Thank you for the opportunity to assist in the care of your patient. Electronically Signed: Marquis Galloway MD  11/27/2024 9:00 AM EST  Workstation ID: [id number]    IR insert non-tunneled central line 5+    Result Date: 11/22/2024  IR INSERT NON-TUNNELED CENTRAL LINE 5+  History: CKD stage V. NON tunneled central line; Lido 3ml; Heparin 2.2 ml; FT 0; mGy 0; uGym 0.75; no sedation; 14F X 15cm Schon XL double lumen  : Marquis Galloway MD. Assistant: None  Modality: Sonography and fluoroscopy  DOSE REDUCTION: The examination was performed according to departmental dose-optimization program which includes automated exposure control, adjustment of the mA and/or kV according to patient size. Fluoro time: Less than 6 seconds. Radiation dose: 0 mGy air Kerma. 0.75 uGy square meters  Sedation: No IV sedation was given. Anesthesia: Lidocaine 1% local infiltration. Estimated blood loss:  < 5 cc.        Technique: A thorough discussion of the risks, benefits, and alternatives of the procedure, and if applicable, moderate sedation, was carried out with the patient. They were encouraged to ask any questions. Any questions were answered. They verbalized understanding. A written informed consent was then signed.  A multi-component timeout was performed prior to starting the procedure using the departmental protocol. The procedure room  personnel used personal protective equipment. The operators used sterile gloves and if indicated, sterile gowns. The surgical site was prepped with chlorhexidine gluconate  and draped in the maximal applicable sterile fashion. A preliminary ultrasonogram was performed of the target site that revealed a patent and compressible Right internal jugular vein. Pertinent ultrasound images were stored in the PACS for documentation. A sterile prep and drape of the neck and upper chest was performed using maximal technique. Using aseptic precautions, real-time ultrasound guidance, the target vein was accessed after local anesthetic infiltration and dermatotomy with an access needle. A guidewire was advanced into the central venous system under fluoroscopic guidance. Over the wire following standard exchanges a nontunneled temporary dialysis catheter was placed. The catheter ports aspirated and flushed well and were terminally packed with heparin. The catheter was secured to skin with nonabsorbable suture. An aseptic dressing was applied. The patient was transferred to the recovery area and was discharged from the department in stable condition.  Complications: None immediate.    Device: 14 Thai x15 cm Dual-lumen nontunneled dialysis catheter. Findings: Patent and compressible Right internal jugular vein. Final image shows the catheter to be in good position with the catheter tip at the cavoatrial junction and an excellent position for use. There is no complication.      Impression:    Successful ultrasound and fluoroscopic guided Right internal jugular vein route nontunneled temporary dialysis catheter placement as described above. Thank you for the opportunity to assist in the care of your patient. Electronically Signed: Marquis Galloway MD  11/22/2024 2:41 PM EST  Workstation ID: [id number]    CT Head Without Contrast    Result Date: 11/21/2024  CT HEAD WO CONTRAST Date of Exam: 11/21/2024 12:06 PM EST Indication:  Encephalopathy. Comparison: Head CT 7/6/2021 Technique: Axial CT images were obtained of the head without contrast administration.  Automated exposure control and iterative construction methods were used. Findings: No acute intracranial hemorrhage. Redemonstration of chronic infarct in the left occipital lobe. Redemonstration of chronic lacunar infarct of the right thalamus. Redemonstration of small chronic linear infarcts in the right and left cerebellar hemispheres. No evidence of acute large territory infarct. There are scattered white matter hypodensities which are nonspecific and can be seen in the setting of chronic small vessel ischemic change. No extra-axial collections. No midline shift or herniation. Normal size and configuration of the ventricles. There is intracranial atherosclerosis. Unremarkable appearance of the orbits. There is minimal mucosal thickening in the inferior left frontal sinus with otherwise clear paranasal sinuses. There are bilateral mastoid air cell effusions, nonspecific, new or worsened compared to prior head CT. No acute or suspicious bony findings.     Impression: No acute intracranial findings. Chronic scattered infarcts appear similar to prior. New or increased bilateral mastoid air cell effusions, nonspecific. Electronically Signed: Iglesia Bernard MD  11/21/2024 12:58 PM EST  Workstation ID: [id number]    XR Chest 1 View    Result Date: 11/21/2024  XR CHEST 1 VW Date of Exam: 11/21/2024 1:42 AM EST Indication: Abdominal breathing Comparison: 11/19/2024. Findings: There are no airspace consolidations. Small bilateral pleural effusions are present. No pneumothorax. The pulmonary vasculature appears mildly indistinct. The heart appears enlarged. Findings appear similar to improved as compared to the previous study. No acute osseous abnormality identified.     Impression: Mild pulmonary edema pattern with small bilateral pleural effusions, similar to improved as compared to the  previous study. Electronically Signed: Talita Murrell MD  11/21/2024 1:49 AM EST  Workstation ID: [id number]    XR Chest 1 View    Result Date: 11/19/2024  XR CHEST 1 VW Date of Exam: 11/19/2024 9:56 PM EST Indication: dypsnea Comparison: 10/30/2024 Findings: Cardiomegaly is stable. Upper lobe vessels are prominent consistent with pulmonary vascular congestion. Interstitial markings are also prominent throughout both lungs suggesting developing interstitial edema. No definite pleural effusion or pneumothorax.  Bony structures are unremarkable.     Impression: 1. Cardiomegaly and pulmonary vascular congestion with prominent tissue markings likely related to interstitial edema. Electronically Signed: Tod Tomlin MD  11/19/2024 10:39 PM EST  Workstation ID: [id number]    US Renal Limited    Result Date: 11/18/2024  US RENAL LIMITED Date of Exam: 11/18/2024 6:15 PM EST Indication: worsening KINDRA. Comparison: CT abdomen dated 3/3/2017. Technique: Grayscale and color Doppler ultrasound evaluation of the kidneys and urinary bladder was performed. Findings: The right kidney measures 8.9 x 4.5 x 4.4 cm. Renal cortical thickness measures 9 mm. There is increased echogenicity. There is no hydronephrosis. The left kidney measures 9.6 x 4.7 x 4.4 cm. Renal cortical thickness measures 9 mm. There is increased echogenicity. There is no hydronephrosis. There is a single cyst arising from the left kidney measuring 3.5 cm. The urinary bladder is not well visualized.     Impression: 1. No hydronephrosis. 2. Increased echogenicity of the kidneys likely related to chronic medical renal disease. 3. Single 3.5 cm left renal cyst. Electronically Signed: Rodger Michaudelor  11/18/2024 11:16 PM EST  Workstation ID: [id number]     Results for orders placed during the hospital encounter of 04/10/24    Duplex Carotid Ultrasound CAR    Interpretation Summary    Right internal carotid artery demonstrates a less than 50% stenosis.    Left internal carotid  artery demonstrates a less than 50% stenosis.    Antegrade flow in the vertebral arteries bilaterally.      Results for orders placed during the hospital encounter of 04/10/24    Duplex Carotid Ultrasound CAR    Interpretation Summary    Right internal carotid artery demonstrates a less than 50% stenosis.    Left internal carotid artery demonstrates a less than 50% stenosis.    Antegrade flow in the vertebral arteries bilaterally.      Results for orders placed during the hospital encounter of 10/30/24    Adult Transthoracic Echo Complete W/ Cont if Necessary Per Protocol    Interpretation Summary    Left ventricular systolic function is normal. Estimated left ventricular EF = 60%    Left ventricular wall thickness is consistent with mild concentric hypertrophy.    The left atrial cavity is moderately dilated.    Aortic sclerosis without aortic stenosis.  Mild to moderate aortic insufficiency    Mild mitral regurgitation.      Plan for Follow-up of Pending Labs/Results: Inbox     Discharge Details        Discharge Medications        New Medications        Instructions Start Date   apixaban 2.5 MG tablet tablet  Commonly known as: ELIQUIS   2.5 mg, Oral, Every 12 Hours Scheduled      epoetin adonis 3000 UNIT/ML injection  Commonly known as: EPOGEN,PROCRIT   3,000 Units, Subcutaneous, 3 Times Weekly      heparin (porcine) 1000 UNIT/ML injection   1,000 Units, Intracatheter, As Needed      loperamide 2 MG capsule  Commonly known as: IMODIUM   2 mg, Oral, 4 Times Daily PRN             Changes to Medications        Instructions Start Date   doxepin 10 MG capsule  Commonly known as: SINEquan  What changed:   when to take this  reasons to take this   10 mg, Oral, Nightly      hydrALAZINE 100 MG tablet  Commonly known as: APRESOLINE  What changed: Another medication with the same name was removed. Continue taking this medication, and follow the directions you see here.   100 mg, Oral, Every 8 Hours Scheduled      melatonin 5  MG tablet tablet  What changed:   medication strength  how much to take  when to take this  reasons to take this   10 mg, Oral, Nightly      terazosin 10 MG capsule  Commonly known as: HYTRIN  What changed:   medication strength  how much to take   10 mg, Oral, Nightly             Continue These Medications        Instructions Start Date   Accu-Chek Guide w/Device kit   1 kit, Not Applicable, See Admin Instructions, Use to check blood sugar once daily.  Dx E11.65      acetaminophen 325 MG tablet  Commonly known as: TYLENOL   650 mg, Oral, Every 4 Hours PRN      aspirin 81 MG EC tablet   81 mg, Oral, Daily      atorvastatin 80 MG tablet  Commonly known as: LIPITOR   80 mg, Daily      BD Pen Needle Veda 2nd Gen 32G X 4 MM misc  Generic drug: Insulin Pen Needle   1 each, Not Applicable, Daily      bisacodyl 5 MG EC tablet  Commonly known as: DULCOLAX   10 mg, Oral, Daily PRN      Blood Glucose Test strip   Check blood sugar 1 time a day dx e11.65      calcitriol 0.5 MCG capsule  Commonly known as: ROCALTROL   0.5 mcg, Oral, Daily      carvedilol 25 MG tablet  Commonly known as: COREG   25 mg, Oral, Every 12 Hours Scheduled      cloNIDine 0.1 MG tablet  Commonly known as: CATAPRES   0.1 mg, Oral, Every 6 Hours Scheduled      fentaNYL 12 MCG/HR  Commonly known as: DURAGESIC   1 patch, Transdermal, Every 72 Hours      ferrous sulfate 325 (65 FE) MG tablet   325 mg, Oral, Daily With Breakfast      ipratropium-albuterol 0.5-2.5 mg/3 ml nebulizer  Commonly known as: DUO-NEB   3 mL, Nebulization, Every 4 Hours PRN      isosorbide mononitrate 30 MG 24 hr tablet  Commonly known as: IMDUR   30 mg, Daily      Lancets 33G misc   1 each, Not Applicable, Daily, Dx e11.65      naloxone 4 MG/0.1ML nasal spray  Commonly known as: NARCAN   Call 911. Don't prime. [place] in 1 nostril for overdose. Repeat in 2-3 minutes in other nostril if no or minimal breathing/responsiveness.      ondansetron ODT 4 MG disintegrating tablet  Commonly  known as: ZOFRAN-ODT   4 mg, Translingual, Every 6 Hours PRN      oxyCODONE 5 MG capsule  Commonly known as: OXY-IR   5 mg, Oral, Every 4 Hours PRN      pantoprazole 40 MG EC tablet  Commonly known as: PROTONIX   40 mg, Oral, 2 Times Daily      Polyethylene Glycol 3350 4 g pack   17 g, Every Morning      probiotic capsule capsule   1 capsule, Daily      sodium bicarbonate 650 MG tablet   650 mg, 2 Times Daily      vitamin B-12 1000 MCG tablet  Commonly known as: CYANOCOBALAMIN   1,000 mcg, Oral, Daily             Stop These Medications      amLODIPine 10 MG tablet  Commonly known as: NORVASC     doxazosin 4 MG tablet  Commonly known as: CARDURA     Glucagon 0.5 MG/0.1ML solution auto-injector     Insulin Lispro 100 UNIT/ML injection  Commonly known as: humaLOG     rivaroxaban 15 MG tablet  Commonly known as: XARELTO     torsemide 20 MG tablet  Commonly known as: DEMADEX              Allergies   Allergen Reactions    Latex Rash     Not an immediate reaction    Nickel Rash    Penicillins Rash     Patient has tolerated amoxicillin in the past. Had PCN reaction of hives when she was 15.    Penicillins Rash     unk         Discharge Disposition:  Skilled Nursing Facility (DC - External)    Diet:  Hospital:  Diet Order   Procedures    Diet: Cardiac, Renal; Healthy Heart (2-3 Na+); Low Potassium, Low Sodium (2-3g), Low Phosphorus; Fluid Consistency: Thin (IDDSI 0)            Activity:      Restrictions or Other Recommendations:       CODE STATUS:    Code Status and Medical Interventions: No CPR (Do Not Attempt to Resuscitate); Limited Support; No intubation (DNI)   Ordered at: 11/21/24 1414     Medical Intervention Limits:    No intubation (DNI)     Level Of Support Discussed With:    Patient     Code Status (Patient has no pulse and is not breathing):    No CPR (Do Not Attempt to Resuscitate)     Medical Interventions (Patient has pulse or is breathing):    Limited Support       Future Appointments   Date Time Provider  Department Center   12/27/2024 11:30 AM Seymour Harrington MD [facility]   1/30/2025  3:15 PM Axel Ribeiro MD [facility]T [facility]   4/28/2025 11:45 AM Emilio Regalado MD [facility]   5/12/2025 11:45 AM Judith Morin APRN [facility]       Additional Instructions for the Follow-ups that You Need to Schedule       Ambulatory Referral to Home Health   As directed      Face to Face Visit Date: 11/20/2024   Follow-up provider for Plan of Care?: I treated the patient in an acute care facility and will not continue treatment after discharge.   Follow-up provider: SEBAS ALICEA [[id number]]   Reason/Clinical Findings: sp hospital stay   Describe mobility limitations that make leaving home difficult: weakness, SOB   Nursing/Therapeutic Services Requested: Skilled Nursing Physical Therapy Occupational Therapy   Skilled nursing orders: Medication education Cardiopulmonary assessments   PT orders: Therapeutic exercise Gait Training Transfer training Strengthening   Weight Bearing Status: As Tolerated   Occupational orders: Activities of daily living Home safety assessment Strengthening Cognition Fine motor   Frequency: 1 Week 1        Discharge Follow-up with PCP   As directed       Currently Documented PCP:    Sebas Alicea MD    PCP Phone Number:    [phone]     Follow Up Details: 1 week        Discharge Follow-up with Specified Provider: [facility] Person; 2 Weeks   As directed      To: Ne[facility]rology Dr Domingo Odonnell   Follow Up: 2 Weeks                      Shayy Littlejohn DO  11/27/24      Time Spent on Discharge:  I spent  50  minutes on this discharge activity which included: face-to-face encounter with the patient, reviewing the data in the system, coordination of the care with the nursing staff as well as consultants, documentation, and entering orders.            Electronically signed by Shayy Littlejohn DO at 11/27/24 3205

## 2024-11-27 NOTE — CASE MANAGEMENT/SOCIAL WORK
Case Management Discharge Note      Final Note: Per Shannen at [facility], the pt has a bed at [facility] on the MED unit for acute rehab. She will have in-house HD at [facility]. An outpt chair is arranged at [facility]. [facility] will contact the outpt clinic closer to NH from [facility]. Please call report to [phone] and send a copy of the DC summary with the pt. No scripts needed. The [facility] WC van will transport today with 02 at 1430. The pt needs to be at the [address] by 1420. No scripts needed. The pt is in agrrement with the transfer.     I updated the pts son by phone. I faxed the DC summary to [facility] at [phone].    Selected Continued Care - Admitted Since 11/15/2024       Destination Coordination complete.      Service Provider Services Address Phone Fax Patient Preferred    [facility] [address] [id number] [phone] --              Durable Medical Equipment    No services have been selected for the patient.                Dialysis/Infusion       Service Provider Services Address Phone Fax Patient Preferred    [facility]-Center Hemod[facility] [address]  [place] [address] [address] [phone] --       Internal Comment last updated by Romy Crockett RN 11/25/2024 1317    11/25: Spoke with Aidee at Home office. MWF 0845 chair time. To begin 12/11/24.                          Home Medical Care       Service Provider Services Address Phone Fax Patient Preferred    [facility] [address] [id number] [phone] --              Therapy    No services have been selected for the patient.                Community Resources    No services have been selected for the patient.                Community & DME    No services have been selected for the patient.                    Selected Continued Care  - Prior Encounters Includes continued care and service providers with selected services from prior encounters from 8/17/2024 to 11/27/2024      Discharged on 11/8/2024 Admission date: 10/30/2024 - Discharge disposition: Rehab Facility or Unit (DC - External)      Destination       Service Provider Services Address Phone Fax Patient Preferred    [facility] [address] [phone] --                      Discharged on 8/19/2024 Admission date: 8/12/2024 - Discharge disposition: Rehab Facility or Unit (DC - External)      Destination       Service Provider Services Address Phone Fax Patient Preferred    [facility] [address] [phone] --                               Final Discharge Disposition Code: 62 - inpatient rehab facility

## 2024-11-27 NOTE — PLAN OF CARE
Goal Outcome Evaluation:   Patient had no acute events during shift  NSR, 2LNC, A&Ox4  Pain - see MAR

## 2025-01-01 ENCOUNTER — HOSPITAL ENCOUNTER (OUTPATIENT)
Facility: HOSPITAL | Age: 85
Setting detail: OBSERVATION
End: 2025-05-08
Attending: EMERGENCY MEDICINE | Admitting: STUDENT IN AN ORGANIZED HEALTH CARE EDUCATION/TRAINING PROGRAM
Payer: MEDICARE

## 2025-01-01 ENCOUNTER — APPOINTMENT (OUTPATIENT)
Dept: GENERAL RADIOLOGY | Facility: HOSPITAL | Age: 85
End: 2025-01-01
Payer: MEDICARE

## 2025-01-01 VITALS
HEART RATE: 77 BPM | RESPIRATION RATE: 14 BRPM | WEIGHT: 174.16 LBS | SYSTOLIC BLOOD PRESSURE: 133 MMHG | DIASTOLIC BLOOD PRESSURE: 58 MMHG | BODY MASS INDEX: 29.73 KG/M2 | HEIGHT: 64 IN | OXYGEN SATURATION: 99 %

## 2025-01-01 DIAGNOSIS — R07.9 NONSPECIFIC CHEST PAIN: ICD-10-CM

## 2025-01-01 DIAGNOSIS — Z51.5 HOSPICE CARE PATIENT: ICD-10-CM

## 2025-01-01 DIAGNOSIS — D63.8 ANEMIA OF CHRONIC DISEASE: ICD-10-CM

## 2025-01-01 DIAGNOSIS — M54.9 INTRACTABLE BACK PAIN: Primary | ICD-10-CM

## 2025-01-01 LAB
ABO GROUP BLD: NORMAL
ALBUMIN SERPL-MCNC: 3.7 G/DL (ref 3.5–5.2)
ALBUMIN/GLOB SERPL: 2.1 G/DL
ALP SERPL-CCNC: 49 U/L (ref 39–117)
ALT SERPL W P-5'-P-CCNC: 6 U/L (ref 1–33)
ANION GAP SERPL CALCULATED.3IONS-SCNC: 18 MMOL/L (ref 5–15)
AST SERPL-CCNC: 9 U/L (ref 1–32)
BASOPHILS # BLD AUTO: 0.04 10*3/MM3 (ref 0–0.2)
BASOPHILS NFR BLD AUTO: 0.5 % (ref 0–1.5)
BILIRUB SERPL-MCNC: 0.2 MG/DL (ref 0–1.2)
BLD GP AB SCN SERPL QL: NEGATIVE
BUN SERPL-MCNC: 68 MG/DL (ref 8–23)
BUN/CREAT SERPL: 12 (ref 7–25)
CALCIUM SPEC-SCNC: 9.8 MG/DL (ref 8.6–10.5)
CHLORIDE SERPL-SCNC: 105 MMOL/L (ref 98–107)
CO2 SERPL-SCNC: 13 MMOL/L (ref 22–29)
CREAT SERPL-MCNC: 5.65 MG/DL (ref 0.57–1)
DEPRECATED RDW RBC AUTO: 48.1 FL (ref 37–54)
DEVELOPER EXPIRATION DATE: NORMAL
DEVELOPER LOT NUMBER: NORMAL
EGFRCR SERPLBLD CKD-EPI 2021: 7 ML/MIN/1.73
EOSINOPHIL # BLD AUTO: 0.1 10*3/MM3 (ref 0–0.4)
EOSINOPHIL NFR BLD AUTO: 1.2 % (ref 0.3–6.2)
ERYTHROCYTE [DISTWIDTH] IN BLOOD BY AUTOMATED COUNT: 14.2 % (ref 12.3–15.4)
EXPIRATION DATE: NORMAL
FECAL OCCULT BLOOD SCREEN, POC: NEGATIVE
GEN 5 1HR TROPONIN T REFLEX: 66 NG/L
GLOBULIN UR ELPH-MCNC: 1.8 GM/DL
GLUCOSE BLDC GLUCOMTR-MCNC: 199 MG/DL (ref 70–130)
GLUCOSE BLDC GLUCOMTR-MCNC: 280 MG/DL (ref 70–130)
GLUCOSE SERPL-MCNC: 186 MG/DL (ref 65–99)
HCT VFR BLD AUTO: 21.4 % (ref 34–46.6)
HGB BLD-MCNC: 7 G/DL (ref 12–15.9)
HOLD SPECIMEN: NORMAL
IMM GRANULOCYTES # BLD AUTO: 0.04 10*3/MM3 (ref 0–0.05)
IMM GRANULOCYTES NFR BLD AUTO: 0.5 % (ref 0–0.5)
LIPASE SERPL-CCNC: 50 U/L (ref 13–60)
LYMPHOCYTES # BLD AUTO: 1.45 10*3/MM3 (ref 0.7–3.1)
LYMPHOCYTES NFR BLD AUTO: 17.9 % (ref 19.6–45.3)
Lab: NORMAL
MCH RBC QN AUTO: 30.4 PG (ref 26.6–33)
MCHC RBC AUTO-ENTMCNC: 32.7 G/DL (ref 31.5–35.7)
MCV RBC AUTO: 93 FL (ref 79–97)
MONOCYTES # BLD AUTO: 0.47 10*3/MM3 (ref 0.1–0.9)
MONOCYTES NFR BLD AUTO: 5.8 % (ref 5–12)
NEGATIVE CONTROL: NEGATIVE
NEUTROPHILS NFR BLD AUTO: 6.02 10*3/MM3 (ref 1.7–7)
NEUTROPHILS NFR BLD AUTO: 74.1 % (ref 42.7–76)
NRBC BLD AUTO-RTO: 0 /100 WBC (ref 0–0.2)
NT-PROBNP SERPL-MCNC: 3104 PG/ML (ref 0–1800)
PLATELET # BLD AUTO: 116 10*3/MM3 (ref 140–450)
PMV BLD AUTO: 10.1 FL (ref 6–12)
POSITIVE CONTROL: POSITIVE
POTASSIUM SERPL-SCNC: 4.2 MMOL/L (ref 3.5–5.2)
PROT SERPL-MCNC: 5.5 G/DL (ref 6–8.5)
QT INTERVAL: 488 MS
QT INTERVAL: 490 MS
QTC INTERVAL: 427 MS
QTC INTERVAL: 433 MS
RBC # BLD AUTO: 2.3 10*6/MM3 (ref 3.77–5.28)
RH BLD: NEGATIVE
SODIUM SERPL-SCNC: 136 MMOL/L (ref 136–145)
T&S EXPIRATION DATE: NORMAL
TROPONIN T % DELTA: 6
TROPONIN T NUMERIC DELTA: 4 NG/L
TROPONIN T SERPL HS-MCNC: 62 NG/L
WBC NRBC COR # BLD AUTO: 8.12 10*3/MM3 (ref 3.4–10.8)
WHOLE BLOOD HOLD COAG: NORMAL
WHOLE BLOOD HOLD SPECIMEN: NORMAL

## 2025-01-01 PROCEDURE — 84484 ASSAY OF TROPONIN QUANT: CPT | Performed by: EMERGENCY MEDICINE

## 2025-01-01 PROCEDURE — 80053 COMPREHEN METABOLIC PANEL: CPT | Performed by: EMERGENCY MEDICINE

## 2025-01-01 PROCEDURE — 82270 OCCULT BLOOD FECES: CPT | Performed by: EMERGENCY MEDICINE

## 2025-01-01 PROCEDURE — 83690 ASSAY OF LIPASE: CPT | Performed by: EMERGENCY MEDICINE

## 2025-01-01 PROCEDURE — G0378 HOSPITAL OBSERVATION PER HR: HCPCS

## 2025-01-01 PROCEDURE — 71045 X-RAY EXAM CHEST 1 VIEW: CPT

## 2025-01-01 PROCEDURE — 86900 BLOOD TYPING SEROLOGIC ABO: CPT | Performed by: EMERGENCY MEDICINE

## 2025-01-01 PROCEDURE — 25010000002 HYDROMORPHONE PER 4 MG: Performed by: EMERGENCY MEDICINE

## 2025-01-01 PROCEDURE — 93005 ELECTROCARDIOGRAM TRACING: CPT | Performed by: EMERGENCY MEDICINE

## 2025-01-01 PROCEDURE — 99223 1ST HOSP IP/OBS HIGH 75: CPT | Performed by: INTERNAL MEDICINE

## 2025-01-01 PROCEDURE — 86850 RBC ANTIBODY SCREEN: CPT | Performed by: EMERGENCY MEDICINE

## 2025-01-01 PROCEDURE — 63710000001 LIDOCAINE 4 % PATCH: Performed by: EMERGENCY MEDICINE

## 2025-01-01 PROCEDURE — 86901 BLOOD TYPING SEROLOGIC RH(D): CPT | Performed by: EMERGENCY MEDICINE

## 2025-01-01 PROCEDURE — 96374 THER/PROPH/DIAG INJ IV PUSH: CPT

## 2025-01-01 PROCEDURE — 36415 COLL VENOUS BLD VENIPUNCTURE: CPT

## 2025-01-01 PROCEDURE — 25010000002 HYDROMORPHONE PER 4 MG: Performed by: INTERNAL MEDICINE

## 2025-01-01 PROCEDURE — A9270 NON-COVERED ITEM OR SERVICE: HCPCS | Performed by: EMERGENCY MEDICINE

## 2025-01-01 PROCEDURE — 96375 TX/PRO/DX INJ NEW DRUG ADDON: CPT

## 2025-01-01 PROCEDURE — 83880 ASSAY OF NATRIURETIC PEPTIDE: CPT | Performed by: EMERGENCY MEDICINE

## 2025-01-01 PROCEDURE — 99285 EMERGENCY DEPT VISIT HI MDM: CPT

## 2025-01-01 PROCEDURE — 85025 COMPLETE CBC W/AUTO DIFF WBC: CPT | Performed by: EMERGENCY MEDICINE

## 2025-01-01 PROCEDURE — 82948 REAGENT STRIP/BLOOD GLUCOSE: CPT

## 2025-01-01 PROCEDURE — 96376 TX/PRO/DX INJ SAME DRUG ADON: CPT

## 2025-01-01 PROCEDURE — 25010000002 ONDANSETRON PER 1 MG: Performed by: EMERGENCY MEDICINE

## 2025-01-01 RX ORDER — MIDAZOLAM HYDROCHLORIDE 1 MG/ML
0.5 INJECTION, SOLUTION INTRAMUSCULAR; INTRAVENOUS
Status: DISCONTINUED | OUTPATIENT
Start: 2025-01-01 | End: 2025-01-01 | Stop reason: HOSPADM

## 2025-01-01 RX ORDER — ACETAMINOPHEN 325 MG/1
650 TABLET ORAL EVERY 4 HOURS PRN
Status: CANCELLED | OUTPATIENT
Start: 2025-01-01

## 2025-01-01 RX ORDER — SODIUM CHLORIDE 0.9 % (FLUSH) 0.9 %
10 SYRINGE (ML) INJECTION AS NEEDED
Status: DISCONTINUED | OUTPATIENT
Start: 2025-01-01 | End: 2025-01-01

## 2025-01-01 RX ORDER — POLYETHYLENE GLYCOL 3350 17 G/17G
17 POWDER, FOR SOLUTION ORAL EVERY MORNING
Status: CANCELLED | OUTPATIENT
Start: 2025-01-01

## 2025-01-01 RX ORDER — OXYCODONE HYDROCHLORIDE 5 MG/1
5 TABLET ORAL EVERY 4 HOURS PRN
Refills: 0 | Status: CANCELLED | OUTPATIENT
Start: 2025-01-01

## 2025-01-01 RX ORDER — ISOSORBIDE MONONITRATE 30 MG/1
30 TABLET, EXTENDED RELEASE ORAL DAILY
Status: CANCELLED | OUTPATIENT
Start: 2025-01-01

## 2025-01-01 RX ORDER — MORPHINE SULFATE 2 MG/ML
1 INJECTION, SOLUTION INTRAMUSCULAR; INTRAVENOUS
Status: DISCONTINUED | OUTPATIENT
Start: 2025-01-01 | End: 2025-01-01 | Stop reason: HOSPADM

## 2025-01-01 RX ORDER — HYDROMORPHONE HYDROCHLORIDE 1 MG/ML
0.25 INJECTION, SOLUTION INTRAMUSCULAR; INTRAVENOUS; SUBCUTANEOUS EVERY 6 HOURS PRN
Status: DISCONTINUED | OUTPATIENT
Start: 2025-01-01 | End: 2025-01-01

## 2025-01-01 RX ORDER — POLYETHYLENE GLYCOL 3350 17 G/17G
17 POWDER, FOR SOLUTION ORAL DAILY PRN
Status: DISCONTINUED | OUTPATIENT
Start: 2025-01-01 | End: 2025-01-01 | Stop reason: HOSPADM

## 2025-01-01 RX ORDER — NALOXONE HYDROCHLORIDE 0.4 MG/ML
INJECTION, SOLUTION INTRAMUSCULAR; INTRAVENOUS; SUBCUTANEOUS
Status: DISCONTINUED
Start: 2025-01-01 | End: 2025-01-01 | Stop reason: WASHOUT

## 2025-01-01 RX ORDER — PANTOPRAZOLE SODIUM 40 MG/10ML
40 INJECTION, POWDER, LYOPHILIZED, FOR SOLUTION INTRAVENOUS ONCE
Status: COMPLETED | OUTPATIENT
Start: 2025-01-01 | End: 2025-01-01

## 2025-01-01 RX ORDER — HYDROMORPHONE HYDROCHLORIDE 2 MG/1
2 TABLET ORAL EVERY 6 HOURS PRN
Refills: 0 | Status: DISCONTINUED | OUTPATIENT
Start: 2025-01-01 | End: 2025-01-01 | Stop reason: HOSPADM

## 2025-01-01 RX ORDER — BISACODYL 5 MG/1
10 TABLET, DELAYED RELEASE ORAL DAILY PRN
Status: DISCONTINUED | OUTPATIENT
Start: 2025-01-01 | End: 2025-01-01 | Stop reason: SDUPTHER

## 2025-01-01 RX ORDER — BISACODYL 5 MG/1
5 TABLET, DELAYED RELEASE ORAL DAILY PRN
Status: DISCONTINUED | OUTPATIENT
Start: 2025-01-01 | End: 2025-01-01 | Stop reason: HOSPADM

## 2025-01-01 RX ORDER — ONDANSETRON 4 MG/1
4 TABLET, ORALLY DISINTEGRATING ORAL EVERY 6 HOURS PRN
Status: CANCELLED | OUTPATIENT
Start: 2025-01-01

## 2025-01-01 RX ORDER — HYDROMORPHONE HYDROCHLORIDE 1 MG/ML
0.25 INJECTION, SOLUTION INTRAMUSCULAR; INTRAVENOUS; SUBCUTANEOUS ONCE
Refills: 0 | Status: COMPLETED | OUTPATIENT
Start: 2025-01-01 | End: 2025-01-01

## 2025-01-01 RX ORDER — ONDANSETRON 2 MG/ML
4 INJECTION INTRAMUSCULAR; INTRAVENOUS ONCE
Status: COMPLETED | OUTPATIENT
Start: 2025-01-01 | End: 2025-01-01

## 2025-01-01 RX ORDER — PANTOPRAZOLE SODIUM 40 MG/1
40 TABLET, DELAYED RELEASE ORAL 2 TIMES DAILY
Status: CANCELLED | OUTPATIENT
Start: 2025-01-01

## 2025-01-01 RX ORDER — SODIUM BICARBONATE 650 MG/1
650 TABLET ORAL 2 TIMES DAILY
Status: CANCELLED | OUTPATIENT
Start: 2025-01-01

## 2025-01-01 RX ORDER — AMOXICILLIN 250 MG
2 CAPSULE ORAL 2 TIMES DAILY PRN
Status: DISCONTINUED | OUTPATIENT
Start: 2025-01-01 | End: 2025-01-01 | Stop reason: HOSPADM

## 2025-01-01 RX ORDER — LIDOCAINE 4 G/G
2 PATCH TOPICAL
Status: DISCONTINUED | OUTPATIENT
Start: 2025-01-01 | End: 2025-01-01 | Stop reason: HOSPADM

## 2025-01-01 RX ORDER — SODIUM CHLORIDE 0.9 % (FLUSH) 0.9 %
10 SYRINGE (ML) INJECTION EVERY 12 HOURS SCHEDULED
Status: DISCONTINUED | OUTPATIENT
Start: 2025-01-01 | End: 2025-01-01

## 2025-01-01 RX ORDER — SODIUM CHLORIDE 9 MG/ML
40 INJECTION, SOLUTION INTRAVENOUS AS NEEDED
Status: DISCONTINUED | OUTPATIENT
Start: 2025-01-01 | End: 2025-01-01

## 2025-01-01 RX ORDER — NITROGLYCERIN 0.4 MG/1
0.4 TABLET SUBLINGUAL
Status: DISCONTINUED | OUTPATIENT
Start: 2025-01-01 | End: 2025-01-01 | Stop reason: HOSPADM

## 2025-01-01 RX ORDER — BISACODYL 10 MG
10 SUPPOSITORY, RECTAL RECTAL DAILY PRN
Status: DISCONTINUED | OUTPATIENT
Start: 2025-01-01 | End: 2025-01-01 | Stop reason: HOSPADM

## 2025-01-01 RX ORDER — SODIUM CHLORIDE 0.9 % (FLUSH) 0.9 %
10 SYRINGE (ML) INJECTION AS NEEDED
Status: DISCONTINUED | OUTPATIENT
Start: 2025-01-01 | End: 2025-01-01 | Stop reason: HOSPADM

## 2025-01-01 RX ORDER — FENTANYL 12.5 UG/1
1 PATCH TRANSDERMAL
Refills: 0 | Status: CANCELLED | OUTPATIENT
Start: 2025-01-01

## 2025-01-01 RX ADMIN — Medication 10 ML: at 01:41

## 2025-01-01 RX ADMIN — PANTOPRAZOLE SODIUM 40 MG: 40 INJECTION, POWDER, FOR SOLUTION INTRAVENOUS at 23:23

## 2025-01-01 RX ADMIN — LIDOCAINE 2 PATCH: 4 PATCH TOPICAL at 01:30

## 2025-01-01 RX ADMIN — HYDROMORPHONE HYDROCHLORIDE 0.25 MG: 1 INJECTION, SOLUTION INTRAMUSCULAR; INTRAVENOUS; SUBCUTANEOUS at 01:40

## 2025-01-01 RX ADMIN — ONDANSETRON 4 MG: 2 INJECTION INTRAMUSCULAR; INTRAVENOUS at 01:40

## 2025-01-01 RX ADMIN — HYDROMORPHONE HYDROCHLORIDE 0.25 MG: 1 INJECTION, SOLUTION INTRAMUSCULAR; INTRAVENOUS; SUBCUTANEOUS at 03:00

## 2025-05-08 PROBLEM — R52 PAIN: Status: ACTIVE | Noted: 2025-01-01

## 2025-05-08 NOTE — ED NOTES
Yanique Quintin Webster    Nursing Report ED to Floor:  Mental status: alert and oriented  Ambulatory status: not observed  Oxygen Therapy:  RA  Cardiac Rhythm: sinus marquita  Admitted from: [place]  Safety Concerns:  none  Precautions: none  Social Issues: none  ED Room #:  7    ED Nurse Phone Extension - [phone] or may call [phone].      HPI:   Chief Complaint   Patient presents with    Chest Pain       Past Medical History:  Past Medical History:   Diagnosis Date    Blurry vision     Chronic joint pain     Chronic pain     COPD (chronic obstructive pulmonary disease)     Coronary artery disease     Diabetic gastroparesis 08/24/2016    Esophageal stricture     s/p dilation in 2007    GERD (gastroesophageal reflux disease)     Gout     Headache     secondary to hypertension    Hyperlipidemia     Hypertension     Long term current use of insulin     Neuropathy     Neuropathy due to type 2 diabetes mellitus     Obesity     Osteoarthritis     Pericarditis 2008    Stroke 03/2019    Type 2 diabetes mellitus         Past Surgical History:  Past Surgical History:   Procedure Laterality Date    BRONCHOSCOPY N/A 8/23/2016    Procedure: BRONCHOSCOPY BIOPSY AT BEDSIDE;  Surgeon: Mookie Willard MD;  Location:  [facility];  Service:     CARDIAC CATHETERIZATION N/A 8/30/2016    Procedure: Left Heart Cath;  Surgeon: Steve Geronimo MD;  Location:  [facility];  Service:     CARDIAC CATHETERIZATION N/A 8/30/2016    Procedure: Right Heart Cath;  Surgeon: Steve Geronimo MD;  Location:  [facility];  Service:     CARDIAC ELECTROPHYSIOLOGY PROCEDURE N/A 7/2/2019    Procedure: Loop insertion;  Surgeon: Steve Geronimo MD;  Location:  [facility];  Service: Cardiovascular    CARDIAC ELECTROPHYSIOLOGY PROCEDURE N/A 9/26/2023    Procedure: Loop recorder removal;  Surgeon: Steve Geronimo MD;  Location:  [facility];  Service: Cardiovascular;  Laterality: N/A;    CATARACT  EXTRACTION      COLONOSCOPY N/A 8/16/2024    Procedure: COLONOSCOPY;  Surgeon: Brunner, Mark I, MD;  Location:  [facility];  Service: Gastroenterology;  Laterality: N/A;    ENDOSCOPY N/A 8/14/2024    Procedure: ESOPHAGOGASTRODUODENOSCOPY;  Surgeon: Brunner, Mark I, MD;  Location:  [facility];  Service: Gastroenterology;  Laterality: N/A;    ESOPHAGEAL DILATATION  2007    HERNIA REPAIR      HIP CANNULATED SCREW PLACEMENT Left 1/2/2024    Procedure: HIP CANNULATED SCREW PLACEMENT LEFT;  Surgeon: Seymour Harrington MD;  Location:  [facility] OR;  Service: Orthopedics;  Laterality: Left;    HYSTERECTOMY  1998    WRIST FRACTURE SURGERY Left         Admitting Doctor:   No admitting provider for patient encounter.    Consulting Provider(s):  Consults       No orders found for last 30 day(s).             Admitting Diagnosis:   The primary encounter diagnosis was Intractable back pain. Diagnoses of Nonspecific chest pain, Anemia of chronic disease, and Hospice care patient were also pertinent to this visit.    Most Recent Vitals:   Vitals:    05/07/25 2330 05/08/25 0000 05/08/25 0030 05/08/25 0100   BP: 120/47 122/46 123/48 118/43   Pulse: (!) 46 (!) 47 (!) 46 51   Resp:       SpO2: 100% 98% 98% 98%   Weight:       Height:           Active LDAs/IV Access:   Lines, Drains & Airways       Active LDAs       Name Placement date Placement time Site Days    Peripheral IV 05/07/25 2252 20 G Left Antecubital 05/07/25  2252  Antecubital  less than 1    External Urinary Catheter 11/15/24  1739  --  173    Hemodialysis Cath Double 11/26/24  1018  Internal Jugular  162                    Labs (abnormal labs have a star):   Labs Reviewed   TROPONIN - Abnormal; Notable for the following components:       Result Value    HS Troponin T 62 (*)     All other components within normal limits    Narrative:     High Sensitive Troponin T Reference Range:  <14.0 ng/L- Negative Female for AMI  <22.0 ng/L- Negative Male for AMI  >=14 - Abnormal  Female indicating possible myocardial injury.  >=22 - Abnormal Male indicating possible myocardial injury.   Clinicians would have to utilize clinical acumen, EKG, Troponin, and serial changes to determine if it is an Acute Myocardial Infarction or myocardial injury due to an underlying chronic condition.        COMPREHENSIVE METABOLIC PANEL - Abnormal; Notable for the following components:    Glucose 186 (*)     BUN 68 (*)     Creatinine 5.65 (*)     CO2 13.0 (*)     Total Protein 5.5 (*)     Anion Gap 18.0 (*)     eGFR 7.0 (*)     All other components within normal limits    Narrative:     GFR Categories in Chronic Kidney Disease (CKD)              GFR Category          GFR (mL/min/1.73)    Interpretation  G1                    90 or greater        Normal or high (1)  G2                    60-89                Mild decrease (1)  G3a                   45-59                Mild to moderate decrease  G3b                   30-44                Moderate to severe decrease  G4                    15-29                Severe decrease  G5                    14 or less           Kidney failure    (1)In the absence of evidence of kidney disease, neither GFR category G1 or G2 fulfill the criteria for CKD.    eGFR calculation 2021 CKD-EPI creatinine equation, which does not include race as a factor   BNP (IN-HOUSE) - Abnormal; Notable for the following components:    proBNP 3,104.0 (*)     All other components within normal limits    Narrative:     This assay is used as an aid in the diagnosis of individuals suspected of having heart failure. It can be used as an aid in the diagnosis of acute decompensated heart failure (ADHF) in patients presenting with signs and symptoms of ADHF to the emergency department (ED). In addition, NT-proBNP of <300 pg/mL indicates ADHF is not likely.    Age Range Result Interpretation  NT-proBNP Concentration (pg/mL:      <50             Positive            >450                   Ha                  300-450                    Negative             <300    50-75           Positive            >900                  Gray                300-900                  Negative            <300      >75             Positive            >1800                  Gray                300-1800                  Negative            <300   CBC WITH AUTO DIFFERENTIAL - Abnormal; Notable for the following components:    RBC 2.30 (*)     Hemoglobin 7.0 (*)     Hematocrit 21.4 (*)     Platelets 116 (*)     Lymphocyte % 17.9 (*)     All other components within normal limits   HIGH SENSITIVITIY TROPONIN T 1HR - Abnormal; Notable for the following components:    HS Troponin T 66 (*)     All other components within normal limits    Narrative:     High Sensitive Troponin T Reference Range:  <14.0 ng/L- Negative Female for AMI  <22.0 ng/L- Negative Male for AMI  >=14 - Abnormal Female indicating possible myocardial injury.  >=22 - Abnormal Male indicating possible myocardial injury.   Clinicians would have to utilize clinical acumen, EKG, Troponin, and serial changes to determine if it is an Acute Myocardial Infarction or myocardial injury due to an underlying chronic condition.        LIPASE - Normal   POCT OCCULT BLOOD STOOL - Normal   RAINBOW DRAW    Narrative:     The following orders were created for panel order [place] Draw.  Procedure                               Abnormality         Status                     ---------                               -----------         ------                     Green Top (Gel)[[id number]]                                  Final result               Lavender Top[[id number]]                                     Final result               Gold Top - SST[[id number]]                                   Final result               Ha Top[[id number]]                                         Final result               Light Blue Top[[id number]]                                   Final result                 Please view  results for these tests on the individual orders.   TYPE AND SCREEN   CBC AND DIFFERENTIAL    Narrative:     The following orders were created for panel order CBC & Differential.  Procedure                               Abnormality         Status                     ---------                               -----------         ------                     CBC Auto Differential[[id number]]        Abnormal            Final result                 Please view results for these tests on the individual orders.   GREEN TOP   LAVENDER TOP   GOLD TOP - SST   GRAY TOP   LIGHT BLUE TOP       Meds Given in ED:   Medications   sodium chloride 0.9 % flush 10 mL (has no administration in time range)   Lidocaine 4 % 2 patch (has no administration in time range)   HYDROmorphone (DILAUDID) injection 0.25 mg (has no administration in time range)   ondansetron (ZOFRAN) injection 4 mg (has no administration in time range)   pantoprazole (PROTONIX) injection 40 mg (40 mg Intravenous Given 5/7/25 8192)           Last NIH score:                                                          Dysphagia screening results:        [place] Coma Scale:  No data recorded     CIWA:        Restraint Type:            Isolation Status:  No active isolations

## 2025-05-08 NOTE — H&P
[facility] Medicine Services  HISTORY AND PHYSICAL    Patient Name: Yanique Webster  : 1940  MRN: [id number]  Primary Care Physician: Sebas Alicea MD  Date of admission: 2025      Subjective   Subjective     Chief Complaint:  pain     HPI:  Yanique Webster is a 85 y.o. female w history of ESRD, chronic pain, COPD CAD DM HTN HL CVA.   She elected to stop hemodialysis some time ago and has been followed by Hospice at home.  For her chronic pain she has a Fentanyl patch and oral opioids.  She says that typically the pain is under fair control.  However, today she had chest pain and also pain in her neck/jaw that she has never had before so she came to ED.      The chest pain has resolved  after dilaudid 0.25mg, and now her only complaint is upper back pain.      She also notes that she was weak this morning - possibly too weak to walk.  She lives alone at [facility]     She has an HD catheter that she would like removed.  Per EMR, ti appears that IR placed it in 2024.      ADDENDUM 0300:  Called to bedside for agonal resps and hypotension.  Spoke w son J Luis who confirms that her wish is comfort measures only.  Will adjust pain and anxiety meds for patient comfort; she appears to be actively dying.  Discussed w staff at bedside ,.       Personal History     Past Medical History:   Diagnosis Date    Blurry vision     Chronic joint pain     Chronic pain     COPD (chronic obstructive pulmonary disease)     Coronary artery disease     Diabetic gastroparesis 2016    Esophageal stricture     s/p dilation in     GERD (gastroesophageal reflux disease)     Gout     Headache     secondary to hypertension    Hyperlipidemia     Hypertension     Long term current use of insulin     Neuropathy     Neuropathy due to type 2 diabetes mellitus     Obesity     Osteoarthritis     Pericarditis 2008    Stroke 2019    Type 2 diabetes mellitus            Past Surgical  History:   Procedure Laterality Date    BRONCHOSCOPY N/A 8/23/2016    Procedure: BRONCHOSCOPY BIOPSY AT BEDSIDE;  Surgeon: Mookie Willard MD;  Location:  [facility];  Service:     CARDIAC CATHETERIZATION N/A 8/30/2016    Procedure: Left Heart Cath;  Surgeon: Steve Geronimo MD;  Location:  [facility]ASIVE [facility];  Service:     CARDIAC CATHETERIZATION N/A 8/30/2016    Procedure: Right Heart Cath;  Surgeon: Steve Geronimo MD;  Location:  [facility];  Service:     CARDIAC ELECTROPHYSIOLOGY PROCEDURE N/A 7/2/2019    Procedure: Loop insertion;  Surgeon: Steve Geronimo MD;  Location:  [facility];  Service: Cardiovascular    CARDIAC ELECTROPHYSIOLOGY PROCEDURE N/A 9/26/2023    Procedure: Loop recorder removal;  Surgeon: Steve Geronimo MD;  Location:  [facility];  Service: Cardiovascular;  Laterality: N/A;    CATARACT EXTRACTION      COLONOSCOPY N/A 8/16/2024    Procedure: COLONOSCOPY;  Surgeon: Brunner, Mark I, MD;  Location:  [facility];  Service: Gastroenterology;  Laterality: N/A;    ENDOSCOPY N/A 8/14/2024    Procedure: ESOPHAGOGASTRODUODENOSCOPY;  Surgeon: Brunner, Mark I, MD;  Location:  [facility];  Service: Gastroenterology;  Laterality: N/A;    ESOPHAGEAL DILATATION  2007    HERNIA REPAIR      HIP CANNULATED SCREW PLACEMENT Left 1/2/2024    Procedure: HIP CANNULATED SCREW PLACEMENT LEFT;  Surgeon: Seymour Harrington MD;  Location:  [facility] OR;  Service: Orthopedics;  Laterality: Left;    HYSTERECTOMY  1998    WRIST FRACTURE SURGERY Left        Family History: family history includes Breast cancer (age of onset: 67) in her sister; Cancer in her father, mother, and another family member.     Social History:  reports that she quit smoking about 22 years ago. Her smoking use included cigarettes. She has never used smokeless tobacco. She reports that she does not drink alcohol and does not use drugs.  Social History     Social History Narrative     Mrs. Webster lives at [facility]. Was sent from [facility] today.     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     She smoked 1ppd for 25 years but quit in 2000.     Denies ETOH/illicit drug use.        Medications:  Available home medication information reviewed.  Accu-Chek Guide, Blood Glucose Test, Insulin Pen Needle, Lancets 33G, Polyethylene Glycol 3350, acetaminophen, apixaban, aspirin, atorvastatin, bisacodyl, calcitriol, carvedilol, cloNIDine, doxepin, epoetin adonis, fentaNYL, ferrous sulfate, heparin (porcine), hydrALAZINE, ipratropium-albuterol, isosorbide mononitrate, loperamide, melatonin, naloxone, ondansetron ODT, oxyCODONE, pantoprazole, probiotic, sodium bicarbonate, terazosin, and vitamin B-12    Allergies   Allergen Reactions    Latex Rash     Not an immediate reaction    Nickel Rash    Penicillins Rash     Patient has tolerated amoxicillin in the past. Had PCN reaction of hives when she was 15.    Penicillins Rash     unk       Objective   Objective     Vital Signs:   Heart Rate:  [46-58] 51  Resp:  [14] 14  BP: ()/(43-68) 118/43       Physical Exam   Gen:  In bed, NAD, very hard of hearing but alert and conversant   Neuro: alert and oriented, clear speech, follows commands, grossly nonfocal  HEENT:  NC/AT   Neck:  Supple, no LAD  Heart RRR .  R IJ catheter.    Abd:  Soft, nontender, no rebound or guarding, pos BS  Extrem:  No c/c/e      Result Review:  I have personally reviewed the results from the time of this admission to 5/8/2025 01:31 EDT and agree with these findings:  []  Laboratory list / accordion  []  Microbiology  [x]  Radiology  [x]  EKG/Telemetry   []  Cardiology/Vascular   []  Pathology  [x]  Old records  []  Other:  Most notable findings include: hbb 7.  Plts 116.  Serum bicarb 13.  Heart enlarged.        LAB RESULTS:      Lab 05/07/25  2247   WBC 8.12   HEMOGLOBIN 7.0*   HEMATOCRIT 21.4*   PLATELETS 116*   NEUTROS ABS 6.02   IMMATURE  GRANS (ABS) 0.04   LYMPHS ABS 1.45   MONOS ABS 0.47   EOS ABS 0.10   MCV 93.0         Lab 05/07/25  2247   SODIUM 136   POTASSIUM 4.2   CHLORIDE 105   CO2 13.0*   ANION GAP 18.0*   BUN 68*   CREATININE 5.65*   EGFR 7.0*   GLUCOSE 186*   CALCIUM 9.8         Lab 05/07/25  2247   TOTAL PROTEIN 5.5*   ALBUMIN 3.7   GLOBULIN 1.8   ALT (SGPT) 6   AST (SGOT) 9   BILIRUBIN 0.2   ALK PHOS 49   LIPASE 50         Lab 05/07/25  2336 05/07/25  2247   PROBNP  --  3,104.0*   HSTROP T 66* 62*             Lab 05/07/25  2336   ABO TYPING O   RH TYPING Negative   ANTIBODY SCREEN Negative         UA          8/13/2024    20:00 11/15/2024    14:10   Urinalysis   Squamous Epithelial Cells, UA 0-2  None Seen    Specific [place], UA 1.015  1.008    Ketones, UA Negative  Negative    Blood, UA Negative  Negative    Leukocytes, UA Negative  Negative    Nitrite, UA Negative  Negative    RBC, UA 0-2  0-2    WBC, UA 0-2  0-2    Bacteria, UA None Seen  None Seen        Microbiology Results (last 10 days)       ** No results found for the last 240 hours. **            XR Chest 1 View  Result Date: 5/7/2025  XR CHEST 1 VW Date of Exam: 5/7/2025 10:30 PM EDT Indication: Chest Pain Triage Protocol Comparison: AP chest x-ray 11/21/2024 Findings: Tip of the right internal jugular central venous catheter terminates near the cavoatrial junction. Cardiac silhouette remains enlarged. Pulmonary edema pattern appears improved compared to previous chest x-ray. Lungs are adequately expanded and appear clear. No pneumothorax or large pleural effusion is seen.     Impression: Impression: Enlarged cardiac silhouette. No acute cardiopulmonary abnormality identified. Electronically Signed: Lizbeth Guerin MD  5/7/2025 10:45 PM EDT  Workstation ID: [id number]      Results for orders placed during the hospital encounter of 10/30/24    Adult Transthoracic Echo Complete W/ Cont if Necessary Per Protocol    Interpretation Summary    Left ventricular systolic function is  normal. Estimated left ventricular EF = 60%    Left ventricular wall thickness is consistent with mild concentric hypertrophy.    The left atrial cavity is moderately dilated.    Aortic sclerosis without aortic stenosis.  Mild to moderate aortic insufficiency    Mild mitral regurgitation.      Assessment & Plan   Assessment & Plan       Pain      85 yr old woman with ESRD who has stopped dialysis and is followed by hospice at home, comes in for uncontrolled diffuse pain.       Chest/jaw pain, transient   Chronic diffuse pain   - continue home fentanyl patch and oral meds, with dilaudid IV prn in small doses  - cardiac workup not warranted given overall situation and transience of pain   - Hospice consult in AM   - exacerbation may relate to uremia     Wants dialysis catheter removed  ESRD not on dialysis  met acidosis   - Will keep her NPO and order IR cath removal     Anemia  - due to renal disease  - Transfusion not likely to improve her and carries risk of pulm edema.  Defer     PAFib  - continue eliquis for now     VTE Prophylaxis:  Pharmacologic VTE prophylaxis orders are signed & held.            CODE STATUS:    Code Status and Medical Interventions: No CPR (Do Not Attempt to Resuscitate); Limited Support; No intubation (DNI)   Ordered at: 05/08/25 0130     Code Status (Patient has no pulse and is not breathing):    No CPR (Do Not Attempt to Resuscitate)     Medical Interventions (Patient has pulse or is breathing):    Limited Support     Medical Intervention Limits:    No intubation (DNI)       Expected Discharge   Expected discharge date/ time has not been documented.     Yesenia Carlisle MD  05/08/25

## 2025-05-08 NOTE — ED PROVIDER NOTES
EMERGENCY DEPARTMENT ENCOUNTER    Pt Name: Yanique Webster  MRN: [id number]  Pt :   1940  Room Number:    Date of encounter:  2025  PCP: Sebas Alicea MD  ED Provider: Sharif Shi MD    Historian: Patient and paramedics      HPI:  Chief Complaint: Chest pain        Context: Yanique Webster is a 85 y.o. female who presents to the ED c/o chest pain starting relatively suddenly and involving her neck and face at the same time.  She describes severe pain.   was called and she was transported to our facility.  The patient does not recall having pain similar to this previously.  She is suffering from end-stage renal disease and was temporarily on dialysis but quit dialysis 2 to 3 months ago.  She understands that she is likely to  prematurely because of this decision.  She is on hospice but continues to wish to have limited treatment of her various problems.  The patient reports having very dark stools over the last couple weeks.  She denies abdominal pain.  The patient reports back pain which is diffuse throughout her upper back and reproducible with movement as well as palpation.    PAST MEDICAL HISTORY  Past Medical History:   Diagnosis Date    Blurry vision     Chronic joint pain     Chronic pain     COPD (chronic obstructive pulmonary disease)     Coronary artery disease     Diabetic gastroparesis 2016    Esophageal stricture     s/p dilation in     GERD (gastroesophageal reflux disease)     Gout     Headache     secondary to hypertension    Hyperlipidemia     Hypertension     Long term current use of insulin     Neuropathy     Neuropathy due to type 2 diabetes mellitus     Obesity     Osteoarthritis     Pericarditis 2008    Stroke 2019    Type 2 diabetes mellitus          PAST SURGICAL HISTORY  Past Surgical History:   Procedure Laterality Date    BRONCHOSCOPY N/A 2016    Procedure: BRONCHOSCOPY BIOPSY AT BEDSIDE;  Surgeon: Mookie Willard MD;   Location:  [facility];  Service:     CARDIAC CATHETERIZATION N/A 2016    Procedure: Left Heart Cath;  Surgeon: Steve Geronimo MD;  Location:  [facility];  Service:     CARDIAC CATHETERIZATION N/A 2016    Procedure: Right Heart Cath;  Surgeon: Steve Geronimo MD;  Location:  [facility];  Service:     CARDIAC ELECTROPHYSIOLOGY PROCEDURE N/A 2019    Procedure: Loop insertion;  Surgeon: Steve Geronimo MD;  Location:  [facility];  Service: Cardiovascular    CARDIAC ELECTROPHYSIOLOGY PROCEDURE N/A 2023    Procedure: Loop recorder removal;  Surgeon: Steve Geronimo MD;  Location:  [facility];  Service: Cardiovascular;  Laterality: N/A;    CATARACT EXTRACTION      COLONOSCOPY N/A 2024    Procedure: COLONOSCOPY;  Surgeon: Brunner, Mark I, MD;  Location:  [facility];  Service: Gastroenterology;  Laterality: N/A;    ENDOSCOPY N/A 2024    Procedure: ESOPHAGOGASTRODUODENOSCOPY;  Surgeon: Brunner, Mark I, MD;  Location:  [facility];  Service: Gastroenterology;  Laterality: N/A;    ESOPHAGEAL DILATATION      HERNIA REPAIR      HIP CANNULATED SCREW PLACEMENT Left 2024    Procedure: HIP CANNULATED SCREW PLACEMENT LEFT;  Surgeon: Seymour Harrington MD;  Location: [facility];  Service: Orthopedics;  Laterality: Left;    HYSTERECTOMY  1998    WRIST FRACTURE SURGERY Left          FAMILY HISTORY  Family History   Problem Relation Age of Onset    Cancer Mother     Cancer Father     Cancer Other     Breast cancer Sister 67    Ovarian cancer Neg Hx          SOCIAL HISTORY  Social History     Socioeconomic History    Marital status:    Tobacco Use    Smoking status: Former     Current packs/day: 0.00     Types: Cigarettes     Quit date: 2003     Years since quittin.3    Smokeless tobacco: Never   Vaping Use    Vaping status: Never Used   Substance and Sexual Activity    Alcohol use: No    Drug use: No    Sexual  activity: Not Currently     Partners: Male     Birth control/protection: Pill, Hysterectomy         ALLERGIES  Latex, Nickel, Penicillins, and Penicillins        REVIEW OF SYSTEMS  Review of Systems       All systems reviewed and negative except for those discussed in HPI.       PHYSICAL EXAM    I have reviewed the triage vital signs and nursing notes.    ED Triage Vitals [05/07/25 2219]   Temp Heart Rate Resp BP SpO2   -- 58 14 124/68 98 %      Temp src Heart Rate Source Patient Position BP Location FiO2 (%)   -- -- -- -- --       Physical Exam  GENERAL:   Appears very pleasant but very weak appearing  HENT: Nares patent.  Slightly dry mucous membranes  EYES: No scleral icterus.  CV: Regular rhythm, borderline bradycardic rate.  No murmurs gallops rubs  RESPIRATORY: Normal effort.  No audible wheezes, rales or rhonchi.  Clear to auscultation  ABDOMEN: Soft, nontender  MUSCULOSKELETAL: No deformities.   NEURO: Alert, moves all extremities, follows commands.  SKIN: Pale, warm, dry, no rash visualized.  Chest: Dialysis catheter is in anterior right chest    LAB RESULTS  Recent Results (from the past 24 hours)   High Sensitivity Troponin T    Collection Time: 05/07/25 10:47 PM    Specimen: Blood   Result Value Ref Range    HS Troponin T 62 (C) <14 ng/L   Comprehensive Metabolic Panel    Collection Time: 05/07/25 10:47 PM    Specimen: Blood   Result Value Ref Range    Glucose 186 (H) 65 - 99 mg/dL    BUN 68 (H) 8 - 23 mg/dL    Creatinine 5.65 (H) 0.57 - 1.00 mg/dL    Sodium 136 136 - 145 mmol/L    Potassium 4.2 3.5 - 5.2 mmol/L    Chloride 105 98 - 107 mmol/L    CO2 13.0 (L) 22.0 - 29.0 mmol/L    Calcium 9.8 8.6 - 10.5 mg/dL    Total Protein 5.5 (L) 6.0 - 8.5 g/dL    Albumin 3.7 3.5 - 5.2 g/dL    ALT (SGPT) 6 1 - 33 U/L    AST (SGOT) 9 1 - 32 U/L    Alkaline Phosphatase 49 39 - 117 U/L    Total Bilirubin 0.2 0.0 - 1.2 mg/dL    Globulin 1.8 gm/dL    A/G Ratio 2.1 g/dL    BUN/Creatinine Ratio 12.0 7.0 - 25.0    Anion Gap  18.0 (H) 5.0 - 15.0 mmol/L    eGFR 7.0 (L) >60.0 mL/min/1.73   Lipase    Collection Time: 05/07/25 10:47 PM    Specimen: Blood   Result Value Ref Range    Lipase 50 13 - 60 U/L   BNP    Collection Time: 05/07/25 10:47 PM    Specimen: Blood   Result Value Ref Range    proBNP 3,104.0 (H) 0.0 - 1,800.0 pg/mL   Green Top (Gel)    Collection Time: 05/07/25 10:47 PM   Result Value Ref Range    Extra Tube Hold for add-ons.    Lavender Top    Collection Time: 05/07/25 10:47 PM   Result Value Ref Range    Extra Tube hold for add-on    Gold Top - SST    Collection Time: 05/07/25 10:47 PM   Result Value Ref Range    Extra Tube Hold for add-ons.    Gray Top    Collection Time: 05/07/25 10:47 PM   Result Value Ref Range    Extra Tube Hold for add-ons.    Light Blue Top    Collection Time: 05/07/25 10:47 PM   Result Value Ref Range    Extra Tube Hold for add-ons.    CBC Auto Differential    Collection Time: 05/07/25 10:47 PM    Specimen: Blood   Result Value Ref Range    WBC 8.12 3.40 - 10.80 10*3/mm3    RBC 2.30 (L) 3.77 - 5.28 10*6/mm3    Hemoglobin 7.0 (L) 12.0 - 15.9 g/dL    Hematocrit 21.4 (L) 34.0 - 46.6 %    MCV 93.0 79.0 - 97.0 fL    MCH 30.4 26.6 - 33.0 pg    MCHC 32.7 31.5 - 35.7 g/dL    RDW 14.2 12.3 - 15.4 %    RDW-SD 48.1 37.0 - 54.0 fl    MPV 10.1 6.0 - 12.0 fL    Platelets 116 (L) 140 - 450 10*3/mm3    Neutrophil % 74.1 42.7 - 76.0 %    Lymphocyte % 17.9 (L) 19.6 - 45.3 %    Monocyte % 5.8 5.0 - 12.0 %    Eosinophil % 1.2 0.3 - 6.2 %    Basophil % 0.5 0.0 - 1.5 %    Immature Grans % 0.5 0.0 - 0.5 %    Neutrophils, Absolute 6.02 1.70 - 7.00 10*3/mm3    Lymphocytes, Absolute 1.45 0.70 - 3.10 10*3/mm3    Monocytes, Absolute 0.47 0.10 - 0.90 10*3/mm3    Eosinophils, Absolute 0.10 0.00 - 0.40 10*3/mm3    Basophils, Absolute 0.04 0.00 - 0.20 10*3/mm3    Immature Grans, Absolute 0.04 0.00 - 0.05 10*3/mm3    nRBC 0.0 0.0 - 0.2 /100 WBC   ECG 12 Lead Chest Pain    Collection Time: 05/07/25 10:58 PM   Result Value Ref Range     QT Interval 490 ms    QTC Interval 433 ms   ECG 12 Lead Chest Pain    Collection Time: 05/07/25 11:07 PM   Result Value Ref Range    QT Interval 488 ms    QTC Interval 427 ms   Type & Screen    Collection Time: 05/07/25 11:36 PM    Specimen: Blood   Result Value Ref Range    ABO Type O     RH type Negative     Antibody Screen Negative     T&S Expiration Date 5/10/2025 11:59:59 PM    High Sensitivity Troponin T 1Hr    Collection Time: 05/07/25 11:36 PM    Specimen: Blood   Result Value Ref Range    HS Troponin T 66 (C) <14 ng/L    Troponin T Numeric Delta 4 ng/L    Troponin T % Delta 6 Abnormal if >/= 20%   POC Occult Blood Stool    Collection Time: 05/08/25 12:23 AM    Specimen: Per Rectum; Stool   Result Value Ref Range    Fecal Occult Blood Negative     Lot Number [id number]     Expiration Date 4/27     DEVELOPER LOT NUMBER [id number]     DEVELOPER EXPIRATION DATE 12/27     Positive Control Positive     Negative Control Negative        If labs were ordered, I independently reviewed the results and considered them in treating the patient.        RADIOLOGY  XR Chest 1 View  Result Date: 5/7/2025  XR CHEST 1 VW Date of Exam: 5/7/2025 10:30 PM EDT Indication: Chest Pain Triage Protocol Comparison: AP chest x-ray 11/21/2024 Findings: Tip of the right internal jugular central venous catheter terminates near the cavoatrial junction. Cardiac silhouette remains enlarged. Pulmonary edema pattern appears improved compared to previous chest x-ray. Lungs are adequately expanded and appear clear. No pneumothorax or large pleural effusion is seen.     Impression: Enlarged cardiac silhouette. No acute cardiopulmonary abnormality identified. Electronically Signed: Lizbeth Guerin MD  5/7/2025 10:45 PM EDT  Workstation ID: [id number]      I ordered and independently reviewed the above noted radiographic studies.      I viewed images of chest x-ray which showed cardiomegaly without overt failure per my independent interpretation.    See  radiologist's dictation for official interpretation.        PROCEDURES    Procedures    ECG 12 Lead Chest Pain   Final Result   Test Reason : Chest Pain   Blood Pressure :   */*   mmHG   Vent. Rate :  46 BPM     Atrial Rate :  46 BPM      P-R Int : 184 ms          QRS Dur :  96 ms       QT Int : 488 ms       P-R-T Axes :  87  40  58 degrees     QTcB Int : 427 ms      Sinus bradycardia   Otherwise normal ECG   Confirmed by OSCAR AYALA MD ([id number]) on 5/7/2025 11:28:48 PM      Referred By: EDMD           Confirmed By: OSCAR AYALA MD      ECG 12 Lead Chest Pain   Final Result   Test Reason : Chest Pain   Blood Pressure :   */*   mmHG   Vent. Rate :  47 BPM     Atrial Rate :  47 BPM      P-R Int : 186 ms          QRS Dur :  94 ms       QT Int : 490 ms       P-R-T Axes :  69  33   1 degrees     QTcB Int : 433 ms      Sinus bradycardia   T wave abnormality, consider inferior ischemia   Abnormal ECG   Poor data quality.   Confirmed by OSCAR AYALA MD ([id number]) on 5/7/2025 11:28:40 PM      Referred By: EDMD           Confirmed By: OSCAR AYALA MD          MEDICATIONS GIVEN IN ER    Medications   sodium chloride 0.9 % flush 10 mL (has no administration in time range)   Lidocaine 4 % 2 patch (has no administration in time range)   HYDROmorphone (DILAUDID) injection 0.25 mg (has no administration in time range)   ondansetron (ZOFRAN) injection 4 mg (has no administration in time range)   pantoprazole (PROTONIX) injection 40 mg (40 mg Intravenous Given 5/7/25 2323)         MEDICAL DECISION MAKING, PROGRESS, and CONSULTS    All labs, if obtained, have been independently reviewed by me.  All radiology studies, if obtained, have been reviewed by me and the radiologist dictating the report.  All EKGs, if obtained, have been independently viewed and interpreted by me/my attending physician.      Discussion below represents my analysis of pertinent findings related to patient's condition, differential diagnosis, treatment plan and  final disposition.                                          Differential diagnosis:    Consider acute coronary syndrome, aortic dissection, chest wall strain, reflux, etc.      Additional sources:    - Discussed/ obtained information from independent historians: Paramedics gave report at bedside    - External (non-ED) record review: Reviewed last discharge summary from our facility dated 11/27/2024 when the patient was admitted for acute on chronic renal failure, anemia of chronic disease, etc.    - Chronic or social conditions impacting care: End-stage renal disease.  Hospice patient.        Orders placed during this visit:  Orders Placed This Encounter   Procedures    XR Chest 1 View    Busy Draw    High Sensitivity Troponin T    Comprehensive Metabolic Panel    Lipase    BNP    CBC Auto Differential    High Sensitivity Troponin T 1Hr    NPO Diet NPO Type: Strict NPO    Undress & Gown    Continuous Pulse Oximetry    Verify Informed Consent    Oxygen Therapy- Nasal Cannula; Titrate 1-6 LPM Per SpO2; 90 - 95%    POC Occult Blood Stool    ECG 12 Lead Chest Pain    ECG 12 Lead ED Triage Standing Order; Chest Pain    Type & Screen    Insert Peripheral IV    CBC & Differential    Green Top (Gel)    Lavender Top    Gold Top - SST    Gray Top    Light Blue Top         Additional orders considered but not ordered:  CTA chest    ED Course:    Consultants: Hospitalist    ED Course as of 05/08/25 0056   Wed May 07, 2025   2309 BP taken at 2230 inaccurate dt [MS]   2329 I strongly considered administration of unit of packed red blood cells given the patient's hemoglobin of 7.0.  I did review her records and see that she runs quite low with likely anemia of chronic kidney disease.  I spoke with her about transfusion and if she would like to be transfused if we think that it will help with her symptoms.  I am concerned however that we will fluid overload her and put her into acute respiratory distress if we administer this.   Initially had ordered 1 unit over 3-1/2 hours but will await stool guaiac, repeat H&H, etc. and further discussion of risk/benefit. [MS]   2330 The patient is under hospice care.  She was told that if she did not undergo dialysis that she would likely die from this.  She is happy that that has not occurred thus far but does not want overly aggressive care especially dialysis to keep her alive. [MS]   u May 08, 2025   0049 The patient's chest discomfort has resolved but the patient is having persistent back pain.  She is on a fentanyl patch and takes Dilaudid on a as needed basis.  Her last dose was 4 hours ago.  I will give her another small dose here in the emergency department.  I did speak with her about returning to home versus admission to the hospital for further diagnostic workup and treatment.  She definitely prefers admission. [MS]      ED Course User Index  [MS] Sharif Shi MD              Shared Decision Making:  After my consideration of clinical presentation and any laboratory/radiology studies obtained, I discussed the findings with the patient/patient representative who is in agreement with the treatment plan and the final disposition.   Risks and benefits of discharge and/or observation/admission were discussed.       AS OF 00:56 EDT VITALS:    BP - 123/48  HR - (!) 46  TEMP -    O2 SATS - 98%                  DIAGNOSIS  Final diagnoses:   Intractable back pain   Nonspecific chest pain   Anemia of chronic disease   Hospice care patient         DISPOSITION  Admission      Please note that portions of this document were completed with voice recognition software.

## 2025-05-08 NOTE — SIGNIFICANT NOTE
Exam confirms with auscultation zero audible heart tones and zero audible respirations. Ms.Wilda Quintin Webster was pronounced dead at 0345.  MD notified by Patient's RN.    Nalini Winchester RN  Clinical House Supervisor  5/8/2025 05:59 EDT

## 2025-05-09 NOTE — DISCHARGE SUMMARY
[facility] Medicine Services  DEATH SUMMARY    Patient Name: Yanique Webster  : 1940  MRN: [id number]    Date of Admission: 2025  Date of Death:  2025  Time of Death:  034    Primary Care Physician: Sebas Alicea MD    Consults       No orders found for last 30 day(s).            Summary of Hospital Events   Presenting Problem: chest pain     Active Hospital Problems    Diagnosis  POA    **Pain [R52]  Yes      Resolved Hospital Problems   No resolved problems to display.          Hospital Course:  Yanique Webster was a 85 y.o. female with a past medical history of ESRD, chronic pain, COPD CAD DM HTN HL CVA.   Since electing to stop hemodialysis, she has been at home with Hospice following.      She came to the ED after having a bout of unusual pain in her chest that radiated to her neck and jaw.      The pain largely resolved in the ER with lowdose opiates.  She continued to note some upper back pain.  Her vitals were stable aside from sinus bradycardia, and her workup was notable for metabolic acidosis.  She was admitted for overnight observation for pain management.      Shortly after arriving in her room, she had a spell of agonal breathing during which her HR dropped to 20s.  RRT was called.  Heart rate recovered and she remained in sinus rhythm but was minimally responsive with BP now in the 70s systolic.  Her family confirmed that she would not want any resuscitative measures, just comfort-focused care.      She passed away shortly thereafter      Official Cause of Death and any directly related diagnoses:    Cardiac arrest   Due to metabolic derangement  Due to end stage renal disease       Yesenia Carlisle MD  25

## (undated) DEVICE — PK MAJ FX HIP 10

## (undated) DEVICE — INTRO ACCSR BLNT TP

## (undated) DEVICE — SPK10295 ORTHOPEDIC FRACTURE KIT: Brand: SPK10295 ORTHOPEDIC FRACTURE KIT

## (undated) DEVICE — IMPLANTABLE DEVICE
Type: IMPLANTABLE DEVICE | Site: HIP | Status: NON-FUNCTIONAL
Removed: 2024-01-02

## (undated) DEVICE — SUT VIC 2/0 CT2 27IN J269H

## (undated) DEVICE — PREMIUM DRY TRAY LF: Brand: MEDLINE INDUSTRIES, INC.

## (undated) DEVICE — TUBING, SUCTION, 1/4" X 10', STRAIGHT: Brand: MEDLINE

## (undated) DEVICE — Device

## (undated) DEVICE — SKN PREP SPNG STKS PVP PNT STR: Brand: MEDLINE INDUSTRIES, INC.

## (undated) DEVICE — SOL IRR H2O BTL 1000ML STRL

## (undated) DEVICE — MARKER,SKIN,W/RULER,DUAL,STOP: Brand: MEDLINE

## (undated) DEVICE — DRSNG WND STRIP OPTIFOAM AG SUPRABS A/MIC 4X8IN STRL

## (undated) DEVICE — DRAPE,ANGIO,BRACH,STERILE,38X44: Brand: MEDLINE

## (undated) DEVICE — SAFELINER SUCTION CANISTER 1000CC: Brand: DEROYAL

## (undated) DEVICE — PROXIMATE RH ROTATING HEAD SKIN STAPLERS (35 WIDE) CONTAINS 35 STAINLESS STEEL STAPLES: Brand: PROXIMATE

## (undated) DEVICE — ST INF PRI SMRTSTE 20DRP 2VLV 24ML 117

## (undated) DEVICE — ADAPT CLN LUM OLYMP AIR/H20

## (undated) DEVICE — SCRB SURG BACTOSHIELD CHG 4PCT 4OZ

## (undated) DEVICE — SHEET,DRAPE,53X77,STERILE: Brand: MEDLINE

## (undated) DEVICE — GOWN,REINF,POLY,ECL,PP SLV,XXL: Brand: MEDLINE

## (undated) DEVICE — UNDERGLV SURG BIOGEL INDICAT PI SZ8.5 BLU

## (undated) DEVICE — CONTN GRAD MEAS TRIANG 32OZ BLK

## (undated) DEVICE — DEBRIDEMENT KIT: Brand: MEDLINE INDUSTRIES, INC.

## (undated) DEVICE — GLV SURG SENSICARE ORTHO PF LF 8.5 STRL

## (undated) DEVICE — 3M™ IOBAN™ 2 ANTIMICROBIAL INCISE DRAPE 6650EZ: Brand: IOBAN™ 2

## (undated) DEVICE — KT PUMP INFUBLOCK MDL 2100 PMKITSOLIS

## (undated) DEVICE — LUBE JELLY FOIL PACK 1.4 OZ: Brand: MEDLINE INDUSTRIES, INC.

## (undated) DEVICE — GRADUATE CONTN 1000ML

## (undated) DEVICE — ST LINER SAFECAP GRN RED CP STRL

## (undated) DEVICE — KT ORCA ORCAPOD DISP STRL

## (undated) DEVICE — FIRST STEP BEDSIDE ADD WATER KIT - RESEALABLE STAND-UP POUCH, ENDOSCOPIC CLEANING PAD - 1 POUCH: Brand: FIRST STEP BEDSIDE ADD WATER KIT - RESEALABLE STAND-UP POUCH, ENDOSCOPIC CLEANIN

## (undated) DEVICE — DRP C/ARMOR

## (undated) DEVICE — CAMERA/LASER ARM DRAPE: Brand: DEROYAL

## (undated) DEVICE — C-ARM DRAPE: Brand: DEROYAL

## (undated) DEVICE — HYBRID CO2 TUBING/CAP SET FOR OLYMPUS® SCOPES & CO2 SOURCE: Brand: ERBE

## (undated) DEVICE — THE BITE BLOCK MAXI, LATEX FREE STRAP IS USED TO PROTECT THE ENDOSCOPE INSERTION TUBE FROM BEING BITTEN BY THE PATIENT.

## (undated) DEVICE — SOL ISO/ALC RUB 70PCT 4OZ

## (undated) DEVICE — SOLIDIFIER LIQ PREMISORB 1500CC

## (undated) DEVICE — SYR LUERLOK 50ML